# Patient Record
Sex: FEMALE | Race: WHITE | HISPANIC OR LATINO | Employment: OTHER | URBAN - METROPOLITAN AREA
[De-identification: names, ages, dates, MRNs, and addresses within clinical notes are randomized per-mention and may not be internally consistent; named-entity substitution may affect disease eponyms.]

---

## 2017-07-21 ENCOUNTER — ALLSCRIPTS OFFICE VISIT (OUTPATIENT)
Dept: OTHER | Facility: OTHER | Age: 62
End: 2017-07-21

## 2017-07-21 ENCOUNTER — LAB REQUISITION (OUTPATIENT)
Dept: LAB | Facility: HOSPITAL | Age: 62
End: 2017-07-21
Payer: COMMERCIAL

## 2017-07-21 DIAGNOSIS — R30.0 DYSURIA: ICD-10-CM

## 2017-07-21 DIAGNOSIS — Z12.31 ENCOUNTER FOR SCREENING MAMMOGRAM FOR MALIGNANT NEOPLASM OF BREAST: ICD-10-CM

## 2017-07-21 DIAGNOSIS — Z12.4 ENCOUNTER FOR SCREENING FOR MALIGNANT NEOPLASM OF CERVIX: ICD-10-CM

## 2017-07-21 DIAGNOSIS — Z78.0 ASYMPTOMATIC MENOPAUSAL STATE: ICD-10-CM

## 2017-07-21 DIAGNOSIS — Z11.51 ENCOUNTER FOR SCREENING FOR HUMAN PAPILLOMAVIRUS (HPV): ICD-10-CM

## 2017-07-21 PROCEDURE — 87086 URINE CULTURE/COLONY COUNT: CPT | Performed by: NURSE PRACTITIONER

## 2017-07-21 PROCEDURE — 87624 HPV HI-RISK TYP POOLED RSLT: CPT | Performed by: NURSE PRACTITIONER

## 2017-07-21 PROCEDURE — G0145 SCR C/V CYTO,THINLAYER,RESCR: HCPCS | Performed by: NURSE PRACTITIONER

## 2017-07-24 LAB — BACTERIA UR CULT: NORMAL

## 2017-07-26 LAB
HPV RRNA GENITAL QL NAA+PROBE: NORMAL
LAB AP GYN PRIMARY INTERPRETATION: NORMAL
Lab: NORMAL

## 2017-09-26 ENCOUNTER — GENERIC CONVERSION - ENCOUNTER (OUTPATIENT)
Dept: OTHER | Facility: OTHER | Age: 62
End: 2017-09-26

## 2018-01-12 VITALS
WEIGHT: 163 LBS | SYSTOLIC BLOOD PRESSURE: 118 MMHG | BODY MASS INDEX: 28.88 KG/M2 | HEIGHT: 63 IN | DIASTOLIC BLOOD PRESSURE: 64 MMHG

## 2018-01-13 NOTE — MISCELLANEOUS
Provider Comments  Provider Comments:   Dear Jacques Sage,    You have missed your appointment with Dr Karri Petersen on 9/26/2017  Please call our office to reschedule at [phone]      Thank you,    [facility]      Signatures   Electronically signed by : Kalyn Shipman, ; Sep 26 2017 11:22AM EST                       (Author)

## 2018-12-29 ENCOUNTER — HOSPITAL ENCOUNTER (INPATIENT)
Facility: HOSPITAL | Age: 63
LOS: 12 days | Discharge: HOME | DRG: 246 | End: 2019-01-10
Attending: EMERGENCY MEDICINE | Admitting: INTERNAL MEDICINE
Payer: COMMERCIAL

## 2018-12-29 ENCOUNTER — APPOINTMENT (EMERGENCY)
Dept: RADIOLOGY | Facility: HOSPITAL | Age: 63
DRG: 246 | End: 2018-12-29
Payer: COMMERCIAL

## 2018-12-29 DIAGNOSIS — D64.9 ANEMIA: ICD-10-CM

## 2018-12-29 DIAGNOSIS — I10 ESSENTIAL HYPERTENSION: ICD-10-CM

## 2018-12-29 DIAGNOSIS — I50.9 CONGESTIVE HEART FAILURE OF UNKNOWN ETIOLOGY (HCC): Primary | ICD-10-CM

## 2018-12-29 DIAGNOSIS — R07.9 CHEST PAIN: ICD-10-CM

## 2018-12-29 DIAGNOSIS — N18.30 STAGE 3 CHRONIC KIDNEY DISEASE (HCC): ICD-10-CM

## 2018-12-29 DIAGNOSIS — I25.119 CORONARY ARTERY DISEASE INVOLVING NATIVE CORONARY ARTERY OF NATIVE HEART WITH ANGINA PECTORIS (HCC): ICD-10-CM

## 2018-12-29 DIAGNOSIS — I21.A1 TYPE 2 MYOCARDIAL INFARCTION (HCC): ICD-10-CM

## 2018-12-29 DIAGNOSIS — J90 PLEURAL EFFUSION ON RIGHT: ICD-10-CM

## 2018-12-29 DIAGNOSIS — I50.31 ACUTE DIASTOLIC CONGESTIVE HEART FAILURE (HCC): ICD-10-CM

## 2018-12-29 DIAGNOSIS — R60.0 EDEMA OF BOTH LOWER EXTREMITIES: ICD-10-CM

## 2018-12-29 DIAGNOSIS — N28.9 KIDNEY INSUFFICIENCY: ICD-10-CM

## 2018-12-29 DIAGNOSIS — R06.01 ORTHOPNEA: ICD-10-CM

## 2018-12-29 DIAGNOSIS — E11.9 DIABETES MELLITUS (HCC): ICD-10-CM

## 2018-12-29 DIAGNOSIS — R06.00 DYSPNEA ON EXERTION: ICD-10-CM

## 2018-12-29 DIAGNOSIS — E11.9 DM (DIABETES MELLITUS) (HCC): ICD-10-CM

## 2018-12-29 DIAGNOSIS — N17.9 AKI (ACUTE KIDNEY INJURY) (HCC): ICD-10-CM

## 2018-12-29 PROBLEM — E78.5 HYPERLIPIDEMIA: Status: ACTIVE | Noted: 2018-12-29

## 2018-12-29 PROBLEM — E87.70 VOLUME OVERLOAD: Status: ACTIVE | Noted: 2018-12-29

## 2018-12-29 PROBLEM — E03.9 HYPOTHYROIDISM: Status: ACTIVE | Noted: 2018-12-29

## 2018-12-29 PROBLEM — R09.89 SUSPECTED CHF (CONGESTIVE HEART FAILURE): Status: ACTIVE | Noted: 2018-12-29

## 2018-12-29 LAB
ALBUMIN SERPL BCP-MCNC: 3 G/DL (ref 3.5–5)
ALP SERPL-CCNC: 636 U/L (ref 46–116)
ALT SERPL W P-5'-P-CCNC: 46 U/L (ref 12–78)
ANION GAP SERPL CALCULATED.3IONS-SCNC: 7 MMOL/L (ref 4–13)
AST SERPL W P-5'-P-CCNC: 48 U/L (ref 5–45)
ATRIAL RATE: 86 BPM
BACTERIA UR QL AUTO: ABNORMAL /HPF
BASOPHILS # BLD AUTO: 0.04 THOUSANDS/ΜL (ref 0–0.1)
BASOPHILS NFR BLD AUTO: 1 % (ref 0–1)
BILIRUB SERPL-MCNC: 0.3 MG/DL (ref 0.2–1)
BILIRUB UR QL STRIP: NEGATIVE
BUN SERPL-MCNC: 30 MG/DL (ref 5–25)
CALCIUM SERPL-MCNC: 8.7 MG/DL (ref 8.3–10.1)
CHLORIDE SERPL-SCNC: 112 MMOL/L (ref 100–108)
CHOLEST SERPL-MCNC: 159 MG/DL (ref 50–200)
CLARITY UR: CLEAR
CO2 SERPL-SCNC: 21 MMOL/L (ref 21–32)
COLOR UR: YELLOW
COLOR, POC: NORMAL
CREAT SERPL-MCNC: 1.44 MG/DL (ref 0.6–1.3)
EOSINOPHIL # BLD AUTO: 0.21 THOUSAND/ΜL (ref 0–0.61)
EOSINOPHIL NFR BLD AUTO: 3 % (ref 0–6)
ERYTHROCYTE [DISTWIDTH] IN BLOOD BY AUTOMATED COUNT: 13.5 % (ref 11.6–15.1)
EST. AVERAGE GLUCOSE BLD GHB EST-MCNC: 169 MG/DL
FERRITIN SERPL-MCNC: 38 NG/ML (ref 8–388)
FOLATE SERPL-MCNC: >20 NG/ML (ref 3.1–17.5)
GFR SERPL CREATININE-BSD FRML MDRD: 38 ML/MIN/1.73SQ M
GLUCOSE SERPL-MCNC: 162 MG/DL (ref 65–140)
GLUCOSE SERPL-MCNC: 187 MG/DL (ref 65–140)
GLUCOSE SERPL-MCNC: 253 MG/DL (ref 65–140)
GLUCOSE UR STRIP-MCNC: ABNORMAL MG/DL
HBA1C MFR BLD: 7.5 % (ref 4.2–6.3)
HCT VFR BLD AUTO: 28.1 % (ref 34.8–46.1)
HDLC SERPL-MCNC: 65 MG/DL (ref 40–60)
HGB BLD-MCNC: 8.4 G/DL (ref 11.5–15.4)
HGB UR QL STRIP.AUTO: ABNORMAL
HYALINE CASTS #/AREA URNS LPF: ABNORMAL /LPF
IMM GRANULOCYTES # BLD AUTO: 0.02 THOUSAND/UL (ref 0–0.2)
IMM GRANULOCYTES NFR BLD AUTO: 0 % (ref 0–2)
IRON SATN MFR SERPL: 22 %
IRON SERPL-MCNC: 68 UG/DL (ref 50–170)
KETONES UR STRIP-MCNC: NEGATIVE MG/DL
LDLC SERPL CALC-MCNC: 72 MG/DL (ref 0–100)
LEUKOCYTE ESTERASE UR QL STRIP: NEGATIVE
LIPASE SERPL-CCNC: 147 U/L (ref 73–393)
LYMPHOCYTES # BLD AUTO: 1.55 THOUSANDS/ΜL (ref 0.6–4.47)
LYMPHOCYTES NFR BLD AUTO: 23 % (ref 14–44)
MCH RBC QN AUTO: 26.8 PG (ref 26.8–34.3)
MCHC RBC AUTO-ENTMCNC: 29.9 G/DL (ref 31.4–37.4)
MCV RBC AUTO: 90 FL (ref 82–98)
MONOCYTES # BLD AUTO: 0.42 THOUSAND/ΜL (ref 0.17–1.22)
MONOCYTES NFR BLD AUTO: 6 % (ref 4–12)
NEUTROPHILS # BLD AUTO: 4.58 THOUSANDS/ΜL (ref 1.85–7.62)
NEUTS SEG NFR BLD AUTO: 67 % (ref 43–75)
NITRITE UR QL STRIP: NEGATIVE
NON-SQ EPI CELLS URNS QL MICRO: ABNORMAL /HPF
NONHDLC SERPL-MCNC: 94 MG/DL
NRBC BLD AUTO-RTO: 0 /100 WBCS
NT-PROBNP SERPL-MCNC: 8663 PG/ML
P AXIS: 92 DEGREES
PH UR STRIP.AUTO: 5.5 [PH] (ref 4.5–8)
PLATELET # BLD AUTO: 174 THOUSANDS/UL (ref 149–390)
PLATELET # BLD AUTO: 184 THOUSANDS/UL (ref 149–390)
PMV BLD AUTO: 10.1 FL (ref 8.9–12.7)
PMV BLD AUTO: 9.9 FL (ref 8.9–12.7)
POTASSIUM SERPL-SCNC: 4.2 MMOL/L (ref 3.5–5.3)
PR INTERVAL: 176 MS
PROT SERPL-MCNC: 7.5 G/DL (ref 6.4–8.2)
PROT UR STRIP-MCNC: >=300 MG/DL
QRS AXIS: 223 DEGREES
QRSD INTERVAL: 82 MS
QT INTERVAL: 356 MS
QTC INTERVAL: 426 MS
RBC # BLD AUTO: 3.13 MILLION/UL (ref 3.81–5.12)
RBC #/AREA URNS AUTO: ABNORMAL /HPF
SODIUM SERPL-SCNC: 140 MMOL/L (ref 136–145)
SP GR UR STRIP.AUTO: >=1.03 (ref 1–1.03)
T WAVE AXIS: 57 DEGREES
T4 FREE SERPL-MCNC: 0.83 NG/DL (ref 0.76–1.46)
TIBC SERPL-MCNC: 309 UG/DL (ref 250–450)
TRIGL SERPL-MCNC: 109 MG/DL
TROPONIN I SERPL-MCNC: 0.16 NG/ML
TROPONIN I SERPL-MCNC: 0.2 NG/ML
TROPONIN I SERPL-MCNC: 0.21 NG/ML
TSH SERPL DL<=0.05 MIU/L-ACNC: 6.51 UIU/ML (ref 0.36–3.74)
UROBILINOGEN UR QL STRIP.AUTO: 0.2 E.U./DL
VENTRICULAR RATE: 86 BPM
VIT B12 SERPL-MCNC: 545 PG/ML (ref 100–900)
WBC # BLD AUTO: 6.82 THOUSAND/UL (ref 4.31–10.16)
WBC #/AREA URNS AUTO: ABNORMAL /HPF

## 2018-12-29 PROCEDURE — 83540 ASSAY OF IRON: CPT | Performed by: INTERNAL MEDICINE

## 2018-12-29 PROCEDURE — 99285 EMERGENCY DEPT VISIT HI MDM: CPT

## 2018-12-29 PROCEDURE — 82948 REAGENT STRIP/BLOOD GLUCOSE: CPT

## 2018-12-29 PROCEDURE — 80061 LIPID PANEL: CPT | Performed by: PHYSICIAN ASSISTANT

## 2018-12-29 PROCEDURE — 84165 PROTEIN E-PHORESIS SERUM: CPT | Performed by: PATHOLOGY

## 2018-12-29 PROCEDURE — 83880 ASSAY OF NATRIURETIC PEPTIDE: CPT | Performed by: EMERGENCY MEDICINE

## 2018-12-29 PROCEDURE — 83036 HEMOGLOBIN GLYCOSYLATED A1C: CPT | Performed by: PHYSICIAN ASSISTANT

## 2018-12-29 PROCEDURE — 83550 IRON BINDING TEST: CPT | Performed by: INTERNAL MEDICINE

## 2018-12-29 PROCEDURE — 80053 COMPREHEN METABOLIC PANEL: CPT | Performed by: EMERGENCY MEDICINE

## 2018-12-29 PROCEDURE — 82728 ASSAY OF FERRITIN: CPT | Performed by: INTERNAL MEDICINE

## 2018-12-29 PROCEDURE — 99223 1ST HOSP IP/OBS HIGH 75: CPT | Performed by: INTERNAL MEDICINE

## 2018-12-29 PROCEDURE — 84443 ASSAY THYROID STIM HORMONE: CPT | Performed by: EMERGENCY MEDICINE

## 2018-12-29 PROCEDURE — 93010 ELECTROCARDIOGRAM REPORT: CPT | Performed by: INTERNAL MEDICINE

## 2018-12-29 PROCEDURE — 93005 ELECTROCARDIOGRAM TRACING: CPT

## 2018-12-29 PROCEDURE — 96374 THER/PROPH/DIAG INJ IV PUSH: CPT

## 2018-12-29 PROCEDURE — 85025 COMPLETE CBC W/AUTO DIFF WBC: CPT | Performed by: EMERGENCY MEDICINE

## 2018-12-29 PROCEDURE — 83690 ASSAY OF LIPASE: CPT | Performed by: EMERGENCY MEDICINE

## 2018-12-29 PROCEDURE — 36415 COLL VENOUS BLD VENIPUNCTURE: CPT | Performed by: EMERGENCY MEDICINE

## 2018-12-29 PROCEDURE — 82607 VITAMIN B-12: CPT | Performed by: INTERNAL MEDICINE

## 2018-12-29 PROCEDURE — 84484 ASSAY OF TROPONIN QUANT: CPT | Performed by: EMERGENCY MEDICINE

## 2018-12-29 PROCEDURE — 84484 ASSAY OF TROPONIN QUANT: CPT | Performed by: PHYSICIAN ASSISTANT

## 2018-12-29 PROCEDURE — 71046 X-RAY EXAM CHEST 2 VIEWS: CPT

## 2018-12-29 PROCEDURE — 85049 AUTOMATED PLATELET COUNT: CPT | Performed by: PHYSICIAN ASSISTANT

## 2018-12-29 PROCEDURE — 82746 ASSAY OF FOLIC ACID SERUM: CPT | Performed by: INTERNAL MEDICINE

## 2018-12-29 PROCEDURE — 81001 URINALYSIS AUTO W/SCOPE: CPT

## 2018-12-29 PROCEDURE — 99253 IP/OBS CNSLTJ NEW/EST LOW 45: CPT | Performed by: INTERNAL MEDICINE

## 2018-12-29 PROCEDURE — 84439 ASSAY OF FREE THYROXINE: CPT | Performed by: EMERGENCY MEDICINE

## 2018-12-29 RX ORDER — FUROSEMIDE 10 MG/ML
40 INJECTION INTRAMUSCULAR; INTRAVENOUS
Status: DISCONTINUED | OUTPATIENT
Start: 2018-12-29 | End: 2019-01-01

## 2018-12-29 RX ORDER — NITROGLYCERIN 20 MG/100ML
INJECTION INTRAVENOUS
Status: COMPLETED
Start: 2018-12-29 | End: 2018-12-29

## 2018-12-29 RX ORDER — FUROSEMIDE 10 MG/ML
20 INJECTION INTRAMUSCULAR; INTRAVENOUS ONCE
Status: COMPLETED | OUTPATIENT
Start: 2018-12-29 | End: 2018-12-29

## 2018-12-29 RX ORDER — GABAPENTIN 100 MG/1
100 CAPSULE ORAL
COMMUNITY
Start: 2018-12-28 | End: 2020-02-19

## 2018-12-29 RX ORDER — AMLODIPINE BESYLATE 5 MG/1
5 TABLET ORAL DAILY
Status: DISCONTINUED | OUTPATIENT
Start: 2018-12-29 | End: 2018-12-30

## 2018-12-29 RX ORDER — LEVOTHYROXINE SODIUM 112 UG/1
112 TABLET ORAL
Status: DISCONTINUED | OUTPATIENT
Start: 2018-12-30 | End: 2018-12-29

## 2018-12-29 RX ORDER — HEPARIN SODIUM 5000 [USP'U]/ML
5000 INJECTION, SOLUTION INTRAVENOUS; SUBCUTANEOUS EVERY 8 HOURS SCHEDULED
Status: DISCONTINUED | OUTPATIENT
Start: 2018-12-29 | End: 2019-01-06

## 2018-12-29 RX ORDER — REPAGLINIDE 0.5 MG/1
0.5 TABLET ORAL
Status: ON HOLD | COMMUNITY
Start: 2018-10-11 | End: 2019-01-10

## 2018-12-29 RX ORDER — ATORVASTATIN CALCIUM 40 MG/1
40 TABLET, FILM COATED ORAL
Status: DISCONTINUED | OUTPATIENT
Start: 2018-12-29 | End: 2019-01-10 | Stop reason: HOSPADM

## 2018-12-29 RX ORDER — LEVOTHYROXINE SODIUM 112 UG/1
56 TABLET ORAL
Status: DISCONTINUED | OUTPATIENT
Start: 2018-12-30 | End: 2019-01-10 | Stop reason: HOSPADM

## 2018-12-29 RX ORDER — ONDANSETRON 2 MG/ML
4 INJECTION INTRAMUSCULAR; INTRAVENOUS EVERY 6 HOURS PRN
Status: DISCONTINUED | OUTPATIENT
Start: 2018-12-29 | End: 2019-01-10 | Stop reason: HOSPADM

## 2018-12-29 RX ORDER — FUROSEMIDE 10 MG/ML
20 INJECTION INTRAMUSCULAR; INTRAVENOUS ONCE
Status: CANCELLED | OUTPATIENT
Start: 2018-12-29

## 2018-12-29 RX ORDER — ATORVASTATIN CALCIUM 40 MG/1
40 TABLET, FILM COATED ORAL DAILY
COMMUNITY
Start: 2018-08-30 | End: 2019-02-01 | Stop reason: SDUPTHER

## 2018-12-29 RX ORDER — LEVOTHYROXINE SODIUM 112 UG/1
TABLET ORAL
Status: ON HOLD | COMMUNITY
Start: 2018-10-11 | End: 2021-02-27 | Stop reason: SDUPTHER

## 2018-12-29 RX ORDER — LEVOTHYROXINE SODIUM 112 UG/1
112 TABLET ORAL
Status: DISCONTINUED | OUTPATIENT
Start: 2018-12-31 | End: 2019-01-10 | Stop reason: HOSPADM

## 2018-12-29 RX ORDER — LISINOPRIL 5 MG/1
5 TABLET ORAL
COMMUNITY
Start: 2018-08-30 | End: 2019-01-10 | Stop reason: HOSPADM

## 2018-12-29 RX ORDER — NITROGLYCERIN 20 MG/100ML
5-200 INJECTION INTRAVENOUS
Status: DISCONTINUED | OUTPATIENT
Start: 2018-12-29 | End: 2018-12-30

## 2018-12-29 RX ORDER — DOCUSATE SODIUM 100 MG/1
100 CAPSULE, LIQUID FILLED ORAL 2 TIMES DAILY
Status: DISCONTINUED | OUTPATIENT
Start: 2018-12-29 | End: 2019-01-10 | Stop reason: HOSPADM

## 2018-12-29 RX ORDER — ACETAMINOPHEN 325 MG/1
650 TABLET ORAL EVERY 6 HOURS PRN
Status: DISCONTINUED | OUTPATIENT
Start: 2018-12-29 | End: 2019-01-10 | Stop reason: HOSPADM

## 2018-12-29 RX ORDER — FUROSEMIDE 10 MG/ML
40 INJECTION INTRAMUSCULAR; INTRAVENOUS DAILY
Status: CANCELLED | OUTPATIENT
Start: 2018-12-30

## 2018-12-29 RX ADMIN — FUROSEMIDE 20 MG: 10 INJECTION, SOLUTION INTRAMUSCULAR; INTRAVENOUS at 10:40

## 2018-12-29 RX ADMIN — HEPARIN SODIUM 5000 UNITS: 5000 INJECTION INTRAVENOUS; SUBCUTANEOUS at 21:41

## 2018-12-29 RX ADMIN — NITROGLYCERIN 1 INCH: 20 OINTMENT TOPICAL at 11:01

## 2018-12-29 RX ADMIN — ACETAMINOPHEN 650 MG: 325 TABLET, FILM COATED ORAL at 20:39

## 2018-12-29 RX ADMIN — NITROGLYCERIN 30 MCG/MIN: 20 INJECTION INTRAVENOUS at 14:52

## 2018-12-29 RX ADMIN — AMLODIPINE BESYLATE 5 MG: 5 TABLET ORAL at 16:23

## 2018-12-29 RX ADMIN — HEPARIN SODIUM 5000 UNITS: 5000 INJECTION INTRAVENOUS; SUBCUTANEOUS at 16:23

## 2018-12-29 RX ADMIN — INSULIN LISPRO 1 UNITS: 100 INJECTION, SOLUTION INTRAVENOUS; SUBCUTANEOUS at 21:42

## 2018-12-29 RX ADMIN — INSULIN LISPRO 2 UNITS: 100 INJECTION, SOLUTION INTRAVENOUS; SUBCUTANEOUS at 17:38

## 2018-12-29 RX ADMIN — FUROSEMIDE 40 MG: 10 INJECTION, SOLUTION INTRAMUSCULAR; INTRAVENOUS at 20:39

## 2018-12-29 NOTE — ASSESSMENT & PLAN NOTE
· With proteinuria  · Worked up at [facility] in the past  · Baseline appears to be around 1 4  · Avoid nephrotoxins if possible

## 2018-12-29 NOTE — H&P
H&P- Chris Clayton 1955, 58 y o  female MRN: [id number]    Unit/Bed#: ED 14 Encounter: [id number]    Primary Care Provider: Ericka Reese MD   Date and time admitted to hospital: 12/29/2018  9:02 AM        CHF (congestive heart failure) [facility])   Assessment & Plan    Suspect CHF  ProBNP 8,873 Patient has noticed swelling in ankles accumulating over 3 weeks associated with 4 lb weight gain  Demonstrates orthopnea and cough in supine position  Patient states she has never received an echo in the past or seen a cardiologist   Will admit with telemetry, will perform echo assessing ventricular function  Will obtain cardiology consult  * Chest pain   Assessment & Plan    · Patient's states has been worsening over the time of 3 weeks, associated with cough and orthopnea  Worse with lying down and exertion  Has not experienced before  Possible related to volume overload    · EKG normal sinus rhythm no acute ischemic changes  · Troponin 1 elevated at 0 16  Will trend  · Will monitor on telemetry  Acute kidney injury ([address] )   Assessment & Plan    · Patient's creatinine 1 44  Recently patient's creatinine has been 1 04  Consider likely fluid overload  · Hold nephrotoxic medications, including patient's lisinopril  · Patient with diabetes, will monitor blood sugar and perform hemoglobin A1c  · We will continue to monitor with ongoing diuresis  Repeat BMP in a m  Hyperlipidemia   Assessment & Plan    Patient on atorvastatin 40 mg every day  Will check lipid panel  Hypertension   Assessment & Plan    Patient's blood pressures have been high this admission, but the patient mentions that she saw PCP yesterday and blood pressures were normal   Outpatient, she Takes lisinopril 5 mg the acute QD  Will hold lisinopril for now since patient's creatinine is 1 44  Will monitor with ongoing diuresis  Hypothyroidism   Assessment & Plan    · Patient sees Endocrinology outpatient    Takes levothyroxine 0 112 mcg daily  · T4 were measured on admission  Within normal limits  Will continue levothyroxine  · Will recommend follow-up with outpatient endocrinologist upon discharge  DM (diabetes mellitus) ([facility] [address] )   Assessment & Plan    Lab Results   Component Value Date    HGBA1C 8 7 (H) 10/07/2015       No results for input(s): POCGLU in the last 72 hours  Blood Sugar Average: Last 72 hrs:  · Patient sees Endocrinology outpatient  Takes rapid denied 0 5 mg p o  B i d  Takes Januvia 50 mg p o  Daily  Patient states she has never taken insulin  She monitors her sugars twice daily  Normal results 160  · While hospitalized, patient will be managed on weight based insulin dosing  Will monitor             VTE Prophylaxis: Heparin  / sequential compression device   Code Status:  Full code  POLST: There is no POLST form on file for this patient (pre-hospital)  Discussion with family:  Patient's daughter and son-in-law present in the room  All questions answered  Anticipated Length of Stay:  Patient will be admitted on an Inpatient basis with an anticipated length of stay of  less than 2 midnights  Justification for Hospital Stay:  Elevated troponin and evaluation of new onset heart failure    Total Time for Visit, including Counseling / Coordination of Care: 30 minutes  Greater than 50% of this total time spent on direct patient counseling and coordination of care  Chief Complaint:   Chest pain associated with cough when lying down for 3 weeks    History of Present Illness:    Mady Wild is a 58 y o  female past medical history of hypertension, diabetes type 2, hypothyroidism and hyperlipidemia who presents with chest pain and shortness of breath which has gradually worsened over the 3 weeks  She states that she will become short of breath after walking only 5-10 minutes    States breathing is worse when she tries to lie down at night which also produces a cough and wheezing though she does not bring anything up  She has also noted swelling in her ankles during this time  Denies diet or medication changes  Describes chest pain as sub sternal, does not radiate  Worsens when she lies down in bed at night  She has not experienced chest pain during the day and does not currently experience chest pain  She states she has never seen a cardiologist and has never received an echocardiogram   Her only other condition have been well managed by an endocrinologist and PCP  She saw her PCP outpatient yesterday regarding the worsening chest pain  States he recommended an outpatient EKG however that evening the cough, difficulty breathing, headache, and chest pain worsened to the extent that she decided to come to the ER to be evaluated  Review of Systems:    Review of Systems   Constitutional: Positive for fatigue (Mild) and unexpected weight change (4 lb)  Negative for activity change, appetite change, chills and diaphoresis  HENT: Negative for congestion, facial swelling, nosebleeds, rhinorrhea, sinus pressure, sneezing, sore throat, tinnitus, trouble swallowing and voice change  Eyes: Negative for pain, discharge and visual disturbance  Respiratory: Positive for cough, shortness of breath and wheezing  Negative for choking and chest tightness  Cardiovascular: Positive for chest pain and leg swelling  Negative for palpitations  Gastrointestinal: Negative for abdominal distention, abdominal pain, anal bleeding, blood in stool, constipation, diarrhea, nausea and vomiting  Endocrine: Negative for cold intolerance and heat intolerance  Genitourinary: Negative for difficulty urinating and frequency  Musculoskeletal: Negative for arthralgias and back pain  Skin: Negative for color change, pallor, rash and wound  Allergic/Immunologic: Negative for environmental allergies and food allergies  Neurological: Positive for headaches (Mild, pounding)   Negative for dizziness and light-headedness  Psychiatric/Behavioral: Negative for agitation, behavioral problems and confusion  Past Medical and Surgical History:     Past Medical History:   Diagnosis Date    Diabetes mellitus ([address] )     Hypothyroidism        History reviewed  No pertinent surgical history  Meds/Allergies:    Prior to Admission medications    Medication Sig Start Date End Date Taking? Authorizing Provider   atorvastatin (LIPITOR) 40 mg tablet Take 40 mg by mouth 18 Yes Historical Provider, MD   gabapentin (NEURONTIN) 100 mg capsule Take 100 mg by mouth 18 Yes Historical Provider, MD   glucose blood test strip 3 times daily 18  Yes Historical Provider, MD   levothyroxine 112 mcg tablet 1 tab daily x 6 day and half pill on sundays 10/11/18  Yes Historical Provider, MD   lisinopril (ZESTRIL) 5 mg tablet Take 5 mg by mouth 18 Yes Historical Provider, MD   repaglinide (PRANDIN) 0 5 mg tablet Take 0 5 mg by mouth 10/11/18 10/11/19 Yes Historical Provider, MD   sitaGLIPtin (JANUVIA) 50 mg tablet Take 50 mg by mouth 18 Yes Historical Provider, MD     I have reviewed home medications with patient personally  Allergies: No Known Allergies    Social History:     Marital Status: /Civil Union   Occupation:  parttime  Patient Pre-hospital Living Situation:  Independent  Patient Pre-hospital Level of Mobility:  Fully mobile independent with ADLs    Patient Pre-hospital Diet Restrictions:  None  Substance Use History:   History   Alcohol Use No     History   Smoking Status    Former Smoker   Smokeless Tobacco    Never Used     History   Drug Use No       Family History:    Father  of MI at age 80, Mother with DMII, brother with lung condition-- uncertain    Physical Exam:     Vitals:   Blood Pressure: (!) 194/87 (18 1210)  Pulse: 77 (18 1210)  Temperature: 98 3 °F (36 8 °C) (18 0900)  Temp Source: Tympanic (18 0900)  Respirations: 18 (12/29/18 1210)  Height: 5' 3" (160 cm) (12/29/18 0900)  Weight - Scale: 74 8 kg (165 lb) (12/29/18 0900)  SpO2: 98 % (12/29/18 1210)    Physical Exam   Constitutional: She is oriented to person, place, and time  She appears well-developed and well-nourished  No distress  HENT:   Head: Normocephalic and atraumatic  Eyes: Right eye exhibits no discharge  Left eye exhibits no discharge  No scleral icterus  Neck: No tracheal deviation present  No thyromegaly present  Cardiovascular: Normal rate and regular rhythm  Exam reveals no gallop and no friction rub  No murmur heard  Pulmonary/Chest: Effort normal  She exhibits no tenderness  Lung sounds distant, no discernible wheezes, crackles, or rales   Abdominal: Soft  Bowel sounds are normal  She exhibits no distension  There is no tenderness  There is no rebound  Musculoskeletal: She exhibits edema (1+ pitting edema lower extremities bilaterally)  She exhibits no tenderness or deformity  Neurological: She is alert and oriented to person, place, and time  Skin: Skin is warm and dry  No rash noted  She is not diaphoretic  No erythema  Acanthosis nigricans noted on neck   Psychiatric: She has a normal mood and affect  Her behavior is normal    Vitals reviewed  Additional Data:     Lab Results: I have personally reviewed pertinent reports          Results from last 7 days  Lab Units 12/29/18  0936   WBC Thousand/uL 6 82   HEMOGLOBIN g/dL 8 4*   HEMATOCRIT % 28 1*   PLATELETS Thousands/uL 184   NEUTROS PCT % 67   LYMPHS PCT % 23   MONOS PCT % 6   EOS PCT % 3       Results from last 7 days  Lab Units 12/29/18  0936   SODIUM mmol/L 140   POTASSIUM mmol/L 4 2   CHLORIDE mmol/L 112*   CO2 mmol/L 21   BUN mg/dL 30*   CREATININE mg/dL 1 44*   ANION GAP mmol/L 7   CALCIUM mg/dL 8 7   ALBUMIN g/dL 3 0*   TOTAL BILIRUBIN mg/dL 0 30   ALK PHOS U/L 636*   ALT U/L 46   AST U/L 48*   GLUCOSE RANDOM mg/dL 187*                       Imaging: I have personally reviewed pertinent reports  XR chest 2 views   ED Interpretation by Brenda Roldan DO (12/29 1021)   Cardiomegaly with R sided pleural effusion          EKG, Pathology, and Other Studies Reviewed on Admission:   · EKG:  Normal sinus rhythm, no acute ischemic changes    Allscripts / Epic Records Reviewed: Yes     ** Please Note: This note has been constructed using a voice recognition system   **

## 2018-12-29 NOTE — ED NOTES
Nitro paste on her left arm removed prior to tridil infusion starting      Italo Root RN  12/29/18 7010

## 2018-12-29 NOTE — ASSESSMENT & PLAN NOTE
Uncontrolled  Continue amlodipine, diuresis  Start beta-blocker prior to discharge  Restart lisinopril when renal function stabilizes

## 2018-12-29 NOTE — ASSESSMENT & PLAN NOTE
· Likely non STEMI secondary to congestive heart failure    · Discussed with Cardiology, for stress test tomorrow

## 2018-12-29 NOTE — LETTER
[address] [address]  Dept: [phone]    January 3, 2019     Patient: Genesis Haddad   YOB: 1955   Date of Visit: 12/29/2018       To Whom it May Concern:    Genesis Haddad is under my professional care  She was seen in the hospital from 12/29/2018   to 01/03/19  She remains hospitalized at this time  If you have any questions or concerns, please don't hesitate to call           Sincerely,          Rickey Beltre, DO

## 2018-12-29 NOTE — CONSULTS
Cardiology   Andria Cat 58 y o  female MRN: [id number]  Unit/Bed#: ED 15 Encounter: [id number]      Reason for Consult / Principal Problem:  Shortness of breath    Physician Requesting Consult:  Stacie Pimentel MD    Cardiologist:  None        Assessment/Plan:    >> Shortness of breath: :  Likely due to congestive heart failure,  Will check echocardiogram,  Diuresis with Lasix IV b i d ,  Daily weights and strict intake and output charting  Salt restricted and fluid-restricted diet  Trend creatinine daily  Better blood pressure control    >> Eelevated troponin:  Likely type 2 MI due subendocardial ischemia from increased transmyocardial gradient  Continue monitor on telemetry and trend troponins until trending downward  If chest pain recurs repeat ECG    >> Hypertension:  Will begin nitroglycerin infusion, this will help with blood pressure and congestive heart failure, Lasix 40 mg IV b i d  And will add amlodipine 5 mg p o  Once her acute kidney injury resolves can resume her lisinopril although it will need to be at a higher dose  >> Acute kidney injury:  Likely cardiorenal, possible underlying CKD, continue to trend and monitor urine output      CC:  Shortness of breath    HPI: Andria Cat 58y o  year old female who presents with shortness of breath that has been going on for the last 3-4 weeks, it has been progressively getting worse  Prior to this she was in her usual state of health  She has no medical history other than diabetes and hypertension, has been on lisinopril 5 mg daily which she claims that she is compliant with  She is not compliant with any particular diet  Eats a lot of salty food  She has been having orthopnea, PND and pedal edema for the last 3-4 weeks, it has been worsening in frequency    Finally this morning she had severe shortness of breath and asked her daughter to bring her into the hospital   She does admit to having some chest pain, but this is always associated with shortness of breath, it is central in location and feels pressure-like, it is 5 to 6/10 in intensity, it is not aggravated by breathing or any other position  It does not radiate anywhere, it resolves when her shortness of breath improves  She does not have any prior history of cardiac disease  Her father had a myocardial infarction  Denies  palpitations, orthopnea, syncope, presyncope, diaphoresis, nausea/vomiting     Remainder of ROS done and negative    Telemetry:  Sinus rhythm, no evidence of ischemia    Weight:  74 8 kg  Chest x-ray shows bilateral congestion, right-sided pleural effusion        Family History:   Family History   Problem Relation Age of Onset    Diabetes Mother     Heart attack Father         MI    Hypertension Brother      Historical Information   Past Medical History:   Diagnosis Date    Diabetes mellitus ([facility] [address] )     Hypothyroidism      History reviewed  No pertinent surgical history    Social History   History   Alcohol Use No     History   Drug Use No     History   Smoking Status    Former Smoker   Smokeless Tobacco    Never Used     Family History:   Family History   Problem Relation Age of Onset    Diabetes Mother     Heart attack Father         MI    Hypertension Brother        Review of Systems:  Review of Systems        Scheduled Meds:  Current Facility-Administered Medications:  nitroGLYcerin       amLODIPine 5 mg Oral Daily Ricco Abarca MD   furosemide 40 mg Intravenous BID (diuretic) Caryl Cronin MD   nitroGLYcerin 5-200 mcg/min Intravenous Titrated Ricco Abarca MD     Continuous Infusions:  nitroGLYcerin 5-200 mcg/min     PRN Meds:   current meds:   Current Facility-Administered Medications   Medication Dose Route Frequency    nitroGLYcerin (TRIDIL) 50 mg in 250 mL infusion (premix) **ADS Override Pull**        amLODIPine (NORVASC) tablet 5 mg  5 mg Oral Daily    furosemide (LASIX) injection 40 mg  40 mg Intravenous BID (diuretic)    nitroGLYcerin (TRIDIL) 50 mg in 250 mL infusion (premix)  5-200 mcg/min Intravenous Titrated       No Known Allergies    Objective   Vitals: Blood pressure (!) 221/97, pulse 78, temperature 98 3 °F (36 8 °C), temperature source Tympanic, resp  rate 18, height 5' 3" (1 6 m), weight 74 8 kg (165 lb), SpO2 96 %  , Body mass index is 29 23 kg/m² , Orthostatic Blood Pressures      Most Recent Value   Blood Pressure   221/97 filed at 12/29/2018 1441   Patient Position - Orthostatic VS  Lying filed at 12/29/2018 1441          No intake or output data in the 24 hours ending 12/29/18 1443    Invasive Devices     Peripheral Intravenous Line            Peripheral IV 12/29/18 Right Wrist less than 1 day                Physical Exam:    General:  AO x3, no acute distress  Cardiac:  S1-S2 normal  No murmurs, rubs or gallops, JVP: elevated to angle of jaw  Lungs:  Clear to auscultation bilaterally, no wheezing or crackles    Abdomen:  Soft nontender nondistended, positive bowel sounds  Extremities:  Warm, well perfused, pulses palpable, no ulcers or rashes, no pedal edema, bilateral pitting edema  Neuro: Grossly nonfocal  Psych:  Normal affect      Lab Results:   Recent Results (from the past 24 hour(s))   CBC and differential    Collection Time: 12/29/18  9:36 AM   Result Value Ref Range    WBC 6 82 4 31 - 10 16 Thousand/uL    RBC 3 13 (L) 3 81 - 5 12 Million/uL    Hemoglobin 8 4 (L) 11 5 - 15 4 g/dL    Hematocrit 28 1 (L) 34 8 - 46 1 %    MCV 90 82 - 98 fL    MCH 26 8 26 8 - 34 3 pg    MCHC 29 9 (L) 31 4 - 37 4 g/dL    RDW 13 5 11 6 - 15 1 %    MPV 10 1 8 9 - 12 7 fL    Platelets 582 530 - 741 Thousands/uL    nRBC 0 /100 WBCs    Neutrophils Relative 67 43 - 75 %    Immat GRANS % 0 0 - 2 %    Lymphocytes Relative 23 14 - 44 %    Monocytes Relative 6 4 - 12 %    Eosinophils Relative 3 0 - 6 %    Basophils Relative 1 0 - 1 %    Neutrophils Absolute 4 58 1 85 - 7 62 Thousands/µL    Immature Grans Absolute 0 02 0 00 - 0 20 Thousand/uL Lymphocytes Absolute 1 55 0 60 - 4 47 Thousands/µL    Monocytes Absolute 0 42 0 17 - 1 22 Thousand/µL    Eosinophils Absolute 0 21 0 00 - 0 61 Thousand/µL    Basophils Absolute 0 04 0 00 - 0 10 Thousands/µL   Comprehensive metabolic panel    Collection Time: 12/29/18  9:36 AM   Result Value Ref Range    Sodium 140 136 - 145 mmol/L    Potassium 4 2 3 5 - 5 3 mmol/L    Chloride 112 (H) 100 - 108 mmol/L    CO2 21 21 - 32 mmol/L    ANION GAP 7 4 - 13 mmol/L    BUN 30 (H) 5 - 25 mg/dL    Creatinine 1 44 (H) 0 60 - 1 30 mg/dL    Glucose 187 (H) 65 - 140 mg/dL    Calcium 8 7 8 3 - 10 1 mg/dL    AST 48 (H) 5 - 45 U/L    ALT 46 12 - 78 U/L    Alkaline Phosphatase 636 (H) 46 - 116 U/L    Total Protein 7 5 6 4 - 8 2 g/dL    Albumin 3 0 (L) 3 5 - 5 0 g/dL    Total Bilirubin 0 30 0 20 - 1 00 mg/dL    eGFR 38 ml/min/1 73sq m   TSH, 3rd generation with Free T4 reflex    Collection Time: 12/29/18  9:36 AM   Result Value Ref Range    TSH 3RD GENERATON 6 510 (H) 0 358 - 3 740 uIU/mL   Troponin I    Collection Time: 12/29/18  9:36 AM   Result Value Ref Range    Troponin I 0 16 (H) <=0 04 ng/mL   B-type natriuretic peptide    Collection Time: 12/29/18  9:36 AM   Result Value Ref Range    NT-proBNP 8,663 (H) <125 pg/mL   Lipase    Collection Time: 12/29/18  9:36 AM   Result Value Ref Range    Lipase 147 73 - 393 u/L   T4, free    Collection Time: 12/29/18  9:36 AM   Result Value Ref Range    Free T4 0 83 0 76 - 1 46 ng/dL   POCT urinalysis dipstick    Collection Time: 12/29/18 10:39 AM   Result Value Ref Range    Color, UA see results    ED Urine Macroscopic    Collection Time: 12/29/18 10:39 AM   Result Value Ref Range    Color, UA Yellow     Clarity, UA Clear     pH, UA 5 5 4 5 - 8 0    Leukocytes, UA Negative Negative    Nitrite, UA Negative Negative    Protein, UA >=300 (A) Negative mg/dl    Glucose,  (1/10%) (A) Negative mg/dl    Ketones, UA Negative Negative mg/dl    Urobilinogen, UA 0 2 0 2, 1 0 E U /dl E U /dl Bilirubin, UA Negative Negative    Blood, UA Moderate (A) Negative    Specific Gravity, UA >=1 030 1 003 - 1 030   Urine Microscopic    Collection Time: 12/29/18 10:39 AM   Result Value Ref Range    RBC, UA 2-4 (A) None Seen, 0-5 /hpf    WBC, UA 2-4 (A) None Seen, 0-5, 5-55, 5-65 /hpf    Epithelial Cells None Seen None Seen, Occasional /hpf    Bacteria, UA None Seen None Seen, Occasional /hpf    Hyaline Casts, UA None Seen None Seen /lpf   Troponin I    Collection Time: 12/29/18 12:34 PM   Result Value Ref Range    Troponin I 0 20 (H) <=0 04 ng/mL       Imaging: I have personally reviewed pertinent reports

## 2018-12-29 NOTE — ASSESSMENT & PLAN NOTE
· Patient's creatinine 1 44  Recently patient's creatinine has been 1 04  Consider likely fluid overload  · Hold nephrotoxic medications, including patient's lisinopril  · Patient with diabetes, will monitor blood sugar and perform hemoglobin A1c  · We will continue to monitor with ongoing diuresis  Repeat BMP in a m

## 2018-12-29 NOTE — ASSESSMENT & PLAN NOTE
· Patient's states has been worsening over that time of 3 weeks, associated with cough and orthopnea  Worse with lying down and exertion  Has not experienced before  Possible related to volume overload    · EKG normal sinus rhythm no acute ischemic changes  · Troponin 1 elevated at 0 16  Will trend  · Will monitor on telemetry

## 2018-12-29 NOTE — ED ATTENDING ATTESTATION
Ortega Camarena MD, saw and evaluated the patient  I have discussed the patient with the resident/non-physician practitioner and agree with the resident's/non-physician practitioner's findings, Plan of Care, and MDM as documented in the resident's/non-physician practitioner's note, except where noted  All available labs and Radiology studies were reviewed  At this point I agree with the current assessment done in the Emergency Department  I have conducted an independent evaluation of this patient including a focused history and a physical exam     79-year-old female, presenting to the emergency department for evaluation of shortness of breath and chest pain  Patient reports that she has been having 3 weeks of worsening shortness of breath  Patient reports some dyspnea on exertion  Patient reports increasing orthopnea  Patient has had an 8 lb weight gain  Patient has noted bilateral lower extremity edema  Patient had an episode of chest pain that began last night while she was laying down flat  Pain has quickly resolved  No lightheadedness, fever, cough, abdominal pain, nausea, vomiting, diarrhea  Ten systems reviewed negative except as noted in the history of present illness  The patient is resting comfortably on a stretcher in no acute respiratory distress  The patient appears nontoxic  HEENT reveals moist mucous membranes  Head is normocephalic and atraumatic  Conjunctiva and sclera are normal  Neck is nontender and supple with full range of motion to flexion, extension, lateral rotation  No meningismus appreciated  No masses are appreciated  Diminished breath sounds bilateral bases  Heart is regular rate and rhythm without any murmurs, rubs or gallops  Abdomen is soft and nontender without any rebound or guarding  Extremities appear grossly normal without any significant arthropathy    1+ bilateral lower extremity pitting edema without any significant calf tenderness and negative Homans signs bilaterally  Patient is awake, alert, and oriented x3  The patient has normal interaction  Motor is 5 out of 5  Assessment and plan:  69-year-old female presenting to the emergency department for evaluation of shortness of breath and chest pain  Symptoms concerning for worsening CHF  Cardiac evaluation, bedside echo, chest x-ray      Labs Reviewed   CBC AND DIFFERENTIAL - Abnormal        Result Value Ref Range Status    WBC 6 82  4 31 - 10 16 Thousand/uL Final    RBC 3 13 (*) 3 81 - 5 12 Million/uL Final    Hemoglobin 8 4 (*) 11 5 - 15 4 g/dL Final    Hematocrit 28 1 (*) 34 8 - 46 1 % Final    MCV 90  82 - 98 fL Final    MCH 26 8  26 8 - 34 3 pg Final    MCHC 29 9 (*) 31 4 - 37 4 g/dL Final    RDW 13 5  11 6 - 15 1 % Final    MPV 10 1  8 9 - 12 7 fL Final    Platelets 014  168 - 390 Thousands/uL Final    nRBC 0  /100 WBCs Final    Neutrophils Relative 67  43 - 75 % Final    Immat GRANS % 0  0 - 2 % Final    Lymphocytes Relative 23  14 - 44 % Final    Monocytes Relative 6  4 - 12 % Final    Eosinophils Relative 3  0 - 6 % Final    Basophils Relative 1  0 - 1 % Final    Neutrophils Absolute 4 58  1 85 - 7 62 Thousands/µL Final    Immature Grans Absolute 0 02  0 00 - 0 20 Thousand/uL Final    Lymphocytes Absolute 1 55  0 60 - 4 47 Thousands/µL Final    Monocytes Absolute 0 42  0 17 - 1 22 Thousand/µL Final    Eosinophils Absolute 0 21  0 00 - 0 61 Thousand/µL Final    Basophils Absolute 0 04  0 00 - 0 10 Thousands/µL Final   COMPREHENSIVE METABOLIC PANEL - Abnormal     Sodium 140  136 - 145 mmol/L Final    Potassium 4 2  3 5 - 5 3 mmol/L Final    Chloride 112 (*) 100 - 108 mmol/L Final    CO2 21  21 - 32 mmol/L Final    ANION GAP 7  4 - 13 mmol/L Final    BUN 30 (*) 5 - 25 mg/dL Final    Creatinine 1 44 (*) 0 60 - 1 30 mg/dL Final    Comment: Standardized to IDMS reference method    Glucose 187 (*) 65 - 140 mg/dL Final    Comment:   If the patient is fasting, the ADA then defines impaired fasting glucose as > 100 mg/dL and diabetes as > or equal to 123 mg/dL  Specimen collection should occur prior to Sulfasalazine administration due to the potential for falsely depressed results  Specimen collection should occur prior to Sulfapyridine administration due to the potential for falsely elevated results  Calcium 8 7  8 3 - 10 1 mg/dL Final    AST 48 (*) 5 - 45 U/L Final    Comment:   Specimen collection should occur prior to Sulfasalazine administration due to the potential for falsely depressed results  ALT 46  12 - 78 U/L Final    Comment:   Specimen collection should occur prior to Sulfasalazine and/or Sulfapyridine administration due to the potential for falsely depressed results  Alkaline Phosphatase 636 (*) 46 - 116 U/L Final    Total Protein 7 5  6 4 - 8 2 g/dL Final    Albumin 3 0 (*) 3 5 - 5 0 g/dL Final    Total Bilirubin 0 30  0 20 - 1 00 mg/dL Final    eGFR 38  ml/min/1 73sq m Final    Narrative:     National Kidney Disease Education Program recommendations are as follows:  GFR calculation is accurate only with a steady state creatinine  Chronic Kidney disease less than 60 ml/min/1 73 sq  meters  Kidney failure less than 15 ml/min/1 73 sq  meters  TSH, 3RD GENERATION WITH FREE T4 REFLEX - Abnormal     TSH 3RD GENERATON 6 510 (*) 0 358 - 3 740 uIU/mL Final    Comment: Using supplements with high doses of biotin 20 to more than 300 times greater than the adequate daily intake for adults of 30 mcg/day as established by the [place] [employer], can cause falsely depress results  Narrative:     Patients undergoing fluorescein dye angiography may retain small amounts of fluorescein in the body for 48-72 hours post procedure  Samples containing fluorescein can produce falsely depressed TSH values  If the patient had this procedure,a specimen should be resubmitted post fluorescein clearance            The recommended reference ranges for TSH during pregnancy are as follows:  First trimester 0 1 to 2 5 uIU/mL  Second trimester  0 2 to 3 0 uIU/mL  Third trimester 0 3 to 3 0 uIU/m     TROPONIN I - Abnormal     Troponin I 0 16 (*) <=0 04 ng/mL Final    Comment:   [employer] Chemistry analyzer 99% cutoff is > 0 04 ng/mL in network labs     o cTnI 99% cutoff is useful only when applied to patients in the clinical setting of myocardial ischemia   o cTnI 99% cutoff should be interpreted in the context of clinical history, ECG findings and possibly cardiac imaging to establish correct diagnosis  o cTnI 99% cutoff may be suggestive but clearly not indicative of a coronary event without the clinical setting of myocardial ischemia  Results indicate test should be repeated on new specimen collected within 4-6 hours of the original   NT-BNP PRO (BRAIN NATRIURETIC PEPTIDE) - Abnormal     NT-proBNP 2,445 (*) <125 pg/mL Final   LIPASE - Normal    Lipase 147  73 - 393 u/L Final   T4, FREE - Normal    Free T4 0 83  0 76 - 1 46 ng/dL Final    Comment:   Specimen collection should occur prior to Sulfasalazine administration due to the potential for falsely elevated results  POCT URINALYSIS DIPSTICK       XR chest 2 views   ED Interpretation   Cardiomegaly with R sided pleural effusion          Bedside ultrasound demonstrated the mild left ventricular chamber enlargement and diminished ejection fraction in the 45-50% range  Given the elevated BNP and elevated troponin, patient will be admitted for further evaluation including probable inpatient echocardiogram and further cardiac evaluation

## 2018-12-29 NOTE — ASSESSMENT & PLAN NOTE
· Patient sees Endocrinology outpatient  Takes levothyroxine 0 112 mcg daily  · T4 were measured on admission  Within normal limits  Will continue levothyroxine  · Will recommend follow-up with outpatient endocrinologist upon discharge

## 2018-12-29 NOTE — LETTER
[address] [place] [address]  Dept: [phone]    January 10, 2019     Patient: Aristides Barroso   YOB: 1955   Date of Visit: 12/29/2018       To Whom it May Concern:    Aristides Barroso is under my professional care  She was seen in the hospital from 12/29/2018   to 01/10/19  Please excuse her from her duties for the next two weeks until she is evaluated by her cardiologist  At that point he will determine whether she can return to work  If you have any questions or concerns, please don't hesitate to call           Sincerely,          Camden Tenorio MD

## 2018-12-29 NOTE — PLAN OF CARE
Problem: PAIN - ADULT  Goal: Verbalizes/displays adequate comfort level or baseline comfort level  Interventions:  - Encourage patient to monitor pain and request assistance  - Assess pain using appropriate pain scale  - Administer analgesics based on type and severity of pain and evaluate response  - Implement non-pharmacological measures as appropriate and evaluate response  - Consider cultural and social influences on pain and pain management  - Notify physician/advanced practitioner if interventions unsuccessful or patient reports new pain  Outcome: Progressing      Problem: SAFETY ADULT  Goal: Patient will remain free of falls  INTERVENTIONS:  - Assess patient frequently for physical needs  -  Identify cognitive and physical deficits and behaviors that affect risk of falls    -  [place] fall precautions as indicated by assessment   - Educate patient/family on patient safety including physical limitations  - Instruct patient to call for assistance with activity based on assessment  - Modify environment to reduce risk of injury  - Consider OT/PT consult to assist with strengthening/mobility  Outcome: Progressing    Goal: Maintain or return to baseline ADL function  INTERVENTIONS:  -  Assess patient's ability to carry out ADLs; assess patient's baseline for ADL function and identify physical deficits which impact ability to perform ADLs (bathing, care of mouth/teeth, toileting, grooming, dressing, etc )  - Assess/evaluate cause of self-care deficits   - Assess range of motion  - Assess patient's mobility; develop plan if impaired  - Assess patient's need for assistive devices and provide as appropriate  - Encourage maximum independence but intervene and supervise when necessary  ¯ Involve family in performance of ADLs  ¯ Assess for home care needs following discharge   ¯ Request OT consult to assist with ADL evaluation and planning for discharge  ¯ Provide patient education as appropriate  Outcome: Progressing    Goal: Maintain or return mobility status to optimal level  INTERVENTIONS:  - Assess patient's baseline mobility status (ambulation, transfers, stairs, etc )    - Identify cognitive and physical deficits and behaviors that affect mobility  - Identify mobility aids required to assist with transfers and/or ambulation (gait belt, sit-to-stand, lift, walker, cane, etc )  - [place] fall precautions as indicated by assessment  - Record patient progress and toleration of activity level on Mobility SBAR; progress patient to next Phase/Stage  - Instruct patient to call for assistance with activity based on assessment  - Request Rehabilitation consult to assist with strengthening/weightbearing, etc   Outcome: Progressing

## 2018-12-29 NOTE — ED NOTES
Mary Jo Fernandez made aware of patient's 2nd Troponin result via tigertext     Jenna Jones RN  12/29/18 0756

## 2018-12-29 NOTE — ED PROVIDER NOTES
History  Chief Complaint   Patient presents with    Chest Pain     chest pain and SOB for the past 12 hrs, also to note called and reported these sx's to PCP yesterday     57 yo F presents to ED for evaluation of 3-4 week hx of dyspnea on exertion, orthopnea, PND, bilateral LE swelling and dry hacking cough which has progressively worsened  Pt states that she was awoken from sleep at 2330 last night with midsternal nonradiating squeezing chest pain which is why she came to ED today  Pt denies any trauma/injury, HA, visual changes, neck/back pain, sputum production, nasal congestion/rhinorrhea/postnasal drip, fever/chills, abdominal pain, N/V/D, urinary symptoms, rash or focal neuro deficits  Pt has PMH of HTN, HLD, hypothyroidism and DM, no significant PSH  Pt is a former smoker, denies any ETOH or recreational drug use  No recent travel or known sick contacts  Father  of MI in his [de-identified], brother  of "fluid in his lungs" in his 63's  No recent changes in medications or new medications  No improvement of symptoms with OTC Advil, no additional interventions prior to arrival, no other complaints at this time  MDM: 1 mo hx of OROSCO/PND/Orthopnea with LE edema/1 day hx of CP--likely new onset CHF, will obtain cardial workup including BNP, CXR, bedside cardiac ultrasound, treat accordingly, will need admission for further evaluation            Prior to Admission Medications   Prescriptions Last Dose Informant Patient Reported?  Taking?   atorvastatin (LIPITOR) 40 mg tablet 2018 at Unknown time  Yes Yes   Sig: Take 40 mg by mouth   gabapentin (NEURONTIN) 100 mg capsule 2018 at Unknown time  Yes Yes   Sig: Take 100 mg by mouth   glucose blood test strip   Yes Yes   Sig: 3 times daily   levothyroxine 112 mcg tablet 2018 at Unknown time  Yes Yes   Si tab daily x 6 day and half pill on sundays   lisinopril (ZESTRIL) 5 mg tablet 2018 at Unknown time  Yes Yes   Sig: Take 5 mg by mouth repaglinide (PRANDIN) 0 5 mg tablet 12/29/2018 at Unknown time  Yes Yes   Sig: Take 0 5 mg by mouth   sitaGLIPtin (JANUVIA) 50 mg tablet 12/29/2018 at Unknown time  Yes Yes   Sig: Take 50 mg by mouth      Facility-Administered Medications: None       Past Medical History:   Diagnosis Date    Diabetes mellitus ([facility] [address] )     Hypothyroidism        History reviewed  No pertinent surgical history  Family History   Problem Relation Age of Onset    Diabetes Mother     Heart attack Father         MI    Hypertension Brother      I have reviewed and agree with the history as documented  Social History   Substance Use Topics    Smoking status: Former Smoker    Smokeless tobacco: Never Used    Alcohol use No        Review of Systems   Constitutional: Negative for chills, fatigue and fever  HENT: Negative for congestion, rhinorrhea and sore throat  Eyes: Negative for pain, redness and visual disturbance  Respiratory: Positive for cough (dry cough) and shortness of breath  Negative for chest tightness, wheezing and stridor  Cardiovascular: Positive for chest pain and leg swelling  Negative for palpitations  Gastrointestinal: Negative for abdominal pain, blood in stool, constipation, diarrhea, nausea and vomiting  Genitourinary: Negative for dysuria, frequency, hematuria and urgency  Musculoskeletal: Negative for back pain and neck pain  Skin: Negative for pallor and rash  Neurological: Negative for dizziness, seizures, syncope, weakness, light-headedness and headaches (occasional HAs associated with coughing (none at time of exam))  Psychiatric/Behavioral: Negative for agitation and confusion         Physical Exam  ED Triage Vitals [12/29/18 0900]   Temperature Pulse Respirations Blood Pressure SpO2   98 3 °F (36 8 °C) 89 16 (!) 216/97 96 %      Temp Source Heart Rate Source Patient Position - Orthostatic VS BP Location FiO2 (%)   Tympanic Monitor Sitting Right arm --      Pain Score       Worst Possible Pain           Orthostatic Vital Signs  Vitals:    12/31/18 1500 12/31/18 1730 12/31/18 1930 12/31/18 2314   BP: 151/67 167/81 147/67 148/66   Pulse: 67 73 66 62   Patient Position - Orthostatic VS: Lying  Lying        Physical Exam   Constitutional: She is oriented to person, place, and time  She appears well-developed and well-nourished  No distress  HENT:   Head: Normocephalic and atraumatic  Nose: Nose normal    Mouth/Throat: Oropharynx is clear and moist  No oropharyngeal exudate  Eyes: Pupils are equal, round, and reactive to light  Conjunctivae and EOM are normal  Right eye exhibits no discharge  Left eye exhibits no discharge  Neck: Normal range of motion  Neck supple  No JVD present  No tracheal deviation present  Cardiovascular: Normal rate, regular rhythm, normal heart sounds and intact distal pulses  Exam reveals no gallop and no friction rub  No murmur heard  Pulmonary/Chest: No stridor  No respiratory distress  She has no wheezes  She has rales (few scattered rales)  She exhibits no tenderness  Dyspnea without tachypnea, diminished in bilateral lung bases with few scattered rales   Abdominal: Soft  Bowel sounds are normal  She exhibits no distension  There is no tenderness  There is no rebound and no guarding  Genitourinary:   Genitourinary Comments: Hemoccult negative   Musculoskeletal: Normal range of motion  She exhibits edema (2+ pitting edema to bilateral LEs)  She exhibits no tenderness or deformity  Neurological: She is alert and oriented to person, place, and time  No cranial nerve deficit  No facial droop/dysarthria/aphasia, intact strength/sensation distally, no cranial nerve deficits noted   Skin: Skin is warm and dry  Capillary refill takes less than 2 seconds  No rash noted  She is not diaphoretic  Psychiatric: She has a normal mood and affect  Nursing note and vitals reviewed        ED Medications  Medications   atorvastatin (LIPITOR) tablet 40 mg (40 mg Oral Given 12/31/18 1730)   docusate sodium (COLACE) capsule 100 mg (100 mg Oral Given 12/31/18 1733)   ondansetron (ZOFRAN) injection 4 mg (not administered)   heparin (porcine) subcutaneous injection 5,000 Units (5,000 Units Subcutaneous Given 12/31/18 2130)   insulin lispro (HumaLOG) 100 units/mL subcutaneous injection 1-5 Units (4 Units Subcutaneous Given 12/31/18 1731)   insulin lispro (HumaLOG) 100 units/mL subcutaneous injection 1-5 Units (2 Units Subcutaneous Given 12/31/18 2132)   furosemide (LASIX) injection 40 mg (40 mg Intravenous Given 12/31/18 1733)   levothyroxine tablet 112 mcg (112 mcg Oral Given 12/31/18 0535)   levothyroxine tablet 56 mcg (56 mcg Oral Given 12/30/18 0638)   acetaminophen (TYLENOL) tablet 650 mg (650 mg Oral Given 12/30/18 0356)   amLODIPine (NORVASC) tablet 10 mg (10 mg Oral Given 12/31/18 0813)   carvedilol (COREG) tablet 6 25 mg (6 25 mg Oral Given 12/31/18 1731)   benzonatate (TESSALON PERLES) capsule 100 mg (not administered)   furosemide (LASIX) injection 20 mg (20 mg Intravenous Given 12/29/18 1040)   nitroglycerin (NITRO-BID) 2 % TD ointment 1 inch (1 inch Topical Given 12/29/18 1101)   regadenoson (LEXISCAN) injection 0 4 mg ( Intravenous Not Given 12/31/18 1441)       Diagnostic Studies  Results Reviewed     Procedure Component Value Units Date/Time    Folate [[id number]]  (Abnormal) Collected:  12/29/18 0936    Lab Status:  Final result Specimen:  Blood from Arm, Right Updated:  12/29/18 1747     Folate >20 0 (H) ng/mL     Iron Saturation % [[id number]] Collected:  12/29/18 0936    Lab Status:  Final result Specimen:  Blood from Arm, Right Updated:  12/29/18 1747     Iron Saturation 22 %      TIBC 309 ug/dL      Iron 68 ug/dL     Ferritin [[id number]]  (Normal) Collected:  12/29/18 0936    Lab Status:  Final result Specimen:  Blood from Arm, Right Updated:  12/29/18 1747     Ferritin 38 ng/mL     Vitamin B12 [[id number]]  (Normal) Collected:  12/29/18 0936    Lab Status: Final result Specimen:  Blood from Arm, Right Updated:  12/29/18 1629     Vitamin B-12 545 pg/mL     Troponin I [[id number]]  (Abnormal) Collected:  12/29/18 1234    Lab Status:  Final result Specimen:  Blood from Arm, Left Updated:  12/29/18 1258     Troponin I 0 20 (H) ng/mL     Urine Microscopic [[id number]]  (Abnormal) Collected:  12/29/18 1039    Lab Status:  Final result Specimen:  Urine from Urine, Clean Catch Updated:  12/29/18 1106     RBC, UA 2-4 (A) /hpf      WBC, UA 2-4 (A) /hpf      Epithelial Cells None Seen /hpf      Bacteria, UA None Seen /hpf      Hyaline Casts, UA None Seen /lpf     POCT urinalysis dipstick [[id number]]  (Normal) Resulted:  12/29/18 1039    Lab Status:  Final result Specimen:  Urine Updated:  12/29/18 1039     Color, UA see results    ED Urine Macroscopic [[id number]]  (Abnormal) Collected:  12/29/18 1039    Lab Status:  Final result Specimen:  Urine Updated:  12/29/18 1038     Color, UA Yellow     Clarity, UA Clear     pH, UA 5 5     Leukocytes, UA Negative     Nitrite, UA Negative     Protein, UA >=300 (A) mg/dl      Glucose,  (1/10%) (A) mg/dl      Ketones, UA Negative mg/dl      Urobilinogen, UA 0 2 E U /dl      Bilirubin, UA Negative     Blood, UA Moderate (A)     Specific Gravity, UA >=1 030    Narrative:       CLINITEK RESULT    T4, free [[id number]]  (Normal) Collected:  12/29/18 0936    Lab Status:  Final result Specimen:  Blood from Arm, Right Updated:  12/29/18 1025     Free T4 0 83 ng/dL     TSH, 3rd generation with Free T4 reflex [[id number]]  (Abnormal) Collected:  12/29/18 0936    Lab Status:  Final result Specimen:  Blood from Arm, Right Updated:  12/29/18 1009     TSH 3RD GENERATON 6 510 (H) uIU/mL     Narrative:         Patients undergoing fluorescein dye angiography may retain small amounts of fluorescein in the body for 48-72 hours post procedure  Samples containing fluorescein can produce falsely depressed TSH values   If the patient had this procedure,a specimen should be resubmitted post fluorescein clearance  The recommended reference ranges for TSH during pregnancy are as follows:  First trimester 0 1 to 2 5 uIU/mL  Second trimester  0 2 to 3 0 uIU/mL  Third trimester 0 3 to 3 0 uIU/m      Comprehensive metabolic panel [[id number]]  (Abnormal) Collected:  12/29/18 0936    Lab Status:  Final result Specimen:  Blood from Arm, Right Updated:  12/29/18 1009     Sodium 140 mmol/L      Potassium 4 2 mmol/L      Chloride 112 (H) mmol/L      CO2 21 mmol/L      ANION GAP 7 mmol/L      BUN 30 (H) mg/dL      Creatinine 1 44 (H) mg/dL      Glucose 187 (H) mg/dL      Calcium 8 7 mg/dL      AST 48 (H) U/L      ALT 46 U/L      Alkaline Phosphatase 636 (H) U/L      Total Protein 7 5 g/dL      Albumin 3 0 (L) g/dL      Total Bilirubin 0 30 mg/dL      eGFR 38 ml/min/1 73sq m     Narrative:         National Kidney Disease Education Program recommendations are as follows:  GFR calculation is accurate only with a steady state creatinine  Chronic Kidney disease less than 60 ml/min/1 73 sq  meters  Kidney failure less than 15 ml/min/1 73 sq  meters      B-type natriuretic peptide [[id number]]  (Abnormal) Collected:  12/29/18 0936    Lab Status:  Final result Specimen:  Blood from Arm, Right Updated:  12/29/18 1009     NT-proBNP 8,663 (H) pg/mL     Lipase [[id number]]  (Normal) Collected:  12/29/18 0936    Lab Status:  Final result Specimen:  Blood from Arm, Right Updated:  12/29/18 1009     Lipase 147 u/L     Troponin I [[id number]]  (Abnormal) Collected:  12/29/18 0936    Lab Status:  Final result Specimen:  Blood from Arm, Right Updated:  12/29/18 1001     Troponin I 0 16 (H) ng/mL     CBC and differential [[id number]]  (Abnormal) Collected:  12/29/18 0936    Lab Status:  Final result Specimen:  Blood from Arm, Right Updated:  12/29/18 0946     WBC 6 82 Thousand/uL      RBC 3 13 (L) Million/uL      Hemoglobin 8 4 (L) g/dL      Hematocrit 28 1 (L) %      MCV 90 fL      MCH 26 8 pg MCHC 29 9 (L) g/dL      RDW 13 5 %      MPV 10 1 fL      Platelets 206 Thousands/uL      nRBC 0 /100 WBCs      Neutrophils Relative 67 %      Immat GRANS % 0 %      Lymphocytes Relative 23 %      Monocytes Relative 6 %      Eosinophils Relative 3 %      Basophils Relative 1 %      Neutrophils Absolute 4 58 Thousands/µL      Immature Grans Absolute 0 02 Thousand/uL      Lymphocytes Absolute 1 55 Thousands/µL      Monocytes Absolute 0 42 Thousand/µL      Eosinophils Absolute 0 21 Thousand/µL      Basophils Absolute 0 04 Thousands/µL                  XR chest 2 views   ED Interpretation by Christopher Johnson DO (12/29 1021)   Cardiomegaly with R sided pleural effusion      Final Result by Malissa Conway MD (12/29 1504)      Vascular congestion with small bilateral pleural effusions  Workstation performed: [id number]               Procedures  ECG 12 Lead Documentation  Date/Time: 12/29/2018 9:10 AM  Performed by: Tyrese Rodriguez  Authorized by: Ricardo Amaya     ECG reviewed by me, the ED Provider: yes    Patient location:  ED  Previous ECG:     Previous ECG:  Unavailable  Interpretation:     Interpretation: abnormal    Rate:     ECG rate assessment: normal    Rhythm:     Rhythm: sinus rhythm    Ectopy:     Ectopy: none    QRS:     QRS axis: right superior axis deviation      QRS intervals:  Normal  ST segments:     ST segments:  Normal  T waves:     T waves: inverted      Inverted:  AVR and V6          Phone Consults  ED Phone Contact    ED Course         HEART Risk Score      Most Recent Value   History  1 Filed at: 12/29/2018 1202   ECG  1 Filed at: 12/29/2018 1202   Age  1 Filed at: 12/29/2018 1202   Risk Factors  2 Filed at: 12/29/2018 1202   Troponin  2 Filed at: 12/29/2018 1202   Heart Score Risk Calculator   History  1 Filed at: 12/29/2018 1202   ECG  1 Filed at: 12/29/2018 1202   Age  1 Filed at: 12/29/2018 1202   Risk Factors  2 Filed at: 12/29/2018 1202   Troponin  2 Filed at: 12/29/2018 1202 HEART Score  7 Filed at: 12/29/2018 1202   HEART Score  7 Filed at: 12/29/2018 1202             Patient and family updated on results of tests and plan of care including admission to the hospital  Report given to Dr Irene Valiente \[facility]\""[facility]) for continuation of patient care  [facility]  CritCare Time    Disposition  Final diagnoses:   Congestive heart failure of unknown etiology ([facility] [address] )   Anemia   Kidney insufficiency   Dyspnea on exertion   Orthopnea   Edema of both lower extremities   Diabetes mellitus ([facility] [address] )   Chest pain   Pleural effusion on right     Time reflects when diagnosis was documented in both MDM as applicable and the Disposition within this note     Time User Action Codes Description Comment    12/29/2018 10:45 AM DarrenHeena santillanah L Add [I50 9] Congestive heart failure of unknown etiology ([facility] [address] )     12/29/2018 10:45 AM Darren Donnald Runner Add [D64 9] Anemia     12/29/2018 10:46 AM Darren Donnald Runner Add [N28 9] Kidney insufficiency     12/29/2018 10:46 AM Darren Donnald Runner Add [R06 09] Dyspnea on exertion     12/29/2018 10:46 AM Darren Donnald Runner Add [R06 01] Orthopnea     12/29/2018 10:46 AM Darren Donnald Runner Add [R22 43] Localized swelling, mass, or lump of lower extremity, bilateral     12/29/2018 10:46 AM Darren Donnald Runner Remove [R22 43] Localized swelling, mass, or lump of lower extremity, bilateral     12/29/2018 10:47 AM Jessica Banks L Add [R60 0] Edema of both lower extremities     12/29/2018 10:47 AM Heena Banksah L Add [E11 9] Diabetes mellitus ([facility] [address] )     12/29/2018 10:47 AM Darren Donnald Runner Add [R07 9] Chest pain     12/29/2018 10:47 AM DarrenGume santillannald Runner Add [J90] Pleural effusion on right       ED Disposition     ED Disposition Condition Comment    Admit  Case was discussed with MELVI and the patient's admission status was agreed to be Admission Status: inpatient status to the service of Dr Irene Valiente          Follow-up Information    None         Current Discharge Medication List      CONTINUE these medications which have NOT CHANGED    Details   atorvastatin (LIPITOR) 40 mg tablet Take 40 mg by mouth      gabapentin (NEURONTIN) 100 mg capsule Take 100 mg by mouth      glucose blood test strip 3 times daily      levothyroxine 112 mcg tablet 1 tab daily x 6 day and half pill on sundays      lisinopril (ZESTRIL) 5 mg tablet Take 5 mg by mouth      repaglinide (PRANDIN) 0 5 mg tablet Take 0 5 mg by mouth      sitaGLIPtin (JANUVIA) 50 mg tablet Take 50 mg by mouth           No discharge procedures on file  ED Provider  Attending physically available and evaluated Brent Rodriguez I managed the patient along with the ED Attending      Electronically Signed by         Darlene Azul DO  12/31/18 9876

## 2018-12-29 NOTE — ASSESSMENT & PLAN NOTE
Continue IV diuresis per Cardiology recommendations    Monitor intake and output  Monitor on telemetry  Low-sodium diet  Daily weights  BMP daily

## 2018-12-29 NOTE — ASSESSMENT & PLAN NOTE
Consider CHF  Patient has noticed swelling in ankles accumulating over 3 weeks associated with 4 lb weight gain  Demonstrates orthopnea and cough in supine position  Patient states she has never received an echo in the past or seen a cardiologist   Will admit with telemetry, will perform echo assessing ventricular function    Consider cardiology referral

## 2018-12-29 NOTE — ASSESSMENT & PLAN NOTE
Lab Results   Component Value Date    HGBA1C 8 7 (H) 10/07/2015       No results for input(s): POCGLU in the last 72 hours  Blood Sugar Average: Last 72 hrs:  · Patient sees Endocrinology outpatient  Takes rapid denied 0 5 mg p o  B i d  Takes Januvia 50 mg p o  Daily  Patient states she has never taken insulin  She monitors her sugars twice daily  Normal results 160  · While hospitalized, patient will be managed on weight based insulin dosing    Will monitor

## 2018-12-29 NOTE — ASSESSMENT & PLAN NOTE
Patient's blood pressures have been high this admission, but the patient mentions that she saw PCP yesterday and blood pressures were normal   Outpatient, she Takes lisinopril 5 mg the acute QD  Will hold lisinopril for now since patient's creatinine is 1 44  Will monitor with ongoing diuresis

## 2018-12-29 NOTE — ASSESSMENT & PLAN NOTE
Lab Results   Component Value Date    HGBA1C 8 7 (H) 10/07/2015       No results for input(s): POCGLU in the last 72 hours  Blood Sugar Average: Last 72 hrs:  · Patient sees Endocrinology outpatient  Takes repaglinide 0 5 mg p o  B i d  Januvia 50 mg p o  Daily  Patient states she has never taken insulin  She monitors her sugars twice daily  Normal results 160  · While hospitalized, patient will be managed on weight based insulin dosing    Will monitor    · Blood glucose is acceptable, continue current medications

## 2018-12-30 ENCOUNTER — APPOINTMENT (INPATIENT)
Dept: NON INVASIVE DIAGNOSTICS | Facility: HOSPITAL | Age: 63
DRG: 246 | End: 2018-12-30
Payer: COMMERCIAL

## 2018-12-30 PROBLEM — N18.30 STAGE 3 CHRONIC KIDNEY DISEASE (HCC): Status: ACTIVE | Noted: 2018-12-29

## 2018-12-30 PROBLEM — D64.9 ANEMIA: Status: ACTIVE | Noted: 2018-12-30

## 2018-12-30 PROBLEM — I50.31 ACUTE DIASTOLIC CONGESTIVE HEART FAILURE (HCC): Status: ACTIVE | Noted: 2018-12-29

## 2018-12-30 LAB
ABO GROUP BLD: NORMAL
ANION GAP SERPL CALCULATED.3IONS-SCNC: 8 MMOL/L (ref 4–13)
BASOPHILS # BLD AUTO: 0.03 THOUSANDS/ΜL (ref 0–0.1)
BASOPHILS NFR BLD AUTO: 0 % (ref 0–1)
BLD GP AB SCN SERPL QL: POSITIVE
BLOOD GROUP ANTIBODIES SERPL: NORMAL
BUN SERPL-MCNC: 32 MG/DL (ref 5–25)
CALCIUM SERPL-MCNC: 8.4 MG/DL (ref 8.3–10.1)
CHLORIDE SERPL-SCNC: 111 MMOL/L (ref 100–108)
CO2 SERPL-SCNC: 22 MMOL/L (ref 21–32)
CREAT SERPL-MCNC: 1.59 MG/DL (ref 0.6–1.3)
EOSINOPHIL # BLD AUTO: 0.27 THOUSAND/ΜL (ref 0–0.61)
EOSINOPHIL NFR BLD AUTO: 4 % (ref 0–6)
ERYTHROCYTE [DISTWIDTH] IN BLOOD BY AUTOMATED COUNT: 13.5 % (ref 11.6–15.1)
GFR SERPL CREATININE-BSD FRML MDRD: 34 ML/MIN/1.73SQ M
GLUCOSE SERPL-MCNC: 106 MG/DL (ref 65–140)
GLUCOSE SERPL-MCNC: 107 MG/DL (ref 65–140)
GLUCOSE SERPL-MCNC: 179 MG/DL (ref 65–140)
GLUCOSE SERPL-MCNC: 257 MG/DL (ref 65–140)
GLUCOSE SERPL-MCNC: 300 MG/DL (ref 65–140)
HCT VFR BLD AUTO: 24 % (ref 34.8–46.1)
HGB BLD-MCNC: 7.2 G/DL (ref 11.5–15.4)
IMM GRANULOCYTES # BLD AUTO: 0.02 THOUSAND/UL (ref 0–0.2)
IMM GRANULOCYTES NFR BLD AUTO: 0 % (ref 0–2)
LYMPHOCYTES # BLD AUTO: 1.76 THOUSANDS/ΜL (ref 0.6–4.47)
LYMPHOCYTES NFR BLD AUTO: 26 % (ref 14–44)
MAGNESIUM SERPL-MCNC: 1.7 MG/DL (ref 1.6–2.6)
MCH RBC QN AUTO: 27 PG (ref 26.8–34.3)
MCHC RBC AUTO-ENTMCNC: 30 G/DL (ref 31.4–37.4)
MCV RBC AUTO: 90 FL (ref 82–98)
MONOCYTES # BLD AUTO: 0.48 THOUSAND/ΜL (ref 0.17–1.22)
MONOCYTES NFR BLD AUTO: 7 % (ref 4–12)
NEUTROPHILS # BLD AUTO: 4.14 THOUSANDS/ΜL (ref 1.85–7.62)
NEUTS SEG NFR BLD AUTO: 63 % (ref 43–75)
NRBC BLD AUTO-RTO: 0 /100 WBCS
PHOSPHATE SERPL-MCNC: 4.3 MG/DL (ref 2.3–4.1)
PLATELET # BLD AUTO: 180 THOUSANDS/UL (ref 149–390)
PMV BLD AUTO: 10.8 FL (ref 8.9–12.7)
POTASSIUM SERPL-SCNC: 4 MMOL/L (ref 3.5–5.3)
RBC # BLD AUTO: 2.67 MILLION/UL (ref 3.81–5.12)
RETICS # AUTO: ABNORMAL 10*3/UL (ref 14097–95744)
RETICS # CALC: 2.56 % (ref 0.37–1.87)
RH BLD: NEGATIVE
SODIUM SERPL-SCNC: 141 MMOL/L (ref 136–145)
SPECIMEN EXPIRATION DATE: NORMAL
TROPONIN I SERPL-MCNC: 0.2 NG/ML
WBC # BLD AUTO: 6.7 THOUSAND/UL (ref 4.31–10.16)

## 2018-12-30 PROCEDURE — 85045 AUTOMATED RETICULOCYTE COUNT: CPT | Performed by: INTERNAL MEDICINE

## 2018-12-30 PROCEDURE — 86870 RBC ANTIBODY IDENTIFICATION: CPT | Performed by: INTERNAL MEDICINE

## 2018-12-30 PROCEDURE — 99232 SBSQ HOSP IP/OBS MODERATE 35: CPT | Performed by: INTERNAL MEDICINE

## 2018-12-30 PROCEDURE — 93306 TTE W/DOPPLER COMPLETE: CPT

## 2018-12-30 PROCEDURE — 86901 BLOOD TYPING SEROLOGIC RH(D): CPT | Performed by: INTERNAL MEDICINE

## 2018-12-30 PROCEDURE — 80048 BASIC METABOLIC PNL TOTAL CA: CPT | Performed by: PHYSICIAN ASSISTANT

## 2018-12-30 PROCEDURE — 85025 COMPLETE CBC W/AUTO DIFF WBC: CPT | Performed by: PHYSICIAN ASSISTANT

## 2018-12-30 PROCEDURE — 82948 REAGENT STRIP/BLOOD GLUCOSE: CPT

## 2018-12-30 PROCEDURE — 84100 ASSAY OF PHOSPHORUS: CPT | Performed by: PHYSICIAN ASSISTANT

## 2018-12-30 PROCEDURE — 86900 BLOOD TYPING SEROLOGIC ABO: CPT | Performed by: INTERNAL MEDICINE

## 2018-12-30 PROCEDURE — 86850 RBC ANTIBODY SCREEN: CPT | Performed by: INTERNAL MEDICINE

## 2018-12-30 PROCEDURE — 93306 TTE W/DOPPLER COMPLETE: CPT | Performed by: INTERNAL MEDICINE

## 2018-12-30 PROCEDURE — 83735 ASSAY OF MAGNESIUM: CPT | Performed by: INTERNAL MEDICINE

## 2018-12-30 RX ORDER — CARVEDILOL 6.25 MG/1
6.25 TABLET ORAL 2 TIMES DAILY WITH MEALS
Status: DISCONTINUED | OUTPATIENT
Start: 2018-12-31 | End: 2019-01-10 | Stop reason: HOSPADM

## 2018-12-30 RX ORDER — AMLODIPINE BESYLATE 10 MG/1
10 TABLET ORAL DAILY
Status: DISCONTINUED | OUTPATIENT
Start: 2018-12-31 | End: 2019-01-06

## 2018-12-30 RX ADMIN — DOCUSATE SODIUM 100 MG: 100 CAPSULE, LIQUID FILLED ORAL at 08:12

## 2018-12-30 RX ADMIN — ACETAMINOPHEN 650 MG: 325 TABLET, FILM COATED ORAL at 03:56

## 2018-12-30 RX ADMIN — HEPARIN SODIUM 5000 UNITS: 5000 INJECTION INTRAVENOUS; SUBCUTANEOUS at 05:22

## 2018-12-30 RX ADMIN — INSULIN LISPRO 1 UNITS: 100 INJECTION, SOLUTION INTRAVENOUS; SUBCUTANEOUS at 16:47

## 2018-12-30 RX ADMIN — INSULIN LISPRO 2 UNITS: 100 INJECTION, SOLUTION INTRAVENOUS; SUBCUTANEOUS at 11:55

## 2018-12-30 RX ADMIN — INSULIN LISPRO 3 UNITS: 100 INJECTION, SOLUTION INTRAVENOUS; SUBCUTANEOUS at 21:50

## 2018-12-30 RX ADMIN — AMLODIPINE BESYLATE 5 MG: 5 TABLET ORAL at 08:12

## 2018-12-30 RX ADMIN — DOCUSATE SODIUM 100 MG: 100 CAPSULE, LIQUID FILLED ORAL at 17:21

## 2018-12-30 RX ADMIN — FUROSEMIDE 40 MG: 10 INJECTION, SOLUTION INTRAMUSCULAR; INTRAVENOUS at 08:12

## 2018-12-30 RX ADMIN — FUROSEMIDE 40 MG: 10 INJECTION, SOLUTION INTRAMUSCULAR; INTRAVENOUS at 17:55

## 2018-12-30 RX ADMIN — ATORVASTATIN CALCIUM 40 MG: 40 TABLET, FILM COATED ORAL at 16:45

## 2018-12-30 RX ADMIN — HEPARIN SODIUM 5000 UNITS: 5000 INJECTION INTRAVENOUS; SUBCUTANEOUS at 21:50

## 2018-12-30 RX ADMIN — LEVOTHYROXINE SODIUM 56 MCG: 112 TABLET ORAL at 06:38

## 2018-12-30 NOTE — UTILIZATION REVIEW
[facility] Utilization Review Department  Phone: [phone]; Fax [phone]  [email or web address]  ATTENTION: Please call with any questions or concerns to [phone]  and carefully listen to the prompts so that you are directed to the right person  Send all requests for admission clinical reviews, approved or denied determinations and any other requests to fax [phone]  All voicemails are confidential     Initial Clinical Review    Admission: Date/Time/Statement: 12/29/18 @ 1049     Orders Placed This Encounter   Procedures    Inpatient Admission (expected length of stay for this patient is greater than two midnights)     Standing Status:   Standing     Number of Occurrences:   1     Order Specific Question:   Admitting Physician     Answer:   Charis Rocha     Order Specific Question:   Level of Care     Answer:   Med Surg [16]     Order Specific Question:   Estimated length of stay     Answer:   More than 2 Midnights     Order Specific Question:   Certification     Answer:   I certify that inpatient services are medically necessary for this patient for a duration of greater than two midnights  See H&P and MD Progress Notes for additional information about the patient's course of treatment         ED: Date/Time/Mode of Arrival:   ED Arrival Information     Expected Arrival Acuity Means of Arrival Escorted By Service Admission Type    - 12/29/2018 08:56 Emergent Walk-In Family Member General Medicine Emergency    Arrival Complaint    CHEST PAIN  / SOB          Chief Complaint:   Chief Complaint   Patient presents with    Chest Pain     chest pain and SOB for the past 12 hrs, also to note called and reported these sx's to PCP yesterday       History of Illness: 58 y o  female past medical history of hypertension, diabetes type 2, hypothyroidism and hyperlipidemia who presents with chest pain and shortness of breath which has gradually worsened over the 3 weeks  She states that she will become short of breath after walking only 5-10 minutes  States breathing is worse when she tries to lie down at night which also produces a cough and wheezing though she does not bring anything up  She has also noted swelling in her ankles during this time  Denies diet or medication changes  Describes chest pain as sub sternal, does not radiate  Worsens when she lies down in bed at night  She has not experienced chest pain during the day and does not currently experience chest pain  She states she has never seen a cardiologist and has never received an echocardiogram   Her only other condition have been well managed by an endocrinologist and PCP  She saw her PCP outpatient yesterday regarding the worsening chest pain    States he recommended an outpatient EKG however that evening the cough, difficulty breathing, headache, and chest pain worsened to the extent that she decided to come to the ER to be evaluated        ED Vital Signs:   ED Triage Vitals [12/29/18 0900]   Temperature Pulse Respirations Blood Pressure SpO2   98 3 °F (36 8 °C) 89 16 (!) 216/97 96 %      Temp Source Heart Rate Source Patient Position - Orthostatic VS BP Location FiO2 (%)   Tympanic Monitor Sitting Right arm --      Pain Score       Worst Possible Pain        Wt Readings from Last 1 Encounters:   12/30/18 75 5 kg (166 lb 7 2 oz)       Vital Signs:  12/30/18 0510  --  76  --  133/63  --    12/30/18 0402  --  77  --   172/79  --    12/30/18 0400  --  77  --   178/81  --    12/30/18 0351  97 7 °F (36 5 °C)  84  18   179/82  93 %    12/30/18 0315  --  82  --   172/79  --    12/30/18 0311  --  81  --   173/77  --    12/30/18 0305  --  82  --  165/73  --    12/29/18 2255  98 1 °F (36 7 °C)  80  16  149/67  94 %    12/29/18 2000  --  --  --  --  --    12/29/18 1901  98 4 °F (36 9 °C)  79  18   174/76  97 %    12/29/18 1500  --  86  20   186/88  98 %    12/29/18 1441  --  78  18   221/97  96 % 12/29/18 1336  --  80  18   220/94  96 %    12/29/18 1210  --  77  18   194/87  98 %    12/29/18 1056  --  89  18   194/94  98 %    12/29/18 0950  --  --  --  --  --    12/29/18 0915  --  88  18   202/93  96 %    12/29/18 0900  98 3 °F (36 8 °C)  89  16   216/97  96 %          Abnormal Labs/Diagnostic Test Results:    Ref Range & Units 12/29/18 0936   Sodium 136 - 145 mmol/L 140    Potassium 3 5 - 5 3 mmol/L 4 2    Chloride 100 - 108 mmol/L 112     CO2 21 - 32 mmol/L 21    ANION GAP 4 - 13 mmol/L 7    BUN 5 - 25 mg/dL 30     Creatinine 0 60 - 1 30 mg/dL 1 44     Glucose 65 - 140 mg/dL 187     Calcium 8 3 - 10 1 mg/dL 8 7    AST 5 - 45 U/L 48     ALT 12 - 78 U/L 46    Alkaline Phosphatase 46 - 116 U/L 636     Total Protein 6 4 - 8 2 g/dL 7 5    Albumin 3 5 - 5 0 g/dL 3 0       WBC 4 31 - 10 16 Thousand/uL 6 82    RBC 3 81 - 5 12 Million/uL 3 13     Hemoglobin 11 5 - 15 4 g/dL 8 4     Hematocrit 34 8 - 46 1 % 28 1       Trop 0 16, BNP 8663    UA-- (+) glucose, mod blood, >300 protein    CXR -- Vascular congestion with small bilateral pleural effusions      EKG ---   Normal sinus rhythm  Right superior axis deviation  Nonspecific ST abnormality  Abnormal ECG      ED Treatment:   Medication Administration from 12/29/2018 0856 to 12/29/2018 1522       Date/Time Order Dose Route Action     12/29/2018 1040 furosemide (LASIX) injection 20 mg 20 mg Intravenous Given     12/29/2018 1101 nitroglycerin (NITRO-BID) 2 % TD ointment 1 inch 1 inch Topical Given     12/29/2018 1452 nitroGLYcerin (TRIDIL) 50 mg in 250 mL infusion (premix) 30 mcg/min Intravenous New Bag       Past Medical/Surgical History:   Past Medical History:   Diagnosis Date    Diabetes mellitus ([facility] )     Hypothyroidism        Admitting Diagnosis: Orthopnea [R06 01]  Diabetes mellitus ([facility] ) [E11 9]  Chest pain [R07 9]  Dyspnea on exertion [R06 09]  Anemia [D64 9]  Kidney insufficiency [N28 9]  Pleural effusion on right [J90]  Congestive heart failure of unknown etiology ([facility] ) [I50 9]  Edema of both lower extremities [R60 0]    Age/Sex: 58 y o  female    Assessment/Plan: Accelerated hypertension on admission noted as well - home Zestril will be held due to acute kidney injury - in light of suspicion for acute CHF, Continue telemetry monitoring  Continue IV Lasix  will also initiate beta-blockade with Lopressor  Monitor I/Os and daily weights  Await echocardiogram to assess for structural/valvular heart disease  Continue to encourage low-sodium diet  In light of history of hypothyroidism a thyroid panel checked revealed a normal free T4 - continue current Synthroid regimen  History of diabetes mellitus noted as well and will check an updated HbA1c level (last noted from 2015) - place on SSI coverage per accuchecks - hold home oral hypoglycemics  Patient likely has an NSTEMI type 2 and troponins will be trended  Maintain oxygenation  Consult cardiology  If renal function worsens on IV diuresis, consider Nephrology consult as well         Admission Orders:  Scheduled Meds:   acetaminophen 650 mg Oral Q6H PRN   [START ON 12/31/2018] amLODIPine 10 mg Oral Daily   atorvastatin 40 mg Oral Daily With Dinner   docusate sodium 100 mg Oral BID   furosemide 40 mg Intravenous BID (diuretic)   heparin (porcine) 5,000 Units Subcutaneous Q8H [address]   insulin lispro 1-5 Units Subcutaneous TID AC   insulin lispro 1-5 Units Subcutaneous HS   [START ON 12/31/2018] levothyroxine 112 mcg Oral Once per day on Mon Tue Wed Thu Fri Sat   levothyroxine 56 mcg Oral Once per day on Sun   ondansetron 4 mg Intravenous Q6H PRN     Telem  Serial troponins  EKG  Echo  Nuclear stress test  Cardiology consult  Fingerstick glucose checks qid w/ ssi  Daily wts  Activity as zach  I/O  Cardiac diet  Stool for occult blood 1-3    Cardiology consult:  >> Shortness of breath: :  Likely due to congestive heart failure,  Will check echocardiogram,  Diuresis with Lasix IV b i d ,  Daily weights and strict intake and output charting  Salt restricted and fluid-restricted diet  Trend creatinine daily  Better blood pressure control     >> Eelevated troponin:  Likely type 2 MI due subendocardial ischemia from increased transmyocardial gradient  Continue monitor on telemetry and trend troponins until trending downward  If chest pain recurs repeat ECG     >> Hypertension:  Will begin nitroglycerin infusion, this will help with blood pressure and congestive heart failure, Lasix 40 mg IV b i d  And will add amlodipine 5 mg p o    Once her acute kidney injury resolves can resume her lisinopril although it will need to be at a higher dose      >> Acute kidney injury:  Likely cardiorenal, possible underlying CKD, continue to trend and monitor urine output

## 2018-12-30 NOTE — PROGRESS NOTES
Progress Note - Ed Mascorro 1955, 58 y o  female MRN: [id number]    Unit/Bed#: [facility] 509-01 Encounter: [id number]    Primary Care Provider: Reema Vásquez MD   Date and time admitted to hospital: 12/29/2018  9:02 AM        * Chest pain   Assessment & Plan    · Likely non STEMI secondary to congestive heart failure  · Discussed with Cardiology, for stress test tomorrow     Acute diastolic congestive heart failure ([facility] 75 )   Assessment & Plan    Continue IV diuresis per Cardiology recommendations  Monitor intake and output  Monitor on telemetry  Low-sodium diet  Daily weights  BMP daily     Anemia   Assessment & Plan    Likely due to chronic kidney disease  Due to her acute heart failure and non STEMI will transfuse 1 unit packed red cells  Stage 3 chronic kidney disease ([facility] 75 )   Assessment & Plan    · With proteinuria  · Worked up at [facility] in the past  · Baseline appears to be around 1 4  · Avoid nephrotoxins if possible     Hyperlipidemia   Assessment & Plan    Patient on atorvastatin 40 mg every day  LDL is at goal     Hypertension   Assessment & Plan    Uncontrolled  Continue amlodipine, diuresis  Start beta-blocker prior to discharge  Restart lisinopril when renal function stabilizes     Hypothyroidism   Assessment & Plan    · Patient sees Endocrinology outpatient  Takes levothyroxine 0 112 mcg daily  · T4 were measured on admission  Within normal limits  Will continue levothyroxine  · Will recommend follow-up with outpatient endocrinologist upon discharge  DM (diabetes mellitus) ([facility] 75 )   Assessment & Plan    Lab Results   Component Value Date    HGBA1C 8 7 (H) 10/07/2015       No results for input(s): POCGLU in the last 72 hours  Blood Sugar Average: Last 72 hrs:  · Patient sees Endocrinology outpatient  Takes repaglinide 0 5 mg p o  B i d  Januvia 50 mg p o  Daily  Patient states she has never taken insulin  She monitors her sugars twice daily    Normal results 160   · While hospitalized, patient will be managed on weight based insulin dosing  Will monitor    · Blood glucose is acceptable, continue current medications       VTE Pharmacologic Prophylaxis:   Pharmacologic: Heparin  Mechanical VTE Prophylaxis in Place: Yes    Patient Centered Rounds: I have performed bedside rounds with nursing staff today  Discussions with Specialists or Other Care Team Provider: cardiology    Education and Discussions with Family / Patient: patient and family, plan of care    Time Spent for Care: 15 minutes  More than 50% of total time spent on counseling and coordination of care as described above  Current Length of Stay: 1 day(s)    Current Patient Status: Inpatient   Certification Statement: The patient will continue to require additional inpatient hospital stay due to Stress test tomorrow, blood transfusion    Discharge Plan:  Home when stable    Code Status: Level 1 - Full Code      Subjective:   Family is translating  Reports that shortness of breath is improving  Denies chest pain at this time  Objective:     Vitals:   Temp (24hrs), Av °F (36 7 °C), Min:97 7 °F (36 5 °C), Max:98 4 °F (36 9 °C)    Temp:  [97 7 °F (36 5 °C)-98 4 °F (36 9 °C)] 98 3 °F (36 8 °C)  HR:  [76-84] 83  Resp:  [16-18] 18  BP: (133-182)/(63-82) 170/82  SpO2:  [91 %-97 %] 95 %  Body mass index is 29 48 kg/m²  Input and Output Summary (last 24 hours): Intake/Output Summary (Last 24 hours) at 18 1555  Last data filed at 18 1216   Gross per 24 hour   Intake           1183 8 ml   Output             2150 ml   Net           -966 2 ml       Physical Exam:     Physical Exam   Constitutional: She is oriented to person, place, and time  She appears well-developed and well-nourished  HENT:   Head: Normocephalic and atraumatic  Mouth/Throat: No oropharyngeal exudate  Eyes: Pupils are equal, round, and reactive to light  EOM are normal    Neck: Neck supple     Pulmonary/Chest: She has rales  Decreased breath sounds in the bases bilaterally   Abdominal: Soft  She exhibits no distension  There is no tenderness  Musculoskeletal: She exhibits edema  Neurological: She is alert and oriented to person, place, and time  No cranial nerve deficit  Skin: Skin is warm and dry  Additional Data:     Labs:      Results from last 7 days  Lab Units 12/30/18  0510   WBC Thousand/uL 6 70   HEMOGLOBIN g/dL 7 2*   HEMATOCRIT % 24 0*   PLATELETS Thousands/uL 180   NEUTROS PCT % 63   LYMPHS PCT % 26   MONOS PCT % 7   EOS PCT % 4       Results from last 7 days  Lab Units 12/30/18  0510 12/29/18  0936   SODIUM mmol/L 141 140   POTASSIUM mmol/L 4 0 4 2   CHLORIDE mmol/L 111* 112*   CO2 mmol/L 22 21   BUN mg/dL 32* 30*   CREATININE mg/dL 1 59* 1 44*   ANION GAP mmol/L 8 7   CALCIUM mg/dL 8 4 8 7   ALBUMIN g/dL  --  3 0*   TOTAL BILIRUBIN mg/dL  --  0 30   ALK PHOS U/L  --  636*   ALT U/L  --  46   AST U/L  --  48*   GLUCOSE RANDOM mg/dL 107 187*           Results from last 7 days  Lab Units 12/30/18  1104 12/30/18  0640 12/29/18  2134 12/29/18  1545   POC GLUCOSE mg/dl 257* 106 162* 253*       Results from last 7 days  Lab Units 12/29/18  1738   HEMOGLOBIN A1C % 7 5*               * I Have Reviewed All Lab Data Listed Above  * Additional Pertinent Lab Tests Reviewed:  All Labs Within Last 24 Hours Reviewed      Recent Cultures (last 7 days):           Last 24 Hours Medication List:     Current Facility-Administered Medications:  acetaminophen 650 mg Oral Q6H PRN Santino Mckay PA-C   [START ON 12/31/2018] amLODIPine 10 mg Oral Daily Awais Jade MD   atorvastatin 40 mg Oral Daily With Dinner Irena Anthony PA-C   docusate sodium 100 mg Oral BID Irena Anthony PA-C   furosemide 40 mg Intravenous BID (diuretic) Irene Mendoza MD   heparin (porcine) 5,000 Units Subcutaneous Q8H [address] Irena Anthony PA-C   insulin lispro 1-5 Units Subcutaneous TID AC Irena Anthony PA-C   insulin lispro 1-5 Units Subcutaneous HS Irena Anthony PA-C   [START ON 12/31/2018] levothyroxine 112 mcg Oral Once per day on Mon Tue Wed Thu Fri Sat Christine A Ayesha Schwab, PA-C   levothyroxine 56 mcg Oral Once per day on Sun Irena Anthony PA-C   ondansetron 4 mg Intravenous Q6H PRN Julian Eldridge PA-C        Today, Patient Was Seen By: Pearl James MD    ** Please Note: Dictation voice to text software may have been used in the creation of this document   **

## 2018-12-30 NOTE — PROGRESS NOTES
Progress Note - Cardiology   Hugh Camacho 58 y o  female MRN: [id number]  Unit/Bed#: [facility] 509-01 Encounter: [id number]    Assessment:  Principal Problem:    Chest pain  Active Problems:    Acute diastolic congestive heart failure (HCC)    DM (diabetes mellitus) ([facility] )    Hypothyroidism    Hypertension    Hyperlipidemia    Stage 3 chronic kidney disease ([facility] )    Anemia    Plan:  Diastolic CHF - unclear etiology  She is quite hypertensive  Increase amlodipine  Continue IV lasix  Possible RWMA on echocardiogram, low normal LV function  Will start with pharmacologic nuclear stress test   Evaluation of anemia per primary team - needs to be sorted out before any potential cath in future  Start Coreg tomorrow for HTN  Subjective/Objective     Subjective:  Still with some dyspnea, PND  Denies any h/o anemia  No bleeding noted  She has no chest pain currently, but still not feeling at her baseline  Objective:    Vitals: /82   Pulse 83   Temp 98 3 °F (36 8 °C) (Oral)   Resp 18   Ht 5' 3" (1 6 m)   Wt 75 5 kg (166 lb 7 2 oz)   SpO2 95%   BMI 29 48 kg/m²   Vitals:    12/29/18 1700 12/30/18 0643   Weight: 76 6 kg (168 lb 14 oz) 75 5 kg (166 lb 7 2 oz)     Orthostatic Blood Pressures      Most Recent Value   Blood Pressure  170/82 filed at 12/30/2018 1551   Patient Position - Orthostatic VS  Lying filed at 12/30/2018 0550          Intake/Output Summary (Last 24 hours) at 12/30/18 1653  Last data filed at 12/30/18 1216   Gross per 24 hour   Intake           1183 8 ml   Output             2150 ml   Net           -966 2 ml     Physical Exam:   General appearance: alert and in no acute distress  Head: Normocephalic, without obvious abnormality, atraumatic  Lungs: basilar rales  Heart: S1, S2 normal and no S3 or S4  No murmurs    Abdomen: soft, non-tender; bowel sounds normal; no masses,  no organomegaly  Extremities: 1+ LE edema  Pulses: 2+ and symmetric bilaterally  Skin: Skin color, texture, turgor normal  No rashes or lesions  Neurologic: Grossly normal  Alert and oriented      Medications:    Current Facility-Administered Medications:     acetaminophen (TYLENOL) tablet 650 mg, 650 mg, Oral, Q6H PRN, Jose Love PA-C, 650 mg at 12/30/18 0356    [START ON 12/31/2018] amLODIPine (NORVASC) tablet 10 mg, 10 mg, Oral, Daily, Stefany Reddy MD    atorvastatin (LIPITOR) tablet 40 mg, 40 mg, Oral, Daily With Saul Anthony PA-C, 40 mg at 12/30/18 1645    docusate sodium (COLACE) capsule 100 mg, 100 mg, Oral, BID, Irena Anthony PA-C, 100 mg at 12/30/18 0727    furosemide (LASIX) injection 40 mg, 40 mg, Intravenous, BID (diuretic), Malorie Simpson MD, Stopped at 12/30/18 1645    heparin (porcine) subcutaneous injection 5,000 Units, 5,000 Units, Subcutaneous, Q8H [facility], Stopped at 12/30/18 1645 **AND** Platelet count, , , Once, Irena Anthony PA-C    insulin lispro (HumaLOG) 100 units/mL subcutaneous injection 1-5 Units, 1-5 Units, Subcutaneous, TID AC, 1 Units at 12/30/18 1647 **AND** Fingerstick Glucose (POCT), , , TID AC, Irena Anthony PA-C    insulin lispro (HumaLOG) 100 units/mL subcutaneous injection 1-5 Units, 1-5 Units, Subcutaneous, HS, Irena Anthony PA-C, 1 Units at 12/29/18 2142    [START ON 12/31/2018] levothyroxine tablet 112 mcg, 112 mcg, Oral, Once per day on Mon Tue Wed Thu Fri Sat, Daniel Guzman PA-C    levothyroxine tablet 56 mcg, 56 mcg, Oral, Once per day on Sun, Daniel Guzman PA-C, 56 mcg at 12/30/18 4673    ondansetron (ZOFRAN) injection 4 mg, 4 mg, Intravenous, Q6H PRN, Meka Prince PA-C    Lab Results:    Results from last 7 days  Lab Units 12/29/18  2335 12/29/18  1738 12/29/18  1234   TROPONIN I ng/mL 0 20* 0 21* 0 20*       Results from last 7 days  Lab Units 12/30/18  0510 12/29/18  1738 12/29/18  0936   WBC Thousand/uL 6 70  --  6 82   HEMOGLOBIN g/dL 7 2*  --  8 4*   HEMATOCRIT % 24 0*  --  28 1*   PLATELETS Thousands/uL 180 174 184       Results from last 7 days  Lab Units 12/29/18  1738   TRIGLYCERIDES mg/dL 109   HDL mg/dL 65*       Results from last 7 days  Lab Units 12/30/18  0510 12/29/18  0936   POTASSIUM mmol/L 4 0 4 2   CHLORIDE mmol/L 111* 112*   CO2 mmol/L 22 21   BUN mg/dL 32* 30*   CREATININE mg/dL 1 59* 1 44*   CALCIUM mg/dL 8 4 8 7   ALK PHOS U/L  --  636*   ALT U/L  --  46   AST U/L  --  48*           Results from last 7 days  Lab Units 12/30/18  0510   MAGNESIUM mg/dL 1 7       Telemetry: Personally reviewed  Echo:  LEFT VENTRICLE:  Systolic function was at the lower limits of normal  Ejection fraction was estimated to be 50 %  There was hypokinesis of the mid inferoseptal and apical inferior wall(s)  Wall thickness was mildly increased  There was mild concentric hypertrophy  Features were consistent with a pseudonormal left ventricular filling pattern, with concomitant abnormal relaxation and increased filling pressure (grade 2 diastolic dysfunction)      LEFT ATRIUM:  The atrium was mildly dilated      MITRAL VALVE:  There was mild regurgitation      TRICUSPID VALVE:  There was mild regurgitation  Estimated peak PA pressure was 33 mmHg      PERICARDIUM:  A trace, free-flowing pericardial effusion was identified   There was no evidence of hemodynamic compromise

## 2018-12-31 ENCOUNTER — APPOINTMENT (INPATIENT)
Dept: RADIOLOGY | Facility: HOSPITAL | Age: 63
DRG: 246 | End: 2018-12-31
Payer: COMMERCIAL

## 2018-12-31 ENCOUNTER — APPOINTMENT (INPATIENT)
Dept: NON INVASIVE DIAGNOSTICS | Facility: HOSPITAL | Age: 63
DRG: 246 | End: 2018-12-31
Payer: COMMERCIAL

## 2018-12-31 LAB
ALBUMIN SERPL ELPH-MCNC: 3.02 G/DL (ref 3.5–5)
ALBUMIN SERPL ELPH-MCNC: 50.4 % (ref 52–65)
ALPHA1 GLOB SERPL ELPH-MCNC: 0.36 G/DL (ref 0.1–0.4)
ALPHA1 GLOB SERPL ELPH-MCNC: 6 % (ref 2.5–5)
ALPHA2 GLOB SERPL ELPH-MCNC: 0.8 G/DL (ref 0.4–1.2)
ALPHA2 GLOB SERPL ELPH-MCNC: 13.4 % (ref 7–13)
ANION GAP SERPL CALCULATED.3IONS-SCNC: 7 MMOL/L (ref 4–13)
BASOPHILS # BLD AUTO: 0.04 THOUSANDS/ΜL (ref 0–0.1)
BASOPHILS NFR BLD AUTO: 1 % (ref 0–1)
BETA GLOB ABNORMAL SERPL ELPH-MCNC: 0.38 G/DL (ref 0.4–0.8)
BETA1 GLOB SERPL ELPH-MCNC: 6.3 % (ref 5–13)
BETA2 GLOB SERPL ELPH-MCNC: 6.6 % (ref 2–8)
BETA2+GAMMA GLOB SERPL ELPH-MCNC: 0.4 G/DL (ref 0.2–0.5)
BUN SERPL-MCNC: 35 MG/DL (ref 5–25)
CALCIUM SERPL-MCNC: 8.4 MG/DL (ref 8.3–10.1)
CHEST PAIN STATEMENT: NORMAL
CHLORIDE SERPL-SCNC: 109 MMOL/L (ref 100–108)
CO2 SERPL-SCNC: 23 MMOL/L (ref 21–32)
CREAT SERPL-MCNC: 1.58 MG/DL (ref 0.6–1.3)
EOSINOPHIL # BLD AUTO: 0.21 THOUSAND/ΜL (ref 0–0.61)
EOSINOPHIL NFR BLD AUTO: 3 % (ref 0–6)
ERYTHROCYTE [DISTWIDTH] IN BLOOD BY AUTOMATED COUNT: 13.4 % (ref 11.6–15.1)
GAMMA GLOB ABNORMAL SERPL ELPH-MCNC: 1.04 G/DL (ref 0.5–1.6)
GAMMA GLOB SERPL ELPH-MCNC: 17.3 % (ref 12–22)
GFR SERPL CREATININE-BSD FRML MDRD: 34 ML/MIN/1.73SQ M
GLUCOSE SERPL-MCNC: 104 MG/DL (ref 65–140)
GLUCOSE SERPL-MCNC: 116 MG/DL (ref 65–140)
GLUCOSE SERPL-MCNC: 243 MG/DL (ref 65–140)
GLUCOSE SERPL-MCNC: 276 MG/DL (ref 65–140)
GLUCOSE SERPL-MCNC: 382 MG/DL (ref 65–140)
HCT VFR BLD AUTO: 25.3 % (ref 34.8–46.1)
HGB BLD-MCNC: 7.7 G/DL (ref 11.5–15.4)
IGG/ALB SER: 1.02 {RATIO} (ref 1.1–1.8)
IMM GRANULOCYTES # BLD AUTO: 0.02 THOUSAND/UL (ref 0–0.2)
IMM GRANULOCYTES NFR BLD AUTO: 0 % (ref 0–2)
LYMPHOCYTES # BLD AUTO: 1.68 THOUSANDS/ΜL (ref 0.6–4.47)
LYMPHOCYTES NFR BLD AUTO: 27 % (ref 14–44)
MAX DIASTOLIC BP: 72 MMHG
MAX HEART RATE: 81 BPM
MAX PREDICTED HEART RATE: 156 BPM
MAX. SYSTOLIC BP: 141 MMHG
MCH RBC QN AUTO: 27.1 PG (ref 26.8–34.3)
MCHC RBC AUTO-ENTMCNC: 30.4 G/DL (ref 31.4–37.4)
MCV RBC AUTO: 89 FL (ref 82–98)
MONOCYTES # BLD AUTO: 0.5 THOUSAND/ΜL (ref 0.17–1.22)
MONOCYTES NFR BLD AUTO: 8 % (ref 4–12)
NEUTROPHILS # BLD AUTO: 3.86 THOUSANDS/ΜL (ref 1.85–7.62)
NEUTS SEG NFR BLD AUTO: 61 % (ref 43–75)
NRBC BLD AUTO-RTO: 0 /100 WBCS
PLATELET # BLD AUTO: 175 THOUSANDS/UL (ref 149–390)
PMV BLD AUTO: 10.6 FL (ref 8.9–12.7)
POTASSIUM SERPL-SCNC: 4 MMOL/L (ref 3.5–5.3)
PROT PATTERN SERPL ELPH-IMP: ABNORMAL
PROT SERPL-MCNC: 6 G/DL (ref 6.4–8.2)
PROTOCOL NAME: NORMAL
RBC # BLD AUTO: 2.84 MILLION/UL (ref 3.81–5.12)
REASON FOR TERMINATION: NORMAL
SODIUM SERPL-SCNC: 139 MMOL/L (ref 136–145)
T4 FREE SERPL-MCNC: 0.91 NG/DL (ref 0.76–1.46)
TARGET HR FORMULA: NORMAL
TEST INDICATION: NORMAL
TIME IN EXERCISE PHASE: NORMAL
TSH SERPL DL<=0.05 MIU/L-ACNC: 5.51 UIU/ML (ref 0.36–3.74)
WBC # BLD AUTO: 6.31 THOUSAND/UL (ref 4.31–10.16)

## 2018-12-31 PROCEDURE — 78452 HT MUSCLE IMAGE SPECT MULT: CPT | Performed by: INTERNAL MEDICINE

## 2018-12-31 PROCEDURE — 84439 ASSAY OF FREE THYROXINE: CPT | Performed by: INTERNAL MEDICINE

## 2018-12-31 PROCEDURE — 84443 ASSAY THYROID STIM HORMONE: CPT | Performed by: INTERNAL MEDICINE

## 2018-12-31 PROCEDURE — A9502 TC99M TETROFOSMIN: HCPCS

## 2018-12-31 PROCEDURE — 99232 SBSQ HOSP IP/OBS MODERATE 35: CPT | Performed by: INTERNAL MEDICINE

## 2018-12-31 PROCEDURE — 93017 CV STRESS TEST TRACING ONLY: CPT

## 2018-12-31 PROCEDURE — 82948 REAGENT STRIP/BLOOD GLUCOSE: CPT

## 2018-12-31 PROCEDURE — 84165 PROTEIN E-PHORESIS SERUM: CPT | Performed by: INTERNAL MEDICINE

## 2018-12-31 PROCEDURE — 78452 HT MUSCLE IMAGE SPECT MULT: CPT

## 2018-12-31 PROCEDURE — 93016 CV STRESS TEST SUPVJ ONLY: CPT | Performed by: INTERNAL MEDICINE

## 2018-12-31 PROCEDURE — 93018 CV STRESS TEST I&R ONLY: CPT | Performed by: INTERNAL MEDICINE

## 2018-12-31 PROCEDURE — 80048 BASIC METABOLIC PNL TOTAL CA: CPT | Performed by: PHYSICIAN ASSISTANT

## 2018-12-31 PROCEDURE — 85025 COMPLETE CBC W/AUTO DIFF WBC: CPT | Performed by: PHYSICIAN ASSISTANT

## 2018-12-31 RX ORDER — BENZONATATE 100 MG/1
100 CAPSULE ORAL 3 TIMES DAILY PRN
Status: DISCONTINUED | OUTPATIENT
Start: 2018-12-31 | End: 2019-01-01

## 2018-12-31 RX ADMIN — HEPARIN SODIUM 5000 UNITS: 5000 INJECTION INTRAVENOUS; SUBCUTANEOUS at 05:35

## 2018-12-31 RX ADMIN — HEPARIN SODIUM 5000 UNITS: 5000 INJECTION INTRAVENOUS; SUBCUTANEOUS at 14:43

## 2018-12-31 RX ADMIN — HEPARIN SODIUM 5000 UNITS: 5000 INJECTION INTRAVENOUS; SUBCUTANEOUS at 21:30

## 2018-12-31 RX ADMIN — LEVOTHYROXINE SODIUM 112 MCG: 112 TABLET ORAL at 05:35

## 2018-12-31 RX ADMIN — INSULIN LISPRO 2 UNITS: 100 INJECTION, SOLUTION INTRAVENOUS; SUBCUTANEOUS at 21:32

## 2018-12-31 RX ADMIN — CARVEDILOL 6.25 MG: 6.25 TABLET, FILM COATED ORAL at 08:08

## 2018-12-31 RX ADMIN — AMLODIPINE BESYLATE 10 MG: 10 TABLET ORAL at 08:13

## 2018-12-31 RX ADMIN — FUROSEMIDE 40 MG: 10 INJECTION, SOLUTION INTRAMUSCULAR; INTRAVENOUS at 17:33

## 2018-12-31 RX ADMIN — INSULIN LISPRO 4 UNITS: 100 INJECTION, SOLUTION INTRAVENOUS; SUBCUTANEOUS at 17:31

## 2018-12-31 RX ADMIN — REGADENOSON 0.4 MG: 0.08 INJECTION, SOLUTION INTRAVENOUS at 13:08

## 2018-12-31 RX ADMIN — DOCUSATE SODIUM 100 MG: 100 CAPSULE, LIQUID FILLED ORAL at 17:33

## 2018-12-31 RX ADMIN — DOCUSATE SODIUM 100 MG: 100 CAPSULE, LIQUID FILLED ORAL at 08:08

## 2018-12-31 RX ADMIN — FUROSEMIDE 40 MG: 10 INJECTION, SOLUTION INTRAMUSCULAR; INTRAVENOUS at 08:08

## 2018-12-31 RX ADMIN — INSULIN LISPRO 2 UNITS: 100 INJECTION, SOLUTION INTRAVENOUS; SUBCUTANEOUS at 14:44

## 2018-12-31 RX ADMIN — CARVEDILOL 6.25 MG: 6.25 TABLET, FILM COATED ORAL at 17:31

## 2018-12-31 RX ADMIN — ATORVASTATIN CALCIUM 40 MG: 40 TABLET, FILM COATED ORAL at 17:30

## 2018-12-31 NOTE — PROGRESS NOTES
Progress Note - Cardiology   Alena Dykes 58 y o  female MRN: [id number]  Unit/Bed#: [facility] 509-01 Encounter: [id number]    Assessment:  Principal Problem:    Chest pain  Active Problems:    Acute diastolic congestive heart failure (HCC)    DM (diabetes mellitus) ([facility] )    Hypertension    Hyperlipidemia    Stage 3 chronic kidney disease ([facility] )    Anemia    Plan:  Diastolic CHF - unclear etiology  She is quite hypertensive  Continue with blood pressure control with the higher dose of amlodipine as well as the Coreg  For the time being, will continue IV Lasix  She continues to have dyspnea with lying flat  Review the results of the stress test with the patient  Based on these results, would recommend cardiac catheterization  She will need to lie flat to be able to do this, tells me she would not be able to do so currently  Also need to watch her kidney function as she has some chronic kidney disease  Continue with diuresis and if able to do so, would proceed with cardiac catheterization on Wednesday  Anemia appears stable, likely secondary chronic kidney disease  Discussed with the internal medicine team     Subjective/Objective     Subjective:  Weight is decreased  She still has PND  Has a lot of coughing which she lays flat  Had a stress test earlier today  There was some anterior ischemia which is relatively mild noted      Objective:    Vitals: /67 (BP Location: Right arm)   Pulse 67   Temp 98 4 °F (36 9 °C) (Oral)   Resp 16   Ht 5' 3" (1 6 m)   Wt 74 3 kg (163 lb 12 8 oz)   SpO2 96%   BMI 29 02 kg/m²   Vitals:    12/30/18 0643 12/31/18 0541   Weight: 75 5 kg (166 lb 7 2 oz) 74 3 kg (163 lb 12 8 oz)     Orthostatic Blood Pressures      Most Recent Value   Blood Pressure  151/67 filed at 12/31/2018 1500   Patient Position - Orthostatic VS  Lying filed at 12/31/2018 1500          Intake/Output Summary (Last 24 hours) at 12/31/18 1634  Last data filed at 12/31/18 1500   Gross per 24 hour Intake              960 ml   Output             1800 ml   Net             -840 ml     Physical Exam:  GEN: Jeff Lyons appears well, alert and oriented x 3, pleasant and cooperative   HEENT: pupils equal, round, and reactive to light; extraocular muscles intact  NECK: supple, no carotid bruits   HEART: regular rhythm, normal S1 and S2, no murmurs, clicks, gallops or rubs   LUNGS: decreased sounds at bases  Still with some rales    ABDOMEN: normal bowel sounds, soft, no tenderness, no distention  EXTREMITIES: peripheral pulses normal; no clubbing, cyanosis, or edema  NEURO: no focal findings   SKIN: normal without suspicious lesions on exposed skin    Medications:    Current Facility-Administered Medications:     acetaminophen (TYLENOL) tablet 650 mg, 650 mg, Oral, Q6H PRN, Kelton Winters PA-C, 650 mg at 12/30/18 0356    amLODIPine (NORVASC) tablet 10 mg, 10 mg, Oral, Daily, Caro Soto MD, 10 mg at 12/31/18 0813    atorvastatin (LIPITOR) tablet 40 mg, 40 mg, Oral, Daily With Dinner, Beverley Gitelman Fischer, PA-C, 40 mg at 12/30/18 1645    benzonatate (TESSALON PERLES) capsule 100 mg, 100 mg, Oral, TID PRN, Florence Chaves MD    carvedilol (COREG) tablet 6 25 mg, 6 25 mg, Oral, BID With Meals, Caro Soto MD, 6 25 mg at 12/31/18 0808    docusate sodium (COLACE) capsule 100 mg, 100 mg, Oral, BID, Irena Anthony PA-C, 100 mg at 12/31/18 0808    furosemide (LASIX) injection 40 mg, 40 mg, Intravenous, BID (diuretic), Obed Rubio MD, 40 mg at 12/31/18 0808    heparin (porcine) subcutaneous injection 5,000 Units, 5,000 Units, Subcutaneous, Q8H [address], 5,000 Units at 12/31/18 1443 **AND** Platelet count, , , Once, Irena Anthony PA-C    insulin lispro (HumaLOG) 100 units/mL subcutaneous injection 1-5 Units, 1-5 Units, Subcutaneous, TID AC, 2 Units at 12/31/18 1444 **AND** Fingerstick Glucose (POCT), , , TID AC, Irena Anthony PA-C    insulin lispro (HumaLOG) 100 units/mL subcutaneous injection 1-5 Units, 1-5 Units, Subcutaneous, HS, Irena Anthony PA-C, 3 Units at 12/30/18 2150    levothyroxine tablet 112 mcg, 112 mcg, Oral, Once per day on Mon Tue Wed Thu Fri Sat, Jackie Thomas PA-C, 112 mcg at 12/31/18 6102    levothyroxine tablet 56 mcg, 56 mcg, Oral, Once per day on Sun, Jackie Thomas PA-C, 56 mcg at 12/30/18 8997    ondansetron (ZOFRAN) injection 4 mg, 4 mg, Intravenous, Q6H PRN, Zen Anthony PA-C    Lab Results:    Results from last 7 days  Lab Units 12/29/18  2335 12/29/18  1738 12/29/18  1234   TROPONIN I ng/mL 0 20* 0 21* 0 20*       Results from last 7 days  Lab Units 12/31/18  0501 12/30/18  0510 12/29/18  1738 12/29/18  0936   WBC Thousand/uL 6 31 6 70  --  6 82   HEMOGLOBIN g/dL 7 7* 7 2*  --  8 4*   HEMATOCRIT % 25 3* 24 0*  --  28 1*   PLATELETS Thousands/uL 175 180 174 184       Results from last 7 days  Lab Units 12/29/18  1738   TRIGLYCERIDES mg/dL 109   HDL mg/dL 65*       Results from last 7 days  Lab Units 12/31/18  0501 12/30/18  0510 12/29/18  0936   POTASSIUM mmol/L 4 0 4 0 4 2   CHLORIDE mmol/L 109* 111* 112*   CO2 mmol/L 23 22 21   BUN mg/dL 35* 32* 30*   CREATININE mg/dL 1 58* 1 59* 1 44*   CALCIUM mg/dL 8 4 8 4 8 7   ALK PHOS U/L  --   --  636*   ALT U/L  --   --  46   AST U/L  --   --  48*           Results from last 7 days  Lab Units 12/30/18  0510   MAGNESIUM mg/dL 1 7       Telemetry: Personally reviewed  No significant arrhythmias  Echo:  LEFT VENTRICLE:  Systolic function was at the lower limits of normal  Ejection fraction was estimated to be 50 %  There was hypokinesis of the mid inferoseptal and apical inferior wall(s)  Wall thickness was mildly increased  There was mild concentric hypertrophy    Features were consistent with a pseudonormal left ventricular filling pattern, with concomitant abnormal relaxation and increased filling pressure (grade 2 diastolic dysfunction)      LEFT ATRIUM:  The atrium was mildly dilated      MITRAL VALVE:  There was mild regurgitation      TRICUSPID VALVE:  There was mild regurgitation  Estimated peak PA pressure was 33 mmHg      PERICARDIUM:  A trace, free-flowing pericardial effusion was identified   There was no evidence of hemodynamic compromise

## 2018-12-31 NOTE — ASSESSMENT & PLAN NOTE
Uncontrolled  Continue amlodipine, diuresis  Start Coreg today  Restart lisinopril when renal function stabilizes

## 2018-12-31 NOTE — SOCIAL WORK
Met with the patient and her daughter, Kiet Young, [phone] to complete assessment and explain role of CM  The patient lives with her spouse, son and grandchildren in a second floor walk up apt  There are 2 ARUNA and then 15 steps with right rail  Prior to admission the patient was independent, drives and works in   She has no DME and no history of HHC or snf  Patient denies MH and D&A treatment  Discussed benefit of Stacie Locke referral for diagnosis of CHF but patient and her spouse decline  PCP is Dr Luna Amaya  Patient has prescription coverage and uses [employer] [place]  She has no Advance Directive and requests her daughterBalbir be primary contact  CM reviewed d/c planning process including the following: identifying help at home, patient preference for d/c planning needs, Discharge Lounge, [facility] Meds to Bed program, availability of treatment team to discuss questions or concerns patient and/or family may have regarding understanding medications and recognizing signs and symptoms once discharged  CM also encouraged patient to follow up with all recommended appointments after discharge  Patient advised of importance for patient and family to participate in managing patients medical well being  Patient/caregiver received discharge checklist  Content reviewed  Patient/caregiver encouraged to participate in discharge plan of care prior to discharge home

## 2018-12-31 NOTE — ASSESSMENT & PLAN NOTE
· Likely type 2 non STEMI secondary to congestive heart failure    · Discussed with Cardiology, awaiting stress test results

## 2018-12-31 NOTE — ASSESSMENT & PLAN NOTE
Likely due to chronic kidney disease    Refuses blood transfusion at this time  Will refer outpatient to Nephrology for EPO and iron therapy

## 2018-12-31 NOTE — PLAN OF CARE
CARDIOVASCULAR - ADULT     Maintains optimal cardiac output and hemodynamic stability Progressing     Absence of cardiac dysrhythmias or at baseline rhythm Progressing        HEMATOLOGIC - ADULT     Maintains hematologic stability Progressing        METABOLIC, FLUID AND ELECTROLYTES - ADULT     Electrolytes maintained within normal limits Progressing     Fluid balance maintained Progressing     Glucose maintained within target range Progressing        PAIN - ADULT     Verbalizes/displays adequate comfort level or baseline comfort level Progressing        Potential for Falls     Patient will remain free of falls Progressing        SAFETY ADULT     Patient will remain free of falls Progressing     Maintain or return to baseline ADL function Progressing     Maintain or return mobility status to optimal level Progressing

## 2018-12-31 NOTE — PROGRESS NOTES
Progress Note - Nimesh Enriquez 1955, 58 y o  female MRN: [id number]    Unit/Bed#: [facility] 509-01 Encounter: [id number]    Primary Care Provider: Christie Cota MD   Date and time admitted to hospital: 12/29/2018  9:02 AM        * Chest pain   Assessment & Plan    · Likely type 2 non STEMI secondary to congestive heart failure  · Discussed with Cardiology, awaiting stress test results     Acute diastolic congestive heart failure ([facility] )   Assessment & Plan    Continue IV diuresis per Cardiology recommendations  Monitor intake and output  Monitor on telemetry  Low-sodium diet  Daily weights  BMP daily     Anemia   Assessment & Plan    Likely due to chronic kidney disease  Refuses blood transfusion at this time  Will refer outpatient to Nephrology for EPO and iron therapy     Stage 3 chronic kidney disease ([facility] )   Assessment & Plan    · With proteinuria, diabetic nephropathy  · Worked up at [facility] in the past  · Baseline appears to be around 1 4  · Avoid nephrotoxins if possible  · Outpatient referral to Nephrology     Hyperlipidemia   Assessment & Plan    Patient on atorvastatin 40 mg every day  LDL is at goal     Hypertension   Assessment & Plan    Uncontrolled  Continue amlodipine, diuresis  Start Coreg today  Restart lisinopril when renal function stabilizes     Hypothyroidism   Assessment & Plan    · Patient sees Endocrinology outpatient  Takes levothyroxine 0 112 mcg daily  · T4 were measured on admission  Within normal limits  Will continue levothyroxine  · Will recommend follow-up with outpatient endocrinologist upon discharge  DM (diabetes mellitus) [facility])   Assessment & Plan    Lab Results   Component Value Date    HGBA1C 7 5 (H) 12/29/2018       Recent Labs      12/30/18   1646  12/30/18   2026  12/31/18   0620  12/31/18   1057   POCGLU  179*  300*  116  243*       Blood Sugar Average: Last 72 hrs:  · Patient sees Endocrinology outpatient  Takes repaglinide 0 5 mg p o  B i d  Januvia 50 mg p o  Daily  Patient states she has never taken insulin  She monitors her sugars twice daily  Normal results 160  · While hospitalized, patient will be managed on weight based insulin dosing  Will monitor    · Blood glucose is acceptable, continue current medications         VTE Pharmacologic Prophylaxis:   Pharmacologic: Heparin  Mechanical VTE Prophylaxis in Place: Yes    Patient Centered Rounds: I have performed bedside rounds with nursing staff today  Discussions with Specialists or Other Care Team Provider:  Cardiology    Education and Discussions with Family / Patient:  Patient, plan of care    Time Spent for Care: 15 minutes  More than 50% of total time spent on counseling and coordination of care as described above  Current Length of Stay: 2 day(s)    Current Patient Status: Inpatient   Certification Statement: The patient will continue to require additional inpatient hospital stay due to Stress test    Discharge Plan: To be determined    Code Status: Level 1 - Full Code      Subjective:   Reports that her shortness of breath is improved  Does note nocturnal coughing which is interrupting her sleep  Denies chest pain  Objective:     Vitals:   Temp (24hrs), Av 5 °F (36 9 °C), Min:98 °F (36 7 °C), Max:99 2 °F (37 3 °C)    Temp:  [98 °F (36 7 °C)-99 2 °F (37 3 °C)] 98 4 °F (36 9 °C)  HR:  [64-92] 67  Resp:  [16-18] 16  BP: (100-192)/(65-93) 151/67  SpO2:  [95 %-97 %] 96 %  Body mass index is 29 02 kg/m²  Input and Output Summary (last 24 hours): Intake/Output Summary (Last 24 hours) at 18 1553  Last data filed at 18 1500   Gross per 24 hour   Intake              960 ml   Output             1800 ml   Net             -840 ml       Physical Exam:     Physical Exam   Constitutional: She is oriented to person, place, and time  She appears well-developed and well-nourished  HENT:   Head: Normocephalic and atraumatic     Eyes: Pupils are equal, round, and reactive to light  EOM are normal    Neck: Neck supple  No JVD present  Cardiovascular: Normal rate and regular rhythm  Pulmonary/Chest: Effort normal  She has no wheezes  She has no rales  Abdominal: Soft  There is no tenderness  Musculoskeletal: She exhibits no edema  Neurological: She is alert and oriented to person, place, and time  Skin: Skin is warm and dry  Additional Data:     Labs:      Results from last 7 days  Lab Units 12/31/18  0501   WBC Thousand/uL 6 31   HEMOGLOBIN g/dL 7 7*   HEMATOCRIT % 25 3*   PLATELETS Thousands/uL 175   NEUTROS PCT % 61   LYMPHS PCT % 27   MONOS PCT % 8   EOS PCT % 3       Results from last 7 days  Lab Units 12/31/18  0501  12/29/18  0936   SODIUM mmol/L 139  < > 140   POTASSIUM mmol/L 4 0  < > 4 2   CHLORIDE mmol/L 109*  < > 112*   CO2 mmol/L 23  < > 21   BUN mg/dL 35*  < > 30*   CREATININE mg/dL 1 58*  < > 1 44*   ANION GAP mmol/L 7  < > 7   CALCIUM mg/dL 8 4  < > 8 7   ALBUMIN g/dL  --   --  3 0*   TOTAL BILIRUBIN mg/dL  --   --  0 30   ALK PHOS U/L  --   --  636*   ALT U/L  --   --  46   AST U/L  --   --  48*   GLUCOSE RANDOM mg/dL 104  < > 187*   < > = values in this interval not displayed  Results from last 7 days  Lab Units 12/31/18  1057 12/31/18  0620 12/30/18  2026 12/30/18  1646 12/30/18  1104 12/30/18  0640 12/29/18  2134 12/29/18  1545   POC GLUCOSE mg/dl 243* 116 300* 179* 257* 106 162* 253*       Results from last 7 days  Lab Units 12/29/18  1738   HEMOGLOBIN A1C % 7 5*               * I Have Reviewed All Lab Data Listed Above  * Additional Pertinent Lab Tests Reviewed:  All Labs Within Last 24 Hours Reviewed      Recent Cultures (last 7 days):           Last 24 Hours Medication List:     Current Facility-Administered Medications:  acetaminophen 650 mg Oral Q6H PRN Parminder Alas PA-C   amLODIPine 10 mg Oral Daily Ole Cunha MD   atorvastatin 40 mg Oral Daily With Dinner Irena Anthony PA-C   benzonatate 100 mg Oral TID PRN Yudith Shaikh MD   carvedilol 6 25 mg Oral BID With Meals Hipolito Eubanks MD   docusate sodium 100 mg Oral BID Irena Anthony PA-C   furosemide 40 mg Intravenous BID (diuretic) Ajay Medina MD   heparin (porcine) 5,000 Units Subcutaneous Q8H [address] [facility] ZAYRA Anthony PA-C   insulin lispro 1-5 Units Subcutaneous TID AC Irena Anthony PA-C   insulin lispro 1-5 Units Subcutaneous HS Irena Anthony PA-C   levothyroxine 112 mcg Oral Once per day on Mon Tue Wed Thu Fri Sat Irena Padilla PA-C   levothyroxine 56 mcg Oral Once per day on Sun Irena Anthony PA-C   ondansetron 4 mg Intravenous Q6H PRN Alex Anthony PA-C   regadenoson            Today, Patient Was Seen By: Yovana Ward MD    ** Please Note: Dictation voice to text software may have been used in the creation of this document   **

## 2018-12-31 NOTE — ASSESSMENT & PLAN NOTE
Lab Results   Component Value Date    HGBA1C 7 5 (H) 12/29/2018       Recent Labs      12/30/18   1646  12/30/18   2026  12/31/18   0620  12/31/18   1057   POCGLU  179*  300*  116  243*       Blood Sugar Average: Last 72 hrs:  · Patient sees Endocrinology outpatient  Takes repaglinide 0 5 mg p o  B i d  Januvia 50 mg p o  Daily  Patient states she has never taken insulin  She monitors her sugars twice daily  Normal results 160  · While hospitalized, patient will be managed on weight based insulin dosing    Will monitor    · Blood glucose is acceptable, continue current medications

## 2018-12-31 NOTE — ASSESSMENT & PLAN NOTE
· With proteinuria, diabetic nephropathy  · Worked up at [facility] in the past  · Baseline appears to be around 1 4  · Avoid nephrotoxins if possible    · Outpatient referral to Nephrology

## 2019-01-01 PROBLEM — I21.A1 TYPE 2 MYOCARDIAL INFARCTION (HCC): Status: ACTIVE | Noted: 2018-12-29

## 2019-01-01 LAB
ANION GAP SERPL CALCULATED.3IONS-SCNC: 7 MMOL/L (ref 4–13)
BASOPHILS # BLD AUTO: 0.04 THOUSANDS/ΜL (ref 0–0.1)
BASOPHILS NFR BLD AUTO: 1 % (ref 0–1)
BUN SERPL-MCNC: 39 MG/DL (ref 5–25)
CALCIUM SERPL-MCNC: 8.2 MG/DL (ref 8.3–10.1)
CHLORIDE SERPL-SCNC: 107 MMOL/L (ref 100–108)
CO2 SERPL-SCNC: 25 MMOL/L (ref 21–32)
CREAT SERPL-MCNC: 1.78 MG/DL (ref 0.6–1.3)
EOSINOPHIL # BLD AUTO: 0.25 THOUSAND/ΜL (ref 0–0.61)
EOSINOPHIL NFR BLD AUTO: 4 % (ref 0–6)
ERYTHROCYTE [DISTWIDTH] IN BLOOD BY AUTOMATED COUNT: 13.4 % (ref 11.6–15.1)
GFR SERPL CREATININE-BSD FRML MDRD: 30 ML/MIN/1.73SQ M
GLUCOSE SERPL-MCNC: 128 MG/DL (ref 65–140)
GLUCOSE SERPL-MCNC: 138 MG/DL (ref 65–140)
GLUCOSE SERPL-MCNC: 184 MG/DL (ref 65–140)
GLUCOSE SERPL-MCNC: 293 MG/DL (ref 65–140)
GLUCOSE SERPL-MCNC: 297 MG/DL (ref 65–140)
HCT VFR BLD AUTO: 24.4 % (ref 34.8–46.1)
HGB BLD-MCNC: 7.4 G/DL (ref 11.5–15.4)
IMM GRANULOCYTES # BLD AUTO: 0.02 THOUSAND/UL (ref 0–0.2)
IMM GRANULOCYTES NFR BLD AUTO: 0 % (ref 0–2)
LYMPHOCYTES # BLD AUTO: 2.22 THOUSANDS/ΜL (ref 0.6–4.47)
LYMPHOCYTES NFR BLD AUTO: 32 % (ref 14–44)
MCH RBC QN AUTO: 26.8 PG (ref 26.8–34.3)
MCHC RBC AUTO-ENTMCNC: 30.3 G/DL (ref 31.4–37.4)
MCV RBC AUTO: 88 FL (ref 82–98)
MONOCYTES # BLD AUTO: 0.52 THOUSAND/ΜL (ref 0.17–1.22)
MONOCYTES NFR BLD AUTO: 7 % (ref 4–12)
NEUTROPHILS # BLD AUTO: 3.97 THOUSANDS/ΜL (ref 1.85–7.62)
NEUTS SEG NFR BLD AUTO: 56 % (ref 43–75)
NRBC BLD AUTO-RTO: 0 /100 WBCS
PLATELET # BLD AUTO: 180 THOUSANDS/UL (ref 149–390)
PMV BLD AUTO: 10.2 FL (ref 8.9–12.7)
POTASSIUM SERPL-SCNC: 3.9 MMOL/L (ref 3.5–5.3)
RBC # BLD AUTO: 2.76 MILLION/UL (ref 3.81–5.12)
SODIUM SERPL-SCNC: 139 MMOL/L (ref 136–145)
WBC # BLD AUTO: 7.02 THOUSAND/UL (ref 4.31–10.16)

## 2019-01-01 PROCEDURE — 99232 SBSQ HOSP IP/OBS MODERATE 35: CPT | Performed by: INTERNAL MEDICINE

## 2019-01-01 PROCEDURE — 85025 COMPLETE CBC W/AUTO DIFF WBC: CPT | Performed by: INTERNAL MEDICINE

## 2019-01-01 PROCEDURE — 82948 REAGENT STRIP/BLOOD GLUCOSE: CPT

## 2019-01-01 PROCEDURE — 80048 BASIC METABOLIC PNL TOTAL CA: CPT | Performed by: INTERNAL MEDICINE

## 2019-01-01 RX ORDER — BENZONATATE 100 MG/1
100 CAPSULE ORAL 3 TIMES DAILY
Status: DISCONTINUED | OUTPATIENT
Start: 2019-01-01 | End: 2019-01-10 | Stop reason: HOSPADM

## 2019-01-01 RX ORDER — ASPIRIN 81 MG/1
81 TABLET, CHEWABLE ORAL DAILY
Status: DISCONTINUED | OUTPATIENT
Start: 2019-01-01 | End: 2019-01-10 | Stop reason: HOSPADM

## 2019-01-01 RX ORDER — FLUTICASONE PROPIONATE 50 MCG
1 SPRAY, SUSPENSION (ML) NASAL 2 TIMES DAILY
Status: DISCONTINUED | OUTPATIENT
Start: 2019-01-01 | End: 2019-01-10 | Stop reason: HOSPADM

## 2019-01-01 RX ORDER — INSULIN GLARGINE 100 [IU]/ML
10 INJECTION, SOLUTION SUBCUTANEOUS
Status: DISCONTINUED | OUTPATIENT
Start: 2019-01-01 | End: 2019-01-04

## 2019-01-01 RX ADMIN — AMLODIPINE BESYLATE 10 MG: 10 TABLET ORAL at 09:46

## 2019-01-01 RX ADMIN — CARVEDILOL 6.25 MG: 6.25 TABLET, FILM COATED ORAL at 17:43

## 2019-01-01 RX ADMIN — INSULIN GLARGINE 10 UNITS: 100 INJECTION, SOLUTION SUBCUTANEOUS at 21:20

## 2019-01-01 RX ADMIN — FLUTICASONE PROPIONATE 1 SPRAY: 50 SPRAY, METERED NASAL at 18:10

## 2019-01-01 RX ADMIN — HEPARIN SODIUM 5000 UNITS: 5000 INJECTION INTRAVENOUS; SUBCUTANEOUS at 05:26

## 2019-01-01 RX ADMIN — INSULIN LISPRO 4 UNITS: 100 INJECTION, SOLUTION INTRAVENOUS; SUBCUTANEOUS at 17:43

## 2019-01-01 RX ADMIN — ATORVASTATIN CALCIUM 40 MG: 40 TABLET, FILM COATED ORAL at 17:43

## 2019-01-01 RX ADMIN — INSULIN LISPRO 1 UNITS: 100 INJECTION, SOLUTION INTRAVENOUS; SUBCUTANEOUS at 17:43

## 2019-01-01 RX ADMIN — INSULIN LISPRO 2 UNITS: 100 INJECTION, SOLUTION INTRAVENOUS; SUBCUTANEOUS at 21:20

## 2019-01-01 RX ADMIN — BENZONATATE 100 MG: 100 CAPSULE ORAL at 21:20

## 2019-01-01 RX ADMIN — BENZONATATE 100 MG: 100 CAPSULE ORAL at 14:38

## 2019-01-01 RX ADMIN — DOCUSATE SODIUM 100 MG: 100 CAPSULE, LIQUID FILLED ORAL at 09:46

## 2019-01-01 RX ADMIN — DOCUSATE SODIUM 100 MG: 100 CAPSULE, LIQUID FILLED ORAL at 17:43

## 2019-01-01 RX ADMIN — HEPARIN SODIUM 5000 UNITS: 5000 INJECTION INTRAVENOUS; SUBCUTANEOUS at 21:20

## 2019-01-01 RX ADMIN — INSULIN LISPRO 3 UNITS: 100 INJECTION, SOLUTION INTRAVENOUS; SUBCUTANEOUS at 12:12

## 2019-01-01 RX ADMIN — HEPARIN SODIUM 5000 UNITS: 5000 INJECTION INTRAVENOUS; SUBCUTANEOUS at 14:33

## 2019-01-01 RX ADMIN — CARVEDILOL 6.25 MG: 6.25 TABLET, FILM COATED ORAL at 09:46

## 2019-01-01 RX ADMIN — ASPIRIN 81 MG 81 MG: 81 TABLET ORAL at 12:13

## 2019-01-01 RX ADMIN — LEVOTHYROXINE SODIUM 112 MCG: 112 TABLET ORAL at 05:27

## 2019-01-01 NOTE — PROGRESS NOTES
Progress Note - Cardiology   Jeff Lyons 58 y o  female MRN: [id number]  Unit/Bed#: [place] 509-01 Encounter: [id number]    Assessment:  Principal Problem:    Chest pain  Active Problems:    Acute diastolic congestive heart failure (HCC)    DM (diabetes mellitus) ([facility] )    Hypertension    Hyperlipidemia    Stage 3 chronic kidney disease ([facility] )    Anemia    Plan:  Diastolic congestive heart failure with improving volume status  Creatinine is increased today  Will hold off on diuretics today and allow creatinine to hopefully stabilize a little bit  I have discussed cardiac catheterization with the patient  She seems very reluctant, but would like to think about this further  This will depend on whether she can lay flat for the procedure, and she is able to tolerate the claustrophobia with her anxiety  I explained that this would be done with sedation, but not general anesthesia  If she is unable to lay flat or on willing to have the procedure done, then we would treat medically for coronary artery disease  With regards to medical management of coronary disease, add aspirin 81 mg  Continue the beta-blocker, Coreg 6 25 mg twice a day  On amlodipine 10 for blood pressure control  40 mg of atorvastatin  Check blood work tomorrow, further recommendations pending this  Subjective/Objective     Subjective:  Less coughing last night  Still some difficulty with lying flat  Her creatinine is increased today  I discussed cardiac catheterization again with her at, and her daughter was present at the bedside  They are significant concerns because she is very claustrophobic  She even had a lot of problems with the stress test yesterday  I discussed that ideally I would recommend cardiac catheterization  However, creatinine is increased today  I do not think we should proceed with this tomorrow  Her volume status is improving, and her symptoms are improving as well      Objective:    Vitals: /69 Pulse 73   Temp 98 7 °F (37 1 °C) (Oral)   Resp 20   Ht 5' 3" (1 6 m)   Wt 74 3 kg (163 lb 12 8 oz)   SpO2 95%   BMI 29 02 kg/m²   Vitals:    12/31/18 0541 01/01/19 0546   Weight: 74 3 kg (163 lb 12 8 oz) 74 3 kg (163 lb 12 8 oz)     Orthostatic Blood Pressures      Most Recent Value   Blood Pressure  153/69 filed at 01/01/2019 6965   Patient Position - Orthostatic VS  Lying filed at 12/31/2018 1930          Intake/Output Summary (Last 24 hours) at 01/01/19 1026  Last data filed at 01/01/19 0935   Gross per 24 hour   Intake              840 ml   Output             1550 ml   Net             -710 ml     Physical Exam:  GEN: Hugh Camacho is anxious, but in NAD  NECK: supple, no carotid bruits   HEART: regular rhythm, normal S1 and S2, no murmurs, clicks, gallops or rubs   LUNGS: decreased air entry    ABDOMEN: normal bowel sounds, soft, no tenderness, no distention  EXTREMITIES: decreased edema  NEURO: no focal findings   SKIN: normal without suspicious lesions on exposed skin    Medications:    Current Facility-Administered Medications:     acetaminophen (TYLENOL) tablet 650 mg, 650 mg, Oral, Q6H PRN, Parminder Alas PA-C, 650 mg at 12/30/18 0356    amLODIPine (NORVASC) tablet 10 mg, 10 mg, Oral, Daily, Ole Cunha MD, 10 mg at 01/01/19 0946    atorvastatin (LIPITOR) tablet 40 mg, 40 mg, Oral, Daily With Dinner, Carmelita Anthony PA-C, 40 mg at 12/31/18 1730    benzonatate (TESSALON PERLES) capsule 100 mg, 100 mg, Oral, TID PRN, Joselyn Borja MD    carvedilol (COREG) tablet 6 25 mg, 6 25 mg, Oral, BID With Meals, Ole Cunha MD, 6 25 mg at 01/01/19 0946    docusate sodium (COLACE) capsule 100 mg, 100 mg, Oral, BID, Irena Anthony PA-C, 100 mg at 01/01/19 0946    heparin (porcine) subcutaneous injection 5,000 Units, 5,000 Units, Subcutaneous, Q8H [facility], 5,000 Units at 01/01/19 0526 **AND** Platelet count, , , Once, Irena Anthony PA-C    insulin lispro (HumaLOG) 100 units/mL subcutaneous injection 1-5 Units, 1-5 Units, Subcutaneous, TID AC, 4 Units at 12/31/18 1731 **AND** Fingerstick Glucose (POCT), , , TID AC, Irena Anthony PA-C    insulin lispro (HumaLOG) 100 units/mL subcutaneous injection 1-5 Units, 1-5 Units, Subcutaneous, HS, Irena Anthony PA-C, 2 Units at 12/31/18 2132    levothyroxine tablet 112 mcg, 112 mcg, Oral, Once per day on Mon Tue Wed Thu Fri Sat, Virginia Fisher PA-C, 112 mcg at 01/01/19 0380    levothyroxine tablet 56 mcg, 56 mcg, Oral, Once per day on Sun, Virginia Fisher PA-C, 56 mcg at 12/30/18 7882    ondansetron (ZOFRAN) injection 4 mg, 4 mg, Intravenous, Q6H PRN, Anitha Anthony PA-C    Lab Results:    Results from last 7 days  Lab Units 12/29/18  2335 12/29/18  1738 12/29/18  1234   TROPONIN I ng/mL 0 20* 0 21* 0 20*       Results from last 7 days  Lab Units 01/01/19  0458 12/31/18  0501 12/30/18  0510   WBC Thousand/uL 7 02 6 31 6 70   HEMOGLOBIN g/dL 7 4* 7 7* 7 2*   HEMATOCRIT % 24 4* 25 3* 24 0*   PLATELETS Thousands/uL 180 175 180       Results from last 7 days  Lab Units 12/29/18  1738   TRIGLYCERIDES mg/dL 109   HDL mg/dL 65*       Results from last 7 days  Lab Units 01/01/19  0458 12/31/18  0501 12/30/18  0510 12/29/18  0936   POTASSIUM mmol/L 3 9 4 0 4 0 4 2   CHLORIDE mmol/L 107 109* 111* 112*   CO2 mmol/L 25 23 22 21   BUN mg/dL 39* 35* 32* 30*   CREATININE mg/dL 1 78* 1 58* 1 59* 1 44*   CALCIUM mg/dL 8 2* 8 4 8 4 8 7   ALK PHOS U/L  --   --   --  636*   ALT U/L  --   --   --  46   AST U/L  --   --   --  48*           Results from last 7 days  Lab Units 12/30/18  0510   MAGNESIUM mg/dL 1 7     Telemetry: Personally reviewed  No significant arrhythmias  Echo:  LEFT VENTRICLE:  Systolic function was at the lower limits of normal  Ejection fraction was estimated to be 50 %  There was hypokinesis of the mid inferoseptal and apical inferior wall(s)  Wall thickness was mildly increased    There was mild concentric hypertrophy  Features were consistent with a pseudonormal left ventricular filling pattern, with concomitant abnormal relaxation and increased filling pressure (grade 2 diastolic dysfunction)      LEFT ATRIUM:  The atrium was mildly dilated      MITRAL VALVE:  There was mild regurgitation      TRICUSPID VALVE:  There was mild regurgitation  Estimated peak PA pressure was 33 mmHg      PERICARDIUM:  A trace, free-flowing pericardial effusion was identified   There was no evidence of hemodynamic compromise

## 2019-01-01 NOTE — ASSESSMENT & PLAN NOTE
· Likely due to congestive heart failure  · Echo shows preserved ejection fraction  · Stress test does show a point of reversible ischemia on the mid anterior wall  · Cardiology planning for cardiac catheterization when renal function stabilizes

## 2019-01-01 NOTE — ASSESSMENT & PLAN NOTE
Stable, reports her breathing is near baseline  Completed IV diuresis  Monitor intake and output  Monitor on telemetry  Low-sodium diet  Daily weights  BMP daily  Monitor off diuretics secondary to elevating creatinine

## 2019-01-01 NOTE — PROGRESS NOTES
Progress Note - Aristides Fletcherer 1955, 58 y o  female MRN: [id number]    Unit/Bed#: [facility] 509-01 Encounter: [id number]    Primary Care Provider: Lacie Santoro MD   Date and time admitted to hospital: 12/29/2018  9:02 AM        * Type 2 myocardial infarction [facility])   Assessment & Plan    · Likely due to congestive heart failure  · Echo shows preserved ejection fraction  · Stress test does show a point of reversible ischemia on the mid anterior wall  · Cardiology planning for cardiac catheterization when renal function stabilizes  Acute diastolic congestive heart failure (HCC)   Assessment & Plan    Stable, reports her breathing is near baseline  Completed IV diuresis  Monitor intake and output  Monitor on telemetry  Low-sodium diet  Daily weights  BMP daily  Monitor off diuretics secondary to elevating creatinine     Anemia   Assessment & Plan    Likely due to chronic kidney disease  Refuses blood transfusion at this time  Will refer outpatient to Nephrology for EPO and iron therapy     Stage 3 chronic kidney disease ([address] )   Assessment & Plan    · With proteinuria, likely diabetic nephropathy  · Appears to be worked up by her endocrinologist stat [facility]  · Baseline appears to be around 1 4  · Avoid nephrotoxins if possible  · Outpatient referral to Nephrology     Hyperlipidemia   Assessment & Plan    Continue atorvastatin 40 mg  LDL is at goal     Hypertension   Assessment & Plan    Improving  Continue amlodipine, Coreg  Restart lisinopril when renal function stabilizes     DM (diabetes mellitus) ([facility])   Assessment & Plan    Lab Results   Component Value Date    HGBA1C 7 5 (H) 12/29/2018       Recent Labs      12/31/18   1621  12/31/18   2125  01/01/19   0622  01/01/19   1057   POCGLU  382*  276*  138  297*       Blood Sugar Average: Last 72 hrs:  · Patient sees Endocrinology outpatient  Takes repaglinide 0 5 mg p o  B i d  Januvia 50 mg p o  Daily  Patient states she has never taken insulin  She monitors her sugars twice daily  Normal results 160  · While hospitalized, patient will be managed on weight based insulin dosing  Will monitor    · Blood glucose is diuretic and elevated, add basal bolus insulin         VTE Pharmacologic Prophylaxis:   Pharmacologic: Heparin  Mechanical VTE Prophylaxis in Place: Yes    Patient Centered Rounds: I have performed bedside rounds with nursing staff today  Discussions with Specialists or Other Care Team Provider:  Cardiology, plan of care    Education and Discussions with Family / Patient:  Patient and family, plan of care    Time Spent for Care: 20 minutes  More than 50% of total time spent on counseling and coordination of care as described above  Current Length of Stay: 3 day(s)    Current Patient Status: Inpatient   Certification Statement: The patient will continue to require additional inpatient hospital stay due to Monitor renal function, awaiting cardiac catheterization    Discharge Plan: To be determined    Code Status: Level 1 - Full Code      Subjective:   Reports that her breathing is near baseline  States that she still does have an occasional dry cough which is interfering with her sleep  Does report some nasal congestion and postnasal drip  Objective:     Vitals:   Temp (24hrs), Av 3 °F (36 8 °C), Min:97 8 °F (36 6 °C), Max:98 7 °F (37 1 °C)    Temp:  [97 8 °F (36 6 °C)-98 7 °F (37 1 °C)] 97 8 °F (36 6 °C)  HR:  [62-73] 65  Resp:  [18-20] 18  BP: (143-167)/(66-81) 143/67  SpO2:  [95 %-99 %] 97 %  Body mass index is 29 02 kg/m²  Input and Output Summary (last 24 hours): Intake/Output Summary (Last 24 hours) at 19 1654  Last data filed at 19 1521   Gross per 24 hour   Intake             1020 ml   Output             1750 ml   Net             -730 ml       Physical Exam:     Physical Exam   Constitutional: She is oriented to person, place, and time  She appears well-developed and well-nourished     HENT:   Head: Normocephalic and atraumatic  Eyes: Pupils are equal, round, and reactive to light  EOM are normal  No scleral icterus  Neck: Neck supple  No JVD present  Cardiovascular: Normal rate and regular rhythm  Pulmonary/Chest: She has no wheezes  She has no rales  Abdominal: Soft  She exhibits no distension  There is no tenderness  Musculoskeletal: She exhibits edema  Neurological: She is alert and oriented to person, place, and time  Skin: Skin is warm and dry  Additional Data:     Labs:      Results from last 7 days  Lab Units 01/01/19  0458   WBC Thousand/uL 7 02   HEMOGLOBIN g/dL 7 4*   HEMATOCRIT % 24 4*   PLATELETS Thousands/uL 180   NEUTROS PCT % 56   LYMPHS PCT % 32   MONOS PCT % 7   EOS PCT % 4       Results from last 7 days  Lab Units 01/01/19  0458  12/29/18  0936   SODIUM mmol/L 139  < > 140   POTASSIUM mmol/L 3 9  < > 4 2   CHLORIDE mmol/L 107  < > 112*   CO2 mmol/L 25  < > 21   BUN mg/dL 39*  < > 30*   CREATININE mg/dL 1 78*  < > 1 44*   ANION GAP mmol/L 7  < > 7   CALCIUM mg/dL 8 2*  < > 8 7   ALBUMIN g/dL  --   --  3 0*   TOTAL BILIRUBIN mg/dL  --   --  0 30   ALK PHOS U/L  --   --  636*   ALT U/L  --   --  46   AST U/L  --   --  48*   GLUCOSE RANDOM mg/dL 128  < > 187*   < > = values in this interval not displayed  Results from last 7 days  Lab Units 01/01/19  1057 01/01/19  0622 12/31/18  2125 12/31/18  1621 12/31/18  1057 12/31/18  0620 12/30/18  2026 12/30/18  1646 12/30/18  1104 12/30/18  0640 12/29/18  2134 12/29/18  1545   POC GLUCOSE mg/dl 297* 138 276* 382* 243* 116 300* 179* 257* 106 162* 253*       Results from last 7 days  Lab Units 12/29/18  1738   HEMOGLOBIN A1C % 7 5*               * I Have Reviewed All Lab Data Listed Above  * Additional Pertinent Lab Tests Reviewed:  All Labs Within Last 24 Hours Reviewed        Recent Cultures (last 7 days):           Last 24 Hours Medication List:     Current Facility-Administered Medications:  acetaminophen 650 mg Oral Q6H PRN Marc Cuadra PA-C   amLODIPine 10 mg Oral Daily Kenya Mercer MD   aspirin 81 mg Oral Daily Kenya Mercer MD   atorvastatin 40 mg Oral Daily With Lisaesther Patel PA-C   benzonatate 100 mg Oral TID Jose Farfan MD   carvedilol 6 25 mg Oral BID With Meals Kenya Mercer MD   docusate sodium 100 mg Oral BID Irena Anthony PA-C   fluticasone 1 spray Each Nare BID Jose Farfan MD   heparin (porcine) 5,000 Units Subcutaneous Q8H [address] [facility] [address] [facility] PA-C   insulin glargine 10 Units Subcutaneous HS Jose Farfan MD   insulin lispro 1-5 Units Subcutaneous TID AC Irena Anthony PA-C   insulin lispro 1-5 Units Subcutaneous HS Irena Anthony PA-C   insulin lispro 4 Units Subcutaneous TID With Meals Jose Farfan MD   levothyroxine 112 mcg Oral Once per day on Mon Tue Wed Thu Fri Sat Irena Landin PA-C   levothyroxine 56 mcg Oral Once per day on Sun Irena Anthony PA-C   ondansetron 4 mg Intravenous Q6H PRN Willard Oro PA-C        Today, Patient Was Seen By: Chiara Camacho MD    ** Please Note: Dictation voice to text software may have been used in the creation of this document   **

## 2019-01-01 NOTE — ASSESSMENT & PLAN NOTE
Lab Results   Component Value Date    HGBA1C 7 5 (H) 12/29/2018       Recent Labs      12/31/18   1621  12/31/18   2125  01/01/19   0622  01/01/19   1057   POCGLU  382*  276*  138  297*       Blood Sugar Average: Last 72 hrs:  · Patient sees Endocrinology outpatient  Takes repaglinide 0 5 mg p o  B i d  Januvia 50 mg p o  Daily  Patient states she has never taken insulin  She monitors her sugars twice daily  Normal results 160  · While hospitalized, patient will be managed on weight based insulin dosing    Will monitor    · Blood glucose is diuretic and elevated, add basal bolus insulin

## 2019-01-01 NOTE — ASSESSMENT & PLAN NOTE
· With proteinuria, likely diabetic nephropathy  · Appears to be worked up by her endocrinologist stat [facility]  · Baseline appears to be around 1 4  · Avoid nephrotoxins if possible    · Outpatient referral to Nephrology

## 2019-01-02 DIAGNOSIS — Z71.89 COMPLEX CARE COORDINATION: Primary | ICD-10-CM

## 2019-01-02 LAB
ANION GAP SERPL CALCULATED.3IONS-SCNC: 6 MMOL/L (ref 4–13)
BASOPHILS # BLD AUTO: 0.03 THOUSANDS/ΜL (ref 0–0.1)
BASOPHILS NFR BLD AUTO: 0 % (ref 0–1)
BUN SERPL-MCNC: 49 MG/DL (ref 5–25)
CALCIUM SERPL-MCNC: 8.4 MG/DL (ref 8.3–10.1)
CHLORIDE SERPL-SCNC: 106 MMOL/L (ref 100–108)
CO2 SERPL-SCNC: 24 MMOL/L (ref 21–32)
CREAT SERPL-MCNC: 1.96 MG/DL (ref 0.6–1.3)
EOSINOPHIL # BLD AUTO: 0.26 THOUSAND/ΜL (ref 0–0.61)
EOSINOPHIL NFR BLD AUTO: 4 % (ref 0–6)
ERYTHROCYTE [DISTWIDTH] IN BLOOD BY AUTOMATED COUNT: 13.4 % (ref 11.6–15.1)
GFR SERPL CREATININE-BSD FRML MDRD: 26 ML/MIN/1.73SQ M
GLUCOSE SERPL-MCNC: 128 MG/DL (ref 65–140)
GLUCOSE SERPL-MCNC: 163 MG/DL (ref 65–140)
GLUCOSE SERPL-MCNC: 172 MG/DL (ref 65–140)
GLUCOSE SERPL-MCNC: 196 MG/DL (ref 65–140)
GLUCOSE SERPL-MCNC: 256 MG/DL (ref 65–140)
HCT VFR BLD AUTO: 25.3 % (ref 34.8–46.1)
HGB BLD-MCNC: 7.8 G/DL (ref 11.5–15.4)
IMM GRANULOCYTES # BLD AUTO: 0.01 THOUSAND/UL (ref 0–0.2)
IMM GRANULOCYTES NFR BLD AUTO: 0 % (ref 0–2)
LYMPHOCYTES # BLD AUTO: 1.7 THOUSANDS/ΜL (ref 0.6–4.47)
LYMPHOCYTES NFR BLD AUTO: 25 % (ref 14–44)
MCH RBC QN AUTO: 27.3 PG (ref 26.8–34.3)
MCHC RBC AUTO-ENTMCNC: 30.8 G/DL (ref 31.4–37.4)
MCV RBC AUTO: 89 FL (ref 82–98)
MONOCYTES # BLD AUTO: 0.47 THOUSAND/ΜL (ref 0.17–1.22)
MONOCYTES NFR BLD AUTO: 7 % (ref 4–12)
NEUTROPHILS # BLD AUTO: 4.47 THOUSANDS/ΜL (ref 1.85–7.62)
NEUTS SEG NFR BLD AUTO: 64 % (ref 43–75)
NRBC BLD AUTO-RTO: 0 /100 WBCS
PLATELET # BLD AUTO: 167 THOUSANDS/UL (ref 149–390)
PMV BLD AUTO: 11 FL (ref 8.9–12.7)
POTASSIUM SERPL-SCNC: 4.4 MMOL/L (ref 3.5–5.3)
RBC # BLD AUTO: 2.86 MILLION/UL (ref 3.81–5.12)
SODIUM SERPL-SCNC: 136 MMOL/L (ref 136–145)
WBC # BLD AUTO: 6.94 THOUSAND/UL (ref 4.31–10.16)

## 2019-01-02 PROCEDURE — 82948 REAGENT STRIP/BLOOD GLUCOSE: CPT

## 2019-01-02 PROCEDURE — 99232 SBSQ HOSP IP/OBS MODERATE 35: CPT | Performed by: GENERAL PRACTICE

## 2019-01-02 PROCEDURE — 85025 COMPLETE CBC W/AUTO DIFF WBC: CPT | Performed by: INTERNAL MEDICINE

## 2019-01-02 PROCEDURE — 80048 BASIC METABOLIC PNL TOTAL CA: CPT | Performed by: INTERNAL MEDICINE

## 2019-01-02 PROCEDURE — 99232 SBSQ HOSP IP/OBS MODERATE 35: CPT | Performed by: INTERNAL MEDICINE

## 2019-01-02 RX ADMIN — INSULIN LISPRO 1 UNITS: 100 INJECTION, SOLUTION INTRAVENOUS; SUBCUTANEOUS at 08:47

## 2019-01-02 RX ADMIN — AMLODIPINE BESYLATE 10 MG: 10 TABLET ORAL at 08:45

## 2019-01-02 RX ADMIN — CARVEDILOL 6.25 MG: 6.25 TABLET, FILM COATED ORAL at 08:45

## 2019-01-02 RX ADMIN — HEPARIN SODIUM 5000 UNITS: 5000 INJECTION INTRAVENOUS; SUBCUTANEOUS at 05:37

## 2019-01-02 RX ADMIN — HEPARIN SODIUM 5000 UNITS: 5000 INJECTION INTRAVENOUS; SUBCUTANEOUS at 15:52

## 2019-01-02 RX ADMIN — FLUTICASONE PROPIONATE 1 SPRAY: 50 SPRAY, METERED NASAL at 08:46

## 2019-01-02 RX ADMIN — INSULIN LISPRO 4 UNITS: 100 INJECTION, SOLUTION INTRAVENOUS; SUBCUTANEOUS at 11:39

## 2019-01-02 RX ADMIN — INSULIN LISPRO 4 UNITS: 100 INJECTION, SOLUTION INTRAVENOUS; SUBCUTANEOUS at 08:46

## 2019-01-02 RX ADMIN — BENZONATATE 100 MG: 100 CAPSULE ORAL at 16:54

## 2019-01-02 RX ADMIN — CARVEDILOL 6.25 MG: 6.25 TABLET, FILM COATED ORAL at 16:54

## 2019-01-02 RX ADMIN — LEVOTHYROXINE SODIUM 112 MCG: 112 TABLET ORAL at 05:37

## 2019-01-02 RX ADMIN — BENZONATATE 100 MG: 100 CAPSULE ORAL at 08:45

## 2019-01-02 RX ADMIN — INSULIN LISPRO 1 UNITS: 100 INJECTION, SOLUTION INTRAVENOUS; SUBCUTANEOUS at 21:44

## 2019-01-02 RX ADMIN — INSULIN LISPRO 2 UNITS: 100 INJECTION, SOLUTION INTRAVENOUS; SUBCUTANEOUS at 11:39

## 2019-01-02 RX ADMIN — BENZONATATE 100 MG: 100 CAPSULE ORAL at 21:35

## 2019-01-02 RX ADMIN — HEPARIN SODIUM 5000 UNITS: 5000 INJECTION INTRAVENOUS; SUBCUTANEOUS at 21:35

## 2019-01-02 RX ADMIN — ASPIRIN 81 MG 81 MG: 81 TABLET ORAL at 08:45

## 2019-01-02 RX ADMIN — INSULIN GLARGINE 10 UNITS: 100 INJECTION, SOLUTION SUBCUTANEOUS at 21:36

## 2019-01-02 RX ADMIN — FLUTICASONE PROPIONATE 1 SPRAY: 50 SPRAY, METERED NASAL at 17:00

## 2019-01-02 RX ADMIN — INSULIN LISPRO 4 UNITS: 100 INJECTION, SOLUTION INTRAVENOUS; SUBCUTANEOUS at 16:57

## 2019-01-02 RX ADMIN — ATORVASTATIN CALCIUM 40 MG: 40 TABLET, FILM COATED ORAL at 16:54

## 2019-01-02 NOTE — PLAN OF CARE
Problem: PAIN - ADULT  Goal: Verbalizes/displays adequate comfort level or baseline comfort level  Interventions:  - Encourage patient to monitor pain and request assistance  - Assess pain using appropriate pain scale  - Administer analgesics based on type and severity of pain and evaluate response  - Implement non-pharmacological measures as appropriate and evaluate response  - Consider cultural and social influences on pain and pain management  - Notify physician/advanced practitioner if interventions unsuccessful or patient reports new pain   Outcome: Progressing      Problem: SAFETY ADULT  Goal: Patient will remain free of falls  INTERVENTIONS:  - Assess patient frequently for physical needs  -  Identify cognitive and physical deficits and behaviors that affect risk of falls    -  [place] fall precautions as indicated by assessment   - Educate patient/family on patient safety including physical limitations  - Instruct patient to call for assistance with activity based on assessment  - Modify environment to reduce risk of injury  - Consider OT/PT consult to assist with strengthening/mobility    Outcome: Progressing    Goal: Maintain or return to baseline ADL function  INTERVENTIONS:  -  Assess patient's ability to carry out ADLs; assess patient's baseline for ADL function and identify physical deficits which impact ability to perform ADLs (bathing, care of mouth/teeth, toileting, grooming, dressing, etc )  - Assess/evaluate cause of self-care deficits   - Assess range of motion  - Assess patient's mobility; develop plan if impaired  - Assess patient's need for assistive devices and provide as appropriate  - Encourage maximum independence but intervene and supervise when necessary  ¯ Involve family in performance of ADLs  ¯ Assess for home care needs following discharge   ¯ Request OT consult to assist with ADL evaluation and planning for discharge  ¯ Provide patient education as appropriate   Outcome: Progressing    Goal: Maintain or return mobility status to optimal level  INTERVENTIONS:  - Assess patient's baseline mobility status (ambulation, transfers, stairs, etc )    - Identify cognitive and physical deficits and behaviors that affect mobility  - Identify mobility aids required to assist with transfers and/or ambulation (gait belt, sit-to-stand, lift, walker, cane, etc )  - [place] fall precautions as indicated by assessment  - Record patient progress and toleration of activity level on Mobility SBAR; progress patient to next Phase/Stage  - Instruct patient to call for assistance with activity based on assessment  - Request Rehabilitation consult to assist with strengthening/weightbearing, etc    Outcome: Progressing      Problem: Potential for Falls  Goal: Patient will remain free of falls  INTERVENTIONS:  - Assess patient frequently for physical needs  -  Identify cognitive and physical deficits and behaviors that affect risk of falls  -  [place] fall precautions as indicated by assessment   - Educate patient/family on patient safety including physical limitations  - Instruct patient to call for assistance with activity based on assessment  - Modify environment to reduce risk of injury  - Consider OT/PT consult to assist with strengthening/mobility    Outcome: Progressing      Problem: CARDIOVASCULAR - ADULT  Goal: Maintains optimal cardiac output and hemodynamic stability  INTERVENTIONS:  - Monitor I/O, vital signs and rhythm  - Monitor for S/S and trends of decreased cardiac output i e  bleeding, hypotension  - Administer and titrate ordered vasoactive medications to optimize hemodynamic stability  - Assess quality of pulses, skin color and temperature  - Assess for signs of decreased coronary artery perfusion - ex   Angina  - Instruct patient to report change in severity of symptoms   Outcome: Progressing    Goal: Absence of cardiac dysrhythmias or at baseline rhythm  INTERVENTIONS:  - Continuous cardiac monitoring, monitor vital signs, obtain 12 lead EKG if indicated  - Administer antiarrhythmic and heart rate control medications as ordered  - Monitor electrolytes and administer replacement therapy as ordered   Outcome: Progressing      Problem: METABOLIC, FLUID AND ELECTROLYTES - ADULT  Goal: Electrolytes maintained within normal limits  INTERVENTIONS:  - Monitor labs and assess patient for signs and symptoms of electrolyte imbalances  - Administer electrolyte replacement as ordered  - Monitor response to electrolyte replacements, including repeat lab results as appropriate  - Instruct patient on fluid and nutrition as appropriate   Outcome: Progressing    Goal: Fluid balance maintained  INTERVENTIONS:  - Monitor labs and assess for signs and symptoms of volume excess or deficit  - Monitor I/O and WT  - Instruct patient on fluid and nutrition as appropriate   Outcome: Progressing    Goal: Glucose maintained within target range  INTERVENTIONS:  - Monitor Blood Glucose as ordered  - Assess for signs and symptoms of hyperglycemia and hypoglycemia  - Administer ordered medications to maintain glucose within target range  - Assess nutritional intake and initiate nutrition service referral as needed   Outcome: Progressing      Problem: HEMATOLOGIC - ADULT  Goal: Maintains hematologic stability  INTERVENTIONS  - Assess for signs and symptoms of bleeding or hemorrhage  - Monitor labs  - Administer supportive blood products/factors as ordered and appropriate   Outcome: Progressing      Problem: DISCHARGE PLANNING - CARE MANAGEMENT  Goal: Discharge to post-acute care or home with appropriate resources  INTERVENTIONS:  - Conduct assessment to determine patient/family and health care team treatment goals, and need for post-acute services based on payer coverage, community resources, and patient preferences, and barriers to discharge  - Address psychosocial, clinical, and financial barriers to discharge as identified in assessment in conjunction with the patient/family and health care team  - Arrange appropriate level of post-acute services according to patient's   needs and preference and payer coverage in collaboration with the physician and health care team  - Communicate with and update the patient/family, physician, and health care team regarding progress on the discharge plan  - Arrange appropriate transportation to post-acute venues   Outcome: Progressing

## 2019-01-02 NOTE — SOCIAL WORK
Heart Failure Care Coordinator Note  Met with patient to provide HF education and identify barriers for HF management at home  HF Booklet: given and reviewed  Arabic book also provided   Will refer for [email or web address] [facility] outreach    Patient has Scale yes  Patient has BP Cuff  no  Scale given:no  BP Cuff given:yes

## 2019-01-02 NOTE — PROGRESS NOTES
Progress Note - Alena Donis 1955, 58 y o  female MRN: [id number]    Unit/Bed#: [facility] 509-01 Encounter: [id number]    Primary Care Provider: Lamin Johnston MD   Date and time admitted to hospital: 12/29/2018  9:02 AM        * Type 2 myocardial infarction [facility])   Assessment & Plan    · Likely due to congestive heart failure  · Echo shows preserved ejection fraction  · Stress test does show a point of reversible ischemia on the mid anterior wall  · Cardiology planning for cardiac catheterization when renal function stabilizes  Acute diastolic congestive heart failure (HCC)   Assessment & Plan    Stable, reports her breathing is near baseline  Completed IV diuresis  Monitor intake and output  Monitor on telemetry  Low-sodium diet  Daily weights  BMP daily  Monitor off diuretics secondary to elevating creatinine     Anemia   Assessment & Plan    Likely due to chronic kidney disease  Refuses blood transfusion at this time  Will refer outpatient to Nephrology for EPO and iron therapy     Stage 3 chronic kidney disease ([facility] [address] )   Assessment & Plan    · With proteinuria, likely diabetic nephropathy  · Appears to be worked up by her endocrinologist stat [facility]  · Baseline appears to be around 1 4  · Avoid nephrotoxins if possible  · Outpatient referral to Nephrology     Hyperlipidemia   Assessment & Plan    Continue atorvastatin 40 mg  LDL is at goal     Hypertension   Assessment & Plan    Improving  Continue amlodipine, Coreg  Restart lisinopril when renal function stabilizes     DM (diabetes mellitus) ([facility] [address] )   Assessment & Plan    Lab Results   Component Value Date    HGBA1C 7 5 (H) 12/29/2018       Recent Labs      01/01/19   2054  01/02/19   0642  01/02/19   1120  01/02/19   1601   POCGLU  293*  172*  256*  128       Blood Sugar Average: Last 72 hrs:  · Patient sees Endocrinology outpatient  Takes repaglinide 0 5 mg p o  B i d  Januvia 50 mg p o  Daily  Patient states she has never taken insulin  She monitors her sugars twice daily  Normal results 160  · While hospitalized, patient will be managed on weight based insulin dosing  Will monitor    · Blood glucose is elevated, add basal bolus insulin       VTE Pharmacologic Prophylaxis:   Pharmacologic: Heparin  Mechanical VTE Prophylaxis in Place: Yes    Patient Centered Rounds: I have performed bedside rounds with nursing staff today  Discussions with Specialists or Other Care Team Provider: no    Education and Discussions with Family / Patient: pt and family    Time Spent for Care: 30 minutes  More than 50% of total time spent on counseling and coordination of care as described above  Current Length of Stay: 4 day(s)    Current Patient Status: Inpatient   Certification Statement: The patient will continue to require additional inpatient hospital stay due to need for cath    Discharge Plan: when Cr stable for cath    Code Status: Level 1 - Full Code      Subjective:   No acute complaints    Objective:     Vitals:   Temp (24hrs), Av 1 °F (36 7 °C), Min:97 8 °F (36 6 °C), Max:98 5 °F (36 9 °C)    Temp:  [97 8 °F (36 6 °C)-98 5 °F (36 9 °C)] 98 °F (36 7 °C)  HR:  [61-77] 65  Resp:  [16-18] 18  BP: (131-169)/(63-97) 131/97  SpO2:  [94 %-98 %] 97 %  Body mass index is 29 33 kg/m²  Input and Output Summary (last 24 hours): Intake/Output Summary (Last 24 hours) at 19 1851  Last data filed at 19 1740   Gross per 24 hour   Intake              238 ml   Output             1225 ml   Net             -987 ml       Physical Exam:     Physical Exam   Constitutional: She is oriented to person, place, and time  No distress  HENT:   Head: Normocephalic and atraumatic  Eyes: Conjunctivae and EOM are normal    Neck: Normal range of motion  Neck supple  Cardiovascular: Normal rate and regular rhythm  Pulmonary/Chest: Effort normal and breath sounds normal  She has no wheezes  She has no rales  Abdominal: Soft   Bowel sounds are normal  She exhibits no distension  There is no tenderness  Musculoskeletal: Normal range of motion  She exhibits no edema  Neurological: She is alert and oriented to person, place, and time  Skin: Skin is warm and dry  She is not diaphoretic  Additional Data:     Labs:      Results from last 7 days  Lab Units 01/02/19  0516   WBC Thousand/uL 6 94   HEMOGLOBIN g/dL 7 8*   HEMATOCRIT % 25 3*   PLATELETS Thousands/uL 167   NEUTROS PCT % 64   LYMPHS PCT % 25   MONOS PCT % 7   EOS PCT % 4       Results from last 7 days  Lab Units 01/02/19  0516  12/29/18  0936   POTASSIUM mmol/L 4 4  < > 4 2   CHLORIDE mmol/L 106  < > 112*   CO2 mmol/L 24  < > 21   BUN mg/dL 49*  < > 30*   CREATININE mg/dL 1 96*  < > 1 44*   CALCIUM mg/dL 8 4  < > 8 7   ALK PHOS U/L  --   --  636*   ALT U/L  --   --  46   AST U/L  --   --  48*   < > = values in this interval not displayed  * I Have Reviewed All Lab Data Listed Above  * Additional Pertinent Lab Tests Reviewed:  [address] Admission Reviewed        Recent Cultures (last 7 days):           Last 24 Hours Medication List:     Current Facility-Administered Medications:  acetaminophen 650 mg Oral Q6H PRN Christy Alston PA-C   amLODIPine 10 mg Oral Daily Amy Patrick MD   aspirin 81 mg Oral Daily Amy Patrick MD   atorvastatin 40 mg Oral Daily With Yanna Alexandra PA-C   benzonatate 100 mg Oral TID Denise Bacon MD   carvedilol 6 25 mg Oral BID With Meals Amy Patrick MD   docusate sodium 100 mg Oral BID Irena Anthony PA-C   fluticasone 1 spray Each Nare BID Denise Bacon MD   heparin (porcine) 5,000 Units Subcutaneous Q8H [facility] Stew Anthony PA-C   insulin glargine 10 Units Subcutaneous HS Denise Bacon MD   insulin lispro 1-5 Units Subcutaneous TID AC Irena Anthony PA-C   insulin lispro 1-5 Units Subcutaneous HS Irena Anthony PA-C   insulin lispro 4 Units Subcutaneous TID With Meals Denise Bacon MD   levothyroxine 112 mcg Oral Once per day on Mon Tue Wed Thu Fri Sat Irena Bajwa PA-C   levothyroxine 56 mcg Oral Once per day on Sun Irena Anthony PA-C   ondansetron 4 mg Intravenous Q6H PRN Darya Bajwa PA-C        Today, Patient Was Seen By: Radha Peralta DO    ** Please Note: Dictation voice to text software may have been used in the creation of this document   **

## 2019-01-02 NOTE — PROGRESS NOTES
Cardiology Progress Note - Mady Wild 58 y o  female MRN: [id number]    Unit/Bed#: [facility] 509-01 Encounter: [id number]      Assessment/Recommendations:  1  Chest Pain: no events overnight, but with positive troponin and abnormal stress test, would benefit from a cardiac cath - would want her Cr to be improved prior to doing cath and subjecting to dye load as well as patient's preference as to whether she wants to get the procedure done  2   Acute diastolic CHF: likely with mild systolic component as well  At this time, we are holding diuresis in order for Cr to improve for potential cath  3   HTN: elevated today, potential increase of coreg to 12 5 if heart rates are not too slow  4   HLD: continued on statin  5   CAD: presumed, continue management medically with ASA, statin, B-blocker  6   KELLY    Subjective:   Patient seen and examined  No significant events overnight   ; pertinent negatives - chest pain, chest pressure/discomfort, irregular heart beat and palpitations  Still unable to lie flat  Objective:     Vitals: Blood pressure 151/69, pulse 61, temperature 97 8 °F (36 6 °C), temperature source Oral, resp  rate 16, height 5' 3" (1 6 m), weight 75 1 kg (165 lb 9 1 oz), SpO2 98 %  , Body mass index is 29 33 kg/m² , Orthostatic Blood Pressures      Most Recent Value   Blood Pressure  151/69 filed at 01/02/2019 1035   Patient Position - Orthostatic VS  Sitting filed at 01/02/2019 1035            Intake/Output Summary (Last 24 hours) at 01/02/19 1046  Last data filed at 01/02/19 0541   Gross per 24 hour   Intake              420 ml   Output              825 ml   Net             -405 ml       TELE: No significant arrhythmias seen      Physical Exam:    GEN: Mady Wild appears well, alert and oriented x 3, pleasant and cooperative   HEENT: pupils equal, round, and reactive to light; extraocular muscles intact  NECK: supple, no carotid bruits   HEART: regular rhythm, normal S1 and S2, no murmurs, clicks, gallops or rubs   LUNGS: diminished breath sounds bilaterally  ABDOMEN: normal bowel sounds, soft, no tenderness, no distention  EXTREMITIES: peripheral pulses normal; no clubbing, cyanosis, + edema  NEURO: no focal findings   SKIN: normal without suspicious lesions on exposed skin    Medications:      Current Facility-Administered Medications:     acetaminophen (TYLENOL) tablet 650 mg, 650 mg, Oral, Q6H PRN, Caty Mahajan PA-C, 650 mg at 12/30/18 0356    amLODIPine (NORVASC) tablet 10 mg, 10 mg, Oral, Daily, Tiny Shay MD, 10 mg at 01/02/19 0845    aspirin chewable tablet 81 mg, 81 mg, Oral, Daily, Tiny Shay MD, 81 mg at 01/02/19 0845    atorvastatin (LIPITOR) tablet 40 mg, 40 mg, Oral, Daily With Melany Churchill PA-C, 40 mg at 01/01/19 1743    benzonatate (TESSALON PERLES) capsule 100 mg, 100 mg, Oral, TID, Lucian Arzate MD, 100 mg at 01/02/19 0845    carvedilol (COREG) tablet 6 25 mg, 6 25 mg, Oral, BID With Meals, Tiny Shay MD, 6 25 mg at 01/02/19 0845    docusate sodium (COLACE) capsule 100 mg, 100 mg, Oral, BID, Irena Anthony PA-C, 100 mg at 01/01/19 1743    fluticasone (FLONASE) 50 mcg/act nasal spray 1 spray, 1 spray, Each Nare, BID, Lucian Arzate MD, 1 spray at 01/02/19 0846    heparin (porcine) subcutaneous injection 5,000 Units, 5,000 Units, Subcutaneous, Q8H [facility], 5,000 Units at 01/02/19 0537 **AND** Platelet count, , , Once, Irena Anthony PA-C    insulin glargine (LANTUS) subcutaneous injection 10 Units 0 1 mL, 10 Units, Subcutaneous, HS, Lucian Arzate MD, 10 Units at 01/01/19 2120    insulin lispro (HumaLOG) 100 units/mL subcutaneous injection 1-5 Units, 1-5 Units, Subcutaneous, TID AC, 1 Units at 01/02/19 0847 **AND** Fingerstick Glucose (POCT), , , TID AC, Irena Anthony PA-C    insulin lispro (HumaLOG) 100 units/mL subcutaneous injection 1-5 Units, 1-5 Units, Subcutaneous, HS, Irena Anthony PA-C, 2 Units at 01/01/19 4090   insulin lispro (HumaLOG) 100 units/mL subcutaneous injection 4 Units, 4 Units, Subcutaneous, TID With Meals, Alverto Pal MD, 4 Units at 01/02/19 0846    levothyroxine tablet 112 mcg, 112 mcg, Oral, Once per day on Mon Tue Wed Thu Fri Sat, Daniel Guzman PA-C, 112 mcg at 01/02/19 5007    levothyroxine tablet 56 mcg, 56 mcg, Oral, Once per day on Sun, Daniel Guzman PA-C, 56 mcg at 12/30/18 0814    ondansetron (ZOFRAN) injection 4 mg, 4 mg, Intravenous, Q6H PRN, Della Anthony PA-C     Labs & Results:      Results from last 7 days  Lab Units 12/29/18  2335 12/29/18  1738 12/29/18  1234   TROPONIN I ng/mL 0 20* 0 21* 0 20*       Results from last 7 days  Lab Units 01/02/19  0516 01/01/19  0458 12/31/18  0501   WBC Thousand/uL 6 94 7 02 6 31   HEMOGLOBIN g/dL 7 8* 7 4* 7 7*   HEMATOCRIT % 25 3* 24 4* 25 3*   PLATELETS Thousands/uL 167 180 175       Results from last 7 days  Lab Units 12/29/18  1738   TRIGLYCERIDES mg/dL 109   HDL mg/dL 65*       Results from last 7 days  Lab Units 01/02/19  0516 01/01/19  0458 12/31/18  0501  12/29/18  0936   POTASSIUM mmol/L 4 4 3 9 4 0  < > 4 2   CHLORIDE mmol/L 106 107 109*  < > 112*   CO2 mmol/L 24 25 23  < > 21   BUN mg/dL 49* 39* 35*  < > 30*   CREATININE mg/dL 1 96* 1 78* 1 58*  < > 1 44*   CALCIUM mg/dL 8 4 8 2* 8 4  < > 8 7   ALK PHOS U/L  --   --   --   --  636*   ALT U/L  --   --   --   --  46   AST U/L  --   --   --   --  48*   < > = values in this interval not displayed  Results from last 7 days  Lab Units 12/30/18  0510   MAGNESIUM mg/dL 1 7       Echo: personally reviewed - EF 50%, HK of mid IS and apical inf walls, G2DD, LAE, mild MR    Nuclear stress test: small amount of ischemia in mid anterior wall      EKG personally reviewed by Ryder León MD

## 2019-01-02 NOTE — ASSESSMENT & PLAN NOTE
· With proteinuria, likely diabetic nephropathy  · Appears to be worked up by her endocrinologist stat [facility]  · Baseline appears to be around 1 4  · Avoid nephrotoxins if possible    · Outpatient referral to Nephrology

## 2019-01-02 NOTE — ASSESSMENT & PLAN NOTE
Lab Results   Component Value Date    HGBA1C 7 5 (H) 12/29/2018       Recent Labs      01/01/19   2054  01/02/19   0642  01/02/19   1120  01/02/19   1601   POCGLU  293*  172*  256*  128       Blood Sugar Average: Last 72 hrs:  · Patient sees Endocrinology outpatient  Takes repaglinide 0 5 mg p o  B i d  Januvia 50 mg p o  Daily  Patient states she has never taken insulin  She monitors her sugars twice daily  Normal results 160  · While hospitalized, patient will be managed on weight based insulin dosing    Will monitor    · Blood glucose is elevated, add basal bolus insulin

## 2019-01-03 LAB
ANION GAP SERPL CALCULATED.3IONS-SCNC: 7 MMOL/L (ref 4–13)
BUN SERPL-MCNC: 50 MG/DL (ref 5–25)
CALCIUM SERPL-MCNC: 8.6 MG/DL (ref 8.3–10.1)
CHLORIDE SERPL-SCNC: 106 MMOL/L (ref 100–108)
CO2 SERPL-SCNC: 25 MMOL/L (ref 21–32)
CREAT SERPL-MCNC: 1.64 MG/DL (ref 0.6–1.3)
ERYTHROCYTE [DISTWIDTH] IN BLOOD BY AUTOMATED COUNT: 13.4 % (ref 11.6–15.1)
GFR SERPL CREATININE-BSD FRML MDRD: 33 ML/MIN/1.73SQ M
GLUCOSE SERPL-MCNC: 107 MG/DL (ref 65–140)
GLUCOSE SERPL-MCNC: 182 MG/DL (ref 65–140)
GLUCOSE SERPL-MCNC: 192 MG/DL (ref 65–140)
GLUCOSE SERPL-MCNC: 261 MG/DL (ref 65–140)
GLUCOSE SERPL-MCNC: 93 MG/DL (ref 65–140)
HCT VFR BLD AUTO: 24.9 % (ref 34.8–46.1)
HGB BLD-MCNC: 7.7 G/DL (ref 11.5–15.4)
MCH RBC QN AUTO: 27.3 PG (ref 26.8–34.3)
MCHC RBC AUTO-ENTMCNC: 30.9 G/DL (ref 31.4–37.4)
MCV RBC AUTO: 88 FL (ref 82–98)
PLATELET # BLD AUTO: 169 THOUSANDS/UL (ref 149–390)
PMV BLD AUTO: 10.7 FL (ref 8.9–12.7)
POTASSIUM SERPL-SCNC: 4.1 MMOL/L (ref 3.5–5.3)
RBC # BLD AUTO: 2.82 MILLION/UL (ref 3.81–5.12)
SODIUM SERPL-SCNC: 138 MMOL/L (ref 136–145)
WBC # BLD AUTO: 6.02 THOUSAND/UL (ref 4.31–10.16)

## 2019-01-03 PROCEDURE — 80048 BASIC METABOLIC PNL TOTAL CA: CPT | Performed by: GENERAL PRACTICE

## 2019-01-03 PROCEDURE — 99232 SBSQ HOSP IP/OBS MODERATE 35: CPT | Performed by: INTERNAL MEDICINE

## 2019-01-03 PROCEDURE — 85027 COMPLETE CBC AUTOMATED: CPT | Performed by: GENERAL PRACTICE

## 2019-01-03 PROCEDURE — 82948 REAGENT STRIP/BLOOD GLUCOSE: CPT

## 2019-01-03 PROCEDURE — 99233 SBSQ HOSP IP/OBS HIGH 50: CPT | Performed by: GENERAL PRACTICE

## 2019-01-03 RX ADMIN — INSULIN LISPRO 4 UNITS: 100 INJECTION, SOLUTION INTRAVENOUS; SUBCUTANEOUS at 12:30

## 2019-01-03 RX ADMIN — FLUTICASONE PROPIONATE 1 SPRAY: 50 SPRAY, METERED NASAL at 17:24

## 2019-01-03 RX ADMIN — FLUTICASONE PROPIONATE 1 SPRAY: 50 SPRAY, METERED NASAL at 08:45

## 2019-01-03 RX ADMIN — INSULIN LISPRO 1 UNITS: 100 INJECTION, SOLUTION INTRAVENOUS; SUBCUTANEOUS at 12:30

## 2019-01-03 RX ADMIN — CARVEDILOL 6.25 MG: 6.25 TABLET, FILM COATED ORAL at 08:44

## 2019-01-03 RX ADMIN — HEPARIN SODIUM 5000 UNITS: 5000 INJECTION INTRAVENOUS; SUBCUTANEOUS at 05:14

## 2019-01-03 RX ADMIN — ATORVASTATIN CALCIUM 40 MG: 40 TABLET, FILM COATED ORAL at 17:19

## 2019-01-03 RX ADMIN — BENZONATATE 100 MG: 100 CAPSULE ORAL at 21:24

## 2019-01-03 RX ADMIN — HEPARIN SODIUM 5000 UNITS: 5000 INJECTION INTRAVENOUS; SUBCUTANEOUS at 21:25

## 2019-01-03 RX ADMIN — INSULIN LISPRO 1 UNITS: 100 INJECTION, SOLUTION INTRAVENOUS; SUBCUTANEOUS at 21:24

## 2019-01-03 RX ADMIN — BENZONATATE 100 MG: 100 CAPSULE ORAL at 08:44

## 2019-01-03 RX ADMIN — DOCUSATE SODIUM 100 MG: 100 CAPSULE, LIQUID FILLED ORAL at 17:19

## 2019-01-03 RX ADMIN — INSULIN GLARGINE 10 UNITS: 100 INJECTION, SOLUTION SUBCUTANEOUS at 21:24

## 2019-01-03 RX ADMIN — INSULIN LISPRO 2 UNITS: 100 INJECTION, SOLUTION INTRAVENOUS; SUBCUTANEOUS at 17:20

## 2019-01-03 RX ADMIN — INSULIN LISPRO 4 UNITS: 100 INJECTION, SOLUTION INTRAVENOUS; SUBCUTANEOUS at 08:45

## 2019-01-03 RX ADMIN — ASPIRIN 81 MG 81 MG: 81 TABLET ORAL at 08:44

## 2019-01-03 RX ADMIN — CARVEDILOL 6.25 MG: 6.25 TABLET, FILM COATED ORAL at 17:19

## 2019-01-03 RX ADMIN — DOCUSATE SODIUM 100 MG: 100 CAPSULE, LIQUID FILLED ORAL at 08:44

## 2019-01-03 RX ADMIN — HEPARIN SODIUM 5000 UNITS: 5000 INJECTION INTRAVENOUS; SUBCUTANEOUS at 14:09

## 2019-01-03 RX ADMIN — BENZONATATE 100 MG: 100 CAPSULE ORAL at 17:19

## 2019-01-03 RX ADMIN — INSULIN LISPRO 4 UNITS: 100 INJECTION, SOLUTION INTRAVENOUS; SUBCUTANEOUS at 17:19

## 2019-01-03 RX ADMIN — AMLODIPINE BESYLATE 10 MG: 10 TABLET ORAL at 08:44

## 2019-01-03 RX ADMIN — LEVOTHYROXINE SODIUM 112 MCG: 112 TABLET ORAL at 05:14

## 2019-01-03 NOTE — PROGRESS NOTES
Cardiology Progress Note - Dayami Tran 58 y o  female MRN: [id number]    Unit/Bed#: [facility] 509-01 Encounter: [id number]      Assessment/Recommendations:  1  Chest Pain: no events overnight, but with positive troponin and abnormal stress test, would benefit from a cardiac cath - would want her Cr to be improved prior to doing cath and subjecting to dye load  Baseline Cr is definitely 1 4 or lower (1 0 in 2015) - would wait another day  2   Acute diastolic CHF: likely with mild systolic component as well  At this time, we are holding diuresis in order for Cr to improve for potential cath  Continues to diurese well without diuretic  3   HTN: improved today, continue current regimen  4   HLD: continued on statin  5   CAD: presumed, continue management medically with ASA, statin, B-blocker  6   KELLY    Subjective:   Patient seen and examined  No significant events overnight   ; pertinent negatives - chest pain, chest pressure/discomfort, irregular heart beat and palpitations  Still unable to lie flat  Objective:     Vitals: Blood pressure 139/65, pulse 57, temperature 98 °F (36 7 °C), temperature source Oral, resp  rate 16, height 5' 3" (1 6 m), weight 76 kg (167 lb 8 8 oz), SpO2 96 %  , Body mass index is 29 68 kg/m² ,   Orthostatic Blood Pressures      Most Recent Value   Blood Pressure  139/65 filed at 01/03/2019 1051   Patient Position - Orthostatic VS  Sitting filed at 01/02/2019 1035            Intake/Output Summary (Last 24 hours) at 01/03/19 1134  Last data filed at 01/03/19 1001   Gross per 24 hour   Intake              418 ml   Output             1600 ml   Net            -1182 ml       TELE: No significant arrhythmias seen      Physical Exam:    GEN: Dayami Tran appears well, alert and oriented x 3, pleasant and cooperative   HEENT: pupils equal, round, and reactive to light; extraocular muscles intact  NECK: supple, no carotid bruits   HEART: regular rhythm, normal S1 and S2, no murmurs, clicks, gallops or rubs   LUNGS: improved aeration compared with yesterday  ABDOMEN: normal bowel sounds, soft, no tenderness, no distention  EXTREMITIES: peripheral pulses normal; no clubbing, cyanosis, + edema  NEURO: no focal findings   SKIN: normal without suspicious lesions on exposed skin    Medications:      Current Facility-Administered Medications:     acetaminophen (TYLENOL) tablet 650 mg, 650 mg, Oral, Q6H PRN, Kayla Alexander PA-C, 650 mg at 12/30/18 0356    amLODIPine (NORVASC) tablet 10 mg, 10 mg, Oral, Daily, Katharina Pereyra MD, 10 mg at 01/03/19 0844    aspirin chewable tablet 81 mg, 81 mg, Oral, Daily, Katharina Pereyra MD, 81 mg at 01/03/19 0844    atorvastatin (LIPITOR) tablet 40 mg, 40 mg, Oral, Daily With Carmen Anthony PA-C, 40 mg at 01/02/19 1654    benzonatate (TESSALON PERLES) capsule 100 mg, 100 mg, Oral, TID, David Hsu MD, 100 mg at 01/03/19 0844    carvedilol (COREG) tablet 6 25 mg, 6 25 mg, Oral, BID With Meals, Katharina Pereyra MD, 6 25 mg at 01/03/19 0844    docusate sodium (COLACE) capsule 100 mg, 100 mg, Oral, BID, Irena Anthony PA-C, 100 mg at 01/03/19 0844    fluticasone (FLONASE) 50 mcg/act nasal spray 1 spray, 1 spray, Each Nare, BID, David sHu MD, 1 spray at 01/03/19 0845    heparin (porcine) subcutaneous injection 5,000 Units, 5,000 Units, Subcutaneous, Q8H [address], 5,000 Units at 01/03/19 0514 **AND** Platelet count, , , Once, Irena Anthony PA-C    insulin glargine (LANTUS) subcutaneous injection 10 Units 0 1 mL, 10 Units, Subcutaneous, HS, David Hsu MD, 10 Units at 01/02/19 2136    insulin lispro (HumaLOG) 100 units/mL subcutaneous injection 1-5 Units, 1-5 Units, Subcutaneous, TID AC, 2 Units at 01/02/19 1139 **AND** Fingerstick Glucose (POCT), , , TID AC, Irena Anthony PA-C    insulin lispro (HumaLOG) 100 units/mL subcutaneous injection 1-5 Units, 1-5 Units, Subcutaneous, HS, Irena Anthony PA-C, 1 Units at 01/02/19 2144    insulin lispro (HumaLOG) 100 units/mL subcutaneous injection 4 Units, 4 Units, Subcutaneous, TID With Meals, Jared Powell MD, 4 Units at 01/03/19 0845    levothyroxine tablet 112 mcg, 112 mcg, Oral, Once per day on Mon Tue Wed Thu Fri Sat, Bravo Tucker PA-C, 112 mcg at 01/03/19 5463    levothyroxine tablet 56 mcg, 56 mcg, Oral, Once per day on Sun, Bravo Tucker PA-C, 56 mcg at 12/30/18 1386    ondansetron (ZOFRAN) injection 4 mg, 4 mg, Intravenous, Q6H PRN, Jeromy Anthony PA-C     Labs & Results:      Results from last 7 days  Lab Units 12/29/18  2335 12/29/18  1738 12/29/18  1234   TROPONIN I ng/mL 0 20* 0 21* 0 20*       Results from last 7 days  Lab Units 01/03/19  0529 01/02/19  0516 01/01/19  0458   WBC Thousand/uL 6 02 6 94 7 02   HEMOGLOBIN g/dL 7 7* 7 8* 7 4*   HEMATOCRIT % 24 9* 25 3* 24 4*   PLATELETS Thousands/uL 169 167 180       Results from last 7 days  Lab Units 12/29/18  1738   TRIGLYCERIDES mg/dL 109   HDL mg/dL 65*       Results from last 7 days  Lab Units 01/03/19  0529 01/02/19  0516 01/01/19  0458  12/29/18  0936   POTASSIUM mmol/L 4 1 4 4 3 9  < > 4 2   CHLORIDE mmol/L 106 106 107  < > 112*   CO2 mmol/L 25 24 25  < > 21   BUN mg/dL 50* 49* 39*  < > 30*   CREATININE mg/dL 1 64* 1 96* 1 78*  < > 1 44*   CALCIUM mg/dL 8 6 8 4 8 2*  < > 8 7   ALK PHOS U/L  --   --   --   --  636*   ALT U/L  --   --   --   --  46   AST U/L  --   --   --   --  48*   < > = values in this interval not displayed  Results from last 7 days  Lab Units 12/30/18  0510   MAGNESIUM mg/dL 1 7       Echo: personally reviewed - EF 50%, HK of mid IS and apical inf walls, G2DD, LAE, mild MR    Nuclear stress test: small amount of ischemia in mid anterior wall      EKG personally reviewed by Inessa Dunne MD

## 2019-01-03 NOTE — ASSESSMENT & PLAN NOTE
· Likely due to congestive heart failure  · Echo shows preserved ejection fraction  · Stress test does show a point of reversible ischemia on the mid anterior wall    · Cardiology planning for cardiac catheterization tomorrow

## 2019-01-03 NOTE — ASSESSMENT & PLAN NOTE
Stable, reports her breathing is near baseline  Completed IV diuresis  Monitor intake and output  Monitor on telemetry  Low-sodium diet  Daily weights  BMP daily  Monitor off diuretics secondary to elevated creatinine

## 2019-01-03 NOTE — ASSESSMENT & PLAN NOTE
· With proteinuria, likely diabetic nephropathy  · Appears to be worked up by her endocrinologist stat [facility]  · Baseline appears to be around 1 4  · Avoid nephrotoxins if possible    · Outpatient referral to Nephrology

## 2019-01-03 NOTE — UTILIZATION REVIEW
[address]  Utilization Review Department  Phone: [phone]; Fax [phone]  [email or web address]  ATTENTION: Please call with any questions or concerns to [phone]  and carefully listen to the prompts so that you are directed to the right person  Send all requests for admission clinical reviews, approved or denied determinations and any other requests to fax [phone]  All voicemails are confidential         Continued Stay Review    Date: 1/3/19 Thursday ACUTE MED SURG LEVEL OF CARE    Vital Signs: /65   Pulse 57   Temp 98 °F (36 7 °C) (Oral)   Resp 16   Ht 5' 3" (1 6 m)   Wt 76 kg (167 lb 8 8 oz)   SpO2 96%   BMI 29 68 kg/m²       01/02 0701 01/03 0700 01/03 0701 01/03 1311      Temperature (°F) 97 898 1 97 998      Pulse 6174 5767      Respirations 1618 1618      Blood Pressure 131/97154/68 139/65153/67      Shock Index 0 40 5 0 410 44      SpO2 (%) 9498 9596        I/O   01/01 0701 01/02 0700 01/02 0701  01/03 0700 01/03 0701  01/04 0700   P  O  540 238 540   Total Intake(mL/kg) 540 (7 2) 238 (3 1) 540 (7 1)   Urine (mL/kg/hr) 825 (0 5) 900 (0 5) 700 (1 5)   Total Output 825 900 700   Net -285 -662 -160       Diet Cardiovascular;  Sodium 2 GM        IV ACCESS    Medications:   Scheduled Meds:   Current Facility-Administered Medications:  acetaminophen 650 mg Oral Q6H PRN    amLODIPine 10 mg Oral Daily    aspirin 81 mg Oral Daily    atorvastatin 40 mg Oral Daily With Dinner    benzonatate 100 mg Oral TID    carvedilol 6 25 mg Oral BID With Meals    docusate sodium 100 mg Oral BID    fluticasone 1 spray Each Nare BID    heparin (porcine) 5,000 Units Subcutaneous Q8H [address]    insulin glargine 10 Units Subcutaneous HS    insulin lispro 1-5 Units Subcutaneous TID AC    insulin lispro 1-5 Units Subcutaneous HS    insulin lispro 4 Units Subcutaneous TID With Meals    levothyroxine 112 mcg Oral Once per day on Mon Tue Wed Thu Fri Sat levothyroxine 56 mcg Oral Once per day on Sun    ondansetron 4 mg Intravenous Q6H PRN        PRN Meds:     acetaminophen    ondansetron      LABS/Diagnostic Results:   Results from last 7 days  Lab Units 01/03/19  0529 01/02/19  0516 01/01/19  0458   WBC Thousand/uL 6 02 6 94 7 02   HEMOGLOBIN g/dL 7 7* 7 8* 7 4*   HEMATOCRIT % 24 9* 25 3* 24 4*   PLATELETS Thousands/uL 169 167 180        Results from last 7 days  Lab Units 01/03/19  0529 01/02/19  0516 01/01/19 0458   12/29/18  0936   POTASSIUM mmol/L 4 1 4 4 3 9  < > 4 2   CHLORIDE mmol/L 106 106 107  < > 112*   CO2 mmol/L 25 24 25  < > 21   BUN mg/dL 50* 49* 39*  < > 30*   CREATININE mg/dL 1 64* 1 96* 1 78*  < > 1 44*   CALCIUM mg/dL 8 6 8 4 8 2*  < > 8 7   ALK PHOS U/L  --   --   --   --  636*   ALT U/L  --   --   --   --  46   AST U/L  --   --   --   --  48*   < > = values in this interval not displayed          Results from last 7 days  Lab Units 12/30/18  0510   MAGNESIUM mg/dL 1 7       IMAGING (Per Card MD):   ECHO: EF 50%, HK of mid IS and apical inf walls, G2DD, LAE, mild MR     NUC MED STRESS TEST: small amount of ischemia in mid anterior wall  Age/Sex: 58 y o  female       Assessment/Plan:   * Type 2 myocardial infarction [facility])   Assessment & Plan     · Likely due to congestive heart failure  · Echo shows preserved ejection fraction  · Stress test does show a point of reversible ischemia on the mid anterior wall  · Cardiology planning for cardiac catheterization when renal function stabilizes       Acute diastolic congestive heart failure (HCC)   Assessment & Plan     Stable, reports her breathing is near baseline  Completed IV diuresis  Monitor intake and output  Monitor on telemetry  Low-sodium diet  Daily weights  BMP daily  Monitor off diuretics secondary to elevating creatinine      Anemia   Assessment & Plan     Likely due to chronic kidney disease    Refuses blood transfusion at this time  Will refer outpatient to Nephrology for EPO and iron therapy      Stage 3 chronic kidney disease ([facility] [address] )   Assessment & Plan     · With proteinuria, likely diabetic nephropathy  · Appears to be worked up by her endocrinologist stat [facility]  · Baseline appears to be around 1 4  · Avoid nephrotoxins if possible  · Outpatient referral to Nephrology      Hyperlipidemia   Assessment & Plan     Continue atorvastatin 40 mg  LDL is at goal      Hypertension   Assessment & Plan     Improving  Continue amlodipine, Coreg  Restart lisinopril when renal function stabilizes      DM (diabetes mellitus) ([facility] )   Assessment & Plan     Lab Results   Component Value Date     HGBA1C 7 5 (H) 12/29/2018                Recent Labs      01/01/19   2054  01/02/19   0642  01/02/19   1120  01/02/19   1601   POCGLU  293*  172*  256*  128         Blood Sugar Average: Last 72 hrs:  · Patient sees Endocrinology outpatient  Takes repaglinide 0 5 mg p o  B i d  Januvia 50 mg p o  Daily  Patient states she has never taken insulin  She monitors her sugars twice daily  Normal results 160  · While hospitalized, patient will be managed on weight based insulin dosing  Will monitor    · Blood glucose is elevated, add basal bolus insulin         VTE Pharmacologic Prophylaxis:   Pharmacologic: Heparin  Mechanical VTE Prophylaxis in Place: Yes    Current Length of Stay: 4 day(s)     Current Patient Status: Inpatient      Certification Statement: The patient will continue to require additional inpatient hospital stay        Cardiology  Progress Notes Date of Service: 1/3/2019 11:33 AM      Assessment/Recommendations:  1  Chest Pain: no events overnight, but with positive troponin and abnormal stress test, would benefit from a cardiac cath - would want her Cr to be improved prior to doing cath and subjecting to dye load  Baseline Cr is definitely 1 4 or lower (1 0 in 2015) - would wait another day  2   Acute diastolic CHF: likely with mild systolic component as well    At this time, we are holding diuresis in order for Cr to improve for potential cath  Continues to diurese well without diuretic  3   HTN: improved today, continue current regimen  4   HLD: continued on statin  5   CAD: presumed, continue management medically with ASA, statin, B-blocker      6   KELLY          Discharge Plan:   [facility] BE DETERMINED PENDING TX PLAN    CASE MANAGEMENT FOLLOWING CLOSELY FOR ALL DISCHARGE NEEDS

## 2019-01-03 NOTE — PROGRESS NOTES
Progress Note - Fatuma Clayton 1955, 58 y o  female MRN: [id number]    Unit/Bed#: [place] 509-01 Encounter: [id number]    Primary Care Provider: Calderon Chan MD   Date and time admitted to hospital: 12/29/2018  9:02 AM        * Type 2 myocardial infarction [facility])   Assessment & Plan    · Likely due to congestive heart failure  · Echo shows preserved ejection fraction  · Stress test does show a point of reversible ischemia on the mid anterior wall  · Cardiology planning for cardiac catheterization tomorrow     Acute diastolic congestive heart failure (HCC)   Assessment & Plan    Stable, reports her breathing is near baseline  Completed IV diuresis  Monitor intake and output  Monitor on telemetry  Low-sodium diet  Daily weights  BMP daily  Monitor off diuretics secondary to elevated creatinine     Anemia   Assessment & Plan    Likely due to chronic kidney disease  Refuses blood transfusion at this time  Will refer outpatient to Nephrology for EPO and iron therapy     Stage 3 chronic kidney disease ([address] )   Assessment & Plan    · With proteinuria, likely diabetic nephropathy  · Appears to be worked up by her endocrinologist stat [facility]  · Baseline appears to be around 1 4  · Avoid nephrotoxins if possible  · Outpatient referral to Nephrology     Hyperlipidemia   Assessment & Plan    Continue atorvastatin 40 mg  LDL is at goal     Hypertension   Assessment & Plan    Improving  Continue amlodipine, Coreg  Restart lisinopril when renal function stable and after cath     DM (diabetes mellitus) [facility])   Assessment & Plan    Lab Results   Component Value Date    HGBA1C 7 5 (H) 12/29/2018       Recent Labs      01/02/19   2104  01/03/19   0622  01/03/19   1032  01/03/19   1623   POCGLU  196*  107  192*  261*       Blood Sugar Average: Last 72 hrs:  · Patient sees Endocrinology outpatient  Takes repaglinide 0 5 mg p o  B i d  Januvia 50 mg p o  Daily  Patient states she has never taken insulin    She monitors her sugars twice daily  Normal results 160  · While hospitalized, patient will be managed on weight based insulin dosing  Will monitor    · Blood glucose is elevated, add basal bolus insulin       VTE Pharmacologic Prophylaxis:   Pharmacologic: Heparin  Mechanical VTE Prophylaxis in Place: Yes    Patient Centered Rounds: I have performed bedside rounds with nursing staff today  Discussions with Specialists or Other Care Team Provider: no    Education and Discussions with Family / Patient: pt  Left vm w/ Nissa    Time Spent for Care: 30 minutes  More than 50% of total time spent on counseling and coordination of care as described above  Current Length of Stay: 5 day(s)    Current Patient Status: Inpatient   Certification Statement: The patient will continue to require additional inpatient hospital stay due to need for cath    Discharge Plan: pending cath results    Code Status: Level 1 - Full Code      Subjective:   No acute complaints    Objective:     Vitals:   Temp (24hrs), Av °F (36 7 °C), Min:97 9 °F (36 6 °C), Max:98 °F (36 7 °C)    Temp:  [97 9 °F (36 6 °C)-98 °F (36 7 °C)] 97 9 °F (36 6 °C)  HR:  [57-70] 60  Resp:  [16-18] 16  BP: (131-153)/(65-97) 152/67  SpO2:  [94 %-97 %] 97 %  Body mass index is 29 68 kg/m²  Input and Output Summary (last 24 hours): Intake/Output Summary (Last 24 hours) at 19 1739  Last data filed at 19 1304   Gross per 24 hour   Intake              660 ml   Output             1000 ml   Net             -340 ml       Physical Exam:     Physical Exam   Constitutional: She is oriented to person, place, and time  No distress  HENT:   Head: Normocephalic and atraumatic  Eyes: Conjunctivae and EOM are normal    Neck: Normal range of motion  Neck supple  Cardiovascular: Normal rate and regular rhythm  Pulmonary/Chest: Effort normal and breath sounds normal  She has no wheezes  She has no rales  Abdominal: Soft   Bowel sounds are normal  She exhibits no distension  There is no tenderness  Musculoskeletal: Normal range of motion  She exhibits no edema  Neurological: She is alert and oriented to person, place, and time  Skin: Skin is warm and dry  She is not diaphoretic  Additional Data:     Labs:      Results from last 7 days  Lab Units 01/03/19  0529 01/02/19  0516   WBC Thousand/uL 6 02 6 94   HEMOGLOBIN g/dL 7 7* 7 8*   HEMATOCRIT % 24 9* 25 3*   PLATELETS Thousands/uL 169 167   NEUTROS PCT %  --  64   LYMPHS PCT %  --  25   MONOS PCT %  --  7   EOS PCT %  --  4       Results from last 7 days  Lab Units 01/03/19  0529  12/29/18  0936   POTASSIUM mmol/L 4 1  < > 4 2   CHLORIDE mmol/L 106  < > 112*   CO2 mmol/L 25  < > 21   BUN mg/dL 50*  < > 30*   CREATININE mg/dL 1 64*  < > 1 44*   CALCIUM mg/dL 8 6  < > 8 7   ALK PHOS U/L  --   --  636*   ALT U/L  --   --  46   AST U/L  --   --  48*   < > = values in this interval not displayed  * I Have Reviewed All Lab Data Listed Above  * Additional Pertinent Lab Tests Reviewed:  [address] 65 Admission Reviewed        Recent Cultures (last 7 days):           Last 24 Hours Medication List:     Current Facility-Administered Medications:  acetaminophen 650 mg Oral Q6H PRN Thuy Browne PA-C   amLODIPine 10 mg Oral Daily Rivas Shay MD   aspirin 81 mg Oral Daily Rivas Shay MD   atorvastatin 40 mg Oral Daily With Alejandro Cruz PA-C   benzonatate 100 mg Oral TID Fifi Gastelum MD   carvedilol 6 25 mg Oral BID With Meals Rivas Shay MD   docusate sodium 100 mg Oral BID Irena Anthony PA-C   fluticasone 1 spray Each Nare BID Fifi Gastelum MD   heparin (porcine) 5,000 Units Subcutaneous Q8H [address] Anthony PA-C   insulin glargine 10 Units Subcutaneous HS Fifi Gastelum MD   insulin lispro 1-5 Units Subcutaneous TID AC Irena Anthony PA-C   insulin lispro 1-5 Units Subcutaneous HS Irena Anthony PA-C   insulin lispro 4 Units Subcutaneous TID With Meals Tracy Wu MD   levothyroxine 112 mcg Oral Once per day on Mon Tue Wed Thu Fri Sat Irena Esposito PA-C   levothyroxine 56 mcg Oral Once per day on Sun Irena Anthony PA-C   ondansetron 4 mg Intravenous Q6H PRN Mannie Esposito PA-C        Today, Patient Was Seen By: Nora James DO    ** Please Note: Dictation voice to text software may have been used in the creation of this document   **

## 2019-01-03 NOTE — ASSESSMENT & PLAN NOTE
Lab Results   Component Value Date    HGBA1C 7 5 (H) 12/29/2018       Recent Labs      01/02/19   2104  01/03/19   0622  01/03/19   1032  01/03/19   1623   POCGLU  196*  107  192*  261*       Blood Sugar Average: Last 72 hrs:  · Patient sees Endocrinology outpatient  Takes repaglinide 0 5 mg p o  B i d  Januvia 50 mg p o  Daily  Patient states she has never taken insulin  She monitors her sugars twice daily  Normal results 160  · While hospitalized, patient will be managed on weight based insulin dosing    Will monitor    · Blood glucose is elevated, add basal bolus insulin

## 2019-01-04 ENCOUNTER — APPOINTMENT (INPATIENT)
Dept: NON INVASIVE DIAGNOSTICS | Facility: HOSPITAL | Age: 64
DRG: 246 | End: 2019-01-04
Attending: INTERNAL MEDICINE
Payer: COMMERCIAL

## 2019-01-04 ENCOUNTER — TELEPHONE (OUTPATIENT)
Dept: CARDIOLOGY CLINIC | Facility: CLINIC | Age: 64
End: 2019-01-04

## 2019-01-04 PROBLEM — I25.119 CORONARY ARTERY DISEASE INVOLVING NATIVE CORONARY ARTERY OF NATIVE HEART WITH ANGINA PECTORIS (HCC): Status: ACTIVE | Noted: 2019-01-04

## 2019-01-04 LAB
ANION GAP SERPL CALCULATED.3IONS-SCNC: 8 MMOL/L (ref 4–13)
BUN SERPL-MCNC: 57 MG/DL (ref 5–25)
CALCIUM SERPL-MCNC: 8.5 MG/DL (ref 8.3–10.1)
CHLORIDE SERPL-SCNC: 105 MMOL/L (ref 100–108)
CO2 SERPL-SCNC: 24 MMOL/L (ref 21–32)
CREAT SERPL-MCNC: 1.77 MG/DL (ref 0.6–1.3)
ERYTHROCYTE [DISTWIDTH] IN BLOOD BY AUTOMATED COUNT: 13.4 % (ref 11.6–15.1)
GFR SERPL CREATININE-BSD FRML MDRD: 30 ML/MIN/1.73SQ M
GLUCOSE SERPL-MCNC: 180 MG/DL (ref 65–140)
GLUCOSE SERPL-MCNC: 197 MG/DL (ref 65–140)
GLUCOSE SERPL-MCNC: 206 MG/DL (ref 65–140)
GLUCOSE SERPL-MCNC: 239 MG/DL (ref 65–140)
HCT VFR BLD AUTO: 23.6 % (ref 34.8–46.1)
HGB BLD-MCNC: 7.1 G/DL (ref 11.5–15.4)
KCT BLD-ACNC: 266 SEC (ref 89–137)
MCH RBC QN AUTO: 26.8 PG (ref 26.8–34.3)
MCHC RBC AUTO-ENTMCNC: 30.1 G/DL (ref 31.4–37.4)
MCV RBC AUTO: 89 FL (ref 82–98)
PLATELET # BLD AUTO: 160 THOUSANDS/UL (ref 149–390)
PMV BLD AUTO: 11.1 FL (ref 8.9–12.7)
POTASSIUM SERPL-SCNC: 4.4 MMOL/L (ref 3.5–5.3)
RBC # BLD AUTO: 2.65 MILLION/UL (ref 3.81–5.12)
SODIUM SERPL-SCNC: 137 MMOL/L (ref 136–145)
SPECIMEN SOURCE: ABNORMAL
WBC # BLD AUTO: 6.5 THOUSAND/UL (ref 4.31–10.16)

## 2019-01-04 PROCEDURE — C1769 GUIDE WIRE: HCPCS | Performed by: INTERNAL MEDICINE

## 2019-01-04 PROCEDURE — C1725 CATH, TRANSLUMIN NON-LASER: HCPCS | Performed by: INTERNAL MEDICINE

## 2019-01-04 PROCEDURE — 99152 MOD SED SAME PHYS/QHP 5/>YRS: CPT | Performed by: INTERNAL MEDICINE

## 2019-01-04 PROCEDURE — C1894 INTRO/SHEATH, NON-LASER: HCPCS | Performed by: INTERNAL MEDICINE

## 2019-01-04 PROCEDURE — 82948 REAGENT STRIP/BLOOD GLUCOSE: CPT

## 2019-01-04 PROCEDURE — 99232 SBSQ HOSP IP/OBS MODERATE 35: CPT | Performed by: INTERNAL MEDICINE

## 2019-01-04 PROCEDURE — 92928 PRQ TCAT PLMT NTRAC ST 1 LES: CPT | Performed by: INTERNAL MEDICINE

## 2019-01-04 PROCEDURE — C1874 STENT, COATED/COV W/DEL SYS: HCPCS

## 2019-01-04 PROCEDURE — C9600 PERC DRUG-EL COR STENT SING: HCPCS | Performed by: INTERNAL MEDICINE

## 2019-01-04 PROCEDURE — B2111ZZ FLUOROSCOPY OF MULTIPLE CORONARY ARTERIES USING LOW OSMOLAR CONTRAST: ICD-10-PCS | Performed by: INTERNAL MEDICINE

## 2019-01-04 PROCEDURE — 93454 CORONARY ARTERY ANGIO S&I: CPT | Performed by: INTERNAL MEDICINE

## 2019-01-04 PROCEDURE — 027034Z DILATION OF CORONARY ARTERY, ONE ARTERY WITH DRUG-ELUTING INTRALUMINAL DEVICE, PERCUTANEOUS APPROACH: ICD-10-PCS | Performed by: INTERNAL MEDICINE

## 2019-01-04 PROCEDURE — 85027 COMPLETE CBC AUTOMATED: CPT | Performed by: GENERAL PRACTICE

## 2019-01-04 PROCEDURE — 85347 COAGULATION TIME ACTIVATED: CPT

## 2019-01-04 PROCEDURE — C1887 CATHETER, GUIDING: HCPCS | Performed by: INTERNAL MEDICINE

## 2019-01-04 PROCEDURE — 99153 MOD SED SAME PHYS/QHP EA: CPT | Performed by: INTERNAL MEDICINE

## 2019-01-04 PROCEDURE — 99233 SBSQ HOSP IP/OBS HIGH 50: CPT | Performed by: GENERAL PRACTICE

## 2019-01-04 PROCEDURE — 02703ZZ DILATION OF CORONARY ARTERY, ONE ARTERY, PERCUTANEOUS APPROACH: ICD-10-PCS | Performed by: INTERNAL MEDICINE

## 2019-01-04 PROCEDURE — 80048 BASIC METABOLIC PNL TOTAL CA: CPT | Performed by: GENERAL PRACTICE

## 2019-01-04 RX ORDER — NITROGLYCERIN 20 MG/100ML
INJECTION INTRAVENOUS CODE/TRAUMA/SEDATION MEDICATION
Status: COMPLETED | OUTPATIENT
Start: 2019-01-04 | End: 2019-01-04

## 2019-01-04 RX ORDER — HEPARIN SODIUM 1000 [USP'U]/ML
INJECTION, SOLUTION INTRAVENOUS; SUBCUTANEOUS CODE/TRAUMA/SEDATION MEDICATION
Status: COMPLETED | OUTPATIENT
Start: 2019-01-04 | End: 2019-01-04

## 2019-01-04 RX ORDER — SODIUM CHLORIDE 9 MG/ML
200 INJECTION, SOLUTION INTRAVENOUS CONTINUOUS
Status: DISCONTINUED | OUTPATIENT
Start: 2019-01-04 | End: 2019-01-04

## 2019-01-04 RX ORDER — SODIUM CHLORIDE 9 MG/ML
150 INJECTION, SOLUTION INTRAVENOUS CONTINUOUS
Status: DISPENSED | OUTPATIENT
Start: 2019-01-04 | End: 2019-01-04

## 2019-01-04 RX ORDER — INSULIN GLARGINE 100 [IU]/ML
15 INJECTION, SOLUTION SUBCUTANEOUS
Status: DISCONTINUED | OUTPATIENT
Start: 2019-01-04 | End: 2019-01-05

## 2019-01-04 RX ORDER — FENTANYL CITRATE 50 UG/ML
INJECTION, SOLUTION INTRAMUSCULAR; INTRAVENOUS CODE/TRAUMA/SEDATION MEDICATION
Status: COMPLETED | OUTPATIENT
Start: 2019-01-04 | End: 2019-01-04

## 2019-01-04 RX ORDER — CLOPIDOGREL BISULFATE 75 MG/1
75 TABLET ORAL DAILY
Status: DISCONTINUED | OUTPATIENT
Start: 2019-01-05 | End: 2019-01-10 | Stop reason: HOSPADM

## 2019-01-04 RX ORDER — MIDAZOLAM HYDROCHLORIDE 1 MG/ML
INJECTION INTRAMUSCULAR; INTRAVENOUS CODE/TRAUMA/SEDATION MEDICATION
Status: COMPLETED | OUTPATIENT
Start: 2019-01-04 | End: 2019-01-04

## 2019-01-04 RX ORDER — LIDOCAINE HYDROCHLORIDE 10 MG/ML
INJECTION, SOLUTION INFILTRATION; PERINEURAL CODE/TRAUMA/SEDATION MEDICATION
Status: COMPLETED | OUTPATIENT
Start: 2019-01-04 | End: 2019-01-04

## 2019-01-04 RX ORDER — VERAPAMIL HYDROCHLORIDE 2.5 MG/ML
INJECTION, SOLUTION INTRAVENOUS CODE/TRAUMA/SEDATION MEDICATION
Status: COMPLETED | OUTPATIENT
Start: 2019-01-04 | End: 2019-01-04

## 2019-01-04 RX ORDER — PRASUGREL 10 MG/1
TABLET, FILM COATED ORAL CODE/TRAUMA/SEDATION MEDICATION
Status: COMPLETED | OUTPATIENT
Start: 2019-01-04 | End: 2019-01-04

## 2019-01-04 RX ORDER — NITROGLYCERIN 0.4 MG/1
0.4 TABLET SUBLINGUAL
Status: DISCONTINUED | OUTPATIENT
Start: 2019-01-04 | End: 2019-01-10 | Stop reason: HOSPADM

## 2019-01-04 RX ADMIN — SODIUM CHLORIDE 150 ML/HR: 0.9 INJECTION, SOLUTION INTRAVENOUS at 13:31

## 2019-01-04 RX ADMIN — HEPARIN SODIUM 5000 UNITS: 1000 INJECTION INTRAVENOUS; SUBCUTANEOUS at 11:44

## 2019-01-04 RX ADMIN — VERAPAMIL HYDROCHLORIDE 5 MG: 2.5 INJECTION, SOLUTION INTRAVENOUS at 11:33

## 2019-01-04 RX ADMIN — INSULIN GLARGINE 15 UNITS: 100 INJECTION, SOLUTION SUBCUTANEOUS at 21:46

## 2019-01-04 RX ADMIN — LEVOTHYROXINE SODIUM 112 MCG: 112 TABLET ORAL at 06:28

## 2019-01-04 RX ADMIN — LIDOCAINE HYDROCHLORIDE 1 ML: 10 INJECTION, SOLUTION INFILTRATION; PERINEURAL at 11:30

## 2019-01-04 RX ADMIN — ONDANSETRON 4 MG: 2 INJECTION INTRAMUSCULAR; INTRAVENOUS at 13:49

## 2019-01-04 RX ADMIN — INSULIN LISPRO 2 UNITS: 100 INJECTION, SOLUTION INTRAVENOUS; SUBCUTANEOUS at 21:46

## 2019-01-04 RX ADMIN — FLUTICASONE PROPIONATE 1 SPRAY: 50 SPRAY, METERED NASAL at 09:53

## 2019-01-04 RX ADMIN — NITROGLYCERIN 200 MCG: 20 INJECTION INTRAVENOUS at 11:57

## 2019-01-04 RX ADMIN — INSULIN LISPRO 1 UNITS: 100 INJECTION, SOLUTION INTRAVENOUS; SUBCUTANEOUS at 06:28

## 2019-01-04 RX ADMIN — HEPARIN SODIUM 2000 UNITS: 1000 INJECTION INTRAVENOUS; SUBCUTANEOUS at 11:51

## 2019-01-04 RX ADMIN — PRASUGREL HYDROCHLORIDE 60 MG: 10 TABLET, FILM COATED ORAL at 11:44

## 2019-01-04 RX ADMIN — BENZONATATE 100 MG: 100 CAPSULE ORAL at 20:01

## 2019-01-04 RX ADMIN — CARVEDILOL 6.25 MG: 6.25 TABLET, FILM COATED ORAL at 16:42

## 2019-01-04 RX ADMIN — MIDAZOLAM 1 MG: 1 INJECTION INTRAMUSCULAR; INTRAVENOUS at 11:43

## 2019-01-04 RX ADMIN — ASPIRIN 81 MG 81 MG: 81 TABLET ORAL at 07:53

## 2019-01-04 RX ADMIN — BENZONATATE 100 MG: 100 CAPSULE ORAL at 09:53

## 2019-01-04 RX ADMIN — HEPARIN SODIUM 3000 UNITS: 1000 INJECTION INTRAVENOUS; SUBCUTANEOUS at 11:32

## 2019-01-04 RX ADMIN — NITROGLYCERIN 1 INCH: 20 OINTMENT TOPICAL at 11:43

## 2019-01-04 RX ADMIN — CARVEDILOL 6.25 MG: 6.25 TABLET, FILM COATED ORAL at 09:53

## 2019-01-04 RX ADMIN — INSULIN LISPRO 1 UNITS: 100 INJECTION, SOLUTION INTRAVENOUS; SUBCUTANEOUS at 17:19

## 2019-01-04 RX ADMIN — ACETAMINOPHEN 325 MG: 325 TABLET, FILM COATED ORAL at 16:45

## 2019-01-04 RX ADMIN — INSULIN LISPRO 4 UNITS: 100 INJECTION, SOLUTION INTRAVENOUS; SUBCUTANEOUS at 17:19

## 2019-01-04 RX ADMIN — ACETAMINOPHEN 325 MG: 325 TABLET, FILM COATED ORAL at 19:59

## 2019-01-04 RX ADMIN — HEPARIN SODIUM 5000 UNITS: 5000 INJECTION INTRAVENOUS; SUBCUTANEOUS at 06:25

## 2019-01-04 RX ADMIN — BENZONATATE 100 MG: 100 CAPSULE ORAL at 16:42

## 2019-01-04 RX ADMIN — AMLODIPINE BESYLATE 10 MG: 10 TABLET ORAL at 09:53

## 2019-01-04 RX ADMIN — FLUTICASONE PROPIONATE 1 SPRAY: 50 SPRAY, METERED NASAL at 17:19

## 2019-01-04 RX ADMIN — NITROGLYCERIN 200 MCG: 20 INJECTION INTRAVENOUS at 11:32

## 2019-01-04 RX ADMIN — IOHEXOL 60 ML: 350 INJECTION, SOLUTION INTRAVENOUS at 12:26

## 2019-01-04 RX ADMIN — FENTANYL CITRATE 50 MCG: 50 INJECTION, SOLUTION INTRAMUSCULAR; INTRAVENOUS at 11:43

## 2019-01-04 RX ADMIN — ATORVASTATIN CALCIUM 40 MG: 40 TABLET, FILM COATED ORAL at 16:42

## 2019-01-04 RX ADMIN — HEPARIN SODIUM 5000 UNITS: 5000 INJECTION INTRAVENOUS; SUBCUTANEOUS at 21:48

## 2019-01-04 NOTE — TELEPHONE ENCOUNTER
Called and spoke to pt  and I will call back the pt when she is released from the hospital to schedule her PCI

## 2019-01-04 NOTE — PLAN OF CARE
Problem: PAIN - ADULT  Goal: Verbalizes/displays adequate comfort level or baseline comfort level  Interventions:  - Encourage patient to monitor pain and request assistance  - Assess pain using appropriate pain scale  - Administer analgesics based on type and severity of pain and evaluate response  - Implement non-pharmacological measures as appropriate and evaluate response  - Consider cultural and social influences on pain and pain management  - Notify physician/advanced practitioner if interventions unsuccessful or patient reports new pain   Outcome: Progressing      Problem: SAFETY ADULT  Goal: Patient will remain free of falls  INTERVENTIONS:  - Assess patient frequently for physical needs  -  Identify cognitive and physical deficits and behaviors that affect risk of falls    -  [place] fall precautions as indicated by assessment   - Educate patient/family on patient safety including physical limitations  - Instruct patient to call for assistance with activity based on assessment  - Modify environment to reduce risk of injury  - Consider OT/PT consult to assist with strengthening/mobility    Outcome: Progressing    Goal: Maintain or return to baseline ADL function  INTERVENTIONS:  -  Assess patient's ability to carry out ADLs; assess patient's baseline for ADL function and identify physical deficits which impact ability to perform ADLs (bathing, care of mouth/teeth, toileting, grooming, dressing, etc )  - Assess/evaluate cause of self-care deficits   - Assess range of motion  - Assess patient's mobility; develop plan if impaired  - Assess patient's need for assistive devices and provide as appropriate  - Encourage maximum independence but intervene and supervise when necessary  ¯ Involve family in performance of ADLs  ¯ Assess for home care needs following discharge   ¯ Request OT consult to assist with ADL evaluation and planning for discharge  ¯ Provide patient education as appropriate   Outcome: Progressing    Goal: Maintain or return mobility status to optimal level  INTERVENTIONS:  - Assess patient's baseline mobility status (ambulation, transfers, stairs, etc )    - Identify cognitive and physical deficits and behaviors that affect mobility  - Identify mobility aids required to assist with transfers and/or ambulation (gait belt, sit-to-stand, lift, walker, cane, etc )  - [place] fall precautions as indicated by assessment  - Record patient progress and toleration of activity level on Mobility SBAR; progress patient to next Phase/Stage  - Instruct patient to call for assistance with activity based on assessment  - Request Rehabilitation consult to assist with strengthening/weightbearing, etc    Outcome: Progressing      Problem: Potential for Falls  Goal: Patient will remain free of falls  INTERVENTIONS:  - Assess patient frequently for physical needs  -  Identify cognitive and physical deficits and behaviors that affect risk of falls  -  [place] fall precautions as indicated by assessment   - Educate patient/family on patient safety including physical limitations  - Instruct patient to call for assistance with activity based on assessment  - Modify environment to reduce risk of injury  - Consider OT/PT consult to assist with strengthening/mobility    Outcome: Progressing      Problem: CARDIOVASCULAR - ADULT  Goal: Maintains optimal cardiac output and hemodynamic stability  INTERVENTIONS:  - Monitor I/O, vital signs and rhythm  - Monitor for S/S and trends of decreased cardiac output i e  bleeding, hypotension  - Administer and titrate ordered vasoactive medications to optimize hemodynamic stability  - Assess quality of pulses, skin color and temperature  - Assess for signs of decreased coronary artery perfusion - ex   Angina  - Instruct patient to report change in severity of symptoms   Outcome: Progressing    Goal: Absence of cardiac dysrhythmias or at baseline rhythm  INTERVENTIONS:  - Continuous cardiac monitoring, monitor vital signs, obtain 12 lead EKG if indicated  - Administer antiarrhythmic and heart rate control medications as ordered  - Monitor electrolytes and administer replacement therapy as ordered   Outcome: Progressing      Problem: METABOLIC, FLUID AND ELECTROLYTES - ADULT  Goal: Electrolytes maintained within normal limits  INTERVENTIONS:  - Monitor labs and assess patient for signs and symptoms of electrolyte imbalances  - Administer electrolyte replacement as ordered  - Monitor response to electrolyte replacements, including repeat lab results as appropriate  - Instruct patient on fluid and nutrition as appropriate   Outcome: Progressing    Goal: Fluid balance maintained  INTERVENTIONS:  - Monitor labs and assess for signs and symptoms of volume excess or deficit  - Monitor I/O and WT  - Instruct patient on fluid and nutrition as appropriate   Outcome: Progressing    Goal: Glucose maintained within target range  INTERVENTIONS:  - Monitor Blood Glucose as ordered  - Assess for signs and symptoms of hyperglycemia and hypoglycemia  - Administer ordered medications to maintain glucose within target range  - Assess nutritional intake and initiate nutrition service referral as needed   Outcome: Progressing      Problem: HEMATOLOGIC - ADULT  Goal: Maintains hematologic stability  INTERVENTIONS  - Assess for signs and symptoms of bleeding or hemorrhage  - Monitor labs  - Administer supportive blood products/factors as ordered and appropriate   Outcome: Progressing      Problem: DISCHARGE PLANNING - CARE MANAGEMENT  Goal: Discharge to post-acute care or home with appropriate resources  INTERVENTIONS:  - Conduct assessment to determine patient/family and health care team treatment goals, and need for post-acute services based on payer coverage, community resources, and patient preferences, and barriers to discharge  - Address psychosocial, clinical, and financial barriers to discharge as identified in assessment in conjunction with the patient/family and health care team  - Arrange appropriate level of post-acute services according to patient's   needs and preference and payer coverage in collaboration with the physician and health care team  - Communicate with and update the patient/family, physician, and health care team regarding progress on the discharge plan  - Arrange appropriate transportation to post-acute venues   Outcome: Progressing

## 2019-01-04 NOTE — ASSESSMENT & PLAN NOTE
Stable, reports her breathing is near baseline  Completed IV diuresis  Monitor intake and output  Monitor on telemetry  Low-sodium diet  Daily weights  BMP daily  Monitor off diuretics secondary to elevated creatinine and need for cath  Net output 4 6L

## 2019-01-04 NOTE — PROGRESS NOTES
Cardiology Progress Note - Genesis Haddad 58 y o  female MRN: [id number]    Unit/Bed#: [facility] 509-01 Encounter: [id number]      Assessment/Recommendations:  1  Chest Pain: no events overnight, but with positive troponin and abnormal stress test, would benefit from a cardiac cath - would want her Cr to be improved prior to doing cath and subjecting to dye load  Baseline Cr is definitely 1 4 or lower (1 0 in 2015) - but perhaps she will have a new baseline now that is higher  2   Acute diastolic CHF: likely with mild systolic component as well  At this time, we are holding diuresis in order for Cr to improve for potential cath  Continues to diurese without diuretic  3   HTN: elevated this AM, continue current regimen  4   HLD: continued on statin  5   CAD: presumed, continue management medically with ASA, statin, B-blocker  6   KELLY    Subjective:   Patient seen and examined  No significant events overnight   ; pertinent negatives - chest pain, chest pressure/discomfort, irregular heart beat and palpitations  Able to lie flat now  Objective:     Vitals: Blood pressure 160/68, pulse 60, temperature 99 °F (37 2 °C), temperature source Oral, resp  rate 18, height 5' 3" (1 6 m), weight 76 7 kg (169 lb 1 5 oz), SpO2 95 %  , Body mass index is 29 95 kg/m² ,   Orthostatic Blood Pressures      Most Recent Value   Blood Pressure  160/68 filed at 01/04/2019 0716   Patient Position - Orthostatic VS  Sitting filed at 01/02/2019 1035            Intake/Output Summary (Last 24 hours) at 01/04/19 1028  Last data filed at 01/04/19 0514   Gross per 24 hour   Intake             1062 ml   Output             1300 ml   Net             -238 ml       TELE: No significant arrhythmias seen      Physical Exam:    GEN: Genesis Haddad appears well, alert and oriented x 3, pleasant and cooperative   HEENT: pupils equal, round, and reactive to light; extraocular muscles intact  NECK: supple, no carotid bruits   HEART: regular rhythm, normal S1 and S2, no murmurs, clicks, gallops or rubs   LUNGS: improved aeration compared with yesterday  ABDOMEN: normal bowel sounds, soft, no tenderness, no distention  EXTREMITIES: peripheral pulses normal; no clubbing, cyanosis, mild edema  NEURO: no focal findings   SKIN: normal without suspicious lesions on exposed skin    Medications:      Current Facility-Administered Medications:     acetaminophen (TYLENOL) tablet 650 mg, 650 mg, Oral, Q6H PRN, Alessia Cisneros PA-C, 650 mg at 12/30/18 0356    amLODIPine (NORVASC) tablet 10 mg, 10 mg, Oral, Daily, Jessica Gimenez MD, 10 mg at 01/04/19 0421    aspirin chewable tablet 81 mg, 81 mg, Oral, Daily, Jessica Gimenez MD, 81 mg at 01/04/19 0753    atorvastatin (LIPITOR) tablet 40 mg, 40 mg, Oral, Daily With Floraravindra Anthony PA-C, 40 mg at 01/03/19 1719    benzonatate (TESSALON PERLES) capsule 100 mg, 100 mg, Oral, TID, Bernadine Parrish MD, 100 mg at 01/04/19 0953    carvedilol (COREG) tablet 6 25 mg, 6 25 mg, Oral, BID With Meals, Jessica Gimenez MD, 6 25 mg at 01/04/19 0953    docusate sodium (COLACE) capsule 100 mg, 100 mg, Oral, BID, Irena Anthony PA-C, 100 mg at 01/03/19 1719    fluticasone (FLONASE) 50 mcg/act nasal spray 1 spray, 1 spray, Each Nare, BID, Bernadine Parrish MD, 1 spray at 01/04/19 0953    heparin (porcine) subcutaneous injection 5,000 Units, 5,000 Units, Subcutaneous, Q8H [address], 5,000 Units at 01/04/19 0625 **AND** Platelet count, , , Once, Irena Anthony PA-C    insulin glargine (LANTUS) subcutaneous injection 10 Units 0 1 mL, 10 Units, Subcutaneous, HS, Bernadine Parrish MD, 10 Units at 01/03/19 2124    insulin lispro (HumaLOG) 100 units/mL subcutaneous injection 1-5 Units, 1-5 Units, Subcutaneous, TID AC, 1 Units at 01/04/19 0628 **AND** Fingerstick Glucose (POCT), , , TINAHUN SCRUGGS, Irena Anthony PA-C    insulin lispro (HumaLOG) 100 units/mL subcutaneous injection 1-5 Units, 1-5 Units, Subcutaneous, HS, Irena Anthony, PEDRO, 1 Units at 01/03/19 2124    insulin lispro (HumaLOG) 100 units/mL subcutaneous injection 4 Units, 4 Units, Subcutaneous, TID With Meals, Denise Bacon MD, 4 Units at 01/03/19 1719    levothyroxine tablet 112 mcg, 112 mcg, Oral, Once per day on Mon Tue Wed Thu Fri Sat, Natalia Shah PA-C, 112 mcg at 01/04/19 7938    levothyroxine tablet 56 mcg, 56 mcg, Oral, Once per day on Sun, Natalia Shah PA-C, 56 mcg at 12/30/18 7969    ondansetron (ZOFRAN) injection 4 mg, 4 mg, Intravenous, Q6H PRN, Colletta [employer]ands Fischer, PA-C     Labs & Results:      Results from last 7 days  Lab Units 12/29/18  2335 12/29/18  1738 12/29/18  1234   TROPONIN I ng/mL 0 20* 0 21* 0 20*       Results from last 7 days  Lab Units 01/04/19  0512 01/03/19  0529 01/02/19  0516   WBC Thousand/uL 6 50 6 02 6 94   HEMOGLOBIN g/dL 7 1* 7 7* 7 8*   HEMATOCRIT % 23 6* 24 9* 25 3*   PLATELETS Thousands/uL 160 169 167       Results from last 7 days  Lab Units 12/29/18  1738   TRIGLYCERIDES mg/dL 109   HDL mg/dL 65*       Results from last 7 days  Lab Units 01/04/19  0512 01/03/19  0529 01/02/19  0516  12/29/18  0936   POTASSIUM mmol/L 4 4 4 1 4 4  < > 4 2   CHLORIDE mmol/L 105 106 106  < > 112*   CO2 mmol/L 24 25 24  < > 21   BUN mg/dL 57* 50* 49*  < > 30*   CREATININE mg/dL 1 77* 1 64* 1 96*  < > 1 44*   CALCIUM mg/dL 8 5 8 6 8 4  < > 8 7   ALK PHOS U/L  --   --   --   --  636*   ALT U/L  --   --   --   --  46   AST U/L  --   --   --   --  48*   < > = values in this interval not displayed  Results from last 7 days  Lab Units 12/30/18  0510   MAGNESIUM mg/dL 1 7       Echo: personally reviewed - EF 50%, HK of mid IS and apical inf walls, G2DD, LAE, mild MR    Nuclear stress test: small amount of ischemia in mid anterior wall      EKG personally reviewed by Katelynn Alonzo MD

## 2019-01-04 NOTE — PROGRESS NOTES
Progress Note - Beatrice Stephens 1955, 58 y o  female MRN: [id number]    Unit/Bed#: [facility] 509-01 Encounter: [id number]    Primary Care Provider: Kemper Babinski, MD   Date and time admitted to hospital: 12/29/2018  9:02 AM        * Type 2 myocardial infarction [facility])   Assessment & Plan    · Likely due to congestive heart failure  · Echo shows preserved ejection fraction  · Stress test does show a point of reversible ischemia on the mid anterior wall  · 1/4: Cardiac catheterization showed CAD - see below     Coronary artery disease involving native coronary artery of native heart with angina pectoris [facility])   Assessment & Plan    S/p PCI today - plan for 2nd stage of PCI in 3 weeks  ASA and plavix and statin abd Coreg     Acute diastolic congestive heart failure (HCC)   Assessment & Plan    Stable, reports her breathing is near baseline  Completed IV diuresis  Monitor intake and output  Monitor on telemetry  Low-sodium diet  Daily weights  BMP daily  Monitor off diuretics secondary to elevated creatinine and need for cath  Net output 4 6L     Anemia   Assessment & Plan    Likely due to chronic kidney disease  Refuses blood transfusion at this time - pt will consider if Hgb < 7  Iron panel, B12, folate WNL  hemoccult neg in ER  Will check hemolysis labs  Outpt hematology     Stage 3 chronic kidney disease ([facility] [address] )   Assessment & Plan    · With proteinuria, likely diabetic nephropathy  · Appears to be worked up by her endocrinologist stat [facility]  · Baseline appears to be around 1 4  · Avoid nephrotoxins if possible    · Outpatient referral to Nephrology     Hypertension   Assessment & Plan    Improving  Continue amlodipine, Coreg  Restart lisinopril when renal function stable and after cath     DM (diabetes mellitus) [facility])   Assessment & Plan    Lab Results   Component Value Date    HGBA1C 7 5 (H) 12/29/2018       Recent Labs      01/03/19   1623  01/03/19   2119  01/04/19   0623  01/04/19   1558   POCGLU 261*  182*  206*  180*       Blood Sugar Average: Last 72 hrs:  · Patient sees Endocrinology outpatient  Takes repaglinide 0 5 mg p o  B i d  Januvia 50 mg p o  Daily  Patient states she has never taken insulin  She monitors her sugars twice daily  Normal results 160  · While hospitalized, patient will be managed on weight based insulin dosing  Will monitor    · Blood glucose is elevated, add basal bolus insulin  · 1/4 - increase Lantus to 15U qhs as BSs high       VTE Pharmacologic Prophylaxis:   Pharmacologic: Heparin  Mechanical VTE Prophylaxis in Place: Yes    Patient Centered Rounds: I have performed bedside rounds with nursing staff today  Discussions with Specialists or Other Care Team Provider: no    Education and Discussions with Family / Patient: pt and family    Time Spent for Care: 30 minutes  More than 50% of total time spent on counseling and coordination of care as described above  Current Length of Stay: 6 day(s)    Current Patient Status: Inpatient   Certification Statement: The patient will continue to require additional inpatient hospital stay due to need to monitor Cr    Discharge Plan: when Cr stable and ok by cardio    Code Status: Level 1 - Full Code      Subjective:   No acute complaints    Objective:     Vitals:   Temp (24hrs), Av 2 °F (36 8 °C), Min:97 6 °F (36 4 °C), Max:99 °F (37 2 °C)    Temp:  [97 6 °F (36 4 °C)-99 °F (37 2 °C)] 97 6 °F (36 4 °C)  HR:  [58-73] 62  Resp:  [16-18] 18  BP: (129-162)/(57-80) 129/59  SpO2:  [95 %-99 %] 96 %  Body mass index is 29 95 kg/m²  Input and Output Summary (last 24 hours): Intake/Output Summary (Last 24 hours) at 19 1907  Last data filed at 19 0800   Gross per 24 hour   Intake              240 ml   Output             1500 ml   Net            -1260 ml       Physical Exam:     Physical Exam   Constitutional: She is oriented to person, place, and time  No distress  HENT:   Head: Normocephalic and atraumatic  Eyes: Conjunctivae and EOM are normal    Neck: Normal range of motion  Neck supple  Cardiovascular: Normal rate and regular rhythm  Pulmonary/Chest: Effort normal and breath sounds normal  She has no wheezes  She has no rales  Abdominal: Soft  Bowel sounds are normal  She exhibits no distension  There is no tenderness  Musculoskeletal: Normal range of motion  She exhibits edema (pedal)  Neurological: She is alert and oriented to person, place, and time  Skin: Skin is warm and dry  She is not diaphoretic  Additional Data:     Labs:      Results from last 7 days  Lab Units 01/04/19  0512  01/02/19  0516   WBC Thousand/uL 6 50  < > 6 94   HEMOGLOBIN g/dL 7 1*  < > 7 8*   HEMATOCRIT % 23 6*  < > 25 3*   PLATELETS Thousands/uL 160  < > 167   NEUTROS PCT %  --   --  64   LYMPHS PCT %  --   --  25   MONOS PCT %  --   --  7   EOS PCT %  --   --  4   < > = values in this interval not displayed  Results from last 7 days  Lab Units 01/04/19  0512  12/29/18  0936   POTASSIUM mmol/L 4 4  < > 4 2   CHLORIDE mmol/L 105  < > 112*   CO2 mmol/L 24  < > 21   BUN mg/dL 57*  < > 30*   CREATININE mg/dL 1 77*  < > 1 44*   CALCIUM mg/dL 8 5  < > 8 7   ALK PHOS U/L  --   --  636*   ALT U/L  --   --  46   AST U/L  --   --  48*   < > = values in this interval not displayed  * I Have Reviewed All Lab Data Listed Above  * Additional Pertinent Lab Tests Reviewed:  All [facility] Admission Reviewed        Recent Cultures (last 7 days):           Last 24 Hours Medication List:     Current Facility-Administered Medications:  acetaminophen 650 mg Oral Q6H PRN Stu Estevez PA-C    amLODIPine 10 mg Oral Daily Cory Jasmine MD    aspirin 81 mg Oral Daily Cory Jasmine MD    atorvastatin 40 mg Oral Daily With Bhavana Keating PA-C    benzonatate 100 mg Oral TID Manolo Rojas MD    carvedilol 6 25 mg Oral BID With Meals Cory Jasmine MD    [START ON 1/5/2019] clopidogrel 75 mg Oral Daily Doug Campoverde MD    docusate sodium 100 mg Oral BID Irena Anthony PA-C    fluticasone 1 spray Each Nare BID Alverto Pal MD    heparin (porcine) 5,000 Units Subcutaneous Q8H [facility] Irena Anthony PA-C    insulin glargine 15 Units Subcutaneous HS Ramila Brar DO    insulin lispro 1-5 Units Subcutaneous TID AC Irena Anthony PA-C    insulin lispro 1-5 Units Subcutaneous HS Irena Anthony PA-C    insulin lispro 4 Units Subcutaneous TID With Meals Alverto Pal MD    levothyroxine 112 mcg Oral Once per day on Mon Tue Wed Thu Fri Sat Irena Prince PA-C    levothyroxine 56 mcg Oral Once per day on Sun Irena Anthony PA-C    nitroglycerin 0 4 mg Sublingual Q5 Min PRN CLARICE Reid    ondansetron 4 mg Intravenous Q6H PRN Irena Anthony PA-C    sodium chloride 150 mL/hr Intravenous Continuous Doug Campoverde MD Last Rate: 150 mL/hr (01/04/19 1331)        Today, Patient Was Seen By: Ramila Brar DO    ** Please Note: Dictation voice to text software may have been used in the creation of this document   **

## 2019-01-04 NOTE — ASSESSMENT & PLAN NOTE
· Likely due to congestive heart failure  · Echo shows preserved ejection fraction  · Stress test does show a point of reversible ischemia on the mid anterior wall    · 1/4: Cardiac catheterization showed CAD - see below

## 2019-01-04 NOTE — DISCHARGE INSTRUCTIONS
1  Please see the post angioplasty discharge instructions  No heavy lifting, greater than 10 lbs  or strenuous activity for 1 week  Follow angioplasty discharge instructions  2 Remove band aid tomorrow  Shower and wash area- wrist gently with soap and water- beginning tomorrow  Rinse and pat dry  Apply new water seal band aid  Repeat this process for 5 days  No powders, creams lotions or antibiotic ointments  for 5 days  No tub baths, hot tubs or swimming for 5 days  3  Call [facility]  Cardiology Office ([phone]) if you develop a fever, redness or drainage at your wrist access site  4  No driving for 2 days    5  Do not stop aspirin or Plavix (clopiogrel) any reason without a cardiologists consent, or the stent could block up and cause a heart attack  6  Stent card and book  After Coronary Angioplasty and Intravascular Stent Placement   AMBULATORY CARE:   After coronary angioplasty and intravascular stent placement,  you will need to move carefully for 48 hours  Your healthcare provider will give you specific activity instructions to follow while you heal  You will also need to care for your procedure wound to prevent infection and help it heal   Call 911 for any of the following:   · You have any of the following signs of a heart attack:      ¨ Squeezing, pressure, or pain in your chest that lasts longer than 5 minutes or returns    ¨ Discomfort or pain in your back, neck, jaw, stomach, or arm     ¨ Trouble breathing    ¨ Nausea or vomiting    ¨ Lightheadedness or a sudden cold sweat, especially with chest pain or trouble breathing    · You have any of the following signs of a stroke:      ¨ Numbness or drooping on one side of your face     ¨ Weakness in an arm or leg    ¨ Confusion or difficulty speaking    ¨ Dizziness, a severe headache, or vision loss    · You feel lightheaded, short of breath, and have chest pain  · You cough up blood    Seek care immediately if:   · Your arm or leg feels warm, tender, and painful  It may look swollen and red  · Your leg or arm becomes numb, feels cold, or turns white or blue  · Your wound is bleeding and does not stop after you hold pressure on it for 10 minutes  · Your wound is swollen, red, or has pus or foul-smelling fluid coming from it  Contact your cardiologist if:   · You have a fever or chills  · You have questions or concerns about your condition or care  Medicines: You may need any of the following:  · Blood thinners    help prevent blood clots  Examples of blood thinners include heparin and warfarin  Clots can cause strokes, heart attacks, and death  The following are general safety guidelines to follow while you are taking a blood thinner:    ¨ Watch for bleeding and bruising while you take blood thinners  Watch for bleeding from your gums or nose  Watch for blood in your urine and bowel movements  Use a soft washcloth on your skin, and a soft toothbrush to brush your teeth  This can keep your skin and gums from bleeding  If you shave, use an electric shaver  Do not play contact sports  ¨ Tell your dentist and other healthcare providers that you take anticoagulants  Wear a bracelet or necklace that says you take this medicine  ¨ Do not start or stop any medicines unless your healthcare provider tells you to  Many medicines cannot be used with blood thinners  ¨ Tell your healthcare provider right away if you forget to take the medicine, or if you take too much  ¨ Warfarin  is a blood thinner that you may need to take  The following are things you should be aware of if you take warfarin  § Foods and medicines can affect the amount of warfarin in your blood  Do not make major changes to your diet while you take warfarin  Warfarin works best when you eat about the same amount of vitamin K every day  Vitamin K is found in green leafy vegetables and certain other foods   Ask for more information about what to eat when you are taking warfarin  § You will need to see your healthcare provider for follow-up visits when you are on warfarin  You will need regular blood tests  These tests are used to decide how much medicine you need  · Antiplatelets , such as aspirin, help prevent blood clots  Take your antiplatelet medicine exactly as directed  These medicines make it more likely for you to bleed or bruise  If you are told to take aspirin, do not take acetaminophen or ibuprofen instead  · Nitrates , such as nitroglycerin, relax the arteries of your heart so it gets more oxygen  This medicine helps to relieve chest pain  · Cholesterol medicine  helps decrease the amount of cholesterol in your blood  Cholesterol can cause plaque buildup that blocks your arteries  · Blood pressure medicine  lowers your blood pressure  · Take your medicine as directed  Contact your healthcare provider if you think your medicine is not helping or if you have side effects  Tell him or her if you are allergic to any medicine  Keep a list of the medicines, vitamins, and herbs you take  Include the amounts, and when and why you take them  Bring the list or the pill bottles to follow-up visits  Carry your medicine list with you in case of an emergency  Activity:   · You may feel like resting more after your procedure  Slowly start to do more each day  Rest when you feel it is needed  Ask when you can return to your daily activities  · Keep the arm or leg used for the procedure straight as much as possible  Try not to bend at the site of the incision for 24 to 48 hours  · Do not place pressure on your arm, hand, or wrist, if the catheter was placed in your wrist      · Do not lift anything heavy for at least 48 hours after your procedure  · Do not climb stairs for the first 48 hours unless it is necessary  Wound care:   · You may have pain, swelling, or bruising at the wound area   This is normal and should go away in a few days  Ice can help relieve pain, swelling, and bruising  Use an ice pack, or put crushed ice in a plastic bag  Cover the ice pack or plastic bag with a towel  Apply it to the area for 20 minutes every hour, or as directed  · If the wound bleeds, lie down with your arm or leg straight, and apply pressure on the wound for 10 minutes  · Do not get your wound wet until your healthcare provider says it is okay  Carefully wash around the wound with soap and water  · Check the wound for signs of infection, such as redness, swelling, or pus  · Dry around the wound and put on new, clean bandages as directed  Change your bandages when they get wet or dirty  · If you need to cough, apply gentle pressure to the wound area with your hand  Drink liquids as directed:  Drink extra liquids if contrast liquid was used during your procedure  Liquid will help flush the contrast out of your body  Ask your healthcare provider how much liquid to drink each day and which liquids are best for you  Do not smoke:  Nicotine and other chemicals in cigarettes and cigars can cause heart, lung, and blood vessel damage  Ask your healthcare provider for information if you currently smoke and need help to quit  E-cigarettes or smokeless tobacco still contain nicotine  Talk to your healthcare provider before you use these products  Go to cardiac rehabilitation (rehab) as directed:  Cardiac rehab is a program run by specialists who help you safely strengthen your heart and prevent more heart disease  The plan includes exercise, relaxation, stress management, and heart-healthy nutrition  Healthcare providers will also check to make sure any medicines you take are working  The plan may also include instructions for when you can drive, return to work, and do other normal daily activities  Follow up with your cardiologist as directed: If you need an MRI, wait at least 6 to 8 weeks after stent placement, or as directed   Write down your questions so you remember to ask them during your visits  © 2017 [employer] Information is for End User's use only and may not be sold, redistributed or otherwise used for commercial purposes  All illustrations and images included in CareNotes® are the copyrighted property of A [employer] M [employer] Inc  or Eliezer Garcia  The above information is an  only  It is not intended as medical advice for individual conditions or treatments  Talk to your doctor, nurse or pharmacist before following any medical regimen to see if it is safe and effective for you

## 2019-01-05 LAB
ALBUMIN SERPL BCP-MCNC: 2.9 G/DL (ref 3.5–5)
ALP SERPL-CCNC: 531 U/L (ref 46–116)
ALT SERPL W P-5'-P-CCNC: 42 U/L (ref 12–78)
ANION GAP SERPL CALCULATED.3IONS-SCNC: 8 MMOL/L (ref 4–13)
AST SERPL W P-5'-P-CCNC: 39 U/L (ref 5–45)
BILIRUB DIRECT SERPL-MCNC: 0.11 MG/DL (ref 0–0.2)
BILIRUB SERPL-MCNC: 0.2 MG/DL (ref 0.2–1)
BUN SERPL-MCNC: 59 MG/DL (ref 5–25)
CALCIUM SERPL-MCNC: 8.9 MG/DL (ref 8.3–10.1)
CHLORIDE SERPL-SCNC: 109 MMOL/L (ref 100–108)
CO2 SERPL-SCNC: 23 MMOL/L (ref 21–32)
CREAT SERPL-MCNC: 1.92 MG/DL (ref 0.6–1.3)
ERYTHROCYTE [DISTWIDTH] IN BLOOD BY AUTOMATED COUNT: 13.6 % (ref 11.6–15.1)
GFR SERPL CREATININE-BSD FRML MDRD: 27 ML/MIN/1.73SQ M
GLUCOSE SERPL-MCNC: 191 MG/DL (ref 65–140)
GLUCOSE SERPL-MCNC: 260 MG/DL (ref 65–140)
GLUCOSE SERPL-MCNC: 361 MG/DL (ref 65–140)
GLUCOSE SERPL-MCNC: 416 MG/DL (ref 65–140)
GLUCOSE SERPL-MCNC: 43 MG/DL (ref 65–140)
GLUCOSE SERPL-MCNC: 54 MG/DL (ref 65–140)
GLUCOSE SERPL-MCNC: 71 MG/DL (ref 65–140)
HCT VFR BLD AUTO: 23.4 % (ref 34.8–46.1)
HGB BLD-MCNC: 7.1 G/DL (ref 11.5–15.4)
LDH SERPL-CCNC: 281 U/L (ref 81–234)
MCH RBC QN AUTO: 27 PG (ref 26.8–34.3)
MCHC RBC AUTO-ENTMCNC: 30.3 G/DL (ref 31.4–37.4)
MCV RBC AUTO: 89 FL (ref 82–98)
PLATELET # BLD AUTO: 246 THOUSANDS/UL (ref 149–390)
PMV BLD AUTO: 11.2 FL (ref 8.9–12.7)
POTASSIUM SERPL-SCNC: 3.9 MMOL/L (ref 3.5–5.3)
PROT SERPL-MCNC: 6.8 G/DL (ref 6.4–8.2)
RBC # BLD AUTO: 2.63 MILLION/UL (ref 3.81–5.12)
RETICS # AUTO: ABNORMAL 10*3/UL (ref 14097–95744)
RETICS # CALC: 3 % (ref 0.37–1.87)
SODIUM SERPL-SCNC: 140 MMOL/L (ref 136–145)
WBC # BLD AUTO: 9.99 THOUSAND/UL (ref 4.31–10.16)

## 2019-01-05 PROCEDURE — 80048 BASIC METABOLIC PNL TOTAL CA: CPT | Performed by: GENERAL PRACTICE

## 2019-01-05 PROCEDURE — 82948 REAGENT STRIP/BLOOD GLUCOSE: CPT

## 2019-01-05 PROCEDURE — 83615 LACTATE (LD) (LDH) ENZYME: CPT | Performed by: GENERAL PRACTICE

## 2019-01-05 PROCEDURE — 99232 SBSQ HOSP IP/OBS MODERATE 35: CPT | Performed by: INTERNAL MEDICINE

## 2019-01-05 PROCEDURE — 80076 HEPATIC FUNCTION PANEL: CPT | Performed by: GENERAL PRACTICE

## 2019-01-05 PROCEDURE — 85045 AUTOMATED RETICULOCYTE COUNT: CPT | Performed by: GENERAL PRACTICE

## 2019-01-05 PROCEDURE — 83010 ASSAY OF HAPTOGLOBIN QUANT: CPT | Performed by: GENERAL PRACTICE

## 2019-01-05 PROCEDURE — 99232 SBSQ HOSP IP/OBS MODERATE 35: CPT | Performed by: GENERAL PRACTICE

## 2019-01-05 PROCEDURE — 85027 COMPLETE CBC AUTOMATED: CPT | Performed by: GENERAL PRACTICE

## 2019-01-05 RX ORDER — INSULIN GLARGINE 100 [IU]/ML
10 INJECTION, SOLUTION SUBCUTANEOUS
Status: DISCONTINUED | OUTPATIENT
Start: 2019-01-05 | End: 2019-01-06

## 2019-01-05 RX ORDER — ISOSORBIDE MONONITRATE 30 MG/1
30 TABLET, EXTENDED RELEASE ORAL DAILY
Status: DISCONTINUED | OUTPATIENT
Start: 2019-01-05 | End: 2019-01-10 | Stop reason: HOSPADM

## 2019-01-05 RX ADMIN — LEVOTHYROXINE SODIUM 112 MCG: 112 TABLET ORAL at 05:57

## 2019-01-05 RX ADMIN — CLOPIDOGREL BISULFATE 75 MG: 75 TABLET ORAL at 08:56

## 2019-01-05 RX ADMIN — INSULIN LISPRO 4 UNITS: 100 INJECTION, SOLUTION INTRAVENOUS; SUBCUTANEOUS at 11:44

## 2019-01-05 RX ADMIN — HEPARIN SODIUM 5000 UNITS: 5000 INJECTION INTRAVENOUS; SUBCUTANEOUS at 05:57

## 2019-01-05 RX ADMIN — CARVEDILOL 6.25 MG: 6.25 TABLET, FILM COATED ORAL at 08:56

## 2019-01-05 RX ADMIN — INSULIN LISPRO 5 UNITS: 100 INJECTION, SOLUTION INTRAVENOUS; SUBCUTANEOUS at 16:54

## 2019-01-05 RX ADMIN — HEPARIN SODIUM 5000 UNITS: 5000 INJECTION INTRAVENOUS; SUBCUTANEOUS at 13:36

## 2019-01-05 RX ADMIN — INSULIN LISPRO 2 UNITS: 100 INJECTION, SOLUTION INTRAVENOUS; SUBCUTANEOUS at 21:31

## 2019-01-05 RX ADMIN — INSULIN LISPRO 4 UNITS: 100 INJECTION, SOLUTION INTRAVENOUS; SUBCUTANEOUS at 16:55

## 2019-01-05 RX ADMIN — ACETAMINOPHEN 650 MG: 325 TABLET, FILM COATED ORAL at 16:53

## 2019-01-05 RX ADMIN — DOCUSATE SODIUM 100 MG: 100 CAPSULE, LIQUID FILLED ORAL at 08:56

## 2019-01-05 RX ADMIN — ATORVASTATIN CALCIUM 40 MG: 40 TABLET, FILM COATED ORAL at 16:54

## 2019-01-05 RX ADMIN — BENZONATATE 100 MG: 100 CAPSULE ORAL at 16:54

## 2019-01-05 RX ADMIN — BENZONATATE 100 MG: 100 CAPSULE ORAL at 21:31

## 2019-01-05 RX ADMIN — FLUTICASONE PROPIONATE 1 SPRAY: 50 SPRAY, METERED NASAL at 08:56

## 2019-01-05 RX ADMIN — CARVEDILOL 6.25 MG: 6.25 TABLET, FILM COATED ORAL at 16:54

## 2019-01-05 RX ADMIN — ASPIRIN 81 MG 81 MG: 81 TABLET ORAL at 08:56

## 2019-01-05 RX ADMIN — BENZONATATE 100 MG: 100 CAPSULE ORAL at 08:56

## 2019-01-05 RX ADMIN — HEPARIN SODIUM 5000 UNITS: 5000 INJECTION INTRAVENOUS; SUBCUTANEOUS at 21:31

## 2019-01-05 RX ADMIN — AMLODIPINE BESYLATE 10 MG: 10 TABLET ORAL at 08:56

## 2019-01-05 RX ADMIN — ISOSORBIDE MONONITRATE 30 MG: 30 TABLET, EXTENDED RELEASE ORAL at 13:36

## 2019-01-05 NOTE — PROGRESS NOTES
Rounds with MELVI (Dr Monika Villarreal)--- data reviewed, blood glucose still labile, adjustments made

## 2019-01-05 NOTE — PROGRESS NOTES
AM blood sugar 54  Pt asymptomatic  Pt given juice and 15 minutes later her blood sugar was 71 upon recheck  Patient is now set up with breakfast and am insulin has been held

## 2019-01-05 NOTE — PROGRESS NOTES
Cardiology Progress Note - Georgia Myers 58 y o  female MRN: [id number]    Unit/Bed#: [facility] 509-01 Encounter: [id number]      Assessment/Recommendations:  1  Chest Pain: no events overnight, but with positive troponin and abnormal stress test, s/p cardiac cath - s/p 2 LUDY to mLAD and will need staged intervention in 3 weeks for RCA lesion  Will continue medical management in the interim - continued on ASA, statin, B-blocker - will add Imdur as well  2   Acute diastolic CHF: likely with mild systolic component as well  At this time, we are holding diuresis in order for Cr to improve for after cath  3   HTN: elevated this AM, continue current regimen, will tolerate addition of Imdur  4  HLD: continued on statin  5   CAD: continue management medically with ASA, statin, B-blocker  - will add Imdur; staged PCI to RCA in 3 weeks  6   KELLY: follow BMP daily  Subjective:   Patient seen and examined  No significant events overnight   ; pertinent negatives - chest pain, chest pressure/discomfort, irregular heart beat and palpitations  Objective:     Vitals: Blood pressure 149/67, pulse 67, temperature (!) 97 4 °F (36 3 °C), temperature source Oral, resp  rate 15, height 5' 3" (1 6 m), weight 77 1 kg (169 lb 15 6 oz), SpO2 99 %  , Body mass index is 30 11 kg/m² ,   Orthostatic Blood Pressures      Most Recent Value   Blood Pressure  149/67 filed at 01/05/2019 1105   Patient Position - Orthostatic VS  Sitting filed at 01/02/2019 1035            Intake/Output Summary (Last 24 hours) at 01/05/19 1230  Last data filed at 01/05/19 1211   Gross per 24 hour   Intake              860 ml   Output              900 ml   Net              -40 ml       TELE: No significant arrhythmias seen      Physical Exam:    GEN: Georgia Myers appears well, alert and oriented x 3, pleasant and cooperative   HEENT: pupils equal, round, and reactive to light; extraocular muscles intact  NECK: supple, no carotid bruits   HEART: regular rhythm, normal S1 and S2, no murmurs, clicks, gallops or rubs   LUNGS: improved aeration   ABDOMEN: normal bowel sounds, soft, no tenderness, no distention  EXTREMITIES: peripheral pulses normal; no clubbing, cyanosis, mild edema  NEURO: no focal findings   SKIN: normal without suspicious lesions on exposed skin    Medications:      Current Facility-Administered Medications:     acetaminophen (TYLENOL) tablet 650 mg, 650 mg, Oral, Q6H PRN, Darinel Ridley PA-C, 325 mg at 01/04/19 1959    amLODIPine (NORVASC) tablet 10 mg, 10 mg, Oral, Daily, Janee Rolle MD, 10 mg at 01/05/19 0856    aspirin chewable tablet 81 mg, 81 mg, Oral, Daily, Janee Rolle MD, 81 mg at 01/05/19 0856    atorvastatin (LIPITOR) tablet 40 mg, 40 mg, Oral, Daily With Dinner, Irena Anthony PA-C, 40 mg at 01/04/19 1642    benzonatate (TESSALON PERLES) capsule 100 mg, 100 mg, Oral, TID, Lucy Patten MD, 100 mg at 01/05/19 0856    carvedilol (COREG) tablet 6 25 mg, 6 25 mg, Oral, BID With Meals, Janee Rolle MD, 6 25 mg at 01/05/19 0856    clopidogrel (PLAVIX) tablet 75 mg, 75 mg, Oral, Daily, Jahaira Chirinos MD, 75 mg at 01/05/19 0856    docusate sodium (COLACE) capsule 100 mg, 100 mg, Oral, BID, Irena Anthony PA-C, 100 mg at 01/05/19 0856    fluticasone (FLONASE) 50 mcg/act nasal spray 1 spray, 1 spray, Each Nare, BID, Lucy Patten MD, 1 spray at 01/05/19 0856    heparin (porcine) subcutaneous injection 5,000 Units, 5,000 Units, Subcutaneous, Q8H [address], 5,000 Units at 01/05/19 0557 **AND** Platelet count, , , Once, Irena Anthony PA-C    insulin glargine (LANTUS) subcutaneous injection 15 Units 0 15 mL, 15 Units, Subcutaneous, HS, Radha , DO, 15 Units at 01/04/19 2146    insulin lispro (HumaLOG) 100 units/mL subcutaneous injection 1-5 Units, 1-5 Units, Subcutaneous, TID AC, 4 Units at 01/05/19 1144 **AND** Fingerstick Glucose (POCT), , , TID AC, Irena Anthony PA-C    insulin lispro (HumaLOG) 100 units/mL subcutaneous injection 1-5 Units, 1-5 Units, Subcutaneous, HS, Irena Anthony PA-C, 2 Units at 01/04/19 2146    insulin lispro (HumaLOG) 100 units/mL subcutaneous injection 4 Units, 4 Units, Subcutaneous, TID With Meals, Valeria Liu MD, 4 Units at 01/05/19 1144    levothyroxine tablet 112 mcg, 112 mcg, Oral, Once per day on Mon Tue Wed Thu Fri Sat, Oren Landau, PA-C, 112 mcg at 01/05/19 0359    levothyroxine tablet 56 mcg, 56 mcg, Oral, Once per day on Sun, Oren Landau, PA-C, 56 mcg at 12/30/18 6080    nitroglycerin (NITROSTAT) SL tablet 0 4 mg, 0 4 mg, Sublingual, Q5 Min PRN, CLARICE Kay    ondansetron [facility]) injection 4 mg, 4 mg, Intravenous, Q6H PRN, Solange Anthony PA-C, 4 mg at 01/04/19 1349     Labs & Results:      Results from last 7 days  Lab Units 12/29/18  2335 12/29/18  1738 12/29/18  1234   TROPONIN I ng/mL 0 20* 0 21* 0 20*       Results from last 7 days  Lab Units 01/05/19  0446 01/04/19  0512 01/03/19  0529   WBC Thousand/uL 9 99 6 50 6 02   HEMOGLOBIN g/dL 7 1* 7 1* 7 7*   HEMATOCRIT % 23 4* 23 6* 24 9*   PLATELETS Thousands/uL 246 160 169       Results from last 7 days  Lab Units 12/29/18  1738   TRIGLYCERIDES mg/dL 109   HDL mg/dL 65*       Results from last 7 days  Lab Units 01/05/19  0446 01/04/19  0512 01/03/19  0529   POTASSIUM mmol/L 3 9 4 4 4 1   CHLORIDE mmol/L 109* 105 106   CO2 mmol/L 23 24 25   BUN mg/dL 59* 57* 50*   CREATININE mg/dL 1 92* 1 77* 1 64*   CALCIUM mg/dL 8 9 8 5 8 6   ALK PHOS U/L 531*  --   --    ALT U/L 42  --   --    AST U/L 39  --   --            Results from last 7 days  Lab Units 12/30/18  0510   MAGNESIUM mg/dL 1 7       Echo: personally reviewed - EF 50%, HK of mid IS and apical inf walls, G2DD, LAE, mild MR    Nuclear stress test: small amount of ischemia in mid anterior wall      EKG personally reviewed by Ann Nesbitt MD

## 2019-01-05 NOTE — ASSESSMENT & PLAN NOTE
Likely due to chronic kidney disease    Refuses blood transfusion at this time - pt will consider if Hgb < 7  Iron panel, B12, folate WNL  hemoccult neg in ER  Will check hemolysis labs  Outpt hematology

## 2019-01-05 NOTE — ASSESSMENT & PLAN NOTE
· With proteinuria, likely diabetic nephropathy  · Appears to be worked up by her endocrinologist stat [facility]  · Baseline appears to be around 1 4  · Avoid nephrotoxins if possible    · Outpatient referral to Nephrology

## 2019-01-05 NOTE — ASSESSMENT & PLAN NOTE
Lab Results   Component Value Date    HGBA1C 7 5 (H) 12/29/2018       Recent Labs      01/03/19   1623  01/03/19   2119  01/04/19   0623  01/04/19   1558   POCGLU  261*  182*  206*  180*       Blood Sugar Average: Last 72 hrs:  · Patient sees Endocrinology outpatient  Takes repaglinide 0 5 mg p o  B i d  Januvia 50 mg p o  Daily  Patient states she has never taken insulin  She monitors her sugars twice daily  Normal results 160  · While hospitalized, patient will be managed on weight based insulin dosing    Will monitor    · Blood glucose is elevated, add basal bolus insulin  · 1/4 - increase Lantus to 15U qhs as BSs high

## 2019-01-05 NOTE — PROGRESS NOTES
Progress Note - Matthew Jaramillo 1955, 58 y o  female MRN: [id number]    Unit/Bed#: [facility] 509-01 Encounter: [id number]    Primary Care Provider: Marc Saez MD   Date and time admitted to hospital: 12/29/2018  9:02 AM        * Type 2 myocardial infarction [facility])   Assessment & Plan    · Likely due to congestive heart failure  · Echo shows preserved ejection fraction  · Stress test does show a point of reversible ischemia on the mid anterior wall  · 1/4: Cardiac catheterization showed CAD - see below     Coronary artery disease involving native coronary artery of native heart with angina pectoris [facility])   Assessment & Plan    S/p PCI 1/4 - plan for 2nd stage of PCI in 3 weeks  ASA and plavix and statin and Coreg     Acute diastolic congestive heart failure (HCC)   Assessment & Plan    Stable, reports her breathing is near baseline  Completed IV diuresis  Monitor intake and output  Monitor on telemetry  Low-sodium diet  Daily weights  BMP daily  Monitor off diuretics secondary to elevated creatinine and need for cath  Net output 4 3L     Anemia   Assessment & Plan    Likely due to chronic kidney disease  Refuses blood transfusion at this time - pt will consider if Hgb < 7  Iron panel, B12, folate WNL  hemoccult neg in ER  hemolysis labs show high LDH, high retic ct, nl bili, and Haptoglobin pending  Outpt hematology     Stage 3 chronic kidney disease ([address] )   Assessment & Plan    · With proteinuria, likely diabetic nephropathy  · Appears to be worked up by her endocrinologist stat [facility]  · Baseline appears to be around 1 4, but possibly closer to 1 6  · Avoid nephrotoxins if possible  · 1/5: Cr up to 1 9 after cath    Monitor for improvement  · Outpatient referral to Nephrology     Hyperlipidemia   Assessment & Plan    Continue atorvastatin 40 mg  LDL is at goal     Hypertension   Assessment & Plan    Improving  Continue amlodipine, Coreg  Restart lisinopril when renal function stable and after cath DM (diabetes mellitus) [facility])   Assessment & Plan    Lab Results   Component Value Date    HGBA1C 7 5 (H) 2018       Recent Labs      19   0655  19   0722  19   1108  19   1645   POCGLU  54*  71  361*  416*       Blood Sugar Average: Last 72 hrs:  · Patient sees Endocrinology outpatient  Takes repaglinide 0 5 mg p o  B i d  Januvia 50 mg p o  Daily  Patient states she has never taken insulin  She monitors her sugars twice daily  Normal results 160  · While hospitalized, patient will be managed on weight based insulin dosing  Will monitor    · Blood glucose is elevated, add basal bolus insulin  · 1/4 - increase Lantus to 15U qhs as BSs high  · 1/5 - decrease Lantus to 10U qhs as FBS low       VTE Pharmacologic Prophylaxis:   Pharmacologic: Heparin  Mechanical VTE Prophylaxis in Place: Yes    Patient Centered Rounds: I have performed bedside rounds with nursing staff today  Discussions with Specialists or Other Care Team Provider: no    Education and Discussions with Family / Patient: pt and famil    Time Spent for Care: 30 minutes  More than 50% of total time spent on counseling and coordination of care as described above  Current Length of Stay: 7 day(s)    Current Patient Status: Inpatient   Certification Statement: The patient will continue to require additional inpatient hospital stay due to need to monitor Cr    Discharge Plan: when ok by cardio    Code Status: Level 1 - Full Code      Subjective:   No acute complaints    Objective: y    Vitals:   Temp (24hrs), Av 9 °F (36 6 °C), Min:97 4 °F (36 3 °C), Max:98 5 °F (36 9 °C)    Temp:  [97 4 °F (36 3 °C)-98 5 °F (36 9 °C)] 98 5 °F (36 9 °C)  HR:  [60-72] 72  Resp:  [15-18] 18  BP: (138-156)/(65-71) 156/71  SpO2:  [95 %-99 %] 97 %  Body mass index is 30 11 kg/m²  Input and Output Summary (last 24 hours):        Intake/Output Summary (Last 24 hours) at 19  Last data filed at 19 1736   Gross per 24 hour   Intake              480 ml   Output              900 ml   Net             -420 ml       Physical Exam:     Physical Exam   Constitutional: She is oriented to person, place, and time  No distress  HENT:   Head: Normocephalic and atraumatic  Eyes: Conjunctivae and EOM are normal    Neck: Normal range of motion  Neck supple  Cardiovascular: Normal rate and regular rhythm  Pulmonary/Chest: Effort normal and breath sounds normal  She has no wheezes  She has no rales  Abdominal: Soft  Bowel sounds are normal  She exhibits no distension  There is no tenderness  Musculoskeletal: Normal range of motion  She exhibits edema  Neurological: She is alert and oriented to person, place, and time  Skin: Skin is warm and dry  She is not diaphoretic  Additional Data:     Labs:      Results from last 7 days  Lab Units 01/05/19  0446  01/02/19  0516   WBC Thousand/uL 9 99  < > 6 94   HEMOGLOBIN g/dL 7 1*  < > 7 8*   HEMATOCRIT % 23 4*  < > 25 3*   PLATELETS Thousands/uL 246  < > 167   NEUTROS PCT %  --   --  64   LYMPHS PCT %  --   --  25   MONOS PCT %  --   --  7   EOS PCT %  --   --  4   < > = values in this interval not displayed  Results from last 7 days  Lab Units 01/05/19  0446   POTASSIUM mmol/L 3 9   CHLORIDE mmol/L 109*   CO2 mmol/L 23   BUN mg/dL 59*   CREATININE mg/dL 1 92*   CALCIUM mg/dL 8 9   ALK PHOS U/L 531*   ALT U/L 42   AST U/L 39           * I Have Reviewed All Lab Data Listed Above  * Additional Pertinent Lab Tests Reviewed:  All [facility] Admission Reviewed        Recent Cultures (last 7 days):           Last 24 Hours Medication List:     Current Facility-Administered Medications:  acetaminophen 650 mg Oral Q6H PRN Burgess John PA-C   amLODIPine 10 mg Oral Daily Gerhardt Sabin, MD   aspirin 81 mg Oral Daily Gerhardt Sabin, MD   atorvastatin 40 mg Oral Daily With Yo Anthony PA-C   benzonatate 100 mg Oral TID Ammy Zepeda MD   carvedilol 6 25 mg Oral BID With Meals Saba Solitario MD   clopidogrel 75 mg Oral Daily Beka Amado MD   docusate sodium 100 mg Oral BID Irena Anthony PA-C   fluticasone 1 spray Each Nare BID Ashley Rogel MD   heparin (porcine) 5,000 Units Subcutaneous Q8H [address] ZAYRA Anthony PA-C   insulin glargine 10 Units Subcutaneous HS Randy Kelly DO   insulin lispro 1-5 Units Subcutaneous TID AC Irena Anthony PA-C   insulin lispro 1-5 Units Subcutaneous HS Irena Anthony PA-C   insulin lispro 4 Units Subcutaneous TID With Meals Ashley Rogel MD   isosorbide mononitrate 30 mg Oral Daily Rodriguez Li MD   levothyroxine 112 mcg Oral Once per day on Mon Tue Wed Thu Fri Sat Irena Can PA-C   levothyroxine 56 mcg Oral Once per day on Sun Irena Anthony PA-C   nitroglycerin 0 4 mg Sublingual Q5 Min PRN CLARICE Marks   ondansetron 4 mg Intravenous Q6H PRN Gunjan Rivas PA-C        Today, Patient Was Seen By: Randy Kelly DO    ** Please Note: Dictation voice to text software may have been used in the creation of this document   **

## 2019-01-06 PROBLEM — N17.9 AKI (ACUTE KIDNEY INJURY) (HCC): Status: ACTIVE | Noted: 2019-01-06

## 2019-01-06 LAB
ANION GAP SERPL CALCULATED.3IONS-SCNC: 8 MMOL/L (ref 4–13)
BACTERIA UR QL AUTO: ABNORMAL /HPF
BILIRUB UR QL STRIP: NEGATIVE
BUN SERPL-MCNC: 71 MG/DL (ref 5–25)
CALCIUM SERPL-MCNC: 8.5 MG/DL (ref 8.3–10.1)
CHLORIDE SERPL-SCNC: 106 MMOL/L (ref 100–108)
CLARITY UR: ABNORMAL
CO2 SERPL-SCNC: 21 MMOL/L (ref 21–32)
COLOR UR: YELLOW
CREAT SERPL-MCNC: 2.48 MG/DL (ref 0.6–1.3)
ERYTHROCYTE [DISTWIDTH] IN BLOOD BY AUTOMATED COUNT: 13.9 % (ref 11.6–15.1)
GFR SERPL CREATININE-BSD FRML MDRD: 20 ML/MIN/1.73SQ M
GLUCOSE SERPL-MCNC: 135 MG/DL (ref 65–140)
GLUCOSE SERPL-MCNC: 147 MG/DL (ref 65–140)
GLUCOSE SERPL-MCNC: 192 MG/DL (ref 65–140)
GLUCOSE SERPL-MCNC: 214 MG/DL (ref 65–140)
GLUCOSE SERPL-MCNC: 218 MG/DL (ref 65–140)
GLUCOSE UR STRIP-MCNC: ABNORMAL MG/DL
HAPTOGLOB SERPL-MCNC: 134 MG/DL (ref 34–200)
HCT VFR BLD AUTO: 22.9 % (ref 34.8–46.1)
HGB BLD-MCNC: 6.9 G/DL (ref 11.5–15.4)
HGB UR QL STRIP.AUTO: NEGATIVE
KETONES UR STRIP-MCNC: NEGATIVE MG/DL
LEUKOCYTE ESTERASE UR QL STRIP: NEGATIVE
MAGNESIUM SERPL-MCNC: 2.2 MG/DL (ref 1.6–2.6)
MCH RBC QN AUTO: 27 PG (ref 26.8–34.3)
MCHC RBC AUTO-ENTMCNC: 30.1 G/DL (ref 31.4–37.4)
MCV RBC AUTO: 90 FL (ref 82–98)
NITRITE UR QL STRIP: NEGATIVE
NON-SQ EPI CELLS URNS QL MICRO: ABNORMAL /HPF
PH UR STRIP.AUTO: 5 [PH] (ref 4.5–8)
PLATELET # BLD AUTO: 184 THOUSANDS/UL (ref 149–390)
PMV BLD AUTO: 11.8 FL (ref 8.9–12.7)
POTASSIUM SERPL-SCNC: 4.4 MMOL/L (ref 3.5–5.3)
PROT UR STRIP-MCNC: ABNORMAL MG/DL
RBC # BLD AUTO: 2.56 MILLION/UL (ref 3.81–5.12)
RBC #/AREA URNS AUTO: ABNORMAL /HPF
SODIUM 24H UR-SCNC: 14 MOL/L
SODIUM SERPL-SCNC: 135 MMOL/L (ref 136–145)
SP GR UR STRIP.AUTO: 1.02 (ref 1–1.03)
UROBILINOGEN UR QL STRIP.AUTO: 0.2 E.U./DL
WBC # BLD AUTO: 7.98 THOUSAND/UL (ref 4.31–10.16)
WBC #/AREA URNS AUTO: ABNORMAL /HPF

## 2019-01-06 PROCEDURE — 83735 ASSAY OF MAGNESIUM: CPT | Performed by: GENERAL PRACTICE

## 2019-01-06 PROCEDURE — 81001 URINALYSIS AUTO W/SCOPE: CPT | Performed by: INTERNAL MEDICINE

## 2019-01-06 PROCEDURE — 80048 BASIC METABOLIC PNL TOTAL CA: CPT | Performed by: GENERAL PRACTICE

## 2019-01-06 PROCEDURE — 99232 SBSQ HOSP IP/OBS MODERATE 35: CPT | Performed by: INTERNAL MEDICINE

## 2019-01-06 PROCEDURE — 85027 COMPLETE CBC AUTOMATED: CPT | Performed by: GENERAL PRACTICE

## 2019-01-06 PROCEDURE — 84300 ASSAY OF URINE SODIUM: CPT | Performed by: INTERNAL MEDICINE

## 2019-01-06 PROCEDURE — 99254 IP/OBS CNSLTJ NEW/EST MOD 60: CPT | Performed by: INTERNAL MEDICINE

## 2019-01-06 PROCEDURE — 99232 SBSQ HOSP IP/OBS MODERATE 35: CPT | Performed by: HOSPITALIST

## 2019-01-06 PROCEDURE — 82948 REAGENT STRIP/BLOOD GLUCOSE: CPT

## 2019-01-06 RX ORDER — AMLODIPINE BESYLATE 10 MG/1
10 TABLET ORAL DAILY
Status: DISCONTINUED | OUTPATIENT
Start: 2019-01-07 | End: 2019-01-10 | Stop reason: HOSPADM

## 2019-01-06 RX ADMIN — INSULIN LISPRO 4 UNITS: 100 INJECTION, SOLUTION INTRAVENOUS; SUBCUTANEOUS at 08:48

## 2019-01-06 RX ADMIN — IRON SUCROSE 300 MG: 20 INJECTION, SOLUTION INTRAVENOUS at 15:33

## 2019-01-06 RX ADMIN — INSULIN LISPRO 1 UNITS: 100 INJECTION, SOLUTION INTRAVENOUS; SUBCUTANEOUS at 21:32

## 2019-01-06 RX ADMIN — CARVEDILOL 6.25 MG: 6.25 TABLET, FILM COATED ORAL at 17:05

## 2019-01-06 RX ADMIN — HEPARIN SODIUM 5000 UNITS: 5000 INJECTION INTRAVENOUS; SUBCUTANEOUS at 05:27

## 2019-01-06 RX ADMIN — INSULIN LISPRO 4 UNITS: 100 INJECTION, SOLUTION INTRAVENOUS; SUBCUTANEOUS at 17:05

## 2019-01-06 RX ADMIN — AMLODIPINE BESYLATE 10 MG: 10 TABLET ORAL at 08:45

## 2019-01-06 RX ADMIN — CARVEDILOL 6.25 MG: 6.25 TABLET, FILM COATED ORAL at 08:46

## 2019-01-06 RX ADMIN — INSULIN LISPRO 1 UNITS: 100 INJECTION, SOLUTION INTRAVENOUS; SUBCUTANEOUS at 11:39

## 2019-01-06 RX ADMIN — INSULIN LISPRO 2 UNITS: 100 INJECTION, SOLUTION INTRAVENOUS; SUBCUTANEOUS at 17:04

## 2019-01-06 RX ADMIN — ATORVASTATIN CALCIUM 40 MG: 40 TABLET, FILM COATED ORAL at 17:05

## 2019-01-06 RX ADMIN — ISOSORBIDE MONONITRATE 30 MG: 30 TABLET, EXTENDED RELEASE ORAL at 08:45

## 2019-01-06 RX ADMIN — ASPIRIN 81 MG 81 MG: 81 TABLET ORAL at 08:46

## 2019-01-06 RX ADMIN — LEVOTHYROXINE SODIUM 56 MCG: 112 TABLET ORAL at 05:28

## 2019-01-06 RX ADMIN — CLOPIDOGREL BISULFATE 75 MG: 75 TABLET ORAL at 08:44

## 2019-01-06 RX ADMIN — INSULIN LISPRO 4 UNITS: 100 INJECTION, SOLUTION INTRAVENOUS; SUBCUTANEOUS at 11:40

## 2019-01-06 NOTE — ASSESSMENT & PLAN NOTE
· With proteinuria, likely diabetic nephropathy  · Appears to be worked up by her endocrinologist stat [facility]  · Baseline appears to be around 1 4, but possibly closer to 1 6  · Avoid nephrotoxins if possible  · 1/5: Cr up to 1 9 after cath    Monitor for improvement  · Outpatient referral to Nephrology

## 2019-01-06 NOTE — ASSESSMENT & PLAN NOTE
Likely due to chronic kidney disease    Iron panel, B12, folate WNL  hemoccult neg in ER  hemolysis labs show high LDH, high retic ct, nl bili, and Haptoglobin negative  Serum protein electrophoresis has some abnormalities  Hb dropped further to 6 9; explained recommendation for transfusion abdias with KELLY btu pt refusing  Will need aspirin, plavix, hold sc heparin  Outpt hematology

## 2019-01-06 NOTE — PROGRESS NOTES
RN notified Dr Amanda Albright of pt's Hbg 6 9  Vital signs stable, pt is asymptomatic  No new orders at this time

## 2019-01-06 NOTE — ASSESSMENT & PLAN NOTE
· With proteinuria, likely diabetic nephropathy  · Appears to be worked up by her endocrinologist stat [facility]  · Baseline appears to be around 1 4, but possibly closer to 1 6  · Avoid nephrotoxins if possible  · 1/5: Cr up to 1 9 after cath    Monitor for improvement  · Outpatient referral to Nephrology

## 2019-01-06 NOTE — PROGRESS NOTES
Progress Note - Caitlin Jalloh 1955, 58 y o  female MRN: [id number]    Unit/Bed#: [facility] 509-01 Encounter: [id number]    Primary Care Provider: Stacie Valle MD   Date and time admitted to hospital: 12/29/2018  9:02 AM        KELLY (acute kidney injury) [facility])   Assessment & Plan    wrosening cr post cath, was 1 7 -> 1 9 -> 2 4  Consulted nephro - recommended rechecking Cr in am  Patient initially was very upset, she just states that she wants some med to go home that her body is improved by itself, wanted no on any ways of managing this as outpatient, however recommended that the acute ongoing rise in her creatinine is concerning, she needs daily blood work as well as some may need management with IV fluids/IV diuretics as needed  She has worsening creatinine that can cause worsening urea and electrolytes which can be life threatening for her  Discuss this with patient, daughter and  at bedside; patient continued to state that she is frustrated about being here and just wants to go home  Nephrology consulted, patient was further recommended to discuss with Nephrology     Coronary artery disease involving native coronary artery of native heart with angina pectoris [facility])   Assessment & Plan    S/p PCI with LUDY to mLAD on 1/4 - plan for staged PCI to RCA in 3 weeks  ASA and plavix and statin and Coreg, imdur     Anemia   Assessment & Plan    Likely due to chronic kidney disease    Iron panel, B12, folate WNL  hemoccult neg in ER  hemolysis labs show high LDH, high retic ct, nl bili, and Haptoglobin negative  Serum protein electrophoresis has some abnormalities  Hb dropped further to 6 9; explained recommendation for transfusion abdias with KELLY btu pt refusing  Will need aspirin, plavix, hold sc heparin  Outpt hematology     Stage 3 chronic kidney disease ([facility] [address] )   Assessment & Plan    · With proteinuria, likely diabetic nephropathy  · Appears to be worked up by her endocrinologist stat [facility]  · Baseline appears to be around 1 4, but possibly closer to 1 6  · Avoid nephrotoxins if possible  · 1/5: Cr up to 1 9 after cath  Monitor for improvement  · Outpatient referral to Nephrology     Hypertension   Assessment & Plan    Improving  Continue amlodipine, Coreg, imdur added  Restart lisinopril when renal function stable     DM (diabetes mellitus) ([facility] [address] )   Assessment & Plan    Lab Results   Component Value Date    HGBA1C 7 5 (H) 12/29/2018       Recent Labs      01/05/19   2237  01/06/19   0639  01/06/19   1133  01/06/19   1617   POCGLU  191*  147*  192*  214*       Blood Sugar Average: Last 72 hrs:  · Patient sees Endocrinology outpatient  Takes repaglinide 0 5 mg p o  B i d  Januvia 50 mg p o  Daily  Patient states she has never taken insulin  She monitors her sugars twice daily  Normal results 160  · While hospitalized, patient will be managed on weight based insulin dosing  Will monitor    · Blood glucose is elevated, add basal bolus insulin  · 1/4 - increase Lantus to 15U qhs as BSs high  · 1/5 - decrease Lantus to 10U qhs as FBS low  · 1/6: PT upset about drop in BS with increase in lantus, refused lantus last night, DC lantus, cont humalog 4 U TIDAC     Acute diastolic congestive heart failure (HCC)   Assessment & Plan    Stable, reports her breathing is at baseline  Completed IV diuresis  Now Monitor off diuretics secondary to elevated creatinine     * Type 2 myocardial infarction [facility])   Assessment & Plan    · Likely due to congestive heart failure  · Echo shows preserved ejection fraction  · Stress test does show a point of reversible ischemia on the mid anterior wall    · 1/4: Cardiac catheterization showed CAD - see below       [facility] Progress Note  Patient: Brent Rodriguez 58 y o  female   MRN: [id number]  PCP: Hadley Mcbride MD  Unit/Bed#: [facility] 963-38 Encounter: [id number]  Date Of Visit: 01/06/19    Assessment:    Principal Problem:    Type 2 myocardial infarction [facility])  Active Problems:    Acute diastolic congestive heart failure (HCC)    DM (diabetes mellitus) ([email or web address] )    Hypertension    Hyperlipidemia    Stage 3 chronic kidney disease (HCC)    Anemia    Coronary artery disease involving native coronary artery of native heart with angina pectoris (HCC)    KELLY (acute kidney injury) ([email or web address] 75 )      VTE Pharmacologic Prophylaxis:   Pharmacologic: Pharmacologic VTE Prophylaxis contraindicated due to hold for anemia  Mechanical VTE Prophylaxis in Place: Yes    Patient Centered Rounds: I have performed bedside rounds with nursing staff today  Discussions with Specialists or Other Care Team Provider: nephro    Education and Discussions with Family / Patient:  Patient, daughter,     Time Spent for Care: 30 minutes  More than 50% of total time spent on counseling and coordination of care as described above  Current Length of Stay: 8 day(s)    Current Patient Status: Inpatient   Certification Statement: The patient will continue to require additional inpatient hospital stay due to Monitor hemoglobin creatinine    Discharge Plan / Estimated Discharge Date:  Next 1-2 days based on current    Code Status: Level 1 - Full Code      Subjective:   She feels very good and wants to go home and is frustrated about being here    Objective:     Vitals:   Temp (24hrs), Av 1 °F (36 7 °C), Min:97 7 °F (36 5 °C), Max:98 9 °F (37 2 °C)    Temp:  [97 7 °F (36 5 °C)-98 9 °F (37 2 °C)] 97 7 °F (36 5 °C)  HR:  [66-71] 66  Resp:  [20] 20  BP: (115-148)/(55-68) 146/67  SpO2:  [95 %-98 %] 97 %  Body mass index is 30 42 kg/m²  Input and Output Summary (last 24 hours): Intake/Output Summary (Last 24 hours) at 19 1732  Last data filed at 19 1533   Gross per 24 hour   Intake              240 ml   Output              825 ml   Net             -585 ml       Physical Exam:     Physical Exam   Constitutional: She is oriented to person, place, and time   She appears well-developed and well-nourished  HENT:   Head: Normocephalic and atraumatic  Mouth/Throat: Oropharynx is clear and moist    Eyes: Conjunctivae are normal  No scleral icterus  Cardiovascular: Normal rate, regular rhythm and normal heart sounds  Exam reveals no gallop  No murmur heard  Pulmonary/Chest: Effort normal  No respiratory distress  She has no wheezes  Abdominal: Soft  She exhibits no distension  There is no tenderness  Musculoskeletal: She exhibits no edema  Neurological: She is alert and oriented to person, place, and time  Vitals reviewed  Additional Data:     Labs:      Results from last 7 days  Lab Units 01/06/19  0435  01/02/19  0516   WBC Thousand/uL 7 98  < > 6 94   HEMOGLOBIN g/dL 6 9*  < > 7 8*   HEMATOCRIT % 22 9*  < > 25 3*   PLATELETS Thousands/uL 184  < > 167   NEUTROS PCT %  --   --  64   LYMPHS PCT %  --   --  25   MONOS PCT %  --   --  7   EOS PCT %  --   --  4   < > = values in this interval not displayed  Results from last 7 days  Lab Units 01/06/19  0435 01/05/19  0446   POTASSIUM mmol/L 4 4 3 9   CHLORIDE mmol/L 106 109*   CO2 mmol/L 21 23   BUN mg/dL 71* 59*   CREATININE mg/dL 2 48* 1 92*   CALCIUM mg/dL 8 5 8 9   ALK PHOS U/L  --  531*   ALT U/L  --  42   AST U/L  --  39           * I Have Reviewed All Lab Data Listed Above  * Additional Pertinent Lab Tests Reviewed:  All Labs Within Last 24 Hours Reviewed    Imaging:    Imaging Reports Reviewed Today Include:   Imaging Personally Reviewed by Myself Includes:      Recent Cultures (last 7 days):           Last 24 Hours Medication List:     Current Facility-Administered Medications:  acetaminophen 650 mg Oral Q6H PRN Tien Kerr PA-C   [START ON 1/7/2019] amLODIPine 10 mg Oral Daily Mu Brar MD   aspirin 81 mg Oral Daily Erasmo Keen MD   atorvastatin 40 mg Oral Daily With [employer] A PEDRO Anthony   benzonatate 100 mg Oral TID Leroy Dumont MD   carvedilol 6 25 mg Oral BID With Meals Janee Rolle MD   clopidogrel 75 mg Oral Daily Jahaira Chirinos MD   docusate sodium 100 mg Oral BID Irena Anthony PA-C   fluticasone 1 spray Each Nare BID Lucy Patten MD   insulin lispro 1-5 Units Subcutaneous TID AC Irena Anthony PA-C   insulin lispro 1-5 Units Subcutaneous HS Irena Anthony PA-C   insulin lispro 4 Units Subcutaneous TID With Meals Lucy Patten MD   isosorbide mononitrate 30 mg Oral Daily Gigi Moreira MD   levothyroxine 112 mcg Oral Once per day on Mon Tue Wed Thu Fri Sat Irena Bajwa PA-C   levothyroxine 56 mcg Oral Once per day on Sun Irena Anthony PA-C   nitroglycerin 0 4 mg Sublingual Q5 Min PRN CLARICE Dooley   ondansetron 4 mg Intravenous Q6H PRN Paty Mosley PA-C        Today, Patient Was Seen By: Alfredo Gonzalez MD    ** Please Note: This note has been constructed using a voice recognition system   **

## 2019-01-06 NOTE — ASSESSMENT & PLAN NOTE
Likely due to chronic kidney disease    Refuses blood transfusion at this time - pt will consider if Hgb < 7  Iron panel, B12, folate WNL  hemoccult neg in ER  hemolysis labs show high LDH, high retic ct, nl bili, and Haptoglobin pending  Outpt hematology

## 2019-01-06 NOTE — ASSESSMENT & PLAN NOTE
wrosening cr post cath, was 1 7 -> 1 9 -> 2 4  Consulted nephro - recommended rechecking Cr in am  Patient initially was very upset, she just states that she wants some med to go home that her body is improved by itself, wanted no on any ways of managing this as outpatient, however recommended that the acute ongoing rise in her creatinine is concerning, she needs daily blood work as well as some may need management with IV fluids/IV diuretics as needed  She has worsening creatinine that can cause worsening urea and electrolytes which can be life threatening for her  Discuss this with patient, daughter and  at bedside; patient continued to state that she is frustrated about being here and just wants to go home    Nephrology consulted, patient was further recommended to discuss with Nephrology

## 2019-01-06 NOTE — ASSESSMENT & PLAN NOTE
S/p PCI with LUDY to mLAD on 1/4 - plan for staged PCI to RCA in 3 weeks  ASA and plavix and statin and Coreg, imdur

## 2019-01-06 NOTE — ASSESSMENT & PLAN NOTE
Lab Results   Component Value Date    HGBA1C 7 5 (H) 12/29/2018       Recent Labs      01/05/19   2237  01/06/19   0639  01/06/19   1133  01/06/19   1617   POCGLU  191*  147*  192*  214*       Blood Sugar Average: Last 72 hrs:  · Patient sees Endocrinology outpatient  Takes repaglinide 0 5 mg p o  B i d  Januvia 50 mg p o  Daily  Patient states she has never taken insulin  She monitors her sugars twice daily  Normal results 160  · While hospitalized, patient will be managed on weight based insulin dosing    Will monitor    · Blood glucose is elevated, add basal bolus insulin  · 1/4 - increase Lantus to 15U qhs as BSs high  · 1/5 - decrease Lantus to 10U qhs as FBS low  · 1/6: PT upset about drop in BS with increase in lantus, refused lantus last night, DC lantus, cont humalog 4 U TIDAC

## 2019-01-06 NOTE — CONSULTS
Consultation - Nephrology   Guillermo Terry 58 y o  female MRN: [id number]  Unit/Bed#: [facility] 509-01 Encounter: [id number]      Assessment/Plan:  1  Acute kidney injury, suspecting possible component of contrast induced nephropathy, repeat urinalysis, check urine sodium  2  Chronic kidney disease, previous baseline creatinine approximately 1 5, likely secondary to diabetic nephropathy given proteinuria  3  Anemia of chronic disease, serum electrophoresis negative for monoclonal gammopathy, check UPEP, suspect some degree of iron deficiency, ferritin less than 100  4  Diastolic heart failure, volume status appears fairly stable  5  Coronary artery disease, status post stent placement will need staged intervention for the RCA    Plan:  · Discussed with patient at length, would continue to monitor renal function closely, again suspecting contrast induced nephropathy  · Continue hold diuretic therapy for now  · No ACE-inhibitor given patient's acute kidney injury  · Check urinalysis, urine sodium  · Urine electrophoresis  · Renal ultrasound for size and echogenicity acute kidney injury    History of Present Illness   Physician Requesting Consult: Gamaliel Otoole DO  Reason for Consult / Principal Problem:  Acute kidney injury  HPI: Guillermo Terry is a 58y o  year old female who presents with dyspnea  Patient is a 78-year-old female with a past medical history of likely chronic kidney disease, baseline creatinine 1 5, diabetes, hypertension who presents with chest pain and shortness of breath  Shortness of breath apparently happened only after walking a very short distance  Chest pain was described as substernal without radiation  However it will worsened with lying down  1 9, did improved approximately 1 6-1 7  At that time underwent cardiac catheterization however creatinine has continued to rise now up to 2 4  Currently patient is feeling well, she wants to go home    Denies any further chest pain or shortness of Breath  She does have lower extremity swelling  Denies any abdominal pain  Initial creatinine on presentation was noted be 144, ACE-inhibitor was on placed on hold, patient was given IV diuretics with improvement in shortness of breath  Renal function did unfortunately worsened with a creatinine of        History obtained from chart review and the patient    Review of Systems    Review of Systems: pertinent findings as noted above otherwise negative    Historical Information   Patient Active Problem List   Diagnosis    Acute diastolic congestive heart failure ([place] [address] )    DM (diabetes mellitus) ([place] [address] )    Hypothyroidism    Hypertension    Type 2 myocardial infarction ([place] [address] )    Hyperlipidemia    Stage 3 chronic kidney disease ([place] [address] )    Anemia    Coronary artery disease involving native coronary artery of native heart with angina pectoris [facility])     Past Medical History:   Diagnosis Date    Diabetes mellitus ([place] [address] )     Hypothyroidism      History reviewed  No pertinent surgical history    Social History   History   Alcohol Use No     History   Drug Use No     History   Smoking Status    Former Smoker   Smokeless Tobacco    Never Used     Family History   Problem Relation Age of Onset    Diabetes Mother     Heart attack Father         MI    Hypertension Brother        Meds/Allergies   current meds:   Current Facility-Administered Medications   Medication Dose Route Frequency    acetaminophen (TYLENOL) tablet 650 mg  650 mg Oral Q6H PRN    amLODIPine (NORVASC) tablet 10 mg  10 mg Oral Daily    aspirin chewable tablet 81 mg  81 mg Oral Daily    atorvastatin (LIPITOR) tablet 40 mg  40 mg Oral Daily With Dinner    benzonatate (TESSALON PERLES) capsule 100 mg  100 mg Oral TID    carvedilol (COREG) tablet 6 25 mg  6 25 mg Oral BID With Meals    clopidogrel (PLAVIX) tablet 75 mg  75 mg Oral Daily    docusate sodium (COLACE) capsule 100 mg  100 mg Oral BID    fluticasone (FLONASE) 50 mcg/act nasal spray 1 spray  1 spray Each Nare BID    heparin (porcine) subcutaneous injection 5,000 Units  5,000 Units Subcutaneous Q8H [address]    insulin glargine (LANTUS) subcutaneous injection 10 Units 0 1 mL  10 Units Subcutaneous HS    insulin lispro (HumaLOG) 100 units/mL subcutaneous injection 1-5 Units  1-5 Units Subcutaneous TID AC    insulin lispro (HumaLOG) 100 units/mL subcutaneous injection 1-5 Units  1-5 Units Subcutaneous HS    insulin lispro (HumaLOG) 100 units/mL subcutaneous injection 4 Units  4 Units Subcutaneous TID With Meals    isosorbide mononitrate (IMDUR) 24 hr tablet 30 mg  30 mg Oral Daily    levothyroxine tablet 112 mcg  112 mcg Oral Once per day on Mon Tue Wed Thu Fri Sat    levothyroxine tablet 56 mcg  56 mcg Oral Once per day on Sun    nitroglycerin (NITROSTAT) SL tablet 0 4 mg  0 4 mg Sublingual Q5 Min PRN    ondansetron (ZOFRAN) injection 4 mg  4 mg Intravenous Q6H PRN       No Known Allergies      Objective   /55 (BP Location: Right arm)   Pulse 66   Temp 97 9 °F (36 6 °C) (Oral)   Resp 20   Ht 5' 3" (1 6 m)   Wt 77 9 kg (171 lb 11 8 oz)   SpO2 98%   BMI 30 42 kg/m²     Intake/Output Summary (Last 24 hours) at 01/06/19 1346  Last data filed at 01/06/19 0552   Gross per 24 hour   Intake              240 ml   Output              500 ml   Net             -260 ml       Current Weight: Weight - Scale: 77 9 kg (171 lb 11 8 oz)    Physical Exam   Constitutional: No distress  HENT:   Head: Normocephalic  Neck: Neck supple  Cardiovascular: Normal rate and regular rhythm  Pulmonary/Chest: Effort normal  She has rales in the left upper field  Abdominal: Soft  She exhibits no distension  Musculoskeletal: She exhibits edema (2+ edema)  Neurological: She is alert  Skin: Skin is warm and dry  Psychiatric: She has a normal mood and affect           Lab Results:      Results from last 7 days  Lab Units 01/06/19  0435   WBC Thousand/uL 7 98   HEMOGLOBIN g/dL 6 9*   HEMATOCRIT % 22 9*   PLATELETS Thousands/uL 184       Results from last 7 days  Lab Units 01/06/19  0435   POTASSIUM mmol/L 4 4   CHLORIDE mmol/L 106   CO2 mmol/L 21   BUN mg/dL 71*   CREATININE mg/dL 2 48*   CALCIUM mg/dL 8 5

## 2019-01-06 NOTE — PROGRESS NOTES
Cardiology Progress Note - Genesis Haddad 58 y o  female MRN: [id number]    Unit/Bed#: [facility] 509-01 Encounter: [id number]      Assessment/Recommendations:  1  Chest Pain: no events overnight, but with positive troponin and abnormal stress test, s/p cardiac cath - s/p 2 LUDY to mLAD and will need staged intervention in 3 weeks for RCA lesion  Will continue medical management in the interim - continued on ASA, statin, B-blocker - added Imdur as well and tolerating  2   Acute diastolic CHF: likely with mild systolic component as well  At this time, we are holding diuresis in order for Cr to improve for after cath - which has worsened - recommend Nephrology consult  3   HTN: improved this AM, continue current regimen, tolerating addition of Imdur  4  HLD: continued on statin  5   CAD: continue management medically with ASA, statin, B-blocker  - added Imdur; staged PCI to RCA in 3 weeks  6   KELLY: follow BMP daily  7   Acute anemia: with low Hgb - work-up per primary team     Subjective:   Patient seen and examined  No significant events overnight   ; pertinent negatives - chest pain, chest pressure/discomfort, irregular heart beat and palpitations  Objective:     Vitals: Blood pressure 115/55, pulse 66, temperature 97 9 °F (36 6 °C), temperature source Oral, resp  rate 20, height 5' 3" (1 6 m), weight 77 9 kg (171 lb 11 8 oz), SpO2 98 %  , Body mass index is 30 42 kg/m² ,   Orthostatic Blood Pressures      Most Recent Value   Blood Pressure  115/55 filed at 01/06/2019 1109   Patient Position - Orthostatic VS  Sitting filed at 01/06/2019 1109            Intake/Output Summary (Last 24 hours) at 01/06/19 1136  Last data filed at 01/06/19 0552   Gross per 24 hour   Intake              240 ml   Output             1100 ml   Net             -860 ml       TELE: No significant arrhythmias seen      Physical Exam:    GEN: Genesis Haddad appears well, alert and oriented x 3, pleasant and cooperative   HEENT: pupils equal, round, and reactive to light; extraocular muscles intact  NECK: supple, no carotid bruits   HEART: regular rhythm, normal S1 and S2, no murmurs, clicks, gallops or rubs   LUNGS: improved aeration   ABDOMEN: normal bowel sounds, soft, no tenderness, no distention  EXTREMITIES: peripheral pulses normal; no clubbing, cyanosis, mild edema  NEURO: no focal findings   SKIN: normal without suspicious lesions on exposed skin    Medications:      Current Facility-Administered Medications:     acetaminophen (TYLENOL) tablet 650 mg, 650 mg, Oral, Q6H PRN, Danielle Muller PA-C, 650 mg at 01/05/19 1653    amLODIPine (NORVASC) tablet 10 mg, 10 mg, Oral, Daily, Alex Valle MD, 10 mg at 01/06/19 0845    aspirin chewable tablet 81 mg, 81 mg, Oral, Daily, Alex Valle MD, 81 mg at 01/06/19 0846    atorvastatin (LIPITOR) tablet 40 mg, 40 mg, Oral, Daily With Dinner, Zen Anthony PA-C, 40 mg at 01/05/19 1654    benzonatate (TESSALON PERLES) capsule 100 mg, 100 mg, Oral, TID, Nancy Wu MD, 100 mg at 01/05/19 2131    carvedilol (COREG) tablet 6 25 mg, 6 25 mg, Oral, BID With Meals, Alex Valle MD, 6 25 mg at 01/06/19 0846    clopidogrel (PLAVIX) tablet 75 mg, 75 mg, Oral, Daily, Lisa Mejia MD, 75 mg at 01/06/19 0844    docusate sodium (COLACE) capsule 100 mg, 100 mg, Oral, BID, Irena Anthony PA-C, 100 mg at 01/05/19 0856    fluticasone (FLONASE) 50 mcg/act nasal spray 1 spray, 1 spray, Each Nare, BID, Nancy Wu MD, 1 spray at 01/05/19 0856    heparin (porcine) subcutaneous injection 5,000 Units, 5,000 Units, Subcutaneous, Q8H [facility], 5,000 Units at 01/06/19 0527 **AND** Platelet count, , , Once, Irena A Anthony, PA-C    insulin glargine (LANTUS) subcutaneous injection 10 Units 0 1 mL, 10 Units, Subcutaneous, HS, Amanda Baca DO    insulin lispro (HumaLOG) 100 units/mL subcutaneous injection 1-5 Units, 1-5 Units, Subcutaneous, TID AC, 5 Units at 01/05/19 1654 **AND** Fingerstick Glucose (POCT), , , TID AC, Irena Anthony PA-C    insulin lispro (HumaLOG) 100 units/mL subcutaneous injection 1-5 Units, 1-5 Units, Subcutaneous, HS, Irena Anthony PA-C, 2 Units at 01/05/19 2131    insulin lispro (HumaLOG) 100 units/mL subcutaneous injection 4 Units, 4 Units, Subcutaneous, TID With Meals, Unknown MD Jamari, 4 Units at 01/06/19 0848    isosorbide mononitrate (IMDUR) 24 hr tablet 30 mg, 30 mg, Oral, Daily, Ric Crump MD, 30 mg at 01/06/19 0845    levothyroxine tablet 112 mcg, 112 mcg, Oral, Once per day on Mon Tue Wed Thu Fri Sat, Blake See PA-C, 112 mcg at 01/05/19 2563    levothyroxine tablet 56 mcg, 56 mcg, Oral, Once per day on Sun, Blake See PA-C, 56 mcg at 01/06/19 2226    nitroglycerin (NITROSTAT) SL tablet 0 4 mg, 0 4 mg, Sublingual, Q5 Min PRN, CLARICE Culp    ondansetron [facility]) injection 4 mg, 4 mg, Intravenous, Q6H PRN, Pawel Anthony PA-C, 4 mg at 01/04/19 1349     Labs & Results:          Results from last 7 days  Lab Units 01/06/19  0435 01/05/19  0446 01/04/19  0512   WBC Thousand/uL 7 98 9 99 6 50   HEMOGLOBIN g/dL 6 9* 7 1* 7 1*   HEMATOCRIT % 22 9* 23 4* 23 6*   PLATELETS Thousands/uL 184 246 160           Results from last 7 days  Lab Units 01/06/19  0435 01/05/19  0446 01/04/19  0512   POTASSIUM mmol/L 4 4 3 9 4 4   CHLORIDE mmol/L 106 109* 105   CO2 mmol/L 21 23 24   BUN mg/dL 71* 59* 57*   CREATININE mg/dL 2 48* 1 92* 1 77*   CALCIUM mg/dL 8 5 8 9 8 5   ALK PHOS U/L  --  531*  --    ALT U/L  --  42  --    AST U/L  --  39  --            Results from last 7 days  Lab Units 01/06/19  0435   MAGNESIUM mg/dL 2 2       Echo: personally reviewed - EF 50%, HK of mid IS and apical inf walls, G2DD, LAE, mild MR    Nuclear stress test: small amount of ischemia in mid anterior wall      EKG personally reviewed by Ric Crump MD

## 2019-01-06 NOTE — ASSESSMENT & PLAN NOTE
Stable, reports her breathing is at baseline  Completed IV diuresis  Now Monitor off diuretics secondary to elevated creatinine

## 2019-01-06 NOTE — ASSESSMENT & PLAN NOTE
Lab Results   Component Value Date    HGBA1C 7 5 (H) 12/29/2018       Recent Labs      01/05/19   0655  01/05/19   0722  01/05/19   1108  01/05/19   1645   POCGLU  54*  71  361*  416*       Blood Sugar Average: Last 72 hrs:  · Patient sees Endocrinology outpatient  Takes repaglinide 0 5 mg p o  B i d  Januvia 50 mg p o  Daily  Patient states she has never taken insulin  She monitors her sugars twice daily  Normal results 160  · While hospitalized, patient will be managed on weight based insulin dosing    Will monitor    · Blood glucose is elevated, add basal bolus insulin  · 1/4 - increase Lantus to 15U qhs as BSs high  · 1/5 - decrease Lantus to 10U qhs as FBS low

## 2019-01-06 NOTE — PROGRESS NOTES
Patient and her daughter expressed great concern and frustration with the ordered insulin doses for bed time  The patient is concerned she is getting too much insulin because she was hypoglycemic this AM  She was agreeable to receiving her humalog insulin, and she requested to have her blood sugar taken an hour after administration  She was 260 before 2 units of humalog and 190 an hour afterwards  At that point, the patient refused to take her Lantus  SLIM notified via text

## 2019-01-06 NOTE — ASSESSMENT & PLAN NOTE
Stable, reports her breathing is near baseline  Completed IV diuresis  Monitor intake and output  Monitor on telemetry  Low-sodium diet  Daily weights  BMP daily  Monitor off diuretics secondary to elevated creatinine and need for cath  Net output 4 3L

## 2019-01-07 ENCOUNTER — APPOINTMENT (INPATIENT)
Dept: RADIOLOGY | Facility: HOSPITAL | Age: 64
DRG: 246 | End: 2019-01-07
Payer: COMMERCIAL

## 2019-01-07 LAB
ABO GROUP BLD: NORMAL
ANION GAP SERPL CALCULATED.3IONS-SCNC: 8 MMOL/L (ref 4–13)
BLD GP AB SCN SERPL QL: POSITIVE
BUN SERPL-MCNC: 76 MG/DL (ref 5–25)
CALCIUM SERPL-MCNC: 8.3 MG/DL (ref 8.3–10.1)
CHLORIDE SERPL-SCNC: 106 MMOL/L (ref 100–108)
CO2 SERPL-SCNC: 21 MMOL/L (ref 21–32)
CREAT SERPL-MCNC: 2.85 MG/DL (ref 0.6–1.3)
CREAT UR-MCNC: 185 MG/DL
GFR SERPL CREATININE-BSD FRML MDRD: 17 ML/MIN/1.73SQ M
GLUCOSE SERPL-MCNC: 116 MG/DL (ref 65–140)
GLUCOSE SERPL-MCNC: 123 MG/DL (ref 65–140)
GLUCOSE SERPL-MCNC: 153 MG/DL (ref 65–140)
GLUCOSE SERPL-MCNC: 154 MG/DL (ref 65–140)
GLUCOSE SERPL-MCNC: 173 MG/DL (ref 65–140)
HCT VFR BLD AUTO: 21.2 % (ref 34.8–46.1)
HGB BLD-MCNC: 6.4 G/DL (ref 11.5–15.4)
POTASSIUM SERPL-SCNC: 4.5 MMOL/L (ref 3.5–5.3)
PROT UR-MCNC: 141 MG/DL
PROT/CREAT UR: 0.76 MG/G{CREAT} (ref 0–0.1)
RH BLD: NEGATIVE
SODIUM SERPL-SCNC: 135 MMOL/L (ref 136–145)
SPECIMEN EXPIRATION DATE: NORMAL

## 2019-01-07 PROCEDURE — 76770 US EXAM ABDO BACK WALL COMP: CPT

## 2019-01-07 PROCEDURE — P9016 RBC LEUKOCYTES REDUCED: HCPCS

## 2019-01-07 PROCEDURE — 86901 BLOOD TYPING SEROLOGIC RH(D): CPT | Performed by: HOSPITALIST

## 2019-01-07 PROCEDURE — 86921 COMPATIBILITY TEST INCUBATE: CPT

## 2019-01-07 PROCEDURE — 86850 RBC ANTIBODY SCREEN: CPT | Performed by: HOSPITALIST

## 2019-01-07 PROCEDURE — 82570 ASSAY OF URINE CREATININE: CPT | Performed by: INTERNAL MEDICINE

## 2019-01-07 PROCEDURE — 99232 SBSQ HOSP IP/OBS MODERATE 35: CPT | Performed by: HOSPITALIST

## 2019-01-07 PROCEDURE — 86922 COMPATIBILITY TEST ANTIGLOB: CPT

## 2019-01-07 PROCEDURE — 85014 HEMATOCRIT: CPT | Performed by: HOSPITALIST

## 2019-01-07 PROCEDURE — 30233N1 TRANSFUSION OF NONAUTOLOGOUS RED BLOOD CELLS INTO PERIPHERAL VEIN, PERCUTANEOUS APPROACH: ICD-10-PCS | Performed by: HOSPITALIST

## 2019-01-07 PROCEDURE — 99233 SBSQ HOSP IP/OBS HIGH 50: CPT | Performed by: INTERNAL MEDICINE

## 2019-01-07 PROCEDURE — 99232 SBSQ HOSP IP/OBS MODERATE 35: CPT | Performed by: INTERNAL MEDICINE

## 2019-01-07 PROCEDURE — 86900 BLOOD TYPING SEROLOGIC ABO: CPT | Performed by: HOSPITALIST

## 2019-01-07 PROCEDURE — 84156 ASSAY OF PROTEIN URINE: CPT | Performed by: INTERNAL MEDICINE

## 2019-01-07 PROCEDURE — P9040 RBC LEUKOREDUCED IRRADIATED: HCPCS

## 2019-01-07 PROCEDURE — 85018 HEMOGLOBIN: CPT | Performed by: HOSPITALIST

## 2019-01-07 PROCEDURE — 82948 REAGENT STRIP/BLOOD GLUCOSE: CPT

## 2019-01-07 PROCEDURE — 80048 BASIC METABOLIC PNL TOTAL CA: CPT | Performed by: INTERNAL MEDICINE

## 2019-01-07 RX ADMIN — INSULIN LISPRO 1 UNITS: 100 INJECTION, SOLUTION INTRAVENOUS; SUBCUTANEOUS at 11:46

## 2019-01-07 RX ADMIN — ATORVASTATIN CALCIUM 40 MG: 40 TABLET, FILM COATED ORAL at 16:09

## 2019-01-07 RX ADMIN — INSULIN LISPRO 4 UNITS: 100 INJECTION, SOLUTION INTRAVENOUS; SUBCUTANEOUS at 17:02

## 2019-01-07 RX ADMIN — INSULIN LISPRO 4 UNITS: 100 INJECTION, SOLUTION INTRAVENOUS; SUBCUTANEOUS at 11:46

## 2019-01-07 RX ADMIN — AMLODIPINE BESYLATE 10 MG: 10 TABLET ORAL at 08:12

## 2019-01-07 RX ADMIN — ASPIRIN 81 MG 81 MG: 81 TABLET ORAL at 08:12

## 2019-01-07 RX ADMIN — CARVEDILOL 6.25 MG: 6.25 TABLET, FILM COATED ORAL at 16:09

## 2019-01-07 RX ADMIN — CARVEDILOL 6.25 MG: 6.25 TABLET, FILM COATED ORAL at 08:12

## 2019-01-07 RX ADMIN — DOCUSATE SODIUM 100 MG: 100 CAPSULE, LIQUID FILLED ORAL at 21:18

## 2019-01-07 RX ADMIN — LEVOTHYROXINE SODIUM 112 MCG: 112 TABLET ORAL at 05:52

## 2019-01-07 RX ADMIN — DOCUSATE SODIUM 100 MG: 100 CAPSULE, LIQUID FILLED ORAL at 08:12

## 2019-01-07 RX ADMIN — INSULIN LISPRO 4 UNITS: 100 INJECTION, SOLUTION INTRAVENOUS; SUBCUTANEOUS at 08:15

## 2019-01-07 RX ADMIN — INSULIN LISPRO 1 UNITS: 100 INJECTION, SOLUTION INTRAVENOUS; SUBCUTANEOUS at 08:14

## 2019-01-07 RX ADMIN — ISOSORBIDE MONONITRATE 30 MG: 30 TABLET, EXTENDED RELEASE ORAL at 08:12

## 2019-01-07 RX ADMIN — CLOPIDOGREL BISULFATE 75 MG: 75 TABLET ORAL at 08:11

## 2019-01-07 NOTE — PROGRESS NOTES
General Cardiology Progress Note   Caitlin Jalloh 58 y o  female MRN: [id number]  Unit/Bed#: [facility] 509-01 Encounter: [id number]      Assessment:  Principal Problem:    Type 2 myocardial infarction [facility])  Active Problems:    Acute diastolic congestive heart failure (HCC)    DM (diabetes mellitus) ([email or web address] 75 )    Hypertension    Hyperlipidemia    Stage 3 chronic kidney disease ([place] [address] )    Anemia    Coronary artery disease involving native coronary artery of native heart with angina pectoris (HCC)    KELLY (acute kidney injury) ([place] [address] )      Impression:    CAD  Drug-eluting stent x2 to the mid LAD  Requires staged intervention to the RCA  Currently dealing with acute renal issues as below  On appropriate meds of aspirin, statin, beta-blocker, Imdur  Acute diastolic CHF  Mildly reduced ejection fraction  Diuresis being held due to rising creatinine    Acute kidney injury  Nephrology consult  Likely multifactorial injury, some contrast induced nephropathy, some decreased effective circulating volume with anemia  Anemia  Getting packed red blood cells  Anemia workup per [facility]  Outpatient hematology advised    Plan: At this point will continue to hold off on diuresis  Blood volume with packed red blood cells today  Continue to follow creatinine  Will need to be sure this improved dramatically prior to RCA intervention  Will also need Nephrology clearance before proceeding with repeat cardiac catheterization  She will also need blood pressure meds, hemodynamics etcetera optimized prior to proceeding with cardiac catheterization  Subjective:   Patient seen and examined  She denies chest pain, shortness of breath, lightheadedness, palpitations, lower extremity edema  Case was discussed with Nephrology regarding acute kidney injury, conservative approach at this point, no diuretics  She continues to have no complaints from a CHF standpoint, stable of diuretics at this point    Currently still needs RCA intervention, she is aware of this, she had LAD stenting last week  She finally agreed to packed red blood cells today  Objective   Vitals: Blood pressure 128/50, pulse 61, temperature 98 °F (36 7 °C), temperature source Oral, resp  rate 20, height 5' 3" (1 6 m), weight 78 4 kg (172 lb 13 5 oz), SpO2 99 %  , Body mass index is 30 62 kg/m² , I/O last 3 completed shifts:  In: -   Out: 1300 [Urine:1300]  No intake/output data recorded  Wt Readings from Last 3 Encounters:   01/07/19 78 4 kg (172 lb 13 5 oz)   07/21/17 73 9 kg (163 lb)   11/20/15 72 8 kg (160 lb 8 oz)       Intake/Output Summary (Last 24 hours) at 01/07/19 1151  Last data filed at 01/07/19 0554   Gross per 24 hour   Intake                0 ml   Output              800 ml   Net             -800 ml     I/O last 3 completed shifts:  In: -   Out: 1300 [Urine:1300]    No significant arrhythmias seen on telemetry review       Physical Exam:    GEN: Deuce Valverde appears well, alert and oriented x 3, pleasant and cooperative   HEENT: pupils equal, round, and reactive to light; extraocular muscles intact  NECK: supple, no carotid bruits, no JVD or HJR  HEART: normal rate, regular rhythm, normal S1 and S2, no murmurs, clicks, gallops or rubs   LUNGS: clear to auscultation bilaterally; no wheezes, rales, or rhonchi   ABDOMEN: normal bowel sounds, soft, no tenderness, no distention  EXTREMITIES: peripheral pulses normal; no clubbing, cyanosis, or edema  NEURO: no focal findings   SKIN: warm and well perfused, no suspicious lesions on exposed skin    Meds/Allergies   No Known Allergies    Current Facility-Administered Medications:     acetaminophen (TYLENOL) tablet 650 mg, 650 mg, Oral, Q6H PRN, Caty Mahajan PA-C, 650 mg at 01/05/19 1653    amLODIPine (NORVASC) tablet 10 mg, 10 mg, Oral, Daily, Myrtle Bennett MD, 10 mg at 01/07/19 2992    aspirin chewable tablet 81 mg, 81 mg, Oral, Daily, Tiny Shay MD, 81 mg at 01/07/19 0812    atorvastatin (LIPITOR) tablet 40 mg, 40 mg, Oral, Daily With Pastora Lopes PA-C, 40 mg at 01/06/19 1705    benzonatate (TESSALON PERLES) capsule 100 mg, 100 mg, Oral, TID, Ernst Hoover MD, 100 mg at 01/05/19 2131    carvedilol (COREG) tablet 6 25 mg, 6 25 mg, Oral, BID With Meals, Apollo Owusu MD, 6 25 mg at 01/07/19 0649    clopidogrel (PLAVIX) tablet 75 mg, 75 mg, Oral, Daily, Walker Ribera MD, 75 mg at 01/07/19 0811    docusate sodium (COLACE) capsule 100 mg, 100 mg, Oral, BID, Irena Anthony PA-C, 100 mg at 01/07/19 4029    fluticasone (FLONASE) 50 mcg/act nasal spray 1 spray, 1 spray, Each Nare, BID, Ernst Hoover MD, 1 spray at 01/05/19 0856    insulin lispro (HumaLOG) 100 units/mL subcutaneous injection 1-5 Units, 1-5 Units, Subcutaneous, TID AC, 1 Units at 01/07/19 1146 **AND** Fingerstick Glucose (POCT), , , TID AC, Irena Anthony PA-C    insulin lispro (HumaLOG) 100 units/mL subcutaneous injection 1-5 Units, 1-5 Units, Subcutaneous, HS, Irena Anthony PA-C, 1 Units at 01/06/19 2132    insulin lispro (HumaLOG) 100 units/mL subcutaneous injection 4 Units, 4 Units, Subcutaneous, TID With Meals, Ernst Hoover MD, 4 Units at 01/07/19 1146    isosorbide mononitrate (IMDUR) 24 hr tablet 30 mg, 30 mg, Oral, Daily, Joceline Anne MD, 30 mg at 01/07/19 5637    levothyroxine tablet 112 mcg, 112 mcg, Oral, Once per day on Mon Tue Wed Thu Fri Sat, Maria Eugenia Toledo PA-C, 112 mcg at 01/07/19 3158    levothyroxine tablet 56 mcg, 56 mcg, Oral, Once per day on Sun, Maria Eugenia Toledo PA-C, 56 mcg at 01/06/19 3208    nitroglycerin (NITROSTAT) SL tablet 0 4 mg, 0 4 mg, Sublingual, Q5 Min PRN, Sunday CLARICE Still    ondansetron [facility]) injection 4 mg, 4 mg, Intravenous, Q6H PRN, Irena Anthony PA-C, 4 mg at 01/04/19 1349    Laboratory Results:        CBC with diff:   Results from last 7 days  Lab Units 01/07/19  0551 01/06/19  0435 01/05/19  0446 01/04/19  0213 01/03/19 0529 01/02/19  0516 01/01/19  0458   WBC Thousand/uL  --  7 98 9 99 6 50 6 02 6 94 7 02   HEMOGLOBIN g/dL 6 4* 6 9* 7 1* 7 1* 7 7* 7 8* 7 4*   HEMATOCRIT % 21 2* 22 9* 23 4* 23 6* 24 9* 25 3* 24 4*   MCV fL  --  90 89 89 88 89 88   PLATELETS Thousands/uL  --  184 246 160 169 167 180   MCH pg  --  27 0 27 0 26 8 27 3 27 3 26 8   MCHC g/dL  --  30 1* 30 3* 30 1* 30 9* 30 8* 30 3*   RDW %  --  13 9 13 6 13 4 13 4 13 4 13 4   MPV fL  --  11 8 11 2 11 1 10 7 11 0 10 2   NRBC AUTO /100 WBCs  --   --   --   --   --  0 0       CMP:  Results from last 7 days  Lab Units 01/07/19 0551 01/06/19 0435 01/05/19 0446 01/04/19 0512 01/03/19 0529 01/02/19 0516 01/01/19  0458   POTASSIUM mmol/L 4 5 4 4 3 9 4 4 4 1 4 4 3 9   CHLORIDE mmol/L 106 106 109* 105 106 106 107   CO2 mmol/L 21 21 23 24 25 24 25   BUN mg/dL 76* 71* 59* 57* 50* 49* 39*   CREATININE mg/dL 2 85* 2 48* 1 92* 1 77* 1 64* 1 96* 1 78*   CALCIUM mg/dL 8 3 8 5 8 9 8 5 8 6 8 4 8 2*   AST U/L  --   --  39  --   --   --   --    ALT U/L  --   --  42  --   --   --   --    ALK PHOS U/L  --   --  531*  --   --   --   --    EGFR ml/min/1 73sq m 17 20 27 30 33 26 30       BMP:  Results from last 7 days  Lab Units 01/07/19  0551 01/06/19 0435 01/05/19 0446 01/04/19  0512 01/03/19  0529 01/02/19  0516 01/01/19  0458   POTASSIUM mmol/L 4 5 4 4 3 9 4 4 4 1 4 4 3 9   CHLORIDE mmol/L 106 106 109* 105 106 106 107   CO2 mmol/L 21 21 23 24 25 24 25   BUN mg/dL 76* 71* 59* 57* 50* 49* 39*   CREATININE mg/dL 2 85* 2 48* 1 92* 1 77* 1 64* 1 96* 1 78*   CALCIUM mg/dL 8 3 8 5 8 9 8 5 8 6 8 4 8 2*       Magnesium:   Results from last 7 days  Lab Units 01/06/19  0435   MAGNESIUM mg/dL 2 2     Lipid Profile:   Lab Results   Component Value Date    CHOL 229 05/30/2015    CHOL 244 04/18/2014     Lab Results   Component Value Date    HDL 65 (H) 12/29/2018    HDL 48 05/30/2015    HDL 55 04/18/2014     Lab Results   Component Value Date    LDLCALC 72 12/29/2018    LDLCALC 152 (H) 2015    LDLCALC 172 (H) 2014     No results found for: LDLDIRECT  Lab Results   Component Value Date    TRIG 109 2018    TRIG 146 2015    TRIG 84 2014       Cardiac testing:       Results for orders placed during the hospital encounter of 18   Echo complete with contrast if indicated    Narrative [address]  [facility], [address] [facility]  [phone]    Transthoracic Echocardiogram  2D, M-mode, Doppler, and Color Doppler    Study date:  30-Dec-2018    Patient: Anna Marie Ortega  MR number: [id number]  Account number: [de-identified]  : 1955  Age: 58 years  Gender: Female  Status: Inpatient  Location: Bedside  Height: 63 in  Weight: 201 5 lb  BP: 159/ 71 mmHg    Indications: Cardiomyopathy    Diagnoses: I43  - Cardiomyopathy in diseases classified elsewhere    Sonographer:  JOCE Benson  Primary Physician:  Stacie Valle MD  Referring Physician:  Todd Pendleton MD  Group:  [facility]  Interpreting Physician:  Sabrina Canales MD    SUMMARY    LEFT VENTRICLE:  Systolic function was at the lower limits of normal  Ejection fraction was estimated to be 50 %  There was hypokinesis of the mid inferoseptal and apical inferior wall(s)  Wall thickness was mildly increased  There was mild concentric hypertrophy  Features were consistent with a pseudonormal left ventricular filling pattern, with concomitant abnormal relaxation and increased filling pressure (grade 2 diastolic dysfunction)  LEFT ATRIUM:  The atrium was mildly dilated  MITRAL VALVE:  There was mild regurgitation  TRICUSPID VALVE:  There was mild regurgitation  Estimated peak PA pressure was 33 mmHg  PERICARDIUM:  A trace, free-flowing pericardial effusion was identified  There was no evidence of hemodynamic compromise      HISTORY: PRIOR HISTORY: CHF; DM; Hypothyroid; HTN; HLD; F  smoker    PROCEDURE: The procedure was performed at the bedside  This was a routine study  The transthoracic approach was used  The study included complete 2D imaging, M-mode, complete spectral Doppler, and color Doppler  Echocardiographic views  were limited due to decreased penetration and lung interference  This was a technically difficult study  LEFT VENTRICLE: Size was normal  Systolic function was at the lower limits of normal  Ejection fraction was estimated to be 50 %  There was hypokinesis of the mid inferoseptal and apical inferior wall(s)  Wall thickness was mildly  increased  There was mild concentric hypertrophy  DOPPLER: Features were consistent with a pseudonormal left ventricular filling pattern, with concomitant abnormal relaxation and increased filling pressure (grade 2 diastolic dysfunction)  RIGHT VENTRICLE: The size was normal  Systolic function was normal     LEFT ATRIUM: The atrium was mildly dilated  RIGHT ATRIUM: Size was normal     MITRAL VALVE: Valve structure was normal  There was normal leaflet separation  DOPPLER: The transmitral velocity was within the normal range  There was no evidence for stenosis  There was mild regurgitation  AORTIC VALVE: The valve was trileaflet  Leaflets exhibited normal thickness and normal cuspal separation  DOPPLER: Transaortic velocity was within the normal range  There was no evidence for stenosis  There was no regurgitation  TRICUSPID VALVE: The valve structure was normal  There was normal leaflet separation  DOPPLER: The transtricuspid velocity was within the normal range  There was no evidence for stenosis  There was mild regurgitation  Estimated peak PA  pressure was 33 mmHg  PULMONIC VALVE: Leaflets exhibited normal thickness, no calcification, and normal cuspal separation  DOPPLER: The transpulmonic velocity was within the normal range  There was no significant regurgitation  PERICARDIUM: A trace, free-flowing pericardial effusion was identified   There was no evidence of hemodynamic compromise  AORTA: The root exhibited normal size  SYSTEMIC VEINS: IVC: The inferior vena cava was not well visualized  SYSTEM MEASUREMENT TABLES    2D  %FS: 29 85 %  Ao Diam: 2 39 cm  EDV(Teich): 52 8 ml  EF Biplane: 55 93 %  EF(Teich): 57 99 %  ESV(Teich): 22 18 ml  IVSd: 1 14 cm  LA Area: 20 45 cm2  LA Diam: 3 62 cm  LVEDV MOD A2C: 127 02 ml  LVEDV MOD A4C: 116 5 ml  LVEDV MOD BP: 127 96 ml  LVEF MOD A2C: 59 2 %  LVEF MOD A4C: 48 18 %  LVESV MOD A2C: 51 82 ml  LVESV MOD A4C: 60 37 ml  LVESV MOD BP: 56 39 ml  LVIDd: 3 55 cm  LVIDs: 2 49 cm  LVLd A2C: 9 81 cm  LVLd A4C: 8 85 cm  LVLs A2C: 8 03 cm  LVLs A4C: 7 62 cm  LVPWd: 1 18 cm  RA Area: 13 89 cm2  RVIDd: 3 23 cm  SV MOD A2C: 75 2 ml  SV MOD A4C: 56 13 ml  SV(Teich): 30 62 ml    CW  RAP: 10 mmHg  TR Vmax: 2 63 m/s  TR maxP 66 mmHg    MM  TAPSE: 1 85 cm    PW  E': 0 06 m/s  E/E': 19 47  MV A Miko: 1 13 m/s  MV Dec Menard: 6 89 m/s2  MV DecT: 168 4 ms  MV E Miko: 1 16 m/s  MV E/A Ratio: 1 03  MV PHT: 48 84 ms  MVA By PHT: 4 5 cm2  RVSP: 37 66 mmHg    [employer] Accredited Echocardiography Laboratory    Prepared and electronically signed by    Charity Alonso MD  Signed 30-Dec-2018 13:04:11       No results found for this or any previous visit  Results for orders placed during the hospital encounter of 18   Cardiac catheterization    Narrative Keshav [facility]  [address]  [phone]    [facility]    Invasive Cardiovascular Lab Complete Report    Patient: Tiffanie Bonner  MR number: [id number]  Account number: [de-identified]  Study date: 2019  Gender: Female  : 1955  Height: 63 in  Weight: 168 7 lb  BSA: 1 8 m squared    Allergies: NO KNOWN ALLERGIES    Diagnostic Cardiologist:  Zoe Saldivar MD  Interventional Cardiologist:  Zoe Saldivar MD  Primary Physician:  Elizabeth Delgado MD    SUMMARY    CORONARY CIRCULATION:  Proximal LAD: There was a 50 % stenosis    Mid LAD: There was a tubular 90 % stenosis  The lesion was complex and moderately calcified  This is a likely culprit for the patient's clinical presentation  An intervention was performed  Distal LAD: There was a diffuse 50 % stenosis  Mid RCA: There was a tubular 80 % stenosis  It appears amenable to percutaneous intervention  1ST LESION INTERVENTIONS:  A balloon angioplasty procedure was performed on the lesion in the mid LAD  A Xience Terrie Rx 2 5 x 15mm drug-eluting stent was placed across the lesion and deployed at a maximum inflation pressure of 12 jennifer  A Xience Terrie Rx 2 5 x 15mm drug-eluting stent was placed across the lesion and deployed at a maximum inflation pressure of 11 jennifer  INDICATIONS:  --  Possible CAD: myocardial infarction without ST elevation (NSTEMI)  --  Coronary artery disease: abnormal stress test     PROCEDURES PERFORMED    --  Left coronary angiography  --  Right coronary angiography  --  Inpatient  --  Mod Sedation Same Physician Initial 15min  --  Mod Sedation Same Physician Add 15min  --  Coronary Catheterization (w/o LHC)  --  Mod Sedation Same Physician Add 15min  --  Coronary Drug Eluting Stent w/PTCA  --  Intervention on mid LAD: balloon angioplasty  PROCEDURE: The risks and alternatives of the procedures and conscious sedation were explained to the patient and informed consent was obtained  The patient was brought to the cath lab and placed on the table  The planned puncture sites  were prepped and draped in the usual sterile fashion  --  Right radial artery access  After performing an Balwinder's test to verify adequate ulnar artery supply to the hand, the radial site was prepped  The puncture site was infiltrated with local anesthetic  The vessel was accessed using the  modified Seldinger technique, a wire was advanced into the vessel, and a sheath was advanced over the wire into the vessel  --  Left coronary artery angiography   A catheter was advanced over a guidewire into the aorta and positioned in the left coronary artery ostium under fluoroscopic guidance  Angiography was performed  --  Right coronary artery angiography  A catheter was advanced over a guidewire into the aorta and positioned in the right coronary artery ostium under fluoroscopic guidance  Angiography was performed  --  Inpatient  --  Mod Sedation Same Physician Initial 15min  --  Mod Sedation Same Physician Add 15min  --  Coronary Catheterization (w/o C)  --  Mod Sedation Same Physician Add 15min  LESION INTERVENTION: A balloon angioplasty procedure was performed on the lesion in the mid LAD  --  Vessel setup was performed  A 5Fr Launcher Ebu 3 5 guiding catheter was used to cannulate the vessel  --  Vessel setup was performed  A Runthrough NS 180cm wire was used to cross the lesion  --  Balloon angioplasty was performed, using a Mini Trek Rx 2 0 x 15mm balloon, with 4 inflations and a maximum inflation pressure of 8 jennifer  --  A Xience Terrie Rx 2 5 x 15mm drug-eluting stent was placed across the lesion and deployed at a maximum inflation pressure of 12 jennifer  --  Balloon angioplasty was performed, using a NC Euphora Rx 2 25 x 15mm balloon, with 2 inflations and a maximum inflation pressure of 16 jennifer  --  Balloon angioplasty was performed, using a NC Trek Rx 2 75 x 08mm balloon, with 2 inflations and a maximum inflation pressure of 20 jennifer  --  A Xience Terrie Rx 2 5 x 15mm drug-eluting stent was placed across the lesion and deployed at a maximum inflation pressure of 11 jennifer  --  Balloon angioplasty was performed, using a NC Trek Rx 2 75 x 08mm balloon, with 3 inflations and a maximum inflation pressure of 20 jennifer  INTERVENTIONS:  --  Coronary Drug Eluting Stent w/PTCA  PROCEDURE COMPLETION: The patient tolerated the procedure well and was discharged from the cath lab  TIMING: Test started at 11:29  Test concluded at 12:29   HEMOSTASIS: The sheath was removed  The site was compressed with a Hemoband  device  Hemostasis was obtained  MEDICATIONS GIVEN: Verapamil (Isoptin, Calan, Covera), 5 mg, IA, at 11:32  Fentanyl (1OOmcg/2 ml), 50 mcg, IV, at 11:27  Versed (2mg/2ml), 1 mg, IV, at 11:27  1% Lidocaine, 1 ml, subcutaneously, at 11:29  Nitroglycerin (200mcg/ml), 200 mcg, at 11:32  Heparin 1000 units/ml, 3,000 units, at 11:32  Versed (2mg/2ml), 1 mg, IV, at 11:42  Fentanyl (1OOmcg/2 ml), 50 mcg, IV, at 11:42  Nitro Paste, 1, topically, at 11:42, in  Effient, 60 mg, PO, at  11:42  Heparin 1000 units/ml, 5,000 units, IV, at 11:43  Heparin 1000 units/ml, 2,000 units, IV, at 11:51  Nitroglycerine (200mcg/ml), 200 mcg, at 11:57  CONTRAST GIVEN: 60 ml Omnipaque (350mg I /ml)  RADIATION EXPOSURE: Fluoroscopy time:  13 23 min  CORONARY VESSELS:   --  The coronary circulation is right dominant  --  Left main: The vessel was medium to large sized  Angiography showed minor luminal irregularities  --  Proximal LAD: There was a 50 % stenosis  --  Mid LAD: There was a tubular 90 % stenosis  The lesion was complex and moderately calcified  This is a likely culprit for the patient's clinical presentation  An intervention was performed  --  Distal LAD: The vessel was small to medium sized  Angiography showed severe atherosclerosis  There was a diffuse 50 % stenosis  --  Circumflex: The vessel was small sized  Angiography showed minor luminal irregularities  There was one major obtuse marginal   --  Ramus intermedius: The vessel was medium sized  Angiography showed mild atherosclerosis  The artery bifurcated into two small sized vessels  --  RCA: The vessel was medium sized  --  Mid RCA: There was a tubular 80 % stenosis  It appears amenable to percutaneous intervention  IMPRESSIONS:  There is significant double vessel coronary artery disease  RECOMMENDATIONS  The patient's anti-anginal regimen should be further intensified    Patient instructed to return for staged percutaneous intervention in three weeks  Prepared and signed by    Thom Cronin MD  Signed 2019 12:37:00    Study diagram    Angiographic findings  Native coronary lesions:  ·Proximal LAD: Lesion 1: 50 % stenosis  ·Mid LAD: Lesion 1: tubular, 90 % stenosis  ·Distal LAD: Lesion 1: diffuse, 50 % stenosis  ·Mid RCA: Lesion 1: tubular, 80 % stenosis  Intervention results  Native coronary lesions:  · balloon angioplasty of mid LAD  Stent: Pastor Eisenmenger Rx 2 5 x 15mm drug-eluting  Stent: Pastor Eisenmenger Rx 2 5 x 15mm drug-eluting  Hemodynamic tables    Pressures:  Baseline  Pressures:  - HR: 42  Pressures:  - Rhythm:  Pressures:  -- Aortic Pressure (S/D/M): 101/49/66    Outputs:  Baseline  Outputs:  -- CALCULATIONS: [age]  Outputs:  -- CALCULATIONS: Body Surface Area: 1 80  Outputs:  -- CALCULATIONS: Height in cm: 160 00  Outputs:  -- CALCULATIONS: Sex: Female  Outputs:  -- CALCULATIONS: Weight in k 70       No results found for this or any previous visit  No results found for this or any previous visit    Results for orders placed during the hospital encounter of 18   NM myocardial perfusion spect (rx stress and/or rest)    [facility] [address]  [facility], [address] [facility]  [phone]    Stress SPECT Myocardial Perfusion Imaging after Regadenoson    Patient: Liliana Barry  MR number: [id number]  Account number: [de-identified]  : 1955  Age: 58 years  Gender: Female  Status: Inpatient  Location: Stress lab  Height: 63 in  Weight: 169 lb  BP: 141/ 72 mmHg    Allergies: NO KNOWN ALLERGIES    Diagnosis: R07 9 - Chest pain, unspecified    Primary Physician:  Lenny Cunha MD  Technician:  Carl Bains  RN:  Jones Kwong RN  Group:  [facility]  Report Prepared by[de-identified]  Jones Kwong RN  Interpreting Physician:  Sundeep Lal MD    INDICATIONS: Chest pain, Dyspnea, Detection CAD    HISTORY: The patient is a 58 year old  female  Chest pain status: recent onset chest pain  Coronary artery disease risk factors: dyslipidemia, hypertension, smoking, family history of premature coronary artery disease, and diabetes  mellitus  Cardiovascular history: none significant  Medications: a calcium channel blocker, an ACE inhibitor/ARB, a lipid lowering agent, and an antihypertensive agent  PHYSICAL EXAM: Baseline physical exam screening: normal and no wheezes audible  REST ECG: Normal sinus rhythm at 71 beats per minute  PROCEDURE: The study was performed in the the Stress lab  A regadenoson infusion pharmacologic stress test was performed  SPECT myocardial perfusion study performed during stress  Systolic blood pressure was 141 mmHg, at the start of the  study  Diastolic blood pressure was 72 mmHg, at the start of the study  The heart rate was 71 bpm, at the start of the study  IV double checked  Regadenoson protocol:  HR bpm SBP mmHg DBP mmHg Symptoms  Baseline 71 141 72 none  Immediate 81 130 59 dizziness, nausea  1 min 68 102 56 same as above  2 min 66 105 47 same as above  3 min 65 103 55 subsiding  4 min 66 121 59 none  No medications or fluids given  The patient also performed low level exercise with leg lifts  STRESS SUMMARY: Duration of pharmacologic stress was 3 min and 0 sec  Functional capacity could not be adequately assessed  Maximal heart rate during stress was 81 bpm  The heart rate response to stress was normal  There was resting  hypertension with an appropriate blood pressure response to stress  The rate-pressure product for the peak heart rate and blood pressure was 04346  There was no chest pain during stress  The stress test was terminated due to protocol  completion  Pre oxygen saturation: 97 %  Peak oxygen saturation: 99 %  The stress ECG was negative for ischemia with vasodilator stress  There were no stress arrhythmias or conduction abnormalities      ISOTOPE ADMINISTRATION:  Resting isotope administration Stress isotope administration  Agent Tetrofosmin Tetrofosmin  Dose 10 5 mCi 32 1 mCi  Date 12/31/2018 12/31/2018  Injection time 11:50 13:09  Injection-image interval 38 min 51 min    The radiopharmaceutical was injected at the peak effect of pharmacologic stress  MYOCARDIAL PERFUSION IMAGING:  The image quality was fair  Rotating projection images reveal mild breast attenuation  Left ventricular size was normal  The TID ratio was 1 01  PERFUSION DEFECTS:  -  There was a small, mildly severe, partially reversible myocardial perfusion defect of the mid anterior wall  GATED SPECT:  The calculated left ventricular ejection fraction was 52 %  There was no diagnostic evidence for left ventricular regional abnormality  SUMMARY:  -  Stress results: There was resting hypertension with an appropriate blood pressure response to stress  There was no chest pain during stress  -  ECG conclusions: The stress ECG was negative for ischemia with vasodilator stress  -  Perfusion imaging: There was a small, mildly severe, partially reversible myocardial perfusion defect of the mid anterior wall  -  Gated SPECT: The calculated left ventricular ejection fraction was 52 %  There was no diagnostic evidence for left ventricular regional abnormality  IMPRESSIONS: Abnormal study after pharmacologic vasodilation  There was a small amount of ischemia of the mid anterior wall  Left ventricular systolic function was normal     Prepared and signed by    Nataliia Cantrell MD  Signed 12/31/2018 15:54:13             Ted Pettit MD    Portions of the record may have been created with voice recognition software   Occasional wrong word or "sound a like" substitutions may have occurred due to the inherent limitations of voice recognition software   Read the chart carefully and recognize, using context, where substitutions have occurred

## 2019-01-07 NOTE — ASSESSMENT & PLAN NOTE
· wrosening cr post cath, was 1 7 -> 1 9 -> 2 4 --  2 8  · Contrast induced nephropathy plus worsened by anemia  · Consulted nephro  · Waited out to see the creatinine today but it continued to worsen to 2 8  And discuss with patient and daughter that partly this is a being contributed due to her worsening anemia and that we should treated with blood transfusion  Patient and daughter continues to argue that why everything is happening and they are only thing was white things are happening like this the should not have perform cardiac catheterization on, their concerns were why she her blood counts were dropping if he are not seeing any active bleeding  But I continue to reinforce than that right now the main thing is to treat her and then the only way her counts can come up soon would be to PRBCs transfusion that would help her renal function as well  I left them to discuss amongst themselves  I explained home the at was outcomes including life-threatening events if she goes home which she wants to do    Later on nephrology also discussed with the patient and finally she agreed for blood transfusion  · Monitor with 2 units PRBC today

## 2019-01-07 NOTE — PROGRESS NOTES
Pt family requesting to speak with Dr Fredo Terry or Dr Austin Charleston  Attempting to call Dr Sandy Cottrell  Will monitor

## 2019-01-07 NOTE — PROGRESS NOTES
Patient refusing IV change this morning  RN explained that it is [facility] protocol to change IV's every 4 days  Pt states that it hurts to much and she will not allow it, she believes she is going home today  Educated on the importance of the blood work in her condition, patient agreeable to blood work

## 2019-01-07 NOTE — PROGRESS NOTES
Pt taken via stretcher to Radiology dept  for Retroperitoneal US  Daughter accompanied pt to procedural area  Will monitor

## 2019-01-07 NOTE — ASSESSMENT & PLAN NOTE
· With proteinuria, likely diabetic nephropathy  · Appears to be worked up by her endocrinologist stat [facility]  · Baseline appears to be around 1 4, but possibly closer to 1 6  · Avoid nephrotoxins if possible  · 1/5: Cr up to 1 9 after cath    Monitor for improvement  · Outpatient referral to Nephrology

## 2019-01-07 NOTE — PROGRESS NOTES
Rounded with CHF team and discussed pt's plan of care  Dr Moustapha Evans made aware of pt refusing to have CT scan done due to being anxious  Explained to pt that it would be a test done feet first  Will monitor

## 2019-01-07 NOTE — PROGRESS NOTES
Pt arrived from [address]  Requesting to shower  IV site covered with glove and clean gown given  Will order first unit of blood when pt done with shower

## 2019-01-07 NOTE — ASSESSMENT & PLAN NOTE
S/p PCI with LUDY to mLAD on 1/4 - plan for staged PCI to RCA in 3 weeks (unclear if she would even come a did do that out will be eligible for that given her rising creatinine post contrast)  ASA and plavix and statin and Coreg, imdur round/constricted

## 2019-01-07 NOTE — PROGRESS NOTES
Progress Note - Mike Schafer 1955, 58 y o  female MRN: [id number]    Unit/Bed#: [facility] 509-01 Encounter: [id number]    Primary Care Provider: Lenny Cunha MD   Date and time admitted to hospital: 12/29/2018  9:02 AM        KELLY (acute kidney injury) [facility])   Assessment & Plan    · wrosening cr post cath, was 1 7 -> 1 9 -> 2 4 --  2 8  · Contrast induced nephropathy plus worsened by anemia  · Consulted nephro  · Waited out to see the creatinine today but it continued to worsen to 2 8  And discuss with patient and daughter that partly this is a being contributed due to her worsening anemia and that we should treated with blood transfusion  Patient and daughter continues to argue that why everything is happening and they are only thing was white things are happening like this the should not have perform cardiac catheterization on, their concerns were why she her blood counts were dropping if he are not seeing any active bleeding  But I continue to reinforce than that right now the main thing is to treat her and then the only way her counts can come up soon would be to PRBCs transfusion that would help her renal function as well  I left them to discuss amongst themselves  I explained home the at was outcomes including life-threatening events if she goes home which she wants to do  Later on nephrology also discussed with the patient and finally she agreed for blood transfusion  · Monitor with 2 units PRBC today     Coronary artery disease involving native coronary artery of native heart with angina pectoris [facility])   Assessment & Plan    S/p PCI with LUDY to mLAD on 1/4 - plan for staged PCI to RCA in 3 weeks (unclear if she would even come a did do that out will be eligible for that given her rising creatinine post contrast)  ASA and plavix and statin and Coreg, imdur     Anemia   Assessment & Plan    Likely due to chronic kidney disease    Iron panel, B12, folate WNL  hemoccult neg in ER  hemolysis labs show high LDH, high retic ct, nl bili, and Haptoglobin negative  Serum protein electrophoresis has some abnormalities  Continued downward stranding hemoglobin now dropped from 6 9-6 4  No active source of bleeding  At recheck her stool occult  Did recommend blood transfusion which initially refused to me but did agree with Nephrology  Giving 2 units PRBC  Also placed orders for a CT abdomen pelvis to look for any intra-abdominal/retroperitoneal bleed but patient denied that the nurse  Will need aspirin, plavix, hold sc heparin  Outpt hematology     Stage 3 chronic kidney disease ([facility] [address] )   Assessment & Plan    · With proteinuria, likely diabetic nephropathy  · Appears to be worked up by her endocrinologist stat [facility]  · Baseline appears to be around 1 4, but possibly closer to 1 6  · Avoid nephrotoxins if possible  · 1/5: Cr up to 1 9 after cath  Monitor for improvement  · Outpatient referral to Nephrology     Hypertension   Assessment & Plan    Improving  Continue amlodipine, Coreg, imdur added  Restart lisinopril when renal function stable     DM (diabetes mellitus) ([facility])   Assessment & Plan    Lab Results   Component Value Date    HGBA1C 7 5 (H) 12/29/2018       Recent Labs      01/06/19   2132  01/07/19   0629  01/07/19   1051  01/07/19   1549   POCGLU  218*  173*  153*  116       Blood Sugar Average: Last 72 hrs:  · Patient sees Endocrinology outpatient  Takes repaglinide 0 5 mg p o  B i d  Januvia 50 mg p o  Daily  Patient states she has never taken insulin  She monitors her sugars twice daily  Normal results 160  · While hospitalized, patient will be managed on weight based insulin dosing    Will monitor    · Blood glucose is elevated, add basal bolus insulin  · 1/4 - increase Lantus to 15U qhs as BSs high  · 1/5 - decrease Lantus to 10U qhs as FBS low  · 1/6: PT upset about drop in BS with increase in lantus, refused lantus last night, DC lantus, cont humalog 4 U TIDAC  · 1/7:  Continue current regimen     Acute diastolic congestive heart failure (HCC)   Assessment & Plan    Stable, reports her breathing is at baseline  Completed IV diuresis  Now Monitor off diuretics secondary to elevated creatinine     * Type 2 myocardial infarction [facility])   Assessment & Plan    · Likely due to congestive heart failure  · Echo shows preserved ejection fraction  · Stress test does show a point of reversible ischemia on the mid anterior wall  · 1/4: Cardiac catheterization showed CAD - see below         [facility] Internal Medicine Progress Note  Patient: Mady Wild 58 y o  female   MRN: [id number]  PCP: Hugh Dover MD  Unit/Bed#: [facility] 509-01 Encounter: [id number]  Date Of Visit: 01/07/19    Assessment:    Principal Problem:    Type 2 myocardial infarction [facility])  Active Problems:    Acute diastolic congestive heart failure (HCC)    DM (diabetes mellitus) ([facility] )    Hypertension    Hyperlipidemia    Stage 3 chronic kidney disease ([facility] )    Anemia    Coronary artery disease involving native coronary artery of native heart with angina pectoris ([facility])    KELLY (acute kidney injury) ([facility] )      VTE Pharmacologic Prophylaxis:   Pharmacologic: Pharmacologic VTE Prophylaxis contraindicated due to Anemia  Mechanical VTE Prophylaxis in Place: Yes    Patient Centered Rounds: I have performed bedside rounds with nursing staff today  Discussions with Specialists or Other Care Team Provider:  Nephrology    Education and Discussions with Family / Patient:  Patient and daughter    Time Spent for Care: 45 minutes  More than 50% of total time spent on counseling and coordination of care as described above      Current Length of Stay: 9 day(s)    Current Patient Status: Inpatient   Certification Statement: The patient will continue to require additional inpatient hospital stay due to ore monitor hemoglobin and creatinine    Discharge Plan / Estimated Discharge Date:  Unclear    Code Status: Level 1 - Full Code      Subjective:   She states he feels fine and wants to go home    Objective:     Vitals:   Temp (24hrs), Av °F (36 7 °C), Min:97 5 °F (36 4 °C), Max:98 3 °F (36 8 °C)    Temp:  [97 5 °F (36 4 °C)-98 3 °F (36 8 °C)] 97 5 °F (36 4 °C)  HR:  [60-69] 61  Resp:  [18-20] 18  BP: (128-158)/(50-75) 133/63  SpO2:  [92 %-99 %] 97 %  Body mass index is 30 62 kg/m²  Input and Output Summary (last 24 hours): Intake/Output Summary (Last 24 hours) at 19 1752  Last data filed at 19 1701   Gross per 24 hour   Intake             1070 ml   Output              725 ml   Net              345 ml       Physical Exam:     Physical Exam   Constitutional: She is oriented to person, place, and time  She appears well-developed and well-nourished  HENT:   Head: Normocephalic and atraumatic  Mouth/Throat: Oropharynx is clear and moist    Cardiovascular: Normal rate, regular rhythm and normal heart sounds  Pulmonary/Chest: Effort normal and breath sounds normal  No respiratory distress  She has no wheezes  She has no rales  Abdominal: Soft  She exhibits no distension  There is no tenderness  Musculoskeletal: She exhibits no edema  Neurological: She is alert and oriented to person, place, and time  Skin: She is not diaphoretic  Vitals reviewed  Additional Data:     Labs:      Results from last 7 days  Lab Units 19  0551 19  0435  19  0516   WBC Thousand/uL  --  7 98  < > 6 94   HEMOGLOBIN g/dL 6 4* 6 9*  < > 7 8*   HEMATOCRIT % 21 2* 22 9*  < > 25 3*   PLATELETS Thousands/uL  --  184  < > 167   NEUTROS PCT %  --   --   --  64   LYMPHS PCT %  --   --   --  25   MONOS PCT %  --   --   --  7   EOS PCT %  --   --   --  4   < > = values in this interval not displayed      Results from last 7 days  Lab Units 19  0551  19  0446   POTASSIUM mmol/L 4 5  < > 3 9   CHLORIDE mmol/L 106  < > 109*   CO2 mmol/L 21  < > 23   BUN mg/dL 76*  < > 59*   CREATININE mg/dL 2 85*  < > 1 92*   CALCIUM mg/dL 8 3  < > 8  9   ALK PHOS U/L  --   --  531*   ALT U/L  --   --  42   AST U/L  --   --  39   < > = values in this interval not displayed  * I Have Reviewed All Lab Data Listed Above  * Additional Pertinent Lab Tests Reviewed: All Labs Within Last 24 Hours Reviewed    Imaging:    Imaging Reports Reviewed Today Include:  Imaging Personally Reviewed by Myself Includes:      Recent Cultures (last 7 days):           Last 24 Hours Medication List:     Current Facility-Administered Medications:  acetaminophen 650 mg Oral Q6H PRN Jose Love PA-C   amLODIPine 10 mg Oral Daily Ivon Roberts MD   aspirin 81 mg Oral Daily Stefany Reddy MD   atorvastatin 40 mg Oral Daily With Deandre Lo PA-C   benzonatate 100 mg Oral TID Alverto Pal MD   carvedilol 6 25 mg Oral BID With Meals Stefany Reddy MD   clopidogrel 75 mg Oral Daily Doug Campoverde MD   docusate sodium 100 mg Oral BID Irena Anthony PA-C   fluticasone 1 spray Each Nare BID Alverto Pal MD   insulin lispro 1-5 Units Subcutaneous TID AC Irena Anthony PA-C   insulin lispro 1-5 Units Subcutaneous HS Irena Prince PA-C   insulin lispro 4 Units Subcutaneous TID With Meals Alverto Pal MD   isosorbide mononitrate 30 mg Oral Daily Ryder León MD   levothyroxine 112 mcg Oral Once per day on Mon Tue Wed Thu Fri Sat Irena Prince PA-C   levothyroxine 56 mcg Oral Once per day on Sun Irena Anthony PA-C   nitroglycerin 0 4 mg Sublingual Q5 Min PRN CLARICE Reid   ondansetron 4 mg Intravenous Q6H PRN Daniel Guzman PA-C        Today, Patient Was Seen By: Ivon Roberts MD    ** Please Note: This note has been constructed using a voice recognition system   **

## 2019-01-07 NOTE — PROGRESS NOTES
NEPHROLOGY PROGRESS NOTE   Terrial Records 58 y o  female MRN: [id number]  Unit/Bed#: [facility] 509-01 Encounter: [id number]      ASSESSMENT & PLAN:    1  Acute kidney injury likely multifactorial secondary to contrast induced nephropathy, decreased effective arterial volume with anemia  2  Stage III B chronic kidney disease with a baseline creatinine of 1 5 and likely he has has diabetic nephropathy with proteinuria  3  Proteinuric kidney disease  4  Diastolic congestive heart failure  5  Coronary artery disease status post intervention as below    -creatinine was slightly above baseline at the time of cardiac catheterization on January 4th the patient was having chest pain with positive troponin and abnormal stress test status post 2 drug-eluting stents mid LAD and will require intervention to RCA lesion and future  -hemoglobin has dropped while in the hospital from 7 7 down to 6 4  -consent obtained for receiving packed red blood cells  -workup ongoing by slim for any signs of bleeding  -iron saturation 22% with a ferritin that is low based on lab work from the 29th  -blood pressures reviewed over the last 3-4 days and she has maintained a mean arterial pressure greater than 65 with blood pressures that have averaged 440 to 931 systolic  -Continue to avoid nephrotoxic agent avoid hypotension and trend creatinine  -will check a urine protein to creatinine ratio  -we did discuss that if renal function worsening she may require renal replacement therapy, she states that she would not want dialysis if needed prolong her life    She was reluctant to sign consent for packed red blood cells we did discuss the benefits and risks of giving blood and at this point given her rising creatinine worsening hemoglobin the benefits of blood transfusion outweigh the risks associated with transfusion reaction she understood signed consent and all other questions were answered    Addendum:  Patient's daughter and daughter's boyfriend at bedside and further questions were answered regarding anemia and acute kidney injury, discuss that risks are present with receiving packed red blood cells but given creatinine now worsening and worsening renal indices workup needs to be done and benefits of receiving blood outweigh the risks    Patient and family are in agreement all questions were answered to their satisfaction    SUBJECTIVE:    Patient was seen sitting up comfortable not in any distress or pain states she is urinating without any difficulties breathing without any difficulty    OBJECTIVE:  Current Weight: Weight - Scale: 78 4 kg (172 lb 13 5 oz)  Vitals:    01/07/19 1053   BP: 128/50   Pulse: 61   Resp: 20   Temp: 98 °F (36 7 °C)   SpO2: 99%       Intake/Output Summary (Last 24 hours) at 01/07/19 1137  Last data filed at 01/07/19 0554   Gross per 24 hour   Intake                0 ml   Output              800 ml   Net             -800 ml       General: conscious, cooperative, in not acute distress  Eyes: conjunctivae pink, anicteric sclerae  ENT: lips and mucous membranes moist  Neck: supple, no JVD  Chest: clear breath sounds bilateral, no crackles, ronchus or wheezings  CVS: distinct S1 & S2, normal rate, regular rhythm  Abdomen: soft, non-tender, non-distended, normoactive bowel sounds  Extremities:  1+ edema  Skin: no rash  Neuro: awake, alert, oriented    Medications:    Current Facility-Administered Medications:     acetaminophen (TYLENOL) tablet 650 mg, 650 mg, Oral, Q6H PRN, Stu Estevez PA-C, 650 mg at 01/05/19 1653    amLODIPine (NORVASC) tablet 10 mg, 10 mg, Oral, Daily, Myrtle Bennett MD, 10 mg at 01/07/19 6805    aspirin chewable tablet 81 mg, 81 mg, Oral, Daily, Cory Jasmine MD, 81 mg at 01/07/19 6102    atorvastatin (LIPITOR) tablet 40 mg, 40 mg, Oral, Daily With Dinner, Irena Anthony PA-C, 40 mg at 01/06/19 1705    benzonatate (TESSALON PERLES) capsule 100 mg, 100 mg, Oral, TID, Manolo Rojas MD, 100 mg at 01/05/19 2131    carvedilol (COREG) tablet 6 25 mg, 6 25 mg, Oral, BID With Meals, Hipolito Eubanks MD, 6 25 mg at 01/07/19 3741    clopidogrel (PLAVIX) tablet 75 mg, 75 mg, Oral, Daily, Amanda Ndiaye MD, 75 mg at 01/07/19 0811    docusate sodium (COLACE) capsule 100 mg, 100 mg, Oral, BID, Irena Anthony PA-C, 100 mg at 01/07/19 8510    fluticasone (FLONASE) 50 mcg/act nasal spray 1 spray, 1 spray, Each Nare, BID, Yudith Shaikh MD, 1 spray at 01/05/19 0856    insulin lispro (HumaLOG) 100 units/mL subcutaneous injection 1-5 Units, 1-5 Units, Subcutaneous, TID AC, 1 Units at 01/07/19 0814 **AND** Fingerstick Glucose (POCT), , , TID AC, Irena Anthony PA-C    insulin lispro (HumaLOG) 100 units/mL subcutaneous injection 1-5 Units, 1-5 Units, Subcutaneous, HS, Irena Anthony PA-C, 1 Units at 01/06/19 2132    insulin lispro (HumaLOG) 100 units/mL subcutaneous injection 4 Units, 4 Units, Subcutaneous, TID With Meals, Yudith Shaikh MD, 4 Units at 01/07/19 0815    isosorbide mononitrate (IMDUR) 24 hr tablet 30 mg, 30 mg, Oral, Daily, Alecia Au MD, 30 mg at 01/07/19 3352    levothyroxine tablet 112 mcg, 112 mcg, Oral, Once per day on Mon Tue Wed Thu Fri Sat, Altaf Peterson PA-C, 112 mcg at 01/07/19 9959    levothyroxine tablet 56 mcg, 56 mcg, Oral, Once per day on Sun, Altaf Peterson PA-C, 56 mcg at 01/06/19 1421    nitroglycerin (NITROSTAT) SL tablet 0 4 mg, 0 4 mg, Sublingual, Q5 Min PRN, CLARICE Okeefe    [facility]ansetron [facility]) injection 4 mg, 4 mg, Intravenous, Q6H PRN, Irena Anthony PA-C, 4 mg at 01/04/19 1349    Invasive Devices:      Lab Results:     Results from last 7 days  Lab Units 01/07/19  0551 01/06/19  1451 01/06/19  0435 01/05/19  0446 01/04/19  0512 01/03/19  0529 01/02/19  0516   WBC Thousand/uL  --   --  7 98 9 99 6 50 6 02 6 94   HEMOGLOBIN g/dL 6 4*  --  6 9* 7 1* 7 1* 7 7* 7 8*   HEMATOCRIT % 21 2*  --  22 9* 23 4* 23 6* 24 9* 25 3* PLATELETS Thousands/uL  --   --  184 246 160 169 167   POTASSIUM mmol/L 4 5  --  4 4 3 9 4 4 4 1 4 4   CHLORIDE mmol/L 106  --  106 109* 105 106 106   CO2 mmol/L 21  --  21 23 24 25 24   BUN mg/dL 76*  --  71* 59* 57* 50* 49*   CREATININE mg/dL 2 85*  --  2 48* 1 92* 1 77* 1 64* 1 96*   CALCIUM mg/dL 8 3  --  8 5 8 9 8 5 8 6 8 4   MAGNESIUM mg/dL  --   --  2 2  --   --   --   --    ALK PHOS U/L  --   --   --  531*  --   --   --    ALT U/L  --   --   --  42  --   --   --    AST U/L  --   --   --  39  --   --   --    LEUKOCYTES UA   --  Negative  --   --   --   --   --    BLOOD UA   --  Negative  --   --   --   --   --        Previous work up:  Please see previous notes

## 2019-01-07 NOTE — ASSESSMENT & PLAN NOTE
Lab Results   Component Value Date    HGBA1C 7 5 (H) 12/29/2018       Recent Labs      01/06/19   2132  01/07/19   0629  01/07/19   1051  01/07/19   1549   POCGLU  218*  173*  153*  116       Blood Sugar Average: Last 72 hrs:  · Patient sees Endocrinology outpatient  Takes repaglinide 0 5 mg p o  B i d  Januvia 50 mg p o  Daily  Patient states she has never taken insulin  She monitors her sugars twice daily  Normal results 160  · While hospitalized, patient will be managed on weight based insulin dosing    Will monitor    · Blood glucose is elevated, add basal bolus insulin  · 1/4 - increase Lantus to 15U qhs as BSs high  · 1/5 - decrease Lantus to 10U qhs as FBS low  · 1/6: PT upset about drop in BS with increase in lantus, refused lantus last night, DC lantus, cont humalog 4 U TIDAC  · 1/7:  Continue current regimen

## 2019-01-07 NOTE — ASSESSMENT & PLAN NOTE
Likely due to chronic kidney disease  Iron panel, B12, folate WNL  hemoccult neg in ER  hemolysis labs show high LDH, high retic ct, nl bili, and Haptoglobin negative  Serum protein electrophoresis has some abnormalities  Continued downward stranding hemoglobin now dropped from 6 9-6 4  No active source of bleeding  At recheck her stool occult  Did recommend blood transfusion which initially refused to me but did agree with Nephrology    Giving 2 units PRBC  Also placed orders for a CT abdomen pelvis to look for any intra-abdominal/retroperitoneal bleed but patient denied that the nurse  Will need aspirin, plavix, hold sc heparin  Outpt hematology

## 2019-01-07 NOTE — PROGRESS NOTES
Dr Angeli Ramirez updated pt daughter current plan of care and addressed concerns regarding dropping Hg levels as per family request  Per Dr Angeli Ramirez, pt family appears satisfied with meeting  Will monitor

## 2019-01-08 LAB
ABO GROUP BLD BPU: NORMAL
ABO GROUP BLD BPU: NORMAL
ANION GAP SERPL CALCULATED.3IONS-SCNC: 9 MMOL/L (ref 4–13)
BASOPHILS # BLD AUTO: 0.03 THOUSANDS/ΜL (ref 0–0.1)
BASOPHILS NFR BLD AUTO: 0 % (ref 0–1)
BPU ID: NORMAL
BPU ID: NORMAL
BUN SERPL-MCNC: 82 MG/DL (ref 5–25)
CALCIUM SERPL-MCNC: 8.6 MG/DL (ref 8.3–10.1)
CHLORIDE SERPL-SCNC: 107 MMOL/L (ref 100–108)
CO2 SERPL-SCNC: 20 MMOL/L (ref 21–32)
CREAT SERPL-MCNC: 2.99 MG/DL (ref 0.6–1.3)
CROSSMATCH: NORMAL
CROSSMATCH: NORMAL
EOSINOPHIL # BLD AUTO: 0.13 THOUSAND/ΜL (ref 0–0.61)
EOSINOPHIL NFR BLD AUTO: 2 % (ref 0–6)
ERYTHROCYTE [DISTWIDTH] IN BLOOD BY AUTOMATED COUNT: 14.1 % (ref 11.6–15.1)
GFR SERPL CREATININE-BSD FRML MDRD: 16 ML/MIN/1.73SQ M
GLUCOSE SERPL-MCNC: 125 MG/DL (ref 65–140)
GLUCOSE SERPL-MCNC: 134 MG/DL (ref 65–140)
GLUCOSE SERPL-MCNC: 189 MG/DL (ref 65–140)
GLUCOSE SERPL-MCNC: 193 MG/DL (ref 65–140)
GLUCOSE SERPL-MCNC: 207 MG/DL (ref 65–140)
HCT VFR BLD AUTO: 28.9 % (ref 34.8–46.1)
HGB BLD-MCNC: 9.2 G/DL (ref 11.5–15.4)
IMM GRANULOCYTES # BLD AUTO: 0.05 THOUSAND/UL (ref 0–0.2)
IMM GRANULOCYTES NFR BLD AUTO: 1 % (ref 0–2)
LYMPHOCYTES # BLD AUTO: 1.5 THOUSANDS/ΜL (ref 0.6–4.47)
LYMPHOCYTES NFR BLD AUTO: 20 % (ref 14–44)
MCH RBC QN AUTO: 27.3 PG (ref 26.8–34.3)
MCHC RBC AUTO-ENTMCNC: 31.8 G/DL (ref 31.4–37.4)
MCV RBC AUTO: 86 FL (ref 82–98)
MONOCYTES # BLD AUTO: 0.56 THOUSAND/ΜL (ref 0.17–1.22)
MONOCYTES NFR BLD AUTO: 8 % (ref 4–12)
NEUTROPHILS # BLD AUTO: 5.19 THOUSANDS/ΜL (ref 1.85–7.62)
NEUTS SEG NFR BLD AUTO: 69 % (ref 43–75)
NRBC BLD AUTO-RTO: 0 /100 WBCS
PLATELET # BLD AUTO: 175 THOUSANDS/UL (ref 149–390)
PMV BLD AUTO: 11.3 FL (ref 8.9–12.7)
POTASSIUM SERPL-SCNC: 4.3 MMOL/L (ref 3.5–5.3)
RBC # BLD AUTO: 3.37 MILLION/UL (ref 3.81–5.12)
SODIUM SERPL-SCNC: 136 MMOL/L (ref 136–145)
UNIT DISPENSE STATUS: NORMAL
UNIT DISPENSE STATUS: NORMAL
UNIT PRODUCT CODE: NORMAL
UNIT PRODUCT CODE: NORMAL
UNIT RH: NORMAL
UNIT RH: NORMAL
WBC # BLD AUTO: 7.46 THOUSAND/UL (ref 4.31–10.16)

## 2019-01-08 PROCEDURE — 99232 SBSQ HOSP IP/OBS MODERATE 35: CPT | Performed by: INTERNAL MEDICINE

## 2019-01-08 PROCEDURE — 85025 COMPLETE CBC W/AUTO DIFF WBC: CPT | Performed by: HOSPITALIST

## 2019-01-08 PROCEDURE — 82948 REAGENT STRIP/BLOOD GLUCOSE: CPT

## 2019-01-08 PROCEDURE — 80048 BASIC METABOLIC PNL TOTAL CA: CPT | Performed by: HOSPITALIST

## 2019-01-08 RX ADMIN — INSULIN LISPRO 4 UNITS: 100 INJECTION, SOLUTION INTRAVENOUS; SUBCUTANEOUS at 07:42

## 2019-01-08 RX ADMIN — INSULIN LISPRO 1 UNITS: 100 INJECTION, SOLUTION INTRAVENOUS; SUBCUTANEOUS at 21:24

## 2019-01-08 RX ADMIN — INSULIN LISPRO 4 UNITS: 100 INJECTION, SOLUTION INTRAVENOUS; SUBCUTANEOUS at 16:59

## 2019-01-08 RX ADMIN — AMLODIPINE BESYLATE 10 MG: 10 TABLET ORAL at 08:31

## 2019-01-08 RX ADMIN — CARVEDILOL 6.25 MG: 6.25 TABLET, FILM COATED ORAL at 17:01

## 2019-01-08 RX ADMIN — ISOSORBIDE MONONITRATE 30 MG: 30 TABLET, EXTENDED RELEASE ORAL at 08:31

## 2019-01-08 RX ADMIN — ATORVASTATIN CALCIUM 40 MG: 40 TABLET, FILM COATED ORAL at 16:59

## 2019-01-08 RX ADMIN — INSULIN LISPRO 1 UNITS: 100 INJECTION, SOLUTION INTRAVENOUS; SUBCUTANEOUS at 17:00

## 2019-01-08 RX ADMIN — ASPIRIN 81 MG 81 MG: 81 TABLET ORAL at 08:31

## 2019-01-08 RX ADMIN — CLOPIDOGREL BISULFATE 75 MG: 75 TABLET ORAL at 08:31

## 2019-01-08 RX ADMIN — LEVOTHYROXINE SODIUM 112 MCG: 112 TABLET ORAL at 05:01

## 2019-01-08 RX ADMIN — INSULIN LISPRO 1 UNITS: 100 INJECTION, SOLUTION INTRAVENOUS; SUBCUTANEOUS at 11:32

## 2019-01-08 RX ADMIN — DOCUSATE SODIUM 100 MG: 100 CAPSULE, LIQUID FILLED ORAL at 08:31

## 2019-01-08 RX ADMIN — INSULIN LISPRO 4 UNITS: 100 INJECTION, SOLUTION INTRAVENOUS; SUBCUTANEOUS at 11:32

## 2019-01-08 RX ADMIN — DOCUSATE SODIUM 100 MG: 100 CAPSULE, LIQUID FILLED ORAL at 17:05

## 2019-01-08 RX ADMIN — CARVEDILOL 6.25 MG: 6.25 TABLET, FILM COATED ORAL at 07:43

## 2019-01-08 RX ADMIN — BENZONATATE 100 MG: 100 CAPSULE ORAL at 16:59

## 2019-01-08 RX ADMIN — BENZONATATE 100 MG: 100 CAPSULE ORAL at 08:31

## 2019-01-08 NOTE — ASSESSMENT & PLAN NOTE
Likely due to chronic kidney disease  Iron panel, B12, folate WNL  hemoccult neg in ER  hemolysis labs show high LDH, high retic ct, nl bili, and Haptoglobin negative  Serum protein electrophoresis shows no evidence of monoclonal spike  Continue aspirin and Plavix due to recent stent placement  Hold heparin for now    Status post 2 units of packed red cells yesterday, continue monitor hemoglobin

## 2019-01-08 NOTE — ASSESSMENT & PLAN NOTE
· On CKD 3  · Renal function worsening post cardiac catheterization, rate of rise has decreased somewhat today after transfusion yesterday  · Suspect contrast nephropathy  · Continue to monitor renal function off diuretics, avoid nephrotoxins    · Nephrology following

## 2019-01-08 NOTE — PROGRESS NOTES
NEPHROLOGY PROGRESS NOTE   Jeff Lyons 58 y o  female MRN: [id number]  Unit/Bed#: [facility] 509-01 Encounter: [id number]      ASSESSMENT & PLAN:    1  Acute kidney injury likely multifactorial secondary to contrast induced nephropathy, decreased effective arterial volume with anemia  2  Stage III B chronic kidney disease with a baseline creatinine of 1 5 and likely he has has diabetic nephropathy with proteinuria  3  Proteinuric kidney disease  4  Diastolic congestive heart failure  5   Coronary artery disease status post intervention as below    -creatinine was slightly above baseline at the time of cardiac catheterization on January 4th the patient was having chest pain with positive troponin and abnormal stress test status post 2 drug-eluting stents mid LAD and will require intervention to RCA lesion and future  -hemoglobin had dropped to 6 4 and is now 9 2 after receiving 2 units of packed red blood cell  -tolerated blood without any difficulty  -workup ongoing by slim for any signs of bleeding  -renal ultrasound shows normal echogenicity and contour of both kidneys with right kidney measuring 9 4 cm and left kidney measuring 9 9 cm  -iron saturation 22% with a ferritin that is low based on lab work from the 29th  -blood pressures reviewed would not overly treat blood pressure for concern of hypotension  -Continue to avoid nephrotoxic agent avoid hypotension and trend creatinine  -urine protein to creatinine ratio 0 76 in the setting of acute kidney injury and advanced diabetes non nephrotic range and will re-evaluate in workup as an outpatient  -overall creatinine is 2 99 from 2 8 yesterday urine output remained stable weight is slightly increased if more short of breath can consider giving Lasix 40 mg IV x1 at this point will hold off on any diuretics given that she has no symptoms and hopeful over the next 1-2 days creatinine stabilizes and that improves and we divert any need for dialysis  -discussed with her daughter in detail today    SUBJECTIVE:    Patient is sitting up comfortably states she feels marginally better denies any chest pain or shortness of Breath slept without any difficulties overnight    OBJECTIVE:  Current Weight: Weight - Scale: 79 1 kg (174 lb 6 1 oz) (standing scale)  Vitals:    01/08/19 1039   BP: 148/75   Pulse: 64   Resp: 18   Temp: 97 7 °F (36 5 °C)   SpO2: 94%       Intake/Output Summary (Last 24 hours) at 01/08/19 1200  Last data filed at 01/08/19 0815   Gross per 24 hour   Intake             1490 ml   Output              550 ml   Net              940 ml       General: conscious, cooperative, in not acute distress  Eyes: conjunctivae pink, anicteric sclerae  ENT: lips and mucous membranes moist  Neck: supple, no JVD  Chest: clear breath sounds bilateral, no crackles, ronchus or wheezings  CVS: distinct S1 & S2, normal rate, regular rhythm  Abdomen: soft, non-tender, non-distended, normoactive bowel sounds  Extremities:  1+ edema in the lower extremity  Skin: no rash  Neuro: awake, alert, oriented      Medications:    Current Facility-Administered Medications:     acetaminophen (TYLENOL) tablet 650 mg, 650 mg, Oral, Q6H PRN, Mendel Mallick, PA-C, 650 mg at 01/05/19 1653    amLODIPine (NORVASC) tablet 10 mg, 10 mg, Oral, Daily, Myrtle Bennett MD, 10 mg at 01/08/19 0831    aspirin chewable tablet 81 mg, 81 mg, Oral, Daily, Galina Khanna MD, 81 mg at 01/08/19 0831    atorvastatin (LIPITOR) tablet 40 mg, 40 mg, Oral, Daily With Dinner, Irean Anthony PA-C, 40 mg at 01/07/19 1609    benzonatate (TESSALON PERLES) capsule 100 mg, 100 mg, Oral, TID, Sherren Pax, MD, 100 mg at 01/08/19 0831    carvedilol (COREG) tablet 6 25 mg, 6 25 mg, Oral, BID With Meals, Galina Khanna MD, 6 25 mg at 01/08/19 0743    clopidogrel (PLAVIX) tablet 75 mg, 75 mg, Oral, Daily, Liliana Small MD, 75 mg at 01/08/19 0831    docusate sodium (COLACE) capsule 100 mg, 100 mg, Oral, BID, Irena IVERSON PEDRO Anthony, 100 mg at 01/08/19 0831    fluticasone (FLONASE) 50 mcg/act nasal spray 1 spray, 1 spray, Each Nare, BID, Lucian Arzate MD, 1 spray at 01/05/19 0856    insulin lispro (HumaLOG) 100 units/mL subcutaneous injection 1-5 Units, 1-5 Units, Subcutaneous, TID AC, 1 Units at 01/08/19 1132 **AND** Fingerstick Glucose (POCT), , , TID AC, Irena Anthony PA-C    insulin lispro (HumaLOG) 100 units/mL subcutaneous injection 1-5 Units, 1-5 Units, Subcutaneous, HS, Irena Anthony PA-C, 1 Units at 01/06/19 2132    insulin lispro (HumaLOG) 100 units/mL subcutaneous injection 4 Units, 4 Units, Subcutaneous, TID With Meals, Lucian Arzate MD, 4 Units at 01/08/19 1132    isosorbide mononitrate (IMDUR) 24 hr tablet 30 mg, 30 mg, Oral, Daily, Osman Arriaga MD, 30 mg at 01/08/19 0831    levothyroxine tablet 112 mcg, 112 mcg, Oral, Once per day on Mon Tue Wed Thu Fri Sat, Edu Wilson PA-C, 112 mcg at 01/08/19 0501    levothyroxine tablet 56 mcg, 56 mcg, Oral, Once per day on Sun, Edu Wilson PA-C, 56 mcg at 01/06/19 9300    nitroglycerin (NITROSTAT) SL tablet 0 4 mg, 0 4 mg, Sublingual, Q5 Min PRN, CLARICE Wilder    [facility]ron [facility]) injection 4 mg, 4 mg, Intravenous, Q6H PRN, Winnie Anthony PA-C, 4 mg at 01/04/19 1349     Lab Results:     Results from last 7 days  Lab Units 01/08/19  0447 01/07/19  0551 01/06/19  1451 01/06/19  0435 01/05/19  0446 01/04/19  0512 01/03/19  0529   WBC Thousand/uL 7 46  --   --  7 98 9 99 6 50 6 02   HEMOGLOBIN g/dL 9 2* 6 4*  --  6 9* 7 1* 7 1* 7 7*   HEMATOCRIT % 28 9* 21 2*  --  22 9* 23 4* 23 6* 24 9*   PLATELETS Thousands/uL 175  --   --  184 246 160 169   POTASSIUM mmol/L 4 3 4 5  --  4 4 3 9 4 4 4 1   CHLORIDE mmol/L 107 106  --  106 109* 105 106   CO2 mmol/L 20* 21  --  21 23 24 25   BUN mg/dL 82* 76*  --  71* 59* 57* 50*   CREATININE mg/dL 2 99* 2 85*  --  2 48* 1 92* 1 77* 1 64*   CALCIUM mg/dL 8 6 8 3  --  8 5 8 9 8 5 8 6 MAGNESIUM mg/dL  --   --   --  2 2  --   --   --    ALK PHOS U/L  --   --   --   --  531*  --   --    ALT U/L  --   --   --   --  42  --   --    AST U/L  --   --   --   --  39  --   --    LEUKOCYTES UA   --   --  Negative  --   --   --   --    BLOOD UA   --   --  Negative  --   --   --   --        Previous work up:  Please see previous notes

## 2019-01-08 NOTE — PROGRESS NOTES
Progress Note - Alena Dykes 1955, 58 y o  female MRN: [id number]    Unit/Bed#: [facility] 509-01 Encounter: [id number]    Primary Care Provider: Lamin Johnston MD   Date and time admitted to hospital: 12/29/2018  9:02 AM        * Type 2 myocardial infarction [facility])   Assessment & Plan    · Likely due to congestive heart failure  · Echo shows preserved ejection fraction  · Stress test does show a point of reversible ischemia on the mid anterior wall  · 1/4: Cardiac catheterization showed CAD - see below     Acute diastolic congestive heart failure (HCC)   Assessment & Plan    Stable, reports her breathing is stable  Completed IV diuresis  Now Monitor off diuretics secondary to elevated creatinine     [facility] (acute kidney injury) ([facility] [address] )   Assessment & Plan    · On CKD 3  · Renal function worsening post cardiac catheterization, rate of rise has decreased somewhat today after transfusion yesterday  · Suspect contrast nephropathy  · Continue to monitor renal function off diuretics, avoid nephrotoxins  · Nephrology following     Coronary artery disease involving native coronary artery of native heart with angina pectoris [facility])   Assessment & Plan    S/p PCI with LUDY to mLAD on 1/4 - plan for staged PCI to RCA in 3 weeks (unclear if she would even come a did do that out will be eligible for that given her rising creatinine post contrast)  ASA and plavix and statin and Coreg, imdur     Anemia   Assessment & Plan    Likely due to chronic kidney disease  Iron panel, B12, folate WNL  hemoccult neg in ER  hemolysis labs show high LDH, high retic ct, nl bili, and Haptoglobin negative  Serum protein electrophoresis shows no evidence of monoclonal spike  Continue aspirin and Plavix due to recent stent placement  Hold heparin for now    Status post 2 units of packed red cells yesterday, continue monitor hemoglobin     Hyperlipidemia   Assessment & Plan    Continue atorvastatin 40 mg  LDL is at goal     Hypertension Assessment & Plan    Improving  Continue amlodipine, Coreg, imdur added  Restart lisinopril when renal function stable  Blood pressure is currently acceptable, avoid hypotension while in [place]     DM (diabetes mellitus) [facility])   Assessment & Plan    Lab Results   Component Value Date    HGBA1C 7 5 (H) 12/29/2018       Recent Labs      01/07/19   1549  01/07/19   2107  01/08/19   0627  01/08/19   1114   POCGLU  116  123  134  189*       Blood Sugar Average: Last 72 hrs:  · Patient sees Endocrinology outpatient  Takes repaglinide 0 5 mg p o  B i d  Januvia 50 mg p o  Daily  Patient states she has never taken insulin  She monitors her sugars twice daily  Normal results 160  · While hospitalized, patient will be managed on weight based insulin dosing  Will monitor    · Blood glucose is elevated, add basal bolus insulin  · 1/4 - increase Lantus to 15U qhs as BSs high  · 1/5 - decrease Lantus to 10U qhs as FBS low  · 1/6: PT upset about drop in BS with increase in lantus, refused lantus last night, DC lantus, cont humalog 4 U TIDAC  · Blood sugars stable, continue current regimen         VTE Pharmacologic Prophylaxis:   Pharmacologic: Pharmacologic VTE Prophylaxis contraindicated due to Anemia  Mechanical VTE Prophylaxis in Place: Yes    Patient Centered Rounds: I have performed bedside rounds with nursing staff today  Discussions with Specialists or Other Care Team Provider:  Nephrology    Education and Discussions with Family / Patient:  Patient, plan of care  Call the patient's daughter, no answer, message left  Time Spent for Care: 20 minutes  More than 50% of total time spent on counseling and coordination of care as described above      Current Length of Stay: 10 day(s)    Current Patient Status: Inpatient   Certification Statement: The patient will continue to require additional inpatient hospital stay due to Monitor renal function    Discharge Plan:  Home when stable    Code Status: Level 1 - Full Code      Subjective:   Reports that her breathing is stable post transfusion  Denies any lower extremity edema  Appetite is adequate  Objective:     Vitals:   Temp (24hrs), Av 8 °F (36 6 °C), Min:97 2 °F (36 2 °C), Max:98 4 °F (36 9 °C)    Temp:  [97 2 °F (36 2 °C)-98 4 °F (36 9 °C)] 98 4 °F (36 9 °C)  HR:  [60-72] 64  Resp:  [16-20] 18  BP: (130-169)/(58-78) 162/72  SpO2:  [94 %-99 %] 96 %  Body mass index is 30 89 kg/m²  Input and Output Summary (last 24 hours): Intake/Output Summary (Last 24 hours) at 19 1611  Last data filed at 19 1300   Gross per 24 hour   Intake             1550 ml   Output              300 ml   Net             1250 ml       Physical Exam:     Physical Exam   Constitutional: She is oriented to person, place, and time  She appears well-developed and well-nourished  HENT:   Head: Normocephalic  Eyes: Pupils are equal, round, and reactive to light  EOM are normal  No scleral icterus  Neck: Neck supple  No JVD present  Cardiovascular: Normal rate and regular rhythm  Pulmonary/Chest: Effort normal  She has no wheezes  She has no rales  Abdominal: Soft  Musculoskeletal:   Trace edema in the lower extremities bilaterally   Neurological: She is alert and oriented to person, place, and time  No cranial nerve deficit  Skin: Skin is warm and dry           Additional Data:     Labs:      Results from last 7 days  Lab Units 19  0447   WBC Thousand/uL 7 46   HEMOGLOBIN g/dL 9 2*   HEMATOCRIT % 28 9*   PLATELETS Thousands/uL 175   NEUTROS PCT % 69   LYMPHS PCT % 20   MONOS PCT % 8   EOS PCT % 2       Results from last 7 days  Lab Units 19  0447  19  0446   SODIUM mmol/L 136  < > 140   POTASSIUM mmol/L 4 3  < > 3 9   CHLORIDE mmol/L 107  < > 109*   CO2 mmol/L 20*  < > 23   BUN mg/dL 82*  < > 59*   CREATININE mg/dL 2 99*  < > 1 92*   ANION GAP mmol/L 9  < > 8   CALCIUM mg/dL 8 6  < > 8 9   ALBUMIN g/dL  --   --  2 9*   TOTAL BILIRUBIN mg/dL  -- --  0 20   ALK PHOS U/L  --   --  531*   ALT U/L  --   --  42   AST U/L  --   --  39   GLUCOSE RANDOM mg/dL 125  < > 43*   < > = values in this interval not displayed  Results from last 7 days  Lab Units 01/08/19  1114 01/08/19  0627 01/07/19  2107 01/07/19  1549 01/07/19  1051 01/07/19  0629 01/06/19  2132 01/06/19  1617 01/06/19  1133 01/06/19  0639 01/05/19  2237 01/05/19  2033   POC GLUCOSE mg/dl 189* 134 123 116 153* 173* 218* 214* 192* 147* 191* 260*                   * I Have Reviewed All Lab Data Listed Above  * Additional Pertinent Lab Tests Reviewed:  All Labs Within Last 24 Hours Reviewed      Recent Cultures (last 7 days):           Last 24 Hours Medication List:     Current Facility-Administered Medications:  acetaminophen 650 mg Oral Q6H PRN Jolynn Smiley PA-C   amLODIPine 10 mg Oral Daily Nishi Rodriguez MD   aspirin 81 mg Oral Daily Saba Sloitario MD   atorvastatin 40 mg Oral Daily With Peterson Fulton PA-C   benzonatate 100 mg Oral TID Ashley Rogel MD   carvedilol 6 25 mg Oral BID With Meals Saba Solitraio MD   clopidogrel 75 mg Oral Daily Beka Amado MD   docusate sodium 100 mg Oral BID Irena Anthony PA-C   fluticasone 1 spray Each Nare BID Ashley Rogel MD   insulin lispro 1-5 Units Subcutaneous TID AC Irena Anthony PA-C   insulin lispro 1-5 Units Subcutaneous HS Irena Can PA-C   insulin lispro 4 Units Subcutaneous TID With Meals Ashley Rogel MD   isosorbide mononitrate 30 mg Oral Daily Rodriguez Li MD   levothyroxine 112 mcg Oral Once per day on Mon Tue Wed Thu Fri Sat Irena Can PA-C   levothyroxine 56 mcg Oral Once per day on Sun Irena Anthony PA-C   nitroglycerin 0 4 mg Sublingual Q5 Min PRN CLARICE Marks   ondansetron 4 mg Intravenous Q6H PRN Gunjan Rivas PA-C        Today, Patient Was Seen By: Kayy Katz MD    ** Please Note: Dictation voice to text software may have been used in the creation of this document   **

## 2019-01-08 NOTE — ASSESSMENT & PLAN NOTE
Improving  Continue amlodipine, Coreg, imdur added  Restart lisinopril when renal function stable    Blood pressure is currently acceptable, avoid hypotension while in KELLY

## 2019-01-08 NOTE — ASSESSMENT & PLAN NOTE
Stable, reports her breathing is stable  Completed IV diuresis  Now Monitor off diuretics secondary to elevated creatinine

## 2019-01-08 NOTE — PROGRESS NOTES
Progress Note - Cardiology   Delene Abo 58 y o  female MRN: [id number]  Unit/Bed#: [facility] 509-01 Encounter: [id number]        Principal Problem:    Type 2 myocardial infarction [facility])  Active Problems:    Acute diastolic congestive heart failure (HCC)    DM (diabetes mellitus) ([facility] )    Hypertension    Hyperlipidemia    Stage 3 chronic kidney disease ([place] [address] )    Anemia    Coronary artery disease involving native coronary artery of native heart with angina pectoris (HCC)    KELLY (acute kidney injury) ([place] [address] )      Assessment/Plan    1  CAD   S/P LUDY X2 LAD  Residual RCA disease and plan is for staged PCI  Pt however with KELLY post cath  Pre op echo- LVEF 50% with hypokinesis mid inferoseptal and apical inferior walls  Nuclear stress test- small mildly severe partially reversible defect mid anterior wall  On asa 81, lipitor 40, coreg 6 25 BID, plavix 75, imdur 30   Minimal troponin elevation on arrival felt to be secondary to heart failure more so than a non STEMI type 1  Patient without any angina  2  KELLY- multifactorial , contrast induced nephropathy and decrease effective circulatory volume d/t anemia  Creatinine rising now 2 9  Renal following  3  Acute diastolic heart failure  Diuretics currently on hold  Creat today 2 9  Patient complains of dyspnea  Reports edema improved  4  Anemia- status post transfusion  W/U per SLIM  Hemoglobin now to 9 2     5  HTN-not well controlled although cautious gets being overly aggressive to avoid any hypotension sign of acute kidney injury  On Norvasc 10 and carvedilol 6 25 b i d  Subjective/Objective   Chief Complaint/Subjective  Patient without any chest pain or shortness of breath  Overall feels her breathing has improved  Feels lower extremity edema improved          Vitals: /75 (BP Location: Right arm)   Pulse 64   Temp 97 7 °F (36 5 °C) (Oral)   Resp 18   Ht 5' 3" (1 6 m)   Wt 79 1 kg (174 lb 6 1 oz) Comment: standing scale  SpO2 94%   BMI 30 89 kg/m²     Vitals:    01/07/19 0554 01/08/19 0455   Weight: 78 4 kg (172 lb 13 5 oz) 79 1 kg (174 lb 6 1 oz)     Orthostatic Blood Pressures      Most Recent Value   Blood Pressure  148/75 filed at 01/08/2019 1039   Patient Position - Orthostatic VS  Lying filed at 01/08/2019 1039            Intake/Output Summary (Last 24 hours) at 01/08/19 1221  Last data filed at 01/08/19 0815   Gross per 24 hour   Intake             1490 ml   Output              550 ml   Net              940 ml       Invasive Devices     Peripheral Intravenous Line            Peripheral IV 01/07/19 Left Forearm less than 1 day                Current Facility-Administered Medications   Medication Dose Route Frequency    acetaminophen (TYLENOL) tablet 650 mg  650 mg Oral Q6H PRN    amLODIPine (NORVASC) tablet 10 mg  10 mg Oral Daily    aspirin chewable tablet 81 mg  81 mg Oral Daily    atorvastatin (LIPITOR) tablet 40 mg  40 mg Oral Daily With Dinner    benzonatate (TESSALON PERLES) capsule 100 mg  100 mg Oral TID    carvedilol (COREG) tablet 6 25 mg  6 25 mg Oral BID With Meals    clopidogrel (PLAVIX) tablet 75 mg  75 mg Oral Daily    docusate sodium (COLACE) capsule 100 mg  100 mg Oral BID    fluticasone (FLONASE) 50 mcg/act nasal spray 1 spray  1 spray Each Nare BID    insulin lispro (HumaLOG) 100 units/mL subcutaneous injection 1-5 Units  1-5 Units Subcutaneous TID AC    insulin lispro (HumaLOG) 100 units/mL subcutaneous injection 1-5 Units  1-5 Units Subcutaneous HS    insulin lispro (HumaLOG) 100 units/mL subcutaneous injection 4 Units  4 Units Subcutaneous TID With Meals    isosorbide mononitrate (IMDUR) 24 hr tablet 30 mg  30 mg Oral Daily    levothyroxine tablet 112 mcg  112 mcg Oral Once per day on Mon Tue Wed Thu Fri Sat    levothyroxine tablet 56 mcg  56 mcg Oral Once per day on Sun    nitroglycerin (NITROSTAT) SL tablet 0 4 mg  0 4 mg Sublingual Q5 Min PRN    ondansetron (ZOFRAN) injection 4 mg  4 mg Intravenous Q6H PRN         Physical Exam: /75 (BP Location: Right arm)   Pulse 64   Temp 97 7 °F (36 5 °C) (Oral)   Resp 18   Ht 5' 3" (1 6 m)   Wt 79 1 kg (174 lb 6 1 oz) Comment: standing scale  SpO2 94%   BMI 30 89 kg/m²     General Appearance:    Alert, cooperative, no distress, appears stated age   Head:    Normocephalic, no scleral icterus   Eyes:    PERRL   Nose:   Nares normal, septum midline, no drainage    Throat:   Lips, mucosa, and tongue normal   Neck:   Supple, symmetrical, trachea midline,              Lungs:     Clear to auscultation bilaterally, respirations unlabored   Chest Wall:    No tenderness or deformity    Heart:    Regular rate and rhythm, S1 and S2 normal, no murmur, rub   or gallop       Extremities:   Extremities normal, atraumatic, no cyanosis, 1+ edema       Skin:   Skin warm   Neurologic:   Alert and oriented to person place and time, no focal deficits                 Lab Results:   Recent Results (from the past 72 hour(s))   Fingerstick Glucose (POCT)    Collection Time: 01/05/19  4:45 PM   Result Value Ref Range    POC Glucose 416 (H) 65 - 140 mg/dl   Fingerstick Glucose (POCT)    Collection Time: 01/05/19  8:33 PM   Result Value Ref Range    POC Glucose 260 (H) 65 - 140 mg/dl   Fingerstick Glucose (POCT)    Collection Time: 01/05/19 10:37 PM   Result Value Ref Range    POC Glucose 191 (H) 65 - 140 mg/dl   CBC    Collection Time: 01/06/19  4:35 AM   Result Value Ref Range    WBC 7 98 4 31 - 10 16 Thousand/uL    RBC 2 56 (L) 3 81 - 5 12 Million/uL    Hemoglobin 6 9 (LL) 11 5 - 15 4 g/dL    Hematocrit 22 9 (L) 34 8 - 46 1 %    MCV 90 82 - 98 fL    MCH 27 0 26 8 - 34 3 pg    MCHC 30 1 (L) 31 4 - 37 4 g/dL    RDW 13 9 11 6 - 15 1 %    Platelets 797 074 - 324 Thousands/uL    MPV 11 8 8 9 - 12 7 fL   Basic metabolic panel    Collection Time: 01/06/19  4:35 AM   Result Value Ref Range    Sodium 135 (L) 136 - 145 mmol/L    Potassium 4 4 3 5 - 5 3 mmol/L    Chloride 106 100 - 108 mmol/L CO2 21 21 - 32 mmol/L    ANION GAP 8 4 - 13 mmol/L    BUN 71 (H) 5 - 25 mg/dL    Creatinine 2 48 (H) 0 60 - 1 30 mg/dL    Glucose 135 65 - 140 mg/dL    Calcium 8 5 8 3 - 10 1 mg/dL    eGFR 20 ml/min/1 73sq m   Magnesium    Collection Time: 01/06/19  4:35 AM   Result Value Ref Range    Magnesium 2 2 1 6 - 2 6 mg/dL   Fingerstick Glucose (POCT)    Collection Time: 01/06/19  6:39 AM   Result Value Ref Range    POC Glucose 147 (H) 65 - 140 mg/dl   Fingerstick Glucose (POCT)    Collection Time: 01/06/19 11:33 AM   Result Value Ref Range    POC Glucose 192 (H) 65 - 140 mg/dl   Sodium, urine, random    Collection Time: 01/06/19  2:50 PM   Result Value Ref Range    Sodium, Ur 14    Urinalysis with microscopic    Collection Time: 01/06/19  2:51 PM   Result Value Ref Range    Clarity, UA Cloudy     Color, UA Yellow     Specific [place]gomar, UA 1 021 1 003 - 1 030    pH, UA 5 0 4 5 - 8 0    Glucose,  (1/10%) (A) Negative mg/dl    Ketones, UA Negative Negative mg/dl    Blood, UA Negative Negative    Protein,  (2+) (A) Negative mg/dl    Nitrite, UA Negative Negative    Bilirubin, UA Negative Negative    Urobilinogen, UA 0 2 0 2, 1 0 E U /dl E U /dl    Leukocytes, UA Negative Negative    WBC, UA 2-4 (A) None Seen, 0-5, 5-55, 5-65 /hpf    RBC, UA None Seen None Seen, 0-5 /hpf    Bacteria, UA None Seen None Seen, Occasional /hpf    Epithelial Cells Occasional None Seen, Occasional /hpf   Fingerstick Glucose (POCT)    Collection Time: 01/06/19  4:17 PM   Result Value Ref Range    POC Glucose 214 (H) 65 - 140 mg/dl   Fingerstick Glucose (POCT)    Collection Time: 01/06/19  9:32 PM   Result Value Ref Range    POC Glucose 218 (H) 65 - 140 mg/dl   Basic metabolic panel    Collection Time: 01/07/19  5:51 AM   Result Value Ref Range    Sodium 135 (L) 136 - 145 mmol/L    Potassium 4 5 3 5 - 5 3 mmol/L    Chloride 106 100 - 108 mmol/L    CO2 21 21 - 32 mmol/L    ANION GAP 8 4 - 13 mmol/L    BUN 76 (H) 5 - 25 mg/dL    Creatinine 2 85 (H) 0 60 - 1 30 mg/dL    Glucose 154 (H) 65 - 140 mg/dL    Calcium 8 3 8 3 - 10 1 mg/dL    eGFR 17 ml/min/1 73sq m   Hemoglobin and hematocrit, blood    Collection Time: 01/07/19  5:51 AM   Result Value Ref Range    Hemoglobin 6 4 (LL) 11 5 - 15 4 g/dL    Hematocrit 21 2 (L) 34 8 - 46 1 %   Fingerstick Glucose (POCT)    Collection Time: 01/07/19  6:29 AM   Result Value Ref Range    POC Glucose 173 (H) 65 - 140 mg/dl   Fingerstick Glucose (POCT)    Collection Time: 01/07/19 10:51 AM   Result Value Ref Range    POC Glucose 153 (H) 65 - 140 mg/dl   Type and screen    Collection Time: 01/07/19 12:16 PM   Result Value Ref Range    ABO Grouping A     Rh Factor Negative     Antibody Screen Positive     Specimen Expiration Date 20190110    Fingerstick Glucose (POCT)    Collection Time: 01/07/19  3:49 PM   Result Value Ref Range    POC Glucose 116 65 - 140 mg/dl   Protein / creatinine ratio, urine    Collection Time: 01/07/19  7:15 PM   Result Value Ref Range    Creatinine, Ur 185 0 mg/dL    Protein Urine Random 141 mg/dL    Prot/Creat Ratio, Ur 0 76 (H) 0 00 - 0 10   Fingerstick Glucose (POCT)    Collection Time: 01/07/19  9:07 PM   Result Value Ref Range    POC Glucose 123 65 - 140 mg/dl   CBC and differential    Collection Time: 01/08/19  4:47 AM   Result Value Ref Range    WBC 7 46 4 31 - 10 16 Thousand/uL    RBC 3 37 (L) 3 81 - 5 12 Million/uL    Hemoglobin 9 2 (L) 11 5 - 15 4 g/dL    Hematocrit 28 9 (L) 34 8 - 46 1 %    MCV 86 82 - 98 fL    MCH 27 3 26 8 - 34 3 pg    MCHC 31 8 31 4 - 37 4 g/dL    RDW 14 1 11 6 - 15 1 %    MPV 11 3 8 9 - 12 7 fL    Platelets 661 918 - 740 Thousands/uL    nRBC 0 /100 WBCs    Neutrophils Relative 69 43 - 75 %    Immat GRANS % 1 0 - 2 %    Lymphocytes Relative 20 14 - 44 %    Monocytes Relative 8 4 - 12 %    Eosinophils Relative 2 0 - 6 %    Basophils Relative 0 0 - 1 %    Neutrophils Absolute 5 19 1 85 - 7 62 Thousands/µL    Immature Grans Absolute 0 05 0 00 - 0 20 Thousand/uL Lymphocytes Absolute 1 50 0 60 - 4 47 Thousands/µL    Monocytes Absolute 0 56 0 17 - 1 22 Thousand/µL    Eosinophils Absolute 0 13 0 00 - 0 61 Thousand/µL    Basophils Absolute 0 03 0 00 - 0 10 Thousands/µL   Basic metabolic panel    Collection Time: 19  4:47 AM   Result Value Ref Range    Sodium 136 136 - 145 mmol/L    Potassium 4 3 3 5 - 5 3 mmol/L    Chloride 107 100 - 108 mmol/L    CO2 20 (L) 21 - 32 mmol/L    ANION GAP 9 4 - 13 mmol/L    BUN 82 (H) 5 - 25 mg/dL    Creatinine 2 99 (H) 0 60 - 1 30 mg/dL    Glucose 125 65 - 140 mg/dL    Calcium 8 6 8 3 - 10 1 mg/dL    eGFR 16 ml/min/1 73sq m   Prepare RBC:Special Requirements: None; Has consent been obtained? Yes, 2 Units    Collection Time: 19  5:55 AM   Result Value Ref Range    Unit Product Code H9069Y32     Unit Number [id number]     Unit ABO A     Unit [facility] NEG     Crossmatch Compatible     Unit Dispense Status Presumed Trans     Unit Product Code T3491X06     Unit Number [id number]     Unit ABO O     Unit RH NEG     Crossmatch Compatible     Unit Dispense Status Presumed Trans    Fingerstick Glucose (POCT)    Collection Time: 19  6:27 AM   Result Value Ref Range    POC Glucose 134 65 - 140 mg/dl   Fingerstick Glucose (POCT)    Collection Time: 19 11:14 AM   Result Value Ref Range    POC Glucose 189 (H) 65 - 140 mg/dl     [facility] 175  [address]  [phone]     [facility]     Invasive Cardiovascular Lab Complete Report     Patient: Mellissa Sanchez  MR number: [id number]  Account number: [de-identified]  Study date: 2019  Gender: Female  : 1955  Height: 63 in  Weight: 168 7 lb  BSA: 1 8 m squared     Allergies: NO KNOWN ALLERGIES     Diagnostic Cardiologist:  Zoltan South MD  Interventional Cardiologist:  Zoltan South MD  Primary Physician:  Lamin Johnston MD     SUMMARY     CORONARY CIRCULATION:  Proximal LAD: There was a 50 % stenosis    Mid LAD: There was a tubular 90 % stenosis  The lesion was complex and moderately calcified  This is a likely culprit for the patient's clinical presentation  An intervention was performed  Distal LAD: There was a diffuse 50 % stenosis  Mid RCA: There was a tubular 80 % stenosis  It appears amenable to percutaneous intervention      1ST LESION INTERVENTIONS:  A balloon angioplasty procedure was performed on the lesion in the mid LAD  A Xience Terrie Rx 2 5 x 15mm drug-eluting stent was placed across the lesion and deployed at a maximum inflation pressure of 12 jennifer  A Xience [address] Rx 2 5 x 15mm drug-eluting stent was placed across the lesion and deployed at a maximum inflation pressure of 11 jennifer      INDICATIONS:  --  Possible CAD: myocardial infarction without ST elevation (NSTEMI)  --  Coronary artery disease: abnormal stress test      PROCEDURES PERFORMED     --  Left coronary angiography  --  Right coronary angiography  --  Inpatient  --  Mod Sedation Same Physician Initial 15min  --  Mod Sedation Same Physician Add 15min  --  Coronary Catheterization (w/o LHC)  --  Mod Sedation Same Physician Add 15min  --  Coronary Drug Eluting Stent w/PTCA  --  Intervention on mid LAD: balloon angioplasty      PROCEDURE: The risks and alternatives of the procedures and conscious sedation were explained to the patient and informed consent was obtained  The patient was brought to the cath lab and placed on the table  The planned puncture sites  were prepped and draped in the usual sterile fashion      --  Right radial artery access  After performing an Balwinder's test to verify adequate ulnar artery supply to the hand, the radial site was prepped  The puncture site was infiltrated with local anesthetic  The vessel was accessed using the  modified Seldinger technique, a wire was advanced into the vessel, and a sheath was advanced over the wire into the vessel      --  Left coronary artery angiography   A catheter was advanced over a guidewire into the aorta and positioned in the left coronary artery ostium under fluoroscopic guidance  Angiography was performed      --  Right coronary artery angiography  A catheter was advanced over a guidewire into the aorta and positioned in the right coronary artery ostium under fluoroscopic guidance  Angiography was performed      --  Inpatient      --  Mod Sedation Same Physician Initial 15min      --  Mod Sedation Same Physician Add 15min      --  Coronary Catheterization (w/o LHC)     --  Mod Sedation Same Physician Add 15min      LESION INTERVENTION: A balloon angioplasty procedure was performed on the lesion in the mid LAD      --  Vessel setup was performed  A 5Fr Launcher Ebu 3 5 guiding catheter was used to cannulate the vessel      --  Vessel setup was performed  A Runthrough NS 180cm wire was used to cross the lesion      --  Balloon angioplasty was performed, using a Mini Trek Rx 2 0 x 15mm balloon, with 4 inflations and a maximum inflation pressure of 8 jennifer      --  A Xience Terrie Rx 2 5 x 15mm drug-eluting stent was placed across the lesion and deployed at a maximum inflation pressure of 12 jennifer      --  Balloon angioplasty was performed, using a NC Euphora Rx 2 25 x 15mm balloon, with 2 inflations and a maximum inflation pressure of 16 jennifer      --  Balloon angioplasty was performed, using a NC Trek Rx 2 75 x 08mm balloon, with 2 inflations and a maximum inflation pressure of 20 jennifer      --  A Xience Terrie Rx 2 5 x 15mm drug-eluting stent was placed across the lesion and deployed at a maximum inflation pressure of 11 jennifer      --  Balloon angioplasty was performed, using a NC Trek Rx 2 75 x 08mm balloon, with 3 inflations and a maximum inflation pressure of 20 jennifer      INTERVENTIONS:  --  Coronary Drug Eluting Stent w/PTCA      PROCEDURE COMPLETION: The patient tolerated the procedure well and was discharged from the cath lab  TIMING: Test started at 11:29  Test concluded at 12:29   HEMOSTASIS: The sheath was removed  The site was compressed with a Hemoband  device  Hemostasis was obtained  MEDICATIONS GIVEN: Verapamil (Isoptin, Calan, Covera), 5 mg, IA, at 11:32  Fentanyl (1OOmcg/2 ml), 50 mcg, IV, at 11:27  Versed (2mg/2ml), 1 mg, IV, at 11:27  1% Lidocaine, 1 ml, subcutaneously, at 11:29  Nitroglycerin (200mcg/ml), 200 mcg, at 11:32  Heparin 1000 units/ml, 3,000 units, at 11:32  Versed (2mg/2ml), 1 mg, IV, at 11:42  Fentanyl (1OOmcg/2 ml), 50 mcg, IV, at 11:42  Nitro Paste, 1, topically, at 11:42, in  Effient, 60 mg, PO, at  11:42  Heparin 1000 units/ml, 5,000 units, IV, at 11:43  Heparin 1000 units/ml, 2,000 units, IV, at 11:51  Nitroglycerine (200mcg/ml), 200 mcg, at 11:57  CONTRAST GIVEN: 60 ml Omnipaque (350mg I /ml)  RADIATION EXPOSURE: Fluoroscopy time:  13 23 min      CORONARY VESSELS:   --  The coronary circulation is right dominant  --  Left main: The vessel was medium to large sized  Angiography showed minor luminal irregularities  --  Proximal LAD: There was a 50 % stenosis  --  Mid LAD: There was a tubular 90 % stenosis  The lesion was complex and moderately calcified  This is a likely culprit for the patient's clinical presentation  An intervention was performed  --  Distal LAD: The vessel was small to medium sized  Angiography showed severe atherosclerosis  There was a diffuse 50 % stenosis  --  Circumflex: The vessel was small sized  Angiography showed minor luminal irregularities  There was one major obtuse marginal   --  Ramus intermedius: The vessel was medium sized  Angiography showed mild atherosclerosis  The artery bifurcated into two small sized vessels  --  RCA: The vessel was medium sized  --  Mid RCA: There was a tubular 80 % stenosis  It appears amenable to percutaneous intervention      IMPRESSIONS:  There is significant double vessel coronary artery disease      RECOMMENDATIONS  The patient's anti-anginal regimen should be further intensified    Patient instructed to return for staged percutaneous intervention in three weeks      Prepared and signed by  Fede Medina MD  Signed 01/04/2019 12:37:00     Study diagram     Angiographic findings  Native coronary lesions:  ·Proximal LAD: Lesion 1: 50 % stenosis  ·Mid LAD: Lesion 1: tubular, 90 % stenosis  ·Distal LAD: Lesion 1: diffuse, 50 % stenosis  ·Mid RCA: Lesion 1: tubular, 80 % stenosis  Intervention results  Native coronary lesions:  · balloon angioplasty of mid LAD  Stent: Clevester Six Rx 2 5 x 15mm drug-eluting  Stent: Clevester Six Rx 2 5 x 15mm drug-eluting        Imaging: I have personally reviewed pertinent reports  Tele- NSR    Counseling / Coordination of Care  Total time spent today 25 minutes  Greater than 50% of total time was spent with the patient and / or family counseling and / or coordination of care

## 2019-01-08 NOTE — ASSESSMENT & PLAN NOTE
Lab Results   Component Value Date    HGBA1C 7 5 (H) 12/29/2018       Recent Labs      01/07/19   1549  01/07/19   2107  01/08/19   0627  01/08/19   1114   POCGLU  116  123  134  189*       Blood Sugar Average: Last 72 hrs:  · Patient sees Endocrinology outpatient  Takes repaglinide 0 5 mg p o  B i d  Januvia 50 mg p o  Daily  Patient states she has never taken insulin  She monitors her sugars twice daily  Normal results 160  · While hospitalized, patient will be managed on weight based insulin dosing    Will monitor    · Blood glucose is elevated, add basal bolus insulin  · 1/4 - increase Lantus to 15U qhs as BSs high  · 1/5 - decrease Lantus to 10U qhs as FBS low  · 1/6: PT upset about drop in BS with increase in lantus, refused lantus last night, DC lantus, cont humalog 4 U TIDAC  · Blood sugars stable, continue current regimen

## 2019-01-08 NOTE — UTILIZATION REVIEW
[address] Utilization Review Department  Phone: [phone]; Fax [phone]  [employer]@"[employer]ah[employer] [email or web address], [employer]"  org  ATTENTION: Please call with any questions or concerns to [phone]  and carefully listen to the prompts so that you are directed to the right person  Send all requests for admission clinical reviews, approved or denied determinations and any other requests to fax [phone]  All voicemails are confidential       Continued Stay Review    Date: 19 ACUTE MED SURG LEVEL OF CARE    Vital Signs: /75 (BP Location: Right arm)   Pulse 64   Temp 97 7 °F (36 5 °C) (Oral)   Resp 18   Ht 5' 3" (1 6 m)   Wt 79 1 kg (174 lb 6 1 oz) Comment: standing scale  SpO2 94%   BMI 30 89 kg/m²     Vitals:   Temp (24hrs), Av °F (36 7 °C), Min:97 5 °F (36 4 °C), Max:98 3 °F (36 8 °C)   Temp:  [97 5 °F (36 4 °C)-98 3 °F (36 8 °C)] 97 5 °F (36 4 °C)  HR:  [60-69] 61  Resp:  [18-20] 18  BP: (128-158)/(50-75) 133/63  SpO2:  [92 %-99 %] 97 %  Body mass index is 30 62 kg/m²       Input and Output Summary (last 24 hours):    Intake/Output Summary (Last 24 hours) at 19 1752    Gross per 24 hour   Intake             1070 ml   Output              725 ml   Net              345 ml       Diet Cardiovascular; Cardiac; Consistent Carbohydrate Diet Level 2 (5 carb servings/75 grams CHO/meal), Sodium 2 GM       IV ACCESS    Assessment/Plan:   KELLY (acute kidney injury) ([facility] [address] )   Assessment & Plan     · wrosening cr post cath, was 1 7 -> 1 9 -> 2 4 --  2 8  · Contrast induced nephropathy plus worsened by anemia  · Consulted nephro  · Waited out to see the creatinine today but it continued to worsen to 2 8  And discuss with patient and daughter that partly this is a being contributed due to her worsening anemia and that we should treated with blood transfusion    Patient and daughter continues to argue that why everything is happening and they are only thing was white things are happening like this the should not have perform cardiac catheterization on, their concerns were why she her blood counts were dropping if he are not seeing any active bleeding  But I continue to reinforce than that right now the main thing is to treat her and then the only way her counts can come up soon would be to PRBCs transfusion that would help her renal function as well  I left them to discuss amongst themselves  I explained home the at was outcomes including life-threatening events if she goes home which she wants to do  Later on nephrology also discussed with the patient and finally she agreed for blood transfusion  · Monitor with 2 units PRBC today      Coronary artery disease involving native coronary artery of native heart with angina pectoris [facility])   Assessment & Plan     S/p PCI with LUDY to mLAD on 1/4 - plan for staged PCI to RCA in 3 weeks (unclear if she would even come a did do that out will be eligible for that given her rising creatinine post contrast)  ASA and plavix and statin and Coreg, imdur      Anemia   Assessment & Plan     Likely due to chronic kidney disease  Iron panel, B12, folate WNL  hemoccult neg in ER  hemolysis labs show high LDH, high retic ct, nl bili, and Haptoglobin negative  Serum protein electrophoresis has some abnormalities  Continued downward stranding hemoglobin now dropped from 6 9-6 4  No active source of bleeding  At recheck her stool occult  Did recommend blood transfusion which initially refused to me but did agree with Nephrology    Giving 2 units PRBC  Also placed orders for a CT abdomen pelvis to look for any intra-abdominal/retroperitoneal bleed but patient denied that the nurse  Will need aspirin, plavix, hold sc heparin  Outpt hematology      Stage 3 chronic kidney disease ([facility] [address] )   Assessment & Plan     · With proteinuria, likely diabetic nephropathy  · Appears to be worked up by her endocrinologist stat [facility]  · Baseline appears to be around 1 4, but possibly closer to 1 6  · Avoid nephrotoxins if possible  · 1/5: Cr up to 1 9 after cath  Monitor for improvement  · Outpatient referral to Nephrology      Hypertension   Assessment & Plan     Improving  Continue amlodipine, Coreg, imdur added  Restart lisinopril when renal function stable      DM (diabetes mellitus) ([facility] )   Assessment & Plan             Lab Results   Component Value Date     HGBA1C 7 5 (H) 12/29/2018                Recent Labs      01/06/19   2132  01/07/19   0629  01/07/19   1051  01/07/19   1549   POCGLU  218*  173*  153*  116         Blood Sugar Average: Last 72 hrs:  · Patient sees Endocrinology outpatient  Takes repaglinide 0 5 mg p o  B i d  Januvia 50 mg p o  Daily  Patient states she has never taken insulin  She monitors her sugars twice daily  Normal results 160  · While hospitalized, patient will be managed on weight based insulin dosing  Will monitor    · Blood glucose is elevated, add basal bolus insulin  · 1/4 - increase Lantus to 15U qhs as BSs high  · 1/5 - decrease Lantus to 10U qhs as FBS low  · 1/6: PT upset about drop in BS with increase in lantus, refused lantus last night, DC lantus, cont humalog 4 U TIDAC  · 1/7:  Continue current regimen      Acute diastolic congestive heart failure (HCC)   Assessment & Plan     Stable, reports her breathing is at baseline  Completed IV diuresis  Now Monitor off diuretics secondary to elevated creatinine      * Type 2 myocardial infarction [facility])   Assessment & Plan     · Likely due to congestive heart failure  · Echo shows preserved ejection fraction  · Stress test does show a point of reversible ischemia on the mid anterior wall    · 1/4: Cardiac catheterization showed CAD - see below          Assessment:   Principal Problem:    Type 2 myocardial infarction [facility])  Active Problems:    Acute diastolic congestive heart failure (HCC)    DM (diabetes mellitus) ([facility] )    Hypertension    Hyperlipidemia    Stage 3 chronic kidney disease ([facility] [address] )    Anemia    Coronary artery disease involving native coronary artery of native heart with angina pectoris (HCC)    KELLY (acute kidney injury) ([facility])      VTE Pharmacologic Prophylaxis:   Pharmacologic: Pharmacologic VTE Prophylaxis contraindicated due to Anemia  Mechanical VTE Prophylaxis in Place:  Yes     Current Length of Stay: 9 day(s)     Current Patient Status: Inpatient   Certification Statement: The patient will continue to require additional inpatient hospital stay         Medications:   Scheduled Meds:   Current Facility-Administered Medications:  acetaminophen 650 mg Oral Q6H PRN    amLODIPine 10 mg Oral Daily    aspirin 81 mg Oral Daily    atorvastatin 40 mg Oral Daily With Dinner    benzonatate 100 mg Oral TID    carvedilol 6 25 mg Oral BID With Meals    clopidogrel 75 mg Oral Daily    docusate sodium 100 mg Oral BID    fluticasone 1 spray Each Nare BID    insulin lispro 1-5 Units Subcutaneous TID AC    insulin lispro 1-5 Units Subcutaneous HS    insulin lispro 4 Units Subcutaneous TID With Meals    isosorbide mononitrate 30 mg Oral Daily    levothyroxine 112 mcg Oral Once per day on Mon Tue Wed Thu Fri Sat    levothyroxine 56 mcg Oral Once per day on Sun    nitroglycerin 0 4 mg Sublingual Q5 Min PRN    ondansetron 4 mg Intravenous Q6H PRN        PRN Meds:     acetaminophen    nitroglycerin    ondansetron      LABS/Diagnostic Results:  Results from last 7 days  Lab Units 01/08/19  0447 01/07/19  0551 01/06/19  1451 01/06/19  0435 01/05/19  0446 01/04/19  0512 01/03/19  0529   WBC Thousand/uL 7 46  --   --  7 98 9 99 6 50 6 02   HEMOGLOBIN g/dL 9 2* 6 4*  --  6 9* 7 1* 7 1* 7 7*   HEMATOCRIT % 28 9* 21 2*  --  22 9* 23 4* 23 6* 24 9*   PLATELETS Thousands/uL 175  --   --  184 246 160 169   POTASSIUM mmol/L 4 3 4 5  --  4 4 3 9 4 4 4 1   CHLORIDE mmol/L 107 106  --  106 109* 105 106   CO2 mmol/L 20* 21  --  21 23 24 25   BUN mg/dL 82* 76*  --  71* 59* 57* 50*   CREATININE mg/dL 2 99* 2 85*  --  2 48* 1 92* 1 77* 1 64*   CALCIUM mg/dL 8 6 8 3  --  8 5 8 9 8 5 8 6   MAGNESIUM mg/dL  --   --   --  2 2  --   --   --    ALK PHOS U/L  --   --   --   --  531*  --   --    ALT U/L  --   --   --   --  42  --   --    AST U/L  --   --   --   --  39  --   --    LEUKOCYTES UA    --   --  Negative  --   --   --   --    BLOOD UA    --   --  Negative  --   --   --   --      Age/Sex: 58 y o  female           Discharge Plan:   [facility]

## 2019-01-08 NOTE — ASSESSMENT & PLAN NOTE
S/p PCI with LUDY to mLAD on 1/4 - plan for staged PCI to RCA in 3 weeks (unclear if she would even come a did do that out will be eligible for that given her rising creatinine post contrast)  ASA and plavix and statin and Coreg, imdur

## 2019-01-09 LAB
ANION GAP SERPL CALCULATED.3IONS-SCNC: 11 MMOL/L (ref 4–13)
BASOPHILS # BLD AUTO: 0.04 THOUSANDS/ΜL (ref 0–0.1)
BASOPHILS NFR BLD AUTO: 1 % (ref 0–1)
BUN SERPL-MCNC: 81 MG/DL (ref 5–25)
CALCIUM SERPL-MCNC: 8.6 MG/DL (ref 8.3–10.1)
CHLORIDE SERPL-SCNC: 109 MMOL/L (ref 100–108)
CO2 SERPL-SCNC: 16 MMOL/L (ref 21–32)
CREAT SERPL-MCNC: 2.9 MG/DL (ref 0.6–1.3)
EOSINOPHIL # BLD AUTO: 0.17 THOUSAND/ΜL (ref 0–0.61)
EOSINOPHIL NFR BLD AUTO: 2 % (ref 0–6)
ERYTHROCYTE [DISTWIDTH] IN BLOOD BY AUTOMATED COUNT: 14.6 % (ref 11.6–15.1)
GFR SERPL CREATININE-BSD FRML MDRD: 16 ML/MIN/1.73SQ M
GLUCOSE SERPL-MCNC: 119 MG/DL (ref 65–140)
GLUCOSE SERPL-MCNC: 121 MG/DL (ref 65–140)
GLUCOSE SERPL-MCNC: 128 MG/DL (ref 65–140)
GLUCOSE SERPL-MCNC: 141 MG/DL (ref 65–140)
GLUCOSE SERPL-MCNC: 173 MG/DL (ref 65–140)
HCT VFR BLD AUTO: 32.2 % (ref 34.8–46.1)
HGB BLD-MCNC: 9.8 G/DL (ref 11.5–15.4)
IMM GRANULOCYTES # BLD AUTO: 0.04 THOUSAND/UL (ref 0–0.2)
IMM GRANULOCYTES NFR BLD AUTO: 1 % (ref 0–2)
LYMPHOCYTES # BLD AUTO: 1.52 THOUSANDS/ΜL (ref 0.6–4.47)
LYMPHOCYTES NFR BLD AUTO: 19 % (ref 14–44)
MCH RBC QN AUTO: 27.1 PG (ref 26.8–34.3)
MCHC RBC AUTO-ENTMCNC: 30.4 G/DL (ref 31.4–37.4)
MCV RBC AUTO: 89 FL (ref 82–98)
MONOCYTES # BLD AUTO: 0.5 THOUSAND/ΜL (ref 0.17–1.22)
MONOCYTES NFR BLD AUTO: 6 % (ref 4–12)
NEUTROPHILS # BLD AUTO: 5.89 THOUSANDS/ΜL (ref 1.85–7.62)
NEUTS SEG NFR BLD AUTO: 71 % (ref 43–75)
NRBC BLD AUTO-RTO: 0 /100 WBCS
PLATELET # BLD AUTO: 202 THOUSANDS/UL (ref 149–390)
PMV BLD AUTO: 11 FL (ref 8.9–12.7)
POTASSIUM SERPL-SCNC: 4.3 MMOL/L (ref 3.5–5.3)
RBC # BLD AUTO: 3.62 MILLION/UL (ref 3.81–5.12)
SODIUM SERPL-SCNC: 136 MMOL/L (ref 136–145)
WBC # BLD AUTO: 8.16 THOUSAND/UL (ref 4.31–10.16)

## 2019-01-09 PROCEDURE — 99232 SBSQ HOSP IP/OBS MODERATE 35: CPT | Performed by: INTERNAL MEDICINE

## 2019-01-09 PROCEDURE — 80048 BASIC METABOLIC PNL TOTAL CA: CPT | Performed by: PHYSICIAN ASSISTANT

## 2019-01-09 PROCEDURE — 85025 COMPLETE CBC W/AUTO DIFF WBC: CPT | Performed by: PHYSICIAN ASSISTANT

## 2019-01-09 PROCEDURE — 82948 REAGENT STRIP/BLOOD GLUCOSE: CPT

## 2019-01-09 RX ORDER — SODIUM BICARBONATE 650 MG/1
650 TABLET ORAL
Status: DISCONTINUED | OUTPATIENT
Start: 2019-01-09 | End: 2019-01-10 | Stop reason: HOSPADM

## 2019-01-09 RX ADMIN — ASPIRIN 81 MG 81 MG: 81 TABLET ORAL at 08:15

## 2019-01-09 RX ADMIN — BENZONATATE 100 MG: 100 CAPSULE ORAL at 17:09

## 2019-01-09 RX ADMIN — ISOSORBIDE MONONITRATE 30 MG: 30 TABLET, EXTENDED RELEASE ORAL at 08:15

## 2019-01-09 RX ADMIN — INSULIN LISPRO 4 UNITS: 100 INJECTION, SOLUTION INTRAVENOUS; SUBCUTANEOUS at 08:15

## 2019-01-09 RX ADMIN — INSULIN LISPRO 4 UNITS: 100 INJECTION, SOLUTION INTRAVENOUS; SUBCUTANEOUS at 17:10

## 2019-01-09 RX ADMIN — CLOPIDOGREL BISULFATE 75 MG: 75 TABLET ORAL at 08:15

## 2019-01-09 RX ADMIN — LEVOTHYROXINE SODIUM 112 MCG: 112 TABLET ORAL at 05:02

## 2019-01-09 RX ADMIN — BENZONATATE 100 MG: 100 CAPSULE ORAL at 08:15

## 2019-01-09 RX ADMIN — FLUTICASONE PROPIONATE 1 SPRAY: 50 SPRAY, METERED NASAL at 08:15

## 2019-01-09 RX ADMIN — SODIUM BICARBONATE 650 MG TABLET 650 MG: at 17:09

## 2019-01-09 RX ADMIN — INSULIN LISPRO 4 UNITS: 100 INJECTION, SOLUTION INTRAVENOUS; SUBCUTANEOUS at 12:04

## 2019-01-09 RX ADMIN — ATORVASTATIN CALCIUM 40 MG: 40 TABLET, FILM COATED ORAL at 17:09

## 2019-01-09 RX ADMIN — AMLODIPINE BESYLATE 10 MG: 10 TABLET ORAL at 08:15

## 2019-01-09 RX ADMIN — INSULIN LISPRO 1 UNITS: 100 INJECTION, SOLUTION INTRAVENOUS; SUBCUTANEOUS at 17:10

## 2019-01-09 RX ADMIN — DOCUSATE SODIUM 100 MG: 100 CAPSULE, LIQUID FILLED ORAL at 17:09

## 2019-01-09 RX ADMIN — CARVEDILOL 6.25 MG: 6.25 TABLET, FILM COATED ORAL at 17:09

## 2019-01-09 RX ADMIN — CARVEDILOL 6.25 MG: 6.25 TABLET, FILM COATED ORAL at 08:15

## 2019-01-09 RX ADMIN — DOCUSATE SODIUM 100 MG: 100 CAPSULE, LIQUID FILLED ORAL at 08:15

## 2019-01-09 RX ADMIN — SODIUM BICARBONATE 650 MG TABLET 650 MG: at 12:04

## 2019-01-09 RX ADMIN — FLUTICASONE PROPIONATE 1 SPRAY: 50 SPRAY, METERED NASAL at 17:10

## 2019-01-09 NOTE — PLAN OF CARE
HEMATOLOGIC - ADULT     Maintains hematologic stability Completed        SAFETY ADULT     Maintain or return to baseline ADL function Completed     Maintain or return mobility status to optimal level Completed          CARDIOVASCULAR - ADULT     Maintains optimal cardiac output and hemodynamic stability Progressing     Absence of cardiac dysrhythmias or at baseline rhythm Progressing        DISCHARGE PLANNING - CARE MANAGEMENT     Discharge to post-acute care or home with appropriate resources Progressing        METABOLIC, FLUID AND ELECTROLYTES - ADULT     Electrolytes maintained within normal limits Progressing     Fluid balance maintained Progressing     Glucose maintained within target range Progressing        Nutrition/Hydration-ADULT     Nutrient/Hydration intake appropriate for improving, restoring or maintaining nutritional needs Progressing        PAIN - ADULT     Verbalizes/displays adequate comfort level or baseline comfort level Progressing        Potential for Falls     Patient will remain free of falls Progressing        SAFETY ADULT     Patient will remain free of falls Progressing

## 2019-01-09 NOTE — ASSESSMENT & PLAN NOTE
Lab Results   Component Value Date    HGBA1C 7 5 (H) 12/29/2018       Recent Labs      01/08/19 2058  01/09/19   0614  01/09/19   1031  01/09/19   1624   POCGLU  207*  121  141*  173*       Blood Sugar Average: Last 72 hrs:  · Patient sees Endocrinology outpatient  Takes repaglinide 0 5 mg p o  B i d  Januvia 50 mg p o  Daily  Patient states she has never taken insulin  She monitors her sugars twice daily  Normal results 160  · While hospitalized, patient will be managed on weight based insulin dosing    Will monitor    · Blood glucose is elevated, add basal bolus insulin  · 1/4 - increase Lantus to 15U qhs as BSs high  · 1/5 - decrease Lantus to 10U qhs as FBS low  · 1/6: PT upset about drop in BS with increase in lantus, refused lantus last night, DC lantus, cont humalog 4 U TIDAC  · Blood sugars stable, continue current regimen

## 2019-01-09 NOTE — ASSESSMENT & PLAN NOTE
· On CKD 3  · Renal function worsening post cardiac catheterization  · Suspect contrast nephropathy  · Continue to monitor off diuretics  · Creatinine seems to have plateaued and is mildly improved today, continue to monitor

## 2019-01-09 NOTE — PROGRESS NOTES
NEPHROLOGY PROGRESS NOTE   Deuce Valverde 58 y o  female MRN: [id number]  Unit/Bed#: [facility] 509-01 Encounter: [id number]      ASSESSMENT & PLAN:    1  Acute kidney injury likely multifactorial secondary to contrast induced nephropathy, decreased effective arterial volume with anemia  2  Stage III B chronic kidney disease with a baseline creatinine of 1 5 and likely he has has diabetic nephropathy with proteinuria  3  Proteinuric kidney disease  4  Diastolic congestive heart failure  5  Coronary artery disease status post intervention as below  6   Non-anion gap metabolic acidosis    -creatinine was slightly above baseline at the time of cardiac catheterization on January 4th the patient was having chest pain with positive troponin and abnormal stress test status post 2 drug-eluting stents mid LAD and will require intervention to RCA lesion and future  -hemoglobin had dropped to 6 4 and is now 9 2 after receiving 2 units of packed red blood cell  -tolerated blood without any difficulty  -workup ongoing by slim for any signs of bleeding, but hemoglobin has stabilized  -renal ultrasound shows normal echogenicity and contour of both kidneys with right kidney measuring 9 4 cm and left kidney measuring 9 9 cm  -iron saturation 22% with a ferritin that is low based on lab work from the 29th  -blood pressures reviewed would not overly treat blood pressure for concern of hypotension  -Continue to avoid nephrotoxic agent avoid hypotension and trend creatinine  -urine protein to creatinine ratio 0 76 in the setting of acute kidney injury and advanced diabetes non nephrotic range and will re-evaluate in workup as an outpatient  -creatinine has improved slightly now to 2 9 from 2 99, monitor for polyuria and recovery, will hold on diuretics right now in unless short of breath and if creatinine continues to improve consider re-initiation of diuretics tomorrow  -bicarb did decrease now from 20-16, will check a VBG and empirically treat with sodium bicarbonate 650 mg b i d   -discussed with her daughter again in detail today and also discussed with slim    SUBJECTIVE:    Patient seen today sitting up comfortably denies any chest pain or shortness of Breath no fevers or chills no nausea vomiting diarrhea or constipation    OBJECTIVE:  Current Weight: Weight - Scale: 79 1 kg (174 lb 6 1 oz)  Vitals:    01/09/19 1028   BP: 146/63   Pulse: 60   Resp: 18   Temp: 97 7 °F (36 5 °C)   SpO2: 95%       Intake/Output Summary (Last 24 hours) at 01/09/19 1149  Last data filed at 01/09/19 0900   Gross per 24 hour   Intake              830 ml   Output             1050 ml   Net             -220 ml       General: conscious, cooperative, in not acute distress  Eyes: conjunctivae pink, anicteric sclerae  ENT: lips and mucous membranes moist  Neck: supple, no JVD  Chest: clear breath sounds bilateral, no crackles, ronchus or wheezings  CVS: distinct S1 & S2, normal rate, regular rhythm  Abdomen: soft, non-tender, non-distended, normoactive bowel sounds  Extremities:  1+ edema  Skin: no rash  Neuro: awake, alert, oriented      Medications:    Current Facility-Administered Medications:     acetaminophen (TYLENOL) tablet 650 mg, 650 mg, Oral, Q6H PRN, Jelena Trejo PA-C, 650 mg at 01/05/19 1653    amLODIPine (NORVASC) tablet 10 mg, 10 mg, Oral, Daily, Myrtle Bennett MD, 10 mg at 01/09/19 0815    aspirin chewable tablet 81 mg, 81 mg, Oral, Daily, Rosita Blanca MD, 81 mg at 01/09/19 0815    atorvastatin (LIPITOR) tablet 40 mg, 40 mg, Oral, Daily With Dinner, Irena Anthony PA-C, 40 mg at 01/08/19 1659    benzonatate (TESSALON PERLES) capsule 100 mg, 100 mg, Oral, TID, Thomas Woods MD, 100 mg at 01/09/19 0815    carvedilol (COREG) tablet 6 25 mg, 6 25 mg, Oral, BID With Meals, Rosita Blanca MD, 6 25 mg at 01/09/19 0815    clopidogrel (PLAVIX) tablet 75 mg, 75 mg, Oral, Daily, Pako Allan MD, 75 mg at 01/09/19 0815    docusate sodium (COLACE) capsule 100 mg, 100 mg, Oral, BID, Irena Anthony PA-C, 100 mg at 01/09/19 0815    fluticasone (FLONASE) 50 mcg/act nasal spray 1 spray, 1 spray, Each Nare, BID, Joselyn Borja MD, 1 spray at 01/09/19 0815    insulin lispro (HumaLOG) 100 units/mL subcutaneous injection 1-5 Units, 1-5 Units, Subcutaneous, TID AC, 1 Units at 01/08/19 1700 **AND** Fingerstick Glucose (POCT), , , TID AC, Irena Anthony PA-C    insulin lispro (HumaLOG) 100 units/mL subcutaneous injection 1-5 Units, 1-5 Units, Subcutaneous, HS, Irena Anthony PA-C, 1 Units at 01/08/19 2124    insulin lispro (HumaLOG) 100 units/mL subcutaneous injection 4 Units, 4 Units, Subcutaneous, TID With Meals, Joselyn Borja MD, 4 Units at 01/09/19 0815    isosorbide mononitrate (IMDUR) 24 hr tablet 30 mg, 30 mg, Oral, Daily, Lei Ocampo MD, 30 mg at 01/09/19 0815    levothyroxine tablet 112 mcg, 112 mcg, Oral, Once per day on Mon Tue Wed Thu Fri Sat, Shabana Hull PA-C, 112 mcg at 01/09/19 0502    levothyroxine tablet 56 mcg, 56 mcg, Oral, Once per day on Sun, Shabana Hull PA-C, 56 mcg at 01/06/19 0858    nitroglycerin (NITROSTAT) SL tablet 0 4 mg, 0 4 mg, Sublingual, Q5 Min PRN, CLARICE Kaba    ondansetron [facility]) injection 4 mg, 4 mg, Intravenous, Q6H PRN, Carmelita Anthony PA-C, 4 mg at 01/04/19 1349     Lab Results:     Results from last 7 days  Lab Units 01/09/19  0514 01/08/19  0447 01/07/19  0551 01/06/19  1451 01/06/19  0435 01/05/19  0446 01/04/19  0512   WBC Thousand/uL 8 16 7 46  --   --  7 98 9 99 6 50   HEMOGLOBIN g/dL 9 8* 9 2* 6 4*  --  6 9* 7 1* 7 1*   HEMATOCRIT % 32 2* 28 9* 21 2*  --  22 9* 23 4* 23 6*   PLATELETS Thousands/uL 202 175  --   --  184 246 160   POTASSIUM mmol/L 4 3 4 3 4 5  --  4 4 3 9 4 4   CHLORIDE mmol/L 109* 107 106  --  106 109* 105   CO2 mmol/L 16* 20* 21  --  21 23 24   BUN mg/dL 81* 82* 76*  --  71* 59* 57*   CREATININE mg/dL 2 90* 2 99* 2 85*  --  2 48* 1 92* 1 77*   CALCIUM mg/dL 8 6 8 6 8 3  --  8 5 8 9 8 5   MAGNESIUM mg/dL  --   --   --   --  2 2  --   --    ALK PHOS U/L  --   --   --   --   --  531*  --    ALT U/L  --   --   --   --   --  42  --    AST U/L  --   --   --   --   --  39  --    LEUKOCYTES UA   --   --   --  Negative  --   --   --    BLOOD UA   --   --   --  Negative  --   --   --        Previous work up:  Please see previous notes

## 2019-01-09 NOTE — PROGRESS NOTES
Progress Note - Mady Wild 1955, 58 y o  female MRN: [id number]    Unit/Bed#: [facility] 509-01 Encounter: [id number]    Primary Care Provider: Hugh Dover MD   Date and time admitted to hospital: 12/29/2018  9:02 AM        * Type 2 myocardial infarction [facility])   Assessment & Plan    · Likely due to congestive heart failure  · Echo shows preserved ejection fraction  · Stress test does show a point of reversible ischemia on the mid anterior wall  · 1/4: Cardiac catheterization showed CAD - see below     Acute diastolic congestive heart failure (HCC)   Assessment & Plan    Stable, reports her breathing is stable  Completed IV diuresis  Now Monitor off diuretics secondary to elevated creatinine, possibly restart diuretics tomorrow if renal function continues to recover     KELLY (acute kidney injury) ([facility] [address] )   Assessment & Plan    · On CKD 3  · Renal function worsening post cardiac catheterization  · Suspect contrast nephropathy  · Continue to monitor off diuretics  · Creatinine seems to have plateaued and is mildly improved today, continue to monitor  Coronary artery disease involving native coronary artery of native heart with angina pectoris [facility])   Assessment & Plan    S/p PCI with LUDY to mLAD on 1/4 - plan for staged PCI to RCA in 3 weeks (unclear if she would even come a did do that out will be eligible for that given her rising creatinine post contrast)  ASA and plavix and statin and Coreg, imdur     Anemia   Assessment & Plan    Likely due to chronic kidney disease  Iron panel, B12, folate WNL  hemoccult neg in ER  hemolysis labs show high LDH, high retic ct, nl bili, and Haptoglobin negative  Serum protein electrophoresis shows no evidence of monoclonal spike  Continue aspirin and Plavix due to recent stent placement  Hold heparin for now    Status post 2 units of packed red cells, hemoglobin is stable     DM (diabetes mellitus) [facility])   Assessment & Plan    Lab Results   Component Value Date HGBA1C 7 5 (H) 2018       Recent Labs      198  19   0614  19   1031  19   1624   POCGLU  207*  121  141*  173*       Blood Sugar Average: Last 72 hrs:  · Patient sees Endocrinology outpatient  Takes repaglinide 0 5 mg p o  B i d  Januvia 50 mg p o  Daily  Patient states she has never taken insulin  She monitors her sugars twice daily  Normal results 160  · While hospitalized, patient will be managed on weight based insulin dosing  Will monitor    · Blood glucose is elevated, add basal bolus insulin  ·  - increase Lantus to 15U qhs as BSs high  ·  - decrease Lantus to 10U qhs as FBS low  · : PT upset about drop in BS with increase in lantus, refused lantus last night, DC lantus, cont humalog 4 U TIDAC  · Blood sugars stable, continue current regimen         VTE Pharmacologic Prophylaxis:   Pharmacologic: Pharmacologic VTE Prophylaxis contraindicated due to Monitor anemia  Mechanical VTE Prophylaxis in Place: Yes    Patient Centered Rounds: I have performed bedside rounds with nursing staff today  Discussions with Specialists or Other Care Team Provider:  Nephrology    Education and Discussions with Family / Patient:  Patient and family, plan of care    Time Spent for Care: 20 minutes  More than 50% of total time spent on counseling and coordination of care as described above  Current Length of Stay: 11 day(s)    Current Patient Status: Inpatient   Certification Statement: The patient will continue to require additional inpatient hospital stay due to Monitor for recovery of renal function    Discharge Plan:  Likely home tomorrow    Code Status: Level 1 - Full Code      Subjective:   Denies any changes, states that her shortness of breath is stable  Denies any chest pain      Objective:     Vitals:   Temp (24hrs), Av 1 °F (36 7 °C), Min:97 3 °F (36 3 °C), Max:98 8 °F (37 1 °C)    Temp:  [97 3 °F (36 3 °C)-98 8 °F (37 1 °C)] 98 5 °F (36 9 °C)  HR:  Tyrel Mcgarry 67  Resp:  [10-18] 18  BP: (146-159)/(63-81) 148/81  SpO2:  [94 %-96 %] 96 %  Body mass index is 30 89 kg/m²  Input and Output Summary (last 24 hours): Intake/Output Summary (Last 24 hours) at 01/09/19 1730  Last data filed at 01/09/19 0900   Gross per 24 hour   Intake              530 ml   Output             1050 ml   Net             -520 ml       Physical Exam:     Physical Exam   Constitutional: She is oriented to person, place, and time  She appears well-developed and well-nourished  HENT:   Head: Normocephalic and atraumatic  Eyes: Pupils are equal, round, and reactive to light  EOM are normal  No scleral icterus  Neck: Neck supple  No JVD present  Cardiovascular: Normal rate and regular rhythm  Pulmonary/Chest: She has no wheezes  She has no rales  Musculoskeletal: She exhibits edema  Neurological: She is alert and oriented to person, place, and time  Skin: Skin is warm and dry  Additional Data:     Labs:      Results from last 7 days  Lab Units 01/09/19  0514   WBC Thousand/uL 8 16   HEMOGLOBIN g/dL 9 8*   HEMATOCRIT % 32 2*   PLATELETS Thousands/uL 202   NEUTROS PCT % 71   LYMPHS PCT % 19   MONOS PCT % 6   EOS PCT % 2       Results from last 7 days  Lab Units 01/09/19  0514  01/05/19  0446   SODIUM mmol/L 136  < > 140   POTASSIUM mmol/L 4 3  < > 3 9   CHLORIDE mmol/L 109*  < > 109*   CO2 mmol/L 16*  < > 23   BUN mg/dL 81*  < > 59*   CREATININE mg/dL 2 90*  < > 1 92*   ANION GAP mmol/L 11  < > 8   CALCIUM mg/dL 8 6  < > 8 9   ALBUMIN g/dL  --   --  2 9*   TOTAL BILIRUBIN mg/dL  --   --  0 20   ALK PHOS U/L  --   --  531*   ALT U/L  --   --  42   AST U/L  --   --  39   GLUCOSE RANDOM mg/dL 119  < > 43*   < > = values in this interval not displayed          Results from last 7 days  Lab Units 01/09/19  1624 01/09/19  1031 01/09/19  3400 01/08/19  2058 01/08/19  1646 01/08/19  1114 01/08/19  0627 01/07/19  2107 01/07/19  1549 01/07/19  1051 01/07/19  0629 01/06/19  2132   POC GLUCOSE mg/dl 173* 141* 121 207* 193* 189* 134 123 116 153* 173* 218*                   * I Have Reviewed All Lab Data Listed Above  * Additional Pertinent Lab Tests Reviewed: All Labs Within Last 24 Hours Reviewed        Recent Cultures (last 7 days):           Last 24 Hours Medication List:     Current Facility-Administered Medications:  acetaminophen 650 mg Oral Q6H PRN Danielle Muller PA-C   amLODIPine 10 mg Oral Daily Casey Wright MD   aspirin 81 mg Oral Daily Alex Valle MD   atorvastatin 40 mg Oral Daily With Maikol Medina PA-C   benzonatate 100 mg Oral TID Nancy Wu MD   carvedilol 6 25 mg Oral BID With Meals Alex Valle MD   clopidogrel 75 mg Oral Daily Lisa Mejia MD   docusate sodium 100 mg Oral BID Irena Anthony PA-C   fluticasone 1 spray Each Nare BID Nancy Wu MD   insulin lispro 1-5 Units Subcutaneous TID AC Irena Anthony PA-C   insulin lispro 1-5 Units Subcutaneous HS Irena Hoskins PA-C   insulin lispro 4 Units Subcutaneous TID With Meals Nancy Wu MD   isosorbide mononitrate 30 mg Oral Daily Pedro Castellanos MD   levothyroxine 112 mcg Oral Once per day on Mon Tue Wed Thu Fri Sat Irena Hoskins PA-C   levothyroxine 56 mcg Oral Once per day on Sun Irena Hoskins PA-C   nitroglycerin 0 4 mg Sublingual Q5 Min PRN CLARICE Braun   ondansetron 4 mg Intravenous Q6H PRN Irena Anthony PA-C   sodium bicarbonate 650 mg Oral BID after meals Contreras Herrera DO        Today, Patient Was Seen By: Bakari Ba MD    ** Please Note: Dictation voice to text software may have been used in the creation of this document   **

## 2019-01-09 NOTE — ASSESSMENT & PLAN NOTE
Likely due to chronic kidney disease  Iron panel, B12, folate WNL  hemoccult neg in ER  hemolysis labs show high LDH, high retic ct, nl bili, and Haptoglobin negative  Serum protein electrophoresis shows no evidence of monoclonal spike  Continue aspirin and Plavix due to recent stent placement  Hold heparin for now    Status post 2 units of packed red cells, hemoglobin is stable

## 2019-01-09 NOTE — ASSESSMENT & PLAN NOTE
Stable, reports her breathing is stable  Completed IV diuresis  Now Monitor off diuretics secondary to elevated creatinine, possibly restart diuretics tomorrow if renal function continues to recover

## 2019-01-09 NOTE — PROGRESS NOTES
Progress Note - Cardiology   Milla Henry 58 y o  female MRN: [id number]  Unit/Bed#: [facility] 509-01 Encounter: [id number]        Principal Problem:    Type 2 myocardial infarction [facility])  Active Problems:    Acute diastolic congestive heart failure (HCC)    DM (diabetes mellitus) ([facility] 75 )    Hypertension    Hyperlipidemia    Stage 3 chronic kidney disease ([place] [address] )    Anemia    Coronary artery disease involving native coronary artery of native heart with angina pectoris (HCC)    KELLY (acute kidney injury) ([place] [address] )      Assessment/Plan    1  CAD   S/P LUDY X2 LAD  Residual RCA disease and plan is for staged PCI  Pt however with KELLY post cath  Pre op echo- LVEF 50% with hypokinesis mid inferoseptal and apical inferior walls  Nuclear stress test- small mildly severe partially reversible defect mid anterior wall  On asa 81, lipitor 40, coreg 6 25 BID, plavix 75, imdur 30   Minimal troponin elevation on arrival felt to be secondary to heart failure more so than a non STEMI type 1  Patient without any angina  2  KELLY- multifactorial , contrast induced nephropathy and decrease effective circulatory volume d/t anemia  Creatinine rising now 2 9, hopefully plateaued  Renal following  3  Acute diastolic heart failure  Diuretics currently on hold  Creat today 2 9  Patient without complains of dyspnea  Clarification to yesterday's note she was not short of breath yesterday  Reports edema improved overall  Continue to trend weights  Unchanged from yesterday  4  Anemia- status post transfusion  W/U per SLIM  Hemoglobin to 9 2 and 9 8 this morning  5  HTN-not well controlled although cautious gets being overly aggressive to avoid any hypotension sign of acute kidney injury  On Norvasc 10 and carvedilol 6 25 b i d  Subjective/Objective   Chief Complaint/Subjective  Patient without any chest pain or shortness of breath  Overall feels her breathing has improved  Feels lower extremity edema improved          Vitals: BP 146/63   Pulse 60   Temp 97 7 °F (36 5 °C) (Oral)   Resp 18   Ht 5' 3" (1 6 m)   Wt 79 1 kg (174 lb 6 1 oz)   SpO2 95%   BMI 30 89 kg/m²     Vitals:    01/08/19 0455 01/09/19 0600   Weight: 79 1 kg (174 lb 6 1 oz) 79 1 kg (174 lb 6 1 oz)     Orthostatic Blood Pressures      Most Recent Value   Blood Pressure  146/63 filed at 01/09/2019 1028   Patient Position - Orthostatic VS  Lying filed at 01/08/2019 1039            Intake/Output Summary (Last 24 hours) at 01/09/19 1044  Last data filed at 01/09/19 0900   Gross per 24 hour   Intake              830 ml   Output             1050 ml   Net             -220 ml       Invasive Devices     Peripheral Intravenous Line            Peripheral IV 01/07/19 Left Forearm 1 day                Current Facility-Administered Medications   Medication Dose Route Frequency    acetaminophen (TYLENOL) tablet 650 mg  650 mg Oral Q6H PRN    amLODIPine (NORVASC) tablet 10 mg  10 mg Oral Daily    aspirin chewable tablet 81 mg  81 mg Oral Daily    atorvastatin (LIPITOR) tablet 40 mg  40 mg Oral Daily With Dinner    benzonatate (TESSALON PERLES) capsule 100 mg  100 mg Oral TID    carvedilol (COREG) tablet 6 25 mg  6 25 mg Oral BID With Meals    clopidogrel (PLAVIX) tablet 75 mg  75 mg Oral Daily    docusate sodium (COLACE) capsule 100 mg  100 mg Oral BID    fluticasone (FLONASE) 50 mcg/act nasal spray 1 spray  1 spray Each Nare BID    insulin lispro (HumaLOG) 100 units/mL subcutaneous injection 1-5 Units  1-5 Units Subcutaneous TID AC    insulin lispro (HumaLOG) 100 units/mL subcutaneous injection 1-5 Units  1-5 Units Subcutaneous HS    insulin lispro (HumaLOG) 100 units/mL subcutaneous injection 4 Units  4 Units Subcutaneous TID With Meals    isosorbide mononitrate (IMDUR) 24 hr tablet 30 mg  30 mg Oral Daily    levothyroxine tablet 112 mcg  112 mcg Oral Once per day on Mon Tue Wed Thu Fri Sat    levothyroxine tablet 56 mcg  56 mcg Oral Once per day on Sun    nitroglycerin (NITROSTAT) SL tablet 0 4 mg  0 4 mg Sublingual Q5 Min PRN    ondansetron (ZOFRAN) injection 4 mg  4 mg Intravenous Q6H PRN         Physical Exam: /63   Pulse 60   Temp 97 7 °F (36 5 °C) (Oral)   Resp 18   Ht 5' 3" (1 6 m)   Wt 79 1 kg (174 lb 6 1 oz)   SpO2 95%   BMI 30 89 kg/m²     General Appearance:    Alert, cooperative, no distress, appears stated age   Head:    Normocephalic, no scleral icterus   Eyes:    PERRL   Nose:   Nares normal, septum midline, no drainage    Throat:   Lips, mucosa, and tongue normal   Neck:   Supple, symmetrical, trachea midline,              Lungs:     Clear to auscultation bilaterally, respirations unlabored   Chest Wall:    No tenderness or deformity    Heart:    Regular rate and rhythm, S1 and S2 normal, no murmur, rub   or gallop       Extremities:   Extremities normal, atraumatic, no cyanosis, 1+ edema       Skin:   Skin warm   Neurologic:   Alert and oriented to person place and time, no focal deficits                 Lab Results:   Recent Results (from the past 72 hour(s))   Fingerstick Glucose (POCT)    Collection Time: 01/06/19 11:33 AM   Result Value Ref Range    POC Glucose 192 (H) 65 - 140 mg/dl   Sodium, urine, random    Collection Time: 01/06/19  2:50 PM   Result Value Ref Range    Sodium, Ur 14    Urinalysis with microscopic    Collection Time: 01/06/19  2:51 PM   Result Value Ref Range    Clarity, UA Cloudy     Color, UA Yellow     Specific [place], UA 1 021 1 003 - 1 030    pH, UA 5 0 4 5 - 8 0    Glucose,  (1/10%) (A) Negative mg/dl    Ketones, UA Negative Negative mg/dl    Blood, UA Negative Negative    Protein,  (2+) (A) Negative mg/dl    Nitrite, UA Negative Negative    Bilirubin, UA Negative Negative    Urobilinogen, UA 0 2 0 2, 1 0 E U /dl E U /dl    Leukocytes, UA Negative Negative    WBC, UA 2-4 (A) None Seen, 0-5, 5-55, 5-65 /hpf    RBC, UA None Seen None Seen, 0-5 /hpf    Bacteria, UA None Seen None Seen, Occasional /hpf    Epithelial Cells Occasional None Seen, Occasional /hpf   Fingerstick Glucose (POCT)    Collection Time: 01/06/19  4:17 PM   Result Value Ref Range    POC Glucose 214 (H) 65 - 140 mg/dl   Fingerstick Glucose (POCT)    Collection Time: 01/06/19  9:32 PM   Result Value Ref Range    POC Glucose 218 (H) 65 - 140 mg/dl   Basic metabolic panel    Collection Time: 01/07/19  5:51 AM   Result Value Ref Range    Sodium 135 (L) 136 - 145 mmol/L    Potassium 4 5 3 5 - 5 3 mmol/L    Chloride 106 100 - 108 mmol/L    CO2 21 21 - 32 mmol/L    ANION GAP 8 4 - 13 mmol/L    BUN 76 (H) 5 - 25 mg/dL    Creatinine 2 85 (H) 0 60 - 1 30 mg/dL    Glucose 154 (H) 65 - 140 mg/dL    Calcium 8 3 8 3 - 10 1 mg/dL    eGFR 17 ml/min/1 73sq m   Hemoglobin and hematocrit, blood    Collection Time: 01/07/19  5:51 AM   Result Value Ref Range    Hemoglobin 6 4 (LL) 11 5 - 15 4 g/dL    Hematocrit 21 2 (L) 34 8 - 46 1 %   Fingerstick Glucose (POCT)    Collection Time: 01/07/19  6:29 AM   Result Value Ref Range    POC Glucose 173 (H) 65 - 140 mg/dl   Fingerstick Glucose (POCT)    Collection Time: 01/07/19 10:51 AM   Result Value Ref Range    POC Glucose 153 (H) 65 - 140 mg/dl   Type and screen    Collection Time: 01/07/19 12:16 PM   Result Value Ref Range    ABO Grouping A     Rh Factor Negative     Antibody Screen Positive     Specimen Expiration Date 20190110    Fingerstick Glucose (POCT)    Collection Time: 01/07/19  3:49 PM   Result Value Ref Range    POC Glucose 116 65 - 140 mg/dl   Protein / creatinine ratio, urine    Collection Time: 01/07/19  7:15 PM   Result Value Ref Range    Creatinine, Ur 185 0 mg/dL    Protein Urine Random 141 mg/dL    Prot/Creat Ratio, Ur 0 76 (H) 0 00 - 0 10   Fingerstick Glucose (POCT)    Collection Time: 01/07/19  9:07 PM   Result Value Ref Range    POC Glucose 123 65 - 140 mg/dl   CBC and differential    Collection Time: 01/08/19  4:47 AM   Result Value Ref Range    WBC 7 46 4 31 - 10 16 Thousand/uL    RBC 3 37 (L) 3 81 - 5 12 Million/uL    Hemoglobin 9 2 (L) 11 5 - 15 4 g/dL    Hematocrit 28 9 (L) 34 8 - 46 1 %    MCV 86 82 - 98 fL    MCH 27 3 26 8 - 34 3 pg    MCHC 31 8 31 4 - 37 4 g/dL    RDW 14 1 11 6 - 15 1 %    MPV 11 3 8 9 - 12 7 fL    Platelets 715 662 - 028 Thousands/uL    nRBC 0 /100 WBCs    Neutrophils Relative 69 43 - 75 %    Immat GRANS % 1 0 - 2 %    Lymphocytes Relative 20 14 - 44 %    Monocytes Relative 8 4 - 12 %    Eosinophils Relative 2 0 - 6 %    Basophils Relative 0 0 - 1 %    Neutrophils Absolute 5 19 1 85 - 7 62 Thousands/µL    Immature Grans Absolute 0 05 0 00 - 0 20 Thousand/uL    Lymphocytes Absolute 1 50 0 60 - 4 47 Thousands/µL    Monocytes Absolute 0 56 0 17 - 1 22 Thousand/µL    Eosinophils Absolute 0 13 0 00 - 0 61 Thousand/µL    Basophils Absolute 0 03 0 00 - 0 10 Thousands/µL   Basic metabolic panel    Collection Time: 01/08/19  4:47 AM   Result Value Ref Range    Sodium 136 136 - 145 mmol/L    Potassium 4 3 3 5 - 5 3 mmol/L    Chloride 107 100 - 108 mmol/L    CO2 20 (L) 21 - 32 mmol/L    ANION GAP 9 4 - 13 mmol/L    BUN 82 (H) 5 - 25 mg/dL    Creatinine 2 99 (H) 0 60 - 1 30 mg/dL    Glucose 125 65 - 140 mg/dL    Calcium 8 6 8 3 - 10 1 mg/dL    eGFR 16 ml/min/1 73sq m   Prepare RBC:Special Requirements: None; Has consent been obtained?  Yes, 2 Units    Collection Time: 01/08/19  5:55 AM   Result Value Ref Range    Unit Product Code T5424P40     Unit Number [id number]     Unit ABO A     Unit [facility] NEG     Crossmatch Compatible     Unit Dispense Status Presumed Trans     Unit Product Code I7309Z96     Unit Number [id number]     Unit ABO O     Unit RH NEG     Crossmatch Compatible     Unit Dispense Status Presumed Trans    Fingerstick Glucose (POCT)    Collection Time: 01/08/19  6:27 AM   Result Value Ref Range    POC Glucose 134 65 - 140 mg/dl   Fingerstick Glucose (POCT)    Collection Time: 01/08/19 11:14 AM   Result Value Ref Range    POC Glucose 189 (H) 65 - 140 mg/dl   Fingerstick Glucose (POCT)    Collection Time: 01/08/19  4:46 PM   Result Value Ref Range    POC Glucose 193 (H) 65 - 140 mg/dl   Fingerstick Glucose (POCT)    Collection Time: 01/08/19  8:58 PM   Result Value Ref Range    POC Glucose 207 (H) 65 - 140 mg/dl   Basic metabolic panel    Collection Time: 01/09/19  5:14 AM   Result Value Ref Range    Sodium 136 136 - 145 mmol/L    Potassium 4 3 3 5 - 5 3 mmol/L    Chloride 109 (H) 100 - 108 mmol/L    CO2 16 (L) 21 - 32 mmol/L    ANION GAP 11 4 - 13 mmol/L    BUN 81 (H) 5 - 25 mg/dL    Creatinine 2 90 (H) 0 60 - 1 30 mg/dL    Glucose 119 65 - 140 mg/dL    Calcium 8 6 8 3 - 10 1 mg/dL    eGFR 16 ml/min/1 73sq m   CBC and differential    Collection Time: 01/09/19  5:14 AM   Result Value Ref Range    WBC 8 16 4 31 - 10 16 Thousand/uL    RBC 3 62 (L) 3 81 - 5 12 Million/uL    Hemoglobin 9 8 (L) 11 5 - 15 4 g/dL    Hematocrit 32 2 (L) 34 8 - 46 1 %    MCV 89 82 - 98 fL    MCH 27 1 26 8 - 34 3 pg    MCHC 30 4 (L) 31 4 - 37 4 g/dL    RDW 14 6 11 6 - 15 1 %    MPV 11 0 8 9 - 12 7 fL    Platelets 859 852 - 757 Thousands/uL    nRBC 0 /100 WBCs    Neutrophils Relative 71 43 - 75 %    Immat GRANS % 1 0 - 2 %    Lymphocytes Relative 19 14 - 44 %    Monocytes Relative 6 4 - 12 %    Eosinophils Relative 2 0 - 6 %    Basophils Relative 1 0 - 1 %    Neutrophils Absolute 5 89 1 85 - 7 62 Thousands/µL    Immature Grans Absolute 0 04 0 00 - 0 20 Thousand/uL    Lymphocytes Absolute 1 52 0 60 - 4 47 Thousands/µL    Monocytes Absolute 0 50 0 17 - 1 22 Thousand/µL    Eosinophils Absolute 0 17 0 00 - 0 61 Thousand/µL    Basophils Absolute 0 04 0 00 - 0 10 Thousands/µL   Fingerstick Glucose (POCT)    Collection Time: 01/09/19  6:14 AM   Result Value Ref Range    POC Glucose 121 65 - 140 mg/dl     [address]  [address] [facility], [address] [place] [address]  [phone]     Jenel Galeazzi)     Invasive Cardiovascular Lab Complete Report     Patient: Leif Gerard  MR number: [id number]  Account number: [de-identified]  Study date: 2019  Gender: Female  : 1955  Height: 63 in  Weight: 168 7 lb  BSA: 1 8 m squared     Allergies: NO KNOWN ALLERGIES     Diagnostic Cardiologist:  Hung Anderson MD  Interventional Cardiologist:  Hung Anderson MD  Primary Physician:  Melanie Jacobsen MD     SUMMARY     CORONARY CIRCULATION:  Proximal LAD: There was a 50 % stenosis  Mid LAD: There was a tubular 90 % stenosis  The lesion was complex and moderately calcified  This is a likely culprit for the patient's clinical presentation  An intervention was performed  Distal LAD: There was a diffuse 50 % stenosis  Mid RCA: There was a tubular 80 % stenosis  It appears amenable to percutaneous intervention      1ST LESION INTERVENTIONS:  A balloon angioplasty procedure was performed on the lesion in the mid LAD  A Xience Terrie Rx 2 5 x 15mm drug-eluting stent was placed across the lesion and deployed at a maximum inflation pressure of 12 jennifer  A Xience [place] Rx 2 5 x 15mm drug-eluting stent was placed across the lesion and deployed at a maximum inflation pressure of 11 jennifer      INDICATIONS:  --  Possible CAD: myocardial infarction without ST elevation (NSTEMI)  --  Coronary artery disease: abnormal stress test      PROCEDURES PERFORMED     --  Left coronary angiography  --  Right coronary angiography  --  Inpatient  --  Mod Sedation Same Physician Initial 15min  --  Mod Sedation Same Physician Add 15min  --  Coronary Catheterization (w/o LHC)  --  Mod Sedation Same Physician Add 15min  --  Coronary Drug Eluting Stent w/PTCA  --  Intervention on mid LAD: balloon angioplasty      PROCEDURE: The risks and alternatives of the procedures and conscious sedation were explained to the patient and informed consent was obtained  The patient was brought to the cath lab and placed on the table   The planned puncture sites  were prepped and draped in the usual sterile fashion      --  Right radial artery access  After performing an Balwinder's test to verify adequate ulnar artery supply to the hand, the radial site was prepped  The puncture site was infiltrated with local anesthetic  The vessel was accessed using the  modified Seldinger technique, a wire was advanced into the vessel, and a sheath was advanced over the wire into the vessel      --  Left coronary artery angiography  A catheter was advanced over a guidewire into the aorta and positioned in the left coronary artery ostium under fluoroscopic guidance  Angiography was performed      --  Right coronary artery angiography  A catheter was advanced over a guidewire into the aorta and positioned in the right coronary artery ostium under fluoroscopic guidance  Angiography was performed      --  Inpatient      --  Mod Sedation Same Physician Initial 15min      --  Mod Sedation Same Physician Add 15min      --  Coronary Catheterization (w/o [facility])     --  Mod Sedation Same Physician Add 15min      LESION INTERVENTION: A balloon angioplasty procedure was performed on the lesion in the mid LAD      --  Vessel setup was performed  A 5Fr Launcher Ebu 3 5 guiding catheter was used to cannulate the vessel      --  Vessel setup was performed   A Runthrough NS 180cm wire was used to cross the lesion      --  Balloon angioplasty was performed, using a Mini Trek Rx 2 0 x 15mm balloon, with 4 inflations and a maximum inflation pressure of 8 jennifer      --  A Xience Terrie Rx 2 5 x 15mm drug-eluting stent was placed across the lesion and deployed at a maximum inflation pressure of 12 jennifer      --  Balloon angioplasty was performed, using a NC [place]up[place] Rx 2 25 x 15mm balloon, with 2 inflations and a maximum inflation pressure of 16 jennifer      --  Balloon angioplasty was performed, using a NC Trek Rx 2 75 x 08mm balloon, with 2 inflations and a maximum inflation pressure of 20 jennifer      --  A Xience Terrie Rx 2 5 x 15mm drug-eluting stent was placed across the lesion and deployed at a maximum inflation pressure of 11 jennifer      --  Balloon angioplasty was performed, using a NC Trek Rx 2 75 x 08mm balloon, with 3 inflations and a maximum inflation pressure of 20 jennifer      INTERVENTIONS:  --  Coronary Drug Eluting Stent w/PTCA      PROCEDURE COMPLETION: The patient tolerated the procedure well and was discharged from the cath lab  TIMING: Test started at 11:29  Test concluded at 12:29  HEMOSTASIS: The sheath was removed  The site was compressed with a Hemoband  device  Hemostasis was obtained  MEDICATIONS GIVEN: Verapamil (Isoptin, Calan, Covera), 5 mg, IA, at 11:32  Fentanyl (1OOmcg/2 ml), 50 mcg, IV, at 11:27  Versed (2mg/2ml), 1 mg, IV, at 11:27  1% Lidocaine, 1 ml, subcutaneously, at 11:29  Nitroglycerin (200mcg/ml), 200 mcg, at 11:32  Heparin 1000 units/ml, 3,000 units, at 11:32  Versed (2mg/2ml), 1 mg, IV, at 11:42  Fentanyl (1OOmcg/2 ml), 50 mcg, IV, at 11:42  Nitro Paste, 1, topically, at 11:42, in  Effient, 60 mg, PO, at  11:42  Heparin 1000 units/ml, 5,000 units, IV, at 11:43  Heparin 1000 units/ml, 2,000 units, IV, at 11:51  Nitroglycerine (200mcg/ml), 200 mcg, at 11:57  CONTRAST GIVEN: 60 ml Omnipaque (350mg I /ml)  RADIATION EXPOSURE: Fluoroscopy time:  13 23 min      CORONARY VESSELS:   --  The coronary circulation is right dominant  --  Left main: The vessel was medium to large sized  Angiography showed minor luminal irregularities  --  Proximal LAD: There was a 50 % stenosis  --  Mid LAD: There was a tubular 90 % stenosis  The lesion was complex and moderately calcified  This is a likely culprit for the patient's clinical presentation  An intervention was performed  --  Distal LAD: The vessel was small to medium sized  Angiography showed severe atherosclerosis  There was a diffuse 50 % stenosis  --  Circumflex: The vessel was small sized  Angiography showed minor luminal irregularities  There was one major obtuse marginal   --  Ramus intermedius: The vessel was medium sized  Angiography showed mild atherosclerosis  The artery bifurcated into two small sized vessels  --  RCA: The vessel was medium sized  --  Mid RCA: There was a tubular 80 % stenosis  It appears amenable to percutaneous intervention      IMPRESSIONS:  There is significant double vessel coronary artery disease      RECOMMENDATIONS  The patient's anti-anginal regimen should be further intensified  Patient instructed to return for staged percutaneous intervention in three weeks      Prepared and signed by  Sagrario Eduardo MD  Signed 01/04/2019 12:37:00     Study diagram     Angiographic findings  Native coronary lesions:  ·Proximal LAD: Lesion 1: 50 % stenosis  ·Mid LAD: Lesion 1: tubular, 90 % stenosis  ·Distal LAD: Lesion 1: diffuse, 50 % stenosis  ·Mid RCA: Lesion 1: tubular, 80 % stenosis  Intervention results  Native coronary lesions:  · balloon angioplasty of mid LAD  Stent: Tamiko Jeans Rx 2 5 x 15mm drug-eluting  Stent: Tamiko Jeans Rx 2 5 x 15mm drug-eluting        Imaging: I have personally reviewed pertinent reports  Tele- NSR    Counseling / Coordination of Care  Total time spent today 25 minutes  Greater than 50% of total time was spent with the patient and / or family counseling and / or coordination of care

## 2019-01-10 ENCOUNTER — TELEPHONE (OUTPATIENT)
Dept: NEPHROLOGY | Facility: CLINIC | Age: 64
End: 2019-01-10

## 2019-01-10 VITALS
WEIGHT: 174.38 LBS | HEART RATE: 66 BPM | RESPIRATION RATE: 20 BRPM | HEIGHT: 63 IN | BODY MASS INDEX: 30.9 KG/M2 | OXYGEN SATURATION: 95 % | TEMPERATURE: 97.8 F | SYSTOLIC BLOOD PRESSURE: 150 MMHG | DIASTOLIC BLOOD PRESSURE: 68 MMHG

## 2019-01-10 LAB
ANION GAP SERPL CALCULATED.3IONS-SCNC: 8 MMOL/L (ref 4–13)
BUN SERPL-MCNC: 72 MG/DL (ref 5–25)
CALCIUM SERPL-MCNC: 8.3 MG/DL (ref 8.3–10.1)
CHLORIDE SERPL-SCNC: 109 MMOL/L (ref 100–108)
CO2 SERPL-SCNC: 21 MMOL/L (ref 21–32)
CREAT SERPL-MCNC: 2.45 MG/DL (ref 0.6–1.3)
GFR SERPL CREATININE-BSD FRML MDRD: 20 ML/MIN/1.73SQ M
GLUCOSE SERPL-MCNC: 109 MG/DL (ref 65–140)
GLUCOSE SERPL-MCNC: 117 MG/DL (ref 65–140)
GLUCOSE SERPL-MCNC: 146 MG/DL (ref 65–140)
POTASSIUM SERPL-SCNC: 4.3 MMOL/L (ref 3.5–5.3)
SODIUM SERPL-SCNC: 138 MMOL/L (ref 136–145)

## 2019-01-10 PROCEDURE — 99239 HOSP IP/OBS DSCHRG MGMT >30: CPT | Performed by: INTERNAL MEDICINE

## 2019-01-10 PROCEDURE — 82948 REAGENT STRIP/BLOOD GLUCOSE: CPT

## 2019-01-10 PROCEDURE — 99232 SBSQ HOSP IP/OBS MODERATE 35: CPT | Performed by: INTERNAL MEDICINE

## 2019-01-10 PROCEDURE — 80048 BASIC METABOLIC PNL TOTAL CA: CPT | Performed by: INTERNAL MEDICINE

## 2019-01-10 RX ORDER — NITROGLYCERIN 0.4 MG/1
0.4 TABLET SUBLINGUAL
Qty: 30 TABLET | Refills: 0 | Status: SHIPPED | OUTPATIENT
Start: 2019-01-10 | End: 2021-03-10 | Stop reason: HOSPADM

## 2019-01-10 RX ORDER — CARVEDILOL 6.25 MG/1
6.25 TABLET ORAL 2 TIMES DAILY WITH MEALS
Qty: 60 TABLET | Refills: 0 | Status: SHIPPED | OUTPATIENT
Start: 2019-01-10 | End: 2019-02-01 | Stop reason: SDUPTHER

## 2019-01-10 RX ORDER — ISOSORBIDE MONONITRATE 30 MG/1
30 TABLET, EXTENDED RELEASE ORAL DAILY
Qty: 30 TABLET | Refills: 0 | Status: SHIPPED | OUTPATIENT
Start: 2019-01-10 | End: 2019-02-01 | Stop reason: SDUPTHER

## 2019-01-10 RX ORDER — REPAGLINIDE 0.5 MG/1
0.5 TABLET ORAL
Refills: 0 | Status: SHIPPED | OUTPATIENT
Start: 2019-01-10 | End: 2021-02-21

## 2019-01-10 RX ORDER — FUROSEMIDE 40 MG/1
40 TABLET ORAL DAILY
Qty: 30 TABLET | Refills: 0 | Status: SHIPPED | OUTPATIENT
Start: 2019-01-11 | End: 2019-01-15 | Stop reason: SDUPTHER

## 2019-01-10 RX ORDER — SODIUM BICARBONATE 650 MG/1
650 TABLET ORAL
Qty: 60 TABLET | Refills: 0 | Status: SHIPPED | OUTPATIENT
Start: 2019-01-10 | End: 2019-01-15

## 2019-01-10 RX ORDER — FUROSEMIDE 40 MG/1
40 TABLET ORAL DAILY
Status: DISCONTINUED | OUTPATIENT
Start: 2019-01-10 | End: 2019-01-10 | Stop reason: HOSPADM

## 2019-01-10 RX ORDER — CLOPIDOGREL BISULFATE 75 MG/1
75 TABLET ORAL DAILY
Qty: 30 TABLET | Refills: 0 | Status: SHIPPED | OUTPATIENT
Start: 2019-01-10 | End: 2019-02-01 | Stop reason: SDUPTHER

## 2019-01-10 RX ORDER — ASPIRIN 81 MG/1
81 TABLET, CHEWABLE ORAL DAILY
Qty: 30 TABLET | Refills: 0 | Status: SHIPPED | OUTPATIENT
Start: 2019-01-10 | End: 2019-02-01 | Stop reason: SDUPTHER

## 2019-01-10 RX ORDER — AMLODIPINE BESYLATE 10 MG/1
10 TABLET ORAL DAILY
Qty: 30 TABLET | Refills: 0 | Status: SHIPPED | OUTPATIENT
Start: 2019-01-10 | End: 2019-02-01 | Stop reason: SDUPTHER

## 2019-01-10 RX ADMIN — DOCUSATE SODIUM 100 MG: 100 CAPSULE, LIQUID FILLED ORAL at 08:15

## 2019-01-10 RX ADMIN — FLUTICASONE PROPIONATE 1 SPRAY: 50 SPRAY, METERED NASAL at 08:20

## 2019-01-10 RX ADMIN — BENZONATATE 100 MG: 100 CAPSULE ORAL at 08:15

## 2019-01-10 RX ADMIN — INSULIN LISPRO 4 UNITS: 100 INJECTION, SOLUTION INTRAVENOUS; SUBCUTANEOUS at 12:13

## 2019-01-10 RX ADMIN — SODIUM BICARBONATE 650 MG TABLET 650 MG: at 08:15

## 2019-01-10 RX ADMIN — LEVOTHYROXINE SODIUM 112 MCG: 112 TABLET ORAL at 06:15

## 2019-01-10 RX ADMIN — AMLODIPINE BESYLATE 10 MG: 10 TABLET ORAL at 08:15

## 2019-01-10 RX ADMIN — INSULIN LISPRO 4 UNITS: 100 INJECTION, SOLUTION INTRAVENOUS; SUBCUTANEOUS at 08:16

## 2019-01-10 RX ADMIN — CARVEDILOL 6.25 MG: 6.25 TABLET, FILM COATED ORAL at 08:15

## 2019-01-10 RX ADMIN — ASPIRIN 81 MG 81 MG: 81 TABLET ORAL at 08:15

## 2019-01-10 RX ADMIN — CLOPIDOGREL BISULFATE 75 MG: 75 TABLET ORAL at 08:15

## 2019-01-10 RX ADMIN — FUROSEMIDE 40 MG: 40 TABLET ORAL at 13:18

## 2019-01-10 RX ADMIN — ISOSORBIDE MONONITRATE 30 MG: 30 TABLET, EXTENDED RELEASE ORAL at 08:15

## 2019-01-10 NOTE — DISCHARGE SUMMARY
Discharge Summary - [place] [address] Internal Medicine    Patient Information: Tiffany Hubbard 58 y o  female MRN: [id number]  Unit/Bed#: [facility] 509-01 Encounter: [id number]    Discharging Physician / Practitioner: John Odonnell MD  PCP: Marvin Escobar MD  Admission Date: 12/29/2018  Discharge Date: 01/10/19    Disposition:     Home    Reason for Admission:  Congestive heart failure    Discharge Diagnoses:     Principal Problem:    Type 2 myocardial infarction [facility])  Active Problems:    Acute diastolic congestive heart failure ([place] [address] )    DM (diabetes mellitus) ([place] [address] )    Hypertension    Hyperlipidemia    Stage 3 chronic kidney disease ([place] [address] )    Anemia    Coronary artery disease involving native coronary artery of native heart with angina pectoris ([place] [address] )    KELLY (acute kidney injury) ([place] [address] )  Resolved Problems:    * No resolved hospital problems  *      Consultations During Hospital Stay:  · Cardiology  · Nephrology    Procedures Performed:     · Cardiac catheterization    Significant Findings / Test Results:     Echocardiogram: LEFT VENTRICLE:  Systolic function was at the lower limits of normal  Ejection fraction was estimated to be 50 %  There was hypokinesis of the mid inferoseptal and apical inferior wall(s)  Wall thickness was mildly increased  There was mild concentric hypertrophy  Features were consistent with a pseudonormal left ventricular filling pattern, with concomitant abnormal relaxation and increased filling pressure (grade 2 diastolic dysfunction)   LEFT ATRIUM:  The atrium was mildly dilated    MITRAL VALVE:  There was mild regurgitation    TRICUSPID VALVE:  There was mild regurgitation  Estimated peak PA pressure was 33 mmHg    PERICARDIUM:  A trace, free-flowing pericardial effusion was identified  There was no evidence of hemodynamic compromise  Stress test: IMPRESSIONS: Abnormal study after pharmacologic vasodilation  There was a small amount of ischemia of the mid anterior wall   Left ventricular systolic function was normal     Cardiac catheterization: CORONARY CIRCULATION:  Proximal LAD: There was a 50 % stenosis  Mid LAD: There was a tubular 90 % stenosis  The lesion was complex and moderately calcified  This is a likely culprit for the patient's clinical presentation  An intervention was performed  Distal LAD: There was a diffuse 50 % stenosis  Mid RCA: There was a tubular 80 % stenosis  It appears amenable to percutaneous intervention  1ST LESION INTERVENTIONS:  A balloon angioplasty procedure was performed on the lesion in the mid LAD  A Xience Terrie Rx 2 5 x 15mm drug-eluting stent was placed across the lesion and deployed at a maximum inflation pressure of 12 jennifer  A Xience Terrie Rx 2 5 x 15mm drug-eluting stent was placed across the lesion and deployed at a maximum inflation pressure of 11 jennifer  Renal ultrasound:  No hydronephrosis    Incidental Findings:   · None     Test Results Pending at Discharge (will require follow up): · None     Outpatient Tests Requested:  · BMP on Monday January 14    Complications:  Contrast nephropathy    Hospital Course:     Tatum Thompson is a 58 y o  female patient who originally presented to the hospital on 12/29/2018 due to shortness of breath  In the ED the patient was found to be in congestive heart failure, she was admitted and placed on IV diuretics  During her hospital course her shortness of breath did improve significantly with IV diuresis  Echocardiogram was performed which showed grade 2 diastolic dysfunction with a 50% ejection fraction  Later in her hospital course she had a stress test which did show some signs of ischemia  Due to this a cardiac catheterization was performed, the above findings were found and interventions performed  After cardiac catheterization she developed contrast nephropathy, diuretics were held, medications were adjusted  She was observed and renal function eventually plateaued and started to improve    During her hospital course for her chronic anemia she was transfused packed red cells to assist with renal recovery  Upon discharge medications were adjusted for blood pressure control, a diuretic was added, and bicarbonate was added, Januvia was adjusted for renal function  A BMP was ordered for Monday January 14  The patient was counseled to follow up with Cardiology and Nephrology as an outpatient  During her hospital course only 1 intervention was performed of the planned 2 interventions avoid contrast nephropathy  Cardiology recommendations she will need to be re-evaluated for recurrent symptoms or possibly a repeat stress test prior to any repeat cardiac catheterization to her high risk of contrast nephropathy  Condition at Discharge: stable     Discharge Day Visit / Exam:     Subjective:  Denies shortness of breath or chest pain  Vitals: Blood Pressure: 150/68 (01/10/19 0745)  Pulse: 66 (01/10/19 0745)  Temperature: 97 8 °F (36 6 °C) (01/10/19 0745)  Temp Source: Oral (01/10/19 0745)  Respirations: 20 (01/10/19 0745)  Height: 5' 3" (160 cm) (12/30/18 1038)  Weight - Scale: 79 1 kg (174 lb 6 1 oz) (01/10/19 0600)  SpO2: 95 % (01/10/19 0745)  Exam:   Physical Exam   Constitutional: She is oriented to person, place, and time  She appears well-developed and well-nourished  HENT:   Head: Normocephalic and atraumatic  Eyes: Pupils are equal, round, and reactive to light  EOM are normal  No scleral icterus  Neck: Neck supple  No JVD present  Cardiovascular: Normal rate and regular rhythm  Pulmonary/Chest: Effort normal  She has no wheezes  She has no rales  Musculoskeletal: She exhibits no edema  Neurological: She is alert and oriented to person, place, and time  No cranial nerve deficit  Skin: Skin is warm and dry  Discussion with Family: daughter at bedside    Discharge instructions/Information to patient and family:   See after visit summary for information provided to patient and family  Provisions for Follow-Up Care:  See after visit summary for information related to follow-up care and any pertinent home health orders  Planned Readmission: None     Discharge Statement:  I spent 45 minutes discharging the patient  This time was spent on the day of discharge  I had direct contact with the patient on the day of discharge  Greater than 50% of the total time was spent examining patient, answering all patient questions, arranging and discussing plan of care with patient as well as directly providing post-discharge instructions  Additional time then spent on discharge activities  Discharge Medications:  See after visit summary for reconciled discharge medications provided to patient and family        ** Please Note: This note has been constructed using a voice recognition system **

## 2019-01-10 NOTE — PLAN OF CARE
Problem: CARDIOVASCULAR - ADULT  Goal: Maintains optimal cardiac output and hemodynamic stability  INTERVENTIONS:  - Monitor I/O, vital signs and rhythm  - Monitor for S/S and trends of decreased cardiac output i e  bleeding, hypotension  - Administer and titrate ordered vasoactive medications to optimize hemodynamic stability  - Assess quality of pulses, skin color and temperature  - Assess for signs of decreased coronary artery perfusion - ex   Angina  - Instruct patient to report change in severity of symptoms   Outcome: Progressing    Goal: Absence of cardiac dysrhythmias or at baseline rhythm  INTERVENTIONS:  - Continuous cardiac monitoring, monitor vital signs, obtain 12 lead EKG if indicated  - Administer antiarrhythmic and heart rate control medications as ordered  - Monitor electrolytes and administer replacement therapy as ordered   Outcome: Progressing      Problem: METABOLIC, FLUID AND ELECTROLYTES - ADULT  Goal: Electrolytes maintained within normal limits  INTERVENTIONS:  - Monitor labs and assess patient for signs and symptoms of electrolyte imbalances  - Administer electrolyte replacement as ordered  - Monitor response to electrolyte replacements, including repeat lab results as appropriate  - Instruct patient on fluid and nutrition as appropriate   Outcome: Progressing    Goal: Fluid balance maintained  INTERVENTIONS:  - Monitor labs and assess for signs and symptoms of volume excess or deficit  - Monitor I/O and WT  - Instruct patient on fluid and nutrition as appropriate   Outcome: Progressing      Problem: DISCHARGE PLANNING - CARE MANAGEMENT  Goal: Discharge to post-acute care or home with appropriate resources  INTERVENTIONS:  - Conduct assessment to determine patient/family and health care team treatment goals, and need for post-acute services based on payer coverage, community resources, and patient preferences, and barriers to discharge  - Address psychosocial, clinical, and financial barriers to discharge as identified in assessment in conjunction with the patient/family and health care team  - Arrange appropriate level of post-acute services according to patient's   needs and preference and payer coverage in collaboration with the physician and health care team  - Communicate with and update the patient/family, physician, and health care team regarding progress on the discharge plan  - Arrange appropriate transportation to post-acute venues   Outcome: Progressing      Problem: Nutrition/Hydration-ADULT  Goal: Nutrient/Hydration intake appropriate for improving, restoring or maintaining nutritional needs  Monitor and assess patient's nutrition/hydration status for malnutrition (ex- brittle hair, bruises, dry skin, pale skin and conjunctiva, muscle wasting, smooth red tongue, and disorientation)  Collaborate with interdisciplinary team and initiate plan and interventions as ordered  Monitor patient's weight and dietary intake as ordered or per policy  Utilize nutrition screening tool and intervene per policy  Determine patient's food preferences and provide high-protein, high-caloric foods as appropriate       INTERVENTIONS:  - Monitor oral intake, urinary output, labs, and treatment plans  - Assess nutrition and hydration status and recommend course of action  - Evaluate amount of meals eaten  - Assist patient with eating if necessary   - Allow adequate time for meals  - Recommend/ encourage appropriate diets, oral nutritional supplements, and vitamin/mineral supplements  - Order, calculate, and assess calorie counts as needed  - Recommend, monitor, and adjust tube feedings and TPN/PPN based on assessed needs  - Assess need for intravenous fluids  - Provide specific nutrition/hydration education as appropriate  - Include patient/family/caregiver in decisions related to nutrition   Outcome: Progressing

## 2019-01-10 NOTE — SOCIAL WORK
CM rounded with Dr Huma Carranza, pt is a tent d/c for today  Cm informed pharmacist Salvador Ramirez to provide education

## 2019-01-10 NOTE — PROGRESS NOTES
NEPHROLOGY PROGRESS NOTE   Morales Pugh 58 y o  female MRN: [id number]  Unit/Bed#: [facility] 509-01 Encounter: [id number]      ASSESSMENT & PLAN:    1  Acute kidney injury likely multifactorial secondary to contrast induced nephropathy, decreased effective arterial volume with anemia  2  Stage III B chronic kidney disease with a baseline creatinine of 1 5 and likely he has has diabetic nephropathy with proteinuria  3  Proteinuric kidney disease  4  Diastolic congestive heart failure  5  Coronary artery disease status post intervention   6  Non-anion gap metabolic acidosis    -creatinine now improved to 2 45 from a peak of 2 99  -symptomatically stable with good urine output  -would continue to hold ACE-inhibitor as an outpatient  -can start furosemide 40 mg p o  Daily  -proteinuria workup as an outpatient  -continue sodium bicarbonate 650 mg p o  B i d   For now  -repeat BMP on Monday and follow up within 1 week will be scheduled with from our office  -further cardiac workup and evaluation ongoing for coronary artery disease    SUBJECTIVE:    Patient seen sitting up denies any chest pain or shortness of Breath good urine output no fevers or chills    OBJECTIVE:  Current Weight: Weight - Scale: 79 1 kg (174 lb 6 1 oz)  Vitals:    01/10/19 0745   BP: 150/68   Pulse: 66   Resp: 20   Temp: 97 8 °F (36 6 °C)   SpO2: 95%       Intake/Output Summary (Last 24 hours) at 01/10/19 1214  Last data filed at 01/10/19 0901   Gross per 24 hour   Intake              480 ml   Output             1200 ml   Net             -720 ml       General: conscious, cooperative, in not acute distress  Eyes: conjunctivae pink, anicteric sclerae  ENT: lips and mucous membranes moist  Neck: supple, no JVD  Chest: clear breath sounds bilateral, no crackles, ronchus or wheezings  CVS: distinct S1 & S2, normal rate, regular rhythm  Abdomen: soft, non-tender, non-distended, normoactive bowel sounds  Extremities:  1+ edema  Skin: no rash  Neuro: awake, alert, oriented      Medications:    Current Facility-Administered Medications:     acetaminophen (TYLENOL) tablet 650 mg, 650 mg, Oral, Q6H PRN, Marc Cuadra PA-C, 650 mg at 01/05/19 1653    amLODIPine (NORVASC) tablet 10 mg, 10 mg, Oral, Daily, Paola Hayes MD, 10 mg at 01/10/19 0815    aspirin chewable tablet 81 mg, 81 mg, Oral, Daily, Kenya Mercer MD, 81 mg at 01/10/19 0815    atorvastatin (LIPITOR) tablet 40 mg, 40 mg, Oral, Daily With Dinner, Erby Schwab Fischer, PA-C, 40 mg at 01/09/19 1709    benzonatate (TESSALON PERLES) capsule 100 mg, 100 mg, Oral, TID, Jose Farfan MD, 100 mg at 01/10/19 0815    carvedilol (COREG) tablet 6 25 mg, 6 25 mg, Oral, BID With Meals, Kenya Mercer MD, 6 25 mg at 01/10/19 0815    clopidogrel (PLAVIX) tablet 75 mg, 75 mg, Oral, Daily, Jake Velez MD, 75 mg at 01/10/19 0815    docusate sodium (COLACE) capsule 100 mg, 100 mg, Oral, BID, Irena Anthony PA-C, 100 mg at 01/10/19 0815    fluticasone (FLONASE) 50 mcg/act nasal spray 1 spray, 1 spray, Each Nare, BID, Jose Farfan MD, 1 spray at 01/10/19 0820    insulin lispro (HumaLOG) 100 units/mL subcutaneous injection 1-5 Units, 1-5 Units, Subcutaneous, TID AC, 1 Units at 01/09/19 1710 **AND** Fingerstick Glucose (POCT), , , TID AC, Irena Anthony PA-C    insulin lispro (HumaLOG) 100 units/mL subcutaneous injection 1-5 Units, 1-5 Units, Subcutaneous, HS, Irena Anthony PA-C, 1 Units at 01/08/19 2124    insulin lispro (HumaLOG) 100 units/mL subcutaneous injection 4 Units, 4 Units, Subcutaneous, TID With Meals, Jose Farfan MD, 4 Units at 01/10/19 0816    isosorbide mononitrate (IMDUR) 24 hr tablet 30 mg, 30 mg, Oral, Daily, Pola Kay MD, 30 mg at 01/10/19 0815    levothyroxine tablet 112 mcg, 112 mcg, Oral, Once per day on Mon Tue Wed Thu Fri Sat, Willard Oro PA-C, 112 mcg at 01/10/19 8756    levothyroxine tablet 56 mcg, 56 mcg, Oral, Once per day on Sun, Irena Anthony PA-C, 56 mcg at 01/06/19 1400    nitroglycerin (NITROSTAT) SL tablet 0 4 mg, 0 4 mg, Sublingual, Q5 Min PRN, CLARICE Caldera    ondansetron [facility]) injection 4 mg, 4 mg, Intravenous, Q6H PRN, Irena Anthony PA-C, 4 mg at 01/04/19 1349    sodium bicarbonate tablet 650 mg, 650 mg, Oral, BID after meals, Fadi Kingsley DO, 650 mg at 01/10/19 0815     Lab Results:     Results from last 7 days  Lab Units 01/10/19  0516 01/09/19  0514 01/08/19  0447 01/07/19  0551 01/06/19  1451 01/06/19  0435 01/05/19  0446 01/04/19  0512   WBC Thousand/uL  --  8 16 7 46  --   --  7 98 9 99 6 50   HEMOGLOBIN g/dL  --  9 8* 9 2* 6 4*  --  6 9* 7 1* 7 1*   HEMATOCRIT %  --  32 2* 28 9* 21 2*  --  22 9* 23 4* 23 6*   PLATELETS Thousands/uL  --  202 175  --   --  184 246 160   POTASSIUM mmol/L 4 3 4 3 4 3 4 5  --  4 4 3 9 4 4   CHLORIDE mmol/L 109* 109* 107 106  --  106 109* 105   CO2 mmol/L 21 16* 20* 21  --  21 23 24   BUN mg/dL 72* 81* 82* 76*  --  71* 59* 57*   CREATININE mg/dL 2 45* 2 90* 2 99* 2 85*  --  2 48* 1 92* 1 77*   CALCIUM mg/dL 8 3 8 6 8 6 8 3  --  8 5 8 9 8 5   MAGNESIUM mg/dL  --   --   --   --   --  2 2  --   --    ALK PHOS U/L  --   --   --   --   --   --  531*  --    ALT U/L  --   --   --   --   --   --  42  --    AST U/L  --   --   --   --   --   --  39  --    LEUKOCYTES UA   --   --   --   --  Negative  --   --   --    BLOOD UA   --   --   --   --  Negative  --   --   --        Previous work up:  Please see previous notes

## 2019-01-10 NOTE — TELEPHONE ENCOUNTER
Patient is Scheduled for 1/15      ----- Message from Fadi Kingsley DO sent at 1/10/2019 12:17 PM EST -----  Please schedule the following patient for follow-up in 1 week  She will need a repeat BMP on Monday as well  Please call her daughter Candace Barros phone number in the chart  She can be scheduled to see advanced practitioner

## 2019-01-10 NOTE — CONSULTS
Patient was counseled on 1/10/19 at 1200 on the following cardiac medications (as well as all discharge medications):       Clopidogrel 75 mg PO daily  Aspirin 81 mg PO daily  Carvedilol 6 25 mg PO BID with meals  Atorvastatin 40 mg daily  Amlodipine 10 mg daily  Isosorbide mononitrate 30 mg daily  Furosemide 40 mg daily    Patient was educated on the importance of taking these medications to prevent further hospitalizations and improve mortality  Common side effects, what to do when a dose is missed, easy ways to remember to take the medications, and when to contact the doctor and/or pharmacist regarding the medications, were discussed  The patient seemed understanding of the education and did not have any questions  Thank you for involving me in this patient's care and please do not hesitate to contact me with any questions or concerns      Maggi Alejandra, PharmD  Pharmacy Resident

## 2019-01-11 ENCOUNTER — TELEPHONE (OUTPATIENT)
Dept: CARDIOLOGY CLINIC | Facility: CLINIC | Age: 64
End: 2019-01-11

## 2019-01-11 NOTE — TELEPHONE ENCOUNTER
Called pt number and line was busy also tried to call the daughter of the pt and her voicemail box is full  Was trying to call and schedule PCI ordered by Dr Gibson Rodríguez, will call back again and try later

## 2019-01-14 ENCOUNTER — TELEPHONE (OUTPATIENT)
Dept: CASE MANAGEMENT | Facility: OTHER | Age: 64
End: 2019-01-14

## 2019-01-14 LAB
BUN SERPL-MCNC: 40 MG/DL (ref 7–25)
BUN/CREAT SERPL: 24 (CALC) (ref 6–22)
CALCIUM SERPL-MCNC: 9.1 MG/DL (ref 8.6–10.4)
CHLORIDE SERPL-SCNC: 110 MMOL/L (ref 98–110)
CO2 SERPL-SCNC: 27 MMOL/L (ref 20–32)
CREAT SERPL-MCNC: 1.65 MG/DL (ref 0.5–0.99)
GLUCOSE SERPL-MCNC: 85 MG/DL (ref 65–99)
POTASSIUM SERPL-SCNC: 5.2 MMOL/L (ref 3.5–5.3)
SL AMB EGFR AFRICAN AMERICAN: 38 ML/MIN/1.73M2
SL AMB EGFR NON AFRICAN AMERICAN: 32 ML/MIN/1.73M2
SODIUM SERPL-SCNC: 143 MMOL/L (ref 135–146)

## 2019-01-14 NOTE — TELEPHONE ENCOUNTER
Called pt's spouse because home phone-unreachable and mobile phone(which is [facility]) not able to leave a message  Left vm for pt to give me a call to introduce myself and to possibly schedule home visit

## 2019-01-15 ENCOUNTER — PATIENT OUTREACH (OUTPATIENT)
Dept: CARDIOLOGY CLINIC | Facility: CLINIC | Age: 64
End: 2019-01-15

## 2019-01-15 ENCOUNTER — OFFICE VISIT (OUTPATIENT)
Dept: NEPHROLOGY | Facility: CLINIC | Age: 64
End: 2019-01-15
Payer: COMMERCIAL

## 2019-01-15 ENCOUNTER — TELEPHONE (OUTPATIENT)
Dept: NEPHROLOGY | Facility: CLINIC | Age: 64
End: 2019-01-15

## 2019-01-15 VITALS
SYSTOLIC BLOOD PRESSURE: 157 MMHG | DIASTOLIC BLOOD PRESSURE: 66 MMHG | HEART RATE: 59 BPM | HEIGHT: 65 IN | WEIGHT: 175.2 LBS | BODY MASS INDEX: 29.19 KG/M2

## 2019-01-15 DIAGNOSIS — I10 ESSENTIAL HYPERTENSION: ICD-10-CM

## 2019-01-15 DIAGNOSIS — N17.9 AKI (ACUTE KIDNEY INJURY) (HCC): Primary | ICD-10-CM

## 2019-01-15 DIAGNOSIS — I25.119 CORONARY ARTERY DISEASE INVOLVING NATIVE CORONARY ARTERY OF NATIVE HEART WITH ANGINA PECTORIS (HCC): ICD-10-CM

## 2019-01-15 DIAGNOSIS — I50.31 ACUTE DIASTOLIC CONGESTIVE HEART FAILURE (HCC): ICD-10-CM

## 2019-01-15 DIAGNOSIS — I21.A1 TYPE 2 MYOCARDIAL INFARCTION (HCC): ICD-10-CM

## 2019-01-15 DIAGNOSIS — N18.30 STAGE 3 CHRONIC KIDNEY DISEASE (HCC): ICD-10-CM

## 2019-01-15 PROCEDURE — 99214 OFFICE O/P EST MOD 30 MIN: CPT | Performed by: INTERNAL MEDICINE

## 2019-01-15 PROCEDURE — 3066F NEPHROPATHY DOC TX: CPT | Performed by: INTERNAL MEDICINE

## 2019-01-15 RX ORDER — FUROSEMIDE 40 MG/1
40 TABLET ORAL 2 TIMES DAILY
Qty: 30 TABLET | Refills: 0 | Status: SHIPPED | OUTPATIENT
Start: 2019-01-15 | End: 2019-01-22 | Stop reason: SDUPTHER

## 2019-01-15 NOTE — PATIENT INSTRUCTIONS
Elaina Ward, you are here today for follow-up regarding her kidney  You had an acute kidney injury during her last hospitalization that was likely related to contrast as well as having a low blood count  Fortunately these are improving and her creatinine which is your kidney number is now down to 1 6  Year baseline kidney number is around 1 4-1 5  Your blood pressure is elevated any do have signs of increased fluid in year body so we will have you take your furosemide 40 mg twice daily  Continue to check her weight daily at home and write them down and bring them with you to your doctor's appointments  Also we will have you discontinue the sodium bicarbonate tablets  Please continue to check her blood pressures at home  Follow up with the cardiologist and we will see when the next catheterization needs to be done and if everything is stable will continue our routine surveillance in 4 months and then if everything is stable semi annually after that

## 2019-01-15 NOTE — TELEPHONE ENCOUNTER
----- Message from Jonah Anderson DO sent at 1/15/2019 11:21 AM EST -----  Labs reviewed will be seeing her today and will discuss with her at that time

## 2019-01-15 NOTE — LETTER
January 15, 2019     Reema Hunger, [address] [facility] [address]  [address] [facility] [address]    Patient: Ed Mascorro   YOB: 1955   Date of Visit: 1/15/2019       Dear Dr Misael Odonnell: Thank you for referring Ed Mascorro to me for evaluation  Below are my notes for this consultation  If you have questions, please do not hesitate to call me  I look forward to following your patient along with you  Sincerely,        Cinthya Osuna DO        CC: El Schirmer, RN Glory Fallen, DO  1/15/2019  2:01 PM  Sign at close encounter  OFFICE FOLLOW UP - Nephrology   Ed Mascorro 58 y o  female MRN: [id number]    Encounter: [id number]        ASSESSMENT  Diagnoses and all orders for this visit:    27-year-old female with past medical history type 2 diabetes, hypertension who presented with a type 2 NSTEMI, coronary artery disease requiring PCI complicated by acute kidney injury on chronic kidney disease    KELLY (acute kidney injury) ([facility] [address] )  Stage 3 chronic kidney disease ([facility] [address] )  Coronary artery disease involving native coronary artery of native heart with angina pectoris ([facility] [address] )  Acute diastolic congestive heart failure ([facility] [address] )  Essential hypertension  Type 2 myocardial infarction [facility])    Patient Instructions   [place], you are here today for follow-up regarding her kidney  You had an acute kidney injury during her last hospitalization that was likely related to contrast as well as having a low blood count  Fortunately these are improving and her creatinine which is your kidney number is now down to 1 6  Year baseline kidney number is around 1 4-1 5  Your blood pressure is elevated any do have signs of increased fluid in year body so we will have you take your furosemide 40 mg twice daily  Continue to check her weight daily at home and write them down and bring them with you to your doctor's appointments  Also we will have you discontinue the sodium bicarbonate tablets    Please continue to check her blood pressures at home  Follow up with the cardiologist and we will see when the next catheterization needs to be done and if everything is stable will continue our routine surveillance in 4 months and then if everything is stable semi annually after that  DISCUSSION & PLAN    1  Acute kidney injury-etiology likely related to contrast induced nephropathy plus decreased effective arterial volume with acute anemia  -creatinine peaked at 3 and has improved now to 1 6  -baseline creatinine is around 1 4-1 5  -continue to monitor H&H  -avoid hypotension  -would space out next contrast exposure by at least 2-3 weeks from most recent hospitalization if possible    2  Stage 3 chronic kidney disease  -baseline creatinine was 1 4-1 5  -patient is not seen a nephrologist in the past  -does have some baseline proteinuria when her creatinine is at baseline this can be further worked the likely related to diabetic nephropathy  -continue cardiovascular risk reduction measures-statin therapy, glycemic control and treatment of coronary artery disease    3  Coronary artery disease  -will require another cardiac catheterization  -will be seeing Cardiology on Thursday    4  Essential hypertension in the setting of diastolic heart failure  -some JVD on exam, orthopnea and increased lower extremity edema  -increase furosemide to 40 mg p o  B i d , she is seeing Cardiology on Thursday  -discontinue sodium bicarbonate tablets   -continue Coreg 6 252  Times daily heart rates have been in the 60  -continue Imdur or 30 mg daily  -continue amlodipine 10 mg daily    5  Acute diastolic congestive heart failure  -is showing some mild signs of volume overload  -asked her to check her weights daily and bring with her to her office visit  -increased her furosemide 40 mg p o  B i d   She states that when she takes her furosemide she does urinate more  -continue blood pressure control, on a beta-blocker, on Imdur not on an ACE-inhibitor because recent acute kidney injury  -currently on a statin    6  Type 2 diabetes mellitus  -continue glycemic control     Overall her renal function remained stable, at her next cardiac catheterization I would have a low threshold to admit patient with renal risk reduction measures and postop monitoring given her recent history of acute kidney injury  Otherwise if everything is stable will follow up in 3-4      HPI: Irina Henriquez is a 58 y o  female who is here for follow-up  She has a past medical history of type 2 diabetes mellitus and hypertension recently presented with type 2 myocardial infarction in signs of volume overload requiring catheterization postoperatively had an acute kidney injury with a creatinine that peaked around 3 she was nonoliguric through this time she was also anemic she was transfused 2 units of packed red blood cell her blood pressures remained stable and her kidney numbers have slowly improved almost to her baseline she will require a repeat cardiac catheterization soon, she currently denies any chest pain she does have some increased coughing when lying flat but otherwise is stable      ROS:   All the systems were reviewed and were negative except as documented on the HPI  Allergies: Patient has no known allergies      Medications:   Current Outpatient Prescriptions:     amLODIPine (NORVASC) 10 mg tablet, Take 1 tablet (10 mg total) by mouth daily for 30 days, Disp: 30 tablet, Rfl: 0    aspirin 81 mg chewable tablet, Chew 1 tablet (81 mg total) daily for 30 days, Disp: 30 tablet, Rfl: 0    atorvastatin (LIPITOR) 40 mg tablet, Take 40 mg by mouth daily  , Disp: , Rfl:     carvedilol (COREG) 6 25 mg tablet, Take 1 tablet (6 25 mg total) by mouth 2 (two) times a day with meals for 30 days, Disp: 60 tablet, Rfl: 0    clopidogrel (PLAVIX) 75 mg tablet, Take 1 tablet (75 mg total) by mouth daily for 30 days, Disp: 30 tablet, Rfl: 0    furosemide (LASIX) 40 mg tablet, Take 1 tablet (40 mg total) by mouth 2 (two) times a day, Disp: 30 tablet, Rfl: 0    gabapentin (NEURONTIN) 100 mg capsule, Take 100 mg by mouth, Disp: , Rfl:     glucose blood test strip, 3 times daily, Disp: , Rfl:     isosorbide mononitrate (IMDUR) 30 mg 24 hr tablet, Take 1 tablet (30 mg total) by mouth daily for 30 days, Disp: 30 tablet, Rfl: 0    levothyroxine 112 mcg tablet, 1 tab daily x 6 day and half pill on sundays, Disp: , Rfl:     nitroglycerin (NITROSTAT) 0 4 mg SL tablet, Place 1 tablet (0 4 mg total) under the tongue every 5 (five) minutes as needed for chest pain for up to 30 doses, Disp: 30 tablet, Rfl: 0    repaglinide (PRANDIN) 0 5 mg tablet, Take 1 tablet (0 5 mg total) by mouth 3 (three) times a day before meals, Disp: , Rfl: 0    Past Medical History:   Diagnosis Date    Diabetes mellitus ([facility] [address] )     Hypothyroidism      History reviewed  No pertinent surgical history  Family History   Problem Relation Age of Onset    Diabetes Mother     Heart attack Father         MI    Hypertension Brother       reports that she has quit smoking  She has never used smokeless tobacco  She reports that she does not drink alcohol or use drugs  Physical Exam:   Vitals:    01/15/19 1317 01/15/19 1320 01/15/19 1322 01/15/19 1324   BP:  160/72 158/71 157/66   BP Location:  Left arm Left arm Left arm   Patient Position:  Sitting Sitting Sitting   Cuff Size:  Large Large Large   Pulse:  60 60 59   Weight: 79 5 kg (175 lb 3 2 oz)      Height: 5' 5" (1 651 m)        Body mass index is 29 15 kg/m²      General: conscious, cooperative, in not acute distress  Eyes: conjunctivae pink, anicteric sclerae  ENT: lips and mucous membranes moist  Neck: supple, positive JVD  Chest: clear breath sounds bilateral, no crackles, ronchus or wheezings  CVS: distinct S1 & S2, normal rate, regular rhythm  Abdomen: soft, non-tender, non-distended, normoactive bowel sounds  Extremities:  1+ edema  Skin: no rash  Neuro: awake, alert, oriented      Lab Results:    Results from last 7 days  Lab Units 01/14/19  1010 01/10/19  0516 01/09/19  0514   WBC Thousand/uL  --   --  8 16   HEMOGLOBIN g/dL  --   --  9 8*   HEMATOCRIT %  --   --  32 2*   PLATELETS Thousands/uL  --   --  202   POTASSIUM mmol/L 5 2 4 3 4 3   CHLORIDE mmol/L 110 109* 109*   CO2 mmol/L 27 21 16*   BUN mg/dL 40* 72* 81*   CREATININE mg/dL  --  2 45* 2 90*   SL AMB CALCIUM mg/dL 9 1  --   --    CALCIUM mg/dL  --  8 3 8 6       Portions of the record may have been created with voice recognition software  Occasional wrong word or "sound a like" substitutions may have occurred due to the inherent limitations of voice recognition software  Read the chart carefully and recognize, using context, where substitutions have occurred  If you have any questions, please contact the dictating provider

## 2019-01-15 NOTE — LETTER
January 15, 2019     Emmie Sachs, [address] [facility] [address]  [address] [facility] [address]    Patient: Nestor Loya   YOB: 1955   Date of Visit: 1/15/2019       Dear Dr Miroslava Thurman: Thank you for referring Nestor Loya to me for evaluation  Below are my notes for this consultation  If you have questions, please do not hesitate to call me  I look forward to following your patient along with you           Sincerely,        Ailin Babb DO        CC: No Recipients

## 2019-01-15 NOTE — PROGRESS NOTES
HF Post hospital discharge call template:    Self management/teach back:  Primary learner: Family- Spoke with daughter, Nidia Pringle  States her mom has a cell phone # not listed:  [phone]  Following low sodium diet: States her mom is trying, but does not always listen to her  Weighing daily: Unsure if mom is still weighing daily- will check with her today  States over the weekend weight was unchanged form hospital discharge weight  - Weight today: ? Monitoring symptoms: Yes  Any current symptoms: Yes, SOB and wheezing at night  Knows when to call provider: Yes  Taking all medication as prescribed: Yes  Knows name of diuretic: Yes    Care Coordination:  Aware of cardiology follow up appointment: Yes  Aware of PCP follow up appointment: Cherie Mon [place] provider has contacted patient: GISELLA  Home Health RN (name) Contacted: GISELLA  Additional comments: Patient has renal appointment today and I recommended she bring patient's weights and medication list with her  Stressed importance of diet and monitoring weights and calling with problems quickly to avoid hospitalizations and complications  Daughter states patient is not interested in home care RN or Community Health Worker at this time  Daughter given my contact information

## 2019-01-15 NOTE — PROGRESS NOTES
OFFICE FOLLOW UP - Nephrology   [place]ad [place] 58 y o  female MRN: [id number]    Encounter: [id number]        ASSESSMENT  Diagnoses and all orders for this visit:    50-year-old female with past medical history type 2 diabetes, hypertension who presented with a type 2 NSTEMI, coronary artery disease requiring PCI complicated by acute kidney injury on chronic kidney disease    KELLY (acute kidney injury) ([facility] [address] )  Stage 3 chronic kidney disease ([email or web address]  Coronary artery disease involving native coronary artery of native heart with angina pectoris ([email or web address] )  Acute diastolic congestive heart failure ([email or web address]  Essential hypertension  Type 2 myocardial infarction [facility])    Patient Instructions   [place], you are here today for follow-up regarding her kidney  You had an acute kidney injury during her last hospitalization that was likely related to contrast as well as having a low blood count  Fortunately these are improving and her creatinine which is your kidney number is now down to 1 6  Year baseline kidney number is around 1 4-1 5  Your blood pressure is elevated any do have signs of increased fluid in year body so we will have you take your furosemide 40 mg twice daily  Continue to check her weight daily at home and write them down and bring them with you to your doctor's appointments  Also we will have you discontinue the sodium bicarbonate tablets  Please continue to check her blood pressures at home  Follow up with the cardiologist and we will see when the next catheterization needs to be done and if everything is stable will continue our routine surveillance in 4 months and then if everything is stable semi annually after that  DISCUSSION & PLAN    1   Acute kidney injury-etiology likely related to contrast induced nephropathy plus decreased effective arterial volume with acute anemia  -creatinine peaked at 3 and has improved now to 1 6  -baseline creatinine is around 1 4-1 5  -continue to monitor H&H  -avoid hypotension  -would space out next contrast exposure by at least 2-3 weeks from most recent hospitalization if possible    2  Stage 3 chronic kidney disease  -baseline creatinine was 1 4-1 5  -patient is not seen a nephrologist in the past  -does have some baseline proteinuria when her creatinine is at baseline this can be further worked the likely related to diabetic nephropathy  -continue cardiovascular risk reduction measures-statin therapy, glycemic control and treatment of coronary artery disease    3  Coronary artery disease  -will require another cardiac catheterization  -will be seeing Cardiology on Thursday    4  Essential hypertension in the setting of diastolic heart failure  -some JVD on exam, orthopnea and increased lower extremity edema  -increase furosemide to 40 mg p o  B i d , she is seeing Cardiology on Thursday  -discontinue sodium bicarbonate tablets   -continue Coreg 6 252  Times daily heart rates have been in the 60  -continue Imdur or 30 mg daily  -continue amlodipine 10 mg daily    5  Acute diastolic congestive heart failure  -is showing some mild signs of volume overload  -asked her to check her weights daily and bring with her to her office visit  -increased her furosemide 40 mg p o  B i d  She states that when she takes her furosemide she does urinate more  -continue blood pressure control, on a beta-blocker, on Imdur not on an ACE-inhibitor because recent acute kidney injury  -currently on a statin    6  Type 2 diabetes mellitus  -continue glycemic control     Overall her renal function remained stable, at her next cardiac catheterization I would have a low threshold to admit patient with renal risk reduction measures and postop monitoring given her recent history of acute kidney injury  Otherwise if everything is stable will follow up in 3-4      HPI: Zuleyma Lorenz is a 58 y o  female who is here for follow-up      She has a past medical history of type 2 diabetes mellitus and hypertension recently presented with type 2 myocardial infarction in signs of volume overload requiring catheterization postoperatively had an acute kidney injury with a creatinine that peaked around 3 she was nonoliguric through this time she was also anemic she was transfused 2 units of packed red blood cell her blood pressures remained stable and her kidney numbers have slowly improved almost to her baseline she will require a repeat cardiac catheterization soon, she currently denies any chest pain she does have some increased coughing when lying flat but otherwise is stable      ROS:   All the systems were reviewed and were negative except as documented on the HPI  Allergies: Patient has no known allergies      Medications:   Current Outpatient Prescriptions:     amLODIPine (NORVASC) 10 mg tablet, Take 1 tablet (10 mg total) by mouth daily for 30 days, Disp: 30 tablet, Rfl: 0    aspirin 81 mg chewable tablet, Chew 1 tablet (81 mg total) daily for 30 days, Disp: 30 tablet, Rfl: 0    atorvastatin (LIPITOR) 40 mg tablet, Take 40 mg by mouth daily  , Disp: , Rfl:     carvedilol (COREG) 6 25 mg tablet, Take 1 tablet (6 25 mg total) by mouth 2 (two) times a day with meals for 30 days, Disp: 60 tablet, Rfl: 0    clopidogrel (PLAVIX) 75 mg tablet, Take 1 tablet (75 mg total) by mouth daily for 30 days, Disp: 30 tablet, Rfl: 0    furosemide (LASIX) 40 mg tablet, Take 1 tablet (40 mg total) by mouth 2 (two) times a day, Disp: 30 tablet, Rfl: 0    gabapentin (NEURONTIN) 100 mg capsule, Take 100 mg by mouth, Disp: , Rfl:     glucose blood test strip, 3 times daily, Disp: , Rfl:     isosorbide mononitrate (IMDUR) 30 mg 24 hr tablet, Take 1 tablet (30 mg total) by mouth daily for 30 days, Disp: 30 tablet, Rfl: 0    levothyroxine 112 mcg tablet, 1 tab daily x 6 day and half pill on sundays, Disp: , Rfl:     nitroglycerin (NITROSTAT) 0 4 mg SL tablet, Place 1 tablet (0 4 mg total) under the tongue every 5 (five) minutes as needed for chest pain for up to 30 doses, Disp: 30 tablet, Rfl: 0    repaglinide (PRANDIN) 0 5 mg tablet, Take 1 tablet (0 5 mg total) by mouth 3 (three) times a day before meals, Disp: , Rfl: 0    Past Medical History:   Diagnosis Date    Diabetes mellitus ([facility] [address] )     Hypothyroidism      History reviewed  No pertinent surgical history  Family History   Problem Relation Age of Onset    Diabetes Mother     Heart attack Father         MI    Hypertension Brother       reports that she has quit smoking  She has never used smokeless tobacco  She reports that she does not drink alcohol or use drugs  Physical Exam:   Vitals:    01/15/19 1317 01/15/19 1320 01/15/19 1322 01/15/19 1324   BP:  160/72 158/71 157/66   BP Location:  Left arm Left arm Left arm   Patient Position:  Sitting Sitting Sitting   Cuff Size:  Large Large Large   Pulse:  60 60 59   Weight: 79 5 kg (175 lb 3 2 oz)      Height: 5' 5" (1 651 m)        Body mass index is 29 15 kg/m²  General: conscious, cooperative, in not acute distress  Eyes: conjunctivae pink, anicteric sclerae  ENT: lips and mucous membranes moist  Neck: supple, positive JVD  Chest: clear breath sounds bilateral, no crackles, ronchus or wheezings  CVS: distinct S1 & S2, normal rate, regular rhythm  Abdomen: soft, non-tender, non-distended, normoactive bowel sounds  Extremities:  1+ edema  Skin: no rash  Neuro: awake, alert, oriented      Lab Results:    Results from last 7 days  Lab Units 01/14/19  1010 01/10/19  0516 01/09/19  0514   WBC Thousand/uL  --   --  8 16   HEMOGLOBIN g/dL  --   --  9 8*   HEMATOCRIT %  --   --  32 2*   PLATELETS Thousands/uL  --   --  202   POTASSIUM mmol/L 5 2 4 3 4 3   CHLORIDE mmol/L 110 109* 109*   CO2 mmol/L 27 21 16*   BUN mg/dL 40* 72* 81*   CREATININE mg/dL  --  2 45* 2 90*   SL AMB CALCIUM mg/dL 9 1  --   --    CALCIUM mg/dL  --  8 3 8 6       Portions of the record may have been created with voice recognition software  Occasional wrong word or "sound a like" substitutions may have occurred due to the inherent limitations of voice recognition software  Read the chart carefully and recognize, using context, where substitutions have occurred  If you have any questions, please contact the dictating provider

## 2019-01-16 NOTE — PROGRESS NOTES
Outpatient Cardiology Consult Note - Aries Mosley 58 y o  female MRN: [id number]    @ Encounter: [id number]        Patient Active Problem List    Diagnosis Date Noted    KELLY (acute kidney injury) ([place] [address] ) 01/06/2019    Coronary artery disease involving native coronary artery of native heart with angina pectoris ([place] [address] ) 01/04/2019    Anemia 12/30/2018    Acute diastolic congestive heart failure ([place] [address] ) 12/29/2018    DM (diabetes mellitus) ([place] [address] ) 12/29/2018    Hypothyroidism 12/29/2018    Hypertension 12/29/2018    Type 2 myocardial infarction ([place] [address] ) 12/29/2018    Hyperlipidemia 12/29/2018    Stage 3 chronic kidney disease ([place] [address] ) 12/29/2018       Assessment:  # Chronic Diastolic CHF, Stage C  Diuretic: furosemide 40 mg BID- increased yesterday by nephrology  Weight: 173 lbs  Echo 12/30/18:  EF: 50%, grade 2 DD, PASP 35 mmHg  # CAD - LUDY x 2 to LAD after NSTEMI (trop 0 2) and abnormal stress test with ischemia in anterior wall on 12/31/18  80% RCA- plan staged  Imdur 30 mg daily, asa and plavix  # HTN- norvasc 10 mg daily, coreg 6 25 mg BID  # anemia- s/p transfusion in hospital  # CKD3, baseline Cr 1 5, cr 1 6 on 1/14  # DM2, hgA1C 9 2    Today's Plan:  Given her fluid status will hold off on RCA stenting for now  Keep 40 mg twice daily  JVP is up, edema, will give lasix 80 mg IV today  Will call tomorrow    HPI:     59 yo with chronic diastolic CHF, HTN, CAD s/p LUDY x 2 LAD, residual disease in RCA planned for stage PCI, CKD, anemia presents post hospitalization for NSTEMI and acute diastolic CHF  She was having OROSCO for last 3-4 weeks  Eats a lot of salty foods  Had PND, edema for about a month prior  She was hypertensive to 220 mmHg on admit  Interim:  Weight is stable a little lower, but still has edema, cough, JVD    Past Medical History:   Diagnosis Date    Diabetes mellitus ([place] [address] )     Hypothyroidism        Review of Systems - Negative except cough, pnd, orthopnea, edema    No Known Allergies        Current Outpatient Prescriptions:     amLODIPine (NORVASC) 10 mg tablet, Take 1 tablet (10 mg total) by mouth daily for 30 days, Disp: 30 tablet, Rfl: 0    aspirin 81 mg chewable tablet, Chew 1 tablet (81 mg total) daily for 30 days, Disp: 30 tablet, Rfl: 0    atorvastatin (LIPITOR) 40 mg tablet, Take 40 mg by mouth daily  , Disp: , Rfl:     carvedilol (COREG) 6 25 mg tablet, Take 1 tablet (6 25 mg total) by mouth 2 (two) times a day with meals for 30 days, Disp: 60 tablet, Rfl: 0    clopidogrel (PLAVIX) 75 mg tablet, Take 1 tablet (75 mg total) by mouth daily for 30 days, Disp: 30 tablet, Rfl: 0    furosemide (LASIX) 40 mg tablet, Take 1 tablet (40 mg total) by mouth 2 (two) times a day, Disp: 30 tablet, Rfl: 0    glucose blood test strip, 3 times daily, Disp: , Rfl:     isosorbide mononitrate (IMDUR) 30 mg 24 hr tablet, Take 1 tablet (30 mg total) by mouth daily for 30 days, Disp: 30 tablet, Rfl: 0    levothyroxine 112 mcg tablet, 1 tab daily x 6 day and half pill on sundays, Disp: , Rfl:     repaglinide (PRANDIN) 0 5 mg tablet, Take 1 tablet (0 5 mg total) by mouth 3 (three) times a day before meals, Disp: , Rfl: 0    gabapentin (NEURONTIN) 100 mg capsule, Take 100 mg by mouth, Disp: , Rfl:     nitroglycerin (NITROSTAT) 0 4 mg SL tablet, Place 1 tablet (0 4 mg total) under the tongue every 5 (five) minutes as needed for chest pain for up to 30 doses (Patient not taking: Reported on 1/17/2019 ), Disp: 30 tablet, Rfl: 0    Social History     Social History    Marital status: /Civil Union     Spouse name: N/A    Number of children: 3    Years of education: N/A     Occupational History    part time      Social History Main Topics    Smoking status: Former Smoker    Smokeless tobacco: Never Used    Alcohol use No    Drug use: No    Sexual activity: Yes     Other Topics Concern    Not on file     Social History Narrative    Always uses seatbelt    Caffeine use    Daily coffee consumption: 1 cp daily    Feels safe at home    Lives with spouse           Family History   Problem Relation Age of Onset    Diabetes Mother     Heart attack Father         MI    Hypertension Brother        Physical Exam:    Vitals: Blood pressure 138/52, pulse 71, height 5' 5" (1 651 m), weight 78 5 kg (173 lb), SpO2 97 %  , Body mass index is 28 79 kg/m² ,   Wt Readings from Last 3 Encounters:   01/17/19 78 5 kg (173 lb)   01/15/19 79 5 kg (175 lb 3 2 oz)   01/10/19 79 1 kg (174 lb 6 1 oz)       Physical Exam:  Vitals:    01/17/19 0811   BP: 138/52   BP Location: Right arm   Patient Position: Sitting   Cuff Size: Standard   Pulse: 71   SpO2: 97%   Weight: 78 5 kg (173 lb)   Height: 5' 5" (1 651 m)       GEN: Bony Menjivar appears well, alert and oriented x 3, pleasant and cooperative   HEENT: pupils equal, round, and reactive to light; extraocular muscles intact  NECK: supple, no carotid bruits   HEART: regular rhythm, normal S1 and S2, no murmurs, clicks, gallops or rubs, JVP is    LUNGS: clear to auscultation bilaterally; no wheezes, rales, or rhonchi   ABDOMEN: normal bowel sounds, soft, no tenderness, no distention  EXTREMITIES: peripheral pulses normal; no clubbing, cyanosis, or edema  NEURO: no focal findings   SKIN: normal without suspicious lesions on exposed skin    Labs & Results:    Lab Results   Component Value Date    WBC 8 16 01/09/2019    HGB 9 8 (L) 01/09/2019    HCT 32 2 (L) 01/09/2019    MCV 89 01/09/2019     01/09/2019     Lab Results   Component Value Date    GLUCOSE 453 (H) 10/07/2015    CALCIUM 9 1 01/14/2019     10/07/2015    K 5 2 01/14/2019    CO2 27 01/14/2019     01/14/2019    BUN 40 (H) 01/14/2019    CREATININE 2 45 (H) 01/10/2019     No results found for: BNP   Lab Results   Component Value Date    CHOL 229 05/30/2015    CHOL 244 04/18/2014     Lab Results   Component Value Date    HDL 65 (H) 12/29/2018    HDL 48 05/30/2015    HDL 55 04/18/2014     Lab Results   Component Value Date    LDLCALC 72 12/29/2018    LDLCALC 152 (H) 05/30/2015    LDLCALC 172 (H) 04/18/2014     Lab Results   Component Value Date    TRIG 109 12/29/2018    TRIG 146 05/30/2015    TRIG 84 04/18/2014     No results found for: CHOLHDL      Echocardiogram  LVEF:   LVIDd:  RV:  MR:  PASP:  RVOT:   Other:      EKG personally reviewed by Brissa Granado, DO  Counseling / Coordination of Care  Total floor / unit time spent today 40 minutes  Greater than 50% of total time was spent with the patient and / or family counseling and / or coordination of care  A description of the counseling / coordination of care: 25 min  Thank you for the opportunity to participate in the care of this patient  Brissa QUINONEZ    Director of Advanced Heart Failure Program  Medical Director of [address] [place] [address]

## 2019-01-17 ENCOUNTER — OFFICE VISIT (OUTPATIENT)
Dept: CARDIOLOGY CLINIC | Facility: CLINIC | Age: 64
End: 2019-01-17
Payer: COMMERCIAL

## 2019-01-17 ENCOUNTER — DOCUMENTATION (OUTPATIENT)
Dept: CARDIOLOGY CLINIC | Facility: CLINIC | Age: 64
End: 2019-01-17

## 2019-01-17 VITALS
HEIGHT: 65 IN | WEIGHT: 173 LBS | OXYGEN SATURATION: 97 % | BODY MASS INDEX: 28.82 KG/M2 | SYSTOLIC BLOOD PRESSURE: 138 MMHG | HEART RATE: 71 BPM | DIASTOLIC BLOOD PRESSURE: 52 MMHG

## 2019-01-17 DIAGNOSIS — I25.10 CORONARY ARTERY DISEASE INVOLVING NATIVE CORONARY ARTERY OF NATIVE HEART WITHOUT ANGINA PECTORIS: ICD-10-CM

## 2019-01-17 DIAGNOSIS — I10 HTN (HYPERTENSION), BENIGN: ICD-10-CM

## 2019-01-17 DIAGNOSIS — I50.32 CHRONIC DIASTOLIC CHF (CONGESTIVE HEART FAILURE), NYHA CLASS 3 (HCC): Primary | ICD-10-CM

## 2019-01-17 PROCEDURE — 99244 OFF/OP CNSLTJ NEW/EST MOD 40: CPT | Performed by: INTERNAL MEDICINE

## 2019-01-17 RX ORDER — FUROSEMIDE 10 MG/ML
80 INJECTION INTRAMUSCULAR; INTRAVENOUS ONCE
Status: COMPLETED | OUTPATIENT
Start: 2019-01-17 | End: 2019-01-17

## 2019-01-17 RX ADMIN — FUROSEMIDE 80 MG: 10 INJECTION INTRAMUSCULAR; INTRAVENOUS at 09:02

## 2019-01-17 NOTE — PROGRESS NOTES
80 mg IV Lasix given w/o difficulty    Post /60     Voided 200 ml     Discussed following a 2g sodium diet as well as a 2000 ml fluid restriction per Dr Hoang Molina recommendations  Diet seems to be a barrier, consumes Adobo with all her foods, spouse did purchase low sodium Adobo  Aware of needing to monitor salt intake  Admits to eating lasagna last night  Provided daughter my card and advised to call me w/ any concerns  Has HF booklet in [place] and [place] which was provided to her in the hospital  She is to call me w/ any weight gain or SOB, LE edema  I also provided main office # as well  Daughter is aware I will be calling her tomorrow to follow up on her mother status

## 2019-01-17 NOTE — PROGRESS NOTES
Domenic Lyle  Is under my care for a cardiovascular condition   Under further review, she  Is not able to return to work until further notice due to heart failure treatment    If you have any further questions please contact me at my office at [phone] Kwadwo Louie

## 2019-01-17 NOTE — PATIENT INSTRUCTIONS
Please check daily weights and contact the heart failure program at [phone] if you gain 3 lb in one day or 5 lb in one week  Please limit daily sodium intake to 2000 mg daily  Please limit daily fluid intake to 2000 ml daily  Bring complete list of medications to your follow up appointment  Low-Salt Choices  Eating salt (sodium) can make your body retain too much water  Excess water makes your heart work harder  Canned, packaged, and frozen foods are easy to prepare  But they are often high in sodium  Here are some ideas for low-salt foods you can easily make yourself  For breakfast  · Fruit or 100% fruit juice  · Whole-wheat bread or an English muffin  Look for sodium content on Nutrition Facts labels    · Low-fat milk or yogurt  · Unsalted eggs  · Shredded wheat  · Corn tortillas  · Unsalted steamed rice  · Regular (not instant) hot cereal, made without salt    Stay away from:  · Sausage, gagnon, and ham  · Flour tortillas  · Packaged muffins, pancakes, and biscuits  · Instant hot cereals  · Cottage cheese    Good Choices:  · Fresh fish, chicken, turkey, or meat--baked, broiled, or roasted without salt  · Dry beans, cooked without salt  · Tofu, stir-fried without salt  · Unsalted fresh fruit and vegetables, or frozen or canned fruit and vegetables with no added salt    Stay away from:  · Lunch or deli meat that is cured or smoked  · Cheese  · Tomato juice and ketchup  · Canned vegetables, soups, and fish not labeled as no-salt-added or reduced sodium  · Packaged gravies and sauces  · Olives, pickles, and relish  · Bottled salad dressings    For snacks and desserts  · Yogurt  · Unsalted, air-popped popcorn  · Unsalted nuts or seeds    Stay away from:  · Pies and cakes  · Packaged dessert mixes  · Pizza  · Canned and packaged puddings  · Pretzels, chips, crackers, and nuts--unless the label says unsalted

## 2019-01-18 ENCOUNTER — PATIENT OUTREACH (OUTPATIENT)
Dept: CARDIOLOGY CLINIC | Facility: CLINIC | Age: 64
End: 2019-01-18

## 2019-01-18 NOTE — PROGRESS NOTES
Daughter, Maria Eugenia Xiong reports patient weight is down to 171 2 (from 175)  Less leg edema, till some coughing at night  Reinforced compliance with diet and fluids  Also reinforced to call with weight gain or worsening symptoms

## 2019-01-21 ENCOUNTER — TELEPHONE (OUTPATIENT)
Dept: CARDIOLOGY CLINIC | Facility: CLINIC | Age: 64
End: 2019-01-21

## 2019-01-21 NOTE — TELEPHONE ENCOUNTER
S/w Abhinav Weems  Weight today 168 lbs  C/o slight pedal edema  Continues w/ cough @ hs relieved when she sits up  Maintaining a low sodium diet  Advised to call w/ any concerns

## 2019-01-22 DIAGNOSIS — I50.31 ACUTE DIASTOLIC CONGESTIVE HEART FAILURE (HCC): ICD-10-CM

## 2019-01-22 RX ORDER — FUROSEMIDE 40 MG/1
40 TABLET ORAL 2 TIMES DAILY
Qty: 60 TABLET | Refills: 2 | Status: SHIPPED | OUTPATIENT
Start: 2019-01-22 | End: 2019-05-01 | Stop reason: SDUPTHER

## 2019-01-22 RX ORDER — FUROSEMIDE 40 MG/1
40 TABLET ORAL 2 TIMES DAILY
Qty: 60 TABLET | Refills: 6 | Status: SHIPPED | OUTPATIENT
Start: 2019-01-22 | End: 2019-01-22 | Stop reason: SDUPTHER

## 2019-01-25 ENCOUNTER — PATIENT OUTREACH (OUTPATIENT)
Dept: CARDIOLOGY CLINIC | Facility: CLINIC | Age: 64
End: 2019-01-25

## 2019-01-25 NOTE — PROGRESS NOTES
Spoke with daughter Xenia Heredia who reports no major concerns with her mother  She does not see her mom everyday  But calls her daily  She does know her recent weights, but will call Monday with an update  States mom compliant with medication  Reinforced diet and daily weight tracking and recording and to bring to provider visits

## 2019-01-28 NOTE — TELEPHONE ENCOUNTER
Pt  Daughter said she is waiting till 02/01 when she sees Dr Zelda Roberts whether to schedule cardiac PCI

## 2019-01-31 NOTE — PROGRESS NOTES
Outpatient Cardiology Consult Note - Madelyn Mcgee 58 y o  female MRN: [id number]    @ Encounter: [id number]        Patient Active Problem List    Diagnosis Date Noted    KELLY (acute kidney injury) ([place] [address] ) 01/06/2019    Coronary artery disease involving native coronary artery of native heart with angina pectoris ([place] [address] ) 01/04/2019    Anemia 12/30/2018    Acute diastolic congestive heart failure ([place] [address] ) 12/29/2018    DM (diabetes mellitus) ([place] [address] ) 12/29/2018    Hypothyroidism 12/29/2018    Hypertension 12/29/2018    Type 2 myocardial infarction ([place] [address] ) 12/29/2018    Hyperlipidemia 12/29/2018    Stage 3 chronic kidney disease ([place] [address] ) 12/29/2018       Assessment:  # Chronic Diastolic CHF, Stage C  Diuretic: furosemide 40 mg BID- increased yesterday by nephrology  Weight: 169 lbs, down 4 lbs from last visit  Echo 12/30/18:  EF: 50%, grade 2 DD, PASP 35 mmHg  # CAD - 1/4/19: LUDY x 2 to LAD after NSTEMI (trop 0 2) and abnormal stress test with ischemia in anterior wall on 12/31/18  80% RCA- plan staged  Imdur 30 mg daily, asa and plavix  # HTN- norvasc 10 mg daily, coreg 6 25 mg BID  # anemia- s/p transfusion in hospital  # CKD3, baseline Cr 1 5, cr 1 6 on 1/14  # DM2, hgA1C 9 2    Today's Plan:  RCA stenting with Zuly Gonzalez- will hold off, no chest pain symptoms  Pt having problems managing fluids  HPI:     57 yo with chronic diastolic CHF, HTN, CAD s/p LUDY x 2 LAD, residual disease in RCA planned for stage PCI, CKD, anemia presents post hospitalization for NSTEMI and acute diastolic CHF  She was having OROSCO for last 3-4 weeks  Eats a lot of salty foods  Had PND, edema for about a month prior  She was hypertensive to 220 mmHg on admit       Interim:  Held off on RCA stenting last time  Gave lasix 80 mg IV in office - weight next day down a couple of pounts to 171  Weight today 169 lbs  Still has edema- some of this may be due to norvasc    Past Medical History:   Diagnosis Date    Diabetes mellitus ([place] [address] )     Hypothyroidism        Review of Systems - still with pedal edema    No Known Allergies        Current Outpatient Prescriptions:     amLODIPine (NORVASC) 10 mg tablet, Take 1 tablet (10 mg total) by mouth daily for 30 days, Disp: 30 tablet, Rfl: 0    aspirin 81 mg chewable tablet, Chew 1 tablet (81 mg total) daily for 30 days, Disp: 30 tablet, Rfl: 0    atorvastatin (LIPITOR) 40 mg tablet, Take 40 mg by mouth daily  , Disp: , Rfl:     carvedilol (COREG) 6 25 mg tablet, Take 1 tablet (6 25 mg total) by mouth 2 (two) times a day with meals for 30 days, Disp: 60 tablet, Rfl: 0    clopidogrel (PLAVIX) 75 mg tablet, Take 1 tablet (75 mg total) by mouth daily for 30 days, Disp: 30 tablet, Rfl: 0    furosemide (LASIX) 40 mg tablet, Take 1 tablet (40 mg total) by mouth 2 (two) times a day, Disp: 60 tablet, Rfl: 2    glucose blood test strip, 3 times daily, Disp: , Rfl:     isosorbide mononitrate (IMDUR) 30 mg 24 hr tablet, Take 1 tablet (30 mg total) by mouth daily for 30 days, Disp: 30 tablet, Rfl: 0    levothyroxine 112 mcg tablet, 1 tab daily x 6 day and half pill on sundays, Disp: , Rfl:     nitroglycerin (NITROSTAT) 0 4 mg SL tablet, Place 1 tablet (0 4 mg total) under the tongue every 5 (five) minutes as needed for chest pain for up to 30 doses, Disp: 30 tablet, Rfl: 0    repaglinide (PRANDIN) 0 5 mg tablet, Take 1 tablet (0 5 mg total) by mouth 3 (three) times a day before meals, Disp: , Rfl: 0    gabapentin (NEURONTIN) 100 mg capsule, Take 100 mg by mouth, Disp: , Rfl:     Social History     Social History    Marital status: /Civil Union     Spouse name: N/A    Number of children: 3    Years of education: N/A     Occupational History    part time      Social History Main Topics    Smoking status: Former Smoker    Smokeless tobacco: Never Used    Alcohol use No    Drug use: No    Sexual activity: Yes     Other Topics Concern    Not on file     Social History Narrative    Always uses seatbelt    Caffeine use    Daily coffee consumption: 1 cp daily    Feels safe at home    Lives with spouse           Family History   Problem Relation Age of Onset    Diabetes Mother     Heart attack Father         MI    Hypertension Brother        Physical Exam:    Vitals: Blood pressure 134/58, pulse 58, height 5' 5" (1 651 m), weight 76 7 kg (169 lb), SpO2 98 %  , Body mass index is 28 12 kg/m² ,   Wt Readings from Last 3 Encounters:   02/01/19 76 7 kg (169 lb)   01/17/19 78 5 kg (173 lb)   01/15/19 79 5 kg (175 lb 3 2 oz)       Physical Exam:  Vitals:    02/01/19 1543   BP: 134/58   BP Location: Right arm   Patient Position: Sitting   Cuff Size: Standard   Pulse: 58   SpO2: 98%   Weight: 76 7 kg (169 lb)   Height: 5' 5" (1 651 m)       GEN: Gerre [place] appears well, alert and oriented x 3, pleasant and cooperative   HEENT: pupils equal, round, and reactive to light; extraocular muscles intact  NECK: supple, no carotid bruits   HEART: regular rhythm, normal S1 and S2, no murmurs, clicks, gallops or rubs, JVP is  Minimally up  LUNGS: clear to auscultation bilaterally; no wheezes, rales, or rhonchi   ABDOMEN: normal bowel sounds, soft, no tenderness, no distention  EXTREMITIES: peripheral pulses normal; no clubbing, cyanosis, 1+ edema  NEURO: no focal findings   SKIN: normal without suspicious lesions on exposed skin    Labs & Results:    Lab Results   Component Value Date    WBC 8 16 01/09/2019    HGB 9 8 (L) 01/09/2019    HCT 32 2 (L) 01/09/2019    MCV 89 01/09/2019     01/09/2019     Lab Results   Component Value Date    GLUCOSE 453 (H) 10/07/2015    CALCIUM 9 1 01/14/2019     10/07/2015    K 5 2 01/14/2019    CO2 27 01/14/2019     01/14/2019    BUN 40 (H) 01/14/2019    CREATININE 2 45 (H) 01/10/2019     No results found for: BNP   Lab Results   Component Value Date    CHOL 229 05/30/2015    CHOL 244 04/18/2014     Lab Results   Component Value Date    HDL 65 (H) 12/29/2018    HDL 48 05/30/2015    HDL 55 04/18/2014     Lab Results   Component Value Date    LDLCALC 72 12/29/2018    LDLCALC 152 (H) 05/30/2015    LDLCALC 172 (H) 04/18/2014     Lab Results   Component Value Date    TRIG 109 12/29/2018    TRIG 146 05/30/2015    TRIG 84 04/18/2014     No results found for: CHOLHDL        EKG personally reviewed by Will Huddleston DO  Counseling / Coordination of Care  Total floor / unit time spent today 25 minutes  Greater than 50% of total time was spent with the patient and / or family counseling and / or coordination of care  A description of the counseling / coordination of care: 15 min  Thank you for the opportunity to participate in the care of this patient  Will QUINONEZ    Director of Advanced Heart Failure Program  Medical Director of [address] [place] [address]

## 2019-02-01 ENCOUNTER — OFFICE VISIT (OUTPATIENT)
Dept: CARDIOLOGY CLINIC | Facility: CLINIC | Age: 64
End: 2019-02-01
Payer: COMMERCIAL

## 2019-02-01 VITALS
OXYGEN SATURATION: 98 % | BODY MASS INDEX: 28.16 KG/M2 | HEART RATE: 58 BPM | DIASTOLIC BLOOD PRESSURE: 58 MMHG | WEIGHT: 169 LBS | HEIGHT: 65 IN | SYSTOLIC BLOOD PRESSURE: 134 MMHG

## 2019-02-01 DIAGNOSIS — I10 ESSENTIAL HYPERTENSION: ICD-10-CM

## 2019-02-01 DIAGNOSIS — I25.10 CORONARY ARTERY DISEASE INVOLVING NATIVE CORONARY ARTERY OF NATIVE HEART WITHOUT ANGINA PECTORIS: ICD-10-CM

## 2019-02-01 DIAGNOSIS — I10 HTN (HYPERTENSION), BENIGN: ICD-10-CM

## 2019-02-01 DIAGNOSIS — I50.32 CHRONIC DIASTOLIC CHF (CONGESTIVE HEART FAILURE), NYHA CLASS 3 (HCC): Primary | ICD-10-CM

## 2019-02-01 DIAGNOSIS — I21.A1 TYPE 2 MYOCARDIAL INFARCTION (HCC): ICD-10-CM

## 2019-02-01 PROCEDURE — 99214 OFFICE O/P EST MOD 30 MIN: CPT | Performed by: INTERNAL MEDICINE

## 2019-02-01 RX ORDER — CARVEDILOL 6.25 MG/1
6.25 TABLET ORAL 2 TIMES DAILY WITH MEALS
Qty: 60 TABLET | Refills: 5 | Status: SHIPPED | OUTPATIENT
Start: 2019-02-01 | End: 2019-08-11 | Stop reason: SDUPTHER

## 2019-02-01 RX ORDER — ASPIRIN 81 MG/1
81 TABLET, CHEWABLE ORAL DAILY
Qty: 30 TABLET | Refills: 5 | Status: SHIPPED | OUTPATIENT
Start: 2019-02-01 | End: 2019-08-13 | Stop reason: SDUPTHER

## 2019-02-01 RX ORDER — CLOPIDOGREL BISULFATE 75 MG/1
75 TABLET ORAL DAILY
Qty: 30 TABLET | Refills: 5 | Status: SHIPPED | OUTPATIENT
Start: 2019-02-01 | End: 2019-02-01 | Stop reason: SDUPTHER

## 2019-02-01 RX ORDER — CLOPIDOGREL BISULFATE 75 MG/1
75 TABLET ORAL DAILY
Qty: 30 TABLET | Refills: 5 | Status: SHIPPED | OUTPATIENT
Start: 2019-02-01 | End: 2019-08-11 | Stop reason: SDUPTHER

## 2019-02-01 RX ORDER — ISOSORBIDE MONONITRATE 30 MG/1
30 TABLET, EXTENDED RELEASE ORAL DAILY
Qty: 30 TABLET | Refills: 5 | Status: SHIPPED | OUTPATIENT
Start: 2019-02-01 | End: 2019-08-11 | Stop reason: SDUPTHER

## 2019-02-01 RX ORDER — ATORVASTATIN CALCIUM 40 MG/1
40 TABLET, FILM COATED ORAL DAILY
Qty: 30 TABLET | Refills: 11 | Status: SHIPPED | OUTPATIENT
Start: 2019-02-01 | End: 2020-05-19 | Stop reason: SDUPTHER

## 2019-02-01 RX ORDER — ASPIRIN 81 MG/1
81 TABLET, CHEWABLE ORAL DAILY
Qty: 30 TABLET | Refills: 5 | Status: SHIPPED | OUTPATIENT
Start: 2019-02-01 | End: 2019-02-01 | Stop reason: SDUPTHER

## 2019-02-01 RX ORDER — ISOSORBIDE MONONITRATE 30 MG/1
30 TABLET, EXTENDED RELEASE ORAL DAILY
Qty: 30 TABLET | Refills: 5 | Status: SHIPPED | OUTPATIENT
Start: 2019-02-01 | End: 2019-02-01 | Stop reason: SDUPTHER

## 2019-02-01 RX ORDER — ATORVASTATIN CALCIUM 40 MG/1
40 TABLET, FILM COATED ORAL DAILY
Qty: 30 TABLET | Refills: 11 | Status: SHIPPED | OUTPATIENT
Start: 2019-02-01 | End: 2019-02-01 | Stop reason: SDUPTHER

## 2019-02-01 RX ORDER — AMLODIPINE BESYLATE 10 MG/1
10 TABLET ORAL DAILY
Qty: 30 TABLET | Refills: 5 | Status: SHIPPED | OUTPATIENT
Start: 2019-02-01 | End: 2019-05-31

## 2019-02-01 RX ORDER — AMLODIPINE BESYLATE 10 MG/1
10 TABLET ORAL DAILY
Qty: 30 TABLET | Refills: 5 | Status: SHIPPED | OUTPATIENT
Start: 2019-02-01 | End: 2019-02-01 | Stop reason: SDUPTHER

## 2019-02-01 RX ORDER — CARVEDILOL 6.25 MG/1
6.25 TABLET ORAL 2 TIMES DAILY WITH MEALS
Qty: 60 TABLET | Refills: 5 | Status: SHIPPED | OUTPATIENT
Start: 2019-02-01 | End: 2019-02-01 | Stop reason: SDUPTHER

## 2019-02-07 ENCOUNTER — TELEPHONE (OUTPATIENT)
Dept: NEPHROLOGY | Facility: CLINIC | Age: 64
End: 2019-02-07

## 2019-02-07 NOTE — TELEPHONE ENCOUNTER
Pt called complaining of back pain and was wondering if she can get seen sooner for it and get labs to check kidney function  She is due back to see you in May  I told her that I will contact the doctor to see about coming in sooner  I asked her if she sees Urology she said no and she also has not talked with PCP about the back pain either

## 2019-02-08 NOTE — TELEPHONE ENCOUNTER
She had a renal ultrasound in January that was negative for any stones, we can send her for a repeat BMP make sure creatinine is stable  But she should also talked to her primary care physician about her back pain    Please send her for repeat BMP thanks

## 2019-04-03 ENCOUNTER — TELEPHONE (OUTPATIENT)
Dept: NEPHROLOGY | Facility: CLINIC | Age: 64
End: 2019-04-03

## 2019-04-29 ENCOUNTER — TELEPHONE (OUTPATIENT)
Dept: CARDIOLOGY CLINIC | Facility: CLINIC | Age: 64
End: 2019-04-29

## 2019-04-30 ENCOUNTER — HOSPITAL ENCOUNTER (EMERGENCY)
Facility: HOSPITAL | Age: 64
Discharge: HOME | End: 2019-04-30
Attending: EMERGENCY MEDICINE
Payer: COMMERCIAL

## 2019-04-30 ENCOUNTER — APPOINTMENT (EMERGENCY)
Dept: RADIOLOGY | Facility: HOSPITAL | Age: 64
End: 2019-04-30
Payer: COMMERCIAL

## 2019-04-30 VITALS
SYSTOLIC BLOOD PRESSURE: 172 MMHG | DIASTOLIC BLOOD PRESSURE: 79 MMHG | OXYGEN SATURATION: 98 % | RESPIRATION RATE: 18 BRPM | TEMPERATURE: 98.8 F | HEART RATE: 68 BPM

## 2019-04-30 DIAGNOSIS — R00.2 PALPITATIONS: Primary | ICD-10-CM

## 2019-04-30 DIAGNOSIS — I50.9 CHF (CONGESTIVE HEART FAILURE) (HCC): ICD-10-CM

## 2019-04-30 LAB
ALBUMIN SERPL BCP-MCNC: 3.4 G/DL (ref 3.5–5)
ALP SERPL-CCNC: 652 U/L (ref 46–116)
ALT SERPL W P-5'-P-CCNC: 56 U/L (ref 12–78)
ANION GAP SERPL CALCULATED.3IONS-SCNC: 4 MMOL/L (ref 4–13)
AST SERPL W P-5'-P-CCNC: 46 U/L (ref 5–45)
ATRIAL RATE: 69 BPM
BASOPHILS # BLD AUTO: 0.02 THOUSANDS/ΜL (ref 0–0.1)
BASOPHILS NFR BLD AUTO: 0 % (ref 0–1)
BILIRUB SERPL-MCNC: 0.43 MG/DL (ref 0.2–1)
BUN SERPL-MCNC: 44 MG/DL (ref 5–25)
CALCIUM SERPL-MCNC: 8.5 MG/DL (ref 8.3–10.1)
CHLORIDE SERPL-SCNC: 104 MMOL/L (ref 100–108)
CO2 SERPL-SCNC: 28 MMOL/L (ref 21–32)
CREAT SERPL-MCNC: 1.82 MG/DL (ref 0.6–1.3)
EOSINOPHIL # BLD AUTO: 0 THOUSAND/ΜL (ref 0–0.61)
EOSINOPHIL NFR BLD AUTO: 0 % (ref 0–6)
ERYTHROCYTE [DISTWIDTH] IN BLOOD BY AUTOMATED COUNT: 13.6 % (ref 11.6–15.1)
GFR SERPL CREATININE-BSD FRML MDRD: 29 ML/MIN/1.73SQ M
GLUCOSE SERPL-MCNC: 259 MG/DL (ref 65–140)
HCT VFR BLD AUTO: 27 % (ref 34.8–46.1)
HGB BLD-MCNC: 8.4 G/DL (ref 11.5–15.4)
IMM GRANULOCYTES # BLD AUTO: 0.02 THOUSAND/UL (ref 0–0.2)
IMM GRANULOCYTES NFR BLD AUTO: 0 % (ref 0–2)
LYMPHOCYTES # BLD AUTO: 1.19 THOUSANDS/ΜL (ref 0.6–4.47)
LYMPHOCYTES NFR BLD AUTO: 20 % (ref 14–44)
MCH RBC QN AUTO: 28.3 PG (ref 26.8–34.3)
MCHC RBC AUTO-ENTMCNC: 31.1 G/DL (ref 31.4–37.4)
MCV RBC AUTO: 91 FL (ref 82–98)
MONOCYTES # BLD AUTO: 0.35 THOUSAND/ΜL (ref 0.17–1.22)
MONOCYTES NFR BLD AUTO: 6 % (ref 4–12)
NEUTROPHILS # BLD AUTO: 4.51 THOUSANDS/ΜL (ref 1.85–7.62)
NEUTS SEG NFR BLD AUTO: 74 % (ref 43–75)
NRBC BLD AUTO-RTO: 0 /100 WBCS
NT-PROBNP SERPL-MCNC: 702 PG/ML
P AXIS: 69 DEGREES
PLATELET # BLD AUTO: 167 THOUSANDS/UL (ref 149–390)
PMV BLD AUTO: 10 FL (ref 8.9–12.7)
POTASSIUM SERPL-SCNC: 4.3 MMOL/L (ref 3.5–5.3)
PR INTERVAL: 166 MS
PROT SERPL-MCNC: 7.6 G/DL (ref 6.4–8.2)
QRS AXIS: -40 DEGREES
QRSD INTERVAL: 84 MS
QT INTERVAL: 420 MS
QTC INTERVAL: 450 MS
RBC # BLD AUTO: 2.97 MILLION/UL (ref 3.81–5.12)
SODIUM SERPL-SCNC: 136 MMOL/L (ref 136–145)
T WAVE AXIS: 65 DEGREES
TROPONIN I SERPL-MCNC: <0.02 NG/ML
VENTRICULAR RATE: 69 BPM
WBC # BLD AUTO: 6.09 THOUSAND/UL (ref 4.31–10.16)

## 2019-04-30 PROCEDURE — 36415 COLL VENOUS BLD VENIPUNCTURE: CPT

## 2019-04-30 PROCEDURE — 99285 EMERGENCY DEPT VISIT HI MDM: CPT

## 2019-04-30 PROCEDURE — 93005 ELECTROCARDIOGRAM TRACING: CPT

## 2019-04-30 PROCEDURE — 84484 ASSAY OF TROPONIN QUANT: CPT | Performed by: EMERGENCY MEDICINE

## 2019-04-30 PROCEDURE — 85025 COMPLETE CBC W/AUTO DIFF WBC: CPT | Performed by: EMERGENCY MEDICINE

## 2019-04-30 PROCEDURE — 99284 EMERGENCY DEPT VISIT MOD MDM: CPT | Performed by: EMERGENCY MEDICINE

## 2019-04-30 PROCEDURE — 96374 THER/PROPH/DIAG INJ IV PUSH: CPT

## 2019-04-30 PROCEDURE — 83880 ASSAY OF NATRIURETIC PEPTIDE: CPT | Performed by: EMERGENCY MEDICINE

## 2019-04-30 PROCEDURE — 80053 COMPREHEN METABOLIC PANEL: CPT | Performed by: EMERGENCY MEDICINE

## 2019-04-30 PROCEDURE — 93010 ELECTROCARDIOGRAM REPORT: CPT | Performed by: INTERNAL MEDICINE

## 2019-04-30 PROCEDURE — 71046 X-RAY EXAM CHEST 2 VIEWS: CPT

## 2019-04-30 RX ORDER — FUROSEMIDE 10 MG/ML
40 INJECTION INTRAMUSCULAR; INTRAVENOUS ONCE
Status: COMPLETED | OUTPATIENT
Start: 2019-04-30 | End: 2019-04-30

## 2019-04-30 RX ADMIN — FUROSEMIDE 40 MG: 10 INJECTION, SOLUTION INTRAMUSCULAR; INTRAVENOUS at 11:49

## 2019-05-01 DIAGNOSIS — I50.31 ACUTE DIASTOLIC CONGESTIVE HEART FAILURE (HCC): ICD-10-CM

## 2019-05-01 RX ORDER — FUROSEMIDE 40 MG/1
TABLET ORAL
Qty: 60 TABLET | Refills: 2 | Status: SHIPPED | OUTPATIENT
Start: 2019-05-01 | End: 2019-08-11 | Stop reason: SDUPTHER

## 2019-05-03 ENCOUNTER — OFFICE VISIT (OUTPATIENT)
Dept: CARDIOLOGY CLINIC | Facility: CLINIC | Age: 64
End: 2019-05-03
Payer: COMMERCIAL

## 2019-05-03 VITALS
OXYGEN SATURATION: 97 % | HEART RATE: 60 BPM | HEIGHT: 65 IN | WEIGHT: 167 LBS | BODY MASS INDEX: 27.82 KG/M2 | DIASTOLIC BLOOD PRESSURE: 52 MMHG | SYSTOLIC BLOOD PRESSURE: 140 MMHG

## 2019-05-03 DIAGNOSIS — I25.10 CORONARY ARTERY DISEASE INVOLVING NATIVE CORONARY ARTERY OF NATIVE HEART WITHOUT ANGINA PECTORIS: ICD-10-CM

## 2019-05-03 DIAGNOSIS — I10 HTN (HYPERTENSION), BENIGN: Primary | ICD-10-CM

## 2019-05-03 DIAGNOSIS — R00.2 HEART PALPITATIONS: ICD-10-CM

## 2019-05-03 PROCEDURE — 99214 OFFICE O/P EST MOD 30 MIN: CPT | Performed by: INTERNAL MEDICINE

## 2019-05-17 ENCOUNTER — HOSPITAL ENCOUNTER (OUTPATIENT)
Dept: NON INVASIVE DIAGNOSTICS | Facility: CLINIC | Age: 64
Discharge: HOME | End: 2019-05-17
Payer: COMMERCIAL

## 2019-05-17 DIAGNOSIS — R00.2 HEART PALPITATIONS: ICD-10-CM

## 2019-05-17 PROCEDURE — 93226 XTRNL ECG REC<48 HR SCAN A/R: CPT

## 2019-05-17 PROCEDURE — 93225 XTRNL ECG REC<48 HRS REC: CPT

## 2019-05-21 LAB
25(OH)D3 SERPL-MCNC: 12 NG/ML (ref 30–100)
ALBUMIN SERPL-MCNC: 3.8 G/DL (ref 3.6–5.1)
ALBUMIN/GLOB SERPL: 1.2 (CALC) (ref 1–2.5)
ALP SERPL-CCNC: 514 U/L (ref 33–130)
ALT SERPL-CCNC: 25 U/L (ref 6–29)
APPEARANCE UR: CLEAR
AST SERPL-CCNC: 28 U/L (ref 10–35)
BACTERIA UR QL AUTO: ABNORMAL /HPF
BASOPHILS # BLD AUTO: 40 CELLS/UL (ref 0–200)
BASOPHILS NFR BLD AUTO: 0.6 %
BILIRUB SERPL-MCNC: 0.4 MG/DL (ref 0.2–1.2)
BILIRUB UR QL STRIP: NEGATIVE
BUN SERPL-MCNC: 47 MG/DL (ref 7–25)
BUN/CREAT SERPL: 24 (CALC) (ref 6–22)
CALCIUM SERPL-MCNC: 9 MG/DL (ref 8.6–10.4)
CALCIUM SERPL-MCNC: 9 MG/DL (ref 8.6–10.4)
CHLORIDE SERPL-SCNC: 103 MMOL/L (ref 98–110)
CO2 SERPL-SCNC: 29 MMOL/L (ref 20–32)
COLOR UR: YELLOW
CREAT SERPL-MCNC: 1.95 MG/DL (ref 0.5–0.99)
CREAT UR-MCNC: 142 MG/DL (ref 20–275)
EOSINOPHIL # BLD AUTO: 0 CELLS/UL (ref 15–500)
EOSINOPHIL NFR BLD AUTO: 0 %
ERYTHROCYTE [DISTWIDTH] IN BLOOD BY AUTOMATED COUNT: 12.4 % (ref 11–15)
GLOBULIN SER CALC-MCNC: 3.2 G/DL (CALC) (ref 1.9–3.7)
GLUCOSE SERPL-MCNC: 93 MG/DL (ref 65–99)
GLUCOSE UR QL STRIP: NEGATIVE
HCT VFR BLD AUTO: 26.7 % (ref 35–45)
HGB BLD-MCNC: 8.7 G/DL (ref 11.7–15.5)
HGB UR QL STRIP: NEGATIVE
HYALINE CASTS #/AREA URNS LPF: ABNORMAL /LPF
KETONES UR QL STRIP: NEGATIVE
LEUKOCYTE ESTERASE UR QL STRIP: NEGATIVE
LYMPHOCYTES # BLD AUTO: 1023 CELLS/UL (ref 850–3900)
LYMPHOCYTES NFR BLD AUTO: 15.5 %
MAGNESIUM SERPL-MCNC: 2.1 MG/DL (ref 1.5–2.5)
MCH RBC QN AUTO: 28.1 PG (ref 27–33)
MCHC RBC AUTO-ENTMCNC: 32.6 G/DL (ref 32–36)
MCV RBC AUTO: 86.1 FL (ref 80–100)
MONOCYTES # BLD AUTO: 462 CELLS/UL (ref 200–950)
MONOCYTES NFR BLD AUTO: 7 %
NEUTROPHILS # BLD AUTO: 5075 CELLS/UL (ref 1500–7800)
NEUTROPHILS NFR BLD AUTO: 76.9 %
NITRITE UR QL STRIP: NEGATIVE
PH UR STRIP: 5.5 [PH] (ref 5–8)
PHOSPHATE SERPL-MCNC: 5 MG/DL (ref 2.1–4.3)
PLATELET # BLD AUTO: 231 THOUSAND/UL (ref 140–400)
PMV BLD REES-ECKER: 10.3 FL (ref 7.5–12.5)
POTASSIUM SERPL-SCNC: 4.4 MMOL/L (ref 3.5–5.3)
PROT SERPL-MCNC: 7 G/DL (ref 6.1–8.1)
PROT UR QL STRIP: ABNORMAL
PROT UR-MCNC: 134 MG/DL (ref 5–24)
PROT/CREAT UR: 944 MG/G CREAT (ref 21–161)
PTH-INTACT SERPL-MCNC: 133 PG/ML (ref 14–64)
RBC # BLD AUTO: 3.1 MILLION/UL (ref 3.8–5.1)
RBC #/AREA URNS HPF: ABNORMAL /HPF
SL AMB EGFR AFRICAN AMERICAN: 31 ML/MIN/1.73M2
SL AMB EGFR NON AFRICAN AMERICAN: 26 ML/MIN/1.73M2
SODIUM SERPL-SCNC: 139 MMOL/L (ref 135–146)
SP GR UR STRIP: 1.01 (ref 1–1.03)
SQUAMOUS #/AREA URNS HPF: ABNORMAL /HPF
WBC # BLD AUTO: 6.6 THOUSAND/UL (ref 3.8–10.8)
WBC #/AREA URNS HPF: ABNORMAL /HPF

## 2019-05-22 ENCOUNTER — TELEPHONE (OUTPATIENT)
Dept: OTHER | Facility: HOSPITAL | Age: 64
End: 2019-05-22

## 2019-05-22 DIAGNOSIS — N18.30 ANEMIA DUE TO STAGE 3 CHRONIC KIDNEY DISEASE (HCC): ICD-10-CM

## 2019-05-22 DIAGNOSIS — N18.30 STAGE 3 CHRONIC KIDNEY DISEASE (HCC): ICD-10-CM

## 2019-05-22 DIAGNOSIS — N17.9 AKI (ACUTE KIDNEY INJURY) (HCC): ICD-10-CM

## 2019-05-22 DIAGNOSIS — I10 ESSENTIAL HYPERTENSION: Primary | ICD-10-CM

## 2019-05-22 DIAGNOSIS — D63.1 ANEMIA DUE TO STAGE 3 CHRONIC KIDNEY DISEASE (HCC): ICD-10-CM

## 2019-05-22 PROCEDURE — 93227 XTRNL ECG REC<48 HR R&I: CPT | Performed by: INTERNAL MEDICINE

## 2019-05-29 ENCOUNTER — TELEPHONE (OUTPATIENT)
Dept: CARDIOLOGY CLINIC | Facility: CLINIC | Age: 64
End: 2019-05-29

## 2019-05-30 ENCOUNTER — TELEPHONE (OUTPATIENT)
Dept: NEPHROLOGY | Facility: CLINIC | Age: 64
End: 2019-05-30

## 2019-05-31 ENCOUNTER — OFFICE VISIT (OUTPATIENT)
Dept: NEPHROLOGY | Facility: CLINIC | Age: 64
End: 2019-05-31
Payer: COMMERCIAL

## 2019-05-31 VITALS
BODY MASS INDEX: 28.62 KG/M2 | SYSTOLIC BLOOD PRESSURE: 148 MMHG | HEIGHT: 65 IN | WEIGHT: 171.8 LBS | DIASTOLIC BLOOD PRESSURE: 59 MMHG | HEART RATE: 56 BPM

## 2019-05-31 DIAGNOSIS — N18.30 STAGE 3 CHRONIC KIDNEY DISEASE (HCC): Primary | ICD-10-CM

## 2019-05-31 DIAGNOSIS — E11.9 DM (DIABETES MELLITUS) (HCC): ICD-10-CM

## 2019-05-31 DIAGNOSIS — I21.A1 TYPE 2 MYOCARDIAL INFARCTION (HCC): ICD-10-CM

## 2019-05-31 DIAGNOSIS — I10 ESSENTIAL HYPERTENSION: ICD-10-CM

## 2019-05-31 DIAGNOSIS — E55.9 VITAMIN D DEFICIENCY: ICD-10-CM

## 2019-05-31 DIAGNOSIS — N17.9 AKI (ACUTE KIDNEY INJURY) (HCC): ICD-10-CM

## 2019-05-31 DIAGNOSIS — I50.31 ACUTE DIASTOLIC CONGESTIVE HEART FAILURE (HCC): ICD-10-CM

## 2019-05-31 PROBLEM — Z86.2 HISTORY OF ANEMIA DUE TO CHRONIC KIDNEY DISEASE: Status: ACTIVE | Noted: 2019-05-31

## 2019-05-31 PROBLEM — N18.9 HISTORY OF ANEMIA DUE TO CHRONIC KIDNEY DISEASE: Status: ACTIVE | Noted: 2019-05-31

## 2019-05-31 PROBLEM — R80.1 PERSISTENT PROTEINURIA: Status: ACTIVE | Noted: 2019-05-31

## 2019-05-31 PROCEDURE — 99214 OFFICE O/P EST MOD 30 MIN: CPT | Performed by: INTERNAL MEDICINE

## 2019-05-31 RX ORDER — ERGOCALCIFEROL (VITAMIN D2) 1250 MCG
50000 CAPSULE ORAL WEEKLY
Qty: 8 CAPSULE | Refills: 0 | Status: SHIPPED | OUTPATIENT
Start: 2019-05-31 | End: 2020-02-19

## 2019-05-31 RX ORDER — AMLODIPINE BESYLATE 5 MG/1
5 TABLET ORAL DAILY
Qty: 90 TABLET | Refills: 3 | Status: SHIPPED | OUTPATIENT
Start: 2019-05-31 | End: 2020-03-10

## 2019-05-31 RX ORDER — LOSARTAN POTASSIUM 25 MG/1
25 TABLET ORAL DAILY
Qty: 30 TABLET | Refills: 3 | Status: SHIPPED | OUTPATIENT
Start: 2019-05-31 | End: 2019-09-29 | Stop reason: SDUPTHER

## 2019-07-11 ENCOUNTER — TELEPHONE (OUTPATIENT)
Dept: NEPHROLOGY | Facility: CLINIC | Age: 64
End: 2019-07-11

## 2019-07-26 NOTE — TELEPHONE ENCOUNTER
Left message for a call back to schedule f/u appt - please schedule with Gurpreet Monteiro or Abundio Springer 7/26/19

## 2019-08-01 NOTE — TELEPHONE ENCOUNTER
Left message for a call back to schedule f/u appt 8/1/19 - please schedule pt with Pillo Guerrero or Ray Hood

## 2019-08-11 DIAGNOSIS — I50.31 ACUTE DIASTOLIC CONGESTIVE HEART FAILURE (HCC): ICD-10-CM

## 2019-08-11 DIAGNOSIS — I21.A1 TYPE 2 MYOCARDIAL INFARCTION (HCC): ICD-10-CM

## 2019-08-11 DIAGNOSIS — I10 ESSENTIAL HYPERTENSION: ICD-10-CM

## 2019-08-12 RX ORDER — FUROSEMIDE 40 MG/1
TABLET ORAL
Qty: 60 TABLET | Refills: 2 | Status: SHIPPED | OUTPATIENT
Start: 2019-08-12 | End: 2019-11-14 | Stop reason: SDUPTHER

## 2019-08-12 RX ORDER — AMLODIPINE BESYLATE 10 MG/1
TABLET ORAL
Qty: 30 TABLET | Refills: 5 | Status: SHIPPED | OUTPATIENT
Start: 2019-08-12 | End: 2020-03-10

## 2019-08-12 RX ORDER — CARVEDILOL 6.25 MG/1
TABLET ORAL
Qty: 60 TABLET | Refills: 5 | Status: SHIPPED | OUTPATIENT
Start: 2019-08-12 | End: 2020-03-10

## 2019-08-12 RX ORDER — ISOSORBIDE MONONITRATE 30 MG/1
TABLET, EXTENDED RELEASE ORAL
Qty: 30 TABLET | Refills: 5 | Status: SHIPPED | OUTPATIENT
Start: 2019-08-12 | End: 2019-10-17 | Stop reason: SDUPTHER

## 2019-08-12 RX ORDER — CLOPIDOGREL BISULFATE 75 MG/1
TABLET ORAL
Qty: 30 TABLET | Refills: 5 | Status: SHIPPED | OUTPATIENT
Start: 2019-08-12 | End: 2020-02-19

## 2019-08-13 DIAGNOSIS — I21.A1 TYPE 2 MYOCARDIAL INFARCTION (HCC): ICD-10-CM

## 2019-08-13 RX ORDER — ASPIRIN 81 MG
TABLET,CHEWABLE ORAL
Qty: 30 TABLET | Refills: 5 | Status: SHIPPED | OUTPATIENT
Start: 2019-08-13 | End: 2021-02-21

## 2019-08-21 LAB
BUN SERPL-MCNC: 40 MG/DL (ref 7–25)
BUN/CREAT SERPL: 22 (CALC) (ref 6–22)
CALCIUM SERPL-MCNC: 9.1 MG/DL (ref 8.6–10.4)
CHLORIDE SERPL-SCNC: 105 MMOL/L (ref 98–110)
CO2 SERPL-SCNC: 26 MMOL/L (ref 20–32)
CREAT SERPL-MCNC: 1.86 MG/DL (ref 0.5–0.99)
GLUCOSE SERPL-MCNC: 144 MG/DL (ref 65–99)
POTASSIUM SERPL-SCNC: 5 MMOL/L (ref 3.5–5.3)
SL AMB EGFR AFRICAN AMERICAN: 32 ML/MIN/1.73M2
SL AMB EGFR NON AFRICAN AMERICAN: 28 ML/MIN/1.73M2
SODIUM SERPL-SCNC: 137 MMOL/L (ref 135–146)

## 2019-08-23 ENCOUNTER — TELEPHONE (OUTPATIENT)
Dept: NEPHROLOGY | Facility: CLINIC | Age: 64
End: 2019-08-23

## 2019-08-23 NOTE — TELEPHONE ENCOUNTER
----- Message from Gary Weems DO sent at 8/22/2019  3:42 PM EDT -----  Please let her know that her renal function remains stable with the addition of losartan we can continue if she feels well  Thanks  Latex:  Patient denies allergy to latex.  Medications reviewed with patient.  Tobacco use verified.  Allergies verified.    Primary Medical Doctor: Nan Cruz MD   DATE OF INJURY/SURGERY:  DOS 10/16/2018  WORK RELATED: No  OCCUPATION: Director for ACL lab at [employer]     Chief Complaint   Patient presents with   • Surgical Followup     left thumb CMC reconstruction DOS 10/16/18       Pain level 0-10: 0/10  Pain medication: None    New XR: No    Incision is well approximated: Yes  Signs of infection No;     Dressing removed/Sutures removed: yes      Pt reports numbness remains in L thumb in the morning

## 2019-09-29 DIAGNOSIS — I21.A1 TYPE 2 MYOCARDIAL INFARCTION (HCC): ICD-10-CM

## 2019-09-29 DIAGNOSIS — I50.31 ACUTE DIASTOLIC CONGESTIVE HEART FAILURE (HCC): ICD-10-CM

## 2019-09-29 DIAGNOSIS — E11.9 DM (DIABETES MELLITUS) (HCC): ICD-10-CM

## 2019-09-29 DIAGNOSIS — I10 ESSENTIAL HYPERTENSION: ICD-10-CM

## 2019-09-29 DIAGNOSIS — N17.9 AKI (ACUTE KIDNEY INJURY) (HCC): ICD-10-CM

## 2019-09-29 DIAGNOSIS — N18.30 STAGE 3 CHRONIC KIDNEY DISEASE (HCC): ICD-10-CM

## 2019-09-30 RX ORDER — LOSARTAN POTASSIUM 25 MG/1
TABLET ORAL
Qty: 30 TABLET | Refills: 3 | Status: SHIPPED | OUTPATIENT
Start: 2019-09-30 | End: 2020-04-14 | Stop reason: SDUPTHER

## 2019-10-10 ENCOUNTER — TELEPHONE (OUTPATIENT)
Dept: NEPHROLOGY | Facility: CLINIC | Age: 64
End: 2019-10-10

## 2019-10-10 NOTE — TELEPHONE ENCOUNTER
Left detailed message reminding Vani Henry on blood work that needs to get done prior to next week's appointment 10/10/19

## 2019-10-15 LAB
ALBUMIN SERPL-MCNC: 3.4 G/DL (ref 3.6–5.1)
ALBUMIN/GLOB SERPL: 1.1 (CALC) (ref 1–2.5)
ALP SERPL-CCNC: 477 U/L (ref 33–130)
ALT SERPL-CCNC: 34 U/L (ref 6–29)
AST SERPL-CCNC: 29 U/L (ref 10–35)
BASOPHILS # BLD AUTO: 50 CELLS/UL (ref 0–200)
BASOPHILS NFR BLD AUTO: 0.7 %
BILIRUB SERPL-MCNC: 0.3 MG/DL (ref 0.2–1.2)
BUN SERPL-MCNC: 42 MG/DL (ref 7–25)
BUN/CREAT SERPL: 25 (CALC) (ref 6–22)
CALCIUM SERPL-MCNC: 8.8 MG/DL (ref 8.6–10.4)
CALCIUM SERPL-MCNC: 8.8 MG/DL (ref 8.6–10.4)
CHLORIDE SERPL-SCNC: 107 MMOL/L (ref 98–110)
CO2 SERPL-SCNC: 25 MMOL/L (ref 20–32)
CREAT SERPL-MCNC: 1.68 MG/DL (ref 0.5–0.99)
EOSINOPHIL # BLD AUTO: 355 CELLS/UL (ref 15–500)
EOSINOPHIL NFR BLD AUTO: 5 %
ERYTHROCYTE [DISTWIDTH] IN BLOOD BY AUTOMATED COUNT: 11.9 % (ref 11–15)
FERRITIN SERPL-MCNC: 82 NG/ML (ref 16–288)
GLOBULIN SER CALC-MCNC: 3 G/DL (CALC) (ref 1.9–3.7)
GLUCOSE SERPL-MCNC: 224 MG/DL (ref 65–99)
HCT VFR BLD AUTO: 25.8 % (ref 35–45)
HGB BLD-MCNC: 8.1 G/DL (ref 11.7–15.5)
IRON SATN MFR SERPL: 26 % (CALC) (ref 16–45)
IRON SERPL-MCNC: 70 MCG/DL (ref 45–160)
LYMPHOCYTES # BLD AUTO: 1512 CELLS/UL (ref 850–3900)
LYMPHOCYTES NFR BLD AUTO: 21.3 %
MAGNESIUM SERPL-MCNC: 2.3 MG/DL (ref 1.5–2.5)
MCH RBC QN AUTO: 27.4 PG (ref 27–33)
MCHC RBC AUTO-ENTMCNC: 31.4 G/DL (ref 32–36)
MCV RBC AUTO: 87.2 FL (ref 80–100)
MONOCYTES # BLD AUTO: 376 CELLS/UL (ref 200–950)
MONOCYTES NFR BLD AUTO: 5.3 %
NEUTROPHILS # BLD AUTO: 4807 CELLS/UL (ref 1500–7800)
NEUTROPHILS NFR BLD AUTO: 67.7 %
PHOSPHATE SERPL-MCNC: 4.2 MG/DL (ref 2.1–4.3)
PLATELET # BLD AUTO: 200 THOUSAND/UL (ref 140–400)
PMV BLD REES-ECKER: 10.8 FL (ref 7.5–12.5)
POTASSIUM SERPL-SCNC: 5.4 MMOL/L (ref 3.5–5.3)
PROT SERPL-MCNC: 6.4 G/DL (ref 6.1–8.1)
PTH-INTACT SERPL-MCNC: 97 PG/ML (ref 14–64)
RBC # BLD AUTO: 2.96 MILLION/UL (ref 3.8–5.1)
SL AMB EGFR AFRICAN AMERICAN: 37 ML/MIN/1.73M2
SL AMB EGFR NON AFRICAN AMERICAN: 32 ML/MIN/1.73M2
SODIUM SERPL-SCNC: 137 MMOL/L (ref 135–146)
TIBC SERPL-MCNC: 268 MCG/DL (CALC) (ref 250–450)
WBC # BLD AUTO: 7.1 THOUSAND/UL (ref 3.8–10.8)

## 2019-10-16 ENCOUNTER — TELEPHONE (OUTPATIENT)
Dept: NEPHROLOGY | Facility: HOSPITAL | Age: 64
End: 2019-10-16

## 2019-10-17 ENCOUNTER — OFFICE VISIT (OUTPATIENT)
Dept: NEPHROLOGY | Facility: CLINIC | Age: 64
End: 2019-10-17
Payer: COMMERCIAL

## 2019-10-17 ENCOUNTER — TELEPHONE (OUTPATIENT)
Dept: NEPHROLOGY | Facility: CLINIC | Age: 64
End: 2019-10-17

## 2019-10-17 VITALS
WEIGHT: 173.6 LBS | HEART RATE: 58 BPM | SYSTOLIC BLOOD PRESSURE: 142 MMHG | HEIGHT: 64 IN | DIASTOLIC BLOOD PRESSURE: 62 MMHG | BODY MASS INDEX: 29.64 KG/M2

## 2019-10-17 DIAGNOSIS — N18.30 STAGE 3 CHRONIC KIDNEY DISEASE (HCC): Primary | ICD-10-CM

## 2019-10-17 DIAGNOSIS — D63.1 ANEMIA DUE TO STAGE 3 CHRONIC KIDNEY DISEASE (HCC): ICD-10-CM

## 2019-10-17 DIAGNOSIS — N18.30 ANEMIA DUE TO STAGE 3 CHRONIC KIDNEY DISEASE (HCC): ICD-10-CM

## 2019-10-17 DIAGNOSIS — I21.A1 TYPE 2 MYOCARDIAL INFARCTION (HCC): ICD-10-CM

## 2019-10-17 PROCEDURE — 99214 OFFICE O/P EST MOD 30 MIN: CPT | Performed by: INTERNAL MEDICINE

## 2019-10-17 RX ORDER — ISOSORBIDE MONONITRATE 30 MG/1
60 TABLET, EXTENDED RELEASE ORAL DAILY
Qty: 30 TABLET | Refills: 5 | Status: SHIPPED | OUTPATIENT
Start: 2019-10-17 | End: 2020-04-20

## 2019-10-17 NOTE — PROGRESS NOTES
OFFICE FOLLOW UP - Nephrology   Margarette Castro 71 y o  female MRN: [id number]    Encounter: [id number]        ASSESSMENT  Diagnoses and all orders for this visit:    59-year-old female with past medical history type 2 diabetes, hypertension who presented with a type 2 NSTEMI, coronary artery disease requiring PCI complicated by acute kidney injury on chronic kidney disease    KELLY (acute kidney injury) ([facility] [address] )  Stage 3 chronic kidney disease ([facility] )  Coronary artery disease involving native coronary artery of native heart with angina pectoris ([facility] )  Acute diastolic congestive heart failure ([facility] )  Essential hypertension  Type 2 myocardial infarction [facility])    Patient Instructions   [place], your here today for follow-up regarding her kidney function we recommend the following  Today your kidney function is stable your blood pressure is slightly elevated  Please increase your isosorbide mononitrate to 60 mg daily  Continue the rest of your medications  Your potassium is slightly elevated as well please start a low-potassium diet as we discussed  We will repeat a BMP/blood work in 1 week if the potassium is still elevated he pain do hold the losartan  Otherwise if you have any other questions or concerns please do not hesitate to contact me if you repeat blood work is stable we will have the follow-up in 4 months  Your hemoglobin is still low and slightly worse than in May  Your iron levels are okay, you have had a recent colonoscopy when urine the hospital   We have also placed a consult for Hematology for further evaluation and treatment      DISCUSSION & PLAN    1  Acute kidney injury-etiology likely related to contrast induced nephropathy plus decreased effective arterial volume with acute anemia  -creatinine peaked at 3 and has improved now to 1 6-1 8  -avoid hypotension  -holding off on further contrast imaging per notes    2   Stage 3 chronic kidney disease  -new baseline creatinine seems to be around 1 9  -continue cardiovascular risk reduction measures-statin therapy, glycemic control and treatment of coronary artery disease    3  Coronary artery disease  -holding on repeat cardiac catheterization at this time  -following up with Cardiology    4  Essential hypertension in the setting of diastolic heart failure  -weight increased but Edema stable will continue furosemide at current dose  -continue Coreg 6 252  Times daily heart rates have been in the 50s  -increase isosorbide mononitrate to 60 mg daily  -back on higher dose of amlodipine 10 mg daily  -currently on losartan potassium upper limits of normal placed on a low-potassium diet and will repeat a BMP in 1 week to make sure potassium is stable    5  diastolic congestive heart failure  -following up with Cardiology  -continue blood pressure control, on a beta-blocker, on Imdur , ARB,  -currently on a statin    6  Type 2 diabetes mellitus  -continue glycemic control     7  Vitamin-D deficiency  -PTH acceptable    8  Proteinuria  -about 1 g of protein on last test  -monitor longstanding history of diabetes    9  Anemia in the setting of chronic kidney disease  -iron saturation 26% hemoglobin now down to 8 1  -has had a colonoscopy in the past  -hemoglobin dropping at a faster rate and would be expected in her degree of CKD although CKD is likely playing a role, will place a referral for hematology     Will continue to monitor as above regarding repeat blood work in 1 week better blood pressure control and if everything is stable will follow up in 3-4 months      HPI: Janina Soto is a 71 y o  female who is here for follow-up      She has a past medical history of type 2 diabetes mellitus and hypertension recently presented with type 2 myocardial infarction in signs of volume overload requiring catheterization postoperatively had an acute kidney injury with a creatinine that peaked around 3 she was nonoliguric through this time she was also anemic she was transfused 2 units of packed red blood cell her blood pressures remained stable and her kidney numbers have slowly improved almost to her baseline    For interval history is that she is stable she denies any acute chest pain or shortness of breath no fevers chills no nausea she does have heart palpitations her blood pressures are not that well controlled does have blood pressures in  range at home her diabetes also she has had some high blood sugars she does admit to a high potassium diet with potatoes and bananas, potassium was slightly elevated today      ROS:   All the systems were reviewed and were negative except as documented on the HPI  Allergies: Patient has no known allergies      Medications:   Current Outpatient Medications:     amLODIPine (NORVASC) 10 mg tablet, take 1 tablet (10 MG TOTAL) by mouth once daily for 30 DAYS, Disp: 30 tablet, Rfl: 5    atorvastatin (LIPITOR) 40 mg tablet, Take 1 tablet (40 mg total) by mouth daily, Disp: 30 tablet, Rfl: 11    carvedilol (COREG) 6 25 mg tablet, take 1 tablet (6 25 MG TOTAL) by mouth twice a day with meals for 30 DAYS, Disp: 60 tablet, Rfl: 5    clopidogrel (PLAVIX) 75 mg tablet, take 1 tablet (75 MG TOTAL) by mouth once daily for 30 DAYS, Disp: 30 tablet, Rfl: 5    furosemide (LASIX) 40 mg tablet, TAKE 1 TABLET (40 MG TOTAL) BY MOUTH TWO TIMES A DAY, Disp: 60 tablet, Rfl: 2    glucose blood test strip, 3 times daily, Disp: , Rfl:     isosorbide mononitrate (IMDUR) 30 mg 24 hr tablet, Take 2 tablets (60 mg total) by mouth daily, Disp: 30 tablet, Rfl: 5    levothyroxine 112 mcg tablet, 1 tab daily x 6 day and half pill on sundays, Disp: , Rfl:     losartan (COZAAR) 25 mg tablet, TAKE 1 TABLET (25 MCG TOTAL) BY MOUTH DAILY (Patient taking differently: 25 mg daily ), Disp: 30 tablet, Rfl: 3    nitroglycerin (NITROSTAT) 0 4 mg SL tablet, Place 1 tablet (0 4 mg total) under the tongue every 5 (five) minutes as needed for chest pain for up to 30 doses, Disp: 30 tablet, Rfl: 0    RA ASPIRIN ADULT LOW DOSE 81 MG chewable tablet, chew and swallow 1 tablet (81 MG TOTAL) by mouth once daily, Disp: 30 tablet, Rfl: 5    repaglinide (PRANDIN) 0 5 mg tablet, Take 1 tablet (0 5 mg total) by mouth 3 (three) times a day before meals, Disp: , Rfl: 0    amLODIPine (NORVASC) 5 mg tablet, Take 1 tablet (5 mg total) by mouth daily for 91 days, Disp: 90 tablet, Rfl: 3    ergocalciferol (ERGOCALCIFEROL) 67446 units capsule, Take 1 capsule (50,000 Units total) by mouth once a week for 8 doses, Disp: 8 capsule, Rfl: 0    gabapentin (NEURONTIN) 100 mg capsule, Take 100 mg by mouth, Disp: , Rfl:     Past Medical History:   Diagnosis Date    Diabetes mellitus ([facility] [address] )     Hypothyroidism      Past Surgical History:   Procedure Laterality Date    CORONARY ANGIOPLASTY WITH STENT PLACEMENT  2019     Family History   Problem Relation Age of Onset    Diabetes Mother     Heart attack Father         MI    Hypertension Brother       reports that she has quit smoking  She has never used smokeless tobacco  She reports that she does not drink alcohol or use drugs  Physical Exam:   Vitals:    10/17/19 1524   BP: 142/62   BP Location: Left arm   Patient Position: Sitting   Cuff Size: Standard   Pulse: 58   Weight: 78 7 kg (173 lb 9 6 oz)   Height: 5' 3 58" (1 615 m)     Body mass index is 30 19 kg/m²      General: conscious, cooperative, in no acute distress  Eyes: conjunctivae pink, anicteric sclerae  ENT: lips and mucous membranes moist  Neck: supple, no JVD  Chest: clear breath sounds bilateral, no crackles, ronchus or wheezings  CVS: normal rate, regular rhythm  Abdomen: soft, non-tender, non-distended, normoactive bowel sounds  Extremities:  Trace edema  Skin: no rash  Neuro: awake, alert, oriented  Psych:  Pleasant affect    Lab Results:    Results for orders placed or performed in visit on 10/14/19   PTH, Intact and Calcium   Result Value Ref Range    PTH, Intact 97 (H) 14 - 64 pg/mL    SL AMB CALCIUM 8 8 8 6 - 10 4 mg/dL   Magnesium   Result Value Ref Range    Magnesium, Serum 2 3 1 5 - 2 5 mg/dL   Phosphorus   Result Value Ref Range    Phosphorus, Serum 4 2 2 1 - 4 3 mg/dL   TIBC   Result Value Ref Range    Iron, Serum 70 45 - 160 mcg/dL    Total Iron Binding Capacity (TIBC) 268 250 - 450 mcg/dL (calc)    Iron Saturation 26 16 - 45 % (calc)   Comprehensive metabolic panel   Result Value Ref Range    Glucose, Random 224 (H) 65 - 99 mg/dL    BUN 42 (H) 7 - 25 mg/dL    Creatinine 1 68 (H) 0 50 - 0 99 mg/dL    eGFR Non  32 (L) > OR = 60 mL/min/1 73m2    eGFR  37 (L) > OR = 60 mL/min/1 73m2    SL AMB BUN/CREATININE RATIO 25 (H) 6 - 22 (calc)    Sodium 137 135 - 146 mmol/L    Potassium 5 4 (H) 3 5 - 5 3 mmol/L    Chloride 107 98 - 110 mmol/L    CO2 25 20 - 32 mmol/L    SL AMB CALCIUM 8 8 8 6 - 10 4 mg/dL    Protein, Total 6 4 6 1 - 8 1 g/dL    Albumin 3 4 (L) 3 6 - 5 1 g/dL    Globulin 3 0 1 9 - 3 7 g/dL (calc)    Albumin/Globulin Ratio 1 1 1 0 - 2 5 (calc)    TOTAL BILIRUBIN 0 3 0 2 - 1 2 mg/dL    Alkaline Phosphatase 477 (H) 33 - 130 U/L    AST 29 10 - 35 U/L    ALT 34 (H) 6 - 29 U/L   CBC and differential   Result Value Ref Range    White Blood Cell Count 7 1 3 8 - 10 8 Thousand/uL    Red Blood Cell Count 2 96 (L) 3 80 - 5 10 Million/uL    Hemoglobin 8 1 (L) 11 7 - 15 5 g/dL    HCT 25 8 (L) 35 0 - 45 0 %    MCV 87 2 80 0 - 100 0 fL    MCH 27 4 27 0 - 33 0 pg    MCHC 31 4 (L) 32 0 - 36 0 g/dL    RDW 11 9 11 0 - 15 0 %    Platelet Count 672 472 - 400 Thousand/uL    SL AMB MPV 10 8 7 5 - 12 5 fL    Neutrophils (Absolute) 4,807 1,500 - 7,800 cells/uL    Lymphocytes (Absolute) 1,512 850 - 3,900 cells/uL    Monocytes (Absolute) 376 200 - 950 cells/uL    Eosinophils (Absolute) 355 15 - 500 cells/uL    Basophils ABS 50 0 - 200 cells/uL    Neutrophils 67 7 %    Lymphocytes 21 3 %    Monocytes 5 3 %    Eosinophils 5 0 %    Basophils PCT 0 7 %    Always Message Ferritin   Result Value Ref Range    Ferritin 82 16 - 288 ng/mL       Portions of the record may have been created with voice recognition software  Occasional wrong word or "sound a like" substitutions may have occurred due to the inherent limitations of voice recognition software  Read the chart carefully and recognize, using context, where substitutions have occurred  If you have any questions, please contact the dictating provider

## 2019-10-17 NOTE — PATIENT INSTRUCTIONS
Gopi Bowden, your here today for follow-up regarding her kidney function we recommend the following  Today your kidney function is stable your blood pressure is slightly elevated  Please increase your isosorbide mononitrate to 60 mg daily  Continue the rest of your medications  Your potassium is slightly elevated as well please start a low-potassium diet as we discussed  We will repeat a BMP/blood work in 1 week if the potassium is still elevated he pain do hold the losartan  Otherwise if you have any other questions or concerns please do not hesitate to contact me if you repeat blood work is stable we will have the follow-up in 4 months  Your hemoglobin is still low and slightly worse than in May    Your iron levels are okay, you have had a recent colonoscopy when urine the hospital   We have also placed a consult for Hematology for further evaluation and treatment

## 2019-10-17 NOTE — TELEPHONE ENCOUNTER
Left message offering patient earlier appointment slots for this afternoon (10/17/19) - office number left in the event the patient is interested in being seen sooner

## 2019-10-22 DIAGNOSIS — E55.9 VITAMIN D DEFICIENCY: ICD-10-CM

## 2019-10-22 DIAGNOSIS — I21.A1 TYPE 2 MYOCARDIAL INFARCTION (HCC): ICD-10-CM

## 2019-10-22 DIAGNOSIS — I50.31 ACUTE DIASTOLIC CONGESTIVE HEART FAILURE (HCC): ICD-10-CM

## 2019-10-22 DIAGNOSIS — N17.9 AKI (ACUTE KIDNEY INJURY) (HCC): ICD-10-CM

## 2019-10-22 DIAGNOSIS — I10 ESSENTIAL HYPERTENSION: ICD-10-CM

## 2019-10-22 DIAGNOSIS — E11.9 DM (DIABETES MELLITUS) (HCC): ICD-10-CM

## 2019-10-22 DIAGNOSIS — N18.30 STAGE 3 CHRONIC KIDNEY DISEASE (HCC): ICD-10-CM

## 2019-10-22 RX ORDER — ERGOCALCIFEROL 1.25 MG/1
CAPSULE ORAL
Qty: 8 CAPSULE | Refills: 0 | OUTPATIENT
Start: 2019-10-22

## 2019-11-14 DIAGNOSIS — I50.31 ACUTE DIASTOLIC CONGESTIVE HEART FAILURE (HCC): ICD-10-CM

## 2019-11-14 RX ORDER — FUROSEMIDE 40 MG/1
TABLET ORAL
Qty: 60 TABLET | Refills: 2 | Status: SHIPPED | OUTPATIENT
Start: 2019-11-14 | End: 2020-02-19 | Stop reason: ALTCHOICE

## 2020-01-31 ENCOUNTER — TELEPHONE (OUTPATIENT)
Dept: SURGICAL ONCOLOGY | Facility: CLINIC | Age: 65
End: 2020-01-31

## 2020-01-31 NOTE — TELEPHONE ENCOUNTER
New Patient Encounter    New Patient Intake Form   Patient Details:  Domenic Alvarenga  1955  [id number]    Background Information:  [address] starts by opening a telephone encounter and gathering the following information   Who is calling to schedule? If not self, relationship to patient? self   Referring Provider Dr Kaminski Brochure   What is the diagnosis? Low hemoglobin   Is this diagnosis confirmed Yes   Is there a confirmed diagnosis from a biopsy/tissue reviewed by pathology? Is there any prior history of Cancer? If yes, please explain    When was the diagnosis? 10/2019   Is patient aware of diagnosis? Yes   Reason for visit? NP DX   Have you had any testing done? If so: when, where? Yes   Are records in [facility]? yes   Was the patient told to bring a disk? no   Scheduling Information:   Preferred [place]:  Any     Requesting Specific Provider? no   Are there any dates/time the patient cannot be seen? no      Miscellaneous: n/a   After completing the above information, please route to Financial Counselor and the appropriate Nurse Navigator for review

## 2020-02-14 ENCOUNTER — TELEPHONE (OUTPATIENT)
Dept: CARDIOLOGY CLINIC | Facility: CLINIC | Age: 65
End: 2020-02-14

## 2020-02-14 NOTE — TELEPHONE ENCOUNTER
Prior discussion she seems to be fluid overloaded  Tomorrow, Saturday please have her take the furosemide 40, 3 tablets in the morning, 120 mg   If she is not better Sunday please have her take another 3 tablets of furosemide 120    She is to call the office Monday if she has had to use the furosemide 120 mg 2 days in a row    Please check with her Monday and see how she did

## 2020-02-14 NOTE — TELEPHONE ENCOUNTER
Daughter called and states for the last 2 nights when lying down , pt has been SOB and had chest tightness  Also has abd bloating  WT today 167 lbs  /71 Hr-66  Per our conversation, pt will take 120 mg of lasix in the am only on Saturday and Sunday  I'll call her back Monday to see how she is doing      Thank you

## 2020-02-17 LAB
ALBUMIN SERPL-MCNC: 3.5 G/DL (ref 3.6–5.1)
ALBUMIN/GLOB SERPL: 1.2 (CALC) (ref 1–2.5)
ALP SERPL-CCNC: 617 U/L (ref 37–153)
ALT SERPL-CCNC: 62 U/L (ref 6–29)
AST SERPL-CCNC: 56 U/L (ref 10–35)
BASOPHILS # BLD AUTO: 48 CELLS/UL (ref 0–200)
BASOPHILS NFR BLD AUTO: 0.7 %
BILIRUB SERPL-MCNC: 0.4 MG/DL (ref 0.2–1.2)
BUN SERPL-MCNC: 46 MG/DL (ref 7–25)
BUN/CREAT SERPL: 24 (CALC) (ref 6–22)
CALCIUM SERPL-MCNC: 8.7 MG/DL (ref 8.6–10.4)
CALCIUM SERPL-MCNC: 8.7 MG/DL (ref 8.6–10.4)
CHLORIDE SERPL-SCNC: 108 MMOL/L (ref 98–110)
CO2 SERPL-SCNC: 26 MMOL/L (ref 20–32)
CREAT SERPL-MCNC: 1.94 MG/DL (ref 0.5–0.99)
EOSINOPHIL # BLD AUTO: 290 CELLS/UL (ref 15–500)
EOSINOPHIL NFR BLD AUTO: 4.2 %
ERYTHROCYTE [DISTWIDTH] IN BLOOD BY AUTOMATED COUNT: 12.2 % (ref 11–15)
GLOBULIN SER CALC-MCNC: 2.9 G/DL (CALC) (ref 1.9–3.7)
GLUCOSE SERPL-MCNC: 145 MG/DL (ref 65–99)
HCT VFR BLD AUTO: 24.7 % (ref 35–45)
HGB BLD-MCNC: 8.2 G/DL (ref 11.7–15.5)
LYMPHOCYTES # BLD AUTO: 1325 CELLS/UL (ref 850–3900)
LYMPHOCYTES NFR BLD AUTO: 19.2 %
MAGNESIUM SERPL-MCNC: 2.1 MG/DL (ref 1.5–2.5)
MCH RBC QN AUTO: 28.4 PG (ref 27–33)
MCHC RBC AUTO-ENTMCNC: 33.2 G/DL (ref 32–36)
MCV RBC AUTO: 85.5 FL (ref 80–100)
MONOCYTES # BLD AUTO: 435 CELLS/UL (ref 200–950)
MONOCYTES NFR BLD AUTO: 6.3 %
NEUTROPHILS # BLD AUTO: 4802 CELLS/UL (ref 1500–7800)
NEUTROPHILS NFR BLD AUTO: 69.6 %
PHOSPHATE SERPL-MCNC: 4.3 MG/DL (ref 2.1–4.3)
PLATELET # BLD AUTO: 178 THOUSAND/UL (ref 140–400)
PMV BLD REES-ECKER: 11.5 FL (ref 7.5–12.5)
POTASSIUM SERPL-SCNC: 5.2 MMOL/L (ref 3.5–5.3)
PROT SERPL-MCNC: 6.4 G/DL (ref 6.1–8.1)
PTH-INTACT SERPL-MCNC: 140 PG/ML (ref 14–64)
RBC # BLD AUTO: 2.89 MILLION/UL (ref 3.8–5.1)
SL AMB EGFR AFRICAN AMERICAN: 31 ML/MIN/1.73M2
SL AMB EGFR NON AFRICAN AMERICAN: 27 ML/MIN/1.73M2
SODIUM SERPL-SCNC: 140 MMOL/L (ref 135–146)
WBC # BLD AUTO: 6.9 THOUSAND/UL (ref 3.8–10.8)

## 2020-02-18 ENCOUNTER — TELEPHONE (OUTPATIENT)
Dept: NEPHROLOGY | Facility: CLINIC | Age: 65
End: 2020-02-18

## 2020-02-18 NOTE — TELEPHONE ENCOUNTER
----- Message from Radha Quach DO sent at 2/18/2020 12:29 PM EST -----  Patient's creatinine slightly above baseline, notes reviewed patient diuretics were increased, will monitor with new doses of medication have her repeat a BMP before her March follow-up appointment thanks

## 2020-02-18 NOTE — PROGRESS NOTES
Outpatient Cardiology Consult Note - Marco Oviedo 71 y o  female MRN: [id number]    @ Encounter: [id number]        Patient Active Problem List    Diagnosis Date Noted    Elevated LFTs 02/19/2020     Priority: Low    History of anemia due to chronic kidney disease 05/31/2019     Priority: Low    Vitamin D deficiency 05/31/2019     Priority: Low    Persistent proteinuria 05/31/2019     Priority: Low    KELLY (acute kidney injury) ([place] [address] ) 01/06/2019     Priority: Low    Coronary artery disease involving native coronary artery of native heart with angina pectoris ([place] [address] ) 01/04/2019     Priority: Low    Anemia 12/30/2018     Priority: Low    Acute diastolic congestive heart failure ([place] [address] ) 12/29/2018     Priority: Low    DM (diabetes mellitus) ([place] [address] ) 12/29/2018     Priority: Low    Hypothyroidism 12/29/2018     Priority: Low    Hypertension 12/29/2018     Priority: Low    Type 2 myocardial infarction ([place] [address] ) 12/29/2018     Priority: Low    Hyperlipidemia 12/29/2018     Priority: Low    Stage 3 chronic kidney disease ([place] [address] ) 12/29/2018     Priority: Low       Assessment:  # Chronic Diastolic CHF, Stage C  Diuretic: furosemide 40 mg BID  Weight: 173 lbs  Nt pro BNP 4/30/19: 702  Echo 12/30/18:  EF: 50%, grade 2 DD, PASP 35 mmHg    # CAD - 1/4/19: LUDY x 2 to LAD after NSTEMI (trop 0 2) and abnormal stress test with ischemia in anterior wall on 12/31/18  80% RCA- plan staged  Imdur 30 mg daily, asa and plavix    # HTN- norvasc 15 mg daily, coreg 6 25 mg BID    # palpitations  Holter 5/17/19: 53-85, avg 69 bpm  8 PVCs     # anemia- 8 2  # CKD3, baseline Cr 1 9 on 2/15/20  # DM2, hgA1C 9 2 2/8/20    Today's Plan:  Can stop Plavix  palps sound like PVCs and documented  15 mg of amlodipine can definitely contribute to edema  Change furosemide to torsemide 20 mg BID    HPI:     72 yo with chronic diastolic CHF, HTN, CAD s/p LUDY x 2 LAD, residual disease in RCA planned for stage PCI, CKD, anemia presents post hospitalization for NSTEMI and acute diastolic CHF  She was having OROSCO for last 3-4 weeks  Eats a lot of salty foods  Had PND, edema for about a month prior  She was hypertensive to 220 mmHg on admit  Held off on RCA stenting last time  Gave lasix 80 mg IV in office - weight next day down a couple of pounts to 171  Weight today 169 lbs    Interim:  Still has not needed to have PCI to 80% RCA- held off as no symptoms  Holter 5/17/19: 53-85, avg 69 bpm  8 PVCs,     Called 2/14 with edema, volume overload- SOB , chest tightness and  mmHg- told to take furosemide 120 mg       Review of Systems   Constitutional: Negative for activity change, appetite change, fatigue and unexpected weight change  HENT: Negative for congestion and nosebleeds  Eyes: Negative  Respiratory: Positive for shortness of breath  Negative for cough and chest tightness  Cardiovascular: Positive for palpitations  Negative for chest pain and leg swelling  Gastrointestinal: Negative for abdominal distention  Endocrine: Negative  Genitourinary: Negative  Musculoskeletal: Negative  Skin: Negative  Neurological: Negative for dizziness, syncope and weakness  Hematological: Negative  Psychiatric/Behavioral: Negative            Past Medical History:   Diagnosis Date    Diabetes mellitus ([facility] [address] )     Hypothyroidism        Allergies   Allergen Reactions    Iv Contrast [Iodinated Diagnostic Agents]      Caused KELLY       Current Outpatient Medications:     amLODIPine (NORVASC) 10 mg tablet, take 1 tablet (10 MG TOTAL) by mouth once daily for 30 DAYS, Disp: 30 tablet, Rfl: 5    amLODIPine (NORVASC) 5 mg tablet, Take 1 tablet (5 mg total) by mouth daily for 91 days, Disp: 90 tablet, Rfl: 3    atorvastatin (LIPITOR) 40 mg tablet, Take 1 tablet (40 mg total) by mouth daily, Disp: 30 tablet, Rfl: 11    carvedilol (COREG) 6 25 mg tablet, take 1 tablet (6 25 MG TOTAL) by mouth twice a day with meals for 30 DAYS, Disp: 60 tablet, Rfl: 5   clopidogrel (PLAVIX) 75 mg tablet, take 1 tablet (75 MG TOTAL) by mouth once daily for 30 DAYS, Disp: 30 tablet, Rfl: 5    furosemide (LASIX) 40 mg tablet, take 1 tablet (40 MG TOTAL) by mouth twice a day, Disp: 60 tablet, Rfl: 2    glucose blood test strip, 3 times daily, Disp: , Rfl:     isosorbide mononitrate (IMDUR) 30 mg 24 hr tablet, Take 2 tablets (60 mg total) by mouth daily, Disp: 30 tablet, Rfl: 5    levothyroxine 112 mcg tablet, 1 tab daily x 6 day and half pill on sundays, Disp: , Rfl:     losartan (COZAAR) 25 mg tablet, TAKE 1 TABLET (25 MCG TOTAL) BY MOUTH DAILY (Patient taking differently: 25 mg daily ), Disp: 30 tablet, Rfl: 3    nitroglycerin (NITROSTAT) 0 4 mg SL tablet, Place 1 tablet (0 4 mg total) under the tongue every 5 (five) minutes as needed for chest pain for up to 30 doses, Disp: 30 tablet, Rfl: 0    RA ASPIRIN ADULT LOW DOSE 81 MG chewable tablet, chew and swallow 1 tablet (81 MG TOTAL) by mouth once daily, Disp: 30 tablet, Rfl: 5    repaglinide (PRANDIN) 0 5 mg tablet, Take 1 tablet (0 5 mg total) by mouth 3 (three) times a day before meals, Disp: , Rfl: 0    Social History     Socioeconomic History    Marital status: /Civil Union     Spouse name: Not on file    Number of children: 3    Years of education: Not on file    Highest education level: Not on file   Occupational History    Occupation: part time   Social Needs    Financial resource strain: Not on file    Food insecurity:     Worry: Not on file     Inability: Not on file   [employer] needs:     Medical: Not on file     Non-medical: Not on file   Tobacco Use    Smoking status: Former Smoker    Smokeless tobacco: Never Used   Substance and Sexual Activity    Alcohol use: No    Drug use: No    Sexual activity: Yes   Lifestyle    Physical activity:     Days per week: Not on file     Minutes per session: Not on file    Stress: Not on file   Relationships    Social connections:     Talks on phone: Not on file     Gets together: Not on file     Attends Rastafarian service: Not on file     Active member of club or organization: Not on file     Attends meetings of clubs or organizations: Not on file     Relationship status: Not on file    Intimate partner violence:     Fear of current or ex partner: Not on file     Emotionally abused: Not on file     Physically abused: Not on file     Forced sexual activity: Not on file   Other Topics Concern    Not on file   Social History Narrative    Always uses seatbelt    Caffeine use    Daily coffee consumption: 1 cp daily    Feels safe at home    Lives with spouse       Family History   Problem Relation Age of Onset    Diabetes Mother     Heart attack Father         MI    Hypertension Brother        Physical Exam:    Vitals: Blood pressure 138/70, pulse 64, height 5' 3 5" (1 613 m), weight 79 3 kg (174 lb 14 4 oz), SpO2 98 %  , Body mass index is 30 5 kg/m² ,   Wt Readings from Last 3 Encounters:   02/19/20 79 3 kg (174 lb 14 4 oz)   02/19/20 80 3 kg (177 lb)   10/17/19 78 7 kg (173 lb 9 6 oz)       Physical Exam   Constitutional: She is oriented to person, place, and time  She appears well-developed and well-nourished  HENT:   Head: Normocephalic and atraumatic  Eyes: Pupils are equal, round, and reactive to light  EOM are normal    Neck: Normal range of motion  No JVD present  Cardiovascular: Normal rate, regular rhythm and normal heart sounds  No murmur heard  Pulmonary/Chest: Effort normal and breath sounds normal  She has no rales  Abdominal: Soft  Bowel sounds are normal  She exhibits no distension  Musculoskeletal: Normal range of motion  She exhibits edema  Neurological: She is alert and oriented to person, place, and time  Skin: Skin is warm and dry  She is not diaphoretic  Psychiatric: She has a normal mood and affect           Labs & Results:    Lab Results   Component Value Date    WBC 6 9 02/15/2020    HGB 8 2 (L) 02/15/2020    HCT 24 7 (L) 02/15/2020    MCV 85 5 02/15/2020     02/15/2020     Lab Results   Component Value Date    GLUCOSE 453 (H) 10/07/2015    CALCIUM 8 7 02/15/2020    CALCIUM 8 7 02/15/2020     10/07/2015    K 5 2 02/15/2020    CO2 26 02/15/2020     02/15/2020    BUN 46 (H) 02/15/2020    CREATININE 1 94 (H) 02/15/2020     No results found for: BNP   Lab Results   Component Value Date    CHOL 229 05/30/2015    CHOL 244 04/18/2014     Lab Results   Component Value Date    HDL 65 (H) 12/29/2018    HDL 48 05/30/2015    HDL 55 04/18/2014     Lab Results   Component Value Date    LDLCALC 72 12/29/2018    LDLCALC 152 (H) 05/30/2015    LDLCALC 172 (H) 04/18/2014     Lab Results   Component Value Date    TRIG 109 12/29/2018    TRIG 146 05/30/2015    TRIG 84 04/18/2014     No results found for: CHOLHDL        EKG personally reviewed by Elizabeth Hurtado DO  Counseling / Coordination of Care  Total floor / unit time spent today 25 minutes  Greater than 50% of total time was spent with the patient and / or family counseling and / or coordination of care  A description of the counseling / coordination of care: 15 min  Thank you for the opportunity to participate in the care of this patient  Elizabeth QUINONEZ    Director of Advanced Heart Failure Program  Medical Director of [address] [place] [address]

## 2020-02-19 ENCOUNTER — TELEPHONE (OUTPATIENT)
Dept: INFUSION CENTER | Facility: HOSPITAL | Age: 65
End: 2020-02-19

## 2020-02-19 ENCOUNTER — OFFICE VISIT (OUTPATIENT)
Dept: CARDIOLOGY CLINIC | Facility: CLINIC | Age: 65
End: 2020-02-19
Payer: COMMERCIAL

## 2020-02-19 ENCOUNTER — CONSULT (OUTPATIENT)
Dept: HEMATOLOGY ONCOLOGY | Facility: CLINIC | Age: 65
End: 2020-02-19
Payer: COMMERCIAL

## 2020-02-19 VITALS
HEART RATE: 64 BPM | HEIGHT: 64 IN | WEIGHT: 174.9 LBS | DIASTOLIC BLOOD PRESSURE: 70 MMHG | OXYGEN SATURATION: 98 % | SYSTOLIC BLOOD PRESSURE: 138 MMHG | BODY MASS INDEX: 29.86 KG/M2

## 2020-02-19 VITALS
HEART RATE: 63 BPM | OXYGEN SATURATION: 98 % | WEIGHT: 177 LBS | HEIGHT: 64 IN | SYSTOLIC BLOOD PRESSURE: 152 MMHG | TEMPERATURE: 97.2 F | RESPIRATION RATE: 14 BRPM | DIASTOLIC BLOOD PRESSURE: 82 MMHG | BODY MASS INDEX: 30.22 KG/M2

## 2020-02-19 DIAGNOSIS — I21.A1 TYPE 2 MYOCARDIAL INFARCTION (HCC): ICD-10-CM

## 2020-02-19 DIAGNOSIS — R79.89 ELEVATED LFTS: ICD-10-CM

## 2020-02-19 DIAGNOSIS — N18.30 STAGE 3 CHRONIC KIDNEY DISEASE (HCC): ICD-10-CM

## 2020-02-19 DIAGNOSIS — N17.9 AKI (ACUTE KIDNEY INJURY) (HCC): ICD-10-CM

## 2020-02-19 DIAGNOSIS — I25.119 CORONARY ARTERY DISEASE INVOLVING NATIVE CORONARY ARTERY OF NATIVE HEART WITH ANGINA PECTORIS (HCC): ICD-10-CM

## 2020-02-19 DIAGNOSIS — E78.00 PURE HYPERCHOLESTEROLEMIA: ICD-10-CM

## 2020-02-19 DIAGNOSIS — D63.1 ANEMIA DUE TO STAGE 3 CHRONIC KIDNEY DISEASE (HCC): ICD-10-CM

## 2020-02-19 DIAGNOSIS — E11.9 DM (DIABETES MELLITUS) (HCC): ICD-10-CM

## 2020-02-19 DIAGNOSIS — I50.31 ACUTE DIASTOLIC CONGESTIVE HEART FAILURE (HCC): Primary | ICD-10-CM

## 2020-02-19 DIAGNOSIS — N18.9 HISTORY OF ANEMIA DUE TO CHRONIC KIDNEY DISEASE: Primary | ICD-10-CM

## 2020-02-19 DIAGNOSIS — I10 ESSENTIAL HYPERTENSION: ICD-10-CM

## 2020-02-19 DIAGNOSIS — N18.30 ANEMIA DUE TO STAGE 3 CHRONIC KIDNEY DISEASE (HCC): ICD-10-CM

## 2020-02-19 DIAGNOSIS — E55.9 VITAMIN D DEFICIENCY: ICD-10-CM

## 2020-02-19 DIAGNOSIS — Z86.2 HISTORY OF ANEMIA DUE TO CHRONIC KIDNEY DISEASE: Primary | ICD-10-CM

## 2020-02-19 PROCEDURE — 99242 OFF/OP CONSLTJ NEW/EST SF 20: CPT | Performed by: PHYSICIAN ASSISTANT

## 2020-02-19 PROCEDURE — 99214 OFFICE O/P EST MOD 30 MIN: CPT | Performed by: INTERNAL MEDICINE

## 2020-02-19 RX ORDER — ERGOCALCIFEROL (VITAMIN D2) 1250 MCG
50000 CAPSULE ORAL WEEKLY
Qty: 8 CAPSULE | Refills: 0
Start: 2020-02-19 | End: 2021-02-21

## 2020-02-19 RX ORDER — TORSEMIDE 20 MG/1
20 TABLET ORAL 2 TIMES DAILY
Qty: 60 TABLET | Refills: 3 | Status: SHIPPED | OUTPATIENT
Start: 2020-02-19 | End: 2020-05-19 | Stop reason: SDUPTHER

## 2020-02-19 NOTE — PROGRESS NOTES
Oncology Outpatient Consult Note  Janina Soto 71 y o  female MRN: @ Encounter: [id number]        Date:  2/19/2020      Assessment/ Plan:    1  Normocytic anemia hemoglobin in the 8 gram/deciliter range 2019 to 2020  It was 10 gram/deciliter in 2015 and 2016    2  Chronic kidney disease with creatinine 1 6-1 9  3   Elevated LFTs  Abdominal ultrasound requested    Reviewed with patient and her daughter that it is her anemia is likely related to chronic kidney disease chronic disease  We discussed possibility of Aranesp  Potential side effects could include but may not be limited to: cardiovascular event such as MI or CVA, blood clot, dizziness, headache, nausea, vomiting, bone pain, high blood pressure, seizure, allergic reaction, tenderness at injection site  Potential signs/ symptoms of blood clot, MI, CVA reviewed  Patient to receive immediate attention should he have any of these symptoms  Questions were asked and answered  She wishes to proceed  Due to her cardiac history, will start low-dose 100 micrograms every 2 weeks  She does have a few additional lab tests and abdominal ultrasound follow-up in 1 week  Will coordinate 1st Aranesp dose to follow office visit  CC:  "Anemia, low blood cells, low blood count, hemoglobin low "      HPI:  Janina Soto "Vicente Munson' is a 71 y o  female seen for initial consultation 2/19/2020 at the referral of Khushboo Morgan DO regarding anemia  She was hospitalized December 29th, 2018 through 1/10/2019 regarding congestive heart failure  Cardiac ischemia also noted and she underwent cardiac catheterization  She developed contrast nephropathy      2/15/2020 hemoglobin 8 2, MCV 85, white blood cell count 6 9 with normal differential, platelet count 091, creatinine 1 94, albumin 3 5, total protein 6 4, calcium 8 7, alkaline phosphatase 617, AST 56, ALT 62      12/29/2018 hemoglobin 8 4, MCV of 90, normal white blood cell count, platelets and differential   Creatinine at presentation was 1 44  It elevated to 2 99 after receiving contrast for cardiac catheterization  Time of discharge 1/14/2019 was 1 65    Her hemoglobin did decrease to 6 4 1/7/2019  She received 2 units of packed red blood cells with improvement of her symptoms  Her hemoglobin improved to 9 2 grams/deciliter  She also received IV iron  Ferritin improved from 38 12/29/2018 - 82 10/14/2019  Her iron saturations 22% 12/29/2018 and  26% 10/14/2019  She denies any melena, hematochezia, abdominal pain  She denies any postmenopausal bleeding  No hematuria  April 2019 hemoglobin 8 4      9/12/2015: hemoglobin 10 4, MCV 84, white blood cell count 6 47, 60% neutrophils, 32% lymphocytes, platelet count [id number]  2015 her creatinine was 1 09    Since May 2019 she has been more short of breath  She does experience palpitations  She has had issues with bilateral lower extremity edema  She does have fatigue, she feels cold  She does have some constipation, occasional headaches and lightheadedness  Denies any fevers, chills, sweats  Her weight has been stable  Test Results:      Labs:   Lab Results   Component Value Date    HGB 8 2 (L) 02/15/2020    HCT 24 7 (L) 02/15/2020    MCV 85 5 02/15/2020     02/15/2020    WBC 6 9 02/15/2020    NRBC 0 04/30/2019     Lab Results   Component Value Date     10/07/2015    K 5 2 02/15/2020     02/15/2020    CO2 26 02/15/2020    ANIONGAP 10 10/07/2015    BUN 46 (H) 02/15/2020    CREATININE 1 94 (H) 02/15/2020    GLUCOSE 453 (H) 10/07/2015    CALCIUM 8 7 02/15/2020    CALCIUM 8 7 02/15/2020    AST 56 (H) 02/15/2020    ALT 62 (H) 02/15/2020    ALKPHOS 617 (H) 02/15/2020    PROT 7 8 10/07/2015    BILITOT 0 41 10/07/2015    EGFR 29 04/30/2019           Imaging:   No results found  ROS: As mentioned in HPI & Interval History otherwise 14 point ROS negative        Active Problems:   Patient Active Problem List   Diagnosis    Acute diastolic congestive heart failure (HCC)    DM (diabetes mellitus) ([place] [id number] )    Hypothyroidism    Hypertension    Type 2 myocardial infarction ([place] [address] )    Hyperlipidemia    Stage 3 chronic kidney disease (HCC)    Anemia    Coronary artery disease involving native coronary artery of native heart with angina pectoris ([place] [address] )    KELLY (acute kidney injury) ([place] [address] )    History of anemia due to chronic kidney disease    Vitamin D deficiency    Persistent proteinuria       Past Medical History:   Past Medical History:   Diagnosis Date    Diabetes mellitus ([place] [address] )     Hypothyroidism        Surgical History:   Past Surgical History:   Procedure Laterality Date    CORONARY ANGIOPLASTY WITH STENT PLACEMENT  2019       Family History:    Family History   Problem Relation Age of Onset    Diabetes Mother     Heart attack Father         MI    Hypertension Brother        Cancer-related family history is not on file      Social History:   Social History     Socioeconomic History    Marital status: /Civil Union     Spouse name: Not on file    Number of children: 3    Years of education: Not on file    Highest education level: Not on file   Occupational History    Occupation: part time   Social Needs    Financial resource strain: Not on file    Food insecurity:     Worry: Not on file     Inability: Not on file   [employer] needs:     Medical: Not on file     Non-medical: Not on file   Tobacco Use    Smoking status: Former Smoker    Smokeless tobacco: Never Used   Substance and Sexual Activity    Alcohol use: No    Drug use: No    Sexual activity: Yes   Lifestyle    Physical activity:     Days per week: Not on file     Minutes per session: Not on file    Stress: Not on file   Relationships    Social connections:     Talks on phone: Not on file     Gets together: Not on file     Attends Evangelical service: Not on file     Active member of club or organization: Not on file     Attends meetings of clubs or organizations: Not on file     Relationship status: Not on file    Intimate partner violence:     Fear of current or ex partner: Not on file     Emotionally abused: Not on file     Physically abused: Not on file     Forced sexual activity: Not on file   Other Topics Concern    Not on file   Social History Narrative    Always uses seatbelt    Caffeine use    Daily coffee consumption: 1 cp daily    Feels safe at home    Lives with spouse       Current Medications:   Current Outpatient Medications   Medication Sig Dispense Refill    amLODIPine (NORVASC) 10 mg tablet take 1 tablet (10 MG TOTAL) by mouth once daily for 30 DAYS 30 tablet 5    amLODIPine (NORVASC) 5 mg tablet Take 1 tablet (5 mg total) by mouth daily for 91 days 90 tablet 3    atorvastatin (LIPITOR) 40 mg tablet Take 1 tablet (40 mg total) by mouth daily 30 tablet 11    carvedilol (COREG) 6 25 mg tablet take 1 tablet (6 25 MG TOTAL) by mouth twice a day with meals for 30 DAYS 60 tablet 5    clopidogrel (PLAVIX) 75 mg tablet take 1 tablet (75 MG TOTAL) by mouth once daily for 30 DAYS 30 tablet 5    ergocalciferol (ERGOCALCIFEROL) 14627 units capsule Take 1 capsule (50,000 Units total) by mouth once a week for 8 doses 8 capsule 0    furosemide (LASIX) 40 mg tablet take 1 tablet (40 MG TOTAL) by mouth twice a day 60 tablet 2    gabapentin (NEURONTIN) 100 mg capsule Take 100 mg by mouth      glucose blood test strip 3 times daily      isosorbide mononitrate (IMDUR) 30 mg 24 hr tablet Take 2 tablets (60 mg total) by mouth daily 30 tablet 5    levothyroxine 112 mcg tablet 1 tab daily x 6 day and half pill on sundays      losartan (COZAAR) 25 mg tablet TAKE 1 TABLET (25 MCG TOTAL) BY MOUTH DAILY (Patient taking differently: 25 mg daily ) 30 tablet 3    nitroglycerin (NITROSTAT) 0 4 mg SL tablet Place 1 tablet (0 4 mg total) under the tongue every 5 (five) minutes as needed for chest pain for up to 30 doses 30 tablet 0    RA ASPIRIN ADULT LOW DOSE 81 MG chewable tablet chew and swallow 1 tablet (81 MG TOTAL) by mouth once daily 30 tablet 5    repaglinide (PRANDIN) 0 5 mg tablet Take 1 tablet (0 5 mg total) by mouth 3 (three) times a day before meals  0     No current facility-administered medications for this visit  Allergies: No Known Allergies      Physical Exam:    Body surface area is 1 85 meters squared  Ht Readings from Last 3 Encounters:   02/19/20 5' 3 5" (1 613 m)   10/17/19 5' 3 58" (1 615 m)   05/31/19 5' 5" (1 651 m)       Wt Readings from Last 3 Encounters:   02/19/20 80 3 kg (177 lb)   10/17/19 78 7 kg (173 lb 9 6 oz)   05/31/19 77 9 kg (171 lb 12 8 oz)        Temp Readings from Last 3 Encounters:   02/19/20 (!) 97 2 °F (36 2 °C)   04/30/19 98 8 °F (37 1 °C) (Oral)   01/10/19 97 8 °F (36 6 °C) (Oral)        BP Readings from Last 3 Encounters:   02/19/20 152/82   10/17/19 142/62   05/31/19 148/59         Pulse Readings from Last 3 Encounters:   02/19/20 63   10/17/19 58   05/31/19 56         Physical Exam    Physical Exam   Constitutional: She is oriented to person, place, and time  She appears well-developed and well-nourished  No distress  HENT:   Head: Normocephalic and atraumatic  Eyes: Conjunctivae are normal    Neck: Normal range of motion  Neck supple  No tracheal deviation present  Cardiovascular: Normal rate and regular rhythm  Exam reveals no gallop and no friction rub  No murmur heard  Pulmonary/Chest: Effort normal and breath sounds normal  No respiratory distress  She has no wheezes  She has no rales  She exhibits no tenderness  Abdominal: Soft  She exhibits no distension  There is no tenderness  Central obesity   Musculoskeletal: She exhibits edema (1+ BLE)  Lymphadenopathy:     She has no cervical adenopathy  Neurological: She is alert and oriented to person, place, and time  Skin: Skin is warm and dry  She is not diaphoretic  No erythema  No pallor  Psychiatric: She has a normal mood and affect  Her behavior is normal  Judgment and thought content normal    Vitals reviewed            Emergency Contacts:    [address], [phone], [phone]

## 2020-02-19 NOTE — TELEPHONE ENCOUNTER
Meme Bustillo from [facility] to verify the frequesncy of CBC testing  Per Pa's OV note from 2/19/2020:    Due to her cardiac history, will start low-dose 100 micrograms every 2 weeks  Therefore , CBC to be done q 2weeks prior to aranesp injections  Red espinoza

## 2020-02-19 NOTE — PATIENT INSTRUCTIONS
Stop Plavix    Start taking magnesium oxide 400- 500 mg daily    I am changing furosemide 40 mg to torsemide 20 mg twice daily    Please check daily weights and contact the heart failure program at  if you gain 3 lb in one day or 5 lb in one week  Please limit daily sodium intake to 2000 mg daily  Please limit daily fluid intake to 2000 ml daily  Bring complete list of medications to your follow up appointment

## 2020-02-20 LAB
BASOPHILS # BLD AUTO: 40 CELLS/UL (ref 0–200)
BASOPHILS NFR BLD AUTO: 0.7 %
EOSINOPHIL # BLD AUTO: 171 CELLS/UL (ref 15–500)
EOSINOPHIL NFR BLD AUTO: 3 %
ERYTHROCYTE [DISTWIDTH] IN BLOOD BY AUTOMATED COUNT: 11.9 % (ref 11–15)
HCT VFR BLD AUTO: 25.2 % (ref 35–45)
HGB BLD-MCNC: 7.9 G/DL (ref 11.7–15.5)
LYMPHOCYTES # BLD AUTO: 1277 CELLS/UL (ref 850–3900)
LYMPHOCYTES NFR BLD AUTO: 22.4 %
MCH RBC QN AUTO: 27.3 PG (ref 27–33)
MCHC RBC AUTO-ENTMCNC: 31.3 G/DL (ref 32–36)
MCV RBC AUTO: 87.2 FL (ref 80–100)
MONOCYTES # BLD AUTO: 308 CELLS/UL (ref 200–950)
MONOCYTES NFR BLD AUTO: 5.4 %
NEUTROPHILS # BLD AUTO: 3905 CELLS/UL (ref 1500–7800)
NEUTROPHILS NFR BLD AUTO: 68.5 %
PLATELET # BLD AUTO: 148 THOUSAND/UL (ref 140–400)
PMV BLD REES-ECKER: 11 FL (ref 7.5–12.5)
RBC # BLD AUTO: 2.89 MILLION/UL (ref 3.8–5.1)
WBC # BLD AUTO: 5.7 THOUSAND/UL (ref 3.8–10.8)

## 2020-02-25 ENCOUNTER — HOSPITAL ENCOUNTER (OUTPATIENT)
Dept: INFUSION CENTER | Facility: HOSPITAL | Age: 65
Discharge: HOME | End: 2020-02-25
Attending: INTERNAL MEDICINE
Payer: COMMERCIAL

## 2020-02-25 ENCOUNTER — OFFICE VISIT (OUTPATIENT)
Dept: HEMATOLOGY ONCOLOGY | Facility: CLINIC | Age: 65
End: 2020-02-25
Payer: COMMERCIAL

## 2020-02-25 VITALS
HEIGHT: 64 IN | WEIGHT: 174 LBS | DIASTOLIC BLOOD PRESSURE: 80 MMHG | BODY MASS INDEX: 29.71 KG/M2 | RESPIRATION RATE: 14 BRPM | SYSTOLIC BLOOD PRESSURE: 126 MMHG | TEMPERATURE: 98.7 F | HEART RATE: 80 BPM

## 2020-02-25 DIAGNOSIS — D63.1 ANEMIA DUE TO STAGE 3 CHRONIC KIDNEY DISEASE (HCC): Primary | ICD-10-CM

## 2020-02-25 DIAGNOSIS — N18.30 ANEMIA DUE TO STAGE 3 CHRONIC KIDNEY DISEASE (HCC): Primary | ICD-10-CM

## 2020-02-25 DIAGNOSIS — N18.30 STAGE 3 CHRONIC KIDNEY DISEASE (HCC): ICD-10-CM

## 2020-02-25 LAB
BASOPHILS # BLD AUTO: 0.05 THOUSANDS/ΜL (ref 0–0.1)
BASOPHILS NFR BLD AUTO: 1 % (ref 0–1)
EOSINOPHIL # BLD AUTO: 0.2 THOUSAND/ΜL (ref 0–0.61)
EOSINOPHIL NFR BLD AUTO: 3 % (ref 0–6)
ERYTHROCYTE [DISTWIDTH] IN BLOOD BY AUTOMATED COUNT: 12.8 % (ref 11.6–15.1)
FERRITIN SERPL-MCNC: 60 NG/ML (ref 8–388)
HCT VFR BLD AUTO: 28 % (ref 34.8–46.1)
HGB BLD-MCNC: 8.7 G/DL (ref 11.5–15.4)
IMM GRANULOCYTES # BLD AUTO: 0.01 THOUSAND/UL (ref 0–0.2)
IMM GRANULOCYTES NFR BLD AUTO: 0 % (ref 0–2)
IRON SATN MFR SERPL: 17 %
IRON SERPL-MCNC: 52 UG/DL (ref 50–170)
LYMPHOCYTES # BLD AUTO: 1.5 THOUSANDS/ΜL (ref 0.6–4.47)
LYMPHOCYTES NFR BLD AUTO: 22 % (ref 14–44)
MCH RBC QN AUTO: 28 PG (ref 26.8–34.3)
MCHC RBC AUTO-ENTMCNC: 31.1 G/DL (ref 31.4–37.4)
MCV RBC AUTO: 90 FL (ref 82–98)
MONOCYTES # BLD AUTO: 0.43 THOUSAND/ΜL (ref 0.17–1.22)
MONOCYTES NFR BLD AUTO: 6 % (ref 4–12)
NEUTROPHILS # BLD AUTO: 4.78 THOUSANDS/ΜL (ref 1.85–7.62)
NEUTS SEG NFR BLD AUTO: 68 % (ref 43–75)
NRBC BLD AUTO-RTO: 0 /100 WBCS
PLATELET # BLD AUTO: 168 THOUSANDS/UL (ref 149–390)
PMV BLD AUTO: 11.3 FL (ref 8.9–12.7)
RBC # BLD AUTO: 3.11 MILLION/UL (ref 3.81–5.12)
TIBC SERPL-MCNC: 302 UG/DL (ref 250–450)
WBC # BLD AUTO: 6.97 THOUSAND/UL (ref 4.31–10.16)

## 2020-02-25 PROCEDURE — 99214 OFFICE O/P EST MOD 30 MIN: CPT | Performed by: INTERNAL MEDICINE

## 2020-02-25 PROCEDURE — 85025 COMPLETE CBC W/AUTO DIFF WBC: CPT

## 2020-02-25 PROCEDURE — 83540 ASSAY OF IRON: CPT

## 2020-02-25 PROCEDURE — 83550 IRON BINDING TEST: CPT

## 2020-02-25 PROCEDURE — 96365 THER/PROPH/DIAG IV INF INIT: CPT

## 2020-02-25 PROCEDURE — 82728 ASSAY OF FERRITIN: CPT

## 2020-02-25 PROCEDURE — 96366 THER/PROPH/DIAG IV INF ADDON: CPT

## 2020-02-25 RX ORDER — SODIUM CHLORIDE 9 MG/ML
20 INJECTION, SOLUTION INTRAVENOUS ONCE
Status: CANCELLED | OUTPATIENT
Start: 2020-02-25

## 2020-02-25 RX ORDER — SODIUM CHLORIDE 9 MG/ML
20 INJECTION, SOLUTION INTRAVENOUS ONCE
Status: COMPLETED | OUTPATIENT
Start: 2020-02-25 | End: 2020-02-25

## 2020-02-25 RX ADMIN — SODIUM CHLORIDE 20 ML/HR: 0.9 INJECTION, SOLUTION INTRAVENOUS at 12:47

## 2020-02-25 RX ADMIN — IRON SUCROSE 300 MG: 20 INJECTION, SOLUTION INTRAVENOUS at 13:05

## 2020-02-25 NOTE — PLAN OF CARE
Problem: Potential for Falls  Goal: Patient will remain free of falls  Description  INTERVENTIONS:  - Assess patient frequently for physical needs  -  Identify cognitive and physical deficits and behaviors that affect risk of falls    -  [place] fall precautions as indicated by assessment   - Educate patient/family on patient safety including physical limitations  - Instruct patient to call for assistance with activity based on assessment  - Modify environment to reduce risk of injury  - Consider OT/PT consult to assist with strengthening/mobility  Outcome: Progressing

## 2020-02-25 NOTE — PROGRESS NOTES
Hematology Outpatient Follow - Up Note  Nieves Quiñonez 71 y o  female MRN: @ Encounter: [id number]        Date:  2/25/2020        Assessment/ Plan:      Anemia most likely secondary to chronic kidney disease grade 3, in order for us to have Aranesp working with hemoglobin goal between 10-11 g only because of history of coronary artery disease, myocardial infarction I believe we need to give the patient Venofer 300 mg IV to increase the ferritin level from 93    Usually insurance will approve Aranesp if ferritin above 100    Will do Venofer 300 mg IV today and repeat CBC in 2 weeks if hemoglobin below 9 9 introduced Aranesp at 100 micro g every 2-4 weeks to keep hemoglobin between 10-11 g only    Follow-up in 3 months with CBC, iron studies          HPI: Nieves Quiñonez "Nasim White' is a 71 y o  female seen for initial consultation 2/19/2020 at the referral of Ruben Perkins DO regarding anemia           She was hospitalized December 29th, 2018 through 1/10/2019 regarding congestive heart failure  Cardiac ischemia also noted and she underwent cardiac catheterization  She developed contrast nephropathy      2/15/2020 hemoglobin 8 2, MCV 85, white blood cell count 6 9 with normal differential, platelet count 511, creatinine 1 94, albumin 3 5, total protein 6 4, calcium 8 7, alkaline phosphatase 617, AST 56, ALT 62        12/29/2018 hemoglobin 8 4, MCV of 90, normal white blood cell count, platelets and differential   Creatinine at presentation was 1 44  It elevated to 2 99 after receiving contrast for cardiac catheterization  Time of discharge 1/14/2019 was 1 65     Her hemoglobin did decrease to 6 4 1/7/2019  She received 2 units of packed red blood cells with improvement of her symptoms  Her hemoglobin improved to 9 2 grams/deciliter      She also received IV iron  Ferritin improved from 38 12/29/2018 - 82 10/14/2019    Her iron saturations 22% 12/29/2018 and  26% 10/14/2019  She denies any melena, hematochezia, abdominal pain  She denies any postmenopausal bleeding  No hematuria      April 2019 hemoglobin 8 4        9/12/2015: hemoglobin 10 4, MCV 84, white blood cell count 6 47, 60% neutrophils, 32% lymphocytes, platelet count [id number]  2015 her creatinine was 1 09     Since May 2019 she has been more short of breath  She does experience palpitations  She has had issues with bilateral lower extremity edema  She does have fatigue, she feels cold  She does have some constipation, occasional headaches and lightheadedness  Denies any fevers, chills, sweats  Her weight has been stable  Interval History:  She had CBC done on the at [address] [place] [address] with hemoglobin of 7 9 MCV 87, RDW 11 9    The patient had blood slip for iron studies, SPEP, free light chain unfortunately they were not done at [address] [place] [address] Lab                Test Results:    Imaging: No results found  Labs:   Lab Results   Component Value Date    WBC 5 7 02/19/2020    HGB 7 9 (L) 02/19/2020    HCT 25 2 (L) 02/19/2020    MCV 87 2 02/19/2020     02/19/2020     Lab Results   Component Value Date     10/07/2015    K 5 2 02/15/2020     02/15/2020    CO2 26 02/15/2020    ANIONGAP 10 10/07/2015    BUN 46 (H) 02/15/2020    CREATININE 1 94 (H) 02/15/2020    GLUCOSE 453 (H) 10/07/2015    CALCIUM 8 7 02/15/2020    CALCIUM 8 7 02/15/2020    AST 56 (H) 02/15/2020    ALT 62 (H) 02/15/2020    ALKPHOS 617 (H) 02/15/2020    PROT 7 8 10/07/2015    BILITOT 0 41 10/07/2015    EGFR 29 04/30/2019       Lab Results   Component Value Date    IRON 70 10/14/2019    TIBC 268 10/14/2019    FERRITIN 82 10/14/2019       Lab Results   Component Value Date    NVDOTEOE42 545 12/29/2018         ROS: Review of Systems   Constitutional: Positive for fatigue  Negative for appetite change, chills, diaphoresis and unexpected weight change  HENT:   Negative for mouth sores, nosebleeds, sore throat, trouble swallowing and voice change  Eyes: Negative for eye problems and icterus  Respiratory: Positive for shortness of breath (Exertional dyspnea)  Negative for chest tightness, cough, hemoptysis and wheezing  Cardiovascular: Negative for chest pain, leg swelling and palpitations  Gastrointestinal: Negative for abdominal distention, abdominal pain, blood in stool, constipation, diarrhea, nausea and vomiting  Endocrine: Negative for hot flashes  Genitourinary: Negative for bladder incontinence, difficulty urinating, dyspareunia, dysuria and frequency  Musculoskeletal: Negative for arthralgias, back pain, gait problem, neck pain and neck stiffness  Skin: Negative for itching and rash  Neurological: Negative for dizziness, gait problem, headaches, numbness, seizures and speech difficulty  Hematological: Negative for adenopathy  Does not bruise/bleed easily  Psychiatric/Behavioral: Negative for decreased concentration, depression, sleep disturbance and suicidal ideas  The patient is not nervous/anxious  Current Medications: Reviewed  Allergies: Reviewed  PMH/FH/SH:  Reviewed      Physical Exam:    Body surface area is 1 83 meters squared  Wt Readings from Last 3 Encounters:   02/25/20 78 9 kg (174 lb)   02/19/20 79 3 kg (174 lb 14 4 oz)   02/19/20 80 3 kg (177 lb)        Temp Readings from Last 3 Encounters:   02/25/20 98 7 °F (37 1 °C)   02/19/20 (!) 97 2 °F (36 2 °C)   04/30/19 98 8 °F (37 1 °C) (Oral)        BP Readings from Last 3 Encounters:   02/25/20 126/80   02/19/20 138/70   02/19/20 152/82         Pulse Readings from Last 3 Encounters:   02/25/20 80   02/19/20 64   02/19/20 63        Physical Exam   Constitutional: She is oriented to person, place, and time  She appears well-developed and well-nourished  No distress  HENT:   Head: Normocephalic and atraumatic  Eyes:   Pale conjunctivae   Neck: Normal range of motion  Neck supple  No tracheal deviation present  Cardiovascular: Normal rate and regular rhythm   Exam reveals no gallop and no friction rub    No murmur heard  Pulmonary/Chest: Effort normal and breath sounds normal  No respiratory distress  She has no wheezes  She has no rales  She exhibits no tenderness  Abdominal: Soft  She exhibits no distension  There is no tenderness  Lymphadenopathy:     She has no cervical adenopathy  Neurological: She is alert and oriented to person, place, and time  Skin: Skin is warm and dry  She is not diaphoretic  No erythema  No pallor  Psychiatric: She has a normal mood and affect  Her behavior is normal  Judgment and thought content normal    Vitals reviewed  ECO    Goals and Barriers:  Current Goal: Minimize effects of disease  Barriers: None  Patient's Capacity to Self Care:  Patient is able to self care      Code Status: [unfilled]

## 2020-03-09 ENCOUNTER — TELEPHONE (OUTPATIENT)
Dept: HEMATOLOGY ONCOLOGY | Facility: MEDICAL CENTER | Age: 65
End: 2020-03-09

## 2020-03-09 ENCOUNTER — TELEPHONE (OUTPATIENT)
Dept: HEMATOLOGY ONCOLOGY | Facility: CLINIC | Age: 65
End: 2020-03-09

## 2020-03-09 DIAGNOSIS — N18.30 ANEMIA DUE TO STAGE 3 CHRONIC KIDNEY DISEASE (HCC): Primary | ICD-10-CM

## 2020-03-09 DIAGNOSIS — D63.1 ANEMIA DUE TO STAGE 3 CHRONIC KIDNEY DISEASE (HCC): Primary | ICD-10-CM

## 2020-03-09 NOTE — TELEPHONE ENCOUNTER
Patient daughter called in had questions in regards to Wednesday appointment stated that it was to be cancelled?  A good call back number  [phone]

## 2020-03-09 NOTE — ASSESSMENT & PLAN NOTE
Likely due to chronic kidney disease  Due to her acute heart failure and non STEMI will transfuse 1 unit packed red cells  PHYSICIAN NEXT STEPS:  Review Only    CHIEF COMPLAINT:  Chief Complaint/Protocol Used: Cough - Acute Non-Productive  Onset: Coughing x 1 week       ASSESSMENT:  ? Onset: Coughing x 1 week   ? Normal True  ? Onset: Coughing x 1 week   ? Severity: Moderate coughing. Keeps her awake at night   ? Respiratory Distress: Denies shortness of breath and no wheezing   ? Fever: Denies   ? Hemoptysis: Denies   ? Treatment: Mucinex   ? Cardiac History: Denies   ? Lung History: Has sleep apnea   ? Pe Risk Factors: Being treated for blood clot   ? Other Symptoms: chest pain when coughing   ? Travel: Denies travel.   -------------------------------------------------------    DISPOSITION:  Disposition Recommendation: See Physician within 24 Hours  Questions that led to disposition:  ? SEVERE coughing spells (e.g., whooping sound after coughing, vomiting after coughing)  Patient Directed To: Unspecified  Patient Intended Action: Seek care in the doctor's office       CALL NOTES:  03/09/2020 at 8:36 AM by Jaz Ferguson  ? Appointment scheduled to see Dr. Chau today at 10:40am.  Instructed to call back for worsening symptoms.    DISPOSITION OVERRIDE/PROVIDER CONSULT:  Disposition Override: See Within 4 Hours  Override Source: Nurse clinical judgment  Consulted with PCP: No  Consulted with On-Call Physician: No    CALLER CONTACT INFO:  Name: Katty Green (Self)  Phone 1: [phone] (Home Phone)      ENCOUNTER STARTED:  03/09/20 08:29:09 AM  ENCOUNTER ASSIGNED TO/CLOSED BY:  Jaz Ferguson @ 03/09/20 08:36:33 AM      -------------------------------------------------------    CARE ADVICE given per Cough - Acute Non-Productive guideline.  SEE PHYSICIAN WITHIN 24 HOURS:   * IF OFFICE WILL BE OPEN: You need to be seen within the next 24 hours. Call your doctor when the office opens, and make an appointment.  * IF OFFICE WILL BE CLOSED AND NO PCP TRIAGE: You need to be seen within the next 24 hours. An urgent care center is often a good  source of care if your doctor's office is closed.  * IF OFFICE WILL BE CLOSED AND PCP TRIAGE REQUIRED: You may need to be seen within the next 24 hours. Your doctor will want to talk with you to decide what's best. I'll page the doctor now. NOTE: Since this isn't serious, hold the page between 10 pm and   7 am. Page the doctor in the morning.  * IF PATIENT HAS NO PCP: Refer patient to an Urgent Care Center or Retail Clinic. Also try to help caller find a PCP (medical home) for their future care. ?; COUGHING SPELLS:    * Drink warm fluids. Inhale warm mist. (Reason: both relax the airway and loosen up the phlegm)    * Suck on cough drops or hard candy to coat the irritated throat.; CALL BACK IF:    * Difficulty breathing occurs   * You become worse.      UNDERSTANDS CARE ADVICE: Yes    AGREES WITH CARE ADVICE: Yes    WILL FOLLOW CARE ADVICE: Yes    -------------------------------------------------------

## 2020-03-09 NOTE — TELEPHONE ENCOUNTER
Please have patient complete a cbc today or tomorrow  We will need results to decide on tx Wednesday

## 2020-03-10 ENCOUNTER — OFFICE VISIT (OUTPATIENT)
Dept: NEPHROLOGY | Facility: CLINIC | Age: 65
End: 2020-03-10
Payer: COMMERCIAL

## 2020-03-10 VITALS
DIASTOLIC BLOOD PRESSURE: 71 MMHG | HEIGHT: 64 IN | WEIGHT: 168 LBS | BODY MASS INDEX: 28.68 KG/M2 | SYSTOLIC BLOOD PRESSURE: 172 MMHG | HEART RATE: 62 BPM

## 2020-03-10 DIAGNOSIS — D63.1 ANEMIA DUE TO STAGE 3 CHRONIC KIDNEY DISEASE (HCC): ICD-10-CM

## 2020-03-10 DIAGNOSIS — N18.30 ANEMIA DUE TO STAGE 3 CHRONIC KIDNEY DISEASE (HCC): ICD-10-CM

## 2020-03-10 DIAGNOSIS — I21.A1 TYPE 2 MYOCARDIAL INFARCTION (HCC): ICD-10-CM

## 2020-03-10 DIAGNOSIS — E11.9 DM (DIABETES MELLITUS) (HCC): ICD-10-CM

## 2020-03-10 DIAGNOSIS — I10 ESSENTIAL HYPERTENSION: Primary | ICD-10-CM

## 2020-03-10 DIAGNOSIS — N18.30 STAGE 3 CHRONIC KIDNEY DISEASE (HCC): ICD-10-CM

## 2020-03-10 DIAGNOSIS — R80.1 PERSISTENT PROTEINURIA: ICD-10-CM

## 2020-03-10 PROCEDURE — 99213 OFFICE O/P EST LOW 20 MIN: CPT | Performed by: INTERNAL MEDICINE

## 2020-03-10 RX ORDER — CARVEDILOL 6.25 MG/1
12.5 TABLET ORAL 2 TIMES DAILY WITH MEALS
Qty: 60 TABLET | Refills: 0
Start: 2020-03-10 | End: 2020-05-19 | Stop reason: SDUPTHER

## 2020-03-10 RX ORDER — AMLODIPINE BESYLATE 5 MG/1
5 TABLET ORAL DAILY
Start: 2020-03-10 | End: 2020-06-04

## 2020-03-10 RX ORDER — UREA 10 %
500 LOTION (ML) TOPICAL 2 TIMES DAILY
COMMUNITY
End: 2021-02-21

## 2020-03-10 NOTE — LETTER
March 10, 2020     Patient: Catrachito Mckeon   YOB: 1955   Date of Visit: 3/10/2020       To Whom it May Concern:    Catrachito Mckeon is under my professional care  She was seen in my office on 3/10/2020  She can return to work on March 12/2020    If you have any questions or concerns, please don't hesitate to call           Sincerely,          [facility], DO        CC: No Recipients

## 2020-03-10 NOTE — PROGRESS NOTES
OFFICE FOLLOW UP - Nephrology   Royal Dailey 71 y o  female MRN: [id number]    Encounter: [id number]        ASSESSMENT  Diagnoses and all orders for this visit:    53-year-old female with past medical history type 2 diabetes, hypertension who presented with a type 2 NSTEMI, coronary artery disease requiring PCI complicated by acute kidney injury on chronic kidney disease    KELLY (acute kidney injury) ([facility] [address] )  Stage 3 chronic kidney disease ([facility] )  Coronary artery disease involving native coronary artery of native heart with angina pectoris ([facility] )  Acute diastolic congestive heart failure ([facility] )  Essential hypertension  Type 2 myocardial infarction [facility])    Patient Instructions   elena Atkinson here today for follow-up regarding your kidney function  Your blood pressure is on the higher side today and we recommend the followin  Continue amlodipine 5 mg daily, continue losartan 25 mg daily, continue isosorbide mononitrate 60 mg daily, and continue torsemide 20 mg twice daily for your blood pressure  2  Increase carvedilol from 6 25 mg twice daily to 12 5 mg twice daily  3  Please check your blood pressure once in the morning and once at night and write them down for the next 2 weeks  4  Please call me in 2 weeks and let me know higher blood pressures are doing at that point we may need to make further adjust  5  Your kidney function is stable we will follow up in about 3 months with repeat blood work       DISCUSSION & PLAN    1  Acute kidney injury-etiology likely related to contrast induced nephropathy plus decreased effective arterial volume with acute anemia  -creatinine peaked at 3 now stable between 1 point and 2 0  -avoid hypotension  -holding off on further contrast imaging per notes    2   Stage 3 chronic kidney disease  -new baseline creatinine seems to be around 1 5-2 0  -continue cardiovascular risk reduction measures-statin therapy, glycemic control and treatment of coronary artery disease  -estimated GFR likely 28-35 mL per minute    3  Coronary artery disease  -holding on repeat cardiac catheterization at this time  -following up with Cardiology    4  Essential hypertension in the setting of diastolic heart failure  -blood pressures remain elevated  -increase carvedilol to 12 5 mg b i d    -continue isosorbide mononitrate to 60 mg daily  -states she is on amlodipine 5 mg daily  -currently on losartan potassium remains in the upper limits of normal so will not up titrate this  -now on torsemide 20 mg twice daily  -asked her to check her blood pressure once in the morning and once at night at home  -her blood pressures in the office are in the 079 systolic and she states at home it sometimes higher than this  -she asked for a return to work slip which I do not think it is on reasonable over the next 2 days as we adjust her medication to stay home from work avoid hypotension 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 [employer], will up titrate meds as above return to work on March 12th if she feels okay    5  diastolic congestive heart failure  -following up with Cardiology  -continue blood pressure control, on a beta-blocker, on Imdur , ARB,  -currently on a statin    6  Type 2 diabetes mellitus  -continue glycemic control     7  Vitamin-D deficiency  -PTH acceptable    8  Proteinuria  -about 1 g of protein on last test  -monitor longstanding history of diabetes  -repeat with next blood work    9  Anemia in the setting of chronic kidney disease  -status post IV iron  -has had a colonoscopy in the past  -hematology did see the patient appreciate referral    Will continue to monitor and follow up in 3 months with blood work she needs better blood pressure control      HPI: Ban Bowden is a 71 y o  female who is here for follow-up      She has a past medical history of type 2 diabetes mellitus and hypertension recently presented with type 2 myocardial infarction in signs of volume overload requiring catheterization postoperatively had an acute kidney injury with a creatinine that peaked around 3 she was nonoliguric through this time she was also anemic she was transfused 2 units of packed red blood cell her blood pressures remained stable and her kidney numbers have slowly improved almost to her baseline    Interval history she is doing okay her blood pressures remain elevated she denies any chest pain or shortness of breath no fevers or chills she checks her blood pressure at home she is here with her daughter and she states stump times it is even higher than what is stated she has no headaches no blurry vision she is getting some episodes of palpitation    Her urine output has been stable  She otherwise offers no complaints      ROS:   All the systems were reviewed and were negative except as documented on the HPI  Allergies:  Iv contrast [iodinated diagnostic agents]    Medications:   Current Outpatient Medications:     amLODIPine (NORVASC) 5 mg tablet, Take 1 tablet (5 mg total) by mouth daily, Disp: , Rfl:     carvedilol (COREG) 6 25 mg tablet, Take 2 tablets (12 5 mg total) by mouth 2 (two) times a day with meals, Disp: 60 tablet, Rfl: 0    glucose blood test strip, 3 times daily, Disp: , Rfl:     isosorbide mononitrate (IMDUR) 30 mg 24 hr tablet, Take 2 tablets (60 mg total) by mouth daily, Disp: 30 tablet, Rfl: 5    levothyroxine 112 mcg tablet, 1 tab daily x 6 day and half pill on sundays, Disp: , Rfl:     losartan (COZAAR) 25 mg tablet, TAKE 1 TABLET (25 MCG TOTAL) BY MOUTH DAILY (Patient taking differently: 25 mg daily ), Disp: 30 tablet, Rfl: 3    magnesium gluconate (MAGONATE) 500 mg tablet, Take 500 mg by mouth 2 (two) times a day, Disp: , Rfl:     nitroglycerin (NITROSTAT) 0 4 mg SL tablet, Place 1 tablet (0 4 mg total) under the tongue every 5 (five) minutes as needed for chest pain for up to 30 doses, Disp: 30 tablet, Rfl: 0    RA ASPIRIN ADULT LOW DOSE 81 MG chewable tablet, chew and swallow 1 tablet (81 MG TOTAL) by mouth once daily, Disp: 30 tablet, Rfl: 5    torsemide (DEMADEX) 20 mg tablet, Take 1 tablet (20 mg total) by mouth 2 (two) times a day, Disp: 60 tablet, Rfl: 3    atorvastatin (LIPITOR) 40 mg tablet, Take 1 tablet (40 mg total) by mouth daily, Disp: 30 tablet, Rfl: 11    ergocalciferol (ERGOCALCIFEROL) 1 25 MG (08708 UT) capsule, Take 1 capsule (50,000 Units total) by mouth once a week for 8 doses (Patient not taking: Reported on 3/10/2020), Disp: 8 capsule, Rfl: 0    repaglinide (PRANDIN) 0 5 mg tablet, Take 1 tablet (0 5 mg total) by mouth 3 (three) times a day before meals, Disp: , Rfl: 0    Past Medical History:   Diagnosis Date    Anemia     CHF (congestive heart failure) (HCC)     Chronic kidney disease     Coronary artery disease     Diabetes mellitus ([facility] [address] )     Hypertension     Hypothyroidism      Past Surgical History:   Procedure Laterality Date    CORONARY ANGIOPLASTY WITH STENT PLACEMENT  2019     Family History   Problem Relation Age of Onset    Diabetes Mother     Heart attack Father         MI    Hypertension Brother       reports that she has quit smoking  She has never used smokeless tobacco  She reports that she does not drink alcohol or use drugs  Physical Exam:   Vitals:    03/10/20 0830   BP: (!) 172/71   BP Location: Left arm   Patient Position: Sitting   Cuff Size: Standard   Pulse: 62   Weight: 76 2 kg (168 lb)   Height: 5' 3 5" (1 613 m)     Body mass index is 29 29 kg/m²      General: conscious, cooperative, in no acute distress  Eyes: conjunctivae pink, anicteric sclerae  ENT: lips and mucous membranes moist  Neck: supple, no JVD  Chest: clear breath sounds bilateral, no crackles, ronchus or wheezings  CVS: normal rate, regular rhythm  Abdomen: soft, non-tender, non-distended, normoactive bowel sounds  Extremities:  1+ edema  Skin: no rash  Neuro: awake, alert, oriented  Psych:  Pleasant affect    Lab Results:    Results for orders placed or performed during the hospital encounter of 02/25/20   CBC and differential   Result Value Ref Range    WBC 6 97 4 31 - 10 16 Thousand/uL    RBC 3 11 (L) 3 81 - 5 12 Million/uL    Hemoglobin 8 7 (L) 11 5 - 15 4 g/dL    Hematocrit 28 0 (L) 34 8 - 46 1 %    MCV 90 82 - 98 fL    MCH 28 0 26 8 - 34 3 pg    MCHC 31 1 (L) 31 4 - 37 4 g/dL    RDW 12 8 11 6 - 15 1 %    MPV 11 3 8 9 - 12 7 fL    Platelets 787 613 - 787 Thousands/uL    nRBC 0 /100 WBCs    Neutrophils Relative 68 43 - 75 %    Immat GRANS % 0 0 - 2 %    Lymphocytes Relative 22 14 - 44 %    Monocytes Relative 6 4 - 12 %    Eosinophils Relative 3 0 - 6 %    Basophils Relative 1 0 - 1 %    Neutrophils Absolute 4 78 1 85 - 7 62 Thousands/µL    Immature Grans Absolute 0 01 0 00 - 0 20 Thousand/uL    Lymphocytes Absolute 1 50 0 60 - 4 47 Thousands/µL    Monocytes Absolute 0 43 0 17 - 1 22 Thousand/µL    Eosinophils Absolute 0 20 0 00 - 0 61 Thousand/µL    Basophils Absolute 0 05 0 00 - 0 10 Thousands/µL   Iron Saturation %   Result Value Ref Range    Iron Saturation 17 %    TIBC 302 250 - 450 ug/dL    Iron 52 50 - 170 ug/dL   Ferritin   Result Value Ref Range    Ferritin 60 8 - 388 ng/mL       Portions of the record may have been created with voice recognition software  Occasional wrong word or "sound a like" substitutions may have occurred due to the inherent limitations of voice recognition software  Read the chart carefully and recognize, using context, where substitutions have occurred  If you have any questions, please contact the dictating provider

## 2020-03-10 NOTE — PATIENT INSTRUCTIONS
Brooks Flaherty, your here today for follow-up regarding your kidney function  Your blood pressure is on the higher side today and we recommend the followin  Continue amlodipine 5 mg daily, continue losartan 25 mg daily, continue isosorbide mononitrate 60 mg daily, and continue torsemide 20 mg twice daily for your blood pressure  2  Increase carvedilol from 6 25 mg twice daily to 12 5 mg twice daily  3  Please check your blood pressure once in the morning and once at night and write them down for the next 2 weeks  4  Please call me in 2 weeks and let me know higher blood pressures are doing at that point we may need to make further adjust  5   Your kidney function is stable we will follow up in about 3 months with repeat blood work

## 2020-03-24 ENCOUNTER — TELEPHONE (OUTPATIENT)
Dept: HEMATOLOGY ONCOLOGY | Facility: CLINIC | Age: 65
End: 2020-03-24

## 2020-04-14 DIAGNOSIS — N18.30 STAGE 3 CHRONIC KIDNEY DISEASE (HCC): ICD-10-CM

## 2020-04-14 DIAGNOSIS — E11.9 DM (DIABETES MELLITUS) (HCC): ICD-10-CM

## 2020-04-14 DIAGNOSIS — I21.A1 TYPE 2 MYOCARDIAL INFARCTION (HCC): ICD-10-CM

## 2020-04-14 DIAGNOSIS — I50.31 ACUTE DIASTOLIC CONGESTIVE HEART FAILURE (HCC): ICD-10-CM

## 2020-04-14 DIAGNOSIS — N17.9 AKI (ACUTE KIDNEY INJURY) (HCC): ICD-10-CM

## 2020-04-14 DIAGNOSIS — I10 ESSENTIAL HYPERTENSION: ICD-10-CM

## 2020-04-15 RX ORDER — LOSARTAN POTASSIUM 25 MG/1
25 TABLET ORAL DAILY
Qty: 30 TABLET | Refills: 0 | Status: SHIPPED | OUTPATIENT
Start: 2020-04-15 | End: 2020-05-13

## 2020-04-19 DIAGNOSIS — I21.A1 TYPE 2 MYOCARDIAL INFARCTION (HCC): ICD-10-CM

## 2020-04-20 RX ORDER — ISOSORBIDE MONONITRATE 30 MG/1
TABLET, EXTENDED RELEASE ORAL
Qty: 30 TABLET | Refills: 6 | Status: SHIPPED | OUTPATIENT
Start: 2020-04-20 | End: 2020-05-19 | Stop reason: SDUPTHER

## 2020-05-13 DIAGNOSIS — I50.31 ACUTE DIASTOLIC CONGESTIVE HEART FAILURE (HCC): ICD-10-CM

## 2020-05-13 DIAGNOSIS — N17.9 AKI (ACUTE KIDNEY INJURY) (HCC): ICD-10-CM

## 2020-05-13 DIAGNOSIS — I10 ESSENTIAL HYPERTENSION: ICD-10-CM

## 2020-05-13 DIAGNOSIS — I21.A1 TYPE 2 MYOCARDIAL INFARCTION (HCC): ICD-10-CM

## 2020-05-13 DIAGNOSIS — N18.30 STAGE 3 CHRONIC KIDNEY DISEASE (HCC): ICD-10-CM

## 2020-05-13 DIAGNOSIS — E11.9 DM (DIABETES MELLITUS) (HCC): ICD-10-CM

## 2020-05-13 RX ORDER — LOSARTAN POTASSIUM 25 MG/1
TABLET ORAL
Qty: 90 TABLET | Refills: 3 | Status: SHIPPED | OUTPATIENT
Start: 2020-05-13 | End: 2020-05-19 | Stop reason: SDUPTHER

## 2020-05-22 ENCOUNTER — TELEPHONE (OUTPATIENT)
Dept: NEPHROLOGY | Facility: CLINIC | Age: 65
End: 2020-05-22

## 2020-05-29 ENCOUNTER — TELEPHONE (OUTPATIENT)
Dept: HEMATOLOGY ONCOLOGY | Facility: CLINIC | Age: 65
End: 2020-05-29

## 2020-06-01 LAB
ALBUMIN SERPL-MCNC: 3.7 G/DL (ref 3.6–5.1)
ALBUMIN SERPL-MCNC: 3.7 G/DL (ref 3.6–5.1)
ALBUMIN/GLOB SERPL: 1.2 (CALC) (ref 1–2.5)
ALP SERPL-CCNC: 597 U/L (ref 37–153)
ALT SERPL-CCNC: 37 U/L (ref 6–29)
AST SERPL-CCNC: 31 U/L (ref 10–35)
BASOPHILS # BLD AUTO: 57 CELLS/UL (ref 0–200)
BASOPHILS NFR BLD AUTO: 0.7 %
BILIRUB SERPL-MCNC: 0.6 MG/DL (ref 0.2–1.2)
BUN SERPL-MCNC: 38 MG/DL (ref 7–25)
BUN SERPL-MCNC: 38 MG/DL (ref 7–25)
BUN/CREAT SERPL: 18 (CALC) (ref 6–22)
BUN/CREAT SERPL: 18 (CALC) (ref 6–22)
CALCIUM SERPL-MCNC: 9.1 MG/DL (ref 8.6–10.4)
CALCIUM SERPL-MCNC: 9.1 MG/DL (ref 8.6–10.4)
CHLORIDE SERPL-SCNC: 98 MMOL/L (ref 98–110)
CHLORIDE SERPL-SCNC: 98 MMOL/L (ref 98–110)
CO2 SERPL-SCNC: 27 MMOL/L (ref 20–32)
CO2 SERPL-SCNC: 27 MMOL/L (ref 20–32)
CREAT SERPL-MCNC: 2.09 MG/DL (ref 0.5–0.99)
CREAT SERPL-MCNC: 2.09 MG/DL (ref 0.5–0.99)
EOSINOPHIL # BLD AUTO: 262 CELLS/UL (ref 15–500)
EOSINOPHIL NFR BLD AUTO: 3.2 %
ERYTHROCYTE [DISTWIDTH] IN BLOOD BY AUTOMATED COUNT: 11.9 % (ref 11–15)
GLOBULIN SER CALC-MCNC: 3.2 G/DL (CALC) (ref 1.9–3.7)
GLUCOSE SERPL-MCNC: 331 MG/DL (ref 65–99)
GLUCOSE SERPL-MCNC: 331 MG/DL (ref 65–99)
HCT VFR BLD AUTO: 31.1 % (ref 35–45)
HGB BLD-MCNC: 10 G/DL (ref 11.7–15.5)
LYMPHOCYTES # BLD AUTO: 1197 CELLS/UL (ref 850–3900)
LYMPHOCYTES NFR BLD AUTO: 14.6 %
MAGNESIUM SERPL-MCNC: 1.9 MG/DL (ref 1.5–2.5)
MCH RBC QN AUTO: 27.6 PG (ref 27–33)
MCHC RBC AUTO-ENTMCNC: 32.2 G/DL (ref 32–36)
MCV RBC AUTO: 85.9 FL (ref 80–100)
MONOCYTES # BLD AUTO: 377 CELLS/UL (ref 200–950)
MONOCYTES NFR BLD AUTO: 4.6 %
NEUTROPHILS # BLD AUTO: 6306 CELLS/UL (ref 1500–7800)
NEUTROPHILS NFR BLD AUTO: 76.9 %
PHOSPHATE SERPL-MCNC: 4.3 MG/DL (ref 2.1–4.3)
PLATELET # BLD AUTO: 187 THOUSAND/UL (ref 140–400)
PMV BLD REES-ECKER: 11.1 FL (ref 7.5–12.5)
POTASSIUM SERPL-SCNC: 5.3 MMOL/L (ref 3.5–5.3)
POTASSIUM SERPL-SCNC: 5.3 MMOL/L (ref 3.5–5.3)
PROT SERPL-MCNC: 6.9 G/DL (ref 6.1–8.1)
RBC # BLD AUTO: 3.62 MILLION/UL (ref 3.8–5.1)
SL AMB EGFR AFRICAN AMERICAN: 28 ML/MIN/1.73M2
SL AMB EGFR AFRICAN AMERICAN: 28 ML/MIN/1.73M2
SL AMB EGFR NON AFRICAN AMERICAN: 24 ML/MIN/1.73M2
SL AMB EGFR NON AFRICAN AMERICAN: 24 ML/MIN/1.73M2
SODIUM SERPL-SCNC: 133 MMOL/L (ref 135–146)
SODIUM SERPL-SCNC: 133 MMOL/L (ref 135–146)
URATE SERPL-MCNC: 9 MG/DL (ref 2.5–7)
WBC # BLD AUTO: 8.2 THOUSAND/UL (ref 3.8–10.8)

## 2020-06-04 ENCOUNTER — OFFICE VISIT (OUTPATIENT)
Dept: NEPHROLOGY | Facility: CLINIC | Age: 65
End: 2020-06-04
Payer: MEDICARE

## 2020-06-04 VITALS
SYSTOLIC BLOOD PRESSURE: 177 MMHG | WEIGHT: 165.8 LBS | HEART RATE: 62 BPM | HEIGHT: 65 IN | DIASTOLIC BLOOD PRESSURE: 78 MMHG | BODY MASS INDEX: 27.63 KG/M2

## 2020-06-04 DIAGNOSIS — I50.31 ACUTE DIASTOLIC CONGESTIVE HEART FAILURE (HCC): ICD-10-CM

## 2020-06-04 DIAGNOSIS — N18.30 STAGE 3 CHRONIC KIDNEY DISEASE (HCC): ICD-10-CM

## 2020-06-04 DIAGNOSIS — I10 ESSENTIAL HYPERTENSION: ICD-10-CM

## 2020-06-04 DIAGNOSIS — R80.1 PERSISTENT PROTEINURIA: ICD-10-CM

## 2020-06-04 DIAGNOSIS — E11.9 DM (DIABETES MELLITUS) (HCC): Primary | ICD-10-CM

## 2020-06-04 PROCEDURE — 99214 OFFICE O/P EST MOD 30 MIN: CPT | Performed by: INTERNAL MEDICINE

## 2020-06-04 RX ORDER — AMLODIPINE BESYLATE 5 MG/1
TABLET ORAL
Qty: 90 TABLET | Refills: 3 | Status: SHIPPED | OUTPATIENT
Start: 2020-06-04 | End: 2020-07-27 | Stop reason: SDUPTHER

## 2020-06-04 RX ORDER — HYDRALAZINE HYDROCHLORIDE 10 MG/1
10 TABLET, FILM COATED ORAL 2 TIMES DAILY
Qty: 28 TABLET | Refills: 3 | Status: SHIPPED | OUTPATIENT
Start: 2020-06-04 | End: 2020-06-15

## 2020-06-13 DIAGNOSIS — R80.1 PERSISTENT PROTEINURIA: ICD-10-CM

## 2020-06-13 DIAGNOSIS — N18.30 STAGE 3 CHRONIC KIDNEY DISEASE (HCC): ICD-10-CM

## 2020-06-13 DIAGNOSIS — I10 ESSENTIAL HYPERTENSION: ICD-10-CM

## 2020-06-13 DIAGNOSIS — E11.9 DM (DIABETES MELLITUS) (HCC): ICD-10-CM

## 2020-06-13 DIAGNOSIS — I50.31 ACUTE DIASTOLIC CONGESTIVE HEART FAILURE (HCC): ICD-10-CM

## 2020-06-15 RX ORDER — HYDRALAZINE HYDROCHLORIDE 10 MG/1
TABLET, FILM COATED ORAL
Qty: 28 TABLET | Refills: 3 | Status: SHIPPED | OUTPATIENT
Start: 2020-06-15 | End: 2020-08-10

## 2020-07-27 DIAGNOSIS — I10 ESSENTIAL HYPERTENSION: ICD-10-CM

## 2020-07-27 DIAGNOSIS — I21.A1 TYPE 2 MYOCARDIAL INFARCTION (HCC): ICD-10-CM

## 2020-07-27 RX ORDER — AMLODIPINE BESYLATE 5 MG/1
5 TABLET ORAL DAILY
Qty: 90 TABLET | Refills: 0 | Status: SHIPPED | OUTPATIENT
Start: 2020-07-27 | End: 2020-10-21 | Stop reason: SDUPTHER

## 2020-07-27 RX ORDER — CARVEDILOL 12.5 MG/1
12.5 TABLET ORAL 2 TIMES DAILY WITH MEALS
Qty: 60 TABLET | Refills: 0 | Status: CANCELLED
Start: 2020-07-27

## 2020-07-27 NOTE — TELEPHONE ENCOUNTER
Patient's daughter would like clarification on her coreg dose  She states she was on 6 25 but at her last visit on may 19  visit it was renewed for 12 5 mg BID by you  She doesn't remember being told to increase it  What dose should patient be on?

## 2020-07-30 ENCOUNTER — TELEPHONE (OUTPATIENT)
Dept: CARDIOLOGY CLINIC | Facility: CLINIC | Age: 65
End: 2020-07-30

## 2020-07-30 NOTE — TELEPHONE ENCOUNTER
3/10 note from Nephrology states increase coreg from 6 25 mg twice daily to 12 5 mg twice daily- nephrology increased and I renewed the higher dose   Due to her HTN

## 2020-08-04 ENCOUNTER — TELEPHONE (OUTPATIENT)
Dept: NEPHROLOGY | Facility: CLINIC | Age: 65
End: 2020-08-04

## 2020-08-04 NOTE — TELEPHONE ENCOUNTER
A message has been left reminding pt of f/u appointment with Dr Ruma Bianchi as well as lab work that is needed for this appointment

## 2020-08-07 ENCOUNTER — TELEPHONE (OUTPATIENT)
Dept: NEPHROLOGY | Facility: CLINIC | Age: 65
End: 2020-08-07

## 2020-08-10 ENCOUNTER — OFFICE VISIT (OUTPATIENT)
Dept: NEPHROLOGY | Facility: CLINIC | Age: 65
End: 2020-08-10
Payer: MEDICARE

## 2020-08-10 VITALS
WEIGHT: 168.2 LBS | HEIGHT: 65 IN | HEART RATE: 59 BPM | DIASTOLIC BLOOD PRESSURE: 70 MMHG | BODY MASS INDEX: 28.02 KG/M2 | RESPIRATION RATE: 18 BRPM | SYSTOLIC BLOOD PRESSURE: 178 MMHG | TEMPERATURE: 98.4 F

## 2020-08-10 DIAGNOSIS — I50.31 ACUTE DIASTOLIC CONGESTIVE HEART FAILURE (HCC): ICD-10-CM

## 2020-08-10 DIAGNOSIS — E11.9 DM (DIABETES MELLITUS) (HCC): ICD-10-CM

## 2020-08-10 DIAGNOSIS — R80.1 PERSISTENT PROTEINURIA: ICD-10-CM

## 2020-08-10 DIAGNOSIS — N18.30 STAGE 3 CHRONIC KIDNEY DISEASE (HCC): ICD-10-CM

## 2020-08-10 DIAGNOSIS — I10 ESSENTIAL HYPERTENSION: ICD-10-CM

## 2020-08-10 DIAGNOSIS — I21.A1 TYPE 2 MYOCARDIAL INFARCTION (HCC): ICD-10-CM

## 2020-08-10 LAB
ALBUMIN SERPL-MCNC: 3.5 G/DL (ref 3.6–5.1)
ALBUMIN/GLOB SERPL: 1.1 (CALC) (ref 1–2.5)
ALP SERPL-CCNC: 421 U/L (ref 37–153)
ALT SERPL-CCNC: 30 U/L (ref 6–29)
APPEARANCE UR: CLEAR
AST SERPL-CCNC: 31 U/L (ref 10–35)
BACTERIA UR QL AUTO: ABNORMAL /HPF
BASOPHILS # BLD AUTO: 61 CELLS/UL (ref 0–200)
BASOPHILS NFR BLD AUTO: 0.9 %
BILIRUB SERPL-MCNC: 0.5 MG/DL (ref 0.2–1.2)
BILIRUB UR QL STRIP: NEGATIVE
BUN SERPL-MCNC: 45 MG/DL (ref 7–25)
BUN/CREAT SERPL: 20 (CALC) (ref 6–22)
CALCIUM SERPL-MCNC: 8.9 MG/DL (ref 8.6–10.4)
CHLORIDE SERPL-SCNC: 104 MMOL/L (ref 98–110)
CO2 SERPL-SCNC: 24 MMOL/L (ref 20–32)
COLOR UR: YELLOW
CREAT SERPL-MCNC: 2.22 MG/DL (ref 0.5–0.99)
CREAT UR-MCNC: 151 MG/DL (ref 20–275)
EOSINOPHIL # BLD AUTO: 211 CELLS/UL (ref 15–500)
EOSINOPHIL NFR BLD AUTO: 3.1 %
ERYTHROCYTE [DISTWIDTH] IN BLOOD BY AUTOMATED COUNT: 12.9 % (ref 11–15)
GLOBULIN SER CALC-MCNC: 3.1 G/DL (CALC) (ref 1.9–3.7)
GLUCOSE SERPL-MCNC: 291 MG/DL (ref 65–99)
GLUCOSE UR QL STRIP: ABNORMAL
HCT VFR BLD AUTO: 30.6 % (ref 35–45)
HGB BLD-MCNC: 9.5 G/DL (ref 11.7–15.5)
HGB UR QL STRIP: NEGATIVE
HYALINE CASTS #/AREA URNS LPF: ABNORMAL /LPF
KETONES UR QL STRIP: NEGATIVE
LEUKOCYTE ESTERASE UR QL STRIP: NEGATIVE
LYMPHOCYTES # BLD AUTO: 1278 CELLS/UL (ref 850–3900)
LYMPHOCYTES NFR BLD AUTO: 18.8 %
MAGNESIUM SERPL-MCNC: 1.9 MG/DL (ref 1.5–2.5)
MCH RBC QN AUTO: 27 PG (ref 27–33)
MCHC RBC AUTO-ENTMCNC: 31 G/DL (ref 32–36)
MCV RBC AUTO: 86.9 FL (ref 80–100)
MONOCYTES # BLD AUTO: 367 CELLS/UL (ref 200–950)
MONOCYTES NFR BLD AUTO: 5.4 %
NEUTROPHILS # BLD AUTO: 4882 CELLS/UL (ref 1500–7800)
NEUTROPHILS NFR BLD AUTO: 71.8 %
NITRITE UR QL STRIP: NEGATIVE
PH UR STRIP: 5.5 [PH] (ref 5–8)
PHOSPHATE SERPL-MCNC: 3.8 MG/DL (ref 2.1–4.3)
PLATELET # BLD AUTO: 177 THOUSAND/UL (ref 140–400)
PMV BLD REES-ECKER: 11 FL (ref 7.5–12.5)
POTASSIUM SERPL-SCNC: 4.8 MMOL/L (ref 3.5–5.3)
PROT SERPL-MCNC: 6.6 G/DL (ref 6.1–8.1)
PROT UR QL STRIP: ABNORMAL
PROT UR-MCNC: 336 MG/DL (ref 5–24)
PROT/CREAT UR: 2.23 MG/MG CREAT (ref 0.02–0.16)
PROT/CREAT UR: 2225 MG/G CREAT (ref 21–161)
RBC # BLD AUTO: 3.52 MILLION/UL (ref 3.8–5.1)
RBC #/AREA URNS HPF: ABNORMAL /HPF
SL AMB EGFR AFRICAN AMERICAN: 26 ML/MIN/1.73M2
SL AMB EGFR NON AFRICAN AMERICAN: 22 ML/MIN/1.73M2
SODIUM SERPL-SCNC: 136 MMOL/L (ref 135–146)
SP GR UR STRIP: 1.02 (ref 1–1.03)
SQUAMOUS #/AREA URNS HPF: ABNORMAL /HPF
URATE SERPL-MCNC: 8.9 MG/DL (ref 2.5–7)
WBC # BLD AUTO: 6.8 THOUSAND/UL (ref 3.8–10.8)
WBC #/AREA URNS HPF: ABNORMAL /HPF

## 2020-08-10 PROCEDURE — 99214 OFFICE O/P EST MOD 30 MIN: CPT | Performed by: INTERNAL MEDICINE

## 2020-08-10 RX ORDER — CARVEDILOL 12.5 MG/1
6.25 TABLET ORAL 2 TIMES DAILY WITH MEALS
Qty: 60 TABLET | Refills: 0
Start: 2020-08-10 | End: 2020-11-02

## 2020-08-10 RX ORDER — HYDRALAZINE HYDROCHLORIDE 10 MG/1
25 TABLET, FILM COATED ORAL 3 TIMES DAILY
Qty: 270 TABLET | Refills: 3 | Status: ON HOLD | OUTPATIENT
Start: 2020-08-10 | End: 2021-02-27 | Stop reason: SDUPTHER

## 2020-08-10 NOTE — PROGRESS NOTES
OFFICE FOLLOW UP - Nephrology   Morales Pugh 76 y o  female MRN: [id number]    Encounter: [id number]        ASSESSMENT  Diagnoses and all orders for this visit:    13-year-old female with past medical history type 2 diabetes, hypertension who presented with a type 2 NSTEMI, coronary artery disease requiring PCI complicated by acute kidney injury on chronic kidney disease    KELLY (acute kidney injury) ([facility] [address] )  Stage 3 chronic kidney disease ([facility] [address] )  Coronary artery disease involving native coronary artery of native heart with angina pectoris ([facility] [address] )  Acute diastolic congest[facility] failure ([facility] )  Essential hypertension  Type 2 myocardial infarction ([facility] )      DISCUSSION & PLAN    1  Acute kidney injury-etiology likely related to contrast induced nephropathy plus decreased effective arterial volume with acute anemia  -creatinine peaked at 3 now stable between 1 point and 2 0  -avoid hypotension  -holding off on further contrast imaging per notes    2  Stage 3 chronic kidney disease  -new baseline creatinine seems to be around 1 5-2 0  -continue cardiovascular risk reduction measures-statin therapy, glycemic control and treatment of coronary artery disease  -estimated GFR likely 28-35 mL per minute    3  Coronary artery disease  -holding on repeat cardiac catheterization at this time  -following up with Cardiology    4   Essential hypertension in the setting of diastolic heart failure  -blood pressures remain elevated  -did ask her to increase her carvedilol but she did not do this she is currently on 6 25 mg twice daily will continue this as her heart rate is in the high 50s low 60  -currently on 30 mg of isosorbide mononitrate, could potentially increase this to 60 mg if blood pressure not at goal  -states she is on amlodipine 5 mg daily, at higher doses she has a significant amount of lower extremity edema  -currently on losartan potassium remains in the upper limits of normal and she has had hyperkalemia  -now on torsemide 20 mg twice daily and tolerating this well  -started on hydralazine 10 mg twice daily increase this now to 25 mg 3 times daily  -target a blood pressure of 130 over 70  -her daughter Mukesh Montoya was called as well and this was discussed with her    5  diastolic congestive heart failure  -following up with Cardiology  -continue blood pressure control, on a beta-blocker, on Imdur , ARB,  -currently on a statin    6  Type 2 diabetes mellitus  -continue glycemic control     7  Vitamin-D deficiency  -PTH acceptable    8  Proteinuria  -proteinuria now worsening  -monitor longstanding history of diabetes  -continue ARB and glycemic control    9  Anemia in the setting of chronic kidney disease  -status post IV iron  -has had a colonoscopy in the past  -hematology did see the patient appreciate referral  -hemoglobin remained stable    Continue follow-up every 3 months with blood work call if blood pressure does not improve to 130/70    HPI: Brent Rodriguez is a 76 y o  female who is here for follow-up  She has a past medical history of type 2 diabetes mellitus and hypertension recently presented with type 2 myocardial infarction in signs of volume overload requiring catheterization postoperatively had an acute kidney injury with a creatinine that peaked around 3 she was nonoliguric through this time she was also anemic she was transfused 2 units of packed red blood cell her blood pressures remained stable and her kidney numbers have slowly improved almost to her baseline    She is doing okay, denies any acute complaints, she is asymptomatic, blood pressures now range from 292-578 systolic, she has no chest pain or shortness of breath fevers chills nausea vomiting diarrhea constipation foamy or bloody urine    Her urine output has been stable  She otherwise offers no complaints      ROS:   All the systems were reviewed and were negative except as documented on the HPI  Allergies:  Iv contrast [iodinated diagnostic agents]    Medications:   Current Outpatient Medications:     amLODIPine (NORVASC) 5 mg tablet, Take 1 tablet (5 mg total) by mouth daily, Disp: 90 tablet, Rfl: 0    atorvastatin (LIPITOR) 40 mg tablet, Take 1 tablet (40 mg total) by mouth daily, Disp: 30 tablet, Rfl: 11    carvedilol (COREG) 12 5 mg tablet, Take 0 5 tablets (6 25 mg total) by mouth 2 (two) times a day with meals, Disp: 60 tablet, Rfl: 0    glucose blood test strip, 3 times daily, Disp: , Rfl:     hydrALAZINE (APRESOLINE) 10 mg tablet, Take 2 5 tablets (25 mg total) by mouth 3 (three) times a day, Disp: 270 tablet, Rfl: 3    isosorbide mononitrate (IMDUR) 30 mg 24 hr tablet, Take 1 tablet (30 mg total) by mouth daily, Disp: 30 tablet, Rfl: 6    levothyroxine 112 mcg tablet, 1 tab daily x 6 day and half pill on sundays, Disp: , Rfl:     losartan (COZAAR) 25 mg tablet, Take 1 tablet (25 mg total) by mouth daily, Disp: 30 tablet, Rfl: 5    nitroglycerin (NITROSTAT) 0 4 mg SL tablet, Place 1 tablet (0 4 mg total) under the tongue every 5 (five) minutes as needed for chest pain for up to 30 doses, Disp: 30 tablet, Rfl: 0    RA ASPIRIN ADULT LOW DOSE 81 MG chewable tablet, chew and swallow 1 tablet (81 MG TOTAL) by mouth once daily, Disp: 30 tablet, Rfl: 5    repaglinide (PRANDIN) 0 5 mg tablet, Take 1 tablet (0 5 mg total) by mouth 3 (three) times a day before meals, Disp: , Rfl: 0    torsemide (DEMADEX) 20 mg tablet, Take 1 tablet (20 mg total) by mouth 2 (two) times a day, Disp: 60 tablet, Rfl: 5    ergocalciferol (ERGOCALCIFEROL) 1 25 MG (35638 UT) capsule, Take 1 capsule (50,000 Units total) by mouth once a week for 8 doses (Patient not taking: Reported on 3/10/2020), Disp: 8 capsule, Rfl: 0    magnesium gluconate (MAGONATE) 500 mg tablet, Take 500 mg by mouth 2 (two) times a day, Disp: , Rfl:     Past Medical History:   Diagnosis Date    Anemia     CHF (congestive heart failure) (HCC)     Chronic kidney disease     Coronary artery disease     Diabetes mellitus ([facility] [address] )     Hypertension     Hypothyroidism      Past Surgical History:   Procedure Laterality Date    CORONARY ANGIOPLASTY WITH STENT PLACEMENT  2019     Family History   Problem Relation Age of Onset    Diabetes Mother     Heart attack Father         MI    Hypertension Brother       reports that she has quit smoking  She has never used smokeless tobacco  She reports that she does not drink alcohol or use drugs  Physical Exam:   Vitals:    08/10/20 1359   BP: (!) 178/70   BP Location: Left arm   Patient Position: Sitting   Cuff Size: Large   Pulse: 59   Resp: 18   Temp: 98 4 °F (36 9 °C)   TempSrc: Oral   Weight: 76 3 kg (168 lb 3 2 oz)   Height: 5' 5" (1 651 m)     Body mass index is 27 99 kg/m²     Repeat blood pressure on the right arm was 155 over 72  General: conscious, cooperative, in no acute distress  Eyes: conjunctivae pink, anicteric sclerae  ENT: lips and mucous membranes moist  Neck: supple, no JVD  Chest: clear breath sounds bilateral, no crackles, ronchus or wheezings  CVS: normal rate, regular rhythm  Abdomen: soft, non-tender, non-distended, normoactive bowel sounds  Extremities:  Trace edema  Skin: no rash  Neuro: awake, alert, oriented  Psych:  Pleasant affect    Lab Results:    Results for orders placed or performed in visit on 08/08/20   Magnesium   Result Value Ref Range    Magnesium, Serum 1 9 1 5 - 2 5 mg/dL   Phosphorus   Result Value Ref Range    Phosphorus, Serum 3 8 2 1 - 4 3 mg/dL   Uric acid   Result Value Ref Range    Uric Acid 8 9 (H) 2 5 - 7 0 mg/dL   Comprehensive metabolic panel   Result Value Ref Range    Glucose, Random 291 (H) 65 - 99 mg/dL    BUN 45 (H) 7 - 25 mg/dL    Creatinine 2 22 (H) 0 50 - 0 99 mg/dL    eGFR Non  22 (L) > OR = 60 mL/min/1 73m2    eGFR  26 (L) > OR = 60 mL/min/1 73m2    SL AMB BUN/CREATININE RATIO 20 6 - 22 (calc)    Sodium 136 135 - 146 mmol/L    Potassium 4 8 3 5 - 5 3 mmol/L Chloride 104 98 - 110 mmol/L    CO2 24 20 - 32 mmol/L    Calcium 8 9 8 6 - 10 4 mg/dL    Protein, Total 6 6 6 1 - 8 1 g/dL    Albumin 3 5 (L) 3 6 - 5 1 g/dL    Globulin 3 1 1 9 - 3 7 g/dL (calc)    Albumin/Globulin Ratio 1 1 1 0 - 2 5 (calc)    TOTAL BILIRUBIN 0 5 0 2 - 1 2 mg/dL    Alkaline Phosphatase 421 (H) 37 - 153 U/L    AST 31 10 - 35 U/L    ALT 30 (H) 6 - 29 U/L   Urinalysis with microscopic   Result Value Ref Range    Color UA YELLOW YELLOW    Urine Appearance CLEAR CLEAR    Specific Gravity 1 019 1 001 - 1 035    Ph 5 5 5 0 - 8 0    Glucose, Urine 3+ (A) NEGATIVE    Bilirubin, Urine NEGATIVE NEGATIVE    Ketone, Urine NEGATIVE NEGATIVE    Blood, Urine NEGATIVE NEGATIVE    Protein, Urine 3+ (A) NEGATIVE    SL AMB NITRITES URINE, QUAL   NEGATIVE NEGATIVE    Leukocyte Esterase NEGATIVE NEGATIVE    SL AMB WBC, URINE 0-5 < OR = 5 /HPF    RBC, Urine 0-2 < OR = 2 /HPF    Squamous Epithelial Cells 0-5 < OR = 5 /HPF    Bacteria, UA NONE SEEN NONE SEEN /HPF    Hyaline Casts NONE SEEN NONE SEEN /LPF   CBC and differential   Result Value Ref Range    White Blood Cell Count 6 8 3 8 - 10 8 Thousand/uL    Red Blood Cell Count 3 52 (L) 3 80 - 5 10 Million/uL    Hemoglobin 9 5 (L) 11 7 - 15 5 g/dL    HCT 30 6 (L) 35 0 - 45 0 %    MCV 86 9 80 0 - 100 0 fL    MCH 27 0 27 0 - 33 0 pg    MCHC 31 0 (L) 32 0 - 36 0 g/dL    RDW 12 9 11 0 - 15 0 %    Platelet Count 625 177 - 400 Thousand/uL    SL AMB MPV 11 0 7 5 - 12 5 fL    Neutrophils (Absolute) 4,882 1,500 - 7,800 cells/uL    Lymphocytes (Absolute) 1,278 850 - 3,900 cells/uL    Monocytes (Absolute) 367 200 - 950 cells/uL    Eosinophils (Absolute) 211 15 - 500 cells/uL    Basophils ABS 61 0 - 200 cells/uL    Neutrophils 71 8 %    Lymphocytes 18 8 %    Monocytes 5 4 %    Eosinophils 3 1 %    Basophils PCT 0 9 %   Protein, Total w/Creat, Random Urine   Result Value Ref Range    Creatinine, Urine 151 20 - 275 mg/dL    Protein/Creat Ratio 2,225 (H) 21 - 161 mg/g creat Protein/Creat Ratio 2 225 (H) 0 021 - 0 161 mg/mg creat    Total Protein, Urine 336 (H) 5 - 24 mg/dL       Portions of the record may have been created with voice recognition software  Occasional wrong word or "sound a like" substitutions may have occurred due to the inherent limitations of voice recognition software  Read the chart carefully and recognize, using context, where substitutions have occurred  If you have any questions, please contact the dictating provider

## 2020-08-10 NOTE — PATIENT INSTRUCTIONS

## 2020-08-22 DIAGNOSIS — I21.A1 TYPE 2 MYOCARDIAL INFARCTION (HCC): ICD-10-CM

## 2020-08-22 RX ORDER — CARVEDILOL 6.25 MG/1
TABLET ORAL
Qty: 60 TABLET | Refills: 5 | Status: ON HOLD | OUTPATIENT
Start: 2020-08-22 | End: 2021-02-27 | Stop reason: SDUPTHER

## 2020-10-16 DIAGNOSIS — I25.10 CORONARY ARTERY DISEASE INVOLVING NATIVE CORONARY ARTERY OF NATIVE HEART WITHOUT ANGINA PECTORIS: ICD-10-CM

## 2020-10-19 RX ORDER — ATORVASTATIN CALCIUM 40 MG/1
40 TABLET, FILM COATED ORAL DAILY
Qty: 30 TABLET | Refills: 5 | Status: SHIPPED | OUTPATIENT
Start: 2020-10-19 | End: 2021-02-21

## 2020-10-19 RX ORDER — ATORVASTATIN CALCIUM 40 MG/1
40 TABLET, FILM COATED ORAL DAILY
Qty: 30 TABLET | Refills: 5 | Status: SHIPPED | OUTPATIENT
Start: 2020-10-19 | End: 2021-04-12

## 2020-10-21 DIAGNOSIS — I10 ESSENTIAL HYPERTENSION: ICD-10-CM

## 2020-10-21 DIAGNOSIS — I21.A1 TYPE 2 MYOCARDIAL INFARCTION (HCC): ICD-10-CM

## 2020-10-22 RX ORDER — ISOSORBIDE MONONITRATE 30 MG/1
30 TABLET, EXTENDED RELEASE ORAL DAILY
Qty: 30 TABLET | Refills: 10 | Status: SHIPPED | OUTPATIENT
Start: 2020-10-22 | End: 2021-03-10 | Stop reason: HOSPADM

## 2020-10-22 RX ORDER — AMLODIPINE BESYLATE 5 MG/1
5 TABLET ORAL DAILY
Qty: 90 TABLET | Refills: 0 | Status: SHIPPED | OUTPATIENT
Start: 2020-10-22 | End: 2021-02-27 | Stop reason: HOSPADM

## 2020-10-30 ENCOUNTER — TELEPHONE (OUTPATIENT)
Dept: NEPHROLOGY | Facility: CLINIC | Age: 65
End: 2020-10-30

## 2020-10-31 ENCOUNTER — TELEPHONE (OUTPATIENT)
Dept: OTHER | Facility: OTHER | Age: 65
End: 2020-10-31

## 2020-11-02 ENCOUNTER — OFFICE VISIT (OUTPATIENT)
Dept: NEPHROLOGY | Facility: CLINIC | Age: 65
End: 2020-11-02
Payer: MEDICARE

## 2020-11-02 VITALS
HEIGHT: 65 IN | HEART RATE: 72 BPM | RESPIRATION RATE: 18 BRPM | DIASTOLIC BLOOD PRESSURE: 70 MMHG | TEMPERATURE: 96.3 F | BODY MASS INDEX: 28.32 KG/M2 | SYSTOLIC BLOOD PRESSURE: 180 MMHG | WEIGHT: 170 LBS

## 2020-11-02 DIAGNOSIS — I21.A1 TYPE 2 MYOCARDIAL INFARCTION (HCC): ICD-10-CM

## 2020-11-02 DIAGNOSIS — N18.4 ANEMIA DUE TO STAGE 4 CHRONIC KIDNEY DISEASE (HCC): ICD-10-CM

## 2020-11-02 DIAGNOSIS — D63.1 ANEMIA DUE TO STAGE 4 CHRONIC KIDNEY DISEASE (HCC): ICD-10-CM

## 2020-11-02 DIAGNOSIS — I50.31 ACUTE DIASTOLIC CONGESTIVE HEART FAILURE (HCC): ICD-10-CM

## 2020-11-02 DIAGNOSIS — N18.4 CKD (CHRONIC KIDNEY DISEASE) STAGE 4, GFR 15-29 ML/MIN (HCC): Primary | ICD-10-CM

## 2020-11-02 LAB
ALBUMIN SERPL-MCNC: 3.4 G/DL (ref 3.6–5.1)
APPEARANCE UR: CLEAR
BACTERIA UR QL AUTO: ABNORMAL /HPF
BASOPHILS # BLD AUTO: 16 CELLS/UL (ref 0–200)
BASOPHILS NFR BLD AUTO: 0.2 %
BILIRUB UR QL STRIP: NEGATIVE
BUN SERPL-MCNC: 43 MG/DL (ref 7–25)
BUN/CREAT SERPL: 20 (CALC) (ref 6–22)
CALCIUM SERPL-MCNC: 8.5 MG/DL (ref 8.6–10.4)
CALCIUM SERPL-MCNC: 8.5 MG/DL (ref 8.6–10.4)
CHLORIDE SERPL-SCNC: 103 MMOL/L (ref 98–110)
CO2 SERPL-SCNC: 22 MMOL/L (ref 20–32)
COLOR UR: YELLOW
CREAT SERPL-MCNC: 2.12 MG/DL (ref 0.5–0.99)
CREAT UR-MCNC: 99 MG/DL (ref 20–275)
EOSINOPHIL # BLD AUTO: 8 CELLS/UL (ref 15–500)
EOSINOPHIL NFR BLD AUTO: 0.1 %
ERYTHROCYTE [DISTWIDTH] IN BLOOD BY AUTOMATED COUNT: 11.9 % (ref 11–15)
GLUCOSE SERPL-MCNC: 491 MG/DL (ref 65–139)
GLUCOSE UR QL STRIP: ABNORMAL
HCT VFR BLD AUTO: 27.6 % (ref 35–45)
HGB BLD-MCNC: 8.6 G/DL (ref 11.7–15.5)
HGB UR QL STRIP: NEGATIVE
HYALINE CASTS #/AREA URNS LPF: ABNORMAL /LPF
KETONES UR QL STRIP: NEGATIVE
LEUKOCYTE ESTERASE UR QL STRIP: NEGATIVE
LYMPHOCYTES # BLD AUTO: 656 CELLS/UL (ref 850–3900)
LYMPHOCYTES NFR BLD AUTO: 8.2 %
MAGNESIUM SERPL-MCNC: 1.8 MG/DL (ref 1.5–2.5)
MCH RBC QN AUTO: 27.8 PG (ref 27–33)
MCHC RBC AUTO-ENTMCNC: 31.2 G/DL (ref 32–36)
MCV RBC AUTO: 89.3 FL (ref 80–100)
MONOCYTES # BLD AUTO: 288 CELLS/UL (ref 200–950)
MONOCYTES NFR BLD AUTO: 3.6 %
NEUTROPHILS # BLD AUTO: 7032 CELLS/UL (ref 1500–7800)
NEUTROPHILS NFR BLD AUTO: 87.9 %
NITRITE UR QL STRIP: NEGATIVE
PH UR STRIP: 5.5 [PH] (ref 5–8)
PHOSPHATE SERPL-MCNC: 4.6 MG/DL (ref 2.1–4.3)
PLATELET # BLD AUTO: 173 THOUSAND/UL (ref 140–400)
PMV BLD REES-ECKER: 11.3 FL (ref 7.5–12.5)
POTASSIUM SERPL-SCNC: 5.1 MMOL/L (ref 3.5–5.3)
PROT UR QL STRIP: ABNORMAL
PROT UR-MCNC: 339 MG/DL (ref 5–24)
PROT/CREAT UR: 3.42 MG/MG CREAT (ref 0.02–0.16)
PROT/CREAT UR: 3424 MG/G CREAT (ref 21–161)
PTH-INTACT SERPL-MCNC: 198 PG/ML (ref 14–64)
RBC # BLD AUTO: 3.09 MILLION/UL (ref 3.8–5.1)
RBC #/AREA URNS HPF: ABNORMAL /HPF
SL AMB EGFR AFRICAN AMERICAN: 27 ML/MIN/1.73M2
SL AMB EGFR NON AFRICAN AMERICAN: 24 ML/MIN/1.73M2
SODIUM SERPL-SCNC: 137 MMOL/L (ref 135–146)
SP GR UR STRIP: 1.02 (ref 1–1.03)
SQUAMOUS #/AREA URNS HPF: ABNORMAL /HPF
URATE SERPL-MCNC: 8.6 MG/DL (ref 2.5–7)
WBC # BLD AUTO: 8 THOUSAND/UL (ref 3.8–10.8)
WBC #/AREA URNS HPF: ABNORMAL /HPF

## 2020-11-02 PROCEDURE — 99214 OFFICE O/P EST MOD 30 MIN: CPT | Performed by: INTERNAL MEDICINE

## 2020-11-02 PROCEDURE — 1124F ACP DISCUSS-NO DSCNMKR DOCD: CPT | Performed by: INTERNAL MEDICINE

## 2020-11-02 RX ORDER — TORSEMIDE 20 MG/1
TABLET ORAL
Qty: 60 TABLET | Refills: 5 | Status: SHIPPED | OUTPATIENT
Start: 2020-11-02 | End: 2021-02-27 | Stop reason: HOSPADM

## 2020-11-02 RX ORDER — CARVEDILOL 12.5 MG/1
6.25 TABLET ORAL 2 TIMES DAILY WITH MEALS
Qty: 60 TABLET | Refills: 0
Start: 2020-11-02 | End: 2020-11-02

## 2020-12-09 DIAGNOSIS — I50.31 ACUTE DIASTOLIC CONGESTIVE HEART FAILURE (HCC): ICD-10-CM

## 2020-12-09 DIAGNOSIS — N18.30 STAGE 3 CHRONIC KIDNEY DISEASE (HCC): ICD-10-CM

## 2020-12-09 DIAGNOSIS — I10 ESSENTIAL HYPERTENSION: ICD-10-CM

## 2020-12-09 DIAGNOSIS — N17.9 AKI (ACUTE KIDNEY INJURY) (HCC): ICD-10-CM

## 2020-12-09 DIAGNOSIS — E11.9 DM (DIABETES MELLITUS) (HCC): ICD-10-CM

## 2020-12-09 DIAGNOSIS — I21.A1 TYPE 2 MYOCARDIAL INFARCTION (HCC): ICD-10-CM

## 2020-12-10 RX ORDER — LOSARTAN POTASSIUM 25 MG/1
25 TABLET ORAL DAILY
Qty: 30 TABLET | Refills: 10 | Status: SHIPPED | OUTPATIENT
Start: 2020-12-10 | End: 2021-01-21 | Stop reason: SDUPTHER

## 2020-12-23 ENCOUNTER — TELEPHONE (OUTPATIENT)
Dept: NEPHROLOGY | Facility: CLINIC | Age: 65
End: 2020-12-23

## 2021-01-21 DIAGNOSIS — N17.9 AKI (ACUTE KIDNEY INJURY) (HCC): ICD-10-CM

## 2021-01-21 DIAGNOSIS — E11.9 DM (DIABETES MELLITUS) (HCC): ICD-10-CM

## 2021-01-21 DIAGNOSIS — I10 ESSENTIAL HYPERTENSION: ICD-10-CM

## 2021-01-21 DIAGNOSIS — I50.31 ACUTE DIASTOLIC CONGESTIVE HEART FAILURE (HCC): ICD-10-CM

## 2021-01-21 DIAGNOSIS — N18.30 STAGE 3 CHRONIC KIDNEY DISEASE (HCC): ICD-10-CM

## 2021-01-21 DIAGNOSIS — I21.A1 TYPE 2 MYOCARDIAL INFARCTION (HCC): ICD-10-CM

## 2021-01-22 RX ORDER — LOSARTAN POTASSIUM 25 MG/1
25 TABLET ORAL DAILY
Qty: 30 TABLET | Refills: 5 | Status: SHIPPED | OUTPATIENT
Start: 2021-01-22 | End: 2021-02-27 | Stop reason: HOSPADM

## 2021-02-21 ENCOUNTER — HOSPITAL ENCOUNTER (INPATIENT)
Facility: HOSPITAL | Age: 66
LOS: 3 days | Discharge: HOME | DRG: 291 | End: 2021-02-24
Attending: EMERGENCY MEDICINE | Admitting: INTERNAL MEDICINE
Payer: COMMERCIAL

## 2021-02-21 ENCOUNTER — APPOINTMENT (EMERGENCY)
Dept: RADIOLOGY | Facility: HOSPITAL | Age: 66
DRG: 291 | End: 2021-02-21
Payer: COMMERCIAL

## 2021-02-21 DIAGNOSIS — Z79.4 TYPE 2 DIABETES MELLITUS WITH STAGE 3 CHRONIC KIDNEY DISEASE, WITH LONG-TERM CURRENT USE OF INSULIN, UNSPECIFIED WHETHER STAGE 3A OR 3B CKD (HCC): ICD-10-CM

## 2021-02-21 DIAGNOSIS — R77.8 ELEVATED TROPONIN: Primary | ICD-10-CM

## 2021-02-21 DIAGNOSIS — I50.31 ACUTE DIASTOLIC CONGESTIVE HEART FAILURE (HCC): ICD-10-CM

## 2021-02-21 DIAGNOSIS — I10 ESSENTIAL HYPERTENSION: ICD-10-CM

## 2021-02-21 DIAGNOSIS — E11.22 TYPE 2 DIABETES MELLITUS WITH STAGE 3 CHRONIC KIDNEY DISEASE, WITH LONG-TERM CURRENT USE OF INSULIN, UNSPECIFIED WHETHER STAGE 3A OR 3B CKD (HCC): ICD-10-CM

## 2021-02-21 DIAGNOSIS — N17.9 ACUTE RENAL FAILURE SUPERIMPOSED ON STAGE 3 CHRONIC KIDNEY DISEASE (HCC): ICD-10-CM

## 2021-02-21 DIAGNOSIS — N18.30 ACUTE RENAL FAILURE SUPERIMPOSED ON STAGE 3 CHRONIC KIDNEY DISEASE (HCC): ICD-10-CM

## 2021-02-21 DIAGNOSIS — N17.9 AKI (ACUTE KIDNEY INJURY) (HCC): ICD-10-CM

## 2021-02-21 DIAGNOSIS — I21.A1 TYPE 2 MYOCARDIAL INFARCTION (HCC): ICD-10-CM

## 2021-02-21 DIAGNOSIS — N17.9 ACUTE RENAL FAILURE SUPERIMPOSED ON STAGE 4 CHRONIC KIDNEY DISEASE (HCC): ICD-10-CM

## 2021-02-21 DIAGNOSIS — E11.9 DM (DIABETES MELLITUS) (HCC): ICD-10-CM

## 2021-02-21 DIAGNOSIS — E03.9 HYPOTHYROIDISM, UNSPECIFIED TYPE: ICD-10-CM

## 2021-02-21 DIAGNOSIS — N18.30 TYPE 2 DIABETES MELLITUS WITH STAGE 3 CHRONIC KIDNEY DISEASE, WITH LONG-TERM CURRENT USE OF INSULIN, UNSPECIFIED WHETHER STAGE 3A OR 3B CKD (HCC): ICD-10-CM

## 2021-02-21 DIAGNOSIS — J18.9 RIGHT LOWER LOBE PNEUMONIA: ICD-10-CM

## 2021-02-21 DIAGNOSIS — R80.1 PERSISTENT PROTEINURIA: ICD-10-CM

## 2021-02-21 DIAGNOSIS — N18.4 ACUTE RENAL FAILURE SUPERIMPOSED ON STAGE 4 CHRONIC KIDNEY DISEASE (HCC): ICD-10-CM

## 2021-02-21 DIAGNOSIS — N18.30 STAGE 3 CHRONIC KIDNEY DISEASE (HCC): ICD-10-CM

## 2021-02-21 PROBLEM — R79.89 ELEVATED TROPONIN: Status: ACTIVE | Noted: 2018-12-29

## 2021-02-21 PROBLEM — I50.43 ACUTE ON CHRONIC COMBINED SYSTOLIC AND DIASTOLIC CONGESTIVE HEART FAILURE (HCC): Status: ACTIVE | Noted: 2018-12-29

## 2021-02-21 LAB
ALBUMIN SERPL BCP-MCNC: 2.9 G/DL (ref 3.5–5)
ALP SERPL-CCNC: 591 U/L (ref 46–116)
ALT SERPL W P-5'-P-CCNC: 39 U/L (ref 12–78)
ANION GAP SERPL CALCULATED.3IONS-SCNC: 6 MMOL/L (ref 4–13)
APTT PPP: 36 SECONDS (ref 23–37)
AST SERPL W P-5'-P-CCNC: 32 U/L (ref 5–45)
ATRIAL RATE: 78 BPM
BACTERIA UR QL AUTO: ABNORMAL /HPF
BASOPHILS # BLD AUTO: 0.04 THOUSANDS/ΜL (ref 0–0.1)
BASOPHILS NFR BLD AUTO: 1 % (ref 0–1)
BILIRUB SERPL-MCNC: 0.64 MG/DL (ref 0.2–1)
BILIRUB UR QL STRIP: NEGATIVE
BUN SERPL-MCNC: 45 MG/DL (ref 5–25)
CALCIUM ALBUM COR SERPL-MCNC: 10.2 MG/DL (ref 8.3–10.1)
CALCIUM SERPL-MCNC: 9.3 MG/DL (ref 8.3–10.1)
CHLORIDE SERPL-SCNC: 108 MMOL/L (ref 100–108)
CHLORIDE UR-SCNC: 44 MMOL/L
CLARITY UR: CLEAR
CO2 SERPL-SCNC: 25 MMOL/L (ref 21–32)
COLOR UR: YELLOW
CREAT SERPL-MCNC: 2.91 MG/DL (ref 0.6–1.3)
CREAT UR-MCNC: 142 MG/DL
EOSINOPHIL # BLD AUTO: 0.05 THOUSAND/ΜL (ref 0–0.61)
EOSINOPHIL NFR BLD AUTO: 1 % (ref 0–6)
ERYTHROCYTE [DISTWIDTH] IN BLOOD BY AUTOMATED COUNT: 13.6 % (ref 11.6–15.1)
EST. AVERAGE GLUCOSE BLD GHB EST-MCNC: 332 MG/DL
FLUAV RNA RESP QL NAA+PROBE: NEGATIVE
FLUBV RNA RESP QL NAA+PROBE: NEGATIVE
GFR SERPL CREATININE-BSD FRML MDRD: 16 ML/MIN/1.73SQ M
GLUCOSE SERPL-MCNC: 318 MG/DL (ref 65–140)
GLUCOSE SERPL-MCNC: 325 MG/DL (ref 65–140)
GLUCOSE SERPL-MCNC: 383 MG/DL (ref 65–140)
GLUCOSE UR STRIP-MCNC: ABNORMAL MG/DL
HBA1C MFR BLD: 13.2 %
HCT VFR BLD AUTO: 27.9 % (ref 34.8–46.1)
HGB BLD-MCNC: 8.4 G/DL (ref 11.5–15.4)
HGB UR QL STRIP.AUTO: ABNORMAL
HYALINE CASTS #/AREA URNS LPF: ABNORMAL /LPF
IMM GRANULOCYTES # BLD AUTO: 0.06 THOUSAND/UL (ref 0–0.2)
IMM GRANULOCYTES NFR BLD AUTO: 1 % (ref 0–2)
INR PPP: 1.1 (ref 0.84–1.19)
KETONES UR STRIP-MCNC: NEGATIVE MG/DL
LEUKOCYTE ESTERASE UR QL STRIP: ABNORMAL
LYMPHOCYTES # BLD AUTO: 1.01 THOUSANDS/ΜL (ref 0.6–4.47)
LYMPHOCYTES NFR BLD AUTO: 12 % (ref 14–44)
MCH RBC QN AUTO: 27.5 PG (ref 26.8–34.3)
MCHC RBC AUTO-ENTMCNC: 30.1 G/DL (ref 31.4–37.4)
MCV RBC AUTO: 92 FL (ref 82–98)
MONOCYTES # BLD AUTO: 0.52 THOUSAND/ΜL (ref 0.17–1.22)
MONOCYTES NFR BLD AUTO: 6 % (ref 4–12)
NEUTROPHILS # BLD AUTO: 6.68 THOUSANDS/ΜL (ref 1.85–7.62)
NEUTS SEG NFR BLD AUTO: 79 % (ref 43–75)
NITRITE UR QL STRIP: NEGATIVE
NON-SQ EPI CELLS URNS QL MICRO: ABNORMAL /HPF
NRBC BLD AUTO-RTO: 0 /100 WBCS
NT-PROBNP SERPL-MCNC: ABNORMAL PG/ML
P AXIS: 81 DEGREES
PH UR STRIP.AUTO: 6 [PH]
PLATELET # BLD AUTO: 161 THOUSANDS/UL (ref 149–390)
PMV BLD AUTO: 11.2 FL (ref 8.9–12.7)
POTASSIUM SERPL-SCNC: 4.9 MMOL/L (ref 3.5–5.3)
PR INTERVAL: 132 MS
PROCALCITONIN SERPL-MCNC: 0.38 NG/ML
PROT SERPL-MCNC: 7.4 G/DL (ref 6.4–8.2)
PROT UR STRIP-MCNC: ABNORMAL MG/DL
PROTHROMBIN TIME: 14.2 SECONDS (ref 11.6–14.5)
QRS AXIS: 76 DEGREES
QRSD INTERVAL: 94 MS
QT INTERVAL: 368 MS
QTC INTERVAL: 419 MS
RBC # BLD AUTO: 3.05 MILLION/UL (ref 3.81–5.12)
RBC #/AREA URNS AUTO: ABNORMAL /HPF
RSV RNA RESP QL NAA+PROBE: NEGATIVE
SARS-COV-2 RNA RESP QL NAA+PROBE: NEGATIVE
SODIUM 24H UR-SCNC: 34 MOL/L
SODIUM SERPL-SCNC: 139 MMOL/L (ref 136–145)
SP GR UR STRIP.AUTO: 1.02 (ref 1–1.03)
T WAVE AXIS: 243 DEGREES
TROPONIN I SERPL-MCNC: 0.38 NG/ML
TROPONIN I SERPL-MCNC: 0.42 NG/ML
TROPONIN I SERPL-MCNC: 0.59 NG/ML
TROPONIN I SERPL-MCNC: 0.6 NG/ML
TSH SERPL DL<=0.05 MIU/L-ACNC: 20.9 UIU/ML (ref 0.36–3.74)
UROBILINOGEN UR QL STRIP.AUTO: 0.2 E.U./DL
VENTRICULAR RATE: 78 BPM
WBC # BLD AUTO: 8.36 THOUSAND/UL (ref 4.31–10.16)
WBC #/AREA URNS AUTO: ABNORMAL /HPF

## 2021-02-21 PROCEDURE — 99222 1ST HOSP IP/OBS MODERATE 55: CPT | Performed by: INTERNAL MEDICINE

## 2021-02-21 PROCEDURE — 84484 ASSAY OF TROPONIN QUANT: CPT | Performed by: INTERNAL MEDICINE

## 2021-02-21 PROCEDURE — 93010 ELECTROCARDIOGRAM REPORT: CPT | Performed by: INTERNAL MEDICINE

## 2021-02-21 PROCEDURE — 82436 ASSAY OF URINE CHLORIDE: CPT | Performed by: INTERNAL MEDICINE

## 2021-02-21 PROCEDURE — 82570 ASSAY OF URINE CREATININE: CPT | Performed by: INTERNAL MEDICINE

## 2021-02-21 PROCEDURE — 85610 PROTHROMBIN TIME: CPT | Performed by: EMERGENCY MEDICINE

## 2021-02-21 PROCEDURE — 99223 1ST HOSP IP/OBS HIGH 75: CPT | Performed by: INTERNAL MEDICINE

## 2021-02-21 PROCEDURE — 93005 ELECTROCARDIOGRAM TRACING: CPT

## 2021-02-21 PROCEDURE — 82948 REAGENT STRIP/BLOOD GLUCOSE: CPT

## 2021-02-21 PROCEDURE — 84300 ASSAY OF URINE SODIUM: CPT | Performed by: INTERNAL MEDICINE

## 2021-02-21 PROCEDURE — 36415 COLL VENOUS BLD VENIPUNCTURE: CPT

## 2021-02-21 PROCEDURE — 99291 CRITICAL CARE FIRST HOUR: CPT | Performed by: EMERGENCY MEDICINE

## 2021-02-21 PROCEDURE — 71045 X-RAY EXAM CHEST 1 VIEW: CPT

## 2021-02-21 PROCEDURE — 99285 EMERGENCY DEPT VISIT HI MDM: CPT

## 2021-02-21 PROCEDURE — 81001 URINALYSIS AUTO W/SCOPE: CPT | Performed by: INTERNAL MEDICINE

## 2021-02-21 PROCEDURE — 84145 PROCALCITONIN (PCT): CPT | Performed by: INTERNAL MEDICINE

## 2021-02-21 PROCEDURE — 0241U HB NFCT DS VIR RESP RNA 4 TRGT: CPT | Performed by: EMERGENCY MEDICINE

## 2021-02-21 PROCEDURE — 83880 ASSAY OF NATRIURETIC PEPTIDE: CPT | Performed by: EMERGENCY MEDICINE

## 2021-02-21 PROCEDURE — 85730 THROMBOPLASTIN TIME PARTIAL: CPT | Performed by: EMERGENCY MEDICINE

## 2021-02-21 PROCEDURE — 83036 HEMOGLOBIN GLYCOSYLATED A1C: CPT | Performed by: INTERNAL MEDICINE

## 2021-02-21 PROCEDURE — 84484 ASSAY OF TROPONIN QUANT: CPT | Performed by: EMERGENCY MEDICINE

## 2021-02-21 PROCEDURE — 87449 NOS EACH ORGANISM AG IA: CPT | Performed by: INTERNAL MEDICINE

## 2021-02-21 PROCEDURE — 84443 ASSAY THYROID STIM HORMONE: CPT | Performed by: INTERNAL MEDICINE

## 2021-02-21 PROCEDURE — 80053 COMPREHEN METABOLIC PANEL: CPT | Performed by: EMERGENCY MEDICINE

## 2021-02-21 PROCEDURE — 85025 COMPLETE CBC W/AUTO DIFF WBC: CPT | Performed by: EMERGENCY MEDICINE

## 2021-02-21 RX ORDER — HEPARIN SODIUM 10000 [USP'U]/100ML
3-20 INJECTION, SOLUTION INTRAVENOUS
Status: DISCONTINUED | OUTPATIENT
Start: 2021-02-21 | End: 2021-02-21

## 2021-02-21 RX ORDER — HEPARIN SODIUM 1000 [USP'U]/ML
4000 INJECTION, SOLUTION INTRAVENOUS; SUBCUTANEOUS
Status: DISCONTINUED | OUTPATIENT
Start: 2021-02-21 | End: 2021-02-21

## 2021-02-21 RX ORDER — SODIUM CHLORIDE 9 MG/ML
3 INJECTION INTRAVENOUS
Status: DISCONTINUED | OUTPATIENT
Start: 2021-02-21 | End: 2021-02-27 | Stop reason: HOSPADM

## 2021-02-21 RX ORDER — FUROSEMIDE 10 MG/ML
80 INJECTION INTRAMUSCULAR; INTRAVENOUS ONCE
Status: COMPLETED | OUTPATIENT
Start: 2021-02-21 | End: 2021-02-21

## 2021-02-21 RX ORDER — LEVOTHYROXINE SODIUM 112 UG/1
112 TABLET ORAL
Status: DISCONTINUED | OUTPATIENT
Start: 2021-02-22 | End: 2021-02-22

## 2021-02-21 RX ORDER — HYDRALAZINE HYDROCHLORIDE 25 MG/1
25 TABLET, FILM COATED ORAL 3 TIMES DAILY
Status: DISCONTINUED | OUTPATIENT
Start: 2021-02-21 | End: 2021-02-22

## 2021-02-21 RX ORDER — ASPIRIN 81 MG/1
81 TABLET, CHEWABLE ORAL DAILY
Status: DISCONTINUED | OUTPATIENT
Start: 2021-02-22 | End: 2021-02-27 | Stop reason: HOSPADM

## 2021-02-21 RX ORDER — ASPIRIN 81 MG/1
81 TABLET, CHEWABLE ORAL DAILY
COMMUNITY
End: 2021-03-28

## 2021-02-21 RX ORDER — ACETAMINOPHEN 325 MG/1
650 TABLET ORAL EVERY 6 HOURS PRN
Status: DISCONTINUED | OUTPATIENT
Start: 2021-02-21 | End: 2021-02-27 | Stop reason: HOSPADM

## 2021-02-21 RX ORDER — FUROSEMIDE 10 MG/ML
80 INJECTION INTRAMUSCULAR; INTRAVENOUS
Status: DISCONTINUED | OUTPATIENT
Start: 2021-02-21 | End: 2021-02-22

## 2021-02-21 RX ORDER — HEPARIN SODIUM 1000 [USP'U]/ML
4000 INJECTION, SOLUTION INTRAVENOUS; SUBCUTANEOUS ONCE
Status: DISCONTINUED | OUTPATIENT
Start: 2021-02-21 | End: 2021-02-21

## 2021-02-21 RX ORDER — ISOSORBIDE MONONITRATE 30 MG/1
30 TABLET, EXTENDED RELEASE ORAL DAILY
Status: DISCONTINUED | OUTPATIENT
Start: 2021-02-21 | End: 2021-02-27 | Stop reason: HOSPADM

## 2021-02-21 RX ORDER — INSULIN ASPART 100 [IU]/ML
10 INJECTION, SUSPENSION SUBCUTANEOUS
Status: ON HOLD | COMMUNITY
End: 2021-10-31 | Stop reason: SDUPTHER

## 2021-02-21 RX ORDER — LABETALOL 20 MG/4 ML (5 MG/ML) INTRAVENOUS SYRINGE
10 EVERY 4 HOURS PRN
Status: DISCONTINUED | OUTPATIENT
Start: 2021-02-21 | End: 2021-02-27 | Stop reason: HOSPADM

## 2021-02-21 RX ORDER — NITROGLYCERIN 20 MG/100ML
5-200 INJECTION INTRAVENOUS
Status: DISCONTINUED | OUTPATIENT
Start: 2021-02-21 | End: 2021-02-22

## 2021-02-21 RX ORDER — CARVEDILOL 6.25 MG/1
6.25 TABLET ORAL 2 TIMES DAILY WITH MEALS
Status: DISCONTINUED | OUTPATIENT
Start: 2021-02-21 | End: 2021-02-22

## 2021-02-21 RX ORDER — ASPIRIN 81 MG/1
243 TABLET, CHEWABLE ORAL ONCE
Status: COMPLETED | OUTPATIENT
Start: 2021-02-21 | End: 2021-02-21

## 2021-02-21 RX ORDER — HEPARIN SODIUM 1000 [USP'U]/ML
2000 INJECTION, SOLUTION INTRAVENOUS; SUBCUTANEOUS
Status: DISCONTINUED | OUTPATIENT
Start: 2021-02-21 | End: 2021-02-21

## 2021-02-21 RX ORDER — AMLODIPINE BESYLATE 5 MG/1
5 TABLET ORAL DAILY
Status: DISCONTINUED | OUTPATIENT
Start: 2021-02-22 | End: 2021-02-22

## 2021-02-21 RX ORDER — ATORVASTATIN CALCIUM 40 MG/1
40 TABLET, FILM COATED ORAL
Status: DISCONTINUED | OUTPATIENT
Start: 2021-02-21 | End: 2021-02-27 | Stop reason: HOSPADM

## 2021-02-21 RX ORDER — FUROSEMIDE 10 MG/ML
40 INJECTION INTRAMUSCULAR; INTRAVENOUS
Status: DISCONTINUED | OUTPATIENT
Start: 2021-02-21 | End: 2021-02-21

## 2021-02-21 RX ADMIN — CARVEDILOL 6.25 MG: 6.25 TABLET, FILM COATED ORAL at 15:56

## 2021-02-21 RX ADMIN — FUROSEMIDE 80 MG: 10 INJECTION, SOLUTION INTRAMUSCULAR; INTRAVENOUS at 15:55

## 2021-02-21 RX ADMIN — ISOSORBIDE MONONITRATE 30 MG: 30 TABLET, EXTENDED RELEASE ORAL at 14:18

## 2021-02-21 RX ADMIN — HYDRALAZINE HYDROCHLORIDE 25 MG: 25 TABLET, FILM COATED ORAL at 21:07

## 2021-02-21 RX ADMIN — HYDRALAZINE HYDROCHLORIDE 25 MG: 25 TABLET, FILM COATED ORAL at 15:56

## 2021-02-21 RX ADMIN — AZITHROMYCIN MONOHYDRATE 500 MG: 500 INJECTION, POWDER, LYOPHILIZED, FOR SOLUTION INTRAVENOUS at 14:52

## 2021-02-21 RX ADMIN — CEFTRIAXONE 1000 MG: 10 INJECTION, POWDER, FOR SOLUTION INTRAVENOUS at 14:05

## 2021-02-21 RX ADMIN — NITROGLYCERIN 5 MCG/MIN: 20 INJECTION INTRAVENOUS at 14:21

## 2021-02-21 RX ADMIN — ASPIRIN 243 MG: 81 TABLET, CHEWABLE ORAL at 14:03

## 2021-02-21 RX ADMIN — LABETALOL 20 MG/4 ML (5 MG/ML) INTRAVENOUS SYRINGE 10 MG: at 23:44

## 2021-02-21 RX ADMIN — INSULIN LISPRO 5 UNITS: 100 INJECTION, SOLUTION INTRAVENOUS; SUBCUTANEOUS at 21:07

## 2021-02-21 RX ADMIN — INSULIN LISPRO 5 UNITS: 100 INJECTION, SOLUTION INTRAVENOUS; SUBCUTANEOUS at 16:00

## 2021-02-21 RX ADMIN — FUROSEMIDE 80 MG: 10 INJECTION, SOLUTION INTRAMUSCULAR; INTRAVENOUS at 23:16

## 2021-02-21 RX ADMIN — ATORVASTATIN CALCIUM 40 MG: 40 TABLET, FILM COATED ORAL at 21:07

## 2021-02-21 NOTE — ASSESSMENT & PLAN NOTE
- patient presenting with few day history of worsened shortness of breath and productive cough with intermittent chills  - chest x-ray with right-sided basilar infiltrate and vascular congestion  - continue Rocephin/azithromycin, check strep, Legionella, procalcitonin  - monitor WBC and fever curve  - will likely need 5 day course

## 2021-02-21 NOTE — H&P
H&P- Jaret Santacruz 1955, 76 y o  female MRN: [id number]    Unit/Bed#: ED 11 Encounter: [id number]    Primary Care Provider: Noy Serrano MD   Date and time admitted to hospital: 2/21/2021 10:21 AM        * Community acquired pneumonia  Assessment & Plan  - patient presenting with few day history of worsened shortness of breath and productive cough with intermittent chills  - chest x-ray with right-sided basilar infiltrate and vascular congestion  - continue Rocephin/azithromycin, check strep, Legionella, procalcitonin  - monitor WBC and fever curve  - will likely need 5 day course    Elevated troponin  Assessment & Plan  -patient complains of intermittent chest tightness over past few days  -initial troponin 0 38 an EKG with mild T-wave inversions and V5, V6  -cardiology consulted by ED, ordered for aspirin and heparin infusion  -repeat echo ordered  Appreciate cardiology recommendations, suspect type 2 MI in the setting of right-sided pneumonia    Acute on chronic combined systolic and diastolic congestive heart failure (HCC)  Assessment & Plan  Wt Readings from Last 3 Encounters:   02/21/21 76 2 kg (168 lb)   11/02/20 77 1 kg (170 lb)   08/10/20 76 3 kg (168 lb 3 2 oz)     -presenting with few day history of worsening shortness of breath and lower extremity edema  - proBNP over 12,000, troponin 0 38  -likely acute on chronic diastolic CHF in the setting of right-sided pneumonia  -last echo in 2018 with EF 49%, grade 2 diastolic dysfunction  -cardiology has been consulted by ED given new T-wave inversions in lateral leads, new from 2019  -takes torsemide 40 mg in the morning and 20 mg in the evening, will continue Lasix 40 mg IV b i d  for now pending cards consultation  -continue cardiac meds, hold losartan for now given a KI  -continue Coreg, Imdur, aspirin, statin  -monitor intake/output, daily weights        KELLY (acute kidney injury) ([facility] [address] )  Assessment & Plan  -see plan under CKD    Coronary artery disease involving native coronary artery of native heart with angina pectoris ([facility] )  Assessment & Plan  -prior stenting X 2, takes daily aspirin, continue cardiac meds as above    Anemia  Assessment & Plan  -hemoglobin 8 4, appears near baseline, continue to trend    Acute renal failure superimposed on stage 3 chronic kidney disease [facility])  Assessment & Plan  Lab Results   Component Value Date    EGFR 16 02/21/2021    EGFR 29 04/30/2019    EGFR 20 01/10/2019    CREATININE 2 91 (H) 02/21/2021    CREATININE 2 12 (H) 10/30/2020    CREATININE 2 22 (H) 08/08/2020   A KI on CKD with presenting creatinine 2 91 from 2 2  May be component of cardiorenal in the setting of volume overload, continue diuresis and trend creatinine  -check urinalysis, urine studies-   Monitor intake/output, daily weights    Hyperlipidemia  Assessment & Plan  Continue statin    Hypertension  Assessment & Plan  -continue amlodipine, Coreg, hydralazine, Imdur, Lasix  -hold losartan given acute kidney injury    Hypothyroidism  Assessment & Plan  -continue home Synthroid, check TSH    DM (diabetes mellitus) ([facility] )  Assessment & Plan  Lab Results   Component Value Date    HGBA1C 7 5 (H) 12/29/2018       No results for input(s): POCGLU in the last 72 hours  Blood Sugar Average: Last 72 hrs:  -takes NPH at home 10 units b i d   -continue sliding scale for now, check A1c, may need to restart long-acting insulin if needed    VTE Prophylaxis: Heparin Drip  / sequential compression device   Code Status:  Full  POLST: POLST form is not discussed and not completed at this time  Discussion with family:  Patient and daughter at bedside    Anticipated Length of Stay:  Patient will be admitted on an Inpatient basis with an anticipated length of stay of  > 2 midnights     Justification for Hospital Stay:  Acute on chronic diastolic CHF, community-acquired pneumonia, a KI on CKD, elevated troponin    Total Time for Visit, including Counseling / Coordination of Care: 45 minutes  Greater than 50% of this total time spent on direct patient counseling and coordination of care  Chief Complaint:   Shortness of breath, cough, chest tightness    History of Present Illness:    Diana Lovett is a 76 y o  female with history of CAD status post stenting in 4283, combined systolic/diastolic CHF with last echo showing EF 89% grade 2 diastolic dysfunction, CKD stage 3, insulin-dependent diabetes, hypertension, hypothyroidism, presenting with 4 day history of shortness of breath, dyspnea on exertion, productive cough  Per daughter at bedside, since Thursday, patient has been having shortness of breath and dyspnea on exertion with lower extremity swelling, as well as productive cough of clear/yellow phlegm, and intermittent chest tightness  Denies fevers but does admit to chills  No sick contacts or recent travel  Denies nausea, vomiting  Chest tightness has been intermittent and ongoing, denies obvious chest pain  In the ED, patient found to be afebrile, heart rate 70s, respirations 20, elevated blood pressure 1 77Y systolic, saturating well on room air  Labs pertinent for troponin 0 38, negative COVID swab, proBNP 17616, creatinine 2 91, glucose 383, hemoglobin 8 4, WBC 8 4  Chest x-ray showed pulmonary vascular congestion and right basilar infiltrate  EKG showed new T-wave inversions in V5-V6, new from 2019  Patient received aspirin, ordered for heparin infusion, and receive Rocephin/azithromycin  Review of Systems:    Review of Systems   Constitutional: Positive for chills and fatigue  Negative for fever and unexpected weight change  HENT: Negative for congestion, rhinorrhea, sneezing and sore throat  Respiratory: Positive for cough, chest tightness and shortness of breath  Negative for wheezing  Cardiovascular: Positive for leg swelling  Negative for chest pain and palpitations     Gastrointestinal: Negative for abdominal pain, constipation, diarrhea, nausea and vomiting  Endocrine: Negative for cold intolerance and heat intolerance  Genitourinary: Negative for dysuria  Musculoskeletal: Negative for arthralgias  Allergic/Immunologic: Negative for immunocompromised state  Neurological: Negative for dizziness and numbness  Past Medical and Surgical History:     Past Medical History:   Diagnosis Date    Anemia     CHF (congestive heart failure) ([facility] )     Chronic kidney disease     Coronary artery disease     Diabetes mellitus ([facility] )     Hypertension     Hypothyroidism        Past Surgical History:   Procedure Laterality Date    CORONARY ANGIOPLASTY WITH STENT PLACEMENT  2019       Meds/Allergies:    Prior to Admission medications    Medication Sig Start Date End Date Taking?  Authorizing Provider   amLODIPine (NORVASC) 5 mg tablet Take 1 tablet (5 mg total) by mouth daily 10/22/20  Yes Babar Colvin DO   aspirin 81 mg chewable tablet Chew 81 mg daily   Yes Historical Provider, MD   atorvastatin (LIPITOR) 40 mg tablet Take 1 tablet (40 mg total) by mouth daily  Patient taking differently: Take 40 mg by mouth daily at bedtime  10/19/20 10/19/21 Yes Parker Gomez DO   carvedilol (COREG) 6 25 mg tablet TAKE 1 TABLET (6 25 MG TOTAL) BY MOUTH TWICE A DAY WITH MEALS FOR 30 DAYS 8/22/20  Yes Parker Gomez DO   hydrALAZINE (APRESOLINE) 10 mg tablet Take 2 5 tablets (25 mg total) by mouth 3 (three) times a day 8/10/20  Yes Amrit Bustos DO   insulin aspart protamine-insulin aspart (NovoLOG 70/30) 100 units/mL injection Inject 10 Units under the skin 2 (two) times a day before meals   Yes Historical Provider, MD   isosorbide mononitrate (IMDUR) 30 mg 24 hr tablet Take 1 tablet (30 mg total) by mouth daily 10/22/20  Yes Parker Gomez DO   levothyroxine 112 mcg tablet 1 tab daily x 6 day and half pill on sundays 10/11/18  Yes Historical Provider, MD   losartan (COZAAR) 25 mg tablet Take 1 tablet (25 mg total) by mouth daily 1/22/21  Yes Parker Gomez DO   torsemide (DEMADEX) 20 mg tablet Take 40 mg in Am and 20 mg in PM 11/2/20  Yes Amrit Bustos DO   nitroglycerin (NITROSTAT) 0 4 mg SL tablet Place 1 tablet (0 4 mg total) under the tongue every 5 (five) minutes as needed for chest pain for up to 30 doses 1/10/19   Judah Mendosa MD   atorvastatin (LIPITOR) 40 mg tablet Take 1 tablet (40 mg total) by mouth daily  Patient not taking: Reported on 11/2/2020 10/19/20 2/21/21  Ryan Lopes DO   ergocalciferol (ERGOCALCIFEROL) 1 25 MG (92509 UT) capsule Take 1 capsule (50,000 Units total) by mouth once a week for 8 doses  Patient not taking: Reported on 3/10/2020 2/19/20 2/21/21  Ryan Lopes DO   glucose blood test strip 3 times daily 8/30/18 2/21/21  Historical Provider, MD   magnesium gluconate (MAGONATE) 500 mg tablet Take 500 mg by mouth 2 (two) times a day  2/21/21  Historical Provider, MD   RA ASPIRIN ADULT LOW DOSE 81 MG chewable tablet chew and swallow 1 tablet (81 MG TOTAL) by mouth once daily 8/13/19 2/21/21  Ryan Lopes DO   repaglinide (PRANDIN) 0 5 mg tablet Take 1 tablet (0 5 mg total) by mouth 3 (three) times a day before meals  Patient not taking: Reported on 11/2/2020 1/10/19 2/21/21  Judah Mendosa MD     I have reviewed home medications with patient personally  Allergies:    Allergies   Allergen Reactions    Iv Contrast [Iodinated Diagnostic Agents]      Caused KELLY       Social History:     Marital Status: /Civil Union   Patient Pre-hospital Living Situation:  Lives at home with , daughter nearby  Patient Pre-hospital Level of Mobility:  Ambulates independently  Patient Pre-hospital Diet Restrictions:  Diabetic, cardiac, renal  Substance Use History:   Social History     Substance and Sexual Activity   Alcohol Use No     Social History     Tobacco Use   Smoking Status Former Smoker   Smokeless Tobacco Never Used     Social History     Substance and Sexual Activity   Drug Use No       Family History:    non-contributory    Physical Exam: Vitals:   Blood Pressure: (!) 193/90 (02/21/21 1030)  Pulse: 78 (02/21/21 1030)  Temperature: 98 3 °F (36 8 °C) (02/21/21 1025)  Temp Source: Oral (02/21/21 1025)  Respirations: 20 (02/21/21 1025)  Weight - Scale: 76 2 kg (168 lb) (02/21/21 1025)  SpO2: 94 % (02/21/21 1030)    Physical Exam  Constitutional:       General: She is not in acute distress  Appearance: She is well-developed  She is not diaphoretic  Comments: Obese   HENT:      Head: Normocephalic and atraumatic  Mouth/Throat:      Mouth: Mucous membranes are moist       Pharynx: Oropharynx is clear  No oropharyngeal exudate  Eyes:      General: No scleral icterus  Cardiovascular:      Rate and Rhythm: Normal rate and regular rhythm  Heart sounds: Normal heart sounds  No murmur  Pulmonary:      Effort: Pulmonary effort is normal  No respiratory distress  Breath sounds: No wheezing  Comments: Bibasilar crackles  Abdominal:      General: Bowel sounds are normal  There is no distension  Palpations: Abdomen is soft  Tenderness: There is no abdominal tenderness  Musculoskeletal:      Right lower leg: Edema present  Left lower leg: Edema present  Comments: 2+ bilateral lower extremity edema   Neurological:      Mental Status: She is alert and oriented to person, place, and time  Additional Data:     Lab Results: I have personally reviewed pertinent reports     and I have personally reviewed pertinent films in PACS    Results from last 7 days   Lab Units 02/21/21  1037   WBC Thousand/uL 8 36   HEMOGLOBIN g/dL 8 4*   HEMATOCRIT % 27 9*   PLATELETS Thousands/uL 161   NEUTROS PCT % 79*   LYMPHS PCT % 12*   MONOS PCT % 6   EOS PCT % 1     Results from last 7 days   Lab Units 02/21/21  1037   SODIUM mmol/L 139   POTASSIUM mmol/L 4 9   CHLORIDE mmol/L 108   CO2 mmol/L 25   BUN mg/dL 45*   CREATININE mg/dL 2 91*   ANION GAP mmol/L 6   CALCIUM mg/dL 9 3   ALBUMIN g/dL 2 9*   TOTAL BILIRUBIN mg/dL 0 64 ALK PHOS U/L 591*   ALT U/L 39   AST U/L 32   GLUCOSE RANDOM mg/dL 383*                       Imaging: I have personally reviewed pertinent reports  and I have personally reviewed pertinent films in PACS    XR chest 1 view portable   ED Interpretation by Cindy Madden MD (02/21 1151)   Right lower lobe pneumonia with associated effusion          EKG, Pathology, and Other Studies Reviewed on Admission:   · EKG:  As per HPI    Allscripts / Epic Records Reviewed: Yes     ** Please Note: This note has been constructed using a voice recognition system   **

## 2021-02-21 NOTE — ASSESSMENT & PLAN NOTE
Wt Readings from Last 3 Encounters:   02/21/21 76 2 kg (168 lb)   11/02/20 77 1 kg (170 lb)   08/10/20 76 3 kg (168 lb 3 2 oz)     -presenting with few day history of worsening shortness of breath and lower extremity edema  - proBNP over 12,000, troponin 0 38  -likely acute on chronic diastolic CHF in the setting of right-sided pneumonia  -last echo in 2018 with EF 79%, grade 2 diastolic dysfunction  -cardiology has been consulted by ED given new T-wave inversions in lateral leads, new from 2019  -takes torsemide 40 mg in the morning and 20 mg in the evening, will continue Lasix 40 mg IV b i d  for now pending cards consultation  -continue cardiac meds, hold losartan for now given a KI  -continue Coreg, Imdur, aspirin, statin  -monitor intake/output, daily weights

## 2021-02-21 NOTE — ASSESSMENT & PLAN NOTE
Lab Results   Component Value Date    EGFR 16 02/21/2021    EGFR 29 04/30/2019    EGFR 20 01/10/2019    CREATININE 2 91 (H) 02/21/2021    CREATININE 2 12 (H) 10/30/2020    CREATININE 2 22 (H) 08/08/2020   A KI on CKD with presenting creatinine 2 91 from 2 2  May be component of cardiorenal in the setting of volume overload, continue diuresis and trend creatinine  -check urinalysis, urine studies-   Monitor intake/output, daily weights

## 2021-02-21 NOTE — ASSESSMENT & PLAN NOTE
Lab Results   Component Value Date    HGBA1C 7 5 (H) 12/29/2018       No results for input(s): POCGLU in the last 72 hours      Blood Sugar Average: Last 72 hrs:  -takes NPH at home 10 units b i d   -continue sliding scale for now, check A1c, may need to restart long-acting insulin if needed

## 2021-02-21 NOTE — ED PROVIDER NOTES
History  Chief Complaint   Patient presents with    Shortness of Breath     Pt  reports having exertional SOB for the past 3 days, reports weight gain, unsure how much  Also reporting cough, and inability to breathe after coughing  77-year-old female with history of coronary artery disease, CKD, hypertension, presents for cough, chest pain, shortness of breath  Patient says symptoms are 3 days ago, constant  Chest pain is tight in quality, localizes center of her chest, nonradiating, no modifying factors  Also reports mild increased lower extremity swelling over the past few days  No known COVID-19 contacts  Denies fever, chills, diaphoresis, n/v/d, abdominal pain, headache, leg pain or asymmetric swelling, recent immobilization or surgery, hemoptysis, personal or family history of VTE  any other complaints  Prior to Admission Medications   Prescriptions Last Dose Informant Patient Reported? Taking?    amLODIPine (NORVASC) 5 mg tablet 2/21/2021 at Unknown time  No Yes   Sig: Take 1 tablet (5 mg total) by mouth daily   aspirin 81 mg chewable tablet 2/20/2021 at Unknown time  Yes Yes   Sig: Chew 81 mg daily   atorvastatin (LIPITOR) 40 mg tablet 2/20/2021 at Unknown time  No Yes   Sig: Take 1 tablet (40 mg total) by mouth daily   Patient taking differently: Take 40 mg by mouth daily at bedtime    carvedilol (COREG) 6 25 mg tablet 2/21/2021 at Unknown time  No Yes   Sig: TAKE 1 TABLET (6 25 MG TOTAL) BY MOUTH TWICE A DAY WITH MEALS FOR 30 DAYS   hydrALAZINE (APRESOLINE) 10 mg tablet 2/21/2021 at Unknown time  No Yes   Sig: Take 2 5 tablets (25 mg total) by mouth 3 (three) times a day   insulin aspart protamine-insulin aspart (NovoLOG 70/30) 100 units/mL injection 2/21/2021 at Unknown time  Yes Yes   Sig: Inject 10 Units under the skin 2 (two) times a day before meals   isosorbide mononitrate (IMDUR) 30 mg 24 hr tablet 2/20/2021 at Unknown time  No Yes   Sig: Take 1 tablet (30 mg total) by mouth daily levothyroxine 112 mcg tablet 2021 at Unknown time Child Yes Yes   Si tab daily x 6 day and half pill on sundays   losartan (COZAAR) 25 mg tablet 2021 at Unknown time  No Yes   Sig: Take 1 tablet (25 mg total) by mouth daily   nitroglycerin (NITROSTAT) 0 4 mg SL tablet  Child No No   Sig: Place 1 tablet (0 4 mg total) under the tongue every 5 (five) minutes as needed for chest pain for up to 30 doses   torsemide (DEMADEX) 20 mg tablet 2021 at Unknown time  No Yes   Sig: Take 40 mg in Am and 20 mg in PM      Facility-Administered Medications: None       Past Medical History:   Diagnosis Date    Anemia     [facility]cong[facility] ([facility] )     Chronic kidney disease     Coronary artery disease     Diabetes mellitus ([facility] )     Hypertension     Hypothyroidism        Past Surgical History:   Procedure Laterality Date    CORONARY ANGIOPLASTY WITH STENT PLACEMENT         Family History   Problem Relation Age of Onset    Diabetes Mother     Heart attack Father         MI    Hypertension Brother      I have reviewed and agree with the history as documented  E-Cigarette/Vaping     E-Cigarette/Vaping Substances     Social History     Tobacco Use    Smoking status: Former Smoker    Smokeless tobacco: Never Used   Substance Use Topics    Alcohol use: No    Drug use: No        Review of Systems   Constitutional: Negative  Negative for chills and fever  HENT: Negative  Negative for rhinorrhea  Eyes: Negative  Respiratory: Positive for cough and shortness of breath  Cardiovascular: Positive for chest pain and leg swelling  Gastrointestinal: Negative  Negative for abdominal pain, diarrhea, nausea and vomiting  Genitourinary: Negative  Negative for dysuria, flank pain and frequency  Musculoskeletal: Negative  Negative for back pain and neck pain  Skin: Negative  Negative for rash  Neurological: Negative  Negative for light-headedness and headaches     All other systems reviewed and are negative  Physical Exam  ED Triage Vitals [02/21/21 1025]   Temperature Pulse Respirations Blood Pressure SpO2   98 3 °F (36 8 °C) 79 20 (!) 211/97 93 %      Temp Source Heart Rate Source Patient Position - Orthostatic VS BP Location FiO2 (%)   Oral Monitor Lying Left arm --      Pain Score       Worst Possible Pain             Orthostatic Vital Signs  Vitals:    02/21/21 1025 02/21/21 1030 02/21/21 1300   BP: (!) 211/97 (!) 193/90 (!) 191/86   Pulse: 79 78 76   Patient Position - Orthostatic VS: Lying         Physical Exam  Vitals signs and nursing note reviewed  Constitutional:       General: She is not in acute distress  Appearance: She is well-developed  HENT:      Head: Normocephalic and atraumatic  Mouth/Throat:      Mouth: Mucous membranes are moist    Eyes:      Pupils: Pupils are equal, round, and reactive to light  Neck:      Musculoskeletal: Normal range of motion and neck supple  Cardiovascular:      Rate and Rhythm: Normal rate and regular rhythm  Pulses:           Radial pulses are 2+ on the right side and 2+ on the left side  Heart sounds: Normal heart sounds  No murmur  No friction rub  No gallop  Pulmonary:      Effort: Pulmonary effort is normal  No respiratory distress  Breath sounds: No stridor  Examination of the right-middle field reveals decreased breath sounds  Examination of the right-lower field reveals decreased breath sounds and rales  Decreased breath sounds and rales present  No wheezing or rhonchi  Abdominal:      Palpations: Abdomen is soft  Tenderness: There is no abdominal tenderness  There is no guarding or rebound  Musculoskeletal: Normal range of motion  General: No swelling or tenderness  Right lower leg: She exhibits no tenderness  No edema  Left lower leg: She exhibits no tenderness  Edema (2+ bilateral pitting edema in pretibial area, symmetric) present        Comments: No calf tenderness   Skin:     General: Skin is warm and dry  Capillary Refill: Capillary refill takes less than 2 seconds  Neurological:      Mental Status: She is alert and oriented to person, place, and time  Cranial Nerves: No cranial nerve deficit        Comments: Clear fluent speech         ED Medications  Medications   sodium chloride (PF) 0 9 % injection 3 mL (has no administration in time range)   ceftriaxone (ROCEPHIN) 1 g/50 mL in dextrose IVPB (has no administration in time range)   azithromycin (ZITHROMAX) 500 mg in sodium chloride 0 9% 250mL IVPB 500 mg (has no administration in time range)   aspirin chewable tablet 243 mg (has no administration in time range)   heparin (porcine) injection 4,000 Units (has no administration in time range)   heparin (porcine) 25,000 units in 0 45% NaCl 250 mL infusion (premix) (has no administration in time range)   heparin (porcine) injection 4,000 Units (has no administration in time range)   heparin (porcine) injection 2,000 Units (has no administration in time range)   amLODIPine (NORVASC) tablet 5 mg (has no administration in time range)   aspirin chewable tablet 81 mg (has no administration in time range)   atorvastatin (LIPITOR) tablet 40 mg (has no administration in time range)   carvedilol (COREG) tablet 6 25 mg (has no administration in time range)   hydrALAZINE (APRESOLINE) tablet 25 mg (has no administration in time range)   isosorbide mononitrate (IMDUR) 24 hr tablet 30 mg (has no administration in time range)   levothyroxine tablet 112 mcg (has no administration in time range)   acetaminophen (TYLENOL) tablet 650 mg (has no administration in time range)   insulin lispro (HumaLOG) 100 units/mL subcutaneous injection 1-6 Units (has no administration in time range)   insulin lispro (HumaLOG) 100 units/mL subcutaneous injection 1-6 Units (has no administration in time range)   ceftriaxone (ROCEPHIN) 1 g/50 mL in dextrose IVPB (has no administration in time range)   azithromycin (ZITHROMAX) 500 mg in sodium chloride 0 9% 250mL IVPB 500 mg (has no administration in time range)   furosemide (LASIX) injection 40 mg (has no administration in time range)       Diagnostic Studies  Results Reviewed     Procedure Component Value Units Date/Time    APTT six (6) hours after Heparin bolus/drip initiation or dosing change [[id number]]  (Normal) Collected: 02/21/21 1328    Lab Status: Final result Specimen: Blood from Arm, Right Updated: 02/21/21 1353     PTT 36 seconds     Protime-INR [[id number]]  (Normal) Collected: 02/21/21 1328    Lab Status: Final result Specimen: Blood from Arm, Right Updated: 02/21/21 1353     Protime 14 2 seconds      INR 1 10    Urinalysis with microscopic [[id number]] Collected: 02/21/21 1344    Lab Status: In process Specimen: Urine, Clean Catch Updated: 02/21/21 1348    Strep Pneumoniae, Urine [[id number]] Collected: 02/21/21 1344    Lab Status: In process Specimen: Urine, Clean Catch Updated: 02/21/21 1348    Legionella antigen, urine [[id number]] Collected: 02/21/21 1344    Lab Status: In process Specimen: Urine, Clean Catch Updated: 02/21/21 1348    Sodium, urine, random [[id number]] Collected: 02/21/21 1344    Lab Status: In process Specimen: Urine, Clean Catch Updated: 02/21/21 1348    Creatinine, urine, random [[id number]] Collected: 02/21/21 1344    Lab Status: In process Specimen: Urine, Clean Catch Updated: 02/21/21 1348    Chloride, urine, random [[id number]] Collected: 02/21/21 1344    Lab Status: In process Specimen: Urine, Clean Catch Updated: 02/21/21 1348    Procalcitonin [[id number]] Collected: 02/21/21 1328    Lab Status: In process Specimen: Blood from Arm, Right Updated: 02/21/21 1333    Troponin I [[id number]] Collected: 02/21/21 1328    Lab Status:  In process Specimen: Blood from Arm, Right Updated: 02/21/21 1333    Hemoglobin A1C [[id number]]     Lab Status: No result Specimen: Blood     TSH, 3rd generation [[id number]]     Lab Status: No result Specimen: Blood     COVID19, Influenza A/B, RSV PCR, [facility] [[id number]]  (Normal) Collected: 02/21/21 1124    Lab Status: Final result Specimen: Nares from Nasopharyngeal Swab Updated: 02/21/21 1222     SARS-CoV-2 Negative     INFLUENZA A PCR Negative     INFLUENZA B PCR Negative     RSV PCR Negative    Narrative: This test has been authorized by FDA under an EUA (Emergency Use Assay) for use by authorized laboratories  Clinical caution and judgement should be used with the interpretation of these results with consideration of the clinical impression and other laboratory testing  Testing reported as "Positive" or "Negative" has been proven to be accurate according to standard laboratory validation requirements  All testing is performed with control materials showing appropriate reactivity at standard intervals      Troponin I [[id number]]  (Abnormal) Collected: 02/21/21 1037    Lab Status: Final result Specimen: Blood from Arm, Left Updated: 02/21/21 1109     Troponin I 0 38 ng/mL     NT-BNP PRO [[id number]]  (Abnormal) Collected: 02/21/21 1037    Lab Status: Final result Specimen: Blood from Arm, Left Updated: 02/21/21 1109     NT-proBNP 12,376 pg/mL     Comprehensive metabolic panel [[id number]]  (Abnormal) Collected: 02/21/21 1037    Lab Status: Final result Specimen: Blood from Arm, Left Updated: 02/21/21 1107     Sodium 139 mmol/L      Potassium 4 9 mmol/L      Chloride 108 mmol/L      CO2 25 mmol/L      ANION GAP 6 mmol/L      BUN 45 mg/dL      Creatinine 2 91 mg/dL      Glucose 383 mg/dL      Calcium 9 3 mg/dL      Corrected Calcium 10 2 mg/dL      AST 32 U/L      ALT 39 U/L      Alkaline Phosphatase 591 U/L      Total Protein 7 4 g/dL      Albumin 2 9 g/dL      Total Bilirubin 0 64 mg/dL      eGFR 16 ml/min/1 73sq m     Narrative:      [place] guidelines for Chronic Kidney Disease (CKD):     Stage 1 with normal or high GFR (GFR > 90 mL/min/1 73 square meters)    Stage 2 Mild CKD (GFR = 60-89 mL/min/1 73 square meters)    Stage 3A Moderate CKD (GFR = 45-59 mL/min/1 73 square meters)    Stage 3B Moderate CKD (GFR = 30-44 mL/min/1 73 square meters)    Stage 4 Severe CKD (GFR = 15-29 mL/min/1 73 square meters)    Stage 5 End Stage CKD (GFR <15 mL/min/1 73 square meters)  Note: GFR calculation is accurate only with a steady state creatinine    CBC and differential [[id number]]  (Abnormal) Collected: 02/21/21 1037    Lab Status: Final result Specimen: Blood from Arm, Left Updated: 02/21/21 1047     WBC 8 36 Thousand/uL      RBC 3 05 Million/uL      Hemoglobin 8 4 g/dL      Hematocrit 27 9 %      MCV 92 fL      MCH 27 5 pg      MCHC 30 1 g/dL      RDW 13 6 %      MPV 11 2 fL      Platelets 689 Thousands/uL      nRBC 0 /100 WBCs      Neutrophils Relative 79 %      Immat GRANS % 1 %      Lymphocytes Relative 12 %      Monocytes Relative 6 %      Eosinophils Relative 1 %      Basophils Relative 1 %      Neutrophils Absolute 6 68 Thousands/µL      Immature Grans Absolute 0 06 Thousand/uL      Lymphocytes Absolute 1 01 Thousands/µL      Monocytes Absolute 0 52 Thousand/µL      Eosinophils Absolute 0 05 Thousand/µL      Basophils Absolute 0 04 Thousands/µL                  XR chest 1 view portable   ED Interpretation by Reginald Luther MD (02/21 1151)   Right lower lobe pneumonia with associated effusion            Procedures  Procedures      ED Course  ED Course as of Feb 21 1354   Sun Feb 21, 2021   1138 From 2 12 previously   Creatinine(!): 2 91   1139 baseline   Hemoglobin(!): 8 4   1139 Troponin I(!): 0 38   1139 NT-proBNP(!): 12,376   1233 Procedure Note: EKG  Date/Time: 02/21/21 12:33 PM   Interpreted by: Vi Rowland  Indications / Diagnosis: SOB  ECG reviewed by me, the ED Provider: yes   The EKG demonstrates:  Rate: 71  Rhythm: normal sinus  Intervals: normal intervals  Axis: normal axis  QRS/Blocks: normal QRS  ST Changes: New T-wave inversions in inferior and lateral leads Identification of Seniors at Risk      Most Recent Value   (ISAR) Identification of Seniors at Risk   Before the illness or injury that brought you to the Emergency, did you need someone to help you on a regular basis? 0 Filed at: 02/21/2021 1029   In the last 24 hours, have you needed more help than usual?  0 Filed at: 02/21/2021 1029   Have you been hospitalized for one or more nights during the past 6 months? 0 Filed at: 02/21/2021 1029   In general, do you see well?  0 Filed at: 02/21/2021 1029   In general, do you have serious problems with your memory? 0 Filed at: 02/21/2021 1029   Do you take more than three different medications every day? 1 Filed at: 02/21/2021 1029   ISAR Score  1 Filed at: 02/21/2021 1029                    SBIRT 20yo+      Most Recent Value   SBIRT (25 yo +)   In order to provide better care to our patients, we are screening all of our patients for alcohol and drug use  Would it be okay to ask you these screening questions? Unable to answer at this time Filed at: 02/21/2021 1030                MDM  Number of Diagnoses or Management Options  KELLY (acute kidney injury) [facility]):   Elevated troponin:   Right lower lobe pneumonia:   Diagnosis management comments: 44-year-old female with history of coronary artery disease, CKD, hypertension, presents for cough, chest pain, shortness of breath  Within differential consider pneumonia, COVID-19, coronary artery disease, pericarditis, pneumothorax  PE less likely given lack of risk factors  Doubt dissection  Obtain cardiac panel to evaluate  Final assessment:  Workup reveals right lower lobe pneumonia and patient has a new elevated troponin as well as KELLY  Started antibiotics and heparin drip  Gave full-dose aspirin  Cardiology is consulted  Discussed with admitting physician who agrees to accept patient for further management  Patient remains stable throughout ED course         Disposition  Final diagnoses:   Elevated troponin   Right lower lobe pneumonia   KELLY (acute kidney injury) ([facility] [address] [facility] )     Time reflects when diagnosis was documented in both MDM as applicable and the Disposition within this note     Time User Action Codes Description Comment    2/21/2021 12:57 PM Minor, Vi Add [R77 8] Elevated troponin     2/21/2021 12:57 PM Minor, Vi Add [J18 9] Right lower lobe pneumonia     2/21/2021 12:57 PM Minor, Vi Add [N17 9] KELLY (acute kidney injury) [facility])       ED Disposition     ED Disposition Condition Date/Time Comment    Admit Stable [place] Feb 21, 2021 12:57 PM Case was discussed with MELVI and the patient's admission status was agreed to be Admission Status: inpatient status to the service of [facility]  Follow-up Information    None         Patient's Medications   Discharge Prescriptions    No medications on file     No discharge procedures on file  PDMP Review     None           ED Provider  Attending physically available and evaluated Marissa Baum I managed the patient along with the ED Attending      Electronically Signed by         Jose Aleman MD  02/21/21 9792

## 2021-02-21 NOTE — ED ATTENDING ATTESTATION
2/21/2021  I, Fe Santiago MD, saw and evaluated the patient  I have discussed the patient with the resident/non-physician practitioner and agree with the resident's/non-physician practitioner's findings, Plan of Care, and MDM as documented in the resident's/non-physician practitioner's note, except where noted  All available labs and Radiology studies were reviewed  I was present for key portions of any procedure(s) performed by the resident/non-physician practitioner and I was immediately available to provide assistance  At this point I agree with the current assessment done in the Emergency Department  I have conducted an independent evaluation of this patient a history and physical is as follows:    OA: 75 y/o f with h/o CAD, CHF, CKD, anemia who presents to the emergency department with chest pain shortness of breaths and cough Thursday  Symptoms progressive since that time  Admits to chills without fevers  Cough has been productive of clear to yellow sputum  No known sick contacts  Upon arrival patient is nontoxic by hypertensive  She is in no respiratory distress  Vital signs are otherwise stable  She is afebrile  Heart is regular rate  Lungs are decreased at bases right greater than left without wheezing rhonchi or rales  Abdomen is soft positive bowel sounds no rebound or guarding  Nontender  Left lung lower extremity edema  Intact distal pulses and capillary refill less than 2 seconds  Awake alert oriented  Assessment and plan cough chest pain shortness of breath  EKG obtained upon arrival shows new T-wave inversions in lateral leads  Patient also with cardiac labs drawn and with elevation in troponin  Chest x-ray obtain  Evaluate for acute pulmonary process as well including infectious  Will treat accordingly  ED Course    patient started on heparin drip given chest pain shortness of breath and elevated troponin with EKG changes    Seen by Cardiology in the emergency department  After evaluation a advised discontinuation of heparin but will do a nitroglycerin as well as Lasix  Patient to be admitted and continued treatment        Critical Care Time  CriticalCare Time  Performed by: Costa Johnson MD  Authorized by: Costa Johnson MD     Critical care provider statement:     Critical care time (minutes):  32    Critical care time was exclusive of:  Separately billable procedures and treating other patients and teaching time    Critical care was necessary to treat or prevent imminent or life-threatening deterioration of the following conditions:  Cardiac failure    Critical care was time spent personally by me on the following activities:  Blood draw for specimens, obtaining history from patient or surrogate, development of treatment plan with patient or surrogate, discussions with consultants, evaluation of patient's response to treatment, examination of patient, ordering and performing treatments and interventions, ordering and review of laboratory studies, ordering and review of radiographic studies and re-evaluation of patient's condition  Comments:      Pt with CP, elevated troponin on heparin drip

## 2021-02-21 NOTE — ASSESSMENT & PLAN NOTE
-patient complains of intermittent chest tightness over past few days  -initial troponin 0 38 an EKG with mild T-wave inversions and V5, V6  -cardiology consulted by ED, ordered for aspirin and heparin infusion  -repeat echo ordered  Appreciate cardiology recommendations, suspect type 2 MI in the setting of right-sided pneumonia

## 2021-02-21 NOTE — CONSULTS
Consultation - Cardiology   Carlitos Ashley 76 y o  female MRN: [id number]  Unit/Bed#: ED 11 Encounter: [id number]      Assessment and Plan:  Principal Problem:    Community acquired pneumonia  Active Problems:    Acute on chronic combined systolic and diastolic congestive heart failure (HCC)    DM (diabetes mellitus) ([facility] )    Hypothyroidism    Hypertension    Elevated troponin    Hyperlipidemia    Acute renal failure superimposed on stage 3 chronic kidney disease (HCC)    Anemia    Coronary artery disease involving native coronary artery of native heart with angina pectoris ([facility] )    KELLY (acute kidney injury) ([place] [address] )    # CHF  # HFpEF ( last EF in 12/2018 noted to be 50% )  # Ischemic cardiomyopathy  - significant volume overload  - likely etiologies include medication and dietary non-compliance and hypertension, cardiorenal syndrome  - BNP > 12k, trop 0 4  - start the patient on lasix 80 mg IV BID  - start patient on nitro with BP goal < 140s  Restart home Imdur, norvasc and hydralazine  Restart coreg  - repeat TTE to evaluate for function  - obtain weight  - monitor strict I/O  - maintain K>4, Mg>2    # KELLY on CKD  - baseline creatinine low 2s  - currently 2 9 likely cardiorenal vs hypertensive emergency  - start the patient on lasix IV 80 mg BID  - avoid nephrotoxic drugs, hold losartan  - consider nephrology consult    # CAD  # type 2 MI  - troponin flat : 0 38 -> 0 42  - patient has hx of CAD  Cath in 01/2019 showing mid-LAD disease s/p 2 LUDY  Obstructive lesion in mid-RCA which was not stented as patient developed post PCI KELLY  -TTE in 2018 showed mid-distal inferoseptal akinesis and fractionation in inferior leads in baseline EKG suggestive of inferior scar   Likely non-viable tissue  - c/w aspirin and atorvastatin  - hold off on heparin for now as likely type 2 MI in the setting of CHF  - monitor on tele    # Hypothyroidism  # DM      History of Present Illness   Physician Requesting Consult: Zaria Lin, MD  Reason for Consult / Principal Problem: CHF  HPI: Janina Soto is a 76y o  year old female with above mentioned cardiac history presenting with chest pain and shortness of breath since Thursday  She is not very active and generally walks around the house  She has had worsening exertional sob and chest discomfort  She is unable to lie flat and wakes up at night with sob  Her legs have been swelling recently  She follows up with Dr Jazmine Greene for cardiology but she has not seen him recently  She is compliant with her medications, lives with her  and has been urinating fine recently  She denies any fevers, chills, sputum production recently  Consults    Review of Systems:  Review of Systems  10 point ROS negative except as mentioned in the HPI    Historical Information   Past Medical History:   Diagnosis Date    Anemia     [facility]est[facility]     Chronic kidney disease     Coronary artery disease     Diabetes mellitus ([facility] )     Hypertension     Hypothyroidism      Past Surgical History:   Procedure Laterality Date    CORONARY ANGIOPLASTY WITH STENT PLACEMENT  2019     Social History     Substance and Sexual Activity   Alcohol Use No     Social History     Substance and Sexual Activity   Drug Use No     Social History     Tobacco Use   Smoking Status Former Smoker   Smokeless Tobacco Never Used     Family History: non-contributory    Meds/Allergies   all current active meds have been reviewed  Allergies   Allergen Reactions    Iv Contrast [Iodinated Diagnostic Agents]      Caused KELLY       Objective   Vitals: Blood pressure (!) 189/84, pulse 74, temperature 98 3 °F (36 8 °C), temperature source Oral, resp  rate 18, weight 76 2 kg (168 lb), SpO2 97 %  , Body mass index is 27 96 kg/m² ,   Orthostatic Blood Pressures      Most Recent Value   Blood Pressure  (!) 189/84 filed at 02/21/2021 1430   Patient Position - Orthostatic VS  Lying filed at 02/21/2021 1025          No intake or output data in the 24 hours ending 02/21/21 1451    Invasive Devices     Peripheral Intravenous Line            Peripheral IV 02/21/21 Left Antecubital less than 1 day    Peripheral IV 02/21/21 Right Forearm less than 1 day                    Physical Exam:  Physical Exam  General: alert, oriented X 3 , on oxygen  Neck: JVD present  Cardiac: normal S1, S2, S3 gallop present, no m/r  Respiratory: normal breath sounds, Bibasilar crackles present  Abdomen: soft and non-tender  Extremities: warm, no cyanosis, 2+ lower extremity edema      Lab Results:     Lab Results   Component Value Date    TROPONINI 0 42 (H) 02/21/2021    TROPONINI 0 38 (H) 02/21/2021    TROPONINI <0 02 04/30/2019       Lab Results   Component Value Date    GLUCOSE 453 (H) 10/07/2015    CALCIUM 9 3 02/21/2021     10/07/2015    K 4 9 02/21/2021    CO2 25 02/21/2021     02/21/2021    BUN 45 (H) 02/21/2021    CREATININE 2 91 (H) 02/21/2021       Lab Results   Component Value Date    WBC 8 36 02/21/2021    HGB 8 4 (L) 02/21/2021    HCT 27 9 (L) 02/21/2021    MCV 92 02/21/2021     02/21/2021       Lab Results   Component Value Date    CHOL 229 05/30/2015    CHOL 244 04/18/2014     Lab Results   Component Value Date    HDL 65 (H) 12/29/2018    HDL 48 05/30/2015    HDL 55 04/18/2014     Lab Results   Component Value Date    LDLCALC 72 12/29/2018    LDLCALC 152 (H) 05/30/2015    LDLCALC 172 (H) 04/18/2014     Lab Results   Component Value Date    TRIG 109 12/29/2018    TRIG 146 05/30/2015    TRIG 84 04/18/2014       Lab Results   Component Value Date    ALT 39 02/21/2021    AST 32 02/21/2021       Results from last 7 days   Lab Units 02/21/21  1328   INR  1 10         Imaging: I have personally reviewed pertinent reports        EKG: NSR, ST depressions and T inversions in inferior leads and V5,V6

## 2021-02-22 ENCOUNTER — APPOINTMENT (INPATIENT)
Dept: NON INVASIVE DIAGNOSTICS | Facility: HOSPITAL | Age: 66
DRG: 291 | End: 2021-02-22
Payer: COMMERCIAL

## 2021-02-22 PROBLEM — E11.29 TYPE 2 DIABETES MELLITUS WITH KIDNEY COMPLICATION, WITH LONG-TERM CURRENT USE OF INSULIN (HCC): Status: ACTIVE | Noted: 2018-12-29

## 2021-02-22 PROBLEM — E83.39 HYPERPHOSPHATEMIA: Status: ACTIVE | Noted: 2021-02-22

## 2021-02-22 PROBLEM — Z79.4 TYPE 2 DIABETES MELLITUS WITH KIDNEY COMPLICATION, WITH LONG-TERM CURRENT USE OF INSULIN (HCC): Status: ACTIVE | Noted: 2018-12-29

## 2021-02-22 LAB
ANION GAP SERPL CALCULATED.3IONS-SCNC: 7 MMOL/L (ref 4–13)
ATRIAL RATE: 67 BPM
BASOPHILS # BLD AUTO: 0.04 THOUSANDS/ΜL (ref 0–0.1)
BASOPHILS NFR BLD AUTO: 1 % (ref 0–1)
BUN SERPL-MCNC: 48 MG/DL (ref 5–25)
CALCIUM SERPL-MCNC: 9.4 MG/DL (ref 8.3–10.1)
CHLORIDE SERPL-SCNC: 109 MMOL/L (ref 100–108)
CO2 SERPL-SCNC: 25 MMOL/L (ref 21–32)
CREAT SERPL-MCNC: 3.08 MG/DL (ref 0.6–1.3)
EOSINOPHIL # BLD AUTO: 0.15 THOUSAND/ΜL (ref 0–0.61)
EOSINOPHIL NFR BLD AUTO: 2 % (ref 0–6)
ERYTHROCYTE [DISTWIDTH] IN BLOOD BY AUTOMATED COUNT: 13.6 % (ref 11.6–15.1)
GFR SERPL CREATININE-BSD FRML MDRD: 15 ML/MIN/1.73SQ M
GLUCOSE SERPL-MCNC: 162 MG/DL (ref 65–140)
GLUCOSE SERPL-MCNC: 183 MG/DL (ref 65–140)
GLUCOSE SERPL-MCNC: 196 MG/DL (ref 65–140)
GLUCOSE SERPL-MCNC: 303 MG/DL (ref 65–140)
GLUCOSE SERPL-MCNC: 331 MG/DL (ref 65–140)
HCT VFR BLD AUTO: 26.6 % (ref 34.8–46.1)
HGB BLD-MCNC: 8.1 G/DL (ref 11.5–15.4)
IMM GRANULOCYTES # BLD AUTO: 0.03 THOUSAND/UL (ref 0–0.2)
IMM GRANULOCYTES NFR BLD AUTO: 0 % (ref 0–2)
L PNEUMO1 AG UR QL IA.RAPID: NEGATIVE
LYMPHOCYTES # BLD AUTO: 1.87 THOUSANDS/ΜL (ref 0.6–4.47)
LYMPHOCYTES NFR BLD AUTO: 24 % (ref 14–44)
MAGNESIUM SERPL-MCNC: 2 MG/DL (ref 1.6–2.6)
MCH RBC QN AUTO: 27.9 PG (ref 26.8–34.3)
MCHC RBC AUTO-ENTMCNC: 30.5 G/DL (ref 31.4–37.4)
MCV RBC AUTO: 92 FL (ref 82–98)
MONOCYTES # BLD AUTO: 0.47 THOUSAND/ΜL (ref 0.17–1.22)
MONOCYTES NFR BLD AUTO: 6 % (ref 4–12)
NEUTROPHILS # BLD AUTO: 5.19 THOUSANDS/ΜL (ref 1.85–7.62)
NEUTS SEG NFR BLD AUTO: 67 % (ref 43–75)
NRBC BLD AUTO-RTO: 0 /100 WBCS
P AXIS: 54 DEGREES
PHOSPHATE SERPL-MCNC: 4.4 MG/DL (ref 2.3–4.1)
PLATELET # BLD AUTO: 160 THOUSANDS/UL (ref 149–390)
PMV BLD AUTO: 11.3 FL (ref 8.9–12.7)
POTASSIUM SERPL-SCNC: 4.8 MMOL/L (ref 3.5–5.3)
PR INTERVAL: 130 MS
PROCALCITONIN SERPL-MCNC: 0.43 NG/ML
QRS AXIS: 46 DEGREES
QRSD INTERVAL: 90 MS
QT INTERVAL: 434 MS
QTC INTERVAL: 458 MS
RBC # BLD AUTO: 2.9 MILLION/UL (ref 3.81–5.12)
S PNEUM AG UR QL: NEGATIVE
SODIUM SERPL-SCNC: 141 MMOL/L (ref 136–145)
T WAVE AXIS: 132 DEGREES
T4 FREE SERPL-MCNC: 0.86 NG/DL (ref 0.76–1.46)
VENTRICULAR RATE: 67 BPM
WBC # BLD AUTO: 7.75 THOUSAND/UL (ref 4.31–10.16)

## 2021-02-22 PROCEDURE — 84100 ASSAY OF PHOSPHORUS: CPT | Performed by: INTERNAL MEDICINE

## 2021-02-22 PROCEDURE — 99233 SBSQ HOSP IP/OBS HIGH 50: CPT | Performed by: INTERNAL MEDICINE

## 2021-02-22 PROCEDURE — 82948 REAGENT STRIP/BLOOD GLUCOSE: CPT

## 2021-02-22 PROCEDURE — 99232 SBSQ HOSP IP/OBS MODERATE 35: CPT | Performed by: NURSE PRACTITIONER

## 2021-02-22 PROCEDURE — 84145 PROCALCITONIN (PCT): CPT | Performed by: INTERNAL MEDICINE

## 2021-02-22 PROCEDURE — 83735 ASSAY OF MAGNESIUM: CPT | Performed by: INTERNAL MEDICINE

## 2021-02-22 PROCEDURE — 84439 ASSAY OF FREE THYROXINE: CPT | Performed by: STUDENT IN AN ORGANIZED HEALTH CARE EDUCATION/TRAINING PROGRAM

## 2021-02-22 PROCEDURE — 93306 TTE W/DOPPLER COMPLETE: CPT

## 2021-02-22 PROCEDURE — 93010 ELECTROCARDIOGRAM REPORT: CPT | Performed by: INTERNAL MEDICINE

## 2021-02-22 PROCEDURE — 85025 COMPLETE CBC W/AUTO DIFF WBC: CPT | Performed by: INTERNAL MEDICINE

## 2021-02-22 PROCEDURE — 93306 TTE W/DOPPLER COMPLETE: CPT | Performed by: INTERNAL MEDICINE

## 2021-02-22 PROCEDURE — 80048 BASIC METABOLIC PNL TOTAL CA: CPT | Performed by: INTERNAL MEDICINE

## 2021-02-22 PROCEDURE — NC001 PR NO CHARGE: Performed by: INTERNAL MEDICINE

## 2021-02-22 PROCEDURE — 99223 1ST HOSP IP/OBS HIGH 75: CPT | Performed by: INTERNAL MEDICINE

## 2021-02-22 RX ORDER — HEPARIN SODIUM 5000 [USP'U]/ML
5000 INJECTION, SOLUTION INTRAVENOUS; SUBCUTANEOUS EVERY 8 HOURS SCHEDULED
Status: DISCONTINUED | OUTPATIENT
Start: 2021-02-22 | End: 2021-02-27 | Stop reason: HOSPADM

## 2021-02-22 RX ORDER — AMLODIPINE BESYLATE 5 MG/1
5 TABLET ORAL DAILY
Status: DISCONTINUED | OUTPATIENT
Start: 2021-02-23 | End: 2021-02-25

## 2021-02-22 RX ORDER — HYDRALAZINE HYDROCHLORIDE 50 MG/1
50 TABLET, FILM COATED ORAL 3 TIMES DAILY
Status: DISCONTINUED | OUTPATIENT
Start: 2021-02-22 | End: 2021-02-22

## 2021-02-22 RX ORDER — BUMETANIDE 0.25 MG/ML
2 INJECTION, SOLUTION INTRAMUSCULAR; INTRAVENOUS 2 TIMES DAILY
Status: DISCONTINUED | OUTPATIENT
Start: 2021-02-22 | End: 2021-02-23

## 2021-02-22 RX ORDER — LEVOTHYROXINE SODIUM 0.12 MG/1
125 TABLET ORAL
Status: DISCONTINUED | OUTPATIENT
Start: 2021-02-23 | End: 2021-02-27 | Stop reason: HOSPADM

## 2021-02-22 RX ORDER — CARVEDILOL 12.5 MG/1
12.5 TABLET ORAL 2 TIMES DAILY WITH MEALS
Status: DISCONTINUED | OUTPATIENT
Start: 2021-02-22 | End: 2021-02-22

## 2021-02-22 RX ORDER — CARVEDILOL 12.5 MG/1
12.5 TABLET ORAL 2 TIMES DAILY WITH MEALS
Status: DISCONTINUED | OUTPATIENT
Start: 2021-02-22 | End: 2021-02-27 | Stop reason: HOSPADM

## 2021-02-22 RX ORDER — INSULIN GLARGINE 100 [IU]/ML
15 INJECTION, SOLUTION SUBCUTANEOUS
Status: DISCONTINUED | OUTPATIENT
Start: 2021-02-22 | End: 2021-02-25

## 2021-02-22 RX ORDER — HYDRALAZINE HYDROCHLORIDE 50 MG/1
50 TABLET, FILM COATED ORAL 3 TIMES DAILY
Status: DISCONTINUED | OUTPATIENT
Start: 2021-02-22 | End: 2021-02-27 | Stop reason: HOSPADM

## 2021-02-22 RX ADMIN — BUMETANIDE 2 MG: 0.25 INJECTION INTRAMUSCULAR; INTRAVENOUS at 17:17

## 2021-02-22 RX ADMIN — AMLODIPINE BESYLATE 5 MG: 5 TABLET ORAL at 08:23

## 2021-02-22 RX ADMIN — LEVOTHYROXINE SODIUM 112 MCG: 112 TABLET ORAL at 04:58

## 2021-02-22 RX ADMIN — INSULIN LISPRO 1 UNITS: 100 INJECTION, SOLUTION INTRAVENOUS; SUBCUTANEOUS at 06:24

## 2021-02-22 RX ADMIN — INSULIN LISPRO 5 UNITS: 100 INJECTION, SOLUTION INTRAVENOUS; SUBCUTANEOUS at 17:33

## 2021-02-22 RX ADMIN — INSULIN LISPRO 5 UNITS: 100 INJECTION, SOLUTION INTRAVENOUS; SUBCUTANEOUS at 11:11

## 2021-02-22 RX ADMIN — ISOSORBIDE MONONITRATE 30 MG: 30 TABLET, EXTENDED RELEASE ORAL at 08:23

## 2021-02-22 RX ADMIN — INSULIN GLARGINE 15 UNITS: 100 INJECTION, SOLUTION SUBCUTANEOUS at 21:33

## 2021-02-22 RX ADMIN — LABETALOL 20 MG/4 ML (5 MG/ML) INTRAVENOUS SYRINGE 10 MG: at 07:31

## 2021-02-22 RX ADMIN — ACETAMINOPHEN 650 MG: 325 TABLET, FILM COATED ORAL at 18:04

## 2021-02-22 RX ADMIN — ATORVASTATIN CALCIUM 40 MG: 40 TABLET, FILM COATED ORAL at 21:33

## 2021-02-22 RX ADMIN — CARVEDILOL 12.5 MG: 12.5 TABLET, FILM COATED ORAL at 17:16

## 2021-02-22 RX ADMIN — LABETALOL 20 MG/4 ML (5 MG/ML) INTRAVENOUS SYRINGE 10 MG: at 15:25

## 2021-02-22 RX ADMIN — INSULIN LISPRO 4 UNITS: 100 INJECTION, SOLUTION INTRAVENOUS; SUBCUTANEOUS at 17:32

## 2021-02-22 RX ADMIN — CEFTRIAXONE 1000 MG: 10 INJECTION, POWDER, FOR SOLUTION INTRAVENOUS at 15:11

## 2021-02-22 RX ADMIN — INSULIN LISPRO 2 UNITS: 100 INJECTION, SOLUTION INTRAVENOUS; SUBCUTANEOUS at 21:33

## 2021-02-22 RX ADMIN — HYDRALAZINE HYDROCHLORIDE 25 MG: 25 TABLET, FILM COATED ORAL at 08:23

## 2021-02-22 RX ADMIN — FUROSEMIDE 80 MG: 10 INJECTION, SOLUTION INTRAMUSCULAR; INTRAVENOUS at 08:23

## 2021-02-22 RX ADMIN — HYDRALAZINE HYDROCHLORIDE 50 MG: 50 TABLET, FILM COATED ORAL at 17:16

## 2021-02-22 RX ADMIN — HYDRALAZINE HYDROCHLORIDE 50 MG: 50 TABLET, FILM COATED ORAL at 21:33

## 2021-02-22 RX ADMIN — CARVEDILOL 6.25 MG: 6.25 TABLET, FILM COATED ORAL at 07:30

## 2021-02-22 RX ADMIN — ASPIRIN 81 MG: 81 TABLET, CHEWABLE ORAL at 08:23

## 2021-02-22 NOTE — ASSESSMENT & PLAN NOTE
· Urgency noted  Continue Norvasc, Coreg, hydralazine, Imdur  Coreg and hydralazine increased today by Cardiology

## 2021-02-22 NOTE — ASSESSMENT & PLAN NOTE
Lab Results   Component Value Date    EGFR 15 02/22/2021    EGFR 16 02/21/2021    EGFR 29 04/30/2019    CREATININE 3 08 (H) 02/22/2021    CREATININE 2 91 (H) 02/21/2021    CREATININE 2 12 (H) 10/30/2020   · CKD stage 3 with a baseline creatinine around 1 5-2  Recent KELLY related to contrast induced nephropathy/anemia  Creatinine 3 08 today  · Monitor renal function carefully in the setting of intravenous diuresis  · Will consult nephrology    · Losartan on hold  · Avoid nephrotoxins and hypotension  · BMP in a m

## 2021-02-22 NOTE — ASSESSMENT & PLAN NOTE
Wt Readings from Last 3 Encounters:   02/22/21 77 6 kg (171 lb 1 6 oz)   11/02/20 77 1 kg (170 lb)   08/10/20 76 3 kg (168 lb 3 2 oz)     · Presenting with few day history of worsening shortness of breath and lower extremity edema  · Cardiology following, switching intravenous Lasix to Bumex  · Continue beta-blocker    · Echo pending  · I&Os, daily weights, low-sodium diet  · Monitor volume status

## 2021-02-22 NOTE — ASSESSMENT & PLAN NOTE
Lab Results   Component Value Date    HGBA1C 13 2 (H) 02/21/2021       Recent Labs     02/21/21  1537 02/21/21  2100 02/22/21  0624   POCGLU 318* 325* 183*       · takes 70/30 at home 10 units b i d, will continue + SSI  · A1c 13, Consult endocrinology  · Diabetic diet  · Insulin adjustments per endo     · Sees endo at [facility] Dr [facility]

## 2021-02-22 NOTE — ASSESSMENT & PLAN NOTE
Wt Readings from Last 3 Encounters:   02/22/21 77 6 kg (171 lb 1 6 oz)   11/02/20 77 1 kg (170 lb)   08/10/20 76 3 kg (168 lb 3 2 oz)       Management as per primary team

## 2021-02-22 NOTE — CONSULTS
Consultation - Nephrology   David Watson 76 y o  female MRN: [id number]  Unit/Bed#: [facility] 504-01 Encounter: [id number]    ASSESSMENT and PLAN:  Acute kidney injury (POA) on chronic kidney disease, stage IV:   - etiology suspect secondary to cardiorenal syndrome in the setting of volume overload, failure to auto regulate in the setting of ARB, underlying pneumonia  - outpatient nephrologist: Dr Miki Nelson  - upon review of medical records, creatinine 1 9- 2 2 mg/dL this past year  - creatinine 2 91 mg/dL upon admission on 02/21/2021   - most recent creatinine 3 08 mg/dL today  - on Bumex 2 mg IV b i d  Per Cardiology  - UA: > 1000 glucose, trace blood, +4 protein, 2-4 RBCs, 10-20 wbc's, 5-10 hyaline casts  Urine creatinine 142, urine sodium: 34 FENa 0 5%  - imaging: Ultrasound completed in January 2019 revealed right kidney 9 4 cm, left kidney 9 9 cm  Normal echogenicity and contour bilaterally  - check PVR with bladder scan, maintain urinary retention protocol   - avoid NSAIDs, nephrotoxic agents, IV contrast   - adjust medications to appropriate GFR  - monitor volume status with strict intake/output, daily weight  - will check BMP, magnesium, phosphorus in a m  Electrolytes, acid/base:  - mild hyperkalemia most recent potassium 4 8    - in the setting of acute renal failure, recommend low-potassium diet  - on diuretics as above  - most recent bicarbonate 25    - will continue monitor with repeat lab studies  Blood pressure, hypertension:  - blood pressure with elevations, fluctuations  To note, SBP > 200 upon admission    - outpatient regimen includes: Amlodipine 5 mg daily, carvedilol 6 25 mg b i d , hydralazine 25 mg t i d , Imdur 30 mg daily, losartan 25 mg daily, torsemide 40 mg in the a m  and 20 mg in the p m   - currently on: Amlodipine 5 mg daily, Bumex 2 mg IV b i d , carvedilol 12 5 mg b i d , hydralazine 50 mg t i d , Imdur 30 mg daily    - recommendations: No changes for now, maintain hold parameters on antihypertensives for SBP < 130  Hydralazine and carvedilol increased today per Cardiology, 2/22   - will complete secondary workup including metanephrines, catecholamines, renin, aldosterone  - optimize hemodynamics; avoid hypotension and fluctuations of blood pressure    - maintain MAP > 65  H/H, anemia of chronic kidney disease:  - most recent hemoglobin 8 1 grams/deciliter   - goal hemoglobin greater than 8 grams/deciliter  - recommend PRBC transfusion for hemoglobin less than 7   - avoid IV Venofer light of infection  Mineral bone disease of chronic kidney disease:  - most recent phosphorus 4 4, magnesium 2 0   - will continue monitor PTH, magnesium, phosphorus as an outpatient  Other medical problems:  - community-acquired pneumonia, continued on Rocephin per primary team     - acute on chronic combined CHF, chest x-ray revealed interstitial pulmonary edema and small right pleural effusion, echocardiogram completed in 2018 revealed EF of 50% (awaiting repeat echocardiogram), Cardiology following  Will implement 1 5 L fluid restriction  - diabetes, most recent hemoglobin A1c 13 2, on insulin per primary team     HISTORY OF PRESENT ILLNESS:  Requesting Physician: Chad Main MD  Reason for Consult:  Acute kidney injury    Ji Miller is a 76 y o  female with history of CAD status post stenting in 0121, combined systolic/diastolic CHF of, CKD stage 3/4, insulin-dependent diabetes, hypertension, hypothyroidism who was admitted to [facility] after presenting with 4 days of shortness of breath, dyspnea on exertion, productive cough  Upon assessment and evaluation, patient resting in chair comfortably on the phone  Patient is without current shortness of breath or chest pain  Per patient, she is eating and drinking well  Patient denies NSAID use and has been voiding well without issue      A renal consultation is requested today for assistance in the management of acute kidney injury      PAST MEDICAL HISTORY:  Past Medical History:   Diagnosis Date    Anemia     [facility]     Chronic kidney disease     Coronary artery disease     Diabetes mellitus ([facility] )     Hypertension     Hypothyroidism        PAST SURGICAL HISTORY:  Past Surgical History:   Procedure Laterality Date    CORONARY ANGIOPLASTY WITH STENT PLACEMENT  2019       ALLERGIES:  Allergies   Allergen Reactions    Iv Contrast [Iodinated Diagnostic Agents]      Caused KELLY       SOCIAL HISTORY:  Social History     Substance and Sexual Activity   Alcohol Use Never    Frequency: Never     Social History     Substance and Sexual Activity   Drug Use No     Social History     Tobacco Use   Smoking Status Former Smoker   Smokeless Tobacco Never Used       FAMILY HISTORY:  Family History   Problem Relation Age of Onset    Diabetes Mother     Heart attack Father         MI    Hypertension Brother        MEDICATIONS:    Current Facility-Administered Medications:     acetaminophen (TYLENOL) tablet 650 mg, 650 mg, Oral, Q6H PRN, Sudie Dayhoff, MD Julieann Frankel Bernerd Raya ON 2/23/2021] amLODIPine (NORVASC) tablet 5 mg, 5 mg, Oral, Daily, Kathleen Lopez, DO    aspirin chewable tablet 81 mg, 81 mg, Oral, Daily, Inna Atwood MD, 81 mg at 02/22/21 3737    atorvastatin (LIPITOR) tablet 40 mg, 40 mg, Oral, HS, Inna Atwood MD, 40 mg at 02/21/21 2107    bumetanide (BUMEX) injection 2 mg, 2 mg, Intravenous, BID, Bal Bourne MD, Stopped at 02/22/21 1018    carvedilol (COREG) tablet 12 5 mg, 12 5 mg, Oral, BID With Meals, Kathleen Lopez,     ceftriaxone (ROCEPHIN) 1 g/50 mL in dextrose IVPB, 1,000 mg, Intravenous, Q24H, Inna Atwood MD    heparin (porcine) subcutaneous injection 5,000 Units, 5,000 Units, Subcutaneous, Q8H [facility], CLARICE Olsen    hydrALAZINE (APRESOLINE) tablet 50 mg, 50 mg, Oral, TID, Bal Bourne MD    insulin lispro (HumaLOG) 100 units/mL subcutaneous injection 1-6 Units, 1-6 Units, Subcutaneous, TID AC, 5 Units at 02/22/21 1111 **AND** Fingerstick Glucose (POCT), , , TID AC, Tomás Hoover MD    insulin lispro (HumaLOG) 100 units/mL subcutaneous injection 1-6 Units, 1-6 Units, Subcutaneous, HS, Tomás Hoover MD, 5 Units at 02/21/21 2107    isosorbide mononitrate (IMDUR) 24 hr tablet 30 mg, 30 mg, Oral, Daily, Tomás Hoover MD, 30 mg at 02/22/21 4316    Labetalol HCl (NORMODYNE) injection 10 mg, 10 mg, Intravenous, Q4H PRN, Tomás Hoover MD, 10 mg at 02/22/21 0731    [START ON 2/23/2021] levothyroxine tablet 125 mcg, 125 mcg, Oral, Early Morning, CLARICE Olsen    Insert peripheral IV, , , Once **AND** sodium chloride (PF) 0 9 % injection 3 mL, 3 mL, Intravenous, Q1H PRN, Vi Rowland MD    REVIEW OF SYSTEMS:  All the systems were reviewed and were negative except as documented on the HPI      PHYSICAL EXAM:  Current Weight: Weight - Scale: 77 6 kg (171 lb 1 6 oz)  First Weight: Weight - Scale: 76 2 kg (168 lb)  Vitals:    02/22/21 0734 02/22/21 0823 02/22/21 1000 02/22/21 1043   BP: (!) 188/83 (!) 181/80 150/69 139/63   BP Location:       Pulse: 70   56   Resp: 18   18   Temp: 98 4 °F (36 9 °C)   98 6 °F (37 °C)   TempSrc: Oral   Oral   SpO2: 98%   96%   Weight:       Height:           Intake/Output Summary (Last 24 hours) at 2/22/2021 1119  Last data filed at 2/22/2021 1001  Gross per 24 hour   Intake 1171 11 ml   Output 1575 ml   Net -403 89 ml     General: conscious, coherent, cooperative, not in acute distress  Skin: no rash, warm  Eyes: pale conjunctivae, anicteric sclerae  ENT: moist lips and mucous membranes  Neck: supple, with trachea midline  Chest:  Diminished breath sounds  CVS: distinct S1 & S2, normal rate, regular rhythm  Abdomen: soft, non-tender, non-distended, normoactive bowel sounds  Extremities:  Bilateral lower extremity edema  Neuro: awake, alert  Psych: appropriate affect       Invasive Devices:        Lab Results:   Results from last 7 days Lab Units 02/22/21  0433 02/21/21  1037   WBC Thousand/uL 7 75 8 36   HEMOGLOBIN g/dL 8 1* 8 4*   HEMATOCRIT % 26 6* 27 9*   PLATELETS Thousands/uL 160 161   POTASSIUM mmol/L 4 8 4 9   CHLORIDE mmol/L 109* 108   CO2 mmol/L 25 25   BUN mg/dL 48* 45*   CREATININE mg/dL 3 08* 2 91*   CALCIUM mg/dL 9 4 9 3   MAGNESIUM mg/dL 2 0  --    PHOSPHORUS mg/dL 4 4*  --    ALK PHOS U/L  --  591*   ALT U/L  --  39   AST U/L  --  32

## 2021-02-22 NOTE — PROGRESS NOTES
Cardiology Progress Note - Marco Oviedo 76 y o  female MRN: [id number]    Unit/Bed#: [facility] 504-01 Encounter: [id number]        Subjective:    No significant events overnight  Feeling better  No dyspnea  Review of Systems   Cardiovascular: Positive for leg swelling  Negative for chest pain and palpitations  Respiratory: Positive for shortness of breath  Objective:   Vitals: Blood pressure (!) 181/80, pulse 70, temperature 98 4 °F (36 9 °C), temperature source Oral, resp  rate 18, height 5' 5" (1 651 m), weight 77 6 kg (171 lb 1 6 oz), SpO2 98 %  , Body mass index is 28 47 kg/m² ,   Orthostatic Blood Pressures      Most Recent Value   Blood Pressure  (!) 181/80 filed at 02/22/2021 2107   Patient Position - Orthostatic VS  Lying filed at 02/22/2021 9156         Systolic (27WSU), CHN:219 , Min:125 , HDH:523     Diastolic (73QRO), EPC:01, Min:60, Max:97      Intake/Output Summary (Last 24 hours) at 2/22/2021 1001  Last data filed at 2/22/2021 4877  Gross per 24 hour   Intake 1171 11 ml   Output 1100 ml   Net 71 11 ml     Weight (last 2 days)     Date/Time   Weight    02/22/21 0426   77 6 (171 1)    02/21/21 1532   77 4 (170 64)    02/21/21 1025   76 2 (168)                  Telemetry Review: NSR    Physical Exam  HENT:      Head: Atraumatic  Mouth/Throat:      Mouth: Mucous membranes are moist    Eyes:      Extraocular Movements: Extraocular movements intact  Neurological:      Mental Status: She is alert             Laboratory Results:  Results from last 7 days   Lab Units 02/21/21  1928 02/21/21  1623 02/21/21  1328   TROPONIN I ng/mL 0 59* 0 60* 0 42*       CBC with diff:   Results from last 7 days   Lab Units 02/22/21  0433 02/21/21  1037   WBC Thousand/uL 7 75 8 36   HEMOGLOBIN g/dL 8 1* 8 4*   HEMATOCRIT % 26 6* 27 9*   MCV fL 92 92   PLATELETS Thousands/uL 160 161   MCH pg 27 9 27 5   MCHC g/dL 30 5* 30 1*   RDW % 13 6 13 6   MPV fL 11 3 11 2   NRBC AUTO /100 WBCs 0 0         CMP:  Results from last 7 days   Lab Units 21  0433 21  1037   POTASSIUM mmol/L 4 8 4 9   CHLORIDE mmol/L 109* 108   CO2 mmol/L 25 25   BUN mg/dL 48* 45*   CREATININE mg/dL 3 08* 2 91*   CALCIUM mg/dL 9 4 9 3   AST U/L  --  32   ALT U/L  --  39   ALK PHOS U/L  --  591*   EGFR ml/min/1 73sq m 15 16         BMP:  Results from last 7 days   Lab Units 21  0433 21  1037   POTASSIUM mmol/L 4 8 4 9   CHLORIDE mmol/L 109* 108   CO2 mmol/L 25 25   BUN mg/dL 48* 45*   CREATININE mg/dL 3 08* 2 91*   CALCIUM mg/dL 9 4 9 3       BNP:     Magnesium:   Results from last 7 days   Lab Units 21  0433   MAGNESIUM mg/dL 2 0       Coags:   Results from last 7 days   Lab Units 21  1328   PTT seconds 36   INR  1 10           Cardiac testing:   Results for orders placed during the hospital encounter of 18   Echo complete with contrast if indicated    Narrative [facility] [address]  [facility] [place], [address] [facility]  [phone]    Transthoracic Echocardiogram  2D, M-mode, Doppler, and Color Doppler    Study date:  30-Dec-2018    Patient: Attila Bhat  MR number: [id number]  Account number: [de-identified]  : 1955  Age: 58 years  Gender: Female  Status: Inpatient  Location: Bedside  Height: 63 in  Weight: 201 5 lb  BP: 159/ 71 mmHg    Indications: Cardiomyopathy    Diagnoses: I43  - Cardiomyopathy in diseases classified elsewhere    Sonographer:  JOCE Mcneill  Primary Physician:  Rafa Gonzales MD  Referring Physician:  Justino Werner MD  Group:  [facility]  Interpreting Physician:  Kimberly Cox MD    SUMMARY    LEFT VENTRICLE:  Systolic function was at the lower limits of normal  Ejection fraction was estimated to be 50 %  There was hypokinesis of the mid inferoseptal and apical inferior wall(s)  Wall thickness was mildly increased  There was mild concentric hypertrophy    Features were consistent with a pseudonormal left ventricular filling pattern, with concomitant abnormal relaxation and increased filling pressure (grade 2 diastolic dysfunction)  LEFT ATRIUM:  The atrium was mildly dilated  MITRAL VALVE:  There was mild regurgitation  TRICUSPID VALVE:  There was mild regurgitation  Estimated peak PA pressure was 33 mmHg  PERICARDIUM:  A trace, free-flowing pericardial effusion was identified  There was no evidence of hemodynamic compromise  HISTORY: PRIOR HISTORY: CHF; DM; Hypothyroid; HTN; HLD; F  smoker    PROCEDURE: The procedure was performed at the bedside  This was a routine study  The transthoracic approach was used  The study included complete 2D imaging, M-mode, complete spectral Doppler, and color Doppler  Echocardiographic views  were limited due to decreased penetration and lung interference  This was a technically difficult study  LEFT VENTRICLE: Size was normal  Systolic function was at the lower limits of normal  Ejection fraction was estimated to be 50 %  There was hypokinesis of the mid inferoseptal and apical inferior wall(s)  Wall thickness was mildly  increased  There was mild concentric hypertrophy  DOPPLER: Features were consistent with a pseudonormal left ventricular filling pattern, with concomitant abnormal relaxation and increased filling pressure (grade 2 diastolic dysfunction)  RIGHT VENTRICLE: The size was normal  Systolic function was normal     LEFT ATRIUM: The atrium was mildly dilated  RIGHT ATRIUM: Size was normal     MITRAL VALVE: Valve structure was normal  There was normal leaflet separation  DOPPLER: The transmitral velocity was within the normal range  There was no evidence for stenosis  There was mild regurgitation  AORTIC VALVE: The valve was trileaflet  Leaflets exhibited normal thickness and normal cuspal separation  DOPPLER: Transaortic velocity was within the normal range  There was no evidence for stenosis  There was no regurgitation      TRICUSPID VALVE: The valve structure was normal  There was normal leaflet separation  DOPPLER: The transtricuspid velocity was within the normal range  There was no evidence for stenosis  There was mild regurgitation  Estimated peak PA  pressure was 33 mmHg  PULMONIC VALVE: Leaflets exhibited normal thickness, no calcification, and normal cuspal separation  DOPPLER: The transpulmonic velocity was within the normal range  There was no significant regurgitation  PERICARDIUM: A trace, free-flowing pericardial effusion was identified  There was no evidence of hemodynamic compromise  AORTA: The root exhibited normal size  SYSTEMIC VEINS: IVC: The inferior vena cava was not well visualized  SYSTEM MEASUREMENT TABLES    2D  %FS: 29 85 %  Ao Diam: 2 39 cm  EDV(Teich): 52 8 ml  EF Biplane: 55 93 %  EF(Teich): 57 99 %  ESV(Teich): 22 18 ml  IVSd: 1 14 cm  LA Area: 20 45 cm2  LA Diam: 3 62 cm  LVEDV MOD A2C: 127 02 ml  LVEDV MOD A4C: 116 5 ml  LVEDV MOD BP: 127 96 ml  LVEF MOD A2C: 59 2 %  LVEF MOD A4C: 48 18 %  LVESV MOD A2C: 51 82 ml  LVESV MOD A4C: 60 37 ml  LVESV MOD BP: 56 39 ml  LVIDd: 3 55 cm  LVIDs: 2 49 cm  LVLd A2C: 9 81 cm  LVLd A4C: 8 85 cm  LVLs A2C: 8 03 cm  LVLs A4C: 7 62 cm  LVPWd: 1 18 cm  RA Area: 13 89 cm2  RVIDd: 3 23 cm  SV MOD A2C: 75 2 ml  SV MOD A4C: 56 13 ml  SV(Teich): 30 62 ml    CW  RAP: 10 mmHg  TR Vmax: 2 63 m/s  TR maxP 66 mmHg    MM  TAPSE: 1 85 cm    PW  E': 0 06 m/s  E/E': 19 47  MV A Miko: 1 13 m/s  MV Dec Cabell: 6 89 m/s2  MV DecT: 168 4 ms  MV E Miko: 1 16 m/s  MV E/A Ratio: 1 03  MV PHT: 48 84 ms  MVA By PHT: 4 5 cm2  RVSP: 37 66 mmHg    [employer] Accredited Echocardiography Laboratory    Prepared and electronically signed by    Rylee Yuen MD  Signed 30-Dec-2018 13:04:11       No results found for this or any previous visit  No results found for this or any previous visit  No results found for this or any previous visit      Meds/Allergies   Current Facility-Administered Medications   Medication Dose Route Frequency Provider Last Rate    acetaminophen  650 mg Oral Q6H PRN Paolo Crook MD      amLODIPine  5 mg Oral Daily Paolo Crook MD      aspirin  81 mg Oral Daily Paolo Crook MD      atorvastatin  40 mg Oral HS Paolo Crook MD      azithromycin  500 mg Intravenous Q24H Paolo Crook MD      carvedilol  6 25 mg Oral BID With Meals Paolo Crook MD      cefTRIAXone  1,000 mg Intravenous Q24H Paolo Crook MD      furosemide  80 mg Intravenous BID (diuretic) Etelvina Oleary MD      hydrALAZINE  25 mg Oral TID Paolo Crook MD      insulin lispro  1-6 Units Subcutaneous TID [facility] Paolo Crook MD     Talya Crystal insulin lispro  1-6 Units Subcutaneous HS Paolo Crook MD      isosorbide mononitrate  30 mg Oral Daily Paolo Crook MD      Labetalol HCl  10 mg Intravenous Q4H PRN Paolo Crook MD      levothyroxine  112 mcg Oral Early Morning Paolo Crook MD      nitroGLYcerin  5-200 mcg/min Intravenous Titrated Etelvina Oleary MD Stopped (02/21/21 1900)    sodium chloride (PF)  3 mL Intravenous Q1H PRN Vi Rowland MD       nitroGLYcerin, 5-200 mcg/min, Last Rate: Stopped (02/21/21 1900)      Medications Prior to Admission   Medication    amLODIPine (NORVASC) 5 mg tablet    aspirin 81 mg chewable tablet    atorvastatin (LIPITOR) 40 mg tablet    carvedilol (COREG) 6 25 mg tablet    hydrALAZINE (APRESOLINE) 10 mg tablet    insulin aspart protamine-insulin aspart (NovoLOG 70/30) 100 units/mL injection    isosorbide mononitrate (IMDUR) 30 mg 24 hr tablet    levothyroxine 112 mcg tablet    losartan (COZAAR) 25 mg tablet    torsemide (DEMADEX) 20 mg tablet    nitroglycerin (NITROSTAT) 0 4 mg SL tablet       Assessment:  Principal Problem:    Community acquired pneumonia  Active Problems:    Acute on chronic combined systolic and diastolic congestive heart failure (HCC)    DM (diabetes mellitus) (HCC)    Hypothyroidism    Hypertension    Elevated troponin    Hyperlipidemia    Acute renal failure superimposed on stage 3 chronic kidney disease (HCC)    Anemia    Coronary artery disease involving native coronary artery of native heart with angina pectoris (HCC)    KELLY (acute kidney injury) ([facility] [address] )      Impression:  1  Acute on chronic diastolic heart failure - on IV diuretics  Not diuresing much  2  Hypertension - not adequate control  3  KELLY on CKD - slightly worse  4  CAD - stable    Plan:  1  Increase hydralazine and carvedilol  2  Switch to Bumex IV  3  Monitor renal function

## 2021-02-22 NOTE — CONSULTS
Consultation - Karen Farfan 76 y o  female MRN: [id number]    Unit/Bed#: [facility] 504-01 Encounter: [id number]      Assessment/Plan     Hypothyroidism  Assessment & Plan  On levothyroxine 112 mcg Monday to Saturday and half a dose on Sunday  Patient takes her medication regularly but not properly  2/21/2021 10:37 2/22/2021 04:33   TSH 3RD GENERATON 20 900 (H)    Free T4  0 86       Agree to increase levothyroxine to 125 mcg daily  Check TFT in 6 weeks  Type 2 diabetes mellitus with kidney complication, with long-term current use of insulin [facility])  Assessment & Plan  Lab Results   Component Value Date    HGBA1C 13 2 (H) 02/21/2021       Recent Labs     02/21/21  1537 02/21/21  2100 02/22/21  0624 02/22/21  1042   POCGLU 318* 325* 183* 331*     Uncontrolled the most recent A1c of 13 2%  The goal is less than 7%  Home regiment:  NovoLog 70/30, 10 units b i d  Perform home glucose monitoring twice daily  her readings in the morning is usually between 150-200 at at bedtime are between 250-350 mg/dl  Diabetes is complicated by nephropathy and CAD  Patient currently is on correctional sliding scale  Restarted Lantus 15 units q h s  And Humalog 5 units t i d  A c  And correctional sliding scale  Continue to monitor blood sugar over time and make adjustments to the regimen if necessary        Acute on chronic combined systolic and diastolic congestive heart failure (HCC)  Assessment & Plan  Wt Readings from Last 3 Encounters:   02/22/21 77 6 kg (171 lb 1 6 oz)   11/02/20 77 1 kg (170 lb)   08/10/20 76 3 kg (168 lb 3 2 oz)       Management as per primary team          CC: Diabetes Consult    History of Present Illness     HPI: Karen Farfan is a 76y o  year old female with type 2 diabetes with long-term use of insulin who is admitted for, community acquired pneumonia, and acute on chronic heart failure    She has type 2 diabetes for more than 5 years, currently on NovoLog 70/30, 10 units twice daily, which started almost 6 months ago which was switched from oral agents  She follows with [facility] endocrinology  She performs home glucose monitoring twice daily, and she reports her readings in the morning is usually between 150-200 at at bedtime are between 250-350 mg/dl  She denies missing any insulin doses  Her most recent A1c is 13 2 %,   Diabetes complicated by nephropathy, CAD, she denies neuropathy, retinopathy or stroke  She denies polyuria, polydipsia or nocturia or blurry vision  She also has developed KELLY  Consults    Review of Systems   Constitutional: Negative for activity change, appetite change, chills, diaphoresis, fatigue, fever and unexpected weight change  HENT: Negative for congestion, drooling, ear discharge, ear pain, trouble swallowing and voice change  Eyes: Negative for photophobia, pain, discharge, redness, itching and visual disturbance  Respiratory: Positive for cough, chest tightness and shortness of breath  Negative for wheezing  Cardiovascular: Positive for leg swelling  Negative for chest pain and palpitations  Gastrointestinal: Negative for abdominal distention, abdominal pain, anal bleeding, blood in stool, diarrhea, nausea and vomiting  Endocrine: Negative for cold intolerance, heat intolerance, polydipsia, polyphagia and polyuria  Genitourinary: Negative for dysuria, flank pain, frequency and hematuria  Musculoskeletal: Negative for back pain, gait problem, joint swelling, myalgias, neck pain and neck stiffness  Skin: Negative for color change, pallor, rash and wound  Neurological: Negative for dizziness, tremors, seizures, syncope, speech difficulty, weakness, numbness and headaches  Psychiatric/Behavioral: Negative for agitation         Historical Information   Past Medical History:   Diagnosis Date    Anemia     CHF (congestive heart failure) (HCC)     Chronic kidney disease     Coronary artery disease     Diabetes mellitus ([address] )     Hypertension     Hypothyroidism      Past Surgical History:   Procedure Laterality Date    CORONARY ANGIOPLASTY WITH STENT PLACEMENT  2019     Social History   Social History     Substance and Sexual Activity   Alcohol Use Never    Frequency: Never     Social History     Substance and Sexual Activity   Drug Use No     Social History     Tobacco Use   Smoking Status Former Smoker   Smokeless Tobacco Never Used     Family History:   Family History   Problem Relation Age of Onset    Diabetes Mother     Heart attack Father         MI    Hypertension Brother        Meds/Allergies   Current Facility-Administered Medications   Medication Dose Route Frequency Provider Last Rate Last Admin    acetaminophen (TYLENOL) tablet 650 mg  650 mg Oral Q6H PRN Catrachito Forte MD        [START ON 2/23/2021] amLODIPine (NORVASC) tablet 5 mg  5 mg Oral Daily Kathleen K Jessica, DO        aspirin chewable tablet 81 mg  81 mg Oral Daily Catrachito Forte MD   81 mg at 02/22/21 2369    atorvastatin (LIPITOR) tablet 40 mg  40 mg Oral HS Catrachito Forte MD   40 mg at 02/21/21 2107    bumetanide (BUMEX) injection 2 mg  2 mg Intravenous BID Phylicia Vogt MD   Stopped at 02/22/21 1018    carvedilol (COREG) tablet 12 5 mg  12 5 mg Oral BID With Meals Kathleen Jasper Oz, DO        ceftriaxone (ROCEPHIN) 1 g/50 mL in dextrose IVPB  1,000 mg Intravenous Q24H Catrachito Forte MD        heparin (porcine) subcutaneous injection 5,000 Units  5,000 Units Subcutaneous Q8H [facility] CLARICE Olsen        hydrALAZINE (APRESOLINE) tablet 50 mg  50 mg Oral TID Phyilcia Vogt MD        insulin lispro (HumaLOG) 100 units/mL subcutaneous injection 1-6 Units  1-6 Units Subcutaneous TID [facility] Catrachito Forte MD   1 Units at 02/22/21 8126    insulin lispro (HumaLOG) 100 units/mL subcutaneous injection 1-6 Units  1-6 Units Subcutaneous HS Catrachito Forte MD   5 Units at 02/21/21 2107    isosorbide mononitrate (IMDUR) 24 hr tablet 30 mg  30 mg Oral Daily Timothy Rosalva Rolle MD   30 mg at 02/22/21 4576    Labetalol HCl (NORMODYNE) injection 10 mg  10 mg Intravenous Q4H PRN Rock Arndt MD   10 mg at 02/22/21 0731    levothyroxine tablet 112 mcg  112 mcg Oral Early Morning Rock Arndt MD   112 mcg at 02/22/21 0458    nitroGLYcerin (TRIDIL) 50 mg in 250 mL infusion (premix)  5-200 mcg/min Intravenous Titrated Jazmin Driscoll MD   Stopped at 02/21/21 1900    sodium chloride (PF) 0 9 % injection 3 mL  3 mL Intravenous Q1H PRN Vi Rowland MD         Allergies   Allergen Reactions    Iv Contrast [Iodinated Diagnostic Agents]      Caused KELLY       Objective   Vitals: Blood pressure 139/63, pulse 56, temperature 98 6 °F (37 °C), temperature source Oral, resp  rate 18, height 5' 5" (1 651 m), weight 77 6 kg (171 lb 1 6 oz), SpO2 96 %  Intake/Output Summary (Last 24 hours) at 2/22/2021 1044  Last data filed at 2/22/2021 1001  Gross per 24 hour   Intake 1171 11 ml   Output 1575 ml   Net -403 89 ml     Invasive Devices     Peripheral Intravenous Line            Peripheral IV 02/21/21 Left Antecubital 1 day    Peripheral IV 02/21/21 Right Forearm less than 1 day                Physical Exam  Constitutional:       Appearance: She is well-developed  HENT:      Head: Normocephalic and atraumatic  Nose: Nose normal    Eyes:      Pupils: Pupils are equal, round, and reactive to light  Neck:      Musculoskeletal: Normal range of motion  Thyroid: No thyromegaly  Vascular: No JVD  Cardiovascular:      Rate and Rhythm: Normal rate and regular rhythm  Heart sounds: Normal heart sounds  No murmur  No friction rub  No gallop  Pulmonary:      Effort: Pulmonary effort is normal  No respiratory distress  Breath sounds: Normal breath sounds  No stridor  No wheezing or rales  Chest:      Chest wall: No tenderness  Abdominal:      General: Bowel sounds are normal  There is no distension  Palpations: Abdomen is soft  There is no mass  Tenderness:  There is no abdominal tenderness  There is no guarding  Musculoskeletal: Normal range of motion  General: No deformity  Left lower leg: Edema present  Skin:     General: Skin is warm  Neurological:      Mental Status: She is alert and oriented to person, place, and time  The history was obtained from the review of the chart, patient  Lab Results:   Results from last 7 days   Lab Units 02/21/21  1037   HEMOGLOBIN A1C % 13 2*     Lab Results   Component Value Date    WBC 7 75 02/22/2021    HGB 8 1 (L) 02/22/2021    HCT 26 6 (L) 02/22/2021    MCV 92 02/22/2021     02/22/2021     Lab Results   Component Value Date/Time    BUN 48 (H) 02/22/2021 04:33 AM    BUN 43 (H) 10/30/2020 12:59 PM     10/07/2015 01:09 PM    K 4 8 02/22/2021 04:33 AM    K 5 1 10/30/2020 12:59 PM     (H) 02/22/2021 04:33 AM     10/30/2020 12:59 PM    CO2 25 02/22/2021 04:33 AM    CO2 22 10/30/2020 12:59 PM    CREATININE 3 08 (H) 02/22/2021 04:33 AM    CREATININE 1 03 10/07/2015 01:09 PM    AST 32 02/21/2021 10:37 AM    AST 31 08/08/2020 10:15 AM    ALT 39 02/21/2021 10:37 AM    ALT 30 (H) 08/08/2020 10:15 AM    ALB 2 9 (L) 02/21/2021 10:37 AM    ALB 3 6 10/07/2015 01:09 PM    GLOB 3 1 08/08/2020 10:15 AM     No results for input(s): CHOL, HDL, LDL, TRIG, VLDL in the last 72 hours  No results found for: Kena Joe  POC Glucose (mg/dl)   Date Value   02/22/2021 183 (H)   02/21/2021 325 (H)   02/21/2021 318 (H)   01/10/2019 146 (H)   01/10/2019 109   01/09/2019 128   01/09/2019 173 (H)   01/09/2019 141 (H)   01/09/2019 121   01/08/2019 207 (H)       Imaging Studies: I have personally reviewed pertinent reports  Portions of the record may have been created with voice recognition software

## 2021-02-22 NOTE — ASSESSMENT & PLAN NOTE
· Patient presenting with few day history of worsened shortness of breath and productive cough with intermittent chills  · Chest x-ray with right sided basilar infiltrate and vascular congestion  · Continue intravenous Rocephin  Discontinue azithromycin (Legionella negative)  · COVID-19/influenza/RSV PCR negative  · Procalcitonin 0 43, will trend  Remains afebrile without leukocytosis

## 2021-02-22 NOTE — PROGRESS NOTES
[facility]    Progress Note - Annabella Díaz 1955, 76 y o  female MRN: [id number]    Unit/Bed#: [facility] 504-01 Encounter: [id number]    Primary Care Provider: Rivka Neumann MD   Date and time admitted to hospital: 2/21/2021 10:21 AM        * Community acquired pneumonia  Assessment & Plan  · Patient presenting with few day history of worsened shortness of breath and productive cough with intermittent chills  · Chest x-ray with right sided basilar infiltrate and vascular congestion  · Continue intravenous Rocephin  Discontinue azithromycin (Legionella negative)  · COVID-19/influenza/RSV PCR negative  · Procalcitonin 0 43, will trend  Remains afebrile without leukocytosis  Acute on chronic combined systolic and diastolic congestive heart failure (HCC)  Assessment & Plan  Wt Readings from Last 3 Encounters:   02/22/21 77 6 kg (171 lb 1 6 oz)   11/02/20 77 1 kg (170 lb)   08/10/20 76 3 kg (168 lb 3 2 oz)     · Presenting with few day history of worsening shortness of breath and lower extremity edema  · Cardiology following, switching intravenous Lasix to Bumex  · Continue beta-blocker  · Echo pending  · I&Os, daily weights, low-sodium diet  · Monitor volume status      Acute renal failure superimposed on stage 3 chronic kidney disease [facility])  Assessment & Plan  Lab Results   Component Value Date    EGFR 15 02/22/2021    EGFR 16 02/21/2021    EGFR 29 04/30/2019    CREATININE 3 08 (H) 02/22/2021    CREATININE 2 91 (H) 02/21/2021    CREATININE 2 12 (H) 10/30/2020   · CKD stage 3 with a baseline creatinine around 1 5-2  Recent KELLY related to contrast induced nephropathy/anemia  Creatinine 3 08 today  · Monitor renal function carefully in the setting of intravenous diuresis  · Will consult nephrology    · Losartan on hold  · Avoid nephrotoxins and hypotension  · BMP in a m      Elevated troponin  Assessment & Plan  · Non MI troponin elevation in the setting of pneumonia/heart failure exacerbation  Coronary artery disease involving native coronary artery of native heart with angina pectoris [facility])  Assessment & Plan  · Has history of PCI in the past   · Continue beta-blocker, aspirin, statin, Imdur  DM (diabetes mellitus) [facility])  Assessment & Plan  Lab Results   Component Value Date    HGBA1C 13 2 (H) 02/21/2021       Recent Labs     02/21/21  1537 02/21/21  2100 02/22/21  0624   POCGLU 318* 325* 183*       · takes 70/30 at home 10 units b i d, will continue + SSI  · A1c 13, Consult endocrinology  · Diabetic diet  · Insulin adjustments per endo  · Sees endo at [facility] Dr Sin Myers     Hypertension  Assessment & Plan  · Urgency noted  Continue Norvasc, Coreg, hydralazine, Imdur  Coreg and hydralazine increased today by Cardiology  Hypothyroidism  Assessment & Plan  · TSH 20 800  Increase levothyroxine to 125 mcg  · Reviewed proper administration with patient, was not taking on empty stomach consistently  Hyperlipidemia  Assessment & Plan  · Continue statin    Anemia  Assessment & Plan  · Hemoglobin stable  Pharmacologic: Heparin  Mechanical VTE Prophylaxis in Place: Yes    Patient Centered Rounds: I have performed bedside rounds with nursing staff today  Discussions with Specialists or Other Care Team Provider: nursing, case management     Education and Discussions with Family / Patient: patient and daughter over phone     Time Spent for Care: 30 minutes  More than 50% of total time spent on counseling and coordination of care as described above  Current Length of Stay: 1 day(s)    Current Patient Status: Inpatient   Certification Statement: The patient will continue to require additional inpatient hospital stay due to plan as above, additional IV diuresis and close monitoring of renal function, iv abx    Discharge Plan / Estimated Discharge Date: not stable for d/c, likely 48-72 hrs         Code Status: Level 1 - Full Code      Subjective:   Patient offers no acute complaints  Feels much better today  No subjective fevers or chills  Cough is improving  Lower extremity edema also improving  Denies any shortness of breath  Slept well overnight  Objective:     Vitals:   Temp (24hrs), Av 2 °F (36 8 °C), Min:97 7 °F (36 5 °C), Max:98 6 °F (37 °C)    Temp:  [97 7 °F (36 5 °C)-98 6 °F (37 °C)] 98 6 °F (37 °C)  HR:  [56-76] 56  Resp:  [15-20] 18  BP: (125-215)/(60-91) 139/63  SpO2:  [85 %-98 %] 96 %  Body mass index is 28 47 kg/m²  Input and Output Summary (last 24 hours): Intake/Output Summary (Last 24 hours) at 2021 1050  Last data filed at 2021 1001  Gross per 24 hour   Intake 1171 11 ml   Output 1575 ml   Net -403 89 ml       Physical Exam:     Physical Exam  Vitals signs and nursing note reviewed  Constitutional:       Appearance: She is obese  Cardiovascular:      Rate and Rhythm: Normal rate  Pulmonary:      Breath sounds: Decreased breath sounds present  Abdominal:      Tenderness: There is no abdominal tenderness  Musculoskeletal:         General: Swelling (Left lower extremity greater than right) present  Skin:     General: Skin is warm  Neurological:      Mental Status: She is alert and oriented to person, place, and time  Mental status is at baseline     Psychiatric:         Mood and Affect: Mood normal          Additional Data:     Labs:    Results from last 7 days   Lab Units 21  0433   WBC Thousand/uL 7 75   HEMOGLOBIN g/dL 8 1*   HEMATOCRIT % 26 6*   PLATELETS Thousands/uL 160   NEUTROS PCT % 67   LYMPHS PCT % 24   MONOS PCT % 6   EOS PCT % 2     Results from last 7 days   Lab Units 21  0433 21  1037   POTASSIUM mmol/L 4 8 4 9   CHLORIDE mmol/L 109* 108   CO2 mmol/L 25 25   BUN mg/dL 48* 45*   CREATININE mg/dL 3 08* 2 91*   CALCIUM mg/dL 9 4 9 3   ALK PHOS U/L  --  591*   ALT U/L  --  39   AST U/L  --  32     Results from last 7 days   Lab Units 21  1328   INR  1 10         Recent Cultures (last 7 days):     Results from last 7 days   Lab Units 02/21/21  1344   LEGIONELLA URINARY ANTIGEN  Negative       Last 24 Hours Medication List:   Current Facility-Administered Medications   Medication Dose Route Frequency Provider Last Rate    acetaminophen  650 mg Oral Q6H PRN Dia Cortes MD      [START ON 2/23/2021] amLODIPine  5 mg Oral Daily Kathleen KUMAR Jessica, DO      aspirin  81 mg Oral Daily Dia Cortes MD      atorvastatin  40 mg Oral HS Dia Cortes MD      bumetanide  2 mg Intravenous BID Destni Garnica MD      carvedilol  12 5 mg Oral BID With Meals Kathleen Lopez Dryer, DO      cefTRIAXone  1,000 mg Intravenous Q24H Dia Cortes MD      heparin (porcine)  5,000 Units Subcutaneous [facility] CLARICE Ramirez      hydrALAZINE  50 mg Oral TID Destin Garnica MD      insulin lispro  1-6 Units Subcutaneous TID [facility] MD Jackie Tello insulin lispro  1-6 Units Subcutaneous HS Dia Cortes MD      isosorbide mononitrate  30 mg Oral Daily Dia Cortes MD      Labetalol HCl  10 mg Intravenous Q4H PRN MD Jackie Tello [place] ON 2/23/2021] levothyroxine  125 mcg Oral Early Morning CLARICE Olsen      sodium chloride (PF)  3 mL Intravenous Q1H PRN Nakita Rowland MD          Today, Patient Was Seen By: CLARICE Drew    ** Please Note: Dragon 360 Dictation voice to text software may have been used in the creation of this document   **

## 2021-02-22 NOTE — UTILIZATION REVIEW
Initial Clinical Review    Admission: Date/Time/Statement:   Admission Orders (From admission, onward)     Ordered        02/21/21 1257  Inpatient Admission  Once                   Orders Placed This Encounter   Procedures    Inpatient Admission     Standing Status:   Standing     Number of Occurrences:   1     Order Specific Question:   Level of Care     Answer:   Med Surg [16]     Order Specific Question:   Estimated length of stay     Answer:   More than 2 Midnights     Order Specific Question:   Certification     Answer:   I certify that inpatient services are medically necessary for this patient for a duration of greater than two midnights  See H&P and MD Progress Notes for additional information about the patient's course of treatment  ED Arrival Information     Expected Arrival Acuity Means of Arrival Escorted By Service Admission Type    - 2/21/2021 10:12 Emergent Walk-In Self General Medicine Emergency    Arrival Complaint    SOB/Cough        Chief Complaint   Patient presents with    Shortness of Breath     Pt  reports having exertional SOB for the past 3 days, reports weight gain, unsure how much  Also reporting cough, and inability to breathe after coughing  Assessment/Plan:  77 y/o male with PMhx of CAD s/p stenting in 9385, combined systolic/diastolic HF with EF 52%, grade 2 dyastolic dysfunction, CKF III, IDDM, HTN, hypothyroidism presents to ED as walk in with sob, dyspnea on exertion, productive cough with lower extrem edema x 4 days  In ED /97  H/H 8 4/27 9  BUN 45, Creat 2 91  CXR showed pulm vascular congestion and R basilar infiltrate  EKG showed T-wave inversion in V5-V6  In ED received asa, rocephin/azithromycin IV, Hep gtt started  On exam bibasilar crackles, +2 b/l lower extrem edema  Admit inpatient to [facility] 2 step down unit with CAP, acute HF, KELLY -- continue Rocephin/azithromycin, check strep, Legionella, procalcitonin  Monitor WBC's/fever curse  Serial trops  Echo    Cont Hep gtt  Cardiology consulted  Hold losartan for now  Continue Coreg, Imdur, aspirin, statin  Cardiology consult 2/21 -- pt reports compliance with meds but not with diet  BP elevated  KELLY on CKD  Agree with diuresis  Using lasix, IV NTG for BP/diuresis  Wean as tolerated hopefully only use very short term  Repeat echo  Hold losartan, monitor BMP carefully  2/22 -- pt states he feels better today  Cough & lower extrem edema also improving  Continue current tx plan -- IV abx  Not diuresing much  Increase hydralazine, switch to Bumex IV by cardiology         ED Triage Vitals [02/21/21 1025]   Temperature Pulse Respirations Blood Pressure SpO2   98 3 °F (36 8 °C) 79 20 (!) 211/97 93 %      Temp Source Heart Rate Source Patient Position - Orthostatic VS BP Location FiO2 (%)   Oral Monitor Lying Left arm --      Pain Score       Worst Possible Pain          Wt Readings from Last 1 Encounters:   02/22/21 77 6 kg (171 lb 1 6 oz)     Additional Vital Signs:   Date/Time  Temp  Pulse  Resp  BP  MAP (mmHg)  SpO2  Calculated FIO2 (%) - Nasal Cannula  Nasal Cannula O2 Flow Rate (L/min)  O2 Device   02/22/21 1500  97 5 °F (36 4 °C)  62  --  183/79Abnormal   --  96 %  --  --  --   02/22/21 0823  --  --  --  181/80Abnormal   --  --  --  --  --   02/22/21 0734  98 4 °F (36 9 °C)  70  18  188/83Abnormal   --  98 %  --  --  --   02/22/21 0400  --  --  --  --  --  --  28  2 L/min  Nasal cannula   02/22/21 0341  --  --  --  --  --  95 %  28  2 L/min  Nasal cannula   02/22/21 0315  --  --  --  --  --  85 %Abnormal   --  --  None (Room air)   02/22/21 0000  --  --  --  --  --  --  --  --  None (Room air)   02/21/21 2348  --  68  --  188/84Abnormal   121  --  --  --  --   02/21/21 2323  97 7 °F (36 5 °C)  70  16  187/81Abnormal   117  96 %  --  --  None (Room air)   02/21/21 2205  --  68  --  175/81Abnormal   116  --  --  --  --   02/21/21 2108  --  67  --  177/80Abnormal   --  --  --  --  --   02/21/21 2000  --  --  --  --  --  -- 28  2 L/min  Nasal cannula   02/21/21 1900  --  66  --  151/71  102  --  --  --  --   02/21/21 1600  --  71  16  156/72  104  98 %  28  2 L/min  Nasal cannula   02/21/21 1534  --  75  18  155/73  105  98 %  28  2 L/min  Nasal cannula   02/21/21 1500  --  70  --  191/81Abnormal   117  97 %  --  --  --   02/21/21 1430  --  74  18  189/84Abnormal   120  97 %  --  --  --   02/21/21 1400  --  74  --  215/91Abnormal   130  97 %  --  --  --   02/21/21 1300  --  76  20  191/86Abnormal   124  94 %  --  --  None (Room air)   02/21/21 1030  --  78  --  193/90Abnormal   129  94 %  --  --  --   02/21/21 1025  98 3 °F (36 8 °C)  79  20  211/97Abnormal   --  93 %  --  --  None (Room air)       Pertinent Labs/Diagnostic Test Results:   EKG 2/21 -- Normal sinus rhythm, Nonspecific ST and T wave abnormality  EKG 2/22 -- Normal sinus rhythm  T wave abnormality, consider lateral ischemia  CXR 2/21 - Interstitial pulmonary edema and small right pleural effusion       Results from last 7 days   Lab Units 02/21/21  1124   SARS-COV-2  Negative     Results from last 7 days   Lab Units 02/22/21  0433 02/21/21  1037   WBC Thousand/uL 7 75 8 36   HEMOGLOBIN g/dL 8 1* 8 4*   HEMATOCRIT % 26 6* 27 9*   PLATELETS Thousands/uL 160 161   NEUTROS ABS Thousands/µL 5 19 6 68     Results from last 7 days   Lab Units 02/22/21  0433 02/21/21  1037   SODIUM mmol/L 141 139   POTASSIUM mmol/L 4 8 4 9   CHLORIDE mmol/L 109* 108   CO2 mmol/L 25 25   ANION GAP mmol/L 7 6   BUN mg/dL 48* 45*   CREATININE mg/dL 3 08* 2 91*   EGFR ml/min/1 73sq m 15 16   CALCIUM mg/dL 9 4 9 3   MAGNESIUM mg/dL 2 0  --    PHOSPHORUS mg/dL 4 4*  --      Results from last 7 days   Lab Units 02/21/21  1037   AST U/L 32   ALT U/L 39   ALK PHOS U/L 591*   TOTAL PROTEIN g/dL 7 4   ALBUMIN g/dL 2 9*   TOTAL BILIRUBIN mg/dL 0 64     Results from last 7 days   Lab Units 02/22/21  1042 02/22/21  0624 02/21/21  2100 02/21/21  1537   POC GLUCOSE mg/dl 331* 183* 325* 318*     Results from last 7 days   Lab Units 02/22/21  0433 02/21/21  1037   GLUCOSE RANDOM mg/dL 162* 383*     Results from last 7 days   Lab Units 02/21/21  1037   HEMOGLOBIN A1C % 13 2*   EAG mg/dl 332     Results from last 7 days   Lab Units 02/21/21  1928 02/21/21  1623 02/21/21  1328 02/21/21  1037   TROPONIN I ng/mL 0 59* 0 60* 0 42* 0 38*     Results from last 7 days   Lab Units 02/21/21  1328   PROTIME seconds 14 2   INR  1 10   PTT seconds 36     Results from last 7 days   Lab Units 02/21/21  1037   TSH 3RD GENERATON uIU/mL 20 900*     Results from last 7 days   Lab Units 02/22/21  0433 02/21/21  1328   PROCALCITONIN ng/ml 0 43* 0 38*     Results from last 7 days   Lab Units 02/21/21  1037   NT-PRO BNP pg/mL 12,376*     Results from last 7 days   Lab Units 02/21/21  1344   CLARITY UA  Clear   COLOR UA  Yellow   SPEC GRAV UA  1 021   PH UA  6 0   GLUCOSE UA mg/dl >=1000 (1%)*   KETONES UA mg/dl Negative   BLOOD UA  Trace*   PROTEIN UA mg/dl >=1000 (4+)*   NITRITE UA  Negative   BILIRUBIN UA  Negative   UROBILINOGEN UA E U /dl 0 2   LEUKOCYTES UA  Elevated glucose may cause decreased leukocyte values   See urine microscopic for [facility] result/*   WBC UA /hpf 10-20*   RBC UA /hpf 2-4   BACTERIA UA /hpf None Seen   EPITHELIAL CELLS WET PREP /hpf None Seen   SODIUM UR  34   CREATININE UR mg/dL 142 0     Results from last 7 days   Lab Units 02/21/21  1344 02/21/21  1124   STREP PNEUMONIAE ANTIGEN, URINE  Negative  --    LEGIONELLA URINARY ANTIGEN  Negative  --    INFLUENZA A PCR   --  Negative   INFLUENZA B PCR   --  Negative   RSV PCR   --  Negative     ED Treatment:   Medication Administration from 02/21/2021 1012 to 02/21/2021 1531       Date/Time Order Dose Route Action     02/21/2021 1405 ceftriaxone (ROCEPHIN) 1 g/50 mL in dextrose IVPB 1,000 mg Intravenous New Bag     02/21/2021 1452 azithromycin (ZITHROMAX) 500 mg in sodium chloride 0 9% 250mL IVPB 500 mg 500 mg Intravenous New Bag     02/21/2021 1403 aspirin chewable tablet 243 mg 243 mg Oral Given     02/21/2021 1418 isosorbide mononitrate (IMDUR) 24 hr tablet 30 mg 30 mg Oral Given     02/21/2021 1515 nitroGLYcerin (TRIDIL) 50 mg in 250 mL infusion (premix) 15 mcg/min Intravenous Rate/Dose Change     02/21/2021 1453 nitroGLYcerin (TRIDIL) 50 mg in 250 mL infusion (premix) 10 mcg/min Intravenous Rate/Dose Change     02/21/2021 1421 nitroGLYcerin (TRIDIL) 50 mg in 250 mL infusion (premix) 5 mcg/min Intravenous New Bag     Past Medical History:   Diagnosis Date    Anemia     CH[facility]congestive [facility] failure) [facility]     Chronic kidney disease     Coronary artery disease     [facility]     Hypertension     Hypothyroidism      Present on Admission:   Acute on chronic combined systolic and diastolic congestive heart failure ([facility])   Hypothyroidism   Hypertension   Elevated troponin   Acute renal failure superimposed on stage 3 chronic kidney disease ([facility]   Hyperlipidemia   Anemia   Coronary artery disease involving native coronary artery of native heart with angina pectoris ([facility])      Admitting Diagnosis: Shortness of breath [R06 02]  Elevated troponin [R77 8]  Right lower lobe pneumonia [J18 9]  KELLY (acute kidney injury) ([facility] ) [N17 9]  Age/Sex: 76 y o  female  Admission Orders:  Scheduled Medications:  [START ON 2/23/2021] amLODIPine, 5 mg, Oral, Daily  aspirin, 81 mg, Oral, Daily  atorvastatin, 40 mg, Oral, HS  bumetanide, 2 mg, Intravenous, BID  carvedilol, 12 5 mg, Oral, BID With Meals  cefTRIAXone, 1,000 mg, Intravenous, Q24H  heparin (porcine), 5,000 Units, Subcutaneous, Q8H DEE  hydrALAZINE, 50 mg, Oral, TID  insulin glargine, 15 Units, Subcutaneous, HS  insulin lispro, 1-6 Units, Subcutaneous, TID AC  insulin lispro, 1-6 Units, Subcutaneous, HS  insulin lispro, 5 Units, Subcutaneous, TID With Meals  isosorbide mononitrate, 30 mg, Oral, Daily  [START ON 2/23/2021] levothyroxine, 125 mcg, Oral, Early Morning       PRN Meds:  acetaminophen, 650 mg, Oral, Q6H PRN  Labetalol HCl, 10 mg, Intravenous, Q4H PRN 2/21 x1, 2/22 x1  sodium chloride (PF), 3 mL, Intravenous, Q1H PRN      Network Utilization Review Department  ATTENTION: Please call with any questions or concerns to [phone] and carefully listen to the prompts so that you are directed to the right person  All voicemails are confidential   Hung Pineda all requests for admission clinical reviews, approved or denied determinations and any other requests to dedicated fax number below belonging to the campus where the patient is receiving treatment   List of dedicated fax numbers for the Facilities:  [address] DENIALS (Administrative/Medical Necessity) [phone]   [address] (Maternity/NICU/Pediatrics) [phone] [address] Denzel Alvarado 9434 (  Rose Duncan "[place]" 103) [address] [place] [address] Shaheen Hughes 1481 [address] [place] [address] [phone]

## 2021-02-22 NOTE — CASE MANAGEMENT
Met with patient and her sister, [place], to complete assessment and explain role of CM  The patient is not a readmission or a Medicare bundle  Her readmission risk score is 16  The patient and her spouse live in a two floor home with 2+2 ARUNA  There are 12 steps to the second floor bedroom and bathroom  The patient is independent and drives  She has no HHC or DME  The patient denies MH and D&A treatment  She has prescription coverage and uses [address]  The patient's PCP is Jack Granda  CM had been informed by Leah El that patient is recommended for [facility] due to insulin changes  Discussed this with the patient who declines [facility] referral    CM reviewed d/c planning process including the following: identifying help at home, patient preference for d/c planning needs, Discharge Lounge, [facility] Meds to Bed program, availability of treatment team to discuss questions or concerns patient and/or family may have regarding understanding medications and recognizing signs and symptoms once discharged  CM also encouraged patient to follow up with all recommended appointments after discharge  Patient advised of importance for patient and family to participate in managing patients medical well being  Patient/caregiver received discharge checklist  Content reviewed  Patient/caregiver encouraged to participate in discharge plan of care prior to discharge home

## 2021-02-22 NOTE — ASSESSMENT & PLAN NOTE
Lab Results   Component Value Date    HGBA1C 13 2 (H) 02/21/2021       Recent Labs     02/21/21  1537 02/21/21  2100 02/22/21  0624 02/22/21  1042   POCGLU 318* 325* 183* 331*     Uncontrolled the most recent A1c of 13 2%  The goal is less than 7%  Home regiment:  NovoLog 70/30, 10 units b i d  Perform home glucose monitoring twice daily  her readings in the morning is usually between 150-200 at at bedtime are between 250-350 mg/dl  Patient blood sugars has been reasonably good controlled however reports at 4:00 a m  Feeling shaky and for which her blood sugar was checked which was 84 mg/dL, orange juice was given  Which brought her blood sugar up to 200 mg/dl at 6:00 a m  However overall she has pre meal hyperglycemia for which will increase Humalog to 7 units t i d  And continue Lantus 15 units q h s  And correctional sliding scale  Continue to monitor blood sugar over time and make adjustments to the regimen if necessary

## 2021-02-22 NOTE — PLAN OF CARE
Problem: Potential for Falls  Goal: Patient will remain free of falls  Description: INTERVENTIONS:  - Assess patient frequently for physical needs  -  Identify cognitive and physical deficits and behaviors that affect risk of falls    -  [place] fall precautions as indicated by assessment   - Educate patient/family on patient safety including physical limitations  - Instruct patient to call for assistance with activity based on assessment  - Modify environment to reduce risk of injury  - Consider OT/PT consult to assist with strengthening/mobility  2/22/2021 0006 by Dylan Brian RN  Outcome: Progressing  2/21/2021 2145 by Dylan Brian RN  Outcome: Progressing     Problem: PAIN - ADULT  Goal: Verbalizes/displays adequate comfort level or baseline comfort level  Description: Interventions:  - Encourage patient to monitor pain and request assistance  - Assess pain using appropriate pain scale  - Administer analgesics based on type and severity of pain and evaluate response  - Implement non-pharmacological measures as appropriate and evaluate response  - Consider cultural and social influences on pain and pain management  - Notify physician/advanced practitioner if interventions unsuccessful or patient reports new pain  2/22/2021 0006 by Dylan Brian RN  Outcome: Progressing  2/21/2021 2145 by Dylan Brian RN  Outcome: Progressing     Problem: INFECTION - ADULT  Goal: Absence or prevention of progression during hospitalization  Description: INTERVENTIONS:  - Assess and monitor for signs and symptoms of infection  - Monitor lab/diagnostic results  - Monitor all insertion sites, i e  indwelling lines, tubes, and drains  - Monitor endotracheal if appropriate and nasal secretions for changes in amount and color  - [place] appropriate cooling/warming therapies per order  - Administer medications as ordered  - Instruct and encourage patient and family to use good hand hygiene technique  - Identify and instruct in appropriate isolation precautions for identified infection/condition  2/22/2021 0006 by Scarlet Hallman RN  Outcome: Progressing  2/21/2021 2145 by Scarlet Hallman RN  Outcome: Progressing  Goal: Absence of fever/infection during neutropenic period  Description: INTERVENTIONS:  - Monitor WBC    2/22/2021 0006 by Scarlet Hallman RN  Outcome: Progressing  2/21/2021 2145 by Scarlet Hallman RN  Outcome: Progressing     Problem: SAFETY ADULT  Goal: Patient will remain free of falls  Description: INTERVENTIONS:  - Assess patient frequently for physical needs  -  Identify cognitive and physical deficits and behaviors that affect risk of falls    -  [place] fall precautions as indicated by assessment   - Educate patient/family on patient safety including physical limitations  - Instruct patient to call for assistance with activity based on assessment  - Modify environment to reduce risk of injury  - Consider OT/PT consult to assist with strengthening/mobility  2/22/2021 0006 by Scarlet Hallman RN  Outcome: Progressing  2/21/2021 2145 by Scarlet Hallman RN  Outcome: Progressing  Goal: Maintain or return to baseline ADL function  Description: INTERVENTIONS:  -  Assess patient's ability to carry out ADLs; assess patient's baseline for ADL function and identify physical deficits which impact ability to perform ADLs (bathing, care of mouth/teeth, toileting, grooming, dressing, etc )  - Assess/evaluate cause of self-care deficits   - Assess range of motion  - Assess patient's mobility; develop plan if impaired  - Assess patient's need for assistive devices and provide as appropriate  - Encourage maximum independence but intervene and supervise when necessary  - Involve family in performance of ADLs  - Assess for home care needs following discharge   - Consider OT consult to assist with ADL evaluation and planning for discharge  - Provide patient education as appropriate  2/22/2021 0006 by Scarlet Hallman RN  Outcome: Progressing  2/21/2021 2145 by Salvatore Richards RN  Outcome: Progressing  Goal: Maintain or return mobility status to optimal level  Description: INTERVENTIONS:  - Assess patient's baseline mobility status (ambulation, transfers, stairs, etc )    - Identify cognitive and physical deficits and behaviors that affect mobility  - Identify mobility aids required to assist with transfers and/or ambulation (gait belt, sit-to-stand, lift, walker, cane, etc )  - [place] fall precautions as indicated by assessment  - Record patient progress and toleration of activity level on Mobility SBAR; progress patient to next Phase/Stage  - Instruct patient to call for assistance with activity based on assessment  - Consider rehabilitation consult to assist with strengthening/weightbearing, etc   2/22/2021 0006 by Salvatore Richards RN  Outcome: Progressing  2/21/2021 2145 by Salvatore Richards RN  Outcome: Progressing     Problem: DISCHARGE PLANNING  Goal: Discharge to home or other facility with appropriate resources  Description: INTERVENTIONS:  - Identify barriers to discharge w/patient and caregiver  - Arrange for needed discharge resources and transportation as appropriate  - Identify discharge learning needs (meds, wound care, etc )  - Arrange for interpretive services to assist at discharge as needed  - Refer to Case Management Department for coordinating discharge planning if the patient needs post-hospital services based on physician/advanced practitioner order or complex needs related to functional status, cognitive ability, or social support system  2/22/2021 0006 by Salvatore Richards RN  Outcome: Progressing  2/21/2021 2145 by Salvatore Richards RN  Outcome: Progressing     Problem: Knowledge Deficit  Goal: Patient/family/caregiver demonstrates understanding of disease process, treatment plan, medications, and discharge instructions  Description: Complete learning assessment and assess knowledge base   Interventions:  - Provide teaching at level of understanding  - Provide teaching via preferred learning methods  2/22/2021 0006 by Loida Lewis RN  Outcome: Progressing  2/21/2021 2145 by Loida Lewis RN  Outcome: Progressing     Problem: CARDIOVASCULAR - ADULT  Goal: Maintains optimal cardiac output and hemodynamic stability  Description: INTERVENTIONS:  - Monitor I/O, vital signs and rhythm  - Monitor for S/S and trends of decreased cardiac output  - Administer and titrate ordered vasoactive medications to optimize hemodynamic stability  - Assess quality of pulses, skin color and temperature  - Assess for signs of decreased coronary artery perfusion  - Instruct patient to report change in severity of symptoms  2/22/2021 0006 by Loida Lewis RN  Outcome: Progressing  2/21/2021 2145 by Loida Lewis RN  Outcome: Progressing  Goal: Absence of cardiac dysrhythmias or at baseline rhythm  Description: INTERVENTIONS:  - Continuous cardiac monitoring, vital signs, obtain 12 lead EKG if ordered  - Administer antiarrhythmic and heart rate control medications as ordered  - Monitor electrolytes and administer replacement therapy as ordered  2/22/2021 0006 by Loida Lewis RN  Outcome: Progressing  2/21/2021 2145 by Loida Lewis RN  Outcome: Progressing     Problem: RESPIRATORY - ADULT  Goal: Achieves optimal ventilation and oxygenation  Description: INTERVENTIONS:  - Assess for changes in respiratory status  - Assess for changes in mentation and behavior  - Position to facilitate oxygenation and minimize respiratory effort  - Oxygen administered by appropriate delivery if ordered  - Initiate smoking cessation education as indicated  - Encourage broncho-pulmonary hygiene including cough, deep breathe, Incentive Spirometry  - Assess the need for suctioning and aspirate as needed  - Assess and instruct to report SOB or any respiratory difficulty  - Respiratory Therapy support as indicated  2/22/2021 0006 by Jean Paul Simeon RN  Outcome: Progressing  2/21/2021 2145 by Jean Paul Simeon RN  Outcome: Progressing     Problem: METABOLIC, FLUID AND ELECTROLYTES - ADULT  Goal: Electrolytes maintained within normal limits  Description: INTERVENTIONS:  - Monitor labs and assess patient for signs and symptoms of electrolyte imbalances  - Administer electrolyte replacement as ordered  - Monitor response to electrolyte replacements, including repeat lab results as appropriate  - Instruct patient on fluid and nutrition as appropriate  2/22/2021 0006 by Jean Paul Simeon RN  Outcome: Progressing  2/21/2021 2145 by Jean Paul Simeon RN  Outcome: Progressing  Goal: Fluid balance maintained  Description: INTERVENTIONS:  - Monitor labs   - Monitor I/O and WT  - Instruct patient on fluid and nutrition as appropriate  - Assess for signs & symptoms of volume excess or deficit  2/22/2021 0006 by Jean Paul Simeon RN  Outcome: Progressing  2/21/2021 2145 by Jean Paul Simeon RN  Outcome: Progressing  Goal: Glucose maintained within target range  Description: INTERVENTIONS:  - Monitor Blood Glucose as ordered  - Assess for signs and symptoms of hyperglycemia and hypoglycemia  - Administer ordered medications to maintain glucose within target range  - Assess nutritional intake and initiate nutrition service referral as needed  2/22/2021 0006 by Jean Paul Simeon RN  Outcome: Progressing  2/21/2021 2145 by Jean Paul Simeon RN  Outcome: Progressing     Problem: Nutrition/Hydration-ADULT  Goal: Nutrient/Hydration intake appropriate for improving, restoring or maintaining nutritional needs  Description: Monitor and assess patient's nutrition/hydration status for malnutrition  Collaborate with interdisciplinary team and initiate plan and interventions as ordered  Monitor patient's weight and dietary intake as ordered or per policy  Utilize nutrition screening tool and intervene as necessary   Determine patient's food preferences and provide high-protein, high-caloric foods as appropriate       INTERVENTIONS:  - Monitor oral intake, urinary output, labs, and treatment plans  - Assess nutrition and hydration status and recommend course of action  - Evaluate amount of meals eaten  - Assist patient with eating if necessary   - Allow adequate time for meals  - Recommend/ encourage appropriate diets, oral nutritional supplements, and vitamin/mineral supplements  - Order, calculate, and assess calorie counts as needed  - Recommend, monitor, and adjust tube feedings and TPN/PPN based on assessed needs  - Assess need for intravenous fluids  - Provide specific nutrition/hydration education as appropriate  - Include patient/family/caregiver in decisions related to nutrition  2/22/2021 0006 by Eugenio Hearn RN  Outcome: Progressing  2/21/2021 2145 by Eugenio Hearn RN  Outcome: Progressing     Problem: DISCHARGE PLANNING - CARE MANAGEMENT  Goal: Discharge to post-acute care or home with appropriate resources  Description: INTERVENTIONS:  - Conduct assessment to determine patient/family and health care team treatment goals, and need for post-acute services based on payer coverage, community resources, and patient preferences, and barriers to discharge  - Address psychosocial, clinical, and financial barriers to discharge as identified in assessment in conjunction with the patient/family and health care team  - Arrange appropriate level of post-acute services according to patients   needs and preference and payer coverage in collaboration with the physician and health care team  - Communicate with and update the patient/family, physician, and health care team regarding progress on the discharge plan  - Arrange appropriate transportation to post-acute venues  2/22/2021 0006 by Eugenio Hearn RN  Outcome: Progressing  2/21/2021 2145 by Eugenio Hearn RN  Outcome: Progressing

## 2021-02-22 NOTE — ASSESSMENT & PLAN NOTE
On levothyroxine 112 mcg Monday to Saturday and half a dose on Sunday  Patient takes her medication regularly but not properly  2/21/2021 10:37   TSH 3RD [facility] 20 900 (H)     Agree to increase levothyroxine to 125 mcg daily      Check free T4

## 2021-02-22 NOTE — ASSESSMENT & PLAN NOTE
· TSH 20 800  Increase levothyroxine to 125 mcg  · Reviewed proper administration with patient, was not taking on empty stomach consistently

## 2021-02-23 ENCOUNTER — APPOINTMENT (INPATIENT)
Dept: NON INVASIVE DIAGNOSTICS | Facility: HOSPITAL | Age: 66
DRG: 291 | End: 2021-02-23
Payer: COMMERCIAL

## 2021-02-23 PROBLEM — N18.4 ACUTE RENAL FAILURE SUPERIMPOSED ON STAGE 4 CHRONIC KIDNEY DISEASE (HCC): Status: ACTIVE | Noted: 2018-12-29

## 2021-02-23 LAB
ANION GAP SERPL CALCULATED.3IONS-SCNC: 6 MMOL/L (ref 4–13)
BACTERIA UR QL AUTO: ABNORMAL /HPF
BASOPHILS # BLD AUTO: 0.03 THOUSANDS/ΜL (ref 0–0.1)
BASOPHILS NFR BLD AUTO: 1 % (ref 0–1)
BILIRUB UR QL STRIP: NEGATIVE
BUN SERPL-MCNC: 62 MG/DL (ref 5–25)
CALCIUM SERPL-MCNC: 8.6 MG/DL (ref 8.3–10.1)
CHLORIDE SERPL-SCNC: 105 MMOL/L (ref 100–108)
CHLORIDE UR-SCNC: 54 MMOL/L
CLARITY UR: ABNORMAL
CO2 SERPL-SCNC: 25 MMOL/L (ref 21–32)
COLOR UR: YELLOW
CREAT SERPL-MCNC: 3.35 MG/DL (ref 0.6–1.3)
CREAT UR-MCNC: 135 MG/DL
CREAT UR-MCNC: 141 MG/DL
EOSINOPHIL # BLD AUTO: 0.18 THOUSAND/ΜL (ref 0–0.61)
EOSINOPHIL NFR BLD AUTO: 3 % (ref 0–6)
ERYTHROCYTE [DISTWIDTH] IN BLOOD BY AUTOMATED COUNT: 13.4 % (ref 11.6–15.1)
GFR SERPL CREATININE-BSD FRML MDRD: 14 ML/MIN/1.73SQ M
GLUCOSE SERPL-MCNC: 105 MG/DL (ref 65–140)
GLUCOSE SERPL-MCNC: 184 MG/DL (ref 65–140)
GLUCOSE SERPL-MCNC: 208 MG/DL (ref 65–140)
GLUCOSE SERPL-MCNC: 216 MG/DL (ref 65–140)
GLUCOSE SERPL-MCNC: 305 MG/DL (ref 65–140)
GLUCOSE SERPL-MCNC: 93 MG/DL (ref 65–140)
GLUCOSE UR STRIP-MCNC: ABNORMAL MG/DL
HCT VFR BLD AUTO: 24.4 % (ref 34.8–46.1)
HGB BLD-MCNC: 7.3 G/DL (ref 11.5–15.4)
HGB UR QL STRIP.AUTO: NEGATIVE
HYALINE CASTS #/AREA URNS LPF: ABNORMAL /LPF
IMM GRANULOCYTES # BLD AUTO: 0.03 THOUSAND/UL (ref 0–0.2)
IMM GRANULOCYTES NFR BLD AUTO: 1 % (ref 0–2)
KETONES UR STRIP-MCNC: NEGATIVE MG/DL
LEUKOCYTE ESTERASE UR QL STRIP: ABNORMAL
LYMPHOCYTES # BLD AUTO: 1.43 THOUSANDS/ΜL (ref 0.6–4.47)
LYMPHOCYTES NFR BLD AUTO: 23 % (ref 14–44)
MAGNESIUM SERPL-MCNC: 2.1 MG/DL (ref 1.6–2.6)
MCH RBC QN AUTO: 27.5 PG (ref 26.8–34.3)
MCHC RBC AUTO-ENTMCNC: 29.9 G/DL (ref 31.4–37.4)
MCV RBC AUTO: 92 FL (ref 82–98)
MONOCYTES # BLD AUTO: 0.38 THOUSAND/ΜL (ref 0.17–1.22)
MONOCYTES NFR BLD AUTO: 6 % (ref 4–12)
NEUTROPHILS # BLD AUTO: 4.3 THOUSANDS/ΜL (ref 1.85–7.62)
NEUTS SEG NFR BLD AUTO: 66 % (ref 43–75)
NITRITE UR QL STRIP: NEGATIVE
NON-SQ EPI CELLS URNS QL MICRO: ABNORMAL /HPF
NRBC BLD AUTO-RTO: 0 /100 WBCS
PH UR STRIP.AUTO: 5.5 [PH]
PHOSPHATE SERPL-MCNC: 5.7 MG/DL (ref 2.3–4.1)
PLATELET # BLD AUTO: 130 THOUSANDS/UL (ref 149–390)
PMV BLD AUTO: 11.5 FL (ref 8.9–12.7)
POTASSIUM SERPL-SCNC: 4.9 MMOL/L (ref 3.5–5.3)
PROCALCITONIN SERPL-MCNC: 0.53 NG/ML
PROT UR STRIP-MCNC: ABNORMAL MG/DL
PROT UR-MCNC: 213 MG/DL
PROT/CREAT UR: 1.51 MG/G{CREAT} (ref 0–0.1)
RBC # BLD AUTO: 2.65 MILLION/UL (ref 3.81–5.12)
RBC #/AREA URNS AUTO: ABNORMAL /HPF
SODIUM 24H UR-SCNC: 42 MOL/L
SODIUM SERPL-SCNC: 136 MMOL/L (ref 136–145)
SP GR UR STRIP.AUTO: 1.02 (ref 1–1.03)
UROBILINOGEN UR QL STRIP.AUTO: 1 E.U./DL
UUN 24H UR-MCNC: 383 MG/DL
WBC # BLD AUTO: 6.35 THOUSAND/UL (ref 4.31–10.16)
WBC #/AREA URNS AUTO: ABNORMAL /HPF

## 2021-02-23 PROCEDURE — 84156 ASSAY OF PROTEIN URINE: CPT | Performed by: NURSE PRACTITIONER

## 2021-02-23 PROCEDURE — 82384 ASSAY THREE CATECHOLAMINES: CPT | Performed by: NURSE PRACTITIONER

## 2021-02-23 PROCEDURE — 82436 ASSAY OF URINE CHLORIDE: CPT | Performed by: NURSE PRACTITIONER

## 2021-02-23 PROCEDURE — 84540 ASSAY OF URINE/UREA-N: CPT | Performed by: NURSE PRACTITIONER

## 2021-02-23 PROCEDURE — 80048 BASIC METABOLIC PNL TOTAL CA: CPT | Performed by: INTERNAL MEDICINE

## 2021-02-23 PROCEDURE — 84145 PROCALCITONIN (PCT): CPT | Performed by: NURSE PRACTITIONER

## 2021-02-23 PROCEDURE — 99233 SBSQ HOSP IP/OBS HIGH 50: CPT | Performed by: INTERNAL MEDICINE

## 2021-02-23 PROCEDURE — 85025 COMPLETE CBC W/AUTO DIFF WBC: CPT | Performed by: NURSE PRACTITIONER

## 2021-02-23 PROCEDURE — 83735 ASSAY OF MAGNESIUM: CPT | Performed by: NURSE PRACTITIONER

## 2021-02-23 PROCEDURE — 99232 SBSQ HOSP IP/OBS MODERATE 35: CPT | Performed by: NURSE PRACTITIONER

## 2021-02-23 PROCEDURE — 82088 ASSAY OF ALDOSTERONE: CPT | Performed by: NURSE PRACTITIONER

## 2021-02-23 PROCEDURE — 82948 REAGENT STRIP/BLOOD GLUCOSE: CPT

## 2021-02-23 PROCEDURE — 82570 ASSAY OF URINE CREATININE: CPT | Performed by: NURSE PRACTITIONER

## 2021-02-23 PROCEDURE — 81001 URINALYSIS AUTO W/SCOPE: CPT | Performed by: NURSE PRACTITIONER

## 2021-02-23 PROCEDURE — 84300 ASSAY OF URINE SODIUM: CPT | Performed by: NURSE PRACTITIONER

## 2021-02-23 PROCEDURE — 83835 ASSAY OF METANEPHRINES: CPT | Performed by: NURSE PRACTITIONER

## 2021-02-23 PROCEDURE — 84244 ASSAY OF RENIN: CPT | Performed by: NURSE PRACTITIONER

## 2021-02-23 PROCEDURE — 84100 ASSAY OF PHOSPHORUS: CPT | Performed by: NURSE PRACTITIONER

## 2021-02-23 RX ADMIN — ACETAMINOPHEN 650 MG: 325 TABLET, FILM COATED ORAL at 14:05

## 2021-02-23 RX ADMIN — INSULIN LISPRO 4 UNITS: 100 INJECTION, SOLUTION INTRAVENOUS; SUBCUTANEOUS at 18:54

## 2021-02-23 RX ADMIN — BUMETANIDE 2 MG: 0.25 INJECTION INTRAMUSCULAR; INTRAVENOUS at 09:06

## 2021-02-23 RX ADMIN — HEPARIN SODIUM 5000 UNITS: 5000 INJECTION INTRAVENOUS; SUBCUTANEOUS at 14:03

## 2021-02-23 RX ADMIN — INSULIN LISPRO 1 UNITS: 100 INJECTION, SOLUTION INTRAVENOUS; SUBCUTANEOUS at 11:53

## 2021-02-23 RX ADMIN — HYDRALAZINE HYDROCHLORIDE 50 MG: 50 TABLET, FILM COATED ORAL at 09:03

## 2021-02-23 RX ADMIN — INSULIN GLARGINE 15 UNITS: 100 INJECTION, SOLUTION SUBCUTANEOUS at 22:21

## 2021-02-23 RX ADMIN — LABETALOL 20 MG/4 ML (5 MG/ML) INTRAVENOUS SYRINGE 10 MG: at 03:17

## 2021-02-23 RX ADMIN — AMLODIPINE BESYLATE 5 MG: 5 TABLET ORAL at 09:03

## 2021-02-23 RX ADMIN — HYDRALAZINE HYDROCHLORIDE 50 MG: 50 TABLET, FILM COATED ORAL at 16:02

## 2021-02-23 RX ADMIN — ATORVASTATIN CALCIUM 40 MG: 40 TABLET, FILM COATED ORAL at 22:19

## 2021-02-23 RX ADMIN — CEFTRIAXONE 1000 MG: 10 INJECTION, POWDER, FOR SOLUTION INTRAVENOUS at 14:03

## 2021-02-23 RX ADMIN — INSULIN LISPRO 5 UNITS: 100 INJECTION, SOLUTION INTRAVENOUS; SUBCUTANEOUS at 18:53

## 2021-02-23 RX ADMIN — INSULIN LISPRO 5 UNITS: 100 INJECTION, SOLUTION INTRAVENOUS; SUBCUTANEOUS at 07:50

## 2021-02-23 RX ADMIN — INSULIN LISPRO 2 UNITS: 100 INJECTION, SOLUTION INTRAVENOUS; SUBCUTANEOUS at 22:21

## 2021-02-23 RX ADMIN — ASPIRIN 81 MG: 81 TABLET, CHEWABLE ORAL at 09:03

## 2021-02-23 RX ADMIN — CARVEDILOL 12.5 MG: 12.5 TABLET, FILM COATED ORAL at 07:26

## 2021-02-23 RX ADMIN — SODIUM CHLORIDE 250 ML: 0.9 INJECTION, SOLUTION INTRAVENOUS at 14:15

## 2021-02-23 RX ADMIN — INSULIN LISPRO 5 UNITS: 100 INJECTION, SOLUTION INTRAVENOUS; SUBCUTANEOUS at 11:53

## 2021-02-23 RX ADMIN — HYDRALAZINE HYDROCHLORIDE 50 MG: 50 TABLET, FILM COATED ORAL at 22:20

## 2021-02-23 RX ADMIN — LEVOTHYROXINE SODIUM 125 MCG: 125 TABLET ORAL at 05:37

## 2021-02-23 RX ADMIN — CARVEDILOL 12.5 MG: 12.5 TABLET, FILM COATED ORAL at 16:02

## 2021-02-23 RX ADMIN — HEPARIN SODIUM 5000 UNITS: 5000 INJECTION INTRAVENOUS; SUBCUTANEOUS at 22:20

## 2021-02-23 RX ADMIN — ISOSORBIDE MONONITRATE 30 MG: 30 TABLET, EXTENDED RELEASE ORAL at 09:03

## 2021-02-23 NOTE — ASSESSMENT & PLAN NOTE
· TSH 20 800  Increase levothyroxine to 125 mcg  · Reviewed proper administration with patient, was not taking on empty stomach consistently     · Repeat thyroid function testing in 4-6 weeks

## 2021-02-23 NOTE — PROGRESS NOTES
Cardiology Progress Note - Jaret Santacruz 76 y o  female MRN: [id number]    Unit/Bed#: [facility] 504-01 Encounter: [id number]        Subjective:    No significant events overnight  Feeling better  No dyspnea  Review of Systems   Cardiovascular: Positive for leg swelling  Negative for chest pain and palpitations  Respiratory: Positive for shortness of breath  Objective:   Vitals: Blood pressure 142/64, pulse 67, temperature 98 °F (36 7 °C), temperature source Oral, resp  rate 16, height 5' 5" (1 651 m), weight 77 5 kg (170 lb 13 7 oz), SpO2 96 %  , Body mass index is 28 43 kg/m² ,   Orthostatic Blood Pressures      Most Recent Value   Blood Pressure  142/64 filed at 02/23/2021 0903   Patient Position - Orthostatic VS  Lying filed at 02/23/2021 5671         Systolic (61IJI), QKS:667 , Min:139 , JEV:034     Diastolic (44JOK), VTQ:27, Min:63, Max:79      Intake/Output Summary (Last 24 hours) at 2/23/2021 0933  Last data filed at 2/23/2021 0901  Gross per 24 hour   Intake 780 ml   Output 2025 ml   Net -1245 ml     Weight (last 2 days)     Date/Time   Weight    02/23/21 0543   77 5 (170 86)    02/22/21 0426   77 6 (171 1)    02/21/21 1532   77 4 (170 64)    02/21/21 1025   76 2 (168)                  Telemetry Review: NSR    Physical Exam  HENT:      Head: Atraumatic  Mouth/Throat:      Mouth: Mucous membranes are moist    Eyes:      Extraocular Movements: Extraocular movements intact  Neurological:      Mental Status: She is alert             Laboratory Results:  Results from last 7 days   Lab Units 02/21/21  1928 02/21/21  1623 02/21/21  1328   TROPONIN I ng/mL 0 59* 0 60* 0 42*       CBC with diff:   Results from last 7 days   Lab Units 02/23/21  0532 02/22/21  0433 02/21/21  1037   WBC Thousand/uL 6 35 7 75 8 36   HEMOGLOBIN g/dL 7 3* 8 1* 8 4*   HEMATOCRIT % 24 4* 26 6* 27 9*   MCV fL 92 92 92   PLATELETS Thousands/uL 130* 160 161   MCH pg 27 5 27 9 27 5   MCHC g/dL 29 9* 30 5* 30 1*   RDW % 13 4 13 6 13 6   MPV fL 11 5 11 3 11 2   NRBC AUTO /100 WBCs 0 0 0         CMP:  Results from last 7 days   Lab Units 21  0536 21  0433 21  1037   POTASSIUM mmol/L 4 9 4 8 4 9   CHLORIDE mmol/L 105 109* 108   CO2 mmol/L 25 25 25   BUN mg/dL 62* 48* 45*   CREATININE mg/dL 3 35* 3 08* 2 91*   CALCIUM mg/dL 8 6 9 4 9 3   AST U/L  --   --  32   ALT U/L  --   --  39   ALK PHOS U/L  --   --  591*   EGFR ml/min/1 73sq m 14 15 16         BMP:  Results from last 7 days   Lab Units 21  0536 21  0433 21  1037   POTASSIUM mmol/L 4 9 4 8 4 9   CHLORIDE mmol/L 105 109* 108   CO2 mmol/L 25 25 25   BUN mg/dL 62* 48* 45*   CREATININE mg/dL 3 35* 3 08* 2 91*   CALCIUM mg/dL 8 6 9 4 9 3       BNP:     Magnesium:   Results from last 7 days   Lab Units 21  0536 21  0433   MAGNESIUM mg/dL 2 1 2 0       Coags:   Results from last 7 days   Lab Units 21  1328   PTT seconds 36   INR  1 10           Cardiac testing:   Results for orders placed during the hospital encounter of 18   Echo complete with contrast if indicated    Narrative [facility] [address]  [facility] [place], [address] [facility]  [phone]    Transthoracic Echocardiogram  2D, M-mode, Doppler, and Color Doppler    Study date:  30-Dec-2018    Patient: Mehran Beckman  MR number: [id number]  Account number: [de-identified]  : 1955  Age: 58 years  Gender: Female  Status: Inpatient  Location: Bedside  Height: 63 in  Weight: 201 5 lb  BP: 159/ 71 mmHg    Indications: Cardiomyopathy    Diagnoses: I43  - Cardiomyopathy in diseases classified elsewhere    Sonographer:  JOCE North  Primary Physician:  Jose Ann MD  Referring Physician:  Vin Rizo MD  Group:  [facility]  Interpreting Physician:  Catherine Courtney MD    SUMMARY    LEFT VENTRICLE:  Systolic function was at the lower limits of normal  Ejection fraction was estimated to be 50 %    There was hypokinesis of the mid inferoseptal and apical inferior wall(s)  Wall thickness was mildly increased  There was mild concentric hypertrophy  Features were consistent with a pseudonormal left ventricular filling pattern, with concomitant abnormal relaxation and increased filling pressure (grade 2 diastolic dysfunction)  LEFT ATRIUM:  The atrium was mildly dilated  MITRAL VALVE:  There was mild regurgitation  TRICUSPID VALVE:  There was mild regurgitation  Estimated peak PA pressure was 33 mmHg  PERICARDIUM:  A trace, free-flowing pericardial effusion was identified  There was no evidence of hemodynamic compromise  HISTORY: PRIOR HISTORY: CHF; DM; Hypothyroid; HTN; HLD; F  smoker    PROCEDURE: The procedure was performed at the bedside  This was a routine study  The transthoracic approach was used  The study included complete 2D imaging, M-mode, complete spectral Doppler, and color Doppler  Echocardiographic views  were limited due to decreased penetration and lung interference  This was a technically difficult study  LEFT VENTRICLE: Size was normal  Systolic function was at the lower limits of normal  Ejection fraction was estimated to be 50 %  There was hypokinesis of the mid inferoseptal and apical inferior wall(s)  Wall thickness was mildly  increased  There was mild concentric hypertrophy  DOPPLER: Features were consistent with a pseudonormal left ventricular filling pattern, with concomitant abnormal relaxation and increased filling pressure (grade 2 diastolic dysfunction)  RIGHT VENTRICLE: The size was normal  Systolic function was normal     LEFT ATRIUM: The atrium was mildly dilated  RIGHT ATRIUM: Size was normal     MITRAL VALVE: Valve structure was normal  There was normal leaflet separation  DOPPLER: The transmitral velocity was within the normal range  There was no evidence for stenosis  There was mild regurgitation  AORTIC VALVE: The valve was trileaflet   Leaflets exhibited normal thickness and normal cuspal separation  DOPPLER: Transaortic velocity was within the normal range  There was no evidence for stenosis  There was no regurgitation  TRICUSPID VALVE: The valve structure was normal  There was normal leaflet separation  DOPPLER: The transtricuspid velocity was within the normal range  There was no evidence for stenosis  There was mild regurgitation  Estimated peak PA  pressure was 33 mmHg  PULMONIC VALVE: Leaflets exhibited normal thickness, no calcification, and normal cuspal separation  DOPPLER: The transpulmonic velocity was within the normal range  There was no significant regurgitation  PERICARDIUM: A trace, free-flowing pericardial effusion was identified  There was no evidence of hemodynamic compromise  AORTA: The root exhibited normal size  SYSTEMIC VEINS: IVC: The inferior vena cava was not well visualized      SYSTEM MEASUREMENT TABLES    2D  %FS: 29 85 %  Ao Diam: 2 39 cm  EDV(Teich): 52 8 ml  EF Biplane: 55 93 %  EF(Teich): 57 99 %  ESV(Teich): 22 18 ml  IVSd: 1 14 cm  LA Area: 20 45 cm2  LA Diam: 3 62 cm  LVEDV MOD A2C: 127 02 ml  LVEDV MOD A4C: 116 5 ml  LVEDV MOD BP: 127 96 ml  LVEF MOD A2C: 59 2 %  LVEF MOD A4C: 48 18 %  LVESV MOD A2C: 51 82 ml  LVESV MOD A4C: 60 37 ml  LVESV MOD BP: 56 39 ml  LVIDd: 3 55 cm  LVIDs: 2 49 cm  LVLd A2C: 9 81 cm  LVLd A4C: 8 85 cm  LVLs A2C: 8 03 cm  LVLs A4C: 7 62 cm  LVPWd: 1 18 cm  RA Area: 13 89 cm2  RVIDd: 3 23 cm  SV MOD A2C: 75 2 ml  SV MOD A4C: 56 13 ml  SV(Teich): 30 62 ml    CW  RAP: 10 mmHg  TR Vmax: 2 63 m/s  TR maxP 66 mmHg    MM  TAPSE: 1 85 cm    PW  E': 0 06 m/s  E/E': 19 47  MV A Miko: 1 13 m/s  MV Dec [place]: 6 89 m/s2  MV DecT: 168 4 ms  MV E Miko: 1 16 m/s  MV E/A Ratio: 1 03  MV PHT: 48 84 ms  MVA By PHT: 4 5 cm2  RVSP: 37 66 mmHg    [facility]    Prepared and electronically signed by    Prema Tang MD  Signed 30-Dec-2018 13:04:11       No results found for this or any previous visit  No results found for this or any previous visit  No results found for this or any previous visit      Meds/Allergies   Current Facility-Administered Medications   Medication Dose Route Frequency Provider Last Rate    acetaminophen  650 mg Oral Q6H PRN Dia Cortes MD      amLODIPine  5 mg Oral Daily Kathleen Herrera,       aspirin  81 mg Oral Daily Dia Cortes MD      atorvastatin  40 mg Oral HS Dia Cortes MD      bumetanide  2 mg Intravenous BID Destin Garnica MD      carvedilol  12 5 mg Oral BID With Meals Kathleen Herrera, DO      cefTRIAXone  1,000 mg Intravenous Q24H Dia Cortes MD 1,000 mg (02/22/21 1511)    heparin (porcine)  5,000 Units Subcutaneous Q8H [address] CLARICE Olsen      hydrALAZINE  50 mg Oral TID CLARICE Copeland      insulin glargine  15 Units Subcutaneous HS MD Jackie Wood insulin lispro  1-6 Units Subcutaneous TID [facility] [place] MD Jackie Tello insulin lispro  1-6 Units Subcutaneous HS MD Jackie Tello insulin lispro  5 Units Subcutaneous TID With Meals Chad Duran MD      isosorbide mononitrate  30 mg Oral Daily Dia Cortes MD      Labetalol HCl  10 mg Intravenous Q4H PRN Dia oCrtes MD      levothyroxine  125 mcg Oral Early Morning CLARICE Ramirez      sodium chloride (PF)  3 mL Intravenous Q1H PRN Vi Rowland MD          Medications Prior to Admission   Medication    amLODIPine (NORVASC) 5 mg tablet    aspirin 81 mg chewable tablet    atorvastatin (LIPITOR) 40 mg tablet    carvedilol (COREG) 6 25 mg tablet    hydrALAZINE (APRESOLINE) 10 mg tablet    insulin aspart protamine-insulin aspart (NovoLOG 70/30) 100 units/mL injection    isosorbide mononitrate (IMDUR) 30 mg 24 hr tablet    levothyroxine 112 mcg tablet    losartan (COZAAR) 25 mg tablet    torsemide (DEMADEX) 20 mg tablet    nitroglycerin (NITROSTAT) 0 4 mg SL tablet       Assessment:  Principal Problem:    Community acquired pneumonia  Active Problems:    Acute on chronic combined systolic and diastolic congestive heart failure (HCC)    Type 2 diabetes mellitus with kidney complication, with long-term current use of insulin (HCC)    Hypothyroidism    Hypertension    Elevated troponin    Hyperlipidemia    Acute renal failure superimposed on stage 4 chronic kidney disease (HCC)    Anemia    Coronary artery disease involving native coronary artery of native heart with angina pectoris (HCC)    Hyperphosphatemia      Impression:  1  Acute on chronic diastolic heart failure - on IV diuretics  Some improvement on IV Bumex  2  Hypertension - improved control  3  KELLY on CKD - slightly worse  4  CAD - stable  5  Moderate pericardial effusion - no hemodynamic compromise  Plan:  1  Continue IV Bumex for now  2  Continue remainder of medications  3  Renal consult

## 2021-02-23 NOTE — ASSESSMENT & PLAN NOTE
Lab Results   Component Value Date    EGFR 14 02/23/2021    EGFR 15 02/22/2021    EGFR 16 02/21/2021    CREATININE 3 35 (H) 02/23/2021    CREATININE 3 08 (H) 02/22/2021    CREATININE 2 91 (H) 02/21/2021   · CKD stage 3 with a baseline creatinine around 1 5-2  Recent KELLY related to contrast induced nephropathy/anemia  Creatinine slightly reduced at 3 35 today  · Monitor renal function carefully in the setting of intravenous diuresis  · Nephrology following  · Losartan on hold  · Avoid nephrotoxins and hypotension  · BMP in a m

## 2021-02-23 NOTE — PROGRESS NOTES
NEPHROLOGY PROGRESS NOTE   Darryl Austin 76 y o  female MRN: [id number]  Unit/Bed#: [facility] 504-01 Encounter: [id number]      ASSESSMENT/PLAN:  Acute kidney injury (POA) on chronic kidney disease, stage IV:   - etiology suspect secondary to cardiorenal syndrome in the setting of volume overload, failure to auto regulate in the setting of ARB, underlying pneumonia +/- progression to ATN  - outpatient nephrologist: Dr Maximiliano Person  - upon review of medical records, creatinine 1 9- 2 2 mg/dL this past year  - creatinine 2 91 mg/dL upon admission on 02/21/2021   - most recent creatinine 3 35  mg/dL today  - continue to hold ARB  - on Bumex 2 mg IV b i d  Per Cardiology  - UA: > 1000 glucose, trace blood, + 4 protein, 2-4 RBCs, 10-20 wbc's, 5-10 hyaline casts  Urine creatinine 142, urine sodium: 34 FENa 0 5%  Will quantify proteinuria and add-on urine protein creatine ratio to original urine studies  - imaging: Ultrasound completed in January 2019 revealed right kidney 9 4 cm, left kidney 9 9 cm  Normal echogenicity and contour bilaterally  Will check renal artery doppler as below  - maintain urinary retention protocol   - avoid NSAIDs, nephrotoxic agents, IV contrast   - adjust medications to appropriate GFR  - monitor volume status with strict intake/output, daily weight  - weight minimally changed  - will check BMP, magnesium, phosphorus in a m      Electrolytes, acid/base:  - mild hyperkalemia most recent potassium 4 9               - in the setting of acute renal failure, continue strict low-potassium diet  - continue on diuretics as above  - most recent bicarbonate 25    - will continue monitor with repeat lab studies      Blood pressure, hypertension:  - blood pressure with elevations, fluctuations   To note, SBP > 200 upon admission    - outpatient regimen includes: Amlodipine 5 mg daily, carvedilol 6 25 mg b i d , hydralazine 25 mg t i d , Imdur 30 mg daily, losartan 25 mg daily, torsemide 40 mg in the a m  and 20 mg in the p m   - currently on: Amlodipine 5 mg daily, Bumex 2 mg IV b i d , carvedilol 12 5 mg b i d , hydralazine 50 mg t i d , Imdur 30 mg daily  - recommendations: No changes for now, maintain hold parameters on antihypertensives for SBP < 130    - secondary workup including metanephrines, catecholamines, renin, aldosterone pending  Will also place order for renal artery doppler   - optimize hemodynamics; avoid hypotension and fluctuations of blood pressure    - maintain MAP > 65       H/H, anemia of chronic kidney disease:  - most recent hemoglobin 7 3 grams/deciliter   - goal hemoglobin greater than 8 grams/deciliter  - recommend PRBC transfusion for hemoglobin less than 7   - avoid IV Venofer light of infection        Mineral bone disease of chronic kidney disease:  - hyperphosphatemia with most recent phosphorus 5 7, magnesium 2 1    - suspect improvement in phosphorus with improvement in renal indices  If no improvement, will plan to initiate phosphorus binders  - recommend low phosphorus diet  - will continue monitor PTH, magnesium, phosphorus as an outpatient      Other medical problems:  - community-acquired pneumonia, continued on Rocephin per primary team      - acute on chronic combined CHF, chest x-ray revealed interstitial pulmonary edema and small right pleural effusion, echocardiogram completed in 2018 revealed EF of 50% (awaiting repeat echocardiogram), Cardiology following  Continue 1 5 L fluid restriction and IV diuretics above      - diabetes, most recent hemoglobin A1c 13 2, on insulin per primary team     SUBJECTIVE:  Patient resting in chair  Patient without acute shortness of breath or chest pain  Patient states she is eating and drinking well      OBJECTIVE:  Current Weight: Weight - Scale: 77 5 kg (170 lb 13 7 oz)  Vitals:    02/23/21 0903   BP: 142/64   Pulse:    Resp:    Temp:    SpO2:        Intake/Output Summary (Last 24 hours) at 2/23/2021 1159  Last data filed at 2/23/2021 0901  Gross per 24 hour   Intake 420 ml   Output 1550 ml   Net -1130 ml       General:  No acute distress  Skin:  Warm, no rash  Eyes:  Sclerae anicteric  ENT:  Moist lips and mucous membranes  Neck:  Supple trachea midline  Chest:  Diminished breath sounds at the bilateral bases   CVS:  Regular rate regular rhythm  Abdomen:  Soft, nontender, normoactive bowel sounds  Extremities:  +1 bilateral lower extremity edema   Neuro:  Awake and interactive  Psych:  Appropriate affect      Medications:  Scheduled Meds:  Current Facility-Administered Medications   Medication Dose Route Frequency Provider Last Rate    acetaminophen  650 mg Oral Q6H PRN Danita Allen MD      amLODIPine  5 mg Oral Daily Kathleen Greenfield DO      aspirin  81 mg Oral Daily Danita Allen MD      atorvastatin  40 mg Oral HS Danita Allen MD      bumetanide  2 mg Intravenous BID Henry Jeong MD      carvedilol  12 5 mg Oral BID With Meals Kathleen Greenfield DO      cefTRIAXone  1,000 mg Intravenous Q24H Danita Allen MD 1,000 mg (02/22/21 1511)    heparin (porcine)  5,000 Units Subcutaneous Q8H [address] CLARICE Olsen      hydrALAZINE  50 mg Oral TID CLARICE Copeland      insulin glargine  15 Units Subcutaneous HS MD Margarette Ramirez insulin lispro  1-6 Units Subcutaneous TID [facility] MD Margarette Rivero insulin lispro  1-6 Units Subcutaneous HS MD Margarette Rivero insulin lispro  5 Units Subcutaneous TID With Meals Monster Moore MD      isosorbide mononitrate  30 mg Oral Daily Danita Allen MD      Labetalol HCl  10 mg Intravenous Q4H PRN Danita Allen MD      levothyroxine  125 mcg Oral Early Morning CLARICE Olsen      sodium chloride (PF)  3 mL Intravenous Q1H PRN Vi Rowland MD         PRN Meds:   acetaminophen    Labetalol HCl    Insert peripheral IV **AND** sodium chloride (PF)    Continuous Infusions:     Laboratory Results:  Results from last 7 days   Lab Units 02/23/21  0536 02/23/21  0532 02/22/21  0433 02/21/21  1037   WBC Thousand/uL  --  6 35 7 75 8 36   HEMOGLOBIN g/dL  --  7 3* 8 1* 8 4*   HEMATOCRIT %  --  24 4* 26 6* 27 9*   PLATELETS Thousands/uL  --  130* 160 161   SODIUM mmol/L 136  --  141 139   POTASSIUM mmol/L 4 9  --  4 8 4 9   CHLORIDE mmol/L 105  --  109* 108   CO2 mmol/L 25  --  25 25   BUN mg/dL 62*  --  48* 45*   CREATININE mg/dL 3 35*  --  3 08* 2 91*   CALCIUM mg/dL 8 6  --  9 4 9 3   MAGNESIUM mg/dL 2 1  --  2 0  --    PHOSPHORUS mg/dL 5 7*  --  4 4*  --

## 2021-02-23 NOTE — PROGRESS NOTES
[facility]    Progress Note - Margarette Matthew 1955, 76 y o  female MRN: [id number]    Unit/Bed#: [facility] 504-01 Encounter: [id number]    Primary Care Provider: Selene Zhao MD   Date and time admitted to hospital: 2/21/2021 10:21 AM        * Community acquired pneumonia  Assessment & Plan  · Patient presenting with few day history of worsened shortness of breath and productive cough with intermittent chills  · Chest x-ray with right sided basilar infiltrate and vascular congestion  · Continue intravenous Rocephin, Rx day 3  Azithromycin was discontinued given negative Legionella  · COVID-19/influenza/RSV PCR negative  · Procalcitonin 0  53, will trend  Remains afebrile without leukocytosis  Acute on chronic combined systolic and diastolic congestive heart failure (HCC)  Assessment & Plan  Wt Readings from Last 3 Encounters:   02/23/21 77 5 kg (170 lb 13 7 oz)   11/02/20 77 1 kg (170 lb)   08/10/20 76 3 kg (168 lb 3 2 oz)     · Presenting with few day history of worsening shortness of breath and lower extremity edema  · Cardiology following, currently on IV Bumex  · Continue beta-blocker  · Echo shows EF 60%  Acne dose of basal inferior and basal mid inferior lateral walls  Grade 2 diastolic dysfunction  LA mildly dilated  Mild MR, trace TR, trace PI  Moderate pericardial effusion identified without evidence of hemodynamic compromise  · I&Os, daily weights, low-sodium diet  · Monitor volume status      Acute renal failure superimposed on stage 4 chronic kidney disease [facility])  Assessment & Plan  Lab Results   Component Value Date    EGFR 14 02/23/2021    EGFR 15 02/22/2021    EGFR 16 02/21/2021    CREATININE 3 35 (H) 02/23/2021    CREATININE 3 08 (H) 02/22/2021    CREATININE 2 91 (H) 02/21/2021   · CKD stage 3 with a baseline creatinine around 1 5-2  Recent KELLY related to contrast induced nephropathy/anemia  Creatinine slightly reduced at 3 35 today    · Monitor renal function carefully in the setting of intravenous diuresis  · Nephrology following  · Losartan on hold  · Avoid nephrotoxins and hypotension  · BMP in a m  Elevated troponin  Assessment & Plan  · Non MI troponin elevation in the setting of pneumonia/heart failure exacerbation  Coronary artery disease involving native coronary artery of native heart with angina pectoris [facility])  Assessment & Plan  · Has history of PCI in the past   · Continue beta-blocker, aspirin, statin, Imdur  Type 2 diabetes mellitus with kidney complication, with long-term current use of insulin [facility])  Assessment & Plan  Lab Results   Component Value Date    HGBA1C 13 2 (H) 02/21/2021       Recent Labs     02/22/21  1042 02/22/21  1620 02/22/21  2042 02/23/21  0726   POCGLU 331* 303* 196* 105       · Uncontrolled as evidenced by A1c  Follows with endocrinology at [facility] (Dr Cari Myers)  takes 70/30 at home 10 units b i d    · Endocrinology following, continue Lantus 15 units q h s  And Humalog 5 units t i d  With meals plus sliding scale  · Diabetic diet  · Insulin adjustments per endo  Hypertension  Assessment & Plan  · Urgency noted  Continue Norvasc, Coreg, hydralazine, Imdur  Coreg and hydralazine increased 2/22 by cardiology  · Monitor    Hypothyroidism  Assessment & Plan  · TSH 20 800  Increase levothyroxine to 125 mcg  · Reviewed proper administration with patient, was not taking on empty stomach consistently  · Repeat thyroid function testing in 4-6 weeks    Hyperlipidemia  Assessment & Plan  · Continue statin    Anemia  Assessment & Plan  · Hemoglobin 7 3 today, trending down  No signs or symptoms of bleeding  Baseline appears to be between 7 and 8   · Trend       Pharmacologic: Heparin  Mechanical VTE Prophylaxis in Place: Yes    Patient Centered Rounds: I have performed bedside rounds with nursing staff today      Discussions with Specialists or Other Care Team Provider: nursing, case management, nephrology     Education and Discussions with Family / Patient: patient and daughter     Time Spent for Care: 30 minutes  More than 50% of total time spent on counseling and coordination of care as described above  Current Length of Stay: 2 day(s)    Current Patient Status: Inpatient   Certification Statement: The patient will continue to require additional inpatient hospital stay due to IV abx, diuresis, additional cards/nephro input    Discharge Plan / Estimated Discharge Date: not stable for d/c  Code Status: Level 1 - Full Code      Subjective:   Patient offers no acute complaints  Denies any CP, SOB  Cough has resolved  Denies fevers, chills  Lower extremity edema is improving, remains slightly worse on left than right  Objective:     Vitals:   Temp (24hrs), Av 9 °F (36 6 °C), Min:97 5 °F (36 4 °C), Max:98 6 °F (37 °C)    Temp:  [97 5 °F (36 4 °C)-98 6 °F (37 °C)] 98 °F (36 7 °C)  HR:  [56-67] 67  Resp:  [14-18] 16  BP: (139-187)/(63-79) 142/64  SpO2:  [94 %-100 %] 96 %  Body mass index is 28 43 kg/m²  Input and Output Summary (last 24 hours): Intake/Output Summary (Last 24 hours) at 2021 0918  Last data filed at 2021 0901  Gross per 24 hour   Intake 900 ml   Output 2025 ml   Net -1125 ml       Physical Exam:     Physical Exam  Vitals signs and nursing note reviewed  Constitutional:       Appearance: She is obese  HENT:      Head: Normocephalic  Cardiovascular:      Rate and Rhythm: Normal rate  Heart sounds: No murmur  Pulmonary:      Breath sounds: Decreased breath sounds present  Abdominal:      Tenderness: There is no abdominal tenderness  Musculoskeletal:         General: Swelling (lower extremities, L>R) present  Skin:     General: Skin is warm  Neurological:      Mental Status: She is alert and oriented to person, place, and time  Mental status is at baseline     Psychiatric:         Mood and Affect: Mood normal          Additional Data:     Labs:    Results from last 7 days   Lab Units 02/23/21  0532   WBC Thousand/uL 6 35   HEMOGLOBIN g/dL 7 3*   HEMATOCRIT % 24 4*   PLATELETS Thousands/uL 130*   NEUTROS PCT % 66   LYMPHS PCT % 23   MONOS PCT % 6   EOS PCT % 3     Results from last 7 days   Lab Units 02/23/21  0536  02/21/21  1037   POTASSIUM mmol/L 4 9   < > 4 9   CHLORIDE mmol/L 105   < > 108   CO2 mmol/L 25   < > 25   BUN mg/dL 62*   < > 45*   CREATININE mg/dL 3 35*   < > 2 91*   CALCIUM mg/dL 8 6   < > 9 3   ALK PHOS U/L  --   --  591*   ALT U/L  --   --  39   AST U/L  --   --  32    < > = values in this interval not displayed       Results from last 7 days   Lab Units 02/21/21  1328   INR  1 10         Recent Cultures (last 7 days):     Results from last 7 days   Lab Units 02/21/21  1344   LEGIONELLA URINARY ANTIGEN  Negative       Last 24 Hours Medication List:   Current Facility-Administered Medications   Medication Dose Route Frequency Provider Last Rate    acetaminophen  650 mg Oral Q6H PRN Clarissa Almodovar MD      amLODIPine  5 mg Oral Daily Kathleen K Jessica, DO      aspirin  81 mg Oral Daily Clarissa Almodovar MD      atorvastatin  40 mg Oral HS Clarissa Almodovar MD      bumetanide  2 mg Intravenous BID Jovany Floyd MD      carvedilol  12 5 mg Oral BID With Meals Kathleen Kylie Tong, DO      cefTRIAXone  1,000 mg Intravenous Q24H Clarissa Almodovar MD 1,000 mg (02/22/21 1511)    heparin (porcine)  5,000 Units Subcutaneous Q8H [address] Elaine M CLARICE Reagan      hydrALAZINE  50 mg Oral TID CLARICE Copeland      insulin glargine  15 Units Subcutaneous HS Maria Fernanda Avilez MD      insulin lispro  1-6 Units Subcutaneous TID [facility] Clarissa Almodovra MD      insulin lispro  1-6 Units Subcutaneous HS Clarissa Almodovar MD      insulin lispro  5 Units Subcutaneous TID With Meals Maria Fernanda Avilez MD      isosorbide mononitrate  30 mg Oral Daily Clarissa Almodovar MD      Labetalol HCl  10 mg Intravenous Q4H PRN Clarissa Almodovar MD      levothyroxine  125 mcg Oral Early Morning Elaine M CLARICE Reagan      sodium chloride (PF)  3 mL Intravenous Q1H PRN Crissy Dao MD          Today, Patient Was Seen By: CLARICE Rodriguez    ** Please Note: Dragon 360 Dictation voice to text software may have been used in the creation of this document   **

## 2021-02-23 NOTE — ASSESSMENT & PLAN NOTE
· Urgency noted  Continue Norvasc, Coreg, hydralazine, Imdur  Coreg and hydralazine increased 2/22 by cardiology    · Monitor

## 2021-02-23 NOTE — ASSESSMENT & PLAN NOTE
· Patient presenting with few day history of worsened shortness of breath and productive cough with intermittent chills  · Chest x-ray with right sided basilar infiltrate and vascular congestion  · Continue intravenous Rocephin, Rx day 4  Azithromycin was discontinued given negative Legionella  · COVID-19/influenza/RSV PCR negative  · Procalcitonin 0  53, will trend  Remains afebrile without leukocytosis

## 2021-02-23 NOTE — ASSESSMENT & PLAN NOTE
· Hemoglobin 7 3 today, trending down  No signs or symptoms of bleeding    Baseline appears to be between 7 and 8   · Trend

## 2021-02-23 NOTE — ASSESSMENT & PLAN NOTE
Wt Readings from Last 3 Encounters:   02/23/21 77 5 kg (170 lb 13 7 oz)   11/02/20 77 1 kg (170 lb)   08/10/20 76 3 kg (168 lb 3 2 oz)     · Presenting with few day history of worsening shortness of breath and lower extremity edema  · Cardiology following, currently on IV Bumex  · Continue beta-blocker  · Echo shows EF 60%  Acne dose of basal inferior and basal mid inferior lateral walls  Grade 2 diastolic dysfunction  LA mildly dilated  Mild MR, trace TR, trace PI  Moderate pericardial effusion identified without evidence of hemodynamic compromise    · I&Os, daily weights, low-sodium diet  · Monitor volume status

## 2021-02-23 NOTE — ASSESSMENT & PLAN NOTE
Lab Results   Component Value Date    HGBA1C 13 2 (H) 02/21/2021       Recent Labs     02/22/21  1042 02/22/21  1620 02/22/21 2042 02/23/21  0726   POCGLU 331* 303* 196* 105       · Uncontrolled as evidenced by A1c  Follows with endocrinology at [facility] (Dr Valentino Easter)  takes 70/30 at home 10 units b i d    · Endocrinology following, continue Lantus 15 units q h s  And Humalog 5 units t i d  With meals plus sliding scale  · Diabetic diet  · Insulin adjustments per endo

## 2021-02-24 ENCOUNTER — APPOINTMENT (INPATIENT)
Dept: NON INVASIVE DIAGNOSTICS | Facility: HOSPITAL | Age: 66
DRG: 291 | End: 2021-02-24
Payer: COMMERCIAL

## 2021-02-24 ENCOUNTER — APPOINTMENT (INPATIENT)
Dept: RADIOLOGY | Facility: HOSPITAL | Age: 66
DRG: 291 | End: 2021-02-24
Payer: COMMERCIAL

## 2021-02-24 PROBLEM — R80.9 PROTEINURIA: Status: ACTIVE | Noted: 2019-05-31

## 2021-02-24 LAB
ABO GROUP BLD: NORMAL
ANION GAP SERPL CALCULATED.3IONS-SCNC: 8 MMOL/L (ref 4–13)
BACTERIA UR QL AUTO: ABNORMAL /HPF
BASOPHILS # BLD AUTO: 0.01 THOUSANDS/ΜL (ref 0–0.1)
BASOPHILS NFR BLD AUTO: 0 % (ref 0–1)
BILIRUB UR QL STRIP: NEGATIVE
BLD GP AB SCN SERPL QL: POSITIVE
BLOOD GROUP ANTIBODIES SERPL: NORMAL
BUN SERPL-MCNC: 73 MG/DL (ref 5–25)
CALCIUM SERPL-MCNC: 8.3 MG/DL (ref 8.3–10.1)
CHLORIDE SERPL-SCNC: 107 MMOL/L (ref 100–108)
CLARITY UR: ABNORMAL
CO2 SERPL-SCNC: 21 MMOL/L (ref 21–32)
COLOR UR: YELLOW
CREAT SERPL-MCNC: 3.77 MG/DL (ref 0.6–1.3)
EOSINOPHIL # BLD AUTO: 0.08 THOUSAND/ΜL (ref 0–0.61)
EOSINOPHIL NFR BLD AUTO: 2 % (ref 0–6)
ERYTHROCYTE [DISTWIDTH] IN BLOOD BY AUTOMATED COUNT: 13.3 % (ref 11.6–15.1)
GFR SERPL CREATININE-BSD FRML MDRD: 12 ML/MIN/1.73SQ M
GLUCOSE SERPL-MCNC: 106 MG/DL (ref 65–140)
GLUCOSE SERPL-MCNC: 108 MG/DL (ref 65–140)
GLUCOSE SERPL-MCNC: 122 MG/DL (ref 65–140)
GLUCOSE SERPL-MCNC: 123 MG/DL (ref 65–140)
GLUCOSE SERPL-MCNC: 145 MG/DL (ref 65–140)
GLUCOSE SERPL-MCNC: 146 MG/DL (ref 65–140)
GLUCOSE SERPL-MCNC: 153 MG/DL (ref 65–140)
GLUCOSE SERPL-MCNC: 169 MG/DL (ref 65–140)
GLUCOSE SERPL-MCNC: 213 MG/DL (ref 65–140)
GLUCOSE SERPL-MCNC: 229 MG/DL (ref 65–140)
GLUCOSE SERPL-MCNC: 234 MG/DL (ref 65–140)
GLUCOSE SERPL-MCNC: 238 MG/DL (ref 65–140)
GLUCOSE SERPL-MCNC: 242 MG/DL (ref 65–140)
GLUCOSE SERPL-MCNC: 256 MG/DL (ref 65–140)
GLUCOSE SERPL-MCNC: 368 MG/DL (ref 65–140)
GLUCOSE SERPL-MCNC: 84 MG/DL (ref 65–140)
GLUCOSE UR STRIP-MCNC: ABNORMAL MG/DL
HCT VFR BLD AUTO: 23.6 % (ref 34.8–46.1)
HCT VFR BLD AUTO: 24.2 % (ref 34.8–46.1)
HGB BLD-MCNC: 7.2 G/DL (ref 11.5–15.4)
HGB BLD-MCNC: 7.6 G/DL (ref 11.5–15.4)
HGB UR QL STRIP.AUTO: NEGATIVE
HGB UR QL STRIP.AUTO: NEGATIVE
HYALINE CASTS #/AREA URNS LPF: ABNORMAL /LPF
IMM GRANULOCYTES # BLD AUTO: 0.02 THOUSAND/UL (ref 0–0.2)
IMM GRANULOCYTES NFR BLD AUTO: 0 % (ref 0–2)
KETONES UR STRIP-MCNC: NEGATIVE MG/DL
LDH SERPL-CCNC: 332 U/L (ref 81–234)
LEUKOCYTE ESTERASE UR QL STRIP: ABNORMAL
LYMPHOCYTES # BLD AUTO: 0.69 THOUSANDS/ΜL (ref 0.6–4.47)
LYMPHOCYTES NFR BLD AUTO: 14 % (ref 14–44)
MAGNESIUM SERPL-MCNC: 2 MG/DL (ref 1.6–2.6)
MCH RBC QN AUTO: 28 PG (ref 26.8–34.3)
MCHC RBC AUTO-ENTMCNC: 30.5 G/DL (ref 31.4–37.4)
MCV RBC AUTO: 92 FL (ref 82–98)
MONOCYTES # BLD AUTO: 0.24 THOUSAND/ΜL (ref 0.17–1.22)
MONOCYTES NFR BLD AUTO: 5 % (ref 4–12)
NEUTROPHILS # BLD AUTO: 3.81 THOUSANDS/ΜL (ref 1.85–7.62)
NEUTS SEG NFR BLD AUTO: 79 % (ref 43–75)
NITRITE UR QL STRIP: NEGATIVE
NON-SQ EPI CELLS URNS QL MICRO: ABNORMAL /HPF
NRBC BLD AUTO-RTO: 0 /100 WBCS
PH UR STRIP.AUTO: 5 [PH]
PHOSPHATE SERPL-MCNC: 5.8 MG/DL (ref 2.3–4.1)
PLATELET # BLD AUTO: 111 THOUSANDS/UL (ref 149–390)
PMV BLD AUTO: 11.1 FL (ref 8.9–12.7)
POTASSIUM SERPL-SCNC: 4.9 MMOL/L (ref 3.5–5.3)
PROCALCITONIN SERPL-MCNC: 0.62 NG/ML
PROT UR STRIP-MCNC: ABNORMAL MG/DL
RBC # BLD AUTO: 2.57 MILLION/UL (ref 3.81–5.12)
RBC #/AREA URNS AUTO: ABNORMAL /HPF
RBC, URINE: NORMAL
RH BLD: NEGATIVE
SODIUM SERPL-SCNC: 136 MMOL/L (ref 136–145)
SP GR UR STRIP.AUTO: 1.02 (ref 1–1.03)
SPECIMEN EXPIRATION DATE: NORMAL
TROPONIN I SERPL-MCNC: 0.09 NG/ML
UROBILINOGEN UR QL STRIP.AUTO: 0.2 E.U./DL
WBC # BLD AUTO: 4.85 THOUSAND/UL (ref 4.31–10.16)
WBC #/AREA URNS AUTO: ABNORMAL /HPF

## 2021-02-24 PROCEDURE — 85014 HEMATOCRIT: CPT | Performed by: FAMILY MEDICINE

## 2021-02-24 PROCEDURE — 86921 COMPATIBILITY TEST INCUBATE: CPT

## 2021-02-24 PROCEDURE — 99232 SBSQ HOSP IP/OBS MODERATE 35: CPT | Performed by: INTERNAL MEDICINE

## 2021-02-24 PROCEDURE — 83735 ASSAY OF MAGNESIUM: CPT | Performed by: NURSE PRACTITIONER

## 2021-02-24 PROCEDURE — 86901 BLOOD TYPING SEROLOGIC RH(D): CPT | Performed by: FAMILY MEDICINE

## 2021-02-24 PROCEDURE — 84100 ASSAY OF PHOSPHORUS: CPT | Performed by: NURSE PRACTITIONER

## 2021-02-24 PROCEDURE — 84484 ASSAY OF TROPONIN QUANT: CPT | Performed by: PHYSICIAN ASSISTANT

## 2021-02-24 PROCEDURE — 81003 URINALYSIS AUTO W/O SCOPE: CPT | Performed by: FAMILY MEDICINE

## 2021-02-24 PROCEDURE — 86850 RBC ANTIBODY SCREEN: CPT | Performed by: NURSE PRACTITIONER

## 2021-02-24 PROCEDURE — 86870 RBC ANTIBODY IDENTIFICATION: CPT | Performed by: INTERNAL MEDICINE

## 2021-02-24 PROCEDURE — 80048 BASIC METABOLIC PNL TOTAL CA: CPT | Performed by: INTERNAL MEDICINE

## 2021-02-24 PROCEDURE — 93005 ELECTROCARDIOGRAM TRACING: CPT

## 2021-02-24 PROCEDURE — 81015 MICROSCOPIC EXAM OF URINE: CPT | Performed by: FAMILY MEDICINE

## 2021-02-24 PROCEDURE — 93975 VASCULAR STUDY: CPT

## 2021-02-24 PROCEDURE — 81001 URINALYSIS AUTO W/SCOPE: CPT | Performed by: FAMILY MEDICINE

## 2021-02-24 PROCEDURE — 84145 PROCALCITONIN (PCT): CPT | Performed by: NURSE PRACTITIONER

## 2021-02-24 PROCEDURE — 86900 BLOOD TYPING SEROLOGIC ABO: CPT | Performed by: NURSE PRACTITIONER

## 2021-02-24 PROCEDURE — 99233 SBSQ HOSP IP/OBS HIGH 50: CPT | Performed by: INTERNAL MEDICINE

## 2021-02-24 PROCEDURE — 86880 COOMBS TEST DIRECT: CPT | Performed by: FAMILY MEDICINE

## 2021-02-24 PROCEDURE — 85025 COMPLETE CBC W/AUTO DIFF WBC: CPT | Performed by: NURSE PRACTITIONER

## 2021-02-24 PROCEDURE — 86922 COMPATIBILITY TEST ANTIGLOB: CPT

## 2021-02-24 PROCEDURE — 82948 REAGENT STRIP/BLOOD GLUCOSE: CPT

## 2021-02-24 PROCEDURE — 85018 HEMOGLOBIN: CPT | Performed by: FAMILY MEDICINE

## 2021-02-24 PROCEDURE — P9016 RBC LEUKOCYTES REDUCED: HCPCS

## 2021-02-24 PROCEDURE — 83010 ASSAY OF HAPTOGLOBIN QUANT: CPT | Performed by: FAMILY MEDICINE

## 2021-02-24 PROCEDURE — 86901 BLOOD TYPING SEROLOGIC RH(D): CPT | Performed by: NURSE PRACTITIONER

## 2021-02-24 PROCEDURE — 71045 X-RAY EXAM CHEST 1 VIEW: CPT

## 2021-02-24 PROCEDURE — 99232 SBSQ HOSP IP/OBS MODERATE 35: CPT | Performed by: FAMILY MEDICINE

## 2021-02-24 PROCEDURE — 86900 BLOOD TYPING SEROLOGIC ABO: CPT | Performed by: FAMILY MEDICINE

## 2021-02-24 PROCEDURE — 83615 LACTATE (LD) (LDH) ENZYME: CPT | Performed by: FAMILY MEDICINE

## 2021-02-24 RX ORDER — DIPHENHYDRAMINE HYDROCHLORIDE 50 MG/ML
25 INJECTION INTRAMUSCULAR; INTRAVENOUS EVERY 6 HOURS PRN
Status: DISCONTINUED | OUTPATIENT
Start: 2021-02-24 | End: 2021-02-27 | Stop reason: HOSPADM

## 2021-02-24 RX ORDER — CALCIUM ACETATE 667 MG/1
667 CAPSULE ORAL 2 TIMES DAILY WITH MEALS
Status: DISCONTINUED | OUTPATIENT
Start: 2021-02-24 | End: 2021-02-27 | Stop reason: HOSPADM

## 2021-02-24 RX ORDER — DEXTROSE MONOHYDRATE 25 G/50ML
50 INJECTION, SOLUTION INTRAVENOUS ONCE
Status: COMPLETED | OUTPATIENT
Start: 2021-02-24 | End: 2021-02-24

## 2021-02-24 RX ORDER — DEXTROSE MONOHYDRATE 100 MG/ML
50 INJECTION, SOLUTION INTRAVENOUS CONTINUOUS
Status: DISCONTINUED | OUTPATIENT
Start: 2021-02-24 | End: 2021-02-25

## 2021-02-24 RX ORDER — DEXTROSE MONOHYDRATE 25 G/50ML
INJECTION, SOLUTION INTRAVENOUS
Status: DISCONTINUED
Start: 2021-02-24 | End: 2021-02-25 | Stop reason: HOSPADM

## 2021-02-24 RX ORDER — BUMETANIDE 0.25 MG/ML
1 INJECTION, SOLUTION INTRAMUSCULAR; INTRAVENOUS ONCE
Status: COMPLETED | OUTPATIENT
Start: 2021-02-24 | End: 2021-02-24

## 2021-02-24 RX ADMIN — HEPARIN SODIUM 5000 UNITS: 5000 INJECTION INTRAVENOUS; SUBCUTANEOUS at 21:31

## 2021-02-24 RX ADMIN — DEXTROSE MONOHYDRATE 30 ML/HR: 100 INJECTION, SOLUTION INTRAVENOUS at 22:30

## 2021-02-24 RX ADMIN — ATORVASTATIN CALCIUM 40 MG: 40 TABLET, FILM COATED ORAL at 21:31

## 2021-02-24 RX ADMIN — ASPIRIN 81 MG: 81 TABLET, CHEWABLE ORAL at 10:07

## 2021-02-24 RX ADMIN — LEVOTHYROXINE SODIUM 125 MCG: 125 TABLET ORAL at 06:06

## 2021-02-24 RX ADMIN — INSULIN GLARGINE 15 UNITS: 100 INJECTION, SOLUTION SUBCUTANEOUS at 21:31

## 2021-02-24 RX ADMIN — DEXTROSE MONOHYDRATE 50 ML: 500 INJECTION PARENTERAL at 21:54

## 2021-02-24 RX ADMIN — HYDRALAZINE HYDROCHLORIDE 50 MG: 50 TABLET, FILM COATED ORAL at 10:07

## 2021-02-24 RX ADMIN — ACETAMINOPHEN 650 MG: 325 TABLET, FILM COATED ORAL at 19:48

## 2021-02-24 RX ADMIN — CARVEDILOL 12.5 MG: 12.5 TABLET, FILM COATED ORAL at 16:53

## 2021-02-24 RX ADMIN — HYDRALAZINE HYDROCHLORIDE 50 MG: 50 TABLET, FILM COATED ORAL at 21:31

## 2021-02-24 RX ADMIN — ACETAMINOPHEN 650 MG: 325 TABLET, FILM COATED ORAL at 13:45

## 2021-02-24 RX ADMIN — INSULIN LISPRO 1 UNITS: 100 INJECTION, SOLUTION INTRAVENOUS; SUBCUTANEOUS at 21:31

## 2021-02-24 RX ADMIN — HEPARIN SODIUM 5000 UNITS: 5000 INJECTION INTRAVENOUS; SUBCUTANEOUS at 06:06

## 2021-02-24 RX ADMIN — CALCIUM ACETATE 667 MG: 667 CAPSULE ORAL at 16:53

## 2021-02-24 RX ADMIN — ISOSORBIDE MONONITRATE 30 MG: 30 TABLET, EXTENDED RELEASE ORAL at 10:06

## 2021-02-24 RX ADMIN — CEFTRIAXONE 1000 MG: 10 INJECTION, POWDER, FOR SOLUTION INTRAVENOUS at 13:32

## 2021-02-24 RX ADMIN — INSULIN LISPRO 2 UNITS: 100 INJECTION, SOLUTION INTRAVENOUS; SUBCUTANEOUS at 10:05

## 2021-02-24 RX ADMIN — INSULIN LISPRO 1 UNITS: 100 INJECTION, SOLUTION INTRAVENOUS; SUBCUTANEOUS at 16:55

## 2021-02-24 RX ADMIN — INSULIN LISPRO 5 UNITS: 100 INJECTION, SOLUTION INTRAVENOUS; SUBCUTANEOUS at 11:50

## 2021-02-24 RX ADMIN — INSULIN LISPRO 5 UNITS: 100 INJECTION, SOLUTION INTRAVENOUS; SUBCUTANEOUS at 10:05

## 2021-02-24 RX ADMIN — HEPARIN SODIUM 5000 UNITS: 5000 INJECTION INTRAVENOUS; SUBCUTANEOUS at 13:32

## 2021-02-24 RX ADMIN — INSULIN LISPRO 3 UNITS: 100 INJECTION, SOLUTION INTRAVENOUS; SUBCUTANEOUS at 11:50

## 2021-02-24 RX ADMIN — HYDRALAZINE HYDROCHLORIDE 50 MG: 50 TABLET, FILM COATED ORAL at 16:53

## 2021-02-24 RX ADMIN — CARVEDILOL 12.5 MG: 12.5 TABLET, FILM COATED ORAL at 10:07

## 2021-02-24 RX ADMIN — BUMETANIDE 1 MG: 0.25 INJECTION, SOLUTION INTRAMUSCULAR; INTRAVENOUS at 19:49

## 2021-02-24 RX ADMIN — AMLODIPINE BESYLATE 5 MG: 5 TABLET ORAL at 10:07

## 2021-02-24 NOTE — PROGRESS NOTES
NEPHROLOGY PROGRESS NOTE   Markell Morse 76 y o  female MRN: [id number]  Unit/Bed#: [facility] 504-01 Encounter: [id number]      ASSESSMENT/PLAN:  Acute kidney injury (POA) on chronic kidney disease, stage IV:   - etiology suspect secondary to cardiorenal syndrome in the setting of volume overload, failure to auto regulate in the setting of ARB, hemodynamic perturbations, underlying pneumonia with progression to ATN  - outpatient nephrologist: Dr Gregory Cortes  - upon review of medical records, creatinine 1 9- 2 2 mg/dL this past year  - creatinine 2 91 mg/dL upon admission on 02/21/2021   - most recent creatinine 3 77 mg/dL today               - continue to hold ARB, evening and a m  dose of diuretics held  Status post gentle IVF hydration due to oliguria  - plan for PRBC transfusion as below, will provide Bumex 1 mg IV post transfusion               - UA: > 1000 glucose, trace blood, + 4 protein, 2-4 RBCs, 10-20 wbc's, 5-10 hyaline casts  Urine creatinine 142, urine sodium: 34, FENa 0 5%  Repeat UA completed on 02/23 revealed 250 glucose, small leukocytes, +3 protein, 10-20 wbc's, 10-20 hyaline casts  Urine creatinine 135, urine sodium 42, FEa 0 77%  Urine protein creatinine ratio 1  51               - imaging: Ultrasound completed in January 2019 revealed right kidney 9 4 cm, left kidney 9 9 cm  Normal echogenicity and contour bilaterally  Renal artery doppler completed, results rending    - maintain urinary retention protocol   - avoid NSAIDs, nephrotoxic agents, IV contrast   - adjust medications to appropriate GFR  - monitor volume status with strict intake/output, daily weight  - will check BMP, magnesium, phosphorus in a m      Electrolytes, acid/base:  - mild hyperkalemia most recent potassium 4 9               - RY the setting of acute renal failure, continue strict low-potassium diet     - most recent bicarbonate 21     - monitor in the setting of advanced chronic kidney disease to determine whether sodium bicarbonate supplements are warranted  Cinda Henry continue monitor with repeat lab studies      Blood pressure, hypertension:  - blood pressure with elevations, fluctuations  To note, SBP > 200 upon admission    - outpatient regimen includes: Amlodipine 5 mg daily, carvedilol 6 25 mg b i d , hydralazine 25 mg t i d , Imdur 30 mg daily, losartan 25 mg daily, torsemide 40 mg in the a m  and 20 mg in the p m   - currently on: Amlodipine 5 mg daily, carvedilol 12 5 mg b i d , hydralazine 50 mg t i d , Imdur 30 mg daily  - recommendations: No changes for now, maintain hold parameters on antihypertensives for SBP < 130    - secondary workup including metanephrines, catecholamines, renin, aldosterone pending  Renal artery doppler completed, results pending   - optimize hemodynamics; avoid hypotension and fluctuations of blood pressure    - maintain MAP > 65       H/H, anemia of chronic kidney disease:  - most recent hemoglobin 7 2 grams/deciliter  - recommend PRBC transfusion, provide diuretics as above post transfusion    - goal hemoglobin greater than 8 grams/deciliter   - recommend PRBC transfusion for hemoglobin less than 7   - avoid IV Venofer light of infection        Mineral bone disease of chronic kidney disease:  - hyperphosphatemia with most recent phosphorus 5 8, magnesium 2 0               - will initiate calcium acetate 667 mg b i d               - continue low phosphorus diet    Cinda Henry continue monitor PTH, magnesium, phosphorus as an outpatient      Other medical problems:  - community-acquired pneumonia, continued on Rocephin per primary team      - acute on chronic combined CHF, chest x-ray revealed interstitial pulmonary edema and small right pleural effusion, echocardiogram completed on 2/22/21 revealed EF of 60% with grade 2 diastolic dysfunction, moderate free flowing pericardial effusion with no evidence of hemodynamic compromise, Cardiology following  Continue 1 5 L fluid restriction       - diabetes, most recent hemoglobin A1c 13 2, on insulin per primary team     SUBJECTIVE:  Patient sitting in chair comfortably  Patient is without acute shortness of breath or chest pain  Patient denies nausea, vomiting, diarrhea      OBJECTIVE:  Current Weight: Weight - Scale: 78 8 kg (173 lb 11 6 oz)  Vitals:    02/24/21 1113   BP: 125/61   Pulse: (!) 52   Resp: 18   Temp:    SpO2: 99%       Intake/Output Summary (Last 24 hours) at 2/24/2021 1155  Last data filed at 2/24/2021 1101  Gross per 24 hour   Intake 736 67 ml   Output 1280 ml   Net -543 33 ml       General:  No acute distress  Skin:  Warm, no rash  Eyes:  Sclerae anicteric  ENT:  Moist lips and mucous membranes  Neck:  Supple with trachea midline  Chest:  Diminished breath sounds bilateral bases   CVS:  Regular rate regular rhythm  Abdomen:  Soft, nontender, normoactive bowel sounds  Extremities:  Bilateral lower extremity edema   Neuro:  Awake and interactive  Psych:  Appropriate affect      Medications:  Scheduled Meds:  Current Facility-Administered Medications   Medication Dose Route Frequency Provider Last Rate    acetaminophen  650 mg Oral Q6H PRN Jamie Crawford MD      amLODIPine  5 mg Oral Daily Kathleen Lopez DO      aspirin  81 mg Oral Daily Jamie Crawford MD      atorvastatin  40 mg Oral HS Jamie Crawford MD      carvedilol  12 5 mg Oral BID With Meals Kathleen Charlton DO      cefTRIAXone  1,000 mg Intravenous Q24H Jamie Crawford MD Stopped (02/23/21 1437)    heparin (porcine)  5,000 Units Subcutaneous Q8H [facility] CLARICE Olsen      hydrALAZINE  50 mg Oral TID CLARICE Copeland      insulin glargine  15 Units Subcutaneous HS MD Richy Tadeo insulin lispro  1-6 Units Subcutaneous TID [facility] MD Richy David insulin lispro  1-6 Units Subcutaneous HS Jamie Crawford MD      insulin lispro  5 Units Subcutaneous TID With Meals Jose Elizabeth MD      isosorbide mononitrate  30 mg Oral Daily Jamie Crawford MD      Labetalol HCl  10 mg Intravenous Q4H PRN [address] Skip Fisher MD      levothyroxine  125 mcg Oral Early Morning CLARICE Olsen      sodium chloride (PF)  3 mL Intravenous Q1H PRN Vi Rowland MD         PRN Meds:   acetaminophen    Labetalol HCl    Insert peripheral IV **AND** sodium chloride (PF)    Continuous Infusions:     Laboratory Results:  Results from last 7 days   Lab Units 02/24/21  0543 02/23/21  0536 02/23/21  0532 02/22/21  0433 02/21/21  1037   WBC Thousand/uL 4 85  --  6 35 7 75 8 36   HEMOGLOBIN g/dL 7 2*  --  7 3* 8 1* 8 4*   HEMATOCRIT % 23 6*  --  24 4* 26 6* 27 9*   PLATELETS Thousands/uL 111*  --  130* 160 161   SODIUM mmol/L 136 136  --  141 139   POTASSIUM mmol/L 4 9 4 9  --  4 8 4 9   CHLORIDE mmol/L 107 105  --  109* 108   CO2 mmol/L 21 25  --  25 25   BUN mg/dL 73* 62*  --  48* 45*   CREATININE mg/dL 3 77* 3 35*  --  3 08* 2 91*   CALCIUM mg/dL 8 3 8 6  --  9 4 9 3   MAGNESIUM mg/dL 2 0 2 1  --  2 0  --    PHOSPHORUS mg/dL 5 8* 5 7*  --  4 4*  --

## 2021-02-24 NOTE — ASSESSMENT & PLAN NOTE
· Overall improved  Continue Norvasc, Coreg, hydralazine, Imdur  Coreg and hydralazine increased 2/22 by cardiology    · Renal artery doppler pending  · Monitor

## 2021-02-24 NOTE — PROGRESS NOTES
Cardiology Progress Note - Carlitos Ashley 76 y o  female MRN: [id number]    Unit/Bed#: [facility] 504-01 Encounter: [id number]        Subjective:    No significant events overnight  Feeling better  No dyspnea  Review of Systems   Cardiovascular: Negative for chest pain, leg swelling and palpitations  Respiratory: Negative for shortness of breath  Objective:   Vitals: Blood pressure 125/61, pulse (!) 52, temperature (!) 97 3 °F (36 3 °C), resp  rate 18, height 5' 5" (1 651 m), weight 78 8 kg (173 lb 11 6 oz), SpO2 99 %  , Body mass index is 28 91 kg/m² ,   Orthostatic Blood Pressures      Most Recent Value   Blood Pressure  125/61 filed at 2021 1113   Patient Position - Orthostatic VS  Lying filed at 2021 3580         Systolic (24AFV), ZXO:849 , Min:115 , KP     Diastolic (10TEW), FMQ:68, Min:58, Max:80      Intake/Output Summary (Last 24 hours) at 2021 1556  Last data filed at 2021 1405  Gross per 24 hour   Intake 735 ml   Output 1080 ml   Net -345 ml     Weight (last 2 days)     Date/Time   Weight    21 0600   78 8 (173 72)    21 0543   77 5 (170 86)    21 0426   77 6 (171 1)                  Telemetry Review: NSR    Physical Exam  HENT:      Head: Atraumatic  Mouth/Throat:      Mouth: Mucous membranes are moist    Eyes:      Extraocular Movements: Extraocular movements intact  Neurological:      Mental Status: She is alert             Laboratory Results:  Results from last 7 days   Lab Units 21  1928 21  1623 21  1328   TROPONIN I ng/mL 0 59* 0 60* 0 42*       CBC with diff:   Results from last 7 days   Lab Units 21  0543 21  0532 21  0433 21  1037   WBC Thousand/uL 4 85 6 35 7 75 8 36   HEMOGLOBIN g/dL 7 2* 7 3* 8 1* 8 4*   HEMATOCRIT % 23 6* 24 4* 26 6* 27 9*   MCV fL 92 92 92 92   PLATELETS Thousands/uL 111* 130* 160 161   MCH pg 28 0 27 5 27 9 27 5   MCHC g/dL 30 5* 29 9* 30 5* 30 1*   RDW % 13 3 13 4 13 6 13 6 MPV fL 11 1 11 5 11 3 11 2   NRBC AUTO /100 WBCs 0 0 0 0         CMP:  Results from last 7 days   Lab Units 21  0543 21  0536 21  0433 21  1037   POTASSIUM mmol/L 4 9 4 9 4 8 4 9   CHLORIDE mmol/L 107 105 109* 108   CO2 mmol/L 21 25 25 25   BUN mg/dL 73* 62* 48* 45*   CREATININE mg/dL 3 77* 3 35* 3 08* 2 91*   CALCIUM mg/dL 8 3 8 6 9 4 9 3   AST U/L  --   --   --  32   ALT U/L  --   --   --  39   ALK PHOS U/L  --   --   --  591*   EGFR ml/min/1 73sq m 12 14 15 16         BMP:  Results from last 7 days   Lab Units 21  0543 21  0536 21  0433 21  1037   POTASSIUM mmol/L 4 9 4 9 4 8 4 9   CHLORIDE mmol/L 107 105 109* 108   CO2 mmol/L  25 25 25   BUN mg/dL 73* 62* 48* 45*   CREATININE mg/dL 3 77* 3 35* 3 08* 2 91*   CALCIUM mg/dL 8 3 8 6 9 4 9 3       BNP:     Magnesium:   Results from last 7 days   Lab Units 21  0543 21  0536 21  0433   MAGNESIUM mg/dL 2 0 2 1 2 0       Coags:   Results from last 7 days   Lab Units 21  1328   PTT seconds 36   INR  1 10           Cardiac testing:   Results for orders placed during the hospital encounter of 18   Echo complete with contrast if indicated    N[facility]ative [facility]  [address]  [phone]    Transthoracic Echocardiogram  2D, M-mode, Doppler, and Color Doppler    Study date:  30-Dec-2018    Patient: Emmie Bran  MR number: [id number]  Account number: [de-identified]  : 1955  Age: 58 years  Gender: Female  Status: Inpatient  Location: Bedside  Height: 63 in  Weight: 201 5 lb  BP: 159/ 71 mmHg    Indications: Cardiomyopathy    Diagnoses: I43  - Cardiomyopathy in diseases classified elsewhere    Sonographer:  JOCE Perez  Primary Physician:  Noel Topete MD  Referring Physician:  Rhonda Espinoza MD  Group:  [facility]  Interpreting Physician:  Catalina Rojas MD    SUMMARY    LEFT VENTRICLE:  Systolic function was at the lower limits of normal  Ejection fraction was estimated to be 50 %  There was hypokinesis of the mid inferoseptal and apical inferior wall(s)  Wall thickness was mildly increased  There was mild concentric hypertrophy  Features were consistent with a pseudonormal left ventricular filling pattern, with concomitant abnormal relaxation and increased filling pressure (grade 2 diastolic dysfunction)  LEFT ATRIUM:  The atrium was mildly dilated  MITRAL VALVE:  There was mild regurgitation  TRICUSPID VALVE:  There was mild regurgitation  Estimated peak PA pressure was 33 mmHg  PERICARDIUM:  A trace, free-flowing pericardial effusion was identified  There was no evidence of hemodynamic compromise  HISTORY: PRIOR HISTORY: CHF; DM; Hypothyroid; HTN; HLD; F  smoker    PROCEDURE: The procedure was performed at the bedside  This was a routine study  The transthoracic approach was used  The study included complete 2D imaging, M-mode, complete spectral Doppler, and color Doppler  Echocardiographic views  were limited due to decreased penetration and lung interference  This was a technically difficult study  LEFT VENTRICLE: Size was normal  Systolic function was at the lower limits of normal  Ejection fraction was estimated to be 50 %  There was hypokinesis of the mid inferoseptal and apical inferior wall(s)  Wall thickness was mildly  increased  There was mild concentric hypertrophy  DOPPLER: Features were consistent with a pseudonormal left ventricular filling pattern, with concomitant abnormal relaxation and increased filling pressure (grade 2 diastolic dysfunction)  RIGHT VENTRICLE: The size was normal  Systolic function was normal     LEFT ATRIUM: The atrium was mildly dilated  RIGHT ATRIUM: Size was normal     MITRAL VALVE: Valve structure was normal  There was normal leaflet separation  DOPPLER: The transmitral velocity was within the normal range   There was no evidence for stenosis  There was mild regurgitation  AORTIC VALVE: The valve was trileaflet  Leaflets exhibited normal thickness and normal cuspal separation  DOPPLER: Transaortic velocity was within the normal range  There was no evidence for stenosis  There was no regurgitation  TRICUSPID VALVE: The valve structure was normal  There was normal leaflet separation  DOPPLER: The transtricuspid velocity was within the normal range  There was no evidence for stenosis  There was mild regurgitation  Estimated peak PA  pressure was 33 mmHg  PULMONIC VALVE: Leaflets exhibited normal thickness, no calcification, and normal cuspal separation  DOPPLER: The transpulmonic velocity was within the normal range  There was no significant regurgitation  PERICARDIUM: A trace, free-flowing pericardial effusion was identified  There was no evidence of hemodynamic compromise  AORTA: The root exhibited normal size  SYSTEMIC VEINS: IVC: The inferior vena cava was not well visualized      SYSTEM MEASUREMENT TABLES    2D  %FS: 29 85 %  Ao Diam: 2 39 cm  EDV(Teich): 52 8 ml  EF Biplane: 55 93 %  EF(Teich): 57 99 %  ESV(Teich): 22 18 ml  IVSd: 1 14 cm  LA Area: 20 45 cm2  LA Diam: 3 62 cm  LVEDV MOD A2C: 127 02 ml  LVEDV MOD A4C: 116 5 ml  LVEDV MOD BP: 127 96 ml  LVEF MOD A2C: 59 2 %  LVEF MOD A4C: 48 18 %  LVESV MOD A2C: 51 82 ml  LVESV MOD A4C: 60 37 ml  LVESV MOD BP: 56 39 ml  LVIDd: 3 55 cm  LVIDs: 2 49 cm  LVLd A2C: 9 81 cm  LVLd A4C: 8 85 cm  LVLs A2C: 8 03 cm  LVLs A4C: 7 62 cm  LVPWd: 1 18 cm  RA Area: 13 89 cm2  RVIDd: 3 23 cm  SV MOD A2C: 75 2 ml  SV MOD A4C: 56 13 ml  SV(Teich): 30 62 ml    CW  RAP: 10 mmHg  TR Vmax: 2 63 m/s  TR maxP 66 mmHg    MM  TAPSE: 1 85 cm    PW  E': 0 06 m/s  E/E': 19 47  MV A Miko: 1 13 m/s  MV Dec [place]: 6 89 m/s2  MV DecT: 168 4 ms  MV E Miko: 1 16 m/s  MV E/A Ratio: 1 03  MV PHT: 48 84 ms  MVA By PHT: 4 5 cm2  RVSP: 37 66 mmHg    [facility]redited [facility]    Prepared and electronically signed by    Rafa Mckay MD  Signed 30-Dec-2018 13:04:11       No results found for this or any previous visit  No results found for this or any previous visit  No results found for this or any previous visit      Meds/Allergies   Current Facility-Administered Medications   Medication Dose Route Frequency Provider Last Rate    acetaminophen  650 mg Oral Q6H PRN Diana Child MD      amLODIPine  5 mg Oral Daily Kathleen SchaffervedDO rosalina      aspirin  81 mg Oral Daily Diana Child MD      atorvastatin  40 mg Oral HS Diana Child MD      bumetanide  1 mg Intravenous Once CLARICE Copeland      calcium acetate  667 mg Oral BID With Meals CLARICE Copeland      carvedilol  12 5 mg Oral BID With Meals Kathleen Yanez DO      cefTRIAXone  1,000 mg Intravenous Q24H Diana Child MD Stopped (02/24/21 1405)    heparin (porcine)  5,000 Units Subcutaneous Q8H [address] CLARICE Olsen      hydrALAZINE  50 mg Oral TID CLARICE Copeland      insulin glargine  15 Units Subcutaneous HS Etelvina Jones MD      insulin lispro  1-6 Units Subcutaneous TID [facility] Diana Child MD      insulin lispro  1-6 Units Subcutaneous HS Diana Child MD      insulin lispro  7 Units Subcutaneous TID With Meals Tamanna Alba, DO      isosorbide mononitrate  30 mg Oral Daily Diana Child MD      Labetalol HCl  10 mg Intravenous Q4H PRN Diana Child MD      levothyroxine  125 mcg Oral Early Morning Elaine CLARICE Gonzalez      sodium chloride (PF)  3 mL Intravenous Q1H PRN Vi Rowland MD          Medications Prior to Admission   Medication    amLODIPine (NORVASC) 5 mg tablet    aspirin 81 mg chewable tablet    atorvastatin (LIPITOR) 40 mg tablet    carvedilol (COREG) 6 25 mg tablet    hydrALAZINE (APRESOLINE) 10 mg tablet    insulin aspart protamine-insulin aspart (NovoLOG 70/30) 100 units/mL injection    isosorbide mononitrate (IMDUR) 30 mg 24 hr tablet    levothyroxine 112 mcg tablet    losartan (COZAAR) 25 mg tablet    torsemide (DEMADEX) 20 mg tablet    nitroglycerin (NITROSTAT) 0 4 mg SL tablet       Assessment:  Principal Problem:    Community acquired pneumonia  Active Problems:    Acute on chronic combined systolic and diastolic congestive heart failure (HCC)    Type 2 diabetes mellitus with kidney complication, with long-term current use of insulin ([facility])    Hypothyroidism    Hypertension    Elevated troponin    Hyperlipidemia    Acute renal failure superimposed on stage 4 chronic kidney disease ([facility])    Anemia    Coronary artery disease involving native coronary artery of native heart with angina pectoris ([facility])    History of anemia due to chronic kidney disease    Proteinuria    Hyperphosphatemia      Impression:  1  Acute on chronic diastolic heart failure - on IV diuretics  Some improvement on IV Bumex  2  Hypertension - improved control  3  KELLY on CKD - slightly worse  Likely somewhat volume depleted  4  CAD - stable  5  Moderate pericardial effusion - no hemodynamic compromise  Plan:  1  Hold diuretics  2  IVF per renal   3  Transfuse PRBC  4  Continue remainder of medications

## 2021-02-24 NOTE — QUICK NOTE
Called by nursing since the patient was complaining of chest pain and tightness after starting the blood transfusion  Rate was decreased from 75 cc to 55 cc even with that patient was not able to tolerate her transfusion due to persistent chest pain,  No fever no chills    No hypotension or rash or aortic area noted  Transfusion discontinued-check for transfusion reaction  EKG  Check troponin  Check chest x-ray- transfusion associated circulatory overload versus transfusion associated lung injury  On auscultation-no crackles  Stable vitals  Family updated in the room

## 2021-02-24 NOTE — ASSESSMENT & PLAN NOTE
Lab Results   Component Value Date    HGBA1C 13 2 (H) 02/21/2021       Recent Labs     02/23/21  2119 02/24/21  0342 02/24/21  0658 02/24/21  1004   POCGLU 208* 84 256* 229*       · Uncontrolled as evidenced by A1c  Follows with endocrinology at [facility] (Dr Lisa Goel)  takes 70/30 at home 10 units b i d    · Endocrinology following, continue Lantus 15 units q h s  And Humalog 5 units t i d  With meals plus sliding scale  · Diabetic diet  · Insulin adjustments per endo

## 2021-02-24 NOTE — PROGRESS NOTES
[address] Internal Medicine    Progress Note - Diana Lovett 1955, 76 y o  female MRN: [id number]    Unit/Bed#: [facility] 504-01 Encounter: [id number]    Primary Care Provider: Christina Medina MD   Date and time admitted to hospital: 2/21/2021 10:21 AM    * Community acquired pneumonia  Assessment & Plan  · Patient presenting with few day history of worsened shortness of breath and productive cough with intermittent chills  · Chest x-ray with right sided basilar infiltrate and vascular congestion  · Continue intravenous Rocephin, Rx day 4  Azithromycin was discontinued given negative Legionella  · COVID-19/influenza/RSV PCR negative  · Procal trending up however clinically improving  Likely 2/2 renal failure  Remains afebrile without leukocytosis  Acute on chronic combined systolic and diastolic congestive heart failure (HCC)  Assessment & Plan  Wt Readings from Last 3 Encounters:   02/24/21 78 8 kg (173 lb 11 6 oz)   11/02/20 77 1 kg (170 lb)   08/10/20 76 3 kg (168 lb 3 2 oz)     · Presenting with few day history of worsening shortness of breath and lower extremity edema  · Cardiology following, was given several doses of IV diuretic, now on hold 2/2 worsening renal function  · Continue beta-blocker  · Echo shows EF 60%  Akinesis of basal inferior and basal mid inferior lateral walls  Grade 2 diastolic dysfunction  LA mildly dilated  Mild MR, trace TR, trace PI  Moderate pericardial effusion identified without evidence of hemodynamic compromise  · I&Os, daily weights, low-sodium diet  · Monitor volume status      Anemia  Assessment & Plan  · Chronic in the setting of CKD  Hemoglobin 7 2 today, trending down  No signs or symptoms of bleeding  Baseline appears to be between 7 and 8   · Is supposed to get iron infusions as outpatient however has not 2/2 COVID  Should f/u with DR Elyn Kanner as outpatient     · Will transfuse 1 unit PRBC today  · Trend     Acute renal failure superimposed on stage 4 chronic kidney disease [facility])  Assessment & Plan  Lab Results   Component Value Date    EGFR 12 02/24/2021    EGFR 14 02/23/2021    EGFR 15 02/22/2021    CREATININE 3 77 (H) 02/24/2021    CREATININE 3 35 (H) 02/23/2021    CREATININE 3 08 (H) 02/22/2021   · CKD stage 3 with a baseline creatinine around 1 5-2  Recent KELLY related to contrast induced nephropathy/anemia  Creatinine worsening today at 3 77  · Nephrology following  Holding diuretics and losartan  Was given 250 NSS yesterday  · 1 unit PRBC today  · Avoid nephrotoxins and hypotension  · BMP in a m  Elevated troponin  Assessment & Plan  · Non MI troponin elevation in the setting of pneumonia/heart failure exacerbation  Coronary artery disease involving native coronary artery of native heart with angina pectoris [facility])  Assessment & Plan  · Has history of PCI in the past   · Continue beta-blocker, aspirin, statin, Imdur  Type 2 diabetes mellitus with kidney complication, with long-term current use of insulin [facility])  Assessment & Plan  Lab Results   Component Value Date    HGBA1C 13 2 (H) 02/21/2021       Recent Labs     02/23/21  2119 02/24/21  0342 02/24/21  0658 02/24/21  1004   POCGLU 208* 84 256* 229*       · Uncontrolled as evidenced by A1c  Follows with endocrinology at [facility] (Dr Joyce Goins)  takes 70/30 at home 10 units b i d    · Endocrinology following, continue Lantus 15 units q h s  And Humalog 5 units t i d  With meals plus sliding scale  · Diabetic diet  · Insulin adjustments per endo  Hypertension  Assessment & Plan  · Overall improved  Continue Norvasc, Coreg, hydralazine, Imdur  Coreg and hydralazine increased 2/22 by cardiology  · Renal artery doppler pending  · Monitor    Hypothyroidism  Assessment & Plan  · TSH 20 800  Increase levothyroxine to 125 mcg  · Reviewed proper administration with patient, was not taking on empty stomach consistently     · Repeat thyroid function testing in 4-6 weeks    Hyperlipidemia  Assessment & Plan  · Continue statin    Pharmacologic: Heparin  Mechanical VTE Prophylaxis in Place: Yes    Patient Centered Rounds: I have performed bedside rounds with nursing staff today  Discussions with Specialists or Other Care Team Provider: nursing, case management, nephro     Education and Discussions with Family / Patient: patient and extensive discussion with daughter over phone, reviewed blood work, testing, consultant recommendations  Daughter given names of all providers involved with mothers care per her request      Time Spent for Care: 30 minutes  More than 50% of total time spent on counseling and coordination of care as described above  Current Length of Stay: 3 day(s)    Current Patient Status: Inpatient   Certification Statement: The patient will continue to require additional inpatient hospital stay due to worsening renal function, anemia    Discharge Plan / Estimated Discharge Date: not stable for discharge  Code Status: Level 1 - Full Code      Subjective:   Patient offers no acute complaints  Feels well but is quite anxious today  Lower extremity edema overall improved  She is agreeable to blood transfusion  Objective:     Vitals:   Temp (24hrs), Av 9 °F (36 6 °C), Min:97 3 °F (36 3 °C), Max:98 7 °F (37 1 °C)    Temp:  [97 3 °F (36 3 °C)-98 7 °F (37 1 °C)] 97 3 °F (36 3 °C)  HR:  [55-97] 93  Resp:  [18] 18  BP: (115-191)/(58-80) 191/80  SpO2:  [97 %-98 %] 97 %  Body mass index is 28 91 kg/m²  Input and Output Summary (last 24 hours): Intake/Output Summary (Last 24 hours) at 2021 1032  Last data filed at 2021 1011  Gross per 24 hour   Intake 766 67 ml   Output 1280 ml   Net -513 33 ml       Physical Exam:     Physical Exam  Vitals signs and nursing note reviewed  Cardiovascular:      Rate and Rhythm: Normal rate  Pulmonary:      Breath sounds: Decreased breath sounds present  Abdominal:      Tenderness:  There is no abdominal tenderness  Musculoskeletal:         General: Swelling (lower extremities but overall improved ) present  Skin:     General: Skin is warm  Neurological:      Mental Status: She is alert and oriented to person, place, and time  Mental status is at baseline  Psychiatric:         Mood and Affect: Mood normal          Additional Data:     Labs:    Results from last 7 days   Lab Units 02/24/21  0543   WBC Thousand/uL 4 85   HEMOGLOBIN g/dL 7 2*   HEMATOCRIT % 23 6*   PLATELETS Thousands/uL 111*   NEUTROS PCT % 79*   LYMPHS PCT % 14   MONOS PCT % 5   EOS PCT % 2     Results from last 7 days   Lab Units 02/24/21  0543  02/21/21  1037   POTASSIUM mmol/L 4 9   < > 4 9   CHLORIDE mmol/L 107   < > 108   CO2 mmol/L 21   < > 25   BUN mg/dL 73*   < > 45*   CREATININE mg/dL 3 77*   < > 2 91*   CALCIUM mg/dL 8 3   < > 9 3   ALK PHOS U/L  --   --  591*   ALT U/L  --   --  39   AST U/L  --   --  32    < > = values in this interval not displayed       Results from last 7 days   Lab Units 02/21/21  1328   INR  1 10         Recent Cultures (last 7 days):     Results from last 7 days   Lab Units 02/21/21  1344   LEGIONELLA URINARY ANTIGEN  Negative       Last 24 Hours Medication List:   Current Facility-Administered Medications   Medication Dose Route Frequency Provider Last Rate    acetaminophen  650 mg Oral Q6H PRN Clement Chang MD      amLODIPine  5 mg Oral Daily Kathleen KUMAR Lopez DO      aspirin  81 mg Oral Daily Clement Chang MD      atorvastatin  40 mg Oral HS Clement Chang MD      carvedilol  12 5 mg Oral BID With Meals Kathleen Heath Leeliseo,       cefTRIAXone  1,000 mg Intravenous Q24H Clement Chang MD Stopped (02/23/21 1437)    heparin (porcine)  5,000 Units Subcutaneous Q8H [address] CLARICE Olsen      hydrALAZINE  50 mg Oral TID CLARICE Copeland      insulin glargine  15 Units Subcutaneous HS Nile Alonzo MD      insulin lispro  1-6 Units Subcutaneous TID [facility] Clement Chang MD      insulin lispro  1-6 Units Subcutaneous HS Margot Montague MD     [place] insulin lispro  5 Units Subcutaneous TID With Meals Lupillo Peoples MD      isosorbide mononitrate  30 mg Oral Daily Margot Montague MD      Labetalol HCl  10 mg Intravenous Q4H PRN Margot Montague MD      levothyroxine  125 mcg Oral Early Morning CLARICE Prince      sodium chloride (PF)  3 mL Intravenous Q1H PRN Terrell Rowland MD          Today, Patient Was Seen By: CLARICE Barreto    ** Please Note: Dragon 360 Dictation voice to text software may have been used in the creation of this document   **

## 2021-02-24 NOTE — ASSESSMENT & PLAN NOTE
· Chronic in the setting of CKD  Hemoglobin 7 2 today, trending down  No signs or symptoms of bleeding  Baseline appears to be between 7 and 8   · Is supposed to get iron infusions as outpatient however has not 2/2 COVID  Should f/u with DR Maribel Spear as outpatient     · Will transfuse 1 unit PRBC today  · Trend

## 2021-02-24 NOTE — ASSESSMENT & PLAN NOTE
Wt Readings from Last 3 Encounters:   02/24/21 78 8 kg (173 lb 11 6 oz)   11/02/20 77 1 kg (170 lb)   08/10/20 76 3 kg (168 lb 3 2 oz)     · Presenting with few day history of worsening shortness of breath and lower extremity edema  · Cardiology following, was given several doses of IV diuretic, now on hold 2/2 worsening renal function  · Continue beta-blocker  · Echo shows EF 60%  Akinesis of basal inferior and basal mid inferior lateral walls  Grade 2 diastolic dysfunction  LA mildly dilated  Mild MR, trace TR, trace PI  Moderate pericardial effusion identified without evidence of hemodynamic compromise    · I&Os, daily weights, low-sodium diet  · Monitor volume status

## 2021-02-24 NOTE — ASSESSMENT & PLAN NOTE
· Patient presenting with few day history of worsened shortness of breath and productive cough with intermittent chills  · Chest x-ray with right sided basilar infiltrate and vascular congestion  · Continue intravenous Rocephin, Rx day 4  Azithromycin was discontinued given negative Legionella  · COVID-19/influenza/RSV PCR negative  · Procal trending up however clinically improving  Likely 2/2 renal failure  Remains afebrile without leukocytosis

## 2021-02-24 NOTE — ASSESSMENT & PLAN NOTE
Lab Results   Component Value Date    EGFR 12 02/24/2021    EGFR 14 02/23/2021    EGFR 15 02/22/2021    CREATININE 3 77 (H) 02/24/2021    CREATININE 3 35 (H) 02/23/2021    CREATININE 3 08 (H) 02/22/2021   · CKD stage 3 with a baseline creatinine around 1 5-2  Recent KELLY related to contrast induced nephropathy/anemia  Creatinine worsening today at 3 77  · Nephrology following  Holding diuretics and losartan  Was given 250 NSS yesterday  · 1 unit PRBC today  · Avoid nephrotoxins and hypotension  · BMP in a m

## 2021-02-24 NOTE — PROGRESS NOTES
Progress Note - Angelica Murry 76 y o  female MRN: [id number]    Unit/Bed#: [facility] 504-01 Encounter: [id number]      CC: diabetes f/u    Subjective:   Angelica Murry is a 76y o  year old female with type 2 diabetes with long-term use of insulin who is admitted for community acquired pneumonia and acute on chronic combined systolic and diastolic congestive heart failure  Patient is currently on Lantus 15 units q h s  And Humalog 5 units t i d  A c  Along with correctional sliding scale  Patient blood sugars has been reasonably good controlled however reports at 4:00 a m  Feeling shaky and for which her blood sugar was checked which was 84 mg/dL, orange juice was given  Which brought her blood sugar up to 200 mg/dl at 6:00 a m         Objective:     Vitals: Blood pressure (!) 191/80, pulse 93, temperature (!) 97 3 °F (36 3 °C), resp  rate 18, height 5' 5" (1 651 m), weight 78 8 kg (173 lb 11 6 oz), SpO2 97 %  ,Body mass index is 28 91 kg/m²  Intake/Output Summary (Last 24 hours) at 2/24/2021 1109  Last data filed at 2/24/2021 1011  Gross per 24 hour   Intake 646 67 ml   Output 1280 ml   Net -633 33 ml       Physical Exam:  General Appearance: awake, appears stated age and cooperative  Head: Normocephalic, without obvious abnormality, atraumatic  Extremities: moves all extremities  Skin: Skin color and temperature normal    Pulm: no labored breathing    Lab, Imaging and other studies: I have personally reviewed pertinent reports  POC Glucose (mg/dl)   Date Value   02/24/2021 229 (H)   02/24/2021 256 (H)   02/24/2021 84   02/23/2021 208 (H)   02/23/2021 305 (H)   02/23/2021 216 (H)   02/23/2021 184 (H)   02/23/2021 105   02/22/2021 196 (H)   02/22/2021 303 (H)       Assessment and plan:    Hypothyroidism  Assessment & Plan  On levothyroxine 112 mcg Monday to Saturday and half a dose on Sunday  Patient takes her medication regularly but not properly         2/21/2021 10:37   TSH 3RD GENERATON 20 900 (H) Agree to increase levothyroxine to 125 mcg daily  Check free T4    Type 2 diabetes mellitus with kidney complication, with long-term current use of insulin [facility])  Assessment & Plan  Lab Results   Component Value Date    HGBA1C 13 2 (H) 02/21/2021       Recent Labs     02/21/21  1537 02/21/21  2100 02/22/21  0624 02/22/21  1042   POCGLU 318* 325* 183* 331*     Uncontrolled the most recent A1c of 13 2%  The goal is less than 7%  Home regiment:  NovoLog 70/30, 10 units b i d  Perform home glucose monitoring twice daily  her readings in the morning is usually between 150-200 at at bedtime are between 250-350 mg/dl  Patient blood sugars has been reasonably good controlled however reports at 4:00 a m  Feeling shaky and for which her blood sugar was checked which was 84 mg/dL, orange juice was given  Which brought her blood sugar up to 200 mg/dl at 6:00 a m  However overall she has pre meal hyperglycemia for which will increase Humalog to 7 units t i d  And continue Lantus 15 units q h s  And correctional sliding scale  Continue to monitor blood sugar over time and make adjustments to the regimen if necessary  Acute on chronic combined systolic and diastolic congestive heart failure (HCC)  Assessment & Plan  Wt Readings from Last 3 Encounters:   02/22/21 77 6 kg (171 lb 1 6 oz)   11/02/20 77 1 kg (170 lb)   08/10/20 76 3 kg (168 lb 3 2 oz)       Management as per primary team            Portions of the record may have been created with voice recognition software

## 2021-02-25 PROBLEM — E88.09 HYPOALBUMINEMIA: Status: ACTIVE | Noted: 2021-02-25

## 2021-02-25 LAB
ABO GROUP BLD BPU: NORMAL
ALBUMIN SERPL BCP-MCNC: 3 G/DL (ref 3.5–5)
ALP SERPL-CCNC: 697 U/L (ref 46–116)
ALT SERPL W P-5'-P-CCNC: 86 U/L (ref 12–78)
ANION GAP SERPL CALCULATED.3IONS-SCNC: 10 MMOL/L (ref 4–13)
ANION GAP SERPL CALCULATED.3IONS-SCNC: 10 MMOL/L (ref 4–13)
ANION GAP SERPL CALCULATED.3IONS-SCNC: 7 MMOL/L (ref 4–13)
AST SERPL W P-5'-P-CCNC: 139 U/L (ref 5–45)
ATRIAL RATE: 56 BPM
BILIRUB SERPL-MCNC: 0.34 MG/DL (ref 0.2–1)
BPU ID: NORMAL
BUN SERPL-MCNC: 76 MG/DL (ref 5–25)
BUN SERPL-MCNC: 79 MG/DL (ref 5–25)
BUN SERPL-MCNC: 79 MG/DL (ref 5–25)
CALCIUM ALBUM COR SERPL-MCNC: 9.5 MG/DL (ref 8.3–10.1)
CALCIUM SERPL-MCNC: 8.6 MG/DL (ref 8.3–10.1)
CALCIUM SERPL-MCNC: 8.7 MG/DL (ref 8.3–10.1)
CALCIUM SERPL-MCNC: 8.8 MG/DL (ref 8.3–10.1)
CHLORIDE SERPL-SCNC: 105 MMOL/L (ref 100–108)
CHLORIDE SERPL-SCNC: 105 MMOL/L (ref 100–108)
CHLORIDE SERPL-SCNC: 106 MMOL/L (ref 100–108)
CO2 SERPL-SCNC: 21 MMOL/L (ref 21–32)
CO2 SERPL-SCNC: 22 MMOL/L (ref 21–32)
CO2 SERPL-SCNC: 23 MMOL/L (ref 21–32)
CREAT SERPL-MCNC: 3.84 MG/DL (ref 0.6–1.3)
CREAT SERPL-MCNC: 4.06 MG/DL (ref 0.6–1.3)
CREAT SERPL-MCNC: 4.19 MG/DL (ref 0.6–1.3)
CROSSMATCH: NORMAL
ERYTHROCYTE [DISTWIDTH] IN BLOOD BY AUTOMATED COUNT: 13.6 % (ref 11.6–15.1)
GFR SERPL CREATININE-BSD FRML MDRD: 10 ML/MIN/1.73SQ M
GFR SERPL CREATININE-BSD FRML MDRD: 11 ML/MIN/1.73SQ M
GFR SERPL CREATININE-BSD FRML MDRD: 12 ML/MIN/1.73SQ M
GLUCOSE SERPL-MCNC: 100 MG/DL (ref 65–140)
GLUCOSE SERPL-MCNC: 106 MG/DL (ref 65–140)
GLUCOSE SERPL-MCNC: 108 MG/DL (ref 65–140)
GLUCOSE SERPL-MCNC: 108 MG/DL (ref 65–140)
GLUCOSE SERPL-MCNC: 109 MG/DL (ref 65–140)
GLUCOSE SERPL-MCNC: 110 MG/DL (ref 65–140)
GLUCOSE SERPL-MCNC: 123 MG/DL (ref 65–140)
GLUCOSE SERPL-MCNC: 124 MG/DL (ref 65–140)
GLUCOSE SERPL-MCNC: 135 MG/DL (ref 65–140)
GLUCOSE SERPL-MCNC: 190 MG/DL (ref 65–140)
GLUCOSE SERPL-MCNC: 66 MG/DL (ref 65–140)
GLUCOSE SERPL-MCNC: 70 MG/DL (ref 65–140)
GLUCOSE SERPL-MCNC: 76 MG/DL (ref 65–140)
GLUCOSE SERPL-MCNC: 77 MG/DL (ref 65–140)
GLUCOSE SERPL-MCNC: 82 MG/DL (ref 65–140)
GLUCOSE SERPL-MCNC: 87 MG/DL (ref 65–140)
GLUCOSE SERPL-MCNC: 88 MG/DL (ref 65–140)
GLUCOSE SERPL-MCNC: 93 MG/DL (ref 65–140)
HAPTOGLOB SERPL-MCNC: 163 MG/DL (ref 37–355)
HCT VFR BLD AUTO: 25.2 % (ref 34.8–46.1)
HGB BLD-MCNC: 7.7 G/DL (ref 11.5–15.4)
MAGNESIUM SERPL-MCNC: 2.1 MG/DL (ref 1.6–2.6)
MCH RBC QN AUTO: 27.8 PG (ref 26.8–34.3)
MCHC RBC AUTO-ENTMCNC: 30.6 G/DL (ref 31.4–37.4)
MCV RBC AUTO: 91 FL (ref 82–98)
P AXIS: 74 DEGREES
PHOSPHATE SERPL-MCNC: 5 MG/DL (ref 2.3–4.1)
PLATELET # BLD AUTO: 138 THOUSANDS/UL (ref 149–390)
PMV BLD AUTO: 11.4 FL (ref 8.9–12.7)
POTASSIUM SERPL-SCNC: 4.1 MMOL/L (ref 3.5–5.3)
POTASSIUM SERPL-SCNC: 4.4 MMOL/L (ref 3.5–5.3)
POTASSIUM SERPL-SCNC: 5 MMOL/L (ref 3.5–5.3)
PR INTERVAL: 168 MS
PROT SERPL-MCNC: 7.3 G/DL (ref 6.4–8.2)
QRS AXIS: 66 DEGREES
QRSD INTERVAL: 92 MS
QT INTERVAL: 468 MS
QTC INTERVAL: 451 MS
RBC # BLD AUTO: 2.77 MILLION/UL (ref 3.81–5.12)
SODIUM SERPL-SCNC: 135 MMOL/L (ref 136–145)
SODIUM SERPL-SCNC: 137 MMOL/L (ref 136–145)
SODIUM SERPL-SCNC: 137 MMOL/L (ref 136–145)
T WAVE AXIS: 163 DEGREES
TRANSFUSION STATUS PATIENT QL: NORMAL
UNIT DISPENSE STATUS: NORMAL
UNIT PRODUCT CODE: NORMAL
UNIT RH: NORMAL
VENTRICULAR RATE: 56 BPM
WBC # BLD AUTO: 4.65 THOUSAND/UL (ref 4.31–10.16)

## 2021-02-25 PROCEDURE — 99233 SBSQ HOSP IP/OBS HIGH 50: CPT | Performed by: INTERNAL MEDICINE

## 2021-02-25 PROCEDURE — 99232 SBSQ HOSP IP/OBS MODERATE 35: CPT | Performed by: INTERNAL MEDICINE

## 2021-02-25 PROCEDURE — 84100 ASSAY OF PHOSPHORUS: CPT | Performed by: PHYSICIAN ASSISTANT

## 2021-02-25 PROCEDURE — 80048 BASIC METABOLIC PNL TOTAL CA: CPT | Performed by: NURSE PRACTITIONER

## 2021-02-25 PROCEDURE — 83735 ASSAY OF MAGNESIUM: CPT | Performed by: PHYSICIAN ASSISTANT

## 2021-02-25 PROCEDURE — 99232 SBSQ HOSP IP/OBS MODERATE 35: CPT | Performed by: NURSE PRACTITIONER

## 2021-02-25 PROCEDURE — 85027 COMPLETE CBC AUTOMATED: CPT | Performed by: INTERNAL MEDICINE

## 2021-02-25 PROCEDURE — 80048 BASIC METABOLIC PNL TOTAL CA: CPT | Performed by: PHYSICIAN ASSISTANT

## 2021-02-25 PROCEDURE — 82948 REAGENT STRIP/BLOOD GLUCOSE: CPT

## 2021-02-25 PROCEDURE — 93010 ELECTROCARDIOGRAM REPORT: CPT | Performed by: INTERNAL MEDICINE

## 2021-02-25 PROCEDURE — 80053 COMPREHEN METABOLIC PANEL: CPT | Performed by: INTERNAL MEDICINE

## 2021-02-25 RX ORDER — NIFEDIPINE 30 MG/1
30 TABLET, EXTENDED RELEASE ORAL DAILY
Status: DISCONTINUED | OUTPATIENT
Start: 2021-02-25 | End: 2021-02-27 | Stop reason: HOSPADM

## 2021-02-25 RX ORDER — INSULIN GLARGINE 100 [IU]/ML
10 INJECTION, SOLUTION SUBCUTANEOUS
Status: DISCONTINUED | OUTPATIENT
Start: 2021-02-25 | End: 2021-02-27 | Stop reason: HOSPADM

## 2021-02-25 RX ADMIN — CARVEDILOL 12.5 MG: 12.5 TABLET, FILM COATED ORAL at 09:20

## 2021-02-25 RX ADMIN — ISOSORBIDE MONONITRATE 30 MG: 30 TABLET, EXTENDED RELEASE ORAL at 09:20

## 2021-02-25 RX ADMIN — CALCIUM ACETATE 667 MG: 667 CAPSULE ORAL at 17:16

## 2021-02-25 RX ADMIN — AMLODIPINE BESYLATE 5 MG: 5 TABLET ORAL at 09:19

## 2021-02-25 RX ADMIN — INSULIN GLARGINE 10 UNITS: 100 INJECTION, SOLUTION SUBCUTANEOUS at 21:43

## 2021-02-25 RX ADMIN — ASPIRIN 81 MG: 81 TABLET, CHEWABLE ORAL at 09:20

## 2021-02-25 RX ADMIN — HYDRALAZINE HYDROCHLORIDE 50 MG: 50 TABLET, FILM COATED ORAL at 17:16

## 2021-02-25 RX ADMIN — NIFEDIPINE 30 MG: 30 TABLET, FILM COATED, EXTENDED RELEASE ORAL at 14:32

## 2021-02-25 RX ADMIN — ATORVASTATIN CALCIUM 40 MG: 40 TABLET, FILM COATED ORAL at 21:43

## 2021-02-25 RX ADMIN — CEFTRIAXONE SODIUM 1000 MG: 10 INJECTION, POWDER, FOR SOLUTION INTRAVENOUS at 14:32

## 2021-02-25 RX ADMIN — HYDRALAZINE HYDROCHLORIDE 50 MG: 50 TABLET, FILM COATED ORAL at 21:44

## 2021-02-25 RX ADMIN — LEVOTHYROXINE SODIUM 125 MCG: 125 TABLET ORAL at 05:48

## 2021-02-25 RX ADMIN — ACETAMINOPHEN 650 MG: 325 TABLET, FILM COATED ORAL at 13:20

## 2021-02-25 RX ADMIN — CALCIUM ACETATE 667 MG: 667 CAPSULE ORAL at 09:20

## 2021-02-25 RX ADMIN — HYDRALAZINE HYDROCHLORIDE 50 MG: 50 TABLET, FILM COATED ORAL at 09:20

## 2021-02-25 RX ADMIN — CARVEDILOL 12.5 MG: 12.5 TABLET, FILM COATED ORAL at 17:17

## 2021-02-25 NOTE — PROGRESS NOTES
This RN was administering medications to patient  A mistake was made and the patients sliding scale insulin was drawn up into a 1ml tuberculin syringe and adminstered subq  RN Imediatly notifed Demond Vail of SLIM 1 amp of dextrose given, ran d10 at 50ml for 1 5 hours  Patient also given snacks as she remained asymptomatic  frequent glucose checks were preformed

## 2021-02-25 NOTE — ASSESSMENT & PLAN NOTE
Lab Results   Component Value Date    HGBA1C 13 2 (H) 02/21/2021       Recent Labs     02/25/21  0411 02/25/21  0451 02/25/21  0549 02/25/21  0617   POCGLU 76 82 66 77       · Uncontrolled as evidenced by A1c  Follows with endocrinology at [facility] (Dr Nasima Lewis)  takes 70/30 at home 10 units b i d    · Medication error 2/24 patient was incorrectly administered 101 units humalog  Patient/family aware  Nurse manager involved  · Endocrinology following, continue Lantus 15 units q h s  And Humalog 7 units t i d  With meals plus sliding scale  · Diabetic diet  · Insulin adjustments per endo

## 2021-02-25 NOTE — PROGRESS NOTES
NEPHROLOGY PROGRESS NOTE   Margarette Castro 76 y o  female MRN: [id number]  Unit/Bed#: [facility] 504-01 Encounter: [id number]      ASSESSMENT/PLAN:  Acute kidney injury (POA) on chronic kidney disease, stage IV:   - etiology suspect secondary to cardiorenal syndrome in the setting of volume overload, failure to auto regulate in the setting of ARB, hemodynamic perturbations, underlying pneumonia with progression to ATN     - outpatient nephrologist: Dr Elizabeth Rivera  - upon review of medical records, creatinine 1 9- 2 2 mg/dL this past year  - creatinine 2 91 mg/dL upon admission on 02/21/2021   - most recent creatinine 3 84 mg/dL today               - suspect elevated creatinine secondary to hemodynamic perturbations, now with improvement in blood pressure  - continue to hold ARB and diuretics for now  - given distended abdomen, will check abdominal ultrasound  - check PVR with bladder scan now               - UA: > 1000 glucose, trace blood, + 4 protein, 2-4 RBCs, 10-20 wbc's, 5-10 hyaline casts  Urine creatinine 142, urine sodium: 34, FENa 0 5%  Repeat UA completed on 02/23 revealed 250 glucose, small leukocytes, +3 protein, 10-20 wbc's, 10-20 hyaline casts  Urine creatinine 135, urine sodium 42, FEa 0 77%  Urine protein creatinine ratio 1  51               - imaging: Ultrasound completed in January 2019 revealed right kidney 9 4 cm, left kidney 9 9 cm  Normal echogenicity and contour bilaterally  Renal artery doppler completed on 2/24/2021 revealed no evidence of significant arterial occlusive disease in the main renal artery bilaterally  Right kidney 9 55 cm, left kidney 10 22 cm  - maintain urinary retention protocol   - avoid NSAIDs, nephrotoxic agents, IV contrast   - adjust medications to appropriate GFR  - monitor volume status with strict intake/output, daily weight    - will check BMP, magnesium, phosphorus in a m      Electrolytes, acid/base:  - mild hyperkalemia with most recent potassium 5 0               - NK the setting of acute renal failure, continue strict low-potassium diet  - most recent bicarbonate 23                - monitor in the setting of advanced chronic kidney disease to determine whether sodium bicarbonate supplements are warranted  Nathaniel Cazares continue monitor with repeat lab studies      Blood pressure, hypertension:  - blood pressure stabilizing more recently  To note, SBP > 200 upon admission    - outpatient regimen includes: Amlodipine 5 mg daily, carvedilol 6 25 mg b i d , hydralazine 25 mg t i d , Imdur 30 mg daily, losartan 25 mg daily, torsemide 40 mg in the a m  and 20 mg in the p m   - currently on: Amlodipine 5 mg daily, carvedilol 12 5 mg b i d , hydralazine 50 mg t i d , Imdur 30 mg daily  - recommendations: Discontinue amlodipine and initiate Procardia XL 30 mg daily to start on 2/26   - secondary workup including metanephrines, catecholamines, renin, aldosterone pending  Renal artery doppler as above  - optimize hemodynamics; avoid hypotension and fluctuations of blood pressure    - maintain MAP > 65       H/H, anemia of chronic kidney disease:  - most recent hemoglobin 7 7 grams/deciliter  - post partial PRBC transfusion yesterday, however discontinued due to possible transfusion reaction   - goal hemoglobin greater than 8 grams/deciliter   - recommend PRBC transfusion for hemoglobin less than 7   - avoid IV Venofer light of infection, recommend follow-up with Hematology post discharge as prior       Mineral bone disease of chronic kidney disease:  - hyperphosphatemia improving with most recent phosphorus 5 0               - continue calcium acetate 667 mg b i d               - continue low phosphorus diet    Nathaniel Cazares continue monitor PTH, magnesium, phosphorus as an outpatient      Other medical problems:  - community-acquired pneumonia, continued on Rocephin per primary team      - acute on chronic combined CHF, chest x-ray revealed interstitial pulmonary edema and small right pleural effusion, echocardiogram completed on 2/22/21 revealed EF of 60% with grade 2 diastolic dysfunction, moderate free flowing pericardial effusion with no evidence of hemodynamic compromise, Cardiology following  Continue 1 5 L fluid restriction       - diabetes, most recent hemoglobin A1c 13 2, on insulin per primary team        SUBJECTIVE:  Patient is resting in bed with daughter at the bedside  Patient is without acute shortness of breath at present  Patient is taking pre from eating her lunch as patient had reflux  She is without vomiting or diarrhea      OBJECTIVE:  Current Weight: Weight - Scale: 80 7 kg (178 lb)  Vitals:    02/25/21 0655   BP: 138/64   Pulse:    Resp:    Temp:    SpO2:        Intake/Output Summary (Last 24 hours) at 2/25/2021 1402  Last data filed at 2/25/2021 0900  Gross per 24 hour   Intake 560 ml   Output 300 ml   Net 260 ml       General:  No acute distress  Skin:  Warm, no rash, jaundice appearing  Eyes:  Sclerae anicteric  ENT:  Moist lips and mucous membranes  Neck:  Supple with trachea midline  Chest:  Diminished breath sounds bilaterally   CVS:  Regular rate regular rhythm  Abdomen:  Rounded, distended  Extremities:  Bilateral lower extremity edema   Neuro:  Awake and interactive  Psych:  Appropriate affect      Medications:  Scheduled Meds:  Current Facility-Administered Medications   Medication Dose Route Frequency Provider Last Rate    acetaminophen  650 mg Oral Q6H PRN Kiran Carver MD      amLODIPine  5 mg Oral Daily Kathleen Lopez DO      aspirin  81 mg Oral Daily Kiran Carver MD      atorvastatin  40 mg Oral HS Kiran Carver MD      calcium acetate  667 mg Oral BID With Meals CLARICE Copeland      carvedilol  12 5 mg Oral BID With Meals Kathleen Balderas DO      cefTRIAXone  1,000 mg Intravenous Q24H CLARICE Olsen      diphenhydrAMINE  25 mg Intravenous Q6H PRN Víctor Mckenzie MD      heparin (porcine)  5,000 Units Subcutaneous [facility] M CLARICE Reagan      hydrALAZINE  50 mg Oral TID CLARICE Copeland      insulin glargine  10 Units Subcutaneous HS Aime Bell DO      insulin lispro  1-6 Units Subcutaneous TID [facility] Sixto Warren MD     [facility] insulin lispro  1-6 Units Subcutaneous HS Sixto Warren MD      isosorbide mononitrate  30 mg Oral Daily Sixto Warren MD      Labetalol HCl  10 mg Intravenous Q4H PRN Sixto Warren MD      levothyroxine  125 mcg Oral Early Morning Elaine CLARICE Gordon      sodium chloride (PF)  3 mL Intravenous Q1H PRN Vi Rowland MD         PRN Meds:   acetaminophen    diphenhydrAMINE    Labetalol HCl    Insert peripheral IV **AND** sodium chloride (PF)    Continuous Infusions:     Laboratory Results:  Results from last 7 days   Lab Units 02/25/21  1125 02/25/21 1997 02/25/21  0034 02/24/21  2023 02/24/21  0543 02/23/21  0536 02/23/21  0532 02/22/21  0433 02/21/21  1037   WBC Thousand/uL  --  4 65  --   --  4 85  --  6 35 7 75 8 36   HEMOGLOBIN g/dL  --  7 7*  --  7 6* 7 2*  --  7 3* 8 1* 8 4*   HEMATOCRIT %  --  25 2*  --  24 2* 23 6*  --  24 4* 26 6* 27 9*   PLATELETS Thousands/uL  --  138*  --   --  111*  --  130* 160 161   SODIUM mmol/L 135* 137 137  --  136 136  --  141 139   POTASSIUM mmol/L 5 0 4 4 4 1  --  4 9 4 9  --  4 8 4 9   CHLORIDE mmol/L 105 105 106  --  107 105  --  109* 108   CO2 mmol/L 23 22 21  --  21 25  --  25 25   BUN mg/dL 79* 76* 79*  --  73* 62*  --  48* 45*   CREATININE mg/dL 3 84* 4 06* 4 19*  --  3 77* 3 35*  --  3 08* 2 91*   CALCIUM mg/dL 8 8 8 7 8 6  --  8 3 8 6  --  9 4 9 3   MAGNESIUM mg/dL  --   --  2 1  --  2 0 2 1  --  2 0  --    PHOSPHORUS mg/dL  --   --  5 0*  --  5 8* 5 7*  --  4 4*  --

## 2021-02-25 NOTE — ASSESSMENT & PLAN NOTE
Lab Results   Component Value Date    EGFR 11 02/25/2021    EGFR 10 02/25/2021    EGFR 12 02/24/2021    CREATININE 4 06 (H) 02/25/2021    CREATININE 4 19 (H) 02/25/2021    CREATININE 3 77 (H) 02/24/2021   · CKD stage 3 with a baseline creatinine around 1 5-2  Recent KELLY related to contrast induced nephropathy/anemia  Creatinine worsening today at 4 06   · Nephrology following  Holding diuretics and losartan  · Avoid nephrotoxins and hypotension  · BMP in a m

## 2021-02-25 NOTE — PROGRESS NOTES
Progress Note - Nieves Quiñonez 76 y o  female MRN: [id number]    [facility] Encounter: [id number]      CC: diabetes f/u    Subjective:   Nieves Quiñonez is a 76y o  year old female with type 2 diabetes  Feels well  Ate 75% of breakfast this morning  Hypoglycemia last night due to insulin admin error  Blood sugars stable this morning without prandial insulin  Objective:     Vitals: Blood pressure 138/64, pulse 64, temperature (!) 97 3 °F (36 3 °C), temperature source Oral, resp  rate 20, height 5' 5" (1 651 m), weight 80 7 kg (178 lb), SpO2 96 %  ,Body mass index is 29 62 kg/m²  Intake/Output Summary (Last 24 hours) at 2/25/2021 1350  Last data filed at 2/25/2021 0900  Gross per 24 hour   Intake 560 ml   Output 300 ml   Net 260 ml       Physical Exam:  General: No acute distress  Alert, awake, and oriented x3  HEENT: Normocephalic, atraumatic  Heart: Regular rate and rhythm  Lungs: Clear  No respiratory distress  Extremities: No edema  Skin: Warm, dry  No rash  Neuro: Moves all 4 extremities spontaneously  Psych: Appropriate mood and affect  Cooperative  Lab, Imaging and other studies: I have personally reviewed pertinent reports  POC Glucose (mg/dl)   Date Value   02/25/2021 123   02/25/2021 135   02/25/2021 77   02/25/2021 66   02/25/2021 82   02/25/2021 76   02/25/2021 93   02/25/2021 108   02/25/2021 109   02/25/2021 110       Assessment/Plan:  1  DM2 with hypoglycemia: As per HPI  To be safe will reduce lantus to 10 units qhs  Stop humalog ac for now  Continue scale for correction  Will continue to follow and make changes as needed  Anticipate need for increase in insulin regimen  Portions of the record may have been created with voice recognition software  Occasional wrong word or "sound a like" substitutions may have occurred due to the inherent limitations of voice recognition software   Read the chart carefully and recognize, using context, where substitutions have occurred

## 2021-02-25 NOTE — PROGRESS NOTES
[facility]    Progress Note - Domenic Alvarenga 1955, 76 y o  female MRN: [id number]    Unit/Bed#: [facility] 504-01 Encounter: [id number]    Primary Care Provider: Amina Ordoñez MD   Date and time admitted to hospital: 2/21/2021 10:21 AM        * Community acquired pneumonia  Assessment & Plan  · Patient presenting with few day history of worsened shortness of breath and productive cough with intermittent chills  · Chest x-ray with right sided basilar infiltrate and vascular congestion  · Continue intravenous Rocephin, to complete 5 day course today  Azithromycin was discontinued given negative Legionella  · COVID-19/influenza/RSV PCR negative  · Procal trending up however clinically improving  Likely 2/2 renal failure  Remains afebrile without leukocytosis  Acute on chronic combined systolic and diastolic congestive heart failure (HCC)  Assessment & Plan  Wt Readings from Last 3 Encounters:   02/25/21 80 7 kg (178 lb)   11/02/20 77 1 kg (170 lb)   08/10/20 76 3 kg (168 lb 3 2 oz)     · Presenting with few day history of worsening shortness of breath and lower extremity edema  · Cardiology following, was given several doses of IV diuretic, now on hold 2/2 worsening renal function  · Continue beta-blocker  · Echo shows EF 60%  Akinesis of basal inferior and basal mid inferior lateral walls  Grade 2 diastolic dysfunction  LA mildly dilated  Mild MR, trace TR, trace PI  Moderate pericardial effusion identified without evidence of hemodynamic compromise  · I&Os, daily weights, low-sodium diet  · Monitor volume status      Anemia  Assessment & Plan  · Chronic in the setting of CKD  Hemoglobin 7 7 today  No signs or symptoms of bleeding  Baseline appears to be between 7 and 8   · Is supposed to get iron infusions as outpatient however has not 2/2 COVID  Should f/u with Dr Jaime Alexander as outpatient     · Attempted to transfuse 1 unit PRBC 2/24 however patient developed chest pain, possible transfusion reaction  EKG/trop/CXR/vitals stable  Seemed more consistent with anxiety rather than transfusion reaction  Transfusion was discontinued (got total 75 CC)  · Trend     Acute renal failure superimposed on stage 4 chronic kidney disease [facility])  Assessment & Plan  Lab Results   Component Value Date    EGFR 11 02/25/2021    EGFR 10 02/25/2021    EGFR 12 02/24/2021    CREATININE 4 06 (H) 02/25/2021    CREATININE 4 19 (H) 02/25/2021    CREATININE 3 77 (H) 02/24/2021   · CKD stage 3 with a baseline creatinine around 1 5-2  Recent KELLY related to contrast induced nephropathy/anemia  Creatinine worsening today at 4 06   · Nephrology following  Holding diuretics and losartan  · Avoid nephrotoxins and hypotension  · BMP in a m  Elevated troponin  Assessment & Plan  · Non MI troponin elevation in the setting of pneumonia/heart failure exacerbation  Coronary artery disease involving native coronary artery of native heart with angina pectoris [facility])  Assessment & Plan  · Has history of PCI in the past   · Continue beta-blocker, aspirin, statin, Imdur  Type 2 diabetes mellitus with kidney complication, with long-term current use of insulin [facility])  Assessment & Plan  Lab Results   Component Value Date    HGBA1C 13 2 (H) 02/21/2021       Recent Labs     02/25/21  0411 02/25/21  0451 02/25/21  0549 02/25/21  0617   POCGLU 76 82 66 77       · Uncontrolled as evidenced by A1c  Follows with endocrinology at [facility] (Dr Julia Wynn)  takes 70/30 at home 10 units b i d    · Medication error 2/24 patient was incorrectly administered 101 units humalog  Patient/family aware  Nurse manager involved  · Endocrinology following, continue Lantus 15 units q h s  And Humalog 7 units t i d  With meals plus sliding scale  · Diabetic diet  · Insulin adjustments per endo  Hypertension  Assessment & Plan  · Overall improved  Continue Norvasc, Coreg, hydralazine, Imdur    Coreg and hydralazine increased 2/22 by cardiology  · Renal artery doppler negative  · Monitor    Hypothyroidism  Assessment & Plan  · TSH 20 800  Increase levothyroxine to 125 mcg  · Reviewed proper administration with patient, was not taking on empty stomach consistently  · Repeat thyroid function testing in 4-6 weeks    Hyperlipidemia  Assessment & Plan  · Continue statin    Pharmacologic: Heparin  Mechanical VTE Prophylaxis in Place: Yes    Patient Centered Rounds: I have performed bedside rounds with nursing staff today  Discussions with Specialists or Other Care Team Provider: nursing, case management, endo, nephro    Education and Discussions with Family / Patient: patient and daughter over phone     Time Spent for Care: 30 minutes  More than 50% of total time spent on counseling and coordination of care as described above  Current Length of Stay: 4 day(s)    Current Patient Status: Inpatient   Certification Statement: The patient will continue to require additional inpatient hospital stay due to KELLY, hypoglycemia    Discharge Plan / Estimated Discharge Date: not stable for discharge  Code Status: Level 1 - Full Code      Subjective:   Patient is upset, states she did not sleep well overnight has she required frequent blood glucose checks  She denies any shortness of breath  Cough has resolved  She does report worsening lower extremity/pedal edema today  Objective:     Vitals:   Temp (24hrs), Av 3 °F (36 3 °C), Min:97 2 °F (36 2 °C), Max:97 5 °F (36 4 °C)    Temp:  [97 2 °F (36 2 °C)-97 5 °F (36 4 °C)] 97 3 °F (36 3 °C)  HR:  [52-64] 64  Resp:  [17-20] 20  BP: (125-191)/(55-80) 138/64  SpO2:  [96 %-99 %] 96 %  Body mass index is 29 62 kg/m²  Input and Output Summary (last 24 hours):        Intake/Output Summary (Last 24 hours) at 2021 0905  Last data filed at 2021 0300  Gross per 24 hour   Intake 470 ml   Output 650 ml   Net -180 ml       Physical Exam:     Physical Exam  Vitals signs and nursing note reviewed  Constitutional:       General: She is not in acute distress  Cardiovascular:      Rate and Rhythm: Normal rate  Pulmonary:      Breath sounds: Decreased breath sounds present  Abdominal:      Tenderness: There is no abdominal tenderness  Musculoskeletal:         General: Swelling present  Skin:     General: Skin is warm  Neurological:      Mental Status: She is alert and oriented to person, place, and time  Mental status is at baseline  Psychiatric:         Mood and Affect: Mood normal       Comments: Can be anxious at times         Additional Data:     Labs:    Results from last 7 days   Lab Units 02/25/21  0433  02/24/21  0543   WBC Thousand/uL 4 65  --  4 85   HEMOGLOBIN g/dL 7 7*   < > 7 2*   HEMATOCRIT % 25 2*   < > 23 6*   PLATELETS Thousands/uL 138*  --  111*   NEUTROS PCT %  --   --  79*   LYMPHS PCT %  --   --  14   MONOS PCT %  --   --  5   EOS PCT %  --   --  2    < > = values in this interval not displayed       Results from last 7 days   Lab Units 02/25/21  0433   POTASSIUM mmol/L 4 4   CHLORIDE mmol/L 105   CO2 mmol/L 22   BUN mg/dL 76*   CREATININE mg/dL 4 06*   CALCIUM mg/dL 8 7   ALK PHOS U/L 697*   ALT U/L 86*   AST U/L 139*     Results from last 7 days   Lab Units 02/21/21  1328   INR  1 10         Recent Cultures (last 7 days):     Results from last 7 days   Lab Units 02/21/21  1344   LEGIONELLA URINARY ANTIGEN  Negative       Last 24 Hours Medication List:   Current Facility-Administered Medications   Medication Dose Route Frequency Provider Last Rate    acetaminophen  650 mg Oral Q6H PRN Karyn Mejia MD      amLODIPine  5 mg Oral Daily Kathleen Lopez DO      aspirin  81 mg Oral Daily Karyn Mejia MD      atorvastatin  40 mg Oral HS Karyn Mejia MD      calcium acetate  667 mg Oral BID With Meals CLARICE Copeland      carvedilol  12 5 mg Oral BID With Meals Kathleen Johnston DO      cefTRIAXone  1,000 mg Intravenous Q24H CLARICE Olsen      dextrose  diphenhydrAMINE  25 mg Intravenous Q6H PRN Rodena Closs, MD      heparin (porcine)  5,000 Units Subcutaneous [facility] CLARICE Bocanegra      hydrALAZINE  50 mg Oral TID CLARICE Copeland      insulin glargine  15 Units Subcutaneous HS Alfredito Gordillo MD      insulin lispro  1-6 Units Subcutaneous TID [facility] Marquis Mcleod MD      insulin lispro  1-6 Units Subcutaneous HS Marquis Mcleod MD     [facility] ON 2/26/2021] insulin lispro  7 Units Subcutaneous TID With Meals CLARICE Olsen      isosorbide mononitrate  30 mg Oral Daily Marquis Mcleod MD      Labetalol HCl  10 mg Intravenous Q4H PRN Marquis Mcleod MD      levothyroxine  125 mcg Oral Early Morning CLARICE Olsen      sodium chloride (PF)  3 mL Intravenous Q1H PRN Lynda Rowland MD          Today, Patient Was Seen By: CLARICE Ramirez    ** Please Note: Dragon 360 Dictation voice to text software may have been used in the creation of this document   **

## 2021-02-25 NOTE — PROGRESS NOTES
1822: Contacted Dr Sean Duque with [facility] regarding patient developing chest tightness after receiving a blood transfusion  1824: Blood administration rate decreased from 75 mL/hr to 55 mL/hr  Patient still not tolerating blood transfusion, as she is complaining of worsening chest pain  EKG obtained  VSS  Blood transfusion stopped  Patient does not have any rash or worsened back pain, as patient already has baseline chronic pain in her back  CXR and transfusion reaction lab values ordered  Blood taken down and sent back down to Blood Bank

## 2021-02-25 NOTE — ASSESSMENT & PLAN NOTE
· Overall improved  Continue Norvasc, Coreg, hydralazine, Imdur  Coreg and hydralazine increased 2/22 by cardiology    · Renal artery doppler negative  · Monitor

## 2021-02-25 NOTE — ASSESSMENT & PLAN NOTE
· Chronic in the setting of CKD  Hemoglobin 7 7 today  No signs or symptoms of bleeding  Baseline appears to be between 7 and 8   · Is supposed to get iron infusions as outpatient however has not 2/2 COVID  Should f/u with Dr Alberto Ladd as outpatient  · Attempted to transfuse 1 unit PRBC 2/24 however patient developed chest pain, possible transfusion reaction  EKG/trop/CXR/vitals stable  Seemed more consistent with anxiety rather than transfusion reaction   Transfusion was discontinued (got total 75 CC)  · Trend

## 2021-02-25 NOTE — PROGRESS NOTES
Patient visited and anointed by Fr Hill     02/25/21 1300   Clinical Encounter Type   Visited With Patient   Quaker Encounters   Quaker Needs Prayer   Sacramental Encounters   Sacrament of Sick-Anointing Anointed

## 2021-02-25 NOTE — ASSESSMENT & PLAN NOTE
Wt Readings from Last 3 Encounters:   02/25/21 80 7 kg (178 lb)   11/02/20 77 1 kg (170 lb)   08/10/20 76 3 kg (168 lb 3 2 oz)     · Presenting with few day history of worsening shortness of breath and lower extremity edema  · Cardiology following, was given several doses of IV diuretic, now on hold 2/2 worsening renal function  · Continue beta-blocker  · Echo shows EF 60%  Akinesis of basal inferior and basal mid inferior lateral walls  Grade 2 diastolic dysfunction  LA mildly dilated  Mild MR, trace TR, trace PI  Moderate pericardial effusion identified without evidence of hemodynamic compromise    · I&Os, daily weights, low-sodium diet  · Monitor volume status

## 2021-02-25 NOTE — ASSESSMENT & PLAN NOTE
· Patient presenting with few day history of worsened shortness of breath and productive cough with intermittent chills  · Chest x-ray with right sided basilar infiltrate and vascular congestion  · Continue intravenous Rocephin, to complete 5 day course today  Azithromycin was discontinued given negative Legionella  · COVID-19/influenza/RSV PCR negative  · Procal trending up however clinically improving  Likely 2/2 renal failure  Remains afebrile without leukocytosis

## 2021-02-25 NOTE — PROGRESS NOTES
Cardiology Progress Note - David Watson 76 y o  female MRN: [id number]    Unit/Bed#: [facility] 504-01 Encounter: [id number]        Subjective:    No significant events overnight  Feels hypoglycemic  No dyspnea  Review of Systems   Cardiovascular: Negative for chest pain, leg swelling and palpitations  Respiratory: Negative for shortness of breath  Objective:   Vitals: Blood pressure 138/64, pulse 64, temperature (!) 97 3 °F (36 3 °C), temperature source Oral, resp  rate 20, height 5' 5" (1 651 m), weight 80 7 kg (178 lb), SpO2 96 %  , Body mass index is 29 62 kg/m² ,   Orthostatic Blood Pressures      Most Recent Value   Blood Pressure  138/64 filed at 02/25/2021 0354   Patient Position - Orthostatic VS  Lying filed at 02/24/2021 0224         Systolic (58TBM), BXX:589 , Min:125 , MITCH:771     Diastolic (42INQ), HWU:07, Min:55, Max:80      Intake/Output Summary (Last 24 hours) at 2/25/2021 0917  Last data filed at 2/25/2021 0300  Gross per 24 hour   Intake 470 ml   Output 650 ml   Net -180 ml     Weight (last 2 days)     Date/Time   Weight    02/25/21 0547   80 7 (178)    02/24/21 0600   78 8 (173 72)    02/23/21 0543   77 5 (170 86)                  Telemetry Review: NSR    Physical Exam  HENT:      Head: Atraumatic  Mouth/Throat:      Mouth: Mucous membranes are moist    Eyes:      Extraocular Movements: Extraocular movements intact  Neurological:      Mental Status: She is alert             Laboratory Results:  Results from last 7 days   Lab Units 02/24/21 2133 02/21/21  1928 02/21/21  1623   TROPONIN I ng/mL 0 09* 0 59* 0 60*       CBC with diff:   Results from last 7 days   Lab Units 02/25/21  0433 02/24/21 2023 02/24/21  0543 02/23/21  0532 02/22/21  0433 02/21/21  1037   WBC Thousand/uL 4 65  --  4 85 6 35 7 75 8 36   HEMOGLOBIN g/dL 7 7* 7 6* 7 2* 7 3* 8 1* 8 4*   HEMATOCRIT % 25 2* 24 2* 23 6* 24 4* 26 6* 27 9*   MCV fL 91  --  92 92 92 92   PLATELETS Thousands/uL 138*  --  111* 130* 160 161   MCH pg 27 8  --  28 0 27 5 27 9 27 5   MCHC g/dL 30 6*  --  30 5* 29 9* 30 5* 30 1*   RDW % 13 6  --  13 3 13 4 13 6 13 6   MPV fL 11 4  --  11 1 11 5 11 3 11 2   NRBC AUTO /100 WBCs  --   --  0 0 0 0         CMP:  Results from last 7 days   Lab Units 21  0433 21  0034 21  0543 21  0536 21  0433 02/21/21  1037   POTASSIUM mmol/L 4 4 4 1 4 9 4 9 4 8 4 9   CHLORIDE mmol/L 105 106 107 105 109* 108   CO2 mmol/L  25 25   BUN mg/dL 76* 79* 73* 62* 48* 45*   CREATININE mg/dL 4 06* 4 19* 3 77* 3 35* 3 08* 2 91*   CALCIUM mg/dL 8 7 8 6 8 3 8 6 9 4 9 3   AST U/L 139*  --   --   --   --  32   ALT U/L 86*  --   --   --   --  39   ALK PHOS U/L 697*  --   --   --   --  591*   EGFR ml/min/1 73sq m 11 10 12 14 15 16         BMP:  Results from last 7 days   Lab Units 21  0543 21  0536 21  1037   POTASSIUM mmol/L 4 4 4 1 4 9 4 9 4 8 4 9   CHLORIDE mmol/L 105 106 107 105 109* 108   CO2 mmol/L  25 25   BUN mg/dL 76* 79* 73* 62* 48* 45*   CREATININE mg/dL 4 06* 4 19* 3 77* 3 35* 3 08* 2 91*   CALCIUM mg/dL 8 7 8 6 8 3 8 6 9 4 9 3       BNP:     Magnesium:   Results from last 7 days   Lab Units 21  0543 21  0536 21  0433   MAGNESIUM mg/dL 2 1 2 0 2 1 2 0       Coags:   Results from last 7 days   Lab Units 21  1328   PTT seconds 36   INR  1 10           Cardiac testing:   Results for orders placed during the hospital encounter of 18   Echo complete with contrast if indicated    Narrative [address]  [phone]    Transthoracic Echocardiogram  2D, M-mode, Doppler, and Color Doppler    Study date:  30-Dec-2018    Patient: Devan Huff  MR number: [id number]  Account number: [de-identified]  : 1955  Age: 58 years  Gender: Female  Status: Inpatient  Location: Bedside  Height: 63 in  Weight: 201 5 lb  BP: 159/ 71 mmHg    Indications: Cardiomyopathy    Diagnoses: I43  - Cardiomyopathy in diseases classified elsewhere    Sonographer:  JOCE Sher  Primary Physician:  Kathy Kinsey MD  Referring Physician:  Gerlean Bence, MD  Group:  [facility]  Interpreting Physician:  True Hdez MD    SUMMARY    LEFT VENTRICLE:  Systolic function was at the lower limits of normal  Ejection fraction was estimated to be 50 %  There was hypokinesis of the mid inferoseptal and apical inferior wall(s)  Wall thickness was mildly increased  There was mild concentric hypertrophy  Features were consistent with a pseudonormal left ventricular filling pattern, with concomitant abnormal relaxation and increased filling pressure (grade 2 diastolic dysfunction)  LEFT ATRIUM:  The atrium was mildly dilated  MITRAL VALVE:  There was mild regurgitation  TRICUSPID VALVE:  There was mild regurgitation  Estimated peak PA pressure was 33 mmHg  PERICARDIUM:  A trace, free-flowing pericardial effusion was identified  There was no evidence of hemodynamic compromise  HISTORY: PRIOR HISTORY: CHF; DM; Hypothyroid; HTN; HLD; F  smoker    PROCEDURE: The procedure was performed at the bedside  This was a routine study  The transthoracic approach was used  The study included complete 2D imaging, M-mode, complete spectral Doppler, and color Doppler  Echocardiographic views  were limited due to decreased penetration and lung interference  This was a technically difficult study  LEFT VENTRICLE: Size was normal  Systolic function was at the lower limits of normal  Ejection fraction was estimated to be 50 %  There was hypokinesis of the mid inferoseptal and apical inferior wall(s)  Wall thickness was mildly  increased  There was mild concentric hypertrophy   DOPPLER: Features were consistent with a pseudonormal left ventricular filling pattern, with concomitant abnormal relaxation and increased filling pressure (grade 2 diastolic dysfunction)  RIGHT VENTRICLE: The size was normal  Systolic function was normal     LEFT ATRIUM: The atrium was mildly dilated  RIGHT ATRIUM: Size was normal     MITRAL VALVE: Valve structure was normal  There was normal leaflet separation  DOPPLER: The transmitral velocity was within the normal range  There was no evidence for stenosis  There was mild regurgitation  AORTIC VALVE: The valve was trileaflet  Leaflets exhibited normal thickness and normal cuspal separation  DOPPLER: Transaortic velocity was within the normal range  There was no evidence for stenosis  There was no regurgitation  TRICUSPID VALVE: The valve structure was normal  There was normal leaflet separation  DOPPLER: The transtricuspid velocity was within the normal range  There was no evidence for stenosis  There was mild regurgitation  Estimated peak PA  pressure was 33 mmHg  PULMONIC VALVE: Leaflets exhibited normal thickness, no calcification, and normal cuspal separation  DOPPLER: The transpulmonic velocity was within the normal range  There was no significant regurgitation  PERICARDIUM: A trace, free-flowing pericardial effusion was identified  There was no evidence of hemodynamic compromise  AORTA: The root exhibited normal size  SYSTEMIC VEINS: IVC: The inferior vena cava was not well visualized      SYSTEM MEASUREMENT TABLES    2D  %FS: 29 85 %  Ao Diam: 2 39 cm  EDV(Teich): 52 8 ml  EF Biplane: 55 93 %  EF(Teich): 57 99 %  ESV(Teich): 22 18 ml  IVSd: 1 14 cm  LA Area: 20 45 cm2  LA Diam: 3 62 cm  LVEDV MOD A2C: 127 02 ml  LVEDV MOD A4C: 116 5 ml  LVEDV MOD BP: 127 96 ml  LVEF MOD A2C: 59 2 %  LVEF MOD A4C: 48 18 %  LVESV MOD A2C: 51 82 ml  LVESV MOD A4C: 60 37 ml  LVESV MOD BP: 56 39 ml  LVIDd: 3 55 cm  LVIDs: 2 49 cm  LVLd A2C: 9 81 cm  LVLd A4C: 8 85 cm  LVLs A2C: 8 03 cm  LVLs A4C: 7 62 cm  LVPWd: 1 18 cm  RA Area: 13 89 cm2  RVIDd: 3 23 cm  SV MOD A2C: 75 2 ml  SV MOD A4C: 56 13 ml  SV(Teich): 30 62 ml    CW  RAP: 10 mmHg  TR Vmax: 2 63 m/s  TR maxP 66 mmHg    MM  TAPSE: 1 85 cm    PW  E': 0 06 m/s  E/E': 19 47  MV A Miko: 1 13 m/s  MV Dec Piatt: 6 89 m/s2  MV DecT: 168 4 ms  MV E Miko: 1 16 m/s  MV E/A Ratio: 1 03  MV PHT: 48 84 ms  MVA By PHT: 4 5 cm2  RVSP: 37 66 mmHg    [facility] Accredited Echocardiography Laboratory    Prepared and electronically signed by    Guanakito Woods MD  Signed 30-Dec-2018 13:04:11       No results found for this or any previous visit  No results found for this or any previous visit  No results found for this or any previous visit      Meds/Allergies   Current Facility-Administered Medications   Medication Dose Route Frequency Provider Last Rate    acetaminophen  650 mg Oral Q6H PRN Donis Lynn MD      amLODIPine  5 mg Oral Daily Kathleen Solomon DO      aspirin  81 mg Oral Daily Donis Lynn MD      atorvastatin  40 mg Oral HS Donis Lynn MD      calcium acetate  667 mg Oral BID With Meals CLARICE Copeland      carvedilol  12 5 mg Oral BID With Meals Kathleen Solomon DO      cefTRIAXone  1,000 mg Intravenous Q24H CLARICE Olsen      dextrose           diphenhydrAMINE  25 mg Intravenous Q6H PRN Marta Hallman MD      heparin (porcine)  5,000 Units Subcutaneous [facility] CLARICE Erwin      hydrALAZINE  50 mg Oral TID CLARICE Copeland      insulin glargine  15 Units Subcutaneous HS Demetrice Villagomez MD      insulin lispro  1-6 Units Subcutaneous TID [facility] Donis Lynn MD      insulin lispro  1-6 Units Subcutaneous HS Donis Lynn MD     [facility] ON 2021] insulin lispro  7 Units Subcutaneous TID With Meals CLARICE Olsen      isosorbide mononitrate  30 mg Oral Daily Donis Lynn MD      Labetalol HCl  10 mg Intravenous Q4H PRN Donis Lynn MD      levothyroxine  125 mcg Oral Early Morning CLARICE Olsen      sodium chloride (PF)  3 mL Intravenous Q1H PRN Selina Rowland MD          Medications Prior to Admission   Medication    amLODIPine (NORVASC) 5 mg tablet    aspirin 81 mg chewable tablet    atorvastatin (LIPITOR) 40 mg tablet    carvedilol (COREG) 6 25 mg tablet    hydrALAZINE (APRESOLINE) 10 mg tablet    insulin aspart protamine-insulin aspart (NovoLOG 70/30) 100 units/mL injection    isosorbide mononitrate (IMDUR) 30 mg 24 hr tablet    levothyroxine 112 mcg tablet    losartan (COZAAR) 25 mg tablet    torsemide (DEMADEX) 20 mg tablet    nitroglycerin (NITROSTAT) 0 4 mg SL tablet       Assessment:  Principal Problem:    Community acquired pneumonia  Active Problems:    Acute on chronic combined systolic and diastolic congestive heart failure (HCC)    Type 2 diabetes mellitus with kidney complication, with long-term current use of insulin ([facility])    Hypothyroidism    Hypertension    Elevated troponin    Hyperlipidemia    Acute renal failure superimposed on stage 4 chronic kidney disease ([facility])    Anemia    Coronary artery disease involving native coronary artery of native heart with angina pectoris ([facility])    History of anemia due to chronic kidney disease    Proteinuria    Hyperphosphatemia      Impression:  1  Acute on chronic diastolic heart failure - on IV diuretics  Some improvement on IV Bumex  2  Hypertension - improved control  3  KELLY on CKD - slightly improved  IVF per nephrology  4  CAD - stable  5  Moderate pericardial effusion - no hemodynamic compromise  6  Anemia - stable  Plan:  1  Hold diuretics  2  IVF per renal   3  Continue remainder of medications

## 2021-02-26 ENCOUNTER — APPOINTMENT (INPATIENT)
Dept: RADIOLOGY | Facility: HOSPITAL | Age: 66
DRG: 291 | End: 2021-02-26
Payer: COMMERCIAL

## 2021-02-26 PROBLEM — R14.0 DISTENDED ABDOMEN: Status: ACTIVE | Noted: 2021-02-26

## 2021-02-26 LAB
ANION GAP SERPL CALCULATED.3IONS-SCNC: 6 MMOL/L (ref 4–13)
BUN SERPL-MCNC: 75 MG/DL (ref 5–25)
CALCIUM SERPL-MCNC: 8.9 MG/DL (ref 8.3–10.1)
CHLORIDE SERPL-SCNC: 106 MMOL/L (ref 100–108)
CO2 SERPL-SCNC: 24 MMOL/L (ref 21–32)
CREAT SERPL-MCNC: 3.82 MG/DL (ref 0.6–1.3)
DOPAMINE 24H UR-MRATE: <30 PG/ML (ref 0–48)
EPINEPH PLAS-MCNC: 128 PG/ML (ref 0–62)
GFR SERPL CREATININE-BSD FRML MDRD: 12 ML/MIN/1.73SQ M
GLUCOSE SERPL-MCNC: 105 MG/DL (ref 65–140)
GLUCOSE SERPL-MCNC: 107 MG/DL (ref 65–140)
GLUCOSE SERPL-MCNC: 175 MG/DL (ref 65–140)
GLUCOSE SERPL-MCNC: 312 MG/DL (ref 65–140)
GLUCOSE SERPL-MCNC: 351 MG/DL (ref 65–140)
GLUCOSE SERPL-MCNC: 85 MG/DL (ref 65–140)
MAGNESIUM SERPL-MCNC: 2.1 MG/DL (ref 1.6–2.6)
NOREPINEPH PLAS-MCNC: 607 PG/ML (ref 0–874)
PHOSPHATE SERPL-MCNC: 4.6 MG/DL (ref 2.3–4.1)
POTASSIUM SERPL-SCNC: 5 MMOL/L (ref 3.5–5.3)
SODIUM SERPL-SCNC: 136 MMOL/L (ref 136–145)

## 2021-02-26 PROCEDURE — 99232 SBSQ HOSP IP/OBS MODERATE 35: CPT | Performed by: NURSE PRACTITIONER

## 2021-02-26 PROCEDURE — 84100 ASSAY OF PHOSPHORUS: CPT | Performed by: NURSE PRACTITIONER

## 2021-02-26 PROCEDURE — 80048 BASIC METABOLIC PNL TOTAL CA: CPT | Performed by: NURSE PRACTITIONER

## 2021-02-26 PROCEDURE — 76705 ECHO EXAM OF ABDOMEN: CPT

## 2021-02-26 PROCEDURE — 99232 SBSQ HOSP IP/OBS MODERATE 35: CPT | Performed by: INTERNAL MEDICINE

## 2021-02-26 PROCEDURE — 83735 ASSAY OF MAGNESIUM: CPT | Performed by: NURSE PRACTITIONER

## 2021-02-26 PROCEDURE — 82948 REAGENT STRIP/BLOOD GLUCOSE: CPT

## 2021-02-26 RX ADMIN — INSULIN LISPRO 5 UNITS: 100 INJECTION, SOLUTION INTRAVENOUS; SUBCUTANEOUS at 17:46

## 2021-02-26 RX ADMIN — ASPIRIN 81 MG: 81 TABLET, CHEWABLE ORAL at 08:49

## 2021-02-26 RX ADMIN — CARVEDILOL 12.5 MG: 12.5 TABLET, FILM COATED ORAL at 17:45

## 2021-02-26 RX ADMIN — HYDRALAZINE HYDROCHLORIDE 50 MG: 50 TABLET, FILM COATED ORAL at 08:52

## 2021-02-26 RX ADMIN — LEVOTHYROXINE SODIUM 125 MCG: 125 TABLET ORAL at 05:28

## 2021-02-26 RX ADMIN — CARVEDILOL 12.5 MG: 12.5 TABLET, FILM COATED ORAL at 08:51

## 2021-02-26 RX ADMIN — HYDRALAZINE HYDROCHLORIDE 50 MG: 50 TABLET, FILM COATED ORAL at 21:38

## 2021-02-26 RX ADMIN — INSULIN LISPRO 1 UNITS: 100 INJECTION, SOLUTION INTRAVENOUS; SUBCUTANEOUS at 11:30

## 2021-02-26 RX ADMIN — INSULIN GLARGINE 10 UNITS: 100 INJECTION, SOLUTION SUBCUTANEOUS at 21:41

## 2021-02-26 RX ADMIN — ISOSORBIDE MONONITRATE 30 MG: 30 TABLET, EXTENDED RELEASE ORAL at 08:51

## 2021-02-26 RX ADMIN — CALCIUM ACETATE 667 MG: 667 CAPSULE ORAL at 17:44

## 2021-02-26 RX ADMIN — CALCIUM ACETATE 667 MG: 667 CAPSULE ORAL at 08:51

## 2021-02-26 RX ADMIN — ATORVASTATIN CALCIUM 40 MG: 40 TABLET, FILM COATED ORAL at 21:38

## 2021-02-26 RX ADMIN — HYDRALAZINE HYDROCHLORIDE 50 MG: 50 TABLET, FILM COATED ORAL at 17:43

## 2021-02-26 RX ADMIN — NIFEDIPINE 30 MG: 30 TABLET, FILM COATED, EXTENDED RELEASE ORAL at 08:52

## 2021-02-26 NOTE — UTILIZATION REVIEW
Continued Stay Review    Date: 2/26/2021                          Current Patient Class:  Inpatient  Current Level of Care:  Med Surg     HPI:65 y o  female initially admitted on 2/21 with  CAP, acute Heart failure, KELLY  Treated with IV abx's, Heparin gtt, Lasix changed to Bumex on 2/22    Assessment/Plan: 2/26 - Pt completed 5 days of Cefriaxone, last dose 2/25 -   KELLY stage 4 , Creatinine to 3 82 today , started on Procardia Norvasc d/c'd due to lower extremity edema  No significant events overnight, LE edema continues, She reports increased urination  No SOB         P/ertinent Labs/Diagnostic Results:   Results from last 7 days   Lab Units 02/21/21  1124   SARS-COV-2  Negative     Results from last 7 days   Lab Units 02/25/21  0433 02/24/21 2023 02/24/21  0543 02/23/21  0532 02/22/21  0433   WBC Thousand/uL 4 65  --  4 85 6 35 7 75   HEMOGLOBIN g/dL 7 7* 7 6* 7 2* 7 3* 8 1*   HEMATOCRIT % 25 2* 24 2* 23 6* 24 4* 26 6*   PLATELETS Thousands/uL 138*  --  111* 130* 160   NEUTROS ABS Thousands/µL  --   --  3 81 4 30 5 19         Results from last 7 days   Lab Units 02/26/21  0436 02/25/21  1125 02/25/21 0433 02/25/21  0034 02/24/21  0543 02/23/21  0536 02/22/21  0433   SODIUM mmol/L 136 135* 137 137 136 136 141   POTASSIUM mmol/L 5 0 5 0 4 4 4 1 4 9 4 9 4 8   CHLORIDE mmol/L 106 105 105 106 107 105 109*   CO2 mmol/L 24 23 22 21 21 25 25   ANION GAP mmol/L 6 7 10 10 8 6 7   BUN mg/dL 75* 79* 76* 79* 73* 62* 48*   CREATININE mg/dL 3 82* 3 84* 4 06* 4 19* 3 77* 3 35* 3 08*   EGFR ml/min/1 73sq m 12 12 11 10 12 14 15   CALCIUM mg/dL 8 9 8 8 8 7 8 6 8 3 8 6 9 4   MAGNESIUM mg/dL 2 1  --   --  2 1 2 0 2 1 2 0   PHOSPHORUS mg/dL 4 6*  --   --  5 0* 5 8* 5 7* 4 4*     Results from last 7 days   Lab Units 02/25/21  0433 02/21/21  1037   AST U/L 139* 32   ALT U/L 86* 39   ALK PHOS U/L 697* 591*   TOTAL PROTEIN g/dL 7 3 7 4   ALBUMIN g/dL 3 0* 2 9*   TOTAL BILIRUBIN mg/dL 0 34 0 64     Results from last 7 days   Lab Units 02/26/21  1100 02/26/21  0556 02/26/21  0439 02/25/21  2030 02/25/21  1600 02/25/21  1039 02/25/21  0928 02/25/21  0617 02/25/21  0549 02/25/21  0451 02/25/21  0411 02/25/21  0305   POC GLUCOSE mg/dl 175* 107 105 190* 124 123 135 77 66 82 76 93     Results from last 7 days   Lab Units 02/26/21  0436 02/25/21  1125 02/25/21  0433 02/25/21  0034 02/24/21  0543 02/23/21  0536 02/22/21  0433 02/21/21  1037   GLUCOSE RANDOM mg/dL 85 106 70 88 242* 93 162* 383*         Results from last 7 days   Lab Units 02/21/21  1037   HEMOGLOBIN A1C % 13 2*   EAG mg/dl 332       Results from last 7 days   Lab Units 02/24/21  2133 02/21/21  1928 02/21/21  1623 02/21/21  1328 02/21/21  1037   TROPONIN I ng/mL 0 09* 0 59* 0 60* 0 42* 0 38*         Results from last 7 days   Lab Units 02/21/21  1328   PROTIME seconds 14 2   INR  1 10   PTT seconds 36     Results from last 7 days   Lab Units 02/21/21  1037   TSH 3RD GENERATON uIU/mL 20 900*     Results from last 7 days   Lab Units 02/24/21  0543 02/23/21  0536 02/22/21  0433 02/21/21  1328   PROCALCITONIN ng/ml 0 62* 0 53* 0 43* 0 38*       Results from last 7 days   Lab Units 02/21/21  1037   NT-PRO BNP pg/mL 12,376*       Results from last 7 days   Lab Units 02/24/21  2027 02/23/21  1604 02/21/21  1344   CLARITY UA  Cloudy Cloudy Clear   COLOR UA  Yellow Yellow Yellow   SPEC GRAV UA  1 018 1 016 1 021   PH UA  5 0 5 5 6 0   GLUCOSE UA mg/dl 100 (1/10%)* 250 (1/4%)* >=1000 (1%)*   KETONES UA mg/dl Negative Negative Negative   BLOOD UA  Negative  Negative Negative Trace*   PROTEIN UA mg/dl 300 (3+)* 300 (3+)* >=1000 (4+)*   NITRITE UA  Negative Negative Negative   BILIRUBIN UA  Negative Negative Negative   UROBILINOGEN UA E U /dl 0 2 1 0 0 2   LEUKOCYTES UA  Small* Small* Elevated glucose may cause decreased leukocyte values   See urine microscopic for [facility] result/*   WBC UA /hpf 30-50* 10-20* 10-20*   RBC UA /hpf 4-10* None Seen 2-4   BACTERIA UA /hpf None Seen None Seen None Seen EPITHELIAL CELLS WET PREP /hpf Moderate* None Seen None Seen   SODIUM UR   --  42 34   CREATININE UR mg/dL  --  141 0  135 0 142 0   PROTEIN UR mg/dL  --  213  --    PROT/CREAT RATIO UR   --  1 51*  --      Results from last 7 days   Lab Units 02/21/21  1344 02/21/21  1124   STREP PNEUMONIAE ANTIGEN, URINE  Negative  --    LEGIONELLA URINARY ANTIGEN  Negative  --    INFLUENZA A PCR   --  Negative   INFLUENZA B PCR   --  Negative   RSV PCR   --  Negative       CXR 2/25 - Small right pleural effusion, smaller  Right middle lobe and right basilar airspace disease may represent passive subsegmental atelectasis, improved  Vital Signs:   Date/Time  Temp  Pulse  Resp  BP  MAP (mmHg)  SpO2  O2 Device  Patient Position - Orthostatic VS   02/26/21 0849  97 3 °F (36 3 °C)Abnormal   71  16  144/68  --  --  --  Sitting   02/25/21 2300  99 3 °F (37 4 °C)  67  20  112/59  --  95 %  None (Room air)  Lying   02/25/21 2143  --  --  --  132/61  --  --  --  --   02/25/21 2040  --  --  --  --  --  --  None (Room air)  --   02/25/21 1530  --  --  --  134/65  88  --  --  --   02/25/21 1430  --  --  --  137/66  90  --  --  --   02/25/21 0900  --  --  --  --  --  --  None (Room air)  --   02/25/21 06:55:08  --  --  --  138/64  89  --  --  --   02/24/21 22:31:43  97 3 °F (36 3 °C)Abnormal   64  20  139/61  87  96 %  None (Room air)  Lying   02/24/21 21:26:29  --  60  --  131/56  81  96 %  --  --   02/24/21 19:45:26  --  60  --  129/56  80  96 %  --  --   02/24/21 1826  97 2 °F (36 2 °C)Abnormal   55  18  127/55  --  --  --  --   02/24/21 1750  97 4 °F (36 3 °C)Abnormal   57  17  142/70  --  98 %  None (Room air)  Sitting   02/24/21 1731  97 2 °F (36 2 °C)Abnormal   60  17  145/68  --  97 %   None (Room air)   --   SpO2: Simultaneous filing  User may not have seen previous data  at 02/24/21 1731   O2 Device: Simultaneous filing  User may not have seen previous data   at 02/24/21 1731 02/24/21 1653  --  --  --  169/70  --  --  --  -- 02/24/21 1500  97 5 °F (36 4 °C)  62  18  143/65  --  98 %  --  --   02/24/21 1113  --  52Abnormal   18  125/61  --  99 %  --  --   02/24/21 1007  --  --  --  191/80Abnormal   --  --  --           Medications:   Scheduled Medications:  aspirin, 81 mg, Oral, Daily  atorvastatin, 40 mg, Oral, HS  calcium acetate, 667 mg, Oral, BID With Meals  carvedilol, 12 5 mg, Oral, BID With Meals  heparin (porcine), 5,000 Units, Subcutaneous, Q8H DEE  hydrALAZINE, 50 mg, Oral, TID  insulin glargine, 10 Units, Subcutaneous, HS  insulin lispro, 1-6 Units, Subcutaneous, TID AC  insulin lispro, 1-6 Units, Subcutaneous, HS  isosorbide mononitrate, 30 mg, Oral, Daily  levothyroxine, 125 mcg, Oral, Early Morning  NIFEdipine, 30 mg, Oral, Daily      Continuous IV Infusions:     PRN Meds:  acetaminophen, 650 mg, Oral, Q6H PRN - 2/25x  1  diphenhydrAMINE, 25 mg, Intravenous, Q6H PRN  Labetalol HCl, 10 mg, Intravenous, Q4H PRN  sodium chloride (PF), 3 mL, Intravenous, Q1H PRN    Nursing Orders - VS - I & O q shift - up & OOB as tolerated -  Diet cons carb - Fluid restriction 1500 ml - Lo Phos, 2 gm NA  Discharge Plan: D     Network Utilization Review Department  ATTENTION: Please call with any questions or concerns to [phone] and carefully listen to the prompts so that you are directed to the right person  All voicemails are confidential   Gail Burgos all requests for admission clinical reviews, approved or denied determinations and any other requests to dedicated fax number below belonging to the campus where the patient is receiving treatment   List of dedicated fax numbers for the Facilities:  [address] DENIALS (Administrative/Medical Necessity) [phone]   [address] (Maternity/NICU/Pediatrics) [phone]   [address] [place] [address]  [address] [facility] [address] ([address] [address] [place] [address] Shaheen Hughes 1481 [phone]   [place] [address] [phone]

## 2021-02-26 NOTE — ASSESSMENT & PLAN NOTE
· Overall improved  Continue Coreg, hydralazine, Imdur  Coreg and hydralazine increased 2/22 by cardiology  Norvasc discontinued secondary to lower extremity edema and started on Procardia    · Renal artery doppler negative  · Monitor

## 2021-02-26 NOTE — ASSESSMENT & PLAN NOTE
· Patient presenting with few day history of worsened shortness of breath and productive cough with intermittent chills  · Chest x-ray with right sided basilar infiltrate and vascular congestion  · Completed 5 days ceftriaxone  · COVID-19/influenza/RSV PCR negative  · Procal trending up however clinically improving  Likely 2/2 renal failure  Remains afebrile without leukocytosis

## 2021-02-26 NOTE — ASSESSMENT & PLAN NOTE
Wt Readings from Last 3 Encounters:   02/26/21 80 7 kg (178 lb)   11/02/20 77 1 kg (170 lb)   08/10/20 76 3 kg (168 lb 3 2 oz)     · Presenting with few day history of worsening shortness of breath and lower extremity edema  · Cardiology following, was given several doses of IV diuretic, now on hold 2/2 KELLY  · Continue beta-blocker  · Echo shows EF 60%  Akinesis of basal inferior and basal mid inferior lateral walls  Grade 2 diastolic dysfunction  LA mildly dilated  Mild MR, trace TR, trace PI  Moderate pericardial effusion identified without evidence of hemodynamic compromise    · I&Os, daily weights, low-sodium diet  · Monitor volume status

## 2021-02-26 NOTE — PLAN OF CARE
Problem: Potential for Falls  Goal: Patient will remain free of falls  Description: INTERVENTIONS:  - Assess patient frequently for physical needs  -  Identify cognitive and physical deficits and behaviors that affect risk of falls    -  [place] fall precautions as indicated by assessment   - Educate patient/family on patient safety including physical limitations  - Instruct patient to call for assistance with activity based on assessment  - Modify environment to reduce risk of injury  - Consider OT/PT consult to assist with strengthening/mobility  Outcome: Progressing     Problem: PAIN - ADULT  Goal: Verbalizes/displays adequate comfort level or baseline comfort level  Description: Interventions:  - Encourage patient to monitor pain and request assistance  - Assess pain using appropriate pain scale  - Administer analgesics based on type and severity of pain and evaluate response  - Implement non-pharmacological measures as appropriate and evaluate response  - Consider cultural and social influences on pain and pain management  - Notify physician/advanced practitioner if interventions unsuccessful or patient reports new pain  Outcome: Progressing     Problem: INFECTION - ADULT  Goal: Absence or prevention of progression during hospitalization  Description: INTERVENTIONS:  - Assess and monitor for signs and symptoms of infection  - Monitor lab/diagnostic results  - Monitor all insertion sites, i e  indwelling lines, tubes, and drains  - Monitor endotracheal if appropriate and nasal secretions for changes in amount and color  - [place] appropriate cooling/warming therapies per order  - Administer medications as ordered  - Instruct and encourage patient and family to use good hand hygiene technique  - Identify and instruct in appropriate isolation precautions for identified infection/condition  Outcome: Progressing  Goal: Absence of fever/infection during neutropenic period  Description: INTERVENTIONS:  - Monitor WBC    Outcome: Progressing     Problem: SAFETY ADULT  Goal: Patient will remain free of falls  Description: INTERVENTIONS:  - Assess patient frequently for physical needs  -  Identify cognitive and physical deficits and behaviors that affect risk of falls    -  [place] fall precautions as indicated by assessment   - Educate patient/family on patient safety including physical limitations  - Instruct patient to call for assistance with activity based on assessment  - Modify environment to reduce risk of injury  - Consider OT/PT consult to assist with strengthening/mobility  Outcome: Progressing  Goal: Maintain or return to baseline ADL function  Description: INTERVENTIONS:  -  Assess patient's ability to carry out ADLs; assess patient's baseline for ADL function and identify physical deficits which impact ability to perform ADLs (bathing, care of mouth/teeth, toileting, grooming, dressing, etc )  - Assess/evaluate cause of self-care deficits   - Assess range of motion  - Assess patient's mobility; develop plan if impaired  - Assess patient's need for assistive devices and provide as appropriate  - Encourage maximum independence but intervene and supervise when necessary  - Involve family in performance of ADLs  - Assess for home care needs following discharge   - Consider OT consult to assist with ADL evaluation and planning for discharge  - Provide patient education as appropriate  Outcome: Progressing  Goal: Maintain or return mobility status to optimal level  Description: INTERVENTIONS:  - Assess patient's baseline mobility status (ambulation, transfers, stairs, etc )    - Identify cognitive and physical deficits and behaviors that affect mobility  - Identify mobility aids required to assist with transfers and/or ambulation (gait belt, sit-to-stand, lift, walker, cane, etc )  - [place] fall precautions as indicated by assessment  - Record patient progress and toleration of activity level on Mobility SBAR; progress patient to next Phase/Stage  - Instruct patient to call for assistance with activity based on assessment  - Consider rehabilitation consult to assist with strengthening/weightbearing, etc   Outcome: Progressing     Problem: DISCHARGE PLANNING  Goal: Discharge to home or other facility with appropriate resources  Description: INTERVENTIONS:  - Identify barriers to discharge w/patient and caregiver  - Arrange for needed discharge resources and transportation as appropriate  - Identify discharge learning needs (meds, wound care, etc )  - Arrange for interpretive services to assist at discharge as needed  - Refer to Case Management Department for coordinating discharge planning if the patient needs post-hospital services based on physician/advanced practitioner order or complex needs related to functional status, cognitive ability, or social support system  Outcome: Progressing     Problem: Knowledge Deficit  Goal: Patient/family/caregiver demonstrates understanding of disease process, treatment plan, medications, and discharge instructions  Description: Complete learning assessment and assess knowledge base    Interventions:  - Provide teaching at level of understanding  - Provide teaching via preferred learning methods  Outcome: Progressing     Problem: CARDIOVASCULAR - ADULT  Goal: Maintains optimal cardiac output and hemodynamic stability  Description: INTERVENTIONS:  - Monitor I/O, vital signs and rhythm  - Monitor for S/S and trends of decreased cardiac output  - Administer and titrate ordered vasoactive medications to optimize hemodynamic stability  - Assess quality of pulses, skin color and temperature  - Assess for signs of decreased coronary artery perfusion  - Instruct patient to report change in severity of symptoms  Outcome: Progressing  Goal: Absence of cardiac dysrhythmias or at baseline rhythm  Description: INTERVENTIONS:  - Continuous cardiac monitoring, vital signs, obtain 12 lead EKG if ordered  - Administer antiarrhythmic and heart rate control medications as ordered  - Monitor electrolytes and administer replacement therapy as ordered  Outcome: Progressing     Problem: RESPIRATORY - ADULT  Goal: Achieves optimal ventilation and oxygenation  Description: INTERVENTIONS:  - Assess for changes in respiratory status  - Assess for changes in mentation and behavior  - Position to facilitate oxygenation and minimize respiratory effort  - Oxygen administered by appropriate delivery if ordered  - Initiate smoking cessation education as indicated  - Encourage broncho-pulmonary hygiene including cough, deep breathe, Incentive Spirometry  - Assess the need for suctioning and aspirate as needed  - Assess and instruct to report SOB or any respiratory difficulty  - Respiratory Therapy support as indicated  Outcome: Progressing     Problem: METABOLIC, FLUID AND ELECTROLYTES - ADULT  Goal: Electrolytes maintained within normal limits  Description: INTERVENTIONS:  - Monitor labs and assess patient for signs and symptoms of electrolyte imbalances  - Administer electrolyte replacement as ordered  - Monitor response to electrolyte replacements, including repeat lab results as appropriate  - Instruct patient on fluid and nutrition as appropriate  Outcome: Progressing  Goal: Fluid balance maintained  Description: INTERVENTIONS:  - Monitor labs   - Monitor I/O and WT  - Instruct patient on fluid and nutrition as appropriate  - Assess for signs & symptoms of volume excess or deficit  Outcome: Progressing  Goal: Glucose maintained within target range  Description: INTERVENTIONS:  - Monitor Blood Glucose as ordered  - Assess for signs and symptoms of hyperglycemia and hypoglycemia  - Administer ordered medications to maintain glucose within target range  - Assess nutritional intake and initiate nutrition service referral as needed  Outcome: Progressing     Problem: Nutrition/Hydration-ADULT  Goal: Nutrient/Hydration intake appropriate for improving, restoring or maintaining nutritional needs  Description: Monitor and assess patient's nutrition/hydration status for malnutrition  Collaborate with interdisciplinary team and initiate plan and interventions as ordered  Monitor patient's weight and dietary intake as ordered or per policy  Utilize nutrition screening tool and intervene as necessary  Determine patient's food preferences and provide high-protein, high-caloric foods as appropriate       INTERVENTIONS:  - Monitor oral intake, urinary output, labs, and treatment plans  - Assess nutrition and hydration status and recommend course of action  - Evaluate amount of meals eaten  - Assist patient with eating if necessary   - Allow adequate time for meals  - Recommend/ encourage appropriate diets, oral nutritional supplements, and vitamin/mineral supplements  - Order, calculate, and assess calorie counts as needed  - Recommend, monitor, and adjust tube feedings and TPN/PPN based on assessed needs  - Assess need for intravenous fluids  - Provide specific nutrition/hydration education as appropriate  - Include patient/family/caregiver in decisions related to nutrition  Outcome: Progressing     Problem: DISCHARGE PLANNING - CARE MANAGEMENT  Goal: Discharge to post-acute care or home with appropriate resources  Description: INTERVENTIONS:  - Conduct assessment to determine patient/family and health care team treatment goals, and need for post-acute services based on payer coverage, community resources, and patient preferences, and barriers to discharge  - Address psychosocial, clinical, and financial barriers to discharge as identified in assessment in conjunction with the patient/family and health care team  - Arrange appropriate level of post-acute services according to patients   needs and preference and payer coverage in collaboration with the physician and health care team  - Communicate with and update the patient/family, physician, and health care team regarding progress on the discharge plan  - Arrange appropriate transportation to post-acute venues  Outcome: Progressing

## 2021-02-26 NOTE — CASE MANAGEMENT
Discussed patient with REESE Henry, and [facility] referral is recommended for both diabetes and CHF  New insulin will need to be price checked when it is determined which med and dose  Met with patient to discuss [facility]  She continues to refuse [facility] referral  Today she argues that her insuranc ewill not pay and is not open to hearing information about the nsurance coverage  Informed SLIM

## 2021-02-26 NOTE — ASSESSMENT & PLAN NOTE
· Chronic in the setting of CKD  Hemoglobin 7 7 2/25  No signs or symptoms of bleeding  Baseline appears to be between 7 and 8   · Is supposed to get iron infusions as outpatient however has not 2/2 COVID  Should f/u with Dr Blanc Model as outpatient  · Attempted to transfuse 1 unit PRBC 2/24 however patient developed chest pain, possible transfusion reaction  EKG/trop/CXR/vitals stable  Seemed more consistent with anxiety rather than transfusion reaction   Transfusion was discontinued (got total 75 CC)  · Trend

## 2021-02-26 NOTE — CASE MANAGEMENT
Message received that [facility] is not in network with insurance so cannot accept referral  [facility] referral made to [facility]

## 2021-02-26 NOTE — PROGRESS NOTES
Progress Note - Nephrology   Tariq Jasmine 76 y o  female MRN: [id number]  Unit/Bed#: [facility] 504-01 Encounter: [id number]      Assessment / Plan:  1  Acute kidney injury, present on admission, on top of CKD stage 4-likely in the setting of cardiorenal syndrome a setting of volume overload/Arb use plus or minus ATN from underlying pneumonia  -baseline serum creatinine 1 9-2 2 this past year, follows with Dr Viv Shelby outpatient for CKD stage 4  -serum creatinine has risen to 4 19 but improving today and stable at 3 8, possibly due to relative hypotension, was on Bumex 2 mg IV b i d  Per Cardiology  -K high normal  -UOP improving  -low fraction excretion of sodium noted but this can be seen in cardiorenal syndrome as well as prerenal KELLY  -renal artery duplex negative, secondary work up pending  -follow-up a m  BMP  -abdominal U/S pending  2  Hyperphosphatemia-phos 4 4-->5 8-->4 6, monitor for now, likely due to Acute kidney injury, continue with phoslo BID, monitor phos  3  Anemia with thrombocytopenia - Hgb 8 1-->7 7, monitor CBC, transfuse prn, no venofer in light of infection, monitor plts  4  Proteinuria - UpCr 1 51g as of 2/23/21  This is while sCr not in steady state  Highly suspect diabetic nephropathy in light of recent uncontrolled A1C 13 2  ACEi/ARB held in light of KELLY  5  HTN - BP improved, secondary work up including renin, sindy, plasma metanephrines pending  Plasma catecholamines ok  amlodipine discontinued and on 30mg to improve LE edema  Renal artery duplex negative for NORMAN  6  Hypoalbuminemia - alb 3, may contribute to LE third spacing, diuretics on hold in light of recovering KELLY        Subjective:   She denies chest pain or shortness of breath  She thinks her urine output is improving  She also thinks her swelling is improving  She very clearly states that she does not want hemodialysis if it was needed  She tells me she has told her daughters that as well        Objective:     Vitals: Blood pressure 144/68, pulse 71, temperature (!) 97 3 °F (36 3 °C), temperature source Oral, resp  rate 16, height 5' 5" (1 651 m), weight 80 7 kg (178 lb), SpO2 95 %  ,Body mass index is 29 62 kg/m²  Temp (24hrs), Av 3 °F (36 8 °C), Min:97 3 °F (36 3 °C), Max:99 3 °F (37 4 °C)      Weight (last 2 days)     Date/Time   Weight    21 0444   80 7 (178)    21 0547   80 7 (178)    21 0600   78 8 (173 72)                Intake/Output Summary (Last 24 hours) at 2021 1515  Last data filed at 2021 1218  Gross per 24 hour   Intake 480 ml   Output 1450 ml   Net -970 ml     I/O last 24 hours: In: 65 [P O :660]  Out: 1450 [Urine:1450]        Physical Exam:   Physical Exam  Vitals signs and nursing note reviewed  Constitutional:       General: She is not in acute distress  Appearance: Normal appearance  She is well-developed  She is obese  She is not diaphoretic  HENT:      Head: Normocephalic and atraumatic  Mouth/Throat:      Pharynx: No oropharyngeal exudate  Eyes:      General: No scleral icterus  Right eye: No discharge  Left eye: No discharge  Neck:      Musculoskeletal: Normal range of motion and neck supple  Thyroid: No thyromegaly  Cardiovascular:      Rate and Rhythm: Normal rate and regular rhythm  Heart sounds: No murmur  Pulmonary:      Effort: Pulmonary effort is normal  No respiratory distress  Breath sounds: Normal breath sounds  No wheezing  Abdominal:      General: Bowel sounds are normal  There is no distension  Palpations: Abdomen is soft  Musculoskeletal: Normal range of motion  General: Swelling (trace pedal edema b/l) present  Skin:     General: Skin is warm and dry  Findings: No rash  Neurological:      General: No focal deficit present  Mental Status: She is alert  Motor: No abnormal muscle tone        Comments: awake   Psychiatric:         Mood and Affect: Mood normal          Behavior: Behavior normal          Invasive Devices     Peripheral Intravenous Line            Peripheral IV 02/25/21 Distal;Left;Ventral (anterior) Forearm 1 day                Medications:    Scheduled Meds:  Current Facility-Administered Medications   Medication Dose Route Frequency Provider Last Rate    acetaminophen  650 mg Oral Q6H PRN Yaakov Chong MD      aspirin  81 mg Oral Daily Yaakov Chong MD      atorvastatin  40 mg Oral HS Yaakov Chong MD      calcium acetate  667 mg Oral BID With Meals CLARICE Copeland      carvedilol  12 5 mg Oral BID With Meals Kathleen Childers DO      diphenhydrAMINE  25 mg Intravenous Q6H PRN Darin Acevedo MD      heparin (porcine)  5,000 Units Subcutaneous [facility] CLARICE Coughlin      hydrALAZINE  50 mg Oral TID CLARICE Copeland      insulin glargine  10 Units Subcutaneous HS Randall Dean DO      insulin lispro  1-6 Units Subcutaneous TID [facility] Yaakov Chong MD      insulin lispro  1-6 Units Subcutaneous HS Yaakov Chong MD      isosorbide mononitrate  30 mg Oral Daily Yaakov Chong MD      Labetalol HCl  10 mg Intravenous Q4H PRN Yaakov Chong MD      levothyroxine  125 mcg Oral Early Morning CLARICE Coughlin      NIFEdipine  30 mg Oral Daily CLARICE Copeland      sodium chloride (PF)  3 mL Intravenous Q1H PRN Vi Rowland MD         PRN Meds:   acetaminophen    diphenhydrAMINE    Labetalol HCl    Insert peripheral IV **AND** sodium chloride (PF)    Continuous Infusions:         LAB RESULTS:      Results from last 7 days   Lab Units 02/26/21  0436 02/25/21  1125 02/25/21  0433 02/25/21  0034 02/24/21  2023 02/24/21  0543 02/23/21  0536 02/23/21  0532 02/22/21  0433 02/21/21  1037   WBC Thousand/uL  --   --  4 65  --   --  4 85  --  6 35 7 75 8 36   HEMOGLOBIN g/dL  --   --  7 7*  --  7 6* 7 2*  --  7 3* 8 1* 8 4*   HEMATOCRIT %  --   --  25 2*  --  24 2* 23 6*  --  24 4* 26 6* 27 9*   PLATELETS Thousands/uL  --   --  138*  --   --  111*  --  130* 160 161 NEUTROS PCT %  --   --   --   --   --  79*  --  66 67 79*   LYMPHS PCT %  --   --   --   --   --  14  --  23 24 12*   MONOS PCT %  --   --   --   --   --  5  --  6 6 6   EOS PCT %  --   --   --   --   --  2  --  3 2 1   POTASSIUM mmol/L 5 0 5 0 4 4 4 1  --  4 9 4 9  --  4 8 4 9   CHLORIDE mmol/L 106 105 105 106  --  107 105  --  109* 108   CO2 mmol/L 24 23 22 21  --  21 25  --  25 25   BUN mg/dL 75* 79* 76* 79*  --  73* 62*  --  48* 45*   CREATININE mg/dL 3 82* 3 84* 4 06* 4 19*  --  3 77* 3 35*  --  3 08* 2 91*   CALCIUM mg/dL 8 9 8 8 8 7 8 6  --  8 3 8 6  --  9 4 9 3   ALK PHOS U/L  --   --  697*  --   --   --   --   --   --  591*   ALT U/L  --   --  86*  --   --   --   --   --   --  39   AST U/L  --   --  139*  --   --   --   --   --   --  32   MAGNESIUM mg/dL 2 1  --   --  2 1  --  2 0 2 1  --  2 0  --    PHOSPHORUS mg/dL 4 6*  --   --  5 0*  --  5 8* 5 7*  --  4 4*  --        CUTURES:  Lab Results   Component Value Date    URINECX No Growth <1000 cfu/mL 07/21/2017                 Portions of the record may have been created with voice recognition software  Occasional wrong word or "sound a like" substitutions may have occurred due to the inherent limitations of voice recognition software  Read the chart carefully and recognize, using context, where substitutions have occurred  If you have any questions, please contact the dictating provider

## 2021-02-26 NOTE — ASSESSMENT & PLAN NOTE
Lab Results   Component Value Date    EGFR 12 02/26/2021    EGFR 12 02/25/2021    EGFR 11 02/25/2021    CREATININE 3 82 (H) 02/26/2021    CREATININE 3 84 (H) 02/25/2021    CREATININE 4 06 (H) 02/25/2021   · CKD stage 3 with a baseline creatinine around 1 5-2  Recent KELLY related to contrast induced nephropathy/anemia  Creatinine improving today at 3 82  · Nephrology following  Holding diuretics and losartan  · Avoid nephrotoxins and hypotension  · BMP in a m

## 2021-02-26 NOTE — PROGRESS NOTES
Cardiology Progress Note - Ban Bowden 76 y o  female MRN: [id number]    Unit/Bed#: [facility] 504-01 Encounter: [id number]        Subjective:    No significant events overnight  Feels well  Review of Systems   Cardiovascular: Negative for chest pain, leg swelling and palpitations  Respiratory: Negative for shortness of breath  Objective:   Vitals: Blood pressure 144/68, pulse 71, temperature (!) 97 3 °F (36 3 °C), temperature source Oral, resp  rate 16, height 5' 5" (1 651 m), weight 80 7 kg (178 lb), SpO2 95 %  , Body mass index is 29 62 kg/m² ,   Orthostatic Blood Pressures      Most Recent Value   Blood Pressure  144/68 filed at 02/26/2021 0849   Patient Position - Orthostatic VS  Sitting filed at 02/26/2021 2912         Systolic (58CSL), SGQ:683 , Min:112 , MEV:284     Diastolic (45YUS), KRS:34, Min:59, Max:68      Intake/Output Summary (Last 24 hours) at 2/26/2021 1337  Last data filed at 2/26/2021 1218  Gross per 24 hour   Intake 480 ml   Output 1450 ml   Net -970 ml     Weight (last 2 days)     Date/Time   Weight    02/26/21 0444   80 7 (178)    02/25/21 0547   80 7 (178)    02/24/21 0600   78 8 (173 72)                  Telemetry Review: NSR    Physical Exam  HENT:      Head: Atraumatic  Mouth/Throat:      Mouth: Mucous membranes are moist    Eyes:      Extraocular Movements: Extraocular movements intact  Neurological:      Mental Status: She is alert             Laboratory Results:  Results from last 7 days   Lab Units 02/24/21 2133 02/21/21  1928 02/21/21  1623   TROPONIN I ng/mL 0 09* 0 59* 0 60*       CBC with diff:   Results from last 7 days   Lab Units 02/25/21  0433 02/24/21  2023 02/24/21  0543 02/23/21  0532 02/22/21  0433 02/21/21  1037   WBC Thousand/uL 4 65  --  4 85 6 35 7 75 8 36   HEMOGLOBIN g/dL 7 7* 7 6* 7 2* 7 3* 8 1* 8 4*   HEMATOCRIT % 25 2* 24 2* 23 6* 24 4* 26 6* 27 9*   MCV fL 91  --  92 92 92 92   PLATELETS Thousands/uL 138*  --  111* 130* 160 161   MCH pg 27 8  -- 28 0 27 5 27 9 27 5   MCHC g/dL 30 6*  --  30 5* 29 9* 30 5* 30 1*   RDW % 13 6  --  13 3 13 4 13 6 13 6   MPV fL 11 4  --  11 1 11 5 11 3 11 2   NRBC AUTO /100 WBCs  --   --  0 0 0 0         CMP:  Results from last 7 days   Lab Units 02/26/21 0436 02/25/21  1125 02/25/21 0433 02/25/21 0034 02/24/21  0543 02/23/21  0536 02/22/21 0433 02/21/21  1037   POTASSIUM mmol/L 5 0 5 0 4 4 4 1 4 9 4 9 4 8 4 9   CHLORIDE mmol/L 106 105 105 106 107 105 109* 108   CO2 mmol/L 24 23 22 21 21 25 25 25   BUN mg/dL 75* 79* 76* 79* 73* 62* 48* 45*   CREATININE mg/dL 3 82* 3 84* 4 06* 4 19* 3 77* 3 35* 3 08* 2 91*   CALCIUM mg/dL 8 9 8 8 8 7 8 6 8 3 8 6 9 4 9 3   AST U/L  --   --  139*  --   --   --   --  32   ALT U/L  --   --  86*  --   --   --   --  39   ALK PHOS U/L  --   --  697*  --   --   --   --  591*   EGFR ml/min/1 73sq m 12 12 11 10 12 14 15 16         BMP:  Results from last 7 days   Lab Units 02/26/21 0436 02/25/21 1125 02/25/21 0433 02/25/21  0034 02/24/21  0543 02/23/21  0536 02/22/21 0433   POTASSIUM mmol/L 5 0 5 0 4 4 4 1 4 9 4 9 4 8   CHLORIDE mmol/L 106 105 105 106 107 105 109*   CO2 mmol/L 24 23 22 21 21 25 25   BUN mg/dL 75* 79* 76* 79* 73* 62* 48*   CREATININE mg/dL 3 82* 3 84* 4 06* 4 19* 3 77* 3 35* 3 08*   CALCIUM mg/dL 8 9 8 8 8 7 8 6 8 3 8 6 9 4       BNP:     Magnesium:   Results from last 7 days   Lab Units 02/26/21 0436 02/25/21  0034 02/24/21  0543 02/23/21  0536 02/22/21  0433   MAGNESIUM mg/dL 2 1 2 1 2 0 2 1 2 0       Coags:   Results from last 7 days   Lab Units 02/21/21  1328   PTT seconds 36   INR  1 10           Cardiac testing:   Results for orders placed during the hospital encounter of 12/29/18   Echo complete with contrast if indicated    Narrative [address]  [facility], [address] [facility]  [phone]    Transthoracic Echocardiogram  2D, M-mode, Doppler, and Color Doppler    Study date:  30-Dec-2018    Patient: Adali Gamboa  MR number: [id number]  Account number: [de-identified]  : 1955  Age: 58 years  Gender: Female  Status: Inpatient  Location: Bedside  Height: 63 in  Weight: 201 5 lb  BP: 159/ 71 mmHg    Indications: Cardiomyopathy    Diagnoses: I43  - Cardiomyopathy in diseases classified elsewhere    Sonographer:  JOCE Vo  Primary Physician:  Arias Winkler MD  Referring Physician:  Godfrey Spencer MD  Group:  [facility]  Interpreting Physician:  Annetta Ying MD    SUMMARY    LEFT VENTRICLE:  Systolic function was at the lower limits of normal  Ejection fraction was estimated to be 50 %  There was hypokinesis of the mid inferoseptal and apical inferior wall(s)  Wall thickness was mildly increased  There was mild concentric hypertrophy  Features were consistent with a pseudonormal left ventricular filling pattern, with concomitant abnormal relaxation and increased filling pressure (grade 2 diastolic dysfunction)  LEFT ATRIUM:  The atrium was mildly dilated  MITRAL VALVE:  There was mild regurgitation  TRICUSPID VALVE:  There was mild regurgitation  Estimated peak PA pressure was 33 mmHg  PERICARDIUM:  A trace, free-flowing pericardial effusion was identified  There was no evidence of hemodynamic compromise  HISTORY: PRIOR HISTORY: CHF; DM; Hypothyroid; HTN; HLD; F  smoker    PROCEDURE: The procedure was performed at the bedside  This was a routine study  The transthoracic approach was used  The study included complete 2D imaging, M-mode, complete spectral Doppler, and color Doppler  Echocardiographic views  were limited due to decreased penetration and lung interference  This was a technically difficult study  LEFT VENTRICLE: Size was normal  Systolic function was at the lower limits of normal  Ejection fraction was estimated to be 50 %  There was hypokinesis of the mid inferoseptal and apical inferior wall(s)  Wall thickness was mildly  increased   There was mild concentric hypertrophy  DOPPLER: Features were consistent with a pseudonormal left ventricular filling pattern, with concomitant abnormal relaxation and increased filling pressure (grade 2 diastolic dysfunction)  RIGHT VENTRICLE: The size was normal  Systolic function was normal     LEFT ATRIUM: The atrium was mildly dilated  RIGHT ATRIUM: Size was normal     MITRAL VALVE: Valve structure was normal  There was normal leaflet separation  DOPPLER: The transmitral velocity was within the normal range  There was no evidence for stenosis  There was mild regurgitation  AORTIC VALVE: The valve was trileaflet  Leaflets exhibited normal thickness and normal cuspal separation  DOPPLER: Transaortic velocity was within the normal range  There was no evidence for stenosis  There was no regurgitation  TRICUSPID VALVE: The valve structure was normal  There was normal leaflet separation  DOPPLER: The transtricuspid velocity was within the normal range  There was no evidence for stenosis  There was mild regurgitation  Estimated peak PA  pressure was 33 mmHg  PULMONIC VALVE: Leaflets exhibited normal thickness, no calcification, and normal cuspal separation  DOPPLER: The transpulmonic velocity was within the normal range  There was no significant regurgitation  PERICARDIUM: A trace, free-flowing pericardial effusion was identified  There was no evidence of hemodynamic compromise  AORTA: The root exhibited normal size  SYSTEMIC VEINS: IVC: The inferior vena cava was not well visualized      SYSTEM MEASUREMENT TABLES    2D  %FS: 29 85 %  Ao Diam: 2 39 cm  EDV(Teich): 52 8 ml  EF Biplane: 55 93 %  EF(Teich): 57 99 %  ESV(Teich): 22 18 ml  IVSd: 1 14 cm  LA Area: 20 45 cm2  LA Diam: 3 62 cm  LVEDV MOD A2C: 127 02 ml  LVEDV MOD A4C: 116 5 ml  LVEDV MOD BP: 127 96 ml  LVEF MOD A2C: 59 2 %  LVEF MOD A4C: 48 18 %  LVESV MOD A2C: 51 82 ml  LVESV MOD A4C: 60 37 ml  LVESV MOD BP: 56 39 ml  LVIDd: 3 55 cm  LVIDs: 2 49 cm  LVLd A2C: 9 81 cm  LVLd A4C: 8 85 cm  LVLs A2C: 8 03 cm  LVLs A4C: 7 62 cm  LVPWd: 1 18 cm  RA Area: 13 89 cm2  RVIDd: 3 23 cm  SV MOD A2C: 75 2 ml  SV MOD A4C: 56 13 ml  SV(Teich): 30 62 ml    CW  RAP: 10 mmHg  TR Vmax: 2 63 m/s  TR maxP 66 mmHg    MM  TAPSE: 1 85 cm    PW  E': 0 06 m/s  E/E': 19 47  MV A Miko: 1 13 m/s  MV Dec Hughes: 6 89 m/s2  MV DecT: 168 4 ms  MV E Miko: 1 16 m/s  MV E/A Ratio: 1 03  MV PHT: 48 84 ms  MVA By PHT: 4 5 cm2  RVSP: 37 66 mmHg    [facility] Accredited [facility]chocardiography Laboratory    Prepared and electronically signed by    Cee Reed MD  Signed 30-Dec-2018 13:04:11       No results found for this or any previous visit  No results found for this or any previous visit  No results found for this or any previous visit      Meds/Allergies   Current Facility-Administered Medications   Medication Dose Route Frequency Provider Last Rate    acetaminophen  650 mg Oral Q6H PRN No Beckwith MD      aspirin  81 mg Oral Daily No Beckwith MD      atorvastatin  40 mg Oral HS No Beckwith MD      calcium acetate  667 mg Oral BID With Meals CLARICE Copeland      carvedilol  12 5 mg Oral BID With Meals Kathleen Montez DO      diphenhydrAMINE  25 mg Intravenous Q6H PRN Esther Ghotra MD      heparin (porcine)  5,000 Units Subcutaneous [facility] CLARICE Dinh      hydrALAZINE  50 mg Oral TID CLARICE Copeland      insulin glargine  10 Units Subcutaneous HS Galdino Iqbal DO      insulin lispro  1-6 Units Subcutaneous TID [facility] MD Moriah Isabel insulin lispro  1-6 Units Subcutaneous HS No Beckwith MD      isosorbide mononitrate  30 mg Oral Daily No Beckwith MD      Labetalol HCl  10 mg Intravenous Q4H PRN No Beckwith MD      levothyroxine  125 mcg Oral Early Morning CLARICE Dinh      NIFEdipine  30 mg Oral Daily CLARICE Copeland      sodium chloride (PF)  3 mL Intravenous Q1H PRN iV Rowland MD          Medications Prior to Admission   Medication    amLODIPine (NORVASC) 5 mg tablet    aspirin 81 mg chewable tablet    atorvastatin (LIPITOR) 40 mg tablet    carvedilol (COREG) 6 25 mg tablet    hydrALAZINE (APRESOLINE) 10 mg tablet    insulin aspart protamine-insulin aspart (NovoLOG 70/30) 100 units/mL injection    isosorbide mononitrate (IMDUR) 30 mg 24 hr tablet    levothyroxine 112 mcg tablet    losartan (COZAAR) 25 mg tablet    torsemide (DEMADEX) 20 mg tablet    nitroglycerin (NITROSTAT) 0 4 mg SL tablet       Assessment:  Principal Problem:    Community acquired pneumonia  Active Problems:    Acute on chronic combined systolic and diastolic congestive heart failure (HCC)    Type 2 diabetes mellitus with kidney complication, with long-term current use of insulin (HCC)    Hypothyroidism    Hypertension    Elevated troponin    Hyperlipidemia    Acute renal failure superimposed on stage 4 chronic kidney disease (HCC)    Anemia    Coronary artery disease involving native coronary artery of native heart with angina pectoris ([facility])    History of anemia due to chronic kidney disease    Proteinuria    Hyperphosphatemia    Hypoalbuminemia    Distended abdomen      Impression:  1  Acute on chronic diastolic heart failure - on IV diuretics  Some improvement on IV Bumex  2  Hypertension - improved control  3  KELLY on CKD - slightly improved  IVF per nephrology  4  CAD - stable  5  Moderate pericardial effusion - no hemodynamic compromise  6  Anemia - stable  Plan:  1  Hold diuretics  2  Continue remainder of medications

## 2021-02-26 NOTE — PROGRESS NOTES
[facility]    Progress Note - Royal Balloon 1955, 76 y o  female MRN: [id number]    Unit/Bed#: [facility] 504-01 Encounter: [id number]    Primary Care Provider: Versie Leventhal, MD   Date and time admitted to hospital: 2/21/2021 10:21 AM        * Community acquired pneumonia  Assessment & Plan  · Patient presenting with few day history of worsened shortness of breath and productive cough with intermittent chills  · Chest x-ray with right sided basilar infiltrate and vascular congestion  · Completed 5 days ceftriaxone  · COVID-19/influenza/RSV PCR negative  · Procal trending up however clinically improving  Likely 2/2 renal failure  Remains afebrile without leukocytosis  Acute on chronic combined systolic and diastolic congestive heart failure (HCC)  Assessment & Plan  Wt Readings from Last 3 Encounters:   02/26/21 80 7 kg (178 lb)   11/02/20 77 1 kg (170 lb)   08/10/20 76 3 kg (168 lb 3 2 oz)     · Presenting with few day history of worsening shortness of breath and lower extremity edema  · Cardiology following, was given several doses of IV diuretic, now on hold 2/2 KELLY  · Continue beta-blocker  · Echo shows EF 60%  Akinesis of basal inferior and basal mid inferior lateral walls  Grade 2 diastolic dysfunction  LA mildly dilated  Mild MR, trace TR, trace PI  Moderate pericardial effusion identified without evidence of hemodynamic compromise  · I&Os, daily weights, low-sodium diet  · Monitor volume status      Anemia  Assessment & Plan  · Chronic in the setting of CKD  Hemoglobin 7 7 2/25  No signs or symptoms of bleeding  Baseline appears to be between 7 and 8   · Is supposed to get iron infusions as outpatient however has not 2/2 COVID  Should f/u with Dr Jem Batres as outpatient  · Attempted to transfuse 1 unit PRBC 2/24 however patient developed chest pain, possible transfusion reaction  EKG/trop/CXR/vitals stable   Seemed more consistent with anxiety rather than transfusion reaction  Transfusion was discontinued (got total 75 CC)  · Trend     Acute renal failure superimposed on stage 4 chronic kidney disease [facility])  Assessment & Plan  Lab Results   Component Value Date    EGFR 12 02/26/2021    EGFR 12 02/25/2021    EGFR 11 02/25/2021    CREATININE 3 82 (H) 02/26/2021    CREATININE 3 84 (H) 02/25/2021    CREATININE 4 06 (H) 02/25/2021   · CKD stage 3 with a baseline creatinine around 1 5-2  Recent KELLY related to contrast induced nephropathy/anemia  Creatinine improving today at 3 82  · Nephrology following  Holding diuretics and losartan  · Avoid nephrotoxins and hypotension  · BMP in a m  Distended abdomen  Assessment & Plan  · Abdominal ultrasound pending  Elevated troponin  Assessment & Plan  · Non MI troponin elevation in the setting of pneumonia/heart failure exacerbation  Coronary artery disease involving native coronary artery of native heart with angina pectoris [facility])  Assessment & Plan  · Has history of PCI in the past   · Continue beta-blocker, aspirin, statin, Imdur  Type 2 diabetes mellitus with kidney complication, with long-term current use of insulin [facility])  Assessment & Plan  Lab Results   Component Value Date    HGBA1C 13 2 (H) 02/21/2021       Recent Labs     02/25/21  1039 02/25/21  1600 02/25/21  2030 02/26/21  0556   POCGLU 123 124 190* 107       · Uncontrolled as evidenced by A1c  Follows with endocrinology at [facility] (Dr Ángel Rios)  takes 70/30 at home 10 units b i d    · Medication error 2/24 patient was incorrectly administered 101 units humalog  Patient/family aware  Nurse manager involved  · Endocrinology following, insulin adjustment as per Endocrinology  · Diabetic diet  · Monitor Accu-Cheks, hypoglycemia protocol  Hypertension  Assessment & Plan  · Overall improved  Continue Coreg, hydralazine, Imdur  Coreg and hydralazine increased 2/22 by cardiology    Norvasc discontinued secondary to lower extremity edema and started on Procardia  · Renal artery doppler negative  · Monitor    Hypothyroidism  Assessment & Plan  · TSH 20 800  Increase levothyroxine to 125 mcg  · Reviewed proper administration with patient, was not taking on empty stomach consistently  · Repeat thyroid function testing in 4-6 weeks    Hyperlipidemia  Assessment & Plan  · Continue statin    Pharmacologic: Heparin  Mechanical VTE Prophylaxis in Place: Yes    Patient Centered Rounds: I have performed bedside rounds with nursing staff today  Discussions with Specialists or Other Care Team Provider: nursing, case management  Will d/w nephrology  Education and Discussions with Family / Patient: patient and daughter     Time Spent for Care: 30 minutes  More than 50% of total time spent on counseling and coordination of care as described above  Current Length of Stay: 5 day(s)    Current Patient Status: Inpatient   Certification Statement: The patient will continue to require additional inpatient hospital stay due to abdominal US, additional Nephrology recommendations and additional lab work, monitor blood sugars    Discharge Plan / Estimated Discharge Date:  Not stable for discharge      Code Status: Level 1 - Full Code      Subjective:   No significant events overnight, Slept better     No shortness of breath  Still with lower extremity edema however this has been stable  Feels as though she is urinating more  Does report that abdomen is distended  Objective:     Vitals:   Temp (24hrs), Av 3 °F (37 4 °C), Min:99 3 °F (37 4 °C), Max:99 3 °F (37 4 °C)    Temp:  [99 3 °F (37 4 °C)] 99 3 °F (37 4 °C)  HR:  [67] 67  Resp:  [20] 20  BP: (112-137)/(59-66) 112/59  SpO2:  [95 %] 95 %  Body mass index is 29 62 kg/m²  Input and Output Summary (last 24 hours):        Intake/Output Summary (Last 24 hours) at 2021 0827  Last data filed at 2021 0529  Gross per 24 hour   Intake 300 ml   Output 1050 ml   Net -750 ml       Physical Exam:     Physical Exam  Vitals signs and nursing note reviewed  Constitutional:       Appearance: She is obese  Cardiovascular:      Rate and Rhythm: Normal rate  Pulmonary:      Breath sounds: Decreased breath sounds present  Abdominal:      General: There is distension  Musculoskeletal:         General: Swelling present  Skin:     General: Skin is warm  Neurological:      Mental Status: She is alert and oriented to person, place, and time  Mental status is at baseline  Psychiatric:         Mood and Affect: Mood normal          Additional Data:     Labs:    Results from last 7 days   Lab Units 02/25/21  0433  02/24/21  0543   WBC Thousand/uL 4 65  --  4 85   HEMOGLOBIN g/dL 7 7*   < > 7 2*   HEMATOCRIT % 25 2*   < > 23 6*   PLATELETS Thousands/uL 138*  --  111*   NEUTROS PCT %  --   --  79*   LYMPHS PCT %  --   --  14   MONOS PCT %  --   --  5   EOS PCT %  --   --  2    < > = values in this interval not displayed  Results from last 7 days   Lab Units 02/26/21  0436  02/25/21  0433   POTASSIUM mmol/L 5 0   < > 4 4   CHLORIDE mmol/L 106   < > 105   CO2 mmol/L 24   < > 22   BUN mg/dL 75*   < > 76*   CREATININE mg/dL 3 82*   < > 4 06*   CALCIUM mg/dL 8 9   < > 8 7   ALK PHOS U/L  --   --  697*   ALT U/L  --   --  86*   AST U/L  --   --  139*    < > = values in this interval not displayed       Results from last 7 days   Lab Units 02/21/21  1328   INR  1 10         Recent Cultures (last 7 days):     Results from last 7 days   Lab Units 02/21/21  1344   LEGIONELLA URINARY ANTIGEN  Negative       Last 24 Hours Medication List:   Current Facility-Administered Medications   Medication Dose Route Frequency Provider Last Rate    acetaminophen  650 mg Oral Q6H PRN Margot Montague MD      aspirin  81 mg Oral Daily Margot Montague MD      atorvastatin  40 mg Oral HS Margot Montague MD      calcium acetate  667 mg Oral BID With Meals CLARICE Copeland      carvedilol  12 5 mg Oral BID With Meals Kathleen Rodriguez DO      diphenhydrAMINE  25 mg Intravenous Q6H PRN Dela Rubinstein, MD      heparin (porcine)  5,000 Units Subcutaneous [facility] CLARICE Fry      hydrALAZINE  50 mg Oral TID CLARICE Copeland      insulin glargine  10 Units Subcutaneous HS Nidia Hammonds DO      insulin lispro  1-6 Units Subcutaneous TID [facility] Angelica Robb MD     Shayy Splinter insulin lispro  1-6 Units Subcutaneous HS Angelica Robb MD      isosorbide mononitrate  30 mg Oral Daily Angelica Robb MD      Labetalol HCl  10 mg Intravenous Q4H PRN Angelica Robb MD      levothyroxine  125 mcg Oral Early Morning CLARICE Fry      NIFEdipine  30 mg Oral Daily CLARICE Copeland      sodium chloride (PF)  3 mL Intravenous Q1H PRN Ayden Rowland MD          Today, Patient Was Seen By: CLARICE Schreiber    ** Please Note: Dragon 360 Dictation voice to text software may have been used in the creation of this document   **

## 2021-02-26 NOTE — ASSESSMENT & PLAN NOTE
Lab Results   Component Value Date    HGBA1C 13 2 (H) 02/21/2021       Recent Labs     02/25/21  1039 02/25/21  1600 02/25/21  2030 02/26/21  0556   POCGLU 123 124 190* 107       · Uncontrolled as evidenced by A1c  Follows with endocrinology at [facility] (Dr Muir )  takes 70/30 at home 10 units b i d    · Medication error 2/24 patient was incorrectly administered 101 units humalog  Patient/family aware  Nurse manager involved  · Endocrinology following, insulin adjustment as per Endocrinology  · Diabetic diet  · Monitor Accu-Cheks, hypoglycemia protocol

## 2021-02-27 VITALS
TEMPERATURE: 98.7 F | BODY MASS INDEX: 29.75 KG/M2 | WEIGHT: 178.57 LBS | RESPIRATION RATE: 18 BRPM | SYSTOLIC BLOOD PRESSURE: 132 MMHG | DIASTOLIC BLOOD PRESSURE: 63 MMHG | HEART RATE: 66 BPM | OXYGEN SATURATION: 95 % | HEIGHT: 65 IN

## 2021-02-27 PROBLEM — J18.9 COMMUNITY ACQUIRED PNEUMONIA: Status: RESOLVED | Noted: 2021-02-21 | Resolved: 2021-02-27

## 2021-02-27 PROBLEM — R77.8 ELEVATED TROPONIN: Status: RESOLVED | Noted: 2018-12-29 | Resolved: 2021-02-27

## 2021-02-27 PROBLEM — R79.89 ELEVATED TROPONIN: Status: RESOLVED | Noted: 2018-12-29 | Resolved: 2021-02-27

## 2021-02-27 PROBLEM — R14.0 DISTENDED ABDOMEN: Status: RESOLVED | Noted: 2021-02-26 | Resolved: 2021-02-27

## 2021-02-27 LAB
ANION GAP SERPL CALCULATED.3IONS-SCNC: 6 MMOL/L (ref 4–13)
BUN SERPL-MCNC: 80 MG/DL (ref 5–25)
CALCIUM SERPL-MCNC: 9 MG/DL (ref 8.3–10.1)
CHLORIDE SERPL-SCNC: 108 MMOL/L (ref 100–108)
CO2 SERPL-SCNC: 24 MMOL/L (ref 21–32)
CREAT SERPL-MCNC: 3.78 MG/DL (ref 0.6–1.3)
GFR SERPL CREATININE-BSD FRML MDRD: 12 ML/MIN/1.73SQ M
GLUCOSE SERPL-MCNC: 165 MG/DL (ref 65–140)
GLUCOSE SERPL-MCNC: 183 MG/DL (ref 65–140)
GLUCOSE SERPL-MCNC: 272 MG/DL (ref 65–140)
GLUCOSE SERPL-MCNC: 303 MG/DL (ref 65–140)
POTASSIUM SERPL-SCNC: 4.7 MMOL/L (ref 3.5–5.3)
SODIUM SERPL-SCNC: 138 MMOL/L (ref 136–145)

## 2021-02-27 PROCEDURE — 80048 BASIC METABOLIC PNL TOTAL CA: CPT | Performed by: INTERNAL MEDICINE

## 2021-02-27 PROCEDURE — 82948 REAGENT STRIP/BLOOD GLUCOSE: CPT

## 2021-02-27 PROCEDURE — 99239 HOSP IP/OBS DSCHRG MGMT >30: CPT | Performed by: NURSE PRACTITIONER

## 2021-02-27 PROCEDURE — 99232 SBSQ HOSP IP/OBS MODERATE 35: CPT | Performed by: INTERNAL MEDICINE

## 2021-02-27 RX ORDER — LEVOTHYROXINE SODIUM 0.12 MG/1
125 TABLET ORAL DAILY
Qty: 30 TABLET | Refills: 0 | Status: SHIPPED | OUTPATIENT
Start: 2021-02-27 | End: 2021-11-08

## 2021-02-27 RX ORDER — CARVEDILOL 12.5 MG/1
12.5 TABLET ORAL 2 TIMES DAILY WITH MEALS
Qty: 60 TABLET | Refills: 0 | Status: SHIPPED | OUTPATIENT
Start: 2021-02-27 | End: 2021-03-10 | Stop reason: HOSPADM

## 2021-02-27 RX ORDER — CALCIUM ACETATE 667 MG/1
667 CAPSULE ORAL 2 TIMES DAILY WITH MEALS
Qty: 60 CAPSULE | Refills: 0 | Status: SHIPPED | OUTPATIENT
Start: 2021-02-27 | End: 2022-02-18 | Stop reason: HOSPADM

## 2021-02-27 RX ORDER — HYDRALAZINE HYDROCHLORIDE 50 MG/1
50 TABLET, FILM COATED ORAL 3 TIMES DAILY
Qty: 90 TABLET | Refills: 0 | Status: SHIPPED | OUTPATIENT
Start: 2021-02-27 | End: 2021-03-10 | Stop reason: HOSPADM

## 2021-02-27 RX ORDER — NIFEDIPINE 30 MG/1
30 TABLET, FILM COATED, EXTENDED RELEASE ORAL DAILY
Qty: 30 TABLET | Refills: 0 | Status: SHIPPED | OUTPATIENT
Start: 2021-02-28 | End: 2021-03-10 | Stop reason: HOSPADM

## 2021-02-27 RX ADMIN — NIFEDIPINE 30 MG: 30 TABLET, FILM COATED, EXTENDED RELEASE ORAL at 08:13

## 2021-02-27 RX ADMIN — INSULIN LISPRO 1 UNITS: 100 INJECTION, SOLUTION INTRAVENOUS; SUBCUTANEOUS at 08:15

## 2021-02-27 RX ADMIN — LEVOTHYROXINE SODIUM 125 MCG: 125 TABLET ORAL at 06:01

## 2021-02-27 RX ADMIN — HYDRALAZINE HYDROCHLORIDE 50 MG: 50 TABLET, FILM COATED ORAL at 08:14

## 2021-02-27 RX ADMIN — INSULIN LISPRO 3 UNITS: 100 INJECTION, SOLUTION INTRAVENOUS; SUBCUTANEOUS at 11:38

## 2021-02-27 RX ADMIN — ISOSORBIDE MONONITRATE 30 MG: 30 TABLET, EXTENDED RELEASE ORAL at 08:14

## 2021-02-27 RX ADMIN — CARVEDILOL 12.5 MG: 12.5 TABLET, FILM COATED ORAL at 08:14

## 2021-02-27 RX ADMIN — ASPIRIN 81 MG: 81 TABLET, CHEWABLE ORAL at 08:13

## 2021-02-27 RX ADMIN — CALCIUM ACETATE 667 MG: 667 CAPSULE ORAL at 08:13

## 2021-02-27 NOTE — PLAN OF CARE
Problem: Potential for Falls  Goal: Patient will remain free of falls  Description: INTERVENTIONS:  - Assess patient frequently for physical needs  -  Identify cognitive and physical deficits and behaviors that affect risk of falls    -  [place] fall precautions as indicated by assessment   - Educate patient/family on patient safety including physical limitations  - Instruct patient to call for assistance with activity based on assessment  - Modify environment to reduce risk of injury  - Consider OT/PT consult to assist with strengthening/mobility  Outcome: Adequate for Discharge     Problem: PAIN - ADULT  Goal: Verbalizes/displays adequate comfort level or baseline comfort level  Description: Interventions:  - Encourage patient to monitor pain and request assistance  - Assess pain using appropriate pain scale  - Administer analgesics based on type and severity of pain and evaluate response  - Implement non-pharmacological measures as appropriate and evaluate response  - Consider cultural and social influences on pain and pain management  - Notify physician/advanced practitioner if interventions unsuccessful or patient reports new pain  Outcome: Adequate for Discharge     Problem: INFECTION - ADULT  Goal: Absence or prevention of progression during hospitalization  Description: INTERVENTIONS:  - Assess and monitor for signs and symptoms of infection  - Monitor lab/diagnostic results  - Monitor all insertion sites, i e  indwelling lines, tubes, and drains  - Monitor endotracheal if appropriate and nasal secretions for changes in amount and color  - [place] appropriate cooling/warming therapies per order  - Administer medications as ordered  - Instruct and encourage patient and family to use good hand hygiene technique  - Identify and instruct in appropriate isolation precautions for identified infection/condition  Outcome: Adequate for Discharge  Goal: Absence of fever/infection during neutropenic period  Description: INTERVENTIONS:  - Monitor WBC    Outcome: Adequate for Discharge     Problem: SAFETY ADULT  Goal: Patient will remain free of falls  Description: INTERVENTIONS:  - Assess patient frequently for physical needs  -  Identify cognitive and physical deficits and behaviors that affect risk of falls    -  [place] fall precautions as indicated by assessment   - Educate patient/family on patient safety including physical limitations  - Instruct patient to call for assistance with activity based on assessment  - Modify environment to reduce risk of injury  - Consider OT/PT consult to assist with strengthening/mobility  Outcome: Adequate for Discharge  Goal: Maintain or return to baseline ADL function  Description: INTERVENTIONS:  -  Assess patient's ability to carry out ADLs; assess patient's baseline for ADL function and identify physical deficits which impact ability to perform ADLs (bathing, care of mouth/teeth, toileting, grooming, dressing, etc )  - Assess/evaluate cause of self-care deficits   - Assess range of motion  - Assess patient's mobility; develop plan if impaired  - Assess patient's need for assistive devices and provide as appropriate  - Encourage maximum independence but intervene and supervise when necessary  - Involve family in performance of ADLs  - Assess for home care needs following discharge   - Consider OT consult to assist with ADL evaluation and planning for discharge  - Provide patient education as appropriate  Outcome: Adequate for Discharge  Goal: Maintain or return mobility status to optimal level  Description: INTERVENTIONS:  - Assess patient's baseline mobility status (ambulation, transfers, stairs, etc )    - Identify cognitive and physical deficits and behaviors that affect mobility  - Identify mobility aids required to assist with transfers and/or ambulation (gait belt, sit-to-stand, lift, walker, cane, etc )  - [place] fall precautions as indicated by assessment  - Record patient progress and toleration of activity level on Mobility SBAR; progress patient to next Phase/Stage  - Instruct patient to call for assistance with activity based on assessment  - Consider rehabilitation consult to assist with strengthening/weightbearing, etc   Outcome: Adequate for Discharge     Problem: DISCHARGE PLANNING  Goal: Discharge to home or other facility with appropriate resources  Description: INTERVENTIONS:  - Identify barriers to discharge w/patient and caregiver  - Arrange for needed discharge resources and transportation as appropriate  - Identify discharge learning needs (meds, wound care, etc )  - Arrange for interpretive services to assist at discharge as needed  - Refer to Case Management Department for coordinating discharge planning if the patient needs post-hospital services based on physician/advanced practitioner order or complex needs related to functional status, cognitive ability, or social support system  Outcome: Adequate for Discharge     Problem: Knowledge Deficit  Goal: Patient/family/caregiver demonstrates understanding of disease process, treatment plan, medications, and discharge instructions  Description: Complete learning assessment and assess knowledge base    Interventions:  - Provide teaching at level of understanding  - Provide teaching via preferred learning methods  Outcome: Adequate for Discharge     Problem: CARDIOVASCULAR - ADULT  Goal: Maintains optimal cardiac output and hemodynamic stability  Description: INTERVENTIONS:  - Monitor I/O, vital signs and rhythm  - Monitor for S/S and trends of decreased cardiac output  - Administer and titrate ordered vasoactive medications to optimize hemodynamic stability  - Assess quality of pulses, skin color and temperature  - Assess for signs of decreased coronary artery perfusion  - Instruct patient to report change in severity of symptoms  Outcome: Adequate for Discharge  Goal: Absence of cardiac dysrhythmias or at baseline rhythm  Description: INTERVENTIONS:  - Continuous cardiac monitoring, vital signs, obtain 12 lead EKG if ordered  - Administer antiarrhythmic and heart rate control medications as ordered  - Monitor electrolytes and administer replacement therapy as ordered  Outcome: Adequate for Discharge     Problem: RESPIRATORY - ADULT  Goal: Achieves optimal ventilation and oxygenation  Description: INTERVENTIONS:  - Assess for changes in respiratory status  - Assess for changes in mentation and behavior  - Position to facilitate oxygenation and minimize respiratory effort  - Oxygen administered by appropriate delivery if ordered  - Initiate smoking cessation education as indicated  - Encourage broncho-pulmonary hygiene including cough, deep breathe, Incentive Spirometry  - Assess the need for suctioning and aspirate as needed  - Assess and instruct to report SOB or any respiratory difficulty  - Respiratory Therapy support as indicated  Outcome: Adequate for Discharge     Problem: METABOLIC, FLUID AND ELECTROLYTES - ADULT  Goal: Electrolytes maintained within normal limits  Description: INTERVENTIONS:  - Monitor labs and assess patient for signs and symptoms of electrolyte imbalances  - Administer electrolyte replacement as ordered  - Monitor response to electrolyte replacements, including repeat lab results as appropriate  - Instruct patient on fluid and nutrition as appropriate  Outcome: Adequate for Discharge  Goal: Fluid balance maintained  Description: INTERVENTIONS:  - Monitor labs   - Monitor I/O and WT  - Instruct patient on fluid and nutrition as appropriate  - Assess for signs & symptoms of volume excess or deficit  Outcome: Adequate for Discharge  Goal: Glucose maintained within target range  Description: INTERVENTIONS:  - Monitor Blood Glucose as ordered  - Assess for signs and symptoms of hyperglycemia and hypoglycemia  - Administer ordered medications to maintain glucose within target range  - Assess nutritional intake and initiate nutrition service referral as needed  Outcome: Adequate for Discharge     Problem: Nutrition/Hydration-ADULT  Goal: Nutrient/Hydration intake appropriate for improving, restoring or maintaining nutritional needs  Description: Monitor and assess patient's nutrition/hydration status for malnutrition  Collaborate with interdisciplinary team and initiate plan and interventions as ordered  Monitor patient's weight and dietary intake as ordered or per policy  Utilize nutrition screening tool and intervene as necessary  Determine patient's food preferences and provide high-protein, high-caloric foods as appropriate       INTERVENTIONS:  - Monitor oral intake, urinary output, labs, and treatment plans  - Assess nutrition and hydration status and recommend course of action  - Evaluate amount of meals eaten  - Assist patient with eating if necessary   - Allow adequate time for meals  - Recommend/ encourage appropriate diets, oral nutritional supplements, and vitamin/mineral supplements  - Order, calculate, and assess calorie counts as needed  - Recommend, monitor, and adjust tube feedings and TPN/PPN based on assessed needs  - Assess need for intravenous fluids  - Provide specific nutrition/hydration education as appropriate  - Include patient/family/caregiver in decisions related to nutrition  Outcome: Adequate for Discharge     Problem: DISCHARGE PLANNING - CARE MANAGEMENT  Goal: Discharge to post-acute care or home with appropriate resources  Description: INTERVENTIONS:  - Conduct assessment to determine patient/family and health care team treatment goals, and need for post-acute services based on payer coverage, community resources, and patient preferences, and barriers to discharge  - Address psychosocial, clinical, and financial barriers to discharge as identified in assessment in conjunction with the patient/family and health care team  - Arrange appropriate level of post-acute services according to patients   needs and preference and payer coverage in collaboration with the physician and health care team  - Communicate with and update the patient/family, physician, and health care team regarding progress on the discharge plan  - Arrange appropriate transportation to post-acute venues  Outcome: Adequate for Discharge

## 2021-02-27 NOTE — PROGRESS NOTES
Progress Note - Nephrology   David Watson 76 y o  female MRN: [id number]  Unit/Bed#: [facility] 504-01 Encounter: [id number]      Assessment / Plan:  1  Acute kidney injury, present on admission, on top of CKD stage 4-likely in the setting of cardiorenal syndrome a setting of volume overload/Arb use plus or minus ATN from underlying pneumonia  -baseline serum creatinine 1 9-2 2 this past year, follows with Dr Miki Nelson outpatient for CKD stage 4  -serum creatinine has risen to 4 19 but improving today and stable at 3 8, possibly due to relative hypotension, was on Bumex 2 mg IV b i d  Per Cardiology  -to be discharged off diuretics  -K high normal  -UOP improving  -low fraction excretion of sodium noted but this can be seen in cardiorenal syndrome as well as prerenal KELLY  -renal artery duplex negative, secondary work up pending including remain, aldosterone, and fractionated metanephrines  -follow-up a m  BMP  -abdominal U/S negative for ascites  2  Hyperphosphatemia-phos 4 4-->5 8-->4 6, monitor for now, likely due to Acute kidney injury, continue with phoslo BID, monitor phos  3  Anemia with thrombocytopenia - Hgb 8 1-->7 7, monitor CBC, transfuse prn, no venofer in light of infection, monitor plts  4  Proteinuria - UpCr 1 51g as of 2/23/21  This is while sCr not in steady state  Highly suspect diabetic nephropathy in light of recent uncontrolled A1C 13 2  ACEi/ARB held in light of KELLY  Can assess this outpatient  5  HTN - BP improved, secondary work up including renin, sindy, plasma metanephrines pending  Plasma catecholamines ok  amlodipine discontinued and on nifedipine XL 30mg to improve LE edema  Renal artery duplex negative for NORMAN  6  Hypoalbuminemia - alb 3, may contribute to LE third spacing, diuretics on hold in light of recovering KELLY  LE edema improving off amlodipine    Ok for d/c from renal perspective with close f/u with Dr Miki Nelson as an outpatient within a week  Office to call with f/u appt   Should have BMP prior to office visit as well as UA with microscopy and urine lytes which have been ordered  Subjective:   She denies chest pain or shortness breath  She is urinating well  She wants to go home  Thinks her leg edema is improving  No other complaints  Objective:     Vitals: Blood pressure 132/63, pulse 66, temperature 98 7 °F (37 1 °C), temperature source Oral, resp  rate 18, height 5' 5" (1 651 m), weight 81 kg (178 lb 9 2 oz), SpO2 95 %  ,Body mass index is 29 72 kg/m²  Temp (24hrs), Av 7 °F (37 1 °C), Min:98 7 °F (37 1 °C), Max:98 7 °F (37 1 °C)      Weight (last 2 days)     Date/Time   Weight    21 0600   81 (178 57)    21 0444   80 7 (178)    21 0547   80 7 (178)                Intake/Output Summary (Last 24 hours) at 2021 1337  Last data filed at 2021 0815  Gross per 24 hour   Intake 180 ml   Output 800 ml   Net -620 ml     I/O last 24 hours: In: 5 [P O :540]  Out: 1200 [Urine:1200]        Physical Exam:   Physical Exam  Vitals signs and nursing note reviewed  Constitutional:       General: She is not in acute distress  Appearance: Normal appearance  She is well-developed  She is obese  She is not diaphoretic  HENT:      Head: Normocephalic and atraumatic  Mouth/Throat:      Pharynx: No oropharyngeal exudate  Eyes:      General: No scleral icterus  Right eye: No discharge  Left eye: No discharge  Neck:      Musculoskeletal: Normal range of motion and neck supple  Thyroid: No thyromegaly  Cardiovascular:      Rate and Rhythm: Normal rate and regular rhythm  Heart sounds: No murmur  Pulmonary:      Effort: Pulmonary effort is normal  No respiratory distress  Breath sounds: Normal breath sounds  No wheezing  Abdominal:      General: Bowel sounds are normal  There is no distension  Palpations: Abdomen is soft  Musculoskeletal: Normal range of motion  General: Swelling (ankles) present  Skin:     General: Skin is warm and dry  Findings: No rash  Neurological:      General: No focal deficit present  Mental Status: She is alert  Motor: No abnormal muscle tone        Comments: awake   Psychiatric:         Mood and Affect: Mood normal          Behavior: Behavior normal          Invasive Devices     None                 Medications:    Scheduled Meds:  Current Facility-Administered Medications   Medication Dose Route Frequency Provider Last Rate    acetaminophen  650 mg Oral Q6H PRN CLARICE Carmen      aspirin  81 mg Oral Daily CLARICE Olsen      atorvastatin  40 mg Oral HS CLARICE Olsen      calcium acetate  667 mg Oral BID With Meals CLARICE Olsen      carvedilol  12 5 mg Oral BID With Meals CLARICE Carmen      diphenhydrAMINE  25 mg Intravenous Q6H PRN CLARICE Carmen      heparin (porcine)  5,000 Units Subcutaneous Q8H [facility] CLARICE Olsen      hydrALAZINE  50 mg Oral TID CLARICE Olsen      insulin glargine  10 Units Subcutaneous HS CLARICE Olsen      insulin lispro  1-6 Units Subcutaneous TID AC CLARICE Oslen      insulin lispro  1-6 Units Subcutaneous HS CLARICE Olsen      isosorbide mononitrate  30 mg Oral Daily CLARICE Olsen      Labetalol HCl  10 mg Intravenous Q4H PRN CLARICE Carmen      levothyroxine  125 mcg Oral Early Morning CLARICE Olsen      NIFEdipine  30 mg Oral Daily CLARICE Olsne      sodium chloride (PF)  3 mL Intravenous Q1H PRN CLARICE Olsen         PRN Meds:   acetaminophen    diphenhydrAMINE    Labetalol HCl    Insert peripheral IV **AND** sodium chloride (PF)    Continuous Infusions:         LAB RESULTS:      Results from last 7 days   Lab Units 02/27/21  0449 02/26/21  0436 02/25/21  1125 02/25/21  0433 02/25/21  0034 02/24/21 2023 02/24/21  0543 02/23/21  0536 02/23/21  0532 02/22/21  0433 02/21/21  1037   WBC Thousand/uL  --   --   -- 4 65  --   --  4 85  --  6 35 7 75 8 36   HEMOGLOBIN g/dL  --   --   --  7 7*  --  7 6* 7 2*  --  7 3* 8 1* 8 4*   HEMATOCRIT %  --   --   --  25 2*  --  24 2* 23 6*  --  24 4* 26 6* 27 9*   PLATELETS Thousands/uL  --   --   --  138*  --   --  111*  --  130* 160 161   NEUTROS PCT %  --   --   --   --   --   --  79*  --  66 67 79*   LYMPHS PCT %  --   --   --   --   --   --  14  --  23 24 12*   MONOS PCT %  --   --   --   --   --   --  5  --  6 6 6   EOS PCT %  --   --   --   --   --   --  2  --  3 2 1   POTASSIUM mmol/L 4 7 5 0 5 0 4 4 4 1  --  4 9 4 9  --  4 8 4 9   CHLORIDE mmol/L 108 106 105 105 106  --  107 105  --  109* 108   CO2 mmol/L 24 24 23 22 21  --  21 25  --  25 25   BUN mg/dL 80* 75* 79* 76* 79*  --  73* 62*  --  48* 45*   CREATININE mg/dL 3 78* 3 82* 3 84* 4 06* 4 19*  --  3 77* 3 35*  --  3 08* 2 91*   CALCIUM mg/dL 9 0 8 9 8 8 8 7 8 6  --  8 3 8 6  --  9 4 9 3   ALK PHOS U/L  --   --   --  697*  --   --   --   --   --   --  591*   ALT U/L  --   --   --  80*  --   --   --   --   --   --  39   AST U/L  --   --   --  139*  --   --   --   --   --   --  32   MAGNESIUM mg/dL  --  2 1  --   --  2 1  --  2 0 2 1  --  2 0  --    PHOSPHORUS mg/dL  --  4 6*  --   --  5 0*  --  5 8* 5 7*  --  4 4*  --        CUTURES:  Lab Results   Component Value Date    URINECX No Growth <1000 cfu/mL 07/21/2017                 Portions of the record may have been created with voice recognition software  Occasional wrong word or "sound a like" substitutions may have occurred due to the inherent limitations of voice recognition software  Read the chart carefully and recognize, using context, where substitutions have occurred  If you have any questions, please contact the dictating provider

## 2021-02-27 NOTE — DISCHARGE INSTRUCTIONS
Acute Kidney Injury (KELLY)  You have been diagnosed with Acute Kidney Injury (KELLY)  The following information has been developed to provide you with information about KELLY and treatment  What is Acute Kidney Injury (KELLY)? KELLY occurs when kidney function decreases over a short period of time  This condition causes a buildup of waste products in the blood and can cause fluid to build up causing swelling in the legs and shortness of breath  Sometimes called Acute Kidney Failure or Acute Renal Failure, KELLY is often reversible if it is found and treated quickly  How do you know if you have KELLY? KELLY is diagnosed by assessing kidney function  This is done by obtaining a blood test to measure the blood level of creatinine  Decreased urine output can sometimes also indicate KELLY  Who is at risk for KELLY? KELLY can happen to anyone but usually happens to people who are already sick and may be in the hospital  People are at higher risk for KELLY if they have any of the following:   age 72 years or older   high or low blood pressure   underlying kidney disease (e g , Chronic Kidney Disease (CKD)   peripheral vascular disease (hardening of arteries)   chronic diseases such as liver disease, heart disease and diabetes   a single kidney    What are the symptoms of KELLY? You may or may not have the symptoms to suggest you have kidney injury until the KELLY has progressed  Some of the symptoms are listed below:   Not making enough urine   Increased swelling in legs    Feeling tired   Trouble breathing or shortness of breath   Nausea   New or worsening confusion    What causes KELLY?   The causes are divided into three categories:   Not enough blood flowing to the kidneys (e g , low blood pressure, bleeding, diarrhea, dehydration)   Injury directly to the kidneys (e g , blood clots, severe infections such as sepsis, medicine toxicity, IV contrast dye used for cardiac catheterization or CT scans)   Blockage to the tubes (ureters) that drain the urine from the kidneys (e g , enlarged prostate, kidney stones, blood clots)    What is the treatment for KELLY? The treatment for KELLY depends on correcting what caused it  Treatment usually involves removing the cause and measures to prevent further injury to the kidneys  This may require the use of intravenous fluids or medications and/or temporary dialysis  Dialysis is a process using a machine that does the job of the kidneys to remove waste and help correct the electrolyte and fluid balance while the kidneys are recovering  If dialysis is needed to treat KELLY, the doctor will assess daily to see if the kidneys are showing signs of recovery  The daily assessments determine how long dialysis needs to continue  Depending on the cause and the extent of damage, an episode of KELLY may resolve in a few days to several weeks to several months  What are the long term effects of having an episode of KELLY?  People who have one episode of KELLY are at an increased risk of having another episode of KELLY as well as other health problems such as kidney disease, stroke, and heart disease   In a small number of people who had unrecognized kidney disease, an KELLY episode may result in Chronic Kidney Disease (CKD) which requires lifelong monitoring and treatment  How do you prevent future episodes of KELLY?  Make sure to follow up with the kidney doctor after hospital discharge and obtain blood work to reassess and monitor kidney function     If you have diabetes, keep your blood sugar in goal range and keep appointments with your diabetes specialist    Nicolette Gonzalez If you have high blood pressure, have your blood pressure checked regularly to make sure it is in target range  o If you take blood pressure medicine called ACEIs or ARBs (e g , Lisinopril, Enalapril, Diovan, Losartan), your doctor may tell you to skip a dose or two if you have severe dehydration and your blood pressure is running low    Avoid using medicines such as NSAIDs (Nonsteroidal Anti-inflammatory Drugs) and Logan-2 Inhibitors (a type of NSAID) that may be harmful to kidney function  These may include the medicines listed in the table that follows  Examples of NSAIDS and Logan-2 Inhibitors   Talk to your doctor or healthcare provider before stopping any medicine ordered for you  Celecoxib (CELEBREX) Ketoprofen (ORUDIS, ORUVAIL)   Diclofenac (VOLTAREN, CATAFLAM) Ketorolac (TORADOL)   Diflunisal (DOLOBID) Meloxicam (MOBIC)   Etodolac (LODINE) Nabumetone (RELAFEN)   Fenoprofen (NALFON) Naproxen (ALEVE, NAPROSYN,    NAPRELAN, ANAPROX)   Flurbiprofen (ANSAID) Oxaprozin (DAYPRO)   Ibuprofen (MOTRIN, ADVIL) Piroxicam (FELDENE)   Indomethacin (INDOCIN) Sulindac (CLINORIL)    Tolmetin (Sherran Back)     Is there a special diet for people with KELLY? People with KELLY and/or other kidney disease often have high potassium and phosphorus levels in their blood  To protect the kidneys from further injury and to avoid complications, most doctors recommend following a healthy diet choosing foods low potassium and low phosphorus   Limiting dietary potassium to 2 5 grams/day and phosphorus to 800 milligrams/day is recommended   A dietitian can help you with learning more about this type of diet   The tables on the following page may help you to choose lower potassium and phosphorus foods  The following websites are also good sources of information:  [employer] at  org/nutrition/Kidney-Disease-Stages-1-4   [employer]   https://www niddk nih gov/health-information/kidney-disease/chronic-kidney-disease-ckd/eating-nutrition  https://[facility] org/health/articles/15641-renal-diet-basics  [employer] cy    If you have any questions or concerns about your condition, please contact your doctor or healthcare provider    These tables may help you to choose lower potassium and phosphorus foods    AVOID these higher phosphorus* foods: CHOOSE these lower phosphorus* foods:   Milk, pudding , yogurt made from animals and from many soy varieties Rice milk (unfortified), nondairy creamer   Hard cheeses, ricotta, cottage cheese, fat-free cream cheese Regular and low-fat cream cheese   Ice cream, frozen yogurt Sherbet, frozen fruit pops, sorbet   Soups made with milk, dried peas, beans, lentils or other high phosphorus ingredients Soups made with broth, are water-based, or other lower phosphorus ingredients   Whole grains, including whole-grain breads, crackers, cereal, rice and pasta, corn tortillas Refined grains, including white bread, crackers, cereals, rice and pasta   Quick breads, biscuits, cornbread, muffins, pancakes, waffles, granola, wheat germ Homemade refined (white) dinner rolls, bagels, English muffins, sugar cookies, shortbread cookies, roland food cake   Dried peas (split, black-eyed), beans (black, garbanzo, lima, kidney, navy, pedroza), lentils Green peas (canned, frozen), green beans, wax beans   Organ meats, walleye, [place], sardines Lean beef, pork, lamb, poultry, other fish   Nuts and seeds Popcorn   Peanut butter, other nut butters; tofu, veggie or soy burgers Jam, jelly, honey   Chocolate, including chocolate drinks Carob (chocolate-flavored) candy, hard candy,  gumdrops   Pattie, pepper-type sodas, flavored hardy, bottled teas, beer Lemon-lime soda, ginger ale or root beer, plain water, cream soda, grape soda   *Always read labels and avoid foods with ingredients containing "phos"  AVOID these higher potassium foods: CHOOSE these lower potassium foods:      Milk (fat free, low fat, whole, buttermilk, Soy), yogurt    Regular and low-fat cream cheese      Beans (white, [place]), [place] sprouts, spinach Swiss       chard, broccoli, avocado, artichoke, potatoes, sweet      potatoes, tomatoes/tomato sauce, beet greens      Green beans, alfalfa sprouts, bamboo shoots (canned),       cabbage, carrots, cauliflower, corn, cucumber, eggplant,      endive, lettuce, mushrooms, onions, radishes,  watercress,       water chestnuts (canned), rice, peas      Halibut, tuna, cod, snapper, tuna fish, turkey    Egg, lean beef, pork, lamb, shellfish, chicken       Banana, papaya, orange, cantaloupe, dates, raisins and      other dried fruit, pomegranate, avocado      Apple, applesauce, blackberries, raspberries, pears,     watermelon, cucumbers, blueberries, cranberries,       peaches      Almonds, peanuts, hazelnuts, [place], cashew, mixed,      seeds (sunflower, pumpkin)     Homemade refined (white) dinner rolls, bagels,      English muffins, flour tortilla, crackers, joanne      crackers, popcorn, pretzels, spaghetti or macaroni,       hummus      Tomato or vegetable juice, prune juice    Papaya, peyman, or pear nectar, cranberry juice cocktail      Molasses

## 2021-02-27 NOTE — DISCHARGE SUMMARY
Discharge Summary - [facility] Internal Medicine    Patient Information: Helene Villasenor 76 y o  female MRN: [id number]  Unit/Bed#: [facility] 504-01 Encounter: [id number]    Discharging Physician / Practitioner: CLARICE Youngblood  PCP: Arlene Montelongo MD  Admission Date: 2/21/2021  Discharge Date: 02/27/21    Reason for Admission:  Pneumonia, acute on chronic heart failure exacerbation, KELLY on CKD    Discharge Diagnoses:     Principal Problem (Resolved):    Community acquired pneumonia  Active Problems:    Acute on chronic combined systolic and diastolic congestive heart failure ([facility] [address] )    Acute renal failure superimposed on stage 4 chronic kidney disease ([facility] )    Anemia    Coronary artery disease involving native coronary artery of native heart with angina pectoris ([facility] )    Type 2 diabetes mellitus with kidney complication, with long-term current use of insulin ([facility] )    Hypertension    Hypothyroidism    Hyperlipidemia    History of anemia due to chronic kidney disease    Proteinuria    Hyperphosphatemia    Hypoalbuminemia  Resolved Problems:    Elevated troponin    Distended abdomen      Consultations During Hospital Stay:  · Cardiology  · Nephrology  · Endocrinology    Procedures Performed:     · None    Significant Findings / Test Results:     · Chest x-ray interstitial pulmonary edema and right small pleural effusion  · Renal artery Doppler negative for renal artery stenosis  · Repeat chest x-ray Small right pleural effusion, smaller  Right middle lobe and right basilar airspace disease may represent passive subsegmental atelectasis, improved  · Echocardiogram EF 60%  Grade 2 diastolic dysfunction  Moderate pericardial effusion without evidence of hemodynamic compromise  No significant valvular abnormalities    · Abdominal ultrasound negative  · Creatinine on day of discharge 3 78  · TSH 20 900, T4 0 86  · Hemoglobin A1c 13 2  · COVID/influenza PCR negative    Incidental Findings:   · None    Test Results Pending at Discharge (will require follow up): · None     Outpatient Tests Requested:  · Outpatient follow-up with PCP  · Outpatient follow-up with endocrinology as scheduled 4/14 at [facility]  Should have repeat thyroid function testing in 4-6 weeks  · BMP on Wednesday with results to Dr Wilman Degroot and Dr Carlos Hackett  Patient should f/u with both specialties as well  Complications:  None    Hospital Course:     Diana Lovett is a 76 y o  female patient with past medical history of combined systolic and diastolic congestive heart failure, anemia, CKD stage 4 with baseline creatinine of approximately 1 5-2, CAD status post PCI, uncontrolled diabetes, hypertension, hypothyroidism, hyperlipidemia who originally presented to the hospital on 2/21/2021 due to worsening shortness of breath, productive cough and intermittent chills  Patient was started on intravenous ceftriaxone completed 5 days for pneumonia  She was also noted to be volume overloaded and Cardiology was consulted  She was given several doses of intravenous diuretic  Developed a KI was seen by Nephrology  Diuretics have subsequently been held and will continue to be held on discharge pending follow-up blood work next week  She was also seen by Endocrinology given uncontrolled diabetes  She was transition to basal bolus regimen in the hospital which we would recommend on discharge however patient is refusing  She should follow-up as scheduled with her primary endocrinologist at [facility]  Patient medically stable for discharge  Discussed with Cardiology, Nephrology and Endocrinology  Cardiology and Nephrology in agreement that diuretics are to be held on discharge with follow-up BMP scheduled for this upcoming Wednesday  Patient will need to follow-up with her primary cardiologist Dr Wilman Degroot next week for additional recommendations regarding diuretics    Message was left with office staff by cardiology team however I advised patient and daughter to call office on Monday  Patient had multiple medication changes during hospitalization all of which were reviewed in detail with daughter  Refused VNA, this was offered multiple times by CM  Medication changes:  Hold torsemide  Discontinue losartan  Start nifedipine ER 30 mg daily  Start calcium a state 667 mg p o  B i d  Increase Coreg to 12 5 mg p o  B i d  Increase hydralazine to 50 mg t i d  Increase levothyroxine to 125 mcg daily    Condition at Discharge: stable     Discharge Day Visit / Exam:     Subjective:  Patient offers no acute complaints  Vitals: Blood Pressure: 132/63 (02/27/21 0700)  Pulse: 66 (02/27/21 0700)  Temperature: 98 7 °F (37 1 °C) (02/26/21 2300)  Temp Source: Oral (02/26/21 2300)  Respirations: 18 (02/27/21 0700)  Height: 5' 5" (165 1 cm) (02/21/21 2108)  Weight - Scale: 81 kg (178 lb 9 2 oz) (02/27/21 0600)  SpO2: 95 % (02/27/21 0700)     Exam:   Physical Exam  Vitals signs and nursing note reviewed  Constitutional:       Appearance: She is obese  Cardiovascular:      Rate and Rhythm: Normal rate  Pulmonary:      Breath sounds: Normal breath sounds  Abdominal:      Tenderness: There is no abdominal tenderness  Musculoskeletal:         General: Swelling present  Skin:     General: Skin is warm  Neurological:      Mental Status: She is alert and oriented to person, place, and time  Mental status is at baseline  Psychiatric:         Mood and Affect: Mood normal          Discussion with Family:  Patient and daughter over the phone, extensive discussion  Discharge instructions/Information to patient and family:   See after visit summary for information provided to patient and family  Provisions for Follow-Up Care:  See after visit summary for information related to follow-up care and any pertinent home health orders        Disposition:     Home    For Discharges to [facility] SNF:   · Not Applicable to this Patient - Not Applicable to this Patient    Planned Readmission: no     Discharge Statement:  I spent 40 minutes discharging the patient  This time was spent on the day of discharge  I had direct contact with the patient on the day of discharge  Greater than 50% of the total time was spent examining patient, answering all patient questions, arranging and discussing plan of care with patient as well as directly providing post-discharge instructions  Additional time then spent on discharge activities  Discharge Medications:  See after visit summary for reconciled discharge medications provided to patient and family        ** Please Note: This note has been constructed using a voice recognition system **

## 2021-02-27 NOTE — PROGRESS NOTES
Patient's BS is 351  Refused insulin coverage dose of 6 units  Educated patient  She agrees to take Lantus dose, but refuses regular insulin dose  SLIM notified

## 2021-02-28 ENCOUNTER — TELEPHONE (OUTPATIENT)
Dept: OTHER | Facility: OTHER | Age: 66
End: 2021-02-28

## 2021-03-01 ENCOUNTER — TELEPHONE (OUTPATIENT)
Dept: NEPHROLOGY | Facility: CLINIC | Age: 66
End: 2021-03-01

## 2021-03-01 ENCOUNTER — APPOINTMENT (EMERGENCY)
Dept: RADIOLOGY | Facility: HOSPITAL | Age: 66
DRG: 291 | End: 2021-03-01
Payer: COMMERCIAL

## 2021-03-01 ENCOUNTER — HOSPITAL ENCOUNTER (INPATIENT)
Facility: HOSPITAL | Age: 66
LOS: 9 days | Discharge: HOME | DRG: 291 | End: 2021-03-10
Attending: EMERGENCY MEDICINE | Admitting: INTERNAL MEDICINE
Payer: COMMERCIAL

## 2021-03-01 DIAGNOSIS — E11.9 T2DM (TYPE 2 DIABETES MELLITUS) (HCC): ICD-10-CM

## 2021-03-01 DIAGNOSIS — I50.43 ACUTE ON CHRONIC COMBINED SYSTOLIC AND DIASTOLIC CONGESTIVE HEART FAILURE (HCC): ICD-10-CM

## 2021-03-01 DIAGNOSIS — N18.4 ACUTE RENAL FAILURE SUPERIMPOSED ON STAGE 4 CHRONIC KIDNEY DISEASE, UNSPECIFIED ACUTE RENAL FAILURE TYPE (HCC): ICD-10-CM

## 2021-03-01 DIAGNOSIS — I16.0 HYPERTENSIVE URGENCY: ICD-10-CM

## 2021-03-01 DIAGNOSIS — I50.9 ACUTE EXACERBATION OF CHF (CONGESTIVE HEART FAILURE) (HCC): Primary | ICD-10-CM

## 2021-03-01 DIAGNOSIS — N17.9 ACUTE RENAL FAILURE SUPERIMPOSED ON STAGE 4 CHRONIC KIDNEY DISEASE, UNSPECIFIED ACUTE RENAL FAILURE TYPE (HCC): ICD-10-CM

## 2021-03-01 DIAGNOSIS — I25.10 CAD (CORONARY ARTERY DISEASE): ICD-10-CM

## 2021-03-01 LAB
ALBUMIN SERPL BCP-MCNC: 3.2 G/DL (ref 3.5–5)
ALP SERPL-CCNC: 692 U/L (ref 46–116)
ALT SERPL W P-5'-P-CCNC: 75 U/L (ref 12–78)
ANION GAP SERPL CALCULATED.3IONS-SCNC: 4 MMOL/L (ref 4–13)
AST SERPL W P-5'-P-CCNC: 52 U/L (ref 5–45)
BASOPHILS # BLD AUTO: 0.04 THOUSANDS/ΜL (ref 0–0.1)
BASOPHILS NFR BLD AUTO: 1 % (ref 0–1)
BILIRUB SERPL-MCNC: 0.25 MG/DL (ref 0.2–1)
BUN SERPL-MCNC: 93 MG/DL (ref 5–25)
CALCIUM ALBUM COR SERPL-MCNC: 9.6 MG/DL (ref 8.3–10.1)
CALCIUM SERPL-MCNC: 9 MG/DL (ref 8.3–10.1)
CHLORIDE SERPL-SCNC: 108 MMOL/L (ref 100–108)
CO2 SERPL-SCNC: 23 MMOL/L (ref 21–32)
CREAT SERPL-MCNC: 3.28 MG/DL (ref 0.6–1.3)
EOSINOPHIL # BLD AUTO: 0.1 THOUSAND/ΜL (ref 0–0.61)
EOSINOPHIL NFR BLD AUTO: 1 % (ref 0–6)
ERYTHROCYTE [DISTWIDTH] IN BLOOD BY AUTOMATED COUNT: 14 % (ref 11.6–15.1)
GFR SERPL CREATININE-BSD FRML MDRD: 14 ML/MIN/1.73SQ M
GLUCOSE SERPL-MCNC: 227 MG/DL (ref 65–140)
GLUCOSE SERPL-MCNC: 250 MG/DL (ref 65–140)
HCT VFR BLD AUTO: 26 % (ref 34.8–46.1)
HGB BLD-MCNC: 7.7 G/DL (ref 11.5–15.4)
IMM GRANULOCYTES # BLD AUTO: 0.06 THOUSAND/UL (ref 0–0.2)
IMM GRANULOCYTES NFR BLD AUTO: 1 % (ref 0–2)
LYMPHOCYTES # BLD AUTO: 1.46 THOUSANDS/ΜL (ref 0.6–4.47)
LYMPHOCYTES NFR BLD AUTO: 21 % (ref 14–44)
MCH RBC QN AUTO: 27.4 PG (ref 26.8–34.3)
MCHC RBC AUTO-ENTMCNC: 29.6 G/DL (ref 31.4–37.4)
MCV RBC AUTO: 93 FL (ref 82–98)
METANEPH FREE SERPL-MCNC: 38.4 PG/ML (ref 0–88)
MONOCYTES # BLD AUTO: 0.45 THOUSAND/ΜL (ref 0.17–1.22)
MONOCYTES NFR BLD AUTO: 6 % (ref 4–12)
NEUTROPHILS # BLD AUTO: 5.01 THOUSANDS/ΜL (ref 1.85–7.62)
NEUTS SEG NFR BLD AUTO: 70 % (ref 43–75)
NORMETANEPHRINE SERPL-MCNC: 80.3 PG/ML (ref 0–191.8)
NRBC BLD AUTO-RTO: 0 /100 WBCS
NT-PROBNP SERPL-MCNC: 9024 PG/ML
PLATELET # BLD AUTO: 169 THOUSANDS/UL (ref 149–390)
PMV BLD AUTO: 11.2 FL (ref 8.9–12.7)
POTASSIUM SERPL-SCNC: 4.9 MMOL/L (ref 3.5–5.3)
PROT SERPL-MCNC: 7.5 G/DL (ref 6.4–8.2)
RBC # BLD AUTO: 2.81 MILLION/UL (ref 3.81–5.12)
SODIUM SERPL-SCNC: 135 MMOL/L (ref 136–145)
TROPONIN I SERPL-MCNC: 0.02 NG/ML
WBC # BLD AUTO: 7.12 THOUSAND/UL (ref 4.31–10.16)

## 2021-03-01 PROCEDURE — 99223 1ST HOSP IP/OBS HIGH 75: CPT | Performed by: INTERNAL MEDICINE

## 2021-03-01 PROCEDURE — 71045 X-RAY EXAM CHEST 1 VIEW: CPT

## 2021-03-01 PROCEDURE — 80053 COMPREHEN METABOLIC PANEL: CPT | Performed by: EMERGENCY MEDICINE

## 2021-03-01 PROCEDURE — 85025 COMPLETE CBC W/AUTO DIFF WBC: CPT | Performed by: EMERGENCY MEDICINE

## 2021-03-01 PROCEDURE — 99285 EMERGENCY DEPT VISIT HI MDM: CPT | Performed by: EMERGENCY MEDICINE

## 2021-03-01 PROCEDURE — 93005 ELECTROCARDIOGRAM TRACING: CPT

## 2021-03-01 PROCEDURE — 84484 ASSAY OF TROPONIN QUANT: CPT | Performed by: EMERGENCY MEDICINE

## 2021-03-01 PROCEDURE — 99285 EMERGENCY DEPT VISIT HI MDM: CPT

## 2021-03-01 PROCEDURE — 93975 VASCULAR STUDY: CPT | Performed by: SURGERY

## 2021-03-01 PROCEDURE — 36415 COLL VENOUS BLD VENIPUNCTURE: CPT

## 2021-03-01 PROCEDURE — 83880 ASSAY OF NATRIURETIC PEPTIDE: CPT | Performed by: INTERNAL MEDICINE

## 2021-03-01 PROCEDURE — 82948 REAGENT STRIP/BLOOD GLUCOSE: CPT

## 2021-03-01 RX ORDER — ONDANSETRON 2 MG/ML
4 INJECTION INTRAMUSCULAR; INTRAVENOUS EVERY 6 HOURS PRN
Status: DISCONTINUED | OUTPATIENT
Start: 2021-03-01 | End: 2021-03-10 | Stop reason: HOSPADM

## 2021-03-01 RX ORDER — NITROGLYCERIN 0.4 MG/1
0.4 TABLET SUBLINGUAL
Status: DISCONTINUED | OUTPATIENT
Start: 2021-03-01 | End: 2021-03-10 | Stop reason: HOSPADM

## 2021-03-01 RX ORDER — ATORVASTATIN CALCIUM 40 MG/1
40 TABLET, FILM COATED ORAL DAILY
Status: DISCONTINUED | OUTPATIENT
Start: 2021-03-02 | End: 2021-03-10 | Stop reason: HOSPADM

## 2021-03-01 RX ORDER — INSULIN ASPART 100 [IU]/ML
10 INJECTION, SUSPENSION SUBCUTANEOUS
Status: DISCONTINUED | OUTPATIENT
Start: 2021-03-02 | End: 2021-03-10 | Stop reason: HOSPADM

## 2021-03-01 RX ORDER — HYDRALAZINE HYDROCHLORIDE 50 MG/1
50 TABLET, FILM COATED ORAL 3 TIMES DAILY
Status: DISCONTINUED | OUTPATIENT
Start: 2021-03-01 | End: 2021-03-02

## 2021-03-01 RX ORDER — HEPARIN SODIUM 5000 [USP'U]/ML
5000 INJECTION, SOLUTION INTRAVENOUS; SUBCUTANEOUS EVERY 8 HOURS SCHEDULED
Status: DISCONTINUED | OUTPATIENT
Start: 2021-03-01 | End: 2021-03-10 | Stop reason: HOSPADM

## 2021-03-01 RX ORDER — DOCUSATE SODIUM 100 MG/1
100 CAPSULE, LIQUID FILLED ORAL 2 TIMES DAILY PRN
Status: DISCONTINUED | OUTPATIENT
Start: 2021-03-01 | End: 2021-03-10 | Stop reason: HOSPADM

## 2021-03-01 RX ORDER — LEVOTHYROXINE SODIUM 0.12 MG/1
125 TABLET ORAL DAILY
Status: DISCONTINUED | OUTPATIENT
Start: 2021-03-02 | End: 2021-03-10 | Stop reason: HOSPADM

## 2021-03-01 RX ORDER — CALCIUM ACETATE 667 MG/1
667 CAPSULE ORAL 2 TIMES DAILY WITH MEALS
Status: DISCONTINUED | OUTPATIENT
Start: 2021-03-02 | End: 2021-03-10 | Stop reason: HOSPADM

## 2021-03-01 RX ORDER — FUROSEMIDE 10 MG/ML
40 INJECTION INTRAMUSCULAR; INTRAVENOUS ONCE
Status: COMPLETED | OUTPATIENT
Start: 2021-03-01 | End: 2021-03-01

## 2021-03-01 RX ORDER — CARVEDILOL 12.5 MG/1
12.5 TABLET ORAL 2 TIMES DAILY WITH MEALS
Status: DISCONTINUED | OUTPATIENT
Start: 2021-03-02 | End: 2021-03-02

## 2021-03-01 RX ORDER — MAGNESIUM HYDROXIDE/ALUMINUM HYDROXICE/SIMETHICONE 120; 1200; 1200 MG/30ML; MG/30ML; MG/30ML
15 SUSPENSION ORAL EVERY 6 HOURS PRN
Status: DISCONTINUED | OUTPATIENT
Start: 2021-03-01 | End: 2021-03-10 | Stop reason: HOSPADM

## 2021-03-01 RX ORDER — ASPIRIN 81 MG/1
81 TABLET, CHEWABLE ORAL DAILY
Status: DISCONTINUED | OUTPATIENT
Start: 2021-03-02 | End: 2021-03-10 | Stop reason: HOSPADM

## 2021-03-01 RX ORDER — ACETAMINOPHEN 325 MG/1
650 TABLET ORAL EVERY 6 HOURS PRN
Status: DISCONTINUED | OUTPATIENT
Start: 2021-03-01 | End: 2021-03-10 | Stop reason: HOSPADM

## 2021-03-01 RX ADMIN — FUROSEMIDE 40 MG: 10 INJECTION, SOLUTION INTRAMUSCULAR; INTRAVENOUS at 20:39

## 2021-03-01 RX ADMIN — NITROGLYCERIN 0.5 INCH: 20 OINTMENT TOPICAL at 22:19

## 2021-03-01 NOTE — TELEPHONE ENCOUNTER
----- Message from Hernando Rm sent at 3/1/2021  9:18 AM EST -----  Regarding: FW: KELLY hospital f/u appt    ----- Message -----  From: Dayanna Lopez DO  Sent: 2/27/2021   1:44 PM EST  To: Galina Barahona DO, #  Subject: KELLY hospital f/u appt                            Patient has been discharged from [facility] and placed on Epic KELLY list  Please schedule hospital follow up for KELLY in 1 week with Dr Jose Raul Batista  Patient should have BMP and urine studies prior to office visit which has already been ordered  Thanks  Last progress note A/P:  1  Acute kidney injury, present on admission, on top of CKD stage 4-likely in the setting of cardiorenal syndrome a setting of volume overload/Arb use plus or minus ATN from underlying pneumonia  -baseline serum creatinine 1 9-2 2 this past year, follows with Dr Olga Yates outpatient for CKD stage 4  -serum creatinine has risen to 4 19 but improving today and stable at 3 8, possibly due to relative hypotension, was on Bumex 2 mg IV b i d  Per Cardiology  -to be discharged off diuretics  -K high normal  -UOP improving  -low fraction excretion of sodium noted but this can be seen in cardiorenal syndrome as well as prerenal KELLY  -renal artery duplex negative, secondary work up pending including remain, aldosterone, and fractionated metanephrines  -follow-up a m  BMP  -abdominal U/S negative for ascites  2  Hyperphosphatemia-phos 4 4-->5 8-->4 6, monitor for now, likely due to Acute kidney injury, continue with phoslo BID, monitor phos  3  Anemia with thrombocytopenia - Hgb 8 1-->7 7, monitor CBC, transfuse prn, no venofer in light of infection, monitor plts  4  Proteinuria - UpCr 1 51g as of 2/23/21  This is while sCr not in steady state  Highly suspect diabetic nephropathy in light of recent uncontrolled A1C 13 2  ACEi/ARB held in light of KELLY  Can assess this outpatient  5  HTN - BP improved, secondary work up including renin, sindy, plasma metanephrines pending   Plasma catecholamines ok  amlodipine discontinued and on nifedipine XL 30mg to improve LE edema  Renal artery duplex negative for NORMAN  6  Hypoalbuminemia - alb 3, may contribute to LE third spacing, diuretics on hold in light of recovering KELLY  LE edema improving off amlodipine     Ok for d/c from renal perspective with close f/u with Dr Gerardo Romberg as an outpatient within a week  Office to call with f/u appt  Should have BMP prior to office visit as well as UA with microscopy and urine lytes which have been ordered

## 2021-03-02 PROBLEM — I16.0 HYPERTENSIVE URGENCY: Status: ACTIVE | Noted: 2021-03-02

## 2021-03-02 LAB
ALBUMIN SERPL BCP-MCNC: 2.8 G/DL (ref 3.5–5)
ALDOST SERPL-MCNC: <1 NG/DL (ref 0–30)
ALP SERPL-CCNC: 610 U/L (ref 46–116)
ALT SERPL W P-5'-P-CCNC: 66 U/L (ref 12–78)
ANION GAP SERPL CALCULATED.3IONS-SCNC: 5 MMOL/L (ref 4–13)
AST SERPL W P-5'-P-CCNC: 45 U/L (ref 5–45)
ATRIAL RATE: 67 BPM
BASOPHILS # BLD AUTO: 0.04 THOUSANDS/ΜL (ref 0–0.1)
BASOPHILS NFR BLD AUTO: 1 % (ref 0–1)
BILIRUB SERPL-MCNC: 0.29 MG/DL (ref 0.2–1)
BUN SERPL-MCNC: 90 MG/DL (ref 5–25)
CALCIUM ALBUM COR SERPL-MCNC: 9.8 MG/DL (ref 8.3–10.1)
CALCIUM SERPL-MCNC: 8.8 MG/DL (ref 8.3–10.1)
CHLORIDE SERPL-SCNC: 109 MMOL/L (ref 100–108)
CHOLEST SERPL-MCNC: 147 MG/DL (ref 50–200)
CO2 SERPL-SCNC: 24 MMOL/L (ref 21–32)
CREAT SERPL-MCNC: 3.04 MG/DL (ref 0.6–1.3)
EOSINOPHIL # BLD AUTO: 0.14 THOUSAND/ΜL (ref 0–0.61)
EOSINOPHIL NFR BLD AUTO: 2 % (ref 0–6)
ERYTHROCYTE [DISTWIDTH] IN BLOOD BY AUTOMATED COUNT: 14.3 % (ref 11.6–15.1)
EST. AVERAGE GLUCOSE BLD GHB EST-MCNC: 289 MG/DL
GFR SERPL CREATININE-BSD FRML MDRD: 15 ML/MIN/1.73SQ M
GLUCOSE SERPL-MCNC: 144 MG/DL (ref 65–140)
GLUCOSE SERPL-MCNC: 171 MG/DL (ref 65–140)
GLUCOSE SERPL-MCNC: 198 MG/DL (ref 65–140)
GLUCOSE SERPL-MCNC: 209 MG/DL (ref 65–140)
GLUCOSE SERPL-MCNC: 226 MG/DL (ref 65–140)
GLUCOSE SERPL-MCNC: 244 MG/DL (ref 65–140)
HBA1C MFR BLD: 11.7 %
HCT VFR BLD AUTO: 23.5 % (ref 34.8–46.1)
HDLC SERPL-MCNC: 76 MG/DL
HGB BLD-MCNC: 7.3 G/DL (ref 11.5–15.4)
IMM GRANULOCYTES # BLD AUTO: 0.06 THOUSAND/UL (ref 0–0.2)
IMM GRANULOCYTES NFR BLD AUTO: 1 % (ref 0–2)
LDLC SERPL CALC-MCNC: 48 MG/DL (ref 0–100)
LYMPHOCYTES # BLD AUTO: 1.66 THOUSANDS/ΜL (ref 0.6–4.47)
LYMPHOCYTES NFR BLD AUTO: 24 % (ref 14–44)
MAGNESIUM SERPL-MCNC: 2.7 MG/DL (ref 1.6–2.6)
MCH RBC QN AUTO: 28.5 PG (ref 26.8–34.3)
MCHC RBC AUTO-ENTMCNC: 31.1 G/DL (ref 31.4–37.4)
MCV RBC AUTO: 92 FL (ref 82–98)
MONOCYTES # BLD AUTO: 0.46 THOUSAND/ΜL (ref 0.17–1.22)
MONOCYTES NFR BLD AUTO: 7 % (ref 4–12)
NEUTROPHILS # BLD AUTO: 4.5 THOUSANDS/ΜL (ref 1.85–7.62)
NEUTS SEG NFR BLD AUTO: 65 % (ref 43–75)
NONHDLC SERPL-MCNC: 71 MG/DL
NRBC BLD AUTO-RTO: 0 /100 WBCS
P AXIS: 64 DEGREES
PHOSPHATE SERPL-MCNC: 4.2 MG/DL (ref 2.3–4.1)
PLATELET # BLD AUTO: 139 THOUSANDS/UL (ref 149–390)
PLATELET # BLD AUTO: 168 THOUSANDS/UL (ref 149–390)
PMV BLD AUTO: 10.7 FL (ref 8.9–12.7)
PMV BLD AUTO: 11 FL (ref 8.9–12.7)
POTASSIUM SERPL-SCNC: 4.8 MMOL/L (ref 3.5–5.3)
PR INTERVAL: 158 MS
PROT SERPL-MCNC: 6.8 G/DL (ref 6.4–8.2)
QRS AXIS: 89 DEGREES
QRSD INTERVAL: 92 MS
QT INTERVAL: 424 MS
QTC INTERVAL: 448 MS
RBC # BLD AUTO: 2.56 MILLION/UL (ref 3.81–5.12)
SODIUM SERPL-SCNC: 138 MMOL/L (ref 136–145)
T WAVE AXIS: 97 DEGREES
TRIGL SERPL-MCNC: 114 MG/DL
TROPONIN I SERPL-MCNC: 0.02 NG/ML
TROPONIN I SERPL-MCNC: 0.02 NG/ML
TSH SERPL DL<=0.05 MIU/L-ACNC: 15.8 UIU/ML (ref 0.36–3.74)
VENTRICULAR RATE: 67 BPM
WBC # BLD AUTO: 6.86 THOUSAND/UL (ref 4.31–10.16)

## 2021-03-02 PROCEDURE — 99232 SBSQ HOSP IP/OBS MODERATE 35: CPT | Performed by: PHYSICIAN ASSISTANT

## 2021-03-02 PROCEDURE — 80061 LIPID PANEL: CPT | Performed by: INTERNAL MEDICINE

## 2021-03-02 PROCEDURE — 84100 ASSAY OF PHOSPHORUS: CPT | Performed by: INTERNAL MEDICINE

## 2021-03-02 PROCEDURE — 36415 COLL VENOUS BLD VENIPUNCTURE: CPT | Performed by: INTERNAL MEDICINE

## 2021-03-02 PROCEDURE — 83735 ASSAY OF MAGNESIUM: CPT | Performed by: INTERNAL MEDICINE

## 2021-03-02 PROCEDURE — 84443 ASSAY THYROID STIM HORMONE: CPT | Performed by: INTERNAL MEDICINE

## 2021-03-02 PROCEDURE — 80053 COMPREHEN METABOLIC PANEL: CPT | Performed by: INTERNAL MEDICINE

## 2021-03-02 PROCEDURE — 82948 REAGENT STRIP/BLOOD GLUCOSE: CPT

## 2021-03-02 PROCEDURE — 99222 1ST HOSP IP/OBS MODERATE 55: CPT | Performed by: INTERNAL MEDICINE

## 2021-03-02 PROCEDURE — 85049 AUTOMATED PLATELET COUNT: CPT | Performed by: INTERNAL MEDICINE

## 2021-03-02 PROCEDURE — 83036 HEMOGLOBIN GLYCOSYLATED A1C: CPT | Performed by: INTERNAL MEDICINE

## 2021-03-02 PROCEDURE — 84484 ASSAY OF TROPONIN QUANT: CPT | Performed by: INTERNAL MEDICINE

## 2021-03-02 PROCEDURE — 85025 COMPLETE CBC W/AUTO DIFF WBC: CPT | Performed by: INTERNAL MEDICINE

## 2021-03-02 PROCEDURE — 93010 ELECTROCARDIOGRAM REPORT: CPT | Performed by: INTERNAL MEDICINE

## 2021-03-02 PROCEDURE — 99255 IP/OBS CONSLTJ NEW/EST HI 80: CPT | Performed by: INTERNAL MEDICINE

## 2021-03-02 RX ORDER — ISOSORBIDE MONONITRATE 30 MG/1
30 TABLET, EXTENDED RELEASE ORAL DAILY
Status: DISCONTINUED | OUTPATIENT
Start: 2021-03-02 | End: 2021-03-02

## 2021-03-02 RX ORDER — ISOSORBIDE MONONITRATE 60 MG/1
60 TABLET, EXTENDED RELEASE ORAL DAILY
Status: DISCONTINUED | OUTPATIENT
Start: 2021-03-02 | End: 2021-03-10 | Stop reason: HOSPADM

## 2021-03-02 RX ORDER — HYDRALAZINE HYDROCHLORIDE 20 MG/ML
20 INJECTION INTRAMUSCULAR; INTRAVENOUS EVERY 6 HOURS PRN
Status: DISCONTINUED | OUTPATIENT
Start: 2021-03-02 | End: 2021-03-10 | Stop reason: HOSPADM

## 2021-03-02 RX ORDER — FUROSEMIDE 10 MG/ML
40 INJECTION INTRAMUSCULAR; INTRAVENOUS
Status: DISCONTINUED | OUTPATIENT
Start: 2021-03-02 | End: 2021-03-03

## 2021-03-02 RX ORDER — CARVEDILOL 12.5 MG/1
12.5 TABLET ORAL 2 TIMES DAILY WITH MEALS
Status: DISCONTINUED | OUTPATIENT
Start: 2021-03-02 | End: 2021-03-10 | Stop reason: HOSPADM

## 2021-03-02 RX ADMIN — LEVOTHYROXINE SODIUM 125 MCG: 125 TABLET ORAL at 08:37

## 2021-03-02 RX ADMIN — CARVEDILOL 12.5 MG: 12.5 TABLET, FILM COATED ORAL at 16:09

## 2021-03-02 RX ADMIN — ATORVASTATIN CALCIUM 40 MG: 40 TABLET, FILM COATED ORAL at 08:38

## 2021-03-02 RX ADMIN — IRON SUCROSE 200 MG: 20 INJECTION, SOLUTION INTRAVENOUS at 11:34

## 2021-03-02 RX ADMIN — ASPIRIN 81 MG: 81 TABLET, CHEWABLE ORAL at 08:37

## 2021-03-02 RX ADMIN — INSULIN LISPRO 2 UNITS: 100 INJECTION, SOLUTION INTRAVENOUS; SUBCUTANEOUS at 13:17

## 2021-03-02 RX ADMIN — CALCIUM ACETATE 667 MG: 667 CAPSULE ORAL at 07:25

## 2021-03-02 RX ADMIN — INSULIN ASPART 10 UNITS: 100 INJECTION, SUSPENSION SUBCUTANEOUS at 07:24

## 2021-03-02 RX ADMIN — CALCIUM ACETATE 667 MG: 667 CAPSULE ORAL at 16:09

## 2021-03-02 RX ADMIN — CARVEDILOL 12.5 MG: 12.5 TABLET, FILM COATED ORAL at 07:25

## 2021-03-02 RX ADMIN — INSULIN LISPRO 1 UNITS: 100 INJECTION, SOLUTION INTRAVENOUS; SUBCUTANEOUS at 00:06

## 2021-03-02 RX ADMIN — HYDRALAZINE HYDROCHLORIDE 50 MG: 50 TABLET ORAL at 08:37

## 2021-03-02 RX ADMIN — FUROSEMIDE 40 MG: 10 INJECTION, SOLUTION INTRAMUSCULAR; INTRAVENOUS at 11:34

## 2021-03-02 RX ADMIN — INSULIN LISPRO 1 UNITS: 100 INJECTION, SOLUTION INTRAVENOUS; SUBCUTANEOUS at 21:59

## 2021-03-02 RX ADMIN — HYDRALAZINE HYDROCHLORIDE 75 MG: 25 TABLET, FILM COATED ORAL at 21:28

## 2021-03-02 RX ADMIN — ISOSORBIDE MONONITRATE 60 MG: 60 TABLET, EXTENDED RELEASE ORAL at 16:09

## 2021-03-02 RX ADMIN — INSULIN ASPART 10 UNITS: 100 INJECTION, SUSPENSION SUBCUTANEOUS at 18:39

## 2021-03-02 RX ADMIN — HYDRALAZINE HYDROCHLORIDE 75 MG: 25 TABLET, FILM COATED ORAL at 16:09

## 2021-03-02 RX ADMIN — FUROSEMIDE 40 MG: 10 INJECTION, SOLUTION INTRAMUSCULAR; INTRAVENOUS at 18:38

## 2021-03-02 RX ADMIN — HYDRALAZINE HYDROCHLORIDE 50 MG: 50 TABLET ORAL at 00:06

## 2021-03-02 NOTE — ASSESSMENT & PLAN NOTE
Wt Readings from Last 3 Encounters:   02/27/21 81 kg (178 lb 9 2 oz)   11/02/20 76 1 kg (170 lb)   08/10/20 76 3 kg (168 lb 3 2 oz)   Patient currently weighs 81 kg and may have been gaining weight judging from orthopnea with leg swelling  CHF protocol with daily weights and I's and O's  Patient may need stricter dietary guidelines  Nutrition consult  Cardiology and nephrology consult is in order to titrate on current afterload unloading medications (hydralazine/nitrates) and use of diuretics  With need stockings that are knee high to compress fluids  CBC with comprehensive metabolic profile and magnesium phosphorus for tomorrow  BNP still pending  Chest x-ray still pending  Currently placed on Nitro-Bid 1/2 inch in order to lower blood pressure (systolic greater than 879 as of the moment with shortness of breath)  Continue hydralazine

## 2021-03-02 NOTE — ASSESSMENT & PLAN NOTE
· Blood pressures are poorly controlled  · Continue carvedilol 12 5 mg BID, hydralazine 50 mg TID  · Monitor BPs closely while diuresing

## 2021-03-02 NOTE — CONSULTS
Advanced Heart Failure/Pulmonary Hypertension Consult Note - Ophelia Player 76 y o  female MRN: [id number]    Unit/Bed#: ED 23 Encounter: [id number]      Assessment:    Active Problems:    Acute on chronic combined systolic and diastolic congestive heart failure (HCC)    Type 2 diabetes mellitus with kidney complication, with long-term current use of insulin (HCC)    Hypothyroidism    Hyperlipidemia    Acute renal failure superimposed on stage 4 chronic kidney disease (HCC)    Anemia    Coronary artery disease involving native coronary artery of native heart with angina pectoris ([facility] [address] )      Plan:  Acute on chronic HFpEF  Recent admitted with PNA and HF exacerbation  Discharged home off diuretic due to KELLY and now presents again with acute decompensation and need for IV diuresis  Start Lasix 40 mg IV BID  Will need daily standing weights, I/O charting, daily BMP  Home diuretic- Torsemide 20 mg BID  Lab Results   Component Value Date    NTBNP 9,024 (H) 03/01/2021      NT Pro BNP 2/21/21: 12 3K    Weight at last OV in 5/2020-173 lbs, at hospital discharge, 178 lbs    TTE 2/22/21: LVEF 60%, akinesis of basal, inferior and basal mid inferolateral walls  Moderate cLVH, grade II DD, mild MR, trace TR, moderate, free flowing pericardial effusion without hemodynamic compromise  RV normal IVC normal  IVSd 0 99 cm  Echo 12/30/18:  EF: 50%, grade 2 DD, PASP 35 mmHg     CAD -No anginal symptoms at present  H/O NSTEMI S/P  LUDY x 2 to LAD 1/4/   80% RCA- plan staged, but has not needed intervention  Imdur 30 mg daily, asa     HTN- Uncontrolled with systolic to the 110N  Currently receiving  Hydral 50 mg TID,  Coreg 12 5 mg BID ,Imdur 30 mg daily    For now, will increase Hydral to 75 mg TID and increase Imdur to 60 mg daily and monitor response  Palpitations  Holter 5/17/19: 53-85, avg 69 bpm  8 PVCs     Anemia- Hgb now in the 7 2-7 5 range  Receiving Venofer infusion    CKD3, new baseline seems to be 2 5-3 0  Nephro on board  Appreciate recs  DM2, hgA1C 3/2/21 11 7    HPI:  57-year-old female, with past medical history as described above, follows with Dr Parker Gomez, who presented to the ED last evening with complaints of shortness of breath, orthopnea, and bilateral lower extremity edema  Was recently admitted with pneumonia and volume overload  General Cardiology team was consulted for diuretic management  Patient was given several doses of IV Lasix but then developed an KELLY  Unfortunately diuretics needed to be placed on hold and patient was discharged home off diuretics, with orders for repeat BMP this week and follow up in our outpatient clinic  In the past patient has had issues with dietary indiscretion, especially with high sodium food  After resuming these habits at home and being off diuretic, she quickly decompensated  According to daughter, patient was prescribed Nifedipine in placed of Amlodipine  However after taking her first dose at home, developed chest and back pain with an acute drop in her BP with a systolic of 90 mmHg  She spoke with the on call MD who gave instructions to discontinue immediately  On admit, patient's creat down to 3 28 from 3 78 on recent discharge  Hgb 7 7  NT Pro BNP 9K, which is down from 12 K last admit  Trops negative  BP has been elevated to the 319-511 systolic  Her creatinine is now improved after one time dose of IV lasix  She continues to complain of SOB and mild abdominal distension/bloating  Tells me her lower extremity edema has been present since last admission  She is unsure of her dry weight but believes it may be in the mid 160s             Past Medical History:   Diagnosis Date    Anemia     CHF (congestive heart failure) (HCC)     Chronic kidney disease     Coronary artery disease     Diabetes mellitus ([facility] [address] )     Hypertension     Hypothyroidism        12 point ROS negative other than that stated in HPI    Allergies   Allergen Reactions    Iv Contrast [Iodinated Diagnostic Agents]      Caused KELLY         Current Facility-Administered Medications:     acetaminophen (TYLENOL) tablet 650 mg, 650 mg, Oral, Q6H PRN, Juan Luis Owens MD    aluminum-magnesium hydroxide-simethicone (MYLANTA) oral suspension 15 mL, 15 mL, Oral, Q6H PRN, Juan Luis Owens MD    aspirin chewable tablet 81 mg, 81 mg, Oral, Daily, Juan Luis Owens MD, 81 mg at 03/02/21 0837    atorvastatin (LIPITOR) tablet 40 mg, 40 mg, Oral, Daily, Juan Luis Owens MD, 40 mg at 03/02/21 9452    calcium acetate (PHOSLO) capsule 667 mg, 667 mg, Oral, BID With Meals, Juan Luis Owens MD, 667 mg at 03/02/21 0725    carvedilol (COREG) tablet 12 5 mg, 12 5 mg, Oral, BID With Meals, Juan Luis Owens MD, 12 5 mg at 03/02/21 0725    docusate sodium (COLACE) capsule 100 mg, 100 mg, Oral, BID PRN, Juan Luis Owens MD    heparin (porcine) subcutaneous injection 5,000 Units, 5,000 Units, Subcutaneous, Q8H [address] **AND** [COMPLETED] Platelet count, , , Once, Juan Luis Owens MD    hydrALAZINE (APRESOLINE) tablet 50 mg, 50 mg, Oral, TID, Juan Luis Owens MD, 50 mg at 03/02/21 0837    insulin aspart protamine-insulin aspart (NovoLOG 70/30) 100 units/mL subcutaneous injection 10 Units, 10 Units, Subcutaneous, BID AC, Juan Luis Owens MD, 10 Units at 03/02/21 0724    insulin lispro (HumaLOG) 100 units/mL subcutaneous injection 1-5 Units, 1-5 Units, Subcutaneous, HS, Juan Luis Owens MD, 1 Units at 03/02/21 0006    insulin lispro (HumaLOG) 100 units/mL subcutaneous injection 1-6 Units, 1-6 Units, Subcutaneous, TID AC, Stopped at 03/02/21 0711 **AND** Fingerstick Glucose (POCT), , , TID AC, Juan Luis Owens MD    levothyroxine tablet 125 mcg, 125 mcg, Oral, Daily, Juan Luis Owens MD, 125 mcg at 03/02/21 0837    nitroglycerin (NITROSTAT) SL tablet 0 4 mg, 0 4 mg, Sublingual, Q5 Min PRN, Juan Luis Owens MD    ondansetron [facility]) injection 4 mg, 4 mg, Intravenous, Q6H PRN, Juan Luis Owens MD    Current Outpatient Medications:     aspirin 81 mg chewable tablet, Chew 81 mg daily, Disp: , Rfl:     atorvastatin (LIPITOR) 40 mg tablet, Take 1 tablet (40 mg total) by mouth daily, Disp: 30 tablet, Rfl: 5    calcium acetate (PHOSLO) capsule, Take 1 capsule (667 mg total) by mouth 2 (two) times a day with meals, Disp: 60 capsule, Rfl: 0    carvedilol (COREG) 12 5 mg tablet, Take 1 tablet (12 5 mg total) by mouth 2 (two) times a day with meals, Disp: 60 tablet, Rfl: 0    hydrALAZINE (APRESOLINE) 50 mg tablet, Take 1 tablet (50 mg total) by mouth 3 (three) times a day, Disp: 90 tablet, Rfl: 0    insulin aspart protamine-insulin aspart (NovoLOG 70/30) 100 units/mL injection, Inject 10 Units under the skin 2 (two) times a day before meals, Disp: , Rfl:     isosorbide mononitrate (IMDUR) 30 mg 24 hr tablet, Take 1 tablet (30 mg total) by mouth daily, Disp: 30 tablet, Rfl: 10    levothyroxine 125 mcg tablet, Take 1 tablet (125 mcg total) by mouth daily, Disp: 30 tablet, Rfl: 0    nitroglycerin (NITROSTAT) 0 4 mg SL tablet, Place 1 tablet (0 4 mg total) under the tongue every 5 (five) minutes as needed for chest pain for up to 30 doses, Disp: 30 tablet, Rfl: 0    NIFEdipine (ADALAT CC) 30 MG 24 hr tablet, Take 1 tablet (30 mg total) by mouth daily (Patient not taking: Reported on 3/1/2021), Disp: 30 tablet, Rfl: 0    Social History     Socioeconomic History    Marital status: /Civil Union     Spouse name: Not on file    Number of children: 3    Years of education: Not on file    Highest education level: Not on file   Occupational History    Occupation: part time   Social Needs    Financial resource strain: Not on file    Food insecurity     Worry: Not on file     Inability: Not on file   [employer] needs     Medical: Not on file     Non-medical: Not on file   Tobacco Use    Smoking status: Former Smoker    Smokeless tobacco: Never Used   Substance and Sexual Activity    Alcohol use: Never Frequency: Never    Drug use: No    Sexual activity: Yes   Lifestyle    Physical activity     Days per week: Not on file     Minutes per session: Not on file    Stress: Not on file   Relationships    Social connections     Talks on phone: Not on file     Gets together: Not on file     Attends Jew service: Not on file     Active member of club or organization: Not on file     Attends meetings of clubs or organizations: Not on file     Relationship status: Not on file    Intimate partner violence     Fear of current or ex partner: Not on file     Emotionally abused: Not on file     Physically abused: Not on file     Forced sexual activity: Not on file   Other Topics Concern    Not on file   Social History Narrative    Always uses seatbelt    Caffeine use    Daily coffee consumption: 1 cp daily    Feels safe at home    Lives with spouse       Family History   Problem Relation Age of Onset    Diabetes Mother     Heart attack Father         MI    Hypertension Brother        Physical Exam:  [employer] (day, reason): Valero catheter (day, reason):    Vitals: Blood pressure 150/67, pulse 62, temperature 97 6 °F (36 4 °C), temperature source Oral, resp  rate 18, SpO2 98 %  , There is no height or weight on file to calculate BMI , No intake/output data recorded  No intake/output data recorded  Wt Readings from Last 3 Encounters:   02/27/21 81 kg (178 lb 9 2 oz)   11/02/20 77 1 kg (170 lb)   08/10/20 76 3 kg (168 lb 3 2 oz)     No intake or output data in the 24 hours ending 03/02/21 0924  No intake/output data recorded  No significant arrhythmias seen on telemetry review       Vitals:    03/02/21 0724 03/02/21 0837 03/02/21 0839 03/02/21 0900   BP: (!) 200/84 (!) 176/79 (!) 176/79 150/67   BP Location:   Left arm Left arm   Pulse: 73  68 62   Resp:   18 18   Temp:       TempSrc:       SpO2:   98% 98%       GEN: Angelica Murry appears well, alert and oriented x 3, pleasant and cooperative   HEENT: pupils equal, round, and reactive to light; extraocular muscles intact  NECK: supple, no carotid bruits   HEART: regular rhythm, normal S1 and S2, no murmurs, clicks, gallops or rubs, JVP is  up  LUNGS: clear to auscultation bilaterally; no wheezes, rales, or rhonchi   ABDOMEN: normal bowel sounds, soft, no tenderness, mild  distention  EXTREMITIES: peripheral pulses normal; no clubbing, cyanosis, +2 BLLE edema  NEURO: no focal findings   SKIN: normal without suspicious lesions on exposed skin    Labs & Results:    Results from last 7 days   Lab Units 03/02/21 0415 03/02/21 0139 03/01/21 1948   TROPONIN I ng/mL 0 02 0 02 0 02     Results from last 7 days   Lab Units 03/02/21  0415 03/02/21 0139 03/01/21 1949 02/25/21  0433   WBC Thousand/uL 6 86  --  7 12 4 65   HEMOGLOBIN g/dL 7 3*  --  7 7* 7 7*   HEMATOCRIT % 23 5*  --  26 0* 25 2*   PLATELETS Thousands/uL 168 139* 169 138*         Results from last 7 days   Lab Units 03/02/21  0140 03/01/21 1948 02/27/21 0449 02/25/21  0433   POTASSIUM mmol/L 4 8 4 9 4 7   < > 4 4   CHLORIDE mmol/L 109* 108 108   < > 105   CO2 mmol/L 24 23 24   < > 22   BUN mg/dL 90* 93* 80*   < > 76*   CREATININE mg/dL 3 04* 3 28* 3 78*   < > 4 06*   CALCIUM mg/dL 8 8 9 0 9 0   < > 8 7   ALK PHOS U/L 610* 692*  --   --  697*   ALT U/L 66 75  --   --  86*   AST U/L 45 52*  --   --  139*    < > = values in this interval not displayed  Chest X-Ray is obtained; result - reviewed  EKG personally reviewed by CLARICE Mendez  Counseling / Coordination of Care  Total floor / unit time spent today 40 minutes  Greater than 50% of total time was spent with the patient and / or family counseling and / or coordination of care  A description of the counseling / coordination of care: 20  Thank you for the opportunity to participate in the care of this patient  Linh Barroso

## 2021-03-02 NOTE — PLAN OF CARE
Problem: SAFETY ADULT  Goal: Patient will remain free of falls  Description: INTERVENTIONS:  - Assess patient frequently for physical needs  -  Identify cognitive and physical deficits and behaviors that affect risk of falls    -  [place] fall precautions as indicated by assessment   - Educate patient/family on patient safety including physical limitations  - Instruct patient to call for assistance with activity based on assessment  - Modify environment to reduce risk of injury  - Consider OT/PT consult to assist with strengthening/mobility  Outcome: Progressing     Problem: CARDIOVASCULAR - ADULT  Goal: Maintains optimal cardiac output and hemodynamic stability  Description: INTERVENTIONS:  - Monitor I/O, vital signs and rhythm  - Monitor for S/S and trends of decreased cardiac output  - Administer and titrate ordered vasoactive medications to optimize hemodynamic stability  - Assess quality of pulses, skin color and temperature  - Assess for signs of decreased coronary artery perfusion  - Instruct patient to report change in severity of symptoms  Outcome: Progressing  Goal: Absence of cardiac dysrhythmias or at baseline rhythm  Description: INTERVENTIONS:  - Continuous cardiac monitoring, vital signs, obtain 12 lead EKG if ordered  - Administer antiarrhythmic and heart rate control medications as ordered  - Monitor electrolytes and administer replacement therapy as ordered  Outcome: Progressing     Problem: RESPIRATORY - ADULT  Goal: Achieves optimal ventilation and oxygenation  Description: INTERVENTIONS:  - Assess for changes in respiratory status  - Assess for changes in mentation and behavior  - Position to facilitate oxygenation and minimize respiratory effort  - Oxygen administered by appropriate delivery if ordered  - Initiate smoking cessation education as indicated  - Encourage broncho-pulmonary hygiene including cough, deep breathe, Incentive Spirometry  - Assess the need for suctioning and aspirate as needed  - Assess and instruct to report SOB or any respiratory difficulty  - Respiratory Therapy support as indicated  Outcome: Progressing

## 2021-03-02 NOTE — ASSESSMENT & PLAN NOTE
Lab Results   Component Value Date    EGFR 14 03/01/2021    EGFR 12 02/27/2021    EGFR 12 02/26/2021    CREATININE 3 28 (H) 03/01/2021    CREATININE 3 78 (H) 02/27/2021    CREATININE 3 82 (H) 02/26/2021   Would continue to watch creatinine  Lasix 40 mg x 1  Nephrology consult  Due to diagnosis of CHF, will place in telemetry

## 2021-03-02 NOTE — ASSESSMENT & PLAN NOTE
Wt Readings from Last 3 Encounters:   02/27/21 81 kg (178 lb 9 2 oz)   11/02/20 77 1 kg (170 lb)   08/10/20 76 3 kg (168 lb 3 2 oz)     · Patient with acute exacerbation of CHF secondary to dietary indiscretions and not resuming her diuretics at time of discharge from recent hospitalization  · Cardiology following and appreciate their input  · Continue IV diuresis  · Continue low-salt diet and fluid restriction  · Chest x-ray (03/01/2021): Mild pulmonary vascular congestion  Bibasilar consolidation likely atelectasis

## 2021-03-02 NOTE — CONSULTS
Consultation - Nephrology   Tariq Jasmine 76 y o  female MRN: [id number]  Unit/Bed#: ED 23 Encounter: [id number]    ASSESSMENT:  1  Acute Kidney Injury, POA- secondary to volume overload/cardiorenal syndrome  - agree with need for diuretics  - may need to tolerate a higher baseline for euvolemia  - check UA  - check PVR  - monitor urine output  - avoid hypotension  - avoid IV contrast, avoid nephrotoxins  2  Chronic Kidney Disease stage IV- Baseline creatinine 1 9-2 2 since 2020  Prior to this in 2019, her baseline was 1 5-2 0  Follows with Dr Viv Shelby outpatient  3  Hypertension- BP elevated, may be due to volume overload  - continue carvedilol, hydralazine  - nifedipine & imdur currently held per primary team  - avoid ACEI/ARB  - secondary workup: renal artery doppler negative for NORMAN, metanephrines 38, norepinephrine 607, renin/aldosterone pending from 2/23/21  4  Volume Overload- would recommend IV diuretics per cardiology  5  Acute Diastolic CHF- cardiology consulted  - recommend IV diuretics BID  6  Anemia- hgb below goal  - transfuse for hgb <7  - iron studies low  - start IV iron 200mg daily x5 days  7  Proteinuria- continue to monitor as an outpatient  - UPC ratio from 2/23/21 = 1 5  8  Electrolytes- acceptable    PLAN:  Would recommend IV diuretics BID per cardiology  Monitor urine output  Hold ACEI/ARB  May need to tolerate a higher baseline for euvolemia  IV venofer        HISTORY OF PRESENT ILLNESS:  Requesting Physician: Amadou Middleton MD  Reason for Consult: KELLY/CKD/volume overload    Tariq Jasmine is a 76y o  year old female who was admitted to [place] [address] after presenting with shortness of breath  She was recently discharged from the hospital off diuretics  That admission, she presented with volume overload and was given bumex 2mg IV by cardiology  Her creatinine brittany however and her diuretics were held upon discharge    At home, she noticed more leg swelling and more shortness of breath  She was also discharged off losartan  Upon speaking with the patient today, it is unclear if she was taking her medications as prescribed  A renal consultation is requested today for assistance in the management of acute kidney injury on chronic kidney disease in the setting of volume overload      PAST MEDICAL HISTORY:  Past Medical History:   Diagnosis Date    Anemia     [facility]est[facility] heart failure) ([facility] [address] )     Chronic kidney disease     Coronary artery disease     [facility]     Hypertension     Hypothyroidism        PAST SURGICAL HISTORY:  Past Surgical History:   Procedure Laterality Date    CORONARY ANGIOPLASTY WITH STENT PLACEMENT  2019       ALLERGIES:  Allergies   Allergen Reactions    Iv Contrast [Iodinated Diagnostic Agents]      Caused KELLY       SOCIAL HISTORY:  Social History     Substance and Sexual Activity   Alcohol Use Never    Frequency: Never     Social History     Substance and Sexual Activity   Drug Use No     Social History     Tobacco Use   Smoking Status Former Smoker   Smokeless Tobacco Never Used       FAMILY HISTORY:  Family History   Problem Relation Age of Onset    Diabetes Mother     Heart attack Father         MI    Hypertension Brother        MEDICATIONS:    Current Facility-Administered Medications:     acetaminophen (TYLENOL) tablet 650 mg, 650 mg, Oral, Q6H PRN, Laura Smith MD    aluminum-magnesium hydroxide-simethicone (MYLANTA) oral suspension 15 mL, 15 mL, Oral, Q6H PRN, Laura Smith MD    aspirin chewable tablet 81 mg, 81 mg, Oral, Daily, Laura Smith MD, 81 mg at 03/02/21 0837    atorvastatin (LIPITOR) tablet 40 mg, 40 mg, Oral, Daily, Laura Smith MD, 40 mg at 03/02/21 0838    calcium acetate (PHOSLO) capsule 667 mg, 667 mg, Oral, BID With Meals, Laura Smith MD, 667 mg at 03/02/21 0725    carvedilol (COREG) tablet 12 5 mg, 12 5 mg, Oral, BID With Meals, Laura Smith MD, 12 5 mg at 03/02/21 0725    docusate sodium (COLACE) capsule 100 mg, 100 mg, Oral, BID PRN, Cassia Estrella MD    heparin (porcine) subcutaneous injection 5,000 Units, 5,000 Units, Subcutaneous, Q8H [address] **AND** [COMPLETED] Platelet count, , , Once, Cassia Estrella MD    hydrALAZINE (APRESOLINE) tablet 50 mg, 50 mg, Oral, TID, Cassia Estrella MD, 50 mg at 03/02/21 0837    insulin aspart protamine-insulin aspart (NovoLOG 70/30) 100 units/mL subcutaneous injection 10 Units, 10 Units, Subcutaneous, BID AC, Cassia Estrella MD, 10 Units at 03/02/21 0724    insulin lispro (HumaLOG) 100 units/mL subcutaneous injection 1-5 Units, 1-5 Units, Subcutaneous, HS, Cassia Estrella MD, 1 Units at 03/02/21 0006    insulin lispro (HumaLOG) 100 units/mL subcutaneous injection 1-6 Units, 1-6 Units, Subcutaneous, TID AC, Stopped at 03/02/21 0711 **AND** Fingerstick Glucose (POCT), , , TID AC, Cassia Estrella MD    iron sucrose (VENOFER) 200 mg in sodium chloride 0 9 % 100 mL IVPB, 200 mg, Intravenous, Daily, [facility], PA-C    levothyroxine tablet 125 mcg, 125 mcg, Oral, Daily, Cassia Estrella MD, 125 mcg at 03/02/21 0837    nitroglycerin (NITROSTAT) SL tablet 0 4 mg, 0 4 mg, Sublingual, Q5 Min PRN, Cassia Estrella MD    ondansetron [facility]) injection 4 mg, 4 mg, Intravenous, Q6H PRN, Cassia Estrella MD    Current Outpatient Medications:     aspirin 81 mg chewable tablet, Chew 81 mg daily, Disp: , Rfl:     atorvastatin (LIPITOR) 40 mg tablet, Take 1 tablet (40 mg total) by mouth daily, Disp: 30 tablet, Rfl: 5    calcium acetate (PHOSLO) capsule, Take 1 capsule (667 mg total) by mouth 2 (two) times a day with meals, Disp: 60 capsule, Rfl: 0    carvedilol (COREG) 12 5 mg tablet, Take 1 tablet (12 5 mg total) by mouth 2 (two) times a day with meals, Disp: 60 tablet, Rfl: 0    hydrALAZINE (APRESOLINE) 50 mg tablet, Take 1 tablet (50 mg total) by mouth 3 (three) times a day, Disp: 90 tablet, Rfl: 0    insulin aspart protamine-insulin aspart (NovoLOG 70/30) 100 units/mL injection, Inject 10 Units under the skin 2 (two) times a day before meals, Disp: , Rfl:     isosorbide mononitrate (IMDUR) 30 mg 24 hr tablet, Take 1 tablet (30 mg total) by mouth daily, Disp: 30 tablet, Rfl: 10    levothyroxine 125 mcg tablet, Take 1 tablet (125 mcg total) by mouth daily, Disp: 30 tablet, Rfl: 0    nitroglycerin (NITROSTAT) 0 4 mg SL tablet, Place 1 tablet (0 4 mg total) under the tongue every 5 (five) minutes as needed for chest pain for up to 30 doses, Disp: 30 tablet, Rfl: 0    NIFEdipine (ADALAT CC) 30 MG 24 hr tablet, Take 1 tablet (30 mg total) by mouth daily (Patient not taking: Reported on 3/1/2021), Disp: 30 tablet, Rfl: 0    REVIEW OF SYSTEMS:  A complete 10 point review of systems was performed and found to be negative unless otherwise noted in the history of present illness  General: No fevers, chills  Cardiovascular:  No chest pain, + leg edema  Respiratory: No cough,  + shortness of breath  Gastrointestinal:  No nausea/vomiting,  No diarrhea,  No abdominal pain  Genitourinary: No hematuria  No foamy urine    No dysuria    PHYSICAL EXAM:  Current Weight:    First Weight:    Vitals:    03/02/21 0724 03/02/21 0837 03/02/21 0839 03/02/21 0900   BP: (!) 200/84 (!) 176/79 (!) 176/79 150/67   BP Location:   Left arm Left arm   Pulse: 73  68 62   Resp:   18 18   Temp:       TempSrc:       SpO2:   98% 98%     No intake or output data in the 24 hours ending 03/02/21 1036  General: NAD  Skin: no rash  Eyes: anicteric  ENMT: mm moist  Neck: no masses  Respiratory: severely decreased breath sounds with some crackles  CVS: RRR  Extremities: ++ bilateral LE edema  GI: soft nt nd  Neuro: alert awake  Psych: mood and affect appropriate     Lab Results:   Results from last 7 days   Lab Units 03/02/21  0415 03/02/21  0140 03/02/21  0139 03/01/21  1949 03/01/21  1948 02/27/21  0449 02/26/21  0436  02/25/21  0433 02/25/21  0034   WBC Thousand/uL 6 86  --   --  7 12  --   --   --   --  4 65  --    HEMOGLOBIN g/dL 7 3*  --   --  7 7*  --   --   --   --  7 7*  --    HEMATOCRIT % 23 5*  --   --  26 0*  --   --   --   --  25 2*  --    PLATELETS Thousands/uL 168  --  139* 169  --   --   --   --  138*  --    POTASSIUM mmol/L  --  4 8  --   --  4 9 4 7 5 0   < > 4 4 4 1   CHLORIDE mmol/L  --  109*  --   --  108 108 106   < > 105 106   CO2 mmol/L  --  24  --   --  23 24 24   < > 22 21   BUN mg/dL  --  90*  --   --  93* 80* 75*   < > 76* 79*   CREATININE mg/dL  --  3 04*  --   --  3 28* 3 78* 3 82*   < > 4 06* 4 19*   CALCIUM mg/dL  --  8 8  --   --  9 0 9 0 8 9   < > 8 7 8 6   MAGNESIUM mg/dL  --  2 7*  --   --   --   --  2 1  --   --  2 1   PHOSPHORUS mg/dL 4 2*  --   --   --   --   --  4 6*  --   --  5 0*   ALK PHOS U/L  --  610*  --   --  692*  --   --   --  697*  --    ALT U/L  --  66  --   --  75  --   --   --  86*  --    AST U/L  --  45  --   --  52*  --   --   --  139*  --     < > = values in this interval not displayed

## 2021-03-02 NOTE — H&P
H&P- Markell Morse 1955, 76 y o  female MRN: [id number]    Unit/Bed#: ED 23 Encounter: [id number]    Primary Care Provider: Francis Bennett MD   Date and time admitted to hospital: 3/1/2021  7:29 PM        Acute on chronic combined systolic and diastolic congestive heart failure ([address] )  Assessment & Plan  Wt Readings from Last 3 Encounters:   02/27/21 81 kg (178 lb 9 2 oz)   11/02/20 76 1 kg (170 lb)   08/10/20 75 3 kg (168 lb 3 2 oz)   Patient currently weighs 81 kg and may have been gaining weight judging from orthopnea with leg swelling  CHF protocol with daily weights and I's and O's  Patient may need stricter dietary guidelines  Nutrition consult  Cardiology and nephrology consult is in order to titrate on current afterload unloading medications (hydralazine/nitrates) and use of diuretics  With need stockings that are knee high to compress fluids  CBC with comprehensive metabolic profile and magnesium phosphorus for tomorrow  BNP still pending  Chest x-ray still pending  Currently placed on Nitro-Bid 1/2 inch in order to lower blood pressure (systolic greater than 209 as of the moment with shortness of breath)  Continue hydralazine  Coronary artery disease involving native coronary artery of native heart with angina pectoris [facility])  Assessment & Plan  Currently without chest discomfort  Continue nitroglycerin currently in form of nitropaste  Also on Coreg 12 5 mg twice daily  Anemia  Assessment & Plan  Currently stable with hemoglobin of 7 7 similar to last time  Patient stays within the level of 7 0's  Acute renal failure superimposed on stage 4 chronic kidney disease [facility])  Assessment & Plan  Lab Results   Component Value Date    EGFR 14 03/01/2021    EGFR 12 02/27/2021    EGFR 12 02/26/2021    CREATININE 3 28 (H) 03/01/2021    CREATININE 3 78 (H) 02/27/2021    CREATININE 3 82 (H) 02/26/2021   Would continue to watch creatinine  Lasix 40 mg x 1  Nephrology consult    Due to diagnosis of CHF, will place in telemetry  Hyperlipidemia  Assessment & Plan  Currently on Lipitor 40 mg daily  Lipid profile  Hypothyroidism  Assessment & Plan  Continue levothyroxine 125 mcg daily   TSH  Type 2 diabetes mellitus with kidney complication, with long-term current use of insulin [facility])  Assessment & Plan  Lab Results   Component Value Date    HGBA1C 13 2 (H) 02/21/2021     Would need hemoglobin A1c; dietary consult  Check blood sugars before meals and before bedtime with insulin sliding scale  Continue NovoLog 70/30 10u b i d  a c  Recent Labs     02/26/21  2140 02/27/21  0604 02/27/21  1045 02/27/21  1134   POCGLU 351* 165* 303* 272*       Blood Sugar Average: Last 72 hrs:            VTE Prophylaxis: Heparin  / sequential compression device   Code Status: Prior full Code as discussed with patient in front of daughter  POLST: There is no POLST form on file for this patient (pre-hospital)    Anticipated Length of Stay:  Patient will be admitted on an Inpatient basis with an anticipated length of stay of  greater than 2 midnights  Justification for Hospital Stay: Please see detailed plans noted above  Chief Complaint:     Increasing shortness of breath with orthopnea  History of Present Illness:  Marcela Heaton is a 76 y o  female who has a past medical history significant for CAD, CHF complicated by acute kidney insufficiency on top of chronic kidney disease with the use of diuretics  Patient also has hyperlipidemia, hypothyroidism, essential hypertension, dietary noncompliance  The patient was recently admitted due to similar complaints of shortness of breath however there was a concern also for elevated creatinine and hence was sent home with the torsemide being discontinued    At home, according to the daughter, she was not drinking much fluids although admittedly drank 2 glasses of or issues; also said to be dietarily compliant this time although claim to have eaten 30 club sandwich  Patient claims that it is of low-salt  In any case she noted there was increasing bilateral lower leg swelling now up to the level of the lower calves as well as shortness of breath with note of orthopnea  Normally she can lie down flat on bed however currently she needs to be at least 60° elevated in order to be comfortable  She also sleeps that way  Currently, patient is comfortable in able to speak however needs to be upright in order to breathe comfortably      Review of Systems:    Constitutional:  Denies fever or chills   Eyes:  Denies change in visual acuity   HENT:  Denies nasal congestion or sore throat   Respiratory:  Denies cough or shortness of breath   Cardiovascular:  Denies chest pain or edema   GI:  Denies abdominal pain, nausea, vomiting, bloody stools or diarrhea   :  Denies dysuria   Musculoskeletal:  Denies back pain or joint pain   Integument:  Denies rash   Neurologic:  Denies headache, focal weakness or sensory changes   Endocrine:  Denies polyuria or polydipsia   Lymphatic:  Denies swollen glands   Psychiatric:  Denies depression or anxiety     Past Medical and Surgical History:   Past Medical History:   Diagnosis Date    Anemia     [facility]congest[facility]) [facility]     Chronic kidney disease     Coronary artery disease     Diabetes mellitus [facility] [address] )     Hypertension     Hypothyroidism      Past Surgical History:   Procedure Laterality Date    CORONARY ANGIOPLASTY WITH STENT PLACEMENT  2019       Meds/Allergies:  (Not in a hospital admission)    aspirin 81 mg chewable tablet Chew 81 mg daily Moreno Hilton MD Reordered   Ordered as: aspirin chewable tablet 81 mg - 81 mg, Oral, Daily, First dose on Tue 3/2/21 at 0900   atorvastatin (LIPITOR) 40 mg tablet Take 1 tablet (40 mg total) by mouth daily Moreno Hilton MD Reordered   Ordered as: atorvastatin (LIPITOR) tablet 40 mg - 40 mg, Oral, Daily, First dose on Tue 3/2/21 at 0900   calcium acetate (PHOSLO) capsule Take 1 capsule (667 mg total) by mouth 2 (two) times a day with meals Richar Rodriguez MD Reordered   Ordered as: calcium acetate (PHOSLO) capsule 667 mg - 667 mg, Oral, 2 times daily with meals, First dose on Tue 3/2/21 at 0730 Take With Food    carvedilol (COREG) 12 5 mg tablet Take 1 tablet (12 5 mg total) by mouth 2 (two) times a day with meals Richar Rodriguez MD Reordered   Ordered as: carvedilol (COREG) tablet 12 5 mg - 12 5 mg, Oral, 2 times daily with meals, First dose on Tue 3/2/21 at 900 [address] for heart rate less than 50 beats per minute  Look alike sound alike  Hold for systolic blood pressure less than (mmHg): 110   hydrALAZINE (APRESOLINE) 50 mg tablet Take 1 tablet (50 mg total) by mouth 3 (three) times a day Richar Rodriguez MD Reordered   Ordered as: hydrALAZINE (APRESOLINE) tablet 50 mg - 50 mg, Oral, 3 times daily, First dose on Mon 3/1/21 at 400 [facility] for systolic blood pressure less than (mmHg): 110   insulin aspart protamine-insulin aspart (NovoLOG 70/30) 100 units/mL injection Inject 10 Units under the skin 2 (two) times a day before meals Richar Rodriguez MD Reordered   Ordered as: insulin aspart protamine-insulin aspart (NovoLOG 70/30) 100 units/mL subcutaneous injection 10 Units - 10 Units, Subcutaneous, 2 times daily before meals, First dose on Tue 3/2/21 at 0700 [facility]  Vial should be gently rolled between the palms ten times to resuspend insulin prior to injection *DISPOSE IN 8 GALLON BLACK CONTAINER* LOOK/SOUND ALIKE MED    isosorbide mononitrate (IMDUR) 30 mg 24 hr tablet Take 1 tablet (30 mg total) by mouth daily Richar Rodriguez MD Not Ordered   levothyroxine 125 mcg tablet Take 1 tablet (125 mcg total) by mouth daily Richar Rodriguez MD Reordered   Ordered as: levothyroxine tablet 125 mcg - 125 mcg, Oral, Daily, First dose on Tue 3/2/21 at 0900 Administer in the morning on an empty stomach, at least 30 to 60 minutes before food   Tablets may be crushed and suspended in 5-10mls of water for administration  LOOK ALIKE SOUND ALIKE MED  nitroglycerin (NITROSTAT) 0 4 mg SL tablet Place 1 tablet (0 4 mg total) under the tongue every 5 (five) minutes as needed for chest pain for up to 30 doses Emily Lofton MD Reordered   Ordered as: nitroglycerin (NITROSTAT) SL tablet 0 4 mg - 0 4 mg, Sublingual, Every 5 minutes PRN, chest pain, Starting Mon 3/1/21 at 2025 May administer up to 3 doses, then call physician  NIFEdipine (ADALAT CC) 30 MG 24 hr tablet Take 1 tablet (30 mg total) by mouth daily Emily Lofton MD Not Ordered    Patient not taking: Reported on 3/1/2021           Allergies: Allergies   Allergen Reactions    Iv Contrast [Iodinated Diagnostic Agents]      Caused KELLY     History:  Marital Status: /Civil Union   Occupation:  Currently retired  Patient Pre-hospital Living Situation:  She lives at home with daughter  Patient Pre-hospital Level of Mobility:  Ambulatory  Patient Pre-hospital Diet Restrictions:  Should be low-salt low-fat diabetic; there is questionable compliance with regards to diet    Substance Use History:   Social History     Substance and Sexual Activity   Alcohol Use Never    Frequency: Never     Social History     Tobacco Use   Smoking Status Former Smoker   Smokeless Tobacco Never Used     Social History     Substance and Sexual Activity   Drug Use No       Family History:  Family History   Problem Relation Age of Onset    Diabetes Mother     Heart attack Father         MI    Hypertension Brother        Physical Exam:     Vitals:   Blood Pressure: 160/91 (03/01/21 1902)  Pulse: 69 (03/01/21 1902)  Temperature: 97 6 °F (36 4 °C) (03/01/21 1902)  Temp Source: Oral (03/01/21 1902)  Respirations: 18 (03/01/21 1902)  SpO2: 95 % (03/01/21 1902)    Constitutional:  Well developed, well nourished, no acute distress, however needs to be upright in order to be comfortable, non-toxic appearance   Eyes:  PERRL, conjunctiva normal   HENT:  Atraumatic, external ears normal, nose normal, oropharynx moist, no pharyngeal exudates  Neck- normal range of motion, no tenderness, supple   Respiratory:  No respiratory distress, decreased breath sounds bilaterally from 1/2 of lung fields no rales, no wheezing ; when tried to lie flat, patient states that she cannot do it as of the moment  Cardiovascular:  Normal rate, normal rhythm, no murmurs, no gallops, no rubs   GI:  Soft, nondistended, normal bowel sounds, nontender, no organomegaly, no mass, no rebound, no guarding   :  No costovertebral angle tenderness   Musculoskeletal:  Bilateral leg swelling seen, no tenderness, no deformities  Back- no tenderness  Integument:  Well hydrated, no rash   Lymphatic:  No lymphadenopathy noted   Neurologic:  Alert &awake, communicative, CN 2-12 normal, normal motor function, normal sensory function, no focal deficits noted   Psychiatric:  Speech and behavior appropriate       Lab Results: I have personally reviewed pertinent reports  Results from last 7 days   Lab Units 03/01/21  1949   WBC Thousand/uL 7 12   HEMOGLOBIN g/dL 7 7*   HEMATOCRIT % 26 0*   PLATELETS Thousands/uL 169   NEUTROS PCT % 70   LYMPHS PCT % 21   MONOS PCT % 6   EOS PCT % 1     Results from last 7 days   Lab Units 03/01/21  1948   POTASSIUM mmol/L 4 9   CHLORIDE mmol/L 108   CO2 mmol/L 23   BUN mg/dL 93*   CREATININE mg/dL 3 28*   CALCIUM mg/dL 9 0   ALK PHOS U/L 692*   ALT U/L 75   AST U/L 52*             Chest x-ray personally reviewed to show bilateral pulmonary congestion much worse than previous film  Imaging: I have personally reviewed pertinent reports  Xr Chest Portable    Result Date: 2/25/2021  Narrative: CHEST INDICATION:   Chest tightness associated with transfusion  COMPARISON:  One view chest 2/21/2021 EXAM PERFORMED/VIEWS:  XR CHEST PORTABLE FINDINGS: Cardiac silhouette is enlarged  Aortic calcification is present   Mild prominence of the central vasculature within normal limits for portable AP technique  Small right pleural effusion, smaller  Right middle lobe and right basilar airspace disease, improved  No pneumothorax  Osseous structures appear within normal limits for patient age  Impression: Small right pleural effusion, smaller  Right middle lobe and right basilar airspace disease may represent passive subsegmental atelectasis, improved  Workstation performed: [id number]     Xr Chest 1 View Portable    Result Date: 2/21/2021  Narrative: CHEST INDICATION:   CP  COMPARISON:  Chest x-ray from 4/30/2019  EXAM PERFORMED/VIEWS:  XR CHEST PORTABLE FINDINGS: Cardiomediastinal silhouette appears unremarkable  Interstitial pulmonary edema and small right pleural effusion  No pneumothorax  Osseous structures appear within normal limits for patient age  Impression: Interstitial pulmonary edema and small right pleural effusion  Workstation performed: [id number]     Vas Renal Artery Complete    Result Date: 3/1/2021  Narrative:  [facility] REPORT CLINICAL: Indications:  Patient presents to evaluate the renal arteries secondary to uncontrolled HTN  Patient is currently on 5 medications for blood pressure  Operative History: 2019-01-01 Coronary angioplasty w stent placement Risk Factors The patient has history of HTN, Diabetes (IDDM), HLD, CKD and CAD  She has no history of Obesity  Clinical Right Pressure:  142/64 mm Hg  FINDINGS:  Unilateral  Impression  PSV (cm/s)  EDV (cm/s)  Sup-Maddie Ao  Normal              70              Celiac                         110          16  Prox   SMA                      271          28   Right          Impression  PSV (cm/s)  EDV (cm/s)   RAR    RI  Kidney (cm)  Prox Renal     Normal              74          21  1 05  0 72               Mid Renal                          65          13  0 92  0 86               Dist Renal                         53          12  0 76  0 78               Celiac Artery  Normal 12           4        0 67               Kidney         Normal                                                 9 55  Hilum 1                            12           4        0 69               Mid Pole                           23           7        0 69               Hilum 3                            10           3        0 72               Renal          Patent                                                        Left           Impression  PSV (cm/s)  EDV (cm/s)   RAR    RI  Kidney (cm)  Prox Renal     Normal              68           2  0 97  1 00               Mid Renal                          69           0  0 98  1 00               Dist Renal                        108          12  1 54                     Celiac Artery  Resistive           31           0        1 00               Kidney         Normal                                                10 22  Hilum 1                            31           0        1 00               Mid Pole                           19           0        1 00               Hilum 3                            33           0        1 00               Renal          Patent                                                          CONCLUSION: Impression The abdominal aorta is widely patent and normal caliber  RIGHT RENAL: No evidence of significant arterial occlusive disease in the main renal artery  Patent renal vein Renovascular resistive index of 0 67 with in normal limits  Renal/Aorta Ratio: 1 0   The Kidney measures 9 55 cm  LEFT RENAL: No evidence of significant arterial occlusive disease in the main renal artery  Patent renal vein Renovascular resistive index of 0 98 which is abnormal and consistent with parenchymal disease  Renal/Aorta Ratio: 1 5   The Kidney measures 10 22 cm  MESENTERIC: Celiac and superior mesenteric arteries are patent  >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  There is no previous study for comparison    SIGNATURE: Electronically Signed by: Joesph Cortes MD on 2021-03-01 06:50:07 PM    Us Abdomen Limited    Result Date: 2/27/2021  Narrative: ABDOMEN ULTRASOUND, LIMITED, FOUR QUADRANT SURVEY INDICATION:    abdominal distentension  COMPARISON:  None  TECHNIQUE: Real-time ultrasound of the abdominal cavity was performed with a curvilinear transducer with standard grey scale imaging techniques  FINDINGS: No ascites  Small right pleural effusion  Layering cholelithiasis with posterior acoustic shadowing versus adjacent bowel loop  This could be followed up with gallbladder ultrasound if clinically warranted  Impression: No evidence of ascites  Workstation performed: [id number]         ** Please Note: Dragon 360 Dictation voice to text software was used in the creation of this document   **

## 2021-03-02 NOTE — ED PROVIDER NOTES
History  Chief Complaint   Patient presents with    Shortness of Breath     pt states she can't breathe  She was discharged from the hospital on saturday after an episode of not being able to breathe     Arley Lind is a 76y o  year old female with PMH of CHF presenting to the [facility] ED for shortness of breath and chest tightness  Patient recently admitted for CAP and CHF exacerbation  Since discharge patient reports daily episodes of chest tightness and shortness of breath  Dyspnea worse with exertion and lying flat  Patient does not wear oxygen supplementation  Patient denies worsening lower extremity edema or weight gain  No fevers or cough  No lightheadedness  Recently discontinued torsemide diuretic  History provided by:  Patient   used: No    Shortness of Breath  Associated symptoms: no abdominal pain, no chest pain, no cough, no fever, no rash, no vomiting and no wheezing        Prior to Admission Medications   Prescriptions Last Dose Informant Patient Reported? Taking?    NIFEdipine (ADALAT CC) 30 MG 24 hr tablet Not Taking at Unknown time  No No   Sig: Take 1 tablet (30 mg total) by mouth daily   Patient not taking: Reported on 3/1/2021   aspirin 81 mg chewable tablet 3/1/2021 at Unknown time  Yes Yes   Sig: Chew 81 mg daily   atorvastatin (LIPITOR) 40 mg tablet 3/1/2021 at Unknown time  No Yes   Sig: Take 1 tablet (40 mg total) by mouth daily   calcium acetate (PHOSLO) capsule 3/1/2021 at Unknown time  No Yes   Sig: Take 1 capsule (667 mg total) by mouth 2 (two) times a day with meals   carvedilol (COREG) 12 5 mg tablet 3/1/2021 at Unknown time  No Yes   Sig: Take 1 tablet (12 5 mg total) by mouth 2 (two) times a day with meals   hydrALAZINE (APRESOLINE) 50 mg tablet 3/1/2021 at Unknown time  No Yes   Sig: Take 1 tablet (50 mg total) by mouth 3 (three) times a day   insulin aspart protamine-insulin aspart (NovoLOG 70/30) 100 units/mL injection 3/1/2021 at Unknown time  Yes Yes Sig: Inject 10 Units under the skin 2 (two) times a day before meals   isosorbide mononitrate (IMDUR) 30 mg 24 hr tablet 3/1/2021 at Unknown time  No Yes   Sig: Take 1 tablet (30 mg total) by mouth daily   levothyroxine 125 mcg tablet 3/1/2021 at Unknown time  No Yes   Sig: Take 1 tablet (125 mcg total) by mouth daily   nitroglycerin (NITROSTAT) 0 4 mg SL tablet  Child No Yes   Sig: Place 1 tablet (0 4 mg total) under the tongue every 5 (five) minutes as needed for chest pain for up to 30 doses      Facility-Administered Medications: None       Past Medical History:   Diagnosis Date    Anemia     [facility])     Chronic kidney disease     Coronary artery disease     Diabetes mellitus ([facility] [address] )     Hypertension     Hypothyroidism        Past Surgical History:   Procedure Laterality Date    CORONARY ANGIOPLASTY WITH STENT PLACEMENT  2019       Family History   Problem Relation Age of Onset    Diabetes Mother     Heart attack Father         MI    Hypertension Brother      I have reviewed and agree with the history as documented  E-Cigarette/Vaping     E-Cigarette/Vaping Substances     Social History     Tobacco Use    Smoking status: Former Smoker    Smokeless tobacco: Never Used   Substance Use Topics    Alcohol use: Never     Frequency: Never    Drug use: No        Review of Systems   Constitutional: Negative for chills and fever  HENT: Negative for congestion and rhinorrhea  Eyes: Negative for photophobia and visual disturbance  Respiratory: Positive for chest tightness and shortness of breath  Negative for cough, choking and wheezing  Cardiovascular: Negative for chest pain and leg swelling  Gastrointestinal: Negative for abdominal distention, abdominal pain, constipation, diarrhea, nausea and vomiting  Endocrine: Negative for polyuria  Genitourinary: Negative for difficulty urinating, dysuria, flank pain and hematuria     Musculoskeletal: Negative for arthralgias  Skin: Negative for rash  Neurological: Negative for seizures, syncope and light-headedness  Psychiatric/Behavioral: Negative for behavioral problems and confusion  All other systems reviewed and are negative  Physical Exam  ED Triage Vitals [03/01/21 1902]   Temperature Pulse Respirations Blood Pressure SpO2   97 6 °F (36 4 °C) 69 18 160/91 95 %      Temp Source Heart Rate Source Patient Position - Orthostatic VS BP Location FiO2 (%)   Oral Monitor Sitting Left arm --      Pain Score       --             Orthostatic Vital Signs  Vitals:    03/02/21 0839 03/02/21 0900 03/02/21 1000 03/02/21 1100   BP: (!) 176/79 150/67 (!) 158/104 (!) 179/77   Pulse: 68 62 60 68   Patient Position - Orthostatic VS: Sitting Sitting Sitting Sitting       Physical Exam  Vitals signs and nursing note reviewed  Constitutional:       General: She is not in acute distress  Appearance: Normal appearance  She is well-developed  She is not ill-appearing, toxic-appearing or diaphoretic  HENT:      Head: Normocephalic and atraumatic  Nose: No congestion or rhinorrhea  Eyes:      General:         Right eye: No discharge  Left eye: No discharge  Neck:      Musculoskeletal: Normal range of motion and neck supple  Cardiovascular:      Rate and Rhythm: Normal rate and regular rhythm  Pulses:           Radial pulses are 2+ on the right side and 2+ on the left side  Pulmonary:      Effort: Pulmonary effort is normal  No accessory muscle usage or respiratory distress  Breath sounds: No stridor  Examination of the right-lower field reveals rales  Examination of the left-lower field reveals rales  Rales present  No decreased breath sounds, wheezing or rhonchi  Abdominal:      General: Bowel sounds are normal  There is no distension  Palpations: Abdomen is soft  Tenderness: There is no abdominal tenderness  There is no guarding or rebound     Musculoskeletal:      Right lower leg: She exhibits no tenderness  Edema present  Left lower leg: She exhibits no tenderness  Edema present  Comments: 1+ pitting edema b/l   Skin:     Capillary Refill: Capillary refill takes less than 2 seconds  Findings: No rash  Neurological:      Mental Status: She is alert and oriented to person, place, and time     Psychiatric:         Mood and Affect: Mood normal          Behavior: Behavior normal          ED Medications  Medications   aspirin chewable tablet 81 mg (81 mg Oral Given 3/2/21 0837)   atorvastatin (LIPITOR) tablet 40 mg (40 mg Oral Given 3/2/21 0838)   calcium acetate (PHOSLO) capsule 667 mg (667 mg Oral Given 3/2/21 0725)   carvedilol (COREG) tablet 12 5 mg (12 5 mg Oral Given 3/2/21 0725)   hydrALAZINE (APRESOLINE) tablet 50 mg (50 mg Oral Given 3/2/21 0837)   nitroglycerin (NITROSTAT) SL tablet 0 4 mg (has no administration in time range)   levothyroxine tablet 125 mcg (125 mcg Oral Given 3/2/21 0837)   insulin aspart protamine-insulin aspart (NovoLOG 70/30) 100 units/mL subcutaneous injection 10 Units (10 Units Subcutaneous Given 3/2/21 0724)   acetaminophen (TYLENOL) tablet 650 mg (has no administration in time range)   docusate sodium (COLACE) capsule 100 mg (has no administration in time range)   ondansetron (ZOFRAN) injection 4 mg (has no administration in time range)   aluminum-magnesium hydroxide-simethicone (MYLANTA) oral suspension 15 mL (has no administration in time range)   heparin (porcine) subcutaneous injection 5,000 Units (5,000 Units Subcutaneous Not Given 3/2/21 0505)   insulin lispro (HumaLOG) 100 units/mL subcutaneous injection 1-6 Units (0 Units Subcutaneous Hold 3/2/21 0711)   insulin lispro (HumaLOG) 100 units/mL subcutaneous injection 1-5 Units (1 Units Subcutaneous Given 3/2/21 0006)   iron sucrose (VENOFER) 200 mg in sodium chloride 0 9 % 100 mL IVPB (has no administration in time range)   furosemide (LASIX) injection 40 mg (has no administration in time range)   furosemide (LASIX) injection 40 mg (40 mg Intravenous Given 3/1/21 2039)   nitroglycerin (NITRO-BID) 2 % TD ointment 0 5 inch (0 5 inches Topical Given 3/1/21 2219)       Diagnostic Studies  Results Reviewed     Procedure Component Value Units Date/Time    Urinalysis with microscopic [[id number]]     Lab Status: No result Specimen: Urine     Fingerstick Glucose (POCT) [[id number]]  (Abnormal) Collected: 03/02/21 0658    Lab Status: Final result Updated: 03/02/21 0659     POC Glucose 144 mg/dl     TSH, 3rd generation [[id number]]  (Abnormal) Collected: 03/02/21 0415    Lab Status: Final result Specimen: Blood from Arm, Right Updated: 03/02/21 0514     TSH 3RD GENERATON 15 800 uIU/mL     Narrative:      Patients undergoing fluorescein dye angiography may retain small amounts of fluorescein in the body for 48-72 hours post procedure  Samples containing fluorescein can produce falsely depressed TSH values  If the patient had this procedure,a specimen should be resubmitted post fluorescein clearance        Lipid panel [[id number]] Collected: 03/02/21 0415    Lab Status: Final result Specimen: Blood from Arm, Right Updated: 03/02/21 0514     Cholesterol 147 mg/dL      Triglycerides 114 mg/dL      HDL, Direct 76 mg/dL      LDL Calculated 48 mg/dL      Non-HDL-Chol (CHOL-HDL) 71 mg/dl     Phosphorus [[id number]]  (Abnormal) Collected: 03/02/21 0415    Lab Status: Final result Specimen: Blood from Arm, Right Updated: 03/02/21 0514     Phosphorus 4 2 mg/dL     Hemoglobin A1c w/EAG Estimation (Orders if not completed within the last 90 days) [[id number]]  (Abnormal) Collected: 03/02/21 0139    Lab Status: Final result Specimen: Blood from Arm, Right Updated: 03/02/21 0511     Hemoglobin A1C 11 7 %       mg/dl     Troponin I [[id number]]  (Normal) Collected: 03/02/21 0415    Lab Status: Final result Specimen: Blood from Arm, Right Updated: 03/02/21 0509     Troponin I 0 02 ng/mL     CBC and differential [[id number]] (Abnormal) Collected: 03/02/21 0415    Lab Status: Final result Specimen: Blood from Arm, Right Updated: 03/02/21 0440     WBC 6 86 Thousand/uL      RBC 2 56 Million/uL      Hemoglobin 7 3 g/dL      Hematocrit 23 5 %      MCV 92 fL      MCH 28 5 pg      MCHC 31 1 g/dL      RDW 14 3 %      MPV 11 0 fL      Platelets 778 Thousands/uL      nRBC 0 /100 WBCs      Neutrophils Relative 65 %      Immat GRANS % 1 %      Lymphocytes Relative 24 %      Monocytes Relative 7 %      Eosinophils Relative 2 %      Basophils Relative 1 %      Neutrophils Absolute 4 50 Thousands/µL      Immature Grans Absolute 0 06 Thousand/uL      Lymphocytes Absolute 1 66 Thousands/µL      Monocytes Absolute 0 46 Thousand/µL      Eosinophils Absolute 0 14 Thousand/µL      Basophils Absolute 0 04 Thousands/µL     Troponin I [[id number]]  (Normal) Collected: 03/02/21 0139    Lab Status: Final result Specimen: Blood from Arm, Right Updated: 03/02/21 0211     Troponin I 0 02 ng/mL     Comprehensive metabolic panel [[id number]]  (Abnormal) Collected: 03/02/21 0140    Lab Status: Final result Specimen: Blood from Arm, Right Updated: 03/02/21 0211     Sodium 138 mmol/L      Potassium 4 8 mmol/L      Chloride 109 mmol/L      CO2 24 mmol/L      ANION GAP 5 mmol/L      BUN 90 mg/dL      Creatinine 3 04 mg/dL      Glucose 171 mg/dL      Calcium 8 8 mg/dL      Corrected Calcium 9 8 mg/dL      AST 45 U/L      ALT 66 U/L      Alkaline Phosphatase 610 U/L      Total Protein 6 8 g/dL      Albumin 2 8 g/dL      Total Bilirubin 0 29 mg/dL      eGFR 15 ml/min/1 73sq m     Narrative:      [place] guidelines for Chronic Kidney Disease (CKD):     Stage 1 with normal or high GFR (GFR > 90 mL/min/1 73 square meters)    Stage 2 Mild CKD (GFR = 60-89 mL/min/1 73 square meters)    Stage 3A Moderate CKD (GFR = 45-59 mL/min/1 73 square meters)    Stage 3B Moderate CKD (GFR = 30-44 mL/min/1 73 square meters)    Stage 4 Severe CKD (GFR = 15-29 mL/min/1 73 square meters)    Stage 5 End Stage CKD (GFR <15 mL/min/1 73 square meters)  Note: GFR calculation is accurate only with a steady state creatinine    Magnesium [[id number]]  (Abnormal) Collected: 03/02/21 0140    Lab Status: Final result Specimen: Blood from Arm, Right Updated: 03/02/21 0211     Magnesium 2 7 mg/dL     Platelet count [[id number]]  (Abnormal) Collected: 03/02/21 0139    Lab Status: Final result Specimen: Blood from Arm, Right Updated: 03/02/21 0152     Platelets 719 Thousands/uL      MPV 10 7 fL     Fingerstick Glucose (POCT) [[id number]]  (Abnormal) Collected: 03/02/21 0005    Lab Status: Final result Updated: 03/02/21 0008     POC Glucose 198 mg/dl     Troponin I [[id number]]     Lab Status: No result Specimen: Blood     NT-BNP PRO [[id number]]  (Abnormal) Collected: 03/01/21 1948    Lab Status: Final result Specimen: Blood from Arm, Right Updated: 03/01/21 2154     NT-proBNP 9,024 pg/mL     Fingerstick Glucose (POCT) [[id number]]  (Abnormal) Collected: 03/01/21 2134    Lab Status: Final result Updated: 03/01/21 2135     POC Glucose 250 mg/dl     Comprehensive metabolic panel [[id number]]  (Abnormal) Collected: 03/01/21 1948    Lab Status: Final result Specimen: Blood from Arm, Right Updated: 03/01/21 2025     Sodium 135 mmol/L      Potassium 4 9 mmol/L      Chloride 108 mmol/L      CO2 23 mmol/L      ANION GAP 4 mmol/L      BUN 93 mg/dL      Creatinine 3 28 mg/dL      Glucose 227 mg/dL      Calcium 9 0 mg/dL      Corrected Calcium 9 6 mg/dL      AST 52 U/L      ALT 75 U/L      Alkaline Phosphatase 692 U/L      Total Protein 7 5 g/dL      Albumin 3 2 g/dL      Total Bilirubin 0 25 mg/dL      eGFR 14 ml/min/1 73sq m     Narrative:      [place] guidelines for Chronic Kidney Disease (CKD):     Stage 1 with normal or high GFR (GFR > 90 mL/min/1 73 square meters)    Stage 2 Mild CKD (GFR = 60-89 mL/min/1 73 square meters)    Stage 3A Moderate CKD (GFR = 45-59 mL/min/1 73 square meters)    Stage 3B Moderate CKD (GFR = 30-44 mL/min/1 73 square meters)    Stage 4 Severe CKD (GFR = 15-29 mL/min/1 73 square meters)    Stage 5 End Stage CKD (GFR <15 mL/min/1 73 square meters)  Note: GFR calculation is accurate only with a steady state creatinine    Troponin I [[id number]]  (Normal) Collected: 03/01/21 1948    Lab Status: Final result Specimen: Blood from Arm, Right Updated: 03/01/21 2025     Troponin I 0 02 ng/mL     CBC and differential [[id number]]  (Abnormal) Collected: 03/01/21 1949    Lab Status: Final result Specimen: Blood from Arm, Right Updated: 03/01/21 2002     WBC 7 12 Thousand/uL      RBC 2 81 Million/uL      Hemoglobin 7 7 g/dL      Hematocrit 26 0 %      MCV 93 fL      MCH 27 4 pg      MCHC 29 6 g/dL      RDW 14 0 %      MPV 11 2 fL      Platelets 438 Thousands/uL      nRBC 0 /100 WBCs      Neutrophils Relative 70 %      Immat GRANS % 1 %      Lymphocytes Relative 21 %      Monocytes Relative 6 %      Eosinophils Relative 1 %      Basophils Relative 1 %      Neutrophils Absolute 5 01 Thousands/µL      Immature Grans Absolute 0 06 Thousand/uL      Lymphocytes Absolute 1 46 Thousands/µL      Monocytes Absolute 0 45 Thousand/µL      Eosinophils Absolute 0 10 Thousand/µL      Basophils Absolute 0 04 Thousands/µL                  XR chest 1 view portable   Final Result by Jeffrey Chaves DO (03/02 6164)      There is mild pulmonary vascular congestion  Bibasilar consolidation likely atelectasis or developing pneumonia              Workstation performed: [id number]               Procedures  Procedures      ED Course  ED Course as of Mar 02 1135   Mon Mar 01, 2021   2111 Troponin I: 0 02 2111 Hemoglobin(!): 7 7   2111 Creatinine(!): 3 28   2139 Procedure Note: EKG  Date/Time: 03/01/21 9:12 PM   Interpreted by: Kamla Andrea DO  Indications / Diagnosis: Dyspnea  ECG reviewed by me, the ED Provider: yes   The EKG demonstrates:  Rhythm: normal sinus rhythm 67 BPM  Intervals: Normal CA and QT intervals  Axis: Normal axis  QRS/Blocks: Normal QRS  ST Changes: No acute ST/T waves changes  No ARUNA  No TWI  Comparison: Compared to prior EKG performed on 02/21/2021                                          MDM  Number of Diagnoses or Management Options  Acute exacerbation of CHF (congestive heart failure) ([place] [address] ):   CAD (coronary artery disease):   T2DM (type 2 diabetes mellitus) [facility]):   Diagnosis management comments: 76 y o  female presenting for shortness of breath and chest tightness  Will order CBC, CMP, troponin, BNP, EKG, CXR to evaluate for ACS, PTX, pulmonary disease, widened mediastinum, CHF exacerbation  Due to dyspnea and volume overload on exam, will admit for further evaluation and management  Patient care discussed with Dr Juan José Mccabe who will assume care and admit patient to the hospital  I have discussed with the patient our recommendation of inpatient admission for further medical care  I have answered all of the patient's questions and concerns   The patient is in agreement with the plan to proceed with admission to the hospital         Amount and/or Complexity of Data Reviewed  Clinical lab tests: ordered and reviewed  Tests in the radiology section of CPT®: reviewed and ordered  Review and summarize past medical records: yes  Discuss the patient with other providers: yes  Independent visualization of images, tracings, or specimens: yes    Patient Progress  Patient progress: stable      Disposition  Final diagnoses:   Acute exacerbation of CHF (congestive heart failure) ([place] [address] )   CAD (coronary artery disease)   T2DM (type 2 diabetes mellitus) ([place] [address] )     Time reflects when diagnosis was documented in both MDM as applicable and the Disposition within this note     Time User Action Codes Description Comment    3/1/2021  8:29 PM Javon Baca Add [I50 43] Acute on chronic combined systolic and diastolic congestive heart failure ([place] [address] )     3/1/2021  8:29 PM Juan José Mccabe, Liam PRESTON Modify [I50 43] Acute on chronic combined systolic and diastolic congestive heart failure ([facility] )     3/1/2021 10:14 PM Dominic Castellon Add [I50 9] Acute exacerbation of CHF (congestive heart failure) ([facility] )     3/1/2021 10:14 PM Dominic Castellon Add [I25 10] CAD (coronary artery disease)     3/1/2021 10:15 PM Dominic Castellon Add [E11 9] T2DM (type 2 diabetes mellitus) ([facility] )     3/1/2021 10:15 PM Dominic Castellon Modify [I50 43] Acute on chronic combined systolic and diastolic congestive heart failure ([place] [address] )     3/1/2021 10:15 PM Dominic Castellon Modify [I50 9] Acute exacerbation of CHF (congestive heart failure) [facility])       ED Disposition     ED Disposition Condition Date/Time Comment    Admit Stable Mon Mar 1, 2021 10:14 PM Case was discussed with Dr Lexi Bianchi and the patient's admission status was agreed to be Admission Status: inpatient status to the service of Dr Lexi Bianchi  Follow-up Information    None         Patient's Medications   Discharge Prescriptions    No medications on file     No discharge procedures on file  PDMP Review     None           ED Provider  Attending physically available and evaluated Carine Gipson I managed the patient along with the ED Attending      Electronically Signed by DO Vitaly Dubose DO  03/02/21 9483

## 2021-03-02 NOTE — ED ATTENDING ATTESTATION
3/1/2021  I, Higinio Alvares MD, saw and evaluated the patient  I have discussed the patient with the resident/non-physician practitioner and agree with the resident's/non-physician practitioner's findings, Plan of Care, and MDM as documented in the resident's/non-physician practitioner's note, except where noted  All available labs and Radiology studies were reviewed  I was present for key portions of any procedure(s) performed by the resident/non-physician practitioner and I was immediately available to provide assistance  At this point I agree with the current assessment done in the Emergency Department  I have conducted an independent evaluation of this patient a history and physical is as follows:    ED Course     Patient location discharge from hospital with diagnosis pneumonia off diuretics for KELLY presents with worsening shortness of breath  Orthopnea and PND per self report  Vitals reviewed patient not hypoxic mild tachypnea noted  Her regular rate rhythm  JVD present Lungs rales bilaterally bases    Abdomen soft nontender nondistended normal bowel sounds    Extremities bilateral pitting edema    Impression:  CHF exacerbation,  labs x-ray,  cardiac enzymes,  BNP diuretics,  admission     Critical Care Time  Procedures

## 2021-03-02 NOTE — ASSESSMENT & PLAN NOTE
Lab Results   Component Value Date    HGBA1C 11 7 (H) 03/02/2021     Recent Labs     03/01/21  2134 03/02/21  0005 03/02/21  0658 03/02/21  1131   POCGLU 250* 198* 144* 244*     Blood Sugar Average: Last 72 hrs:  (P) 209     · Poorly controlled as evidenced by A1c but improved since prior study last month  · Dietary consult: pending  · Continue NovoLog 70/30 10u BID  · Continue Accu-Cheks and sliding scale insulin

## 2021-03-02 NOTE — ASSESSMENT & PLAN NOTE
· TSH is elevated at 15 800 however improved from prior study on 02/21/2021 at 20 900  · Levothyroxine was increased on 02/22/2021  · Continue levothyroxine 125 mcg daily

## 2021-03-02 NOTE — ASSESSMENT & PLAN NOTE
Currently without chest discomfort  Continue nitroglycerin currently in form of nitropaste  Also on Coreg 12 5 mg twice daily

## 2021-03-02 NOTE — ASSESSMENT & PLAN NOTE
Currently stable with hemoglobin of 7 7 similar to last time    Patient stays within the level of 7 0's

## 2021-03-02 NOTE — PROGRESS NOTES
Progress Note - Jessica Dudley 1955, 76 y o  female MRN: [id number]  Unit/Bed#: ED 23 Encounter: [id number]  Primary Care Provider: Alla Zhang MD   Date and time admitted to hospital: 3/1/2021  7:29 PM    * Acute on chronic combined systolic and diastolic congestive heart failure ([address] )  Assessment & Plan  Wt Readings from Last 3 Encounters:   02/27/21 81 kg (178 lb 9 2 oz)   11/02/20 77 1 kg (170 lb)   08/10/20 76 3 kg (168 lb 3 2 oz)     · Patient with acute exacerbation of CHF secondary to dietary indiscretions and not resuming her diuretics at time of discharge from recent hospitalization  · Cardiology following and appreciate their input  · Continue IV diuresis  · Continue low-salt diet and fluid restriction  · Chest x-ray (03/01/2021): Mild pulmonary vascular congestion  Bibasilar consolidation likely atelectasis  Hypertensive urgency  Assessment & Plan  · Blood pressures are poorly controlled  · Continue carvedilol 12 5 mg BID, hydralazine 50 mg TID  · Monitor BPs closely while diuresing  Acute renal failure superimposed on stage 4 chronic kidney disease [facility])  Assessment & Plan  Lab Results   Component Value Date    EGFR 15 03/02/2021    EGFR 14 03/01/2021    EGFR 12 02/27/2021    CREATININE 3 04 (H) 03/02/2021    CREATININE 3 28 (H) 03/01/2021    CREATININE 3 78 (H) 02/27/2021     · Nephrology consulted and appreciate their input  · Baseline creatinine:  1 9-2 2  · Creatinine today:  3 04  · KELLY likely secondary to cardiorenal syndrome as well as not being fully recovered from prior KELLY  · Continue diuretics    Hypothyroidism  Assessment & Plan  · TSH is elevated at 15 800 however improved from prior study on 02/21/2021 at 20 900  · Levothyroxine was increased on 02/22/2021  · Continue levothyroxine 125 mcg daily      Type 2 diabetes mellitus with kidney complication, with long-term current use of insulin [facility])  Assessment & Plan  Lab Results   Component Value Date    HGBA1C 11 7 (H) 2021     Recent Labs     21  2134 21  0005 21  0658 21  1131   POCGLU 250* 198* 144* 244*     Blood Sugar Average: Last 72 hrs:  (P) 209     · Poorly controlled as evidenced by A1c but improved since prior study last month  · Dietary consult: pending  · Continue NovoLog 70/30 10u BID  · Continue Accu-Cheks and sliding scale insulin  VTE Pharmacologic Prophylaxis:   Pharmacologic: Heparin  Mechanical: Mechanical VTE prophylaxis in place  Patient Centered Rounds: I have performed bedside rounds with nursing staff today  Discussions with Specialists or Other Care Team Provider: None  Education and Discussions with Family / Patient:  All patient questions answered to the best my ability  Patient's daughter is at the bedside  Time Spent for Care: 30 minutes  More than 50% of total time spent on counseling and coordination of care as described above  Current Length of Stay: 1 day(s)  Current Patient Status: Inpatient   Certification Statement: The patient will continue to require additional inpatient hospital stay due to Continued need for IV diuresis  Discharge Plan:  Patient is not medically stable for discharge at this time  Code Status: Level 1 - Full Code    Subjective:   Patient is seen while waiting for a bed in the emergency department  She states she feels better since her admission in regards to her respiratory status but is not yet back to baseline  She continues to have pitting edema of the lower extremities  Objective:   Vitals:   Temp (24hrs), Av 6 °F (36 4 °C), Min:97 6 °F (36 4 °C), Max:97 6 °F (36 4 °C)    Temp:  [97 6 °F (36 4 °C)] 97 6 °F (36 4 °C)  HR:  [56-73] 64  Resp:  [14-24] 18  BP: (150-200)/() 177/84  SpO2:  [95 %-98 %] 95 %  There is no height or weight on file to calculate BMI       Input and Output Summary (last 24 hours):     No intake or output data in the 24 hours ending 21 1215    Physical Exam:     Physical Exam  Vitals signs reviewed  HENT:      Head: Normocephalic and atraumatic  Eyes:      General: No scleral icterus  Cardiovascular:      Rate and Rhythm: Normal rate and regular rhythm  Heart sounds: No murmur  Pulmonary:      Breath sounds: Normal breath sounds  No wheezing or rales  Chest:      Chest wall: No tenderness  Abdominal:      General: Bowel sounds are normal  There is no distension  Palpations: Abdomen is soft  Tenderness: There is no abdominal tenderness  Musculoskeletal: Normal range of motion  Right lower le+ Pitting Edema present  Left lower le+ Pitting Edema present  Skin:     General: Skin is warm and dry  Findings: No rash  Additional Data:   Labs:  Results from last 7 days   Lab Units 21  0415   WBC Thousand/uL 6 86   HEMOGLOBIN g/dL 7 3*   HEMATOCRIT % 23 5*   PLATELETS Thousands/uL 168   NEUTROS PCT % 65   LYMPHS PCT % 24   MONOS PCT % 7   EOS PCT % 2     Results from last 7 days   Lab Units 21  0140   POTASSIUM mmol/L 4 8   CHLORIDE mmol/L 109*   CO2 mmol/L 24   BUN mg/dL 90*   CREATININE mg/dL 3 04*   CALCIUM mg/dL 8 8   ALK PHOS U/L 610*   ALT U/L 66   AST U/L 45           * I Have Reviewed All Lab Data Listed Above  * Additional Pertinent Lab Tests Reviewed:  All Labs Within Last 24 Hours Reviewed    Imaging:    Imaging Reports Reviewed Today Include:  None new    Cultures:   Blood Culture: No results found for: BLOODCX  Urine Culture:   Lab Results   Component Value Date    URINECX No Growth <1000 cfu/mL 2017     Sputum Culture: No components found for: SPUTUMCX  Wound Culture: No results found for: WOUNDCULT    Last 24 Hours Medication List:   Current Facility-Administered Medications   Medication Dose Route Frequency Provider Last Rate    acetaminophen  650 mg Oral Q6H PRN Maximus Fernandez MD      aluminum-magnesium hydroxide-simethicone  15 mL Oral Q6H PRN Maximus Fernandez MD      aspirin  81 mg Oral Daily Aranza Martin MD      atorvastatin  40 mg Oral Daily Aranza Martin MD      calcium acetate  667 mg Oral BID With Meals Aranza Martin MD      carvedilol  12 5 mg Oral BID With Meals Aranza Martin MD      docusate sodium  100 mg Oral BID PRN Aranza Martin MD      furosemide  40 mg Intravenous BID (diuretic) [address] [facility] [address] CLARICE Dickson      heparin (porcine)  5,000 Units Subcutaneous [facility] Aranza Martin MD      hydrALAZINE  50 mg Oral TID Aranza Martin MD      insulin aspart protamine-insulin aspart  10 Units Subcutaneous BID AC Aranza Martin MD      insulin lispro  1-5 Units Subcutaneous HS Aranza Martin MD      insulin lispro  1-6 Units Subcutaneous TID [facility] Aranza Martin MD      iron sucrose  200 mg Intravenous Daily [facility] 200 mg (03/02/21 1134)    levothyroxine  125 mcg Oral Daily Aranza Martin MD      nitroglycerin  0 4 mg Sublingual Q5 Min PRN Aranza Martin MD      ondansetron  4 mg Intravenous Q6H PRN Aranza Martin MD          Today, Patient Was Seen By: Aleksandr Mosquera PA-C    ** Please Note: Dragon 360 Dictation voice to text software may have been used in the creation of this document   **

## 2021-03-02 NOTE — ASSESSMENT & PLAN NOTE
Lab Results   Component Value Date    EGFR 15 03/02/2021    EGFR 14 03/01/2021    EGFR 12 02/27/2021    CREATININE 3 04 (H) 03/02/2021    CREATININE 3 28 (H) 03/01/2021    CREATININE 3 78 (H) 02/27/2021     · Nephrology consulted and appreciate their input  · Baseline creatinine:  1 9-2 2  · Creatinine today:  3 04  · KELLY likely secondary to cardiorenal syndrome as well as not being fully recovered from prior KELLY    · Continue diuretics

## 2021-03-02 NOTE — UTILIZATION REVIEW
Initial Clinical Review    Admission: Date/Time/Statement:   Admission Orders (From admission, onward)     Ordered        03/01/21 2035  INPATIENT ADMISSION  Once                   Orders Placed This Encounter   Procedures    INPATIENT ADMISSION     Standing Status:   Standing     Number of Occurrences:   1     Order Specific Question:   Level of Care     Answer:   Med Surg [16]     Order Specific Question:   Estimated length of stay     Answer:   More than 2 Midnights     Order Specific Question:   Certification     Answer:   I certify that inpatient services are medically necessary for this patient for a duration of greater than two midnights  See H&P and MD Progress Notes for additional information about the patient's course of treatment  ED Arrival Information     Expected Arrival Acuity Means of Arrival Escorted By Service Admission Type    - 3/1/2021 18:45 Emergent [address] Emergency    Arrival Complaint    SOB,CHF        Chief Complaint   Patient presents with    Shortness of Breath     pt states she can't breathe  She was discharged from the hospital on saturday after an episode of not being able to breathe     Assessment/Plan:   72y Female to ED presents with increasing shortness of breath with orthopnea, increase BLE swelling now up to lower calves  Usually can lie flat on bed however now needs to be at least 60 degs elevated for comfort  Recent hospitalization on 2/21 to 2/24/2021 for Pneumonia, acute on chronic heart failure exacerbation, KELLY on CKD  D/c home with torsemide  PMH for CAD, CHF complicated by acute kidney insufficiency on top of chronic kidney disease with the use of diuretics, Anemia, HLD, HTN, Hypothyroidism and dietary noncompliance  Admit Inpatient level of care for Acute on chronic combined systolic and diastolic CHF and ARF superimposed on CKD stage 4   Currently weighs 81 kg and may have been gaining weight judging from orthopnea with leg swelling  Cardiology and Nephrology consult to titrate on current afterload unloading medications (hydralazine/nitrates) and use of diuretics  Cbc with CMP, Mag and Phos for tomorrow 3/2  BNP pending  CXR pending  Placed on Nitro-Bid 1/2 to lower BP (sys greater than 160 as of the moment with shortness of breath)  Continue Hydralazine  Hgb stable at 7 7, baseline withing 7 0's  Continue to monitor creat  Tele monitoring  3/2 Nephrology cons; Volume Overload, Acute Diastolic CHF, KELLY - secondary to volume overload/cardiorenal syndrome  Check UA and PVR  Monitor urine outpt  Need to tolerated a higher baseline for euvolemia  BP elevated - continue carvedilol and hydralazine  Nifedipine & Imdur currently held  Iv diuretics Bid  Cardiology consult pending  Start Iv iron 200 mg daily x5 days  Monitor urine outpt  Hold ACEI/ ARB  May need to tolerate a higher baseline for euvolemia  On exam; severely decreased breath sounds with some crackles  3/2 Cardiology cons; Acute on chronic combined systolic and diastolic CHF  Recently admitted with PNA and HF exacerbation  Pt was d/c home off diuretic due to KELLY and now return with acute decompensation and need for Iv diuresis  Start Iv diuretic 40 mg Iv bid  Daily standing wt, I/O and daily BMP     3/2 Progress notes; Continue Iv diuresis, low salt diet and fluid restriction  BP poorly controlled  Continue carvedilol 12 5 mg BID, hydralazine 50 mg TID  Monitor BPs closely while diuresing  Baseline creat 1 9-2 2, trending down from 3 28 to 3 04 today  On exam, 2+ pitting edema to LE         Wt Readings from Last 3 Encounters:   02/27/21 81 kg (178 lb 9 2 oz)   11/02/20 77 1 kg (170 lb)   08/10/20 76 3 kg (168 lb 3 2 oz)       ED Triage Vitals [03/01/21 1902]   Temperature Pulse Respirations Blood Pressure SpO2   97 6 °F (36 4 °C) 69 18 160/91 95 %      Temp Source Heart Rate Source Patient Position - Orthostatic VS BP Location FiO2 (%)   Oral Monitor Sitting Left arm -- Pain Score       --          Wt Readings from Last 1 Encounters:   02/27/21 81 kg (178 lb 9 2 oz)     Additional Vital Signs:   03/02/21 1000  --  60  24  Abnormal   158/104  Abnormal   127  98 %  None (Room air)  Sitting   03/02/21 0900  --  62  18  150/67  97  98 %  None (Room air)  Sitting   03/02/21 0839  --  68  18  176/79  Abnormal   --  98 %  None (Room air)  Sitting   03/02/21 0837  --  --  --  176/79  Abnormal   --  --  --  --   03/02/21 0724  --  73  --  200/84  Abnormal   --  --  --  --   03/02/21 0510  --  56  14  163/72  108  96 %  None (Room air       Pertinent Labs/Diagnostic Test Results:   3/1 PCXR - There is mild pulmonary vascular congestion  Bibasilar consolidation likely atelectasis or developing pneumonia        Lab Units 03/02/21  0415 03/02/21  0139 03/01/21  1949   WBC Thousand/uL 6 86  --  7 12   HEMOGLOBIN g/dL 7 3*  --  7 7*   HEMATOCRIT % 23 5*  --  26 0*   PLATELETS Thousands/uL 168 139* 169   NEUTROS ABS Thousands/µL 4 50  --  5 01         Lab Units 03/02/21  0415 03/02/21  0140 03/01/21  1948   SODIUM mmol/L  --  138 135*   POTASSIUM mmol/L  --  4 8 4 9   CHLORIDE mmol/L  --  109* 108   CO2 mmol/L  --  24 23   ANION GAP mmol/L  --  5 4   BUN mg/dL  --  90* 93*   CREATININE mg/dL  --  3 04* 3 28*   EGFR ml/min/1 73sq m  --  15 14   CALCIUM mg/dL  --  8 8 9 0   MAGNESIUM mg/dL  --  2 7*  --    PHOSPHORUS mg/dL 4 2*  --   --      Lab Units 03/02/21  0140 03/01/21  1948   AST U/L 45 52*   ALT U/L 66 75   ALK PHOS U/L 610* 692*   TOTAL PROTEIN g/dL 6 8 7 5   ALBUMIN g/dL 2 8* 3 2*   TOTAL BILIRUBIN mg/dL 0 29 0 25     Lab Units 03/02/21  0658 03/02/21  0005 03/01/21  2134   POC GLUCOSE mg/dl 144* 198* 250*     Lab Units 03/02/21  0140 03/01/21  1948   GLUCOSE RANDOM mg/dL 171* 227*         Results from last 7 days   Lab Units 03/02/21  0139   HEMOGLOBIN A1C % 11 7*   EAG mg/dl 289       Lab Units 03/02/21  0415 03/02/21  0139 03/01/21 1948   TROPONIN I ng/mL 0 02 0 02 0 02 Results from last 7 days   Lab Units 03/02/21  0415   TSH 3RD GENERATON uIU/mL 15 800*       Results from last 7 days   Lab Units 03/01/21  1948   NT-PRO BNP pg/mL 9,024*       ED Treatment:   Medication Administration from 03/01/2021 1845 to 03/02/2021 1041       Date/Time Order Dose Route Action     03/01/2021 2039 furosemide (LASIX) injection 40 mg 40 mg Intravenous Given     03/01/2021 2219 nitroglycerin (NITRO-BID) 2 % TD ointment 0 5 inch 0 5 inch Topical Given     03/02/2021 0837 aspirin chewable tablet 81 mg 81 mg Oral Given     03/02/2021 0838 atorvastatin (LIPITOR) tablet 40 mg 40 mg Oral Given     03/02/2021 0725 calcium acetate (PHOSLO) capsule 667 mg 667 mg Oral Given     03/02/2021 0725 carvedilol (COREG) tablet 12 5 mg 12 5 mg Oral Given     03/02/2021 0837 hydrALAZINE (APRESOLINE) tablet 50 mg 50 mg Oral Given     03/02/2021 0006 hydrALAZINE (APRESOLINE) tablet 50 mg 50 mg Oral Given     03/02/2021 0837 levothyroxine tablet 125 mcg 125 mcg Oral Given     03/02/2021 0724 insulin aspart protamine-insulin aspart (NovoLOG 70/30) 100 units/mL subcutaneous injection 10 Units 10 Units Subcutaneous Given     03/02/2021 0006 insulin lispro (HumaLOG) 100 units/mL subcutaneous injection 1-5 Units 1 Units Subcutaneous Given        Past Medical History:   Diagnosis Date    Anemia     CHF (congestive heart failure) (HCC)     Chronic kidney disease     Coronary artery disease     Diabetes mellitus [facility]     Hypertension     Hypothyroidism      Present on Admission:   Acute renal failure superimposed on stage 4 chronic kidney disease ([facility] [address] [facility]   Acute on chronic combined systolic and diastolic congestive heart failure (HCC)   Anemia   Coronary artery disease involving native coronary artery of native heart with angina pectoris ([facility] )   Hyperlipidemia   Hypothyroidism      Admitting Diagnosis: SOB (shortness of breath) [R06 02]  Age/Sex: 76 y o  female     Admission Orders:  Scheduled Medications:  aspirin, 81 mg, Oral, Daily  atorvastatin, 40 mg, Oral, Daily  calcium acetate, 667 mg, Oral, BID With Meals  carvedilol, 12 5 mg, Oral, BID With Meals  heparin (porcine), 5,000 Units, Subcutaneous, Q8H DEE  hydrALAZINE, 50 mg, Oral, TID  insulin aspart protamine-insulin aspart, 10 Units, Subcutaneous, BID AC  insulin lispro, 1-5 Units, Subcutaneous, HS  insulin lispro, 1-6 Units, Subcutaneous, TID AC  iron sucrose, 200 mg, Intravenous, Daily  levothyroxine, 125 mcg, Oral, Daily    furosemide (LASIX) injection 40 mg   Dose: 40 mg  Freq: 2 times daily (diuretic) Route: IV  Start: 03/02/21 1100    Continuous IV Infusions: None     PRN Meds:  acetaminophen, 650 mg, Oral, Q6H PRN  aluminum-magnesium hydroxide-simethicone, 15 mL, Oral, Q6H PRN  docusate sodium, 100 mg, Oral, BID PRN  nitroglycerin, 0 4 mg, Sublingual, Q5 Min PRN  ondansetron, 4 mg, Intravenous, Q6H PRN      Tele monitoring  IP CONSULT TO NUTRITION SERVICES  IP CONSULT TO CARDIOLOGY  IP CONSULT TO NEPHROLOGY  IP CONSULT TO CASE MANAGEMENT    Network Utilization Review Department  ATTENTION: Please call with any questions or concerns to [phone] and carefully listen to the prompts so that you are directed to the right person  All voicemails are confidential   Sara Marshall all requests for admission clinical reviews, approved or denied determinations and any other requests to dedicated fax number below belonging to the campus where the patient is receiving treatment   List of dedicated fax numbers for the Facilities:  [address] DENIALS (Administrative/Medical Necessity) [phone]   [address] [place] [address] (Maternity/NICU/Pediatrics) 261 [facility],[address] Denzel Alvarado 0237 ([address])   [address] [facility] [address] [phone]   [place] [address] [phone]     '

## 2021-03-02 NOTE — ASSESSMENT & PLAN NOTE
Lab Results   Component Value Date    HGBA1C 13 2 (H) 02/21/2021     Would need hemoglobin A1c; dietary consult  Check blood sugars before meals and before bedtime with insulin sliding scale  Continue NovoLog 70/30 10u b i d  a c    Recent Labs     02/26/21  2140 02/27/21  0604 02/27/21  1045 02/27/21  1134   POCGLU 351* 165* 303* 272*       Blood Sugar Average: Last 72 hrs:

## 2021-03-03 LAB
ANION GAP SERPL CALCULATED.3IONS-SCNC: 5 MMOL/L (ref 4–13)
BUN SERPL-MCNC: 84 MG/DL (ref 5–25)
CALCIUM SERPL-MCNC: 8.9 MG/DL (ref 8.3–10.1)
CHLORIDE SERPL-SCNC: 109 MMOL/L (ref 100–108)
CO2 SERPL-SCNC: 25 MMOL/L (ref 21–32)
CREAT SERPL-MCNC: 2.81 MG/DL (ref 0.6–1.3)
GFR SERPL CREATININE-BSD FRML MDRD: 17 ML/MIN/1.73SQ M
GLUCOSE SERPL-MCNC: 168 MG/DL (ref 65–140)
GLUCOSE SERPL-MCNC: 171 MG/DL (ref 65–140)
GLUCOSE SERPL-MCNC: 172 MG/DL (ref 65–140)
GLUCOSE SERPL-MCNC: 177 MG/DL (ref 65–140)
GLUCOSE SERPL-MCNC: 271 MG/DL (ref 65–140)
GLUCOSE SERPL-MCNC: 78 MG/DL (ref 65–140)
GLUCOSE SERPL-MCNC: 81 MG/DL (ref 65–140)
PHOSPHATE SERPL-MCNC: 4.3 MG/DL (ref 2.3–4.1)
POTASSIUM SERPL-SCNC: 4.7 MMOL/L (ref 3.5–5.3)
SODIUM SERPL-SCNC: 139 MMOL/L (ref 136–145)

## 2021-03-03 PROCEDURE — 99232 SBSQ HOSP IP/OBS MODERATE 35: CPT | Performed by: INTERNAL MEDICINE

## 2021-03-03 PROCEDURE — 82948 REAGENT STRIP/BLOOD GLUCOSE: CPT

## 2021-03-03 PROCEDURE — 84100 ASSAY OF PHOSPHORUS: CPT | Performed by: INTERNAL MEDICINE

## 2021-03-03 PROCEDURE — 80048 BASIC METABOLIC PNL TOTAL CA: CPT | Performed by: PHYSICIAN ASSISTANT

## 2021-03-03 RX ORDER — HYDRALAZINE HYDROCHLORIDE 50 MG/1
100 TABLET, FILM COATED ORAL 3 TIMES DAILY
Status: DISCONTINUED | OUTPATIENT
Start: 2021-03-03 | End: 2021-03-10 | Stop reason: HOSPADM

## 2021-03-03 RX ORDER — FUROSEMIDE 10 MG/ML
40 INJECTION INTRAMUSCULAR; INTRAVENOUS
Status: DISCONTINUED | OUTPATIENT
Start: 2021-03-03 | End: 2021-03-06

## 2021-03-03 RX ADMIN — IRON SUCROSE 200 MG: 20 INJECTION, SOLUTION INTRAVENOUS at 09:00

## 2021-03-03 RX ADMIN — CALCIUM ACETATE 667 MG: 667 CAPSULE ORAL at 08:45

## 2021-03-03 RX ADMIN — HYDRALAZINE HYDROCHLORIDE 100 MG: 50 TABLET ORAL at 20:56

## 2021-03-03 RX ADMIN — HYDRALAZINE HYDROCHLORIDE 75 MG: 25 TABLET, FILM COATED ORAL at 08:45

## 2021-03-03 RX ADMIN — ISOSORBIDE MONONITRATE 60 MG: 60 TABLET, EXTENDED RELEASE ORAL at 08:45

## 2021-03-03 RX ADMIN — ATORVASTATIN CALCIUM 40 MG: 40 TABLET, FILM COATED ORAL at 08:45

## 2021-03-03 RX ADMIN — FUROSEMIDE 40 MG: 10 INJECTION, SOLUTION INTRAMUSCULAR; INTRAVENOUS at 17:21

## 2021-03-03 RX ADMIN — INSULIN LISPRO 1 UNITS: 100 INJECTION, SOLUTION INTRAVENOUS; SUBCUTANEOUS at 17:24

## 2021-03-03 RX ADMIN — FUROSEMIDE 40 MG: 10 INJECTION, SOLUTION INTRAMUSCULAR; INTRAVENOUS at 08:45

## 2021-03-03 RX ADMIN — INSULIN ASPART 10 UNITS: 100 INJECTION, SUSPENSION SUBCUTANEOUS at 08:55

## 2021-03-03 RX ADMIN — INSULIN LISPRO 1 UNITS: 100 INJECTION, SOLUTION INTRAVENOUS; SUBCUTANEOUS at 21:24

## 2021-03-03 RX ADMIN — ASPIRIN 81 MG: 81 TABLET, CHEWABLE ORAL at 08:45

## 2021-03-03 RX ADMIN — CARVEDILOL 12.5 MG: 12.5 TABLET, FILM COATED ORAL at 17:20

## 2021-03-03 RX ADMIN — FUROSEMIDE 40 MG: 10 INJECTION, SOLUTION INTRAMUSCULAR; INTRAVENOUS at 13:17

## 2021-03-03 RX ADMIN — CARVEDILOL 12.5 MG: 12.5 TABLET, FILM COATED ORAL at 08:45

## 2021-03-03 RX ADMIN — INSULIN ASPART 10 UNITS: 100 INJECTION, SUSPENSION SUBCUTANEOUS at 17:24

## 2021-03-03 RX ADMIN — LEVOTHYROXINE SODIUM 125 MCG: 125 TABLET ORAL at 08:47

## 2021-03-03 RX ADMIN — HYDRALAZINE HYDROCHLORIDE 100 MG: 50 TABLET ORAL at 17:20

## 2021-03-03 RX ADMIN — CALCIUM ACETATE 667 MG: 667 CAPSULE ORAL at 17:20

## 2021-03-03 RX ADMIN — INSULIN LISPRO 1 UNITS: 100 INJECTION, SOLUTION INTRAVENOUS; SUBCUTANEOUS at 12:38

## 2021-03-03 NOTE — ASSESSMENT & PLAN NOTE
Lab Results   Component Value Date    HGBA1C 11 7 (H) 03/02/2021     Recent Labs     03/02/21  0658 03/02/21  1131 03/02/21  1316 03/02/21  1605   POCGLU 144* 244* 209* 226*     Blood Sugar Average: Last 72 hrs:  (P) 911 [id number]     · Poorly controlled as evidenced by A1c but improved since prior study last month  · Continue NovoLog 70/30 10u BID  · Continue Accu-Cheks and sliding scale insulin

## 2021-03-03 NOTE — ASSESSMENT & PLAN NOTE
· Blood pressures are poorly controlled    · Continue carvedilol 12 5 mg BID, hydralazine 50 mg TID  · Improved with diuresis

## 2021-03-03 NOTE — ASSESSMENT & PLAN NOTE
· Chronic anemia, likely due to CKD and iron deficiency  · On IV venofer x 5 doses  · Outpatient follow up with nephrology for EPO

## 2021-03-03 NOTE — ASSESSMENT & PLAN NOTE
· Currently without chest discomfort  Continue nitroglycerin currently in form of nitropaste  · Also on Coreg 12 5 mg twice daily

## 2021-03-03 NOTE — PROGRESS NOTES
Progress Note - Nieves Quiñonez 1955, 76 y o  female MRN: [id number]    Unit/Bed#: [facility] 526-01 Encounter: [id number]    Primary Care Provider: Arias Winkler MD   Date and time admitted to hospital: 3/1/2021  7:29 PM        * Acute on chronic combined systolic and diastolic congestive heart failure ([address] )  Assessment & Plan  Wt Readings from Last 3 Encounters:   03/03/21 79 7 kg (175 lb 11 3 oz)   02/27/21 81 kg (178 lb 9 2 oz)   11/02/20 77 1 kg (170 lb)     · Patient with acute exacerbation of CHF secondary to dietary indiscretions and not resuming her diuretics at time of discharge from recent hospitalization  · Continue IV diuresis, likely change to PO diuretics tomorrow  · Continue low-salt diet and fluid restriction  · Monitor intake, and output  · Monitor daily weights    Hypertensive urgency  Assessment & Plan  · Blood pressures are poorly controlled  · Continue carvedilol 12 5 mg BID, hydralazine 50 mg TID  · Improved with diuresis    Acute renal failure superimposed on stage 4 chronic kidney disease [facility])  Assessment & Plan  Lab Results   Component Value Date    EGFR 17 03/03/2021    EGFR 15 03/02/2021    EGFR 14 03/01/2021    CREATININE 2 81 (H) 03/03/2021    CREATININE 3 04 (H) 03/02/2021    CREATININE 3 28 (H) 03/01/2021     · Nephrology consulted and appreciate their input  · Baseline creatinine:  1 9-2 2  · Creatinine today:  3 04  · KELLY likely secondary to cardiorenal syndrome as well as not being fully recovered from prior KELLY  · Continue diuretics  · Improved today    Anemia  Assessment & Plan  · Chronic anemia, likely due to CKD and iron deficiency  · On IV venofer x 5 doses  · Outpatient follow up with nephrology for EPO    Coronary artery disease involving native coronary artery of native heart with angina pectoris [facility])  Assessment & Plan  · Currently without chest discomfort  Continue nitroglycerin currently in form of nitropaste  · Also on Coreg 12 5 mg twice daily      Type 2 diabetes mellitus with kidney complication, with long-term current use of insulin [facility])  Assessment & Plan  Lab Results   Component Value Date    HGBA1C 11 7 (H) 2021     Recent Labs     21  0658 21  1131 21  1316 21  1605   POCGLU 144* 244* 209* 226*     Blood Sugar Average: Last 72 hrs:  (P) 802 [id number]     · Poorly controlled as evidenced by A1c but improved since prior study last month  · Continue NovoLog 70/30 10u BID  · Continue Accu-Cheks and sliding scale insulin  Hypothyroidism  Assessment & Plan  · TSH is elevated at 15 800 however improved from prior study on 2021 at 20 900  · Levothyroxine was increased on 2021  · Continue levothyroxine 125 mcg daily  VTE Pharmacologic Prophylaxis:   Pharmacologic: Heparin  Mechanical VTE Prophylaxis in Place: Yes    Patient Centered Rounds: I have performed bedside rounds with nursing staff today  Discussions with Specialists or Other Care Team Provider: nephrology    Education and Discussions with Family / Patient: patient, plan of care    Time Spent for Care: 20 minutes  More than 50% of total time spent on counseling and coordination of care as described above  Current Length of Stay: 2 day(s)    Current Patient Status: Inpatient   Certification Statement: The patient will continue to require additional inpatient hospital stay due to continue diuresis    Discharge Plan: home when stable    Code Status: Level 1 - Full Code      Subjective:   Reports that her breathing is better then yesterday but not back to normal  Denies cough or sputum  Denies chest pain  Objective:     Vitals:   Temp (24hrs), Av 7 °F (36 5 °C), Min:97 3 °F (36 3 °C), Max:98 5 °F (36 9 °C)    Temp:  [97 3 °F (36 3 °C)-98 5 °F (36 9 °C)] 97 5 °F (36 4 °C)  HR:  [56-72] 72  Resp:  [16-20] 20  BP: (140-197)/(58-84) 155/64  SpO2:  [93 %-98 %] 93 %  Body mass index is 29 24 kg/m²       Input and Output Summary (last 24 hours): Intake/Output Summary (Last 24 hours) at 3/3/2021 1128  Last data filed at 3/3/2021 1005  Gross per 24 hour   Intake 398 ml   Output 1925 ml   Net -1527 ml       Physical Exam:     Physical Exam  HENT:      Head: Normocephalic and atraumatic  Nose: Nose normal       Mouth/Throat:      Mouth: Mucous membranes are moist       Pharynx: Oropharynx is clear  Eyes:      Extraocular Movements: Extraocular movements intact  Cardiovascular:      Rate and Rhythm: Normal rate and regular rhythm  Pulmonary:      Effort: Pulmonary effort is normal       Breath sounds: No wheezing or rales  Abdominal:      General: Abdomen is flat  Palpations: Abdomen is soft  Musculoskeletal:      Right lower leg: Edema present  Left lower leg: Edema present  Skin:     General: Skin is warm and dry  Neurological:      General: No focal deficit present  Mental Status: She is alert and oriented to person, place, and time     Psychiatric:         Mood and Affect: Mood normal          Behavior: Behavior normal            Additional Data:     Labs:    Results from last 7 days   Lab Units 03/02/21  0415   WBC Thousand/uL 6 86   HEMOGLOBIN g/dL 7 3*   HEMATOCRIT % 23 5*   PLATELETS Thousands/uL 168   NEUTROS PCT % 65   LYMPHS PCT % 24   MONOS PCT % 7   EOS PCT % 2     Results from last 7 days   Lab Units 03/03/21  0524 03/02/21  0140   SODIUM mmol/L 139 138   POTASSIUM mmol/L 4 7 4 8   CHLORIDE mmol/L 109* 109*   CO2 mmol/L 25 24   BUN mg/dL 84* 90*   CREATININE mg/dL 2 81* 3 04*   ANION GAP mmol/L 5 5   CALCIUM mg/dL 8 9 8 8   ALBUMIN g/dL  --  2 8*   TOTAL BILIRUBIN mg/dL  --  0 29   ALK PHOS U/L  --  610*   ALT U/L  --  66   AST U/L  --  45   GLUCOSE RANDOM mg/dL 81 171*         Results from last 7 days   Lab Units 03/02/21  1605 03/02/21  1316 03/02/21  1131 03/02/21  0658 03/02/21  0005 03/01/21  2134 02/27/21  1134 02/27/21  1045 02/27/21  0604 02/26/21  2140 02/26/21  1705 02/26/21  1100   POC GLUCOSE mg/dl 226* 209* 244* 144* 198* 250* 272* 303* 165* 351* 312* 175*     Results from last 7 days   Lab Units 03/02/21  0139   HEMOGLOBIN A1C % 11 7*               * I Have Reviewed All Lab Data Listed Above  * Additional Pertinent Lab Tests Reviewed: All Labs Within Last 24 Hours Reviewed      Recent Cultures (last 7 days):           Last 24 Hours Medication List:   Current Facility-Administered Medications   Medication Dose Route Frequency Provider Last Rate    acetaminophen  650 mg Oral Q6H PRN Isadora Mackey MD      aluminum-magnesium hydroxide-simethicone  15 mL Oral Q6H PRN Isadora Mackey MD      aspirin  81 mg Oral Daily Isadora Mackey MD      atorvastatin  40 mg Oral Daily Isadora Mackey MD      calcium acetate  667 mg Oral BID With Meals Isadora Mackey MD      carvedilol  12 5 mg Oral BID With Meals CLARICE Wang      docusate sodium  100 mg Oral BID PRN Isadora Mackey MD      furosemide  40 mg Intravenous BID (diuretic) CLARICE Hanson      heparin (porcine)  5,000 Units Subcutaneous [facility] Isadora Mackey MD      hydrALAZINE  20 mg Intravenous Q6H PRN Christa Maxwell MD      hydrALAZINE  100 mg Oral TID CLARICE Hanson      insulin aspart protamine-insulin aspart  10 Units Subcutaneous BID AC Isadora Mackey MD      insulin lispro  1-5 Units Subcutaneous HS Isadora Mackey MD      insulin lispro  1-6 Units Subcutaneous TID [facility] Isadora Mackey MD      iron sucrose  200 mg Intravenous Daily Lisandra Cooper PA-C Stopped (03/03/21 1000)    isosorbide mononitrate  60 mg Oral Daily CLARICE Wang      levothyroxine  125 mcg Oral Daily Isadora Mackey MD      nitroglycerin  0 4 mg Sublingual Q5 Min PRN Isadora Mackey MD      ondansetron  4 mg Intravenous Q6H PRN Isadora Mackey MD          Today, Patient Was Seen By: Leigh Ann Tipton MD    ** Please Note: Dictation voice to text software may have been used in the creation of this document  **

## 2021-03-03 NOTE — PROGRESS NOTES
Follow up Consultation    Nephrology   Tariq Jasmine 76 y o  female MRN: [id number]  Unit/Bed#: [facility] 526-01 Encounter: [id number]      Physician Requesting Consult: Maricel Wilhelm MD        ASSESSMENT/PLAN:  [de-identified] 9year-old female with multiple comorbidities including diabetes, hypertension, hypothyroidism, CAD, CHF, anemia and CKD stage 4 status post recent hospitalization with acute on chronic CHF and acute kidney injury now readmitted with shortness of breath  Nephrology consulted for acute kidney injury evaluation and management         · Acute kidney injury (POA) on CKD stage 4:  - EMRE most likely secondary to not having fully recovered from prior Emre I plus cardiorenal syndrome  - After review of records it appears that the patient has a baseline Creatinine of 1 9 to 2 2 mg/dL dating as far back as 2020   - patient was admitted with a creatinine of 3 28 mg/dL on 03/01/2021   - patient's creatinine today is at 2 81 mg/dL, stable and improving   - diuretics per Cardiology, likely convert over to p o  Tomorrow  - check BMP in a m   - Await renal recovery  - Clinically patient is not uremic and there is no acute indication for renal replacement therapy (dialysis)  - Optimize hemodynamic status to avoid delay in renal recovery  - Place on a renal diet when allowed diet order    - Avoid nephrotoxins, adjust meds to appropriate GFR   - Strict I/O   - Daily weights  - Urinary retention protocol if patient does not have a [place]  - Most likely has underlying CKD secondary to diabetic kidney disease plus hypertensive nephrosclerosis plus cardiorenal syndrome plus recurrent episodes of acute kidney injury non dialysis requiring plus age-related nephron loss    - will need to set up patient for follow up with Nephrology as an outpatient post hospitalization   - as an outpatient for nephrology patient follows up with Dr ash     · Blood pressure/hypertension:  - home medications:  Coreg 12 5 mg p o  B i d , hydralazine 50 mg p o  T i d , Imdur 30 mg p o  Q day, Procardia 30 mg p o  Q day, torsemide 20 mg p o  B i d   - current medications:  Lasix 40 mg IV b i d , hydralazine 50 mg p o  T i d , Coreg 12 5 mg p o  B i d   - recommendations:  diuretics b i d  Per Cardiology  , consider converting diuretics to p o  From tomorrow  - Optimize hemodynamics   - Maintain MAP > 65mmHg  - Avoid BP fluctuations      · H/H/anemia:  - most recent hemoglobin at 7 3 grams/deciliter  - maintain hemoglobin greater than 8 grams/deciliter  - likely syncopal removed due to underlying CKD  - complete Venofer 200 mg IV daily x5 days while in the hospital     · Acid-base electrolytes:  ? Hyperphosphatemia  § On PhosLo 1 tab p o  B i d  With meals  § Check phosphorus level in a m  § Most recent phosphorus 4 3   ? Hypermagnesemia:  § Most recent magnesium at 2 7  § Continue to monitor for now  ? Acid-base:    § Most recent bicarb at 25     · Volume status:  ?  Clinically appears to be hypervolemic  IV diuretics per Cardiology     · Proteinuria:   ? Most likely secondary to diabetic kidney disease  ? Most recent protein creatinine ratio of 1 5 g as of 02/23/2021     · Other medical problems:  ? Diabetes:  Management per primary team   On insulin, most recent A1c of 11 7% as of March 2021  ? Acute on chronic CHF:  Management per primary team, diuretics per Cardiology  Follow-up with cardiology  At home was supposed to be on torsemide 20 mg p o  B i d  But was not discharged on any diuretics at the time of discharge         Thanks for the consult  Will continue to follow  Please call with questions/ concerns  Above-mentioned orders and Plan in terms of acute kidney injury was discussed with the team in [address] Devlin MD, FASN, 3/3/2021, 10:19 AM              Objective :   Patient seen and examined in her room no overnight events urine output yesterday documented 800 cc thus far this a m  1 1 L   Blood pressure stable remains afebrile overall feels she is doing better  Weight decreased by 3 lb in last 24 hours  PHYSICAL EXAM  /64   Pulse 72   Temp 97 5 °F (36 4 °C) (Oral)   Resp 20   Ht 5' 5" (1 651 m)   Wt 79 7 kg (175 lb 11 3 oz)   SpO2 93%   BMI 29 24 kg/m²   Temp (24hrs), Av 7 °F (36 5 °C), Min:97 3 °F (36 3 °C), Max:98 5 °F (36 9 °C)        Intake/Output Summary (Last 24 hours) at 3/3/2021 1019  Last data filed at 3/3/2021 1005  Gross per 24 hour   Intake 398 ml   Output 1925 ml   Net -1527 ml       I/O last 24 hours: In: 398 [P O :298; IV Piggyback:100]  Out: 192 [Urine:192]      Current Weight: Weight - Scale: 79 7 kg (175 lb 11 3 oz)  First Weight: Weight - Scale: 80 5 kg (177 lb 8 oz)  Physical Exam  Vitals signs and nursing note reviewed  Constitutional:       General: She is not in acute distress  Appearance: Normal appearance  She is normal weight  She is not ill-appearing, toxic-appearing or diaphoretic  HENT:      Head: Normocephalic and atraumatic  Mouth/Throat:      Mouth: Mucous membranes are moist       Pharynx: Oropharynx is clear  No oropharyngeal exudate  Eyes:      General: No scleral icterus  Conjunctiva/sclera: Conjunctivae normal    Neck:      Musculoskeletal: Normal range of motion and neck supple  Cardiovascular:      Rate and Rhythm: Normal rate  Heart sounds: Normal heart sounds  No friction rub  Pulmonary:      Effort: Pulmonary effort is normal  No respiratory distress  Breath sounds: Normal breath sounds  No stridor  No wheezing  Abdominal:      General: There is no distension  Palpations: Abdomen is soft  There is no mass  Tenderness: There is no abdominal tenderness  Musculoskeletal:         General: Swelling present  Skin:     General: Skin is warm  Coloration: Skin is not jaundiced  Neurological:      General: No focal deficit present  Mental Status: She is alert and oriented to person, place, and time     Psychiatric:         Mood and Affect: Mood normal          Behavior: Behavior normal              Review of Systems   Constitutional: Negative for appetite change, chills and fatigue  HENT: Negative for congestion  Respiratory: Negative for cough, shortness of breath and wheezing  Cardiovascular: Positive for leg swelling  Gastrointestinal: Negative for abdominal pain, constipation, diarrhea, nausea and vomiting  Genitourinary: Negative for difficulty urinating and dysuria  Musculoskeletal: Negative for back pain  Skin: Negative for rash  Neurological: Negative for dizziness and headaches  Psychiatric/Behavioral: Negative for agitation and confusion  All other systems reviewed and are negative        Scheduled Meds:  Current Facility-Administered Medications   Medication Dose Route Frequency Provider Last Rate    acetaminophen  650 mg Oral Q6H PRN Juan Luis Owens MD      aluminum-magnesium hydroxide-simethicone  15 mL Oral Q6H PRN Juan Luis Owens MD      aspirin  81 mg Oral Daily Juan Luis Owens MD      atorvastatin  40 mg Oral Daily Juan Luis Owens MD      calcium acetate  667 mg Oral BID With Meals Juan Luis Owens MD      carvedilol  12 5 mg Oral BID With Meals CLARICE Wang      docusate sodium  100 mg Oral BID PRN Juan Luis Owens MD      furosemide  40 mg Intravenous BID (diuretic) CLARICE Paul      heparin (porcine)  5,000 Units Subcutaneous [facility] Juan Luis Owens MD      hydrALAZINE  20 mg Intravenous Q6H PRN Rebeka Cormier MD      hydrALAZINE  100 mg Oral TID CLARICE Paul      insulin aspart protamine-insulin aspart  10 Units Subcutaneous BID AC Juan Luis Owens MD      insulin lispro  1-5 Units Subcutaneous HS Juan Luis Owens MD      insulin lispro  1-6 Units Subcutaneous TID AC Juan Luis Owens MD      iron sucrose  200 mg Intravenous Daily PEDRO Ladd Stopped (03/03/21 1000)    isosorbide mononitrate  60 mg Oral Daily CLARICE Wang      levothyroxine  125 mcg Oral Daily Moreno Hilton MD      nitroglycerin  0 4 mg Sublingual Q5 Min PRN Moreno Hilton MD      ondansetron  4 mg Intravenous Q6H PRN Moreno Hilton MD         PRN Meds:   acetaminophen    aluminum-magnesium hydroxide-simethicone    docusate sodium    hydrALAZINE    nitroglycerin    ondansetron    Continuous Infusions:       Invasive Devices: Invasive Devices     Peripheral Intravenous Line            Peripheral IV 03/01/21 Right Antecubital 1 day                  LABORATORY:    Results from last 7 days   Lab Units 03/03/21  0524 03/02/21  0415 03/02/21  0140 03/02/21  0139 03/01/21  1949 03/01/21  1948 02/27/21  0449 02/26/21  0436 02/25/21  1125 02/25/21 0433 02/25/21  0034  02/24/21 2023   WBC Thousand/uL  --  6 86  --   --  7 12  --   --   --   --  4 65  --   --   --    HEMOGLOBIN g/dL  --  7 3*  --   --  7 7*  --   --   --   --  7 7*  --   --  7 6*   HEMATOCRIT %  --  23 5*  --   --  26 0*  --   --   --   --  25 2*  --   --  24 2*   PLATELETS Thousands/uL  --  168  --  139* 169  --   --   --   --  138*  --   --   --    POTASSIUM mmol/L 4 7  --  4 8  --   --  4 9 4 7 5 0 5 0 4 4 4 1   < >  --    CHLORIDE mmol/L 109*  --  109*  --   --  108 108 106 105 105 106   < >  --    CO2 mmol/L 25  --  24  --   --  23 24 24 23 22 21   < >  --    BUN mg/dL 84*  --  90*  --   --  93* 80* 75* 79* 76* 79*   < >  --    CREATININE mg/dL 2 81*  --  3 04*  --   --  3 28* 3 78* 3 82* 3 84* 4 06* 4 19*   < >  --    CALCIUM mg/dL 8 9  --  8 8  --   --  9 0 9 0 8 9 8 8 8 7 8 6   < >  --    MAGNESIUM mg/dL  --   --  2 7*  --   --   --   --  2 1  --   --  2 1  --   --    PHOSPHORUS mg/dL 4 3* 4 2*  --   --   --   --   --  4 6*  --   --  5 0*  --   --     < > = values in this interval not displayed  rest all reviewed    RADIOLOGY:  XR chest 1 view portable   Final Result by Jane Jeter DO (03/02 8665)      There is mild pulmonary vascular congestion    Bibasilar consolidation likely atelectasis or developing pneumonia  Workstation performed: [id number]           Rest all reviewed    Portions of the record may have been created with voice recognition software  Occasional wrong word or "sound a like" substitutions may have occurred due to the inherent limitations of voice recognition software  Read the chart carefully and recognize, using context, where substitutions have occurred  If you have any questions, please contact the dictating provider

## 2021-03-03 NOTE — PROGRESS NOTES
Heart Failure/ Pulmonary Hypertension Progress Note - Nadira Serrano 76 y o  female MRN: [id number]    Unit/Bed#: [facility] 526-01 Encounter: [id number]      Assessment:    Principal Problem:    Acute on chronic combined systolic and diastolic congestive heart failure (HCC)  Active Problems:    Type 2 diabetes mellitus with kidney complication, with long-term current use of insulin (HCC)    Hypothyroidism    Acute renal failure superimposed on stage 4 chronic kidney disease (HCC)    Anemia    Coronary artery disease involving native coronary artery of native heart with angina pectoris ([address] )    Hypertensive urgency      Subjective:   Patient seen and examined  No significant events overnight  MAPs improved but still up  Creat down  Still volume up  Objective: Intake/ Output: not recorded  Weight: 178 on admit, 175 today  Tele: SR    Plan:  Acute on chronic HFpEF  Recent admitted with PNA and HF exacerbation  Discharged home off diuretic due to KELLY and now presents again with acute decompensation and need for IV diuresis  Currently on Lasix 40 mg IV BID with unclear I/O's  Will escalate dosing to TID and monitor  Continue  daily standing weights, I/O charting, daily BMP        Home diuretic- Torsemide 20 mg BID             Lab Results   Component Value Date     NTBNP 9,024 (H) 03/01/2021      NT Pro BNP 2/21/21: 12 3K     Weight at last OV in 5/2020-173 lbs, at hospital discharge, 178 lbs     TTE 2/22/21: LVEF 60%, akinesis of basal, inferior and basal mid inferolateral walls  Moderate cLVH, grade II DD, mild MR, trace TR, moderate, free flowing pericardial effusion without hemodynamic compromise  RV normal IVC normal  IVSd 0 99 cm       Echo 12/30/18:  EF: 50%, grade 2 DD, PASP 35 mmHg     CAD -No anginal symptoms at present  H/O NSTEMI S/P  LUDY x 2 to LAD 1/4/   80% RCA- plan staged, but has not needed intervention  Imdur 30 mg daily, asa      HTN- Uncontrolled with systolic to the 912G on admit   Improving but still elevated Currently receiving  Hydral 75 mg TID, will increase to 100 mg TID  Continue   Coreg 12 5 mg BID ,Imdur 60 mg daily        Palpitations  Holter 5/17/19: 53-85, avg 69 bpm  8 PVCs      Anemia- Hgb now in the 7 2-7 5 range  Receiving Venofer infusion     CKD3, new baseline seems to be 2 5-3 0  Creat down 2 8 down from 3 054  Nephro on board  Appreciate recs       DM2, hgA1C 3/2/21 11 7    HLD  FLP 3/2/21: , , HDL 76, LDL 48 on Atorva 40 mg daily         Review of Systems   All other systems reviewed and are negative  [facility] reason[facility] Valero catheter (day, reason):    Vitals: Blood pressure 155/64, pulse 72, temperature 97 5 °F (36 4 °C), temperature source Oral, resp  rate 20, height 5' 5" (1 651 m), weight 79 7 kg (175 lb 11 3 oz), SpO2 93 %  , Body mass index is 29 24 kg/m² , I/O last 3 completed shifts: In: 0   Out: 800 [Urine:800]  I/O this shift:  In: 298 [P O :298]  Out: 700 [Urine:700]  Wt Readings from Last 3 Encounters:   03/03/21 79 7 kg (175 lb 11 3 oz)   02/27/21 81 kg (178 lb 9 2 oz)   11/02/20 77 1 kg (170 lb)       Intake/Output Summary (Last 24 hours) at 3/3/2021 0938  Last data filed at 3/3/2021 0801  Gross per 24 hour   Intake 298 ml   Output 1500 ml   Net -1202 ml     I/O last 3 completed shifts: In: 0   Out: 800 [Urine:800]    No significant arrhythmias seen on telemetry review         Physical Exam:  Vitals:    03/02/21 2316 03/02/21 2317 03/03/21 0530 03/03/21 0626   BP: 154/63 154/63  155/64   BP Location:  Left arm     Pulse: 72 72  72   Resp:  18  20   Temp: 97 7 °F (36 5 °C) 97 7 °F (36 5 °C)  97 5 °F (36 4 °C)   TempSrc:  Oral  Oral   SpO2: 95% 96%  93%   Weight:   79 7 kg (175 lb 11 3 oz)    Height:           GEN: Margarette Gins appears well, alert and oriented x 3, pleasant and cooperative   HEENT: pupils equal, round, and reactive to light; extraocular muscles intact  NECK: supple, no carotid bruits   HEART: regular rhythm, normal S1 and S2, no murmurs, clicks, gallops or rubs, JVP is elevated       LUNGS: clear to auscultation bilaterally; no wheezes, rales, or rhonchi   ABDOMEN: normal bowel sounds, soft, no tenderness, no distention  EXTREMITIES: peripheral pulses normal; no clubbing, cyanosis, +2 BLLE  edema  NEURO: no focal findings   SKIN: normal without suspicious lesions on exposed skin      Current Facility-Administered Medications:     acetaminophen (TYLENOL) tablet 650 mg, 650 mg, Oral, Q6H PRN, Evonne Manning MD    aluminum-magnesium hydroxide-simethicone (MYLANTA) oral suspension 15 mL, 15 mL, Oral, Q6H PRN, Evonne Manning MD    aspirin chewable tablet 81 mg, 81 mg, Oral, Daily, Evonne Manning MD, 81 mg at 03/03/21 0845    atorvastatin (LIPITOR) tablet 40 mg, 40 mg, Oral, Daily, Evonne Manning MD, 40 mg at 03/03/21 0845    calcium acetate (PHOSLO) capsule 667 mg, 667 mg, Oral, BID With Meals, Evonne Manning MD, 667 mg at 03/03/21 0845    carvedilol (COREG) tablet 12 5 mg, 12 5 mg, Oral, BID With Meals, CLARICE Sadler, 12 5 mg at 03/03/21 0845    docusate sodium (COLACE) capsule 100 mg, 100 mg, Oral, BID PRN, Evonne Manning MD    furosemide (LASIX) injection 40 mg, 40 mg, Intravenous, BID (diuretic), CLARICE Wang, 40 mg at 03/03/21 0845    heparin (porcine) subcutaneous injection 5,000 Units, 5,000 Units, Subcutaneous, Q8H [address] **AND** [COMPLETED] Platelet count, , , Once, Evonne Manning MD    hydrALAZINE (APRESOLINE) injection 20 mg, 20 mg, Intravenous, Q6H PRN, Hernando Layton MD    hydrALAZINE (APRESOLINE) tablet 75 mg, 75 mg, Oral, TID, CLARICE Wang, 75 mg at 03/03/21 0845    insulin aspart protamine-insulin aspart (NovoLOG 70/30) 100 units/mL subcutaneous injection 10 Units, 10 Units, Subcutaneous, BID AC, Evonne Manning MD, 10 Units at 03/03/21 0855    insulin lispro (HumaLOG) 100 units/mL subcutaneous injection 1-5 Units, 1-5 Units, Subcutaneous, HS, Evonne Manning MD, 1 Units at 03/02/21 2159    insulin lispro (HumaLOG) 100 units/mL subcutaneous injection 1-6 Units, 1-6 Units, Subcutaneous, TID AC, 2 Units at 03/02/21 1317 **AND** Fingerstick Glucose (POCT), , , TID AC, Gerhardt Norton, MD    iron sucrose (VENOFER) 200 mg in sodium chloride 0 9 % 100 mL IVPB, 200 mg, Intravenous, Daily, PEDRO Ladd, Stopped at 03/02/21 1257    isosorbide mononitrate (IMDUR) 24 hr tablet 60 mg, 60 mg, Oral, Daily, CLARICE Wang, 60 mg at 03/03/21 0845    levothyroxine tablet 125 mcg, 125 mcg, Oral, Daily, Gerhardt Norton, MD, 125 mcg at 03/03/21 0847    nitroglycerin (NITROSTAT) SL tablet 0 4 mg, 0 4 mg, Sublingual, Q5 Min PRN, Gerhardt Norton, MD    ondansetron [facility]) injection 4 mg, 4 mg, Intravenous, Q6H PRN, Gerhardt Norton, MD      Labs & Results:    Results from last 7 days   Lab Units 03/02/21 0415 03/02/21 0139 03/01/21 1948   TROPONIN I ng/mL 0 02 0 02 0 02     Results from last 7 days   Lab Units 03/02/21  0415 03/02/21  0139 03/01/21 1949 02/25/21  0433   WBC Thousand/uL 6 86  --  7 12 4 65   HEMOGLOBIN g/dL 7 3*  --  7 7* 7 7*   HEMATOCRIT % 23 5*  --  26 0* 25 2*   PLATELETS Thousands/uL 168 139* 169 138*         Results from last 7 days   Lab Units 03/03/21  0524 03/02/21  0140 03/01/21 1948 02/25/21  0433   POTASSIUM mmol/L 4 7 4 8 4 9   < > 4 4   CHLORIDE mmol/L 109* 109* 108   < > 105   CO2 mmol/L 25 24 23   < > 22   BUN mg/dL 84* 90* 93*   < > 76*   CREATININE mg/dL 2 81* 3 04* 3 28*   < > 4 06*   CALCIUM mg/dL 8 9 8 8 9 0   < > 8 7   ALK PHOS U/L  --  610* 692*  --  697*   ALT U/L  --  66 75  --  86*   AST U/L  --  45 52*  --  139*    < > = values in this interval not displayed  Counseling / Coordination of Care  Total floor / unit time spent today 20 minutes  Greater than 50% of total time was spent with the patient and / or family counseling and / or coordination of care  A description of the counseling / coordination of care: 20  Thank you for the opportunity to participate in the care of this patient  Linh Ingram

## 2021-03-03 NOTE — ASSESSMENT & PLAN NOTE
Lab Results   Component Value Date    EGFR 17 03/03/2021    EGFR 15 03/02/2021    EGFR 14 03/01/2021    CREATININE 2 81 (H) 03/03/2021    CREATININE 3 04 (H) 03/02/2021    CREATININE 3 28 (H) 03/01/2021     · Nephrology consulted and appreciate their input  · Baseline creatinine:  1 9-2 2  · Creatinine today:  3 04  · KELLY likely secondary to cardiorenal syndrome as well as not being fully recovered from prior KELLY    · Continue diuretics  · Improved today

## 2021-03-03 NOTE — ASSESSMENT & PLAN NOTE
Wt Readings from Last 3 Encounters:   03/03/21 79 7 kg (175 lb 11 3 oz)   02/27/21 81 kg (178 lb 9 2 oz)   11/02/20 77 1 kg (170 lb)     · Patient with acute exacerbation of CHF secondary to dietary indiscretions and not resuming her diuretics at time of discharge from recent hospitalization  · Continue IV diuresis, likely change to PO diuretics tomorrow  · Continue low-salt diet and fluid restriction    · Monitor intake, and output  · Monitor daily weights

## 2021-03-04 LAB
ANION GAP SERPL CALCULATED.3IONS-SCNC: 6 MMOL/L (ref 4–13)
BUN SERPL-MCNC: 83 MG/DL (ref 5–25)
CALCIUM SERPL-MCNC: 8.9 MG/DL (ref 8.3–10.1)
CHLORIDE SERPL-SCNC: 106 MMOL/L (ref 100–108)
CO2 SERPL-SCNC: 27 MMOL/L (ref 21–32)
CREAT SERPL-MCNC: 2.87 MG/DL (ref 0.6–1.3)
GFR SERPL CREATININE-BSD FRML MDRD: 16 ML/MIN/1.73SQ M
GLUCOSE SERPL-MCNC: 210 MG/DL (ref 65–140)
GLUCOSE SERPL-MCNC: 330 MG/DL (ref 65–140)
GLUCOSE SERPL-MCNC: 89 MG/DL (ref 65–140)
GLUCOSE SERPL-MCNC: 93 MG/DL (ref 65–140)
POTASSIUM SERPL-SCNC: 4.5 MMOL/L (ref 3.5–5.3)
SODIUM SERPL-SCNC: 139 MMOL/L (ref 136–145)

## 2021-03-04 PROCEDURE — 82948 REAGENT STRIP/BLOOD GLUCOSE: CPT

## 2021-03-04 PROCEDURE — 99232 SBSQ HOSP IP/OBS MODERATE 35: CPT | Performed by: INTERNAL MEDICINE

## 2021-03-04 PROCEDURE — 80048 BASIC METABOLIC PNL TOTAL CA: CPT | Performed by: INTERNAL MEDICINE

## 2021-03-04 PROCEDURE — 99231 SBSQ HOSP IP/OBS SF/LOW 25: CPT | Performed by: INTERNAL MEDICINE

## 2021-03-04 RX ORDER — ALBUMIN (HUMAN) 12.5 G/50ML
25 SOLUTION INTRAVENOUS EVERY 6 HOURS
Status: COMPLETED | OUTPATIENT
Start: 2021-03-04 | End: 2021-03-04

## 2021-03-04 RX ADMIN — LEVOTHYROXINE SODIUM 125 MCG: 125 TABLET ORAL at 06:39

## 2021-03-04 RX ADMIN — ISOSORBIDE MONONITRATE 60 MG: 60 TABLET, EXTENDED RELEASE ORAL at 08:41

## 2021-03-04 RX ADMIN — HYDRALAZINE HYDROCHLORIDE 100 MG: 50 TABLET ORAL at 08:41

## 2021-03-04 RX ADMIN — IRON SUCROSE 200 MG: 20 INJECTION, SOLUTION INTRAVENOUS at 08:51

## 2021-03-04 RX ADMIN — INSULIN LISPRO 5 UNITS: 100 INJECTION, SOLUTION INTRAVENOUS; SUBCUTANEOUS at 17:19

## 2021-03-04 RX ADMIN — INSULIN LISPRO 2 UNITS: 100 INJECTION, SOLUTION INTRAVENOUS; SUBCUTANEOUS at 12:22

## 2021-03-04 RX ADMIN — HEPARIN SODIUM 5000 UNITS: 5000 INJECTION INTRAVENOUS; SUBCUTANEOUS at 17:18

## 2021-03-04 RX ADMIN — FUROSEMIDE 40 MG: 10 INJECTION, SOLUTION INTRAMUSCULAR; INTRAVENOUS at 12:12

## 2021-03-04 RX ADMIN — ALBUMIN (HUMAN) 25 G: 0.25 INJECTION, SOLUTION INTRAVENOUS at 18:32

## 2021-03-04 RX ADMIN — ALBUMIN (HUMAN) 25 G: 0.25 INJECTION, SOLUTION INTRAVENOUS at 12:13

## 2021-03-04 RX ADMIN — INSULIN LISPRO 1 UNITS: 100 INJECTION, SOLUTION INTRAVENOUS; SUBCUTANEOUS at 21:10

## 2021-03-04 RX ADMIN — CALCIUM ACETATE 667 MG: 667 CAPSULE ORAL at 17:18

## 2021-03-04 RX ADMIN — FUROSEMIDE 40 MG: 10 INJECTION, SOLUTION INTRAMUSCULAR; INTRAVENOUS at 06:38

## 2021-03-04 RX ADMIN — CARVEDILOL 12.5 MG: 12.5 TABLET, FILM COATED ORAL at 17:18

## 2021-03-04 RX ADMIN — INSULIN ASPART 10 UNITS: 100 INJECTION, SUSPENSION SUBCUTANEOUS at 08:40

## 2021-03-04 RX ADMIN — CARVEDILOL 12.5 MG: 12.5 TABLET, FILM COATED ORAL at 08:42

## 2021-03-04 RX ADMIN — HYDRALAZINE HYDROCHLORIDE 100 MG: 50 TABLET ORAL at 17:18

## 2021-03-04 RX ADMIN — ATORVASTATIN CALCIUM 40 MG: 40 TABLET, FILM COATED ORAL at 08:41

## 2021-03-04 RX ADMIN — HYDRALAZINE HYDROCHLORIDE 100 MG: 50 TABLET ORAL at 21:11

## 2021-03-04 RX ADMIN — CALCIUM ACETATE 667 MG: 667 CAPSULE ORAL at 08:41

## 2021-03-04 RX ADMIN — FUROSEMIDE 40 MG: 10 INJECTION, SOLUTION INTRAMUSCULAR; INTRAVENOUS at 17:18

## 2021-03-04 RX ADMIN — INSULIN ASPART 10 UNITS: 100 INJECTION, SUSPENSION SUBCUTANEOUS at 17:27

## 2021-03-04 RX ADMIN — ASPIRIN 81 MG: 81 TABLET, CHEWABLE ORAL at 08:42

## 2021-03-04 NOTE — ASSESSMENT & PLAN NOTE
Wt Readings from Last 3 Encounters:   03/04/21 78 4 kg (172 lb 13 5 oz)   02/27/21 81 kg (178 lb 9 2 oz)   11/02/20 77 1 kg (170 lb)     · Patient with acute exacerbation of CHF secondary to dietary indiscretions and not resuming her diuretics at time of discharge from recent hospitalization  · Continue low-salt diet and fluid restriction    · Monitor intake, and output  · Monitor daily weights  · Weights down by 1kg today  · Continue diuresis per cardiology recommendations

## 2021-03-04 NOTE — CASE MANAGEMENT
PARMJITW met with pt during AHF Rounds  Pt has [address] however she reported that it's difficult to pay her co-pays  Pt said that she has never applied for PA MA  Pt receives SS $480/month  Spouse receives $1400/month  1 house; 1 car  Pt previously declined referral to home health care because she didn't want a bill  YUDELKA messaged [facility] in [employer] Services to see if pt could apply for PA MA with PAT[employer]  Fiorella will refer if appropriate  Pt lives with spouse; has 1 dtr in [facility] and 1 dtr named Lucero Frazier who lives locally

## 2021-03-04 NOTE — ASSESSMENT & PLAN NOTE
Lab Results   Component Value Date    HGBA1C 11 7 (H) 03/02/2021     Recent Labs     03/03/21  1627 03/03/21  2114 03/04/21  0635 03/04/21  1118   POCGLU 168* 172* 89 210*     Blood Sugar Average: Last 72 hrs:  (P) 326 [id number]     · Poorly controlled as evidenced by A1c but improved since prior study last month  · Continue NovoLog 70/30 10u BID  · Continue Accu-Cheks and sliding scale insulin

## 2021-03-04 NOTE — CASE MANAGEMENT
LOS: 2 days   Bundle: no  Readmission: yes    Explained role of CM to pt  CM obtained the following information from pt  Home:Lives with  in an apartment that is all one level and [address]      DME: denies  Ambulation: independent  Drives: self  Pharmacy: [address] [facility]  PCP: Wilton Guaman History: denies, denies inpatient psychiatric stay ever  Substance Use/Abuse History: denies  [address] History: denies  Work/Retired:  retired  Rehab History: denies  POA/LW:no and declined information on LW/ POA  Transport at [employer]:   Daughter Renato Weiner will transport home  CM reviewed d/c planning process including the following: identifying help at home, patient preference for d/c planning needs,  [facility] Meds to Bed program      Cm asked pt if she was unable to make decisions for herself who she would want to make decisions for her she said  Cori Chock  Her daughter and her  Heidi Beach as the first alternate  Cm asked pt if CM can call anyone for her and she declined  CM team will continue to follow through discharge  A post acute care recommendation was made by your care team for [address]  Discussed Freedom of Choice with patient   Pt declined [address] services

## 2021-03-04 NOTE — PROGRESS NOTES
Progress Note - Gordy Diaz 1955, 76 y o  female MRN: [id number]    Unit/Bed#: [facility] 526-01 Encounter: [id number]    Primary Care Provider: Marichuy Griffith MD   Date and time admitted to hospital: 3/1/2021  7:29 PM        * Acute on chronic combined systolic and diastolic congestive heart failure ([address] )  Assessment & Plan  Wt Readings from Last 3 Encounters:   03/04/21 78 4 kg (172 lb 13 5 oz)   02/27/21 81 kg (178 lb 9 2 oz)   11/02/20 77 1 kg (170 lb)     · Patient with acute exacerbation of CHF secondary to dietary indiscretions and not resuming her diuretics at time of discharge from recent hospitalization  · Continue low-salt diet and fluid restriction  · Monitor intake, and output  · Monitor daily weights  · Weights down by 1kg today  · Continue diuresis per cardiology recommendations     Hypertensive urgency  Assessment & Plan  · Blood pressures are poorly controlled  · Continue carvedilol 12 5 mg BID, hydralazine 50 mg TID  · Improved with diuresis    Acute renal failure superimposed on stage 4 chronic kidney disease [facility])  Assessment & Plan  Lab Results   Component Value Date    EGFR 16 03/04/2021    EGFR 17 03/03/2021    EGFR 15 03/02/2021    CREATININE 2 87 (H) 03/04/2021    CREATININE 2 81 (H) 03/03/2021    CREATININE 3 04 (H) 03/02/2021     · Baseline creatinine:  1 9-2 2  · KELLY likely secondary to cardiorenal syndrome as well as not being fully recovered from prior KELLY  · Continue diuretics  · Creatinine is stable now    Anemia  Assessment & Plan  · Chronic anemia, likely due to CKD and iron deficiency  · On IV venofer x 5 doses  · Outpatient follow up with nephrology for EPO    Coronary artery disease involving native coronary artery of native heart with angina pectoris [facility])  Assessment & Plan  · Currently without chest discomfort    · Continue Coreg, aspirin, lipitor, imdur    Type 2 diabetes mellitus with kidney complication, with long-term current use of insulin (HCC)  Assessment & Plan  Lab Results   Component Value Date    HGBA1C 11 7 (H) 2021     Recent Labs     21  1627 21  2114 21  0635 21  1118   POCGLU 168* 172* 89 210*     Blood Sugar Average: Last 72 hrs:  (P) 895 [id number]     · Poorly controlled as evidenced by A1c but improved since prior study last month  · Continue NovoLog 70/30 10u BID  · Continue Accu-Cheks and sliding scale insulin  Hypothyroidism  Assessment & Plan  · TSH is elevated at 15 800 however improved from prior study on 2021 at 20 900  · Levothyroxine was increased on 2021  · Continue levothyroxine 125 mcg daily  VTE Pharmacologic Prophylaxis:   Pharmacologic: Heparin  Mechanical VTE Prophylaxis in Place: Yes    Patient Centered Rounds: I have performed bedside rounds with nursing staff today  Education and Discussions with Family / Patient: patient, plan of care    Time Spent for Care: 20 minutes  More than 50% of total time spent on counseling and coordination of care as described above  Current Length of Stay: 3 day(s)    Current Patient Status: Inpatient   Certification Statement: The patient will continue to require additional inpatient hospital stay due to continue diuresis    Discharge Plan: home when stable    Code Status: Level 1 - Full Code      Subjective:   Reports that her breathing is improving but not at baseline  Objective:     Vitals:   Temp (24hrs), Av 1 °F (36 7 °C), Min:97 7 °F (36 5 °C), Max:98 3 °F (36 8 °C)    Temp:  [97 7 °F (36 5 °C)-98 3 °F (36 8 °C)] 97 7 °F (36 5 °C)  HR:  [60-76] 63  Resp:  [16-18] 16  BP: (125-177)/(54-95) 144/58  SpO2:  [94 %-98 %] 96 %  Body mass index is 28 76 kg/m²  Input and Output Summary (last 24 hours):        Intake/Output Summary (Last 24 hours) at 3/4/2021 1511  Last data filed at 3/4/2021 1206  Gross per 24 hour   Intake 456 ml   Output 1700 ml   Net -1244 ml       Physical Exam:     Physical Exam  Constitutional:       Appearance: Normal appearance  HENT:      Head: Normocephalic and atraumatic  Nose: Nose normal       Mouth/Throat:      Mouth: Mucous membranes are moist       Pharynx: Oropharynx is clear  Eyes:      Extraocular Movements: Extraocular movements intact  Cardiovascular:      Rate and Rhythm: Normal rate and regular rhythm  Pulmonary:      Effort: Pulmonary effort is normal       Breath sounds: Rales present  Musculoskeletal:      Right lower leg: Edema present  Left lower leg: Edema present  Neurological:      Mental Status: She is alert and oriented to person, place, and time  Mental status is at baseline  Psychiatric:         Mood and Affect: Mood normal          Behavior: Behavior normal          Additional Data:     Labs:    Results from last 7 days   Lab Units 03/02/21  0415   WBC Thousand/uL 6 86   HEMOGLOBIN g/dL 7 3*   HEMATOCRIT % 23 5*   PLATELETS Thousands/uL 168   NEUTROS PCT % 65   LYMPHS PCT % 24   MONOS PCT % 7   EOS PCT % 2     Results from last 7 days   Lab Units 03/04/21  0510  03/02/21  0140   SODIUM mmol/L 139   < > 138   POTASSIUM mmol/L 4 5   < > 4 8   CHLORIDE mmol/L 106   < > 109*   CO2 mmol/L 27   < > 24   BUN mg/dL 83*   < > 90*   CREATININE mg/dL 2 87*   < > 3 04*   ANION GAP mmol/L 6   < > 5   CALCIUM mg/dL 8 9   < > 8 8   ALBUMIN g/dL  --   --  2 8*   TOTAL BILIRUBIN mg/dL  --   --  0 29   ALK PHOS U/L  --   --  610*   ALT U/L  --   --  66   AST U/L  --   --  45   GLUCOSE RANDOM mg/dL 93   < > 171*    < > = values in this interval not displayed           Results from last 7 days   Lab Units 03/04/21  1118 03/04/21  0635 03/03/21  2114 03/03/21  1627 03/03/21  1036 03/03/21  0847 03/03/21  0628 03/02/21  2126 03/02/21  1605 03/02/21  1316 03/02/21  1131 03/02/21  0658   POC GLUCOSE mg/dl 210* 89 172* 168* 177* 271* 78 171* 226* 209* 244* 144*     Results from last 7 days   Lab Units 03/02/21  0139   HEMOGLOBIN A1C % 11 7*               * I Have Reviewed All Lab Data Listed Above   * Additional Pertinent Lab Tests Reviewed: All Labs Within Last 24 Hours Reviewed      Recent Cultures (last 7 days):           Last 24 Hours Medication List:   Current Facility-Administered Medications   Medication Dose Route Frequency Provider Last Rate    acetaminophen  650 mg Oral Q6H PRN Solis Sánchez MD      albumin human  25 g Intravenous Q6H Oralee MD David      aluminum-magnesium hydroxide-simethicone  15 mL Oral Q6H PRN Solis Sánchez MD      aspirin  81 mg Oral Daily Solis Sánchez MD      atorvastatin  40 mg Oral Daily Solis Sánchez MD      calcium acetate  667 mg Oral BID With Meals Solis Sánchez MD      carvedilol  12 5 mg Oral BID With Meals CLARICE Wang      docusate sodium  100 mg Oral BID PRN Solis Sánchez MD      furosemide  40 mg Intravenous TID (diuretic) CLARICE Olvera      heparin (porcine)  5,000 Units Subcutaneous [facility] Solis Sánchez MD      hydrALAZINE  20 mg Intravenous Q6H PRN Jay Chavez MD      hydrALAZINE  100 mg Oral TID CLARICE Olvera      insulin aspart protamine-insulin aspart  10 Units Subcutaneous BID AC Solis Sánchez MD      insulin lispro  1-5 Units Subcutaneous HS Solis Sánchez MD      insulin lispro  1-6 Units Subcutaneous TID [facility] Solis Sánchez MD      iron sucrose  200 mg Intravenous Daily [facility], PEDRO Stopped (03/04/21 1001)    isosorbide mononitrate  60 mg Oral Daily CLARICE Wang      levothyroxine  125 mcg Oral Daily Solis Sánchez MD      nitroglycerin  0 4 mg Sublingual Q5 Min PRN Solis Sánchez MD      ondansetron  4 mg Intravenous Q6H PRN Solis Sánchez MD          Today, Patient Was Seen By: Deonte Velásquez MD    ** Please Note: Dictation voice to text software may have been used in the creation of this document   **

## 2021-03-04 NOTE — ASSESSMENT & PLAN NOTE
Lab Results   Component Value Date    EGFR 16 03/04/2021    EGFR 17 03/03/2021    EGFR 15 03/02/2021    CREATININE 2 87 (H) 03/04/2021    CREATININE 2 81 (H) 03/03/2021    CREATININE 3 04 (H) 03/02/2021     · Baseline creatinine:  1 9-2 2  · KELLY likely secondary to cardiorenal syndrome as well as not being fully recovered from prior KELLY    · Continue diuretics  · Creatinine is stable now

## 2021-03-04 NOTE — PROGRESS NOTES
Follow up Consultation    Nephrology   Aries Mosley 76 y o  female MRN: [id number]  Unit/Bed#: [facility] 526-01 Encounter: [id number]      Physician Requesting Consult: Sree Cano MD        ASSESSMENT/PLAN:  70-year-old female with multiple comorbidities including diabetes, hypertension, hypothyroidism, CAD, CHF, anemia and CKD stage 4 status post recent hospitalization with acute on chronic CHF and acute kidney injury now readmitted with shortness of breath  Nephrology consulted for acute kidney injury evaluation and management         · Acute kidney injury (POA) on CKD stage 4:  - EMRE most likely secondary to not having fully recovered from prior Emre I plus cardiorenal syndrome  - After review of records it appears that the patient has a baseline Creatinine of 1 9 to 2 2 mg/dL dating as far back as 2020   - patient was admitted with a creatinine of 3 28 mg/dL on 03/01/2021   - patient's creatinine today is at 2 87 mg/dL, stable   - diuretics per Cardiology, recommend metolazone 5 mg p o  X1 today if okay with Cardiology and albumin 25 g x 2 today  - check BMP in a m   - Await renal recovery  - Clinically patient is not uremic and there is no acute indication for renal replacement therapy (dialysis)  - Optimize hemodynamic status to avoid delay in renal recovery  - Place on a renal diet when allowed diet order    - Avoid nephrotoxins, adjust meds to appropriate GFR   - Strict I/O   - Daily weights  - Urinary retention protocol if patient does not have a Valero  - Most likely has underlying CKD secondary to diabetic kidney disease plus hypertensive nephrosclerosis plus cardiorenal syndrome plus recurrent episodes of acute kidney injury non dialysis requiring plus age-related nephron loss    - will need to set up patient for follow up with Nephrology as an outpatient post hospitalization   - as an outpatient for nephrology patient follows up with Dr ash     · Blood pressure/hypertension:  - home medications: Coreg 12 5 mg p o  B i d , hydralazine 50 mg p o  T i d , Imdur 30 mg p o  Q day, Procardia 30 mg p o  Q day, torsemide 20 mg p o  B i d   - current medications:  Lasix 40 mg IV t i d  , hydralazine 50 mg p o  T i d , Coreg 12 5 mg p o  B i d   - recommendations:  recommend giving albumin 25 g IV Q 6 x 2 and metolazone 5 mg p o  X1 if okay with Cardiology  - Optimize hemodynamics   - Maintain MAP > 65mmHg  - Avoid BP fluctuations      · H/H/anemia:  - most recent hemoglobin at 7 3 grams/deciliter  - maintain hemoglobin greater than 8 grams/deciliter  - likely syncopal removed due to underlying CKD  - complete Venofer 200 mg IV daily x5 days while in the hospital     · Acid-base electrolytes:  ? Hyperphosphatemia  § On PhosLo 1 tab p o  B i d  With meals  § Check phosphorus level in a m  § Most recent phosphorus 4 3   ? Hypermagnesemia:  § Most recent magnesium at 2 7  § Continue to monitor for now  ? Acid-base:    § Most recent bicarb at 27     · Volume status:  ?  Clinically appears to be hypervolemic  IV diuretics per Cardiology  Recommend metolazone 5 mg p o  X1 today if okay with Cardiology     · Proteinuria:   ? Most likely secondary to diabetic kidney disease  ? Most recent protein creatinine ratio of 1 5 g as of 02/23/2021     · Other medical problems:  ? Diabetes:  Management per primary team   On insulin, most recent A1c of 11 7% as of March 2021  ? Acute on chronic CHF:  Management per primary team, diuretics per Cardiology  Follow-up with cardiology  At home was supposed to be on torsemide 20 mg p o  B i d  But was not discharged on any diuretics at the time of discharge         Thanks for the consult  Will continue to follow  Please call with questions/ concerns    Above-mentioned orders and Plan in terms of acute kidney injury was discussed with the team in Shannan Devlin MD, FASN, 3/4/2021, 10:23 AM              Objective :   Patient seen and examined in her room no overnight events , remains afebrile urine output close to 1 9 L last 24 hours  Weight decreased by about 3 lb in last 24 hours  Lasix was increased to t i d  Dosage yesterday patient still reports edema lower extremities of overall feels little better      PHYSICAL EXAM  /95   Pulse 66   Temp 98 3 °F (36 8 °C)   Resp 18   Ht 5' 5" (1 651 m)   Wt 78 4 kg (172 lb 13 5 oz)   SpO2 98%   BMI 28 76 kg/m²   Temp (24hrs), Av 1 °F (36 7 °C), Min:98 °F (36 7 °C), Max:98 3 °F (36 8 °C)        Intake/Output Summary (Last 24 hours) at 3/4/2021 1023  Last data filed at 3/4/2021 1001  Gross per 24 hour   Intake 336 ml   Output 800 ml   Net -464 ml       I/O last 24 hours: In: 875 [P O :534; IV Piggyback:200]  Out: 1925 [Urine:]      Current Weight: Weight - Scale: 78 4 kg (172 lb 13 5 oz)  First Weight: Weight - Scale: 80 5 kg (177 lb 8 oz)  Physical Exam  Vitals signs and nursing note reviewed  Constitutional:       General: She is not in acute distress  Appearance: Normal appearance  She is normal weight  She is ill-appearing  She is not toxic-appearing or diaphoretic  HENT:      Head: Normocephalic and atraumatic  Mouth/Throat:      Mouth: Mucous membranes are moist       Pharynx: Oropharynx is clear  No oropharyngeal exudate  Eyes:      General: No scleral icterus  Conjunctiva/sclera: Conjunctivae normal    Neck:      Musculoskeletal: Normal range of motion and neck supple  Cardiovascular:      Rate and Rhythm: Normal rate  Heart sounds: Normal heart sounds  No friction rub  Pulmonary:      Effort: Pulmonary effort is normal  No respiratory distress  Breath sounds: Normal breath sounds  No wheezing  Abdominal:      General: There is no distension  Palpations: Abdomen is soft  There is no mass  Tenderness: There is no abdominal tenderness  Musculoskeletal:         General: Swelling present        Comments: Plus one edema bilateral lower extremities   Skin:     General: Skin is warm       Coloration: Skin is not jaundiced  Neurological:      General: No focal deficit present  Mental Status: She is alert and oriented to person, place, and time  Psychiatric:         Mood and Affect: Mood normal          Behavior: Behavior normal              Review of Systems   Constitutional: Negative for appetite change, chills and fatigue  HENT: Negative for congestion  Respiratory: Negative for cough, shortness of breath and wheezing  Cardiovascular: Positive for leg swelling  Gastrointestinal: Negative for abdominal pain, constipation, diarrhea, nausea and vomiting  Endocrine: Positive for polyuria  Genitourinary: Negative for dysuria  Musculoskeletal: Negative for back pain  Skin: Negative for rash  Neurological: Negative for dizziness  Psychiatric/Behavioral: Negative for agitation and confusion  All other systems reviewed and are negative        Scheduled Meds:  Current Facility-Administered Medications   Medication Dose Route Frequency Provider Last Rate    acetaminophen  650 mg Oral Q6H PRN Kena Allred MD      aluminum-magnesium hydroxide-simethicone  15 mL Oral Q6H PRN Kena Allred MD      aspirin  81 mg Oral Daily Kena Allred MD      atorvastatin  40 mg Oral Daily Kena Allred MD      calcium acetate  667 mg Oral BID With Meals Kena Allred MD      carvedilol  12 5 mg Oral BID With Meals CLARICE Wang      docusate sodium  100 mg Oral BID PRN Kena Allred MD      furosemide  40 mg Intravenous TID (diuretic) CLARICE Randall      heparin (porcine)  5,000 Units Subcutaneous [facility] Kena Allerd MD      hydrALAZINE  20 mg Intravenous Q6H PRN Mery Nevarez MD      hydrALAZINE  100 mg Oral TID CLARICE Randall      insulin aspart protamine-insulin aspart  10 Units Subcutaneous BID AC Kena Allred MD      insulin lispro  1-5 Units Subcutaneous HS Kena Allred MD      insulin lispro  1-6 Units Subcutaneous TID [facility] Charo Lewis MD      iron sucrose  200 mg Intravenous Daily [facility], PABARBARA Stopped (03/04/21 1001)    isosorbide mononitrate  60 mg Oral Daily CLARICE Wang      levothyroxine  125 mcg Oral Daily Charo Lewis MD      nitroglycerin  0 4 mg Sublingual Q5 Min PRN Charo Lewis MD      ondansetron  4 mg Intravenous Q6H PRN Charo Lewis MD         PRN Meds:   acetaminophen    aluminum-magnesium hydroxide-simethicone    docusate sodium    hydrALAZINE    nitroglycerin    ondansetron    Continuous Infusions:       Invasive Devices: Invasive Devices     Peripheral Intravenous Line            Peripheral IV 03/04/21 Left Antecubital less than 1 day                  LABORATORY:    Results from last 7 days   Lab Units 03/04/21  0510 03/03/21  0524 03/02/21  0415 03/02/21  0140 03/02/21  0139 03/01/21  1949 03/01/21  1948 02/27/21  0449 02/26/21  0436 02/25/21  1125   WBC Thousand/uL  --   --  6 86  --   --  7 12  --   --   --   --    HEMOGLOBIN g/dL  --   --  7 3*  --   --  7 7*  --   --   --   --    HEMATOCRIT %  --   --  23 5*  --   --  26 0*  --   --   --   --    PLATELETS Thousands/uL  --   --  168  --  139* 169  --   --   --   --    POTASSIUM mmol/L 4 5 4 7  --  4 8  --   --  4 9 4 7 5 0 5 0   CHLORIDE mmol/L 106 109*  --  109*  --   --  108 108 106 105   CO2 mmol/L 27 25  --  24  --   --  23 24 24 23   BUN mg/dL 83* 84*  --  90*  --   --  93* 80* 75* 79*   CREATININE mg/dL 2 87* 2 81*  --  3 04*  --   --  3 28* 3 78* 3 82* 3 84*   CALCIUM mg/dL 8 9 8 9  --  8 8  --   --  9 0 9 0 8 9 8 8   MAGNESIUM mg/dL  --   --   --  2 7*  --   --   --   --  2 1  --    PHOSPHORUS mg/dL  --  4 3* 4 2*  --   --   --   --   --  4 6*  --       rest all reviewed    RADIOLOGY:  XR chest 1 view portable   Final Result by Jeffrey Chaves, DO (03/02 0670)      There is mild pulmonary vascular congestion  Bibasilar consolidation likely atelectasis or developing pneumonia  Workstation performed: [id number]           Rest all reviewed    Portions of the record may have been created with voice recognition software  Occasional wrong word or "sound a like" substitutions may have occurred due to the inherent limitations of voice recognition software  Read the chart carefully and recognize, using context, where substitutions have occurred  If you have any questions, please contact the dictating provider

## 2021-03-04 NOTE — PROGRESS NOTES
Heart Failure/ Pulmonary Hypertension Progress Note - Darryl Austin 76 y o  female MRN: [id number]    Unit/Bed#: [facility] 526-01 Encounter: [id number]      Assessment:    Principal Problem:    Acute on chronic combined systolic and diastolic congestive heart failure (HCC)  Active Problems:    Type 2 diabetes mellitus with kidney complication, with long-term current use of insulin (HCC)    Hypothyroidism    Acute renal failure superimposed on stage 4 chronic kidney disease (HCC)    Anemia    Coronary artery disease involving native coronary artery of native heart with angina pectoris ([address] )    Hypertensive urgency      Subjective:   Patient seen and examined  No significant events overnight  MAPs overall improved  Creat up slightly  Still diuresing  Objective: Intake/ Output: 634/1925/-1 3L  Weight: 178 on admit, 172 today  Tele: SR    Plan:  Acute on chronic HFpEF  Recent admitted with PNA and HF exacerbation  Discharged home off diuretic due to KELLY and now presents again with acute decompensation and need for IV diuresis  Currently on Lasix 40 mg IV TID  Seems to be diuresing well on this so would hold on Metolazone  Will continue for now  Continue  daily standing weights, I/O charting, daily BMP        Home diuretic- Torsemide 20 mg BID             Lab Results   Component Value Date     NTBNP 9,024 (H) 03/01/2021      NT Pro BNP 2/21/21: 12 3K        TTE 2/22/21: LVEF 60%, akinesis of basal, inferior and basal mid inferolateral walls  Moderate cLVH, grade II DD, mild MR, trace TR, moderate, free flowing pericardial effusion without hemodynamic compromise  RV normal IVC normal  IVSd 0 99 cm       Echo 12/30/18:  EF: 50%, grade 2 DD, PASP 35 mmHg     CAD -No anginal symptoms at present  H/O NSTEMI S/P  LUDY x 2 to LAD 1/4/   80% RCA- plan staged, but has not needed intervention  Imdur 30 mg daily, asa      HTN- Uncontrolled with systolic to the 375M on admit   Improving but still elevated Currently receiving Hydral 75 mg TID, will increase to 100 mg TID  Continue   Coreg 12 5 mg BID ,Imdur 60 mg daily        Palpitations  Holter 5/17/19: 53-85, avg 69 bpm  8 PVCs      Anemia- Hgb now in the 7 2-7 5 range  Receiving Venofer infusion     CKD3, new baseline seems to be 2 5-3 0  Creat down 2 87 down from 3 054  Nephro on board  Giving IV albumin today  Appreciate recs       DM2, hgA1C 3/2/21 11 7    HLD  FLP 3/2/21: , , HDL 76, LDL 48 on Atorva 40 mg daily         Review of Systems   All other systems reviewed and are negative  [facility] (day, reason): Valero catheter (day, reason):    Vitals: Blood pressure 167/72, pulse 64, temperature 98 3 °F (36 8 °C), resp  rate 18, height 5' 5" (1 651 m), weight 78 4 kg (172 lb 13 5 oz), SpO2 96 %  , Body mass index is 28 76 kg/m² , I/O last 3 completed shifts: In: 362 [P O :534; IV Piggyback:100]  Out: 2725 [Urine:2725]  No intake/output data recorded  Wt Readings from Last 3 Encounters:   03/04/21 78 4 kg (172 lb 13 5 oz)   02/27/21 81 kg (178 lb 9 2 oz)   11/02/20 77 1 kg (170 lb)       Intake/Output Summary (Last 24 hours) at 3/4/2021 0810  Last data filed at 3/3/2021 2000  Gross per 24 hour   Intake 336 ml   Output 1225 ml   Net -889 ml     I/O last 3 completed shifts: In: 56 [P O :534; IV Piggyback:100]  Out: 2043 [Urine:2725]    No significant arrhythmias seen on telemetry review         Physical Exam:  Vitals:    03/03/21 2050 03/03/21 2344 03/04/21 0600 03/04/21 0739   BP: 152/63 144/60  167/72   BP Location: Left arm Left arm     Pulse:  76  64   Resp:  16  18   Temp:  98 2 °F (36 8 °C)  98 3 °F (36 8 °C)   TempSrc:  Oral     SpO2:  94%  96%   Weight:   78 4 kg (172 lb 13 5 oz)    Height:           GEN: Nieves Quiñonez appears well, alert and oriented x 3, pleasant and cooperative   HEENT: pupils equal, round, and reactive to light; extraocular muscles intact  NECK: supple, no carotid bruits   HEART: regular rhythm, normal S1 and S2, no murmurs, clicks, gallops or rubs, JVP is elevated       LUNGS: clear to auscultation bilaterally; no wheezes, rales, or rhonchi   ABDOMEN: normal bowel sounds, soft, no tenderness, no distention  EXTREMITIES: peripheral pulses normal; no clubbing, cyanosis, +2 BLLE  edema  NEURO: no focal findings   SKIN: normal without suspicious lesions on exposed skin      Current Facility-Administered Medications:     acetaminophen (TYLENOL) tablet 650 mg, 650 mg, Oral, Q6H PRN, Xavier Meredith MD    aluminum-magnesium hydroxide-simethicone (MYLANTA) oral suspension 15 mL, 15 mL, Oral, Q6H PRN, Xavier Meredith MD    aspirin chewable tablet 81 mg, 81 mg, Oral, Daily, Xavier Meredith MD, 81 mg at 03/03/21 0845    atorvastatin (LIPITOR) tablet 40 mg, 40 mg, Oral, Daily, Xavier Meredith MD, 40 mg at 03/03/21 0845    calcium acetate (PHOSLO) capsule 667 mg, 667 mg, Oral, BID With Meals, Xavier Meredith MD, 667 mg at 03/03/21 1720    carvedilol (COREG) tablet 12 5 mg, 12 5 mg, Oral, BID With Meals, CLARICE Yee, 12 5 mg at 03/03/21 1720    docusate sodium (COLACE) capsule 100 mg, 100 mg, Oral, BID PRN, Xavier Meredith MD    furosemide (LASIX) injection 40 mg, 40 mg, Intravenous, TID (diuretic), LCARICE Wang, 40 mg at 03/04/21 4808    heparin (porcine) subcutaneous injection 5,000 Units, 5,000 Units, Subcutaneous, Q8H [address] **AND** [COMPLETED] Platelet count, , , Once, Xavier Meredith MD    hydrALAZINE (APRESOLINE) injection 20 mg, 20 mg, Intravenous, Q6H PRN, Natalie Garnica MD    hydrALAZINE (APRESOLINE) tablet 100 mg, 100 mg, Oral, TID, CLARICE Wang, 100 mg at 03/03/21 2056    insulin aspart protamine-insulin aspart (NovoLOG 70/30) 100 units/mL subcutaneous injection 10 Units, 10 Units, Subcutaneous, BID AC, Xavier Meredith MD, 10 Units at 03/03/21 1724    insulin lispro (HumaLOG) 100 units/mL subcutaneous injection 1-5 Units, 1-5 Units, Subcutaneous, HS, Xavier Meredith MD, 1 Units at 03/03/21 2124    insulin lispro (HumaLOG) 100 units/mL subcutaneous injection 1-6 Units, 1-6 Units, Subcutaneous, TID AC, 1 Units at 03/03/21 1724 **AND** Fingerstick Glucose (POCT), , , TID AC, Kaylene Bennett MD    iron sucrose (VENOFER) 200 mg in sodium chloride 0 9 % 100 mL IVPB, 200 mg, Intravenous, Daily, [place], [place], Stopped at 03/03/21 1000    isosorbide mononitrate (IMDUR) 24 hr tablet 60 mg, 60 mg, Oral, Daily, CLARICE Wang, 60 mg at 03/03/21 0845    levothyroxine tablet 125 mcg, 125 mcg, Oral, Daily, Kaylene Bennett MD, 125 mcg at 03/04/21 0639    nitroglycerin (NITROSTAT) SL tablet 0 4 mg, 0 4 mg, Sublingual, Q5 Min PRN, Kaylene Bennett MD    ondansetron [facility]) injection 4 mg, 4 mg, Intravenous, Q6H PRN, Kaylene Bennett MD      Labs & Results:    Results from last 7 days   Lab Units 03/02/21  0415 03/02/21  0139 03/01/21 1948   TROPONIN I ng/mL 0 02 0 02 0 02     Results from last 7 days   Lab Units 03/02/21  0415 03/02/21  0139 03/01/21  1949   WBC Thousand/uL 6 86  --  7 12   HEMOGLOBIN g/dL 7 3*  --  7 7*   HEMATOCRIT % 23 5*  --  26 0*   PLATELETS Thousands/uL 168 139* 169         Results from last 7 days   Lab Units 03/04/21  0510 03/03/21  0524 03/02/21  0140 03/01/21  1948   POTASSIUM mmol/L 4 5 4 7 4 8 4 9   CHLORIDE mmol/L 106 109* 109* 108   CO2 mmol/L 27 25 24 23   BUN mg/dL 83* 84* 90* 93*   CREATININE mg/dL 2 87* 2 81* 3 04* 3 28*   CALCIUM mg/dL 8 9 8 9 8 8 9 0   ALK PHOS U/L  --   --  610* 692*   ALT U/L  --   --  66 75   AST U/L  --   --  45 52*           Counseling / Coordination of Care  Total floor / unit time spent today 20 minutes  Greater than 50% of total time was spent with the patient and / or family counseling and / or coordination of care  A description of the counseling / coordination of care: 20  Thank you for the opportunity to participate in the care of this patient  Linh Yi

## 2021-03-05 PROBLEM — H66.90 ACUTE OTITIS MEDIA: Status: ACTIVE | Noted: 2021-03-05

## 2021-03-05 LAB
ANION GAP SERPL CALCULATED.3IONS-SCNC: 9 MMOL/L (ref 4–13)
BUN SERPL-MCNC: 72 MG/DL (ref 5–25)
CALCIUM SERPL-MCNC: 9.1 MG/DL (ref 8.3–10.1)
CHLORIDE SERPL-SCNC: 105 MMOL/L (ref 100–108)
CO2 SERPL-SCNC: 26 MMOL/L (ref 21–32)
CREAT SERPL-MCNC: 3.05 MG/DL (ref 0.6–1.3)
GFR SERPL CREATININE-BSD FRML MDRD: 15 ML/MIN/1.73SQ M
GLUCOSE SERPL-MCNC: 124 MG/DL (ref 65–140)
GLUCOSE SERPL-MCNC: 125 MG/DL (ref 65–140)
GLUCOSE SERPL-MCNC: 162 MG/DL (ref 65–140)
GLUCOSE SERPL-MCNC: 176 MG/DL (ref 65–140)
GLUCOSE SERPL-MCNC: 180 MG/DL (ref 65–140)
GLUCOSE SERPL-MCNC: 207 MG/DL (ref 65–140)
GLUCOSE SERPL-MCNC: 223 MG/DL (ref 65–140)
POTASSIUM SERPL-SCNC: 4.5 MMOL/L (ref 3.5–5.3)
SODIUM SERPL-SCNC: 140 MMOL/L (ref 136–145)

## 2021-03-05 PROCEDURE — 82948 REAGENT STRIP/BLOOD GLUCOSE: CPT

## 2021-03-05 PROCEDURE — 99231 SBSQ HOSP IP/OBS SF/LOW 25: CPT | Performed by: INTERNAL MEDICINE

## 2021-03-05 PROCEDURE — 99233 SBSQ HOSP IP/OBS HIGH 50: CPT | Performed by: INTERNAL MEDICINE

## 2021-03-05 PROCEDURE — 80048 BASIC METABOLIC PNL TOTAL CA: CPT | Performed by: INTERNAL MEDICINE

## 2021-03-05 RX ORDER — AMLODIPINE BESYLATE 2.5 MG/1
2.5 TABLET ORAL
Status: DISCONTINUED | OUTPATIENT
Start: 2021-03-05 | End: 2021-03-08

## 2021-03-05 RX ORDER — AZITHROMYCIN 250 MG/1
500 TABLET, FILM COATED ORAL EVERY 24 HOURS
Status: COMPLETED | OUTPATIENT
Start: 2021-03-05 | End: 2021-03-09

## 2021-03-05 RX ADMIN — HYDRALAZINE HYDROCHLORIDE 100 MG: 50 TABLET ORAL at 21:36

## 2021-03-05 RX ADMIN — CARVEDILOL 12.5 MG: 12.5 TABLET, FILM COATED ORAL at 17:26

## 2021-03-05 RX ADMIN — HYDRALAZINE HYDROCHLORIDE 100 MG: 50 TABLET ORAL at 17:26

## 2021-03-05 RX ADMIN — INSULIN LISPRO 2 UNITS: 100 INJECTION, SOLUTION INTRAVENOUS; SUBCUTANEOUS at 08:50

## 2021-03-05 RX ADMIN — HYDRALAZINE HYDROCHLORIDE 100 MG: 50 TABLET ORAL at 08:48

## 2021-03-05 RX ADMIN — IRON SUCROSE 200 MG: 20 INJECTION, SOLUTION INTRAVENOUS at 08:52

## 2021-03-05 RX ADMIN — FUROSEMIDE 40 MG: 10 INJECTION, SOLUTION INTRAMUSCULAR; INTRAVENOUS at 11:34

## 2021-03-05 RX ADMIN — ISOSORBIDE MONONITRATE 60 MG: 60 TABLET, EXTENDED RELEASE ORAL at 08:48

## 2021-03-05 RX ADMIN — ASPIRIN 81 MG: 81 TABLET, CHEWABLE ORAL at 08:46

## 2021-03-05 RX ADMIN — ATORVASTATIN CALCIUM 40 MG: 40 TABLET, FILM COATED ORAL at 08:46

## 2021-03-05 RX ADMIN — INSULIN ASPART 10 UNITS: 100 INJECTION, SUSPENSION SUBCUTANEOUS at 17:27

## 2021-03-05 RX ADMIN — FUROSEMIDE 40 MG: 10 INJECTION, SOLUTION INTRAMUSCULAR; INTRAVENOUS at 05:48

## 2021-03-05 RX ADMIN — INSULIN LISPRO 1 UNITS: 100 INJECTION, SOLUTION INTRAVENOUS; SUBCUTANEOUS at 21:37

## 2021-03-05 RX ADMIN — CARVEDILOL 12.5 MG: 12.5 TABLET, FILM COATED ORAL at 08:46

## 2021-03-05 RX ADMIN — CALCIUM ACETATE 667 MG: 667 CAPSULE ORAL at 17:26

## 2021-03-05 RX ADMIN — CEFTRIAXONE 1000 MG: 2 INJECTION, POWDER, FOR SOLUTION INTRAMUSCULAR; INTRAVENOUS at 11:29

## 2021-03-05 RX ADMIN — CALCIUM ACETATE 667 MG: 667 CAPSULE ORAL at 08:46

## 2021-03-05 RX ADMIN — FUROSEMIDE 40 MG: 10 INJECTION, SOLUTION INTRAMUSCULAR; INTRAVENOUS at 17:27

## 2021-03-05 RX ADMIN — AMLODIPINE BESYLATE 2.5 MG: 2.5 TABLET ORAL at 21:36

## 2021-03-05 RX ADMIN — AZITHROMYCIN MONOHYDRATE 500 MG: 250 TABLET ORAL at 11:32

## 2021-03-05 RX ADMIN — LEVOTHYROXINE SODIUM 125 MCG: 125 TABLET ORAL at 05:48

## 2021-03-05 NOTE — ASSESSMENT & PLAN NOTE
Lab Results   Component Value Date    EGFR 15 03/05/2021    EGFR 16 03/04/2021    EGFR 17 03/03/2021    CREATININE 3 05 (H) 03/05/2021    CREATININE 2 87 (H) 03/04/2021    CREATININE 2 81 (H) 03/03/2021     · Baseline creatinine:  1 9-2 2  · KELLY likely secondary to cardiorenal syndrome as well as not being fully recovered from prior KELLY    · Continue diuretics for volume overload

## 2021-03-05 NOTE — ASSESSMENT & PLAN NOTE
Lab Results   Component Value Date    HGBA1C 11 7 (H) 03/02/2021     Recent Labs     03/04/21  0635 03/04/21  1118 03/04/21  1613 03/04/21  2107   POCGLU 89 210* 330* 180*     Blood Sugar Average: Last 72 hrs:  (P) 118 [id number]     · Poorly controlled as evidenced by A1c but improved since prior study last month  · Continue NovoLog 70/30 10u BID  · Continue Accu-Cheks and sliding scale insulin

## 2021-03-05 NOTE — PLAN OF CARE
Problem: SAFETY ADULT  Goal: Patient will remain free of falls  Description: INTERVENTIONS:  - Assess patient frequently for physical needs  -  Identify cognitive and physical deficits and behaviors that affect risk of falls    -  [place] fall precautions as indicated by assessment   - Educate patient/family on patient safety including physical limitations  - Instruct patient to call for assistance with activity based on assessment  - Modify environment to reduce risk of injury  - Consider OT/PT consult to assist with strengthening/mobility  Outcome: Progressing     Problem: CARDIOVASCULAR - ADULT  Goal: Maintains optimal cardiac output and hemodynamic stability  Description: INTERVENTIONS:  - Monitor I/O, vital signs and rhythm  - Monitor for S/S and trends of decreased cardiac output  - Administer and titrate ordered vasoactive medications to optimize hemodynamic stability  - Assess quality of pulses, skin color and temperature  - Assess for signs of decreased coronary artery perfusion  - Instruct patient to report change in severity of symptoms  Outcome: Progressing  Goal: Absence of cardiac dysrhythmias or at baseline rhythm  Description: INTERVENTIONS:  - Continuous cardiac monitoring, vital signs, obtain 12 lead EKG if ordered  - Administer antiarrhythmic and heart rate control medications as ordered  - Monitor electrolytes and administer replacement therapy as ordered  Outcome: Progressing     Problem: RESPIRATORY - ADULT  Goal: Achieves optimal ventilation and oxygenation  Description: INTERVENTIONS:  - Assess for changes in respiratory status  - Assess for changes in mentation and behavior  - Position to facilitate oxygenation and minimize respiratory effort  - Oxygen administered by appropriate delivery if ordered  - Initiate smoking cessation education as indicated  - Encourage broncho-pulmonary hygiene including cough, deep breathe, Incentive Spirometry  - Assess the need for suctioning and aspirate as needed  - Assess and instruct to report SOB or any respiratory difficulty  - Respiratory Therapy support as indicated  Outcome: Progressing     Problem: Nutrition/Hydration-ADULT  Goal: Nutrient/Hydration intake appropriate for improving, restoring or maintaining nutritional needs  Description: Monitor and assess patient's nutrition/hydration status for malnutrition  Collaborate with interdisciplinary team and initiate plan and interventions as ordered  Monitor patient's weight and dietary intake as ordered or per policy  Utilize nutrition screening tool and intervene as necessary  Determine patient's food preferences and provide high-protein, high-caloric foods as appropriate  INTERVENTIONS:  - Monitor oral intake, urinary output, labs, and treatment plans  - Assess nutrition and hydration status and recommend course of action  - Evaluate amount of meals eaten  - Assist patient with eating if necessary   - Allow adequate time for meals  - Recommend/ encourage appropriate diets, oral nutritional supplements, and vitamin/mineral supplements  - Order, calculate, and assess calorie counts as needed  - Recommend, monitor, and adjust tube feedings and TPN/PPN based on assessed needs  - Assess need for intravenous fluids  - Provide specific nutrition/hydration education as appropriate  - Include patient/family/caregiver in decisions related to nutrition  Outcome: Progressing     Problem: Potential for Falls  Goal: Patient will remain free of falls  Description: INTERVENTIONS:  - Assess patient frequently for physical needs  -  Identify cognitive and physical deficits and behaviors that affect risk of falls    -  [place] fall precautions as indicated by assessment   - Educate patient/family on patient safety including physical limitations  - Instruct patient to call for assistance with activity based on assessment  - Modify environment to reduce risk of injury  - Consider OT/PT consult to assist with strengthening/mobility  Outcome: Progressing     Problem: DISCHARGE PLANNING  Goal: Discharge to home or other facility with appropriate resources  Description: INTERVENTIONS:  - Identify barriers to discharge w/patient and caregiver  - Arrange for needed discharge resources and transportation as appropriate  - Identify discharge learning needs (meds, wound care, etc )  - Arrange for interpretive services to assist at discharge as needed  - Refer to Case Management Department for coordinating discharge planning if the patient needs post-hospital services based on physician/advanced practitioner order or complex needs related to functional status, cognitive ability, or social support system  Outcome: Progressing     Problem: Knowledge Deficit  Goal: Patient/family/caregiver demonstrates understanding of disease process, treatment plan, medications, and discharge instructions  Description: Complete learning assessment and assess knowledge base    Interventions:  - Provide teaching at level of understanding  - Provide teaching via preferred learning methods  Outcome: Progressing

## 2021-03-05 NOTE — PROGRESS NOTES
Follow up Consultation    Nephrology   Helene Villasenor 76 y o  female MRN: [id number]  Unit/Bed#: [facility] 526-01 Encounter: [id number]      Physician Requesting Consult: Sindhu Menjivar MD        ASSESSMENT/PLAN:  19-year-old female with multiple comorbidities including diabetes, hypertension, hypothyroidism, CAD, CHF, anemia and CKD stage 4 status post recent hospitalization with acute on chronic CHF and acute kidney injury now readmitted with shortness of breath  Nephrology consulted for acute kidney injury evaluation and management         · Acute kidney injury (POA) on CKD stage 4:  - KELLY most likely secondary to not having fully recovered from prior Kelly I plus cardiorenal syndrome  - After review of records it appears that the patient has a baseline Creatinine of 1 9 to 2 2 mg/dL dating as far back as 2020   - patient was admitted with a creatinine of 3 28 mg/dL on 03/01/2021   - patient's creatinine today is at 2 87 mg/dL, stable   - diuretics per Cardiology, recommend metolazone 5 mg p o  X1 and changing Lasix to Bumex 2 mg IV b i d  If okay with Cardiology  - check BMP in a m   - Await renal recovery  - Clinically patient is not uremic and there is no acute indication for renal replacement therapy (dialysis)  - Optimize hemodynamic status to avoid delay in renal recovery  - Place on a renal diet when allowed diet order    - Avoid nephrotoxins, adjust meds to appropriate GFR   - Strict I/O   - Daily weights  - Urinary retention protocol if patient does not have a Valero  - Most likely has underlying CKD secondary to diabetic kidney disease plus hypertensive nephrosclerosis plus cardiorenal syndrome plus recurrent episodes of acute kidney injury non dialysis requiring plus age-related nephron loss    - will need to set up patient for follow up with Nephrology as an outpatient post hospitalization   - as an outpatient for nephrology patient follows up with Daina Patrick     · Blood pressure/hypertension:  - home medications:  Coreg 12 5 mg p o  B i d , hydralazine 50 mg p o  T i d , Imdur 30 mg p o  Q day, Procardia 30 mg p o  Q day, torsemide 20 mg p o  B i d   - current medications:  Lasix 40 mg IV t i d  , hydralazine 50 mg p o  T i d , Coreg 12 5 mg p o  B i d   - recommendations:  Bumex 2 mg IV b i d  And metolazone 5 mg p o  X1 if okay with Cardiology  - Optimize hemodynamics   - Maintain MAP > 65mmHg  - Avoid BP fluctuations      · H/H/anemia:  - most recent hemoglobin at 7 3 grams/deciliter  - maintain hemoglobin greater than 8 grams/deciliter  - likely syncopal removed due to underlying CKD  - complete Venofer 200 mg IV daily x5 days while in the hospital     · Acid-base electrolytes:  ? Hyperphosphatemia  § On PhosLo 1 tab p o  B i d  With meals  § Check phosphorus level in a m  § Most recent phosphorus 4 3   ? Hypermagnesemia:  § Most recent magnesium at 2 7  § Continue to monitor for now  ? Acid-base:    § Most recent bicarb at 26     · Volume status:  ?  Clinically appears to be hypervolemic  IV diuretics per Cardiology  Recommend metolazone 5 mg p o  X1 today and possibly changing over to Bumex 2 mg IV b i d  if okay with Cardiology     · Proteinuria:   ? Most likely secondary to diabetic kidney disease  ? Most recent protein creatinine ratio of 1 5 g as of 02/23/2021     · Other medical problems:  ? Diabetes:  Management per primary team   On insulin, most recent A1c of 11 7% as of March 2021  ? Acute on chronic CHF:  Management per primary team, diuretics per Cardiology  Follow-up with cardiology  At home was supposed to be on torsemide 20 mg p o  B i d  But was not discharged on any diuretics at the time of discharge         Thanks for the consult  Will continue to follow  Please call with questions/ concerns    Above-mentioned orders and Plan in terms of acute kidney injury was discussed with the team in Shannan Devlin MD, FASN, 3/5/2021, 11:48 AM              Objective :   Patient seen and examined in her room no overnight events , hemodynamically stable remains afebrile urine output 2 3 L  Still with edema  Weight only decreased by 1 lb  PHYSICAL EXAM  /58   Pulse 59   Temp 98 2 °F (36 8 °C)   Resp 18   Ht 5' 5" (1 651 m)   Wt 77 6 kg (171 lb 1 2 oz)   SpO2 96%   BMI 28 47 kg/m²   Temp (24hrs), Av °F (36 7 °C), Min:97 6 °F (36 4 °C), Max:98 3 °F (36 8 °C)        Intake/Output Summary (Last 24 hours) at 3/5/2021 1148  Last data filed at 3/5/2021 0835  Gross per 24 hour   Intake 1120 ml   Output 1425 ml   Net -305 ml       I/O last 24 hours: In: 1700 [P O :1400; IV Piggyback:300]  Out: 2325 [Urine:2325]      Current Weight: Weight - Scale: 77 6 kg (171 lb 1 2 oz)  First Weight: Weight - Scale: 80 5 kg (177 lb 8 oz)  Physical Exam  Vitals signs and nursing note reviewed  Constitutional:       General: She is not in acute distress  Appearance: Normal appearance  She is normal weight  She is not ill-appearing, toxic-appearing or diaphoretic  HENT:      Head: Normocephalic and atraumatic  Mouth/Throat:      Mouth: Mucous membranes are moist       Pharynx: Oropharynx is clear  No oropharyngeal exudate  Eyes:      General: No scleral icterus  Conjunctiva/sclera: Conjunctivae normal    Neck:      Musculoskeletal: Normal range of motion and neck supple  Cardiovascular:      Rate and Rhythm: Normal rate  Heart sounds: Normal heart sounds  No friction rub  Pulmonary:      Effort: Pulmonary effort is normal  No respiratory distress  Breath sounds: Normal breath sounds  No wheezing  Abdominal:      General: There is no distension  Palpations: Abdomen is soft  There is no mass  Tenderness: There is no abdominal tenderness  Musculoskeletal:         General: Swelling present  Skin:     General: Skin is warm  Coloration: Skin is not jaundiced  Neurological:      General: No focal deficit present        Mental Status: She is alert and oriented to person, place, and time  Psychiatric:         Mood and Affect: Mood normal          Behavior: Behavior normal              Review of Systems   Constitutional: Negative for chills and fatigue  HENT: Negative for congestion  Respiratory: Negative for cough, shortness of breath and wheezing  Cardiovascular: Positive for leg swelling  Gastrointestinal: Negative for abdominal pain, constipation, diarrhea, nausea and vomiting  Genitourinary: Negative for dysuria  Musculoskeletal: Negative for back pain  Skin: Negative for rash  Neurological: Negative for dizziness and headaches  Psychiatric/Behavioral: Negative for agitation and confusion  All other systems reviewed and are negative        Scheduled Meds:  Current Facility-Administered Medications   Medication Dose Route Frequency Provider Last Rate    acetaminophen  650 mg Oral Q6H PRN Benra Garvin MD      aluminum-magnesium hydroxide-simethicone  15 mL Oral Q6H PRN Berna Garvin MD      aspirin  81 mg Oral Daily Berna Garvin MD      atorvastatin  40 mg Oral Daily Berna Garvin MD      azithromycin  500 mg Oral Q24H Natanael Connell MD      calcium acetate  667 mg Oral BID With Meals Berna Garvin MD      carvedilol  12 5 mg Oral BID With Meals CLARICE Wang      cefTRIAXone  1,000 mg Intravenous Q24H Natanael Connell MD 1,000 mg (03/05/21 1129)    docusate sodium  100 mg Oral BID PRN Berna Garvin MD      furosemide  40 mg Intravenous TID (diuretic) CLARICE Yanes      heparin (porcine)  5,000 Units Subcutaneous [facility] Berna Garvin MD      hydrALAZINE  20 mg Intravenous Q6H PRN Natanael Connell MD      hydrALAZINE  100 mg Oral TID CLARICE Yanes      insulin aspart protamine-insulin aspart  10 Units Subcutaneous BID AC Berna Garvin MD      insulin lispro  1-5 Units Subcutaneous HS Berna Garvin MD      insulin lispro  1-6 Units Subcutaneous TID [facility] Berna Garvin MD  iron sucrose  200 mg Intravenous Daily [facility] 200 mg (03/05/21 7413)    isosorbide mononitrate  60 mg Oral Daily CLARICE Wang      levothyroxine  125 mcg Oral Daily Celine Pinon MD      nitroglycerin  0 4 mg Sublingual Q5 Min PRN Celine Pinon MD      ondansetron  4 mg Intravenous Q6H PRN Celine Pinon MD         PRN Meds:   acetaminophen    aluminum-magnesium hydroxide-simethicone    docusate sodium    hydrALAZINE    nitroglycerin    ondansetron    Continuous Infusions:       Invasive Devices: Invasive Devices     Peripheral Intravenous Line            Peripheral IV 03/05/21 Right;Upper;Ventral (anterior) Arm less than 1 day                  LABORATORY:    Results from last 7 days   Lab Units 03/05/21  0516 03/04/21  0510 03/03/21  0524 03/02/21  0415 03/02/21  0140 03/02/21  0139 03/01/21  1949 03/01/21  1948 02/27/21  0449   WBC Thousand/uL  --   --   --  6 86  --   --  7 12  --   --    HEMOGLOBIN g/dL  --   --   --  7 3*  --   --  7 7*  --   --    HEMATOCRIT %  --   --   --  23 5*  --   --  26 0*  --   --    PLATELETS Thousands/uL  --   --   --  168  --  139* 169  --   --    POTASSIUM mmol/L 4 5 4 5 4 7  --  4 8  --   --  4 9 4 7   CHLORIDE mmol/L 105 106 109*  --  109*  --   --  108 108   CO2 mmol/L 26 27 25  --  24  --   --  23 24   BUN mg/dL 72* 83* 84*  --  90*  --   --  93* 80*   CREATININE mg/dL 3 05* 2 87* 2 81*  --  3 04*  --   --  3 28* 3 78*   CALCIUM mg/dL 9 1 8 9 8 9  --  8 8  --   --  9 0 9 0   MAGNESIUM mg/dL  --   --   --   --  2 7*  --   --   --   --    PHOSPHORUS mg/dL  --   --  4 3* 4 2*  --   --   --   --   --       rest all reviewed    RADIOLOGY:  XR chest 1 view portable   Final Result by Jeffrey 6, DO (03/02 2293)      There is mild pulmonary vascular congestion  Bibasilar consolidation likely atelectasis or developing pneumonia              Workstation performed: [id number]           Rest all reviewed    Portions of the record may have been created with voice recognition software  Occasional wrong word or "sound a like" substitutions may have occurred due to the inherent limitations of voice recognition software  Read the chart carefully and recognize, using context, where substitutions have occurred  If you have any questions, please contact the dictating provider

## 2021-03-05 NOTE — ASSESSMENT & PLAN NOTE
Wt Readings from Last 3 Encounters:   03/05/21 77 6 kg (171 lb 1 2 oz)   02/27/21 81 kg (178 lb 9 2 oz)   11/02/20 77 1 kg (170 lb)     · Patient with acute exacerbation of CHF secondary to dietary indiscretions and not resuming her diuretics at time of discharge from recent hospitalization  · Continue low-salt diet and fluid restriction    · Monitor intake, and output  · Monitor daily weights  · Weights down by approximate another 1kg today  · Edema and SOB improving  · D/w nephrology possibly change to bumex and metolazone

## 2021-03-05 NOTE — PROGRESS NOTES
Progress Note - Marissa Baum 1955, 76 y o  female MRN: [id number]    Unit/Bed#: [facility] 526-01 Encounter: [id number]    Primary Care Provider: Gabe Carson MD   Date and time admitted to hospital: 3/1/2021  7:29 PM        * Acute on chronic combined systolic and diastolic congestive heart failure ([address] )  Assessment & Plan  Wt Readings from Last 3 Encounters:   03/05/21 77 6 kg (171 lb 1 2 oz)   02/27/21 81 kg (178 lb 9 2 oz)   11/02/20 77 1 kg (170 lb)     · Patient with acute exacerbation of CHF secondary to dietary indiscretions and not resuming her diuretics at time of discharge from recent hospitalization  · Continue low-salt diet and fluid restriction  · Monitor intake, and output  · Monitor daily weights  · Weights down by approximate another 1kg today  · Edema and SOB improving  · D/w nephrology possibly change to bumex and metolazone    Hypertensive urgency  Assessment & Plan  · Blood pressures were poorly controlled upon admission but BP is trending down now  · Continue carvedilol 12 5 mg BID, hydralazine 50 mg TID  · Improved with diuresis    Acute renal failure superimposed on stage 4 chronic kidney disease [facility])  Assessment & Plan  Lab Results   Component Value Date    EGFR 15 03/05/2021    EGFR 16 03/04/2021    EGFR 17 03/03/2021    CREATININE 3 05 (H) 03/05/2021    CREATININE 2 87 (H) 03/04/2021    CREATININE 2 81 (H) 03/03/2021     · Baseline creatinine:  1 9-2 2  · KELLY likely secondary to cardiorenal syndrome as well as not being fully recovered from prior KELLY  · Continue diuretics for volume overload    Anemia  Assessment & Plan  · Chronic anemia, likely due to CKD and iron deficiency  · On IV venofer x 5 doses  · Outpatient follow up with nephrology for EPO    Coronary artery disease involving native coronary artery of native heart with angina pectoris [facility])  Assessment & Plan  · Currently without chest discomfort    · Continue Coreg, aspirin, lipitor, imdur    Type 2 diabetes mellitus with kidney complication, with long-term current use of insulin [facility])  Assessment & Plan  Lab Results   Component Value Date    HGBA1C 11 7 (H) 2021     Recent Labs     21  0635 21  1118 21  1613 21  2107   POCGLU 89 210* 330* 180*     Blood Sugar Average: Last 72 hrs:  (P) 487 [id number]     · Poorly controlled as evidenced by A1c but improved since prior study last month  · Continue NovoLog 70/30 10u BID  · Continue Accu-Cheks and sliding scale insulin  Hypothyroidism  Assessment & Plan  · TSH is elevated at 15 800 however improved from prior study on 2021 at 20 900  · Levothyroxine was increased on 2021  · Continue levothyroxine 125 mcg daily  VTE Pharmacologic Prophylaxis:   Pharmacologic: Heparin  Mechanical VTE Prophylaxis in Place: Yes    Patient Centered Rounds: I have performed bedside rounds with nursing staff today  Discussions with Specialists or Other Care Team Provider: cardiology nephrology    Education and Discussions with Family / Patient: patient, plan of care    Time Spent for Care: 20 minutes  More than 50% of total time spent on counseling and coordination of care as described above  Current Length of Stay: 4 day(s)    Current Patient Status: Inpatient   Certification Statement: The patient will continue to require additional inpatient hospital stay due to continue diuresis    Discharge Plan: home when stable    Code Status: Level 1 - Full Code      Subjective:   Reports that her breathing is improved but has an earache, sore throat and chest congestion    Objective:     Vitals:   Temp (24hrs), Av °F (36 7 °C), Min:97 6 °F (36 4 °C), Max:98 3 °F (36 8 °C)    Temp:  [97 6 °F (36 4 °C)-98 3 °F (36 8 °C)] 98 2 °F (36 8 °C)  HR:  [59-79] 59  Resp:  [16-20] 18  BP: (110-160)/(58-66) 135/58  SpO2:  [93 %-98 %] 96 %  Body mass index is 28 47 kg/m²  Input and Output Summary (last 24 hours):        Intake/Output Summary (Last 24 hours) at 3/5/2021 1316  Last data filed at 3/5/2021 1233  Gross per 24 hour   Intake 1460 ml   Output 1425 ml   Net 35 ml       Physical Exam:     Physical Exam  Constitutional:       Appearance: Normal appearance  HENT:      Head: Normocephalic and atraumatic  Ears:      Comments: Left TM effusion     Nose: Congestion present  Mouth/Throat:      Pharynx: Posterior oropharyngeal erythema present  No oropharyngeal exudate  Eyes:      Extraocular Movements: Extraocular movements intact  Cardiovascular:      Rate and Rhythm: Normal rate and regular rhythm  Pulmonary:      Effort: Pulmonary effort is normal  No respiratory distress  Breath sounds: Rales present  Skin:     General: Skin is warm and dry  Neurological:      General: No focal deficit present  Mental Status: She is alert and oriented to person, place, and time  Psychiatric:         Mood and Affect: Mood normal          Behavior: Behavior normal          Additional Data:     Labs:    Results from last 7 days   Lab Units 03/02/21  0415   WBC Thousand/uL 6 86   HEMOGLOBIN g/dL 7 3*   HEMATOCRIT % 23 5*   PLATELETS Thousands/uL 168   NEUTROS PCT % 65   LYMPHS PCT % 24   MONOS PCT % 7   EOS PCT % 2     Results from last 7 days   Lab Units 03/05/21  0516  03/02/21  0140   SODIUM mmol/L 140   < > 138   POTASSIUM mmol/L 4 5   < > 4 8   CHLORIDE mmol/L 105   < > 109*   CO2 mmol/L 26   < > 24   BUN mg/dL 72*   < > 90*   CREATININE mg/dL 3 05*   < > 3 04*   ANION GAP mmol/L 9   < > 5   CALCIUM mg/dL 9 1   < > 8 8   ALBUMIN g/dL  --   --  2 8*   TOTAL BILIRUBIN mg/dL  --   --  0 29   ALK PHOS U/L  --   --  610*   ALT U/L  --   --  66   AST U/L  --   --  45   GLUCOSE RANDOM mg/dL 125   < > 171*    < > = values in this interval not displayed           Results from last 7 days   Lab Units 03/04/21  2107 03/04/21  1613 03/04/21  1118 03/04/21  0839 03/03/21  2114 03/03/21  1627 03/03/21  1036 03/03/21  4059 03/03/21  1789 03/02/21  2126 03/02/21  1605 03/02/21  1316   POC GLUCOSE mg/dl 180* 330* 210* 89 172* 168* 177* 271* 78 171* 226* 209*     Results from last 7 days   Lab Units 03/02/21  0139   HEMOGLOBIN A1C % 11 7*               * I Have Reviewed All Lab Data Listed Above  * Additional Pertinent Lab Tests Reviewed:  All Labs Within Last 24 Hours Reviewed    Imaging:    Imaging Reports Reviewed Today Include: CXR  Imaging Personally Reviewed by Myself Includes:  CXR    Recent Cultures (last 7 days):           Last 24 Hours Medication List:   Current Facility-Administered Medications   Medication Dose Route Frequency Provider Last Rate    acetaminophen  650 mg Oral Q6H PRN Kaylene Bennett MD      aluminum-magnesium hydroxide-simethicone  15 mL Oral Q6H PRN Kaylene Bennett MD      amLODIPine  2 5 mg Oral HS CLARICE Wang      aspirin  81 mg Oral Daily Kaylene Bennett MD      atorvastatin  40 mg Oral Daily Kaylene Bennett MD      azithromycin  500 mg Oral Q24H Ameya Taveras MD      calcium acetate  667 mg Oral BID With Meals Kaylene Bennett MD      carvedilol  12 5 mg Oral BID With Meals CLARICE Wang      cefTRIAXone  1,000 mg Intravenous Q24H Ameya Taveras MD 1,000 mg (03/05/21 1129)    docusate sodium  100 mg Oral BID PRN Kaylene Bennett MD      furosemide  40 mg Intravenous TID (diuretic) CLARICE Koehler      heparin (porcine)  5,000 Units Subcutaneous Q8H [address] Kaylene Bennett MD      hydrALAZINE  20 mg Intravenous Q6H PRN Ameya Taveras MD      hydrALAZINE  100 mg Oral TID CLARICE Koehler      insulin aspart protamine-insulin aspart  10 Units Subcutaneous BID AC Kaylene Bennett MD      insulin lispro  1-5 Units Subcutaneous HS Kaylene Bennett MD      insulin lispro  1-6 Units Subcutaneous TID AC Kaylene Bennett MD      iron sucrose  200 mg Intravenous Daily PEDRO Ladd 200 mg (03/05/21 0166)    isosorbide mononitrate  60 mg Oral Daily CLARICE Wang      levothyroxine  125 mcg Oral Daily Xavier Meredith MD      nitroglycerin  0 4 mg Sublingual Q5 Min PRN Xavier Meredith MD      ondansetron  4 mg Intravenous Q6H PRN Xavier Meredith MD          Today, Patient Was Seen By: Elbert Trevizo MD    ** Please Note: Dictation voice to text software may have been used in the creation of this document   **

## 2021-03-05 NOTE — ASSESSMENT & PLAN NOTE
· opacification of left TM, likely bacterial OM  · Started on ceftriaxone and azithro, for pneumonia as above

## 2021-03-05 NOTE — PROGRESS NOTES
Had in depth conversation with patient and patient's daughter, patient states that upon pervious discharge she was not weighing herself regularly, nor was she monitoring her salt and fluid intake  Nursing educated patient and daughter the importance following HF guidelines  Patient did state that she was taking her medication

## 2021-03-05 NOTE — ASSESSMENT & PLAN NOTE
· Blood pressures were poorly controlled upon admission but BP is trending down now  · Continue carvedilol 12 5 mg BID, hydralazine 50 mg TID  · Improved with diuresis

## 2021-03-05 NOTE — ASSESSMENT & PLAN NOTE
Suspect RLL PNA based on CXR, cough with sputum and RLL crackles    Start ceftriaxone and azithromycin  Obtain sputum if pt is able to produce

## 2021-03-05 NOTE — PROGRESS NOTES
Heart Failure/ Pulmonary Hypertension Progress Note - Helena Hickey 76 y o  female MRN: [id number]    Unit/Bed#: [facility] 526-01 Encounter: [id number]      Assessment:    Principal Problem:    Acute on chronic combined systolic and diastolic congestive heart failure (HCC)  Active Problems:    Type 2 diabetes mellitus with kidney complication, with long-term current use of insulin (HCC)    Hypothyroidism    Acute renal failure superimposed on stage 4 chronic kidney disease (HCC)    Anemia    Coronary artery disease involving native coronary artery of native heart with angina pectoris (HCC)    Pneumonia    Hypertensive urgency    Acute otitis media      Subjective:   Patient seen and examined  No significant events overnight  Objective: Intake/ Output: 930/1000 w/ furosemide 40 mg IV TID  Weight: 178 on admit, 170 today  Tele: SR     Assessment:   # Acute on chronic HFpEF, Stage C, NYHA II/III  Recent admitted with PNA and HF exacerbation  Discharged home off diuretic due to KELLY and now presents again with acute decompensation and need for IV diuresis  Home diuretic- Torsemide 20 mg BID    Inpatient diuretic: furosemide 40 mg IV TID  Weight:             Lab Results   Component Value Date     NTBNP 9,024 (H) 03/01/2021      NT Pro BNP 2/21/21: 12 3K      Studies- personally reviewed by me    TTE 2/22/21: LVEF 60%, akinesis of basal, inferior and basal mid inferolateral walls  Moderate cLVH, grade II DD, mild MR, trace TR, moderate, free flowing pericardial effusion without hemodynamic compromise  RV normal IVC normal  IVSd 0 99 cm       Echo 12/30/18:  EF: 50%, grade 2 DD, PASP 35 mmHg     # CAD -1/4/19: LUDY x 2 to LAD after NSTEMI (trop 0 2) and abnormal stress test with ischemia in anterior wall on 12/31/18  80% RCA- plan staged, but has not needed intervention  Med Rx: Imdur 30 mg daily, asa         # HTN- Uncontrolled with systolic to the 373A on admit  Improved with Hydral 100 mg TID  Continue Coreg 12 5 mg BID ,Imdur 60 mg daily        H/O Palpitations  Holter 5/17/19: 53-85, avg 69 bpm  8 PVCs      Anemia- Hgb now in the 7 2-7 5 range  Receiving Venofer infusion     # KELLY on CKD3, new baseline seems to be 2 5-3 0  Cr 3 28 on admit, down to [employer] on board  S/P 2 u of IV ablumin  Appreciate recs       DM2, hgA1C 3/2/21 11 7     HLD  FLP 3/2/21: , , HDL 76, LDL 48 on Atorva 40 mg daily     Plan:  Change torsemide to 40 mg BID    Review of Systems   Constitutional: Negative for activity change, appetite change, fatigue and unexpected weight change  HENT: Negative for congestion and nosebleeds  Eyes: Negative  Respiratory: Negative for cough, chest tightness and shortness of breath  Cardiovascular: Negative for chest pain, palpitations and leg swelling  Gastrointestinal: Negative for abdominal distention  Endocrine: Negative  Genitourinary: Negative  Musculoskeletal: Negative  Skin: Negative  Neurological: Negative for dizziness, syncope and weakness  Hematological: Negative  Psychiatric/Behavioral: Negative  [employer] (day, reason): Valero catheter (day, reason):    Vitals: Blood pressure 135/58, pulse 59, temperature 98 2 °F (36 8 °C), resp  rate 18, height 5' 5" (1 651 m), weight 77 6 kg (171 lb 1 2 oz), SpO2 96 %  , Body mass index is 28 47 kg/m² , I/O last 3 completed shifts: In: 1400 [P O :1100; IV Piggyback:300]  Out: 2525 [Urine:2525]  I/O this shift: In: 760 [P O :660; IV Piggyback:100]  Out: -   Wt Readings from Last 3 Encounters:   03/05/21 77 6 kg (171 lb 1 2 oz)   02/27/21 81 kg (178 lb 9 2 oz)   11/02/20 77 1 kg (170 lb)       Intake/Output Summary (Last 24 hours) at 3/5/2021 1334  Last data filed at 3/5/2021 1233  Gross per 24 hour   Intake 1460 ml   Output 1425 ml   Net 35 ml     I/O last 3 completed shifts: In: 1400 [P O :1100; IV Piggyback:300]  Out: 2525 [Urine:2525]    No significant arrhythmias seen on telemetry review  Physical Exam:  Vitals:    03/05/21 0240 03/05/21 0555 03/05/21 0742 03/05/21 1127   BP: 150/66  110/61 135/58   Pulse: 79  75 59   Resp: 20  18 18   Temp: 98 2 °F (36 8 °C)  97 6 °F (36 4 °C) 98 2 °F (36 8 °C)   TempSrc:       SpO2: 95%  93% 96%   Weight:  77 6 kg (171 lb 1 2 oz)     Height:           GEN: Royal Dailey appears well, alert and oriented x 3, pleasant and cooperative   HEENT: pupils equal, round, and reactive to light; extraocular muscles intact  NECK: supple, no carotid bruits   HEART: regular rhythm, normal S1 and S2, no murmurs, clicks, gallops or rubs, JVP is    LUNGS: clear to auscultation bilaterally; no wheezes, rales, or rhonchi   ABDOMEN: normal bowel sounds, soft, no tenderness, no distention  EXTREMITIES: peripheral pulses normal; no clubbing, cyanosis, or edema  NEURO: no focal findings   SKIN: normal without suspicious lesions on exposed skin      Current Facility-Administered Medications:     acetaminophen (TYLENOL) tablet 650 mg, 650 mg, Oral, Q6H PRN, Gerhardt Norton, MD    aluminum-magnesium hydroxide-simethicone (MYLANTA) oral suspension 15 mL, 15 mL, Oral, Q6H PRN, Gerhardt Norton, MD    amLODIPine (NORVASC) tablet 2 5 mg, 2 5 mg, Oral, HS, CLARICE Wang    aspirin chewable tablet 81 mg, 81 mg, Oral, Daily, Gerhardt Norton, MD, 81 mg at 03/05/21 0846    atorvastatin (LIPITOR) tablet 40 mg, 40 mg, Oral, Daily, Gerhardt Norton, MD, 40 mg at 03/05/21 0846    azithromycin (ZITHROMAX) tablet 500 mg, 500 mg, Oral, Q24H, Lyubov Murillo MD, 500 mg at 03/05/21 1132    calcium acetate (PHOSLO) capsule 667 mg, 667 mg, Oral, BID With Meals, Gerhardt Norton, MD, 667 mg at 03/05/21 0846    carvedilol (COREG) tablet 12 5 mg, 12 5 mg, Oral, BID With Meals, CLARICE Head, 12 5 mg at 03/05/21 0846    cefTRIAXone (ROCEPHIN) 1,000 mg in dextrose 5 % 50 mL IVPB, 1,000 mg, Intravenous, Q24H, Lyubov Murillo MD, Last Rate: 100 mL/hr at 03/05/21 1129, 1,000 mg at 03/05/21 1129    docusate sodium (COLACE) capsule 100 mg, 100 mg, Oral, BID PRN, Jarocho Kahn MD    furosemide (LASIX) injection 40 mg, 40 mg, Intravenous, TID (diuretic), CLARICE Wang, 40 mg at 03/05/21 1134    heparin (porcine) subcutaneous injection 5,000 Units, 5,000 Units, Subcutaneous, Q8H [address], 5,000 Units at 03/04/21 1718 **AND** [COMPLETED] Platelet count, , , Once, Jarocho Kahn MD    hydrALAZINE (APRESOLINE) injection 20 mg, 20 mg, Intravenous, Q6H PRN, Roselynn Angelucci, MD    hydrALAZINE (APRESOLINE) tablet 100 mg, 100 mg, Oral, TID, CLARICE Wang, 100 mg at 03/05/21 0848    insulin aspart protamine-insulin aspart (NovoLOG 70/30) 100 units/mL subcutaneous injection 10 Units, 10 Units, Subcutaneous, BID AC, Jarocho Kahn MD, 10 Units at 03/04/21 1727    insulin lispro (HumaLOG) 100 units/mL subcutaneous injection 1-5 Units, 1-5 Units, Subcutaneous, HS, Jarocho Kahn MD, 1 Units at 03/04/21 2110    insulin lispro (HumaLOG) 100 units/mL subcutaneous injection 1-6 Units, 1-6 Units, Subcutaneous, TID AC, 2 Units at 03/05/21 0850 **AND** Fingerstick Glucose (POCT), , , TID AC, Jarocho Kahn MD    iron sucrose (VENOFER) 200 mg in sodium chloride 0 9 % 100 mL IVPB, 200 mg, Intravenous, Daily, [facility], PA-C, Last Rate: 100 mL/hr at 03/05/21 0852, 200 mg at 03/05/21 0852    isosorbide mononitrate (IMDUR) 24 hr tablet 60 mg, 60 mg, Oral, Daily, CLARICE Wang, 60 mg at 03/05/21 0848    levothyroxine tablet 125 mcg, 125 mcg, Oral, Daily, Jarocho Kahn MD, 125 mcg at 03/05/21 0548    nitroglycerin (NITROSTAT) SL tablet 0 4 mg, 0 4 mg, Sublingual, Q5 Min PRN, Jarocho Kahn MD    ondansetron [facility]) injection 4 mg, 4 mg, Intravenous, Q6H PRN, Jarocho Kahn MD    phenol (CHLORASEPTIC) 1 4 % mucosal liquid 1 spray, 1 spray, Mouth/Throat, Q2H PRN, Roselynn Angelucci, MD      Labs & Results:    Results from last 7 days   Lab Units 03/02/21  4862 03/02/21  0139 03/01/21 1948   TROPONIN I ng/mL 0 02 0 02 0 02     Results from last 7 days   Lab Units 03/02/21  0415 03/02/21  0139 03/01/21 1949   WBC Thousand/uL 6 86  --  7 12   HEMOGLOBIN g/dL 7 3*  --  7 7*   HEMATOCRIT % 23 5*  --  26 0*   PLATELETS Thousands/uL 168 139* 169         Results from last 7 days   Lab Units 03/05/21  0516 03/04/21  0510 03/03/21  0524 03/02/21  0140 03/01/21 1948   POTASSIUM mmol/L 4 5 4 5 4 7 4 8 4 9   CHLORIDE mmol/L 105 106 109* 109* 108   CO2 mmol/L 26 27 25 24 23   BUN mg/dL 72* 83* 84* 90* 93*   CREATININE mg/dL 3 05* 2 87* 2 81* 3 04* 3 28*   CALCIUM mg/dL 9 1 8 9 8 9 8 8 9 0   ALK PHOS U/L  --   --   --  610* 692*   ALT U/L  --   --   --  66 75   AST U/L  --   --   --  45 52*           Counseling / Coordination of Care  Total floor / unit time spent today 25 minutes  Greater than 50% of total time was spent with the patient and / or family counseling and / or coordination of care  A description of the counseling / coordination of care: 15      Thank you for the opportunity to participate in the care of this patient    [facility] PULMONARY HYPERTENSION  MEDICAL DIRECTOR OF [facility]em Haskins

## 2021-03-05 NOTE — PROGRESS NOTES
Heart Failure/ Pulmonary Hypertension Progress Note - Arley Lind 76 y o  female MRN: [id number]    [facility] Encounter: [id number]      Assessment:    Principal Problem:    Acute on chronic combined systolic and diastolic congestive heart failure (HCC)  Active Problems:    Type 2 diabetes mellitus with kidney complication, with long-term current use of insulin (HCC)    Hypothyroidism    Acute renal failure superimposed on stage 4 chronic kidney disease (HCC)    Anemia    Coronary artery disease involving native coronary artery of native heart with angina pectoris ([address] )    Hypertensive urgency      Subjective:   Patient seen and examined  No significant events overnight  Had 2 doses of Albumin per nephro yesterday  MAPs elevated through the night but overall improved  Creat up again today     Objective: Intake/ Output: 1400/2325/-925  Weight: 178 on admit, 171 today  Tele: SR    Plan:  Acute on chronic HFpEF  Recent admitted with PNA and HF exacerbation  Discharged home off diuretic due to KELLY and now presents again with acute decompensation and need for IV diuresis  Currently on Lasix 40 mg IV TID  Only negative 1 L but did receive 2 units of albumin yesterday  Will continue with IV diuresis today  Continue  daily standing weights, I/O charting, daily BMP        Home diuretic- Torsemide 20 mg BID             Lab Results   Component Value Date     NTBNP 9,024 (H) 03/01/2021      NT Pro BNP 2/21/21: 12 3K        TTE 2/22/21: LVEF 60%, akinesis of basal, inferior and basal mid inferolateral walls  Moderate cLVH, grade II DD, mild MR, trace TR, moderate, free flowing pericardial effusion without hemodynamic compromise  RV normal IVC normal  IVSd 0 99 cm       Echo 12/30/18:  EF: 50%, grade 2 DD, PASP 35 mmHg     CAD -No anginal symptoms at present  H/O NSTEMI S/P  LUDY x 2 to LAD 1/4/   80% RCA- plan staged, but has not needed intervention   Imdur 30 mg daily, asa      HTN- Uncontrolled with systolic to the 665G on admit  Improved with Hydral 100 mg TID  Continue  Coreg 12 5 mg BID ,Imdur 60 mg daily       H/O Palpitations  Holter 5/17/19: 53-85, avg 69 bpm  8 PVCs      Anemia- Hgb now in the 7 2-7 5 range  Receiving Venofer infusion     CKD3, new baseline seems to be 2 5-3 0  Creat 3 05 today  Nephro on board  S/P 2 u of IV ablumin  Appreciate recs       DM2, hgA1C 3/2/21 11 7    HLD  FLP 3/2/21: , , HDL 76, LDL 48 on Atorva 40 mg daily         Review of Systems   All other systems reviewed and are negative  [employer] (day, reason): Valero catheter (day, reason):    Vitals: Blood pressure 110/61, pulse 75, temperature 97 6 °F (36 4 °C), resp  rate 18, height 5' 5" (1 651 m), weight 77 6 kg (171 lb 1 2 oz), SpO2 93 %  , Body mass index is 28 47 kg/m² , I/O last 3 completed shifts: In: 1400 [P O :1100; IV Piggyback:300]  Out: 2525 [Urine:2525]  I/O this shift:  In: 300 [P O :300]  Out: -   Wt Readings from Last 3 Encounters:   03/05/21 77 6 kg (171 lb 1 2 oz)   02/27/21 81 kg (178 lb 9 2 oz)   11/02/20 77 1 kg (170 lb)       Intake/Output Summary (Last 24 hours) at 3/5/2021 0951  Last data filed at 3/5/2021 0835  Gross per 24 hour   Intake 1700 ml   Output 2325 ml   Net -625 ml     I/O last 3 completed shifts: In: 1400 [P O :1100; IV Piggyback:300]  Out: 2525 [Urine:2525]    No significant arrhythmias seen on telemetry review         Physical Exam:  Vitals:    03/04/21 2315 03/05/21 0240 03/05/21 0555 03/05/21 0742   BP: 133/63 150/66  110/61   Pulse: 76 79  75   Resp: 20 20  18   Temp: 98 2 °F (36 8 °C) 98 2 °F (36 8 °C)  97 6 °F (36 4 °C)   TempSrc:       SpO2: 98% 95%  93%   Weight:   77 6 kg (171 lb 1 2 oz)    Height:           GEN: Angelica Murry appears well, alert and oriented x 3, pleasant and cooperative   HEENT: pupils equal, round, and reactive to light; extraocular muscles intact  NECK: supple, no carotid bruits   HEART: regular rhythm, normal S1 and S2, no murmurs, clicks, gallops or rubs, JVP is elevated       LUNGS: clear to auscultation bilaterally; no wheezes, rales, or rhonchi   ABDOMEN: normal bowel sounds, soft, no tenderness, no distention  EXTREMITIES: peripheral pulses normal; no clubbing, cyanosis,trace BLLE  edema  NEURO: no focal findings   SKIN: normal without suspicious lesions on exposed skin      Current Facility-Administered Medications:     acetaminophen (TYLENOL) tablet 650 mg, 650 mg, Oral, Q6H PRN, Tyler Thomason MD    aluminum-magnesium hydroxide-simethicone (MYLANTA) oral suspension 15 mL, 15 mL, Oral, Q6H PRN, Tyler Thomason MD    aspirin chewable tablet 81 mg, 81 mg, Oral, Daily, Tyler Thomason MD, 81 mg at 03/05/21 0846    atorvastatin (LIPITOR) tablet 40 mg, 40 mg, Oral, Daily, Tyler Thomason MD, 40 mg at 03/05/21 0846    calcium acetate (PHOSLO) capsule 667 mg, 667 mg, Oral, BID With Meals, Tyler Thomason MD, 667 mg at 03/05/21 0846    carvedilol (COREG) tablet 12 5 mg, 12 5 mg, Oral, BID With Meals, CLARICE Saab, 12 5 mg at 03/05/21 0846    docusate sodium (COLACE) capsule 100 mg, 100 mg, Oral, BID PRN, Tyler Thomason MD    furosemide (LASIX) injection 40 mg, 40 mg, Intravenous, TID (diuretic), CLARICE Wang, 40 mg at 03/05/21 0548    heparin (porcine) subcutaneous injection 5,000 Units, 5,000 Units, Subcutaneous, Q8H [address], 5,000 Units at 03/04/21 1718 **AND** [COMPLETED] Platelet count, , , Once, Tyler Thomason MD    hydrALAZINE (APRESOLINE) injection 20 mg, 20 mg, Intravenous, Q6H PRN, Nancy Madsen MD    hydrALAZINE (APRESOLINE) tablet 100 mg, 100 mg, Oral, TID, CLARIEC Wang, 100 mg at 03/05/21 0848    insulin aspart protamine-insulin aspart (NovoLOG 70/30) 100 units/mL subcutaneous injection 10 Units, 10 Units, Subcutaneous, BID AC, Tyler Thomason MD, 10 Units at 03/04/21 1727    insulin lispro (HumaLOG) 100 units/mL subcutaneous injection 1-5 Units, 1-5 Units, Subcutaneous, HS, Mariluz Becerra MD, 1 Units at 03/04/21 2110    insulin lispro (HumaLOG) 100 units/mL subcutaneous injection 1-6 Units, 1-6 Units, Subcutaneous, TID AC, 2 Units at 03/05/21 0850 **AND** Fingerstick Glucose (POCT), , , TID AC, Mariluz Becerra MD    iron sucrose (VENOFER) 200 mg in sodium chloride 0 9 % 100 mL IVPB, 200 mg, Intravenous, Daily, Lis[address], PA-C, Last Rate: 100 mL/hr at 03/05/21 0852, 200 mg at 03/05/21 4390    isosorbide mononitrate (IMDUR) 24 hr tablet 60 mg, 60 mg, Oral, Daily, CLARICE Wang, 60 mg at 03/05/21 0848    levothyroxine tablet 125 mcg, 125 mcg, Oral, Daily, Mariluz Becerra MD, 125 mcg at 03/05/21 0548    nitroglycerin (NITROSTAT) SL tablet 0 4 mg, 0 4 mg, Sublingual, Q5 Min PRN, Mariluz Becerra MD    ondansetron [facility]) injection 4 mg, 4 mg, Intravenous, Q6H PRN, Mariluz Becerra MD      Labs & Results:    Results from last 7 days   Lab Units 03/02/21 0415 03/02/21  0139 03/01/21 1948   TROPONIN I ng/mL 0 02 0 02 0 02     Results from last 7 days   Lab Units 03/02/21  0415 03/02/21  0139 03/01/21 1949   WBC Thousand/uL 6 86  --  7 12   HEMOGLOBIN g/dL 7 3*  --  7 7*   HEMATOCRIT % 23 5*  --  26 0*   PLATELETS Thousands/uL 168 139* 169         Results from last 7 days   Lab Units 03/05/21  0516 03/04/21  0510 03/03/21  0524 03/02/21  0140 03/01/21 1948   POTASSIUM mmol/L 4 5 4 5 4 7 4 8 4 9   CHLORIDE mmol/L 105 106 109* 109* 108   CO2 mmol/L 26 27 25 24 23   BUN mg/dL 72* 83* 84* 90* 93*   CREATININE mg/dL 3 05* 2 87* 2 81* 3 04* 3 28*   CALCIUM mg/dL 9 1 8 9 8 9 8 8 9 0   ALK PHOS U/L  --   --   --  610* 692*   ALT U/L  --   --   --  66 75   AST U/L  --   --   --  45 52*           Counseling / Coordination of Care  Total floor / unit time spent today 20 minutes  Greater than 50% of total time was spent with the patient and / or family counseling and / or coordination of care  A description of the counseling / coordination of care: 20        Thank you for the opportunity to participate in the care of this patient  Linh Ponce

## 2021-03-06 LAB
ANION GAP SERPL CALCULATED.3IONS-SCNC: 7 MMOL/L (ref 4–13)
BASOPHILS # BLD AUTO: 0.05 THOUSANDS/ΜL (ref 0–0.1)
BASOPHILS NFR BLD AUTO: 1 % (ref 0–1)
BUN SERPL-MCNC: 73 MG/DL (ref 5–25)
CALCIUM SERPL-MCNC: 9.3 MG/DL (ref 8.3–10.1)
CHLORIDE SERPL-SCNC: 105 MMOL/L (ref 100–108)
CO2 SERPL-SCNC: 28 MMOL/L (ref 21–32)
CREAT SERPL-MCNC: 3.04 MG/DL (ref 0.6–1.3)
EOSINOPHIL # BLD AUTO: 0.22 THOUSAND/ΜL (ref 0–0.61)
EOSINOPHIL NFR BLD AUTO: 3 % (ref 0–6)
ERYTHROCYTE [DISTWIDTH] IN BLOOD BY AUTOMATED COUNT: 14.5 % (ref 11.6–15.1)
GFR SERPL CREATININE-BSD FRML MDRD: 15 ML/MIN/1.73SQ M
GLUCOSE SERPL-MCNC: 113 MG/DL (ref 65–140)
GLUCOSE SERPL-MCNC: 120 MG/DL (ref 65–140)
GLUCOSE SERPL-MCNC: 149 MG/DL (ref 65–140)
GLUCOSE SERPL-MCNC: 158 MG/DL (ref 65–140)
GLUCOSE SERPL-MCNC: 285 MG/DL (ref 65–140)
HCT VFR BLD AUTO: 24.6 % (ref 34.8–46.1)
HGB BLD-MCNC: 7.3 G/DL (ref 11.5–15.4)
IMM GRANULOCYTES # BLD AUTO: 0.04 THOUSAND/UL (ref 0–0.2)
IMM GRANULOCYTES NFR BLD AUTO: 1 % (ref 0–2)
LYMPHOCYTES # BLD AUTO: 1.36 THOUSANDS/ΜL (ref 0.6–4.47)
LYMPHOCYTES NFR BLD AUTO: 20 % (ref 14–44)
MCH RBC QN AUTO: 27.9 PG (ref 26.8–34.3)
MCHC RBC AUTO-ENTMCNC: 29.7 G/DL (ref 31.4–37.4)
MCV RBC AUTO: 94 FL (ref 82–98)
MONOCYTES # BLD AUTO: 0.46 THOUSAND/ΜL (ref 0.17–1.22)
MONOCYTES NFR BLD AUTO: 7 % (ref 4–12)
NEUTROPHILS # BLD AUTO: 4.58 THOUSANDS/ΜL (ref 1.85–7.62)
NEUTS SEG NFR BLD AUTO: 68 % (ref 43–75)
NRBC BLD AUTO-RTO: 0 /100 WBCS
PLATELET # BLD AUTO: 144 THOUSANDS/UL (ref 149–390)
PMV BLD AUTO: 11.4 FL (ref 8.9–12.7)
POTASSIUM SERPL-SCNC: 4.4 MMOL/L (ref 3.5–5.3)
RBC # BLD AUTO: 2.62 MILLION/UL (ref 3.81–5.12)
SODIUM SERPL-SCNC: 140 MMOL/L (ref 136–145)
WBC # BLD AUTO: 6.71 THOUSAND/UL (ref 4.31–10.16)

## 2021-03-06 PROCEDURE — 80048 BASIC METABOLIC PNL TOTAL CA: CPT | Performed by: NURSE PRACTITIONER

## 2021-03-06 PROCEDURE — 99233 SBSQ HOSP IP/OBS HIGH 50: CPT | Performed by: INTERNAL MEDICINE

## 2021-03-06 PROCEDURE — 99232 SBSQ HOSP IP/OBS MODERATE 35: CPT | Performed by: INTERNAL MEDICINE

## 2021-03-06 PROCEDURE — 82948 REAGENT STRIP/BLOOD GLUCOSE: CPT

## 2021-03-06 PROCEDURE — 85025 COMPLETE CBC W/AUTO DIFF WBC: CPT | Performed by: NURSE PRACTITIONER

## 2021-03-06 RX ORDER — TORSEMIDE 20 MG/1
40 TABLET ORAL
Status: DISCONTINUED | OUTPATIENT
Start: 2021-03-06 | End: 2021-03-07

## 2021-03-06 RX ADMIN — TORSEMIDE 40 MG: 20 TABLET ORAL at 16:26

## 2021-03-06 RX ADMIN — ATORVASTATIN CALCIUM 40 MG: 40 TABLET, FILM COATED ORAL at 08:45

## 2021-03-06 RX ADMIN — HYDRALAZINE HYDROCHLORIDE 100 MG: 50 TABLET ORAL at 08:44

## 2021-03-06 RX ADMIN — INSULIN LISPRO 2 UNITS: 100 INJECTION, SOLUTION INTRAVENOUS; SUBCUTANEOUS at 21:40

## 2021-03-06 RX ADMIN — IRON SUCROSE 200 MG: 20 INJECTION, SOLUTION INTRAVENOUS at 08:45

## 2021-03-06 RX ADMIN — HYDRALAZINE HYDROCHLORIDE 100 MG: 50 TABLET ORAL at 21:39

## 2021-03-06 RX ADMIN — HYDRALAZINE HYDROCHLORIDE 100 MG: 50 TABLET ORAL at 16:26

## 2021-03-06 RX ADMIN — FUROSEMIDE 40 MG: 10 INJECTION, SOLUTION INTRAMUSCULAR; INTRAVENOUS at 12:04

## 2021-03-06 RX ADMIN — CARVEDILOL 12.5 MG: 12.5 TABLET, FILM COATED ORAL at 08:47

## 2021-03-06 RX ADMIN — CARVEDILOL 12.5 MG: 12.5 TABLET, FILM COATED ORAL at 16:27

## 2021-03-06 RX ADMIN — AZITHROMYCIN MONOHYDRATE 500 MG: 250 TABLET ORAL at 10:50

## 2021-03-06 RX ADMIN — FUROSEMIDE 40 MG: 10 INJECTION, SOLUTION INTRAMUSCULAR; INTRAVENOUS at 05:09

## 2021-03-06 RX ADMIN — LEVOTHYROXINE SODIUM 125 MCG: 125 TABLET ORAL at 05:09

## 2021-03-06 RX ADMIN — CALCIUM ACETATE 667 MG: 667 CAPSULE ORAL at 16:27

## 2021-03-06 RX ADMIN — ASPIRIN 81 MG: 81 TABLET, CHEWABLE ORAL at 08:45

## 2021-03-06 RX ADMIN — INSULIN ASPART 10 UNITS: 100 INJECTION, SUSPENSION SUBCUTANEOUS at 16:33

## 2021-03-06 RX ADMIN — ISOSORBIDE MONONITRATE 60 MG: 60 TABLET, EXTENDED RELEASE ORAL at 08:45

## 2021-03-06 RX ADMIN — CALCIUM ACETATE 667 MG: 667 CAPSULE ORAL at 08:47

## 2021-03-06 RX ADMIN — AMLODIPINE BESYLATE 2.5 MG: 2.5 TABLET ORAL at 21:39

## 2021-03-06 RX ADMIN — INSULIN LISPRO 1 UNITS: 100 INJECTION, SOLUTION INTRAVENOUS; SUBCUTANEOUS at 12:04

## 2021-03-06 RX ADMIN — CEFTRIAXONE 1000 MG: 2 INJECTION, POWDER, FOR SOLUTION INTRAMUSCULAR; INTRAVENOUS at 10:50

## 2021-03-06 NOTE — ASSESSMENT & PLAN NOTE
· Chronic anemia, likely due to CKD and iron deficiency  · On IV venofer x 5 doses  · Outpatient follow up with nephrology for EPO  · Discussed possible transfusion with the patient, he had chest pain with transfusion last admission and is hesitant at this time  Per blood bank there was no significant transfusion reaction  Will reserve blood transfusion if symptoms worsen

## 2021-03-06 NOTE — PLAN OF CARE
Problem: SAFETY ADULT  Goal: Patient will remain free of falls  Description: INTERVENTIONS:  - Assess patient frequently for physical needs  -  Identify cognitive and physical deficits and behaviors that affect risk of falls    -  [place] fall precautions as indicated by assessment   - Educate patient/family on patient safety including physical limitations  - Instruct patient to call for assistance with activity based on assessment  - Modify environment to reduce risk of injury  - Consider OT/PT consult to assist with strengthening/mobility  Outcome: Progressing     Problem: CARDIOVASCULAR - ADULT  Goal: Maintains optimal cardiac output and hemodynamic stability  Description: INTERVENTIONS:  - Monitor I/O, vital signs and rhythm  - Monitor for S/S and trends of decreased cardiac output  - Administer and titrate ordered vasoactive medications to optimize hemodynamic stability  - Assess quality of pulses, skin color and temperature  - Assess for signs of decreased coronary artery perfusion  - Instruct patient to report change in severity of symptoms  Outcome: Progressing  Goal: Absence of cardiac dysrhythmias or at baseline rhythm  Description: INTERVENTIONS:  - Continuous cardiac monitoring, vital signs, obtain 12 lead EKG if ordered  - Administer antiarrhythmic and heart rate control medications as ordered  - Monitor electrolytes and administer replacement therapy as ordered  Outcome: Progressing     Problem: RESPIRATORY - ADULT  Goal: Achieves optimal ventilation and oxygenation  Description: INTERVENTIONS:  - Assess for changes in respiratory status  - Assess for changes in mentation and behavior  - Position to facilitate oxygenation and minimize respiratory effort  - Oxygen administered by appropriate delivery if ordered  - Initiate smoking cessation education as indicated  - Encourage broncho-pulmonary hygiene including cough, deep breathe, Incentive Spirometry  - Assess the need for suctioning and aspirate as needed  - Assess and instruct to report SOB or any respiratory difficulty  - Respiratory Therapy support as indicated  Outcome: Progressing     Problem: Nutrition/Hydration-ADULT  Goal: Nutrient/Hydration intake appropriate for improving, restoring or maintaining nutritional needs  Description: Monitor and assess patient's nutrition/hydration status for malnutrition  Collaborate with interdisciplinary team and initiate plan and interventions as ordered  Monitor patient's weight and dietary intake as ordered or per policy  Utilize nutrition screening tool and intervene as necessary  Determine patient's food preferences and provide high-protein, high-caloric foods as appropriate  INTERVENTIONS:  - Monitor oral intake, urinary output, labs, and treatment plans  - Assess nutrition and hydration status and recommend course of action  - Evaluate amount of meals eaten  - Assist patient with eating if necessary   - Allow adequate time for meals  - Recommend/ encourage appropriate diets, oral nutritional supplements, and vitamin/mineral supplements  - Order, calculate, and assess calorie counts as needed  - Recommend, monitor, and adjust tube feedings and TPN/PPN based on assessed needs  - Assess need for intravenous fluids  - Provide specific nutrition/hydration education as appropriate  - Include patient/family/caregiver in decisions related to nutrition  Outcome: Progressing     Problem: Potential for Falls  Goal: Patient will remain free of falls  Description: INTERVENTIONS:  - Assess patient frequently for physical needs  -  Identify cognitive and physical deficits and behaviors that affect risk of falls    -  [place] fall precautions as indicated by assessment   - Educate patient/family on patient safety including physical limitations  - Instruct patient to call for assistance with activity based on assessment  - Modify environment to reduce risk of injury  - Consider OT/PT consult to assist with strengthening/mobility  Outcome: Progressing     Problem: DISCHARGE PLANNING  Goal: Discharge to home or other facility with appropriate resources  Description: INTERVENTIONS:  - Identify barriers to discharge w/patient and caregiver  - Arrange for needed discharge resources and transportation as appropriate  - Identify discharge learning needs (meds, wound care, etc )  - Arrange for interpretive services to assist at discharge as needed  - Refer to Case Management Department for coordinating discharge planning if the patient needs post-hospital services based on physician/advanced practitioner order or complex needs related to functional status, cognitive ability, or social support system  Outcome: Progressing     Problem: Knowledge Deficit  Goal: Patient/family/caregiver demonstrates understanding of disease process, treatment plan, medications, and discharge instructions  Description: Complete learning assessment and assess knowledge base    Interventions:  - Provide teaching at level of understanding  - Provide teaching via preferred learning methods  Outcome: Progressing

## 2021-03-06 NOTE — PROGRESS NOTES
Follow up Consultation    Nephrology   Helene Villasenor 76 y o  female MRN: [id number]  Unit/Bed#: [facility] 526-01 Encounter: [id number]      Physician Requesting Consult: Sindhu Menjivar MD        ASSESSMENT/PLAN:  58-year-old female with multiple comorbidities including diabetes, hypertension, hypothyroidism, CAD, CHF, anemia and CKD stage 4 status post recent hospitalization with acute on chronic CHF and acute kidney injury now readmitted with shortness of breath  Nephrology consulted for acute kidney injury evaluation and management         · Acute kidney injury (POA) on CKD stage 4:  - EMRE most likely secondary to not having fully recovered from prior Emre I plus cardiorenal syndrome  - After review of records it appears that the patient has a baseline Creatinine of 1 9 to 2 2 mg/dL dating as far back as 2020   - patient was admitted with a creatinine of 3 28 mg/dL on 03/01/2021   - patient's creatinine today is at 3 04 mg/dL, level from yesterday   - diuretics per Cardiology, recommend changing over to p o  Diuretics If okay with Cardiology  - check BMP in a m   - Await renal recovery  - Clinically patient is not uremic and there is no acute indication for renal replacement therapy (dialysis)  - Optimize hemodynamic status to avoid delay in renal recovery  - Place on a renal diet when allowed diet order    - Avoid nephrotoxins, adjust meds to appropriate GFR   - Strict I/O   - Daily weights  - Urinary retention protocol if patient does not have a Valero  - Most likely has underlying CKD secondary to diabetic kidney disease plus hypertensive nephrosclerosis plus cardiorenal syndrome plus recurrent episodes of acute kidney injury non dialysis requiring plus age-related nephron loss    - will need to set up patient for follow up with Nephrology as an outpatient post hospitalization   - as an outpatient for nephrology patient follows up with Dr ash     · Blood pressure/hypertension:  - home medications:  Coreg 12 5 mg p o  B i d , hydralazine 50 mg p o  T i d , Imdur 30 mg p o  Q day, Procardia 30 mg p o  Q day, torsemide 20 mg p o  B i d   - current medications:  Lasix 40 mg IV t i d  , Norvasc 2 5 mg p o  Q h s  hydralazine 100 mg p o  T i d , Coreg 12 5 mg p o  B i d  Imdur 60 mg p o  Q day  - recommendations:  changing over to p o  Diuretics if okay with Cardiology  - Optimize hemodynamics   - Maintain MAP > 65mmHg  - Avoid BP fluctuations      · H/H/anemia:  - most recent hemoglobin at 7 3 grams/deciliter  - maintain hemoglobin greater than 8 grams/deciliter  - likely anemia due to underlying CKD  - complete Venofer 200 mg IV daily x5 days while in the hospital  - consider PRBC transfusion     · Acid-base electrolytes:  ? Hyperphosphatemia  § On PhosLo 1 tab p o  B i d  With meals  § Check phosphorus level in a m  § Most recent phosphorus 4 3   ? Hypermagnesemia:  § Most recent magnesium at 2 7  § Continue to monitor for now  ? Acid-base:    § Most recent bicarb at 28     · Volume status:  ?  Clinically appears to be minimally hypervolemic with intravascular volume depletion likely secondary to hypoalbuminemia  Diuretics per Cardiology  Recommend changing over to p o  Diuretics   · Proteinuria:   ? Most likely secondary to diabetic kidney disease  ? Most recent protein creatinine ratio of 1 5 g as of 02/23/2021     · Other medical problems:  ? Diabetes:  Management per primary team   On insulin, most recent A1c of 11 7% as of March 2021  ? Acute on chronic CHF:  Management per primary team, diuretics per Cardiology  Follow-up with cardiology  At home was supposed to be on torsemide 20 mg p o  B i d  But was not discharged on any diuretics at the time of discharge         Thanks for the consult  Will continue to follow  Please call with questions/ concerns    Above-mentioned orders and Plan in terms of acute kidney injury was discussed with the team in [address] MD, FASN, 3/6/2021, 6:25 AM              Objective :   Patient seen and examined in her room, remains afebrile hemodynamically stable urine output Na 1 L last 24 hours documented  Weight only decreased by 1 lb  Overall improving slowly      PHYSICAL EXAM  BP (!) 175/78   Pulse 72   Temp 98 7 °F (37 1 °C)   Resp 20   Ht 5' 5" (1 651 m)   Wt 77 2 kg (170 lb 3 1 oz)   SpO2 96%   BMI 28 32 kg/m²   Temp (24hrs), Av 2 °F (36 8 °C), Min:97 6 °F (36 4 °C), Max:98 7 °F (37 1 °C)        Intake/Output Summary (Last 24 hours) at 3/6/2021 0694  Last data filed at 3/6/2021 0547  Gross per 24 hour   Intake 930 ml   Output 1000 ml   Net -70 ml       I/O last 24 hours: In: 1030 [P O :780; IV Piggyback:250]  Out:  [Urine:]      Current Weight: Weight - Scale: 77 2 kg (170 lb 3 1 oz)  First Weight: Weight - Scale: 80 5 kg (177 lb 8 oz)  Physical Exam  Vitals signs and nursing note reviewed  Constitutional:       General: She is not in acute distress  Appearance: Normal appearance  She is ill-appearing  She is not toxic-appearing or diaphoretic  HENT:      Head: Normocephalic and atraumatic  Mouth/Throat:      Mouth: Mucous membranes are moist       Pharynx: Oropharynx is clear  No oropharyngeal exudate  Eyes:      General: No scleral icterus  Conjunctiva/sclera: Conjunctivae normal    Neck:      Musculoskeletal: Normal range of motion and neck supple  Cardiovascular:      Rate and Rhythm: Normal rate  Heart sounds: Normal heart sounds  No friction rub  Pulmonary:      Effort: Pulmonary effort is normal  No respiratory distress  Breath sounds: Normal breath sounds  No wheezing  Abdominal:      General: There is no distension  Palpations: Abdomen is soft  There is no mass  Tenderness: There is no abdominal tenderness  Musculoskeletal:         General: Swelling present  Skin:     General: Skin is warm  Coloration: Skin is not jaundiced     Neurological:      General: No focal deficit present  Mental Status: She is alert and oriented to person, place, and time  Psychiatric:         Mood and Affect: Mood normal          Behavior: Behavior normal              Review of Systems   Constitutional: Negative for chills and fatigue  HENT: Negative for congestion  Respiratory: Negative for cough, shortness of breath and wheezing  Cardiovascular: Positive for leg swelling  Gastrointestinal: Negative for abdominal pain, constipation, diarrhea, nausea and vomiting  Genitourinary: Negative for difficulty urinating and dysuria  Musculoskeletal: Negative for back pain  Skin: Negative for pallor  Neurological: Negative for headaches  Psychiatric/Behavioral: Negative for agitation and confusion  All other systems reviewed and are negative        Scheduled Meds:  Current Facility-Administered Medications   Medication Dose Route Frequency Provider Last Rate    acetaminophen  650 mg Oral Q6H PRN Charo Lewis MD      aluminum-magnesium hydroxide-simethicone  15 mL Oral Q6H PRN Charo Lewis MD      amLODIPine  2 5 mg Oral HS CLARICE Wang      aspirin  81 mg Oral Daily Charo Lewis MD      atorvastatin  40 mg Oral Daily Charo Lewis MD      azithromycin  500 mg Oral Q24H Wicho Freire MD      calcium acetate  667 mg Oral BID With Meals Charo Lewis MD      carvedilol  12 5 mg Oral BID With Meals CLARICE Wang      cefTRIAXone  1,000 mg Intravenous Q24H Wicho Freire MD 1,000 mg (03/05/21 1129)    docusate sodium  100 mg Oral BID PRN Charo Lewis MD      furosemide  40 mg Intravenous TID (diuretic) CLARICE Bourgeois      heparin (porcine)  5,000 Units Subcutaneous [facility] Charo Lewis MD      hydrALAZINE  20 mg Intravenous Q6H PRN Wicho Freire MD      hydrALAZINE  100 mg Oral TID CLARICE Bourgeois      insulin aspart protamine-insulin aspart  10 Units Subcutaneous BID AC Charo Lewis MD      insulin lispro 1-5 Units Subcutaneous HS Gemma Kelley MD      insulin lispro  1-6 Units Subcutaneous TID [facility] Gemma Kelley MD      iron sucrose  200 mg Intravenous Daily [facility] 200 mg (03/05/21 6817)    isosorbide mononitrate  60 mg Oral Daily CLARICE Wang      levothyroxine  125 mcg Oral Daily Gemma Kelley MD      nitroglycerin  0 4 mg Sublingual Q5 Min PRN Gemma Kelley MD      ondansetron  4 mg Intravenous Q6H PRN Gemma Kelley MD      phenol  1 spray Mouth/Throat Q2H PRN Richard Carter MD         PRN Meds:   acetaminophen    aluminum-magnesium hydroxide-simethicone    docusate sodium    hydrALAZINE    nitroglycerin    ondansetron    phenol    Continuous Infusions:       Invasive Devices:      Invasive Devices     Peripheral Intravenous Line            Peripheral IV 03/05/21 Right;Upper;Ventral (anterior) Arm less than 1 day                  LABORATORY:    Results from last 7 days   Lab Units 03/06/21  0510 03/05/21  0516 03/04/21  0510 03/03/21  0524 03/02/21  0415 03/02/21  0140 03/02/21  0139 03/01/21  1949 03/01/21 1948   WBC Thousand/uL 6 71  --   --   --  6 86  --   --  7 12  --    HEMOGLOBIN g/dL 7 3*  --   --   --  7 3*  --   --  7 7*  --    HEMATOCRIT % 24 6*  --   --   --  23 5*  --   --  26 0*  --    PLATELETS Thousands/uL 144*  --   --   --  168  --  139* 169  --    POTASSIUM mmol/L  --  4 5 4 5 4 7  --  4 8  --   --  4 9   CHLORIDE mmol/L  --  105 106 109*  --  109*  --   --  108   CO2 mmol/L  --  26 27 25  --  24  --   --  23   BUN mg/dL  --  72* 83* 84*  --  90*  --   --  93*   CREATININE mg/dL  --  3 05* 2 87* 2 81*  --  3 04*  --   --  3 28*   CALCIUM mg/dL  --  9 1 8 9 8 9  --  8 8  --   --  9 0   MAGNESIUM mg/dL  --   --   --   --   --  2 7*  --   --   --    PHOSPHORUS mg/dL  --   --   --  4 3* 4 2*  --   --   --   --       rest all reviewed    RADIOLOGY:  XR chest 1 view portable   Final Result by Jeffrey Chaves, DO (03/02 5893)      There is mild pulmonary vascular congestion  Bibasilar consolidation likely atelectasis or developing pneumonia  Workstation performed: [id number]           Rest all reviewed    Portions of the record may have been created with voice recognition software  Occasional wrong word or "sound a like" substitutions may have occurred due to the inherent limitations of voice recognition software  Read the chart carefully and recognize, using context, where substitutions have occurred  If you have any questions, please contact the dictating provider

## 2021-03-06 NOTE — ASSESSMENT & PLAN NOTE
· Blood pressures were poorly controlled upon admission but BP is trending down now  · Continue carvedilol 12 5 mg BID, hydralazine 50 mg TID  · Amlodipine added this admission  · Improved with diuresis

## 2021-03-06 NOTE — ASSESSMENT & PLAN NOTE
Lab Results   Component Value Date    HGBA1C 11 7 (H) 03/02/2021     Recent Labs     03/05/21  2054 03/06/21  0617 03/06/21  1139 03/06/21  1617   POCGLU 223* 113 158* 149*     Blood Sugar Average: Last 72 hrs:  (P) 056 [id number]     · Poorly controlled as evidenced by A1c but improved since prior study last month  · Continue NovoLog 70/30 10u BID  · Continue Accu-Cheks and sliding scale insulin

## 2021-03-06 NOTE — ASSESSMENT & PLAN NOTE
· opacification of left TM, likely bacterial OM  · Started on ceftriaxone and azithro, for pneumonia as above  · improved

## 2021-03-06 NOTE — ASSESSMENT & PLAN NOTE
Suspect RLL PNA based on CXR, cough with sputum and RLL crackles    Continue ceftriaxone and azithromycin x5 days total

## 2021-03-06 NOTE — ASSESSMENT & PLAN NOTE
Wt Readings from Last 3 Encounters:   03/06/21 77 2 kg (170 lb 3 1 oz)   02/27/21 81 kg (178 lb 9 2 oz)   11/02/20 77 1 kg (170 lb)     · Patient with acute exacerbation of CHF secondary to dietary indiscretions and not resuming her diuretics at time of discharge from recent hospitalization  · Continue low-salt diet and fluid restriction    · Monitor intake, and output  · Monitor daily weights  · Weights down by approximate another 0 5kg today  · Edema and SOB improving  · Diuretics changed to PO torsemide, monitor for stability on oral regimen

## 2021-03-06 NOTE — PROGRESS NOTES
Progress Note - Annabella Díaz 1955, 76 y o  female MRN: [id number]    Unit/Bed#: [facility] 526-01 Encounter: [id number]    Primary Care Provider: Rivka Neumann MD   Date and time admitted to hospital: 3/1/2021  7:29 PM        * Acute on chronic combined systolic and diastolic congestive heart failure ([address] )  Assessment & Plan  Wt Readings from Last 3 Encounters:   03/06/21 77 2 kg (170 lb 3 1 oz)   02/27/21 81 kg (178 lb 9 2 oz)   11/02/20 77 1 kg (170 lb)     · Patient with acute exacerbation of CHF secondary to dietary indiscretions and not resuming her diuretics at time of discharge from recent hospitalization  · Continue low-salt diet and fluid restriction  · Monitor intake, and output  · Monitor daily weights  · Weights down by approximate another 0 5kg today  · Edema and SOB improving  · Diuretics changed to PO torsemide, monitor for stability on oral regimen    Hypertensive urgency  Assessment & Plan  · Blood pressures were poorly controlled upon admission but BP is trending down now  · Continue carvedilol 12 5 mg BID, hydralazine 50 mg TID  · Amlodipine added this admission  · Improved with diuresis    Acute renal failure superimposed on stage 4 chronic kidney disease [facility])  Assessment & Plan  Lab Results   Component Value Date    EGFR 15 03/06/2021    EGFR 15 03/05/2021    EGFR 16 03/04/2021    CREATININE 3 04 (H) 03/06/2021    CREATININE 3 05 (H) 03/05/2021    CREATININE 2 87 (H) 03/04/2021     · Baseline creatinine:  1 9-2 2  · KELLY likely secondary to cardiorenal syndrome as well as not being fully recovered from prior KELLY  · Continue diuretics for volume overload    Anemia  Assessment & Plan  · Chronic anemia, likely due to CKD and iron deficiency  · On IV venofer x 5 doses  · Outpatient follow up with nephrology for EPO  · Discussed possible transfusion with the patient, he had chest pain with transfusion last admission and is hesitant at this time   Per blood bank there was no significant transfusion reaction  Will reserve blood transfusion if symptoms worsen  Coronary artery disease involving native coronary artery of native heart with angina pectoris [facility])  Assessment & Plan  · Currently without chest discomfort  · Continue Coreg, aspirin, lipitor, imdur    Type 2 diabetes mellitus with kidney complication, with long-term current use of insulin [facility])  Assessment & Plan  Lab Results   Component Value Date    HGBA1C 11 7 (H) 03/02/2021     Recent Labs     03/05/21  2054 03/06/21  0617 03/06/21  1139 03/06/21  1617   POCGLU 223* 113 158* 149*     Blood Sugar Average: Last 72 hrs:  (P) 458 [id number]     · Poorly controlled as evidenced by A1c but improved since prior study last month  · Continue NovoLog 70/30 10u BID  · Continue Accu-Cheks and sliding scale insulin  Hypothyroidism  Assessment & Plan  · TSH is elevated at 15 800 however improved from prior study on 02/21/2021 at 20 900  · Levothyroxine was increased on 02/22/2021  · Continue levothyroxine 125 mcg daily  Pneumonia  Assessment & Plan  Suspect RLL PNA based on CXR, cough with sputum and RLL crackles  Continue ceftriaxone and azithromycin x5 days total    Acute otitis media  Assessment & Plan  · opacification of left TM, likely bacterial OM  · Started on ceftriaxone and azithro, for pneumonia as above  · improved        VTE Pharmacologic Prophylaxis:   Pharmacologic: Heparin  Mechanical VTE Prophylaxis in Place: Yes    Patient Centered Rounds: I have performed bedside rounds with nursing staff today  Discussions with Specialists or Other Care Team Provider:     Education and Discussions with Family / Patient: patient, plan of care    Time Spent for Care: 20 minutes  More than 50% of total time spent on counseling and coordination of care as described above      Current Length of Stay: 5 day(s)    Current Patient Status: Inpatient   Certification Statement: The patient will continue to require additional inpatient hospital stay due [place] [facility] [place]    Discharge Plan: home when  stable    Code Status: Level 1 - Full Code      Subjective:   Reports that her breathing and lower extremity edema continue to improve  Objective:     Vitals:   Temp (24hrs), Av 4 °F (36 9 °C), Min:98 °F (36 7 °C), Max:98 7 °F (37 1 °C)    Temp:  [98 °F (36 7 °C)-98 7 °F (37 1 °C)] 98 °F (36 7 °C)  HR:  [60-76] 60  Resp:  [16-20] 18  BP: (140-175)/(58-78) 140/67  SpO2:  [93 %-96 %] 95 %  Body mass index is 28 32 kg/m²  Input and Output Summary (last 24 hours): Intake/Output Summary (Last 24 hours) at 3/6/2021 1641  Last data filed at 3/6/2021 1636  Gross per 24 hour   Intake 530 ml   Output 1425 ml   Net -895 ml       Physical Exam:     Physical Exam  Constitutional:       Appearance: Normal appearance  She is obese  HENT:      Head: Normocephalic and atraumatic  Cardiovascular:      Rate and Rhythm: Normal rate and regular rhythm  Pulmonary:      Breath sounds: No wheezing or rales  Musculoskeletal:      Right lower leg: Edema present  Left lower leg: Edema present  Neurological:      Mental Status: She is alert and oriented to person, place, and time  Mental status is at baseline     Psychiatric:         Mood and Affect: Mood normal          Behavior: Behavior normal            Additional Data:     Labs:    Results from last 7 days   Lab Units 21  0510   WBC Thousand/uL 6 71   HEMOGLOBIN g/dL 7 3*   HEMATOCRIT % 24 6*   PLATELETS Thousands/uL 144*   NEUTROS PCT % 68   LYMPHS PCT % 20   MONOS PCT % 7   EOS PCT % 3     Results from last 7 days   Lab Units 21  0510  21  0140   SODIUM mmol/L 140   < > 138   POTASSIUM mmol/L 4 4   < > 4 8   CHLORIDE mmol/L 105   < > 109*   CO2 mmol/L 28   < > 24   BUN mg/dL 73*   < > 90*   CREATININE mg/dL 3 04*   < > 3 04*   ANION GAP mmol/L 7   < > 5   CALCIUM mg/dL 9 3   < > 8 8   ALBUMIN g/dL  --   --  2 8*   TOTAL BILIRUBIN mg/dL  --   --  0 29   ALK PHOS U/L  --   -- 610*   ALT U/L  --   --  66   AST U/L  --   --  45   GLUCOSE RANDOM mg/dL 120   < > 171*    < > = values in this interval not displayed  Results from last 7 days   Lab Units 03/06/21  1617 03/06/21  1139 03/06/21  0617 03/05/21  2054 03/05/21  1612 03/05/21  1128 03/05/21  0847 03/05/21  0625 03/04/21  2107 03/04/21  1613 03/04/21  1118 03/04/21  0635   POC GLUCOSE mg/dl 149* 158* 113 223* 162* 176* 207* 124 180* 330* 210* 89     Results from last 7 days   Lab Units 03/02/21  0139   HEMOGLOBIN A1C % 11 7*               * I Have Reviewed All Lab Data Listed Above  * Additional Pertinent Lab Tests Reviewed:  All Labs Within Last 24 Hours Reviewed      Recent Cultures (last 7 days):           Last 24 Hours Medication List:   Current Facility-Administered Medications   Medication Dose Route Frequency Provider Last Rate    acetaminophen  650 mg Oral Q6H PRN Andreas Lion MD      aluminum-magnesium hydroxide-simethicone  15 mL Oral Q6H PRN Andreas Lion MD      amLODIPine  2 5 mg Oral HS CLARICE Wang      aspirin  81 mg Oral Daily Andreas Lion MD      atorvastatin  40 mg Oral Daily Andreas Lion MD      azithromycin  500 mg Oral Q24H Tariq Baxter MD      calcium acetate  667 mg Oral BID With Meals Andreas Lion MD      carvedilol  12 5 mg Oral BID With Meals CLARICE Wang      cefTRIAXone  1,000 mg Intravenous Q24H Tariq Baxter MD 1,000 mg (03/06/21 1050)    docusate sodium  100 mg Oral BID PRN Andreas Lion MD      heparin (porcine)  5,000 Units Subcutaneous [facility] Andreas Lion MD      hydrALAZINE  20 mg Intravenous Q6H PRN Tariq Baxter MD      hydrALAZINE  100 mg Oral TID Rebbecca Cobble CLARICE Dickson      insulin aspart protamine-insulin aspart  10 Units Subcutaneous BID KAEL Lion MD      insulin lispro  1-5 Units Subcutaneous HS Andreas Lion MD      insulin lispro  1-6 Units Subcutaneous TID AC Andreas Lion MD      isosorbide mononitrate  60 mg Oral Daily CLARICE Wang      levothyroxine  125 mcg Oral Daily Peggy Rivas MD      nitroglycerin  0 4 mg Sublingual Q5 Min PRN Peggy Rivas MD      ondansetron  4 mg Intravenous Q6H PRN Peggy Rivas MD      phenol  1 spray Mouth/Throat Q2H PRN Omaira Caro MD      torsemide  40 mg Oral BID (diuretic) Brittney Gerardo DO          Today, Patient Was Seen By: Candido Leija MD    ** Please Note: Dictation voice to text software may have been used in the creation of this document   **

## 2021-03-06 NOTE — ASSESSMENT & PLAN NOTE
Lab Results   Component Value Date    EGFR 15 03/06/2021    EGFR 15 03/05/2021    EGFR 16 03/04/2021    CREATININE 3 04 (H) 03/06/2021    CREATININE 3 05 (H) 03/05/2021    CREATININE 2 87 (H) 03/04/2021     · Baseline creatinine:  1 9-2 2  · KELLY likely secondary to cardiorenal syndrome as well as not being fully recovered from prior KELLY    · Continue diuretics for volume overload

## 2021-03-07 LAB
ANION GAP SERPL CALCULATED.3IONS-SCNC: 5 MMOL/L (ref 4–13)
BUN SERPL-MCNC: 75 MG/DL (ref 5–25)
CALCIUM SERPL-MCNC: 8.7 MG/DL (ref 8.3–10.1)
CHLORIDE SERPL-SCNC: 105 MMOL/L (ref 100–108)
CO2 SERPL-SCNC: 30 MMOL/L (ref 21–32)
CREAT SERPL-MCNC: 3.54 MG/DL (ref 0.6–1.3)
GFR SERPL CREATININE-BSD FRML MDRD: 13 ML/MIN/1.73SQ M
GLUCOSE SERPL-MCNC: 105 MG/DL (ref 65–140)
GLUCOSE SERPL-MCNC: 107 MG/DL (ref 65–140)
GLUCOSE SERPL-MCNC: 196 MG/DL (ref 65–140)
GLUCOSE SERPL-MCNC: 235 MG/DL (ref 65–140)
GLUCOSE SERPL-MCNC: 348 MG/DL (ref 65–140)
PHOSPHATE SERPL-MCNC: 4 MG/DL (ref 2.3–4.1)
POTASSIUM SERPL-SCNC: 4.4 MMOL/L (ref 3.5–5.3)
SODIUM SERPL-SCNC: 140 MMOL/L (ref 136–145)

## 2021-03-07 PROCEDURE — 80048 BASIC METABOLIC PNL TOTAL CA: CPT | Performed by: INTERNAL MEDICINE

## 2021-03-07 PROCEDURE — 84100 ASSAY OF PHOSPHORUS: CPT | Performed by: INTERNAL MEDICINE

## 2021-03-07 PROCEDURE — 99233 SBSQ HOSP IP/OBS HIGH 50: CPT | Performed by: INTERNAL MEDICINE

## 2021-03-07 PROCEDURE — 99231 SBSQ HOSP IP/OBS SF/LOW 25: CPT | Performed by: INTERNAL MEDICINE

## 2021-03-07 PROCEDURE — 82948 REAGENT STRIP/BLOOD GLUCOSE: CPT

## 2021-03-07 PROCEDURE — 99232 SBSQ HOSP IP/OBS MODERATE 35: CPT | Performed by: INTERNAL MEDICINE

## 2021-03-07 RX ADMIN — ISOSORBIDE MONONITRATE 60 MG: 60 TABLET, EXTENDED RELEASE ORAL at 08:00

## 2021-03-07 RX ADMIN — CARVEDILOL 12.5 MG: 12.5 TABLET, FILM COATED ORAL at 06:47

## 2021-03-07 RX ADMIN — ATORVASTATIN CALCIUM 40 MG: 40 TABLET, FILM COATED ORAL at 08:00

## 2021-03-07 RX ADMIN — INSULIN LISPRO 2 UNITS: 100 INJECTION, SOLUTION INTRAVENOUS; SUBCUTANEOUS at 21:39

## 2021-03-07 RX ADMIN — LEVOTHYROXINE SODIUM 125 MCG: 125 TABLET ORAL at 06:39

## 2021-03-07 RX ADMIN — INSULIN ASPART 10 UNITS: 100 INJECTION, SUSPENSION SUBCUTANEOUS at 17:03

## 2021-03-07 RX ADMIN — HYDRALAZINE HYDROCHLORIDE 100 MG: 50 TABLET ORAL at 17:01

## 2021-03-07 RX ADMIN — AZITHROMYCIN MONOHYDRATE 500 MG: 250 TABLET ORAL at 10:25

## 2021-03-07 RX ADMIN — HYDRALAZINE HYDROCHLORIDE 100 MG: 50 TABLET ORAL at 08:00

## 2021-03-07 RX ADMIN — INSULIN LISPRO 2 UNITS: 100 INJECTION, SOLUTION INTRAVENOUS; SUBCUTANEOUS at 11:35

## 2021-03-07 RX ADMIN — HYDRALAZINE HYDROCHLORIDE 100 MG: 50 TABLET ORAL at 21:39

## 2021-03-07 RX ADMIN — CALCIUM ACETATE 667 MG: 667 CAPSULE ORAL at 06:47

## 2021-03-07 RX ADMIN — ASPIRIN 81 MG: 81 TABLET, CHEWABLE ORAL at 08:00

## 2021-03-07 RX ADMIN — CARVEDILOL 12.5 MG: 12.5 TABLET, FILM COATED ORAL at 17:01

## 2021-03-07 RX ADMIN — AMLODIPINE BESYLATE 2.5 MG: 2.5 TABLET ORAL at 21:40

## 2021-03-07 RX ADMIN — INSULIN LISPRO 5 UNITS: 100 INJECTION, SOLUTION INTRAVENOUS; SUBCUTANEOUS at 17:03

## 2021-03-07 RX ADMIN — CEFTRIAXONE 1000 MG: 2 INJECTION, POWDER, FOR SOLUTION INTRAMUSCULAR; INTRAVENOUS at 10:22

## 2021-03-07 RX ADMIN — CALCIUM ACETATE 667 MG: 667 CAPSULE ORAL at 17:01

## 2021-03-07 RX ADMIN — HEPARIN SODIUM 5000 UNITS: 5000 INJECTION INTRAVENOUS; SUBCUTANEOUS at 06:39

## 2021-03-07 NOTE — ASSESSMENT & PLAN NOTE
Lab Results   Component Value Date    EGFR 13 03/07/2021    EGFR 15 03/06/2021    EGFR 15 03/05/2021    CREATININE 3 54 (H) 03/07/2021    CREATININE 3 04 (H) 03/06/2021    CREATININE 3 05 (H) 03/05/2021     · Baseline creatinine:  1 9-2 2  · KELLY likely secondary to cardiorenal syndrome as well as not being fully recovered from prior KELLY    · Continue diuretics for volume overload

## 2021-03-07 NOTE — PROGRESS NOTES
Follow up Consultation    Nephrology   Markell Morse 76 y o  female MRN: [id number]  Unit/Bed#: [facility] 526-01 Encounter: [id number]      Physician Requesting Consult: Ameya Taveras MD        ASSESSMENT/PLAN:  77-year-old female with multiple comorbidities including diabetes, hypertension, hypothyroidism, CAD, CHF, anemia and CKD stage 4 status post recent hospitalization with acute on chronic CHF and acute kidney injury now readmitted with shortness of breath  Nephrology consulted for acute kidney injury evaluation and management         · Acute kidney injury (POA) on CKD stage 4:  - EMRE most likely secondary to not having fully recovered from prior Emre I plus cardiorenal syndrome  - After review of records it appears that the patient has a baseline Creatinine of 1 9 to 2 2 mg/dL dating as far back as 2020   - patient was admitted with a creatinine of 3 28 mg/dL on 03/01/2021   - patient's creatinine today is at 3 54 mg/dL, elevated from yesterday   - diuretics per Cardiology, recommend holding diuretics today and re-initiated tomorrow to lower dose If okay with Cardiology  - check BMP in a m   - Await renal recovery  - Clinically patient is not uremic and there is no acute indication for renal replacement therapy (dialysis)  - Optimize hemodynamic status to avoid delay in renal recovery  - Place on a renal diet when allowed diet order    - Avoid nephrotoxins, adjust meds to appropriate GFR   - Strict I/O   - Daily weights  - Urinary retention protocol if patient does not have a Valero  - Most likely has underlying CKD secondary to diabetic kidney disease plus hypertensive nephrosclerosis plus cardiorenal syndrome plus recurrent episodes of acute kidney injury non dialysis requiring plus age-related nephron loss    - will need to set up patient for follow up with Nephrology as an outpatient post hospitalization   - as an outpatient for nephrology patient follows up with Dr ash     · Blood pressure/hypertension:  - home medications:  Coreg 12 5 mg p o  B i d , hydralazine 50 mg p o  T i d , Imdur 30 mg p o  Q day, Procardia 30 mg p o  Q day, torsemide 20 mg p o  B i d   - current medications:  torsemide 40 mg p o  B i d   , Norvasc 2 5 mg p o  Q h s  hydralazine 100 mg p o  T i d , Coreg 12 5 mg p o  B i d  Imdur 60 mg p o  Q day  - recommendations:  recommend holding diuretics today and reinitiate in tomorrow to lower dose Diuretics if okay with Cardiology  - Optimize hemodynamics   - Maintain MAP > 65mmHg  - Avoid BP fluctuations      · H/H/anemia:  - most recent hemoglobin at 7 3 grams/deciliter  - maintain hemoglobin greater than 8 grams/deciliter  - likely anemia due to underlying CKD  - complete Venofer 200 mg IV daily x5 days while in the hospital  - consider PRBC transfusion     · Acid-base electrolytes:  ? Hyperphosphatemia  § On PhosLo 1 tab p o  B i d  With meals  § Check phosphorus level in a m  § Most recent phosphorus 4 0, improving  ? Hypermagnesemia:  § Most recent magnesium at 2 7  § Continue to monitor for now  ? Acid-base:    § Most recent bicarb at 30     · Volume status:  ?  Clinically appears to be minimally hypervolemic with intravascular volume depletion likely secondary to hypoalbuminemia  Diuretics per Cardiology  Recommend holding diuretics today and restarted at lower dose tomorrow  · Proteinuria:   ? Most likely secondary to diabetic kidney disease  ? Most recent protein creatinine ratio of 1 5 g as of 02/23/2021     · Other medical problems:  ? Diabetes:  Management per primary team   On insulin, most recent A1c of 11 7% as of March 2021  ? Acute on chronic CHF:  Management per primary team, diuretics per Cardiology  Follow-up with cardiology  At home was supposed to be on torsemide 20 mg p o  B i d   But was not discharged on any diuretics at the time of discharge         Thanks for the consult  Will continue to follow  Please call with questions/ concerns  Above-mentioned orders and Plan in terms of acute kidney injury was discussed with the team in [address] MD, [facility] 3/7/2021, 6:24 AM              Objective :   Patient seen and examined in her room no overnight events hemodynamically stable remains afebrile urine output close to 1 2 L last 24 hours  Overall reports she feels better edema is improved      PHYSICAL EXAM  /61 (BP Location: Left arm)   Pulse 76   Temp 97 5 °F (36 4 °C) (Oral)   Resp 16   Ht 5' 5" (1 651 m)   Wt 77 2 kg (170 lb 3 1 oz)   SpO2 95%   BMI 28 32 kg/m²   Temp (24hrs), Av 8 °F (36 6 °C), Min:97 5 °F (36 4 °C), Max:98 1 °F (36 7 °C)        Intake/Output Summary (Last 24 hours) at 3/7/2021 0624  Last data filed at 3/6/2021 2302  Gross per 24 hour   Intake 480 ml   Output 425 ml   Net 55 ml       I/O last 24 hours: In: 650 [P O :600; IV Piggyback:50]  Out: 825 [Urine:825]      Current Weight: Weight - Scale: 77 2 kg (170 lb 3 1 oz)  First Weight: Weight - Scale: 80 5 kg (177 lb 8 oz)  Physical Exam  Vitals signs and nursing note reviewed  Constitutional:       General: She is not in acute distress  Appearance: Normal appearance  She is normal weight  She is not ill-appearing, toxic-appearing or diaphoretic  HENT:      Head: Normocephalic and atraumatic  Mouth/Throat:      Pharynx: Oropharynx is clear  No oropharyngeal exudate  Eyes:      General: No scleral icterus  Conjunctiva/sclera: Conjunctivae normal    Neck:      Musculoskeletal: Normal range of motion and neck supple  Cardiovascular:      Rate and Rhythm: Normal rate  Heart sounds: Normal heart sounds  No friction rub  Pulmonary:      Effort: No respiratory distress  Breath sounds: Normal breath sounds  No stridor  No wheezing  Abdominal:      General: There is no distension  Palpations: Abdomen is soft  There is no mass  Tenderness: There is no abdominal tenderness     Musculoskeletal:         General: Swelling present  Comments: Trace edema bilateral lower extremities   Skin:     General: Skin is warm  Coloration: Skin is not jaundiced  Neurological:      General: No focal deficit present  Mental Status: She is alert and oriented to person, place, and time  Psychiatric:         Mood and Affect: Mood normal          Behavior: Behavior normal              Review of Systems   Constitutional: Negative for appetite change, chills and fatigue  HENT: Negative for congestion  Respiratory: Negative for cough, shortness of breath and wheezing  Cardiovascular: Positive for leg swelling  Gastrointestinal: Negative for abdominal pain, constipation, diarrhea, nausea and vomiting  Genitourinary: Negative for difficulty urinating and dysuria  Musculoskeletal: Negative for back pain  Skin: Negative for rash  Neurological: Negative for dizziness and headaches  Psychiatric/Behavioral: Negative for agitation and confusion  All other systems reviewed and are negative        Scheduled Meds:  Current Facility-Administered Medications   Medication Dose Route Frequency Provider Last Rate    acetaminophen  650 mg Oral Q6H PRN Emily Lofton MD      aluminum-magnesium hydroxide-simethicone  15 mL Oral Q6H PRN Emily Lofton MD      amLODIPine  2 5 mg Oral HS CLARICE Wang      aspirin  81 mg Oral Daily Emily Lofton MD      atorvastatin  40 mg Oral Daily Emily Lofton MD      azithromycin  500 mg Oral Q24H Melissa Humphrey MD      calcium acetate  667 mg Oral BID With Meals Emily Lofton MD      carvedilol  12 5 mg Oral BID With Meals CLARICE Wang      cefTRIAXone  1,000 mg Intravenous Q24H Melissa Humphrey MD 1,000 mg (03/06/21 1050)    docusate sodium  100 mg Oral BID PRN Emily Lofton MD      heparin (porcine)  5,000 Units Subcutaneous [facility] Emily Lofton MD      hydrALAZINE  20 mg Intravenous Q6H PRN Melissa Humphrey MD      hydrALAZINE  100 mg Oral TID CLARICE Baldwin      insulin aspart protamine-insulin aspart  10 Units Subcutaneous BID AC Suni Oseguera MD      insulin lispro  1-5 Units Subcutaneous HS Suni Oseguera MD      insulin lispro  1-6 Units Subcutaneous TID AC Suni Oseguera MD      isosorbide mononitrate  60 mg Oral Daily Lihn MITA CLARICE Dickson      levothyroxine  125 mcg Oral Daily Suni Oseguera MD      nitroglycerin  0 4 mg Sublingual Q5 Min PRN Suni Oseguera MD      ondansetron  4 mg Intravenous Q6H PRN Suni Oseguera MD      phenol  1 spray Mouth/Throat Q2H PRN Kaylene Sicard, MD      torsemide  40 mg Oral BID (diuretic) Jakob Moffett DO         PRN Meds:   acetaminophen    aluminum-magnesium hydroxide-simethicone    docusate sodium    hydrALAZINE    nitroglycerin    ondansetron    phenol    Continuous Infusions:       Invasive Devices:      Invasive Devices     Peripheral Intravenous Line            Peripheral IV 03/06/21 Left Antecubital less than 1 day                  LABORATORY:    Results from last 7 days   Lab Units 03/07/21  0442 03/06/21  0510 03/05/21  0516 03/04/21  0510 03/03/21  0524 03/02/21  0415 03/02/21  0140 03/02/21  0139 03/01/21  1949 03/01/21  1948   WBC Thousand/uL  --  6 71  --   --   --  6 86  --   --  7 12  --    HEMOGLOBIN g/dL  --  7 3*  --   --   --  7 3*  --   --  7 7*  --    HEMATOCRIT %  --  24 6*  --   --   --  23 5*  --   --  26 0*  --    PLATELETS Thousands/uL  --  144*  --   --   --  168  --  139* 169  --    POTASSIUM mmol/L 4 4 4 4 4 5 4 5 4 7  --  4 8  --   --  4 9   CHLORIDE mmol/L 105 105 105 106 109*  --  109*  --   --  108   CO2 mmol/L 30 28 26 27 25  --  24  --   --  23   BUN mg/dL 75* 73* 72* 83* 84*  --  90*  --   --  93*   CREATININE mg/dL 3 54* 3 04* 3 05* 2 87* 2 81*  --  3 04*  --   --  3 28*   CALCIUM mg/dL 8 7 9 3 9 1 8 9 8 9  --  8 8  --   --  9 0   MAGNESIUM mg/dL  --   --   --   --   --   --  2 7*  --   --   --    PHOSPHORUS mg/dL 4 0  --   --   -- 4  3* 4 2*  --   --   --   --       rest all reviewed    RADIOLOGY:  XR chest 1 view portable   Final Result by Jeanne Wray DO (03/02 5402)      There is mild pulmonary vascular congestion  Bibasilar consolidation likely atelectasis or developing pneumonia  Workstation performed: [id number]           Rest all reviewed    Portions of the record may have been created with voice recognition software  Occasional wrong word or "sound a like" substitutions may have occurred due to the inherent limitations of voice recognition software  Read the chart carefully and recognize, using context, where substitutions have occurred  If you have any questions, please contact the dictating provider

## 2021-03-07 NOTE — ASSESSMENT & PLAN NOTE
Lab Results   Component Value Date    HGBA1C 11 7 (H) 03/02/2021     Recent Labs     03/06/21  1617 03/06/21  2110 03/07/21  0615 03/07/21  1130   POCGLU 149* 285* 105 196*     Blood Sugar Average: Last 72 hrs:  (P) 677 [id number]     · Poorly controlled as evidenced by A1c but improved since prior study last month  · Continue NovoLog 70/30 10u BID  · Continue Accu-Cheks and sliding scale insulin

## 2021-03-07 NOTE — ASSESSMENT & PLAN NOTE
Wt Readings from Last 3 Encounters:   03/07/21 75 9 kg (167 lb 5 3 oz)   02/27/21 81 kg (178 lb 9 2 oz)   11/02/20 77 1 kg (170 lb)     · Patient with acute exacerbation of CHF secondary to dietary indiscretions and not resuming her diuretics at time of discharge from recent hospitalization  · Continue low-salt diet and fluid restriction    · Monitor intake, and output  · Monitor daily weights  · Weights down by approximate another kg today  · Edema and SOB improving  · Holding diuretics today due to slight inrease in creatinine

## 2021-03-07 NOTE — PROGRESS NOTES
Heart Failure/ Pulmonary Hypertension Progress Note - Margarette Castro 76 y o  female MRN: [id number]    Unit/Bed#: [facility] 526-01 Encounter: [id number]      Assessment:    Principal Problem:    Acute on chronic combined systolic and diastolic congestive heart failure (HCC)  Active Problems:    Type 2 diabetes mellitus with kidney complication, with long-term current use of insulin (HCC)    Hypothyroidism    Acute renal failure superimposed on stage 4 chronic kidney disease (HCC)    Anemia    Coronary artery disease involving native coronary artery of native heart with angina pectoris (HCC)    Pneumonia    Hypertensive urgency    Acute otitis media      Subjective:   Patient seen and examined  No significant events overnight  Objective: Intake/ Output: 480/425 w/ torsemide 40 mg and furosemide 40 mg IV x 2  Weight: 178 on admit, 167 today  Tele: SR    Cr 3 5 today     Assessment:   # Acute on chronic HFpEF, Stage C, NYHA II/III  Recent admitted with PNA and HF exacerbation  Discharged home off diuretic due to KELLY and now presents again with acute decompensation and need for IV diuresis  Home diuretic- Torsemide 20 mg BID    Inpatient diuretic: furosemide 40 mg IV TID- hold today  Weight:             Lab Results   Component Value Date     NTBNP 9,024 (H) 03/01/2021      NT Pro BNP 2/21/21: 12 3K      Studies- personally reviewed by me    TTE 2/22/21: LVEF 60%, akinesis of basal, inferior and basal mid inferolateral walls  Moderate cLVH, grade II DD, mild MR, trace TR, moderate, free flowing pericardial effusion without hemodynamic compromise   RV normal IVC normal  IVSd 0 99 cm       Echo 12/30/18:  EF: 50%, grade 2 DD, PASP 35 mmHg     # CAD -1/4/19: LUDY x 2 to LAD after NSTEMI (trop 0 2) and abnormal stress test with ischemia in anterior wall on 12/31/18  80% RCA- plan staged, but has not needed intervention  Med Rx: Imdur 30 mg daily, asa         # HTN- Uncontrolled with systolic to the 514X on admit  Improved with Hydral 100 mg TID  Continue  Coreg 12 5 mg BID ,Imdur 60 mg daily  Amlodipine 2 5 mg daily     H/O Palpitations  Holter 5/17/19: 53-85, avg 69 bpm  8 PVCs      Anemia- Hgb now in the 7 2-7 5 range  Receiving Venofer infusion     # KELLY on CKD3, new baseline seems to be 2 5-3 0  Cr 3 28 on admit, down to Fiksu on board  S/P 2 u of IV ablumin  Appreciate recs       DM2, hgA1C 3/2/21 11 7     HLD  FLP 3/2/21: , , HDL 76, LDL 48 on Atorva 40 mg daily     Plan:  Hold diuretic today given bump in Cr  Continue antihypertensives    Review of Systems   Constitutional: Negative for activity change, appetite change, fatigue and unexpected weight change  HENT: Negative for congestion and nosebleeds  Eyes: Negative  Respiratory: Negative for cough, chest tightness and shortness of breath  Cardiovascular: Negative for chest pain, palpitations and leg swelling  Gastrointestinal: Negative for abdominal distention  Endocrine: Negative  Genitourinary: Negative  Musculoskeletal: Negative  Skin: Negative  Neurological: Negative for dizziness, syncope and weakness  Hematological: Negative  Psychiatric/Behavioral: Negative  [employer] (day, reason): Valero catheter (day, reason):    Vitals: Blood pressure 149/61, pulse 70, temperature 98 1 °F (36 7 °C), resp  rate 18, height 5' 5" (1 651 m), weight 75 9 kg (167 lb 5 3 oz), SpO2 95 %  , Body mass index is 27 85 kg/m² , I/O last 3 completed shifts: In: 650 [P O :600; IV Piggyback:50]  Out: 825 [Urine:825]  No intake/output data recorded  Wt Readings from Last 3 Encounters:   03/07/21 75 9 kg (167 lb 5 3 oz)   02/27/21 81 kg (178 lb 9 2 oz)   11/02/20 77 1 kg (170 lb)       Intake/Output Summary (Last 24 hours) at 3/7/2021 0906  Last data filed at 3/6/2021 2302  Gross per 24 hour   Intake 300 ml   Output 425 ml   Net -125 ml     I/O last 3 completed shifts:   In: 650 [P O :600; IV Piggyback:50]  Out: 193 [Urine:825]    No significant arrhythmias seen on telemetry review         Physical Exam:  Vitals:    03/06/21 2302 03/07/21 0600 03/07/21 0722 03/07/21 0724   BP: 138/61  149/61    BP Location: Left arm      Pulse: 76  70    Resp: 16  18    Temp: 97 5 °F (36 4 °C)  98 1 °F (36 7 °C)    TempSrc: Oral      SpO2: 95%  95% 95%   Weight:  75 9 kg (167 lb 5 3 oz)     Height:           GEN: Celia Hernandez appears well, alert and oriented x 3, pleasant and cooperative   HEENT: pupils equal, round, and reactive to light; extraocular muscles intact  NECK: supple, no carotid bruits   HEART: regular rhythm, normal S1 and S2, no murmurs, clicks, gallops or rubs, JVP is    LUNGS: clear to auscultation bilaterally; no wheezes, rales, or rhonchi   ABDOMEN: normal bowel sounds, soft, no tenderness, no distention  EXTREMITIES: peripheral pulses normal; no clubbing, cyanosis, or edema  NEURO: no focal findings   SKIN: normal without suspicious lesions on exposed skin      Current Facility-Administered Medications:     acetaminophen (TYLENOL) tablet 650 mg, 650 mg, Oral, Q6H PRN, Yemi iDll MD    aluminum-magnesium hydroxide-simethicone (MYLANTA) oral suspension 15 mL, 15 mL, Oral, Q6H PRN, Yemi Dill MD    amLODIPine (NORVASC) tablet 2 5 mg, 2 5 mg, Oral, HS, Linh Dickson, CLARICE, 2 5 mg at 03/06/21 2139    aspirin chewable tablet 81 mg, 81 mg, Oral, Daily, Yemi Dill MD, 81 mg at 03/07/21 0800    atorvastatin (LIPITOR) tablet 40 mg, 40 mg, Oral, Daily, Yeim Dill MD, 40 mg at 03/07/21 0800    azithromycin (ZITHROMAX) tablet 500 mg, 500 mg, Oral, Q24H, Mauri Rodriguez MD, 500 mg at 03/06/21 1050    calcium acetate (PHOSLO) capsule 667 mg, 667 mg, Oral, BID With Meals, Yemi Dill MD, 667 mg at 03/07/21 0647    carvedilol (COREG) tablet 12 5 mg, 12 5 mg, Oral, BID With Meals, CLARICE Gooden, 12 5 mg at 03/07/21 0647    cefTRIAXone (ROCEPHIN) 1,000 mg in dextrose 5 % 50 mL IVPB, 1,000 mg, Intravenous, Q24H, Carlos Aguirre MD, Last Rate: 100 mL/hr at 03/06/21 1050, 1,000 mg at 03/06/21 1050    docusate sodium (COLACE) capsule 100 mg, 100 mg, Oral, BID PRN, Wes Maki MD    heparin (porcine) subcutaneous injection 5,000 Units, 5,000 Units, Subcutaneous, Q8H [facility] & long-term, 5,000 Units at 03/07/21 0639 **AND** [COMPLETED] Platelet count, , , Once, Wes Maki MD    hydrALAZINE (APRESOLINE) injection 20 mg, 20 mg, Intravenous, Q6H PRN, Carlos Aguirre MD    hydrALAZINE (APRESOLINE) tablet 100 mg, 100 mg, Oral, TID, CLARICE Wang, 100 mg at 03/07/21 0800    insulin aspart protamine-insulin aspart (NovoLOG 70/30) 100 units/mL subcutaneous injection 10 Units, 10 Units, Subcutaneous, BID AC, Wes Maki MD, Stopped at 03/07/21 0639    insulin lispro (HumaLOG) 100 units/mL subcutaneous injection 1-5 Units, 1-5 Units, Subcutaneous, HS, Wes Maki MD, 2 Units at 03/06/21 2140    insulin lispro (HumaLOG) 100 units/mL subcutaneous injection 1-6 Units, 1-6 Units, Subcutaneous, TID AC, Stopped at 03/07/21 0640 **AND** Fingerstick Glucose (POCT), , , TID AC, Wes Maki MD    isosorbide mononitrate (IMDUR) 24 hr tablet 60 mg, 60 mg, Oral, Daily, CLARICE Wang, 60 mg at 03/07/21 0800    levothyroxine tablet 125 mcg, 125 mcg, Oral, Daily, Wes Maki MD, 125 mcg at 03/07/21 0639    nitroglycerin (NITROSTAT) SL tablet 0 4 mg, 0 4 mg, Sublingual, Q5 Min PRN, Wes Maki MD    ondansetron [facility]) injection 4 mg, 4 mg, Intravenous, Q6H PRN, Wes Maki MD    phenol (CHLORASEPTIC) 1 4 % mucosal liquid 1 spray, 1 spray, Mouth/Throat, Q2H PRN, Carlos Aguirre MD      Labs & Results:    Results from last 7 days   Lab Units 03/02/21  0415 03/02/21  0139 03/01/21 1948   TROPONIN I ng/mL 0 02 0 02 0 02     Results from last 7 days   Lab Units 03/06/21  0510 03/02/21  0415 03/02/21  0139 03/01/21 1949   WBC Thousand/uL 6 71 6 86  --  7 12   HEMOGLOBIN g/dL 7 3* 7 3* --  7 7*   HEMATOCRIT % 24 6* 23 5*  --  26 0*   PLATELETS Thousands/uL 144* 168 139* 169         Results from last 7 days   Lab Units 03/07/21  0442 03/06/21  0510 03/05/21  0516  03/02/21  0140 03/01/21  1948   POTASSIUM mmol/L 4 4 4 4 4 5   < > 4 8 4 9   CHLORIDE mmol/L 105 105 105   < > 109* 108   CO2 mmol/L 30 28 26   < > 24 23   BUN mg/dL 75* 73* 72*   < > 90* 93*   CREATININE mg/dL 3 54* 3 04* 3 05*   < > 3 04* 3 28*   CALCIUM mg/dL 8 7 9 3 9 1   < > 8 8 9 0   ALK PHOS U/L  --   --   --   --  610* 692*   ALT U/L  --   --   --   --  66 75   AST U/L  --   --   --   --  45 52*    < > = values in this interval not displayed  Counseling / Coordination of Care  Total floor / unit time spent today 25 minutes  Greater than 50% of total time was spent with the patient and / or family counseling and / or coordination of care  A description of the counseling / coordination of care: 15      Thank you for the opportunity to participate in the care of this patient    [address] [facility] PULMONARY HYPERTENSION  MEDICAL DIRECTOR OF [place]

## 2021-03-07 NOTE — PROGRESS NOTES
Progress Note - Bony Dears 1955, 76 y o  female MRN: [id number]    Unit/Bed#: [facility] 526-01 Encounter: [id number]    Primary Care Provider: Wayne Madsen MD   Date and time admitted to hospital: 3/1/2021  7:29 PM        * Acute on chronic combined systolic and diastolic congestive heart failure ([address] )  Assessment & Plan  Wt Readings from Last 3 Encounters:   03/07/21 75 9 kg (167 lb 5 3 oz)   02/27/21 81 kg (178 lb 9 2 oz)   11/02/20 77 1 kg (170 lb)     · Patient with acute exacerbation of CHF secondary to dietary indiscretions and not resuming her diuretics at time of discharge from recent hospitalization  · Continue low-salt diet and fluid restriction  · Monitor intake, and output  · Monitor daily weights  · Weights down by approximate another kg today  · Edema and SOB improving  · Holding diuretics today due to slight inrease in creatinine    Hypertensive urgency  Assessment & Plan  · Blood pressures were poorly controlled upon admission but BP is trending down now  · Continue carvedilol 12 5 mg BID, hydralazine 50 mg TID  · Amlodipine added this admission  · Improved with diuresis    Acute renal failure superimposed on stage 4 chronic kidney disease [facility])  Assessment & Plan  Lab Results   Component Value Date    EGFR 13 03/07/2021    EGFR 15 03/06/2021    EGFR 15 03/05/2021    CREATININE 3 54 (H) 03/07/2021    CREATININE 3 04 (H) 03/06/2021    CREATININE 3 05 (H) 03/05/2021     · Baseline creatinine:  1 9-2 2  · KELLY likely secondary to cardiorenal syndrome as well as not being fully recovered from prior KELLY  · Continue diuretics for volume overload    Anemia  Assessment & Plan  · Chronic anemia, likely due to CKD and iron deficiency  · On IV venofer x 5 doses  · Outpatient follow up with nephrology for EPO  · Discussed possible transfusion with the patient, he had chest pain with transfusion last admission and is hesitant at this time  Per blood bank there was no significant transfusion reaction  Will reserve blood transfusion if symptoms worsen  Coronary artery disease involving native coronary artery of native heart with angina pectoris [facility])  Assessment & Plan  · Currently without chest discomfort  · Continue Coreg, aspirin, lipitor, imdur    Type 2 diabetes mellitus with kidney complication, with long-term current use of insulin [facility])  Assessment & Plan  Lab Results   Component Value Date    HGBA1C 11 7 (H) 03/02/2021     Recent Labs     03/06/21  1617 03/06/21  2110 03/07/21  0615 03/07/21  1130   POCGLU 149* 285* 105 196*     Blood Sugar Average: Last 72 hrs:  (P) 862 [id number]     · Poorly controlled as evidenced by A1c but improved since prior study last month  · Continue NovoLog 70/30 10u BID  · Continue Accu-Cheks and sliding scale insulin  Hypothyroidism  Assessment & Plan  · TSH is elevated at 15 800 however improved from prior study on 02/21/2021 at 20 900  · Levothyroxine was increased on 02/22/2021  · Continue levothyroxine 125 mcg daily  Pneumonia  Assessment & Plan  Suspect RLL PNA based on CXR, cough with sputum and RLL crackles  Continue ceftriaxone and azithromycin x5 days total    Acute otitis media  Assessment & Plan  · opacification of left TM, likely bacterial OM  · Started on ceftriaxone and azithro, for pneumonia as above  · improved        VTE Pharmacologic Prophylaxis:   Pharmacologic: Heparin  Mechanical VTE Prophylaxis in Place: Yes    Patient Centered Rounds: I have performed bedside rounds with nursing staff today  Discussions with Specialists or Other Care Team Provider: cardiology    Education and Discussions with Family / Patient: patient, plan of care    Time Spent for Care: 20 minutes  More than 50% of total time spent on counseling and coordination of care as described above      Current Length of Stay: 6 day(s)    Current Patient Status: Inpatient   Certification Statement: The patient will continue to require additional inpatient hospital stay due to monitor renal function, medication management    Discharge Plan: home when stable    Code Status: Level 1 - Full Code      Subjective:   Reports that her ear pain has resolved and cough is better  Sob and edema continues to improve    Objective:     Vitals:   Temp (24hrs), Av 8 °F (36 6 °C), Min:97 5 °F (36 4 °C), Max:98 1 °F (36 7 °C)    Temp:  [97 5 °F (36 4 °C)-98 1 °F (36 7 °C)] 98 1 °F (36 7 °C)  HR:  [60-76] 70  Resp:  [16-18] 18  BP: (138-149)/(61-67) 149/61  SpO2:  [94 %-95 %] 95 %  Body mass index is 27 85 kg/m²  Input and Output Summary (last 24 hours): Intake/Output Summary (Last 24 hours) at 3/7/2021 1414  Last data filed at 3/7/2021 1248  Gross per 24 hour   Intake 580 ml   Output 1225 ml   Net -645 ml       Physical Exam:     Physical Exam  Constitutional:       Appearance: Normal appearance  HENT:      Head: Normocephalic and atraumatic  Nose: Nose normal       Mouth/Throat:      Mouth: Mucous membranes are moist       Pharynx: Oropharynx is clear  Eyes:      Extraocular Movements: Extraocular movements intact  Cardiovascular:      Rate and Rhythm: Normal rate and regular rhythm  Pulmonary:      Effort: Pulmonary effort is normal       Breath sounds: No wheezing or rales  Abdominal:      General: Abdomen is flat  Musculoskeletal:      Right lower leg: Edema present  Left lower leg: Edema present  Neurological:      Mental Status: She is alert and oriented to person, place, and time  Mental status is at baseline     Psychiatric:         Mood and Affect: Mood normal          Behavior: Behavior normal            Additional Data:     Labs:    Results from last 7 days   Lab Units 21  0510   WBC Thousand/uL 6 71   HEMOGLOBIN g/dL 7 3*   HEMATOCRIT % 24 6*   PLATELETS Thousands/uL 144*   NEUTROS PCT % 68   LYMPHS PCT % 20   MONOS PCT % 7   EOS PCT % 3     Results from last 7 days   Lab Units 21  0442  21  0140   SODIUM mmol/L 140   < > 138 POTASSIUM mmol/L 4 4   < > 4 8   CHLORIDE mmol/L 105   < > 109*   CO2 mmol/L 30   < > 24   BUN mg/dL 75*   < > 90*   CREATININE mg/dL 3 54*   < > 3 04*   ANION GAP mmol/L 5   < > 5   CALCIUM mg/dL 8 7   < > 8 8   ALBUMIN g/dL  --   --  2 8*   TOTAL BILIRUBIN mg/dL  --   --  0 29   ALK PHOS U/L  --   --  610*   ALT U/L  --   --  66   AST U/L  --   --  45   GLUCOSE RANDOM mg/dL 107   < > 171*    < > = values in this interval not displayed  Results from last 7 days   Lab Units 03/07/21  1130 03/07/21  0615 03/06/21  2110 03/06/21  1617 03/06/21  1139 03/06/21  0617 03/05/21  2054 03/05/21  1612 03/05/21  1128 03/05/21  0847 03/05/21  0625 03/04/21  2107   POC GLUCOSE mg/dl 196* 105 285* 149* 158* 113 223* 162* 176* 207* 124 180*     Results from last 7 days   Lab Units 03/02/21  0139   HEMOGLOBIN A1C % 11 7*               * I Have Reviewed All Lab Data Listed Above  * Additional Pertinent Lab Tests Reviewed:  All Labs Within Last 24 Hours Reviewed      Recent Cultures (last 7 days):           Last 24 Hours Medication List:   Current Facility-Administered Medications   Medication Dose Route Frequency Provider Last Rate    acetaminophen  650 mg Oral Q6H PRN Evonne Manning MD      aluminum-magnesium hydroxide-simethicone  15 mL Oral Q6H PRN Evonne Manning MD      amLODIPine  2 5 mg Oral HS CLARICE Wang      aspirin  81 mg Oral Daily Evonne Manning MD      atorvastatin  40 mg Oral Daily Evonne Manning MD      azithromycin  500 mg Oral Q24H Hernando Layton MD      calcium acetate  667 mg Oral BID With Meals Evonne Manning MD      carvedilol  12 5 mg Oral BID With Meals CLARICE Wang      cefTRIAXone  1,000 mg Intravenous Q24H Hernando Layton MD Stopped (03/07/21 1113)    docusate sodium  100 mg Oral BID PRN Evonne Manning MD      heparin (porcine)  5,000 Units Subcutaneous [facility] Evonne Manning MD      hydrALAZINE  20 mg Intravenous Q6H PRN MD Margarette Hathaway hydrALAZINE  100 mg Oral TID CLARICE Baldwin      insulin aspart protamine-insulin aspart  10 Units Subcutaneous BID AC Suni Oseguera MD      insulin lispro  1-5 Units Subcutaneous HS Suni Oseguera MD      insulin lispro  1-6 Units Subcutaneous TID AC Suni Oseguera MD      isosorbide mononitrate  60 mg Oral Daily CLARICE Wang      levothyroxine  125 mcg Oral Daily Suni Oseguera MD      nitroglycerin  0 4 mg Sublingual Q5 Min PRN Suni Oseguera MD      ondansetron  4 mg Intravenous Q6H PRN Suni Oseguera MD      phenol  1 spray Mouth/Throat Q2H PRN Kaylene Sicard, MD          Today, Patient Was Seen By: Pb Lu MD    ** Please Note: Dictation voice to text software may have been used in the creation of this document   **

## 2021-03-08 LAB
ANION GAP SERPL CALCULATED.3IONS-SCNC: 5 MMOL/L (ref 4–13)
ANION GAP SERPL CALCULATED.3IONS-SCNC: 8 MMOL/L (ref 4–13)
BUN SERPL-MCNC: 77 MG/DL (ref 5–25)
BUN SERPL-MCNC: 77 MG/DL (ref 5–25)
CALCIUM SERPL-MCNC: 8.8 MG/DL (ref 8.3–10.1)
CALCIUM SERPL-MCNC: 9 MG/DL (ref 8.3–10.1)
CHLORIDE SERPL-SCNC: 105 MMOL/L (ref 100–108)
CHLORIDE SERPL-SCNC: 106 MMOL/L (ref 100–108)
CO2 SERPL-SCNC: 23 MMOL/L (ref 21–32)
CO2 SERPL-SCNC: 29 MMOL/L (ref 21–32)
CREAT SERPL-MCNC: 3.78 MG/DL (ref 0.6–1.3)
CREAT SERPL-MCNC: 3.9 MG/DL (ref 0.6–1.3)
GFR SERPL CREATININE-BSD FRML MDRD: 11 ML/MIN/1.73SQ M
GFR SERPL CREATININE-BSD FRML MDRD: 12 ML/MIN/1.73SQ M
GLUCOSE SERPL-MCNC: 107 MG/DL (ref 65–140)
GLUCOSE SERPL-MCNC: 139 MG/DL (ref 65–140)
GLUCOSE SERPL-MCNC: 221 MG/DL (ref 65–140)
GLUCOSE SERPL-MCNC: 246 MG/DL (ref 65–140)
GLUCOSE SERPL-MCNC: 262 MG/DL (ref 65–140)
GLUCOSE SERPL-MCNC: 268 MG/DL (ref 65–140)
POTASSIUM SERPL-SCNC: 4.3 MMOL/L (ref 3.5–5.3)
POTASSIUM SERPL-SCNC: 4.5 MMOL/L (ref 3.5–5.3)
SODIUM SERPL-SCNC: 136 MMOL/L (ref 136–145)
SODIUM SERPL-SCNC: 140 MMOL/L (ref 136–145)

## 2021-03-08 PROCEDURE — 80048 BASIC METABOLIC PNL TOTAL CA: CPT | Performed by: NURSE PRACTITIONER

## 2021-03-08 PROCEDURE — 80048 BASIC METABOLIC PNL TOTAL CA: CPT | Performed by: INTERNAL MEDICINE

## 2021-03-08 PROCEDURE — 99231 SBSQ HOSP IP/OBS SF/LOW 25: CPT | Performed by: INTERNAL MEDICINE

## 2021-03-08 PROCEDURE — 82948 REAGENT STRIP/BLOOD GLUCOSE: CPT

## 2021-03-08 PROCEDURE — 99232 SBSQ HOSP IP/OBS MODERATE 35: CPT | Performed by: INTERNAL MEDICINE

## 2021-03-08 PROCEDURE — 99233 SBSQ HOSP IP/OBS HIGH 50: CPT | Performed by: INTERNAL MEDICINE

## 2021-03-08 RX ORDER — FUROSEMIDE 10 MG/ML
40 INJECTION INTRAMUSCULAR; INTRAVENOUS ONCE
Status: COMPLETED | OUTPATIENT
Start: 2021-03-08 | End: 2021-03-08

## 2021-03-08 RX ORDER — AMLODIPINE BESYLATE 5 MG/1
5 TABLET ORAL
Status: DISCONTINUED | OUTPATIENT
Start: 2021-03-08 | End: 2021-03-09

## 2021-03-08 RX ADMIN — HEPARIN SODIUM 5000 UNITS: 5000 INJECTION INTRAVENOUS; SUBCUTANEOUS at 06:33

## 2021-03-08 RX ADMIN — CALCIUM ACETATE 667 MG: 667 CAPSULE ORAL at 16:44

## 2021-03-08 RX ADMIN — CARVEDILOL 12.5 MG: 12.5 TABLET, FILM COATED ORAL at 06:39

## 2021-03-08 RX ADMIN — AMLODIPINE BESYLATE 5 MG: 5 TABLET ORAL at 21:43

## 2021-03-08 RX ADMIN — INSULIN LISPRO 2 UNITS: 100 INJECTION, SOLUTION INTRAVENOUS; SUBCUTANEOUS at 11:30

## 2021-03-08 RX ADMIN — HYDRALAZINE HYDROCHLORIDE 100 MG: 50 TABLET ORAL at 20:00

## 2021-03-08 RX ADMIN — CEFTRIAXONE 1000 MG: 2 INJECTION, POWDER, FOR SOLUTION INTRAMUSCULAR; INTRAVENOUS at 11:16

## 2021-03-08 RX ADMIN — ASPIRIN 81 MG: 81 TABLET, CHEWABLE ORAL at 08:44

## 2021-03-08 RX ADMIN — CALCIUM ACETATE 667 MG: 667 CAPSULE ORAL at 06:33

## 2021-03-08 RX ADMIN — INSULIN LISPRO 2 UNITS: 100 INJECTION, SOLUTION INTRAVENOUS; SUBCUTANEOUS at 21:44

## 2021-03-08 RX ADMIN — CARVEDILOL 12.5 MG: 12.5 TABLET, FILM COATED ORAL at 16:44

## 2021-03-08 RX ADMIN — LEVOTHYROXINE SODIUM 125 MCG: 125 TABLET ORAL at 06:33

## 2021-03-08 RX ADMIN — ATORVASTATIN CALCIUM 40 MG: 40 TABLET, FILM COATED ORAL at 08:44

## 2021-03-08 RX ADMIN — HYDRALAZINE HYDROCHLORIDE 100 MG: 50 TABLET ORAL at 16:44

## 2021-03-08 RX ADMIN — AZITHROMYCIN MONOHYDRATE 500 MG: 250 TABLET ORAL at 11:17

## 2021-03-08 RX ADMIN — INSULIN LISPRO 3 UNITS: 100 INJECTION, SOLUTION INTRAVENOUS; SUBCUTANEOUS at 16:45

## 2021-03-08 RX ADMIN — ISOSORBIDE MONONITRATE 60 MG: 60 TABLET, EXTENDED RELEASE ORAL at 08:44

## 2021-03-08 RX ADMIN — HYDRALAZINE HYDROCHLORIDE 100 MG: 50 TABLET ORAL at 08:44

## 2021-03-08 RX ADMIN — FUROSEMIDE 40 MG: 10 INJECTION, SOLUTION INTRAMUSCULAR; INTRAVENOUS at 11:17

## 2021-03-08 NOTE — CASE MANAGEMENT
Met with pt during [facility] team rounds  Pt added her dtr, Earle Sarah via conference call on her cell phone during physician discussion of status and options  At this time, pt declined HD and dtr - Heidi is aware  Teams will continue discussion during pt's course to ensure that pt understands the outcome of no HD

## 2021-03-08 NOTE — PROGRESS NOTES
Progress Note - Margarette Castro 1955, 76 y o  female MRN: [id number]    Unit/Bed#: [facility] 526-01 Encounter: [id number]    Primary Care Provider: Selene Zhao MD   Date and time admitted to hospital: 3/1/2021  7:29 PM        * Acute on chronic combined systolic and diastolic congestive heart failure ([address] )  Assessment & Plan  Wt Readings from Last 3 Encounters:   03/08/21 76 4 kg (168 lb 6 9 oz)   02/27/21 81 kg (178 lb 9 2 oz)   11/02/20 77 1 kg (170 lb)     · Patient with acute exacerbation of CHF secondary to dietary indiscretions and not resuming her diuretics at time of discharge from recent hospitalization  · Continue low-salt diet and fluid restriction  · Monitor intake, and output  · Monitor daily weights  · Edema and SOB improving  · Worsening renal function but weight increased by one pound, diuretics restarted    Hypertensive urgency  Assessment & Plan  · Blood pressures were poorly controlled upon admission but BP is trending down now  · Continue carvedilol 12 5 mg BID, hydralazine 50 mg TID  · Amlodipine added this admission  · Improved with diuresis    Acute renal failure superimposed on stage 4 chronic kidney disease [facility])  Assessment & Plan  Lab Results   Component Value Date    EGFR 12 03/08/2021    EGFR 13 03/07/2021    EGFR 15 03/06/2021    CREATININE 3 78 (H) 03/08/2021    CREATININE 3 54 (H) 03/07/2021    CREATININE 3 04 (H) 03/06/2021     · Baseline creatinine:  1 9-2 2  · KELLY likely secondary to cardiorenal syndrome as well as not being fully recovered from prior KELLY  · Continue diuretics for volume overload  · May need HD this admission if worsening    Anemia  Assessment & Plan  · Chronic anemia, likely due to CKD and iron deficiency  · On IV venofer x 5 doses  · Outpatient follow up with nephrology for EPO  · Discussed possible transfusion with the patient, she had chest pain with transfusion last admission and is hesitant at this time   Per blood bank there was no significant transfusion reaction  Will reserve blood transfusion if symptoms worsen  Coronary artery disease involving native coronary artery of native heart with angina pectoris [facility])  Assessment & Plan  · Currently without chest discomfort  · Continue Coreg, aspirin, lipitor, imdur    Type 2 diabetes mellitus with kidney complication, with long-term current use of insulin [facility])  Assessment & Plan  Lab Results   Component Value Date    HGBA1C 11 7 (H) 03/02/2021     Recent Labs     03/07/21  2116 03/08/21  0624 03/08/21  1054 03/08/21  1604   POCGLU 235* 139 221* 268*     Blood Sugar Average: Last 72 hrs:  (P) 194 3125     · Poorly controlled as evidenced by A1c but improved since prior study last month  · Continue NovoLog 70/30 10u BID  · Continue Accu-Cheks and sliding scale insulin  Hypothyroidism  Assessment & Plan  · TSH is elevated at 15 800 however improved from prior study on 02/21/2021 at 20 900  · Levothyroxine was increased on 02/22/2021  · Continue levothyroxine 125 mcg daily  Pneumonia  Assessment & Plan  Suspect RLL PNA based on CXR, cough with sputum and RLL crackles  Continue ceftriaxone and azithromycin x5 days total    Acute otitis media  Assessment & Plan  · opacification of left TM, likely bacterial OM  · Started on ceftriaxone and azithro, for pneumonia as above  · improved        VTE Pharmacologic Prophylaxis:   Pharmacologic: Heparin  Mechanical VTE Prophylaxis in Place: Yes    Patient Centered Rounds: I have performed bedside rounds with nursing staff today  Discussions with Specialists or Other Care Team Provider: cardiology, nephrology    Education and Discussions with Family / Patient: patient and family at bedside, diagnosis, prognosis, plan of care    Time Spent for Care: 20 minutes  More than 50% of total time spent on counseling and coordination of care as described above  Current Length of Stay: 7 day(s)    Current Patient Status: Inpatient   Certification Statement:  The patient will continue to require additional inpatient hospital stay due to continue diuresis, awaiting renal recovery    Discharge Plan: TBD    Code Status: Level 1 - Full Code      Subjective:   Reports that her breathing is nearly back to her normal  Ear pain resolved  Cough has nearly resolved  Objective:     Vitals:   Temp (24hrs), Av 2 °F (36 8 °C), Min:98 1 °F (36 7 °C), Max:98 2 °F (36 8 °C)    Temp:  [98 1 °F (36 7 °C)-98 2 °F (36 8 °C)] 98 1 °F (36 7 °C)  HR:  [56-74] 56  Resp:  [18-19] 19  BP: (130-156)/(60-74) 130/74  SpO2:  [95 %-97 %] 97 %  Body mass index is 28 03 kg/m²  Input and Output Summary (last 24 hours): Intake/Output Summary (Last 24 hours) at 3/8/2021 1746  Last data filed at 3/8/2021 1717  Gross per 24 hour   Intake 540 ml   Output 1850 ml   Net -1310 ml       Physical Exam:     Physical Exam  Constitutional:       Appearance: Normal appearance  She is obese  HENT:      Head: Normocephalic and atraumatic  Mouth/Throat:      Mouth: Mucous membranes are moist       Pharynx: Oropharynx is clear  Eyes:      Extraocular Movements: Extraocular movements intact  Cardiovascular:      Rate and Rhythm: Normal rate and regular rhythm  Pulmonary:      Breath sounds: Rales present  Abdominal:      General: Abdomen is flat  Palpations: Abdomen is soft  Musculoskeletal:      Right lower leg: Edema present  Left lower leg: Edema present  Skin:     General: Skin is warm and dry  Neurological:      General: No focal deficit present  Mental Status: She is alert and oriented to person, place, and time     Psychiatric:         Mood and Affect: Mood normal          Behavior: Behavior normal            Additional Data:     Labs:    Results from last 7 days   Lab Units 21  0510   WBC Thousand/uL 6 71   HEMOGLOBIN g/dL 7 3*   HEMATOCRIT % 24 6*   PLATELETS Thousands/uL 144*   NEUTROS PCT % 68   LYMPHS PCT % 20   MONOS PCT % 7   EOS PCT % 3     Results from last 7 days   Lab Units 03/08/21  0452  03/02/21  0140   SODIUM mmol/L 140   < > 138   POTASSIUM mmol/L 4 3   < > 4 8   CHLORIDE mmol/L 106   < > 109*   CO2 mmol/L 29   < > 24   BUN mg/dL 77*   < > 90*   CREATININE mg/dL 3 78*   < > 3 04*   ANION GAP mmol/L 5   < > 5   CALCIUM mg/dL 8 8   < > 8 8   ALBUMIN g/dL  --   --  2 8*   TOTAL BILIRUBIN mg/dL  --   --  0 29   ALK PHOS U/L  --   --  610*   ALT U/L  --   --  66   AST U/L  --   --  45   GLUCOSE RANDOM mg/dL 107   < > 171*    < > = values in this interval not displayed  Results from last 7 days   Lab Units 03/08/21  1604 03/08/21  1054 03/08/21  0624 03/07/21  2116 03/07/21  1602 03/07/21  1130 03/07/21  0615 03/06/21  2110 03/06/21  1617 03/06/21  1139 03/06/21  0617 03/05/21  2054   POC GLUCOSE mg/dl 268* 221* 139 235* 348* 196* 105 285* 149* 158* 113 223*     Results from last 7 days   Lab Units 03/02/21  0139   HEMOGLOBIN A1C % 11 7*               * I Have Reviewed All Lab Data Listed Above  * Additional Pertinent Lab Tests Reviewed:  All Labs Within Last 24 Hours Reviewed      Recent Cultures (last 7 days):           Last 24 Hours Medication List:   Current Facility-Administered Medications   Medication Dose Route Frequency Provider Last Rate    acetaminophen  650 mg Oral Q6H PRN Yemi Dill MD      aluminum-magnesium hydroxide-simethicone  15 mL Oral Q6H PRN Yemi Dill MD      amLODIPine  5 mg Oral HS CLARICE Wang      aspirin  81 mg Oral Daily Yemi Dill MD      atorvastatin  40 mg Oral Daily Yemi Dill MD      azithromycin  500 mg Oral Q24H Mauri Rodriguez MD      calcium acetate  667 mg Oral BID With Meals Yemi Dill MD      carvedilol  12 5 mg Oral BID With Meals CLARICE Wang      cefTRIAXone  1,000 mg Intravenous Q24H Mauri Rodriguez MD 1,000 mg (03/08/21 1116)    docusate sodium  100 mg Oral BID PRN Yemi Dill MD      heparin (porcine)  5,000 Units Subcutaneous Q8H [address] [place] [address] Josefina Schumacher MD      hydrALAZINE  20 mg Intravenous Q6H PRN Kayla Donohue MD      hydrALAZINE  100 mg Oral TID CLARICE Dubois      insulin aspart protamine-insulin aspart  10 Units Subcutaneous BID AC Aranza Martin MD      insulin lispro  1-5 Units Subcutaneous HS Aranza Martin MD      insulin lispro  1-6 Units Subcutaneous TID AC Aranza Martin MD      isosorbide mononitrate  60 mg Oral Daily CLARICE Wang      levothyroxine  125 mcg Oral Daily Aranza Martin MD      nitroglycerin  0 4 mg Sublingual Q5 Min PRN Aranza Martin MD      ondansetron  4 mg Intravenous Q6H PRN Aranza Martin MD      phenol  1 spray Mouth/Throat Q2H PRN Kayla Donohue MD          Today, Patient Was Seen By: Morelia Moon MD    ** Please Note: Dictation voice to text software may have been used in the creation of this document   **

## 2021-03-08 NOTE — PROGRESS NOTES
Heart Failure/ Pulmonary Hypertension Progress Note - Lynnette Thurman 76 y o  female MRN: [id number]    [facility] Encounter: [id number]      Assessment:    Principal Problem:    Acute on chronic combined systolic and diastolic congestive heart failure (HCC)  Active Problems:    Type 2 diabetes mellitus with kidney complication, with long-term current use of insulin (HCC)    Hypothyroidism    Acute renal failure superimposed on stage 4 chronic kidney disease (HCC)    Anemia    Coronary artery disease involving native coronary artery of native heart with angina pectoris (HCC)    Pneumonia    Hypertensive urgency    Acute otitis media      Subjective:   Patient seen and examined  No significant events overnight  Diuretics held yesterday for elevated creat  Up again today to 3 78    Objective: Intake/ Output: 1400/2325/-925  Weight: 178 on admit, 168 today  Tele: SR    Plan:  Acute on chronic HFpEF  Recent admitted with PNA and HF exacerbation  Discharged home off diuretic due to KELLY and now presents again with acute decompensation and need for IV diuresis  Diuretics held yesterday for creat bump from 3 05 to 3 5  Today 3 78 with  some JVD on exam  Will trial with one time dose of Lasix 40 mg IV  Recheck BMP at 1600 to trend  Continue  daily standing weights, I/O charting, daily BMP        Home diuretic- Torsemide 20 mg BID             Lab Results   Component Value Date     NTBNP 9,024 (H) 03/01/2021      NT Pro BNP 2/21/21: 12 3K        TTE 2/22/21: LVEF 60%, akinesis of basal, inferior and basal mid inferolateral walls  Moderate cLVH, grade II DD, mild MR, trace TR, moderate, free flowing pericardial effusion without hemodynamic compromise  RV normal IVC normal  IVSd 0 99 cm       Echo 12/30/18:  EF: 50%, grade 2 DD, PASP 35 mmHg     CAD -No anginal symptoms at present  H/O NSTEMI S/P  LUDY x 2 to LAD 1/4/   80% RCA- plan staged, but has not needed intervention   Imdur 30 mg daily, asa      HTN- Uncontrolled with systolic to the 561X on admit  Improved with Hydral 100 mg TID  Continue  Coreg 12 5 mg BID ,Imdur 60 mg daily       H/O Palpitations  Holter 5/17/19: 53-85, avg 69 bpm  8 PVCs      Anemia- Hgb now in the 7 2-7 5 range  Receiving Venofer infusion     CKD3, new baseline seems to be 2 5-3 0  Creat 3 78 today  Nephro on board  Appreciate recs       DM2, hgA1C 3/2/21 11 7    HLD  FLP 3/2/21: , , HDL 76, LDL 48 on Atorva 40 mg daily         Review of Systems   All other systems reviewed and are negative  [facility] (day, reason): Valero catheter (day, reason):    Vitals: Blood pressure 156/67, pulse 63, temperature 98 1 °F (36 7 °C), temperature source Oral, resp  rate 18, height 5' 5" (1 651 m), weight 76 4 kg (168 lb 6 9 oz), SpO2 95 %  , Body mass index is 28 03 kg/m² , I/O last 3 completed shifts: In: 700 [P O :700]  Out: 1900 [Urine:1900]  No intake/output data recorded  Wt Readings from Last 3 Encounters:   03/08/21 76 4 kg (168 lb 6 9 oz)   02/27/21 81 kg (178 lb 9 2 oz)   11/02/20 77 1 kg (170 lb)       Intake/Output Summary (Last 24 hours) at 3/8/2021 0921  Last data filed at 3/8/2021 0726  Gross per 24 hour   Intake 360 ml   Output 1100 ml   Net -740 ml     I/O last 3 completed shifts: In: 700 [P O :700]  Out: 1900 [Urine:1900]    No significant arrhythmias seen on telemetry review         Physical Exam:  Vitals:    03/07/21 2327 03/08/21 0600 03/08/21 0639 03/08/21 0726   BP: 146/60  152/64 156/67   BP Location: Left arm   Left arm   Pulse: 73  74 63   Resp: 18   18   Temp: 98 2 °F (36 8 °C)   98 1 °F (36 7 °C)   TempSrc: Oral   Oral   SpO2: 96%   95%   Weight:  76 4 kg (168 lb 6 9 oz)     Height:           GEN: Nieves Quiñonez appears well, alert and oriented x 3, pleasant and cooperative   HEENT: pupils equal, round, and reactive to light; extraocular muscles intact  NECK: supple, no carotid bruits   HEART: regular rhythm, normal S1 and S2, no murmurs, clicks, gallops or rubs, JVP is elevated       LUNGS: clear to auscultation bilaterally; no wheezes, rales, or rhonchi   ABDOMEN: normal bowel sounds, soft, no tenderness, no distention  EXTREMITIES: peripheral pulses normal; no clubbing, cyanosis,trace BLLE  edema  NEURO: no focal findings   SKIN: normal without suspicious lesions on exposed skin      Current Facility-Administered Medications:     acetaminophen (TYLENOL) tablet 650 mg, 650 mg, Oral, Q6H PRN, Kraig Stone MD    aluminum-magnesium hydroxide-simethicone (MYLANTA) oral suspension 15 mL, 15 mL, Oral, Q6H PRN, Kraig Stone MD    amLODIPine (NORVASC) tablet 2 5 mg, 2 5 mg, Oral, HS, CLARICE Wang, 2 5 mg at 03/07/21 2140    aspirin chewable tablet 81 mg, 81 mg, Oral, Daily, Kraig Stone MD, 81 mg at 03/08/21 0844    atorvastatin (LIPITOR) tablet 40 mg, 40 mg, Oral, Daily, Kraig Stone MD, 40 mg at 03/08/21 0844    azithromycin (ZITHROMAX) tablet 500 mg, 500 mg, Oral, Q24H, Jorge Hassan MD, 500 mg at 03/07/21 1025    calcium acetate (PHOSLO) capsule 667 mg, 667 mg, Oral, BID With Meals, Kraig Stone MD, 667 mg at 03/08/21 7681    carvedilol (COREG) tablet 12 5 mg, 12 5 mg, Oral, BID With Meals, CLARICE Busby, 12 5 mg at 03/08/21 0639    cefTRIAXone (ROCEPHIN) 1,000 mg in dextrose 5 % 50 mL IVPB, 1,000 mg, Intravenous, Q24H, Jorge Hassan MD, Stopped at 03/07/21 1113    docusate sodium (COLACE) capsule 100 mg, 100 mg, Oral, BID PRN, Kraig Stone MD    heparin (porcine) subcutaneous injection 5,000 Units, 5,000 Units, Subcutaneous, Q8H [facility], 5,000 Units at 03/08/21 9819 **AND** [COMPLETED] Platelet count, , , Once, Kraig Stone MD    hydrALAZINE (APRESOLINE) injection 20 mg, 20 mg, Intravenous, Q6H PRN, Jorge Hassan MD    hydrALAZINE (APRESOLINE) tablet 100 mg, 100 mg, Oral, TID, CLARICE Wang, 100 mg at 03/08/21 0844    insulin aspart protamine-insulin aspart (NovoLOG 70/30) 100 units/mL subcutaneous injection 10 Units, 10 Units, Subcutaneous, BID AC, Emily Lofton MD, Stopped at 03/08/21 0634    insulin lispro (HumaLOG) 100 units/mL subcutaneous injection 1-5 Units, 1-5 Units, Subcutaneous, HS, Emily Lofton MD, 2 Units at 03/07/21 2139    insulin lispro (HumaLOG) 100 units/mL subcutaneous injection 1-6 Units, 1-6 Units, Subcutaneous, TID AC, Stopped at 03/08/21 1376 **AND** Fingerstick Glucose (POCT), , , TID AC, Emily Lofton MD    isosorbide mononitrate (IMDUR) 24 hr tablet 60 mg, 60 mg, Oral, Daily, CLARICE Wang, 60 mg at 03/08/21 0844    levothyroxine tablet 125 mcg, 125 mcg, Oral, Daily, Emily Lofton MD, 125 mcg at 03/08/21 3537    nitroglycerin (NITROSTAT) SL tablet 0 4 mg, 0 4 mg, Sublingual, Q5 Min PRN, Emily Lofton MD    [facility]ans[facility]ron [facility]) injection 4 mg, 4 mg, Intravenous, Q6H PRN, Emily Lofton MD    phenol (CHLORASEPTIC) 1 4 % mucosal liquid 1 spray, 1 spray, Mouth/Throat, Q2H PRN, Melissa Humphrey MD      Labs & Results:    Results from last 7 days   Lab Units 03/02/21  0415 03/02/21  0139 03/01/21 1948   TROPONIN I ng/mL 0 02 0 02 0 02     Results from last 7 days   Lab Units 03/06/21  0510 03/02/21  0415 03/02/21  0139 03/01/21  1949   WBC Thousand/uL 6 71 6 86  --  7 12   HEMOGLOBIN g/dL 7 3* 7 3*  --  7 7*   HEMATOCRIT % 24 6* 23 5*  --  26 0*   PLATELETS Thousands/uL 144* 168 139* 169         Results from last 7 days   Lab Units 03/08/21  0452 03/07/21  0442 03/06/21  0510  03/02/21  0140 03/01/21  1948   POTASSIUM mmol/L 4 3 4 4 4 4   < > 4 8 4 9   CHLORIDE mmol/L 106 105 105   < > 109* 108   CO2 mmol/L 29 30 28   < > 24 23   BUN mg/dL 77* 75* 73*   < > 90* 93*   CREATININE mg/dL 3 78* 3 54* 3 04*   < > 3 04* 3 28*   CALCIUM mg/dL 8 8 8 7 9 3   < > 8 8 9 0   ALK PHOS U/L  --   --   --   --  610* 692*   ALT U/L  --   --   --   --  66 75   AST U/L  --   --   --   --  45 52*    < > = values in this interval not displayed             Counseling / Coordination of Care  Total floor / unit time spent today 20 minutes  Greater than 50% of total time was spent with the patient and / or family counseling and / or coordination of care  A description of the counseling / coordination of care: 20  Thank you for the opportunity to participate in the care of this patient  Linh Tran Cos

## 2021-03-08 NOTE — PLAN OF CARE
Problem: SAFETY ADULT  Goal: Patient will remain free of falls  Description: INTERVENTIONS:  - Assess patient frequently for physical needs  -  Identify cognitive and physical deficits and behaviors that affect risk of falls  -  [place] fall precautions as indicated by assessment   - Educate patient/family on patient safety including physical limitations  - Instruct patient to call for assistance with activity based on assessment  - Modify environment to reduce risk of injury  - Consider OT/PT consult to assist with strengthening/mobility  Outcome: Progressing     Problem: DISCHARGE PLANNING  Goal: Discharge to home or other facility with appropriate resources  Description: INTERVENTIONS:  - Identify barriers to discharge w/patient and caregiver  - Arrange for needed discharge resources and transportation as appropriate  - Identify discharge learning needs (meds, wound care, etc )  - Arrange for interpretive services to assist at discharge as needed  - Refer to Case Management Department for coordinating discharge planning if the patient needs post-hospital services based on physician/advanced practitioner order or complex needs related to functional status, cognitive ability, or social support system  Outcome: Progressing     Problem: Knowledge Deficit  Goal: Patient/family/caregiver demonstrates understanding of disease process, treatment plan, medications, and discharge instructions  Description: Complete learning assessment and assess knowledge base    Interventions:  - Provide teaching at level of understanding  - Provide teaching via preferred learning methods  Outcome: Progressing     Problem: CARDIOVASCULAR - ADULT  Goal: Maintains optimal cardiac output and hemodynamic stability  Description: INTERVENTIONS:  - Monitor I/O, vital signs and rhythm  - Monitor for S/S and trends of decreased cardiac output  - Administer and titrate ordered vasoactive medications to optimize hemodynamic stability  - Assess quality of pulses, skin color and temperature  - Assess for signs of decreased coronary artery perfusion  - Instruct patient to report change in severity of symptoms  Outcome: Completed  Goal: Absence of cardiac dysrhythmias or at baseline rhythm  Description: INTERVENTIONS:  - Continuous cardiac monitoring, vital signs, obtain 12 lead EKG if ordered  - Administer antiarrhythmic and heart rate control medications as ordered  - Monitor electrolytes and administer replacement therapy as ordered  Outcome: Completed     Problem: RESPIRATORY - ADULT  Goal: Achieves optimal ventilation and oxygenation  Description: INTERVENTIONS:  - Assess for changes in respiratory status  - Assess for changes in mentation and behavior  - Position to facilitate oxygenation and minimize respiratory effort  - Oxygen administered by appropriate delivery if ordered  - Initiate smoking cessation education as indicated  - Encourage broncho-pulmonary hygiene including cough, deep breathe, Incentive Spirometry  - Assess the need for suctioning and aspirate as needed  - Assess and instruct to report SOB or any respiratory difficulty  - Respiratory Therapy support as indicated  Outcome: Progressing     Problem: METABOLIC, FLUID AND ELECTROLYTES - ADULT  Goal: Electrolytes maintained within normal limits  Description: INTERVENTIONS:  - Monitor labs and assess patient for signs and symptoms of electrolyte imbalances  - Administer electrolyte replacement as ordered  - Monitor response to electrolyte replacements, including repeat lab results as appropriate  - Instruct patient on fluid and nutrition as appropriate  Outcome: Progressing  Goal: Fluid balance maintained  Description: INTERVENTIONS:  - Monitor labs   - Monitor I/O and WT  - Instruct patient on fluid and nutrition as appropriate  - Assess for signs & symptoms of volume excess or deficit  Outcome: Progressing  Goal: Glucose maintained within target range  Description: INTERVENTIONS:  - Monitor Blood Glucose as ordered  - Assess for signs and symptoms of hyperglycemia and hypoglycemia  - Administer ordered medications to maintain glucose within target range  - Assess nutritional intake and initiate nutrition service referral as needed  Outcome: Progressing     Problem: Nutrition/Hydration-ADULT  Goal: Nutrient/Hydration intake appropriate for improving, restoring or maintaining nutritional needs  Description: Monitor and assess patient's nutrition/hydration status for malnutrition  Collaborate with interdisciplinary team and initiate plan and interventions as ordered  Monitor patient's weight and dietary intake as ordered or per policy  Utilize nutrition screening tool and intervene as necessary  Determine patient's food preferences and provide high-protein, high-caloric foods as appropriate  INTERVENTIONS:  - Monitor oral intake, urinary output, labs, and treatment plans  - Assess nutrition and hydration status and recommend course of action  - Evaluate amount of meals eaten  - Assist patient with eating if necessary   - Allow adequate time for meals  - Recommend/ encourage appropriate diets, oral nutritional supplements, and vitamin/mineral supplements  - Order, calculate, and assess calorie counts as needed  - Recommend, monitor, and adjust tube feedings and TPN/PPN based on assessed needs  - Assess need for intravenous fluids  - Provide specific nutrition/hydration education as appropriate  - Include patient/family/caregiver in decisions related to nutrition  Outcome: Progressing     Problem: Potential for Falls  Goal: Patient will remain free of falls  Description: INTERVENTIONS:  - Assess patient frequently for physical needs  -  Identify cognitive and physical deficits and behaviors that affect risk of falls    -  [place] fall precautions as indicated by assessment   - Educate patient/family on patient safety including physical limitations  - Instruct patient to call for assistance with activity based on assessment  - Modify environment to reduce risk of injury  - Consider OT/PT consult to assist with strengthening/mobility  Outcome: Progressing

## 2021-03-08 NOTE — ASSESSMENT & PLAN NOTE
Wt Readings from Last 3 Encounters:   03/08/21 76 4 kg (168 lb 6 9 oz)   02/27/21 81 kg (178 lb 9 2 oz)   11/02/20 77 1 kg (170 lb)     · Patient with acute exacerbation of CHF secondary to dietary indiscretions and not resuming her diuretics at time of discharge from recent hospitalization  · Continue low-salt diet and fluid restriction    · Monitor intake, and output  · Monitor daily weights  · Edema and SOB improving  · Worsening renal function but weight increased by one pound, diuretics restarted

## 2021-03-08 NOTE — ASSESSMENT & PLAN NOTE
Lab Results   Component Value Date    EGFR 12 03/08/2021    EGFR 13 03/07/2021    EGFR 15 03/06/2021    CREATININE 3 78 (H) 03/08/2021    CREATININE 3 54 (H) 03/07/2021    CREATININE 3 04 (H) 03/06/2021     · Baseline creatinine:  1 9-2 2  · KELLY likely secondary to cardiorenal syndrome as well as not being fully recovered from prior KELLY    · Continue diuretics for volume overload  · May need HD this admission if worsening

## 2021-03-08 NOTE — PROGRESS NOTES
NEPHROLOGY PROGRESS NOTE   Ban Kal 76 y o  female MRN: [id number]  Unit/Bed#: [facility] 526-01 Encounter: [id number]  Reason for Consult:  Acute kidney injury    ASSESSMENT/PLAN:  1  Acute kidney injury, likely multifactorial currently suspect component of cardiorenal syndrome unfortunately creatinine continues to rise   2  Chronic kidney disease with baseline creatinine 1 9-2 2  3  Cardiomyopathy ejection fraction 60% with grade 2 diastolic dysfunction  4  Hypertension, blood pressure overall appears stable  5  CKD associated mineral bone disorder, continue with calcium acetate  6  Diabetes with likely associated nephropathy recent hemoglobin A1c 11 7  7  Diastolic heart failure, maintenance diuretics currently on hold, currently as needed    PLAN:  · Overall renal function unfortunately does continue to worsen  · Again suspecting likely cardiorenal syndrome  · Hold maintenance diuretic therapy, currently as needed  · Hopeful plateau/improvement next 24-48 hours  · I did discuss with the patient regards to worsening renal failure in the possibility of requiring hemodialysis  SUBJECTIVE:  Seen examined  Patient awake alert  Overall appears comfortable  Denies any worsening chest pain/shortness of breath  Denies any abdominal pain  Review of Systems    OBJECTIVE:  Current Weight: Weight - Scale: 76 4 kg (168 lb 6 9 oz)  Vitals:    03/07/21 2327 03/08/21 0600 03/08/21 0639 03/08/21 0726   BP: 146/60  152/64 156/67   BP Location: Left arm   Left arm   Pulse: 73  74 63   Resp: 18   18   Temp: 98 2 °F (36 8 °C)   98 1 °F (36 7 °C)   TempSrc: Oral   Oral   SpO2: 96%   95%   Weight:  76 4 kg (168 lb 6 9 oz)     Height:           Intake/Output Summary (Last 24 hours) at 3/8/2021 1350  Last data filed at 3/8/2021 1101  Gross per 24 hour   Intake 480 ml   Output 1100 ml   Net -620 ml       Physical Exam  Constitutional:       Appearance: She is obese  She is not ill-appearing     HENT:      Head: Normocephalic and atraumatic  Eyes:      General: No scleral icterus  Cardiovascular:      Rate and Rhythm: Normal rate and regular rhythm  Pulmonary:      Breath sounds: Examination of the left-middle field reveals decreased breath sounds  Examination of the left-lower field reveals decreased breath sounds  Decreased breath sounds present  Abdominal:      General: There is no distension  Palpations: Abdomen is soft  Musculoskeletal:      Right lower leg: Edema (Trace to 1+) present  Left lower leg: Edema ( trace to 1+) present  Skin:     General: Skin is warm and dry  Findings: No rash  Neurological:      Mental Status: She is alert and oriented to person, place, and time           Medications:    Current Facility-Administered Medications:     acetaminophen (TYLENOL) tablet 650 mg, 650 mg, Oral, Q6H PRN, Xavier Meredith MD    aluminum-magnesium hydroxide-simethicone (MYLANTA) oral suspension 15 mL, 15 mL, Oral, Q6H PRN, Xavier Meredith MD    amLODIPine (NORVASC) tablet 5 mg, 5 mg, Oral, HS, CLARICE Wang    aspirin chewable tablet 81 mg, 81 mg, Oral, Daily, Xavier Meredith MD, 81 mg at 03/08/21 0844    atorvastatin (LIPITOR) tablet 40 mg, 40 mg, Oral, Daily, Xavier Meredith MD, 40 mg at 03/08/21 0844    azithromycin (ZITHROMAX) tablet 500 mg, 500 mg, Oral, Q24H, Natalie Garnica MD, 500 mg at 03/08/21 1117    calcium acetate (PHOSLO) capsule 667 mg, 667 mg, Oral, BID With Meals, Xavier Meredith MD, 667 mg at 03/08/21 5320    carvedilol (COREG) tablet 12 5 mg, 12 5 mg, Oral, BID With Meals, CLARICE Holley, 12 5 mg at 03/08/21 0639    cefTRIAXone (ROCEPHIN) 1,000 mg in dextrose 5 % 50 mL IVPB, 1,000 mg, Intravenous, Q24H, Natalie Garnica MD, Last Rate: 100 mL/hr at 03/08/21 1116, 1,000 mg at 03/08/21 1116    docusate sodium (COLACE) capsule 100 mg, 100 mg, Oral, BID PRN, Xavier Meredith MD    heparin (porcine) subcutaneous injection 5,000 Units, 5,000 Units, Subcutaneous, Q8H [facility], 5,000 Units at 03/08/21 4202 **AND** [COMPLETED] Platelet count, , , Once, Kaylene Bennett MD    hydrALAZINE (APRESOLINE) injection 20 mg, 20 mg, Intravenous, Q6H PRN, Ameya Taveras MD    hydrALAZINE (APRESOLINE) tablet 100 mg, 100 mg, Oral, TID, CLARICE Wang, 100 mg at 03/08/21 0844    insulin aspart protamine-insulin aspart (NovoLOG 70/30) 100 units/mL subcutaneous injection 10 Units, 10 Units, Subcutaneous, BID AC, Kaylene Bennett MD, Stopped at 03/08/21 0634    insulin lispro (HumaLOG) 100 units/mL subcutaneous injection 1-5 Units, 1-5 Units, Subcutaneous, HS, Kaylene Bennett MD, 2 Units at 03/07/21 2139    insulin lispro (HumaLOG) 100 units/mL subcutaneous injection 1-6 Units, 1-6 Units, Subcutaneous, TID AC, 2 Units at 03/08/21 1130 **AND** Fingerstick Glucose (POCT), , , TID AC, Kaylene Bennett MD    isosorbide mononitrate (IMDUR) 24 hr tablet 60 mg, 60 mg, Oral, Daily, CLARICE Wang, 60 mg at 03/08/21 0844    levothyroxine tablet 125 mcg, 125 mcg, Oral, Daily, Kaylene Bennett MD, 125 mcg at 03/08/21 9092    nitroglycerin (NITROSTAT) SL tablet 0 4 mg, 0 4 mg, Sublingual, Q5 Min PRN, Kaylene Bennett MD    [facility]ron [facility]) injection 4 mg, 4 mg, Intravenous, Q6H PRN, Kaylene Bennett MD    phenol (CHLORASEPTIC) 1 4 % mucosal liquid 1 spray, 1 spray, Mouth/Throat, Q2H PRN, Ameya Taveras MD    Laboratory Results:  Results from last 7 days   Lab Units 03/08/21  0452 03/07/21  0442 03/06/21  0510 03/05/21  0516 03/04/21  0510 03/03/21  0524 03/02/21  0415 03/02/21  0140 03/02/21 0139 03/01/21 1949   WBC Thousand/uL  --   --  6 71  --   --   --  6 86  --   --  7 12   HEMOGLOBIN g/dL  --   --  7 3*  --   --   --  7 3*  --   --  7 7*   HEMATOCRIT %  --   --  24 6*  --   --   --  23 5*  --   --  26 0*   PLATELETS Thousands/uL  --   --  144*  --   --   --  168  --  139* 169   POTASSIUM mmol/L 4 3 4 4 4 4 4 5 4 5 4 7  --  4 8  --   --    CHLORIDE mmol/L 106 105 105 105 106 109*  --  109*  --   --    CO2 mmol/L 29 30 28 26 27 25  --  24  --   --    BUN mg/dL 77* 75* 73* 72* 83* 84*  --  90*  --   --    CREATININE mg/dL 3 78* 3 54* 3 04* 3 05* 2 87* 2 81*  --  3 04*  --   --    CALCIUM mg/dL 8 8 8 7 9 3 9 1 8 9 8 9  --  8 8  --   --    MAGNESIUM mg/dL  --   --   --   --   --   --   --  2 7*  --   --    PHOSPHORUS mg/dL  --  4 0  --   --   --  4 3* 4 2*  --   --   --

## 2021-03-08 NOTE — ASSESSMENT & PLAN NOTE
Lab Results   Component Value Date    HGBA1C 11 7 (H) 03/02/2021     Recent Labs     03/07/21  2116 03/08/21  0624 03/08/21  1054 03/08/21  1604   POCGLU 235* 139 221* 268*     Blood Sugar Average: Last 72 hrs:  (P) 194 3125     · Poorly controlled as evidenced by A1c but improved since prior study last month  · Continue NovoLog 70/30 10u BID  · Continue Accu-Cheks and sliding scale insulin

## 2021-03-08 NOTE — UTILIZATION REVIEW
Continued Stay Review    Date:3/6-3/8/2021                        Current Patient Class: inpatient   Current Level of Care:  Med Surg     HPI:65 y o  female initially admitted on 3/1/2021   With increased SOB due to acute on chronic combined systolic and diastolic  CHF  Stage C, NYHA II/III and ARF superimposed on CKD  Stage 4  Assessment/Plan: 3/6 - diuretics changed from lasix 40 mg  Iv tid  to PO torsemide 40 mg  Po , She reports her breathing and Lower extremity edema continue to improve  Creatinine 3 04 - for BP is on,  Norvasc,  Hydralazine, Coreg and  Imdur  On venofer x 5 doses  for anemia daily last dose 3/6  Rocephin  Iv daily for  Suspected pneumonia based on CXR  RLL infiltrate, started on 3/5 for 5 days  3/7   -   She continues to require monitoring for renal function and medication management  Torsemide d/c'd,Lasix on hold, due to elevated creatinine,   Rocephin continues for PNU         3/8 -  Creatinine remains elevated to 3 78, continue daily weights, OI & O charting  Daily labs           Pertinent Labs/Diagnostic Results:       Results from last 7 days   Lab Units 03/06/21  0510 03/02/21  0415 03/02/21  0139 03/01/21  1949   WBC Thousand/uL 6 71 6 86  --  7 12   HEMOGLOBIN g/dL 7 3* 7 3*  --  7 7*   HEMATOCRIT % 24 6* 23 5*  --  26 0*   PLATELETS Thousands/uL 144* 168 139* 169   NEUTROS ABS Thousands/µL 4 58 4 50  --  5 01         Results from last 7 days   Lab Units 03/08/21  0452 03/07/21  0442 03/06/21  0510 03/05/21  0516 03/04/21  0510 03/03/21  0524 03/02/21  0415 03/02/21  0140   SODIUM mmol/L 140 140 140 140 139 139  --  138   POTASSIUM mmol/L 4 3 4 4 4 4 4 5 4 5 4 7  --  4 8   CHLORIDE mmol/L 106 105 105 105 106 109*  --  109*   CO2 mmol/L 29 30 28 26 27 25  --  24   ANION GAP mmol/L 5 5 7 9 6 5  --  5   BUN mg/dL 77* 75* 73* 72* 83* 84*  --  90*   CREATININE mg/dL 3 78* 3 54* 3 04* 3 05* 2 87* 2 81*  --  3 04*   EGFR ml/min/1 73sq m 12 13 15 15 16 17  --  15   CALCIUM mg/dL 8 8 8 7 9 3 9 1 8 9 8 9  --  8 8   MAGNESIUM mg/dL  --   --   --   --   --   --   --  2 7*   PHOSPHORUS mg/dL  --  4 0  --   --   --  4 3* 4 2*  --      Results from last 7 days   Lab Units 03/02/21  0140 03/01/21 1948   AST U/L 45 52*   ALT U/L 66 75   ALK PHOS U/L 610* 692*   TOTAL PROTEIN g/dL 6 8 7 5   ALBUMIN g/dL 2 8* 3 2*   TOTAL BILIRUBIN mg/dL 0 29 0 25     Results from last 7 days   Lab Units 03/08/21  0624 03/07/21  2116 03/07/21  1602 03/07/21  1130 03/07/21  0615 03/06/21  2110 03/06/21  1617 03/06/21  1139 03/06/21  0617 03/05/21  2054 03/05/21  1612 03/05/21  1128   POC GLUCOSE mg/dl 139 235* 348* 196* 105 285* 149* 158* 113 223* 162* 176*     Results from last 7 days   Lab Units 03/08/21  0452 03/07/21  0442 03/06/21  0510 03/05/21  0516 03/04/21  0510 03/03/21  0524 03/02/21  0140 03/01/21 1948   GLUCOSE RANDOM mg/dL 107 107 120 125 93 81 171* 227*         Results from last 7 days   Lab Units 03/02/21 0139   HEMOGLOBIN A1C % 11 7*   EAG mg/dl 289       Results from last 7 days   Lab Units 03/02/21  0415 03/02/21  0139 03/01/21 1948   TROPONIN I ng/mL 0 02 0 02 0 02     Results from last 7 days   Lab Units 03/02/21 0415   TSH 3RD GENERATON uIU/mL 15 800*     Results from last 7 days   Lab Units 03/01/21 1948   NT-PRO BNP pg/mL 9,024*       Vital Signs:   Date/Time  Temp  Pulse  Resp  BP  MAP (mmHg)  SpO2  O2 Device  Patient Position - Orthostatic VS   03/08/21 07:26:37  98 1 °F (36 7 °C)  63  18  156/67  97  95 %  None (Room air)  Lying   03/08/21 0639  --  74  --  152/64  --  --  --  --   03/07/21 23:27:29  98 2 °F (36 8 °C)  73  18  146/60  89  96 %  None (Room air)  Lying   03/07/21 21:36:25  --  65  --  151/63  92  96 %  --  --   03/07/21 1910  --  --  --  --  --  --  None (Room air)  --   03/07/21 15:21:50  98 1 °F (36 7 °C)  73  16  146/61  89  94 %  --  --   03/07/21 0724  --  --  --  --  --  95 %  None (Room air)  --   03/07/21 07:22:04  98 1 °F (36 7 °C)  70  18  149/61  90  95 %  -- --   03/06/21 23:02:19  97 5 °F (36 4 °C)  76  16  138/61  87  95 %  None (Room air)  Lying   03/06/21 23:01:47  97 5 °F (36 4 °C)  74  --  138/61  87  94 %  --  --   03/06/21 1600  --  --  --  --  --  --  None (Room air)  --   03/06/21 15:25:05  98 °F (36 7 °C)  60  18  140/67  91  95 %  --  --       Medications:   Scheduled Medications:  amLODIPine, 5 mg, Oral, HS  aspirin, 81 mg, Oral, Daily  atorvastatin, 40 mg, Oral, Daily  azithromycin, 500 mg, Oral, Q24H  calcium acetate, 667 mg, Oral, BID With Meals  carvedilol, 12 5 mg, Oral, BID With Meals  cefTRIAXone, 1,000 mg, Intravenous, Q24H  heparin (porcine), 5,000 Units, Subcutaneous, Q8H DEE  hydrALAZINE, 100 mg, Oral, TID  insulin aspart protamine-insulin aspart, 10 Units, Subcutaneous, BID AC  insulin lispro, 1-5 Units, Subcutaneous, HS  insulin lispro, 1-6 Units, Subcutaneous, TID AC  isosorbide mononitrate, 60 mg, Oral, Daily  levothyroxine, 125 mcg, Oral, Daily  Lasxi 40 mg  Iv tid -  Last dose 3/6( 2 doses given on 3/6)       Continuous IV Infusions:     PRN Meds:  acetaminophen, 650 mg, Oral, Q6H PRN  aluminum-magnesium hydroxide-simethicone, 15 mL, Oral, Q6H PRN  docusate sodium, 100 mg, Oral, BID PRN  hydrALAZINE, 20 mg, Intravenous, Q6H PRN  nitroglycerin, 0 4 mg, Sublingual, Q5 Min PRN  ondansetron, 4 mg, Intravenous, Q6H PRN  phenol, 1 spray, Mouth/Throat, Q2H PRN        Discharge Plan: D     Network Utilization Review Department  ATTENTION: Please call with any questions or concerns to [phone] and carefully listen to the prompts so that you are directed to the right person  All voicemails are confidential   Mary Heredia all requests for admission clinical reviews, approved or denied determinations and any other requests to dedicated fax number below belonging to the campus where the patient is receiving treatment   List of dedicated fax numbers for the Facilities:  FACILITY NAME UR FAX NUMBER   ADMISSION DENIALS (Administrative/Medical Necessity) [facility] (Maternity/NICU/Pediatrics) [address] [place] [address],[address]  [phone]   Adolfo Alvarado [address] (  Rose Duncan "[place]" 103) [address] [address] [phone]   [place] [address] [phone]

## 2021-03-08 NOTE — ASSESSMENT & PLAN NOTE
· Chronic anemia, likely due to CKD and iron deficiency  · On IV venofer x 5 doses  · Outpatient follow up with nephrology for EPO  · Discussed possible transfusion with the patient, she had chest pain with transfusion last admission and is hesitant at this time  Per blood bank there was no significant transfusion reaction  Will reserve blood transfusion if symptoms worsen

## 2021-03-09 LAB
ANION GAP SERPL CALCULATED.3IONS-SCNC: 6 MMOL/L (ref 4–13)
BUN SERPL-MCNC: 73 MG/DL (ref 5–25)
CALCIUM SERPL-MCNC: 8.9 MG/DL (ref 8.3–10.1)
CHLORIDE SERPL-SCNC: 108 MMOL/L (ref 100–108)
CO2 SERPL-SCNC: 27 MMOL/L (ref 21–32)
CREAT SERPL-MCNC: 3.71 MG/DL (ref 0.6–1.3)
ERYTHROCYTE [DISTWIDTH] IN BLOOD BY AUTOMATED COUNT: 14.3 % (ref 11.6–15.1)
GFR SERPL CREATININE-BSD FRML MDRD: 12 ML/MIN/1.73SQ M
GLUCOSE SERPL-MCNC: 147 MG/DL (ref 65–140)
GLUCOSE SERPL-MCNC: 163 MG/DL (ref 65–140)
GLUCOSE SERPL-MCNC: 273 MG/DL (ref 65–140)
GLUCOSE SERPL-MCNC: 288 MG/DL (ref 65–140)
GLUCOSE SERPL-MCNC: 292 MG/DL (ref 65–140)
GLUCOSE SERPL-MCNC: 326 MG/DL (ref 65–140)
HCT VFR BLD AUTO: 24.3 % (ref 34.8–46.1)
HGB BLD-MCNC: 7.3 G/DL (ref 11.5–15.4)
MCH RBC QN AUTO: 28.4 PG (ref 26.8–34.3)
MCHC RBC AUTO-ENTMCNC: 30 G/DL (ref 31.4–37.4)
MCV RBC AUTO: 95 FL (ref 82–98)
PLATELET # BLD AUTO: 136 THOUSANDS/UL (ref 149–390)
PMV BLD AUTO: 11.3 FL (ref 8.9–12.7)
POTASSIUM SERPL-SCNC: 4.5 MMOL/L (ref 3.5–5.3)
RBC # BLD AUTO: 2.57 MILLION/UL (ref 3.81–5.12)
SODIUM SERPL-SCNC: 141 MMOL/L (ref 136–145)
WBC # BLD AUTO: 6.08 THOUSAND/UL (ref 4.31–10.16)

## 2021-03-09 PROCEDURE — 80048 BASIC METABOLIC PNL TOTAL CA: CPT | Performed by: INTERNAL MEDICINE

## 2021-03-09 PROCEDURE — 99232 SBSQ HOSP IP/OBS MODERATE 35: CPT | Performed by: INTERNAL MEDICINE

## 2021-03-09 PROCEDURE — 99231 SBSQ HOSP IP/OBS SF/LOW 25: CPT | Performed by: INTERNAL MEDICINE

## 2021-03-09 PROCEDURE — 82948 REAGENT STRIP/BLOOD GLUCOSE: CPT

## 2021-03-09 PROCEDURE — 99233 SBSQ HOSP IP/OBS HIGH 50: CPT | Performed by: INTERNAL MEDICINE

## 2021-03-09 PROCEDURE — 85027 COMPLETE CBC AUTOMATED: CPT | Performed by: INTERNAL MEDICINE

## 2021-03-09 RX ORDER — HYDRALAZINE HYDROCHLORIDE 100 MG/1
100 TABLET, FILM COATED ORAL 3 TIMES DAILY
Qty: 90 TABLET | Refills: 0 | Status: SHIPPED | OUTPATIENT
Start: 2021-03-09 | End: 2021-04-13 | Stop reason: SDUPTHER

## 2021-03-09 RX ORDER — AMLODIPINE BESYLATE 10 MG/1
10 TABLET ORAL
Qty: 30 TABLET | Refills: 0 | Status: SHIPPED | OUTPATIENT
Start: 2021-03-09 | End: 2021-04-12 | Stop reason: SDUPTHER

## 2021-03-09 RX ORDER — CARVEDILOL 12.5 MG/1
12.5 TABLET ORAL 2 TIMES DAILY WITH MEALS
Qty: 60 TABLET | Refills: 0 | Status: SHIPPED | OUTPATIENT
Start: 2021-03-09 | End: 2021-04-13 | Stop reason: SDUPTHER

## 2021-03-09 RX ORDER — TORSEMIDE 20 MG/1
20 TABLET ORAL 2 TIMES DAILY
Status: DISCONTINUED | OUTPATIENT
Start: 2021-03-09 | End: 2021-03-10 | Stop reason: HOSPADM

## 2021-03-09 RX ORDER — TORSEMIDE 20 MG/1
20 TABLET ORAL 2 TIMES DAILY
Qty: 60 TABLET | Refills: 0 | Status: SHIPPED | OUTPATIENT
Start: 2021-03-09 | End: 2021-04-12

## 2021-03-09 RX ORDER — AMLODIPINE BESYLATE 10 MG/1
10 TABLET ORAL
Status: DISCONTINUED | OUTPATIENT
Start: 2021-03-09 | End: 2021-03-10 | Stop reason: HOSPADM

## 2021-03-09 RX ORDER — ISOSORBIDE MONONITRATE 60 MG/1
60 TABLET, EXTENDED RELEASE ORAL DAILY
Qty: 30 TABLET | Refills: 0 | Status: SHIPPED | OUTPATIENT
Start: 2021-03-10 | End: 2021-04-12 | Stop reason: SDUPTHER

## 2021-03-09 RX ADMIN — AZITHROMYCIN MONOHYDRATE 500 MG: 250 TABLET ORAL at 11:45

## 2021-03-09 RX ADMIN — CALCIUM ACETATE 667 MG: 667 CAPSULE ORAL at 17:51

## 2021-03-09 RX ADMIN — HYDRALAZINE HYDROCHLORIDE 100 MG: 50 TABLET ORAL at 08:20

## 2021-03-09 RX ADMIN — LEVOTHYROXINE SODIUM 125 MCG: 125 TABLET ORAL at 06:37

## 2021-03-09 RX ADMIN — INSULIN LISPRO 1 UNITS: 100 INJECTION, SOLUTION INTRAVENOUS; SUBCUTANEOUS at 08:22

## 2021-03-09 RX ADMIN — INSULIN LISPRO 5 UNITS: 100 INJECTION, SOLUTION INTRAVENOUS; SUBCUTANEOUS at 11:46

## 2021-03-09 RX ADMIN — CARVEDILOL 12.5 MG: 12.5 TABLET, FILM COATED ORAL at 17:55

## 2021-03-09 RX ADMIN — HYDRALAZINE HYDROCHLORIDE 100 MG: 50 TABLET ORAL at 17:51

## 2021-03-09 RX ADMIN — ISOSORBIDE MONONITRATE 60 MG: 60 TABLET, EXTENDED RELEASE ORAL at 08:21

## 2021-03-09 RX ADMIN — ASPIRIN 81 MG: 81 TABLET, CHEWABLE ORAL at 08:21

## 2021-03-09 RX ADMIN — AMLODIPINE BESYLATE 10 MG: 10 TABLET ORAL at 22:18

## 2021-03-09 RX ADMIN — INSULIN LISPRO 2 UNITS: 100 INJECTION, SOLUTION INTRAVENOUS; SUBCUTANEOUS at 22:19

## 2021-03-09 RX ADMIN — ATORVASTATIN CALCIUM 40 MG: 40 TABLET, FILM COATED ORAL at 08:21

## 2021-03-09 RX ADMIN — CARVEDILOL 12.5 MG: 12.5 TABLET, FILM COATED ORAL at 08:21

## 2021-03-09 RX ADMIN — CEFTRIAXONE 1000 MG: 2 INJECTION, POWDER, FOR SOLUTION INTRAMUSCULAR; INTRAVENOUS at 11:45

## 2021-03-09 RX ADMIN — INSULIN LISPRO 4 UNITS: 100 INJECTION, SOLUTION INTRAVENOUS; SUBCUTANEOUS at 17:57

## 2021-03-09 RX ADMIN — HYDRALAZINE HYDROCHLORIDE 100 MG: 50 TABLET ORAL at 22:18

## 2021-03-09 RX ADMIN — TORSEMIDE 20 MG: 20 TABLET ORAL at 14:05

## 2021-03-09 RX ADMIN — INSULIN ASPART 10 UNITS: 100 INJECTION, SUSPENSION SUBCUTANEOUS at 17:58

## 2021-03-09 RX ADMIN — CALCIUM ACETATE 667 MG: 667 CAPSULE ORAL at 08:21

## 2021-03-09 RX ADMIN — TORSEMIDE 20 MG: 20 TABLET ORAL at 17:54

## 2021-03-09 NOTE — PROGRESS NOTES
NEPHROLOGY PROGRESS NOTE   Nadira Serrano 76 y o  female MRN: [id number]  Unit/Bed#: [facility] [place] 526-01 Encounter: [id number]  Reason for Consult:  Acute kidney injury    ASSESSMENT/PLAN:  1  Acute kidney injury, likely multifactorial currently suspect component of cardiorenal syndrome unfortunately creatinine continues to rise   2  Chronic kidney disease with baseline creatinine 1 9-2 2  3  Cardiomyopathy ejection fraction 60% with grade 2 diastolic dysfunction  4  Hypertension, blood pressure overall appears stable  5  CKD associated mineral bone disorder, continue with calcium acetate  6  Diabetes with likely associated nephropathy recent hemoglobin A1c 11 7  7  Diastolic heart failure, maintenance diuretics currently on hold, currently as needed   8  Acute on chronic anemia, receiving IV iron  9  Otitis media, currently on antibiotic therapy    PLAN:  · Okay to resume oral diuretic therapy, [id number]  b i d    · Hopeful continued improvement in renal function   · Will need close follow-up as an outpatient given advanced CKD and recent KELLY    SUBJECTIVE:   seen examined  Patient offers no new complaints  Denies any chest pain or shortness of breath  Denies any lower extremity swelling  Denies abdominal pain  Review of Systems    OBJECTIVE:  Current Weight: Weight - Scale: 76 5 kg (168 lb 10 4 oz)  Vitals:    03/08/21 1949 03/08/21 2224 03/09/21 0600 03/09/21 0723   BP: 153/65 143/59  161/67   BP Location:    Right arm   Pulse: 65 71  70   Resp:  16  18   Temp:  (!) 97 2 °F (36 2 °C)  98 4 °F (36 9 °C)   TempSrc:    Oral   SpO2:  96%  97%   Weight:   76 5 kg (168 lb 10 4 oz)    Height:           Intake/Output Summary (Last 24 hours) at 3/9/2021 1302  Last data filed at 3/9/2021 1235  Gross per 24 hour   Intake 718 ml   Output 1800 ml   Net -1082 ml       Physical Exam  Constitutional:       Appearance: Normal appearance  She is not ill-appearing  HENT:      Head: Normocephalic and atraumatic     Eyes: General: No scleral icterus  Cardiovascular:      Rate and Rhythm: Normal rate and regular rhythm  Pulmonary:      Effort: Pulmonary effort is normal       Breath sounds: Examination of the left-lower field reveals decreased breath sounds  Decreased breath sounds present  Abdominal:      General: There is no distension  Palpations: Abdomen is soft  Tenderness: There is no abdominal tenderness  Musculoskeletal:      Right lower leg: No edema  Left lower leg: No edema  Skin:     General: Skin is warm and dry  Neurological:      Mental Status: She is alert and oriented to person, place, and time           Medications:    Current Facility-Administered Medications:     acetaminophen (TYLENOL) tablet 650 mg, 650 mg, Oral, Q6H PRN, Richar Rodriguez MD    aluminum-magnesium hydroxide-simethicone (MYLANTA) oral suspension 15 mL, 15 mL, Oral, Q6H PRN, Richar Rodriguez MD    amLODIPine (NORVASC) tablet 10 mg, 10 mg, Oral, HS, CLARICE Wang    aspirin chewable tablet 81 mg, 81 mg, Oral, Daily, Richar Rodriguez MD, 81 mg at 03/09/21 4508    atorvastatin (LIPITOR) tablet 40 mg, 40 mg, Oral, Daily, Richar Rodriguez MD, 40 mg at 03/09/21 3888    calcium acetate (PHOSLO) capsule 667 mg, 667 mg, Oral, BID With Meals, Richar Rodriguez MD, 667 mg at 03/09/21 0677    carvedilol (COREG) tablet 12 5 mg, 12 5 mg, Oral, BID With Meals, CLARICE Oneill, 12 5 mg at 03/09/21 0821    docusate sodium (COLACE) capsule 100 mg, 100 mg, Oral, BID PRN, Richar Rodriguez MD    heparin (porcine) subcutaneous injection 5,000 Units, 5,000 Units, Subcutaneous, Q8H [facility], Stopped at 03/08/21 1357 **AND** [COMPLETED] Platelet count, , , Once, Richar Rodriguez MD    hydrALAZINE (APRESOLINE) injection 20 mg, 20 mg, Intravenous, Q6H PRN, Netta Griffith MD    hydrALAZINE (APRESOLINE) tablet 100 mg, 100 mg, Oral, TID, CLARICE Wang, 100 mg at 03/09/21 0820    insulin aspart protamine-insulin aspart (NovoLOG 70/30) 100 units/mL subcutaneous injection 10 Units, 10 Units, Subcutaneous, BID ACCeline MD, Stopped at 03/08/21 0634    insulin lispro (HumaLOG) 100 units/mL subcutaneous injection 1-5 Units, 1-5 Units, Subcutaneous, HS, Celine Pinon MD, 2 Units at 03/08/21 2144    insulin lispro (HumaLOG) 100 units/mL subcutaneous injection 1-6 Units, 1-6 Units, Subcutaneous, TID AC, 5 Units at 03/09/21 1146 **AND** Fingerstick Glucose (POCT), , , TID AC, Celine Pinon MD    isosorbide mononitrate (IMDUR) 24 hr tablet 60 mg, 60 mg, Oral, Daily, CLARICE Wang, 60 mg at 03/09/21 9113    levothyroxine tablet 125 mcg, 125 mcg, Oral, Daily, Celine Pinon MD, 125 mcg at 03/09/21 0977    nitroglycerin (NITROSTAT) SL tablet 0 4 mg, 0 4 mg, Sublingual, Q5 Min PRN, Celine Pinon MD    ondansetron [facility]) injection 4 mg, 4 mg, Intravenous, Q6H PRN, Celine Pinon MD    phenol (CHLORASEPTIC) 1 4 % mucosal liquid 1 spray, 1 spray, Mouth/Throat, Q2H PRN, Latia Poe MD    Laboratory Results:  Results from last 7 days   Lab Units 03/09/21  0457 03/08/21  1637 03/08/21  0452 03/07/21  0442 03/06/21  0510 03/05/21  0516 03/04/21  0510 03/03/21  0524   WBC Thousand/uL 6 08  --   --   --  6 71  --   --   --    HEMOGLOBIN g/dL 7 3*  --   --   --  7 3*  --   --   --    HEMATOCRIT % 24 3*  --   --   --  24 6*  --   --   --    PLATELETS Thousands/uL 136*  --   --   --  144*  --   --   --    POTASSIUM mmol/L 4 5 4 5 4 3 4 4 4 4 4 5 4 5 4 7   CHLORIDE mmol/L 108 105 106 105 105 105 106 109*   CO2 mmol/L 27 23 29 30 28 26 27 25   BUN mg/dL 73* 77* 77* 75* 73* 72* 83* 84*   CREATININE mg/dL 3 71* 3 90* 3 78* 3 54* 3 04* 3 05* 2 87* 2 81*   CALCIUM mg/dL 8 9 9 0 8 8 8 7 9 3 9 1 8 9 8 9   PHOSPHORUS mg/dL  --   --   --  4 0  --   --   --  4 3*

## 2021-03-09 NOTE — PLAN OF CARE
Problem: SAFETY ADULT  Goal: Patient will remain free of falls  Description: INTERVENTIONS:  - Assess patient frequently for physical needs  -  Identify cognitive and physical deficits and behaviors that affect risk of falls  -  [place] fall precautions as indicated by assessment   - Educate patient/family on patient safety including physical limitations  - Instruct patient to call for assistance with activity based on assessment  - Modify environment to reduce risk of injury  - Consider OT/PT consult to assist with strengthening/mobility  Outcome: Progressing     Problem: RESPIRATORY - ADULT  Goal: Achieves optimal ventilation and oxygenation  Description: INTERVENTIONS:  - Assess for changes in respiratory status  - Assess for changes in mentation and behavior  - Position to facilitate oxygenation and minimize respiratory effort  - Oxygen administered by appropriate delivery if ordered  - Initiate smoking cessation education as indicated  - Encourage broncho-pulmonary hygiene including cough, deep breathe, Incentive Spirometry  - Assess the need for suctioning and aspirate as needed  - Assess and instruct to report SOB or any respiratory difficulty  - Respiratory Therapy support as indicated  Outcome: Progressing     Problem: Nutrition/Hydration-ADULT  Goal: Nutrient/Hydration intake appropriate for improving, restoring or maintaining nutritional needs  Description: Monitor and assess patient's nutrition/hydration status for malnutrition  Collaborate with interdisciplinary team and initiate plan and interventions as ordered  Monitor patient's weight and dietary intake as ordered or per policy  Utilize nutrition screening tool and intervene as necessary  Determine patient's food preferences and provide high-protein, high-caloric foods as appropriate       INTERVENTIONS:  - Monitor oral intake, urinary output, labs, and treatment plans  - Assess nutrition and hydration status and recommend course of action  - Evaluate amount of meals eaten  - Assist patient with eating if necessary   - Allow adequate time for meals  - Recommend/ encourage appropriate diets, oral nutritional supplements, and vitamin/mineral supplements  - Order, calculate, and assess calorie counts as needed  - Recommend, monitor, and adjust tube feedings and TPN/PPN based on assessed needs  - Assess need for intravenous fluids  - Provide specific nutrition/hydration education as appropriate  - Include patient/family/caregiver in decisions related to nutrition  Outcome: Progressing     Problem: Potential for Falls  Goal: Patient will remain free of falls  Description: INTERVENTIONS:  - Assess patient frequently for physical needs  -  Identify cognitive and physical deficits and behaviors that affect risk of falls    -  [place] fall precautions as indicated by assessment   - Educate patient/family on patient safety including physical limitations  - Instruct patient to call for assistance with activity based on assessment  - Modify environment to reduce risk of injury  - Consider OT/PT consult to assist with strengthening/mobility  Outcome: Progressing     Problem: DISCHARGE PLANNING  Goal: Discharge to home or other facility with appropriate resources  Description: INTERVENTIONS:  - Identify barriers to discharge w/patient and caregiver  - Arrange for needed discharge resources and transportation as appropriate  - Identify discharge learning needs (meds, wound care, etc )  - Arrange for interpretive services to assist at discharge as needed  - Refer to Case Management Department for coordinating discharge planning if the patient needs post-hospital services based on physician/advanced practitioner order or complex needs related to functional status, cognitive ability, or social support system  Outcome: Progressing     Problem: Knowledge Deficit  Goal: Patient/family/caregiver demonstrates understanding of disease process, treatment plan, medications, and discharge instructions  Description: Complete learning assessment and assess knowledge base    Interventions:  - Provide teaching at level of understanding  - Provide teaching via preferred learning methods  Outcome: Progressing     Problem: METABOLIC, FLUID AND ELECTROLYTES - ADULT  Goal: Electrolytes maintained within normal limits  Description: INTERVENTIONS:  - Monitor labs and assess patient for signs and symptoms of electrolyte imbalances  - Administer electrolyte replacement as ordered  - Monitor response to electrolyte replacements, including repeat lab results as appropriate  - Instruct patient on fluid and nutrition as appropriate  Outcome: Progressing  Goal: Fluid balance maintained  Description: INTERVENTIONS:  - Monitor labs   - Monitor I/O and WT  - Instruct patient on fluid and nutrition as appropriate  - Assess for signs & symptoms of volume excess or deficit  Outcome: Progressing  Goal: Glucose maintained within target range  Description: INTERVENTIONS:  - Monitor Blood Glucose as ordered  - Assess for signs and symptoms of hyperglycemia and hypoglycemia  - Administer ordered medications to maintain glucose within target range  - Assess nutritional intake and initiate nutrition service referral as needed  Outcome: Progressing

## 2021-03-09 NOTE — ASSESSMENT & PLAN NOTE
Lab Results   Component Value Date    EGFR 12 03/09/2021    EGFR 11 03/08/2021    EGFR 12 03/08/2021    CREATININE 3 71 (H) 03/09/2021    CREATININE 3 90 (H) 03/08/2021    CREATININE 3 78 (H) 03/08/2021     · Baseline creatinine:  1 9-2 2  · KELLY likely secondary to cardiorenal syndrome as well as not being fully recovered from prior KELLY    · Continue diuretics for volume overload  · May need HD this admission if worsening

## 2021-03-09 NOTE — ASSESSMENT & PLAN NOTE
Wt Readings from Last 3 Encounters:   03/09/21 76 5 kg (168 lb 10 4 oz)   02/27/21 81 kg (178 lb 9 2 oz)   11/02/20 77 1 kg (170 lb)     · Patient with acute exacerbation of CHF secondary to dietary indiscretions and not resuming her diuretics at time of discharge from recent hospitalization  · Continue low-salt diet and fluid restriction    · Monitor intake, and output  · Monitor daily weights  · Edema and SOB improving  · Worsening renal function but weight increased by one pound, diuretics restarted,

## 2021-03-09 NOTE — CASE MANAGEMENT
Spoke with Fiorella in [place] who reported that pt is above income for PA DELFINO Barros sent pt an application for Financial Assistance within [place] left message for Heidi armando to discuss PACE application for possible assist with medication co-pays

## 2021-03-09 NOTE — PROGRESS NOTES
Heart Failure/ Pulmonary Hypertension Progress Note - Ji Miller 76 y o  female MRN: [id number]    Unit/Bed#: [facility] 526-01 Encounter: [id number]      Assessment:    Principal Problem:    Acute on chronic combined systolic and diastolic congestive heart failure (HCC)  Active Problems:    Type 2 diabetes mellitus with kidney complication, with long-term current use of insulin (HCC)    Hypothyroidism    Acute renal failure superimposed on stage 4 chronic kidney disease (HCC)    Anemia    Coronary artery disease involving native coronary artery of native heart with angina pectoris (HCC)    Pneumonia    Hypertensive urgency    Acute otitis media      Subjective:   Patient seen and examined  No significant events overnight  Creat up again yesterday despite holding diuretics x 24 hours  Challenged with one time dose of Lasix IV 40 mg with increase in creat from 3 7 to 3 9  Objective: Intake/ Output: 540/1250/-710  Weight: 178 on admit, 168 today  Tele: SR    Plan:  Acute on chronic HFpEF  Recent admitted with PNA and HF exacerbation  Discharged home off diuretic due to KELLY and now presents again with acute decompensation and need for IV diuresis  Renal function appears to be worsening overall with diuresis  Will discuss diuretic regimen with attending today  Continue  daily standing weights, I/O charting, daily BMP        Home diuretic- Torsemide 20 mg BID             Lab Results   Component Value Date     NTBNP 9,024 (H) 03/01/2021      NT Pro BNP 2/21/21: 12 3K        TTE 2/22/21: LVEF 60%, akinesis of basal, inferior and basal mid inferolateral walls  Moderate cLVH, grade II DD, mild MR, trace TR, moderate, free flowing pericardial effusion without hemodynamic compromise  RV normal IVC normal  IVSd 0 99 cm       Echo 12/30/18:  EF: 50%, grade 2 DD, PASP 35 mmHg     CAD -No anginal symptoms at present  H/O NSTEMI S/P  LUDY x 2 to LAD 1/4/   80% RCA- plan staged, but has not needed intervention   Imdur 30 mg daily, asa      HTN- Uncontrolled with systolic still  to the 784- 160s in the PM and early AM hours  on admit  Would continue Hydral 100 mg TID,  Coreg 12 5 mg BID ,Imdur 60 mg daily  Will increase Amlodipine today to 10 mg at HS  H/O Palpitations  Holter 5/17/19: 53-85, avg 69 bpm  8 PVCs      Anemia- Hgb now in the 7 2-7 5 range  Receiving Venofer infusion     CKD3, new baseline seems to be 2 5-3 0  Creat 3 7 today  Need for eventual HD discussed, patient adamantly against this  Nephro on board  Appreciate recs       DM2, hgA1C 3/2/21 11 7    HLD  FLP 3/2/21: , , HDL 76, LDL 48 on Atorva 40 mg daily         Review of Systems   All other systems reviewed and are negative  [employer] (day, reason): Valero catheter (day, reason):    Vitals: Blood pressure 161/67, pulse 70, temperature 98 4 °F (36 9 °C), temperature source Oral, resp  rate 18, height 5' 5" (1 651 m), weight 76 5 kg (168 lb 10 4 oz), SpO2 97 %  , Body mass index is 28 07 kg/m² , I/O last 3 completed shifts: In: 540 [P O :540]  Out: 2350 [Urine:2350]  I/O this shift:  In: 300 [P O :300]  Out: 550 [Urine:550]  Wt Readings from Last 3 Encounters:   03/09/21 76 5 kg (168 lb 10 4 oz)   02/27/21 81 kg (178 lb 9 2 oz)   11/02/20 77 1 kg (170 lb)       Intake/Output Summary (Last 24 hours) at 3/9/2021 1032  Last data filed at 3/9/2021 0819  Gross per 24 hour   Intake 840 ml   Output 1800 ml   Net -960 ml     I/O last 3 completed shifts:   In: 540 [P O :540]  Out: 2350 [Urine:2350]          Physical Exam:  Vitals:    03/08/21 1949 03/08/21 2224 03/09/21 0600 03/09/21 0723   BP: 153/65 143/59  161/67   BP Location:    Right arm   Pulse: 65 71  70   Resp:  16  18   Temp:  (!) 97 2 °F (36 2 °C)  98 4 °F (36 9 °C)   TempSrc:    Oral   SpO2:  96%  97%   Weight:   76 5 kg (168 lb 10 4 oz)    Height:           GEN: Helene Villasenor appears well, alert and oriented x 3, pleasant and cooperative   HEENT: pupils equal, round, and reactive to light; extraocular muscles intact  NECK: supple, no carotid bruits   HEART: regular rhythm, normal S1 and S2, no murmurs, clicks, gallops or rubs, JVP is down     LUNGS: clear to auscultation bilaterally; no wheezes, rales, or rhonchi   ABDOMEN: normal bowel sounds, soft, no tenderness, no distention  EXTREMITIES: peripheral pulses normal; no clubbing, cyanosis,trace BLLE  edema  NEURO: no focal findings   SKIN: normal without suspicious lesions on exposed skin      Current Facility-Administered Medications:     acetaminophen (TYLENOL) tablet 650 mg, 650 mg, Oral, Q6H PRN, Amadou Middleton MD    aluminum-magnesium hydroxide-simethicone (MYLANTA) oral suspension 15 mL, 15 mL, Oral, Q6H PRN, Amadou Middleton MD    amLODIPine (NORVASC) tablet 5 mg, 5 mg, Oral, HS, CLARICE Wang, 5 mg at 03/08/21 2143    aspirin chewable tablet 81 mg, 81 mg, Oral, Daily, Amadou Middleton MD, 81 mg at 03/09/21 1018    atorvastatin (LIPITOR) tablet 40 mg, 40 mg, Oral, Daily, Amadou Middleton MD, 40 mg at 03/09/21 0821    azithromycin (ZITHROMAX) tablet 500 mg, 500 mg, Oral, Q24H, Maricel Wilhelm MD, 500 mg at 03/08/21 1117    calcium acetate (PHOSLO) capsule 667 mg, 667 mg, Oral, BID With Meals, Amadou Middleton MD, 667 mg at 03/09/21 0821    carvedilol (COREG) tablet 12 5 mg, 12 5 mg, Oral, BID With Meals, CLARICE Retana, 12 5 mg at 03/09/21 0821    cefTRIAXone (ROCEPHIN) 1,000 mg in dextrose 5 % 50 mL IVPB, 1,000 mg, Intravenous, Q24H, Maricel Wilhelm MD, Last Rate: 100 mL/hr at 03/08/21 1116, 1,000 mg at 03/08/21 1116    docusate sodium (COLACE) capsule 100 mg, 100 mg, Oral, BID PRN, Amadou Middleton MD    heparin (porcine) subcutaneous injection 5,000 Units, 5,000 Units, Subcutaneous, Q8H [facility], Stopped at 03/08/21 1357 **AND** [COMPLETED] Platelet count, , , Once, Amadou Middleton MD    hydrALAZINE (APRESOLINE) injection 20 mg, 20 mg, Intravenous, Q6H PRN, Maricel Wilhelm MD    hydrALAZINE (APRESOLINE) tablet 100 mg, 100 mg, Oral, TID, CLARICE Wang, 100 mg at 03/09/21 0820    insulin aspart protamine-insulin aspart (NovoLOG 70/30) 100 units/mL subcutaneous injection 10 Units, 10 Units, Subcutaneous, BID AC, Amadou Middleton MD, Stopped at 03/08/21 0634    insulin lispro (HumaLOG) 100 units/mL subcutaneous injection 1-5 Units, 1-5 Units, Subcutaneous, HS, Amadou Middleton MD, 2 Units at 03/08/21 2144    insulin lispro (HumaLOG) 100 units/mL subcutaneous injection 1-6 Units, 1-6 Units, Subcutaneous, TID AC, 1 Units at 03/09/21 1466 **AND** Fingerstick Glucose (POCT), , , TID ACAmadou MD    isosorbide mononitrate (IMDUR) 24 hr tablet 60 mg, 60 mg, Oral, Daily, CLARICE Wang, 60 mg at 03/09/21 3514    levothyroxine tablet 125 mcg, 125 mcg, Oral, Daily, Amadou Middleton MD, 125 mcg at 03/09/21 1336    nitroglycerin (NITROSTAT) SL tablet 0 4 mg, 0 4 mg, Sublingual, Q5 Min PRN, Amadou Middleton MD    ondansetron [facility]) injection 4 mg, 4 mg, Intravenous, Q6H PRN, Amadou Middleton MD    phenol (CHLORASEPTIC) 1 4 % mucosal liquid 1 spray, 1 spray, Mouth/Throat, Q2H PRN, Maricel Wilhelm MD      Labs & Results:        Results from last 7 days   Lab Units 03/09/21  0457 03/06/21  0510   WBC Thousand/uL 6 08 6 71   HEMOGLOBIN g/dL 7 3* 7 3*   HEMATOCRIT % 24 3* 24 6*   PLATELETS Thousands/uL 136* 144*         Results from last 7 days   Lab Units 03/09/21  0457 03/08/21  1637 03/08/21  0452   POTASSIUM mmol/L 4 5 4 5 4 3   CHLORIDE mmol/L 108 105 106   CO2 mmol/L 27 23 29   BUN mg/dL 73* 77* 77*   CREATININE mg/dL 3 71* 3 90* 3 78*   CALCIUM mg/dL 8 9 9 0 8 8           Counseling / Coordination of Care  Total floor / unit time spent today 20 minutes  Greater than 50% of total time was spent with the patient and / or family counseling and / or coordination of care  A description of the counseling / coordination of care: 20        Thank you for the opportunity to participate in the care of this patient  Linh Luther

## 2021-03-09 NOTE — PROGRESS NOTES
[facility]  Progress Note - Bony Dears 1955, 76 y o  female MRN: [id number]  Unit/Bed#: [facility] 526-01 Encounter: [id number]  Primary Care Provider: Wayne Madsen MD   Date and time admitted to hospital: 3/1/2021  7:29 PM    Acute otitis media  Assessment & Plan  · opacification of left TM, likely bacterial OM  · Started on ceftriaxone and azithro, for pneumonia as above  · improved    Hypertensive urgency  Assessment & Plan  · Blood pressures were poorly controlled upon admission but BP is trending down now  · Continue carvedilol 12 5 mg BID, hydralazine 50 mg TID  · Amlodipine added this admission  · Improved with diuresis    Pneumonia  Assessment & Plan  Suspect RLL PNA based on CXR, cough with sputum and RLL crackles  Continue ceftriaxone and azithromycin x5 days total    Coronary artery disease involving native coronary artery of native heart with angina pectoris [facility])  Assessment & Plan  · Currently without chest discomfort  · Continue Coreg, aspirin, lipitor, imdur    Anemia  Assessment & Plan  · Chronic anemia, likely due to CKD and iron deficiency  · On IV venofer x 5 doses  · Outpatient follow up with nephrology for EPO  · Discussed possible transfusion with the patient, she had chest pain with transfusion last admission and is hesitant at this time  Per blood bank there was no significant transfusion reaction  Will reserve blood transfusion if symptoms worsen  Acute renal failure superimposed on stage 4 chronic kidney disease [facility])  Assessment & Plan  Lab Results   Component Value Date    EGFR 12 03/09/2021    EGFR 11 03/08/2021    EGFR 12 03/08/2021    CREATININE 3 71 (H) 03/09/2021    CREATININE 3 90 (H) 03/08/2021    CREATININE 3 78 (H) 03/08/2021     · Baseline creatinine:  1 9-2 2  · KELLY likely secondary to cardiorenal syndrome as well as not being fully recovered from prior KELLY    · Continue diuretics for volume overload  · May need HD this admission if worsening    Hypothyroidism  Assessment & Plan  · TSH is elevated at 15 800 however improved from prior study on 02/21/2021 at 20 900  · Levothyroxine was increased on 02/22/2021  · Continue levothyroxine 125 mcg daily  * Acute on chronic combined systolic and diastolic congestive heart failure (HCC)  Assessment & Plan  Wt Readings from Last 3 Encounters:   03/09/21 76 5 kg (168 lb 10 4 oz)   02/27/21 81 kg (178 lb 9 2 oz)   11/02/20 77 1 kg (170 lb)     · Patient with acute exacerbation of CHF secondary to dietary indiscretions and not resuming her diuretics at time of discharge from recent hospitalization  · Continue low-salt diet and fluid restriction  · Monitor intake, and output  · Monitor daily weights  · Edema and SOB improving  · Worsening renal function but weight increased by one pound, diuretics restarted,            Subjective/Objective     Subjective:   Patient feels better  Denies of any chest pain or shortness of breath  Denies of any fever or chills  Objective:  Vitals: Blood pressure 144/55, pulse 70, temperature 98 3 °F (36 8 °C), resp  rate 18, height 5' 5" (1 651 m), weight 76 5 kg (168 lb 10 4 oz), SpO2 95 %  ,Body mass index is 28 07 kg/m²        Intake/Output Summary (Last 24 hours) at 3/9/2021 1729  Last data filed at 3/9/2021 1621  Gross per 24 hour   Intake 658 ml   Output 1450 ml   Net -792 ml       Invasive Devices     Peripheral Intravenous Line            Peripheral IV 03/06/21 Left Antecubital 3 days                Physical Exam:   HEENT-PERRLA, moist oral mucosa  Neck-supple, no JVD elevation   Respiratory-equal air entry bilaterally, no rales or rhonchi  Cardiovascular system-S1, S2 heard, no murmur or gallops or rubs  Abdomen-soft, nontender, no guarding or rigidity, bowel sounds heard  Extremities-no pedal edema  Peripheral pulses palpable  Musculoskeletal-no contractures  Central nervous system-no acute focal neurological deficit ,no sensory or motor deficit noted   Skin-no rash noted        Lab, Imaging and other studies: I have personally reviewed pertinent reports      VTE Pharmacologic Prophylaxis: Heparin  VTE Mechanical Prophylaxis: sequential compression device

## 2021-03-10 VITALS
OXYGEN SATURATION: 95 % | TEMPERATURE: 97.6 F | DIASTOLIC BLOOD PRESSURE: 61 MMHG | RESPIRATION RATE: 18 BRPM | WEIGHT: 166.67 LBS | SYSTOLIC BLOOD PRESSURE: 141 MMHG | HEIGHT: 65 IN | BODY MASS INDEX: 27.77 KG/M2 | HEART RATE: 68 BPM

## 2021-03-10 DIAGNOSIS — Z23 ENCOUNTER FOR IMMUNIZATION: ICD-10-CM

## 2021-03-10 LAB
ANION GAP SERPL CALCULATED.3IONS-SCNC: 5 MMOL/L (ref 4–13)
BUN SERPL-MCNC: 69 MG/DL (ref 5–25)
CALCIUM SERPL-MCNC: 9.3 MG/DL (ref 8.3–10.1)
CHLORIDE SERPL-SCNC: 107 MMOL/L (ref 100–108)
CO2 SERPL-SCNC: 28 MMOL/L (ref 21–32)
CREAT SERPL-MCNC: 3.44 MG/DL (ref 0.6–1.3)
GFR SERPL CREATININE-BSD FRML MDRD: 13 ML/MIN/1.73SQ M
GLUCOSE SERPL-MCNC: 129 MG/DL (ref 65–140)
GLUCOSE SERPL-MCNC: 134 MG/DL (ref 65–140)
GLUCOSE SERPL-MCNC: 356 MG/DL (ref 65–140)
POTASSIUM SERPL-SCNC: 4.3 MMOL/L (ref 3.5–5.3)
SODIUM SERPL-SCNC: 140 MMOL/L (ref 136–145)

## 2021-03-10 PROCEDURE — 99231 SBSQ HOSP IP/OBS SF/LOW 25: CPT | Performed by: INTERNAL MEDICINE

## 2021-03-10 PROCEDURE — 99239 HOSP IP/OBS DSCHRG MGMT >30: CPT | Performed by: INTERNAL MEDICINE

## 2021-03-10 PROCEDURE — 82948 REAGENT STRIP/BLOOD GLUCOSE: CPT

## 2021-03-10 PROCEDURE — 99232 SBSQ HOSP IP/OBS MODERATE 35: CPT | Performed by: INTERNAL MEDICINE

## 2021-03-10 PROCEDURE — 80048 BASIC METABOLIC PNL TOTAL CA: CPT | Performed by: INTERNAL MEDICINE

## 2021-03-10 RX ADMIN — INSULIN LISPRO 6 UNITS: 100 INJECTION, SOLUTION INTRAVENOUS; SUBCUTANEOUS at 11:35

## 2021-03-10 RX ADMIN — ISOSORBIDE MONONITRATE 60 MG: 60 TABLET, EXTENDED RELEASE ORAL at 08:41

## 2021-03-10 RX ADMIN — TORSEMIDE 20 MG: 20 TABLET ORAL at 08:42

## 2021-03-10 RX ADMIN — ASPIRIN 81 MG: 81 TABLET, CHEWABLE ORAL at 08:41

## 2021-03-10 RX ADMIN — ATORVASTATIN CALCIUM 40 MG: 40 TABLET, FILM COATED ORAL at 08:41

## 2021-03-10 RX ADMIN — LEVOTHYROXINE SODIUM 125 MCG: 125 TABLET ORAL at 05:25

## 2021-03-10 RX ADMIN — CALCIUM ACETATE 667 MG: 667 CAPSULE ORAL at 08:41

## 2021-03-10 RX ADMIN — CARVEDILOL 12.5 MG: 12.5 TABLET, FILM COATED ORAL at 08:41

## 2021-03-10 RX ADMIN — INSULIN ASPART 10 UNITS: 100 INJECTION, SUSPENSION SUBCUTANEOUS at 08:42

## 2021-03-10 RX ADMIN — HYDRALAZINE HYDROCHLORIDE 100 MG: 50 TABLET ORAL at 08:41

## 2021-03-10 NOTE — PLAN OF CARE
Problem: SAFETY ADULT  Goal: Patient will remain free of falls  Description: INTERVENTIONS:  - Assess patient frequently for physical needs  -  Identify cognitive and physical deficits and behaviors that affect risk of falls  -  Milo fall precautions as indicated by assessment   - Educate patient/family on patient safety including physical limitations  - Instruct patient to call for assistance with activity based on assessment  - Modify environment to reduce risk of injury  - Consider OT/PT consult to assist with strengthening/mobility  Outcome: Progressing     Problem: RESPIRATORY - ADULT  Goal: Achieves optimal ventilation and oxygenation  Description: INTERVENTIONS:  - Assess for changes in respiratory status  - Assess for changes in mentation and behavior  - Position to facilitate oxygenation and minimize respiratory effort  - Oxygen administered by appropriate delivery if ordered  - Initiate smoking cessation education as indicated  - Encourage broncho-pulmonary hygiene including cough, deep breathe, Incentive Spirometry  - Assess the need for suctioning and aspirate as needed  - Assess and instruct to report SOB or any respiratory difficulty  - Respiratory Therapy support as indicated  Outcome: Progressing     Problem: Nutrition/Hydration-ADULT  Goal: Nutrient/Hydration intake appropriate for improving, restoring or maintaining nutritional needs  Description: Monitor and assess patient's nutrition/hydration status for malnutrition  Collaborate with interdisciplinary team and initiate plan and interventions as ordered  Monitor patient's weight and dietary intake as ordered or per policy  Utilize nutrition screening tool and intervene as necessary  Determine patient's food preferences and provide high-protein, high-caloric foods as appropriate       INTERVENTIONS:  - Monitor oral intake, urinary output, labs, and treatment plans  - Assess nutrition and hydration status and recommend course of action  - Evaluate amount of meals eaten  - Assist patient with eating if necessary   - Allow adequate time for meals  - Recommend/ encourage appropriate diets, oral nutritional supplements, and vitamin/mineral supplements  - Order, calculate, and assess calorie counts as needed  - Recommend, monitor, and adjust tube feedings and TPN/PPN based on assessed needs  - Assess need for intravenous fluids  - Provide specific nutrition/hydration education as appropriate  - Include patient/family/caregiver in decisions related to nutrition  Outcome: Progressing     Problem: Potential for Falls  Goal: Patient will remain free of falls  Description: INTERVENTIONS:  - Assess patient frequently for physical needs  -  Identify cognitive and physical deficits and behaviors that affect risk of falls    -  [place] fall precautions as indicated by assessment   - Educate patient/family on patient safety including physical limitations  - Instruct patient to call for assistance with activity based on assessment  - Modify environment to reduce risk of injury  - Consider OT/PT consult to assist with strengthening/mobility  Outcome: Progressing     Problem: DISCHARGE PLANNING  Goal: Discharge to home or other facility with appropriate resources  Description: INTERVENTIONS:  - Identify barriers to discharge w/patient and caregiver  - Arrange for needed discharge resources and transportation as appropriate  - Identify discharge learning needs (meds, wound care, etc )  - Arrange for interpretive services to assist at discharge as needed  - Refer to Case Management Department for coordinating discharge planning if the patient needs post-hospital services based on physician/advanced practitioner order or complex needs related to functional status, cognitive ability, or social support system  Outcome: Progressing     Problem: Knowledge Deficit  Goal: Patient/family/caregiver demonstrates understanding of disease process, treatment plan, medications, and discharge instructions  Description: Complete learning assessment and assess knowledge base    Interventions:  - Provide teaching at level of understanding  - Provide teaching via preferred learning methods  Outcome: Progressing     Problem: METABOLIC, FLUID AND ELECTROLYTES - ADULT  Goal: Electrolytes maintained within normal limits  Description: INTERVENTIONS:  - Monitor labs and assess patient for signs and symptoms of electrolyte imbalances  - Administer electrolyte replacement as ordered  - Monitor response to electrolyte replacements, including repeat lab results as appropriate  - Instruct patient on fluid and nutrition as appropriate  Outcome: Progressing  Goal: Fluid balance maintained  Description: INTERVENTIONS:  - Monitor labs   - Monitor I/O and WT  - Instruct patient on fluid and nutrition as appropriate  - Assess for signs & symptoms of volume excess or deficit  Outcome: Progressing  Goal: Glucose maintained within target range  Description: INTERVENTIONS:  - Monitor Blood Glucose as ordered  - Assess for signs and symptoms of hyperglycemia and hypoglycemia  - Administer ordered medications to maintain glucose within target range  - Assess nutritional intake and initiate nutrition service referral as needed  Outcome: Progressing

## 2021-03-10 NOTE — CASE MANAGEMENT
Spoke with dtrHeidi to discuss PACE/PACENET application and income limits based on pt's concerns about medication and hospital co-pays  Cielo Welch stated that pt and spouse are over the income limit for PACE/PACENET so will not qualify  Heidi explained that what pt was referring to was the gap in prescription coverage when her spouse retired in April 2020 due to [place]  Heidi was using [employer] to help with pt's medication co-pays  Since that time, Cielo Welch researched insurance programs and enrolled pt in [employer] which has helped with medication costs  Dtr, Cielo Welch reported that pt is still worried about medical bills  LCSW explained that [employer] mailed an assistance application to pt's home to see if she qualifies for assistance within [place]  Cielo Welch will look for the application in the mail and assist pt with completion  Cielo Welch has OP LCSW phone contact if needed in the future

## 2021-03-10 NOTE — DISCHARGE SUMMARY
Discharge Summary - [address] [facility]  Patient: Tariq Jasmine 76 y o  female   MRN: [id number]  PCP: Daryl Vila MD  [facility] Encounter: [id number]            Discharging Physician / Practitioner: Reinaldo Sánchez MD  PCP: Daryl Vila MD  Admission Date:   Admission Orders (From admission, onward)     Ordered        03/01/21 2035  INPATIENT ADMISSION  Once                   Discharge Date: 03/10/21      Reason for Admission: Shortness of breath       Discharge Diagnoses:     Principal Problem:    Acute on chronic combined systolic and diastolic CHF    Active Problems:    Insulin-dependent diabetes mellitus    Hypothyroidism    Acute kidney injury on chronic kidney disease     Anemia of chronic disease    Thrombocytopenia    Coronary artery disease       Consultations During Hospital Stay:  · Nephrology  · Cardiology (heart failure service)        Hospital Course:     Tariq Jasmine presented to the hospital on 3/1 with complaints of progressive/worsening shortness of breath and orthopnea  She does have underlying history of congestive heart failure and was initially placed on intravenous diuretics with continuation of afterload reduction with Hydralazine/nitrates along with beta-blockade  She had an acute on chronic kidney injury with her last creatinine of 3 44 on discharge day and a pre-existing baseline of 1 9-2 2  Through the course of her hospitalization, she was given various doses of intravenous albumin  It was agreed that her new baseline creatinine would be higher as to maintain euvolemic status (cardiorenal syndrome)  She will be followed in approximately one week by outpatient nephrology with a repeat BMP  Her diuretic regimen on discharge will be Torsemide 20 mg BID  Today, she was cleared for discharge from both cardiac and renal standpoints  She will follow up with her specialists and PCP as instructed        Condition at Discharge: fair       Discharge Day Visit / Exam:     Vitals: Blood Pressure: 141/61 (03/10/21 0627)  Pulse: 68 (03/10/21 0627)  Temperature: 97 6 °F (36 4 °C) (03/10/21 0627)  Temp Source: Tympanic (03/09/21 1512)  Respirations: 18 (03/10/21 0627)  Height: 5' 5" (165 1 cm) (03/02/21 1525)  Weight - Scale: 75 6 kg (166 lb 10 7 oz) (03/10/21 0548)  SpO2: 95 % (03/10/21 8868)      Physical exam - I had a face-to-face encounter with the patient on day of discharge  Discussion with Patient and/or Family:  The patient has been advised to return to the ER immediately if any symptoms recur or worsen  Discharge instructions/Information to Patient and/or Family:   See after visit summary for information provided to patient and/or family  Provisions for Follow-Up Care:  See after visit summary for information related to follow-up care and any pertinent home health orders  Disposition:   Home      Discharge Medications:  See after visit summary for reconciled discharge medications provided to patient and/or family  Discharge Statement:  I spent 38 minutes discharging the patient  This time was spent on the day of discharge  I had direct contact with the patient on the day of discharge  Greater than 50% of the total time was spent examining patient, answering all patient questions, arranging and discussing plan of care with patient as well as directly providing post-discharge instructions  Additional time then spent on discharge activities  ** Please Note: This note is constructed using a voice recognition dictation system  An occasional wrong word/phrase or sound-a-like substitution may have been picked up by dictation device due to the inherent limitations of voice recognition software  Read the chart carefully and recognize, using reasonable context, where substitutions may have occurred  **

## 2021-03-10 NOTE — PROGRESS NOTES
NEPHROLOGY PROGRESS NOTE   Marcela Heaton 76 y o  female MRN: [id number]  Unit/Bed#: [facility] 526-01 Encounter: [id number]  Reason for Consult: KELLY (POA)    PLAN:   -creatinine improving  Likely will need higher baseline for euvolemia  -status post IV Venofer   -continue torsemide 20 mg b i d  At discharge   -message sent office to arrange follow-up with Dr Taiwo Og, repeat BMP prior to appointment  ASSESSMENT/PLAN:  KELLY (POA) on CKD stage III:   -baseline creatinine 1 9-2 2   -follows with Dr Taiwo Og    -likely will need higher baseline   -received IV albumin x2   -currently on torsemide 20 mg b i d  Souleymane Grant -previous retroperitoneal ultrasound showed normal echogenicity and contour, negative for hydro   -most recent UA showed 3+ protein, 30-50 WBCs, 4-10 RBCs, 3-5 hyaline cast   -continue to avoid nephrotoxic contrast   -will need follow-up with Renal at discharge  -I/O  Hypertension:  Stable and acceptable  -outpatient antihypertensives:  Hydralazine 50 mg 3 times per day, carvedilol 12 5 mg 2 times per day, on Imdur 30 mg daily, nifedipine 30 mg daily   -continues on amlodipine 10 mg daily Coreg 12 5 mg 2 times per day, hydralazine 100 mg 3 times per day, Imdur 60 mg daily, torsemide 20 mg 2 times per day   -avoid hypotension or high fluctuations in blood pressure   -recommend holding parameters for antihypertensive for systolic blood pressure less than 130   -previous renal artery Doppler Ng negative for significant arterial occlusive disease  Cardiomyopathy/diastolic heart failure: With EF of 60% and grade 2 diastolic dysfunction   -cardiology following   -outpatient diuretics: none  -currently on torsemide 20 mg b i d  -continue daily weights, fluid restriction, strict I/O   -chest x-ray showed mild pulmonary vascular congestion  Acute on chronic anemia:  -continue to monitor and transfuse level 7 0   -receieved IV Venofer      MBD in CKD:   -phosphorus level pending   -continues on PhosLo with meals   -will continue to monitor PTH, phos, Mag, vitamin-D as outpatient  Otitis media:  Continues on antibiotics  Other:  Diabetes , CAD, hyperlipidemia  Disposition:  Okay to discharge from Renal   Message sent to arrange hospital follow-up with repeat BMP prior to appointment  Continue torsemide 20 mg b i d  At discharge  SUBJECTIVE:  The patient is resting out of bed in her chair  She denies chest pain or shortness of breath  She remains on room air  She denies nausea, vomiting, diarrhea or changes to her urination  She is eating and drinking well  She is exhibiting stable lower extremity edema      OBJECTIVE:  Current Weight: Weight - Scale: 75 6 kg (166 lb 10 7 oz)  Vitals:    03/09/21 2211 03/09/21 2234 03/10/21 0548 03/10/21 0627   BP: 133/56 138/58  141/61   BP Location:       Pulse: 66 72  68   Resp:  18  18   Temp:  98 1 °F (36 7 °C)  97 6 °F (36 4 °C)   TempSrc:       SpO2: 96% 95%  95%   Weight:   75 6 kg (166 lb 10 7 oz)    Height:           Intake/Output Summary (Last 24 hours) at 3/10/2021 1146  Last data filed at 3/10/2021 7545  Gross per 24 hour   Intake 898 ml   Output 1950 ml   Net -1052 ml     General: NAD  Skin: warm, dry, intact, no rash  HEENT: Moist mucous membranes, sclera anicteric, normocephalic, atraumatic  Neck: No apparent JVD appreciated  Chest: lung sounds clear B/L, on RA   CVS:Regular rate and rhythm, no murmer   Abdomen: Soft, round, non-tender, +BS, obese  Extremities: B/L LE edema present  Neuro: alert and oriented  Psych: appropriate mood and affect     Medications:    Current Facility-Administered Medications:     acetaminophen (TYLENOL) tablet 650 mg, 650 mg, Oral, Q6H PRN, Aranza Martin MD    aluminum-magnesium hydroxide-simethicone (MYLANTA) oral suspension 15 mL, 15 mL, Oral, Q6H PRN, Aranza Martin MD    amLODIPine (NORVASC) tablet 10 mg, 10 mg, Oral, HS, Linh Dickson, CLARICE, 10 mg at 03/09/21 2218    aspirin chewable tablet 81 mg, 81 mg, Oral, Daily, Rayray Ballesteros MD, 81 mg at 03/10/21 0841    atorvastatin (LIPITOR) tablet 40 mg, 40 mg, Oral, Daily, Rayray Ballesteros MD, 40 mg at 03/10/21 0841    calcium acetate (PHOSLO) capsule 667 mg, 667 mg, Oral, BID With Meals, Rayray Ballesteros MD, 667 mg at 03/10/21 0841    carvedilol (COREG) tablet 12 5 mg, 12 5 mg, Oral, BID With Meals, CLARICE Slater, 12 5 mg at 03/10/21 0841    docusate sodium (COLACE) capsule 100 mg, 100 mg, Oral, BID PRN, Rayray Ballesteros MD    heparin (porcine) subcutaneous injection 5,000 Units, 5,000 Units, Subcutaneous, Q8H [facility], Stopped at 03/08/21 1357 **AND** [COMPLETED] Platelet count, , , Once, Rayray Ballesteros MD    hydrALAZINE (APRESOLINE) injection 20 mg, 20 mg, Intravenous, Q6H PRN, Kate Rubin MD    hydrALAZINE (APRESOLINE) tablet 100 mg, 100 mg, Oral, TID, CLARICE Wang, 100 mg at 03/10/21 0841    insulin aspart protamine-insulin aspart (NovoLOG 70/30) 100 units/mL subcutaneous injection 10 Units, 10 Units, Subcutaneous, BID AC, Rayray Ballesteros MD, 10 Units at 03/10/21 0842    insulin lispro (HumaLOG) 100 units/mL subcutaneous injection 1-5 Units, 1-5 Units, Subcutaneous, HS, Rayray Ballesteros MD, 2 Units at 03/09/21 2219    insulin lispro (HumaLOG) 100 units/mL subcutaneous injection 1-6 Units, 1-6 Units, Subcutaneous, TID AC, 6 Units at 03/10/21 1135 **AND** Fingerstick Glucose (POCT), , , TID AC, Rayray Ballesteros MD    isosorbide mononitrate (IMDUR) 24 hr tablet 60 mg, 60 mg, Oral, Daily, CLARICE Wang, 60 mg at 03/10/21 0841    levothyroxine tablet 125 mcg, 125 mcg, Oral, Daily, Rayray Ballesteros MD, 125 mcg at 03/10/21 0525    nitroglycerin (NITROSTAT) SL tablet 0 4 mg, 0 4 mg, Sublingual, Q5 Min PRN, Rayray Ballesteros MD    ondansetron [facility]) injection 4 mg, 4 mg, Intravenous, Q6H PRN, Rayray Ballesteros MD    phenol (CHLORASEPTIC) 1 4 % mucosal liquid 1 spray, 1 spray, Mouth/Throat, Q2H PRN, Kate Rubin MD  [employer] torsemide (DEMADEX) tablet 20 mg, 20 mg, Oral, BID, Yovany Maxwell DO, 20 mg at 03/10/21 1195    Laboratory Results:  Results from last 7 days   Lab Units 03/10/21  0518 03/09/21  0457 03/08/21  1637  03/07/21  0442 03/06/21  0510   WBC Thousand/uL  --  6 08  --   --   --  6 71   HEMOGLOBIN g/dL  --  7 3*  --   --   --  7 3*   HEMATOCRIT %  --  24 3*  --   --   --  24 6*   PLATELETS Thousands/uL  --  136*  --   --   --  144*   SODIUM mmol/L 140 141 136   < > 140 140   POTASSIUM mmol/L 4 3 4 5 4 5   < > 4 4 4 4   CHLORIDE mmol/L 107 108 105   < > 105 105   CO2 mmol/L 28 27 23   < > 30 28   BUN mg/dL 69* 73* 77*   < > 75* 73*   CREATININE mg/dL 3 44* 3 71* 3 90*   < > 3 54* 3 04*   CALCIUM mg/dL 9 3 8 9 9 0   < > 8 7 9 3   PHOSPHORUS mg/dL  --   --   --   --  4 0  --     < > = values in this interval not displayed

## 2021-03-10 NOTE — PROGRESS NOTES
Heart Failure/ Pulmonary Hypertension Progress Note - Diana Lovett 76 y o  female MRN: [id number]    Unit/Bed#: [facility] 526-01 Encounter: [id number]      Assessment:    Principal Problem:    Acute on chronic combined systolic and diastolic congestive heart failure (HCC)  Active Problems:    Type 2 diabetes mellitus with kidney complication, with long-term current use of insulin (HCC)    Hypothyroidism    Acute renal failure superimposed on stage 4 chronic kidney disease (HCC)    Anemia    Coronary artery disease involving native coronary artery of native heart with angina pectoris (HCC)    Pneumonia    Hypertensive urgency    Acute otitis media      Subjective:   Patient seen and examined  No significant events overnight  Creat looks to be improving on oral diuretic  Likely d/c home today  Objective: Intake/ Output: 1018/2500/-1 5L  Weight: 178 on admit, 166 today  Tele: SR    Plan:  Acute on chronic HFpEF  Recent admitted with PNA and HF exacerbation  Discharged home off diuretic due to KELLY and now presents again with acute decompensation and need for IV diuresis  Renal function improving on oral regimen  Likely discharge home today         Home diuretic- Torsemide 20 mg BID             Lab Results   Component Value Date     NTBNP 9,024 (H) 03/01/2021      NT Pro BNP 2/21/21: 12 3K        TTE 2/22/21: LVEF 60%, akinesis of basal, inferior and basal mid inferolateral walls  Moderate cLVH, grade II DD, mild MR, trace TR, moderate, free flowing pericardial effusion without hemodynamic compromise  RV normal IVC normal  IVSd 0 99 cm       Echo 12/30/18:  EF: 50%, grade 2 DD, PASP 35 mmHg     CAD -No anginal symptoms at present  H/O NSTEMI S/P  LUDY x 2 to LAD 1/4/   80% RCA- plan staged, but has not needed intervention  Imdur 30 mg daily, asa      HTN- Improved  Would continue Hydral 100 mg TID,  Coreg 12 5 mg BID ,Imdur 60 mg daily, Amlodipine today to 10 mg at HS  H/O Palpitations    Holter 5/17/19: 53-85, avg 69 bpm  8 PVCs      Anemia- Hgb now in the 7 2-7 5 range  Receiving Venofer infusion     CKD3, new baseline seems to be 2 5-3 0  Creat 3 4 today  Need for eventual HD discussed, patient adamantly against this  Nephro on board  Appreciate recs       DM2, hgA1C 3/2/21 11 7    HLD  FLP 3/2/21: , , HDL 76, LDL 48 on Atorva 40 mg daily         Review of Systems   All other systems reviewed and are negative  [facility] (day, reason): Valero catheter (day, reason):    Vitals: Blood pressure 141/61, pulse 68, temperature 97 6 °F (36 4 °C), resp  rate 18, height 5' 5" (1 651 m), weight 75 6 kg (166 lb 10 7 oz), SpO2 95 %  , Body mass index is 27 73 kg/m² , I/O last 3 completed shifts: In: 8791 [P O :1018]  Out: 3000 [Urine:3000]  I/O this shift:  In: 180 [P O :180]  Out: -   Wt Readings from Last 3 Encounters:   03/10/21 75 6 kg (166 lb 10 7 oz)   02/27/21 81 kg (178 lb 9 2 oz)   11/02/20 77 1 kg (170 lb)       Intake/Output Summary (Last 24 hours) at 3/10/2021 0957  Last data filed at 3/10/2021 0826  Gross per 24 hour   Intake 898 ml   Output 1950 ml   Net -1052 ml     I/O last 3 completed shifts:   In: 1018 [P O :1018]  Out: 3000 [Urine:3000]          Physical Exam:  Vitals:    03/09/21 2211 03/09/21 2234 03/10/21 0548 03/10/21 0627   BP: 133/56 138/58  141/61   BP Location:       Pulse: 66 72  68   Resp:  18  18   Temp:  98 1 °F (36 7 °C)  97 6 °F (36 4 °C)   TempSrc:       SpO2: 96% 95%  95%   Weight:   75 6 kg (166 lb 10 7 oz)    Height:           GEN: Karen Axon appears well, alert and oriented x 3, pleasant and cooperative   HEENT: pupils equal, round, and reactive to light; extraocular muscles intact  NECK: supple, no carotid bruits   HEART: regular rhythm, normal S1 and S2, no murmurs, clicks, gallops or rubs, JVP is down     LUNGS: clear to auscultation bilaterally; no wheezes, rales, or rhonchi   ABDOMEN: normal bowel sounds, soft, no tenderness, no distention  EXTREMITIES: peripheral pulses normal; no clubbing, cyanosis,trace BLLE  edema  NEURO: no focal findings   SKIN: normal without suspicious lesions on exposed skin      Current Facility-Administered Medications:     acetaminophen (TYLENOL) tablet 650 mg, 650 mg, Oral, Q6H PRN, Xavier Meredith MD    aluminum-magnesium hydroxide-simethicone (MYLANTA) oral suspension 15 mL, 15 mL, Oral, Q6H PRN, Xavier Meredith MD    amLODIPine (NORVASC) tablet 10 mg, 10 mg, Oral, HS, CLARICE Wang, 10 mg at 03/09/21 2218    aspirin chewable tablet 81 mg, 81 mg, Oral, Daily, Xavier Meredith MD, 81 mg at 03/10/21 0841    atorvastatin (LIPITOR) tablet 40 mg, 40 mg, Oral, Daily, Xavier Meredith MD, 40 mg at 03/10/21 0841    calcium acetate (PHOSLO) capsule 667 mg, 667 mg, Oral, BID With Meals, Xavier Meredith MD, 667 mg at 03/10/21 0841    carvedilol (COREG) tablet 12 5 mg, 12 5 mg, Oral, BID With Meals, CLARICE Holley, 12 5 mg at 03/10/21 0841    docusate sodium (COLACE) capsule 100 mg, 100 mg, Oral, BID PRN, Xavier Meredith MD    heparin (porcine) subcutaneous injection 5,000 Units, 5,000 Units, Subcutaneous, Q8H [address], Stopped at 03/08/21 1357 **AND** [COMPLETED] Platelet count, , , Once, Xavier Meredith MD    hydrALAZINE (APRESOLINE) injection 20 mg, 20 mg, Intravenous, Q6H PRN, Natalie Garnica MD    hydrALAZINE (APRESOLINE) tablet 100 mg, 100 mg, Oral, TID, CLARICE Wang, 100 mg at 03/10/21 0841    insulin aspart protamine-insulin aspart (NovoLOG 70/30) 100 units/mL subcutaneous injection 10 Units, 10 Units, Subcutaneous, BID AC, Xavier Meredith MD, 10 Units at 03/10/21 0842    insulin lispro (HumaLOG) 100 units/mL subcutaneous injection 1-5 Units, 1-5 Units, Subcutaneous, HS, Xavier Meredith MD, 2 Units at 03/09/21 2219    insulin lispro (HumaLOG) 100 units/mL subcutaneous injection 1-6 Units, 1-6 Units, Subcutaneous, TID AC, 4 Units at 03/09/21 1757 **AND** Fingerstick Glucose (POCT), , , TID AC, Liam Darlene Schlatter, MD    isosorbide mononitrate (IMDUR) 24 hr tablet 60 mg, 60 mg, Oral, Daily, CLARICE Wang, 60 mg at 03/10/21 0841    levothyroxine tablet 125 mcg, 125 mcg, Oral, Daily, Berna Garvin MD, 125 mcg at 03/10/21 0525    nitroglycerin (NITROSTAT) SL tablet 0 4 mg, 0 4 mg, Sublingual, Q5 Min PRN, Berna Garvin MD    ondansetron [facility]) injection 4 mg, 4 mg, Intravenous, Q6H PRN, Berna Garvin MD    phenol (CHLORASEPTIC) 1 4 % mucosal liquid 1 spray, 1 spray, Mouth/Throat, Q2H PRN, Natanael Connell MD    torsemide [facility]) tablet 20 mg, 20 mg, Oral, BID, Candice Maxwell, , 20 mg at 03/10/21 0842      Labs & Results:        Results from last 7 days   Lab Units 03/09/21  0457 03/06/21  0510   WBC Thousand/uL 6 08 6 71   HEMOGLOBIN g/dL 7 3* 7 3*   HEMATOCRIT % 24 3* 24 6*   PLATELETS Thousands/uL 136* 144*         Results from last 7 days   Lab Units 03/10/21  0518 03/09/21  0457 03/08/21  1637   POTASSIUM mmol/L 4 3 4 5 4 5   CHLORIDE mmol/L 107 108 105   CO2 mmol/L 28 27 23   BUN mg/dL 69* 73* 77*   CREATININE mg/dL 3 44* 3 71* 3 90*   CALCIUM mg/dL 9 3 8 9 9 0           Counseling / Coordination of Care  Total floor / unit time spent today 20 minutes  Greater than 50% of total time was spent with the patient and / or family counseling and / or coordination of care  A description of the counseling / coordination of care: 20  Thank you for the opportunity to participate in the care of this patient  Linh Garnett

## 2021-03-13 LAB — RENIN PLAS-CCNC: 0.54 NG/ML/HR (ref 0.17–5.38)

## 2021-03-15 ENCOUNTER — APPOINTMENT (OUTPATIENT)
Dept: LAB | Facility: HOSPITAL | Age: 66
End: 2021-03-15
Payer: COMMERCIAL

## 2021-03-15 DIAGNOSIS — N18.30 STAGE 3 CHRONIC KIDNEY DISEASE (HCC): ICD-10-CM

## 2021-03-15 DIAGNOSIS — R80.1 PERSISTENT PROTEINURIA: ICD-10-CM

## 2021-03-15 DIAGNOSIS — I50.9 ACUTE EXACERBATION OF CHF (CONGESTIVE HEART FAILURE) (HCC): ICD-10-CM

## 2021-03-15 DIAGNOSIS — N17.9 ACUTE RENAL FAILURE SUPERIMPOSED ON STAGE 4 CHRONIC KIDNEY DISEASE (HCC): ICD-10-CM

## 2021-03-15 DIAGNOSIS — N17.9 ACUTE RENAL FAILURE SUPERIMPOSED ON STAGE 4 CHRONIC KIDNEY DISEASE, UNSPECIFIED ACUTE RENAL FAILURE TYPE (HCC): ICD-10-CM

## 2021-03-15 DIAGNOSIS — I50.31 ACUTE DIASTOLIC CONGESTIVE HEART FAILURE (HCC): ICD-10-CM

## 2021-03-15 DIAGNOSIS — I21.A1 TYPE 2 MYOCARDIAL INFARCTION (HCC): ICD-10-CM

## 2021-03-15 DIAGNOSIS — E11.9 DM (DIABETES MELLITUS) (HCC): ICD-10-CM

## 2021-03-15 DIAGNOSIS — I10 ESSENTIAL HYPERTENSION: ICD-10-CM

## 2021-03-15 DIAGNOSIS — N18.4 ANEMIA DUE TO STAGE 4 CHRONIC KIDNEY DISEASE (HCC): ICD-10-CM

## 2021-03-15 DIAGNOSIS — D63.1 ANEMIA DUE TO STAGE 4 CHRONIC KIDNEY DISEASE (HCC): ICD-10-CM

## 2021-03-15 DIAGNOSIS — N18.4 ACUTE RENAL FAILURE SUPERIMPOSED ON STAGE 4 CHRONIC KIDNEY DISEASE (HCC): ICD-10-CM

## 2021-03-15 DIAGNOSIS — N18.4 ACUTE RENAL FAILURE SUPERIMPOSED ON STAGE 4 CHRONIC KIDNEY DISEASE, UNSPECIFIED ACUTE RENAL FAILURE TYPE (HCC): ICD-10-CM

## 2021-03-15 DIAGNOSIS — N18.4 CKD (CHRONIC KIDNEY DISEASE) STAGE 4, GFR 15-29 ML/MIN (HCC): ICD-10-CM

## 2021-03-15 LAB
ALBUMIN SERPL BCP-MCNC: 3.5 G/DL (ref 3.5–5)
ALP SERPL-CCNC: 628 U/L (ref 46–116)
ALT SERPL W P-5'-P-CCNC: 74 U/L (ref 12–78)
ANION GAP SERPL CALCULATED.3IONS-SCNC: 3 MMOL/L (ref 4–13)
AST SERPL W P-5'-P-CCNC: 39 U/L (ref 5–45)
BACTERIA UR QL AUTO: ABNORMAL /HPF
BASOPHILS # BLD AUTO: 0.05 THOUSANDS/ΜL (ref 0–0.1)
BASOPHILS NFR BLD AUTO: 1 % (ref 0–1)
BILIRUB SERPL-MCNC: 0.45 MG/DL (ref 0.2–1)
BILIRUB UR QL STRIP: NEGATIVE
BUN SERPL-MCNC: 65 MG/DL (ref 5–25)
CALCIUM SERPL-MCNC: 9.6 MG/DL (ref 8.3–10.1)
CHLORIDE SERPL-SCNC: 107 MMOL/L (ref 100–108)
CHLORIDE UR-SCNC: 56 MMOL/L
CLARITY UR: CLEAR
CO2 SERPL-SCNC: 28 MMOL/L (ref 21–32)
COLOR UR: YELLOW
CREAT SERPL-MCNC: 3.18 MG/DL (ref 0.6–1.3)
CREAT UR-MCNC: 102 MG/DL
CREAT UR-MCNC: 102 MG/DL
CREAT UR-MCNC: 108 MG/DL
EOSINOPHIL # BLD AUTO: 0.26 THOUSAND/ΜL (ref 0–0.61)
EOSINOPHIL NFR BLD AUTO: 5 % (ref 0–6)
ERYTHROCYTE [DISTWIDTH] IN BLOOD BY AUTOMATED COUNT: 13.6 % (ref 11.6–15.1)
FERRITIN SERPL-MCNC: 983 NG/ML (ref 8–388)
GFR SERPL CREATININE-BSD FRML MDRD: 15 ML/MIN/1.73SQ M
GLUCOSE P FAST SERPL-MCNC: 213 MG/DL (ref 65–99)
GLUCOSE UR STRIP-MCNC: ABNORMAL MG/DL
HCT VFR BLD AUTO: 26.8 % (ref 34.8–46.1)
HGB BLD-MCNC: 8.1 G/DL (ref 11.5–15.4)
HGB UR QL STRIP.AUTO: NEGATIVE
HYALINE CASTS #/AREA URNS LPF: ABNORMAL /LPF
IMM GRANULOCYTES # BLD AUTO: 0.02 THOUSAND/UL (ref 0–0.2)
IMM GRANULOCYTES NFR BLD AUTO: 0 % (ref 0–2)
IRON SATN MFR SERPL: 34 %
IRON SERPL-MCNC: 101 UG/DL (ref 50–170)
KETONES UR STRIP-MCNC: NEGATIVE MG/DL
LEUKOCYTE ESTERASE UR QL STRIP: NEGATIVE
LYMPHOCYTES # BLD AUTO: 1.15 THOUSANDS/ΜL (ref 0.6–4.47)
LYMPHOCYTES NFR BLD AUTO: 21 % (ref 14–44)
MAGNESIUM SERPL-MCNC: 2.3 MG/DL (ref 1.6–2.6)
MCH RBC QN AUTO: 28.3 PG (ref 26.8–34.3)
MCHC RBC AUTO-ENTMCNC: 30.2 G/DL (ref 31.4–37.4)
MCV RBC AUTO: 94 FL (ref 82–98)
MONOCYTES # BLD AUTO: 0.33 THOUSAND/ΜL (ref 0.17–1.22)
MONOCYTES NFR BLD AUTO: 6 % (ref 4–12)
NEUTROPHILS # BLD AUTO: 3.69 THOUSANDS/ΜL (ref 1.85–7.62)
NEUTS SEG NFR BLD AUTO: 67 % (ref 43–75)
NITRITE UR QL STRIP: NEGATIVE
NON-SQ EPI CELLS URNS QL MICRO: ABNORMAL /HPF
NRBC BLD AUTO-RTO: 0 /100 WBCS
PH UR STRIP.AUTO: 6.5 [PH]
PHOSPHATE SERPL-MCNC: 3.6 MG/DL (ref 2.3–4.1)
PLATELET # BLD AUTO: 137 THOUSANDS/UL (ref 149–390)
PMV BLD AUTO: 11.6 FL (ref 8.9–12.7)
POTASSIUM SERPL-SCNC: 4.6 MMOL/L (ref 3.5–5.3)
PROT SERPL-MCNC: 7.5 G/DL (ref 6.4–8.2)
PROT UR STRIP-MCNC: ABNORMAL MG/DL
PROT UR-MCNC: 225 MG/DL
PROT/CREAT UR: 2.21 MG/G{CREAT} (ref 0–0.1)
PTH-INTACT SERPL-MCNC: 107.9 PG/ML (ref 18.4–80.1)
RBC # BLD AUTO: 2.86 MILLION/UL (ref 3.81–5.12)
RBC #/AREA URNS AUTO: ABNORMAL /HPF
SODIUM 24H UR-SCNC: 51 MOL/L
SODIUM SERPL-SCNC: 138 MMOL/L (ref 136–145)
SP GR UR STRIP.AUTO: 1.01 (ref 1–1.03)
TIBC SERPL-MCNC: 295 UG/DL (ref 250–450)
URATE SERPL-MCNC: 9.8 MG/DL (ref 2–6.8)
UROBILINOGEN UR QL STRIP.AUTO: 0.2 E.U./DL
UUN 24H UR-MCNC: 543 MG/DL
WBC # BLD AUTO: 5.5 THOUSAND/UL (ref 4.31–10.16)
WBC #/AREA URNS AUTO: ABNORMAL /HPF

## 2021-03-15 PROCEDURE — 84100 ASSAY OF PHOSPHORUS: CPT

## 2021-03-15 PROCEDURE — 82570 ASSAY OF URINE CREATININE: CPT

## 2021-03-15 PROCEDURE — 81001 URINALYSIS AUTO W/SCOPE: CPT

## 2021-03-15 PROCEDURE — 83970 ASSAY OF PARATHORMONE: CPT

## 2021-03-15 PROCEDURE — 83540 ASSAY OF IRON: CPT

## 2021-03-15 PROCEDURE — 80053 COMPREHEN METABOLIC PANEL: CPT

## 2021-03-15 PROCEDURE — 83735 ASSAY OF MAGNESIUM: CPT

## 2021-03-15 PROCEDURE — 83550 IRON BINDING TEST: CPT

## 2021-03-15 PROCEDURE — 84550 ASSAY OF BLOOD/URIC ACID: CPT

## 2021-03-15 PROCEDURE — 36415 COLL VENOUS BLD VENIPUNCTURE: CPT | Performed by: PHYSICIAN ASSISTANT

## 2021-03-15 PROCEDURE — 84156 ASSAY OF PROTEIN URINE: CPT | Performed by: INTERNAL MEDICINE

## 2021-03-15 PROCEDURE — 84540 ASSAY OF URINE/UREA-N: CPT

## 2021-03-15 PROCEDURE — 82436 ASSAY OF URINE CHLORIDE: CPT

## 2021-03-15 PROCEDURE — 82570 ASSAY OF URINE CREATININE: CPT | Performed by: INTERNAL MEDICINE

## 2021-03-15 PROCEDURE — 85025 COMPLETE CBC W/AUTO DIFF WBC: CPT | Performed by: PHYSICIAN ASSISTANT

## 2021-03-15 PROCEDURE — 82728 ASSAY OF FERRITIN: CPT

## 2021-03-15 PROCEDURE — 84300 ASSAY OF URINE SODIUM: CPT

## 2021-03-15 NOTE — PROGRESS NOTES
Heart Failure Outpatient Progress Note - Domenic Alvarenga 76 y o  female MRN: [id number]    @ Encounter: [id number]      Assessment/Plan:    Patient Active Problem List    Diagnosis Date Noted    Hypoalbuminemia 02/25/2021     Priority: Low    Hyperphosphatemia 02/22/2021     Priority: Low    Pneumonia 02/21/2021     Priority: Low    Anemia due to stage 3 chronic kidney disease 02/25/2020     Priority: Low    Elevated LFTs 02/19/2020     Priority: Low    History of anemia due to chronic kidney disease 05/31/2019     Priority: Low    Vitamin D deficiency 05/31/2019     Priority: Low    Proteinuria 05/31/2019     Priority: Low    Coronary artery disease involving native coronary artery of native heart with angina pectoris ([facility] ) 01/04/2019     Priority: Low    Anemia 12/30/2018     Priority: Low    Acute on chronic combined systolic and diastolic congestive heart failure ([facility] ) 12/29/2018     Priority: Low    Type 2 diabetes mellitus with kidney complication, with long-term current use of insulin ([place] [id number] ) 12/29/2018     Priority: Low    Hypothyroidism 12/29/2018     Priority: Low    Hypertension 12/29/2018     Priority: Low    Hyperlipidemia 12/29/2018     Priority: Low    Acute renal failure superimposed on stage 4 chronic kidney disease ([place] [address] ) 12/29/2018     Priority: Low    Acute otitis media 03/05/2021    Hypertensive urgency 03/02/2021     Plan:  Chronic HFpEF  Recent re-admit with HF exacerbation after being discharged home off diuretics after PNA  BP elevated on admit, improved with med adjustements  Appears compensated on exam today  BP controlled  Weight at discharge 166 lbs, today 165 lbs        Home diuretic- Torsemide 20 mg BID                  Lab Results   Component Value Date     NTBNP 9,024 (H) 03/01/2021      NT Pro BNP 2/21/21: 12 3K        TTE 2/22/21: LVEF 60%, akinesis of basal, inferior and basal mid inferolateral walls   Moderate cLVH, grade II DD, mild MR, trace TR, moderate, free flowing pericardial effusion without hemodynamic compromise  RV normal IVC normal  IVSd 0 99 cm       Echo 12/30/18:  EF: 50%, grade 2 DD, PASP 35 mmHg     CAD -No anginal symptoms at present  H/O NSTEMI S/P  LUDY x 2 to LAD 1/4/   80% RCA- plan staged, but has not needed intervention  Imdur 30 mg daily, asa      HTN- Improved  Continue Hydral 100 mg TID,  Coreg 12 5 mg BID ,Imdur 60 mg daily, Amlodipine 10 mg at HS      H/O Palpitations  Holter 5/17/19: 53-85, avg 69 bpm  8 PVCs      Anemia- Hgb now in the 7 2-7 5 range  On labs from 3/15 8  1     CKD3, new baseline seems to be 2 5-3 0  3 1 on 3/15 labs  Need for eventual HD discussed, patient adamantly against this at this time  Will follow up with nephro      DM2, hgA1C 3/2/21 11 7     HLD  FLP 3/2/21: , , HDL 76, LDL 48 on Atorva 40 mg daily    HPI:  Inpatient HF consult 12/21  "13-year-old female, with past medical history as described above, follows with Dr Jose Herman, who presented to the ED last evening with complaints of shortness of breath, orthopnea, and bilateral lower extremity edema  Was recently admitted with pneumonia and volume overload  General Cardiology team was consulted for diuretic management  Patient was given several doses of IV Lasix but then developed an KELLY  Unfortunately diuretics needed to be placed on hold and patient was discharged home off diuretics, with orders for repeat BMP this week and follow up in our outpatient clinic  In the past patient has had issues with dietary indiscretion, especially with high sodium food  After resuming these habits at home and being off diuretic, she quickly decompensated  According to daughter, patient was prescribed Nifedipine in placed of Amlodipine  However after taking her first dose at home, developed chest and back pain with an acute drop in her BP with a systolic of 90 mmHg   She spoke with the on call MD who gave instructions to discontinue immediately       On admit, patient's creat down to 3 28 from 3 78 on recent discharge  Hgb 7 7  NT Pro BNP 9K, which is down from 12 K last admit  Trops negative  BP has been elevated to the 062-532 systolic  Her creatinine is now improved after one time dose of IV lasix  She continues to complain of SOB and mild abdominal distension/bloating  Tells me her lower extremity edema has been present since last admission  She is unsure of her dry weight but believes it may be in the mid 160s  "    Patient presents today 3/16/21 for post hospital follow up  Recently with prolonged admission for the reasons noted above  Did have issues with rising creatinine and uncontrolled BP which contributed to her increased LOS  Her labs from yesterday show improved creatinine  Hgb 8 1  Feels well today  Struggling with elevated blood sugars  BP and weights stable at home  Otherwise has no complaints  Plans to follow up with nephrology in upcoming weeks  Past Medical History:   Diagnosis Date    Anemia     [facility]     Chronic kidney disease     Coronary artery disease     Diabetes mellitus ([facility] )     Hypertension     Hypothyroidism        12 point ROS negative other than that stated in HPI    Allergies   Allergen Reactions    Iv Contrast [Iodinated Diagnostic Agents] Itching     Caused KELLY    Nifedipine Rash           Current Outpatient Medications:     amLODIPine (NORVASC) 10 mg tablet, Take 1 tablet (10 mg total) by mouth daily at bedtime, Disp: 30 tablet, Rfl: 0    aspirin 81 mg chewable tablet, Chew 81 mg daily, Disp: , Rfl:     atorvastatin (LIPITOR) 40 mg tablet, Take 1 tablet (40 mg total) by mouth daily, Disp: 30 tablet, Rfl: 5    calcium acetate (PHOSLO) capsule, Take 1 capsule (667 mg total) by mouth 2 (two) times a day with meals, Disp: 60 capsule, Rfl: 0    carvedilol (COREG) 12 5 mg tablet, Take 1 tablet (12 5 mg total) by mouth 2 (two) times a day with meals, Disp: 60 tablet, Rfl: 0    hydrALAZINE (APRESOLINE) 100 MG tablet, Take 1 tablet (100 mg total) by mouth 3 (three) times a day, Disp: 90 tablet, Rfl: 0    insulin aspart protamine-insulin aspart (NovoLOG 70/30) 100 units/mL injection, Inject 10 Units under the skin 2 (two) times a day before meals, Disp: , Rfl:     isosorbide mononitrate (IMDUR) 60 mg 24 hr tablet, Take 1 tablet (60 mg total) by mouth daily, Disp: 30 tablet, Rfl: 0    levothyroxine 125 mcg tablet, Take 1 tablet (125 mcg total) by mouth daily, Disp: 30 tablet, Rfl: 0    torsemide (DEMADEX) 20 mg tablet, Take 1 tablet (20 mg total) by mouth 2 (two) times a day, Disp: 60 tablet, Rfl: 0    Social History     Socioeconomic History    Marital status: /Civil Union     Spouse name: Not on file    Number of children: 3    Years of education: Not on file    Highest education level: Not on file   Occupational History    Occupation: part time   Social Needs    Financial resource strain: Not on file    Food insecurity     Worry: Not on file     Inability: Not on file   [employer] needs     Medical: Not on file     Non-medical: Not on file   Tobacco Use    Smoking status: Former Smoker    Smokeless tobacco: Never Used   Substance and Sexual Activity    Alcohol use: Never     Frequency: Never    Drug use: No    Sexual activity: Yes   Lifestyle    Physical activity     Days per week: Not on file     Minutes per session: Not on file    Stress: Not on file   Relationships    Social connections     Talks on phone: Not on file     Gets together: Not on file     Attends Gnosticist service: Not on file     Active member of club or organization: Not on file     Attends meetings of clubs or organizations: Not on file     Relationship status: Not on file    Intimate partner violence     Fear of current or ex partner: Not on file     Emotionally abused: Not on file     Physically abused: Not on file     Forced sexual activity: Not on file   Other Topics Concern    Not on file Social History Narrative    Always uses seatbelt    Caffeine use    Daily coffee consumption: 1 cp daily    Feels safe at home    Lives with spouse       Family History   Problem Relation Age of Onset    Diabetes Mother     Heart attack Father         MI    Hypertension Brother        Physical Exam:    Vitals: /78 (BP Location: Left arm, Patient Position: Sitting, Cuff Size: Standard)   Pulse 62   Ht 5' 5" (1 651 m)   Wt 75 3 kg (165 lb 14 4 oz)   SpO2 99%   BMI 27 61 kg/m²     Wt Readings from Last 3 Encounters:   03/10/21 75 6 kg (166 lb 10 7 oz)   02/27/21 81 kg (178 lb 9 2 oz)   11/02/20 77 1 kg (170 lb)       GEN: Markell Morse appears well, alert and oriented x 3, pleasant and cooperative   HEENT: pupils equal, round, and reactive to light; extraocular muscles intact  NECK: supple, no carotid bruits   HEART: regular rhythm, normal S1 and S2, no murmurs, clicks, gallops or rubs, JVP is flat    LUNGS: clear to auscultation bilaterally; no wheezes, rales, or rhonchi   ABDOMEN: normal bowel sounds, soft, no tenderness, no distention  EXTREMITIES: peripheral pulses normal; no clubbing, cyanosis, or edema  NEURO: no focal findings   SKIN: normal without suspicious lesions on exposed skin    Labs & Results:    Chemistry        Component Value Date/Time     10/07/2015 1309    K 4 3 03/10/2021 0518    K 5 1 10/30/2020 1259     03/10/2021 0518     10/30/2020 1259    CO2 28 03/10/2021 0518    CO2 22 10/30/2020 1259    BUN 69 (H) 03/10/2021 0518    BUN 43 (H) 10/30/2020 1259    CREATININE 3 44 (H) 03/10/2021 0518    CREATININE 1 03 10/07/2015 1309        Component Value Date/Time    CALCIUM 9 3 03/10/2021 0518    CALCIUM 8 5 (L) 10/30/2020 1259    CALCIUM 8 5 (L) 10/30/2020 1259    ALKPHOS 610 (H) 03/02/2021 0140    ALKPHOS 421 (H) 08/08/2020 1015    AST 45 03/02/2021 0140    AST 31 08/08/2020 1015    ALT 66 03/02/2021 0140    ALT 30 (H) 08/08/2020 1015    BILITOT 0 41 10/07/2015 1303 Lab Results   Component Value Date    WBC 6 08 03/09/2021    HGB 7 3 (L) 03/09/2021    HCT 24 3 (L) 03/09/2021    MCV 95 03/09/2021     (L) 03/09/2021     Lab Results   Component Value Date    NTBNP 9,024 (H) 03/01/2021      Lab Results   Component Value Date    LDLCALC 48 03/02/2021       EKG personally reviewed by CLARICE Rodriguez  No results found for this visit on 03/16/21  Counseling / Coordination of Care  Total floor / unit time spent today 40 minutes  Greater than 50% of total time was spent with the patient and / or family counseling and / or coordination of care  A description of the counseling / coordination of care: 20  Thank you for the opportunity to participate in the care of this patient      Efrain Villeda

## 2021-03-16 ENCOUNTER — OFFICE VISIT (OUTPATIENT)
Dept: CARDIOLOGY CLINIC | Facility: CLINIC | Age: 66
End: 2021-03-16
Payer: COMMERCIAL

## 2021-03-16 ENCOUNTER — TELEPHONE (OUTPATIENT)
Dept: NEPHROLOGY | Facility: CLINIC | Age: 66
End: 2021-03-16

## 2021-03-16 VITALS
SYSTOLIC BLOOD PRESSURE: 136 MMHG | HEIGHT: 65 IN | WEIGHT: 165.9 LBS | HEART RATE: 62 BPM | BODY MASS INDEX: 27.64 KG/M2 | DIASTOLIC BLOOD PRESSURE: 78 MMHG | OXYGEN SATURATION: 99 %

## 2021-03-16 DIAGNOSIS — I50.30 DIASTOLIC CONGESTIVE HEART FAILURE, UNSPECIFIED HF CHRONICITY (HCC): Primary | ICD-10-CM

## 2021-03-16 PROCEDURE — 99215 OFFICE O/P EST HI 40 MIN: CPT | Performed by: INTERNAL MEDICINE

## 2021-03-16 NOTE — PATIENT INSTRUCTIONS
Weight yourself daily  If you gain 3 lbs in one day or 5 lbs in one week, please call the office at [phone] and ask for a nurse or the heart failure nurse  Keep your sodium intake to <2 grams, (2000 mg) per day, and fluids <2 Liters (2000 ml) per day  This is around 6-7, 8 oz glasses of fluid per day    Continue to monitor blood pressure at home  Try to walk for a few minutes every day to stay active     Follow up with your nephrologist

## 2021-03-16 NOTE — TELEPHONE ENCOUNTER
----- Message from Scooby Marie DO sent at 3/15/2021  8:51 PM EDT -----  Creatinine trending downward electrolytes stable, cardiology follow up scheduled  Does she have f/u with us scheduled?

## 2021-03-19 NOTE — PROGRESS NOTES
Cabrera Birch  Is under my care for a cardiovascular condition   Under further review, she  Is able to return to work with ability to sit and rest for atleast 5 min once every hour due to edema in her feet    If you have any further questions please contact me at my office at [facility] Medication Appeal Initiation    We have initiated an appeal for the requested medication:  Medication:   Appeal Start Date:     Insurance Company:  [employer]  Comments:   Used last appeal letter and just changed the date.

## 2021-03-22 NOTE — TELEPHONE ENCOUNTER
A message has been left asking pt to contact the office  This is my third attempt in contacting pt via telephone  A letter has been mailed asking requesting that pt call the office

## 2021-03-28 DIAGNOSIS — I25.10 CORONARY ARTERY DISEASE INVOLVING NATIVE CORONARY ARTERY OF NATIVE HEART WITHOUT ANGINA PECTORIS: Primary | ICD-10-CM

## 2021-03-28 RX ORDER — ASPIRIN 81 MG
TABLET,CHEWABLE ORAL
Qty: 30 TABLET | Refills: 5 | Status: SHIPPED | OUTPATIENT
Start: 2021-03-28 | End: 2021-11-23

## 2021-04-09 NOTE — PROGRESS NOTES
Heart Failure Outpatient Progress Note - Kizzy Garcia 78 y o  female MRN: [id number]    @ Encounter: [id number]      Assessment/Plan:    Patient Active Problem List    Diagnosis Date Noted    Acute otitis media 03/05/2021     Priority: Low    Hypertensive urgency 03/02/2021     Priority: Low    Hypoalbuminemia 02/25/2021     Priority: Low    Hyperphosphatemia 02/22/2021     Priority: Low    Pneumonia 02/21/2021     Priority: Low    Anemia due to stage 3 chronic kidney disease 02/25/2020     Priority: Low    Elevated LFTs 02/19/2020     Priority: Low    History of anemia due to chronic kidney disease 05/31/2019     Priority: Low    Vitamin D deficiency 05/31/2019     Priority: Low    Proteinuria 05/31/2019     Priority: Low    Coronary artery disease involving native coronary artery of native heart with angina pectoris ([facility] ) 01/04/2019     Priority: Low    Anemia 12/30/2018     Priority: Low    Acute on chronic combined systolic and diastolic congestive heart failure ([place] [address] ) 12/29/2018     Priority: Low    Type 2 diabetes mellitus with kidney complication, with long-term current use of insulin ([facility] ) 12/29/2018     Priority: Low    Hypothyroidism 12/29/2018     Priority: Low    Hypertension 12/29/2018     Priority: Low    Hyperlipidemia 12/29/2018     Priority: Low    Acute renal failure superimposed on stage 4 chronic kidney disease ([place] [address] ) 12/29/2018     Priority: Low     Plan:  Chronic HFpEF  Recent re-admit with HF exacerbation after being discharged home off diuretics after PNA  BP elevated on admit, improved with med adjustements  Appears compensated on exam today with some mild LE edema which is at baseline  She was instructed to monitor this  If worsens can try taking an extra dose of Torsemide and a mild compression stocking  BP controlled  Weight stable  Continue Torsemide 20 mg BID, Imdur 60 mg daily, Hydral 100 mg TID, Carvedilol 12 5 mg BID and Amlodipine 10 mg daily      Weight at discharge 166 lbs, 165 lbs  today 164 lbs                    Lab Results   Component Value Date     NTBNP 9,024 (H) 03/01/2021      NT Pro BNP 2/21/21: 12 3K        TTE 2/22/21: LVEF 60%, akinesis of basal, inferior and basal mid inferolateral walls  Moderate cLVH, grade II DD, mild MR, trace TR, moderate, free flowing pericardial effusion without hemodynamic compromise  RV normal IVC normal  IVSd 0 99 cm       Echo 12/30/18:  EF: 50%, grade 2 DD, PASP 35 mmHg     CAD -No anginal symptoms at present  H/O NSTEMI S/P  LUDY x 2 to LAD 1/4/   80% RCA- plan staged, but has not needed intervention  Imdur 30 mg daily, asa      HTN- Improved  Continue Hydral 100 mg TID,  Coreg 12 5 mg BID ,Imdur 60 mg daily, Amlodipine 10 mg at HS      H/O Palpitations  Holter 5/17/19: 53-85, avg 69 bpm  8 PVCs      Anemia- Hgb now in the 7 2-7 5 range  On labs from 3/15 8  1     CKD3, new baseline seems to be 2 5-3 0  3 1 on 3/15 labs  Need for eventual HD discussed, patient adamantly against this at this time  Will follow up with nephro      DM2, hgA1C 3/2/21 11 7     HLD  FLP 3/2/21: , , HDL 76, LDL 48 on Atorva 40 mg daily    HPI:  Inpatient HF consult 12/21  "27-year-old female, with past medical history as described above, follows with Dr Norma He, who presented to the ED last evening with complaints of shortness of breath, orthopnea, and bilateral lower extremity edema  Was recently admitted with pneumonia and volume overload  General Cardiology team was consulted for diuretic management  Patient was given several doses of IV Lasix but then developed an KELLY  Unfortunately diuretics needed to be placed on hold and patient was discharged home off diuretics, with orders for repeat BMP this week and follow up in our outpatient clinic  In the past patient has had issues with dietary indiscretion, especially with high sodium food    After resuming these habits at home and being off diuretic, she quickly decompensated  According to daughter, patient was prescribed Nifedipine in placed of Amlodipine  However after taking her first dose at home, developed chest and back pain with an acute drop in her BP with a systolic of 90 mmHg  She spoke with the on call MD who gave instructions to discontinue immediately       On admit, patient's creat down to 3 28 from 3 78 on recent discharge  Hgb 7 7  NT Pro BNP 9K, which is down from 12 K last admit  Trops negative  BP has been elevated to the 762-134 systolic  Her creatinine is now improved after one time dose of IV lasix  She continues to complain of SOB and mild abdominal distension/bloating  Tells me her lower extremity edema has been present since last admission  She is unsure of her dry weight but believes it may be in the mid 160s  "    Patient presents today 3/16/21 for post hospital follow up  Recently with prolonged admission for the reasons noted above  Did have issues with rising creatinine and uncontrolled BP which contributed to her increased LOS  Her labs from yesterday show improved creatinine  Hgb 8 1  Feels well today  Struggling with elevated blood sugars  BP and weights stable at home  Otherwise has no complaints  Plans to follow up with nephrology in upcoming weeks  Today, 4/13/21 patient presents for one month follow up  Feels well  Weights and BP stable  Denies chest pain, SOB, orthopnea or PND  Has some mild LE edeam which is at baseline  She has upcoming dental extractions to be done under local anesthesia and would like clearance for this             Past Medical History:   Diagnosis Date    Anemia     [facility])     Chronic kidney disease     Coronary artery disease     Diabetes mellitus ([facility]     Hypertension     Hypothyroidism        12 point ROS negative other than that stated in HPI    Allergies   Allergen Reactions    Iv Contrast [Iodinated Diagnostic Agents] Itching     Caused KELLY    Nifedipine Rash      Current Outpatient Medications:     amLODIPine (NORVASC) 10 mg tablet, Take 1 tablet (10 mg total) by mouth daily at bedtime, Disp: 30 tablet, Rfl: 0    atorvastatin (LIPITOR) 40 mg tablet, Take 1 tablet (40 mg total) by mouth daily, Disp: 30 tablet, Rfl: 5    calcium acetate (PHOSLO) capsule, Take 1 capsule (667 mg total) by mouth 2 (two) times a day with meals, Disp: 60 capsule, Rfl: 0    carvedilol (COREG) 12 5 mg tablet, Take 1 tablet (12 5 mg total) by mouth 2 (two) times a day with meals, Disp: 60 tablet, Rfl: 0    hydrALAZINE (APRESOLINE) 100 MG tablet, Take 1 tablet (100 mg total) by mouth 3 (three) times a day, Disp: 90 tablet, Rfl: 0    insulin aspart protamine-insulin aspart (NovoLOG 70/30) 100 units/mL injection, Inject 10 Units under the skin 2 (two) times a day before meals, Disp: , Rfl:     isosorbide mononitrate (IMDUR) 60 mg 24 hr tablet, Take 1 tablet (60 mg total) by mouth daily, Disp: 30 tablet, Rfl: 0    levothyroxine 125 mcg tablet, Take 1 tablet (125 mcg total) by mouth daily, Disp: 30 tablet, Rfl: 0    RA Aspirin Adult Low Dose 81 MG chewable tablet, CHEW AND SWALLOW 1 TABLET (81 MG TOTAL) BY MOUTH ONCE DAILY, Disp: 30 tablet, Rfl: 5    torsemide (DEMADEX) 20 mg tablet, Take 1 tablet (20 mg total) by mouth 2 (two) times a day, Disp: 60 tablet, Rfl: 0    Social History     Socioeconomic History    Marital status: /Civil Union     Spouse name: Not on file    Number of children: 3    Years of education: Not on file    Highest education level: Not on file   Occupational History    Occupation: part time   Social Needs    Financial resource strain: Not on file    Food insecurity     Worry: Not on file     Inability: Not on file   [employer] needs     Medical: Not on file     Non-medical: Not on file   Tobacco Use    Smoking status: Former Smoker    Smokeless tobacco: Never Used   Substance and Sexual Activity    Alcohol use: Never     Frequency: Never    Drug use: No    Sexual activity: Yes   Lifestyle    Physical activity     Days per week: Not on file     Minutes per session: Not on file    Stress: Not on file   Relationships    Social connections     Talks on phone: Not on file     Gets together: Not on file     Attends Mandaeism service: Not on file     Active member of club or organization: Not on file     Attends meetings of clubs or organizations: Not on file     Relationship status: Not on file    Intimate partner violence     Fear of current or ex partner: Not on file     Emotionally abused: Not on file     Physically abused: Not on file     Forced sexual activity: Not on file   Other Topics Concern    Not on file   Social History Narrative    Always uses seatbelt    Caffeine use    Daily coffee consumption: 1 cp daily    Feels safe at home    Lives with spouse       Family History   Problem Relation Age of Onset    Diabetes Mother     Heart attack Father         MI    Hypertension Brother        Physical Exam:    Vitals: /64 (BP Location: Left arm, Patient Position: Sitting, Cuff Size: Standard)   Pulse 56   Ht 5' 5" (1 651 m)   Wt 74 6 kg (164 lb 6 4 oz)   SpO2 98% Comment: RA  BMI 27 36 kg/m²       Wt Readings from Last 3 Encounters:   03/16/21 75 3 kg (165 lb 14 4 oz)   03/10/21 75 6 kg (166 lb 10 7 oz)   02/27/21 81 kg (178 lb 9 2 oz)       GEN: Chris Clayton appears well, alert and oriented x 3, pleasant and cooperative   HEENT: pupils equal, round, and reactive to light; extraocular muscles intact  NECK: supple, no carotid bruits   HEART: regular rhythm, normal S1 and S2, no murmurs, clicks, gallops or rubs, JVP is flat    LUNGS: clear to auscultation bilaterally; no wheezes, rales, or rhonchi   ABDOMEN: normal bowel sounds, soft, no tenderness, no distention  EXTREMITIES: peripheral pulses normal; no clubbing, cyanosis, or edema  NEURO: no focal findings   SKIN: normal without suspicious lesions on exposed skin    Labs & Results:    Chemistry        Component Value Date/Time     10/07/2015 1309    K 4 6 03/15/2021 1054    K 5 1 10/30/2020 1259     03/15/2021 1054     10/30/2020 1259    CO2 28 03/15/2021 1054    CO2 22 10/30/2020 1259    BUN 65 (H) 03/15/2021 1054    BUN 43 (H) 10/30/2020 1259    CREATININE 3 18 (H) 03/15/2021 1054    CREATININE 1 03 10/07/2015 1309        Component Value Date/Time    CALCIUM 9 6 03/15/2021 1054    CALCIUM 8 5 (L) 10/30/2020 1259    CALCIUM 8 5 (L) 10/30/2020 1259    ALKPHOS 628 (H) 03/15/2021 1054    ALKPHOS 421 (H) 08/08/2020 1015    AST 39 03/15/2021 1054    AST 31 08/08/2020 1015    ALT 74 03/15/2021 1054    ALT 30 (H) 08/08/2020 1015    BILITOT 0 41 10/07/2015 1309        Lab Results   Component Value Date    WBC 5 50 03/15/2021    HGB 8 1 (L) 03/15/2021    HCT 26 8 (L) 03/15/2021    MCV 94 03/15/2021     (L) 03/15/2021     Lab Results   Component Value Date    NTBNP 9,024 (H) 03/01/2021      Lab Results   Component Value Date    LDLCALC 48 03/02/2021       EKG personally reviewed by CLARICE Gill  No results found for this visit on 04/13/21  Counseling / Coordination of Care  Total floor / unit time spent today 40 minutes  Greater than 50% of total time was spent with the patient and / or family counseling and / or coordination of care  A description of the counseling / coordination of care: 20  Thank you for the opportunity to participate in the care of this patient      Gibson Guadalupe

## 2021-04-12 DIAGNOSIS — N17.9 ACUTE RENAL FAILURE SUPERIMPOSED ON STAGE 4 CHRONIC KIDNEY DISEASE, UNSPECIFIED ACUTE RENAL FAILURE TYPE (HCC): ICD-10-CM

## 2021-04-12 DIAGNOSIS — I25.10 CORONARY ARTERY DISEASE INVOLVING NATIVE CORONARY ARTERY OF NATIVE HEART WITHOUT ANGINA PECTORIS: ICD-10-CM

## 2021-04-12 DIAGNOSIS — I16.0 HYPERTENSIVE URGENCY: ICD-10-CM

## 2021-04-12 DIAGNOSIS — N18.4 ACUTE RENAL FAILURE SUPERIMPOSED ON STAGE 4 CHRONIC KIDNEY DISEASE, UNSPECIFIED ACUTE RENAL FAILURE TYPE (HCC): ICD-10-CM

## 2021-04-13 ENCOUNTER — OFFICE VISIT (OUTPATIENT)
Dept: CARDIOLOGY CLINIC | Facility: CLINIC | Age: 66
End: 2021-04-13
Payer: COMMERCIAL

## 2021-04-13 VITALS
OXYGEN SATURATION: 98 % | HEART RATE: 56 BPM | DIASTOLIC BLOOD PRESSURE: 64 MMHG | HEIGHT: 65 IN | WEIGHT: 164.4 LBS | BODY MASS INDEX: 27.39 KG/M2 | SYSTOLIC BLOOD PRESSURE: 120 MMHG

## 2021-04-13 DIAGNOSIS — N17.9 ACUTE RENAL FAILURE SUPERIMPOSED ON STAGE 4 CHRONIC KIDNEY DISEASE, UNSPECIFIED ACUTE RENAL FAILURE TYPE (HCC): ICD-10-CM

## 2021-04-13 DIAGNOSIS — N18.4 ACUTE RENAL FAILURE SUPERIMPOSED ON STAGE 4 CHRONIC KIDNEY DISEASE, UNSPECIFIED ACUTE RENAL FAILURE TYPE (HCC): ICD-10-CM

## 2021-04-13 DIAGNOSIS — I25.10 CORONARY ARTERY DISEASE INVOLVING NATIVE CORONARY ARTERY OF NATIVE HEART WITHOUT ANGINA PECTORIS: ICD-10-CM

## 2021-04-13 DIAGNOSIS — I16.0 HYPERTENSIVE URGENCY: ICD-10-CM

## 2021-04-13 PROCEDURE — 99214 OFFICE O/P EST MOD 30 MIN: CPT | Performed by: NURSE PRACTITIONER

## 2021-04-13 RX ORDER — AMLODIPINE BESYLATE 10 MG/1
10 TABLET ORAL
Qty: 30 TABLET | Refills: 5 | Status: SHIPPED | OUTPATIENT
Start: 2021-04-13 | End: 2021-05-14 | Stop reason: SDUPTHER

## 2021-04-13 RX ORDER — ATORVASTATIN CALCIUM 40 MG/1
40 TABLET, FILM COATED ORAL DAILY
Qty: 30 TABLET | Refills: 5 | Status: SHIPPED | OUTPATIENT
Start: 2021-04-13 | End: 2021-05-14 | Stop reason: SDUPTHER

## 2021-04-13 RX ORDER — HYDRALAZINE HYDROCHLORIDE 100 MG/1
100 TABLET, FILM COATED ORAL 3 TIMES DAILY
Qty: 90 TABLET | Refills: 3 | Status: SHIPPED | OUTPATIENT
Start: 2021-04-13 | End: 2021-05-14 | Stop reason: SDUPTHER

## 2021-04-13 RX ORDER — AMLODIPINE BESYLATE 10 MG/1
10 TABLET ORAL
Qty: 30 TABLET | Refills: 5 | Status: SHIPPED | OUTPATIENT
Start: 2021-04-13 | End: 2021-04-13 | Stop reason: SDUPTHER

## 2021-04-13 RX ORDER — ATORVASTATIN CALCIUM 40 MG/1
40 TABLET, FILM COATED ORAL DAILY
Qty: 30 TABLET | Refills: 5 | Status: SHIPPED | OUTPATIENT
Start: 2021-04-13 | End: 2021-04-13 | Stop reason: SDUPTHER

## 2021-04-13 RX ORDER — TORSEMIDE 20 MG/1
20 TABLET ORAL 2 TIMES DAILY
Qty: 60 TABLET | Refills: 5 | Status: SHIPPED | OUTPATIENT
Start: 2021-04-13 | End: 2021-05-14 | Stop reason: SDUPTHER

## 2021-04-13 RX ORDER — CARVEDILOL 12.5 MG/1
12.5 TABLET ORAL 2 TIMES DAILY WITH MEALS
Qty: 60 TABLET | Refills: 0 | Status: SHIPPED | OUTPATIENT
Start: 2021-04-13 | End: 2021-05-03 | Stop reason: SDUPTHER

## 2021-04-13 RX ORDER — ISOSORBIDE MONONITRATE 60 MG/1
60 TABLET, EXTENDED RELEASE ORAL DAILY
Qty: 30 TABLET | Refills: 5 | Status: SHIPPED | OUTPATIENT
Start: 2021-04-13 | End: 2021-05-14 | Stop reason: SDUPTHER

## 2021-04-13 RX ORDER — TORSEMIDE 20 MG/1
20 TABLET ORAL 2 TIMES DAILY
Qty: 60 TABLET | Refills: 5 | Status: SHIPPED | OUTPATIENT
Start: 2021-04-13 | End: 2021-04-13 | Stop reason: SDUPTHER

## 2021-04-13 RX ORDER — ISOSORBIDE MONONITRATE 60 MG/1
60 TABLET, EXTENDED RELEASE ORAL DAILY
Qty: 30 TABLET | Refills: 5 | Status: SHIPPED | OUTPATIENT
Start: 2021-04-13 | End: 2021-04-13 | Stop reason: SDUPTHER

## 2021-04-13 NOTE — PATIENT INSTRUCTIONS
Weight yourself daily  If you gain 3 lbs in one day or 5 lbs in one week, please call the office at [phone] and ask for a nurse or the heart failure nurse  Keep your sodium intake to <2 grams, (2000 mg) per day, and fluids <2 Liters (2000 ml) per day  This is around 6-7, 8 oz glasses of fluid per day    You may take an extra Torsemide for worsening lower extremity swelling/edema as needed  Start walking when weather warms up

## 2021-05-03 ENCOUNTER — OFFICE VISIT (OUTPATIENT)
Dept: NEPHROLOGY | Facility: CLINIC | Age: 66
End: 2021-05-03
Payer: COMMERCIAL

## 2021-05-03 VITALS
DIASTOLIC BLOOD PRESSURE: 70 MMHG | SYSTOLIC BLOOD PRESSURE: 138 MMHG | HEART RATE: 57 BPM | RESPIRATION RATE: 16 BRPM | BODY MASS INDEX: 27.66 KG/M2 | HEIGHT: 65 IN | WEIGHT: 166 LBS

## 2021-05-03 DIAGNOSIS — N17.9 ACUTE RENAL FAILURE SUPERIMPOSED ON STAGE 4 CHRONIC KIDNEY DISEASE, UNSPECIFIED ACUTE RENAL FAILURE TYPE (HCC): ICD-10-CM

## 2021-05-03 DIAGNOSIS — R80.1 PERSISTENT PROTEINURIA: ICD-10-CM

## 2021-05-03 DIAGNOSIS — I10 ESSENTIAL HYPERTENSION: ICD-10-CM

## 2021-05-03 DIAGNOSIS — N18.4 ACUTE RENAL FAILURE SUPERIMPOSED ON STAGE 4 CHRONIC KIDNEY DISEASE, UNSPECIFIED ACUTE RENAL FAILURE TYPE (HCC): ICD-10-CM

## 2021-05-03 DIAGNOSIS — I50.43 ACUTE ON CHRONIC COMBINED SYSTOLIC AND DIASTOLIC CONGESTIVE HEART FAILURE (HCC): Primary | ICD-10-CM

## 2021-05-03 DIAGNOSIS — I16.0 HYPERTENSIVE URGENCY: ICD-10-CM

## 2021-05-03 PROCEDURE — 99214 OFFICE O/P EST MOD 30 MIN: CPT | Performed by: INTERNAL MEDICINE

## 2021-05-03 RX ORDER — CARVEDILOL 12.5 MG/1
12.5 TABLET ORAL 2 TIMES DAILY WITH MEALS
Qty: 180 TABLET | Refills: 3 | Status: SHIPPED | OUTPATIENT
Start: 2021-05-03 | End: 2021-05-14 | Stop reason: SDUPTHER

## 2021-05-03 NOTE — PROGRESS NOTES
OFFICE FOLLOW UP - Nephrology   Tatum Thompson 78 y o  female MRN: [id number]    Encounter: [id number]        ASSESSMENT  Diagnoses and all orders for this visit:    19-year-old female with past medical history type 2 diabetes, hypertension who presented with a type 2 NSTEMI, coronary artery disease requiring PCI complicated by acute kidney injury on chronic kidney disease    KELLY (acute kidney injury) ([facility] )  Stage 4 chronic kidney disease ([facility] )  Coronary artery disease involving native coronary artery of native heart with angina pectoris ([facility] )  Acute diastolic congestive heart failure ([facility] )  Essential hypertension  Type 2 myocardial infarction ([facility] )      DISCUSSION & PLAN    1  Acute kidney injury-etiology likely related to contrast induced nephropathy plus decreased effective arterial volume with acute anemia  -creatinine increased to 3 9 in the hospital, now improved to 3 1 this may be new baseline    2  Stage 4 chronic kidney disease  -new baseline creatinine seems to be around 3 1 although it is trending down  -continue cardiovascular risk reduction measures-statin therapy, glycemic control and treatment of coronary artery disease  -her estimated GFR is now around 15 mL/min  -today we did discuss renal replacement therapy, she states at this point she would now want renal replacement therapy if needed we discuss this if renal function worsens this could be life-threatening  Dialysis would keep her alive     -she stated she will think further regarding this will monitor her kidney function closely  -scheduled a CKD education course as well  -for now appears compensated but high risk of progression    3  Coronary artery disease with diastolic congestive heart failure  -follows with Heart failure team regularly    4   Essential hypertension in the setting of diastolic heart failure  -blood pressures remain elevated  -blood pressures are stable  -carvedilol 12 5 mg 2 times daily  -l torsemide 20 mg twice daily  -10 mg daily  -Imdur 60 mg daily  -hydralazine 100 mg 3 times daily    5  diastolic congestive heart failure  -following up with Cardiology  -continue blood pressure control, on a beta-blocker, on Imdur , hydralazine not on ARB because of acute kidney injury  -currently on a statin    6  Type 2 diabetes mellitus  -continue glycemic control     7  Vitamin-D deficiency  -PTH acceptable    8  Proteinuria  -proteinuria now worsening  -monitor longstanding history of diabetes, with worsening blood sugars  -now with high chance of progression    9  Anemia in the setting of chronic kidney disease  -iron level stable  -follows with Hematology    Continue follow-up every 3 months     HPI: Aristides Barroso is a 78 y o  female who is here for follow-up  She has a past medical history of type 2 diabetes mellitus and hypertension originally presented with type 2 myocardial infarction in signs of volume overload requiring catheterization postoperatively now with multiple episodes of acute kidney injury was recently in the hospital on March had a creatinine of 3 9 in volume overload she was diuresed and her creatinine is improving    Her creatinine is now down to 3, her estimated GFR if her creatinine stabilizes will be about 15 mL/minutes her weight has been around 165 lb which is consistent with her weight at the time of discharge, she did have pulmonary edema and shortness of breath and this is currently stable    She denies any acute chest pain or shortness of breath fevers or chills no nausea vomiting diarrhea constipation foamy or bloody urine    ROS:   All the systems were reviewed and were negative except as documented on the HPI  Allergies:  Iv contrast [iodinated diagnostic agents] and Nifedipine    Medications:   Current Outpatient Medications:     amLODIPine (NORVASC) 10 mg tablet, Take 1 tablet (10 mg total) by mouth daily at bedtime, Disp: 30 tablet, Rfl: 5    atorvastatin (LIPITOR) 40 mg tablet, Take 1 tablet (40 mg total) by mouth daily, Disp: 30 tablet, Rfl: 5    calcium acetate (PHOSLO) capsule, Take 1 capsule (667 mg total) by mouth 2 (two) times a day with meals, Disp: 60 capsule, Rfl: 0    carvedilol (COREG) 12 5 mg tablet, Take 1 tablet (12 5 mg total) by mouth 2 (two) times a day with meals, Disp: 180 tablet, Rfl: 3    hydrALAZINE (APRESOLINE) 100 MG tablet, Take 1 tablet (100 mg total) by mouth 3 (three) times a day, Disp: 90 tablet, Rfl: 3    insulin aspart protamine-insulin aspart (NovoLOG 70/30) 100 units/mL injection, Inject 10 Units under the skin 2 (two) times a day before meals, Disp: , Rfl:     isosorbide mononitrate (IMDUR) 60 mg 24 hr tablet, Take 1 tablet (60 mg total) by mouth daily, Disp: 30 tablet, Rfl: 5    levothyroxine 125 mcg tablet, Take 1 tablet (125 mcg total) by mouth daily, Disp: 30 tablet, Rfl: 0    RA Aspirin Adult Low Dose 81 MG chewable tablet, CHEW AND SWALLOW 1 TABLET (81 MG TOTAL) BY MOUTH ONCE DAILY, Disp: 30 tablet, Rfl: 5    torsemide (DEMADEX) 20 mg tablet, Take 1 tablet (20 mg total) by mouth 2 (two) times a day, Disp: 60 tablet, Rfl: 5    Past Medical History:   Diagnosis Date    Anemia     CHF (congestive heart failure) (HCC)     Chronic kidney disease     Coronary artery disease     Diabetes mellitus ([facility] )     Hypertension     Hypothyroidism      Past Surgical History:   Procedure Laterality Date    CORONARY ANGIOPLASTY WITH STENT PLACEMENT  2019     Family History   Problem Relation Age of Onset    Diabetes Mother     Heart attack Father         MI    Hypertension Brother       reports that she has quit smoking  She has never used smokeless tobacco  She reports that she does not drink alcohol or use drugs  Physical Exam:   Vitals:    05/03/21 0959   BP: 138/70   BP Location: Left arm   Patient Position: Sitting   Cuff Size: Standard   Pulse: 57   Resp: 16   Weight: 75 3 kg (166 lb)   Height: 5' 5" (1 651 m)     Body mass index is 27 62 kg/m²     Repeat blood pressure on the right arm was 155 over 72  General: conscious, cooperative, in no acute distress  Eyes: conjunctivae pink, anicteric sclerae  ENT: lips and mucous membranes moist  Neck: supple, no JVD  Chest: clear breath sounds bilateral, no crackles, ronchus or wheezings  CVS: normal rate, regular rhythm  Abdomen: soft, non-tender, non-distended, normoactive bowel sounds  Extremities:  Trace edema  Skin: no rash  Neuro: awake, alert, oriented  Psych:  Pleasant affect    Lab Results:    Results for orders placed or performed in visit on 03/15/21   Comprehensive metabolic panel   Result Value Ref Range    Sodium 138 136 - 145 mmol/L    Potassium 4 6 3 5 - 5 3 mmol/L    Chloride 107 100 - 108 mmol/L    CO2 28 21 - 32 mmol/L    ANION GAP 3 (L) 4 - 13 mmol/L    BUN 65 (H) 5 - 25 mg/dL    Creatinine 3 18 (H) 0 60 - 1 30 mg/dL    Glucose, Fasting 213 (H) 65 - 99 mg/dL    Calcium 9 6 8 3 - 10 1 mg/dL    AST 39 5 - 45 U/L    ALT 74 12 - 78 U/L    Alkaline Phosphatase 628 (H) 46 - 116 U/L    Total Protein 7 5 6 4 - 8 2 g/dL    Albumin 3 5 3 5 - 5 0 g/dL    Total Bilirubin 0 45 0 20 - 1 00 mg/dL    eGFR 15 ml/min/1 73sq m   Magnesium   Result Value Ref Range    Magnesium 2 3 1 6 - 2 6 mg/dL   Phosphorus   Result Value Ref Range    Phosphorus 3 6 2 3 - 4 1 mg/dL   Uric acid   Result Value Ref Range    Uric Acid 9 8 (H) 2 0 - 6 8 mg/dL   PTH, intact   Result Value Ref Range     9 (H) 18 4 - 80 1 pg/mL   Iron Saturation %   Result Value Ref Range    Iron Saturation 34 %    TIBC 295 250 - 450 ug/dL    Iron 101 50 - 170 ug/dL   Ferritin   Result Value Ref Range    Ferritin 983 (H) 8 - 388 ng/mL   Urinalysis with microscopic   Result Value Ref Range    Clarity, UA Clear     Color, UA Yellow     Specific Rice, UA 1 014 1 003 - 1 030    pH, UA 6 5 4 5, 5 0, 5 5, 6 0, 6 5, 7 0, 7 5, 8 0    Glucose,  (1/10%) (A) Negative mg/dl    Ketones, UA Negative Negative mg/dl    Blood, UA Negative Negative    Protein, UA 300 (3+) (A) Negative mg/dl    Nitrite, UA Negative Negative    Bilirubin, UA Negative Negative    Urobilinogen, UA 0 2 0 2, 1 0 E U /dl E U /dl    Leukocytes, UA Negative Negative    WBC, UA None Seen None Seen, 2-4 /hpf    RBC, UA None Seen None Seen, 2-4 /hpf    Hyaline Casts, UA None Seen None Seen /lpf    Bacteria, UA None Seen None Seen, Occasional /hpf    Epithelial Cells None Seen None Seen, Occasional /hpf   Chloride, urine, random   Result Value Ref Range    Chloride, Ur 56 mmol/L   Creatinine, urine, random   Result Value Ref Range    Creatinine, Ur 102 0 mg/dL   Urea nitrogen, urine   Result Value Ref Range    Urea Nitrogen, Ur 543 mg/dL   Sodium, urine, random   Result Value Ref Range    Sodium, Ur 51    Creatinine, urine, random   Result Value Ref Range    Creatinine, Ur 108 0 mg/dL       Portions of the record may have been created with voice recognition software  Occasional wrong word or "sound a like" substitutions may have occurred due to the inherent limitations of voice recognition software  Read the chart carefully and recognize, using context, where substitutions have occurred  If you have any questions, please contact the dictating provider

## 2021-05-03 NOTE — PATIENT INSTRUCTIONS
Chronic Kidney Disease   WHAT YOU NEED TO KNOW:   Chronic kidney disease (CKD) is the gradual and permanent loss of kidney function  It is also called chronic kidney failure, or chronic renal insufficiency  Normally, the kidneys remove fluid, chemicals, and waste from your blood  These wastes are turned into urine by your kidneys  CKD may worsen over time and lead to kidney failure  Your CKD team will help you and your family plan for your care at home  The team will help you create goals and find ways to meet your goals  Your care plan may change over time as your needs change  DISCHARGE INSTRUCTIONS:   Call your local emergency number (911 in the [address]) if:   · You have a seizure  · You have shortness of breath  Call your doctor or nephrologist if:   · You are confused and very drowsy  · You suddenly gain or lose more weight than your healthcare provider has told you is okay  · You have itchy skin or a rash  · You urinate more or less than you normally do  · You have blood in your urine  · You have nausea and are vomiting  · You have fatigue or muscle weakness  · You have hiccups that will not stop  · You have questions or concerns about your condition or care  Medicines:   · Medicines  may be given to decrease blood pressure and get rid of extra fluid  You may also receive medicine to manage health conditions that may occur with CKD, such as anemia, diabetes, and heart disease  · Take your medicine as directed  Contact your healthcare provider if you think your medicine is not helping or if you have side effects  Tell him or her if you are allergic to any medicine  Keep a list of the medicines, vitamins, and herbs you take  Include the amounts, and when and why you take them  Bring the list or the pill bottles to follow-up visits  Carry your medicine list with you in case of an emergency  What you can do to manage CKD: Management may include making some lifestyle changes  Tell your healthcare provider if you have any concerns about being able to make the changes  He or she can help you find solutions, including working with specialists  Ask for help creating a plan to break large goals into smaller steps  Your plan may include any of the following:  · Manage other health conditions  Your healthcare provider will work with you to make a care plan that meets your needs  You will be checked regularly for heart disease or other conditions that can make CKD worse, such as diabetes  Your blood pressure will be closely monitored  You will also get a target blood pressure and help making a plan to reach your target  This may include taking your blood pressure at home  · Maintain a healthy weight  Extra weight can strain your kidneys  Ask what a healthy weight is for you  Your provider can help you create a weight loss plan if you are overweight  · Create an exercise plan  Regular exercise can help you manage CKD, high blood pressure, and diabetes  Exercise also helps control weight  Your provider can help you create exercise goals and a plan to reach those goals  For example, your goal may be to exercise for 30 minutes in a day  Your plan can include breaking exercise into 10 minute sessions, 3 times during the day  · Create a healthy eating plan  Your provider may tell you to eat food low in sodium (salt), potassium, phosphorus, or protein  A dietitian can help you plan meals if needed  Ask how much liquid to drink each day and which liquids are best for you  · Limit alcohol as directed  Alcohol can cause fluid retention and can affect your kidneys  Ask how much alcohol is safe for you  A drink of alcohol is 12 ounces of beer, 5 ounces of wine, or 1½ ounces of liquor  · Do not smoke  Nicotine and other chemicals in cigarettes and cigars can cause kidney damage  Ask your provider for information if you currently smoke and need help to quit   E-cigarettes or smokeless tobacco still contain nicotine  Talk to your provider before you use these products  · Ask about over-the-counter medicines  Medicines such as NSAIDs and laxatives may harm your kidneys  Some cough and cold medicines can raise your blood pressure  Always ask if a medicine is safe before you take it  · Ask about vaccines you may need  Infections such as pneumonia, influenza, and hepatitis can be more harmful or more likely to occur in a person who has CKD  Vaccines lower your risk for infection  Follow up with your doctor as directed: You will need to return for tests to monitor your kidney and nerve function, and your parathyroid hormone level  Your medicines may be changed, based on certain test results  You may also be referred to a nephrologist (kidney specialist)  Write down your questions so you remember to ask them during your visits  © Copyright [address] Information is for End User's use only and may not be sold, redistributed or otherwise used for commercial purposes  All illustrations and images included in CareNotes® are the copyrighted property of A D A M , Inc  or [facility] Teja Tellez   The above information is an  only  It is not intended as medical advice for individual conditions or treatments  Talk to your doctor, nurse or pharmacist before following any medical regimen to see if it is safe and effective for you

## 2021-05-13 NOTE — PROGRESS NOTES
Cardiology Outpatient Progress Note - Tatum Thompson 78 y o  female MRN: [id number]    @ Encounter: [id number]      Patient Active Problem List    Diagnosis Date Noted    Acute otitis media 03/05/2021     Priority: Low    Hypertensive urgency 03/02/2021     Priority: Low    Hypoalbuminemia 02/25/2021     Priority: Low    Hyperphosphatemia 02/22/2021     Priority: Low    Pneumonia 02/21/2021     Priority: Low    Anemia due to stage 3 chronic kidney disease ([facility] ) 02/25/2020     Priority: Low    Elevated LFTs 02/19/2020     Priority: Low    History of anemia due to chronic kidney disease 05/31/2019     Priority: Low    Vitamin D deficiency 05/31/2019     Priority: Low    Proteinuria 05/31/2019     Priority: Low    Coronary artery disease involving native coronary artery of native heart with angina pectoris ([facility] ) 01/04/2019     Priority: Low    Anemia 12/30/2018     Priority: Low    Acute on chronic combined systolic and diastolic congestive heart failure ([facility] ) 12/29/2018     Priority: Low    Type 2 diabetes mellitus with kidney complication, with long-term current use of insulin ([facility] ) 12/29/2018     Priority: Low    Hypothyroidism 12/29/2018     Priority: Low    Hypertension 12/29/2018     Priority: Low    Hyperlipidemia 12/29/2018     Priority: Low    Acute renal failure superimposed on stage 4 chronic kidney disease ([facility] ) 12/29/2018     Priority: Low     Assessment:  # Chronic HFpEF, Stage C  Diuretic: torsemide 20 mg BID  Weight: 166 lbs on 4/13, today:   NT pro BNP 3/1/21: 9024  4/30/19: 702    Studies- personally reviewed by me  Echo 2/22/21  LVEF: 60%, moderate LVH, grade 2 DD  RV: normal  Other: moderate free flowing pericardial effusion     Echo 12/30/18:  EF: 50%, grade 2 DD, PASP 35 mmHg    # pericardial effusion- see echo report above     # CAD - LAD stents  [facility] 1/4/19: LUDY x 2 to LAD after NSTEMI (trop 0 2) and abnormal stress test with ischemia in anterior wall on 12/31/18  80% RCA- plan staged, but has not needed intervention  Med Rx: Imdur 60 mg daily, asa, atorvastatin 40 mg, coreg 12 5 mg BID  Angina: none    # dyslipidemia- atorvastatin 40 mg   3/2/21: LDL 48, HDL 76     # HTN- norvasc 10 mg QHS, coreg 12 5 mg BID, hydralazine 100 mg TID, Imdur 60 mg daily     # palpitations  Holter 5/17/19: 53-85, avg 69 bpm  8 PVCs      # anemia of CKD, HgB 8 1 on 3/15/21  IV iron  # CKD4, Cr 3 18 on 3/15/21  # DM2, HgA1C 3/2/21: 11 7     Today's Plan:  Continue torsemide 20 mg BID for volume for HFpEF  Continue  Asa, imdur, atorvastatin, coreg  for CAD  Lipids at goal     HPI:     77 yo with chronic diastolic CHF, HTN, CAD s/p LUDY x 2 LAD, residual disease in RCA planned for stage PCI, CKD, anemia presents post hospitalization for NSTEMI and acute diastolic CHF  She was having OROSCO for last 3-4 weeks  Eats a lot of salty foods  Had PND, edema for about a month prior  She was hypertensive to 220 mmHg on admit  Held off on RCA stenting last time  Gave lasix 80 mg IV in office - weight next day down a couple of pounts to 171    Interim:  Was admitted in Feb with PNA and HF exacerbation, had KELLY likely due to contrast, was discharged off diuretics and then admitted early March with acute HFpEF, NT proBNP 9024  She received IV diuresis    She states her insurance called her and wondered why she is on so many medications    Past Medical History:   Diagnosis Date    Anemia     [facility]     Chronic kidney disease     Coronary artery disease     Diabetes mellitus [facility]     Hypertension     Hypothyroidism        Review of Systems    Allergies   Allergen Reactions    Iv Contrast [Iodinated Diagnostic Agents] Itching     Caused KELLY    Nifedipine Rash           Current Outpatient Medications:     amLODIPine (NORVASC) 10 mg tablet, Take 1 tablet (10 mg total) by mouth daily at bedtime, Disp: 30 tablet, Rfl: 5    atorvastatin (LIPITOR) 40 mg tablet, Take 1 tablet (40 mg total) by mouth daily, Disp: 30 tablet, Rfl: 5    calcium acetate (PHOSLO) capsule, Take 1 capsule (667 mg total) by mouth 2 (two) times a day with meals, Disp: 60 capsule, Rfl: 0    carvedilol (COREG) 12 5 mg tablet, Take 1 tablet (12 5 mg total) by mouth 2 (two) times a day with meals, Disp: 180 tablet, Rfl: 3    hydrALAZINE (APRESOLINE) 100 MG tablet, Take 1 tablet (100 mg total) by mouth 3 (three) times a day, Disp: 90 tablet, Rfl: 3    insulin aspart protamine-insulin aspart (NovoLOG 70/30) 100 units/mL injection, Inject 10 Units under the skin 2 (two) times a day before meals, Disp: , Rfl:     isosorbide mononitrate (IMDUR) 60 mg 24 hr tablet, Take 1 tablet (60 mg total) by mouth daily, Disp: 30 tablet, Rfl: 5    levothyroxine 125 mcg tablet, Take 1 tablet (125 mcg total) by mouth daily, Disp: 30 tablet, Rfl: 0    RA Aspirin Adult Low Dose 81 MG chewable tablet, CHEW AND SWALLOW 1 TABLET (81 MG TOTAL) BY MOUTH ONCE DAILY, Disp: 30 tablet, Rfl: 5    torsemide (DEMADEX) 20 mg tablet, Take 1 tablet (20 mg total) by mouth 2 (two) times a day, Disp: 60 tablet, Rfl: 5    Social History     Socioeconomic History    Marital status: /Civil Union     Spouse name: Not on file    Number of children: 3    Years of education: Not on file    Highest education level: Not on file   Occupational History    Occupation: part time   Social Needs    Financial resource strain: Not on file    Food insecurity     Worry: Not on file     Inability: Not on file   PT [employer] needs     Medical: Not on file     Non-medical: Not on file   Tobacco Use    Smoking status: Former Smoker    Smokeless tobacco: Never Used   Substance and Sexual Activity    Alcohol use: Never     Frequency: Never    Drug use: No    Sexual activity: Yes   Lifestyle    Physical activity     Days per week: Not on file     Minutes per session: Not on file    Stress: Not on file   Relationships    Social connections     Talks on phone: Not on file     Gets together: Not on file     Attends Denominational service: Not on file     Active member of club or organization: Not on file     Attends meetings of clubs or organizations: Not on file     Relationship status: Not on file    Intimate partner violence     Fear of current or ex partner: Not on file     Emotionally abused: Not on file     Physically abused: Not on file     Forced sexual activity: Not on file   Other Topics Concern    Not on file   Social History Narrative    Always uses seatbelt    Caffeine use    Daily coffee consumption: 1 cp daily    Feels safe at home    Lives with spouse       Family History   Problem Relation Age of Onset    Diabetes Mother     Heart attack Father         MI    Hypertension Brother        Physical Exam:    Vitals: There were no vitals taken for this visit  , There is no height or weight on file to calculate BMI ,   Wt Readings from Last 3 Encounters:   05/03/21 75 3 kg (166 lb)   04/13/21 74 6 kg (164 lb 6 4 oz)   03/16/21 75 3 kg (165 lb 14 4 oz)         Physical Exam    Labs & Results:    Lab Results   Component Value Date    SODIUM 138 03/15/2021    K 4 6 03/15/2021     03/15/2021    CO2 28 03/15/2021    BUN 65 (H) 03/15/2021    CREATININE 3 18 (H) 03/15/2021    GLUC 129 03/10/2021    CALCIUM 9 6 03/15/2021     Lab Results   Component Value Date    WBC 5 50 03/15/2021    HGB 8 1 (L) 03/15/2021    HCT 26 8 (L) 03/15/2021    MCV 94 03/15/2021     (L) 03/15/2021     No results found for: BNP   Lab Results   Component Value Date    CHOLESTEROL 147 03/02/2021    CHOLESTEROL 159 12/29/2018     Lab Results   Component Value Date    HDL 76 03/02/2021    HDL 65 (H) 12/29/2018    HDL 48 05/30/2015     Lab Results   Component Value Date    TRIG 114 03/02/2021    TRIG 109 12/29/2018    TRIG 146 05/30/2015     Lab Results   Component Value Date    [place] 71 03/02/2021    [place] 94 12/29/2018         EKG personally reviewed by Ophelia Tidwell DO       Counseling / Coordination of Care  Time spent today 25 minutes  Greater than 50% of total time was spent with the patient and / or family counseling and / or coordination of care  We discussed diagnoses, most recent studies, tests and any changes in treatment plan  Thank you for the opportunity to participate in the care of this patient      [address] [facility] PULMONARY HYPERTENSION  MEDICAL DIRECTOR OF [place]

## 2021-05-14 ENCOUNTER — OFFICE VISIT (OUTPATIENT)
Dept: CARDIOLOGY CLINIC | Facility: CLINIC | Age: 66
End: 2021-05-14
Payer: COMMERCIAL

## 2021-05-14 VITALS
HEIGHT: 65 IN | OXYGEN SATURATION: 99 % | HEART RATE: 58 BPM | DIASTOLIC BLOOD PRESSURE: 64 MMHG | WEIGHT: 166.9 LBS | BODY MASS INDEX: 27.81 KG/M2 | SYSTOLIC BLOOD PRESSURE: 126 MMHG

## 2021-05-14 DIAGNOSIS — I16.0 HYPERTENSIVE URGENCY: ICD-10-CM

## 2021-05-14 DIAGNOSIS — N17.9 ACUTE RENAL FAILURE SUPERIMPOSED ON STAGE 4 CHRONIC KIDNEY DISEASE, UNSPECIFIED ACUTE RENAL FAILURE TYPE (HCC): ICD-10-CM

## 2021-05-14 DIAGNOSIS — I50.32 CHRONIC DIASTOLIC CHF (CONGESTIVE HEART FAILURE), NYHA CLASS 2 (HCC): ICD-10-CM

## 2021-05-14 DIAGNOSIS — N18.4 ACUTE RENAL FAILURE SUPERIMPOSED ON STAGE 4 CHRONIC KIDNEY DISEASE, UNSPECIFIED ACUTE RENAL FAILURE TYPE (HCC): ICD-10-CM

## 2021-05-14 DIAGNOSIS — E78.2 MIXED HYPERLIPIDEMIA: Primary | ICD-10-CM

## 2021-05-14 DIAGNOSIS — I25.119 CORONARY ARTERY DISEASE INVOLVING NATIVE CORONARY ARTERY OF NATIVE HEART WITH ANGINA PECTORIS (HCC): ICD-10-CM

## 2021-05-14 DIAGNOSIS — I10 HTN (HYPERTENSION), BENIGN: ICD-10-CM

## 2021-05-14 DIAGNOSIS — I25.10 CORONARY ARTERY DISEASE INVOLVING NATIVE CORONARY ARTERY OF NATIVE HEART WITHOUT ANGINA PECTORIS: ICD-10-CM

## 2021-05-14 PROCEDURE — 99214 OFFICE O/P EST MOD 30 MIN: CPT | Performed by: INTERNAL MEDICINE

## 2021-05-14 RX ORDER — ATORVASTATIN CALCIUM 40 MG/1
40 TABLET, FILM COATED ORAL DAILY
Qty: 90 TABLET | Refills: 3 | Status: SHIPPED | OUTPATIENT
Start: 2021-05-14 | End: 2021-10-13

## 2021-05-14 RX ORDER — AMLODIPINE BESYLATE 10 MG/1
10 TABLET ORAL
Qty: 90 TABLET | Refills: 3 | Status: ON HOLD | OUTPATIENT
Start: 2021-05-14 | End: 2021-10-31 | Stop reason: SDUPTHER

## 2021-05-14 RX ORDER — HYDRALAZINE HYDROCHLORIDE 100 MG/1
100 TABLET, FILM COATED ORAL 3 TIMES DAILY
Qty: 90 TABLET | Refills: 3 | Status: SHIPPED | OUTPATIENT
Start: 2021-05-14 | End: 2021-11-07

## 2021-05-14 RX ORDER — CARVEDILOL 12.5 MG/1
12.5 TABLET ORAL 2 TIMES DAILY WITH MEALS
Qty: 180 TABLET | Refills: 3 | Status: SHIPPED | OUTPATIENT
Start: 2021-05-14 | End: 2021-10-20 | Stop reason: HOSPADM

## 2021-05-14 RX ORDER — TORSEMIDE 20 MG/1
20 TABLET ORAL 2 TIMES DAILY
Qty: 180 TABLET | Refills: 3 | Status: SHIPPED | OUTPATIENT
Start: 2021-05-14 | End: 2021-10-20 | Stop reason: HOSPADM

## 2021-05-14 RX ORDER — ISOSORBIDE MONONITRATE 60 MG/1
60 TABLET, EXTENDED RELEASE ORAL DAILY
Qty: 90 TABLET | Refills: 3 | Status: SHIPPED | OUTPATIENT
Start: 2021-05-14 | End: 2021-10-20 | Stop reason: HOSPADM

## 2021-07-02 ENCOUNTER — APPOINTMENT (EMERGENCY)
Dept: RADIOLOGY | Facility: HOSPITAL | Age: 66
DRG: 194 | End: 2021-07-02
Payer: COMMERCIAL

## 2021-07-02 ENCOUNTER — HOSPITAL ENCOUNTER (INPATIENT)
Facility: HOSPITAL | Age: 66
LOS: 3 days | Discharge: HOME | DRG: 194 | End: 2021-07-05
Attending: EMERGENCY MEDICINE | Admitting: FAMILY MEDICINE
Payer: COMMERCIAL

## 2021-07-02 DIAGNOSIS — N18.4 STAGE 4 CHRONIC KIDNEY DISEASE (HCC): ICD-10-CM

## 2021-07-02 DIAGNOSIS — A41.9 SEPSIS (HCC): Primary | ICD-10-CM

## 2021-07-02 PROBLEM — I31.39 PERICARDIAL EFFUSION: Status: ACTIVE | Noted: 2021-07-02

## 2021-07-02 PROBLEM — I50.42 CHRONIC COMBINED SYSTOLIC AND DIASTOLIC CONGESTIVE HEART FAILURE (HCC): Status: ACTIVE | Noted: 2018-12-29

## 2021-07-02 PROBLEM — E87.1 HYPONATREMIA: Status: ACTIVE | Noted: 2021-07-02

## 2021-07-02 PROBLEM — J18.9 RLL PNEUMONIA: Status: ACTIVE | Noted: 2021-07-02

## 2021-07-02 PROBLEM — I31.3 PERICARDIAL EFFUSION: Status: ACTIVE | Noted: 2021-07-02

## 2021-07-02 PROBLEM — R65.10 SIRS (SYSTEMIC INFLAMMATORY RESPONSE SYNDROME) (HCC): Status: ACTIVE | Noted: 2021-07-02

## 2021-07-02 LAB
ABO GROUP BLD: NORMAL
ALBUMIN SERPL BCP-MCNC: 2.7 G/DL (ref 3.5–5)
ALP SERPL-CCNC: 683 U/L (ref 46–116)
ALT SERPL W P-5'-P-CCNC: 72 U/L (ref 12–78)
ANION GAP SERPL CALCULATED.3IONS-SCNC: 11 MMOL/L (ref 4–13)
APTT PPP: 31 SECONDS (ref 23–37)
AST SERPL W P-5'-P-CCNC: 68 U/L (ref 5–45)
ATRIAL RATE: 46 BPM
ATRIAL RATE: 70 BPM
BACTERIA UR QL AUTO: ABNORMAL /HPF
BASE EXCESS BLDA CALC-SCNC: -4 MMOL/L (ref -2–3)
BASOPHILS # BLD AUTO: 0.02 THOUSANDS/ÂΜL (ref 0–0.1)
BASOPHILS NFR BLD AUTO: 0 % (ref 0–1)
BILIRUB SERPL-MCNC: 0.45 MG/DL (ref 0.2–1)
BILIRUB UR QL STRIP: NEGATIVE
BLD GP AB SCN SERPL QL: POSITIVE
BLOOD GROUP ANTIBODIES SERPL: NORMAL
BUN SERPL-MCNC: 96 MG/DL (ref 5–25)
CALCIUM ALBUM COR SERPL-MCNC: 10.2 MG/DL (ref 8.3–10.1)
CALCIUM SERPL-MCNC: 9.2 MG/DL (ref 8.3–10.1)
CHLORIDE SERPL-SCNC: 101 MMOL/L (ref 100–108)
CLARITY UR: CLEAR
CO2 SERPL-SCNC: 19 MMOL/L (ref 21–32)
COLOR UR: YELLOW
CREAT SERPL-MCNC: 3.27 MG/DL (ref 0.6–1.3)
EOSINOPHIL # BLD AUTO: 0.02 THOUSAND/ÂΜL (ref 0–0.61)
EOSINOPHIL NFR BLD AUTO: 0 % (ref 0–6)
ERYTHROCYTE [DISTWIDTH] IN BLOOD BY AUTOMATED COUNT: 12.3 % (ref 11.6–15.1)
GFR SERPL CREATININE-BSD FRML MDRD: 14 ML/MIN/1.73SQ M
GLUCOSE SERPL-MCNC: 224 MG/DL (ref 65–140)
GLUCOSE SERPL-MCNC: 324 MG/DL (ref 65–140)
GLUCOSE SERPL-MCNC: 350 MG/DL (ref 65–140)
GLUCOSE SERPL-MCNC: 362 MG/DL (ref 65–140)
GLUCOSE UR STRIP-MCNC: ABNORMAL MG/DL
HCO3 BLDA-SCNC: 21.6 MMOL/L (ref 24–30)
HCT VFR BLD AUTO: 29.6 % (ref 34.8–46.1)
HCT VFR BLD CALC: 27 % (ref 34.8–46.1)
HGB BLD-MCNC: 9.5 G/DL (ref 11.5–15.4)
HGB BLDA-MCNC: 9.2 G/DL (ref 11.5–15.4)
HGB UR QL STRIP.AUTO: NEGATIVE
HYALINE CASTS #/AREA URNS LPF: ABNORMAL /LPF
IMM GRANULOCYTES # BLD AUTO: 0.05 THOUSAND/UL (ref 0–0.2)
IMM GRANULOCYTES NFR BLD AUTO: 1 % (ref 0–2)
INR PPP: 1.1 (ref 0.84–1.19)
KETONES UR STRIP-MCNC: NEGATIVE MG/DL
LACTATE SERPL-SCNC: 1.3 MMOL/L (ref 0.5–2)
LEUKOCYTE ESTERASE UR QL STRIP: NEGATIVE
LYMPHOCYTES # BLD AUTO: 0.68 THOUSANDS/ÂΜL (ref 0.6–4.47)
LYMPHOCYTES NFR BLD AUTO: 8 % (ref 14–44)
MAGNESIUM SERPL-MCNC: 2.3 MG/DL (ref 1.6–2.6)
MCH RBC QN AUTO: 27.9 PG (ref 26.8–34.3)
MCHC RBC AUTO-ENTMCNC: 32.1 G/DL (ref 31.4–37.4)
MCV RBC AUTO: 87 FL (ref 82–98)
MONOCYTES # BLD AUTO: 0.12 THOUSAND/ÂΜL (ref 0.17–1.22)
MONOCYTES NFR BLD AUTO: 2 % (ref 4–12)
NEUTROPHILS # BLD AUTO: 7.28 THOUSANDS/ÂΜL (ref 1.85–7.62)
NEUTS SEG NFR BLD AUTO: 89 % (ref 43–75)
NITRITE UR QL STRIP: NEGATIVE
NON-SQ EPI CELLS URNS QL MICRO: ABNORMAL /HPF
NRBC BLD AUTO-RTO: 0 /100 WBCS
NT-PROBNP SERPL-MCNC: 5780 PG/ML
P AXIS: 59 DEGREES
PCO2 BLD: 23 MMOL/L (ref 21–32)
PCO2 BLD: 38.7 MM HG (ref 42–50)
PH BLD: 7.36 [PH] (ref 7.3–7.4)
PH UR STRIP.AUTO: 5.5 [PH]
PLATELET # BLD AUTO: 158 THOUSANDS/UL (ref 149–390)
PLATELET # BLD AUTO: 161 THOUSANDS/UL (ref 149–390)
PMV BLD AUTO: 11 FL (ref 8.9–12.7)
PMV BLD AUTO: 11.2 FL (ref 8.9–12.7)
PO2 BLD: 36 MM HG (ref 35–45)
POTASSIUM BLD-SCNC: 5.2 MMOL/L (ref 3.5–5.3)
POTASSIUM SERPL-SCNC: 5 MMOL/L (ref 3.5–5.3)
PR INTERVAL: 184 MS
PROCALCITONIN SERPL-MCNC: 0.47 NG/ML
PROT SERPL-MCNC: 7.3 G/DL (ref 6.4–8.2)
PROT UR STRIP-MCNC: ABNORMAL MG/DL
PROTHROMBIN TIME: 14.2 SECONDS (ref 11.6–14.5)
QRS AXIS: -13 DEGREES
QRS AXIS: -22 DEGREES
QRSD INTERVAL: 88 MS
QRSD INTERVAL: 90 MS
QT INTERVAL: 476 MS
QT INTERVAL: 564 MS
QTC INTERVAL: 467 MS
QTC INTERVAL: 493 MS
RBC # BLD AUTO: 3.4 MILLION/UL (ref 3.81–5.12)
RBC #/AREA URNS AUTO: ABNORMAL /HPF
RH BLD: NEGATIVE
SAO2 % BLD FROM PO2: 67 % (ref 60–85)
SARS-COV-2 RNA RESP QL NAA+PROBE: NEGATIVE
SODIUM BLD-SCNC: 135 MMOL/L (ref 136–145)
SODIUM SERPL-SCNC: 131 MMOL/L (ref 136–145)
SP GR UR STRIP.AUTO: 1.01 (ref 1–1.03)
SPECIMEN EXPIRATION DATE: NORMAL
SPECIMEN SOURCE: ABNORMAL
T WAVE AXIS: 45 DEGREES
T WAVE AXIS: 46 DEGREES
TROPONIN I SERPL-MCNC: <0.02 NG/ML
TSH SERPL DL<=0.05 MIU/L-ACNC: 0.55 UIU/ML (ref 0.36–3.74)
UROBILINOGEN UR QL STRIP.AUTO: 0.2 E.U./DL
VENTRICULAR RATE: 46 BPM
VENTRICULAR RATE: 58 BPM
WBC # BLD AUTO: 8.17 THOUSAND/UL (ref 4.31–10.16)
WBC #/AREA URNS AUTO: ABNORMAL /HPF

## 2021-07-02 PROCEDURE — 86870 RBC ANTIBODY IDENTIFICATION: CPT | Performed by: EMERGENCY MEDICINE

## 2021-07-02 PROCEDURE — 83880 ASSAY OF NATRIURETIC PEPTIDE: CPT | Performed by: EMERGENCY MEDICINE

## 2021-07-02 PROCEDURE — 85049 AUTOMATED PLATELET COUNT: CPT | Performed by: FAMILY MEDICINE

## 2021-07-02 PROCEDURE — 83605 ASSAY OF LACTIC ACID: CPT | Performed by: EMERGENCY MEDICINE

## 2021-07-02 PROCEDURE — 93321 DOPPLER ECHO F-UP/LMTD STD: CPT | Performed by: EMERGENCY MEDICINE

## 2021-07-02 PROCEDURE — 86901 BLOOD TYPING SEROLOGIC RH(D): CPT | Performed by: EMERGENCY MEDICINE

## 2021-07-02 PROCEDURE — U0005 INFEC AGEN DETEC AMPLI PROBE: HCPCS | Performed by: FAMILY MEDICINE

## 2021-07-02 PROCEDURE — 87040 BLOOD CULTURE FOR BACTERIA: CPT | Performed by: FAMILY MEDICINE

## 2021-07-02 PROCEDURE — 93308 TTE F-UP OR LMTD: CPT | Performed by: EMERGENCY MEDICINE

## 2021-07-02 PROCEDURE — 94760 N-INVAS EAR/PLS OXIMETRY 1: CPT

## 2021-07-02 PROCEDURE — 84484 ASSAY OF TROPONIN QUANT: CPT | Performed by: EMERGENCY MEDICINE

## 2021-07-02 PROCEDURE — 80053 COMPREHEN METABOLIC PANEL: CPT | Performed by: EMERGENCY MEDICINE

## 2021-07-02 PROCEDURE — 93010 ELECTROCARDIOGRAM REPORT: CPT | Performed by: INTERNAL MEDICINE

## 2021-07-02 PROCEDURE — 85610 PROTHROMBIN TIME: CPT | Performed by: EMERGENCY MEDICINE

## 2021-07-02 PROCEDURE — 81001 URINALYSIS AUTO W/SCOPE: CPT | Performed by: FAMILY MEDICINE

## 2021-07-02 PROCEDURE — 83735 ASSAY OF MAGNESIUM: CPT | Performed by: EMERGENCY MEDICINE

## 2021-07-02 PROCEDURE — 74176 CT ABD & PELVIS W/O CONTRAST: CPT

## 2021-07-02 PROCEDURE — 82947 ASSAY GLUCOSE BLOOD QUANT: CPT

## 2021-07-02 PROCEDURE — 99291 CRITICAL CARE FIRST HOUR: CPT | Performed by: EMERGENCY MEDICINE

## 2021-07-02 PROCEDURE — U0003 INFECTIOUS AGENT DETECTION BY NUCLEIC ACID (DNA OR RNA); SEVERE ACUTE RESPIRATORY SYNDROME CORONAVIRUS 2 (SARS-COV-2) (CORONAVIRUS DISEASE [COVID-19]), AMPLIFIED PROBE TECHNIQUE, MAKING USE OF HIGH THROUGHPUT TECHNOLOGIES AS DESCRIBED BY CMS-2020-01-R: HCPCS | Performed by: FAMILY MEDICINE

## 2021-07-02 PROCEDURE — 86900 BLOOD TYPING SEROLOGIC ABO: CPT | Performed by: EMERGENCY MEDICINE

## 2021-07-02 PROCEDURE — 84295 ASSAY OF SERUM SODIUM: CPT

## 2021-07-02 PROCEDURE — 87449 NOS EACH ORGANISM AG IA: CPT | Performed by: FAMILY MEDICINE

## 2021-07-02 PROCEDURE — 87081 CULTURE SCREEN ONLY: CPT | Performed by: FAMILY MEDICINE

## 2021-07-02 PROCEDURE — 96365 THER/PROPH/DIAG IV INF INIT: CPT

## 2021-07-02 PROCEDURE — 84132 ASSAY OF SERUM POTASSIUM: CPT

## 2021-07-02 PROCEDURE — 85025 COMPLETE CBC W/AUTO DIFF WBC: CPT | Performed by: EMERGENCY MEDICINE

## 2021-07-02 PROCEDURE — 36415 COLL VENOUS BLD VENIPUNCTURE: CPT | Performed by: EMERGENCY MEDICINE

## 2021-07-02 PROCEDURE — 82803 BLOOD GASES ANY COMBINATION: CPT

## 2021-07-02 PROCEDURE — 99285 EMERGENCY DEPT VISIT HI MDM: CPT

## 2021-07-02 PROCEDURE — 82948 REAGENT STRIP/BLOOD GLUCOSE: CPT

## 2021-07-02 PROCEDURE — 85730 THROMBOPLASTIN TIME PARTIAL: CPT | Performed by: EMERGENCY MEDICINE

## 2021-07-02 PROCEDURE — 86850 RBC ANTIBODY SCREEN: CPT | Performed by: EMERGENCY MEDICINE

## 2021-07-02 PROCEDURE — 85014 HEMATOCRIT: CPT

## 2021-07-02 PROCEDURE — 93005 ELECTROCARDIOGRAM TRACING: CPT

## 2021-07-02 PROCEDURE — 84443 ASSAY THYROID STIM HORMONE: CPT | Performed by: EMERGENCY MEDICINE

## 2021-07-02 PROCEDURE — 71250 CT THORAX DX C-: CPT

## 2021-07-02 PROCEDURE — 99223 1ST HOSP IP/OBS HIGH 75: CPT | Performed by: FAMILY MEDICINE

## 2021-07-02 PROCEDURE — 96375 TX/PRO/DX INJ NEW DRUG ADDON: CPT

## 2021-07-02 PROCEDURE — 84145 PROCALCITONIN (PCT): CPT | Performed by: EMERGENCY MEDICINE

## 2021-07-02 RX ORDER — ATORVASTATIN CALCIUM 40 MG/1
40 TABLET, FILM COATED ORAL
Status: DISCONTINUED | OUTPATIENT
Start: 2021-07-03 | End: 2021-07-05 | Stop reason: HOSPADM

## 2021-07-02 RX ORDER — ACETAMINOPHEN 325 MG/1
650 TABLET ORAL EVERY 6 HOURS PRN
Status: DISCONTINUED | OUTPATIENT
Start: 2021-07-02 | End: 2021-07-05 | Stop reason: HOSPADM

## 2021-07-02 RX ORDER — AMLODIPINE BESYLATE 10 MG/1
10 TABLET ORAL
Status: DISCONTINUED | OUTPATIENT
Start: 2021-07-02 | End: 2021-07-05 | Stop reason: HOSPADM

## 2021-07-02 RX ORDER — MAGNESIUM HYDROXIDE/ALUMINUM HYDROXICE/SIMETHICONE 120; 1200; 1200 MG/30ML; MG/30ML; MG/30ML
30 SUSPENSION ORAL EVERY 6 HOURS PRN
Status: DISCONTINUED | OUTPATIENT
Start: 2021-07-02 | End: 2021-07-05 | Stop reason: HOSPADM

## 2021-07-02 RX ORDER — INSULIN GLARGINE 100 [IU]/ML
15 INJECTION, SOLUTION SUBCUTANEOUS
Status: DISCONTINUED | OUTPATIENT
Start: 2021-07-02 | End: 2021-07-03

## 2021-07-02 RX ORDER — SODIUM CHLORIDE, SODIUM GLUCONATE, SODIUM ACETATE, POTASSIUM CHLORIDE, MAGNESIUM CHLORIDE, SODIUM PHOSPHATE, DIBASIC, AND POTASSIUM PHOSPHATE .53; .5; .37; .037; .03; .012; .00082 G/100ML; G/100ML; G/100ML; G/100ML; G/100ML; G/100ML; G/100ML
500 INJECTION, SOLUTION INTRAVENOUS ONCE
Status: COMPLETED | OUTPATIENT
Start: 2021-07-02 | End: 2021-07-02

## 2021-07-02 RX ORDER — TORSEMIDE 20 MG/1
20 TABLET ORAL 2 TIMES DAILY
Status: DISCONTINUED | OUTPATIENT
Start: 2021-07-02 | End: 2021-07-05 | Stop reason: HOSPADM

## 2021-07-02 RX ORDER — CALCIUM ACETATE 667 MG/1
667 CAPSULE ORAL 2 TIMES DAILY WITH MEALS
Status: DISCONTINUED | OUTPATIENT
Start: 2021-07-03 | End: 2021-07-05 | Stop reason: HOSPADM

## 2021-07-02 RX ORDER — CARVEDILOL 12.5 MG/1
12.5 TABLET ORAL 2 TIMES DAILY WITH MEALS
Status: DISCONTINUED | OUTPATIENT
Start: 2021-07-03 | End: 2021-07-05 | Stop reason: HOSPADM

## 2021-07-02 RX ORDER — LEVOTHYROXINE SODIUM 0.12 MG/1
125 TABLET ORAL
Status: DISCONTINUED | OUTPATIENT
Start: 2021-07-03 | End: 2021-07-05 | Stop reason: HOSPADM

## 2021-07-02 RX ORDER — ISOSORBIDE MONONITRATE 60 MG/1
60 TABLET, EXTENDED RELEASE ORAL DAILY
Status: DISCONTINUED | OUTPATIENT
Start: 2021-07-03 | End: 2021-07-05 | Stop reason: HOSPADM

## 2021-07-02 RX ORDER — HYDRALAZINE HYDROCHLORIDE 50 MG/1
100 TABLET, FILM COATED ORAL 3 TIMES DAILY
Status: DISCONTINUED | OUTPATIENT
Start: 2021-07-02 | End: 2021-07-05 | Stop reason: HOSPADM

## 2021-07-02 RX ORDER — HEPARIN SODIUM 5000 [USP'U]/ML
5000 INJECTION, SOLUTION INTRAVENOUS; SUBCUTANEOUS EVERY 8 HOURS SCHEDULED
Status: DISCONTINUED | OUTPATIENT
Start: 2021-07-02 | End: 2021-07-05 | Stop reason: HOSPADM

## 2021-07-02 RX ORDER — ONDANSETRON 2 MG/ML
4 INJECTION INTRAMUSCULAR; INTRAVENOUS EVERY 6 HOURS PRN
Status: DISCONTINUED | OUTPATIENT
Start: 2021-07-02 | End: 2021-07-05 | Stop reason: HOSPADM

## 2021-07-02 RX ADMIN — AMLODIPINE BESYLATE 10 MG: 10 TABLET ORAL at 23:09

## 2021-07-02 RX ADMIN — HYDRALAZINE HYDROCHLORIDE 100 MG: 50 TABLET, FILM COATED ORAL at 23:08

## 2021-07-02 RX ADMIN — INSULIN LISPRO 10 UNITS: 100 INJECTION, SOLUTION INTRAVENOUS; SUBCUTANEOUS at 18:43

## 2021-07-02 RX ADMIN — CEFEPIME HYDROCHLORIDE 2000 MG: 2 INJECTION, POWDER, FOR SOLUTION INTRAVENOUS at 23:03

## 2021-07-02 RX ADMIN — INSULIN GLARGINE 15 UNITS: 100 INJECTION, SOLUTION SUBCUTANEOUS at 23:08

## 2021-07-02 RX ADMIN — TORSEMIDE 20 MG: 20 TABLET ORAL at 23:09

## 2021-07-02 RX ADMIN — SODIUM CHLORIDE, SODIUM GLUCONATE, SODIUM ACETATE, POTASSIUM CHLORIDE, MAGNESIUM CHLORIDE, SODIUM PHOSPHATE, DIBASIC, AND POTASSIUM PHOSPHATE 500 ML: .53; .5; .37; .037; .03; .012; .00082 INJECTION, SOLUTION INTRAVENOUS at 16:28

## 2021-07-02 RX ADMIN — CEFTRIAXONE SODIUM 1000 MG: 10 INJECTION, POWDER, FOR SOLUTION INTRAVENOUS at 14:03

## 2021-07-02 NOTE — H&P
[address] [facility]  H&P- [facility] 1955, 78 y o  female MRN: [id number]  Unit/Bed#: ED 13 Encounter: [id number]  Primary Care Provider: Vadim Mulligan MD   Date and time admitted to hospital: 7/2/2021 10:19 AM    * SIRS (systemic inflammatory response syndrome) [facility])  Assessment & Plan  Patient presented with hypothermia, bradycardia, chest pain, found to have elevated procalcitonin  Patient was treated with [facility] with improvement of the temperature  Patient was also given 500 cc of bolus fluid, bradycardia improved  She is started on Rocephin in ER  CT scan chest abdomen pelvis shows right lower lobe pneumonia and thickening of the bladder which may indicate UTI   will switch antibiotic to cefepime  Pending urine culture, blood culture, COVID-19    RLL pneumonia  Assessment & Plan  Patient denies any dyspnea or cough  However she presented with chest pain and hypothermia  CT chest reveals a right lower lobe pneumonia  Lactic acid normal, elevated procalcitonin noted  Obtain COVID-19, MRSA, urinary Legionella and strep  Started on broad-spectrum antibiotics cefepime    Hyponatremia  Assessment & Plan  Pseudohyponatremia noted secondary to hyperglycemia  Corrected sodium is 135-137  Stage 4 chronic kidney disease [facility])  Assessment & Plan  Lab Results   Component Value Date    EGFR 14 07/02/2021    EGFR 15 03/15/2021    EGFR 13 03/10/2021    CREATININE 3 27 (H) 07/02/2021    CREATININE 3 18 (H) 03/15/2021    CREATININE 3 44 (H) 03/10/2021   Baseline seems to be 3 1-3 4  Currently at baseline    Pericardial effusion  Assessment & Plan  Noted on CT chest   When compared to previous CT chest, patient has moderate pericardial effusion since February    Will obtain repeat echocardiogram  Bedside ultrasound in the ER did not reveal tamponade    Coronary artery disease involving native coronary artery of native heart with angina pectoris [facility])  Assessment & Plan  Patient presented with chest pain that has resolved since  EKG no acute changes  Troponin negative  Placed on telemetry monitor  Trend troponin    Type 2 diabetes mellitus with kidney complication, with long-term current use of insulin ([facility])  Assessment & Plan  Lab Results   Component Value Date    HGBA1C 11 7 (H) 03/02/2021       No results for input(s): POCGLU in the last 72 hours  Blood Sugar Average: Last 72 hrs:   uncontrolled  Patient takes NovoLog 70/30 10 units b i d  However she missed her dose this morning therefore presented with blood sugar of 350  Order Lantus 15 units at bedtime and Humalog 10 units t i d  with meals with sliding scale  Chronic combined systolic and diastolic congestive heart failure [facility])  Assessment & Plan  Wt Readings from Last 3 Encounters:   07/02/21 75 3 kg (166 lb)   05/14/21 75 7 kg (166 lb 14 4 oz)   05/03/21 75 3 kg (166 lb)     Patient has chronically elevated proBNP  No acute exacerbation noted clinically  Continue home medication carvedilol and Imdur  Continue torsemide 20 mg b i d       VTE Prophylaxis: Heparin  / sequential compression device   Code Status:  Full code    Discussion with family:  Daughter at bedside    Anticipated Length of Stay:  Patient will be admitted on an Inpatient basis with an anticipated length of stay of  > 2 midnights  Justification for Hospital Stay: SIRS, pneumonia    Total Time for Visit, including Counseling / Coordination of Care: 60 minutes  Greater than 50% of this total time spent on direct patient counseling and coordination of care  Chief Complaint:   Chest pain    History of Present Illness:    Jose Rivero is a 78 y o  female with significant PMH of CHF, CAD, IDDM, CKD stage 4, presented to ER with complaint of left-sided chest pain that was radiating toward the left lower abdomen  Patient states 8 characterized as burning sensation that was radiating  Patient called EMS    On arrival to ER, patient was found to be hypothermic and bradycardic  Patient was given fluid and air hunger with improvement of temperature and heart rate  EKG reveals sinus bradycardia  Troponin negative  CT chest abdomen pelvis revealed right lower lobe pneumonia and thickening of the bladder  Patient was given Rocephin in ER and admitted for sepsis secondary to pneumonia  On my exam, patient is lying comfortably  States the chest pain is completely resolved now  No nausea or vomiting  Patient denies any diaphoresis or short of breath  No swelling of the extremities  Review of Systems:    Review of Systems   Constitutional: Negative for activity change, appetite change and fever  HENT: Negative for congestion and sore throat  Eyes: Negative for pain and visual disturbance  Respiratory: Negative for cough, shortness of breath and wheezing  Cardiovascular: Positive for chest pain  Negative for palpitations and leg swelling  Gastrointestinal: Positive for abdominal distention  Negative for abdominal pain  Endocrine: Negative for polyuria  Genitourinary: Negative for difficulty urinating and dysuria  Musculoskeletal: Negative for arthralgias and back pain  Skin: Negative for rash and wound  Allergic/Immunologic: Negative for immunocompromised state  Neurological: Negative for dizziness, speech difficulty and headaches  Hematological: Negative for adenopathy  Psychiatric/Behavioral: Negative for sleep disturbance  The patient is not hyperactive          Past Medical and Surgical History:     Past Medical History:   Diagnosis Date    Anemia     [facility]F [facility]congestive [facility] failure) ([facility] [address] [facility]     Chronic kidney disease     Coronary artery disease     [facility]     Hypertension     Hypothyroidism        Past Surgical History:   Procedure Laterality Date    CORONARY ANGIOPLASTY WITH STENT PLACEMENT  2019       Meds/Allergies:    Prior to Admission medications    Medication Sig Start Date End Date Taking? Authorizing Provider   amLODIPine (NORVASC) 10 mg tablet Take 1 tablet (10 mg total) by mouth daily at bedtime 5/14/21  Yes Lucian Singh DO   atorvastatin (LIPITOR) 40 mg tablet Take 1 tablet (40 mg total) by mouth daily 5/14/21 5/14/22 Yes Lucian Singh DO   calcium acetate (PHOSLO) capsule Take 1 capsule (667 mg total) by mouth 2 (two) times a day with meals 2/27/21  Yes CLARICE Olsen   carvedilol (COREG) 12 5 mg tablet Take 1 tablet (12 5 mg total) by mouth 2 (two) times a day with meals 5/14/21  Yes Lucian Singh DO   hydrALAZINE (APRESOLINE) 100 MG tablet Take 1 tablet (100 mg total) by mouth 3 (three) times a day 5/14/21  Yes Lucian Singh DO   insulin aspart protamine-insulin aspart (NovoLOG 70/30) 100 units/mL injection Inject 10 Units under the skin 2 (two) times a day before meals   Yes Historical Provider, MD   isosorbide mononitrate (IMDUR) 60 mg 24 hr tablet Take 1 tablet (60 mg total) by mouth daily 5/14/21  Yes Lucian Singh DO   levothyroxine 125 mcg tablet Take 1 tablet (125 mcg total) by mouth daily 2/27/21  Yes CLARICE Olsen   RA Aspirin Adult Low Dose 81 MG chewable tablet CHEW AND SWALLOW 1 TABLET (81 MG TOTAL) BY MOUTH ONCE DAILY 3/28/21  Yes Lucian Singh DO   torsemide (DEMADEX) 20 mg tablet Take 1 tablet (20 mg total) by mouth 2 (two) times a day 5/14/21  Yes Lucian Singh DO     I have reviewed home medications using allscripts  Allergies:    Allergies   Allergen Reactions    Iv Contrast [Iodinated Diagnostic Agents] Itching     Caused KELLY    Nifedipine Rash       Social History:     Marital Status: /Civil Union   Occupation:  None  Patient Pre-hospital Living Situation:  Lives with  at home  Patient Pre-hospital Level of Mobility:  Walks without any assistance  Patient Pre-hospital Diet Restrictions:  No restrictions  Substance Use History:   Social History     Substance and Sexual Activity   Alcohol Use Never     Social History     Tobacco Use   Smoking Status Former Smoker   Smokeless Tobacco Never Used     Social History     Substance and Sexual Activity   Drug Use No       Family History:    Family History   Problem Relation Age of Onset    Diabetes Mother     Heart attack Father         MI    Hypertension Brother        Physical Exam:     Vitals:   Blood Pressure: (!) 175/70 (07/02/21 1624)  Pulse: 65 (07/02/21 1624)  Temperature: 98 °F (36 7 °C) (07/02/21 1624)  Temp Source: Oral (07/02/21 1624)  Respirations: 18 (07/02/21 1624)  Weight - Scale: 75 3 kg (166 lb) (07/02/21 1021)  SpO2: 97 % (07/02/21 1624)    Physical Exam  Vitals and nursing note reviewed  Constitutional:       General: She is not in acute distress  Appearance: She is well-developed  HENT:      Head: Normocephalic and atraumatic  Eyes:      Conjunctiva/sclera: Conjunctivae normal    Cardiovascular:      Rate and Rhythm: Normal rate  Rhythm irregular  Heart sounds: No murmur heard  Pulmonary:      Effort: Pulmonary effort is normal  No respiratory distress  Breath sounds: Normal breath sounds  Abdominal:      Palpations: Abdomen is soft  Tenderness: There is no abdominal tenderness  Comments: Abdomen soft, nontender, nondistended   Musculoskeletal:      Cervical back: Neck supple  Right lower leg: No edema  Left lower leg: No edema  Skin:     General: Skin is warm and dry  Neurological:      Mental Status: She is alert  Additional Data:     Lab Results: I have personally reviewed pertinent reports        Results from last 7 days   Lab Units 07/02/21  1050 07/02/21  1035   WBC Thousand/uL  --  8 17   HEMOGLOBIN g/dL  --  9 5*   I STAT HEMOGLOBIN g/dl 9 2*  --    HEMATOCRIT %  --  29 6*   HEMATOCRIT, ISTAT % 27*  --    PLATELETS Thousands/uL  --  161   NEUTROS PCT %  --  89*   LYMPHS PCT %  --  8*   MONOS PCT %  --  2*   EOS PCT %  --  0     Results from last 7 days   Lab Units 07/02/21  1050 07/02/21  1035   SODIUM mmol/L  --  131* POTASSIUM mmol/L  --  5 0   CHLORIDE mmol/L  --  101   CO2 mmol/L  --  19*   CO2, I-STAT mmol/L 23  --    BUN mg/dL  --  96*   CREATININE mg/dL  --  3 27*   ANION GAP mmol/L  --  11   CALCIUM mg/dL  --  9 2   ALBUMIN g/dL  --  2 7*   TOTAL BILIRUBIN mg/dL  --  0 45   ALK PHOS U/L  --  683*   ALT U/L  --  72   AST U/L  --  68*   GLUCOSE RANDOM mg/dL  --  350*     Results from last 7 days   Lab Units 07/02/21  1035   INR  1 10             Results from last 7 days   Lab Units 07/02/21  1051   LACTIC ACID mmol/L 1 3   PROCALCITONIN ng/ml 0 47*       Imaging: I have personally reviewed pertinent reports  CT chest abdomen pelvis wo contrast   Final Result by Carolina Cage MD (07/02 1606)      There is patchy opacity in the right lower lobe of the lung, most pronounced within the superior segment of the right lower lobe of the lung  The appearance suggests pneumonia  Short-term follow-up after a course of treatment is recommended in order to    ensure complete radiographic resolution  There is a moderate-sized pericardial effusion, measuring up to 1 9 cm  Echocardiography is suggested for further evaluation  The liver is enlarged  The surface contour of the liver appears nodular  Clinical and laboratory correlation regarding the possibility of cirrhosis is suggested  The urinary bladder wall appears thickened, however, evaluation is limited by underdistention  Correlation with urinalysis and urine culture and sensitivity is recommended  Mild cardiomegaly  Coronary artery disease  Prior coronary artery stenting  Extensive atherosclerosis  Other nonemergent findings, as described above  Please see discussion  This examination demonstrates findings for which clinical and imaging follow-up is recommended and was logged as such in [address] [place] [address] Rd  The study was marked in [facility] for immediate notification              Workstation performed: [id number]             EKG, Pathology, and Other Studies Reviewed on Admission:   · EKG:  Reviewed    Allscripts / Epic Records Reviewed: Yes     ** Please Note: This note has been constructed using a voice recognition system   **

## 2021-07-02 NOTE — ASSESSMENT & PLAN NOTE
Lab Results   Component Value Date    EGFR 14 07/02/2021    EGFR 15 03/15/2021    EGFR 13 03/10/2021    CREATININE 3 27 (H) 07/02/2021    CREATININE 3 18 (H) 03/15/2021    CREATININE 3 44 (H) 03/10/2021   Baseline seems to be 3 1-3 4  Currently at baseline

## 2021-07-02 NOTE — ED PROVIDER NOTES
History  Chief Complaint   Patient presents with    Chest Pain     Chest pain that woke her up this morning  Mendel Fritter is an 78y o  year old female with PMHx significant for CHF, CAD, IDDM, CKD, who presents to the ED today with chest pain that woke her from sleep this morning  Also having LLQ abdominal pain  Unable to describe exacerbating or relieving factors  Chest pain feels like pressure, does not radiate  8/10 pain prior to nitro administration by EMS, then 5/10  Denies hx of similar chest pain  The patient denies fevers, chills, headaches, lightheadedness or syncope, nausea, vomiting, vision changes, hearing changes, shortness of breath, cough, changes in usual bowel movements including dark or bloody BM, changes with urination, back pain, numbness or tingling, pain anywhere else in body  ROS otherwise negative  History provided by:  Medical records and patient   used: No    Chest Pain  Associated symptoms: abdominal pain    Associated symptoms: no cough, no dizziness, no fever, no nausea, no shortness of breath and not vomiting        Prior to Admission Medications   Prescriptions Last Dose Informant Patient Reported? Taking?    RA Aspirin Adult Low Dose 81 MG chewable tablet  Self No Yes   Sig: CHEW AND SWALLOW 1 TABLET (81 MG TOTAL) BY MOUTH ONCE DAILY   amLODIPine (NORVASC) 10 mg tablet   No Yes   Sig: Take 1 tablet (10 mg total) by mouth daily at bedtime   atorvastatin (LIPITOR) 40 mg tablet   No Yes   Sig: Take 1 tablet (40 mg total) by mouth daily   calcium acetate (PHOSLO) capsule  Self No Yes   Sig: Take 1 capsule (667 mg total) by mouth 2 (two) times a day with meals   carvedilol (COREG) 12 5 mg tablet   No Yes   Sig: Take 1 tablet (12 5 mg total) by mouth 2 (two) times a day with meals   hydrALAZINE (APRESOLINE) 100 MG tablet   No Yes   Sig: Take 1 tablet (100 mg total) by mouth 3 (three) times a day   insulin aspart protamine-insulin aspart (NovoLOG 70/30) 100 units/mL injection  Self Yes Yes   Sig: Inject 10 Units under the skin 2 (two) times a day before meals   isosorbide mononitrate (IMDUR) 60 mg 24 hr tablet   No Yes   Sig: Take 1 tablet (60 mg total) by mouth daily   levothyroxine 125 mcg tablet  Self No Yes   Sig: Take 1 tablet (125 mcg total) by mouth daily   torsemide (DEMADEX) 20 mg tablet   No Yes   Sig: Take 1 tablet (20 mg total) by mouth 2 (two) times a day      Facility-Administered Medications: None       Past Medical History:   Diagnosis Date    Anemia     CHF (congestive heart failure) (HCC)     Chronic kidney disease     Coronary artery disease     Diabetes mellitus ([facility] [address] )     Hypertension     Hypothyroidism        Past Surgical History:   Procedure Laterality Date    CORONARY ANGIOPLASTY WITH STENT PLACEMENT  2019       Family History   Problem Relation Age of Onset    Diabetes Mother     Heart attack Father         MI    Hypertension Brother      I have reviewed and agree with the history as documented  E-Cigarette/Vaping     E-Cigarette/Vaping Substances     Social History     Tobacco Use    Smoking status: Former Smoker    Smokeless tobacco: Never Used   Substance Use Topics    Alcohol use: Never    Drug use: No        Review of Systems   Constitutional: Negative for chills and fever  Eyes: Negative for visual disturbance  Respiratory: Negative for cough and shortness of breath  Cardiovascular: Positive for chest pain  Gastrointestinal: Positive for abdominal pain  Negative for blood in stool, diarrhea, nausea and vomiting  Genitourinary: Negative for difficulty urinating and hematuria  Musculoskeletal: Negative for myalgias  Skin: Positive for pallor  Neurological: Negative for dizziness and syncope  Hematological: Does not bruise/bleed easily         Physical Exam  ED Triage Vitals   Temperature Pulse Respirations Blood Pressure SpO2   07/02/21 1119 07/02/21 1021 07/02/21 1021 07/02/21 1024 07/02/21 1021   (!) 92 3 °F (33 5 °C) (!) 46 20 110/54 98 %      Temp Source Heart Rate Source Patient Position - Orthostatic VS BP Location FiO2 (%)   07/02/21 1119 07/02/21 1021 07/02/21 1021 07/02/21 1021 --   Tympanic Monitor Lying Right arm       Pain Score       07/02/21 1021       5             Orthostatic Vital Signs  Vitals:    07/02/21 1300 07/02/21 1332 07/02/21 1452 07/02/21 1624   BP: 169/69 169/69 155/70 (!) 175/70   Pulse: 58 55 62 65   Patient Position - Orthostatic VS:  Lying Lying Lying       Physical Exam  Vitals and nursing note reviewed  HENT:      Head: Normocephalic and atraumatic  Eyes:      Pupils: Pupils are equal, round, and reactive to light  Comments: Pale conjunctivae   Cardiovascular:      Rate and Rhythm: Regular rhythm  Bradycardia present  Pulmonary:      Effort: Pulmonary effort is normal  No tachypnea or accessory muscle usage  Breath sounds: No decreased breath sounds, wheezing, rhonchi or rales  Abdominal:      Palpations: Abdomen is soft  Musculoskeletal:      Cervical back: Neck supple  Right lower leg: No tenderness  No edema  Left lower leg: No tenderness  No edema  Skin:     Coloration: Skin is pale  Neurological:      Mental Status: She is alert           ED Medications  Medications   multi-electrolyte (ISOLYTE-S PH 7 4) bolus 500 mL (500 mL Intravenous New Bag 7/2/21 1628)   ceftriaxone (ROCEPHIN) 1 g/50 mL in dextrose IVPB (1,000 mg Intravenous New Bag 7/2/21 1403)       Diagnostic Studies  Results Reviewed     Procedure Component Value Units Date/Time    UA w Reflex to Microscopic w Reflex to Culture [[id number]]     Lab Status: No result Specimen: Urine     Procalcitonin [[id number]]  (Abnormal) Collected: 07/02/21 1051    Lab Status: Final result Specimen: Blood from Arm, Left Updated: 07/02/21 1223     Procalcitonin 0 47 ng/ml     Procalcitonin Reflex [[id number]]     Lab Status: No result Specimen: Blood     Lactic acid, plasma [[id number]] (Normal) Collected: 07/02/21 1051    Lab Status: Final result Specimen: Blood from Arm, Left Updated: 07/02/21 1133     LACTIC ACID 1 3 mmol/L     Narrative:      Result may be elevated if tourniquet was used during collection  TSH [[id number]]  (Normal) Collected: 07/02/21 1035    Lab Status: Final result Specimen: Blood from Arm, Left Updated: 07/02/21 1117     TSH 3RD GENERATON 0 552 uIU/mL     Narrative:      Patients undergoing fluorescein dye angiography may retain small amounts of fluorescein in the body for 48-72 hours post procedure  Samples containing fluorescein can produce falsely depressed TSH values  If the patient had this procedure,a specimen should be resubmitted post fluorescein clearance        NT-BNP PRO [[id number]]  (Abnormal) Collected: 07/02/21 1035    Lab Status: Final result Specimen: Blood from Arm, Left Updated: 07/02/21 1117     NT-proBNP 5,780 pg/mL     Troponin I [[id number]] Collected: 07/02/21 1035    Lab Status: Final result Specimen: Blood from Arm, Left Updated: 07/02/21 1115     Troponin I <0 02 ng/mL     Comprehensive metabolic panel [[id number]]  (Abnormal) Collected: 07/02/21 1035    Lab Status: Final result Specimen: Blood from Arm, Left Updated: 07/02/21 1111     Sodium 131 mmol/L      Potassium 5 0 mmol/L      Chloride 101 mmol/L      CO2 19 mmol/L      ANION GAP 11 mmol/L      BUN 96 mg/dL      Creatinine 3 27 mg/dL      Glucose 350 mg/dL      Calcium 9 2 mg/dL      Corrected Calcium 10 2 mg/dL      AST 68 U/L      ALT 72 U/L      Alkaline Phosphatase 683 U/L      Total Protein 7 3 g/dL      Albumin 2 7 g/dL      Total Bilirubin 0 45 mg/dL      eGFR 14 ml/min/1 73sq m     Narrative:      [facility] guidelines for Chronic Kidney Disease (CKD):     Stage 1 with normal or high GFR (GFR > 90 mL/min/1 73 square meters)    Stage 2 Mild CKD (GFR = 60-89 mL/min/1 73 square meters)    Stage 3A Moderate CKD (GFR = 45-59 mL/min/1 73 square meters)    Stage 3B Moderate CKD (GFR = 30-44 mL/min/1 73 square meters)    Stage 4 Severe CKD (GFR = 15-29 mL/min/1 73 square meters)    Stage 5 End Stage CKD (GFR <15 mL/min/1 73 square meters)  Note: GFR calculation is accurate only with a steady state creatinine    Magnesium [[id number]]  (Normal) Collected: 07/02/21 1035    Lab Status: Final result Specimen: Blood from Arm, Left Updated: 07/02/21 1111     Magnesium 2 3 mg/dL     Protime-INR [[id number]]  (Normal) Collected: 07/02/21 1035    Lab Status: Final result Specimen: Blood from Arm, Left Updated: 07/02/21 1108     Protime 14 2 seconds      INR 1 10    APTT [[id number]]  (Normal) Collected: 07/02/21 1035    Lab Status: Final result Specimen: Blood from Arm, Left Updated: 07/02/21 1108     PTT 31 seconds     POCT Blood Gas (CG8+) [[id number]]  (Abnormal) Collected: 07/02/21 1050    Lab Status: Final result Specimen: Venous Updated: 07/02/21 1055     ph, Drew ISTAT 7 355     pCO2, Drew i-STAT 38 7 mm HG      pO2, Drew i-STAT 36 0 mm HG      BE, i-STAT -4 mmol/L      HCO3, Drew i-STAT 21 6 mmol/L      CO2, i-STAT 23 mmol/L      O2 Sat, i-STAT 67 %      SODIUM, I-STAT 135 mmol/l      Potassium, i-STAT 5 2 mmol/L      Hct, i-STAT 27 %      Hgb, i-STAT 9 2 g/dl      Glucose, i-STAT 362 mg/dl      Specimen Type VENOUS    CBC and differential [[id number]]  (Abnormal) Collected: 07/02/21 1035    Lab Status: Final result Specimen: Blood from Arm, Left Updated: 07/02/21 1046     WBC 8 17 Thousand/uL      RBC 3 40 Million/uL      Hemoglobin 9 5 g/dL      Hematocrit 29 6 %      MCV 87 fL      MCH 27 9 pg      MCHC 32 1 g/dL      RDW 12 3 %      MPV 11 0 fL      Platelets 971 Thousands/uL      nRBC 0 /100 WBCs      Neutrophils Relative 89 %      Immat GRANS % 1 %      Lymphocytes Relative 8 %      Monocytes Relative 2 %      Eosinophils Relative 0 %      Basophils Relative 0 %      Neutrophils Absolute 7 28 Thousands/µL      Immature Grans Absolute 0 05 Thousand/uL      Lymphocytes Absolute 0 68 Thousands/µL      Monocytes Absolute 0 12 Thousand/µL      Eosinophils Absolute 0 02 Thousand/µL      Basophils Absolute 0 02 Thousands/µL     POCT urinalysis dipstick [[id number]]     Lab Status: No result Specimen: Urine                  CT chest abdomen pelvis wo contrast   Final Result by Cuco Jenkins MD (07/02 1606)      There is patchy opacity in the right lower lobe of the lung, most pronounced within the superior segment of the right lower lobe of the lung  The appearance suggests pneumonia  Short-term follow-up after a course of treatment is recommended in order to    ensure complete radiographic resolution  There is a moderate-sized pericardial effusion, measuring up to 1 9 cm  Echocardiography is suggested for further evaluation  The liver is enlarged  The surface contour of the liver appears nodular  Clinical and laboratory correlation regarding the possibility of cirrhosis is suggested  The urinary bladder wall appears thickened, however, evaluation is limited by underdistention  Correlation with urinalysis and urine culture and sensitivity is recommended  Mild cardiomegaly  Coronary artery disease  Prior coronary artery stenting  Extensive atherosclerosis  Other nonemergent findings, as described above  Please see discussion  This examination demonstrates findings for which clinical and imaging follow-up is recommended and was logged as such in [facility]  The study was marked in [facility] for immediate notification              Workstation performed: [id number]               Procedures  Procedures      ED Course  ED Course as of Jul 02 1638 Fri Jul 02, 2021   1043 Procedure Note: EKG  Date/Time: 07/02/21 10:43 AM   Interpreted by: Allison Small DO  Indications / Diagnosis: CP  ECG reviewed by me, the ED Provider: yes   The EKG demonstrates:  Rhythm: sinus, rate 46  Intervals: Wtc 493  Axis: left axis  QRS: normal QRS  ST Changes: T wave inversions V5 and V6            1048 Hemoglobin(!): 9 5   1110 Procedure Note: EKG  Date/Time: 07/02/21 11:10 AM   Interpreted by: Koby Rivers DO  Indications / Diagnosis: CP, repeat  ECG reviewed by me, the ED Provider: yes   The EKG demonstrates:  Rhythm: sinus, rate 58  Intervals: normal intervals  Axis: left axis  QRS: normal QRS  ST Changes: No acute ST Changes, no STD/ARUNA           1116 Troponin I: <0 02   1117 NT-proBNP(!): 5,780   1224 Procalcitonin(!): 0 47             HEART Risk Score      Most Recent Value   Heart Score Risk Calculator   History  1 Filed at: 07/02/2021 1117   ECG  1 Filed at: 07/02/2021 1117   Age  2 Filed at: 07/02/2021 1117   Risk Factors  2 Filed at: 07/02/2021 1117   Troponin  0 Filed at: 07/02/2021 1117   HEART Score  6 Filed at: 07/02/2021 1117        Identification of Seniors at Risk      Most Recent Value   (ISAR) Identification of Seniors at Risk   Before the illness or injury that brought you to the Emergency, did you need someone to help you on a regular basis? 0 Filed at: 07/02/2021 1024   In the last 24 hours, have you needed more help than usual?  0 Filed at: 07/02/2021 1024   Have you been hospitalized for one or more nights during the past 6 months? 1 Filed at: 07/02/2021 1024   In general, do you see well?  0 Filed at: 07/02/2021 1024   In general, do you have serious problems with your memory? 0 Filed at: 07/02/2021 1024   Do you take more than three different medications every day? 1 Filed at: 07/02/2021 1024   ISAR Score  2 Filed at: 07/02/2021 1024                    SBIRT 22yo+      Most Recent Value   SBIRT (23 yo +)   In order to provide better care to our patients, we are screening all of our patients for alcohol and drug use  Would it be okay to ask you these screening questions?   Unable to answer at this time Filed at: 07/02/2021 1040                MDM  Number of Diagnoses or Management Options  Sepsis ([facility] [address] )  Diagnosis management comments: Given CP and abdominal pain, CTA dissection study  EKG, troponin, CBC, CMP, type and screen given significant pallor on exam   No symptoms consistent with or exam signs suggestive of active bleeding  FAST negative  Tx per workup  Anticipate admission  HR stable mid to upper 40's, BP low normal range, no respiratory distress, dizziness  No focal neuro deficits  HR improved with improvement in temperature  Amount and/or Complexity of Data Reviewed  Clinical lab tests: ordered and reviewed  Tests in the radiology section of CPT®: ordered and reviewed  Tests in the medicine section of CPT®: ordered and reviewed  Review and summarize past medical records: yes  Discuss the patient with other providers: yes    Risk of Complications, Morbidity, and/or Mortality  Presenting problems: moderate  Diagnostic procedures: moderate  Management options: moderate    Patient Progress  Patient progress: stable      Disposition  Final diagnoses:   Sepsis ([address] )     Time reflects when diagnosis was documented in both MDM as applicable and the Disposition within this note     Time User Action Codes Description Comment    7/2/2021  4:37 PM Torie Branch Add [A41 9] Sepsis [facility])       ED Disposition     ED Disposition Condition Date/Time Comment    Admit Stable Fri Jul 2, 2021  4:37 PM Case was discussed with MELVI and the patient's admission status was agreed to be Admission Status: observation status to the service of Dr Jud Gleason  Follow-up Information    None         Patient's Medications   Discharge Prescriptions    No medications on file     No discharge procedures on file  PDMP Review     None           ED Provider  Attending physically available and evaluated Jose Rivero I managed the patient along with the ED Attending      Electronically Signed by         Shelton Barnhart DO  07/02/21 6227

## 2021-07-02 NOTE — ASSESSMENT & PLAN NOTE
Patient presented with hypothermia, bradycardia, chest pain, found to have elevated procalcitonin  Patient was treated with [facility] with improvement of the temperature  Patient was also given 500 cc of bolus fluid, bradycardia improved  She is started on Rocephin in ER  CT scan chest abdomen pelvis shows right lower lobe pneumonia and thickening of the bladder which may indicate UTI   will switch antibiotic to cefepime    Pending urine culture, blood culture, COVID-19

## 2021-07-02 NOTE — ASSESSMENT & PLAN NOTE
Patient denies any dyspnea or cough  However she presented with chest pain and hypothermia  CT chest reveals a right lower lobe pneumonia  Lactic acid normal, elevated procalcitonin noted    Obtain COVID-19, MRSA, urinary Legionella and strep  Started on broad-spectrum antibiotics cefepime

## 2021-07-02 NOTE — ED PROCEDURE NOTE
Procedure  POC Cardiac US    Date/Time: 7/2/2021 11:00 AM  Performed by: Ania Barrios DO  Authorized by: Ania Barrios DO     Patient location:  ED  Other Assisting Provider: No    Procedure details:     Exam Type:  Diagnostic    Indications: chest pain      Assessment / Evaluation for: cardiac function and pericardial effusion      Exam Type: initial exam      Image quality: limited diagnostic      Image availability:  Images available in PACS, still images obtained and video obtained  Patient Details:     Cardiac Rhythm:  Regular    Mechanical ventilation: No    Cardiac findings:     Echo technique: limited 2D      Views obtained: parasternal long axis, parasternal short axis, subcostal and apical      Pericardial effusion: absent      Tamponade physiology: absent      Wall motion: normal      LV systolic function: normal      RV dilation: none    Interpretation:      Limited cardiac US   No significant abnormalities                      Ania Barrios DO  07/02/21 1105

## 2021-07-02 NOTE — ASSESSMENT & PLAN NOTE
Lab Results   Component Value Date    HGBA1C 11 7 (H) 03/02/2021       No results for input(s): POCGLU in the last 72 hours  Blood Sugar Average: Last 72 hrs:   uncontrolled  Patient takes NovoLog 70/30 10 units b i d  However she missed her dose this morning therefore presented with blood sugar of 350  Order Lantus 15 units at bedtime and Humalog 10 units t i d  with meals with sliding scale

## 2021-07-02 NOTE — ASSESSMENT & PLAN NOTE
Patient presented with chest pain that has resolved since    EKG no acute changes  Troponin negative  Placed on telemetry monitor  Trend troponin

## 2021-07-02 NOTE — ASSESSMENT & PLAN NOTE
Wt Readings from Last 3 Encounters:   07/02/21 75 3 kg (166 lb)   05/14/21 75 7 kg (166 lb 14 4 oz)   05/03/21 75 3 kg (166 lb)     Patient has chronically elevated proBNP  No acute exacerbation noted clinically  Continue home medication carvedilol and Imdur  Continue torsemide 20 mg b i d

## 2021-07-03 ENCOUNTER — APPOINTMENT (INPATIENT)
Dept: NON INVASIVE DIAGNOSTICS | Facility: HOSPITAL | Age: 66
DRG: 194 | End: 2021-07-03
Payer: COMMERCIAL

## 2021-07-03 LAB
ALBUMIN SERPL BCP-MCNC: 2.7 G/DL (ref 3.5–5)
ALP SERPL-CCNC: 601 U/L (ref 46–116)
ALT SERPL W P-5'-P-CCNC: 60 U/L (ref 12–78)
ANION GAP SERPL CALCULATED.3IONS-SCNC: 7 MMOL/L (ref 4–13)
AST SERPL W P-5'-P-CCNC: 39 U/L (ref 5–45)
BILIRUB SERPL-MCNC: 0.34 MG/DL (ref 0.2–1)
BUN SERPL-MCNC: 87 MG/DL (ref 5–25)
CALCIUM ALBUM COR SERPL-MCNC: 10.1 MG/DL (ref 8.3–10.1)
CALCIUM SERPL-MCNC: 9.1 MG/DL (ref 8.3–10.1)
CHLORIDE SERPL-SCNC: 109 MMOL/L (ref 100–108)
CO2 SERPL-SCNC: 22 MMOL/L (ref 21–32)
CREAT SERPL-MCNC: 2.78 MG/DL (ref 0.6–1.3)
CRP SERPL QL: 9.5 MG/L
ERYTHROCYTE [DISTWIDTH] IN BLOOD BY AUTOMATED COUNT: 12.5 % (ref 11.6–15.1)
ERYTHROCYTE [SEDIMENTATION RATE] IN BLOOD: 61 MM/HOUR (ref 0–29)
GFR SERPL CREATININE-BSD FRML MDRD: 17 ML/MIN/1.73SQ M
GLUCOSE SERPL-MCNC: 174 MG/DL (ref 65–140)
GLUCOSE SERPL-MCNC: 229 MG/DL (ref 65–140)
GLUCOSE SERPL-MCNC: 65 MG/DL (ref 65–140)
GLUCOSE SERPL-MCNC: 68 MG/DL (ref 65–140)
GLUCOSE SERPL-MCNC: 75 MG/DL (ref 65–140)
HBV CORE AB SER QL: NORMAL
HBV CORE IGM SER QL: NORMAL
HBV SURFACE AG SER QL: NORMAL
HCT VFR BLD AUTO: 28.5 % (ref 34.8–46.1)
HCV AB SER QL: NORMAL
HGB BLD-MCNC: 9.1 G/DL (ref 11.5–15.4)
L PNEUMO1 AG UR QL IA.RAPID: NEGATIVE
MCH RBC QN AUTO: 27.8 PG (ref 26.8–34.3)
MCHC RBC AUTO-ENTMCNC: 31.9 G/DL (ref 31.4–37.4)
MCV RBC AUTO: 87 FL (ref 82–98)
PLATELET # BLD AUTO: 171 THOUSANDS/UL (ref 149–390)
PMV BLD AUTO: 11.6 FL (ref 8.9–12.7)
POTASSIUM SERPL-SCNC: 4.2 MMOL/L (ref 3.5–5.3)
PROCALCITONIN SERPL-MCNC: 0.59 NG/ML
PROT SERPL-MCNC: 7.2 G/DL (ref 6.4–8.2)
RBC # BLD AUTO: 3.27 MILLION/UL (ref 3.81–5.12)
S PNEUM AG UR QL: NEGATIVE
SODIUM SERPL-SCNC: 138 MMOL/L (ref 136–145)
URATE SERPL-MCNC: 11.4 MG/DL (ref 2–6.8)
WBC # BLD AUTO: 9.36 THOUSAND/UL (ref 4.31–10.16)

## 2021-07-03 PROCEDURE — B24BZZZ ULTRASONOGRAPHY OF HEART WITH AORTA: ICD-10-PCS | Performed by: INTERNAL MEDICINE

## 2021-07-03 PROCEDURE — 85652 RBC SED RATE AUTOMATED: CPT | Performed by: NURSE PRACTITIONER

## 2021-07-03 PROCEDURE — 86140 C-REACTIVE PROTEIN: CPT | Performed by: NURSE PRACTITIONER

## 2021-07-03 PROCEDURE — 82948 REAGENT STRIP/BLOOD GLUCOSE: CPT

## 2021-07-03 PROCEDURE — 86803 HEPATITIS C AB TEST: CPT | Performed by: NURSE PRACTITIONER

## 2021-07-03 PROCEDURE — 99232 SBSQ HOSP IP/OBS MODERATE 35: CPT | Performed by: NURSE PRACTITIONER

## 2021-07-03 PROCEDURE — 93308 TTE F-UP OR LMTD: CPT

## 2021-07-03 PROCEDURE — 86704 HEP B CORE ANTIBODY TOTAL: CPT | Performed by: NURSE PRACTITIONER

## 2021-07-03 PROCEDURE — 87340 HEPATITIS B SURFACE AG IA: CPT | Performed by: NURSE PRACTITIONER

## 2021-07-03 PROCEDURE — 85027 COMPLETE CBC AUTOMATED: CPT | Performed by: FAMILY MEDICINE

## 2021-07-03 PROCEDURE — 93321 DOPPLER ECHO F-UP/LMTD STD: CPT | Performed by: INTERNAL MEDICINE

## 2021-07-03 PROCEDURE — 84550 ASSAY OF BLOOD/URIC ACID: CPT | Performed by: NURSE PRACTITIONER

## 2021-07-03 PROCEDURE — 80053 COMPREHEN METABOLIC PANEL: CPT | Performed by: FAMILY MEDICINE

## 2021-07-03 PROCEDURE — 93325 DOPPLER ECHO COLOR FLOW MAPG: CPT | Performed by: INTERNAL MEDICINE

## 2021-07-03 PROCEDURE — 86705 HEP B CORE ANTIBODY IGM: CPT | Performed by: NURSE PRACTITIONER

## 2021-07-03 PROCEDURE — 86038 ANTINUCLEAR ANTIBODIES: CPT | Performed by: NURSE PRACTITIONER

## 2021-07-03 PROCEDURE — 93308 TTE F-UP OR LMTD: CPT | Performed by: INTERNAL MEDICINE

## 2021-07-03 PROCEDURE — 84145 PROCALCITONIN (PCT): CPT | Performed by: FAMILY MEDICINE

## 2021-07-03 RX ORDER — INSULIN GLARGINE 100 [IU]/ML
10 INJECTION, SOLUTION SUBCUTANEOUS
Status: DISCONTINUED | OUTPATIENT
Start: 2021-07-03 | End: 2021-07-05 | Stop reason: HOSPADM

## 2021-07-03 RX ADMIN — TORSEMIDE 20 MG: 20 TABLET ORAL at 08:56

## 2021-07-03 RX ADMIN — CARVEDILOL 12.5 MG: 12.5 TABLET, FILM COATED ORAL at 08:51

## 2021-07-03 RX ADMIN — ACETAMINOPHEN 650 MG: 325 TABLET, FILM COATED ORAL at 12:05

## 2021-07-03 RX ADMIN — INSULIN LISPRO 10 UNITS: 100 INJECTION, SOLUTION INTRAVENOUS; SUBCUTANEOUS at 11:54

## 2021-07-03 RX ADMIN — AMLODIPINE BESYLATE 10 MG: 10 TABLET ORAL at 21:12

## 2021-07-03 RX ADMIN — HYDRALAZINE HYDROCHLORIDE 100 MG: 50 TABLET, FILM COATED ORAL at 17:05

## 2021-07-03 RX ADMIN — ISOSORBIDE MONONITRATE 60 MG: 60 TABLET, EXTENDED RELEASE ORAL at 08:51

## 2021-07-03 RX ADMIN — LEVOTHYROXINE SODIUM 125 MCG: 125 TABLET ORAL at 05:05

## 2021-07-03 RX ADMIN — CALCIUM ACETATE 667 MG: 667 CAPSULE ORAL at 08:51

## 2021-07-03 RX ADMIN — HYDRALAZINE HYDROCHLORIDE 100 MG: 50 TABLET, FILM COATED ORAL at 08:52

## 2021-07-03 RX ADMIN — CALCIUM ACETATE 667 MG: 667 CAPSULE ORAL at 17:05

## 2021-07-03 RX ADMIN — TORSEMIDE 20 MG: 20 TABLET ORAL at 17:05

## 2021-07-03 RX ADMIN — ATORVASTATIN CALCIUM 40 MG: 40 TABLET, FILM COATED ORAL at 17:05

## 2021-07-03 RX ADMIN — CEFEPIME HYDROCHLORIDE 2000 MG: 2 INJECTION, POWDER, FOR SOLUTION INTRAVENOUS at 21:12

## 2021-07-03 RX ADMIN — INSULIN GLARGINE 10 UNITS: 100 INJECTION, SOLUTION SUBCUTANEOUS at 21:12

## 2021-07-03 RX ADMIN — CARVEDILOL 12.5 MG: 12.5 TABLET, FILM COATED ORAL at 17:05

## 2021-07-03 RX ADMIN — HYDRALAZINE HYDROCHLORIDE 100 MG: 50 TABLET, FILM COATED ORAL at 21:12

## 2021-07-03 NOTE — ASSESSMENT & PLAN NOTE
Lab Results   Component Value Date    EGFR 17 07/03/2021    EGFR 14 07/02/2021    EGFR 15 03/15/2021    CREATININE 2 78 (H) 07/03/2021    CREATININE 3 27 (H) 07/02/2021    CREATININE 3 18 (H) 03/15/2021   Baseline seems to be 3 1-3 4  Currently at baseline

## 2021-07-03 NOTE — PROGRESS NOTES
[address] [facility]  Progress Note - Elizabeth Walker 1955, 78 y o  female MRN: [id number]  Unit/Bed#: CW2 210-02 Encounter: [id number]  Primary Care Provider: Rebecca Diop MD   Date and time admitted to hospital: 7/2/2021 10:19 AM    * SIRS (systemic inflammatory response syndrome) [facility])  Assessment & Plan  Patient presented with hypothermia, bradycardia, chest pain, with mildly elevated procal in setting of ckd   · Patient was treated with Valencia Hugger with improvement of the temperature  · Patient was also given 500 cc of bolus fluid, bradycardia improved  · She is started on Rocephin in ER - increased to cefepime   · CT scan chest abdomen pelvis shows right lower lobe pneumonia and thickening of the bladder  · Pt was placed on cefepime in setting of suspected pna - with no urinary symptoms UA - negative   · blood culture in process ,   · COVID-19 negative   · Will obtain sed rate uric acid crp   As well as lower extremity duplex dt bl left greater leg pain pt reports giving out last night     Pericardial effusion  Assessment & Plan  · Noted on CT chest     · When compared to previous CT chest, patient has moderate pericardial effusion since February  · repeat echocardiogramdemonstrates improved pericardial infusion from prior per discussion with Dr Geovany Bennett  Pt does have pain under the left lower ribs tender to palpation   · Will obtain uric acid, sed rate and crp   - Limited study   LEFT VENTRICLE:  Systolic function was normal  Ejection fraction was estimated to be 60 %  There was hypokinesis of the basal inferior wall(s)  Wall thickness was mildly increased  TRICUSPID VALVE:  There was mild regurgitation  PERICARDIUM:  A small to moderate pericardial effusion was identified posterior to the heart  There was no evidence of hemodynamic compromise    · Bedside ultrasound in the ER did not reveal tamponade    RLL pneumonia  Assessment & Plan  · Clinically does not present as pna  Pt has no leukocytosis/no sob, cough or difficulty breathing/no lactic acid/ negative legionella, negative strep pneumo/ negative covid  · Positive hypothermia poa/tachycardia/ elevated procal in setting of CKD   · Ct chest wo contrast: There is patchy opacity in the right lower lobe of the lung, most pronounced within the superior segment of the right lower lobe of the lung   The appearance suggests pneumonia   Short-term follow-up after a course of treatment is recommended in order to   ensure complete radiographic resolution  There is a moderate-sized pericardial effusion, measuring up to 1 9 cm   Echocardiography is suggested for further evaluation  The liver is enlarged   The surface contour of the liver appears nodular   Clinical and laboratory correlation regarding the possibility of cirrhosis is suggested  The urinary bladder wall appears thickened, however, evaluation is limited by underdistention   Correlation with urinalysis and urine culture and sensitivity is recommended  Mild cardiomegaly   Coronary artery disease   Prior coronary artery stenting   Extensive atherosclerosis  Other nonemergent findings, as described above   Please see discussion  · Started on broad-spectrum antibiotics cefepime unclear at this time - will continue today and monitor     Hyponatremia  Assessment & Plan  · Pseudohyponatremia noted secondary to hyperglycemia  · Corrected sodium is 135-137  · wnl range     Stage 4 chronic kidney disease [facility])  Assessment & Plan  Lab Results   Component Value Date    EGFR 17 07/03/2021    EGFR 14 07/02/2021    EGFR 15 03/15/2021    CREATININE 2 78 (H) 07/03/2021    CREATININE 3 27 (H) 07/02/2021    CREATININE 3 18 (H) 03/15/2021   Baseline seems to be 3 1-3 4  Currently at baseline    Coronary artery disease involving native coronary artery of native heart with angina pectoris [facility])  Assessment & Plan  Patient presented with chest pain that has resolved since    EKG no acute changes  Troponin negative  Placed on telemetry monitor  Trend troponin    Type 2 diabetes mellitus with kidney complication, with long-term current use of insulin [facility])  Assessment & Plan  Lab Results   Component Value Date    HGBA1C 11 7 (H) 03/02/2021       Recent Labs     07/02/21  1814 07/02/21  2131 07/03/21  0644 07/03/21  1028   POCGLU 324* 224* 75 174*       Blood Sugar Average: Last 72 hrs:  (P) 199 25 uncontrolled  Patient takes NovoLog 70/30 10 units b i d  However she missed her dose this morning therefore presented with blood sugar of 350  · Started on Lantus 15 units at bedtime and Humalog 10 units t i d  with meals with sliding scale  · Will reduce lantus to 10 units and reduce humalog to 5 units tid due to bs in the low 70s     Chronic combined systolic and diastolic congestive heart failure [facility])  Assessment & Plan  Wt Readings from Last 3 Encounters:   07/02/21 71 8 kg (158 lb 4 6 oz)   05/14/21 75 7 kg (166 lb 14 4 oz)   05/03/21 75 3 kg (166 lb)     · Patient has chronically elevated proBNP  · No acute exacerbation noted clinically  · Continue home medication carvedilol and Imdur  · Continue torsemide 20 mg b i d   · Echocardiogram completed dt ct findings of pericardial effusion: SUMMARY:        Limited study          LEFT VENTRICLE:  Systolic function was normal  Ejection fraction was estimated to be 60 %  There was hypokinesis of the basal inferior wall(s)  Wall thickness was mildly increased    TRICUSPID VALVE:  · There was mild regurgitation          VTE Pharmacologic Prophylaxis:   High Risk (Score >/= 5) - Pharmacological DVT Prophylaxis Ordered: heparin  Sequential Compression Devices Ordered  Patient Centered Rounds: I performed bedside rounds with nursing staff today  Discussions with Specialists or Other Care Team Provider: nursing     Education and Discussions with Family / Patient: Patient declined call to   Time Spent for Care: 45 minutes   More than 50% of total time spent on counseling and coordination of care as described above  Current Length of Stay: 1 day(s)  Current Patient Status: Inpatient   Certification Statement: The patient will continue to require additional inpatient hospital stay due to ongoing workup   Discharge Plan: Anticipate discharge in 24-48 hrs to home  Code Status: Level 1 - Full Code    Subjective:   Pt reports she came into the hospital because she has pain in her left chest radiating to her abdomen  Today she is tender under her left rib cage no tenderness on anterior chest or right side  She is not sob and denies a cough feels like she has a fair appetite  She also reports that she came in because she had very week legs and states she could not walk  She report more pain in the left leg at this time     Objective:     Vitals:   Temp (24hrs), Av 1 °F (36 7 °C), Min:97 7 °F (36 5 °C), Max:98 5 °F (36 9 °C)    Temp:  [97 7 °F (36 5 °C)-98 5 °F (36 9 °C)] 97 7 °F (36 5 °C)  HR:  [62-70] 67  Resp:  [17-19] 18  BP: (132-192)/() 132/54  SpO2:  [98 %-99 %] 98 %  Body mass index is 26 34 kg/m²  Input and Output Summary (last 24 hours): Intake/Output Summary (Last 24 hours) at 7/3/2021 1800  Last data filed at 7/3/2021 1709  Gross per 24 hour   Intake 940 ml   Output 2225 ml   Net -1285 ml       Physical Exam:   Physical Exam  Constitutional:       General: She is not in acute distress  Appearance: She is not ill-appearing, toxic-appearing or diaphoretic  HENT:      Head: Normocephalic and atraumatic  Nose: No congestion  Mouth/Throat:      Pharynx: Oropharynx is clear  No oropharyngeal exudate  Eyes:      General:         Right eye: No discharge  Left eye: No discharge  Cardiovascular:      Rate and Rhythm: Normal rate  Heart sounds: No murmur heard  No friction rub  No gallop  Pulmonary:      Effort: No respiratory distress  Breath sounds: No stridor   No wheezing, rhonchi or rales  Comments: Diminished   Chest:      Chest wall: No tenderness  Abdominal:      General: There is no distension  Palpations: There is no mass  Tenderness: There is abdominal tenderness (left upper quadrant under rib cage with palpation )  There is no right CVA tenderness, left CVA tenderness, guarding or rebound  Hernia: No hernia is present  Musculoskeletal:         General: Tenderness (left upper quadrante under lower rib cage with palpation ) present  No swelling, deformity or signs of injury  Right lower leg: No edema  Left lower leg: No edema  Skin:     Coloration: Skin is not jaundiced or pale  Findings: No bruising, erythema, lesion or rash  Neurological:      Mental Status: She is alert and oriented to person, place, and time  Psychiatric:         Behavior: Behavior normal           Additional Data:     Labs:  Results from last 7 days   Lab Units 07/03/21  0505 07/02/21  1050 07/02/21  1035   WBC Thousand/uL 9 36  --  8 17   HEMOGLOBIN g/dL 9 1*  --  9 5*   HEMATOCRIT % 28 5*  --  29 6*   PLATELETS Thousands/uL 171   < > 161   NEUTROS PCT %  --   --  89*   LYMPHS PCT %  --   --  8*   MONOS PCT %  --   --  2*   EOS PCT %  --   --  0    < > = values in this interval not displayed       Results from last 7 days   Lab Units 07/03/21  0505   SODIUM mmol/L 138   POTASSIUM mmol/L 4 2   CHLORIDE mmol/L 109*   CO2 mmol/L 22   BUN mg/dL 87*   CREATININE mg/dL 2 78*   ANION GAP mmol/L 7   CALCIUM mg/dL 9 1   ALBUMIN g/dL 2 7*   TOTAL BILIRUBIN mg/dL 0 34   ALK PHOS U/L 601*   ALT U/L 60   AST U/L 39   GLUCOSE RANDOM mg/dL 68     Results from last 7 days   Lab Units 07/02/21  1035   INR  1 10     Results from last 7 days   Lab Units 07/03/21  1601 07/03/21  1028 07/03/21  0644 07/02/21  2131 07/02/21  1814   POC GLUCOSE mg/dl 65 174* 75 224* 324*         Results from last 7 days   Lab Units 07/03/21  0505 07/02/21  1051   LACTIC ACID mmol/L  --  1 3 PROCALCITONIN ng/ml 0 59* 0 47*       Lines/Drains:  Invasive Devices     Peripheral Intravenous Line            Peripheral IV 07/02/21 Left Antecubital 1 day    Peripheral IV 07/02/21 Right Forearm 1 day                  Telemetry:  Telemetry Orders (From admission, onward)             48 Hour Telemetry Monitoring  Continuous x 48 hours     Question:  Reason for 48 Hour Telemetry  Answer:  Arrhythmias Requiring Medical Therapy (eg  SVT, Vtach/fib, Bradycardia, Uncontrolled A-fib)                 Telemetry Reviewed: Normal Sinus Rhythm  Indication for Continued Telemetry Use: Arrthymias requiring medical therapy           Imaging: Reviewed radiology reports from this admission including: abdominal/pelvic CT    Recent Cultures (last 7 days):   Results from last 7 days   Lab Units 07/02/21  2315 07/02/21  1819   BLOOD CULTURE   --  Received in Microbiology Lab  Culture in Progress  Received in Microbiology Lab  Culture in Progress     LEGIONELLA URINARY ANTIGEN  Negative  --        Last 24 Hours Medication List:   Current Facility-Administered Medications   Medication Dose Route Frequency Provider Last Rate    acetaminophen  650 mg Oral Q6H PRN Ayla Monroy MD      aluminum-magnesium hydroxide-simethicone  30 mL Oral Q6H PRN Ayla Monroy MD      amLODIPine  10 mg Oral HS Ayla Monroy MD      atorvastatin  40 mg Oral Daily With Kishor Ortiz MD      calcium acetate  667 mg Oral BID With Refugio Bains MD      carvedilol  12 5 mg Oral BID With Meals Ayla Monroy MD      cefepime  2,000 mg Intravenous Q24H Ayla Monroy MD 2,000 mg (07/02/21 2303)    heparin (porcine)  5,000 Units Subcutaneous Q8H [address], MD      hydrALAZINE  100 mg Oral TID Ayla Monroy MD      insulin glargine  10 Units Subcutaneous HS CLARICE Corrigan      insulin lispro  1-6 Units Subcutaneous TID AC Ayla Monroy MD      [START ON 7/4/2021] insulin lispro  5 Units Subcutaneous TID With [address], CRNP  isosorbide mononitrate  60 mg Oral Daily Shirin Alvarenga MD      levothyroxine  125 mcg Oral Early Morning Shirin Alvarenga MD      ondansetron  4 mg Intravenous Q6H PRN Shirin Alvarenga MD      torsemide  20 mg Oral BID Shirin Alvarenga MD          Today, Patient Was Seen By: CLARICE Alonso    **Please Note: This note may have been constructed using a voice recognition system  **

## 2021-07-03 NOTE — UTILIZATION REVIEW
Initial Clinical Review    Admission: Date/Time/Statement:   Admission Orders (From admission, onward)     Ordered        07/02/21 1638  Inpatient Admission  Once                   Orders Placed This Encounter   Procedures    Inpatient Admission     Standing Status:   Standing     Number of Occurrences:   1     Order Specific Question:   Level of Care     Answer:   Med Surg [16]     Order Specific Question:   Estimated length of stay     Answer:   More than 2 Midnights     Order Specific Question:   Certification     Answer:   I certify that inpatient services are medically necessary for this patient for a duration of greater than two midnights  See H&P and MD Progress Notes for additional information about the patient's course of treatment  ED Arrival Information     Expected Arrival Acuity    - 7/2/2021 10:14 Emergent         Means of arrival Escorted by Service Admission type    Ambulance [facility] Emergency         Arrival complaint    Chest pain        Chief Complaint   Patient presents with    Chest Pain     Chest pain that woke her up this morning  Initial Presentation:  79 y/o female with PMhx of CHF, CAD, IDDM, CKD IV presents to ED by EMS with c/o L-sided chest pain with radiation toward L lower abd  Rates 8/10, describes as burning  On arrival to ED pt found to be hypothermic with temp of 92 3 and bradycardic with HR 46  EKG showed sinus perlita  Valencia hugger applied and IVF's given with improvement in temp and HR  Procal elevated  CT c/a/p with RLL PNA with bladder wall thickening  IV rocephin given in ED    BUN 96, Cr 3 27  Admitted inpatient to M/S/Tele unit with SIRS, RLL PNA, hyponatremia  Continue IV abx  Telem, serial trops  Fingerstick glucose checks w/ ssi  Continue previous home po meds  Date: 7/3   Day 2: pt still with tenderness under her L rib cage as well as more pain in her L leg  Echo demonstrates improved pericardial infusion per cardiology  Obtain uric acid, sed rate and crp  LE duplex  Blood cx pending  Does not appear to be PNA, source unclear at this time  Continue cefepime for now   Reduce lantus to 10 units and reduce humalog to 5 units tid d/t BS in the low 70s     ED Triage Vitals   Temperature Pulse Respirations Blood Pressure SpO2   07/02/21 1119 07/02/21 1021 07/02/21 1021 07/02/21 1024 07/02/21 1021   (!) 92 3 °F (33 5 °C) (!) 46 20 110/54 98 %      Temp Source Heart Rate Source Patient Position - Orthostatic VS BP Location FiO2 (%)   07/02/21 1119 07/02/21 1021 07/02/21 1021 07/02/21 1021 --   Tympanic Monitor Lying Right arm       Pain Score       07/02/21 1021       5          Wt Readings from Last 1 Encounters:   07/02/21 71 8 kg (158 lb 4 6 oz)     Additional Vital Signs:   Date/Time  Temp  Pulse  Resp  BP  MAP (mmHg)  SpO2  O2 Device   07/03/21 11:03:17  98 3 °F (36 8 °C)  --  --  136/58  84  --  --   07/03/21 0850  --  67  --  --  --  98 %  None (Room air)   07/03/21 07:02:42  98 5 °F (36 9 °C)  --  18  153/65  94  --  --   07/03/21 02:38:01  98 3 °F (36 8 °C)  --  18  149/105Abnormal   120  --  --   07/03/21 02:37:40  98 3 °F (36 8 °C)  --  19  149/105Abnormal   120  --  --   07/02/21 2300  97 9 °F (36 6 °C)  --  --  --  --  --  --   07/02/21 22:21:11  --  70  18  168/69  102  99 %  None (Room air)   07/02/21 2120  --  --  --  --  --  98 %  None (Room air)   07/02/21 1930  --  62  18  192/84Abnormal   121  99 %  None (Room air)   07/02/21 1820  --  63  17  163/124Abnormal   --  98 %  None (Room air)   07/02/21 1624  98 °F (36 7 °C)  65  18  175/70Abnormal   --  97 %  --   07/02/21 1452  --  62  17  155/70  --  98 %  --   07/02/21 1451  96 4 °F (35 8 °C)Abnormal   --  --  --  --  --  --   07/02/21 1332  --  55  17  169/69  --  98 %  None (Room air)   07/02/21 1304  93 1 °F (33 9 °C)Abnormal    --  --  --  --  --  --   07/02/21 1300  --  58  20  169/69  99  99 %  --   07/02/21 1119  92 3 °F (33 5 °C)Abnormal   --  --  --  --  --  -- 07/02/21 1115  --  52Abnormal   20  147/69  99  99 %  --   07/02/21 1024  --  --  --  110/54  --  --  --   07/02/21 1021  --  46Abnormal   20  --  --  98 %  None (Room air)       Pertinent Labs/Diagnostic Test Results:   CT c/a/p 7/2 -- There is patchy opacity in the right lower lobe of the lung, most pronounced within the superior segment of the right lower lobe of the lung   The appearance suggests pneumonia   Short-term follow-up after a course of treatment is recommended in order to [facility] complete radiographic resolution  There is a moderate-sized pericardial effusion, measuring up to 1 9 cm   Echocardiography is suggested for further evaluation  The liver is enlarged   The surface contour of the liver appears nodular   Clinical and laboratory correlation regarding the possibility of cirrhosis is suggested  The urinary bladder wall appears thickened, however, evaluation is limited by underdistention   Correlation with urinalysis and urine culture and sensitivity is recommended  Mild cardiomegaly   Coronary artery disease   Prior coronary artery stenting   Extensive atherosclerosis         Results from last 7 days   Lab Units 07/02/21  1728   SARS-COV-2  Negative     Results from last 7 days   Lab Units 07/03/21  0505 07/02/21  2119 07/02/21  1050 07/02/21  1035   WBC Thousand/uL 9 36  --   --  8 17   HEMOGLOBIN g/dL 9 1*  --   --  9 5*   I STAT HEMOGLOBIN g/dl  --   --  9 2*  --    HEMATOCRIT % 28 5*  --   --  29 6*   HEMATOCRIT, ISTAT %  --   --  27*  --    PLATELETS Thousands/uL 171 158  --  161   NEUTROS ABS Thousands/µL  --   --   --  7 28     Results from last 7 days   Lab Units 07/03/21  0505 07/02/21  1050 07/02/21  1035   SODIUM mmol/L 138  --  131*   POTASSIUM mmol/L 4 2  --  5 0   CHLORIDE mmol/L 109*  --  101   CO2 mmol/L 22  --  19*   CO2, I-STAT mmol/L  --  23  --    ANION GAP mmol/L 7  --  11   BUN mg/dL 87*  --  96*   CREATININE mg/dL 2 78*  --  3 27*   EGFR ml/min/1 73sq m 17  --  14 CALCIUM mg/dL 9 1  --  9 2   MAGNESIUM mg/dL  --   --  2 3     Results from last 7 days   Lab Units 07/03/21  0505 07/02/21  1035   AST U/L 39 68*   ALT U/L 60 72   ALK PHOS U/L 601* 683*   TOTAL PROTEIN g/dL 7 2 7 3   ALBUMIN g/dL 2 7* 2 7*   TOTAL BILIRUBIN mg/dL 0 34 0 45     Results from last 7 days   Lab Units 07/03/21  1028 07/03/21  0644 07/02/21  2131 07/02/21  1814   POC GLUCOSE mg/dl 174* 75 224* 324*     Results from last 7 days   Lab Units 07/03/21  0505 07/02/21  1035   GLUCOSE RANDOM mg/dL 68 350*     Results from last 7 days   Lab Units 07/02/21  1050   PH, POLLO I-STAT  7 355   PCO2, POLLO ISTAT mm HG 38 7*   PO2, POLLO ISTAT mm HG 36 0   HCO3, POLLO ISTAT mmol/L 21 6*   I STAT BASE EXC mmol/L -4*   I STAT O2 SAT % 67     Results from last 7 days   Lab Units 07/02/21  1035   TROPONIN I ng/mL <0 02     Results from last 7 days   Lab Units 07/02/21  1035   PROTIME seconds 14 2   INR  1 10   PTT seconds 31     Results from last 7 days   Lab Units 07/02/21  1035   TSH 3RD GENERATON uIU/mL 0 552     Results from last 7 days   Lab Units 07/03/21  0505 07/02/21  1051   PROCALCITONIN ng/ml 0 59* 0 47*     Results from last 7 days   Lab Units 07/02/21  1051   LACTIC ACID mmol/L 1 3     Results from last 7 days   Lab Units 07/02/21  1035   NT-PRO BNP pg/mL 5,780*     Results from last 7 days   Lab Units 07/02/21  1931   CLARITY UA  Clear   COLOR UA  Yellow   SPEC GRAV UA  1 014   PH UA  5 5   GLUCOSE UA mg/dl 500 (1/2%)*   KETONES UA mg/dl Negative   BLOOD UA  Negative   PROTEIN UA mg/dl 100 (2+)*   NITRITE UA  Negative   BILIRUBIN UA  Negative   UROBILINOGEN UA E U /dl 0 2   LEUKOCYTES UA  Negative   WBC UA /hpf 4-10*   RBC UA /hpf None Seen   BACTERIA UA /hpf None Seen   EPITHELIAL CELLS WET PREP /hpf None Seen     Results from last 7 days   Lab Units 07/02/21  2315 07/02/21  2314   STREP PNEUMONIAE ANTIGEN, URINE   --  Negative   LEGIONELLA URINARY ANTIGEN  Negative  --      Results from last 7 days   Lab Units 07/02/21  1819   BLOOD CULTURE  Received in Microbiology Lab  Culture in Progress  Received in Microbiology Lab  Culture in Progress       ED Treatment:   Medication Administration from 07/02/2021 1014 to 07/02/2021 2017       Date/Time Order Dose Route Action     07/02/2021 1403 ceftriaxone (ROCEPHIN) 1 g/50 mL in dextrose IVPB 1,000 mg Intravenous New Bag     07/02/2021 1628 multi-electrolyte (ISOLYTE-S PH 7 4) bolus 500 mL 500 mL Intravenous New Bag     07/02/2021 1843 insulin lispro (HumaLOG) 100 units/mL subcutaneous injection 10 Units 10 Units Subcutaneous Given     Past Medical History:   Diagnosis Date    Anemia     [facility]congestive [facility] failure) ([facility])     Chronic kidney disease     Coronary artery disease     Diabetes mellitus ([facility] )     Hypertension     Hypothyroidism      Present on Admission:   Coronary artery disease involving native coronary artery of native heart with angina pectoris ([facility])   Chronic combined systolic and diastolic congestive heart failure ([facility])      Admitting Diagnosis: Chest pain [R07 9]  Sepsis ([facility] ) [A41 9]  Age/Sex: 78 y o  female  Admission Orders:  Scheduled Medications:  amLODIPine, 10 mg, Oral, HS  atorvastatin, 40 mg, Oral, Daily With Dinner  calcium acetate, 667 mg, Oral, BID With Meals  carvedilol, 12 5 mg, Oral, BID With Meals  cefepime, 2,000 mg, Intravenous, Q24H  heparin (porcine), 5,000 Units, Subcutaneous, Q8H DEE  hydrALAZINE, 100 mg, Oral, TID  insulin glargine, 15 Units, Subcutaneous, HS  insulin lispro, 1-6 Units, Subcutaneous, TID AC  insulin lispro, 10 Units, Subcutaneous, TID With Meals  isosorbide mononitrate, 60 mg, Oral, Daily  levothyroxine, 125 mcg, Oral, Early Morning  torsemide, 20 mg, Oral, BID         PRN Meds:  acetaminophen, 650 mg, Oral, Q6H PRN  aluminum-magnesium hydroxide-simethicone, 30 mL, Oral, Q6H PRN  ondansetron, 4 mg, Intravenous, Q6H PRN        Network Utilization Review Department  ATTENTION: Please call with any questions or concerns to [phone] and carefully listen to the prompts so that you are directed to the right person  All voicemails are confidential   Lucien Cooper all requests for admission clinical reviews, approved or denied determinations and any other requests to dedicated fax number below belonging to the campus where the patient is receiving treatment   List of dedicated fax numbers for the Facilities:  [address] DENIALS (Administrative/Medical Necessity) [phone]   [address] (Maternity/NICU/Pediatrics) [phone]   [address] [place] [address] [id number] [place] [address] Shaheen Sin [address] [place] [address] [phone]

## 2021-07-03 NOTE — PLAN OF CARE
Reviewed    Problem: PAIN - ADULT  Goal: Verbalizes/displays adequate comfort level or baseline comfort level  Description: Interventions:  - Encourage patient to monitor pain and request assistance  - Assess pain using appropriate pain scale  - Administer analgesics based on type and severity of pain and evaluate response  - Implement non-pharmacological measures as appropriate and evaluate response  - Consider cultural and social influences on pain and pain management  - Notify physician/advanced practitioner if interventions unsuccessful or patient reports new pain  Outcome: Progressing     Problem: INFECTION - ADULT  Goal: Absence or prevention of progression during hospitalization  Description: INTERVENTIONS:  - Assess and monitor for signs and symptoms of infection  - Monitor lab/diagnostic results  - Monitor all insertion sites, i e  indwelling lines, tubes, and drains  - Monitor endotracheal if appropriate and nasal secretions for changes in amount and color  - [place] appropriate cooling/warming therapies per order  - Administer medications as ordered  - Instruct and encourage patient and family to use good hand hygiene technique  - Identify and instruct in appropriate isolation precautions for identified infection/condition  Outcome: Progressing  Goal: Absence of fever/infection during neutropenic period  Description: INTERVENTIONS:  - Monitor WBC    Outcome: Progressing     Goal: Maintain or return to baseline ADL function  Description: INTERVENTIONS:  -  Assess patient's ability to carry out ADLs; assess patient's baseline for ADL function and identify physical deficits which impact ability to perform ADLs (bathing, care of mouth/teeth, toileting, grooming, dressing, etc )  - Assess/evaluate cause of self-care deficits   - Assess range of motion  - Assess patient's mobility; develop plan if impaired  - Assess patient's need for assistive devices and provide as appropriate  - Encourage maximum independence but intervene and supervise when necessary  - Involve family in performance of ADLs  - Assess for home care needs following discharge   - Consider OT consult to assist with ADL evaluation and planning for discharge  - Provide patient education as appropriate  Outcome: Progressing     Problem: DISCHARGE PLANNING  Goal: Discharge to home or other facility with appropriate resources  Description: INTERVENTIONS:  - Identify barriers to discharge w/patient and caregiver  - Arrange for needed discharge resources and transportation as appropriate  - Identify discharge learning needs (meds, wound care, etc )  - Arrange for interpretive services to assist at discharge as needed  - Refer to Case Management Department for coordinating discharge planning if the patient needs post-hospital services based on physician/advanced practitioner order or complex needs related to functional status, cognitive ability, or social support system  Outcome: Progressing     Problem: Knowledge Deficit  Goal: Patient/family/caregiver demonstrates understanding of disease process, treatment plan, medications, and discharge instructions  Description: Complete learning assessment and assess knowledge base    Interventions:  - Provide teaching at level of understanding  - Provide teaching via preferred learning methods  Outcome: Progressing

## 2021-07-03 NOTE — ASSESSMENT & PLAN NOTE
Wt Readings from Last 3 Encounters:   07/02/21 71 8 kg (158 lb 4 6 oz)   05/14/21 75 7 kg (166 lb 14 4 oz)   05/03/21 75 3 kg (166 lb)     · Patient has chronically elevated proBNP  · No acute exacerbation noted clinically  · Continue home medication carvedilol and Imdur  · Continue torsemide 20 mg b i d   · Echocardiogram completed dt ct findings of pericardial effusion: SUMMARY:        Limited study          LEFT VENTRICLE:  Systolic function was normal  Ejection fraction was estimated to be 60 %  There was hypokinesis of the basal inferior wall(s)    Wall thickness was mildly increased    TRICUSPID VALVE:  · There was mild regurgitation

## 2021-07-03 NOTE — ASSESSMENT & PLAN NOTE
· Clinically does not present as pna  Pt has no leukocytosis/no sob, cough or difficulty breathing/no lactic acid/ negative legionella, negative strep pneumo/ negative covid  · Positive hypothermia poa/tachycardia/ elevated procal in setting of CKD   · Ct chest wo contrast: There is patchy opacity in the right lower lobe of the lung, most pronounced within the superior segment of the right lower lobe of the lung   The appearance suggests pneumonia   Short-term follow-up after a course of treatment is recommended in order to   ensure complete radiographic resolution  There is a moderate-sized pericardial effusion, measuring up to 1 9 cm   Echocardiography is suggested for further evaluation  The liver is enlarged   The surface contour of the liver appears nodular   Clinical and laboratory correlation regarding the possibility of cirrhosis is suggested  The urinary bladder wall appears thickened, however, evaluation is limited by underdistention   Correlation with urinalysis and urine culture and sensitivity is recommended  Mild cardiomegaly   Coronary artery disease   Prior coronary artery stenting   Extensive atherosclerosis  Other nonemergent findings, as described above   Please see discussion    · Started on broad-spectrum antibiotics cefepime unclear at this time - will continue today and monitor

## 2021-07-03 NOTE — ASSESSMENT & PLAN NOTE
· Pseudohyponatremia noted secondary to hyperglycemia  · Corrected sodium is 135-137    · wnl range Eyeglasses

## 2021-07-03 NOTE — PLAN OF CARE
Problem: Potential for Falls  Goal: Patient will remain free of falls  Description: INTERVENTIONS:  - Educate patient/family on patient safety including physical limitations  - Instruct patient to call for assistance with activity   - Consult OT/PT to assist with strengthening/mobility   - Keep Call bell within reach  - Keep bed low and locked with side rails adjusted as appropriate  - Keep care items and personal belongings within reach  - Initiate and maintain comfort rounds  - Apply yellow socks and bracelet for high fall risk patients  - Consider moving patient to room near nurses station  Outcome: Progressing     Problem: PAIN - ADULT  Goal: Verbalizes/displays adequate comfort level or baseline comfort level  Description: Interventions:  - Encourage patient to monitor pain and request assistance  - Assess pain using appropriate pain scale  - Administer analgesics based on type and severity of pain and evaluate response  - Implement non-pharmacological measures as appropriate and evaluate response  - Consider cultural and social influences on pain and pain management  - Notify physician/advanced practitioner if interventions unsuccessful or patient reports new pain  Outcome: Progressing     Problem: INFECTION - ADULT  Goal: Absence or prevention of progression during hospitalization  Description: INTERVENTIONS:  - Assess and monitor for signs and symptoms of infection  - Monitor lab/diagnostic results  - Monitor all insertion sites, i e  indwelling lines, tubes, and drains  - Monitor endotracheal if appropriate and nasal secretions for changes in amount and color  - [place] appropriate cooling/warming therapies per order  - Administer medications as ordered  - Instruct and encourage patient and family to use good hand hygiene technique  - Identify and instruct in appropriate isolation precautions for identified infection/condition  Outcome: Progressing  Goal: Absence of fever/infection during neutropenic period  Description: INTERVENTIONS:  - Monitor WBC    Outcome: Progressing     Problem: SAFETY ADULT  Goal: Patient will remain free of falls  Description: INTERVENTIONS:  - Educate patient/family on patient safety including physical limitations  - Instruct patient to call for assistance with activity   - Consult OT/PT to assist with strengthening/mobility   - Keep Call bell within reach  - Keep bed low and locked with side rails adjusted as appropriate  - Keep care items and personal belongings within reach  - Initiate and maintain comfort rounds  - Apply yellow socks and bracelet for high fall risk patients  - Consider moving patient to room near nurses station  Outcome: Progressing  Goal: Maintain or return to baseline ADL function  Description: INTERVENTIONS:  -  Assess patient's ability to carry out ADLs; assess patient's baseline for ADL function and identify physical deficits which impact ability to perform ADLs (bathing, care of mouth/teeth, toileting, grooming, dressing, etc )  - Assess/evaluate cause of self-care deficits   - Assess range of motion  - Assess patient's mobility; develop plan if impaired  - Assess patient's need for assistive devices and provide as appropriate  - Encourage maximum independence but intervene and supervise when necessary  - Involve family in performance of ADLs  - Assess for home care needs following discharge   - Consider OT consult to assist with ADL evaluation and planning for discharge  - Provide patient education as appropriate  Outcome: Progressing  Goal: Maintains/Returns to pre admission functional level  Description: INTERVENTIONS:  - Perform BMAT or MOVE assessment daily    - Set and communicate daily mobility goal to care team and patient/family/caregiver     - Collaborate with rehabilitation services on mobility goals if consulted  - Record patient progress and toleration of activity level   Outcome: Progressing     Problem: DISCHARGE PLANNING  Goal: Discharge to home or other facility with appropriate resources  Description: INTERVENTIONS:  - Identify barriers to discharge w/patient and caregiver  - Arrange for needed discharge resources and transportation as appropriate  - Identify discharge learning needs (meds, wound care, etc )  - Arrange for interpretive services to assist at discharge as needed  - Refer to Case Management Department for coordinating discharge planning if the patient needs post-hospital services based on physician/advanced practitioner order or complex needs related to functional status, cognitive ability, or social support system  Outcome: Progressing     Problem: Knowledge Deficit  Goal: Patient/family/caregiver demonstrates understanding of disease process, treatment plan, medications, and discharge instructions  Description: Complete learning assessment and assess knowledge base    Interventions:  - Provide teaching at level of understanding  - Provide teaching via preferred learning methods  Outcome: Progressing

## 2021-07-03 NOTE — ASSESSMENT & PLAN NOTE
· Noted on CT chest     · When compared to previous CT chest, patient has moderate pericardial effusion since February  · repeat echocardiogramdemonstrates improved pericardial infusion from prior per discussion with Dr Jessica Capps  Pt does have pain under the left lower ribs tender to palpation   · Will obtain uric acid, sed rate and crp   - Limited study   LEFT VENTRICLE:  Systolic function was normal  Ejection fraction was estimated to be 60 %  There was hypokinesis of the basal inferior wall(s)  Wall thickness was mildly increased  TRICUSPID VALVE:  There was mild regurgitation  PERICARDIUM:  A small to moderate pericardial effusion was identified posterior to the heart  There was no evidence of hemodynamic compromise    · Bedside ultrasound in the ER did not reveal tamponade

## 2021-07-03 NOTE — ASSESSMENT & PLAN NOTE
Patient presented with hypothermia, bradycardia, chest pain, with mildly elevated procal in setting of ckd   · Patient was treated with [place] Hugger with improvement of the temperature  · Patient was also given 500 cc of bolus fluid, bradycardia improved  · She is started on Rocephin in ER - increased to cefepime   · CT scan chest abdomen pelvis shows right lower lobe pneumonia and thickening of the bladder  · Pt was placed on cefepime in setting of suspected pna - with no urinary symptoms UA - negative   · blood culture in process ,   · COVID-19 negative   · Will obtain sed rate uric acid crp    As well as lower extremity duplex dt bl left greater leg pain pt reports giving out last night

## 2021-07-03 NOTE — ASSESSMENT & PLAN NOTE
Lab Results   Component Value Date    HGBA1C 11 7 (H) 03/02/2021       Recent Labs     07/02/21  1814 07/02/21  2131 07/03/21  0644 07/03/21  1028   POCGLU 324* 224* 75 174*       Blood Sugar Average: Last 72 hrs:  (P) 199 25 uncontrolled  Patient takes NovoLog 70/30 10 units b i d  However she missed her dose this morning therefore presented with blood sugar of 350  · Started on Lantus 15 units at bedtime and Humalog 10 units t i d  with meals with sliding scale    · Will reduce lantus to 10 units and reduce humalog to 5 units tid due to bs in the low 70s

## 2021-07-03 NOTE — ED ATTENDING ATTESTATION
7/2/2021  I, Nasrin Diaz MD, saw and evaluated the patient  I have discussed the patient with the resident/non-physician practitioner and agree with the resident's/non-physician practitioner's findings, Plan of Care, and MDM as documented in the resident's/non-physician practitioner's note, except where noted  All available labs and Radiology studies were reviewed  I was present for key portions of any procedure(s) performed by the resident/non-physician practitioner and I was immediately available to provide assistance  At this point I agree with the current assessment done in the Emergency Department  I have conducted an independent evaluation of this patient a history and physical is as follows:    OA: 79 y/o f with h/o CHF, CAD, IDDM, CKD who presents with acute onset of left sided CP, abdominal pain with radiation to her L arm and leg  + associated nausea  sxms awoke her from sleep at approximately 4 am this morning  Sxms intensified prompting family to call EMS  ASA and NTG given prior to arrival with no improvement  Otherwise denies vomiting, fevers/chills  Normal BM  No recent illnesses  PE, unwell appearing f, cool to the touch, borderline BP on arrival and bradycardic, pt found to be hypothermic with a rectal temp of 93 1, placed on jose de jesus hugger, NC/AT, pale and dry MM, neck supple/FROM, bradycardic, decreased BS thorughout, abd soft, + mil dttp over epigastric and LUQ, intact sensation x 4, intact strength x 4, symmetric face, clear speech  A/p concern for infectious v cardiopulmonary cause, continue Jose De Jesus hugger for improvement in temp and HR, labs, imaging, IVF hydration, treat accordingly  Admit       ED Course      Critical Care Time  CriticalCare Time  Performed by: Nasrin Diaz MD  Authorized by: Nasrin Diaz MD     Critical care provider statement:     Critical care time (minutes):  32    Critical care time was exclusive of:  Separately billable procedures and treating other patients and teaching time    Critical care was necessary to treat or prevent imminent or life-threatening deterioration of the following conditions:  Sepsis and metabolic crisis    Critical care was time spent personally by me on the following activities:  Blood draw for specimens, obtaining history from patient or surrogate, ordering and performing treatments and interventions, ordering and review of laboratory studies, ordering and review of radiographic studies, re-evaluation of patient's condition, review of old charts and discussions with primary provider

## 2021-07-04 ENCOUNTER — APPOINTMENT (INPATIENT)
Dept: NON INVASIVE DIAGNOSTICS | Facility: HOSPITAL | Age: 66
DRG: 194 | End: 2021-07-04
Payer: COMMERCIAL

## 2021-07-04 LAB
ALBUMIN SERPL BCP-MCNC: 2.4 G/DL (ref 3.5–5)
ALP SERPL-CCNC: 544 U/L (ref 46–116)
ALT SERPL W P-5'-P-CCNC: 55 U/L (ref 12–78)
ANION GAP SERPL CALCULATED.3IONS-SCNC: 7 MMOL/L (ref 4–13)
AST SERPL W P-5'-P-CCNC: 44 U/L (ref 5–45)
ATRIAL RATE: 51 BPM
BACTERIA UR QL AUTO: ABNORMAL /HPF
BILIRUB SERPL-MCNC: 0.34 MG/DL (ref 0.2–1)
BILIRUB UR QL STRIP: NEGATIVE
BUN SERPL-MCNC: 80 MG/DL (ref 5–25)
CALCIUM ALBUM COR SERPL-MCNC: 9.9 MG/DL (ref 8.3–10.1)
CALCIUM SERPL-MCNC: 8.6 MG/DL (ref 8.3–10.1)
CHLORIDE SERPL-SCNC: 105 MMOL/L (ref 100–108)
CLARITY UR: CLEAR
CO2 SERPL-SCNC: 23 MMOL/L (ref 21–32)
COLOR UR: YELLOW
CREAT SERPL-MCNC: 3.25 MG/DL (ref 0.6–1.3)
GFR SERPL CREATININE-BSD FRML MDRD: 14 ML/MIN/1.73SQ M
GLUCOSE SERPL-MCNC: 116 MG/DL (ref 65–140)
GLUCOSE SERPL-MCNC: 136 MG/DL (ref 65–140)
GLUCOSE SERPL-MCNC: 169 MG/DL (ref 65–140)
GLUCOSE SERPL-MCNC: 172 MG/DL (ref 65–140)
GLUCOSE SERPL-MCNC: 232 MG/DL (ref 65–140)
GLUCOSE UR STRIP-MCNC: NEGATIVE MG/DL
HGB UR QL STRIP.AUTO: NEGATIVE
HYALINE CASTS #/AREA URNS LPF: ABNORMAL /LPF
KETONES UR STRIP-MCNC: NEGATIVE MG/DL
LEUKOCYTE ESTERASE UR QL STRIP: NEGATIVE
LIPASE SERPL-CCNC: 122 U/L (ref 73–393)
MRSA NOSE QL CULT: NORMAL
NITRITE UR QL STRIP: NEGATIVE
NON-SQ EPI CELLS URNS QL MICRO: ABNORMAL /HPF
P AXIS: 65 DEGREES
PH UR STRIP.AUTO: 5 [PH]
POTASSIUM SERPL-SCNC: 4.5 MMOL/L (ref 3.5–5.3)
PR INTERVAL: 172 MS
PROT SERPL-MCNC: 6.5 G/DL (ref 6.4–8.2)
PROT UR STRIP-MCNC: ABNORMAL MG/DL
QRS AXIS: 0 DEGREES
QRSD INTERVAL: 96 MS
QT INTERVAL: 474 MS
QTC INTERVAL: 436 MS
RBC #/AREA URNS AUTO: ABNORMAL /HPF
SODIUM SERPL-SCNC: 135 MMOL/L (ref 136–145)
SP GR UR STRIP.AUTO: 1.01 (ref 1–1.03)
T WAVE AXIS: 101 DEGREES
UROBILINOGEN UR QL STRIP.AUTO: 0.2 E.U./DL
VENTRICULAR RATE: 51 BPM
WBC #/AREA URNS AUTO: ABNORMAL /HPF

## 2021-07-04 PROCEDURE — 99232 SBSQ HOSP IP/OBS MODERATE 35: CPT | Performed by: NURSE PRACTITIONER

## 2021-07-04 PROCEDURE — 83690 ASSAY OF LIPASE: CPT | Performed by: NURSE PRACTITIONER

## 2021-07-04 PROCEDURE — 81001 URINALYSIS AUTO W/SCOPE: CPT | Performed by: NURSE PRACTITIONER

## 2021-07-04 PROCEDURE — 93970 EXTREMITY STUDY: CPT

## 2021-07-04 PROCEDURE — 82948 REAGENT STRIP/BLOOD GLUCOSE: CPT

## 2021-07-04 PROCEDURE — 93005 ELECTROCARDIOGRAM TRACING: CPT

## 2021-07-04 PROCEDURE — 93010 ELECTROCARDIOGRAM REPORT: CPT | Performed by: INTERNAL MEDICINE

## 2021-07-04 PROCEDURE — 80053 COMPREHEN METABOLIC PANEL: CPT | Performed by: NURSE PRACTITIONER

## 2021-07-04 PROCEDURE — 93970 EXTREMITY STUDY: CPT | Performed by: SURGERY

## 2021-07-04 RX ORDER — COLCHICINE 0.6 MG/1
0.6 TABLET ORAL DAILY
Status: DISCONTINUED | OUTPATIENT
Start: 2021-07-04 | End: 2021-07-04

## 2021-07-04 RX ORDER — COLCHICINE 0.6 MG/1
0.6 TABLET ORAL 2 TIMES DAILY
Status: DISCONTINUED | OUTPATIENT
Start: 2021-07-04 | End: 2021-07-05 | Stop reason: HOSPADM

## 2021-07-04 RX ADMIN — LEVOTHYROXINE SODIUM 125 MCG: 125 TABLET ORAL at 05:10

## 2021-07-04 RX ADMIN — ISOSORBIDE MONONITRATE 60 MG: 60 TABLET, EXTENDED RELEASE ORAL at 08:12

## 2021-07-04 RX ADMIN — INSULIN GLARGINE 10 UNITS: 100 INJECTION, SOLUTION SUBCUTANEOUS at 21:17

## 2021-07-04 RX ADMIN — HYDRALAZINE HYDROCHLORIDE 100 MG: 50 TABLET, FILM COATED ORAL at 08:12

## 2021-07-04 RX ADMIN — TORSEMIDE 20 MG: 20 TABLET ORAL at 08:12

## 2021-07-04 RX ADMIN — INSULIN LISPRO 1 UNITS: 100 INJECTION, SOLUTION INTRAVENOUS; SUBCUTANEOUS at 11:16

## 2021-07-04 RX ADMIN — CALCIUM ACETATE 667 MG: 667 CAPSULE ORAL at 07:38

## 2021-07-04 RX ADMIN — ATORVASTATIN CALCIUM 40 MG: 40 TABLET, FILM COATED ORAL at 17:36

## 2021-07-04 RX ADMIN — HYDRALAZINE HYDROCHLORIDE 100 MG: 50 TABLET, FILM COATED ORAL at 21:17

## 2021-07-04 RX ADMIN — CALCIUM ACETATE 667 MG: 667 CAPSULE ORAL at 17:36

## 2021-07-04 RX ADMIN — AMLODIPINE BESYLATE 10 MG: 10 TABLET ORAL at 21:17

## 2021-07-04 RX ADMIN — CARVEDILOL 12.5 MG: 12.5 TABLET, FILM COATED ORAL at 17:36

## 2021-07-04 RX ADMIN — HYDRALAZINE HYDROCHLORIDE 100 MG: 50 TABLET, FILM COATED ORAL at 17:36

## 2021-07-04 RX ADMIN — CARVEDILOL 12.5 MG: 12.5 TABLET, FILM COATED ORAL at 07:38

## 2021-07-04 RX ADMIN — TORSEMIDE 20 MG: 20 TABLET ORAL at 17:36

## 2021-07-04 NOTE — ASSESSMENT & PLAN NOTE
Wt Readings from Last 3 Encounters:   07/02/21 71 8 kg (158 lb 4 6 oz)   05/14/21 75 7 kg (166 lb 14 4 oz)   05/03/21 75 3 kg (166 lb)     · Patient has chronically elevated proBNP  · No acute exacerbation noted clinically  · Continue home medication carvedilol and Imdur  · Continue torsemide 20 mg b i d   · Echocardiogram completed dt ct findings of pericardial effusion: SUMMARY:        Limited study          LEFT VENTRICLE:  Systolic function was normal  Ejection fraction was estimated to be 60 %  There was hypokinesis of the basal inferior wall(s)  Wall thickness was mildly increased    TRICUSPID VALVE:  · There was mild regurgitation  · PERICARDIUM:A small to moderate pericardial effusion was identified posterior to the heart  There was no evidence of hemodynamic compromise

## 2021-07-04 NOTE — ASSESSMENT & PLAN NOTE
Lab Results   Component Value Date    HGBA1C 11 7 (H) 03/02/2021       Recent Labs     07/03/21  1028 07/03/21  1601 07/03/21 2052 07/04/21  0609   POCGLU 174* 65 229* 136       Blood Sugar Average: Last 72 hrs:  (P) 175 [id number] uncontrolled  Patient takes NovoLog 70/30 10 units b i d  However she missed her dose this morning therefore presented with blood sugar of 350  · Started on Lantus 15 units at bedtime and Humalog 10 units t i d  with meals with sliding scale    · Will reduce lantus to 10 units and reduce humalog to 5 units tid due to bs in the low 70s

## 2021-07-04 NOTE — ASSESSMENT & PLAN NOTE
· Patient presented with chest pain that has resolved since    · EKG no acute changes  · Troponin negative  · Placed on telemetry monitor, no findings will dc today

## 2021-07-04 NOTE — PROGRESS NOTES
[address] [facility]  Progress Note - Nicolette Antunez 1955, 78 y o  female MRN: [id number]  Unit/Bed#: CW2 210-02 Encounter: [id number]  Primary Care Provider: Norma York MD   Date and time admitted to hospital: 7/2/2021 10:19 AM    * SIRS (systemic inflammatory response syndrome) [facility])  Assessment & Plan  Patient presented with hypothermia, bradycardia, chest pain, with mildly elevated procal in setting of ckd   · Patient was treated with Valencia Hugger with improvement of the temperature  · Patient was also given 500 cc of bolus fluid, bradycardia improved  · She is started on Rocephin in ER - increased to cefepime   · CT scan chest abdomen pelvis shows right lower lobe pneumonia and thickening of the bladder  · Pt was placed on cefepime in setting of suspected pna - with no urinary symptoms UA - negative   · blood culture in process ,   · COVID-19 negative   · Will obtain sed rate 61 uric acid 11 4  crp 9 5   ·  As well as lower extremity duplex dt bl left greater leg pain pt reports giving out last night     Pericardial effusion  Assessment & Plan  · Noted on CT chest     · When compared to previous CT chest, patient has moderate pericardial effusion since February  · repeat echocardiogramdemonstrates improved pericardial infusion from prior per discussion with Dr Raad Velez  Pt does have pain under the left lower ribs tender to palpation   · Will obtain uric acid, sed rate and crp   - Limited study   LEFT VENTRICLE:  Systolic function was normal  Ejection fraction was estimated to be 60 %  There was hypokinesis of the basal inferior wall(s)  Wall thickness was mildly increased  TRICUSPID VALVE:  There was mild regurgitation  PERICARDIUM:  A small to moderate pericardial effusion was identified posterior to the heart  There was no evidence of hemodynamic compromise    · Bedside ultrasound in the ER did not reveal tamponade    RLL pneumonia  Assessment & Plan  · Clinically does not present as pna  Pt has no leukocytosis/no sob, cough or difficulty breathing/no lactic acid/ negative legionella, negative strep pneumo/ negative covid  · Positive hypothermia poa/bradycardia/ elevated procal in setting of CKD   · Ct chest wo contrast: There is patchy opacity in the right lower lobe of the lung, most pronounced within the superior segment of the right lower lobe of the lung   The appearance suggests pneumonia   Short-term follow-up after a course of treatment is recommended in order to   ensure complete radiographic resolution  There is a moderate-sized pericardial effusion, measuring up to 1 9 cm   Echocardiography is suggested for further evaluation  The liver is enlarged   The surface contour of the liver appears nodular   Clinical and laboratory correlation regarding the possibility of cirrhosis is suggested  The urinary bladder wall appears thickened, however, evaluation is limited by underdistention   Correlation with urinalysis and urine culture and sensitivity is recommended  Mild cardiomegaly   Coronary artery disease   Prior coronary artery stenting   Extensive atherosclerosis  Other nonemergent findings, as described above   Please see discussion  · Was started on broad-spectrum antibiotics cefepime however clinically I do not feel pt has pneumonia will stop iv abx and based on lab work and exam suspicion for possible pericarditis although symptoms resolving   · Will administer colchicine tonight 0 6   · Uric acid 11 4  · sedrate 61  · c reactive protein 9 5    Hyponatremia  Assessment & Plan  · Pseudohyponatremia noted secondary to hyperglycemia  · Corrected sodium is 135-137    · wnl range     Stage 4 chronic kidney disease [facility])  Assessment & Plan  Lab Results   Component Value Date    EGFR 17 07/03/2021    EGFR 14 07/02/2021    EGFR 15 03/15/2021    CREATININE 2 78 (H) 07/03/2021    CREATININE 3 27 (H) 07/02/2021    CREATININE 3 18 (H) 03/15/2021   Baseline seems to be 3 1-3 4  Currently at baseline    Coronary artery disease involving native coronary artery of native heart with angina pectoris [facility])  Assessment & Plan  Patient presented with chest pain that has resolved since  EKG no acute changes  Troponin negative  Placed on telemetry monitor  Trend troponin    Type 2 diabetes mellitus with kidney complication, with long-term current use of insulin [facility])  Assessment & Plan  Lab Results   Component Value Date    HGBA1C 11 7 (H) 03/02/2021       Recent Labs     07/03/21  1028 07/03/21  1601 07/03/21  2052 07/04/21  0609   POCGLU 174* 65 229* 136       Blood Sugar Average: Last 72 hrs:  (P) 175 [id number] uncontrolled  Patient takes NovoLog 70/30 10 units b i d  However she missed her dose this morning therefore presented with blood sugar of 350  · Started on Lantus 15 units at bedtime and Humalog 10 units t i d  with meals with sliding scale  · Will reduce lantus to 10 units and reduce humalog to 5 units tid due to bs in the low 70s     Chronic combined systolic and diastolic congestive heart failure [facility])  Assessment & Plan  Wt Readings from Last 3 Encounters:   07/02/21 71 8 kg (158 lb 4 6 oz)   05/14/21 75 7 kg (166 lb 14 4 oz)   05/03/21 75 3 kg (166 lb)     · Patient has chronically elevated proBNP  · No acute exacerbation noted clinically  · Continue home medication carvedilol and Imdur  · Continue torsemide 20 mg b i d   · Echocardiogram completed dt ct findings of pericardial effusion: SUMMARY:        Limited study          LEFT VENTRICLE:  Systolic function was normal  Ejection fraction was estimated to be 60 %  There was hypokinesis of the basal inferior wall(s)  Wall thickness was mildly increased    TRICUSPID VALVE:  · There was mild regurgitation  · PERICARDIUM:A small to moderate pericardial effusion was identified posterior to the heart  There was no evidence of hemodynamic compromise            VTE Pharmacologic Prophylaxis:   High Risk (Score >/= 5) - Pharmacological DVT Prophylaxis Ordered: heparin  Sequential Compression Devices Ordered  Patient Centered Rounds: I performed bedside rounds with nursing staff today  Discussions with Specialists or Other Care Team Provider: nursing     Education and Discussions with Family / Patient: Patient declined call to   Time Spent for Care: 45 minutes  More than 50% of total time spent on counseling and coordination of care as described above  Current Length of Stay: 2 day(s)  Current Patient Status: Inpatient   Certification Statement: The patient will continue to require additional inpatient hospital stay due to ongoing treatment   Discharge Plan: Anticipate discharge tomorrow to home  Code Status: Level 1 - Full Code    Subjective:   Pt is doing better today still with some pain in left upper quadrant  She is able to move more comfortably  No sob no cough no sob with ambulation no chest pain  Pt has no abdominal pain but still with some tenderness in left upper quadrant   Legs bl with improved pain   Objective:     Vitals:   Temp (24hrs), Av °F (36 7 °C), Min:97 8 °F (36 6 °C), Max:98 3 °F (36 8 °C)    Temp:  [97 8 °F (36 6 °C)-98 3 °F (36 8 °C)] 97 8 °F (36 6 °C)  Resp:  [18] 18  BP: (135-139)/(54-58) 138/56  SpO2:  [95 %-97 %] 95 %  Body mass index is 26 34 kg/m²  Input and Output Summary (last 24 hours): Intake/Output Summary (Last 24 hours) at 2021 1717  Last data filed at 2021 1437  Gross per 24 hour   Intake 180 ml   Output 700 ml   Net -520 ml       Physical Exam:   Physical Exam  Constitutional:       General: She is not in acute distress  Appearance: She is obese  She is not ill-appearing, toxic-appearing or diaphoretic  HENT:      Head: Normocephalic and atraumatic  Mouth/Throat:      Pharynx: Oropharynx is clear  Eyes:      General:         Right eye: No discharge  Left eye: No discharge        Conjunctiva/sclera: Conjunctivae normal    Cardiovascular:      Rate and Rhythm: Normal rate  Heart sounds: Murmur heard  No friction rub  No gallop  Pulmonary:      Effort: No respiratory distress  Breath sounds: No stridor  No wheezing, rhonchi or rales  Chest:      Chest wall: No tenderness  Abdominal:      General: There is no distension  Palpations: There is no mass  Tenderness: There is no abdominal tenderness (luq  with tenderness on palpation )  There is no right CVA tenderness, left CVA tenderness, guarding or rebound  Hernia: No hernia is present  Musculoskeletal:         General: No swelling, tenderness, deformity or signs of injury  Right lower leg: No edema  Left lower leg: No edema  Skin:     Coloration: Skin is not jaundiced or pale  Findings: No bruising, erythema, lesion or rash  Neurological:      Mental Status: She is alert and oriented to person, place, and time  Psychiatric:         Behavior: Behavior normal           Additional Data:     Labs:  Results from last 7 days   Lab Units 07/03/21  0505 07/02/21  1050 07/02/21  1035   WBC Thousand/uL 9 36  --  8 17   HEMOGLOBIN g/dL 9 1*  --  9 5*   HEMATOCRIT % 28 5*  --  29 6*   PLATELETS Thousands/uL 171   < > 161   NEUTROS PCT %  --   --  89*   LYMPHS PCT %  --   --  8*   MONOS PCT %  --   --  2*   EOS PCT %  --   --  0    < > = values in this interval not displayed       Results from last 7 days   Lab Units 07/04/21  0940   SODIUM mmol/L 135*   POTASSIUM mmol/L 4 5   CHLORIDE mmol/L 105   CO2 mmol/L 23   BUN mg/dL 80*   CREATININE mg/dL 3 25*   ANION GAP mmol/L 7   CALCIUM mg/dL 8 6   ALBUMIN g/dL 2 4*   TOTAL BILIRUBIN mg/dL 0 34   ALK PHOS U/L 544*   ALT U/L 55   AST U/L 44   GLUCOSE RANDOM mg/dL 232*     Results from last 7 days   Lab Units 07/02/21  1035   INR  1 10     Results from last 7 days   Lab Units 07/04/21  1608 07/04/21  1100 07/04/21  0609 07/03/21  2052 07/03/21  1601 07/03/21  1028 07/03/21  5104 07/02/21  2131 07/02/21  1814   POC GLUCOSE mg/dl 116 169* 136 229* 65 174* 75 224* 324*         Results from last 7 days   Lab Units 07/03/21  0505 07/02/21  1051   LACTIC ACID mmol/L  --  1 3   PROCALCITONIN ng/ml 0 59* 0 47*       Lines/Drains:  Invasive Devices     Peripheral Intravenous Line            Peripheral IV 07/02/21 Left Antecubital 2 days    Peripheral IV 07/02/21 Right Forearm 2 days                  Telemetry:  Telemetry Orders (From admission, onward)             48 Hour Telemetry Monitoring  Continuous x 48 hours     Expiring   Question:  Reason for 48 Hour Telemetry  Answer:  Arrhythmias Requiring Medical Therapy (eg  SVT, Vtach/fib, Bradycardia, Uncontrolled A-fib)                 Telemetry Reviewed: Normal Sinus Rhythm  Indication for Continued Telemetry Use: No indication for continued use  Will discontinue  Imaging: No pertinent imaging reviewed  pending duplex bl     Recent Cultures (last 7 days):   Results from last 7 days   Lab Units 07/02/21  2315 07/02/21  1819   BLOOD CULTURE   --  No Growth at 24 hrs  No Growth at 24 hrs     LEGIONELLA URINARY ANTIGEN  Negative  --        Last 24 Hours Medication List:   Current Facility-Administered Medications   Medication Dose Route Frequency Provider Last Rate    acetaminophen  650 mg Oral Q6H PRN Marleni Gomez MD      aluminum-magnesium hydroxide-simethicone  30 mL Oral Q6H PRN Marleni Gomez MD      amLODIPine  10 mg Oral HS Marleni Gomez MD      atorvastatin  40 mg Oral Daily With Una Garcia MD      calcium acetate  667 mg Oral BID With Zohra Valerio MD      carvedilol  12 5 mg Oral BID With Meals Marleni Gomez MD      colchicine  0 6 mg Oral BID Loree Krabbe, CRNP      heparin (porcine)  5,000 Units Subcutaneous Q8H [address], MD      hydrALAZINE  100 mg Oral TID Marleni Gomez MD      insulin glargine  10 Units Subcutaneous HS Loree Krabbe, CRNP      insulin lispro  1-6 Units Subcutaneous TID KAEL Serrano MD Damon      insulin lispro  5 Units Subcutaneous TID With Meals CLARICE Mars      isosorbide mononitrate  60 mg Oral Daily Nunu Palomares MD      levothyroxine  125 mcg Oral Early Morning Nunu Palomares MD      ondansetron  4 mg Intravenous Q6H PRN Nunu Palomares MD      torsemide  20 mg Oral BID Nunu Palomares MD          Today, Patient Was Seen By: CLARICE Mars    **Please Note: This note may have been constructed using a voice recognition system  **

## 2021-07-04 NOTE — ASSESSMENT & PLAN NOTE
· Clinically does not present as pna  Pt has no leukocytosis/no sob, cough or difficulty breathing/no lactic acid/ negative legionella, negative strep pneumo/ negative covid  · Positive hypothermia poa/bradycardia/ elevated procal in setting of CKD   · Ct chest wo contrast: There is patchy opacity in the right lower lobe of the lung, most pronounced within the superior segment of the right lower lobe of the lung   The appearance suggests pneumonia   Short-term follow-up after a course of treatment is recommended in order to   ensure complete radiographic resolution  There is a moderate-sized pericardial effusion, measuring up to 1 9 cm   Echocardiography is suggested for further evaluation  The liver is enlarged   The surface contour of the liver appears nodular   Clinical and laboratory correlation regarding the possibility of cirrhosis is suggested  The urinary bladder wall appears thickened, however, evaluation is limited by underdistention   Correlation with urinalysis and urine culture and sensitivity is recommended  Mild cardiomegaly   Coronary artery disease   Prior coronary artery stenting   Extensive atherosclerosis  Other nonemergent findings, as described above   Please see discussion    · Was started on broad-spectrum antibiotics cefepime however clinically I do not feel pt has pneumonia will stop iv abx and based on lab work and exam suspicion for possible pericarditis although symptoms resolving   · Will administer colchicine tonight 0 6   · Uric acid 11 4  · sedrate 61  · c reactive protein 9 5

## 2021-07-04 NOTE — ASSESSMENT & PLAN NOTE
Lab Results   Component Value Date    EGFR 17 07/03/2021    EGFR 14 07/02/2021    EGFR 15 03/15/2021    CREATININE 2 78 (H) 07/03/2021    CREATININE 3 27 (H) 07/02/2021    CREATININE 3 18 (H) 03/15/2021   · Baseline seems to be 3 1-3 4  · Currently at baseline  · Increased today 3 25  · Will need to follow up outpt with nephrology   · Follow up with Dr Lisa Carroll at discharge

## 2021-07-04 NOTE — ASSESSMENT & PLAN NOTE
· Noted on CT chest     · When compared to previous CT chest, patient has moderate pericardial effusion since February  · repeat echocardiogramdemonstrates improved pericardial infusion from prior per discussion with Dr Darío De Jesus  Pt does have pain under the left lower ribs tender to palpation   · Elevated uric acid, sed rate and crp   - Limited study   LEFT VENTRICLE:  Systolic function was normal  Ejection fraction was estimated to be 60 %  There was hypokinesis of the basal inferior wall(s)  Wall thickness was mildly increased  TRICUSPID VALVE:  There was mild regurgitation  PERICARDIUM:  A small to moderate pericardial effusion was identified posterior to the heart  There was no evidence of hemodynamic compromise    · Bedside ultrasound in the ER did not reveal tamponade

## 2021-07-04 NOTE — ASSESSMENT & PLAN NOTE
Patient presented with hypothermia, bradycardia, chest pain, with mildly elevated procal in setting of ckd   · Patient was treated with Valencia Hugger with improvement of the temperature  · Patient was also given 500 cc of bolus fluid, bradycardia improved  · She is started on Rocephin in ER - increased to cefepime   · CT scan chest abdomen pelvis shows right lower lobe pneumonia and thickening of the bladder     · Pt was placed on cefepime in setting of suspected pna - with no urinary symptoms UA - negative   · blood culture in process ,   · COVID-19 negative   · Will obtain sed rate 61 uric acid 11 4  crp 9 5   ·  As well as lower extremity duplex dt bl left greater leg pain pt reports giving out last night

## 2021-07-05 VITALS
HEIGHT: 65 IN | RESPIRATION RATE: 16 BRPM | OXYGEN SATURATION: 99 % | HEART RATE: 63 BPM | SYSTOLIC BLOOD PRESSURE: 130 MMHG | WEIGHT: 157.7 LBS | BODY MASS INDEX: 26.27 KG/M2 | TEMPERATURE: 98.2 F | DIASTOLIC BLOOD PRESSURE: 61 MMHG

## 2021-07-05 PROBLEM — J18.9 RLL PNEUMONIA: Status: RESOLVED | Noted: 2021-07-02 | Resolved: 2021-07-05

## 2021-07-05 PROBLEM — R65.10 SIRS (SYSTEMIC INFLAMMATORY RESPONSE SYNDROME) (HCC): Status: RESOLVED | Noted: 2021-07-02 | Resolved: 2021-07-05

## 2021-07-05 PROBLEM — E87.1 HYPONATREMIA: Status: RESOLVED | Noted: 2021-07-02 | Resolved: 2021-07-05

## 2021-07-05 LAB
ALBUMIN SERPL BCP-MCNC: 2.9 G/DL (ref 3.5–5)
ALP SERPL-CCNC: 672 U/L (ref 46–116)
ALT SERPL W P-5'-P-CCNC: 68 U/L (ref 12–78)
ANION GAP SERPL CALCULATED.3IONS-SCNC: 5 MMOL/L (ref 4–13)
AST SERPL W P-5'-P-CCNC: 67 U/L (ref 5–45)
BASOPHILS # BLD AUTO: 0.03 THOUSANDS/ÂΜL (ref 0–0.1)
BASOPHILS NFR BLD AUTO: 1 % (ref 0–1)
BILIRUB SERPL-MCNC: 0.34 MG/DL (ref 0.2–1)
BUN SERPL-MCNC: 83 MG/DL (ref 5–25)
CALCIUM ALBUM COR SERPL-MCNC: 10.3 MG/DL (ref 8.3–10.1)
CALCIUM SERPL-MCNC: 9.4 MG/DL (ref 8.3–10.1)
CHLORIDE SERPL-SCNC: 105 MMOL/L (ref 100–108)
CO2 SERPL-SCNC: 26 MMOL/L (ref 21–32)
CORTIS SERPL-MCNC: 16.9 UG/DL
CREAT SERPL-MCNC: 3.64 MG/DL (ref 0.6–1.3)
EOSINOPHIL # BLD AUTO: 0.12 THOUSAND/ÂΜL (ref 0–0.61)
EOSINOPHIL NFR BLD AUTO: 2 % (ref 0–6)
ERYTHROCYTE [DISTWIDTH] IN BLOOD BY AUTOMATED COUNT: 12.4 % (ref 11.6–15.1)
GFR SERPL CREATININE-BSD FRML MDRD: 12 ML/MIN/1.73SQ M
GLUCOSE SERPL-MCNC: 102 MG/DL (ref 65–140)
GLUCOSE SERPL-MCNC: 108 MG/DL (ref 65–140)
GLUCOSE SERPL-MCNC: 298 MG/DL (ref 65–140)
HCT VFR BLD AUTO: 30 % (ref 34.8–46.1)
HGB BLD-MCNC: 9.3 G/DL (ref 11.5–15.4)
IMM GRANULOCYTES # BLD AUTO: 0.02 THOUSAND/UL (ref 0–0.2)
IMM GRANULOCYTES NFR BLD AUTO: 0 % (ref 0–2)
LYMPHOCYTES # BLD AUTO: 1.23 THOUSANDS/ÂΜL (ref 0.6–4.47)
LYMPHOCYTES NFR BLD AUTO: 23 % (ref 14–44)
MCH RBC QN AUTO: 27.4 PG (ref 26.8–34.3)
MCHC RBC AUTO-ENTMCNC: 31 G/DL (ref 31.4–37.4)
MCV RBC AUTO: 88 FL (ref 82–98)
MONOCYTES # BLD AUTO: 0.4 THOUSAND/ÂΜL (ref 0.17–1.22)
MONOCYTES NFR BLD AUTO: 7 % (ref 4–12)
NEUTROPHILS # BLD AUTO: 3.63 THOUSANDS/ÂΜL (ref 1.85–7.62)
NEUTS SEG NFR BLD AUTO: 67 % (ref 43–75)
NRBC BLD AUTO-RTO: 0 /100 WBCS
PLATELET # BLD AUTO: 138 THOUSANDS/UL (ref 149–390)
PMV BLD AUTO: 11.4 FL (ref 8.9–12.7)
POTASSIUM SERPL-SCNC: 4.4 MMOL/L (ref 3.5–5.3)
PROT SERPL-MCNC: 7.4 G/DL (ref 6.4–8.2)
RBC # BLD AUTO: 3.4 MILLION/UL (ref 3.81–5.12)
SODIUM SERPL-SCNC: 136 MMOL/L (ref 136–145)
WBC # BLD AUTO: 5.43 THOUSAND/UL (ref 4.31–10.16)

## 2021-07-05 PROCEDURE — 82948 REAGENT STRIP/BLOOD GLUCOSE: CPT

## 2021-07-05 PROCEDURE — 99239 HOSP IP/OBS DSCHRG MGMT >30: CPT | Performed by: NURSE PRACTITIONER

## 2021-07-05 PROCEDURE — 82533 TOTAL CORTISOL: CPT | Performed by: NURSE PRACTITIONER

## 2021-07-05 PROCEDURE — 80053 COMPREHEN METABOLIC PANEL: CPT | Performed by: NURSE PRACTITIONER

## 2021-07-05 PROCEDURE — 85025 COMPLETE CBC W/AUTO DIFF WBC: CPT | Performed by: NURSE PRACTITIONER

## 2021-07-05 RX ADMIN — ISOSORBIDE MONONITRATE 60 MG: 60 TABLET, EXTENDED RELEASE ORAL at 09:40

## 2021-07-05 RX ADMIN — CALCIUM ACETATE 667 MG: 667 CAPSULE ORAL at 09:34

## 2021-07-05 RX ADMIN — INSULIN LISPRO 4 UNITS: 100 INJECTION, SOLUTION INTRAVENOUS; SUBCUTANEOUS at 11:43

## 2021-07-05 RX ADMIN — LEVOTHYROXINE SODIUM 125 MCG: 125 TABLET ORAL at 05:56

## 2021-07-05 RX ADMIN — TORSEMIDE 20 MG: 20 TABLET ORAL at 09:40

## 2021-07-05 RX ADMIN — HYDRALAZINE HYDROCHLORIDE 100 MG: 50 TABLET, FILM COATED ORAL at 09:40

## 2021-07-05 RX ADMIN — CARVEDILOL 12.5 MG: 12.5 TABLET, FILM COATED ORAL at 09:40

## 2021-07-05 RX ADMIN — COLCHICINE 0.6 MG: 0.6 TABLET ORAL at 09:41

## 2021-07-05 NOTE — DISCHARGE SUMMARY
Discharge Summary - [facility]    Patient Information: Mignon Mendoza 78 y o  female MRN: [id number]  Unit/Bed#: CW2 210-02 Encounter: [id number]    Discharging Physician / Practitioner: CLARICE Perez  PCP: Walker Lazar MD  Admission Date: 7/2/2021  Discharge Date: 07/05/21    Reason for Admission:  Chest pain, hypothermia    Discharge Diagnoses:     Principal Problem:    SIRS (systemic inflammatory response syndrome) ([facility] )  Active Problems:    Chronic combined systolic and diastolic congestive heart failure ([facility] )    Coronary artery disease involving native coronary artery of native heart with angina pectoris ([place] [id number] )    Type 2 diabetes mellitus with kidney complication, with long-term current use of insulin ([facility])    RLL pneumonia    Pericardial effusion    Stage 4 chronic kidney disease ([place] [address] )    Hyponatremia  Resolved Problems:    * No resolved hospital problems  *      Consultations During Hospital Stay:  · None    Procedures Performed:     · None    Significant Findings / Test Results:     · CT chest abdomen pelvis without contrast There is patchy opacity in the right lower lobe of the lung, most pronounced within the superior segment of the right lower lobe of the lung  The appearance suggests pneumonia  Short-term follow-up after a course of treatment is recommended in order to ensure complete radiographic resolution  There is a moderate-sized pericardial effusion, measuring up to 1 9 cm  Echocardiography is suggested for further evaluation  The liver is enlarged  The surface contour of the liver appears nodular  Clinical and laboratory correlation regarding the possibility of cirrhosis is suggested  The urinary bladder wall appears thickened, however, evaluation is limited by underdistention  Correlation with urinalysis and urine culture and sensitivity is recommended  Mild cardiomegaly  Coronary artery disease  Prior coronary artery stenting    Extensive atherosclerosis  · Echocardiogram EF 60%  Mild regurgitation of tricuspid valve  Small to moderate pericardial effusion without evidence of hemodynamic compromise  · Lower extremity venous duplex negative  · Creatinine on day of discharge 3 64   · Hepatitis screening negative  · Urinalysis unremarkable  · Urine strep and Legionella negative  · COVID-19 PCR negative  · Blood cultures negative at 48 hours  · Lipase neck    Incidental Findings:   · None     Test Results Pending at Discharge (will require follow up): · Endpoints of blood cultures     Outpatient Tests Requested:  · Outpatient follow-up with PCP within 1 week  · BMP on Thursday with results to primary nephrologist followed by outpatient follow-up    Complications:  None    Hospital Course:     Jose Wray is a 78 y o  female patient with past medical history of CKD stage 4, CAD, type 2 diabetes, chronic systolic and diastolic congestive heart failure, who originally presented to the hospital on 7/2/2021 due to left-sided chest pain radiating towards left side of abdomen characterized as burning  On arrival to the ED patient was noted to be hypothermic and bradycardic, she improved with fluids and Valencia Hugger  CT chest abdomen pelvis was concerning for right lower lobe pneumonia and patient was started on intravenous antibiotics however patient has no clinical signs or symptoms of pneumonia  No fevers, white blood cell count  Antibiotics were stopped and patient was monitored off  Infectious workup was negative  Patient was noted to have a moderate pericardial effusion noted on CT imaging therefore echocardiogram was performed which showed small to moderate pericardial effusion without hemodynamic compromise  There was some consideration of pericarditis however patient's symptoms improved without intervention  Medically stable for discharge home today    Her renal function is trending up however still within relative baseline with her known history of CKD stage 4  Will repeat BMP on Thursday with results to her primary nephrologist   Given abnormal CT scan, would recommend repeat CT chest without contrast in 1 month  This was discussed with patient and can be ordered by PCP  Condition at Discharge: stable     Discharge Day Visit / Exam:     Subjective:  Patient offers no acute complaints  Left upper quadrant pain has completely resolved  No shortness of breath, palpitations  Feels well  Vitals: Blood Pressure: 130/61 (07/05/21 0654)  Pulse: 63 (07/05/21 0654)  Temperature: 98 2 °F (36 8 °C) (07/05/21 0654)  Temp Source: Oral (07/05/21 0654)  Respirations: 16 (07/05/21 0654)  Height: 5' 5" (165 1 cm) (07/02/21 2300)  Weight - Scale: 71 5 kg (157 lb 11 2 oz) (07/05/21 0600)  SpO2: 99 % (07/05/21 0654)     Exam:   Physical Exam  Vitals and nursing note reviewed  Cardiovascular:      Rate and Rhythm: Normal rate  Heart sounds: No murmur heard  No friction rub  Pulmonary:      Breath sounds: Decreased breath sounds present  Abdominal:      Tenderness: There is no abdominal tenderness  Musculoskeletal:         General: No swelling  Skin:     General: Skin is warm  Neurological:      Mental Status: She is alert and oriented to person, place, and time  Mental status is at baseline  Psychiatric:         Mood and Affect: Mood normal          Discussion with Family:  Patient  Refused family update  Discharge instructions/Information to patient and family:   See after visit summary for information provided to patient and family  Provisions for Follow-Up Care:  See after visit summary for information related to follow-up care and any pertinent home health orders  Disposition:     Home    For Discharges to [facility] SNF:   · Not Applicable to this Patient - Not Applicable to this Patient    Planned Readmission: no     Discharge Statement:  I spent 40 minutes discharging the patient   This time was spent on the day of discharge  I had direct contact with the patient on the day of discharge  Greater than 50% of the total time was spent examining patient, answering all patient questions, arranging and discussing plan of care with patient as well as directly providing post-discharge instructions  Additional time then spent on discharge activities  Discharge Medications:  See after visit summary for reconciled discharge medications provided to patient and family        ** Please Note: This note has been constructed using a voice recognition system **

## 2021-07-06 LAB — RYE IGE QN: NEGATIVE

## 2021-07-08 LAB
BACTERIA BLD CULT: NORMAL
BACTERIA BLD CULT: NORMAL

## 2021-07-10 NOTE — UTILIZATION REVIEW
14[address] [facility]  26[address] [facility]  Ryan Jc   [phone]     Tax ID: [id number]         NPI: [id number]  Michelle David [facility]#[id number] (OJX:[id number]) (NND:[birth year] 78 y o  F) (Adm: 21 1019) Inpatient    BE CW2-CW2 210-CW2            Demographics  Comment          Last edited by  on  at      Address: Home Phone: Work Phone: Mobile Phone:       [facility] [address]   [address] [facility] [address] [phone]      SSN: Insurance: Marital Status: Rastafari:        [address] [facility] REP /Civil [address] [facility] Account [de-identified]     CVG-F/O Precert Number 1300 [employer] Phone Authorization Number     1  [address] [facility] [address]PO  [address] REP    EXT [id number]     2   Shaheen Bagley 950             Primary Visit Coverage (Effective 2021)        Payer Plan Group Number Group Name     254 [facility] [address] [facility]  [address] REP [id number]                 Primary Visit Coverage Subscriber        Subscriber Name Subscriber ID Subscriber  Subscriber WINDY Nath [id number]                 Secondary Visit Coverage (Effective 4/15/2021)        Payer Plan Group Number Group Name     Shaheen João Kevyn [id number]                  Secondary Visit Coverage Subscriber        Subscriber Name Subscriber ID Subscriber  Subscriber JUAQUINN   Yajaira Nath [id number] 1955                 Admission Information      Current Information      Attending Provider Admitting Provider Admission Type Admission Status      Precious Perez MD Emergency Confirmed Discharge              Admission Date/Time Discharge Date/Time Hospital Service Auth/Cert Status       10:19 AM 07/05/21  12:08 PM Hospitalist Incomplete     [facility] Room/Bed Referring Provider     1425 [facility] GO1 CW2 210/CW2 210-02               Point of Origin        Home/Work/Non-Health Care Facilty         Diagnosis      Chest pain       Discharge Disposition Discharge Destination     Home/Self Care Home                Attending NPI#    Goran Lopes Md [[id number]]        Patient Diagnoses      Reasons for Admission Coded Admission Diagnoses      *Chest pain [R07 9]      Sepsis ([address] ) [A41 9]     Inpatient Admission Authorization Request   NOTIFICATION OF INPATIENT ADMISSION/INPATIENT AUTHORIZATION REQUEST   SERVICING FACILITY:   [facility]  Address: [address] [place] [address], [address] [place] [address]  Tax ID: [id number]  NPI: [id number]  Place of Service: Inpatient [address] [place] [address] Code: 24     ATTENDING PROVIDER:  Attending Name and NPI#: Goran Lopes Md [[id number]]  Address: [address] [place] [address], [address] [place] [address]  Phone: [phone]     UTILIZATION REVIEW CONTACT:  Yaritza Hidalgo Utilization   Network Utilization Review Department  Phone: [phone]  Fax: [phone]  Email: [email or web address]     PHYSICIAN ADVISORY SERVICES:  FOR WCVA-WE-IFLN REVIEW - MEDICAL NECESSITY DENIAL  Phone: [phone]  Fax: [phone]  Email: [email or web address]     TYPE OF REQUEST:  Inpatient Status     ADMISSION INFORMATION:  ADMISSION DATE/TIME: 7/2/21  4:38 PM  PATIENT DIAGNOSIS CODE/DESCRIPTION:  Chest pain [R07 9]  Sepsis ([address] ) [A41 9]  DISCHARGE DATE/TIME: 7/5/2021 12:08 PM  DISCHARGE DISPOSITION (IF DISCHARGED): Home/Self Care     IMPORTANT INFORMATION:  Please contact the Yaritza Hidalgo directly with any questions or concerns regarding this request  Department voicemails are confidential     Send requests for admission clinical reviews, concurrent reviews, approvals, and administrative denials due to lack of clinical to fax [phone]

## 2021-07-10 NOTE — UTILIZATION REVIEW
Notification of Discharge   This is a Notification of Discharge from our facility [address]  Please be advised that this patient has been discharge from our facility  Below you will find the admission and discharge date and time including the patients disposition  UTILIZATION REVIEW CONTACT:  Zohra Hendrickson  Utilization   Network Utilization Review Department  Phone: [phone] x carefully listen to the prompts  All voicemails are confidential   Email: [email or web address]     PHYSICIAN ADVISORY SERVICES:  FOR ZODP-UJ-VSEZ REVIEW - MEDICAL NECESSITY DENIAL  Phone: [phone]  Fax: [phone]  Email: [email or web address]     PRESENTATION DATE: 7/2/2021 10:19 AM  OBERVATION ADMISSION DATE:   INPATIENT ADMISSION DATE: 7/2/21  4:38 PM   DISCHARGE DATE: 7/5/2021 12:08 PM  DISPOSITION: Home/Self Care Home/Self Care      IMPORTANT INFORMATION:  Send all requests for admission clinical reviews, approved or denied determinations and any other requests to dedicated fax number below belonging to the campus where the patient is receiving treatment   List of dedicated fax numbers:  [address] DENIALS (Administrative/Medical Necessity) [phone]   [address] (Maternity/NICU/Pediatrics) [phone]   [facility] [phone]   Karlene Osman [phone]   Soledad Rodriguez [phone]   Oscar [address] [phone]   [facility]  [phone]   2207 [facility]  2401 [facility] 1000 [address] [facility] [phone]

## 2021-07-30 ENCOUNTER — TELEPHONE (OUTPATIENT)
Dept: NEPHROLOGY | Facility: CLINIC | Age: 66
End: 2021-07-30

## 2021-07-30 NOTE — TELEPHONE ENCOUNTER
Patient called asking to reschedule to the end of the month of august, no appointments are available and the patient decided to cancled her appointment on 8/4 and asked to be put on the wait-list

## 2021-08-26 ENCOUNTER — APPOINTMENT (OUTPATIENT)
Dept: LAB | Facility: HOSPITAL | Age: 66
End: 2021-08-26
Attending: INTERNAL MEDICINE
Payer: COMMERCIAL

## 2021-08-26 DIAGNOSIS — I50.43 ACUTE ON CHRONIC COMBINED SYSTOLIC AND DIASTOLIC CONGESTIVE HEART FAILURE (HCC): ICD-10-CM

## 2021-08-26 DIAGNOSIS — R80.1 PERSISTENT PROTEINURIA: ICD-10-CM

## 2021-08-26 DIAGNOSIS — I10 ESSENTIAL HYPERTENSION: ICD-10-CM

## 2021-08-26 DIAGNOSIS — N18.4 ACUTE RENAL FAILURE SUPERIMPOSED ON STAGE 4 CHRONIC KIDNEY DISEASE, UNSPECIFIED ACUTE RENAL FAILURE TYPE (HCC): ICD-10-CM

## 2021-08-26 DIAGNOSIS — N17.9 ACUTE RENAL FAILURE SUPERIMPOSED ON STAGE 4 CHRONIC KIDNEY DISEASE, UNSPECIFIED ACUTE RENAL FAILURE TYPE (HCC): ICD-10-CM

## 2021-08-26 DIAGNOSIS — I16.0 HYPERTENSIVE URGENCY: ICD-10-CM

## 2021-08-26 LAB
ALBUMIN SERPL BCP-MCNC: 2.9 G/DL (ref 3.5–5)
ANION GAP SERPL CALCULATED.3IONS-SCNC: 4 MMOL/L (ref 4–13)
BACTERIA UR QL AUTO: ABNORMAL /HPF
BASOPHILS # BLD AUTO: 0.04 THOUSANDS/ΜL (ref 0–0.1)
BASOPHILS NFR BLD AUTO: 1 % (ref 0–1)
BILIRUB UR QL STRIP: NEGATIVE
BUN SERPL-MCNC: 60 MG/DL (ref 5–25)
CALCIUM ALBUM COR SERPL-MCNC: 10 MG/DL (ref 8.3–10.1)
CALCIUM SERPL-MCNC: 9.1 MG/DL (ref 8.3–10.1)
CHLORIDE SERPL-SCNC: 107 MMOL/L (ref 100–108)
CLARITY UR: ABNORMAL
CO2 SERPL-SCNC: 24 MMOL/L (ref 21–32)
COLOR UR: YELLOW
CREAT SERPL-MCNC: 2.95 MG/DL (ref 0.6–1.3)
CREAT UR-MCNC: 129 MG/DL
EOSINOPHIL # BLD AUTO: 0.24 THOUSAND/ΜL (ref 0–0.61)
EOSINOPHIL NFR BLD AUTO: 4 % (ref 0–6)
ERYTHROCYTE [DISTWIDTH] IN BLOOD BY AUTOMATED COUNT: 13.4 % (ref 11.6–15.1)
GFR SERPL CREATININE-BSD FRML MDRD: 16 ML/MIN/1.73SQ M
GLUCOSE P FAST SERPL-MCNC: 188 MG/DL (ref 65–99)
GLUCOSE UR STRIP-MCNC: ABNORMAL MG/DL
HCT VFR BLD AUTO: 31.1 % (ref 34.8–46.1)
HGB BLD-MCNC: 9.7 G/DL (ref 11.5–15.4)
HGB UR QL STRIP.AUTO: NEGATIVE
HYALINE CASTS #/AREA URNS LPF: ABNORMAL /LPF
IMM GRANULOCYTES # BLD AUTO: 0.06 THOUSAND/UL (ref 0–0.2)
IMM GRANULOCYTES NFR BLD AUTO: 1 % (ref 0–2)
KETONES UR STRIP-MCNC: NEGATIVE MG/DL
LEUKOCYTE ESTERASE UR QL STRIP: NEGATIVE
LYMPHOCYTES # BLD AUTO: 1.07 THOUSANDS/ΜL (ref 0.6–4.47)
LYMPHOCYTES NFR BLD AUTO: 17 % (ref 14–44)
MAGNESIUM SERPL-MCNC: 2.1 MG/DL (ref 1.6–2.6)
MCH RBC QN AUTO: 27.6 PG (ref 26.8–34.3)
MCHC RBC AUTO-ENTMCNC: 31.2 G/DL (ref 31.4–37.4)
MCV RBC AUTO: 88 FL (ref 82–98)
MONOCYTES # BLD AUTO: 0.27 THOUSAND/ΜL (ref 0.17–1.22)
MONOCYTES NFR BLD AUTO: 4 % (ref 4–12)
NEUTROPHILS # BLD AUTO: 4.79 THOUSANDS/ΜL (ref 1.85–7.62)
NEUTS SEG NFR BLD AUTO: 73 % (ref 43–75)
NITRITE UR QL STRIP: NEGATIVE
NON-SQ EPI CELLS URNS QL MICRO: ABNORMAL /HPF
NRBC BLD AUTO-RTO: 0 /100 WBCS
PH UR STRIP.AUTO: 5 [PH]
PHOSPHATE SERPL-MCNC: 3.6 MG/DL (ref 2.3–4.1)
PLATELET # BLD AUTO: 168 THOUSANDS/UL (ref 149–390)
PMV BLD AUTO: 11 FL (ref 8.9–12.7)
POTASSIUM SERPL-SCNC: 4.4 MMOL/L (ref 3.5–5.3)
PROT UR STRIP-MCNC: ABNORMAL MG/DL
PROT UR-MCNC: 194 MG/DL
PROT/CREAT UR: 1.5 MG/G{CREAT} (ref 0–0.1)
PTH-INTACT SERPL-MCNC: 145.8 PG/ML (ref 18.4–80.1)
RBC # BLD AUTO: 3.52 MILLION/UL (ref 3.81–5.12)
RBC #/AREA URNS AUTO: ABNORMAL /HPF
SODIUM SERPL-SCNC: 135 MMOL/L (ref 136–145)
SP GR UR STRIP.AUTO: 1.01 (ref 1–1.03)
URATE SERPL-MCNC: 10.4 MG/DL (ref 2–6.8)
UROBILINOGEN UR QL STRIP.AUTO: 0.2 E.U./DL
WBC # BLD AUTO: 6.47 THOUSAND/UL (ref 4.31–10.16)
WBC #/AREA URNS AUTO: ABNORMAL /HPF

## 2021-08-26 PROCEDURE — 82570 ASSAY OF URINE CREATININE: CPT | Performed by: INTERNAL MEDICINE

## 2021-08-26 PROCEDURE — 83735 ASSAY OF MAGNESIUM: CPT

## 2021-08-26 PROCEDURE — 84156 ASSAY OF PROTEIN URINE: CPT | Performed by: INTERNAL MEDICINE

## 2021-08-26 PROCEDURE — 36415 COLL VENOUS BLD VENIPUNCTURE: CPT

## 2021-08-26 PROCEDURE — 81001 URINALYSIS AUTO W/SCOPE: CPT | Performed by: INTERNAL MEDICINE

## 2021-08-26 PROCEDURE — 83970 ASSAY OF PARATHORMONE: CPT

## 2021-08-26 PROCEDURE — 84550 ASSAY OF BLOOD/URIC ACID: CPT

## 2021-08-26 PROCEDURE — 80069 RENAL FUNCTION PANEL: CPT

## 2021-08-26 PROCEDURE — 85025 COMPLETE CBC W/AUTO DIFF WBC: CPT

## 2021-08-30 ENCOUNTER — TELEPHONE (OUTPATIENT)
Dept: NEPHROLOGY | Facility: CLINIC | Age: 66
End: 2021-08-30

## 2021-08-30 NOTE — TELEPHONE ENCOUNTER
----- Message from Any Reyes DO sent at 8/30/2021  3:56 PM EDT -----  Labs reviewed renal function is stable no changes to current medication

## 2021-08-30 NOTE — TELEPHONE ENCOUNTER
I called patient and left a message regarding the following:  Labs reviewed renal function is stable no changes to current medication

## 2021-10-07 ENCOUNTER — APPOINTMENT (OUTPATIENT)
Dept: LAB | Facility: HOSPITAL | Age: 66
End: 2021-10-07
Payer: COMMERCIAL

## 2021-10-07 DIAGNOSIS — N18.4 STAGE 4 CHRONIC KIDNEY DISEASE (HCC): ICD-10-CM

## 2021-10-07 DIAGNOSIS — E11.9 TYPE 2 DIABETES MELLITUS WITHOUT COMPLICATION, WITH LONG-TERM CURRENT USE OF INSULIN (HCC): ICD-10-CM

## 2021-10-07 DIAGNOSIS — E03.9 ACQUIRED HYPOTHYROIDISM: ICD-10-CM

## 2021-10-07 DIAGNOSIS — Z79.4 ENCOUNTER FOR LONG-TERM (CURRENT) USE OF INSULIN (HCC): ICD-10-CM

## 2021-10-07 DIAGNOSIS — Z79.4 TYPE 2 DIABETES MELLITUS WITHOUT COMPLICATION, WITH LONG-TERM CURRENT USE OF INSULIN (HCC): ICD-10-CM

## 2021-10-07 LAB
ALBUMIN SERPL BCP-MCNC: 2.9 G/DL (ref 3.5–5)
ALP SERPL-CCNC: 610 U/L (ref 46–116)
ALT SERPL W P-5'-P-CCNC: 45 U/L (ref 12–78)
ANION GAP SERPL CALCULATED.3IONS-SCNC: 8 MMOL/L (ref 4–13)
AST SERPL W P-5'-P-CCNC: 33 U/L (ref 5–45)
BILIRUB SERPL-MCNC: 0.45 MG/DL (ref 0.2–1)
BUN SERPL-MCNC: 51 MG/DL (ref 5–25)
CALCIUM ALBUM COR SERPL-MCNC: 9.8 MG/DL (ref 8.3–10.1)
CALCIUM SERPL-MCNC: 8.9 MG/DL (ref 8.3–10.1)
CHLORIDE SERPL-SCNC: 106 MMOL/L (ref 100–108)
CHOLEST SERPL-MCNC: 227 MG/DL (ref 50–200)
CO2 SERPL-SCNC: 23 MMOL/L (ref 21–32)
CREAT SERPL-MCNC: 2.83 MG/DL (ref 0.6–1.3)
EST. AVERAGE GLUCOSE BLD GHB EST-MCNC: 252 MG/DL
GFR SERPL CREATININE-BSD FRML MDRD: 17 ML/MIN/1.73SQ M
GLUCOSE P FAST SERPL-MCNC: 116 MG/DL (ref 65–99)
HBA1C MFR BLD: 10.4 %
HDLC SERPL-MCNC: 67 MG/DL
LDLC SERPL CALC-MCNC: 131 MG/DL (ref 0–100)
NONHDLC SERPL-MCNC: 160 MG/DL
POTASSIUM SERPL-SCNC: 3.9 MMOL/L (ref 3.5–5.3)
PROT SERPL-MCNC: 7.2 G/DL (ref 6.4–8.2)
SODIUM SERPL-SCNC: 137 MMOL/L (ref 136–145)
T4 FREE SERPL-MCNC: 1.29 NG/DL (ref 0.76–1.46)
TRIGL SERPL-MCNC: 147 MG/DL
TSH SERPL DL<=0.05 MIU/L-ACNC: 16.3 UIU/ML (ref 0.36–3.74)

## 2021-10-07 PROCEDURE — 83036 HEMOGLOBIN GLYCOSYLATED A1C: CPT

## 2021-10-07 PROCEDURE — 80053 COMPREHEN METABOLIC PANEL: CPT

## 2021-10-07 PROCEDURE — 84439 ASSAY OF FREE THYROXINE: CPT

## 2021-10-07 PROCEDURE — 84443 ASSAY THYROID STIM HORMONE: CPT

## 2021-10-07 PROCEDURE — 80061 LIPID PANEL: CPT

## 2021-10-07 PROCEDURE — 36415 COLL VENOUS BLD VENIPUNCTURE: CPT

## 2021-10-13 ENCOUNTER — DOCUMENTATION (OUTPATIENT)
Dept: CARDIOLOGY CLINIC | Facility: CLINIC | Age: 66
End: 2021-10-13

## 2021-10-13 ENCOUNTER — TELEPHONE (OUTPATIENT)
Dept: NEPHROLOGY | Facility: CLINIC | Age: 66
End: 2021-10-13

## 2021-10-13 ENCOUNTER — OFFICE VISIT (OUTPATIENT)
Dept: CARDIOLOGY CLINIC | Facility: CLINIC | Age: 66
End: 2021-10-13
Payer: COMMERCIAL

## 2021-10-13 VITALS
HEART RATE: 60 BPM | OXYGEN SATURATION: 98 % | SYSTOLIC BLOOD PRESSURE: 166 MMHG | WEIGHT: 168.6 LBS | BODY MASS INDEX: 28.09 KG/M2 | DIASTOLIC BLOOD PRESSURE: 70 MMHG | HEIGHT: 65 IN

## 2021-10-13 DIAGNOSIS — I25.10 CORONARY ARTERY DISEASE INVOLVING NATIVE CORONARY ARTERY OF NATIVE HEART WITHOUT ANGINA PECTORIS: ICD-10-CM

## 2021-10-13 DIAGNOSIS — I50.30 DIASTOLIC CONGESTIVE HEART FAILURE, UNSPECIFIED HF CHRONICITY (HCC): Primary | ICD-10-CM

## 2021-10-13 PROCEDURE — 99215 OFFICE O/P EST HI 40 MIN: CPT | Performed by: NURSE PRACTITIONER

## 2021-10-13 RX ORDER — ATORVASTATIN CALCIUM 40 MG/1
80 TABLET, FILM COATED ORAL DAILY
Qty: 90 TABLET | Refills: 3
Start: 2021-10-13 | End: 2021-11-23

## 2021-10-13 RX ORDER — FUROSEMIDE 10 MG/ML
100 INJECTION INTRAMUSCULAR; INTRAVENOUS ONCE
Status: COMPLETED | OUTPATIENT
Start: 2021-10-13 | End: 2021-10-13

## 2021-10-13 RX ADMIN — FUROSEMIDE 100 MG: 10 INJECTION INTRAMUSCULAR; INTRAVENOUS at 11:27

## 2021-10-15 ENCOUNTER — TELEPHONE (OUTPATIENT)
Dept: CARDIOLOGY CLINIC | Facility: CLINIC | Age: 66
End: 2021-10-15

## 2021-10-15 ENCOUNTER — HOSPITAL ENCOUNTER (INPATIENT)
Facility: HOSPITAL | Age: 66
LOS: 5 days | Discharge: HOME | DRG: 682 | End: 2021-10-20
Attending: EMERGENCY MEDICINE | Admitting: FAMILY MEDICINE
Payer: COMMERCIAL

## 2021-10-15 ENCOUNTER — APPOINTMENT (EMERGENCY)
Dept: RADIOLOGY | Facility: HOSPITAL | Age: 66
DRG: 682 | End: 2021-10-15
Payer: COMMERCIAL

## 2021-10-15 DIAGNOSIS — R05.3 COUGH, PERSISTENT: ICD-10-CM

## 2021-10-15 DIAGNOSIS — D64.9 ACUTE ANEMIA: ICD-10-CM

## 2021-10-15 DIAGNOSIS — J18.9 RIGHT MIDDLE LOBE PNEUMONIA: Primary | ICD-10-CM

## 2021-10-15 DIAGNOSIS — N18.4 ACUTE RENAL FAILURE SUPERIMPOSED ON STAGE 4 CHRONIC KIDNEY DISEASE, UNSPECIFIED ACUTE RENAL FAILURE TYPE (HCC): ICD-10-CM

## 2021-10-15 DIAGNOSIS — N18.9 CKD (CHRONIC KIDNEY DISEASE): ICD-10-CM

## 2021-10-15 DIAGNOSIS — R05.9 COUGH: ICD-10-CM

## 2021-10-15 DIAGNOSIS — R07.9 CHEST PAIN, UNSPECIFIED TYPE: ICD-10-CM

## 2021-10-15 DIAGNOSIS — Z86.2 HISTORY OF ANEMIA DUE TO CHRONIC KIDNEY DISEASE: ICD-10-CM

## 2021-10-15 DIAGNOSIS — I50.42 CHRONIC COMBINED SYSTOLIC AND DIASTOLIC CONGESTIVE HEART FAILURE (HCC): ICD-10-CM

## 2021-10-15 DIAGNOSIS — R93.2 ABNORMAL FINDING ON IMAGING OF LIVER: ICD-10-CM

## 2021-10-15 DIAGNOSIS — N17.9 ACUTE RENAL FAILURE SUPERIMPOSED ON STAGE 4 CHRONIC KIDNEY DISEASE, UNSPECIFIED ACUTE RENAL FAILURE TYPE (HCC): ICD-10-CM

## 2021-10-15 DIAGNOSIS — I50.43 ACUTE ON CHRONIC COMBINED SYSTOLIC AND DIASTOLIC CONGESTIVE HEART FAILURE (HCC): ICD-10-CM

## 2021-10-15 DIAGNOSIS — N18.9 HISTORY OF ANEMIA DUE TO CHRONIC KIDNEY DISEASE: ICD-10-CM

## 2021-10-15 DIAGNOSIS — I50.9 ACUTE EXACERBATION OF CHF (CONGESTIVE HEART FAILURE) (HCC): ICD-10-CM

## 2021-10-15 DIAGNOSIS — J02.9 SORE THROAT: ICD-10-CM

## 2021-10-15 DIAGNOSIS — N18.4 STAGE 4 CHRONIC KIDNEY DISEASE (HCC): ICD-10-CM

## 2021-10-15 DIAGNOSIS — R77.8 ELEVATED TROPONIN: ICD-10-CM

## 2021-10-15 PROBLEM — R79.89 ELEVATED TROPONIN: Status: ACTIVE | Noted: 2021-10-15

## 2021-10-15 PROBLEM — J96.01 ACUTE RESPIRATORY FAILURE WITH HYPOXIA (HCC): Status: ACTIVE | Noted: 2021-10-15

## 2021-10-15 LAB
ALBUMIN SERPL BCP-MCNC: 2.7 G/DL (ref 3.5–5)
ALP SERPL-CCNC: 671 U/L (ref 46–116)
ALT SERPL W P-5'-P-CCNC: 56 U/L (ref 12–78)
ANION GAP SERPL CALCULATED.3IONS-SCNC: 6 MMOL/L (ref 4–13)
APTT PPP: 37 SECONDS (ref 23–37)
AST SERPL W P-5'-P-CCNC: 41 U/L (ref 5–45)
BASOPHILS # BLD AUTO: 0.03 THOUSANDS/ΜL (ref 0–0.1)
BASOPHILS NFR BLD AUTO: 1 % (ref 0–1)
BILIRUB SERPL-MCNC: 0.45 MG/DL (ref 0.2–1)
BUN SERPL-MCNC: 61 MG/DL (ref 5–25)
CALCIUM ALBUM COR SERPL-MCNC: 9.6 MG/DL (ref 8.3–10.1)
CALCIUM SERPL-MCNC: 8.6 MG/DL (ref 8.3–10.1)
CHLORIDE SERPL-SCNC: 103 MMOL/L (ref 100–108)
CO2 SERPL-SCNC: 24 MMOL/L (ref 21–32)
CREAT SERPL-MCNC: 3.19 MG/DL (ref 0.6–1.3)
EOSINOPHIL # BLD AUTO: 0.04 THOUSAND/ΜL (ref 0–0.61)
EOSINOPHIL NFR BLD AUTO: 1 % (ref 0–6)
ERYTHROCYTE [DISTWIDTH] IN BLOOD BY AUTOMATED COUNT: 13.3 % (ref 11.6–15.1)
FLUAV RNA RESP QL NAA+PROBE: NEGATIVE
FLUBV RNA RESP QL NAA+PROBE: NEGATIVE
GFR SERPL CREATININE-BSD FRML MDRD: 14 ML/MIN/1.73SQ M
GLUCOSE SERPL-MCNC: 409 MG/DL (ref 65–140)
HCT VFR BLD AUTO: 24.8 % (ref 34.8–46.1)
HGB BLD-MCNC: 7.6 G/DL (ref 11.5–15.4)
IMM GRANULOCYTES # BLD AUTO: 0.05 THOUSAND/UL (ref 0–0.2)
IMM GRANULOCYTES NFR BLD AUTO: 1 % (ref 0–2)
LYMPHOCYTES # BLD AUTO: 0.78 THOUSANDS/ΜL (ref 0.6–4.47)
LYMPHOCYTES NFR BLD AUTO: 13 % (ref 14–44)
MCH RBC QN AUTO: 27.7 PG (ref 26.8–34.3)
MCHC RBC AUTO-ENTMCNC: 30.6 G/DL (ref 31.4–37.4)
MCV RBC AUTO: 91 FL (ref 82–98)
MONOCYTES # BLD AUTO: 0.48 THOUSAND/ΜL (ref 0.17–1.22)
MONOCYTES NFR BLD AUTO: 8 % (ref 4–12)
NEUTROPHILS # BLD AUTO: 4.49 THOUSANDS/ΜL (ref 1.85–7.62)
NEUTS SEG NFR BLD AUTO: 76 % (ref 43–75)
NRBC BLD AUTO-RTO: 0 /100 WBCS
NT-PROBNP SERPL-MCNC: ABNORMAL PG/ML
PLATELET # BLD AUTO: 125 THOUSANDS/UL (ref 149–390)
PMV BLD AUTO: 10.9 FL (ref 8.9–12.7)
POTASSIUM SERPL-SCNC: 4.5 MMOL/L (ref 3.5–5.3)
PROT SERPL-MCNC: 7 G/DL (ref 6.4–8.2)
RBC # BLD AUTO: 2.74 MILLION/UL (ref 3.81–5.12)
RSV RNA RESP QL NAA+PROBE: NEGATIVE
SARS-COV-2 RNA RESP QL NAA+PROBE: NEGATIVE
SODIUM SERPL-SCNC: 133 MMOL/L (ref 136–145)
TROPONIN I SERPL-MCNC: 0.1 NG/ML
WBC # BLD AUTO: 5.87 THOUSAND/UL (ref 4.31–10.16)

## 2021-10-15 PROCEDURE — 93005 ELECTROCARDIOGRAM TRACING: CPT

## 2021-10-15 PROCEDURE — 84484 ASSAY OF TROPONIN QUANT: CPT | Performed by: EMERGENCY MEDICINE

## 2021-10-15 PROCEDURE — 36415 COLL VENOUS BLD VENIPUNCTURE: CPT | Performed by: EMERGENCY MEDICINE

## 2021-10-15 PROCEDURE — 85025 COMPLETE CBC W/AUTO DIFF WBC: CPT | Performed by: EMERGENCY MEDICINE

## 2021-10-15 PROCEDURE — 96375 TX/PRO/DX INJ NEW DRUG ADDON: CPT

## 2021-10-15 PROCEDURE — 99285 EMERGENCY DEPT VISIT HI MDM: CPT | Performed by: EMERGENCY MEDICINE

## 2021-10-15 PROCEDURE — 83880 ASSAY OF NATRIURETIC PEPTIDE: CPT | Performed by: EMERGENCY MEDICINE

## 2021-10-15 PROCEDURE — 80053 COMPREHEN METABOLIC PANEL: CPT | Performed by: EMERGENCY MEDICINE

## 2021-10-15 PROCEDURE — 96365 THER/PROPH/DIAG IV INF INIT: CPT

## 2021-10-15 PROCEDURE — 99223 1ST HOSP IP/OBS HIGH 75: CPT | Performed by: FAMILY MEDICINE

## 2021-10-15 PROCEDURE — 85730 THROMBOPLASTIN TIME PARTIAL: CPT | Performed by: EMERGENCY MEDICINE

## 2021-10-15 PROCEDURE — 71045 X-RAY EXAM CHEST 1 VIEW: CPT

## 2021-10-15 PROCEDURE — 99285 EMERGENCY DEPT VISIT HI MDM: CPT

## 2021-10-15 PROCEDURE — 0241U HB NFCT DS VIR RESP RNA 4 TRGT: CPT | Performed by: EMERGENCY MEDICINE

## 2021-10-15 RX ORDER — DOCUSATE SODIUM 100 MG/1
100 CAPSULE, LIQUID FILLED ORAL 2 TIMES DAILY
Status: DISCONTINUED | OUTPATIENT
Start: 2021-10-16 | End: 2021-10-20 | Stop reason: HOSPADM

## 2021-10-15 RX ORDER — LEVOTHYROXINE SODIUM 0.12 MG/1
125 TABLET ORAL DAILY
Status: DISCONTINUED | OUTPATIENT
Start: 2021-10-16 | End: 2021-10-20 | Stop reason: HOSPADM

## 2021-10-15 RX ORDER — FUROSEMIDE 10 MG/ML
40 INJECTION INTRAMUSCULAR; INTRAVENOUS ONCE
Status: DISCONTINUED | OUTPATIENT
Start: 2021-10-15 | End: 2021-10-16

## 2021-10-15 RX ORDER — ASPIRIN 81 MG/1
81 TABLET, CHEWABLE ORAL DAILY
Status: DISCONTINUED | OUTPATIENT
Start: 2021-10-16 | End: 2021-10-20 | Stop reason: HOSPADM

## 2021-10-15 RX ORDER — AZITHROMYCIN 250 MG/1
500 TABLET, FILM COATED ORAL EVERY 24 HOURS
Status: DISCONTINUED | OUTPATIENT
Start: 2021-10-16 | End: 2021-10-18

## 2021-10-15 RX ORDER — AMLODIPINE BESYLATE 10 MG/1
10 TABLET ORAL
Status: DISCONTINUED | OUTPATIENT
Start: 2021-10-15 | End: 2021-10-20 | Stop reason: HOSPADM

## 2021-10-15 RX ORDER — CARVEDILOL 12.5 MG/1
12.5 TABLET ORAL 2 TIMES DAILY WITH MEALS
Status: DISCONTINUED | OUTPATIENT
Start: 2021-10-16 | End: 2021-10-20 | Stop reason: HOSPADM

## 2021-10-15 RX ORDER — ISOSORBIDE MONONITRATE 60 MG/1
60 TABLET, EXTENDED RELEASE ORAL DAILY
Status: DISCONTINUED | OUTPATIENT
Start: 2021-10-16 | End: 2021-10-18

## 2021-10-15 RX ORDER — HEPARIN SODIUM 5000 [USP'U]/ML
5000 INJECTION, SOLUTION INTRAVENOUS; SUBCUTANEOUS EVERY 8 HOURS SCHEDULED
Status: DISCONTINUED | OUTPATIENT
Start: 2021-10-15 | End: 2021-10-20 | Stop reason: HOSPADM

## 2021-10-15 RX ORDER — CALCIUM ACETATE 667 MG/1
667 CAPSULE ORAL 2 TIMES DAILY WITH MEALS
Status: DISCONTINUED | OUTPATIENT
Start: 2021-10-16 | End: 2021-10-20 | Stop reason: HOSPADM

## 2021-10-15 RX ORDER — GUAIFENESIN 600 MG
600 TABLET, EXTENDED RELEASE 12 HR ORAL 2 TIMES DAILY
Status: DISCONTINUED | OUTPATIENT
Start: 2021-10-16 | End: 2021-10-20 | Stop reason: HOSPADM

## 2021-10-15 RX ORDER — ATORVASTATIN CALCIUM 80 MG/1
80 TABLET, FILM COATED ORAL DAILY
Status: DISCONTINUED | OUTPATIENT
Start: 2021-10-16 | End: 2021-10-20 | Stop reason: HOSPADM

## 2021-10-15 RX ORDER — LEVALBUTEROL INHALATION SOLUTION 0.63 MG/3ML
0.63 SOLUTION RESPIRATORY (INHALATION) EVERY 8 HOURS PRN
Status: DISCONTINUED | OUTPATIENT
Start: 2021-10-15 | End: 2021-10-16

## 2021-10-15 RX ORDER — HYDRALAZINE HYDROCHLORIDE 50 MG/1
100 TABLET, FILM COATED ORAL 3 TIMES DAILY
Status: DISCONTINUED | OUTPATIENT
Start: 2021-10-15 | End: 2021-10-20 | Stop reason: HOSPADM

## 2021-10-15 RX ORDER — ACETAMINOPHEN 325 MG/1
650 TABLET ORAL EVERY 6 HOURS PRN
Status: DISCONTINUED | OUTPATIENT
Start: 2021-10-15 | End: 2021-10-20 | Stop reason: HOSPADM

## 2021-10-15 RX ORDER — INSULIN ASPART 100 [IU]/ML
10 INJECTION, SUSPENSION SUBCUTANEOUS
Status: DISCONTINUED | OUTPATIENT
Start: 2021-10-16 | End: 2021-10-18

## 2021-10-15 RX ORDER — ONDANSETRON 2 MG/ML
4 INJECTION INTRAMUSCULAR; INTRAVENOUS EVERY 6 HOURS PRN
Status: DISCONTINUED | OUTPATIENT
Start: 2021-10-15 | End: 2021-10-20 | Stop reason: HOSPADM

## 2021-10-15 RX ADMIN — AZITHROMYCIN 500 MG: 500 INJECTION, POWDER, LYOPHILIZED, FOR SOLUTION INTRAVENOUS at 18:26

## 2021-10-15 RX ADMIN — CEFTRIAXONE SODIUM 1000 MG: 10 INJECTION, POWDER, FOR SOLUTION INTRAVENOUS at 17:58

## 2021-10-16 LAB
ANION GAP SERPL CALCULATED.3IONS-SCNC: 8 MMOL/L (ref 4–13)
BUN SERPL-MCNC: 61 MG/DL (ref 5–25)
CALCIUM SERPL-MCNC: 9 MG/DL (ref 8.3–10.1)
CHLORIDE SERPL-SCNC: 107 MMOL/L (ref 100–108)
CHOLEST SERPL-MCNC: 177 MG/DL (ref 50–200)
CO2 SERPL-SCNC: 22 MMOL/L (ref 21–32)
CREAT SERPL-MCNC: 2.95 MG/DL (ref 0.6–1.3)
ERYTHROCYTE [DISTWIDTH] IN BLOOD BY AUTOMATED COUNT: 13.2 % (ref 11.6–15.1)
GFR SERPL CREATININE-BSD FRML MDRD: 16 ML/MIN/1.73SQ M
GLUCOSE SERPL-MCNC: 138 MG/DL (ref 65–140)
GLUCOSE SERPL-MCNC: 147 MG/DL (ref 65–140)
GLUCOSE SERPL-MCNC: 157 MG/DL (ref 65–140)
GLUCOSE SERPL-MCNC: 232 MG/DL (ref 65–140)
GLUCOSE SERPL-MCNC: 246 MG/DL (ref 65–140)
HCT VFR BLD AUTO: 26.1 % (ref 34.8–46.1)
HDLC SERPL-MCNC: 57 MG/DL
HGB BLD-MCNC: 8 G/DL (ref 11.5–15.4)
LDLC SERPL CALC-MCNC: 94 MG/DL (ref 0–100)
LDLC SERPL DIRECT ASSAY-MCNC: 85 MG/DL (ref 0–100)
MCH RBC QN AUTO: 28.1 PG (ref 26.8–34.3)
MCHC RBC AUTO-ENTMCNC: 30.7 G/DL (ref 31.4–37.4)
MCV RBC AUTO: 92 FL (ref 82–98)
NONHDLC SERPL-MCNC: 120 MG/DL
PLATELET # BLD AUTO: 148 THOUSANDS/UL (ref 149–390)
PMV BLD AUTO: 10.9 FL (ref 8.9–12.7)
POTASSIUM SERPL-SCNC: 4.3 MMOL/L (ref 3.5–5.3)
PROCALCITONIN SERPL-MCNC: 0.65 NG/ML
RBC # BLD AUTO: 2.85 MILLION/UL (ref 3.81–5.12)
SODIUM SERPL-SCNC: 137 MMOL/L (ref 136–145)
TRIGL SERPL-MCNC: 129 MG/DL
TROPONIN I SERPL-MCNC: 0.07 NG/ML
TROPONIN I SERPL-MCNC: 0.08 NG/ML
TROPONIN I SERPL-MCNC: 0.08 NG/ML
WBC # BLD AUTO: 9.5 THOUSAND/UL (ref 4.31–10.16)

## 2021-10-16 PROCEDURE — 83721 ASSAY OF BLOOD LIPOPROTEIN: CPT | Performed by: STUDENT IN AN ORGANIZED HEALTH CARE EDUCATION/TRAINING PROGRAM

## 2021-10-16 PROCEDURE — 82948 REAGENT STRIP/BLOOD GLUCOSE: CPT

## 2021-10-16 PROCEDURE — 84484 ASSAY OF TROPONIN QUANT: CPT | Performed by: FAMILY MEDICINE

## 2021-10-16 PROCEDURE — 83540 ASSAY OF IRON: CPT | Performed by: INTERNAL MEDICINE

## 2021-10-16 PROCEDURE — 99223 1ST HOSP IP/OBS HIGH 75: CPT | Performed by: INTERNAL MEDICINE

## 2021-10-16 PROCEDURE — 83550 IRON BINDING TEST: CPT | Performed by: INTERNAL MEDICINE

## 2021-10-16 PROCEDURE — 84145 PROCALCITONIN (PCT): CPT | Performed by: FAMILY MEDICINE

## 2021-10-16 PROCEDURE — 80061 LIPID PANEL: CPT | Performed by: STUDENT IN AN ORGANIZED HEALTH CARE EDUCATION/TRAINING PROGRAM

## 2021-10-16 PROCEDURE — 80048 BASIC METABOLIC PNL TOTAL CA: CPT | Performed by: FAMILY MEDICINE

## 2021-10-16 PROCEDURE — 85027 COMPLETE CBC AUTOMATED: CPT | Performed by: FAMILY MEDICINE

## 2021-10-16 PROCEDURE — 99232 SBSQ HOSP IP/OBS MODERATE 35: CPT | Performed by: PHYSICIAN ASSISTANT

## 2021-10-16 RX ORDER — BUMETANIDE 0.25 MG/ML
1 INJECTION, SOLUTION INTRAMUSCULAR; INTRAVENOUS
Status: COMPLETED | OUTPATIENT
Start: 2021-10-16 | End: 2021-10-17

## 2021-10-16 RX ORDER — ALBUTEROL SULFATE 2.5 MG/3ML
2.5 SOLUTION RESPIRATORY (INHALATION) EVERY 4 HOURS PRN
Status: DISCONTINUED | OUTPATIENT
Start: 2021-10-16 | End: 2021-10-19

## 2021-10-16 RX ADMIN — HYDRALAZINE HYDROCHLORIDE 100 MG: 50 TABLET ORAL at 02:18

## 2021-10-16 RX ADMIN — GUAIFENESIN 600 MG: 600 TABLET, EXTENDED RELEASE ORAL at 17:38

## 2021-10-16 RX ADMIN — BUMETANIDE 1 MG: 0.25 INJECTION, SOLUTION INTRAMUSCULAR; INTRAVENOUS at 14:03

## 2021-10-16 RX ADMIN — CEFTRIAXONE 1000 MG: 1 INJECTION, POWDER, FOR SOLUTION INTRAMUSCULAR; INTRAVENOUS at 17:39

## 2021-10-16 RX ADMIN — GUAIFENESIN 600 MG: 600 TABLET, EXTENDED RELEASE ORAL at 08:35

## 2021-10-16 RX ADMIN — INSULIN LISPRO 1 UNITS: 100 INJECTION, SOLUTION INTRAVENOUS; SUBCUTANEOUS at 11:30

## 2021-10-16 RX ADMIN — HYDRALAZINE HYDROCHLORIDE 100 MG: 50 TABLET ORAL at 17:39

## 2021-10-16 RX ADMIN — INSULIN ASPART 10 UNITS: 100 INJECTION, SUSPENSION SUBCUTANEOUS at 06:41

## 2021-10-16 RX ADMIN — INSULIN LISPRO 2 UNITS: 100 INJECTION, SOLUTION INTRAVENOUS; SUBCUTANEOUS at 21:27

## 2021-10-16 RX ADMIN — AZITHROMYCIN MONOHYDRATE 500 MG: 250 TABLET ORAL at 17:38

## 2021-10-16 RX ADMIN — INSULIN LISPRO 3 UNITS: 100 INJECTION, SOLUTION INTRAVENOUS; SUBCUTANEOUS at 08:41

## 2021-10-16 RX ADMIN — CALCIUM ACETATE 667 MG: 667 CAPSULE ORAL at 08:35

## 2021-10-16 RX ADMIN — ISOSORBIDE MONONITRATE 60 MG: 60 TABLET, EXTENDED RELEASE ORAL at 08:35

## 2021-10-16 RX ADMIN — AMLODIPINE BESYLATE 10 MG: 10 TABLET ORAL at 02:18

## 2021-10-16 RX ADMIN — ATORVASTATIN CALCIUM 80 MG: 80 TABLET, FILM COATED ORAL at 08:35

## 2021-10-16 RX ADMIN — CALCIUM ACETATE 667 MG: 667 CAPSULE ORAL at 17:38

## 2021-10-16 RX ADMIN — LEVOTHYROXINE SODIUM 125 MCG: 125 TABLET ORAL at 06:41

## 2021-10-16 RX ADMIN — HYDRALAZINE HYDROCHLORIDE 100 MG: 50 TABLET ORAL at 08:35

## 2021-10-16 RX ADMIN — CARVEDILOL 12.5 MG: 12.5 TABLET, FILM COATED ORAL at 17:38

## 2021-10-16 RX ADMIN — ASPIRIN 81 MG CHEWABLE TABLET 81 MG: 81 TABLET CHEWABLE at 08:35

## 2021-10-16 RX ADMIN — AMLODIPINE BESYLATE 10 MG: 10 TABLET ORAL at 21:27

## 2021-10-16 RX ADMIN — DOCUSATE SODIUM 100 MG: 100 CAPSULE ORAL at 08:35

## 2021-10-16 RX ADMIN — CARVEDILOL 12.5 MG: 12.5 TABLET, FILM COATED ORAL at 08:35

## 2021-10-16 RX ADMIN — INSULIN ASPART 10 UNITS: 100 INJECTION, SUSPENSION SUBCUTANEOUS at 17:39

## 2021-10-16 RX ADMIN — HYDRALAZINE HYDROCHLORIDE 100 MG: 50 TABLET ORAL at 21:27

## 2021-10-17 LAB
ANION GAP SERPL CALCULATED.3IONS-SCNC: 5 MMOL/L (ref 4–13)
BUN SERPL-MCNC: 61 MG/DL (ref 5–25)
CALCIUM SERPL-MCNC: 8.9 MG/DL (ref 8.3–10.1)
CHLORIDE SERPL-SCNC: 106 MMOL/L (ref 100–108)
CO2 SERPL-SCNC: 26 MMOL/L (ref 21–32)
CREAT SERPL-MCNC: 3.25 MG/DL (ref 0.6–1.3)
ERYTHROCYTE [DISTWIDTH] IN BLOOD BY AUTOMATED COUNT: 13.2 % (ref 11.6–15.1)
FERRITIN SERPL-MCNC: 197 NG/ML (ref 8–388)
GFR SERPL CREATININE-BSD FRML MDRD: 14 ML/MIN/1.73SQ M
GLUCOSE SERPL-MCNC: 177 MG/DL (ref 65–140)
GLUCOSE SERPL-MCNC: 199 MG/DL (ref 65–140)
GLUCOSE SERPL-MCNC: 255 MG/DL (ref 65–140)
GLUCOSE SERPL-MCNC: 283 MG/DL (ref 65–140)
GLUCOSE SERPL-MCNC: 80 MG/DL (ref 65–140)
GLUCOSE SERPL-MCNC: 88 MG/DL (ref 65–140)
HCT VFR BLD AUTO: 25.2 % (ref 34.8–46.1)
HGB BLD-MCNC: 7.8 G/DL (ref 11.5–15.4)
MCH RBC QN AUTO: 28.4 PG (ref 26.8–34.3)
MCHC RBC AUTO-ENTMCNC: 31 G/DL (ref 31.4–37.4)
MCV RBC AUTO: 92 FL (ref 82–98)
PLATELET # BLD AUTO: 145 THOUSANDS/UL (ref 149–390)
PMV BLD AUTO: 11 FL (ref 8.9–12.7)
POTASSIUM SERPL-SCNC: 4.2 MMOL/L (ref 3.5–5.3)
PROCALCITONIN SERPL-MCNC: 0.53 NG/ML
RBC # BLD AUTO: 2.75 MILLION/UL (ref 3.81–5.12)
SODIUM SERPL-SCNC: 137 MMOL/L (ref 136–145)
WBC # BLD AUTO: 6.45 THOUSAND/UL (ref 4.31–10.16)

## 2021-10-17 PROCEDURE — 82728 ASSAY OF FERRITIN: CPT | Performed by: INTERNAL MEDICINE

## 2021-10-17 PROCEDURE — 85027 COMPLETE CBC AUTOMATED: CPT | Performed by: PHYSICIAN ASSISTANT

## 2021-10-17 PROCEDURE — 99232 SBSQ HOSP IP/OBS MODERATE 35: CPT | Performed by: PHYSICIAN ASSISTANT

## 2021-10-17 PROCEDURE — 80048 BASIC METABOLIC PNL TOTAL CA: CPT | Performed by: FAMILY MEDICINE

## 2021-10-17 PROCEDURE — 84145 PROCALCITONIN (PCT): CPT | Performed by: FAMILY MEDICINE

## 2021-10-17 PROCEDURE — 99232 SBSQ HOSP IP/OBS MODERATE 35: CPT | Performed by: INTERNAL MEDICINE

## 2021-10-17 PROCEDURE — 82948 REAGENT STRIP/BLOOD GLUCOSE: CPT

## 2021-10-17 RX ORDER — BENZONATATE 100 MG/1
100 CAPSULE ORAL 3 TIMES DAILY PRN
Status: DISCONTINUED | OUTPATIENT
Start: 2021-10-17 | End: 2021-10-20 | Stop reason: HOSPADM

## 2021-10-17 RX ORDER — BENZONATATE 100 MG/1
CAPSULE ORAL
Status: COMPLETED
Start: 2021-10-17 | End: 2021-10-19

## 2021-10-17 RX ORDER — BUMETANIDE 0.25 MG/ML
2 INJECTION, SOLUTION INTRAMUSCULAR; INTRAVENOUS 2 TIMES DAILY
Status: COMPLETED | OUTPATIENT
Start: 2021-10-17 | End: 2021-10-18

## 2021-10-17 RX ADMIN — INSULIN LISPRO 1 UNITS: 100 INJECTION, SOLUTION INTRAVENOUS; SUBCUTANEOUS at 17:13

## 2021-10-17 RX ADMIN — INSULIN ASPART 10 UNITS: 100 INJECTION, SUSPENSION SUBCUTANEOUS at 17:13

## 2021-10-17 RX ADMIN — HYDRALAZINE HYDROCHLORIDE 100 MG: 50 TABLET ORAL at 21:39

## 2021-10-17 RX ADMIN — INSULIN LISPRO 3 UNITS: 100 INJECTION, SOLUTION INTRAVENOUS; SUBCUTANEOUS at 11:03

## 2021-10-17 RX ADMIN — ASPIRIN 81 MG CHEWABLE TABLET 81 MG: 81 TABLET CHEWABLE at 09:13

## 2021-10-17 RX ADMIN — AZITHROMYCIN MONOHYDRATE 500 MG: 250 TABLET ORAL at 17:12

## 2021-10-17 RX ADMIN — CEFTRIAXONE 1000 MG: 1 INJECTION, POWDER, FOR SOLUTION INTRAMUSCULAR; INTRAVENOUS at 17:13

## 2021-10-17 RX ADMIN — BENZONATATE 100 MG: 100 CAPSULE ORAL at 15:56

## 2021-10-17 RX ADMIN — ATORVASTATIN CALCIUM 80 MG: 80 TABLET, FILM COATED ORAL at 09:13

## 2021-10-17 RX ADMIN — ISOSORBIDE MONONITRATE 60 MG: 60 TABLET, EXTENDED RELEASE ORAL at 09:13

## 2021-10-17 RX ADMIN — CARVEDILOL 12.5 MG: 12.5 TABLET, FILM COATED ORAL at 17:12

## 2021-10-17 RX ADMIN — INSULIN LISPRO 1 UNITS: 100 INJECTION, SOLUTION INTRAVENOUS; SUBCUTANEOUS at 21:40

## 2021-10-17 RX ADMIN — HYDRALAZINE HYDROCHLORIDE 100 MG: 50 TABLET ORAL at 17:12

## 2021-10-17 RX ADMIN — GUAIFENESIN 600 MG: 600 TABLET, EXTENDED RELEASE ORAL at 09:14

## 2021-10-17 RX ADMIN — AMLODIPINE BESYLATE 10 MG: 10 TABLET ORAL at 21:39

## 2021-10-17 RX ADMIN — CALCIUM ACETATE 667 MG: 667 CAPSULE ORAL at 09:13

## 2021-10-17 RX ADMIN — CARVEDILOL 12.5 MG: 12.5 TABLET, FILM COATED ORAL at 09:13

## 2021-10-17 RX ADMIN — CALCIUM ACETATE 667 MG: 667 CAPSULE ORAL at 17:12

## 2021-10-17 RX ADMIN — LEVOTHYROXINE SODIUM 125 MCG: 125 TABLET ORAL at 09:13

## 2021-10-17 RX ADMIN — HYDRALAZINE HYDROCHLORIDE 100 MG: 50 TABLET ORAL at 09:13

## 2021-10-17 RX ADMIN — GUAIFENESIN 600 MG: 600 TABLET, EXTENDED RELEASE ORAL at 17:12

## 2021-10-17 RX ADMIN — LEVOTHYROXINE SODIUM 125 MCG: 125 TABLET ORAL at 06:15

## 2021-10-17 RX ADMIN — BUMETANIDE 2 MG: 0.25 INJECTION, SOLUTION INTRAMUSCULAR; INTRAVENOUS at 17:17

## 2021-10-17 RX ADMIN — INSULIN ASPART 10 UNITS: 100 INJECTION, SUSPENSION SUBCUTANEOUS at 09:14

## 2021-10-17 RX ADMIN — BUMETANIDE 1 MG: 0.25 INJECTION, SOLUTION INTRAMUSCULAR; INTRAVENOUS at 11:03

## 2021-10-17 RX ADMIN — DOCUSATE SODIUM 100 MG: 100 CAPSULE ORAL at 09:14

## 2021-10-18 PROBLEM — R93.2 ABNORMAL FINDING ON IMAGING OF LIVER: Status: ACTIVE | Noted: 2021-10-18

## 2021-10-18 PROBLEM — J18.9 PNEUMONIA: Status: RESOLVED | Noted: 2021-02-21 | Resolved: 2021-10-18

## 2021-10-18 LAB
ANION GAP SERPL CALCULATED.3IONS-SCNC: 4 MMOL/L (ref 4–13)
ATRIAL RATE: 72 BPM
BASOPHILS # BLD AUTO: 0.04 THOUSANDS/ΜL (ref 0–0.1)
BASOPHILS NFR BLD AUTO: 1 % (ref 0–1)
BUN SERPL-MCNC: 67 MG/DL (ref 5–25)
CALCIUM SERPL-MCNC: 8.8 MG/DL (ref 8.3–10.1)
CHLORIDE SERPL-SCNC: 107 MMOL/L (ref 100–108)
CO2 SERPL-SCNC: 25 MMOL/L (ref 21–32)
CREAT SERPL-MCNC: 3.36 MG/DL (ref 0.6–1.3)
EOSINOPHIL # BLD AUTO: 0.22 THOUSAND/ΜL (ref 0–0.61)
EOSINOPHIL NFR BLD AUTO: 4 % (ref 0–6)
ERYTHROCYTE [DISTWIDTH] IN BLOOD BY AUTOMATED COUNT: 13.2 % (ref 11.6–15.1)
GFR SERPL CREATININE-BSD FRML MDRD: 14 ML/MIN/1.73SQ M
GLUCOSE SERPL-MCNC: 130 MG/DL (ref 65–140)
GLUCOSE SERPL-MCNC: 151 MG/DL (ref 65–140)
GLUCOSE SERPL-MCNC: 175 MG/DL (ref 65–140)
GLUCOSE SERPL-MCNC: 200 MG/DL (ref 65–140)
GLUCOSE SERPL-MCNC: 81 MG/DL (ref 65–140)
GLUCOSE SERPL-MCNC: 94 MG/DL (ref 65–140)
HCT VFR BLD AUTO: 23.4 % (ref 34.8–46.1)
HGB BLD-MCNC: 7.1 G/DL (ref 11.5–15.4)
IMM GRANULOCYTES # BLD AUTO: 0.03 THOUSAND/UL (ref 0–0.2)
IMM GRANULOCYTES NFR BLD AUTO: 1 % (ref 0–2)
IRON SATN MFR SERPL: 20 % (ref 15–50)
IRON SERPL-MCNC: 46 UG/DL (ref 50–170)
LYMPHOCYTES # BLD AUTO: 1.13 THOUSANDS/ΜL (ref 0.6–4.47)
LYMPHOCYTES NFR BLD AUTO: 19 % (ref 14–44)
MCH RBC QN AUTO: 27.7 PG (ref 26.8–34.3)
MCHC RBC AUTO-ENTMCNC: 30.3 G/DL (ref 31.4–37.4)
MCV RBC AUTO: 91 FL (ref 82–98)
MONOCYTES # BLD AUTO: 0.39 THOUSAND/ΜL (ref 0.17–1.22)
MONOCYTES NFR BLD AUTO: 7 % (ref 4–12)
NEUTROPHILS # BLD AUTO: 4.15 THOUSANDS/ΜL (ref 1.85–7.62)
NEUTS SEG NFR BLD AUTO: 68 % (ref 43–75)
NRBC BLD AUTO-RTO: 0 /100 WBCS
P AXIS: 69 DEGREES
PLATELET # BLD AUTO: 141 THOUSANDS/UL (ref 149–390)
PMV BLD AUTO: 11.4 FL (ref 8.9–12.7)
POTASSIUM SERPL-SCNC: 4.4 MMOL/L (ref 3.5–5.3)
PR INTERVAL: 168 MS
PROCALCITONIN SERPL-MCNC: 0.69 NG/ML
QRS AXIS: 61 DEGREES
QRSD INTERVAL: 94 MS
QT INTERVAL: 404 MS
QTC INTERVAL: 442 MS
RBC # BLD AUTO: 2.56 MILLION/UL (ref 3.81–5.12)
SODIUM SERPL-SCNC: 136 MMOL/L (ref 136–145)
T WAVE AXIS: 112 DEGREES
TIBC SERPL-MCNC: 228 UG/DL (ref 250–450)
VENTRICULAR RATE: 72 BPM
WBC # BLD AUTO: 5.96 THOUSAND/UL (ref 4.31–10.16)

## 2021-10-18 PROCEDURE — 99232 SBSQ HOSP IP/OBS MODERATE 35: CPT | Performed by: INTERNAL MEDICINE

## 2021-10-18 PROCEDURE — 82948 REAGENT STRIP/BLOOD GLUCOSE: CPT

## 2021-10-18 PROCEDURE — 84145 PROCALCITONIN (PCT): CPT | Performed by: PHYSICIAN ASSISTANT

## 2021-10-18 PROCEDURE — 93010 ELECTROCARDIOGRAM REPORT: CPT | Performed by: INTERNAL MEDICINE

## 2021-10-18 PROCEDURE — 99222 1ST HOSP IP/OBS MODERATE 55: CPT | Performed by: INTERNAL MEDICINE

## 2021-10-18 PROCEDURE — 80048 BASIC METABOLIC PNL TOTAL CA: CPT | Performed by: FAMILY MEDICINE

## 2021-10-18 PROCEDURE — 85025 COMPLETE CBC W/AUTO DIFF WBC: CPT | Performed by: PHYSICIAN ASSISTANT

## 2021-10-18 PROCEDURE — 94760 N-INVAS EAR/PLS OXIMETRY 1: CPT

## 2021-10-18 RX ORDER — INSULIN ASPART 100 [IU]/ML
8 INJECTION, SUSPENSION SUBCUTANEOUS EVERY 8 HOURS
Status: DISCONTINUED | OUTPATIENT
Start: 2021-10-18 | End: 2021-10-19

## 2021-10-18 RX ADMIN — ISOSORBIDE MONONITRATE 60 MG: 60 TABLET, EXTENDED RELEASE ORAL at 08:13

## 2021-10-18 RX ADMIN — CARVEDILOL 12.5 MG: 12.5 TABLET, FILM COATED ORAL at 16:49

## 2021-10-18 RX ADMIN — INSULIN LISPRO 2 UNITS: 100 INJECTION, SOLUTION INTRAVENOUS; SUBCUTANEOUS at 16:48

## 2021-10-18 RX ADMIN — HEPARIN SODIUM 5000 UNITS: 5000 INJECTION INTRAVENOUS; SUBCUTANEOUS at 23:44

## 2021-10-18 RX ADMIN — ASPIRIN 81 MG CHEWABLE TABLET 81 MG: 81 TABLET CHEWABLE at 08:12

## 2021-10-18 RX ADMIN — DOCUSATE SODIUM 100 MG: 100 CAPSULE ORAL at 16:49

## 2021-10-18 RX ADMIN — HYDRALAZINE HYDROCHLORIDE 100 MG: 50 TABLET ORAL at 08:12

## 2021-10-18 RX ADMIN — INSULIN ASPART 8 UNITS: 100 INJECTION, SUSPENSION SUBCUTANEOUS at 16:48

## 2021-10-18 RX ADMIN — BENZONATATE 100 MG: 100 CAPSULE ORAL at 23:44

## 2021-10-18 RX ADMIN — LEVOTHYROXINE SODIUM 125 MCG: 125 TABLET ORAL at 06:29

## 2021-10-18 RX ADMIN — HYDRALAZINE HYDROCHLORIDE 100 MG: 50 TABLET ORAL at 23:41

## 2021-10-18 RX ADMIN — CARVEDILOL 12.5 MG: 12.5 TABLET, FILM COATED ORAL at 08:12

## 2021-10-18 RX ADMIN — ATORVASTATIN CALCIUM 80 MG: 80 TABLET, FILM COATED ORAL at 08:13

## 2021-10-18 RX ADMIN — INSULIN ASPART 8 UNITS: 100 INJECTION, SUSPENSION SUBCUTANEOUS at 23:41

## 2021-10-18 RX ADMIN — CALCIUM ACETATE 667 MG: 667 CAPSULE ORAL at 16:49

## 2021-10-18 RX ADMIN — INSULIN LISPRO 1 UNITS: 100 INJECTION, SOLUTION INTRAVENOUS; SUBCUTANEOUS at 23:40

## 2021-10-18 RX ADMIN — CALCIUM ACETATE 667 MG: 667 CAPSULE ORAL at 08:12

## 2021-10-18 RX ADMIN — INSULIN ASPART 10 UNITS: 100 INJECTION, SUSPENSION SUBCUTANEOUS at 08:12

## 2021-10-18 RX ADMIN — BUMETANIDE 2 MG: 0.25 INJECTION, SOLUTION INTRAMUSCULAR; INTRAVENOUS at 08:16

## 2021-10-18 RX ADMIN — DOCUSATE SODIUM 100 MG: 100 CAPSULE ORAL at 08:13

## 2021-10-18 RX ADMIN — GUAIFENESIN 600 MG: 600 TABLET, EXTENDED RELEASE ORAL at 08:12

## 2021-10-18 RX ADMIN — HYDRALAZINE HYDROCHLORIDE 100 MG: 50 TABLET ORAL at 16:48

## 2021-10-18 RX ADMIN — GUAIFENESIN 600 MG: 600 TABLET, EXTENDED RELEASE ORAL at 16:49

## 2021-10-18 RX ADMIN — AMLODIPINE BESYLATE 10 MG: 10 TABLET ORAL at 23:41

## 2021-10-19 PROBLEM — J96.01 ACUTE RESPIRATORY FAILURE WITH HYPOXIA (HCC): Status: RESOLVED | Noted: 2021-10-15 | Resolved: 2021-10-19

## 2021-10-19 LAB
ANION GAP SERPL CALCULATED.3IONS-SCNC: 7 MMOL/L (ref 4–13)
BUN SERPL-MCNC: 78 MG/DL (ref 5–25)
CALCIUM SERPL-MCNC: 8.9 MG/DL (ref 8.3–10.1)
CHLORIDE SERPL-SCNC: 106 MMOL/L (ref 100–108)
CO2 SERPL-SCNC: 24 MMOL/L (ref 21–32)
CREAT SERPL-MCNC: 3.65 MG/DL (ref 0.6–1.3)
GFR SERPL CREATININE-BSD FRML MDRD: 12 ML/MIN/1.73SQ M
GLUCOSE SERPL-MCNC: 121 MG/DL (ref 65–140)
GLUCOSE SERPL-MCNC: 162 MG/DL (ref 65–140)
GLUCOSE SERPL-MCNC: 185 MG/DL (ref 65–140)
GLUCOSE SERPL-MCNC: 206 MG/DL (ref 65–140)
GLUCOSE SERPL-MCNC: 371 MG/DL (ref 65–140)
GLUCOSE SERPL-MCNC: 54 MG/DL (ref 65–140)
POTASSIUM SERPL-SCNC: 4.2 MMOL/L (ref 3.5–5.3)
PROCALCITONIN SERPL-MCNC: 0.41 NG/ML
SODIUM SERPL-SCNC: 137 MMOL/L (ref 136–145)

## 2021-10-19 PROCEDURE — 94760 N-INVAS EAR/PLS OXIMETRY 1: CPT

## 2021-10-19 PROCEDURE — 99232 SBSQ HOSP IP/OBS MODERATE 35: CPT | Performed by: INTERNAL MEDICINE

## 2021-10-19 PROCEDURE — 84145 PROCALCITONIN (PCT): CPT | Performed by: PHYSICIAN ASSISTANT

## 2021-10-19 PROCEDURE — 99233 SBSQ HOSP IP/OBS HIGH 50: CPT | Performed by: INTERNAL MEDICINE

## 2021-10-19 PROCEDURE — 82948 REAGENT STRIP/BLOOD GLUCOSE: CPT

## 2021-10-19 PROCEDURE — 80048 BASIC METABOLIC PNL TOTAL CA: CPT | Performed by: FAMILY MEDICINE

## 2021-10-19 RX ORDER — BUMETANIDE 1 MG/1
1 TABLET ORAL 2 TIMES DAILY
Status: DISCONTINUED | OUTPATIENT
Start: 2021-10-19 | End: 2021-10-20

## 2021-10-19 RX ORDER — INSULIN ASPART 100 [IU]/ML
5 INJECTION, SUSPENSION SUBCUTANEOUS EVERY 8 HOURS
Status: DISCONTINUED | OUTPATIENT
Start: 2021-10-19 | End: 2021-10-20 | Stop reason: HOSPADM

## 2021-10-19 RX ADMIN — INSULIN LISPRO 1 UNITS: 100 INJECTION, SOLUTION INTRAVENOUS; SUBCUTANEOUS at 17:36

## 2021-10-19 RX ADMIN — INSULIN LISPRO 6 UNITS: 100 INJECTION, SOLUTION INTRAVENOUS; SUBCUTANEOUS at 11:41

## 2021-10-19 RX ADMIN — HYDRALAZINE HYDROCHLORIDE 100 MG: 50 TABLET ORAL at 17:35

## 2021-10-19 RX ADMIN — BENZONATATE 100 MG: 100 CAPSULE ORAL at 08:12

## 2021-10-19 RX ADMIN — ATORVASTATIN CALCIUM 80 MG: 80 TABLET, FILM COATED ORAL at 07:59

## 2021-10-19 RX ADMIN — CARVEDILOL 12.5 MG: 12.5 TABLET, FILM COATED ORAL at 07:59

## 2021-10-19 RX ADMIN — CARVEDILOL 12.5 MG: 12.5 TABLET, FILM COATED ORAL at 17:35

## 2021-10-19 RX ADMIN — AMLODIPINE BESYLATE 10 MG: 10 TABLET ORAL at 21:48

## 2021-10-19 RX ADMIN — HYDRALAZINE HYDROCHLORIDE 100 MG: 50 TABLET ORAL at 21:48

## 2021-10-19 RX ADMIN — BUMETANIDE 1 MG: 1 TABLET ORAL at 17:36

## 2021-10-19 RX ADMIN — BENZONATATE 100 MG: 100 CAPSULE ORAL at 17:36

## 2021-10-19 RX ADMIN — CALCIUM ACETATE 667 MG: 667 CAPSULE ORAL at 17:35

## 2021-10-19 RX ADMIN — BUMETANIDE 1 MG: 1 TABLET ORAL at 13:46

## 2021-10-19 RX ADMIN — ASPIRIN 81 MG CHEWABLE TABLET 81 MG: 81 TABLET CHEWABLE at 07:59

## 2021-10-19 RX ADMIN — INSULIN ASPART 5 UNITS: 100 INJECTION, SUSPENSION SUBCUTANEOUS at 17:41

## 2021-10-19 RX ADMIN — ISOSORBIDE MONONITRATE 90 MG: 30 TABLET, EXTENDED RELEASE ORAL at 08:07

## 2021-10-19 RX ADMIN — CALCIUM ACETATE 667 MG: 667 CAPSULE ORAL at 07:59

## 2021-10-19 RX ADMIN — GUAIFENESIN 600 MG: 600 TABLET, EXTENDED RELEASE ORAL at 07:59

## 2021-10-19 RX ADMIN — HYDRALAZINE HYDROCHLORIDE 100 MG: 50 TABLET ORAL at 07:59

## 2021-10-19 RX ADMIN — BENZONATATE 100 MG: 100 CAPSULE ORAL at 21:49

## 2021-10-19 RX ADMIN — LEVOTHYROXINE SODIUM 125 MCG: 125 TABLET ORAL at 05:13

## 2021-10-19 RX ADMIN — INSULIN LISPRO 1 UNITS: 100 INJECTION, SOLUTION INTRAVENOUS; SUBCUTANEOUS at 08:01

## 2021-10-19 RX ADMIN — INSULIN LISPRO 1 UNITS: 100 INJECTION, SOLUTION INTRAVENOUS; SUBCUTANEOUS at 21:49

## 2021-10-19 RX ADMIN — GUAIFENESIN 600 MG: 600 TABLET, EXTENDED RELEASE ORAL at 17:35

## 2021-10-19 RX ADMIN — INSULIN ASPART 8 UNITS: 100 INJECTION, SUSPENSION SUBCUTANEOUS at 08:07

## 2021-10-20 VITALS
TEMPERATURE: 97.9 F | RESPIRATION RATE: 18 BRPM | OXYGEN SATURATION: 96 % | BODY MASS INDEX: 28.14 KG/M2 | WEIGHT: 168.87 LBS | SYSTOLIC BLOOD PRESSURE: 141 MMHG | HEIGHT: 65 IN | DIASTOLIC BLOOD PRESSURE: 53 MMHG | HEART RATE: 56 BPM

## 2021-10-20 LAB
ANION GAP SERPL CALCULATED.3IONS-SCNC: 6 MMOL/L (ref 4–13)
BUN SERPL-MCNC: 78 MG/DL (ref 5–25)
CALCIUM SERPL-MCNC: 9 MG/DL (ref 8.3–10.1)
CHLORIDE SERPL-SCNC: 103 MMOL/L (ref 100–108)
CO2 SERPL-SCNC: 24 MMOL/L (ref 21–32)
CREAT SERPL-MCNC: 3.78 MG/DL (ref 0.6–1.3)
GFR SERPL CREATININE-BSD FRML MDRD: 12 ML/MIN/1.73SQ M
GLUCOSE SERPL-MCNC: 101 MG/DL (ref 65–140)
GLUCOSE SERPL-MCNC: 128 MG/DL (ref 65–140)
GLUCOSE SERPL-MCNC: 259 MG/DL (ref 65–140)
GLUCOSE SERPL-MCNC: 295 MG/DL (ref 65–140)
HGB BLD-MCNC: 7.5 G/DL (ref 11.5–15.4)
POTASSIUM SERPL-SCNC: 4.4 MMOL/L (ref 3.5–5.3)
SODIUM SERPL-SCNC: 133 MMOL/L (ref 136–145)

## 2021-10-20 PROCEDURE — 82948 REAGENT STRIP/BLOOD GLUCOSE: CPT

## 2021-10-20 PROCEDURE — 99239 HOSP IP/OBS DSCHRG MGMT >30: CPT | Performed by: INTERNAL MEDICINE

## 2021-10-20 PROCEDURE — 99232 SBSQ HOSP IP/OBS MODERATE 35: CPT | Performed by: INTERNAL MEDICINE

## 2021-10-20 PROCEDURE — 85018 HEMOGLOBIN: CPT | Performed by: STUDENT IN AN ORGANIZED HEALTH CARE EDUCATION/TRAINING PROGRAM

## 2021-10-20 PROCEDURE — 80048 BASIC METABOLIC PNL TOTAL CA: CPT | Performed by: STUDENT IN AN ORGANIZED HEALTH CARE EDUCATION/TRAINING PROGRAM

## 2021-10-20 RX ORDER — BENZONATATE 100 MG/1
100 CAPSULE ORAL 3 TIMES DAILY PRN
Qty: 20 CAPSULE | Refills: 0 | Status: SHIPPED | OUTPATIENT
Start: 2021-10-20 | End: 2022-02-18 | Stop reason: HOSPADM

## 2021-10-20 RX ORDER — BENZONATATE 100 MG/1
100 CAPSULE ORAL 3 TIMES DAILY PRN
Qty: 20 CAPSULE | Refills: 0 | Status: SHIPPED | OUTPATIENT
Start: 2021-10-20 | End: 2021-10-20

## 2021-10-20 RX ORDER — GUAIFENESIN 600 MG
600 TABLET, EXTENDED RELEASE 12 HR ORAL 2 TIMES DAILY
Qty: 14 TABLET | Refills: 0 | Status: SHIPPED | OUTPATIENT
Start: 2021-10-20 | End: 2021-10-31 | Stop reason: HOSPADM

## 2021-10-20 RX ORDER — ISOSORBIDE MONONITRATE 30 MG/1
90 TABLET, EXTENDED RELEASE ORAL DAILY
Qty: 90 TABLET | Refills: 0 | Status: SHIPPED | OUTPATIENT
Start: 2021-10-21 | End: 2021-10-31 | Stop reason: HOSPADM

## 2021-10-20 RX ORDER — CARVEDILOL 12.5 MG/1
12.5 TABLET ORAL 2 TIMES DAILY WITH MEALS
Qty: 60 TABLET | Refills: 0 | Status: SHIPPED | OUTPATIENT
Start: 2021-10-20 | End: 2021-11-23

## 2021-10-20 RX ORDER — BUMETANIDE 1 MG/1
1 TABLET ORAL 2 TIMES DAILY
Qty: 60 TABLET | Refills: 0 | Status: SHIPPED | OUTPATIENT
Start: 2021-10-20 | End: 2021-10-20 | Stop reason: SDUPTHER

## 2021-10-20 RX ORDER — BUMETANIDE 1 MG/1
1 TABLET ORAL 2 TIMES DAILY
Qty: 60 TABLET | Refills: 0 | Status: SHIPPED | OUTPATIENT
Start: 2021-10-21 | End: 2021-10-31 | Stop reason: HOSPADM

## 2021-10-20 RX ADMIN — INSULIN ASPART 5 UNITS: 100 INJECTION, SUSPENSION SUBCUTANEOUS at 10:08

## 2021-10-20 RX ADMIN — ATORVASTATIN CALCIUM 80 MG: 80 TABLET, FILM COATED ORAL at 09:58

## 2021-10-20 RX ADMIN — ISOSORBIDE MONONITRATE 90 MG: 30 TABLET, EXTENDED RELEASE ORAL at 09:57

## 2021-10-20 RX ADMIN — GUAIFENESIN 600 MG: 600 TABLET, EXTENDED RELEASE ORAL at 09:57

## 2021-10-20 RX ADMIN — HYDRALAZINE HYDROCHLORIDE 100 MG: 50 TABLET ORAL at 09:57

## 2021-10-20 RX ADMIN — CARVEDILOL 12.5 MG: 12.5 TABLET, FILM COATED ORAL at 09:58

## 2021-10-20 RX ADMIN — CALCIUM ACETATE 667 MG: 667 CAPSULE ORAL at 09:57

## 2021-10-20 RX ADMIN — ASPIRIN 81 MG CHEWABLE TABLET 81 MG: 81 TABLET CHEWABLE at 09:57

## 2021-10-20 RX ADMIN — LEVOTHYROXINE SODIUM 125 MCG: 125 TABLET ORAL at 06:13

## 2021-10-20 RX ADMIN — INSULIN LISPRO 3 UNITS: 100 INJECTION, SOLUTION INTRAVENOUS; SUBCUTANEOUS at 11:46

## 2021-10-20 RX ADMIN — BENZONATATE 100 MG: 100 CAPSULE ORAL at 10:08

## 2021-10-24 ENCOUNTER — HOSPITAL ENCOUNTER (INPATIENT)
Facility: HOSPITAL | Age: 66
LOS: 7 days | Discharge: HOME | DRG: 291 | End: 2021-10-31
Attending: EMERGENCY MEDICINE | Admitting: INTERNAL MEDICINE
Payer: COMMERCIAL

## 2021-10-24 ENCOUNTER — APPOINTMENT (EMERGENCY)
Dept: RADIOLOGY | Facility: HOSPITAL | Age: 66
DRG: 291 | End: 2021-10-24
Payer: COMMERCIAL

## 2021-10-24 ENCOUNTER — NURSE TRIAGE (OUTPATIENT)
Dept: OTHER | Facility: OTHER | Age: 66
End: 2021-10-24

## 2021-10-24 DIAGNOSIS — I16.0 HYPERTENSIVE URGENCY: ICD-10-CM

## 2021-10-24 DIAGNOSIS — N17.9 ACUTE RENAL FAILURE, UNSPECIFIED ACUTE RENAL FAILURE TYPE (HCC): ICD-10-CM

## 2021-10-24 DIAGNOSIS — N18.30 TYPE 2 DIABETES MELLITUS WITH STAGE 3 CHRONIC KIDNEY DISEASE, WITH LONG-TERM CURRENT USE OF INSULIN, UNSPECIFIED WHETHER STAGE 3A OR 3B CKD (HCC): ICD-10-CM

## 2021-10-24 DIAGNOSIS — I50.33 ACUTE ON CHRONIC DIASTOLIC CONGESTIVE HEART FAILURE (HCC): Primary | ICD-10-CM

## 2021-10-24 DIAGNOSIS — E11.22 TYPE 2 DIABETES MELLITUS WITH STAGE 3 CHRONIC KIDNEY DISEASE, WITH LONG-TERM CURRENT USE OF INSULIN, UNSPECIFIED WHETHER STAGE 3A OR 3B CKD (HCC): ICD-10-CM

## 2021-10-24 DIAGNOSIS — R79.89 ELEVATED D-DIMER: ICD-10-CM

## 2021-10-24 DIAGNOSIS — Z79.4 TYPE 2 DIABETES MELLITUS WITH STAGE 3 CHRONIC KIDNEY DISEASE, WITH LONG-TERM CURRENT USE OF INSULIN, UNSPECIFIED WHETHER STAGE 3A OR 3B CKD (HCC): ICD-10-CM

## 2021-10-24 LAB
ALBUMIN SERPL BCP-MCNC: 3.2 G/DL (ref 3.5–5)
ALP SERPL-CCNC: 658 U/L (ref 46–116)
ALT SERPL W P-5'-P-CCNC: 47 U/L (ref 12–78)
ANION GAP SERPL CALCULATED.3IONS-SCNC: 6 MMOL/L (ref 4–13)
AST SERPL W P-5'-P-CCNC: 27 U/L (ref 5–45)
ATRIAL RATE: 78 BPM
BASOPHILS # BLD AUTO: 0.07 THOUSANDS/ΜL (ref 0–0.1)
BASOPHILS NFR BLD AUTO: 1 % (ref 0–1)
BILIRUB SERPL-MCNC: 0.62 MG/DL (ref 0.2–1)
BUN SERPL-MCNC: 87 MG/DL (ref 5–25)
CALCIUM ALBUM COR SERPL-MCNC: 10 MG/DL (ref 8.3–10.1)
CALCIUM SERPL-MCNC: 9.4 MG/DL (ref 8.3–10.1)
CHLORIDE SERPL-SCNC: 107 MMOL/L (ref 100–108)
CO2 SERPL-SCNC: 24 MMOL/L (ref 21–32)
CREAT SERPL-MCNC: 3.2 MG/DL (ref 0.6–1.3)
D DIMER PPP FEU-MCNC: 1.49 UG/ML FEU
EOSINOPHIL # BLD AUTO: 0.1 THOUSAND/ΜL (ref 0–0.61)
EOSINOPHIL NFR BLD AUTO: 1 % (ref 0–6)
ERYTHROCYTE [DISTWIDTH] IN BLOOD BY AUTOMATED COUNT: 13.8 % (ref 11.6–15.1)
FLUAV RNA RESP QL NAA+PROBE: NEGATIVE
FLUBV RNA RESP QL NAA+PROBE: NEGATIVE
GFR SERPL CREATININE-BSD FRML MDRD: 14 ML/MIN/1.73SQ M
GLUCOSE SERPL-MCNC: 259 MG/DL (ref 65–140)
GLUCOSE SERPL-MCNC: 297 MG/DL (ref 65–140)
HCT VFR BLD AUTO: 27 % (ref 34.8–46.1)
HGB BLD-MCNC: 8.2 G/DL (ref 11.5–15.4)
IMM GRANULOCYTES # BLD AUTO: 0.11 THOUSAND/UL (ref 0–0.2)
IMM GRANULOCYTES NFR BLD AUTO: 1 % (ref 0–2)
LYMPHOCYTES # BLD AUTO: 0.97 THOUSANDS/ΜL (ref 0.6–4.47)
LYMPHOCYTES NFR BLD AUTO: 9 % (ref 14–44)
MCH RBC QN AUTO: 27.8 PG (ref 26.8–34.3)
MCHC RBC AUTO-ENTMCNC: 30.4 G/DL (ref 31.4–37.4)
MCV RBC AUTO: 92 FL (ref 82–98)
MONOCYTES # BLD AUTO: 0.58 THOUSAND/ΜL (ref 0.17–1.22)
MONOCYTES NFR BLD AUTO: 5 % (ref 4–12)
NEUTROPHILS # BLD AUTO: 8.85 THOUSANDS/ΜL (ref 1.85–7.62)
NEUTS SEG NFR BLD AUTO: 83 % (ref 43–75)
NRBC BLD AUTO-RTO: 0 /100 WBCS
NT-PROBNP SERPL-MCNC: ABNORMAL PG/ML
P AXIS: 80 DEGREES
PLATELET # BLD AUTO: 253 THOUSANDS/UL (ref 149–390)
PMV BLD AUTO: 10.1 FL (ref 8.9–12.7)
POTASSIUM SERPL-SCNC: 4.6 MMOL/L (ref 3.5–5.3)
PR INTERVAL: 152 MS
PROT SERPL-MCNC: 8.2 G/DL (ref 6.4–8.2)
QRS AXIS: 96 DEGREES
QRSD INTERVAL: 94 MS
QT INTERVAL: 390 MS
QTC INTERVAL: 444 MS
RBC # BLD AUTO: 2.95 MILLION/UL (ref 3.81–5.12)
RSV RNA RESP QL NAA+PROBE: NEGATIVE
SARS-COV-2 RNA RESP QL NAA+PROBE: NEGATIVE
SODIUM SERPL-SCNC: 137 MMOL/L (ref 136–145)
T WAVE AXIS: 37 DEGREES
TROPONIN I SERPL-MCNC: 0.02 NG/ML
VENTRICULAR RATE: 78 BPM
WBC # BLD AUTO: 10.68 THOUSAND/UL (ref 4.31–10.16)

## 2021-10-24 PROCEDURE — 84484 ASSAY OF TROPONIN QUANT: CPT | Performed by: STUDENT IN AN ORGANIZED HEALTH CARE EDUCATION/TRAINING PROGRAM

## 2021-10-24 PROCEDURE — 71045 X-RAY EXAM CHEST 1 VIEW: CPT

## 2021-10-24 PROCEDURE — 83880 ASSAY OF NATRIURETIC PEPTIDE: CPT | Performed by: STUDENT IN AN ORGANIZED HEALTH CARE EDUCATION/TRAINING PROGRAM

## 2021-10-24 PROCEDURE — 85025 COMPLETE CBC W/AUTO DIFF WBC: CPT | Performed by: STUDENT IN AN ORGANIZED HEALTH CARE EDUCATION/TRAINING PROGRAM

## 2021-10-24 PROCEDURE — 36415 COLL VENOUS BLD VENIPUNCTURE: CPT | Performed by: STUDENT IN AN ORGANIZED HEALTH CARE EDUCATION/TRAINING PROGRAM

## 2021-10-24 PROCEDURE — 85379 FIBRIN DEGRADATION QUANT: CPT | Performed by: STUDENT IN AN ORGANIZED HEALTH CARE EDUCATION/TRAINING PROGRAM

## 2021-10-24 PROCEDURE — 0241U HB NFCT DS VIR RESP RNA 4 TRGT: CPT | Performed by: STUDENT IN AN ORGANIZED HEALTH CARE EDUCATION/TRAINING PROGRAM

## 2021-10-24 PROCEDURE — 80053 COMPREHEN METABOLIC PANEL: CPT | Performed by: STUDENT IN AN ORGANIZED HEALTH CARE EDUCATION/TRAINING PROGRAM

## 2021-10-24 PROCEDURE — 99285 EMERGENCY DEPT VISIT HI MDM: CPT | Performed by: EMERGENCY MEDICINE

## 2021-10-24 PROCEDURE — 99285 EMERGENCY DEPT VISIT HI MDM: CPT

## 2021-10-24 PROCEDURE — 99223 1ST HOSP IP/OBS HIGH 75: CPT | Performed by: INTERNAL MEDICINE

## 2021-10-24 PROCEDURE — 93010 ELECTROCARDIOGRAM REPORT: CPT | Performed by: INTERNAL MEDICINE

## 2021-10-24 PROCEDURE — 82948 REAGENT STRIP/BLOOD GLUCOSE: CPT

## 2021-10-24 PROCEDURE — 93005 ELECTROCARDIOGRAM TRACING: CPT

## 2021-10-24 PROCEDURE — 96374 THER/PROPH/DIAG INJ IV PUSH: CPT

## 2021-10-24 RX ORDER — CALCIUM ACETATE 667 MG/1
667 CAPSULE ORAL 2 TIMES DAILY WITH MEALS
Status: DISCONTINUED | OUTPATIENT
Start: 2021-10-25 | End: 2021-10-31 | Stop reason: HOSPADM

## 2021-10-24 RX ORDER — DOCUSATE SODIUM 100 MG/1
100 CAPSULE, LIQUID FILLED ORAL 2 TIMES DAILY PRN
Status: DISCONTINUED | OUTPATIENT
Start: 2021-10-24 | End: 2021-10-29

## 2021-10-24 RX ORDER — GUAIFENESIN 100 MG/5ML
200 SOLUTION ORAL 3 TIMES DAILY PRN
Status: DISCONTINUED | OUTPATIENT
Start: 2021-10-24 | End: 2021-10-31 | Stop reason: HOSPADM

## 2021-10-24 RX ORDER — LEVOTHYROXINE SODIUM 0.12 MG/1
125 TABLET ORAL
Status: DISCONTINUED | OUTPATIENT
Start: 2021-10-25 | End: 2021-10-31 | Stop reason: HOSPADM

## 2021-10-24 RX ORDER — HYDRALAZINE HYDROCHLORIDE 50 MG/1
100 TABLET, FILM COATED ORAL 3 TIMES DAILY
Status: DISCONTINUED | OUTPATIENT
Start: 2021-10-24 | End: 2021-10-31 | Stop reason: HOSPADM

## 2021-10-24 RX ORDER — BENZONATATE 100 MG/1
100 CAPSULE ORAL 3 TIMES DAILY PRN
Status: DISCONTINUED | OUTPATIENT
Start: 2021-10-24 | End: 2021-10-31 | Stop reason: HOSPADM

## 2021-10-24 RX ORDER — GUAIFENESIN 600 MG
600 TABLET, EXTENDED RELEASE 12 HR ORAL 2 TIMES DAILY
Status: COMPLETED | OUTPATIENT
Start: 2021-10-24 | End: 2021-10-27

## 2021-10-24 RX ORDER — CARVEDILOL 12.5 MG/1
12.5 TABLET ORAL 2 TIMES DAILY WITH MEALS
Status: DISCONTINUED | OUTPATIENT
Start: 2021-10-25 | End: 2021-10-29

## 2021-10-24 RX ORDER — FUROSEMIDE 10 MG/ML
40 INJECTION INTRAMUSCULAR; INTRAVENOUS ONCE
Status: COMPLETED | OUTPATIENT
Start: 2021-10-24 | End: 2021-10-24

## 2021-10-24 RX ORDER — ACETAMINOPHEN 325 MG/1
650 TABLET ORAL EVERY 6 HOURS PRN
Status: DISCONTINUED | OUTPATIENT
Start: 2021-10-24 | End: 2021-10-31 | Stop reason: HOSPADM

## 2021-10-24 RX ORDER — ASPIRIN 81 MG/1
81 TABLET, CHEWABLE ORAL DAILY
Status: DISCONTINUED | OUTPATIENT
Start: 2021-10-25 | End: 2021-10-31 | Stop reason: HOSPADM

## 2021-10-24 RX ORDER — MAGNESIUM HYDROXIDE/ALUMINUM HYDROXICE/SIMETHICONE 120; 1200; 1200 MG/30ML; MG/30ML; MG/30ML
15 SUSPENSION ORAL EVERY 6 HOURS PRN
Status: DISCONTINUED | OUTPATIENT
Start: 2021-10-24 | End: 2021-10-31 | Stop reason: HOSPADM

## 2021-10-24 RX ORDER — ATORVASTATIN CALCIUM 80 MG/1
80 TABLET, FILM COATED ORAL DAILY
Status: DISCONTINUED | OUTPATIENT
Start: 2021-10-25 | End: 2021-10-31 | Stop reason: HOSPADM

## 2021-10-24 RX ORDER — AMLODIPINE BESYLATE 10 MG/1
10 TABLET ORAL
Status: DISCONTINUED | OUTPATIENT
Start: 2021-10-24 | End: 2021-10-31 | Stop reason: HOSPADM

## 2021-10-24 RX ORDER — HEPARIN SODIUM 5000 [USP'U]/ML
5000 INJECTION, SOLUTION INTRAVENOUS; SUBCUTANEOUS EVERY 8 HOURS SCHEDULED
Status: DISCONTINUED | OUTPATIENT
Start: 2021-10-24 | End: 2021-10-31 | Stop reason: HOSPADM

## 2021-10-24 RX ORDER — FUROSEMIDE 10 MG/ML
40 INJECTION INTRAMUSCULAR; INTRAVENOUS 2 TIMES DAILY
Status: DISCONTINUED | OUTPATIENT
Start: 2021-10-24 | End: 2021-10-25

## 2021-10-24 RX ORDER — INSULIN ASPART 100 [IU]/ML
10 INJECTION, SUSPENSION SUBCUTANEOUS
Status: DISCONTINUED | OUTPATIENT
Start: 2021-10-25 | End: 2021-10-27

## 2021-10-24 RX ORDER — ONDANSETRON 2 MG/ML
4 INJECTION INTRAMUSCULAR; INTRAVENOUS EVERY 6 HOURS PRN
Status: DISCONTINUED | OUTPATIENT
Start: 2021-10-24 | End: 2021-10-31 | Stop reason: HOSPADM

## 2021-10-24 RX ADMIN — AMLODIPINE BESYLATE 10 MG: 10 TABLET ORAL at 23:10

## 2021-10-24 RX ADMIN — FUROSEMIDE 40 MG: 10 INJECTION, SOLUTION INTRAMUSCULAR; INTRAVENOUS at 21:17

## 2021-10-24 RX ADMIN — INSULIN LISPRO 2 UNITS: 100 INJECTION, SOLUTION INTRAVENOUS; SUBCUTANEOUS at 23:11

## 2021-10-24 RX ADMIN — GUAIFENESIN 600 MG: 600 TABLET, EXTENDED RELEASE ORAL at 23:10

## 2021-10-24 RX ADMIN — HYDRALAZINE HYDROCHLORIDE 100 MG: 50 TABLET ORAL at 23:10

## 2021-10-25 ENCOUNTER — APPOINTMENT (INPATIENT)
Dept: RADIOLOGY | Facility: HOSPITAL | Age: 66
DRG: 291 | End: 2021-10-25
Payer: COMMERCIAL

## 2021-10-25 ENCOUNTER — TELEPHONE (OUTPATIENT)
Dept: NEPHROLOGY | Facility: CLINIC | Age: 66
End: 2021-10-25

## 2021-10-25 DIAGNOSIS — I10 PRIMARY HYPERTENSION: Primary | ICD-10-CM

## 2021-10-25 DIAGNOSIS — N18.4 STAGE 4 CHRONIC KIDNEY DISEASE (HCC): ICD-10-CM

## 2021-10-25 LAB
ALBUMIN SERPL BCP-MCNC: 2.8 G/DL (ref 3.5–5)
ALP SERPL-CCNC: 578 U/L (ref 46–116)
ALT SERPL W P-5'-P-CCNC: 37 U/L (ref 12–78)
ANION GAP SERPL CALCULATED.3IONS-SCNC: 5 MMOL/L (ref 4–13)
AST SERPL W P-5'-P-CCNC: 22 U/L (ref 5–45)
BASOPHILS # BLD AUTO: 0.04 THOUSANDS/ΜL (ref 0–0.1)
BASOPHILS # BLD AUTO: 0.04 THOUSANDS/ΜL (ref 0–0.1)
BASOPHILS NFR BLD AUTO: 0 % (ref 0–1)
BASOPHILS NFR BLD AUTO: 1 % (ref 0–1)
BILIRUB SERPL-MCNC: 0.54 MG/DL (ref 0.2–1)
BUN SERPL-MCNC: 85 MG/DL (ref 5–25)
CALCIUM ALBUM COR SERPL-MCNC: 10.4 MG/DL (ref 8.3–10.1)
CALCIUM SERPL-MCNC: 9.4 MG/DL (ref 8.3–10.1)
CHLORIDE SERPL-SCNC: 109 MMOL/L (ref 100–108)
CHOLEST SERPL-MCNC: 157 MG/DL (ref 50–200)
CO2 SERPL-SCNC: 25 MMOL/L (ref 21–32)
CREAT SERPL-MCNC: 3.12 MG/DL (ref 0.6–1.3)
EOSINOPHIL # BLD AUTO: 0.06 THOUSAND/ΜL (ref 0–0.61)
EOSINOPHIL # BLD AUTO: 0.08 THOUSAND/ΜL (ref 0–0.61)
EOSINOPHIL NFR BLD AUTO: 1 % (ref 0–6)
EOSINOPHIL NFR BLD AUTO: 1 % (ref 0–6)
ERYTHROCYTE [DISTWIDTH] IN BLOOD BY AUTOMATED COUNT: 13.8 % (ref 11.6–15.1)
ERYTHROCYTE [DISTWIDTH] IN BLOOD BY AUTOMATED COUNT: 13.9 % (ref 11.6–15.1)
EST. AVERAGE GLUCOSE BLD GHB EST-MCNC: 232 MG/DL
GFR SERPL CREATININE-BSD FRML MDRD: 15 ML/MIN/1.73SQ M
GLUCOSE SERPL-MCNC: 159 MG/DL (ref 65–140)
GLUCOSE SERPL-MCNC: 160 MG/DL (ref 65–140)
GLUCOSE SERPL-MCNC: 223 MG/DL (ref 65–140)
GLUCOSE SERPL-MCNC: 246 MG/DL (ref 65–140)
GLUCOSE SERPL-MCNC: 288 MG/DL (ref 65–140)
HBA1C MFR BLD: 9.7 %
HCT VFR BLD AUTO: 15.4 % (ref 34.8–46.1)
HCT VFR BLD AUTO: 25.3 % (ref 34.8–46.1)
HDLC SERPL-MCNC: 64 MG/DL
HGB BLD-MCNC: 4.7 G/DL (ref 11.5–15.4)
HGB BLD-MCNC: 7.7 G/DL (ref 11.5–15.4)
IMM GRANULOCYTES # BLD AUTO: 0.06 THOUSAND/UL (ref 0–0.2)
IMM GRANULOCYTES # BLD AUTO: 0.1 THOUSAND/UL (ref 0–0.2)
IMM GRANULOCYTES NFR BLD AUTO: 1 % (ref 0–2)
IMM GRANULOCYTES NFR BLD AUTO: 1 % (ref 0–2)
LDLC SERPL CALC-MCNC: 74 MG/DL (ref 0–100)
LYMPHOCYTES # BLD AUTO: 1.31 THOUSANDS/ΜL (ref 0.6–4.47)
LYMPHOCYTES # BLD AUTO: 1.61 THOUSANDS/ΜL (ref 0.6–4.47)
LYMPHOCYTES NFR BLD AUTO: 16 % (ref 14–44)
LYMPHOCYTES NFR BLD AUTO: 16 % (ref 14–44)
MAGNESIUM SERPL-MCNC: 2.6 MG/DL (ref 1.6–2.6)
MCH RBC QN AUTO: 27.9 PG (ref 26.8–34.3)
MCH RBC QN AUTO: 28 PG (ref 26.8–34.3)
MCHC RBC AUTO-ENTMCNC: 29.9 G/DL (ref 31.4–37.4)
MCHC RBC AUTO-ENTMCNC: 30.4 G/DL (ref 31.4–37.4)
MCV RBC AUTO: 92 FL (ref 82–98)
MCV RBC AUTO: 93 FL (ref 82–98)
MONOCYTES # BLD AUTO: 0.57 THOUSAND/ΜL (ref 0.17–1.22)
MONOCYTES # BLD AUTO: 0.72 THOUSAND/ΜL (ref 0.17–1.22)
MONOCYTES NFR BLD AUTO: 7 % (ref 4–12)
MONOCYTES NFR BLD AUTO: 7 % (ref 4–12)
NEUTROPHILS # BLD AUTO: 6.38 THOUSANDS/ΜL (ref 1.85–7.62)
NEUTROPHILS # BLD AUTO: 7.76 THOUSANDS/ΜL (ref 1.85–7.62)
NEUTS SEG NFR BLD AUTO: 74 % (ref 43–75)
NEUTS SEG NFR BLD AUTO: 75 % (ref 43–75)
NONHDLC SERPL-MCNC: 93 MG/DL
NRBC BLD AUTO-RTO: 0 /100 WBCS
NRBC BLD AUTO-RTO: 0 /100 WBCS
PLATELET # BLD AUTO: 187 THOUSANDS/UL (ref 149–390)
PLATELET # BLD AUTO: 215 THOUSANDS/UL (ref 149–390)
PLATELET # BLD AUTO: 234 THOUSANDS/UL (ref 149–390)
PMV BLD AUTO: 10.3 FL (ref 8.9–12.7)
PMV BLD AUTO: 10.5 FL (ref 8.9–12.7)
PMV BLD AUTO: 10.9 FL (ref 8.9–12.7)
POTASSIUM SERPL-SCNC: 4.7 MMOL/L (ref 3.5–5.3)
PROT SERPL-MCNC: 7.2 G/DL (ref 6.4–8.2)
RBC # BLD AUTO: 1.65 MILLION/UL (ref 3.81–5.12)
RBC # BLD AUTO: 2.75 MILLION/UL (ref 3.81–5.12)
SODIUM SERPL-SCNC: 139 MMOL/L (ref 136–145)
TRIGL SERPL-MCNC: 93 MG/DL
TROPONIN I SERPL-MCNC: 0.04 NG/ML
TROPONIN I SERPL-MCNC: 0.06 NG/ML
TSH SERPL DL<=0.05 MIU/L-ACNC: 3.29 UIU/ML (ref 0.36–3.74)
WBC # BLD AUTO: 10.31 THOUSAND/UL (ref 4.31–10.16)
WBC # BLD AUTO: 8.42 THOUSAND/UL (ref 4.31–10.16)

## 2021-10-25 PROCEDURE — 84443 ASSAY THYROID STIM HORMONE: CPT | Performed by: INTERNAL MEDICINE

## 2021-10-25 PROCEDURE — 85049 AUTOMATED PLATELET COUNT: CPT | Performed by: INTERNAL MEDICINE

## 2021-10-25 PROCEDURE — 85025 COMPLETE CBC W/AUTO DIFF WBC: CPT | Performed by: INTERNAL MEDICINE

## 2021-10-25 PROCEDURE — 99223 1ST HOSP IP/OBS HIGH 75: CPT | Performed by: INTERNAL MEDICINE

## 2021-10-25 PROCEDURE — 83036 HEMOGLOBIN GLYCOSYLATED A1C: CPT | Performed by: INTERNAL MEDICINE

## 2021-10-25 PROCEDURE — 80061 LIPID PANEL: CPT | Performed by: INTERNAL MEDICINE

## 2021-10-25 PROCEDURE — 83735 ASSAY OF MAGNESIUM: CPT | Performed by: INTERNAL MEDICINE

## 2021-10-25 PROCEDURE — 82948 REAGENT STRIP/BLOOD GLUCOSE: CPT

## 2021-10-25 PROCEDURE — 78580 LUNG PERFUSION IMAGING: CPT

## 2021-10-25 PROCEDURE — 36415 COLL VENOUS BLD VENIPUNCTURE: CPT | Performed by: INTERNAL MEDICINE

## 2021-10-25 PROCEDURE — 99232 SBSQ HOSP IP/OBS MODERATE 35: CPT | Performed by: STUDENT IN AN ORGANIZED HEALTH CARE EDUCATION/TRAINING PROGRAM

## 2021-10-25 PROCEDURE — G1004 CDSM NDSC: HCPCS

## 2021-10-25 PROCEDURE — 84484 ASSAY OF TROPONIN QUANT: CPT | Performed by: INTERNAL MEDICINE

## 2021-10-25 PROCEDURE — A9540 TC99M MAA: HCPCS

## 2021-10-25 PROCEDURE — 80053 COMPREHEN METABOLIC PANEL: CPT | Performed by: INTERNAL MEDICINE

## 2021-10-25 RX ORDER — FUROSEMIDE 10 MG/ML
60 INJECTION INTRAMUSCULAR; INTRAVENOUS 2 TIMES DAILY
Status: DISCONTINUED | OUTPATIENT
Start: 2021-10-25 | End: 2021-10-29

## 2021-10-25 RX ADMIN — AMLODIPINE BESYLATE 10 MG: 10 TABLET ORAL at 21:11

## 2021-10-25 RX ADMIN — ASPIRIN 81 MG CHEWABLE TABLET 81 MG: 81 TABLET CHEWABLE at 09:08

## 2021-10-25 RX ADMIN — HYDRALAZINE HYDROCHLORIDE 100 MG: 50 TABLET ORAL at 21:12

## 2021-10-25 RX ADMIN — CARVEDILOL 12.5 MG: 12.5 TABLET, FILM COATED ORAL at 09:22

## 2021-10-25 RX ADMIN — CARVEDILOL 12.5 MG: 12.5 TABLET, FILM COATED ORAL at 16:02

## 2021-10-25 RX ADMIN — HYDRALAZINE HYDROCHLORIDE 100 MG: 50 TABLET ORAL at 16:02

## 2021-10-25 RX ADMIN — INSULIN ASPART 10 UNITS: 100 INJECTION, SUSPENSION SUBCUTANEOUS at 18:29

## 2021-10-25 RX ADMIN — INSULIN ASPART 10 UNITS: 100 INJECTION, SUSPENSION SUBCUTANEOUS at 07:48

## 2021-10-25 RX ADMIN — ACETAMINOPHEN 650 MG: 325 TABLET, FILM COATED ORAL at 12:46

## 2021-10-25 RX ADMIN — INSULIN LISPRO 3 UNITS: 100 INJECTION, SOLUTION INTRAVENOUS; SUBCUTANEOUS at 21:12

## 2021-10-25 RX ADMIN — LEVOTHYROXINE SODIUM 125 MCG: 125 TABLET ORAL at 05:59

## 2021-10-25 RX ADMIN — INSULIN LISPRO 2 UNITS: 100 INJECTION, SOLUTION INTRAVENOUS; SUBCUTANEOUS at 11:44

## 2021-10-25 RX ADMIN — GUAIFENESIN 600 MG: 600 TABLET, EXTENDED RELEASE ORAL at 09:08

## 2021-10-25 RX ADMIN — INSULIN LISPRO 1 UNITS: 100 INJECTION, SOLUTION INTRAVENOUS; SUBCUTANEOUS at 07:56

## 2021-10-25 RX ADMIN — ISOSORBIDE MONONITRATE 90 MG: 60 TABLET, EXTENDED RELEASE ORAL at 09:08

## 2021-10-25 RX ADMIN — FUROSEMIDE 60 MG: 10 INJECTION, SOLUTION INTRAMUSCULAR; INTRAVENOUS at 18:26

## 2021-10-25 RX ADMIN — INSULIN LISPRO 3 UNITS: 100 INJECTION, SOLUTION INTRAVENOUS; SUBCUTANEOUS at 18:27

## 2021-10-25 RX ADMIN — ATORVASTATIN CALCIUM 80 MG: 80 TABLET, FILM COATED ORAL at 09:09

## 2021-10-25 RX ADMIN — CALCIUM ACETATE 667 MG: 667 CAPSULE ORAL at 09:22

## 2021-10-25 RX ADMIN — FUROSEMIDE 40 MG: 10 INJECTION, SOLUTION INTRAMUSCULAR; INTRAVENOUS at 09:25

## 2021-10-25 RX ADMIN — CALCIUM ACETATE 667 MG: 667 CAPSULE ORAL at 16:02

## 2021-10-25 RX ADMIN — GUAIFENESIN 600 MG: 600 TABLET, EXTENDED RELEASE ORAL at 18:27

## 2021-10-25 RX ADMIN — HYDRALAZINE HYDROCHLORIDE 100 MG: 50 TABLET ORAL at 09:08

## 2021-10-26 LAB
ABO GROUP BLD: NORMAL
ALBUMIN SERPL BCP-MCNC: 2.5 G/DL (ref 3.5–5)
ANION GAP SERPL CALCULATED.3IONS-SCNC: 5 MMOL/L (ref 4–13)
BLD GP AB SCN SERPL QL: POSITIVE
BLOOD GROUP ANTIBODIES SERPL: NORMAL
BLOOD GROUP ANTIBODIES SERPL: NORMAL
BUN SERPL-MCNC: 86 MG/DL (ref 5–25)
C AG RBC QL: NEGATIVE
CALCIUM ALBUM COR SERPL-MCNC: 10.2 MG/DL (ref 8.3–10.1)
CALCIUM SERPL-MCNC: 9 MG/DL (ref 8.3–10.1)
CHLORIDE SERPL-SCNC: 107 MMOL/L (ref 100–108)
CO2 SERPL-SCNC: 25 MMOL/L (ref 21–32)
CREAT SERPL-MCNC: 3.37 MG/DL (ref 0.6–1.3)
ERYTHROCYTE [DISTWIDTH] IN BLOOD BY AUTOMATED COUNT: 13.7 % (ref 11.6–15.1)
GFR SERPL CREATININE-BSD FRML MDRD: 13 ML/MIN/1.73SQ M
GLUCOSE SERPL-MCNC: 109 MG/DL (ref 65–140)
GLUCOSE SERPL-MCNC: 112 MG/DL (ref 65–140)
GLUCOSE SERPL-MCNC: 166 MG/DL (ref 65–140)
GLUCOSE SERPL-MCNC: 186 MG/DL (ref 65–140)
GLUCOSE SERPL-MCNC: 256 MG/DL (ref 65–140)
HCT VFR BLD AUTO: 21.9 % (ref 34.8–46.1)
HCT VFR BLD AUTO: 23.9 % (ref 34.8–46.1)
HGB BLD-MCNC: 6.7 G/DL (ref 11.5–15.4)
HGB BLD-MCNC: 7.3 G/DL (ref 11.5–15.4)
MAGNESIUM SERPL-MCNC: 2.6 MG/DL (ref 1.6–2.6)
MCH RBC QN AUTO: 27.9 PG (ref 26.8–34.3)
MCHC RBC AUTO-ENTMCNC: 30.6 G/DL (ref 31.4–37.4)
MCV RBC AUTO: 91 FL (ref 82–98)
PHOSPHATE SERPL-MCNC: 4.1 MG/DL (ref 2.3–4.1)
PLATELET # BLD AUTO: 150 THOUSANDS/UL (ref 149–390)
PMV BLD AUTO: 11 FL (ref 8.9–12.7)
POTASSIUM SERPL-SCNC: 4.6 MMOL/L (ref 3.5–5.3)
RBC # BLD AUTO: 2.4 MILLION/UL (ref 3.81–5.12)
RH BLD: NEGATIVE
SODIUM SERPL-SCNC: 137 MMOL/L (ref 136–145)
SPECIMEN EXPIRATION DATE: NORMAL
WBC # BLD AUTO: 6.8 THOUSAND/UL (ref 4.31–10.16)

## 2021-10-26 PROCEDURE — 85027 COMPLETE CBC AUTOMATED: CPT | Performed by: STUDENT IN AN ORGANIZED HEALTH CARE EDUCATION/TRAINING PROGRAM

## 2021-10-26 PROCEDURE — 86900 BLOOD TYPING SEROLOGIC ABO: CPT | Performed by: STUDENT IN AN ORGANIZED HEALTH CARE EDUCATION/TRAINING PROGRAM

## 2021-10-26 PROCEDURE — 83735 ASSAY OF MAGNESIUM: CPT | Performed by: STUDENT IN AN ORGANIZED HEALTH CARE EDUCATION/TRAINING PROGRAM

## 2021-10-26 PROCEDURE — 86921 COMPATIBILITY TEST INCUBATE: CPT

## 2021-10-26 PROCEDURE — 99233 SBSQ HOSP IP/OBS HIGH 50: CPT | Performed by: INTERNAL MEDICINE

## 2021-10-26 PROCEDURE — 85018 HEMOGLOBIN: CPT | Performed by: STUDENT IN AN ORGANIZED HEALTH CARE EDUCATION/TRAINING PROGRAM

## 2021-10-26 PROCEDURE — 82948 REAGENT STRIP/BLOOD GLUCOSE: CPT

## 2021-10-26 PROCEDURE — 86901 BLOOD TYPING SEROLOGIC RH(D): CPT | Performed by: STUDENT IN AN ORGANIZED HEALTH CARE EDUCATION/TRAINING PROGRAM

## 2021-10-26 PROCEDURE — 86850 RBC ANTIBODY SCREEN: CPT | Performed by: STUDENT IN AN ORGANIZED HEALTH CARE EDUCATION/TRAINING PROGRAM

## 2021-10-26 PROCEDURE — 86922 COMPATIBILITY TEST ANTIGLOB: CPT

## 2021-10-26 PROCEDURE — 99232 SBSQ HOSP IP/OBS MODERATE 35: CPT | Performed by: HOSPITALIST

## 2021-10-26 PROCEDURE — 86905 BLOOD TYPING RBC ANTIGENS: CPT

## 2021-10-26 PROCEDURE — 99232 SBSQ HOSP IP/OBS MODERATE 35: CPT | Performed by: INTERNAL MEDICINE

## 2021-10-26 PROCEDURE — 80069 RENAL FUNCTION PANEL: CPT | Performed by: STUDENT IN AN ORGANIZED HEALTH CARE EDUCATION/TRAINING PROGRAM

## 2021-10-26 PROCEDURE — 85014 HEMATOCRIT: CPT | Performed by: STUDENT IN AN ORGANIZED HEALTH CARE EDUCATION/TRAINING PROGRAM

## 2021-10-26 PROCEDURE — 86870 RBC ANTIBODY IDENTIFICATION: CPT | Performed by: STUDENT IN AN ORGANIZED HEALTH CARE EDUCATION/TRAINING PROGRAM

## 2021-10-26 PROCEDURE — 97162 PT EVAL MOD COMPLEX 30 MIN: CPT

## 2021-10-26 RX ORDER — ISOSORBIDE MONONITRATE 60 MG/1
120 TABLET, EXTENDED RELEASE ORAL DAILY
Status: DISCONTINUED | OUTPATIENT
Start: 2021-10-27 | End: 2021-10-31 | Stop reason: HOSPADM

## 2021-10-26 RX ADMIN — CARVEDILOL 12.5 MG: 12.5 TABLET, FILM COATED ORAL at 08:29

## 2021-10-26 RX ADMIN — HYDRALAZINE HYDROCHLORIDE 100 MG: 50 TABLET ORAL at 20:50

## 2021-10-26 RX ADMIN — ASPIRIN 81 MG CHEWABLE TABLET 81 MG: 81 TABLET CHEWABLE at 08:29

## 2021-10-26 RX ADMIN — LEVOTHYROXINE SODIUM 125 MCG: 125 TABLET ORAL at 05:00

## 2021-10-26 RX ADMIN — INSULIN LISPRO 1 UNITS: 100 INJECTION, SOLUTION INTRAVENOUS; SUBCUTANEOUS at 16:55

## 2021-10-26 RX ADMIN — ATORVASTATIN CALCIUM 80 MG: 80 TABLET, FILM COATED ORAL at 08:29

## 2021-10-26 RX ADMIN — CALCIUM ACETATE 667 MG: 667 CAPSULE ORAL at 08:30

## 2021-10-26 RX ADMIN — INSULIN ASPART 10 UNITS: 100 INJECTION, SUSPENSION SUBCUTANEOUS at 08:30

## 2021-10-26 RX ADMIN — HYDRALAZINE HYDROCHLORIDE 100 MG: 50 TABLET ORAL at 08:29

## 2021-10-26 RX ADMIN — INSULIN ASPART 10 UNITS: 100 INJECTION, SUSPENSION SUBCUTANEOUS at 16:58

## 2021-10-26 RX ADMIN — INSULIN LISPRO 1 UNITS: 100 INJECTION, SOLUTION INTRAVENOUS; SUBCUTANEOUS at 11:25

## 2021-10-26 RX ADMIN — CARVEDILOL 12.5 MG: 12.5 TABLET, FILM COATED ORAL at 16:59

## 2021-10-26 RX ADMIN — ISOSORBIDE MONONITRATE 90 MG: 60 TABLET, EXTENDED RELEASE ORAL at 08:29

## 2021-10-26 RX ADMIN — HYDRALAZINE HYDROCHLORIDE 100 MG: 50 TABLET ORAL at 16:59

## 2021-10-26 RX ADMIN — INSULIN LISPRO 2 UNITS: 100 INJECTION, SOLUTION INTRAVENOUS; SUBCUTANEOUS at 21:14

## 2021-10-26 RX ADMIN — FUROSEMIDE 60 MG: 10 INJECTION, SOLUTION INTRAMUSCULAR; INTRAVENOUS at 17:01

## 2021-10-26 RX ADMIN — CALCIUM ACETATE 667 MG: 667 CAPSULE ORAL at 16:59

## 2021-10-26 RX ADMIN — GUAIFENESIN 600 MG: 600 TABLET, EXTENDED RELEASE ORAL at 08:30

## 2021-10-26 RX ADMIN — FUROSEMIDE 60 MG: 10 INJECTION, SOLUTION INTRAMUSCULAR; INTRAVENOUS at 08:30

## 2021-10-26 RX ADMIN — GUAIFENESIN 600 MG: 600 TABLET, EXTENDED RELEASE ORAL at 17:01

## 2021-10-26 RX ADMIN — AMLODIPINE BESYLATE 10 MG: 10 TABLET ORAL at 21:12

## 2021-10-27 LAB
ANION GAP SERPL CALCULATED.3IONS-SCNC: 5 MMOL/L (ref 4–13)
BASOPHILS # BLD AUTO: 0.04 THOUSANDS/ΜL (ref 0–0.1)
BASOPHILS NFR BLD AUTO: 1 % (ref 0–1)
BUN SERPL-MCNC: 88 MG/DL (ref 5–25)
CALCIUM SERPL-MCNC: 8.7 MG/DL (ref 8.3–10.1)
CHLORIDE SERPL-SCNC: 108 MMOL/L (ref 100–108)
CO2 SERPL-SCNC: 26 MMOL/L (ref 21–32)
CREAT SERPL-MCNC: 3.45 MG/DL (ref 0.6–1.3)
EOSINOPHIL # BLD AUTO: 0.12 THOUSAND/ΜL (ref 0–0.61)
EOSINOPHIL NFR BLD AUTO: 2 % (ref 0–6)
ERYTHROCYTE [DISTWIDTH] IN BLOOD BY AUTOMATED COUNT: 13.6 % (ref 11.6–15.1)
GFR SERPL CREATININE-BSD FRML MDRD: 13 ML/MIN/1.73SQ M
GLUCOSE SERPL-MCNC: 243 MG/DL (ref 65–140)
GLUCOSE SERPL-MCNC: 259 MG/DL (ref 65–140)
GLUCOSE SERPL-MCNC: 261 MG/DL (ref 65–140)
GLUCOSE SERPL-MCNC: 94 MG/DL (ref 65–140)
GLUCOSE SERPL-MCNC: 96 MG/DL (ref 65–140)
HCT VFR BLD AUTO: 23.2 % (ref 34.8–46.1)
HEMOCCULT STL QL: NEGATIVE
HEMOCCULT STL QL: NORMAL
HEMOCCULT STL QL: NORMAL
HGB BLD-MCNC: 7.1 G/DL (ref 11.5–15.4)
IMM GRANULOCYTES # BLD AUTO: 0.02 THOUSAND/UL (ref 0–0.2)
IMM GRANULOCYTES NFR BLD AUTO: 0 % (ref 0–2)
LYMPHOCYTES # BLD AUTO: 1.15 THOUSANDS/ΜL (ref 0.6–4.47)
LYMPHOCYTES NFR BLD AUTO: 20 % (ref 14–44)
MCH RBC QN AUTO: 28.4 PG (ref 26.8–34.3)
MCHC RBC AUTO-ENTMCNC: 30.6 G/DL (ref 31.4–37.4)
MCV RBC AUTO: 93 FL (ref 82–98)
MONOCYTES # BLD AUTO: 0.4 THOUSAND/ΜL (ref 0.17–1.22)
MONOCYTES NFR BLD AUTO: 7 % (ref 4–12)
NEUTROPHILS # BLD AUTO: 4.06 THOUSANDS/ΜL (ref 1.85–7.62)
NEUTS SEG NFR BLD AUTO: 70 % (ref 43–75)
NRBC BLD AUTO-RTO: 0 /100 WBCS
PLATELET # BLD AUTO: 144 THOUSANDS/UL (ref 149–390)
PMV BLD AUTO: 10.8 FL (ref 8.9–12.7)
POTASSIUM SERPL-SCNC: 4.5 MMOL/L (ref 3.5–5.3)
RBC # BLD AUTO: 2.5 MILLION/UL (ref 3.81–5.12)
SODIUM SERPL-SCNC: 139 MMOL/L (ref 136–145)
WBC # BLD AUTO: 5.79 THOUSAND/UL (ref 4.31–10.16)

## 2021-10-27 PROCEDURE — P9016 RBC LEUKOCYTES REDUCED: HCPCS

## 2021-10-27 PROCEDURE — 82272 OCCULT BLD FECES 1-3 TESTS: CPT | Performed by: HOSPITALIST

## 2021-10-27 PROCEDURE — 30233N1 TRANSFUSION OF NONAUTOLOGOUS RED BLOOD CELLS INTO PERIPHERAL VEIN, PERCUTANEOUS APPROACH: ICD-10-PCS | Performed by: HOSPITALIST

## 2021-10-27 PROCEDURE — 99232 SBSQ HOSP IP/OBS MODERATE 35: CPT | Performed by: HOSPITALIST

## 2021-10-27 PROCEDURE — 99233 SBSQ HOSP IP/OBS HIGH 50: CPT | Performed by: INTERNAL MEDICINE

## 2021-10-27 PROCEDURE — 80048 BASIC METABOLIC PNL TOTAL CA: CPT | Performed by: HOSPITALIST

## 2021-10-27 PROCEDURE — 82948 REAGENT STRIP/BLOOD GLUCOSE: CPT

## 2021-10-27 PROCEDURE — 85025 COMPLETE CBC W/AUTO DIFF WBC: CPT | Performed by: HOSPITALIST

## 2021-10-27 PROCEDURE — 94762 N-INVAS EAR/PLS OXIMTRY CONT: CPT

## 2021-10-27 RX ORDER — INSULIN ASPART 100 [IU]/ML
12 INJECTION, SUSPENSION SUBCUTANEOUS
Status: DISCONTINUED | OUTPATIENT
Start: 2021-10-28 | End: 2021-10-28

## 2021-10-27 RX ORDER — INSULIN ASPART 100 [IU]/ML
10 INJECTION, SUSPENSION SUBCUTANEOUS
Status: DISCONTINUED | OUTPATIENT
Start: 2021-10-28 | End: 2021-10-31 | Stop reason: HOSPADM

## 2021-10-27 RX ADMIN — GUAIFENESIN 600 MG: 600 TABLET, EXTENDED RELEASE ORAL at 08:39

## 2021-10-27 RX ADMIN — INSULIN LISPRO 2 UNITS: 100 INJECTION, SOLUTION INTRAVENOUS; SUBCUTANEOUS at 21:16

## 2021-10-27 RX ADMIN — INSULIN LISPRO 3 UNITS: 100 INJECTION, SOLUTION INTRAVENOUS; SUBCUTANEOUS at 17:27

## 2021-10-27 RX ADMIN — HYDRALAZINE HYDROCHLORIDE 100 MG: 50 TABLET ORAL at 08:39

## 2021-10-27 RX ADMIN — FUROSEMIDE 60 MG: 10 INJECTION, SOLUTION INTRAMUSCULAR; INTRAVENOUS at 08:39

## 2021-10-27 RX ADMIN — ASPIRIN 81 MG CHEWABLE TABLET 81 MG: 81 TABLET CHEWABLE at 08:39

## 2021-10-27 RX ADMIN — INSULIN LISPRO 3 UNITS: 100 INJECTION, SOLUTION INTRAVENOUS; SUBCUTANEOUS at 12:33

## 2021-10-27 RX ADMIN — HYDRALAZINE HYDROCHLORIDE 100 MG: 50 TABLET ORAL at 17:26

## 2021-10-27 RX ADMIN — ATORVASTATIN CALCIUM 80 MG: 80 TABLET, FILM COATED ORAL at 08:39

## 2021-10-27 RX ADMIN — CARVEDILOL 12.5 MG: 12.5 TABLET, FILM COATED ORAL at 08:39

## 2021-10-27 RX ADMIN — CALCIUM ACETATE 667 MG: 667 CAPSULE ORAL at 08:39

## 2021-10-27 RX ADMIN — LEVOTHYROXINE SODIUM 125 MCG: 125 TABLET ORAL at 06:41

## 2021-10-27 RX ADMIN — INSULIN ASPART 10 UNITS: 100 INJECTION, SUSPENSION SUBCUTANEOUS at 08:39

## 2021-10-27 RX ADMIN — AMLODIPINE BESYLATE 10 MG: 10 TABLET ORAL at 21:13

## 2021-10-27 RX ADMIN — CARVEDILOL 12.5 MG: 12.5 TABLET, FILM COATED ORAL at 17:26

## 2021-10-27 RX ADMIN — FUROSEMIDE 60 MG: 10 INJECTION, SOLUTION INTRAMUSCULAR; INTRAVENOUS at 17:26

## 2021-10-27 RX ADMIN — INSULIN ASPART 10 UNITS: 100 INJECTION, SUSPENSION SUBCUTANEOUS at 17:27

## 2021-10-27 RX ADMIN — CALCIUM ACETATE 667 MG: 667 CAPSULE ORAL at 17:24

## 2021-10-27 RX ADMIN — HYDRALAZINE HYDROCHLORIDE 100 MG: 50 TABLET ORAL at 21:15

## 2021-10-27 RX ADMIN — ISOSORBIDE MONONITRATE 120 MG: 60 TABLET, EXTENDED RELEASE ORAL at 08:38

## 2021-10-28 LAB
ABO GROUP BLD BPU: NORMAL
ANION GAP SERPL CALCULATED.3IONS-SCNC: 4 MMOL/L (ref 4–13)
BASOPHILS # BLD AUTO: 0.04 THOUSANDS/ΜL (ref 0–0.1)
BASOPHILS NFR BLD AUTO: 1 % (ref 0–1)
BPU ID: NORMAL
BUN SERPL-MCNC: 83 MG/DL (ref 5–25)
CALCIUM SERPL-MCNC: 9.3 MG/DL (ref 8.3–10.1)
CHLORIDE SERPL-SCNC: 106 MMOL/L (ref 100–108)
CO2 SERPL-SCNC: 27 MMOL/L (ref 21–32)
CREAT SERPL-MCNC: 3.31 MG/DL (ref 0.6–1.3)
CROSSMATCH: NORMAL
EOSINOPHIL # BLD AUTO: 0.21 THOUSAND/ΜL (ref 0–0.61)
EOSINOPHIL NFR BLD AUTO: 3 % (ref 0–6)
ERYTHROCYTE [DISTWIDTH] IN BLOOD BY AUTOMATED COUNT: 14 % (ref 11.6–15.1)
GFR SERPL CREATININE-BSD FRML MDRD: 14 ML/MIN/1.73SQ M
GLUCOSE SERPL-MCNC: 107 MG/DL (ref 65–140)
GLUCOSE SERPL-MCNC: 111 MG/DL (ref 65–140)
GLUCOSE SERPL-MCNC: 243 MG/DL (ref 65–140)
GLUCOSE SERPL-MCNC: 283 MG/DL (ref 65–140)
GLUCOSE SERPL-MCNC: 309 MG/DL (ref 65–140)
HCT VFR BLD AUTO: 27.7 % (ref 34.8–46.1)
HGB BLD-MCNC: 8.5 G/DL (ref 11.5–15.4)
IMM GRANULOCYTES # BLD AUTO: 0.05 THOUSAND/UL (ref 0–0.2)
IMM GRANULOCYTES NFR BLD AUTO: 1 % (ref 0–2)
LYMPHOCYTES # BLD AUTO: 1.22 THOUSANDS/ΜL (ref 0.6–4.47)
LYMPHOCYTES NFR BLD AUTO: 18 % (ref 14–44)
MCH RBC QN AUTO: 27.7 PG (ref 26.8–34.3)
MCHC RBC AUTO-ENTMCNC: 30.7 G/DL (ref 31.4–37.4)
MCV RBC AUTO: 90 FL (ref 82–98)
MONOCYTES # BLD AUTO: 0.49 THOUSAND/ΜL (ref 0.17–1.22)
MONOCYTES NFR BLD AUTO: 7 % (ref 4–12)
NEUTROPHILS # BLD AUTO: 4.62 THOUSANDS/ΜL (ref 1.85–7.62)
NEUTS SEG NFR BLD AUTO: 70 % (ref 43–75)
NRBC BLD AUTO-RTO: 0 /100 WBCS
PLATELET # BLD AUTO: 150 THOUSANDS/UL (ref 149–390)
PMV BLD AUTO: 10.6 FL (ref 8.9–12.7)
POTASSIUM SERPL-SCNC: 4.3 MMOL/L (ref 3.5–5.3)
RBC # BLD AUTO: 3.07 MILLION/UL (ref 3.81–5.12)
SODIUM SERPL-SCNC: 137 MMOL/L (ref 136–145)
UNIT DISPENSE STATUS: NORMAL
UNIT PRODUCT CODE: NORMAL
UNIT PRODUCT VOLUME: 350 ML
UNIT RH: NORMAL
WBC # BLD AUTO: 6.63 THOUSAND/UL (ref 4.31–10.16)

## 2021-10-28 PROCEDURE — 99233 SBSQ HOSP IP/OBS HIGH 50: CPT | Performed by: INTERNAL MEDICINE

## 2021-10-28 PROCEDURE — 85025 COMPLETE CBC W/AUTO DIFF WBC: CPT | Performed by: HOSPITALIST

## 2021-10-28 PROCEDURE — 99232 SBSQ HOSP IP/OBS MODERATE 35: CPT | Performed by: HOSPITALIST

## 2021-10-28 PROCEDURE — 82948 REAGENT STRIP/BLOOD GLUCOSE: CPT

## 2021-10-28 PROCEDURE — 80048 BASIC METABOLIC PNL TOTAL CA: CPT | Performed by: HOSPITALIST

## 2021-10-28 RX ORDER — INSULIN ASPART 100 [IU]/ML
14 INJECTION, SUSPENSION SUBCUTANEOUS
Status: DISCONTINUED | OUTPATIENT
Start: 2021-10-29 | End: 2021-10-29

## 2021-10-28 RX ORDER — FUROSEMIDE 10 MG/ML
60 INJECTION INTRAMUSCULAR; INTRAVENOUS ONCE
Status: COMPLETED | OUTPATIENT
Start: 2021-10-28 | End: 2021-10-28

## 2021-10-28 RX ADMIN — HYDRALAZINE HYDROCHLORIDE 100 MG: 50 TABLET ORAL at 17:36

## 2021-10-28 RX ADMIN — CARVEDILOL 12.5 MG: 12.5 TABLET, FILM COATED ORAL at 09:50

## 2021-10-28 RX ADMIN — FUROSEMIDE 60 MG: 10 INJECTION, SOLUTION INTRAMUSCULAR; INTRAVENOUS at 17:38

## 2021-10-28 RX ADMIN — CALCIUM ACETATE 667 MG: 667 CAPSULE ORAL at 09:50

## 2021-10-28 RX ADMIN — AMLODIPINE BESYLATE 10 MG: 10 TABLET ORAL at 21:30

## 2021-10-28 RX ADMIN — HYDRALAZINE HYDROCHLORIDE 100 MG: 50 TABLET ORAL at 09:50

## 2021-10-28 RX ADMIN — ISOSORBIDE MONONITRATE 120 MG: 60 TABLET, EXTENDED RELEASE ORAL at 09:50

## 2021-10-28 RX ADMIN — FUROSEMIDE 60 MG: 10 INJECTION, SOLUTION INTRAMUSCULAR; INTRAVENOUS at 09:50

## 2021-10-28 RX ADMIN — CALCIUM ACETATE 667 MG: 667 CAPSULE ORAL at 15:47

## 2021-10-28 RX ADMIN — INSULIN LISPRO 3 UNITS: 100 INJECTION, SOLUTION INTRAVENOUS; SUBCUTANEOUS at 21:29

## 2021-10-28 RX ADMIN — ASPIRIN 81 MG CHEWABLE TABLET 81 MG: 81 TABLET CHEWABLE at 09:49

## 2021-10-28 RX ADMIN — INSULIN LISPRO 3 UNITS: 100 INJECTION, SOLUTION INTRAVENOUS; SUBCUTANEOUS at 17:38

## 2021-10-28 RX ADMIN — LEVOTHYROXINE SODIUM 125 MCG: 125 TABLET ORAL at 04:45

## 2021-10-28 RX ADMIN — INSULIN ASPART 10 UNITS: 100 INJECTION, SUSPENSION SUBCUTANEOUS at 17:37

## 2021-10-28 RX ADMIN — INSULIN ASPART 12 UNITS: 100 INJECTION, SUSPENSION SUBCUTANEOUS at 09:50

## 2021-10-28 RX ADMIN — HYDRALAZINE HYDROCHLORIDE 100 MG: 50 TABLET ORAL at 21:30

## 2021-10-28 RX ADMIN — ATORVASTATIN CALCIUM 80 MG: 80 TABLET, FILM COATED ORAL at 09:50

## 2021-10-28 RX ADMIN — FUROSEMIDE 60 MG: 10 INJECTION, SOLUTION INTRAMUSCULAR; INTRAVENOUS at 15:47

## 2021-10-28 RX ADMIN — CARVEDILOL 12.5 MG: 12.5 TABLET, FILM COATED ORAL at 15:47

## 2021-10-29 ENCOUNTER — APPOINTMENT (INPATIENT)
Dept: RADIOLOGY | Facility: HOSPITAL | Age: 66
DRG: 291 | End: 2021-10-29
Payer: COMMERCIAL

## 2021-10-29 LAB
ANION GAP SERPL CALCULATED.3IONS-SCNC: 3 MMOL/L (ref 4–13)
BASOPHILS # BLD AUTO: 0.03 THOUSANDS/ΜL (ref 0–0.1)
BASOPHILS NFR BLD AUTO: 1 % (ref 0–1)
BUN SERPL-MCNC: 84 MG/DL (ref 5–25)
CALCIUM SERPL-MCNC: 8.7 MG/DL (ref 8.3–10.1)
CHLORIDE SERPL-SCNC: 105 MMOL/L (ref 100–108)
CO2 SERPL-SCNC: 28 MMOL/L (ref 21–32)
CREAT SERPL-MCNC: 3.43 MG/DL (ref 0.6–1.3)
EOSINOPHIL # BLD AUTO: 0.21 THOUSAND/ΜL (ref 0–0.61)
EOSINOPHIL NFR BLD AUTO: 3 % (ref 0–6)
ERYTHROCYTE [DISTWIDTH] IN BLOOD BY AUTOMATED COUNT: 14.3 % (ref 11.6–15.1)
GFR SERPL CREATININE-BSD FRML MDRD: 13 ML/MIN/1.73SQ M
GLUCOSE SERPL-MCNC: 123 MG/DL (ref 65–140)
GLUCOSE SERPL-MCNC: 160 MG/DL (ref 65–140)
GLUCOSE SERPL-MCNC: 196 MG/DL (ref 65–140)
GLUCOSE SERPL-MCNC: 233 MG/DL (ref 65–140)
GLUCOSE SERPL-MCNC: 271 MG/DL (ref 65–140)
HCT VFR BLD AUTO: 27.2 % (ref 34.8–46.1)
HGB BLD-MCNC: 8.6 G/DL (ref 11.5–15.4)
IMM GRANULOCYTES # BLD AUTO: 0.03 THOUSAND/UL (ref 0–0.2)
IMM GRANULOCYTES NFR BLD AUTO: 1 % (ref 0–2)
LYMPHOCYTES # BLD AUTO: 0.99 THOUSANDS/ΜL (ref 0.6–4.47)
LYMPHOCYTES NFR BLD AUTO: 15 % (ref 14–44)
MCH RBC QN AUTO: 28.1 PG (ref 26.8–34.3)
MCHC RBC AUTO-ENTMCNC: 31.6 G/DL (ref 31.4–37.4)
MCV RBC AUTO: 89 FL (ref 82–98)
MONOCYTES # BLD AUTO: 0.45 THOUSAND/ΜL (ref 0.17–1.22)
MONOCYTES NFR BLD AUTO: 7 % (ref 4–12)
NEUTROPHILS # BLD AUTO: 4.79 THOUSANDS/ΜL (ref 1.85–7.62)
NEUTS SEG NFR BLD AUTO: 73 % (ref 43–75)
NRBC BLD AUTO-RTO: 0 /100 WBCS
PLATELET # BLD AUTO: 131 THOUSANDS/UL (ref 149–390)
PMV BLD AUTO: 10.6 FL (ref 8.9–12.7)
POTASSIUM SERPL-SCNC: 4.2 MMOL/L (ref 3.5–5.3)
RBC # BLD AUTO: 3.06 MILLION/UL (ref 3.81–5.12)
SODIUM SERPL-SCNC: 136 MMOL/L (ref 136–145)
WBC # BLD AUTO: 6.5 THOUSAND/UL (ref 4.31–10.16)

## 2021-10-29 PROCEDURE — 80048 BASIC METABOLIC PNL TOTAL CA: CPT | Performed by: HOSPITALIST

## 2021-10-29 PROCEDURE — 74018 RADEX ABDOMEN 1 VIEW: CPT

## 2021-10-29 PROCEDURE — 85025 COMPLETE CBC W/AUTO DIFF WBC: CPT | Performed by: HOSPITALIST

## 2021-10-29 PROCEDURE — 99232 SBSQ HOSP IP/OBS MODERATE 35: CPT | Performed by: HOSPITALIST

## 2021-10-29 PROCEDURE — 82948 REAGENT STRIP/BLOOD GLUCOSE: CPT

## 2021-10-29 PROCEDURE — 99233 SBSQ HOSP IP/OBS HIGH 50: CPT | Performed by: INTERNAL MEDICINE

## 2021-10-29 RX ORDER — AMLODIPINE BESYLATE 5 MG/1
5 TABLET ORAL EVERY EVENING
Status: DISCONTINUED | OUTPATIENT
Start: 2021-10-29 | End: 2021-10-31 | Stop reason: HOSPADM

## 2021-10-29 RX ORDER — INSULIN ASPART 100 [IU]/ML
15 INJECTION, SUSPENSION SUBCUTANEOUS
Status: DISCONTINUED | OUTPATIENT
Start: 2021-10-30 | End: 2021-10-31 | Stop reason: HOSPADM

## 2021-10-29 RX ORDER — AMOXICILLIN 250 MG
1 CAPSULE ORAL 2 TIMES DAILY
Status: DISCONTINUED | OUTPATIENT
Start: 2021-10-29 | End: 2021-10-31 | Stop reason: HOSPADM

## 2021-10-29 RX ORDER — CARVEDILOL 25 MG/1
25 TABLET ORAL 2 TIMES DAILY WITH MEALS
Status: DISCONTINUED | OUTPATIENT
Start: 2021-10-30 | End: 2021-10-30

## 2021-10-29 RX ORDER — BUMETANIDE 2 MG/1
2 TABLET ORAL 2 TIMES DAILY
Status: DISCONTINUED | OUTPATIENT
Start: 2021-10-29 | End: 2021-10-31 | Stop reason: HOSPADM

## 2021-10-29 RX ADMIN — HYDRALAZINE HYDROCHLORIDE 100 MG: 50 TABLET ORAL at 21:44

## 2021-10-29 RX ADMIN — INSULIN LISPRO 3 UNITS: 100 INJECTION, SOLUTION INTRAVENOUS; SUBCUTANEOUS at 21:44

## 2021-10-29 RX ADMIN — AMLODIPINE BESYLATE 5 MG: 5 TABLET ORAL at 17:46

## 2021-10-29 RX ADMIN — ASPIRIN 81 MG CHEWABLE TABLET 81 MG: 81 TABLET CHEWABLE at 09:28

## 2021-10-29 RX ADMIN — HYDRALAZINE HYDROCHLORIDE 100 MG: 50 TABLET ORAL at 09:28

## 2021-10-29 RX ADMIN — BUMETANIDE 2 MG: 2 TABLET ORAL at 17:46

## 2021-10-29 RX ADMIN — INSULIN LISPRO 2 UNITS: 100 INJECTION, SOLUTION INTRAVENOUS; SUBCUTANEOUS at 17:48

## 2021-10-29 RX ADMIN — DOCUSATE SODIUM AND SENNOSIDES 1 TABLET: 8.6; 5 TABLET ORAL at 17:46

## 2021-10-29 RX ADMIN — BUMETANIDE 2 MG: 2 TABLET ORAL at 12:22

## 2021-10-29 RX ADMIN — INSULIN ASPART 10 UNITS: 100 INJECTION, SUSPENSION SUBCUTANEOUS at 17:51

## 2021-10-29 RX ADMIN — AMLODIPINE BESYLATE 10 MG: 10 TABLET ORAL at 21:44

## 2021-10-29 RX ADMIN — LEVOTHYROXINE SODIUM 125 MCG: 125 TABLET ORAL at 05:15

## 2021-10-29 RX ADMIN — CALCIUM ACETATE 667 MG: 667 CAPSULE ORAL at 17:46

## 2021-10-29 RX ADMIN — INSULIN LISPRO 3 UNITS: 100 INJECTION, SOLUTION INTRAVENOUS; SUBCUTANEOUS at 12:22

## 2021-10-29 RX ADMIN — INSULIN ASPART 14 UNITS: 100 INJECTION, SUSPENSION SUBCUTANEOUS at 07:53

## 2021-10-29 RX ADMIN — HYDRALAZINE HYDROCHLORIDE 100 MG: 50 TABLET ORAL at 17:46

## 2021-10-29 RX ADMIN — CARVEDILOL 12.5 MG: 12.5 TABLET, FILM COATED ORAL at 07:52

## 2021-10-29 RX ADMIN — CALCIUM ACETATE 667 MG: 667 CAPSULE ORAL at 07:52

## 2021-10-29 RX ADMIN — ISOSORBIDE MONONITRATE 120 MG: 60 TABLET, EXTENDED RELEASE ORAL at 09:28

## 2021-10-29 RX ADMIN — FUROSEMIDE 60 MG: 10 INJECTION, SOLUTION INTRAMUSCULAR; INTRAVENOUS at 09:29

## 2021-10-30 PROBLEM — G47.34 NOCTURNAL HYPOXIA: Status: ACTIVE | Noted: 2021-10-30

## 2021-10-30 LAB
ANION GAP SERPL CALCULATED.3IONS-SCNC: 5 MMOL/L (ref 4–13)
BUN SERPL-MCNC: 81 MG/DL (ref 5–25)
CALCIUM SERPL-MCNC: 8.8 MG/DL (ref 8.3–10.1)
CHLORIDE SERPL-SCNC: 105 MMOL/L (ref 100–108)
CO2 SERPL-SCNC: 28 MMOL/L (ref 21–32)
CREAT SERPL-MCNC: 3.24 MG/DL (ref 0.6–1.3)
GFR SERPL CREATININE-BSD FRML MDRD: 14 ML/MIN/1.73SQ M
GLUCOSE SERPL-MCNC: 102 MG/DL (ref 65–140)
GLUCOSE SERPL-MCNC: 138 MG/DL (ref 65–140)
GLUCOSE SERPL-MCNC: 152 MG/DL (ref 65–140)
GLUCOSE SERPL-MCNC: 261 MG/DL (ref 65–140)
POTASSIUM SERPL-SCNC: 3.9 MMOL/L (ref 3.5–5.3)
SODIUM SERPL-SCNC: 138 MMOL/L (ref 136–145)

## 2021-10-30 PROCEDURE — 80048 BASIC METABOLIC PNL TOTAL CA: CPT | Performed by: HOSPITALIST

## 2021-10-30 PROCEDURE — 99232 SBSQ HOSP IP/OBS MODERATE 35: CPT | Performed by: INTERNAL MEDICINE

## 2021-10-30 PROCEDURE — 94762 N-INVAS EAR/PLS OXIMTRY CONT: CPT

## 2021-10-30 PROCEDURE — 99232 SBSQ HOSP IP/OBS MODERATE 35: CPT | Performed by: HOSPITALIST

## 2021-10-30 PROCEDURE — 82948 REAGENT STRIP/BLOOD GLUCOSE: CPT

## 2021-10-30 RX ORDER — POLYETHYLENE GLYCOL 3350 17 G/17G
17 POWDER, FOR SOLUTION ORAL DAILY
Status: DISCONTINUED | OUTPATIENT
Start: 2021-10-30 | End: 2021-10-31 | Stop reason: HOSPADM

## 2021-10-30 RX ORDER — CARVEDILOL 12.5 MG/1
12.5 TABLET ORAL 2 TIMES DAILY WITH MEALS
Status: DISCONTINUED | OUTPATIENT
Start: 2021-10-30 | End: 2021-10-31 | Stop reason: HOSPADM

## 2021-10-30 RX ADMIN — BUMETANIDE 2 MG: 2 TABLET ORAL at 08:48

## 2021-10-30 RX ADMIN — DOCUSATE SODIUM AND SENNOSIDES 1 TABLET: 8.6; 5 TABLET ORAL at 18:09

## 2021-10-30 RX ADMIN — HYDRALAZINE HYDROCHLORIDE 100 MG: 50 TABLET ORAL at 18:09

## 2021-10-30 RX ADMIN — ASPIRIN 81 MG CHEWABLE TABLET 81 MG: 81 TABLET CHEWABLE at 08:49

## 2021-10-30 RX ADMIN — POLYETHYLENE GLYCOL 3350 17 G: 17 POWDER, FOR SOLUTION ORAL at 18:10

## 2021-10-30 RX ADMIN — DOCUSATE SODIUM AND SENNOSIDES 1 TABLET: 8.6; 5 TABLET ORAL at 08:49

## 2021-10-30 RX ADMIN — HYDRALAZINE HYDROCHLORIDE 100 MG: 50 TABLET ORAL at 08:49

## 2021-10-30 RX ADMIN — CARVEDILOL 12.5 MG: 12.5 TABLET, FILM COATED ORAL at 18:10

## 2021-10-30 RX ADMIN — INSULIN ASPART 10 UNITS: 100 INJECTION, SUSPENSION SUBCUTANEOUS at 18:10

## 2021-10-30 RX ADMIN — CALCIUM ACETATE 667 MG: 667 CAPSULE ORAL at 08:48

## 2021-10-30 RX ADMIN — HYDRALAZINE HYDROCHLORIDE 100 MG: 50 TABLET ORAL at 21:42

## 2021-10-30 RX ADMIN — INSULIN LISPRO 1 UNITS: 100 INJECTION, SOLUTION INTRAVENOUS; SUBCUTANEOUS at 11:30

## 2021-10-30 RX ADMIN — ISOSORBIDE MONONITRATE 120 MG: 60 TABLET, EXTENDED RELEASE ORAL at 08:48

## 2021-10-30 RX ADMIN — ATORVASTATIN CALCIUM 80 MG: 80 TABLET, FILM COATED ORAL at 08:49

## 2021-10-30 RX ADMIN — INSULIN LISPRO 2 UNITS: 100 INJECTION, SOLUTION INTRAVENOUS; SUBCUTANEOUS at 21:42

## 2021-10-30 RX ADMIN — LEVOTHYROXINE SODIUM 125 MCG: 125 TABLET ORAL at 05:26

## 2021-10-30 RX ADMIN — BUMETANIDE 2 MG: 2 TABLET ORAL at 18:09

## 2021-10-30 RX ADMIN — AMLODIPINE BESYLATE 5 MG: 5 TABLET ORAL at 18:09

## 2021-10-30 RX ADMIN — CALCIUM ACETATE 667 MG: 667 CAPSULE ORAL at 18:09

## 2021-10-30 RX ADMIN — AMLODIPINE BESYLATE 10 MG: 10 TABLET ORAL at 21:42

## 2021-10-30 RX ADMIN — INSULIN ASPART 15 UNITS: 100 INJECTION, SUSPENSION SUBCUTANEOUS at 08:51

## 2021-10-30 RX ADMIN — CARVEDILOL 25 MG: 25 TABLET, FILM COATED ORAL at 08:49

## 2021-10-31 VITALS
BODY MASS INDEX: 29.79 KG/M2 | HEART RATE: 53 BPM | DIASTOLIC BLOOD PRESSURE: 58 MMHG | HEIGHT: 62 IN | RESPIRATION RATE: 20 BRPM | OXYGEN SATURATION: 97 % | SYSTOLIC BLOOD PRESSURE: 124 MMHG | TEMPERATURE: 98.2 F | WEIGHT: 161.9 LBS

## 2021-10-31 LAB
ANION GAP SERPL CALCULATED.3IONS-SCNC: 4 MMOL/L (ref 4–13)
BUN SERPL-MCNC: 79 MG/DL (ref 5–25)
CALCIUM SERPL-MCNC: 8.8 MG/DL (ref 8.3–10.1)
CHLORIDE SERPL-SCNC: 104 MMOL/L (ref 100–108)
CO2 SERPL-SCNC: 29 MMOL/L (ref 21–32)
CREAT SERPL-MCNC: 3.45 MG/DL (ref 0.6–1.3)
GFR SERPL CREATININE-BSD FRML MDRD: 13 ML/MIN/1.73SQ M
GLUCOSE SERPL-MCNC: 110 MG/DL (ref 65–140)
GLUCOSE SERPL-MCNC: 113 MG/DL (ref 65–140)
GLUCOSE SERPL-MCNC: 311 MG/DL (ref 65–140)
POTASSIUM SERPL-SCNC: 4.3 MMOL/L (ref 3.5–5.3)
SODIUM SERPL-SCNC: 137 MMOL/L (ref 136–145)

## 2021-10-31 PROCEDURE — 82948 REAGENT STRIP/BLOOD GLUCOSE: CPT

## 2021-10-31 PROCEDURE — 99239 HOSP IP/OBS DSCHRG MGMT >30: CPT | Performed by: HOSPITALIST

## 2021-10-31 PROCEDURE — 99232 SBSQ HOSP IP/OBS MODERATE 35: CPT | Performed by: INTERNAL MEDICINE

## 2021-10-31 PROCEDURE — 80048 BASIC METABOLIC PNL TOTAL CA: CPT | Performed by: INTERNAL MEDICINE

## 2021-10-31 RX ORDER — AMLODIPINE BESYLATE 10 MG/1
10 TABLET ORAL 2 TIMES DAILY
Qty: 60 TABLET | Refills: 0 | Status: SHIPPED | OUTPATIENT
Start: 2021-10-31 | End: 2021-10-31 | Stop reason: SDUPTHER

## 2021-10-31 RX ORDER — ISOSORBIDE MONONITRATE 120 MG/1
120 TABLET, EXTENDED RELEASE ORAL DAILY
Qty: 30 TABLET | Refills: 0 | Status: SHIPPED | OUTPATIENT
Start: 2021-11-01 | End: 2021-11-23

## 2021-10-31 RX ORDER — ISOSORBIDE MONONITRATE 120 MG/1
120 TABLET, EXTENDED RELEASE ORAL DAILY
Qty: 30 TABLET | Refills: 0 | Status: SHIPPED | OUTPATIENT
Start: 2021-11-01 | End: 2021-10-31

## 2021-10-31 RX ORDER — INSULIN ASPART 100 [IU]/ML
INJECTION, SUSPENSION SUBCUTANEOUS
Refills: 0
Start: 2021-10-31

## 2021-10-31 RX ORDER — BUMETANIDE 2 MG/1
2 TABLET ORAL 2 TIMES DAILY
Qty: 60 TABLET | Refills: 0 | Status: SHIPPED | OUTPATIENT
Start: 2021-10-31 | End: 2021-11-23

## 2021-10-31 RX ORDER — BUMETANIDE 2 MG/1
2 TABLET ORAL 2 TIMES DAILY
Qty: 60 TABLET | Refills: 0 | Status: SHIPPED | OUTPATIENT
Start: 2021-10-31 | End: 2021-10-31

## 2021-10-31 RX ORDER — AMLODIPINE BESYLATE 10 MG/1
10 TABLET ORAL 2 TIMES DAILY
Qty: 60 TABLET | Refills: 0 | Status: SHIPPED | OUTPATIENT
Start: 2021-10-31 | End: 2021-11-23

## 2021-10-31 RX ADMIN — ISOSORBIDE MONONITRATE 120 MG: 60 TABLET, EXTENDED RELEASE ORAL at 09:25

## 2021-10-31 RX ADMIN — POLYETHYLENE GLYCOL 3350 17 G: 17 POWDER, FOR SOLUTION ORAL at 09:25

## 2021-10-31 RX ADMIN — DOCUSATE SODIUM AND SENNOSIDES 1 TABLET: 8.6; 5 TABLET ORAL at 09:25

## 2021-10-31 RX ADMIN — ASPIRIN 81 MG CHEWABLE TABLET 81 MG: 81 TABLET CHEWABLE at 09:25

## 2021-10-31 RX ADMIN — LEVOTHYROXINE SODIUM 125 MCG: 125 TABLET ORAL at 05:36

## 2021-10-31 RX ADMIN — ATORVASTATIN CALCIUM 80 MG: 80 TABLET, FILM COATED ORAL at 09:25

## 2021-10-31 RX ADMIN — BUMETANIDE 2 MG: 2 TABLET ORAL at 09:25

## 2021-10-31 RX ADMIN — INSULIN ASPART 15 UNITS: 100 INJECTION, SUSPENSION SUBCUTANEOUS at 09:27

## 2021-10-31 RX ADMIN — HYDRALAZINE HYDROCHLORIDE 100 MG: 50 TABLET ORAL at 09:25

## 2021-10-31 RX ADMIN — CARVEDILOL 12.5 MG: 12.5 TABLET, FILM COATED ORAL at 09:25

## 2021-10-31 RX ADMIN — CALCIUM ACETATE 667 MG: 667 CAPSULE ORAL at 09:25

## 2021-11-05 ENCOUNTER — APPOINTMENT (OUTPATIENT)
Dept: LAB | Facility: HOSPITAL | Age: 66
End: 2021-11-05
Payer: COMMERCIAL

## 2021-11-05 DIAGNOSIS — N18.9 CKD (CHRONIC KIDNEY DISEASE): ICD-10-CM

## 2021-11-05 DIAGNOSIS — I50.30 DIASTOLIC CONGESTIVE HEART FAILURE, UNSPECIFIED HF CHRONICITY (HCC): ICD-10-CM

## 2021-11-05 DIAGNOSIS — N18.4 STAGE 4 CHRONIC KIDNEY DISEASE (HCC): ICD-10-CM

## 2021-11-05 DIAGNOSIS — I10 PRIMARY HYPERTENSION: ICD-10-CM

## 2021-11-05 DIAGNOSIS — N17.9 ACUTE RENAL FAILURE, UNSPECIFIED ACUTE RENAL FAILURE TYPE (HCC): ICD-10-CM

## 2021-11-05 LAB
ANION GAP SERPL CALCULATED.3IONS-SCNC: 5 MMOL/L (ref 4–13)
BUN SERPL-MCNC: 80 MG/DL (ref 5–25)
CALCIUM SERPL-MCNC: 9.1 MG/DL (ref 8.3–10.1)
CHLORIDE SERPL-SCNC: 104 MMOL/L (ref 100–108)
CO2 SERPL-SCNC: 29 MMOL/L (ref 21–32)
CREAT SERPL-MCNC: 3.14 MG/DL (ref 0.6–1.3)
GFR SERPL CREATININE-BSD FRML MDRD: 15 ML/MIN/1.73SQ M
GLUCOSE P FAST SERPL-MCNC: 184 MG/DL (ref 65–99)
POTASSIUM SERPL-SCNC: 4.1 MMOL/L (ref 3.5–5.3)
SODIUM SERPL-SCNC: 138 MMOL/L (ref 136–145)

## 2021-11-05 PROCEDURE — 80048 BASIC METABOLIC PNL TOTAL CA: CPT

## 2021-11-05 PROCEDURE — 36415 COLL VENOUS BLD VENIPUNCTURE: CPT

## 2021-11-06 DIAGNOSIS — I16.0 HYPERTENSIVE URGENCY: ICD-10-CM

## 2021-11-07 RX ORDER — HYDRALAZINE HYDROCHLORIDE 100 MG/1
TABLET, FILM COATED ORAL
Qty: 90 TABLET | Refills: 3 | Status: SHIPPED | OUTPATIENT
Start: 2021-11-07 | End: 2021-11-23

## 2021-11-08 ENCOUNTER — OFFICE VISIT (OUTPATIENT)
Dept: CARDIOLOGY CLINIC | Facility: CLINIC | Age: 66
End: 2021-11-08
Payer: COMMERCIAL

## 2021-11-08 VITALS
WEIGHT: 160.8 LBS | HEART RATE: 62 BPM | HEIGHT: 62 IN | BODY MASS INDEX: 29.59 KG/M2 | DIASTOLIC BLOOD PRESSURE: 54 MMHG | SYSTOLIC BLOOD PRESSURE: 142 MMHG

## 2021-11-08 DIAGNOSIS — I50.30 DIASTOLIC HEART FAILURE, UNSPECIFIED HF CHRONICITY (HCC): Primary | ICD-10-CM

## 2021-11-08 PROCEDURE — 99215 OFFICE O/P EST HI 40 MIN: CPT | Performed by: NURSE PRACTITIONER

## 2021-11-08 RX ORDER — LEVOTHYROXINE SODIUM 112 UG/1
TABLET ORAL
COMMUNITY
Start: 2021-09-24 | End: 2022-04-27 | Stop reason: ALTCHOICE

## 2021-11-08 RX ORDER — PEN NEEDLE, DIABETIC 32GX 5/32"
NEEDLE, DISPOSABLE MISCELLANEOUS 2 TIMES DAILY
COMMUNITY
Start: 2021-10-12

## 2021-11-23 ENCOUNTER — OFFICE VISIT (OUTPATIENT)
Dept: CARDIOLOGY CLINIC | Facility: CLINIC | Age: 66
End: 2021-11-23
Payer: COMMERCIAL

## 2021-11-23 VITALS
BODY MASS INDEX: 29.37 KG/M2 | HEIGHT: 62 IN | OXYGEN SATURATION: 100 % | HEART RATE: 62 BPM | DIASTOLIC BLOOD PRESSURE: 82 MMHG | WEIGHT: 159.6 LBS | SYSTOLIC BLOOD PRESSURE: 130 MMHG

## 2021-11-23 DIAGNOSIS — Z79.4 TYPE 2 DIABETES MELLITUS WITH STAGE 4 CHRONIC KIDNEY DISEASE, WITH LONG-TERM CURRENT USE OF INSULIN (HCC): ICD-10-CM

## 2021-11-23 DIAGNOSIS — N18.4 TYPE 2 DIABETES MELLITUS WITH STAGE 4 CHRONIC KIDNEY DISEASE, WITH LONG-TERM CURRENT USE OF INSULIN (HCC): ICD-10-CM

## 2021-11-23 DIAGNOSIS — Z71.9 HEALTH EDUCATION/COUNSELING: ICD-10-CM

## 2021-11-23 DIAGNOSIS — N18.4 STAGE 4 CHRONIC KIDNEY DISEASE (HCC): ICD-10-CM

## 2021-11-23 DIAGNOSIS — I50.32 CHRONIC HEART FAILURE WITH PRESERVED EJECTION FRACTION (HCC): Primary | ICD-10-CM

## 2021-11-23 DIAGNOSIS — I10 HYPERTENSION, UNSPECIFIED TYPE: ICD-10-CM

## 2021-11-23 DIAGNOSIS — E11.22 TYPE 2 DIABETES MELLITUS WITH STAGE 4 CHRONIC KIDNEY DISEASE, WITH LONG-TERM CURRENT USE OF INSULIN (HCC): ICD-10-CM

## 2021-11-23 DIAGNOSIS — E78.2 MIXED HYPERLIPIDEMIA: ICD-10-CM

## 2021-11-23 DIAGNOSIS — I25.10 CORONARY ARTERY DISEASE INVOLVING NATIVE CORONARY ARTERY OF NATIVE HEART WITHOUT ANGINA PECTORIS: ICD-10-CM

## 2021-11-23 PROCEDURE — 99214 OFFICE O/P EST MOD 30 MIN: CPT | Performed by: PHYSICIAN ASSISTANT

## 2021-11-23 RX ORDER — BUMETANIDE 2 MG/1
2 TABLET ORAL 2 TIMES DAILY
Qty: 180 TABLET | Refills: 1 | Status: SHIPPED | OUTPATIENT
Start: 2021-11-23 | End: 2022-01-31 | Stop reason: SDUPTHER

## 2021-11-23 RX ORDER — CARVEDILOL 12.5 MG/1
12.5 TABLET ORAL 2 TIMES DAILY WITH MEALS
Qty: 180 TABLET | Refills: 1 | Status: SHIPPED | OUTPATIENT
Start: 2021-11-23 | End: 2022-01-31 | Stop reason: SDUPTHER

## 2021-11-23 RX ORDER — HYDRALAZINE HYDROCHLORIDE 100 MG/1
100 TABLET, FILM COATED ORAL 3 TIMES DAILY
Qty: 270 TABLET | Refills: 1 | Status: SHIPPED | OUTPATIENT
Start: 2021-11-23 | End: 2022-01-31 | Stop reason: SDUPTHER

## 2021-11-23 RX ORDER — ATORVASTATIN CALCIUM 40 MG/1
80 TABLET, FILM COATED ORAL DAILY
Qty: 90 TABLET | Refills: 1 | Status: SHIPPED | OUTPATIENT
Start: 2021-11-23 | End: 2022-01-31 | Stop reason: SDUPTHER

## 2021-11-23 RX ORDER — ISOSORBIDE MONONITRATE 120 MG/1
120 TABLET, EXTENDED RELEASE ORAL DAILY
Qty: 90 TABLET | Refills: 1 | Status: SHIPPED | OUTPATIENT
Start: 2021-11-23 | End: 2022-01-31 | Stop reason: SDUPTHER

## 2021-11-23 RX ORDER — AMLODIPINE BESYLATE 10 MG/1
10 TABLET ORAL 2 TIMES DAILY
Qty: 180 TABLET | Refills: 1 | Status: SHIPPED | OUTPATIENT
Start: 2021-11-23 | End: 2022-01-31 | Stop reason: SDUPTHER

## 2021-11-23 RX ORDER — ASPIRIN 81 MG/1
81 TABLET, CHEWABLE ORAL DAILY
Qty: 90 TABLET | Refills: 1 | Status: SHIPPED | OUTPATIENT
Start: 2021-11-23

## 2022-01-24 NOTE — PROGRESS NOTES
Cardiology Outpatient Progress Note - Alena Dykes 78 y o  female MRN: [id number]    @ Encounter: [id number]      Patient Active Problem List    Diagnosis Date Noted    Nocturnal hypoxia 10/30/2021    Acute renal failure ([facility] )     Abnormal finding on imaging of liver 10/18/2021    CKD (chronic kidney disease)     Elevated troponin 10/15/2021    Pericardial effusion 07/02/2021    Stage 4 chronic kidney disease ([facility] ) 07/02/2021    Acute otitis media 03/05/2021    Hypoalbuminemia 02/25/2021    Hyperphosphatemia 02/22/2021    Anemia due to stage 3 chronic kidney disease ([facility] ) 02/25/2020    Elevated LFTs 02/19/2020    History of anemia due to chronic kidney disease 05/31/2019    Vitamin D deficiency 05/31/2019    Proteinuria 05/31/2019    Coronary artery disease involving native coronary artery of native heart with angina pectoris ([facility] ) 01/04/2019    Anemia 12/30/2018    Acute on chronic diastolic congestive heart failure ([facility] ) 12/29/2018    Type 2 diabetes mellitus with kidney complication, with long-term current use of insulin ([facility] ) 12/29/2018    Hypothyroidism 12/29/2018    Hypertension 12/29/2018    Hyperlipidemia 12/29/2018    Acute renal failure superimposed on stage 4 chronic kidney disease ([facility] ) 12/29/2018     Assessment:  # Chronic HFpEF, Stage C  Diuretic: bumex 2 mg BID  Weight: 157 lbs  NT pro BNP 3/1/21: 9024  4/30/19: 702    Studies- personally reviewed by me  Echo 7/3/21:  LVEF: 60%, hypo inferior wall  RV: normal  Other: small to moderate pericardial effusion posterior- no compromise    Echo 2/22/21  LVEF: 60%, moderate LVH, grade 2 DD  RV: normal  Other: moderate free flowing pericardial effusion     Echo 12/30/18:  EF: 50%, grade 2 DD, PASP 35 mmHg    # pericardial effusion- see echo report above     # CAD - LAD stents  [facility] 1/4/19: LUDY x 2 to LAD after NSTEMI (trop 0 2) and abnormal stress test with ischemia in anterior wall on 12/31/18  80% RCA- plan staged, but has not needed intervention  Med Rx: Imdur 90 mg daily, asa, atorvastatin 40 mg, coreg 12 5 mg BID  Angina: none    # dyslipidemia- atorvastatin 40 mg   3/2/21: LDL 48, HDL 76     # HTN- norvasc 10 mg QHS, coreg 12 5 mg BID, hydralazine 100 mg TID, Imdur 60 mg daily     # palpitations  Holter 5/17/19: 53-85, avg 69 bpm  8 PVCs      # anemia of CKD, HgB 8 1 on 3/15/21, 7 1 on 10/27  IV iron  # CKD4, Cr 3 14 on 11/5/21  # DM2, HgA1C 10/25/21: 9 7%     Today's Plan:  Continue Bumex 2 BID for volume for HFpEF  Continue  Asa, imdur, atorvastatin, coreg  for CAD  Lipids at goal     HPI:     79 yo with chronic diastolic CHF, HTN, CAD s/p LUDY x 2 LAD, residual disease in RCA planned for stage PCI, CKD, anemia presents post hospitalization for NSTEMI and acute diastolic CHF  She was having OROSCO for last 3-4 weeks  Eats a lot of salty foods  Had PND, edema for about a month prior  She was hypertensive to 220 mmHg on admit  Held off on RCA stenting last time  Gave lasix 80 mg IV in office - weight next day down a couple of pounts to 171      Was admitted in Feb with PNA and HF exacerbation, had KELLY likely due to contrast, was discharged off diuretics and then admitted early March with acute HFpEF, NT proBNP 9024  She received IV diuresis    Admitted in October 2021 with volume overload, stated did not respond to torsemide, switched to Bumex    Interim:  Bumex since doubled to 2 mg BID on follow ups      Past Medical History:   Diagnosis Date    Anemia     CHF (congestive heart failure) [facility] )     Chronic kidney disease     Coronary artery disease     [facility]     Hypertension     Hypothyroidism        Review of Systems   Constitutional: Negative for activity change, appetite change, fatigue and unexpected weight change  HENT: Negative for congestion and nosebleeds  Eyes: Negative  Respiratory: Negative for cough, chest tightness and shortness of breath      Cardiovascular: Negative for chest pain, palpitations and leg swelling  Gastrointestinal: Negative for abdominal distention  Endocrine: Negative  Genitourinary: Negative  Musculoskeletal: Negative  Skin: Negative  Neurological: Negative for dizziness, syncope and weakness  Hematological: Negative  Psychiatric/Behavioral: Negative  Allergies   Allergen Reactions    Iv Contrast [Iodinated Diagnostic Agents] Itching     Caused KELLY    Nifedipine Rash           Current Outpatient Medications:     amLODIPine (NORVASC) 10 mg tablet, Take 1 tablet (10 mg total) by mouth 2 (two) times a day, Disp: 180 tablet, Rfl: 1    aspirin (RA Aspirin Adult Low Dose) 81 mg chewable tablet, Chew 1 tablet (81 mg total) daily, Disp: 90 tablet, Rfl: 1    atorvastatin (LIPITOR) 40 mg tablet, Take 2 tablets (80 mg total) by mouth daily, Disp: 90 tablet, Rfl: 1    bumetanide (BUMEX) 2 mg tablet, Take 1 tablet (2 mg total) by mouth 2 (two) times a day, Disp: 180 tablet, Rfl: 1    carvedilol (COREG) 12 5 mg tablet, Take 1 tablet (12 5 mg total) by mouth 2 (two) times a day with meals, Disp: 180 tablet, Rfl: 1    Droplet Pen Needles 32G X 4 MM MISC, 2 (two) times a day, Disp: , Rfl:     hydrALAZINE (APRESOLINE) 100 MG tablet, Take 1 tablet (100 mg total) by mouth 3 (three) times a day, Disp: 270 tablet, Rfl: 1    insulin aspart protamine-insulin aspart (NovoLOG 70/30) 100 units/mL injection, As per your current blood sugars, take 15 units before breakfast, 10 units before dinner, Disp: , Rfl: 0    isosorbide mononitrate (IMDUR) 120 mg 24 hr tablet, Take 1 tablet (120 mg total) by mouth daily, Disp: 90 tablet, Rfl: 1    levothyroxine 112 mcg tablet, take 1 tablet by mouth daily for 6 days then take 1/2 tablet ON SUNDAY, Disp: , Rfl:     benzonatate (TESSALON PERLES) 100 mg capsule, Take 1 capsule (100 mg total) by mouth 3 (three) times a day as needed for cough (Patient not taking: Reported on 11/23/2021 ), Disp: 20 capsule, Rfl: 0    calcium acetate (PHOSLO) capsule, Take 1 capsule (667 mg total) by mouth 2 (two) times a day with meals (Patient not taking: Reported on 10/13/2021), Disp: 60 capsule, Rfl: 0    guaiFENesin (ROBITUSSIN) 100 MG/5ML oral liquid, Take 10 mL (200 mg total) by mouth 3 (three) times a day as needed for cough (Patient not taking: Reported on 11/23/2021 ), Disp: 120 mL, Rfl: 0    Social History     Socioeconomic History    Marital status: /Civil Union     Spouse name: Not on file    Number of children: 3    Years of education: Not on file    Highest education level: Not on file   Occupational History    Occupation: part time   Tobacco Use    Smoking status: Former Smoker    Smokeless tobacco: Never Used   Vaping Use    Vaping Use: Never used   Substance and Sexual Activity    Alcohol use: Never    Drug use: No    Sexual activity: Yes   Other Topics Concern    Not on file   Social History Narrative    Always uses seatbelt    Caffeine use    Daily coffee consumption: 1 cp daily    Feels safe at home    Lives with spouse     Social Determinants of Health     Financial Resource Strain: Not on file   Food Insecurity: Not on file   Transportation Needs: Not on file   Physical Activity: Not on file   Stress: Not on file   Social Connections: Not on file   Intimate Partner Violence: Not on file   Housing Stability: Not on file       Family History   Problem Relation Age of Onset    Diabetes Mother     Heart attack Father         MI    Hypertension Brother        Physical Exam:    Vitals: Blood pressure 128/64, pulse 76, height 5' 2" (1 575 m), weight 71 2 kg (157 lb), SpO2 100 %, currently breastfeeding , Body mass index is 28 72 kg/m² ,   Wt Readings from Last 3 Encounters:   01/31/22 71 2 kg (157 lb)   11/23/21 72 4 kg (159 lb 9 6 oz)   11/08/21 72 9 kg (160 lb 12 8 oz)         Physical Exam  Constitutional:       Appearance: She is well-developed  HENT:      Head: Normocephalic and atraumatic     Eyes:      Pupils: Pupils are equal, round, and reactive to light  Neck:      Vascular: No JVD  Cardiovascular:      Rate and Rhythm: Normal rate and regular rhythm  Heart sounds: No murmur heard  Pulmonary:      Effort: Pulmonary effort is normal  No respiratory distress  Breath sounds: Normal breath sounds  Abdominal:      General: There is no distension  Palpations: Abdomen is soft  Tenderness: There is no abdominal tenderness  Musculoskeletal:         General: Normal range of motion  Cervical back: Normal range of motion  Skin:     General: Skin is warm and dry  Findings: No rash  Neurological:      Mental Status: She is alert and oriented to person, place, and time  Labs & Results:    Lab Results   Component Value Date    SODIUM 138 11/05/2021    K 4 1 11/05/2021     11/05/2021    CO2 29 11/05/2021    BUN 80 (H) 11/05/2021    CREATININE 3 14 (H) 11/05/2021    GLUC 110 10/31/2021    CALCIUM 9 1 11/05/2021     Lab Results   Component Value Date    WBC 6 50 10/29/2021    HGB 8 6 (L) 10/29/2021    HCT 27 2 (L) 10/29/2021    MCV 89 10/29/2021     (L) 10/29/2021     No results found for: BNP   Lab Results   Component Value Date    CHOLESTEROL 157 10/25/2021    CHOLESTEROL 177 10/16/2021    CHOLESTEROL 227 (H) 10/07/2021     Lab Results   Component Value Date    HDL 64 10/25/2021    HDL 57 10/16/2021    HDL 67 10/07/2021     Lab Results   Component Value Date    TRIG 93 10/25/2021    TRIG 129 10/16/2021    TRIG 147 10/07/2021     Lab Results   Component Value Date    NONHDLC 93 10/25/2021    [place] 120 10/16/2021    [place] 160 10/07/2021         EKG personally reviewed by Worthy Cockayne, DO  Counseling / Coordination of Care  Time spent today 25 minutes  Greater than 50% of total time was spent with the patient and / or family counseling and / or coordination of care    We discussed diagnoses, most recent studies, tests and any changes in treatment plan  Thank you for the opportunity to participate in the care of this patient      [address] [facility] PULMONARY HYPERTENSION  MEDICAL DIRECTOR OF [facility] Carla Haskins

## 2022-01-31 ENCOUNTER — OFFICE VISIT (OUTPATIENT)
Dept: CARDIOLOGY CLINIC | Facility: CLINIC | Age: 67
End: 2022-01-31
Payer: COMMERCIAL

## 2022-01-31 VITALS
SYSTOLIC BLOOD PRESSURE: 128 MMHG | HEIGHT: 62 IN | HEART RATE: 76 BPM | OXYGEN SATURATION: 100 % | WEIGHT: 157 LBS | DIASTOLIC BLOOD PRESSURE: 64 MMHG | BODY MASS INDEX: 28.89 KG/M2

## 2022-01-31 DIAGNOSIS — Z79.4 TYPE 2 DIABETES MELLITUS WITH STAGE 4 CHRONIC KIDNEY DISEASE, WITH LONG-TERM CURRENT USE OF INSULIN (HCC): ICD-10-CM

## 2022-01-31 DIAGNOSIS — E11.22 TYPE 2 DIABETES MELLITUS WITH STAGE 4 CHRONIC KIDNEY DISEASE, WITH LONG-TERM CURRENT USE OF INSULIN (HCC): ICD-10-CM

## 2022-01-31 DIAGNOSIS — E78.00 PURE HYPERCHOLESTEROLEMIA: ICD-10-CM

## 2022-01-31 DIAGNOSIS — I10 HYPERTENSION, UNSPECIFIED TYPE: ICD-10-CM

## 2022-01-31 DIAGNOSIS — E78.2 MIXED HYPERLIPIDEMIA: ICD-10-CM

## 2022-01-31 DIAGNOSIS — N18.4 TYPE 2 DIABETES MELLITUS WITH STAGE 4 CHRONIC KIDNEY DISEASE, WITH LONG-TERM CURRENT USE OF INSULIN (HCC): ICD-10-CM

## 2022-01-31 DIAGNOSIS — I25.119 CORONARY ARTERY DISEASE INVOLVING NATIVE CORONARY ARTERY OF NATIVE HEART WITH ANGINA PECTORIS (HCC): Primary | ICD-10-CM

## 2022-01-31 DIAGNOSIS — I25.10 CORONARY ARTERY DISEASE INVOLVING NATIVE CORONARY ARTERY OF NATIVE HEART WITHOUT ANGINA PECTORIS: ICD-10-CM

## 2022-01-31 DIAGNOSIS — I50.32 CHRONIC HEART FAILURE WITH PRESERVED EJECTION FRACTION (HCC): ICD-10-CM

## 2022-01-31 DIAGNOSIS — I50.32 CHRONIC DIASTOLIC CHF (CONGESTIVE HEART FAILURE), NYHA CLASS 3 (HCC): ICD-10-CM

## 2022-01-31 PROCEDURE — 99214 OFFICE O/P EST MOD 30 MIN: CPT | Performed by: INTERNAL MEDICINE

## 2022-01-31 RX ORDER — CARVEDILOL 12.5 MG/1
12.5 TABLET ORAL 2 TIMES DAILY WITH MEALS
Qty: 180 TABLET | Refills: 3 | Status: SHIPPED | OUTPATIENT
Start: 2022-01-31

## 2022-01-31 RX ORDER — BUMETANIDE 2 MG/1
2 TABLET ORAL 2 TIMES DAILY
Qty: 180 TABLET | Refills: 3 | Status: ON HOLD | OUTPATIENT
Start: 2022-01-31 | End: 2022-02-18 | Stop reason: SDUPTHER

## 2022-01-31 RX ORDER — ATORVASTATIN CALCIUM 40 MG/1
80 TABLET, FILM COATED ORAL DAILY
Qty: 90 TABLET | Refills: 3 | Status: SHIPPED | OUTPATIENT
Start: 2022-01-31

## 2022-01-31 RX ORDER — AMLODIPINE BESYLATE 10 MG/1
10 TABLET ORAL 2 TIMES DAILY
Qty: 180 TABLET | Refills: 3 | Status: ON HOLD | OUTPATIENT
Start: 2022-01-31 | End: 2022-02-18 | Stop reason: SDUPTHER

## 2022-01-31 RX ORDER — HYDRALAZINE HYDROCHLORIDE 100 MG/1
100 TABLET, FILM COATED ORAL 3 TIMES DAILY
Qty: 270 TABLET | Refills: 3 | Status: SHIPPED | OUTPATIENT
Start: 2022-01-31 | End: 2022-06-09 | Stop reason: SDUPTHER

## 2022-01-31 RX ORDER — ISOSORBIDE MONONITRATE 120 MG/1
120 TABLET, EXTENDED RELEASE ORAL DAILY
Qty: 90 TABLET | Refills: 3 | Status: SHIPPED | OUTPATIENT
Start: 2022-01-31

## 2022-02-09 ENCOUNTER — APPOINTMENT (EMERGENCY)
Dept: RADIOLOGY | Facility: HOSPITAL | Age: 67
DRG: 177 | End: 2022-02-09
Payer: COMMERCIAL

## 2022-02-09 ENCOUNTER — HOSPITAL ENCOUNTER (INPATIENT)
Facility: HOSPITAL | Age: 67
LOS: 8 days | Discharge: HOME | DRG: 177 | End: 2022-02-18
Attending: EMERGENCY MEDICINE | Admitting: INTERNAL MEDICINE
Payer: COMMERCIAL

## 2022-02-09 DIAGNOSIS — R52 INTRACTABLE PAIN: Primary | ICD-10-CM

## 2022-02-09 DIAGNOSIS — R19.7 DIARRHEA: ICD-10-CM

## 2022-02-09 DIAGNOSIS — N17.9 ACUTE KIDNEY INJURY SUPERIMPOSED ON CHRONIC KIDNEY DISEASE (HCC): ICD-10-CM

## 2022-02-09 DIAGNOSIS — I10 HYPERTENSION, UNSPECIFIED TYPE: ICD-10-CM

## 2022-02-09 DIAGNOSIS — U07.1 COVID: ICD-10-CM

## 2022-02-09 DIAGNOSIS — I50.32 CHRONIC HEART FAILURE WITH PRESERVED EJECTION FRACTION (HCC): ICD-10-CM

## 2022-02-09 DIAGNOSIS — N18.9 ACUTE KIDNEY INJURY SUPERIMPOSED ON CHRONIC KIDNEY DISEASE (HCC): ICD-10-CM

## 2022-02-09 DIAGNOSIS — N18.4 STAGE 4 CHRONIC KIDNEY DISEASE (HCC): ICD-10-CM

## 2022-02-09 DIAGNOSIS — N17.9 AKI (ACUTE KIDNEY INJURY) (HCC): ICD-10-CM

## 2022-02-09 LAB
ALBUMIN SERPL BCP-MCNC: 2.3 G/DL (ref 3.5–5)
ALBUMIN SERPL BCP-MCNC: 2.4 G/DL (ref 3.5–5)
ALP SERPL-CCNC: 429 U/L (ref 46–116)
ALP SERPL-CCNC: 485 U/L (ref 46–116)
ALT SERPL W P-5'-P-CCNC: 36 U/L (ref 12–78)
ALT SERPL W P-5'-P-CCNC: 40 U/L (ref 12–78)
ANION GAP SERPL CALCULATED.3IONS-SCNC: 11 MMOL/L (ref 4–13)
ANION GAP SERPL CALCULATED.3IONS-SCNC: 9 MMOL/L (ref 4–13)
AST SERPL W P-5'-P-CCNC: 49 U/L (ref 5–45)
AST SERPL W P-5'-P-CCNC: 65 U/L (ref 5–45)
BACTERIA UR QL AUTO: NORMAL /HPF
BASOPHILS # BLD AUTO: 0 THOUSANDS/ΜL (ref 0–0.1)
BASOPHILS # BLD AUTO: 0.01 THOUSANDS/ΜL (ref 0–0.1)
BASOPHILS NFR BLD AUTO: 0 % (ref 0–1)
BASOPHILS NFR BLD AUTO: 0 % (ref 0–1)
BILIRUB SERPL-MCNC: 0.3 MG/DL (ref 0.2–1)
BILIRUB SERPL-MCNC: 0.47 MG/DL (ref 0.2–1)
BILIRUB UR QL STRIP: NEGATIVE
BUN SERPL-MCNC: 68 MG/DL (ref 5–25)
BUN SERPL-MCNC: 69 MG/DL (ref 5–25)
CALCIUM ALBUM COR SERPL-MCNC: 9.7 MG/DL (ref 8.3–10.1)
CALCIUM ALBUM COR SERPL-MCNC: 9.9 MG/DL (ref 8.3–10.1)
CALCIUM SERPL-MCNC: 8.3 MG/DL (ref 8.3–10.1)
CALCIUM SERPL-MCNC: 8.6 MG/DL (ref 8.3–10.1)
CHLORIDE SERPL-SCNC: 103 MMOL/L (ref 100–108)
CHLORIDE SERPL-SCNC: 106 MMOL/L (ref 100–108)
CLARITY UR: CLEAR
CO2 SERPL-SCNC: 20 MMOL/L (ref 21–32)
CO2 SERPL-SCNC: 21 MMOL/L (ref 21–32)
COLOR UR: YELLOW
CREAT SERPL-MCNC: 3.18 MG/DL (ref 0.6–1.3)
CREAT SERPL-MCNC: 3.43 MG/DL (ref 0.6–1.3)
CRP SERPL QL: 22.2 MG/L
D DIMER PPP FEU-MCNC: 1.58 UG/ML FEU
EOSINOPHIL # BLD AUTO: 0 THOUSAND/ΜL (ref 0–0.61)
EOSINOPHIL # BLD AUTO: 0.04 THOUSAND/ΜL (ref 0–0.61)
EOSINOPHIL NFR BLD AUTO: 0 % (ref 0–6)
EOSINOPHIL NFR BLD AUTO: 1 % (ref 0–6)
ERYTHROCYTE [DISTWIDTH] IN BLOOD BY AUTOMATED COUNT: 12.8 % (ref 11.6–15.1)
ERYTHROCYTE [DISTWIDTH] IN BLOOD BY AUTOMATED COUNT: 13 % (ref 11.6–15.1)
FLUAV RNA RESP QL NAA+PROBE: NEGATIVE
FLUBV RNA RESP QL NAA+PROBE: NEGATIVE
GFR SERPL CREATININE-BSD FRML MDRD: 13 ML/MIN/1.73SQ M
GFR SERPL CREATININE-BSD FRML MDRD: 14 ML/MIN/1.73SQ M
GLUCOSE SERPL-MCNC: 197 MG/DL (ref 65–140)
GLUCOSE SERPL-MCNC: 253 MG/DL (ref 65–140)
GLUCOSE UR STRIP-MCNC: ABNORMAL MG/DL
HCT VFR BLD AUTO: 25.3 % (ref 34.8–46.1)
HCT VFR BLD AUTO: 27.6 % (ref 34.8–46.1)
HGB BLD-MCNC: 8 G/DL (ref 11.5–15.4)
HGB BLD-MCNC: 8.6 G/DL (ref 11.5–15.4)
HGB UR QL STRIP.AUTO: NEGATIVE
HYALINE CASTS #/AREA URNS LPF: NORMAL /LPF
IMM GRANULOCYTES # BLD AUTO: 0.03 THOUSAND/UL (ref 0–0.2)
IMM GRANULOCYTES # BLD AUTO: 0.04 THOUSAND/UL (ref 0–0.2)
IMM GRANULOCYTES NFR BLD AUTO: 1 % (ref 0–2)
IMM GRANULOCYTES NFR BLD AUTO: 1 % (ref 0–2)
KETONES UR STRIP-MCNC: NEGATIVE MG/DL
LEUKOCYTE ESTERASE UR QL STRIP: NEGATIVE
LIPASE SERPL-CCNC: 210 U/L (ref 73–393)
LYMPHOCYTES # BLD AUTO: 0.67 THOUSANDS/ΜL (ref 0.6–4.47)
LYMPHOCYTES # BLD AUTO: 0.78 THOUSANDS/ΜL (ref 0.6–4.47)
LYMPHOCYTES NFR BLD AUTO: 13 % (ref 14–44)
LYMPHOCYTES NFR BLD AUTO: 22 % (ref 14–44)
MCH RBC QN AUTO: 27.2 PG (ref 26.8–34.3)
MCH RBC QN AUTO: 27.4 PG (ref 26.8–34.3)
MCHC RBC AUTO-ENTMCNC: 31.2 G/DL (ref 31.4–37.4)
MCHC RBC AUTO-ENTMCNC: 31.6 G/DL (ref 31.4–37.4)
MCV RBC AUTO: 87 FL (ref 82–98)
MCV RBC AUTO: 87 FL (ref 82–98)
MONOCYTES # BLD AUTO: 0.15 THOUSAND/ΜL (ref 0.17–1.22)
MONOCYTES # BLD AUTO: 0.23 THOUSAND/ΜL (ref 0.17–1.22)
MONOCYTES NFR BLD AUTO: 4 % (ref 4–12)
MONOCYTES NFR BLD AUTO: 5 % (ref 4–12)
NEUTROPHILS # BLD AUTO: 2.21 THOUSANDS/ΜL (ref 1.85–7.62)
NEUTROPHILS # BLD AUTO: 5.09 THOUSANDS/ΜL (ref 1.85–7.62)
NEUTS SEG NFR BLD AUTO: 72 % (ref 43–75)
NEUTS SEG NFR BLD AUTO: 81 % (ref 43–75)
NITRITE UR QL STRIP: NEGATIVE
NON-SQ EPI CELLS URNS QL MICRO: NORMAL /HPF
NRBC BLD AUTO-RTO: 0 /100 WBCS
NRBC BLD AUTO-RTO: 0 /100 WBCS
NT-PROBNP SERPL-MCNC: ABNORMAL PG/ML
PH UR STRIP.AUTO: 5.5 [PH]
PLATELET # BLD AUTO: 153 THOUSANDS/UL (ref 149–390)
PLATELET # BLD AUTO: 167 THOUSANDS/UL (ref 149–390)
PMV BLD AUTO: 10.8 FL (ref 8.9–12.7)
PMV BLD AUTO: 11.2 FL (ref 8.9–12.7)
POTASSIUM SERPL-SCNC: 4 MMOL/L (ref 3.5–5.3)
POTASSIUM SERPL-SCNC: 4.2 MMOL/L (ref 3.5–5.3)
PROT SERPL-MCNC: 6.6 G/DL (ref 6.4–8.2)
PROT SERPL-MCNC: 7.2 G/DL (ref 6.4–8.2)
PROT UR STRIP-MCNC: ABNORMAL MG/DL
RBC # BLD AUTO: 2.92 MILLION/UL (ref 3.81–5.12)
RBC # BLD AUTO: 3.16 MILLION/UL (ref 3.81–5.12)
RBC #/AREA URNS AUTO: NORMAL /HPF
RSV RNA RESP QL NAA+PROBE: NEGATIVE
SARS-COV-2 RNA RESP QL NAA+PROBE: POSITIVE
SODIUM SERPL-SCNC: 135 MMOL/L (ref 136–145)
SODIUM SERPL-SCNC: 135 MMOL/L (ref 136–145)
SP GR UR STRIP.AUTO: 1.02 (ref 1–1.03)
UROBILINOGEN UR QL STRIP.AUTO: 0.2 E.U./DL
WBC # BLD AUTO: 3.06 THOUSAND/UL (ref 4.31–10.16)
WBC # BLD AUTO: 6.19 THOUSAND/UL (ref 4.31–10.16)
WBC #/AREA URNS AUTO: NORMAL /HPF

## 2022-02-09 PROCEDURE — 0241U HB NFCT DS VIR RESP RNA 4 TRGT: CPT

## 2022-02-09 PROCEDURE — 96375 TX/PRO/DX INJ NEW DRUG ADDON: CPT

## 2022-02-09 PROCEDURE — 83880 ASSAY OF NATRIURETIC PEPTIDE: CPT | Performed by: INTERNAL MEDICINE

## 2022-02-09 PROCEDURE — 36415 COLL VENOUS BLD VENIPUNCTURE: CPT

## 2022-02-09 PROCEDURE — 99285 EMERGENCY DEPT VISIT HI MDM: CPT | Performed by: EMERGENCY MEDICINE

## 2022-02-09 PROCEDURE — 85379 FIBRIN DEGRADATION QUANT: CPT | Performed by: INTERNAL MEDICINE

## 2022-02-09 PROCEDURE — 96365 THER/PROPH/DIAG IV INF INIT: CPT

## 2022-02-09 PROCEDURE — 99220 PR INITIAL OBSERVATION CARE/DAY 70 MINUTES: CPT | Performed by: INTERNAL MEDICINE

## 2022-02-09 PROCEDURE — 74176 CT ABD & PELVIS W/O CONTRAST: CPT

## 2022-02-09 PROCEDURE — 85025 COMPLETE CBC W/AUTO DIFF WBC: CPT | Performed by: INTERNAL MEDICINE

## 2022-02-09 PROCEDURE — 86140 C-REACTIVE PROTEIN: CPT | Performed by: INTERNAL MEDICINE

## 2022-02-09 PROCEDURE — 83690 ASSAY OF LIPASE: CPT

## 2022-02-09 PROCEDURE — 80053 COMPREHEN METABOLIC PANEL: CPT

## 2022-02-09 PROCEDURE — 84145 PROCALCITONIN (PCT): CPT | Performed by: INTERNAL MEDICINE

## 2022-02-09 PROCEDURE — G1004 CDSM NDSC: HCPCS

## 2022-02-09 PROCEDURE — 99285 EMERGENCY DEPT VISIT HI MDM: CPT

## 2022-02-09 PROCEDURE — 80053 COMPREHEN METABOLIC PANEL: CPT | Performed by: INTERNAL MEDICINE

## 2022-02-09 PROCEDURE — 85025 COMPLETE CBC W/AUTO DIFF WBC: CPT

## 2022-02-09 PROCEDURE — 96376 TX/PRO/DX INJ SAME DRUG ADON: CPT

## 2022-02-09 PROCEDURE — 81001 URINALYSIS AUTO W/SCOPE: CPT

## 2022-02-09 RX ORDER — SODIUM CHLORIDE, SODIUM GLUCONATE, SODIUM ACETATE, POTASSIUM CHLORIDE, MAGNESIUM CHLORIDE, SODIUM PHOSPHATE, DIBASIC, AND POTASSIUM PHOSPHATE .53; .5; .37; .037; .03; .012; .00082 G/100ML; G/100ML; G/100ML; G/100ML; G/100ML; G/100ML; G/100ML
1000 INJECTION, SOLUTION INTRAVENOUS ONCE
Status: COMPLETED | OUTPATIENT
Start: 2022-02-09 | End: 2022-02-09

## 2022-02-09 RX ORDER — HYDROMORPHONE HCL/PF 1 MG/ML
0.5 SYRINGE (ML) INJECTION ONCE
Status: COMPLETED | OUTPATIENT
Start: 2022-02-09 | End: 2022-02-09

## 2022-02-09 RX ORDER — OXYCODONE HYDROCHLORIDE 5 MG/1
2.5 TABLET ORAL EVERY 4 HOURS PRN
Status: DISCONTINUED | OUTPATIENT
Start: 2022-02-09 | End: 2022-02-18 | Stop reason: HOSPADM

## 2022-02-09 RX ORDER — HYDROMORPHONE HCL/PF 1 MG/ML
0.5 SYRINGE (ML) INJECTION ONCE
Status: DISCONTINUED | OUTPATIENT
Start: 2022-02-09 | End: 2022-02-18 | Stop reason: HOSPADM

## 2022-02-09 RX ORDER — CALCIUM ACETATE 667 MG/1
667 CAPSULE ORAL 2 TIMES DAILY WITH MEALS
Status: DISCONTINUED | OUTPATIENT
Start: 2022-02-10 | End: 2022-02-11

## 2022-02-09 RX ORDER — ONDANSETRON 2 MG/ML
4 INJECTION INTRAMUSCULAR; INTRAVENOUS ONCE
Status: COMPLETED | OUTPATIENT
Start: 2022-02-09 | End: 2022-02-09

## 2022-02-09 RX ORDER — ATORVASTATIN CALCIUM 80 MG/1
80 TABLET, FILM COATED ORAL
Status: DISCONTINUED | OUTPATIENT
Start: 2022-02-10 | End: 2022-02-18 | Stop reason: HOSPADM

## 2022-02-09 RX ORDER — SIMETHICONE 80 MG
80 TABLET,CHEWABLE ORAL 4 TIMES DAILY PRN
Status: DISCONTINUED | OUTPATIENT
Start: 2022-02-09 | End: 2022-02-18 | Stop reason: HOSPADM

## 2022-02-09 RX ORDER — LEVOTHYROXINE SODIUM 112 UG/1
112 TABLET ORAL
Status: DISCONTINUED | OUTPATIENT
Start: 2022-02-10 | End: 2022-02-18 | Stop reason: HOSPADM

## 2022-02-09 RX ORDER — OXYCODONE HYDROCHLORIDE 5 MG/1
5 TABLET ORAL EVERY 6 HOURS PRN
Status: DISCONTINUED | OUTPATIENT
Start: 2022-02-09 | End: 2022-02-18 | Stop reason: HOSPADM

## 2022-02-09 RX ORDER — INSULIN ASPART 100 [IU]/ML
10 INJECTION, SUSPENSION SUBCUTANEOUS
Status: DISCONTINUED | OUTPATIENT
Start: 2022-02-10 | End: 2022-02-15

## 2022-02-09 RX ORDER — GUAIFENESIN 100 MG/5ML
200 SOLUTION ORAL 3 TIMES DAILY PRN
Status: DISCONTINUED | OUTPATIENT
Start: 2022-02-09 | End: 2022-02-18 | Stop reason: HOSPADM

## 2022-02-09 RX ORDER — DOCUSATE SODIUM 100 MG/1
100 CAPSULE, LIQUID FILLED ORAL 2 TIMES DAILY
Status: DISCONTINUED | OUTPATIENT
Start: 2022-02-09 | End: 2022-02-11

## 2022-02-09 RX ORDER — HYDRALAZINE HYDROCHLORIDE 50 MG/1
100 TABLET, FILM COATED ORAL 3 TIMES DAILY
Status: DISCONTINUED | OUTPATIENT
Start: 2022-02-09 | End: 2022-02-11

## 2022-02-09 RX ORDER — INSULIN ASPART 100 [IU]/ML
15 INJECTION, SUSPENSION SUBCUTANEOUS
Status: DISCONTINUED | OUTPATIENT
Start: 2022-02-10 | End: 2022-02-09

## 2022-02-09 RX ORDER — POLYETHYLENE GLYCOL 3350 17 G/17G
17 POWDER, FOR SOLUTION ORAL DAILY
Status: DISCONTINUED | OUTPATIENT
Start: 2022-02-10 | End: 2022-02-11

## 2022-02-09 RX ORDER — CARVEDILOL 12.5 MG/1
12.5 TABLET ORAL 2 TIMES DAILY WITH MEALS
Status: DISCONTINUED | OUTPATIENT
Start: 2022-02-10 | End: 2022-02-18 | Stop reason: HOSPADM

## 2022-02-09 RX ORDER — ASPIRIN 81 MG/1
81 TABLET, CHEWABLE ORAL DAILY
Status: DISCONTINUED | OUTPATIENT
Start: 2022-02-10 | End: 2022-02-18 | Stop reason: HOSPADM

## 2022-02-09 RX ORDER — BENZONATATE 100 MG/1
100 CAPSULE ORAL 3 TIMES DAILY PRN
Status: DISCONTINUED | OUTPATIENT
Start: 2022-02-09 | End: 2022-02-18 | Stop reason: HOSPADM

## 2022-02-09 RX ORDER — ACETAMINOPHEN 325 MG/1
650 TABLET ORAL EVERY 6 HOURS PRN
Status: DISCONTINUED | OUTPATIENT
Start: 2022-02-09 | End: 2022-02-18 | Stop reason: HOSPADM

## 2022-02-09 RX ORDER — HYDROMORPHONE HCL/PF 1 MG/ML
0.5 SYRINGE (ML) INJECTION EVERY 6 HOURS PRN
Status: DISCONTINUED | OUTPATIENT
Start: 2022-02-09 | End: 2022-02-10

## 2022-02-09 RX ORDER — ISOSORBIDE MONONITRATE 60 MG/1
120 TABLET, EXTENDED RELEASE ORAL DAILY
Status: DISCONTINUED | OUTPATIENT
Start: 2022-02-10 | End: 2022-02-18 | Stop reason: HOSPADM

## 2022-02-09 RX ORDER — BUMETANIDE 2 MG/1
2 TABLET ORAL 2 TIMES DAILY
Status: DISCONTINUED | OUTPATIENT
Start: 2022-02-09 | End: 2022-02-11

## 2022-02-09 RX ORDER — HEPARIN SODIUM 5000 [USP'U]/ML
5000 INJECTION, SOLUTION INTRAVENOUS; SUBCUTANEOUS EVERY 8 HOURS SCHEDULED
Status: DISCONTINUED | OUTPATIENT
Start: 2022-02-09 | End: 2022-02-18 | Stop reason: HOSPADM

## 2022-02-09 RX ORDER — AMLODIPINE BESYLATE 10 MG/1
10 TABLET ORAL 2 TIMES DAILY
Status: DISCONTINUED | OUTPATIENT
Start: 2022-02-09 | End: 2022-02-11

## 2022-02-09 RX ORDER — ONDANSETRON 2 MG/ML
4 INJECTION INTRAMUSCULAR; INTRAVENOUS EVERY 6 HOURS PRN
Status: DISCONTINUED | OUTPATIENT
Start: 2022-02-09 | End: 2022-02-18 | Stop reason: HOSPADM

## 2022-02-09 RX ADMIN — BUMETANIDE 2 MG: 2 TABLET ORAL at 23:17

## 2022-02-09 RX ADMIN — REMDESIVIR 200 MG: 100 INJECTION, POWDER, LYOPHILIZED, FOR SOLUTION INTRAVENOUS at 23:18

## 2022-02-09 RX ADMIN — HYDRALAZINE HYDROCHLORIDE 100 MG: 50 TABLET, FILM COATED ORAL at 23:17

## 2022-02-09 RX ADMIN — ACETAMINOPHEN 650 MG: 325 TABLET, FILM COATED ORAL at 23:17

## 2022-02-09 RX ADMIN — ONDANSETRON 4 MG: 2 INJECTION INTRAMUSCULAR; INTRAVENOUS at 12:08

## 2022-02-09 RX ADMIN — HYDROMORPHONE HYDROCHLORIDE 0.5 MG: 1 INJECTION, SOLUTION INTRAMUSCULAR; INTRAVENOUS; SUBCUTANEOUS at 13:22

## 2022-02-09 RX ADMIN — AMLODIPINE BESYLATE 10 MG: 10 TABLET ORAL at 23:18

## 2022-02-09 RX ADMIN — ONDANSETRON 4 MG: 2 INJECTION INTRAMUSCULAR; INTRAVENOUS at 17:33

## 2022-02-09 RX ADMIN — SODIUM CHLORIDE, SODIUM GLUCONATE, SODIUM ACETATE, POTASSIUM CHLORIDE, MAGNESIUM CHLORIDE, SODIUM PHOSPHATE, DIBASIC, AND POTASSIUM PHOSPHATE 1000 ML: .53; .5; .37; .037; .03; .012; .00082 INJECTION, SOLUTION INTRAVENOUS at 12:13

## 2022-02-09 RX ADMIN — HYDROMORPHONE HYDROCHLORIDE 0.5 MG: 1 INJECTION, SOLUTION INTRAMUSCULAR; INTRAVENOUS; SUBCUTANEOUS at 12:09

## 2022-02-09 NOTE — ED PROVIDER NOTES
History  Chief Complaint   Patient presents with    Abdominal Pain     Patient reports  generalized abdominal pain for the last few days along with loose stools     Headache     Patient also reports a headache      Patient is a 56-year-old with past medical history significant for hypertension, diabetes, heart failure, hyperlipidemia presenting to the emergency department chief complaint of abdominal pain  Patient states that over the past couple of days she has been experiencing generalized abdominal pain with associated nonbloody diarrhea  Patient also endorses generalized chills, weakness, fatigue, nausea, nasal congestion, sore throat  Patient took a home COVID test yesterday which came back positive  Patient endorses being COVID vaccinated  Patient denies fevers, chest pain, shortness of breath, vomiting, constipation, dysuria, hematuria, hematochezia numbness or tingling in any of her extremities  Patient denies any recent antibiotic use  Patient states she has not been eating or drinking as much she should  Patient denies any abdominal surgeries in the past           Prior to Admission Medications   Prescriptions Last Dose Informant Patient Reported? Taking?    Droplet Pen Needles 32G X 4 MM MISC 2022 at Unknown time Self Yes Yes   Si (two) times a day   amLODIPine (NORVASC) 10 mg tablet 2022 at Unknown time  No Yes   Sig: Take 1 tablet (10 mg total) by mouth 2 (two) times a day   aspirin (RA Aspirin Adult Low Dose) 81 mg chewable tablet 2022 at Unknown time  No Yes   Sig: Chew 1 tablet (81 mg total) daily   atorvastatin (LIPITOR) 40 mg tablet 2022 at Unknown time  No Yes   Sig: Take 2 tablets (80 mg total) by mouth daily   benzonatate (TESSALON PERLES) 100 mg capsule Not Taking at Unknown time Self No No   Sig: Take 1 capsule (100 mg total) by mouth 3 (three) times a day as needed for cough   Patient not taking: Reported on 2021    bumetanide (BUMEX) 2 mg tablet 2022 at Unknown time  No Yes   Sig: Take 1 tablet (2 mg total) by mouth 2 (two) times a day   calcium acetate (PHOSLO) capsule Not Taking at Unknown time Self No No   Sig: Take 1 capsule (667 mg total) by mouth 2 (two) times a day with meals   Patient not taking: Reported on 10/13/2021   carvedilol (COREG) 12 5 mg tablet 2/9/2022 at Unknown time  No Yes   Sig: Take 1 tablet (12 5 mg total) by mouth 2 (two) times a day with meals   guaiFENesin (ROBITUSSIN) 100 MG/5ML oral liquid Not Taking at Unknown time Self No No   Sig: Take 10 mL (200 mg total) by mouth 3 (three) times a day as needed for cough   Patient not taking: Reported on 2/9/2022    hydrALAZINE (APRESOLINE) 100 MG tablet 2/9/2022 at Unknown time  No Yes   Sig: Take 1 tablet (100 mg total) by mouth 3 (three) times a day   insulin aspart protamine-insulin aspart (NovoLOG 70/30) 100 units/mL injection 2/9/2022 at Unknown time Self No Yes   Sig: As per your current blood sugars, take 15 units before breakfast, 10 units before dinner   isosorbide mononitrate (IMDUR) 120 mg 24 hr tablet 2/9/2022 at Unknown time  No Yes   Sig: Take 1 tablet (120 mg total) by mouth daily   levothyroxine 112 mcg tablet 2/9/2022 at Unknown time Self Yes Yes   Sig: take 1 tablet by mouth daily for 6 days then take 1/2 tablet ON SUNDAY      Facility-Administered Medications: None       Past Medical History:   Diagnosis Date    Anemia     CHF (congestive heart failure) (HCC)     Chronic kidney disease     Coronary artery disease     Diabetes mellitus ([facility] [address] )     Hypertension     Hypothyroidism        Past Surgical History:   Procedure Laterality Date    CORONARY ANGIOPLASTY WITH STENT PLACEMENT  2019       Family History   Problem Relation Age of Onset    Diabetes Mother     Heart attack Father         MI    Hypertension Brother      I have reviewed and agree with the history as documented      E-Cigarette/Vaping    E-Cigarette Use Never User      E-Cigarette/Vaping Substances    Nicotine No     THC No     CBD No     Flavoring No     Other No     Unknown No      Social History     Tobacco Use    Smoking status: Former Smoker    Smokeless tobacco: Never Used   Vaping Use    Vaping Use: Never used   Substance Use Topics    Alcohol use: Never    Drug use: No        Review of Systems   Constitutional: Positive for chills and fatigue  Negative for activity change and fever  HENT: Positive for congestion and sore throat  Negative for ear pain  Eyes: Negative for pain and visual disturbance  Respiratory: Negative for cough, chest tightness and shortness of breath  Cardiovascular: Negative for chest pain, palpitations and leg swelling  Gastrointestinal: Positive for abdominal pain, diarrhea and nausea  Negative for abdominal distention, blood in stool, constipation and vomiting  Genitourinary: Negative for difficulty urinating, dysuria, flank pain and hematuria  Musculoskeletal: Negative for arthralgias and back pain  Skin: Negative for color change and rash  Neurological: Positive for headaches  Negative for dizziness, seizures, syncope, facial asymmetry, weakness, light-headedness and numbness  Psychiatric/Behavioral: Negative for agitation and behavioral problems  All other systems reviewed and are negative  Physical Exam  ED Triage Vitals [02/09/22 1141]   Temperature Pulse Respirations Blood Pressure SpO2   97 9 °F (36 6 °C) 55 18 143/67 97 %      Temp Source Heart Rate Source Patient Position - Orthostatic VS BP Location FiO2 (%)   Oral Monitor Lying Left arm --      Pain Score       9             Orthostatic Vital Signs  Vitals:    02/09/22 2134 02/10/22 0153 02/10/22 0415 02/10/22 0955   BP: (!) 197/87 144/63 130/59 142/61   Pulse: 68 64 56 60   Patient Position - Orthostatic VS: Lying Lying Lying        Physical Exam  Vitals and nursing note reviewed  Constitutional:       General: She is not in acute distress       Appearance: She is not ill-appearing or toxic-appearing  HENT:      Head: Normocephalic and atraumatic  Mouth/Throat:      Pharynx: Oropharynx is clear  Eyes:      Extraocular Movements: Extraocular movements intact  Conjunctiva/sclera: Conjunctivae normal       Pupils: Pupils are equal, round, and reactive to light  Cardiovascular:      Rate and Rhythm: Regular rhythm  Bradycardia present  Heart sounds: Normal heart sounds  Pulmonary:      Effort: Pulmonary effort is normal  No respiratory distress  Breath sounds: Normal breath sounds  No wheezing or rhonchi  Abdominal:      General: Abdomen is flat  Bowel sounds are normal  There is no distension  Palpations: Abdomen is soft  Tenderness: There is abdominal tenderness in the suprapubic area and left lower quadrant  There is guarding  There is no rebound  Negative signs include Dejesus's sign, Rovsing's sign, McBurney's sign and psoas sign  Musculoskeletal:      Cervical back: Neck supple  Skin:     General: Skin is warm and dry  Capillary Refill: Capillary refill takes 2 to 3 seconds  Neurological:      General: No focal deficit present  Mental Status: She is alert and oriented to person, place, and time     Psychiatric:         Mood and Affect: Mood normal          Behavior: Behavior normal          ED Medications  Medications   HYDROmorphone (DILAUDID) injection 0 5 mg (0 5 mg Intravenous Not Given 2/9/22 1627)   amLODIPine (NORVASC) tablet 10 mg (10 mg Oral Given 2/10/22 0829)   aspirin chewable tablet 81 mg (81 mg Oral Given 2/10/22 0830)   atorvastatin (LIPITOR) tablet 80 mg (has no administration in time range)   benzonatate (TESSALON PERLES) capsule 100 mg (has no administration in time range)   bumetanide (BUMEX) tablet 2 mg (2 mg Oral Given 2/10/22 0946)   calcium acetate (PHOSLO) capsule 667 mg (667 mg Oral Given 2/10/22 0829)   carvedilol (COREG) tablet 12 5 mg (12 5 mg Oral Given 2/10/22 0829)   guaiFENesin (ROBITUSSIN) oral solution 200 mg (has no administration in time range)   hydrALAZINE (APRESOLINE) tablet 100 mg (100 mg Oral Given 2/10/22 0829)   isosorbide mononitrate (IMDUR) 24 hr tablet 120 mg (120 mg Oral Given 2/10/22 0829)   levothyroxine tablet 112 mcg (112 mcg Oral Given 2/10/22 0521)   acetaminophen (TYLENOL) tablet 650 mg (650 mg Oral Given 2/9/22 2317)   docusate sodium (COLACE) capsule 100 mg (100 mg Oral Given 2/10/22 0829)   polyethylene glycol (MIRALAX) packet 17 g (17 g Oral Given 2/10/22 0828)   ondansetron (ZOFRAN) injection 4 mg (4 mg Intravenous Given 2/10/22 0950)   simethicone (MYLICON) chewable tablet 80 mg (has no administration in time range)   heparin (porcine) subcutaneous injection 5,000 Units (5,000 Units Subcutaneous Not Given 2/10/22 1300)   insulin lispro (HumaLOG) 100 units/mL subcutaneous injection 1-6 Units (4 Units Subcutaneous Given 2/10/22 1244)   oxyCODONE (ROXICODONE) IR tablet 5 mg (has no administration in time range)   oxyCODONE (ROXICODONE) IR tablet 2 5 mg (has no administration in time range)   insulin aspart protamine-insulin aspart (NovoLOG 70/30) 100 units/mL subcutaneous injection 10 Units (10 Units Subcutaneous Given 2/10/22 0828)   loperamide (IMODIUM) capsule 2 mg (has no administration in time range)   dicyclomine (BENTYL) tablet 20 mg (20 mg Oral Given 2/10/22 1243)   multi-electrolyte (ISOLYTE-S PH 7 4) bolus 1,000 mL (0 mL Intravenous Stopped 2/9/22 1324)   ondansetron (ZOFRAN) injection 4 mg (4 mg Intravenous Given 2/9/22 1208)   HYDROmorphone (DILAUDID) injection 0 5 mg (0 5 mg Intravenous Given 2/9/22 1209)   HYDROmorphone (DILAUDID) injection 0 5 mg (0 5 mg Intravenous Given 2/9/22 1322)   ondansetron (ZOFRAN) injection 4 mg (4 mg Intravenous Given 2/9/22 4189)   remdesivir (Veklury) 200 mg in sodium chloride 0 9 % 290 mL IVPB (200 mg Intravenous Given 2/9/22 1781)       Diagnostic Studies  Results Reviewed     Procedure Component Value Units Date/Time Procalcitonin Reflex [[id number]] Collected: 02/10/22 0525    Lab Status: No result Specimen: Blood from Arm, Left     Comprehensive metabolic panel [[id number]] Collected: 02/10/22 0525    Lab Status: No result Specimen: Blood from Arm, Left     CBC and differential [[id number]] Collected: 02/10/22 0525    Lab Status: No result Specimen: Blood from Arm, Left     C-reactive protein [[id number]] Collected: 02/10/22 0525    Lab Status: No result Specimen: Blood from Arm, Left     CK (with reflex to MB) [[id number]] Collected: 02/10/22 0525    Lab Status: No result Specimen: Blood from Arm, Left     Procalcitonin with AM Reflex [[id number]]  (Abnormal) Collected: 02/09/22 2316    Lab Status: Final result Specimen: Blood from Arm, Right Updated: 02/10/22 0043     Procalcitonin 0 79 ng/ml     Comprehensive metabolic panel [[id number]]  (Abnormal) Collected: 02/09/22 2316    Lab Status: Final result Specimen: Blood from Arm, Right Updated: 02/09/22 2357     Sodium 135 mmol/L      Potassium 4 2 mmol/L      Chloride 103 mmol/L      CO2 21 mmol/L      ANION GAP 11 mmol/L      BUN 69 mg/dL      Creatinine 3 18 mg/dL      Glucose 253 mg/dL      Calcium 8 3 mg/dL      Corrected Calcium 9 7 mg/dL      AST 49 U/L      ALT 36 U/L      Alkaline Phosphatase 429 U/L      Total Protein 6 6 g/dL      Albumin 2 3 g/dL      Total Bilirubin 0 30 mg/dL      eGFR 14 ml/min/1 73sq m     Narrative:      [place] guidelines for Chronic Kidney Disease (CKD):     Stage 1 with normal or high GFR (GFR > 90 mL/min/1 73 square meters)    Stage 2 Mild CKD (GFR = 60-89 mL/min/1 73 square meters)    Stage 3A Moderate CKD (GFR = 45-59 mL/min/1 73 square meters)    Stage 3B Moderate CKD (GFR = 30-44 mL/min/1 73 square meters)    Stage 4 Severe CKD (GFR = 15-29 mL/min/1 73 square meters)    Stage 5 End Stage CKD (GFR <15 mL/min/1 73 square meters)  Note: GFR calculation is accurate only with a steady state creatinine    C-reactive protein [[id number]]  (Abnormal) Collected: 02/09/22 2316    Lab Status: Final result Specimen: Blood from Arm, Right Updated: 02/09/22 2357     CRP 22 2 mg/L     D-dimer, quantitative [[id number]]  (Abnormal) Collected: 02/09/22 2316    Lab Status: Final result Specimen: Blood from Arm, Right Updated: 02/09/22 2356     D-Dimer, Quant 1 58 ug/ml FEU     CBC and differential [[id number]]  (Abnormal) Collected: 02/09/22 2316    Lab Status: Final result Specimen: Blood from Arm, Right Updated: 02/09/22 2344     WBC 3 06 Thousand/uL      RBC 2 92 Million/uL      Hemoglobin 8 0 g/dL      Hematocrit 25 3 %      MCV 87 fL      MCH 27 4 pg      MCHC 31 6 g/dL      RDW 12 8 %      MPV 10 8 fL      Platelets 163 Thousands/uL      nRBC 0 /100 WBCs      Neutrophils Relative 72 %      Immat GRANS % 1 %      Lymphocytes Relative 22 %      Monocytes Relative 5 %      Eosinophils Relative 0 %      Basophils Relative 0 %      Neutrophils Absolute 2 21 Thousands/µL      Immature Grans Absolute 0 03 Thousand/uL      Lymphocytes Absolute 0 67 Thousands/µL      Monocytes Absolute 0 15 Thousand/µL      Eosinophils Absolute 0 00 Thousand/µL      Basophils Absolute 0 00 Thousands/µL     COVID/FLU/RSV [[id number]]  (Abnormal) Collected: 02/09/22 1733    Lab Status: Final result Specimen: Nares from Nasopharyngeal Swab Updated: 02/09/22 1852     SARS-CoV-2 Positive     INFLUENZA A PCR Negative     INFLUENZA B PCR Negative     RSV PCR Negative    Narrative:      FOR PEDIATRIC PATIENTS - copy/paste COVID Guidelines URL to browser: [email or web address]  [email or web address]    SARS-CoV-2 assay is a Nucleic Acid Amplification assay intended for the  qualitative detection of nucleic acid from SARS-CoV-2 in nasopharyngeal  swabs  Results are for the presumptive identification of SARS-CoV-2 RNA      Positive results are indicative of infection with SARS-CoV-2, the virus  causing COVID-19, but do not rule out bacterial infection or co-infection  with other viruses  Laboratories within the [place] and its  territories are required to report all positive results to the appropriate  public health authorities  Negative results do not preclude SARS-CoV-2  infection and should not be used as the sole basis for treatment or other  patient management decisions  Negative results must be combined with  clinical observations, patient history, and epidemiological information  This test has not been FDA cleared or approved  This test has been authorized by FDA under an Emergency Use Authorization  (EUA)  This test is only authorized for the duration of time the  declaration that circumstances exist justifying the authorization of the  emergency use of an in vitro diagnostic tests for detection of SARS-CoV-2  virus and/or diagnosis of COVID-19 infection under section 564(b)(1) of  the Act, 21 U  S C  043BLG-3(L)(1), unless the authorization is terminated  or revoked sooner  The test has been validated but independent review by FDA  and CLIA is pending  Test performed using [employer] Gene[employer]pert: This RT-PCR assay targets N2,  a region unique to SARS-CoV-2  A conserved region in the E-gene was chosen  for pan-Sarbecovirus detection which includes SARS-CoV-2      Urine Microscopic [[id number]]  (Normal) Collected: 02/09/22 1738    Lab Status: Final result Specimen: Urine, Clean Catch Updated: 02/09/22 1824     RBC, UA 2-4 /hpf      WBC, UA None Seen /hpf      Epithelial Cells None Seen /hpf      Bacteria, UA None Seen /hpf      Hyaline Casts, UA None Seen /lpf     UA w Reflex to Microscopic w Reflex to Culture [[id number]]  (Abnormal) Collected: 02/09/22 1738    Lab Status: Final result Specimen: Urine, Clean Catch Updated: 02/09/22 1820     Color, UA Yellow     Clarity, UA Clear     Specific [place], UA 1 020     pH, UA 5 5     Leukocytes, UA Negative     Nitrite, UA Negative     Protein,  (2+) mg/dl      Glucose,  (1/10%) mg/dl Ketones, UA Negative mg/dl      Urobilinogen, UA 0 2 E U /dl      Bilirubin, UA Negative     Blood, UA Negative    NT-BNP PRO [[id number]]  (Abnormal) Collected: 02/09/22 1207    Lab Status: Final result Specimen: Blood from Arm, Right Updated: 02/09/22 1645     NT-proBNP 17,351 pg/mL     Comprehensive metabolic panel [[id number]]  (Abnormal) Collected: 02/09/22 1207    Lab Status: Final result Specimen: Blood from Arm, Right Updated: 02/09/22 1252     Sodium 135 mmol/L      Potassium 4 0 mmol/L      Chloride 106 mmol/L      CO2 20 mmol/L      ANION GAP 9 mmol/L      BUN 68 mg/dL      Creatinine 3 43 mg/dL      Glucose 197 mg/dL      Calcium 8 6 mg/dL      Corrected Calcium 9 9 mg/dL      AST 65 U/L      ALT 40 U/L      Alkaline Phosphatase 485 U/L      Total Protein 7 2 g/dL      Albumin 2 4 g/dL      Total Bilirubin 0 47 mg/dL      eGFR 13 ml/min/1 73sq m     Narrative:      [place] guidelines for Chronic Kidney Disease (CKD):     Stage 1 with normal or high GFR (GFR > 90 mL/min/1 73 square meters)    Stage 2 Mild CKD (GFR = 60-89 mL/min/1 73 square meters)    Stage 3A Moderate CKD (GFR = 45-59 mL/min/1 73 square meters)    Stage 3B Moderate CKD (GFR = 30-44 mL/min/1 73 square meters)    Stage 4 Severe CKD (GFR = 15-29 mL/min/1 73 square meters)    Stage 5 End Stage CKD (GFR <15 mL/min/1 73 square meters)  Note: GFR calculation is accurate only with a steady state creatinine    Lipase [[id number]]  (Normal) Collected: 02/09/22 1207    Lab Status: Final result Specimen: Blood from Arm, Right Updated: 02/09/22 1252     Lipase 210 u/L     CBC and differential [[id number]]  (Abnormal) Collected: 02/09/22 1207    Lab Status: Final result Specimen: Blood from Arm, Right Updated: 02/09/22 1231     WBC 6 19 Thousand/uL      RBC 3 16 Million/uL      Hemoglobin 8 6 g/dL      Hematocrit 27 6 %      MCV 87 fL      MCH 27 2 pg      MCHC 31 2 g/dL      RDW 13 0 %      MPV 11 2 fL      Platelets 167 Thousands/uL      nRBC 0 /100 WBCs      Neutrophils Relative 81 %      Immat GRANS % 1 %      Lymphocytes Relative 13 %      Monocytes Relative 4 %      Eosinophils Relative 1 %      Basophils Relative 0 %      Neutrophils Absolute 5 09 Thousands/µL      Immature Grans Absolute 0 04 Thousand/uL      Lymphocytes Absolute 0 78 Thousands/µL      Monocytes Absolute 0 23 Thousand/µL      Eosinophils Absolute 0 04 Thousand/µL      Basophils Absolute 0 01 Thousands/µL                  CT abdomen pelvis wo contrast   Final Result by Snow Black MD (02/09 1556)      Subpleural groundglass opacities noted at the lung bases most consistent with Covid 19 pneumonia  No acute intra-abdominal abnormality  Nodular liver suggesting cirrhosis  Cholelithiasis  Hepatic contour suggesting potential cirrhosis  Small to moderate hiatal hernia  Trace bilateral pleural effusions and small pericardial effusion  Workstation performed: [id number]               Procedures  Procedures      ED Course  ED Course as of 02/10/22 1323   Wed Feb 09, 2022   1156 Will evaluate patient with UA with reflex culture, CBC, CMP, lipase, CT abdomen pelvis  Will treat patient's symptoms with 0 5 of Dilaudid, Zofran, isolate  Patient and her  are aware they have no questions or concerns at this time will frequently re-evaluate  1157 Blood Pressure: 143/67   1157 Pulse(!): 53   1157 SpO2: 98 %   1257 Creatinine(!): 3 43   1257 Glucose, Random(!): 197   1313 Patient in continued pain will treat  [facility] There is one person ahead of Ms Nikolai Garnett for CT scan  1401 Patient re-evaluated  Resting informed her of her lab results so far  Asked her if we could get a urine sample  Patient states she will try   1455 Waiting for official read  2220 [facility] radiology room for patients read   They will send it to a radiologist     [address] [place] patients daughter Daniel [facility] [phone] no answer will try back later  Left a message  [address] with patient about findings  Patient with continued pain  Will treat patient symptoms  Reached out to [facility] for admission  1646 NT-proBNP(!): 17,351   1913 SARS-COV-2(!): Positive               Identification of Seniors at Risk      Most Recent Value   (ISAR) Identification of Seniors at Risk    Before the illness or injury that brought you to the Emergency, did you need someone to help you on a regular basis? 0 Filed at: 02/09/2022 1142   In the last 24 hours, have you needed more help than usual? 0 Filed at: 02/09/2022 1142   Have you been hospitalized for one or more nights during the past 6 months? 0 Filed at: 02/09/2022 1142   In general, do you see well? 0 Filed at: 02/09/2022 1142   In general, do you have serious problems with your memory? 0 Filed at: 02/09/2022 1142   Do you take more than three different medications every day? 1 Filed at: 02/09/2022 1142   ISAR Score 1 Filed at: 02/09/2022 1142                    SBIRT 22yo+      Most Recent Value   SBIRT (25 yo +)    In order to provide better care to our patients, we are screening all of our patients for alcohol and drug use  Would it be okay to ask you these screening questions?  No Filed at: 02/09/2022 1147                MDM    Disposition  Final diagnoses:   Intractable pain   KELLY (acute kidney injury) ([facility] [address] )   Diarrhea   COVID     Time reflects when diagnosis was documented in both MDM as applicable and the Disposition within this note     Time User Action Codes Description Comment    2/9/2022  4:13 PM Neomia Boyers Add [R52] Intractable pain     2/9/2022  4:13 PM Palladino, Emerson Clas Add [N17 9] KELLY (acute kidney injury) ([facility] [address] )     2/9/2022  4:13 PM Neomia Boyers Add [R19 7] Diarrhea     2/9/2022  4:13 PM Neomia Boyers Add [U07 1] John\[facility] [place] [facility]\""       ED Disposition     ED Disposition Condition Date/Time Comment    Admit Stable Wed Feb 9, 2022  4:21 PM Case was discussed with Dr Noemi Eller and the patient's admission status was agreed obs to be  to the service of Dr Martine Daniels    None         Current Discharge Medication List      CONTINUE these medications which have NOT CHANGED    Details   amLODIPine (NORVASC) 10 mg tablet Take 1 tablet (10 mg total) by mouth 2 (two) times a day  Qty: 180 tablet, Refills: 3    Associated Diagnoses: Hypertension, unspecified type      aspirin (RA Aspirin Adult Low Dose) 81 mg chewable tablet Chew 1 tablet (81 mg total) daily  Qty: 90 tablet, Refills: 1    Associated Diagnoses: Coronary artery disease involving native coronary artery of native heart without angina pectoris      atorvastatin (LIPITOR) 40 mg tablet Take 2 tablets (80 mg total) by mouth daily  Qty: 90 tablet, Refills: 3    Associated Diagnoses: Coronary artery disease involving native coronary artery of native heart without angina pectoris; Mixed hyperlipidemia      bumetanide (BUMEX) 2 mg tablet Take 1 tablet (2 mg total) by mouth 2 (two) times a day  Qty: 180 tablet, Refills: 3    Associated Diagnoses: Chronic heart failure with preserved ejection fraction (HCC)      carvedilol (COREG) 12 5 mg tablet Take 1 tablet (12 5 mg total) by mouth 2 (two) times a day with meals  Qty: 180 tablet, Refills: 3    Associated Diagnoses: Chronic heart failure with preserved ejection fraction ([address] ); Coronary artery disease involving native coronary artery of native heart without angina pectoris;  Hypertension, unspecified type      Droplet Pen Needles 32G X 4 MM MISC 2 (two) times a day      hydrALAZINE (APRESOLINE) 100 MG tablet Take 1 tablet (100 mg total) by mouth 3 (three) times a day  Qty: 270 tablet, Refills: 3    Associated Diagnoses: Hypertension, unspecified type      insulin aspart protamine-insulin aspart (NovoLOG 70/30) 100 units/mL injection As per your current blood sugars, take 15 units before breakfast, 10 units before dinner  Refills: 0    Associated Diagnoses: Type 2 diabetes mellitus with stage 3 chronic kidney disease, with long-term current use of insulin, unspecified whether stage 3a or 3b CKD (HCC)      isosorbide mononitrate (IMDUR) 120 mg 24 hr tablet Take 1 tablet (120 mg total) by mouth daily  Qty: 90 tablet, Refills: 3    Associated Diagnoses: Coronary artery disease involving native coronary artery of native heart without angina pectoris      levothyroxine 112 mcg tablet take 1 tablet by mouth daily for 6 days then take 1/2 tablet ON SUNDAY      benzonatate (TESSALON PERLES) 100 mg capsule Take 1 capsule (100 mg total) by mouth 3 (three) times a day as needed for cough  Qty: 20 capsule, Refills: 0    Associated Diagnoses: Cough      calcium acetate (PHOSLO) capsule Take 1 capsule (667 mg total) by mouth 2 (two) times a day with meals  Qty: 60 capsule, Refills: 0    Associated Diagnoses: Acute renal failure superimposed on stage 4 chronic kidney disease (HCC)      guaiFENesin (ROBITUSSIN) 100 MG/5ML oral liquid Take 10 mL (200 mg total) by mouth 3 (three) times a day as needed for cough  Qty: 120 mL, Refills: 0    Associated Diagnoses: Sore throat; Cough, persistent           No discharge procedures on file  PDMP Review     None           ED Provider  Attending physically available and evaluated Deuce Valverde I managed the patient along with the ED Attending      Electronically Signed by         Tamara Petersen DO  02/10/22 1338

## 2022-02-09 NOTE — Clinical Note
Case was discussed with Dr Malissa Douglas and the patient's admission status was agreed to be  to the service of Dr Malissa Douglas

## 2022-02-09 NOTE — ED ATTENDING ATTESTATION
Final Diagnosis:  1  Intractable pain    2  KELLY (acute kidney injury) ([facility] [address] )    3  Diarrhea    4  [facility]      ED Course as of 02/10/22 0819 Wed Feb 09, 2022   1321 Creatinine(!): 3 43  CKD   1321 Hemoglobin(!): 8 6  Stable anemia  Sabra Chakraborty MD, saw and evaluated the patient  All available labs and X-rays were ordered by me or the resident and have been reviewed by myself  I discussed the patient with the resident / non-physician and agree with the resident's / non-physician practitioner's findings and plan as documented in the resident's / non-physician practicitioner's note, except where noted  At this point, I agree with the current assessment done in the ED  I was present during key portions of all procedures performed unless otherwise stated  Chief Complaint   Patient presents with    Abdominal Pain     Patient reports  generalized abdominal pain for the last few days along with loose stools     Headache     Patient also reports a headache      This is a 78 y o  female presenting for evaluation of a few days of abdominal pain, with nausea and diarrhea (NBNM) without vomiting  No f/ch/cp/sob  +weakness all over with poor oral intake  +COVID last night   No ear pain  +throat pain +congestion    PMH:   has a past medical history of Anemia, CHF (congestive heart failure) ([facility] [address] ), Chronic kidney disease, Coronary artery disease, Diabetes mellitus ([facility] [address] ), Hypertension, and Hypothyroidism  PSH:   has a past surgical history that includes Coronary angioplasty with stent (2019)      Social:  Social History     Substance and Sexual Activity   Alcohol Use Never     Social History     Tobacco Use   Smoking Status Former Smoker   Smokeless Tobacco Never Used     Social History     Substance and Sexual Activity   Drug Use No     PE:  Vitals:    02/09/22 1745 02/09/22 2134 02/10/22 0153 02/10/22 0415   BP: (!) 183/86 (!) 197/87 144/63 130/59   BP Location: Right arm Left arm Right arm Right arm Pulse:  68 64 56   Resp: 18 18 16 18   Temp:       TempSrc:       SpO2: 96% 96% 94% 95%   Weight:       Height:       General: VSS, NAD, awake, alert  Well-nourished, well-developed  Appears stated age  Head: Normocephalic, atraumatic, nontender  Eyes: PERRL, EOM-I  No diplopia  No hyphema  No subconjunctival hemorrhages  Symmetrical lids  ENTAtraumatic external nose and ears  Dry MM  No stridor  Normal phonation  No drooling  Base of mouth is soft  No mastoid tenderness  Neck: Symmetric, trachea midline  No JVD  CV: Peripheral pulses +2 throughout  No chest wall tenderness  Lungs:   Unlabored   No retractions  No crepitus  No tachypnea  No paradoxical motion  Abd: +BS, soft, LLQ tenderness   ND  No guarding  No rigidity  MSK:   FROM   No lower extremity edema  Back:   No CVAT  Skin: Dry, intact  Neuro: AAOx3, GCS 15, CN II-XII grossly intact  Motor grossly intact  Psychiatric/Behavioral: Appropriate mood and affect   Exam: deferred  A:  - LLQ abdominal discomfort  - Dry MM  P:  - CT AP for diverticulitis  - Labs  - Fluids  - Re-evaluate, dispo    - 13 point ROS was performed and all are normal unless stated in the history above  - Nursing note reviewed  Vitals reviewed  - Orders placed by myself and/or advanced practitioner / resident     - Previous chart was reviewed  - No language barrier    - History obtained from patient  - There are no limitations to the history obtained  - Critical care time: Not applicable for this patient       Code Status: Level 1 - Full Code  Advance Directive and Living Will:      Power of :    POLST:      Medications   HYDROmorphone (DILAUDID) injection 0 5 mg (0 5 mg Intravenous Not Given 2/9/22 1627)   amLODIPine (NORVASC) tablet 10 mg (10 mg Oral Given 2/9/22 2318)   aspirin chewable tablet 81 mg (has no administration in time range)   atorvastatin (LIPITOR) tablet 80 mg (has no administration in time range)   benzonatate (TESSALON PERLES) capsule 100 mg (has no administration in time range)   bumetanide (BUMEX) tablet 2 mg (2 mg Oral Given 2/9/22 2317)   calcium acetate (PHOSLO) capsule 667 mg (has no administration in time range)   carvedilol (COREG) tablet 12 5 mg (has no administration in time range)   guaiFENesin (ROBITUSSIN) oral solution 200 mg (has no administration in time range)   hydrALAZINE (APRESOLINE) tablet 100 mg (100 mg Oral Given 2/9/22 2317)   isosorbide mononitrate (IMDUR) 24 hr tablet 120 mg (has no administration in time range)   levothyroxine tablet 112 mcg (112 mcg Oral Given 2/10/22 0521)   acetaminophen (TYLENOL) tablet 650 mg (650 mg Oral Given 2/9/22 2317)   docusate sodium (COLACE) capsule 100 mg (100 mg Oral Not Given 2/9/22 2257)   polyethylene glycol (MIRALAX) packet 17 g (has no administration in time range)   ondansetron (ZOFRAN) injection 4 mg (has no administration in time range)   simethicone (MYLICON) chewable tablet 80 mg (has no administration in time range)   heparin (porcine) subcutaneous injection 5,000 Units (5,000 Units Subcutaneous Not Given 2/10/22 0521)   insulin lispro (HumaLOG) 100 units/mL subcutaneous injection 1-6 Units (has no administration in time range)   remdesivir (Veklury) 200 mg in sodium chloride 0 9 % 290 mL IVPB (200 mg Intravenous Given 2/9/22 2318)     Followed by   remdesivir Dunia Nightingale) 100 mg in sodium chloride 0 9 % 270 mL IVPB (has no administration in time range)   oxyCODONE (ROXICODONE) IR tablet 5 mg (has no administration in time range)   oxyCODONE (ROXICODONE) IR tablet 2 5 mg (has no administration in time range)   HYDROmorphone (DILAUDID) injection 0 5 mg (has no administration in time range)   insulin aspart protamine-insulin aspart (NovoLOG 70/30) 100 units/mL subcutaneous injection 10 Units (has no administration in time range)   multi-electrolyte (ISOLYTE-S PH 7 4) bolus 1,000 mL (0 mL Intravenous Stopped 2/9/22 6334)   ondansetron (ZOFRAN) injection 4 mg (4 mg Intravenous Given 2/9/22 1208)   HYDROmorphone (DILAUDID) injection 0 5 mg (0 5 mg Intravenous Given 2/9/22 1209)   HYDROmorphone (DILAUDID) injection 0 5 mg (0 5 mg Intravenous Given 2/9/22 1322)   ondansetron (ZOFRAN) injection 4 mg (4 mg Intravenous Given 2/9/22 1733)     CT abdomen pelvis wo contrast   Final Result      Subpleural groundglass opacities noted at the lung bases most consistent with Covid 19 pneumonia  No acute intra-abdominal abnormality  Nodular liver suggesting cirrhosis  Cholelithiasis  Hepatic contour suggesting potential cirrhosis  Small to moderate hiatal hernia  Trace bilateral pleural effusions and small pericardial effusion  Workstation performed: [id number]           Orders Placed This Encounter   Procedures    COVID/FLU/RSV    CT abdomen pelvis wo contrast    CBC and differential    Comprehensive metabolic panel    Lipase    UA w Reflex to Microscopic w Reflex to Culture    NT-BNP PRO    Urine Microscopic    CBC and differential    Comprehensive metabolic panel    Procalcitonin with AM Reflex    C-reactive protein    D-dimer, quantitative    Comprehensive metabolic panel    CBC and differential    C-reactive protein    CK (with reflex to MB)    Procalcitonin Reflex    Diet Cardiovascular; Cardiac; Fluid Restriction 1800 ML, Consistent Carbohydrate Diet Level 2 (5 carb servings/75 grams CHO/meal)    Nursing communication Continue IV as ordered      Notify admitting physician    Notify admitting physician on arrival    Insulin Subcutaneous Notify Physician    Insulin Subcutaneous Instruction    Hypoglycemia Protocol    Vital Signs per unit routine    Up and OOB as tolerated    I/O    Daily weights    Insert peripheral IV    Maintain IV access    Apply SCD or Foot pumps    Fingerstick Glucose (POCT)    Notify Provider for Increasing O2    Incentive spirometry    Titrate O2 (oxygen) to keep saturation at    Activity as tolerated    Ambulate patient    Level 1-Full Code: all life saving measures are indicated    Contact and airborne isolation status    OT eval and treat    PT eval and treat    Place in Observation     Labs Reviewed   COVID19, INFLUENZA A/B, RSV PCR, [facility] - Abnormal       Result Value Ref Range Status    SARS-CoV-2 Positive (*) Negative Final    Comment:      INFLUENZA A PCR Negative  Negative Final    Comment:      INFLUENZA B PCR Negative  Negative Final    Comment:      RSV PCR Negative  Negative Final    Comment:      Narrative:     FOR PEDIATRIC PATIENTS - copy/paste COVID Guidelines URL to browser: [email or web address]  [email or web address]    SARS-CoV-2 assay is a Nucleic Acid Amplification assay intended for the  qualitative detection of nucleic acid from SARS-CoV-2 in nasopharyngeal  swabs  Results are for the presumptive identification of SARS-CoV-2 RNA  Positive results are indicative of infection with SARS-CoV-2, the virus  causing COVID-19, but do not rule out bacterial infection or co-infection  with other viruses  Laboratories within the [facility] and its  territories are required to report all positive results to the appropriate  public health authorities  Negative results do not preclude SARS-CoV-2  infection and should not be used as the sole basis for treatment or other  patient management decisions  Negative results must be combined with  clinical observations, patient history, and epidemiological information  This test has not been FDA cleared or approved  This test has been authorized by FDA under an Emergency Use Authorization  (EUA)  This test is only authorized for the duration of time the  declaration that circumstances exist justifying the authorization of the  emergency use of an in vitro diagnostic tests for detection of SARS-CoV-2  virus and/or diagnosis of COVID-19 infection under section 564(b)(1) of  the Act, 21 U  S C  589BCP-5(V)(1), unless the authorization is terminated  or revoked sooner  The test has been validated but independent review by FDA  and CLIA is pending  Test performed using [employer] Gene[employer]pert: This RT-PCR assay targets N2,  a region unique to SARS-CoV-2  A conserved region in the E-gene was chosen  for pan-Sarbecovirus detection which includes SARS-CoV-2  CBC AND DIFFERENTIAL - Abnormal    WBC 6 19  4 31 - 10 16 Thousand/uL Final    RBC 3 16 (*) 3 81 - 5 12 Million/uL Final    Hemoglobin 8 6 (*) 11 5 - 15 4 g/dL Final    Hematocrit 27 6 (*) 34 8 - 46 1 % Final    MCV 87  82 - 98 fL Final    MCH 27 2  26 8 - 34 3 pg Final    MCHC 31 2 (*) 31 4 - 37 4 g/dL Final    RDW 13 0  11 6 - 15 1 % Final    MPV 11 2  8 9 - 12 7 fL Final    Platelets 749  124 - 390 Thousands/uL Final    nRBC 0  /100 WBCs Final    Neutrophils Relative 81 (*) 43 - 75 % Final    Immat GRANS % 1  0 - 2 % Final    Lymphocytes Relative 13 (*) 14 - 44 % Final    Monocytes Relative 4  4 - 12 % Final    Eosinophils Relative 1  0 - 6 % Final    Basophils Relative 0  0 - 1 % Final    Neutrophils Absolute 5 09  1 85 - 7 62 Thousands/µL Final    Immature Grans Absolute 0 04  0 00 - 0 20 Thousand/uL Final    Lymphocytes Absolute 0 78  0 60 - 4 47 Thousands/µL Final    Monocytes Absolute 0 23  0 17 - 1 22 Thousand/µL Final    Eosinophils Absolute 0 04  0 00 - 0 61 Thousand/µL Final    Basophils Absolute 0 01  0 00 - 0 10 Thousands/µL Final   COMPREHENSIVE METABOLIC PANEL - Abnormal    Sodium 135 (*) 136 - 145 mmol/L Final    Potassium 4 0  3 5 - 5 3 mmol/L Final    Chloride 106  100 - 108 mmol/L Final    CO2 20 (*) 21 - 32 mmol/L Final    ANION GAP 9  4 - 13 mmol/L Final    BUN 68 (*) 5 - 25 mg/dL Final    Creatinine 3 43 (*) 0 60 - 1 30 mg/dL Final    Comment: Standardized to IDMS reference method    Glucose 197 (*) 65 - 140 mg/dL Final    Comment: If the patient is fasting, the ADA then defines impaired fasting glucose as > 100 mg/dL and diabetes as > or equal to 123 mg/dL    Specimen collection should occur prior to Sulfasalazine administration due to the potential for falsely depressed results  Specimen collection should occur prior to Sulfapyridine administration due to the potential for falsely elevated results  Calcium 8 6  8 3 - 10 1 mg/dL Final    Corrected Calcium 9 9  8 3 - 10 1 mg/dL Final    AST 65 (*) 5 - 45 U/L Final    Comment: Specimen collection should occur prior to Sulfasalazine administration due to the potential for falsely depressed results  ALT 40  12 - 78 U/L Final    Comment: Specimen collection should occur prior to Sulfasalazine and/or Sulfapyridine administration due to the potential for falsely depressed results  Alkaline Phosphatase 485 (*) 46 - 116 U/L Final    Total Protein 7 2  6 4 - 8 2 g/dL Final    Albumin 2 4 (*) 3 5 - 5 0 g/dL Final    Total Bilirubin 0 47  0 20 - 1 00 mg/dL Final    Comment: Use of this assay is not recommended for patients undergoing treatment with eltrombopag due to the potential for falsely elevated results      eGFR 13  ml/min/1 73sq m Final    Narrative:     [place] guidelines for Chronic Kidney Disease (CKD):     Stage 1 with normal or high GFR (GFR > 90 mL/min/1 73 square meters)    Stage 2 Mild CKD (GFR = 60-89 mL/min/1 73 square meters)    Stage 3A Moderate CKD (GFR = 45-59 mL/min/1 73 square meters)    Stage 3B Moderate CKD (GFR = 30-44 mL/min/1 73 square meters)    Stage 4 Severe CKD (GFR = 15-29 mL/min/1 73 square meters)    Stage 5 End Stage CKD (GFR <15 mL/min/1 73 square meters)  Note: GFR calculation is accurate only with a steady state creatinine   UA W REFLEX TO MICROSCOPIC WITH REFLEX TO CULTURE - Abnormal    Color, UA Yellow   Final    Clarity, UA Clear   Final    Specific Gravity, UA 1 020  1 003 - 1 030 Final    pH, UA 5 5  4 5, 5 0, 5 5, 6 0, 6 5, 7 0, 7 5, 8 0 Final    Leukocytes, UA Negative  Negative Final    Nitrite, UA Negative  Negative Final    Protein,  (2+) (*) Negative mg/dl Final    Glucose,  (1/10%) (*) Negative mg/dl Final    Ketones, UA Negative  Negative mg/dl Final    Urobilinogen, UA 0 2  0 2, 1 0 E U /dl E U /dl Final    Bilirubin, UA Negative  Negative Final    Blood, UA Negative   Final   NT-BNP PRO (BRAIN NATRIURETIC PEPTIDE) - Abnormal    NT-proBNP 17,351 (*) <125 pg/mL Final   CBC AND DIFFERENTIAL - Abnormal    WBC 3 06 (*) 4 31 - 10 16 Thousand/uL Final    RBC 2 92 (*) 3 81 - 5 12 Million/uL Final    Hemoglobin 8 0 (*) 11 5 - 15 4 g/dL Final    Hematocrit 25 3 (*) 34 8 - 46 1 % Final    MCV 87  82 - 98 fL Final    MCH 27 4  26 8 - 34 3 pg Final    MCHC 31 6  31 4 - 37 4 g/dL Final    RDW 12 8  11 6 - 15 1 % Final    MPV 10 8  8 9 - 12 7 fL Final    Platelets 180  183 - 390 Thousands/uL Final    nRBC 0  /100 WBCs Final    Neutrophils Relative 72  43 - 75 % Final    Immat GRANS % 1  0 - 2 % Final    Lymphocytes Relative 22  14 - 44 % Final    Monocytes Relative 5  4 - 12 % Final    Eosinophils Relative 0  0 - 6 % Final    Basophils Relative 0  0 - 1 % Final    Neutrophils Absolute 2 21  1 85 - 7 62 Thousands/µL Final    Immature Grans Absolute 0 03  0 00 - 0 20 Thousand/uL Final    Lymphocytes Absolute 0 67  0 60 - 4 47 Thousands/µL Final    Monocytes Absolute 0 15 (*) 0 17 - 1 22 Thousand/µL Final    Eosinophils Absolute 0 00  0 00 - 0 61 Thousand/µL Final    Basophils Absolute 0 00  0 00 - 0 10 Thousands/µL Final   COMPREHENSIVE METABOLIC PANEL - Abnormal    Sodium 135 (*) 136 - 145 mmol/L Final    Potassium 4 2  3 5 - 5 3 mmol/L Final    Chloride 103  100 - 108 mmol/L Final    CO2 21  21 - 32 mmol/L Final    ANION GAP 11  4 - 13 mmol/L Final    BUN 69 (*) 5 - 25 mg/dL Final    Creatinine 3 18 (*) 0 60 - 1 30 mg/dL Final    Comment: Standardized to IDMS reference method    Glucose 253 (*) 65 - 140 mg/dL Final    Comment: If the patient is fasting, the ADA then defines impaired fasting glucose as > 100 mg/dL and diabetes as > or equal to 123 mg/dL    Specimen collection should occur prior to Sulfasalazine administration due to the potential for falsely depressed results  Specimen collection should occur prior to Sulfapyridine administration due to the potential for falsely elevated results  Calcium 8 3  8 3 - 10 1 mg/dL Final    Corrected Calcium 9 7  8 3 - 10 1 mg/dL Final    AST 49 (*) 5 - 45 U/L Final    Comment: Specimen collection should occur prior to Sulfasalazine administration due to the potential for falsely depressed results  ALT 36  12 - 78 U/L Final    Comment: Specimen collection should occur prior to Sulfasalazine and/or Sulfapyridine administration due to the potential for falsely depressed results  Alkaline Phosphatase 429 (*) 46 - 116 U/L Final    Total Protein 6 6  6 4 - 8 2 g/dL Final    Albumin 2 3 (*) 3 5 - 5 0 g/dL Final    Total Bilirubin 0 30  0 20 - 1 00 mg/dL Final    Comment: Use of this assay is not recommended for patients undergoing treatment with eltrombopag due to the potential for falsely elevated results  eGFR 14  ml/min/1 73sq m Final    Narrative:     [place] guidelines for Chronic Kidney Disease (CKD):     Stage 1 with normal or high GFR (GFR > 90 mL/min/1 73 square meters)    Stage 2 Mild CKD (GFR = 60-89 mL/min/1 73 square meters)    Stage 3A Moderate CKD (GFR = 45-59 mL/min/1 73 square meters)    Stage 3B Moderate CKD (GFR = 30-44 mL/min/1 73 square meters)    Stage 4 Severe CKD (GFR = 15-29 mL/min/1 73 square meters)    Stage 5 End Stage CKD (GFR <15 mL/min/1 73 square meters)  Note: GFR calculation is accurate only with a steady state creatinine   PROCALCITONIN TEST - Abnormal    Procalcitonin 0 79 (*) <=0 25 ng/ml Final    Comment: Suspected Lower Respiratory Tract Infection (LRTI):  - LESS than or EQUAL to 0 25 ng/mL:   low likelihood for bacterial LRTI; antibiotics DISCOURAGED   - GREATER than 0 25 ng/mL:   increased likelihood for bacterial LRTI; antibiotics ENCOURAGED      Suspected Sepsis:  - Strongly consider initiating antibiotics in ALL UNSTABLE patients  - LESS than or EQUAL to 0 5 ng/mL:   low likelihood for bacterial sepsis; antibiotics DISCOURAGED   - GREATER than 0 5 ng/mL:   increased likelihood for bacterial sepsis; antibiotics ENCOURAGED   - GREATER than 2 ng/mL:   high risk for severe sepsis / septic shock; antibiotics strongly ENCOURAGED  Decisions on antibiotic use should not be based solely on Procalcitonin (PCT) levels  If PCT is low but uncertainty exists with stopping antibiotics, repeat PCT in 6-24 hours to confirm the low level  If antibiotics are administered (regardless if initial PCT was high or low), repeat PCT every 1-2 days to consider early antibiotic cessation (when GREATER than 80% decrease from the peak OR when PCT drops below designated cutoffs, whichever comes first), so long as the infection is NOT one that typically requires prolonged treatment durations (e g , bone/joint infections, endocarditis, Staph  aureus bacteremia)      Situations of FALSE-POSITIVE Procalcitonin values:  1) Newborns < 67 hours old  2) Massive stress from severe trauma / burns, major surgery, acute pancreatitis, cardiogenic / hemorrhagic shock, sickle cell crisis, or other organ perfusion abnormalities  3) Malaria and some Candidal infections  4) Treatment with agents that stimulate cytokines (e g , OKT3, anti-lymphocyte globulins, alemtuzumab, IL-2, granulocyte transfusion [NOT GCSFs])  5) Chronic renal disease causes elevated baseline levels (consider GREATER than 0 75 ng/mL as an abnormal cut-off); initiating HD/CRRT may cause transient decreases  6) Paraneoplastic syndromes from medullary thyroid or SCLC, some forms of vasculitis, and acute ijjmy-wi-wozj disease    Situations of FALSE-NEGATIVE Procalcitonin values:  1) Too early in clinical course for PCT to have reached its peak (may repeat in 6-24 hours to confirm low level)  2) Localized infection WITHOUT systemic (SIRS / sepsis) response (e g , an abscess, osteomyelitis, cystitis)  3) Mycobacteria (e g , Tuberculosis, MAC)  4) Cystic fibrosis exacerbations     C-REACTIVE PROTEIN - Abnormal    CRP 22 2 (*) <3 0 mg/L Final   D-DIMER, QUANTITATIVE - Abnormal    D-Dimer, Quant 1 58 (*) <0 50 ug/ml FEU Final    Comment: Reference and upper limits to exclude DVT and PE are the same  Do not use to exclude if clinical symptoms are present  Pregnant women:  1st trimester:  <0 22 - 1 06 ug/ml FEU  2nd trimester:  <0 22 - 1 88 ug/ml FEU  3rd trimester:   0 24 - 3 28 ug/ml FEU    Note: Normal ranges may not apply to patients who are transgender, non-binary, or whose legal sex, sex at birth, and gender identity differ       LIPASE - Normal    Lipase 210  73 - 393 u/L Final   URINE MICROSCOPIC - Normal    RBC, UA 2-4  None Seen, 2-4 /hpf Final    WBC, UA None Seen  None Seen, 2-4, 5-60 /hpf Final    Epithelial Cells None Seen  None Seen, Occasional /hpf Final    Bacteria, UA None Seen  None Seen, Occasional /hpf Final    Hyaline Casts, UA None Seen  None Seen /lpf Final   PROCALCITONIN REFLEX   COMPREHENSIVE METABOLIC PANEL   CBC AND DIFFERENTIAL   C-REACTIVE PROTEIN   CK     Time reflects when diagnosis was documented in both MDM as applicable and the Disposition within this note       Time User Action Codes Description Comment    2/9/2022  4:13 PM Dia Irving Add [R52] Intractable pain     2/9/2022  4:13 PM Palladino, Emaline Mode Add [N17 9] KELLY (acute kidney injury) ([facility] [address] )     2/9/2022  4:13 PM Dia Irving Add [R19 7] Diarrhea     2/9/2022  4:13 PM Dia Irving Add [U07 1] COVID           ED Disposition       ED Disposition Condition Date/Time Comment    Admit Stable Wed Feb 9, 2022  4:21 PM Case was discussed with Dr Fran Patrick and the patient's admission status was agreed obs to be  to the service of Dr Sonia Mercer    None       Current Discharge Medication List        CONTINUE these medications which have NOT CHANGED    Details   amLODIPine (NORVASC) 10 mg tablet Take 1 tablet (10 mg total) by mouth 2 (two) times a day  Qty: 180 tablet, Refills: 3    Associated Diagnoses: Hypertension, unspecified type      aspirin (RA Aspirin Adult Low Dose) 81 mg chewable tablet Chew 1 tablet (81 mg total) daily  Qty: 90 tablet, Refills: 1    Associated Diagnoses: Coronary artery disease involving native coronary artery of native heart without angina pectoris      atorvastatin (LIPITOR) 40 mg tablet Take 2 tablets (80 mg total) by mouth daily  Qty: 90 tablet, Refills: 3    Associated Diagnoses: Coronary artery disease involving native coronary artery of native heart without angina pectoris; Mixed hyperlipidemia      bumetanide (BUMEX) 2 mg tablet Take 1 tablet (2 mg total) by mouth 2 (two) times a day  Qty: 180 tablet, Refills: 3    Associated Diagnoses: Chronic heart failure with preserved ejection fraction (HCC)      carvedilol (COREG) 12 5 mg tablet Take 1 tablet (12 5 mg total) by mouth 2 (two) times a day with meals  Qty: 180 tablet, Refills: 3    Associated Diagnoses: Chronic heart failure with preserved ejection fraction ([facility] [address] ); Coronary artery disease involving native coronary artery of native heart without angina pectoris;  Hypertension, unspecified type      Droplet Pen Needles 32G X 4 MM MISC 2 (two) times a day      hydrALAZINE (APRESOLINE) 100 MG tablet Take 1 tablet (100 mg total) by mouth 3 (three) times a day  Qty: 270 tablet, Refills: 3    Associated Diagnoses: Hypertension, unspecified type      insulin aspart protamine-insulin aspart (NovoLOG 70/30) 100 units/mL injection As per your current blood sugars, take 15 units before breakfast, 10 units before dinner  Refills: 0    Associated Diagnoses: Type 2 diabetes mellitus with stage 3 chronic kidney disease, with long-term current use of insulin, unspecified whether stage 3a or 3b CKD (formerly [facility])      isosorbide mononitrate (IMDUR) 120 mg 24 hr tablet Take 1 tablet (120 mg total) by mouth daily  Qty: 90 tablet, Refills: 3    Associated Diagnoses: Coronary artery disease involving native coronary artery of native heart without angina pectoris      levothyroxine 112 mcg tablet take 1 tablet by mouth daily for 6 days then take 1/2 tablet ON       benzonatate (TESSALON PERLES) 100 mg capsule Take 1 capsule (100 mg total) by mouth 3 (three) times a day as needed for cough  Qty: 20 capsule, Refills: 0    Associated Diagnoses: Cough      calcium acetate (PHOSLO) capsule Take 1 capsule (667 mg total) by mouth 2 (two) times a day with meals  Qty: 60 capsule, Refills: 0    Associated Diagnoses: Acute renal failure superimposed on stage 4 chronic kidney disease (HCC)      guaiFENesin (ROBITUSSIN) 100 MG/5ML oral liquid Take 10 mL (200 mg total) by mouth 3 (three) times a day as needed for cough  Qty: 120 mL, Refills: 0    Associated Diagnoses: Sore throat; Cough, persistent           No discharge procedures on file  Prior to Admission Medications   Prescriptions Last Dose Informant Patient Reported? Taking?    Droplet Pen Needles 32G X 4 MM MISC 2022 at Unknown time Self Yes Yes   Si (two) times a day   amLODIPine (NORVASC) 10 mg tablet 2022 at Unknown time  No Yes   Sig: Take 1 tablet (10 mg total) by mouth 2 (two) times a day   aspirin (RA Aspirin Adult Low Dose) 81 mg chewable tablet 2022 at Unknown time  No Yes   Sig: Chew 1 tablet (81 mg total) daily   atorvastatin (LIPITOR) 40 mg tablet 2022 at Unknown time  No Yes   Sig: Take 2 tablets (80 mg total) by mouth daily   benzonatate (TESSALON PERLES) 100 mg capsule Not Taking at Unknown time Self No No   Sig: Take 1 capsule (100 mg total) by mouth 3 (three) times a day as needed for cough   Patient not taking: Reported on 2021    bumetanide (BUMEX) 2 mg tablet 2022 at Unknown time  No Yes   Sig: Take 1 tablet (2 mg total) by mouth 2 (two) times a day   calcium acetate (PHOSLO) capsule Not Taking at Unknown time Self No No   Sig: Take 1 capsule (667 mg total) by mouth 2 (two) times a day with meals   Patient not taking: Reported on 10/13/2021   carvedilol (COREG) 12 5 mg tablet 2/9/2022 at Unknown time  No Yes   Sig: Take 1 tablet (12 5 mg total) by mouth 2 (two) times a day with meals   guaiFENesin (ROBITUSSIN) 100 MG/5ML oral liquid Not Taking at Unknown time Self No No   Sig: Take 10 mL (200 mg total) by mouth 3 (three) times a day as needed for cough   Patient not taking: Reported on 2/9/2022    hydrALAZINE (APRESOLINE) 100 MG tablet 2/9/2022 at Unknown time  No Yes   Sig: Take 1 tablet (100 mg total) by mouth 3 (three) times a day   insulin aspart protamine-insulin aspart (NovoLOG 70/30) 100 units/mL injection 2/9/2022 at Unknown time Self No Yes   Sig: As per your current blood sugars, take 15 units before breakfast, 10 units before dinner   isosorbide mononitrate (IMDUR) 120 mg 24 hr tablet 2/9/2022 at Unknown time  No Yes   Sig: Take 1 tablet (120 mg total) by mouth daily   levothyroxine 112 mcg tablet 2/9/2022 at Unknown time Self Yes Yes   Sig: take 1 tablet by mouth daily for 6 days then take 1/2 tablet ON SUNDAY      Facility-Administered Medications: None       Portions of the record may have been created with voice recognition software  Occasional wrong word or "sound a like" substitutions may have occurred due to the inherent limitations of voice recognition software  Read the chart carefully and recognize, using context, where substitutions have occurred      Electronically signed by:  Guillaume Flower

## 2022-02-10 PROBLEM — R10.9 ABDOMINAL PAIN: Status: ACTIVE | Noted: 2022-02-10

## 2022-02-10 PROBLEM — I50.32 CHRONIC DIASTOLIC HEART FAILURE (HCC): Status: ACTIVE | Noted: 2018-12-29

## 2022-02-10 LAB
GLUCOSE SERPL-MCNC: 178 MG/DL (ref 65–140)
GLUCOSE SERPL-MCNC: 206 MG/DL (ref 65–140)
GLUCOSE SERPL-MCNC: 309 MG/DL (ref 65–140)
PROCALCITONIN SERPL-MCNC: 0.79 NG/ML

## 2022-02-10 PROCEDURE — 97162 PT EVAL MOD COMPLEX 30 MIN: CPT

## 2022-02-10 PROCEDURE — XW033E5 INTRODUCTION OF REMDESIVIR ANTI-INFECTIVE INTO PERIPHERAL VEIN, PERCUTANEOUS APPROACH, NEW TECHNOLOGY GROUP 5: ICD-10-PCS | Performed by: INTERNAL MEDICINE

## 2022-02-10 PROCEDURE — 99225 PR SBSQ OBSERVATION CARE/DAY 25 MINUTES: CPT | Performed by: INTERNAL MEDICINE

## 2022-02-10 PROCEDURE — 82948 REAGENT STRIP/BLOOD GLUCOSE: CPT

## 2022-02-10 RX ORDER — DICYCLOMINE HCL 20 MG
20 TABLET ORAL
Status: DISCONTINUED | OUTPATIENT
Start: 2022-02-10 | End: 2022-02-18 | Stop reason: HOSPADM

## 2022-02-10 RX ORDER — LOPERAMIDE HYDROCHLORIDE 2 MG/1
2 CAPSULE ORAL 3 TIMES DAILY PRN
Status: DISCONTINUED | OUTPATIENT
Start: 2022-02-10 | End: 2022-02-18 | Stop reason: HOSPADM

## 2022-02-10 RX ADMIN — INSULIN ASPART 10 UNITS: 100 INJECTION, SUSPENSION SUBCUTANEOUS at 08:28

## 2022-02-10 RX ADMIN — DICYCLOMINE HYDROCHLORIDE 20 MG: 20 TABLET ORAL at 12:43

## 2022-02-10 RX ADMIN — INSULIN ASPART 10 UNITS: 100 INJECTION, SUSPENSION SUBCUTANEOUS at 18:39

## 2022-02-10 RX ADMIN — HYDRALAZINE HYDROCHLORIDE 100 MG: 50 TABLET, FILM COATED ORAL at 18:46

## 2022-02-10 RX ADMIN — ISOSORBIDE MONONITRATE 120 MG: 60 TABLET, EXTENDED RELEASE ORAL at 08:29

## 2022-02-10 RX ADMIN — CALCIUM ACETATE 667 MG: 667 CAPSULE ORAL at 08:29

## 2022-02-10 RX ADMIN — DICYCLOMINE HYDROCHLORIDE 20 MG: 20 TABLET ORAL at 18:40

## 2022-02-10 RX ADMIN — HYDRALAZINE HYDROCHLORIDE 100 MG: 50 TABLET, FILM COATED ORAL at 08:29

## 2022-02-10 RX ADMIN — LEVOTHYROXINE SODIUM 112 MCG: 112 TABLET ORAL at 05:21

## 2022-02-10 RX ADMIN — DOCUSATE SODIUM 100 MG: 100 CAPSULE ORAL at 18:39

## 2022-02-10 RX ADMIN — INSULIN LISPRO 2 UNITS: 100 INJECTION, SOLUTION INTRAVENOUS; SUBCUTANEOUS at 18:38

## 2022-02-10 RX ADMIN — ASPIRIN 81 MG: 81 TABLET, CHEWABLE ORAL at 08:30

## 2022-02-10 RX ADMIN — INSULIN LISPRO 4 UNITS: 100 INJECTION, SOLUTION INTRAVENOUS; SUBCUTANEOUS at 12:44

## 2022-02-10 RX ADMIN — BUMETANIDE 2 MG: 2 TABLET ORAL at 18:45

## 2022-02-10 RX ADMIN — CARVEDILOL 12.5 MG: 12.5 TABLET, FILM COATED ORAL at 08:29

## 2022-02-10 RX ADMIN — CALCIUM ACETATE 667 MG: 667 CAPSULE ORAL at 18:39

## 2022-02-10 RX ADMIN — CARVEDILOL 12.5 MG: 12.5 TABLET, FILM COATED ORAL at 18:46

## 2022-02-10 RX ADMIN — POLYETHYLENE GLYCOL 3350 17 G: 17 POWDER, FOR SOLUTION ORAL at 08:28

## 2022-02-10 RX ADMIN — DOCUSATE SODIUM 100 MG: 100 CAPSULE ORAL at 08:29

## 2022-02-10 RX ADMIN — AMLODIPINE BESYLATE 10 MG: 10 TABLET ORAL at 08:29

## 2022-02-10 RX ADMIN — BUMETANIDE 2 MG: 2 TABLET ORAL at 09:46

## 2022-02-10 RX ADMIN — ATORVASTATIN CALCIUM 80 MG: 80 TABLET, FILM COATED ORAL at 18:39

## 2022-02-10 RX ADMIN — INSULIN LISPRO 1 UNITS: 100 INJECTION, SOLUTION INTRAVENOUS; SUBCUTANEOUS at 08:28

## 2022-02-10 RX ADMIN — ONDANSETRON 4 MG: 2 INJECTION INTRAMUSCULAR; INTRAVENOUS at 09:50

## 2022-02-10 NOTE — ASSESSMENT & PLAN NOTE
BP relatively well controlled  · Continue hydralazine 100 mg TID, Coreg 12 5 mg BID, Norvasc 10 mg BID?, Bumex 2 mg BID  · Monitor BPs

## 2022-02-10 NOTE — ASSESSMENT & PLAN NOTE
No source on CT, benign abdominal exam  · Suspect related to COVID/diarrhea  · Monitor stool output--reports 2 episodes of diarrhea this morning with some reported nausea/vomiting however not documented  · Will add bentyl, imodium

## 2022-02-10 NOTE — ASSESSMENT & PLAN NOTE
Patient presented with  Generalized abdominal pain, diarrhea, chills, weakness, fatigue and nasal congestion  · Home COVID test +, confirmation test on arrival also +  Reports she is vaccinated   · On RA, not hypoxic   MILD Pathway  · Patient was not requiring oxygen at any point but was started on Remdesivir--will discontinue per protocol  · CRP 22, D-dimer 1 58 (AC with Hep SQ 5000 q8h per VTE Group C), procal 0 79 (unclear significance in setting of chronic renal failure), BNP 17,351  · Supportive care for diarrhea   · Isolation thru 2/19

## 2022-02-10 NOTE — ASSESSMENT & PLAN NOTE
Lab Results   Component Value Date    EGFR 13 02/09/2022    EGFR 15 11/05/2021    EGFR 13 10/31/2021    CREATININE 3 43 (H) 02/09/2022    CREATININE 3 14 (H) 11/05/2021    CREATININE 3 45 (H) 10/31/2021   Patient has creatinine 3 43 that is around her baseline  Continue to monitor daily BMP  Avoid nephrotoxins  Continue with Bumex b i d    Avoid hypotension

## 2022-02-10 NOTE — PLAN OF CARE
Problem: PAIN - ADULT  Goal: Verbalizes/displays adequate comfort level or baseline comfort level  Description: Interventions:  - Encourage patient to monitor pain and request assistance  - Assess pain using appropriate pain scale  - Administer analgesics based on type and severity of pain and evaluate response  - Implement non-pharmacological measures as appropriate and evaluate response  - Consider cultural and social influences on pain and pain management  - Notify physician/advanced practitioner if interventions unsuccessful or patient reports new pain  Outcome: Progressing     Problem: INFECTION - ADULT  Goal: Absence or prevention of progression during hospitalization  Description: INTERVENTIONS:  - Assess and monitor for signs and symptoms of infection  - Monitor lab/diagnostic results  - Monitor all insertion sites, i e  indwelling lines, tubes, and drains  - [place] appropriate cooling/warming therapies per order  - Administer medications as ordered  - Instruct and encourage patient and family to use good hand hygiene technique  - Identify and instruct in appropriate isolation precautions for identified infection/condition  Outcome: Progressing     Problem: SAFETY ADULT  Goal: Patient will remain free of falls  Description: INTERVENTIONS:  - Educate patient/family on patient safety including physical limitations  - Instruct patient to call for assistance with activity   - Consult OT/PT to assist with strengthening/mobility   - Keep Call bell within reach  - Keep bed low and locked with side rails adjusted as appropriate  - Keep care items and personal belongings within reach  - Initiate and maintain comfort rounds  - Make Fall Risk Sign visible to staff  - Offer Toileting every 2 Hours, in advance of need  - Initiate/Maintain bed alarm  - Apply yellow socks and bracelet for high fall risk patients  - Consider moving patient to room near nurses station  Outcome: Progressing  Goal: Maintain or return to baseline ADL function  Description: INTERVENTIONS:  -  Assess patient's ability to carry out ADLs; assess patient's baseline for ADL function and identify physical deficits which impact ability to perform ADLs (bathing, care of mouth/teeth, toileting, grooming, dressing, etc )  - Assess/evaluate cause of self-care deficits   - Assess range of motion  - Assess patient's mobility; develop plan if impaired  - Assess patient's need for assistive devices and provide as appropriate  - Encourage maximum independence but intervene and supervise when necessary  - Involve family in performance of ADLs  - Assess for home care needs following discharge   - Consider OT consult to assist with ADL evaluation and planning for discharge  - Provide patient education as appropriate  Outcome: Progressing  Goal: Maintains/Returns to pre admission functional level  Description: INTERVENTIONS:  - Perform BMAT or MOVE assessment daily    - Set and communicate daily mobility goal to care team and patient/family/caregiver  - Collaborate with rehabilitation services on mobility goals if consulted  - Perform Range of Motion 3 times a day  - Reposition patient every 2 hours    - Dangle patient 3 times a day  - Stand patient 3 times a day  - Ambulate patient 3 times a day  - Out of bed to chair 3 times a day   - Out of bed for meals 3 times a day  - Out of bed for toileting  - Record patient progress and toleration of activity level   Outcome: Progressing     Problem: DISCHARGE PLANNING  Goal: Discharge to home or other facility with appropriate resources  Description: INTERVENTIONS:  - Identify barriers to discharge w/patient and caregiver  - Arrange for needed discharge resources and transportation as appropriate  - Identify discharge learning needs (meds, wound care, etc )  - Arrange for interpretive services to assist at discharge as needed  - Refer to Case Management Department for coordinating discharge planning if the patient needs post-hospital services based on physician/advanced practitioner order or complex needs related to functional status, cognitive ability, or social support system  Outcome: Progressing

## 2022-02-10 NOTE — ASSESSMENT & PLAN NOTE
Lab Results   Component Value Date    HGBA1C 9 7 (H) 10/25/2021       No results for input(s): POCGLU in the last 72 hours      Blood Sugar Average: Last 72 hrs:   continue with 70 30 at 15 units  Sliding scale  Insulin cover  Avoid hypoglycemia

## 2022-02-10 NOTE — ASSESSMENT & PLAN NOTE
[facility] 1/4/19: LUDY x 2 to LAD after NSTEMI (trop 0 2) and abnormal stress test with ischemia in anterior wall on 12/31/18, 80% RCA- plan staged, but has not needed intervention  · On imdur, asa, coreg, statin

## 2022-02-10 NOTE — OCCUPATIONAL THERAPY NOTE
Occupational Therapy Screen        Patient Name: Caitlin Jalloh  LKMLI'I Date: 2/10/2022       02/10/22 1136   OT Last Visit   OT Visit Date 02/10/22   Note Type   Note type Screen   Additional Comments OT orders received  Chart reviewed  Spoke to LOC&ALL who reports pt independent in room w/ functional mobility and ADLs  No further acutE OT needs, d/c OT       Eliezer Schwartz, MS, OTR/L

## 2022-02-10 NOTE — ASSESSMENT & PLAN NOTE
Lab Results   Component Value Date    EGFR 14 02/09/2022    EGFR 13 02/09/2022    EGFR 15 11/05/2021    CREATININE 3 18 (H) 02/09/2022    CREATININE 3 43 (H) 02/09/2022    CREATININE 3 14 (H) 11/05/2021     Baseline creatinine around 3 per prior notes however most recent labs indicate she has been around 3 1-3 4, Estimated GFR around 15-20, stage 4  · Reports she follows with Dr Heaven Palencia  · Monitor creatinine with PO Bumex and reported diarrhea  · Continue to monitor BMP  · Low threshold for renal consult if worsens  · I/O's reviewed with RN

## 2022-02-10 NOTE — ASSESSMENT & PLAN NOTE
Patient presented with fever, abdominal pain diarrhea  CT abdomen shows ground-glass opacities in the lower lung field  Patient was positive for COVID test at home it was confirmed here  Admit to med surge  Air droplet and contact precaution  Telemetry monitoring  Obtain D-dimer, CRP, ESR, procalcitonin  Started on remdesivir as she is high risk due to diabetes and obesity  Monitor oxygen levels  Review of the labs

## 2022-02-10 NOTE — H&P
[facility]  H&P- Beatrice Stephens 1955, 78 y o  female MRN: [id number]  Unit/Bed#Gerard Conor Encounter: [id number]  Primary Care Provider: Kemper Babinski, MD   Date and time admitted to hospital: 2/9/2022 11:38 AM    * COVID-19  Assessment & Plan  Patient presented with fever, abdominal pain diarrhea  CT abdomen shows ground-glass opacities in the lower lung field  Patient was positive for COVID test at home it was confirmed here  Admit to med surge  Air droplet and contact precaution  Telemetry monitoring  Obtain D-dimer, CRP, ESR, procalcitonin  Started on remdesivir as she is high risk due to diabetes and obesity  Monitor oxygen levels  Review of the labs      CKD (chronic kidney disease)  Assessment & Plan  Lab Results   Component Value Date    EGFR 13 02/09/2022    EGFR 15 11/05/2021    EGFR 13 10/31/2021    CREATININE 3 43 (H) 02/09/2022    CREATININE 3 14 (H) 11/05/2021    CREATININE 3 45 (H) 10/31/2021   Patient has creatinine 3 43 that is around her baseline  Continue to monitor daily BMP  Avoid nephrotoxins  Continue with Bumex b i d  Avoid hypotension    Coronary artery disease involving native coronary artery of native heart with angina pectoris [facility])  Assessment & Plan  Continue with aspirin, carvedilol and amlodipine and statin      Hypertension  Assessment & Plan  Continue with hydralazine and Imdur, carvedilol, Bumex, amlodipine  Adjust as need    Type 2 diabetes mellitus with kidney complication, with long-term current use of insulin (HCC)  Assessment & Plan  Lab Results   Component Value Date    HGBA1C 9 7 (H) 10/25/2021       No results for input(s): POCGLU in the last 72 hours      Blood Sugar Average: Last 72 hrs:   continue with 70 30 at 15 units  Sliding scale  Insulin cover  Avoid hypoglycemia    Acute on chronic diastolic congestive heart failure (HCC)  Assessment & Plan  Wt Readings from Last 3 Encounters:   02/09/22 72 6 kg (160 lb)   01/31/22 71 2 kg (157 lb)   11/23/21 72 4 kg (159 lb 9 6 oz)       Patient has acute on chronic heart failure  Her BNP [place] 62441  Input output measured  Daily weight  Continue with isosorbide mononitrate 120 mg daily, Coreg 12 5 mg b i d , amlodipine 10 mg b i d  Lasix 40 mg b i d           VTE Pharmacologic Prophylaxis: VTE Score: 11 High Risk (Score >/= 5) - Pharmacological DVT Prophylaxis Ordered: heparin  Sequential Compression Devices Ordered  Code Status: Level 1 - Full Code    Discussion with family: Patient declined call to   Anticipated Length of Stay: Patient will be admitted on an inpatient basis with an anticipated length of stay of greater than 2 midnights secondary to COVID infection, CHF  Total Time for Visit, including Counseling / Coordination of Care: 45 minutes Greater than 50% of this total time spent on direct patient counseling and coordination of care  Chief Complaint:  COVID positive with abdominal pain and headache    History of Present Illness:  Chris Clayton is a 78 y o  female with a PMH of CHF, coronary artery disease, CKD stage 4, diabetes, nocturnal hypoxia, hyperlipidemia, hypothyroidism, who presents with abdominal pain and headache  She tested positive for COVID at home  She has confirmed positive COVID test in the emergency room  Patient has no fever  She has loose stools and abdominal pain  She does not need sepsis criteria  She is being admitted as she is in heart failure and as she is diabetic she is at risk and need to be treated for COVID with remdesivir  Review of Systems:  Review of Systems   Constitutional: Negative for chills and fever  HENT: Negative for ear pain and sore throat  Eyes: Negative for pain and visual disturbance  Respiratory: Negative for cough and shortness of breath  Cardiovascular: Negative for chest pain and palpitations  Gastrointestinal: Negative for abdominal pain and vomiting     Genitourinary: Negative for dysuria and hematuria  Musculoskeletal: Negative for arthralgias and back pain  Skin: Negative for color change and rash  Neurological: Negative for seizures and syncope  All other systems reviewed and are negative  Past Medical and Surgical History:   Past Medical History:   Diagnosis Date    Anemia     [facility]     Chronic kidney disease     Coronary artery disease     Diabetes mellitus ([facility] )     Hypertension     Hypothyroidism        Past Surgical History:   Procedure Laterality Date    CORONARY ANGIOPLASTY WITH STENT PLACEMENT  2019       Meds/Allergies:  Prior to Admission medications    Medication Sig Start Date End Date Taking?  Authorizing Provider   amLODIPine (NORVASC) 10 mg tablet Take 1 tablet (10 mg total) by mouth 2 (two) times a day 1/31/22  Yes Edwin Song DO   aspirin (RA Aspirin Adult Low Dose) 81 mg chewable tablet Chew 1 tablet (81 mg total) daily 11/23/21  Yes Harish Llamas PA-C   atorvastatin (LIPITOR) 40 mg tablet Take 2 tablets (80 mg total) by mouth daily 1/31/22  Yes Edwin Song DO   bumetanide (BUMEX) 2 mg tablet Take 1 tablet (2 mg total) by mouth 2 (two) times a day 1/31/22  Yes Edwin Song DO   carvedilol (COREG) 12 5 mg tablet Take 1 tablet (12 5 mg total) by mouth 2 (two) times a day with meals 1/31/22  Yes Edwin Song DO   Droplet Pen Needles 32G X 4 MM MISC 2 (two) times a day 10/12/21  Yes Historical Provider, MD   hydrALAZINE (APRESOLINE) 100 MG tablet Take 1 tablet (100 mg total) by mouth 3 (three) times a day 1/31/22  Yes Edwin Song DO   insulin aspart protamine-insulin aspart (NovoLOG 70/30) 100 units/mL injection As per your current blood sugars, take 15 units before breakfast, 10 units before dinner 10/31/21  Yes Sushil Smith MD   isosorbide mononitrate (IMDUR) 120 mg 24 hr tablet Take 1 tablet (120 mg total) by mouth daily 1/31/22  Yes Edwin Song DO   levothyroxine 112 mcg tablet take 1 tablet by mouth daily for 6 days then take 1/2 tablet ON SUNDAY 9/24/21  Yes Historical Provider, MD   benzonatate (TESSALON PERLES) 100 mg capsule Take 1 capsule (100 mg total) by mouth 3 (three) times a day as needed for cough  Patient not taking: Reported on 11/23/2021  10/20/21   Deni Aquino DO   calcium acetate (PHOSLO) capsule Take 1 capsule (667 mg total) by mouth 2 (two) times a day with meals  Patient not taking: Reported on 10/13/2021 2/27/21   CLARICE Olsen   guaiFENesin (ROBITUSSIN) 100 MG/5ML oral liquid Take 10 mL (200 mg total) by mouth 3 (three) times a day as needed for cough  Patient not taking: Reported on 2/9/2022  10/20/21   Deni Aquino DO     I have reviewed home medications with patient personally  Allergies: Allergies   Allergen Reactions    Iv Contrast [Iodinated Diagnostic Agents] Itching     Caused KELLY    Nifedipine Rash       Social History:  Marital Status: /Civil Union   Occupation:    Patient Pre-hospital Living Situation: Home  Patient Pre-hospital Level of Mobility: walks  Patient Pre-hospital Diet Restrictions:  Diabetic  Substance Use History:   Social History     Substance and Sexual Activity   Alcohol Use Never     Social History     Tobacco Use   Smoking Status Former Smoker   Smokeless Tobacco Never Used     Social History     Substance and Sexual Activity   Drug Use No       Family History:  Family History   Problem Relation Age of Onset    Diabetes Mother     Heart attack Father         MI    Hypertension Brother        Physical Exam:     Vitals:   Blood Pressure: (!) 183/86 (02/09/22 1745)  Pulse: 67 (02/09/22 1324)  Temperature: 97 9 °F (36 6 °C) (02/09/22 1141)  Temp Source: Oral (02/09/22 1141)  Respirations: 18 (02/09/22 1745)  Height: 5' 2" (157 5 cm) (02/09/22 1141)  Weight - Scale: 72 6 kg (160 lb) (02/09/22 1141)  SpO2: 96 % (02/09/22 1745)    Physical Exam  Vitals and nursing note reviewed  Constitutional:       General: She is not in acute distress       Appearance: She is well-developed  HENT:      Head: Normocephalic and atraumatic  Eyes:      Conjunctiva/sclera: Conjunctivae normal    Cardiovascular:      Rate and Rhythm: Normal rate and regular rhythm  Heart sounds: No murmur heard  Pulmonary:      Effort: Pulmonary effort is normal  No respiratory distress  Breath sounds: Normal breath sounds  Abdominal:      Palpations: Abdomen is soft  Tenderness: There is no abdominal tenderness  Musculoskeletal:         General: No swelling, tenderness or deformity  Normal range of motion  Cervical back: Neck supple  Skin:     General: Skin is warm and dry  Neurological:      General: No focal deficit present  Mental Status: She is alert and oriented to person, place, and time  Psychiatric:         Mood and Affect: Mood normal          Behavior: Behavior normal            Additional Data:     Lab Results:  Results from last 7 days   Lab Units 02/09/22  1207   WBC Thousand/uL 6 19   HEMOGLOBIN g/dL 8 6*   HEMATOCRIT % 27 6*   PLATELETS Thousands/uL 167   NEUTROS PCT % 81*   LYMPHS PCT % 13*   MONOS PCT % 4   EOS PCT % 1     Results from last 7 days   Lab Units 02/09/22  1207   SODIUM mmol/L 135*   POTASSIUM mmol/L 4 0   CHLORIDE mmol/L 106   CO2 mmol/L 20*   BUN mg/dL 68*   CREATININE mg/dL 3 43*   ANION GAP mmol/L 9   CALCIUM mg/dL 8 6   ALBUMIN g/dL 2 4*   TOTAL BILIRUBIN mg/dL 0 47   ALK PHOS U/L 485*   ALT U/L 40   AST U/L 65*   GLUCOSE RANDOM mg/dL 197*                       Imaging: Reviewed radiology reports from this admission including: abdominal/pelvic CT  CT abdomen pelvis wo contrast   Final Result by Victor M Dean MD (02/09 7316)      Subpleural groundglass opacities noted at the lung bases most consistent with Covid 19 pneumonia  No acute intra-abdominal abnormality  Nodular liver suggesting cirrhosis  Cholelithiasis  Hepatic contour suggesting potential cirrhosis  Small to moderate hiatal hernia  Trace bilateral pleural effusions and small pericardial effusion  Workstation performed: [id number]             EKG and Other Studies Reviewed on Admission:   · EKG: NSR  HR 63     ** Please Note: This note has been constructed using a voice recognition system   **

## 2022-02-10 NOTE — PHYSICAL THERAPY NOTE
Physical Therapy Evaluation    Patient's Name: Nestor Black    Admitting Diagnosis  Diarrhea [R19 7]  Head pain [R51 9]  KELLY (acute kidney injury) ([facility] ) [N17 9]  Intractable pain [R52]  COVID [U07 1]    Problem List  Patient Active Problem List   Diagnosis    Chronic diastolic heart failure ([place] [address] )    Type 2 diabetes mellitus with kidney complication, with long-term current use of insulin ([place] [address] )    Hypothyroidism    Hypertension    Acute renal failure superimposed on stage 4 chronic kidney disease (HCC)    Anemia    Coronary artery disease involving native coronary artery of native heart with angina pectoris ([place] [address] )    History of anemia due to chronic kidney disease    Vitamin D deficiency    Proteinuria    Elevated LFTs    Hyperphosphatemia    Hypoalbuminemia    Acute otitis media    Pericardial effusion    Stage 4 chronic kidney disease (HCC)    CKD (chronic kidney disease)    Abnormal finding on imaging of liver    Nocturnal hypoxia    COVID-19    Abdominal pain       Past Medical History  Past Medical History:   Diagnosis Date    Anemia     [facility]estive heart failure) ([facility])     Chronic kidney disease     Coronary artery disease     Diabetes mellitus ([place] [address] )     Hypertension     Hypothyroidism        Past Surgical History  Past Surgical History:   Procedure Laterality Date    CORONARY ANGIOPLASTY WITH STENT PLACEMENT  2019        02/10/22 1020   PT Last Visit   PT Visit Date 02/10/22   Note Type   Note type Evaluation   Pain Assessment   Pain Assessment Tool 0-10   Pain Score 4   Pain Location/Orientation Location: Abdomen   Hospital Pain Intervention(s) Ambulation/increased activity; Emotional support   Restrictions/Precautions   Weight Bearing Precautions Per Order No   Other Precautions Airborne/isolation;Pain  (COVID-19(+))   Home Living   Type of Home Apartment   Home Layout Stairs to enter with rails  (FF stairs to enter)   Prior Function   Level of [place] Independent with ADLs and functional mobility   Lives With Daughter  (dtr works during the day so pt is alone)   Receives Help From [facility], [address] in the last 6 months 0   Comments PTA pt was I + active, (+)   General   Family/Caregiver Present No   Cognition   Overall Cognitive Status WFL   Arousal/Participation Alert   Orientation Level Oriented X4   Memory Within functional limits   Following Commands Follows all commands and directions without difficulty   Comments pleasant + cooperative   Subjective   Subjective Pt agreeable to mobilize  RLE Assessment   RLE Assessment WFL   LLE Assessment   LLE Assessment WFL   Coordination   Movements are Fluid and Coordinated 1   Bed Mobility   Supine to Sit 6  Modified independent   Sit to Supine Unable to assess   Additional Comments Pt greeted in supine  Transfers   Sit to Stand 7  Independent   Stand to Sit 7  Independent   Stand pivot 7  Independent   Additional Comments no AD   Ambulation/Elevation   Gait pattern WNL   Gait Assistance 7  Independent   Assistive Device None   Distance 75' (limited as pt in quarantine + due to abdominal pain)   Stair Management Assistance Not tested  (in quarantine - unable to test)   Balance   Static Sitting Normal   Dynamic Sitting Good   Static Standing Good   Dynamic Standing Fair +   Ambulatory Fair +   Endurance Deficit   Endurance Deficit No  (SpO2 93% + above on RA )   Activity Tolerance   Activity Tolerance Patient tolerated treatment well   Medical Staff [address] [facility]   Nurse Made Aware yes   Assessment   Assessment Pt is 78 y o  female seen for a moderate complexity PT evaluation s/p admit to [facility] on 2/9/2022  Pt presenting w/ COVID-19  Other active hospital problem list significant for: chronic diastolic heart failure, DM2 w/ kidney complication, hypothyroidism, HTN, CAD, pericardial effusion, CKD, + abdominal pain    PT now consulted to assess functional mobility and needs for safe d/c planning  Prior to admission, pt was I w/ functional mobility w/o AD community distances  Currently pt is I for bed skills; I for functional transfers; I for ambulation w/o AD  No further acute PT needs  Will sign off  Barriers to Discharge Inaccessible home environment   Goals   Patient Goals go home   PT Treatment Day 0   Plan   PT Frequency   (eval only - d/c PT)   Recommendation   PT Discharge Recommendation No rehabilitation needs   AM-PAC Basic Mobility Inpatient   Turning in Bed Without Bedrails 4   Lying on Back to Sitting on Edge of Flat Bed 4   Moving Bed to Chair 4   Standing Up From Chair 4   Walk in Room 4   Climb 3-5 Stairs 4   Basic Mobility Inpatient Raw Score 24   Basic Mobility Standardized Score 57 68   Highest Level Of Mobility   -[facility] Goal 8: Walk 250 feet or more   End of Consult   Patient Position at End of Consult Bedside chair; All needs within reach  (on waffle cushion, in NAD)   End of Consult Comments encouraged pt to continue mobilizing independently     Brent Mcneal, PT, DPT

## 2022-02-10 NOTE — ASSESSMENT & PLAN NOTE
Wt Readings from Last 3 Encounters:   02/09/22 72 6 kg (160 lb)   01/31/22 71 2 kg (157 lb)   11/23/21 72 4 kg (159 lb 9 6 oz)       Patient has acute on chronic heart failure  Her BNP is 055[address]  Input output measured  Daily weight  Continue with isosorbide mononitrate 120 mg daily, Coreg 12 5 mg b i d , amlodipine 10 mg b i d  Lasix 40 mg b i d

## 2022-02-10 NOTE — ASSESSMENT & PLAN NOTE
Wt Readings from Last 3 Encounters:   02/09/22 72 6 kg (160 lb)   01/31/22 71 2 kg (157 lb)   11/23/21 72 4 kg (159 lb 9 6 oz)      Despite admission HPI documenting acute heart failure, actually do not believe patient with acute exacerbation  Clinically examines dry to euvolemic  On RA  No rales on examination  Patient follows with Dr Jr Costa  Stage C HFpEF, echo most recently with EF 60%, hypo-inferior wall, normal RV  ·  NT proBNP 17,351 (20,000 in 10/21), CT A/P wo contrast showed trace bilateral pleural effusions  · Weight was recorded as 160 on admission,  Weight was 157 at recent office visit 1/31  · Will ask for standing accurate weight today  Not done despite orders   I/O's also not accurate despite orders  · Admission note mentions starting on IV lasix however patient was not actually started on this and she was continued on home dose Bumex 2 mg BID, agree with this for now as she does not examine overloaded and in fact has diarrhea

## 2022-02-10 NOTE — PROGRESS NOTES
[address] [facility]  Progress Note - Olivia Shetty 1955, 78 y o  female MRN: [id number]  Unit/Bed#: -01 Encounter: [id number]  Primary Care Provider: Joey Cardozo MD   Date and time admitted to hospital: 2/9/2022 11:38 AM    * COVID-19  Assessment & Plan  Patient presented with  Generalized abdominal pain, diarrhea, chills, weakness, fatigue and nasal congestion  · Home COVID test +, confirmation test on arrival also +  Reports she is vaccinated   · On RA, not hypoxic   MILD Pathway  · Patient was not requiring oxygen at any point but was started on Remdesivir--will discontinue per protocol  · CRP 22, D-dimer 1 58 (AC with Hep SQ 5000 q8h per VTE Group C), procal 0 79 (unclear significance in setting of chronic renal failure), BNP 17,351  · Supportive care for diarrhea   · Isolation thru 2/19      Abdominal pain  Assessment & Plan  No source on CT, benign abdominal exam  · Suspect related to COVID/diarrhea  · Monitor stool output--reports 2 episodes of diarrhea this morning with some reported nausea/vomiting however not documented  · Will add carafate, bentyl, imodium     CKD (chronic kidney disease)  Assessment & Plan  Lab Results   Component Value Date    EGFR 14 02/09/2022    EGFR 13 02/09/2022    EGFR 15 11/05/2021    CREATININE 3 18 (H) 02/09/2022    CREATININE 3 43 (H) 02/09/2022    CREATININE 3 14 (H) 11/05/2021     Baseline creatinine around 3 per prior notes however most recent labs indicate she has been around 3 1-3 4, Estimated GFR around 15-20, stage 4  · Reports she follows with Dr Vanesa Hand  · Monitor creatinine with PO Bumex and reported diarrhea  · Continue to monitor BMP  · Low threshold for renal consult if worsens  · I/O's reviewed with RN    Chronic diastolic heart failure ([facility] )  Assessment & Plan  Wt Readings from Last 3 Encounters:   02/09/22 72 6 kg (160 lb)   01/31/22 71 2 kg (157 lb)   11/23/21 72 4 kg (159 lb 9 6 oz)      Despite admission HPI documenting acute heart failure, actually do not believe patient with acute exacerbation  Clinically examines dry to euvolemic  On RA  No rales on examination  Patient follows with Dr Emily Shipman  Stage C HFpEF, echo most recently with EF 60%, hypo-inferior wall, normal RV  ·  NT proBNP 17,351 (20,000 in 10/21), CT A/P wo contrast showed trace bilateral pleural effusions  · Weight was recorded as 160 on admission,  Weight was 157 at recent office visit 1/31  · Will ask for standing accurate weight today  Not done despite orders   I/O's also not accurate despite orders  · Admission note mentions starting on IV lasix however patient was not actually started on this and she was continued on home dose Bumex 2 mg BID, agree with this for now as she does not examine overloaded and in fact has diarrhea       Pericardial effusion  Assessment & Plan  Chronic, noted on prior echos/imaging  · Monitoring outpatient     Coronary artery disease involving native coronary artery of native heart with angina pectoris [facility])  Assessment & Plan  [facility] 1/4/19: LUDY x 2 to LAD after NSTEMI (trop 0 2) and abnormal stress test with ischemia in anterior wall on 12/31/18, 80% RCA- plan staged, but has not needed intervention  · On imdur, asa, coreg, statin    Hypertension  Assessment & Plan  BP relatively well controlled  · Continue hydralazine 100 mg TID, Coreg 12 5 mg BID, Norvasc 10 mg BID?, Bumex 2 mg BID  · Monitor BPs    Hypothyroidism  Assessment & Plan  · Continue synthroid     Type 2 diabetes mellitus with kidney complication, with long-term current use of insulin ([facility] )  Assessment & Plan  Lab Results   Component Value Date    HGBA1C 9 7 (H) 10/25/2021       Recent Labs     02/10/22  0551   POCGLU 178*       Blood Sugar Average: Last 72 hrs:  (P) 178   · A1C not well controlled as of last check, continue NovoLog 70/30 10 units b i d which is home regimen  · SSI/accuchecks  · Diabetic diet        VTE Pharmacologic Prophylaxis: VTE Score: 11 Moderate Risk (Score 3-4) - Pharmacological DVT Prophylaxis Ordered: heparin  Patient Centered Rounds: I performed bedside rounds with nursing staff today  Discussions with Specialists or Other Care Team Provider: none    Education and Discussions with Family / Patient: Patient declined call to   Time Spent for Care: 30 minutes  More than 50% of total time spent on counseling and coordination of care as described above  Current Length of Stay: 0 day(s)  Current Patient Status: Observation   Certification Statement: The patient, admitted on an observation basis, will now require > 2 midnight hospital stay due to nausea/vomiting, diarrhea   Discharge Plan: Anticipate discharge in 24-48 hrs to home  Code Status: Level 1 - Full Code    Subjective:   Pt reports not feeling well today  She complains of diffuse abdominal pain, tolerated breakfast but reportedly vomited twice after that  She states she also had 2 episodes of non bloody diarrhea  Denies any significant cough or shortness of breath, no leg swelling  Urinating fine  Objective:     Vitals:   No data recorded  HR:  [53-68] 60  Resp:  [16-20] 18  BP: (130-197)/(59-87) 142/61  SpO2:  [94 %-98 %] 95 %  Body mass index is 29 26 kg/m²  Input and Output Summary (last 24 hours): Intake/Output Summary (Last 24 hours) at 2/10/2022 1144  Last data filed at 2/9/2022 1324  Gross per 24 hour   Intake 1000 ml   Output --   Net 1000 ml       Physical Exam:   Physical Exam  Vitals and nursing note reviewed  Constitutional:       General: She is not in acute distress  Appearance: She is not ill-appearing or diaphoretic  Comments: On RA    Cardiovascular:      Rate and Rhythm: Normal rate and regular rhythm  Pulmonary:      Effort: No respiratory distress  Breath sounds: No wheezing or rales  Abdominal:      General: Bowel sounds are normal  There is no distension  Tenderness: There is no abdominal tenderness  Musculoskeletal:      Right lower leg: No edema  Left lower leg: No edema  Skin:     Coloration: Skin is pale  Neurological:      Mental Status: She is oriented to person, place, and time     Psychiatric:         Mood and Affect: Mood normal           Additional Data:     Labs:  Results from last 7 days   Lab Units 02/09/22  2316   WBC Thousand/uL 3 06*   HEMOGLOBIN g/dL 8 0*   HEMATOCRIT % 25 3*   PLATELETS Thousands/uL 153   NEUTROS PCT % 72   LYMPHS PCT % 22   MONOS PCT % 5   EOS PCT % 0     Results from last 7 days   Lab Units 02/09/22  2316   SODIUM mmol/L 135*   POTASSIUM mmol/L 4 2   CHLORIDE mmol/L 103   CO2 mmol/L 21   BUN mg/dL 69*   CREATININE mg/dL 3 18*   ANION GAP mmol/L 11   CALCIUM mg/dL 8 3   ALBUMIN g/dL 2 3*   TOTAL BILIRUBIN mg/dL 0 30   ALK PHOS U/L 429*   ALT U/L 36   AST U/L 49*   GLUCOSE RANDOM mg/dL 253*         Results from last 7 days   Lab Units 02/10/22  0551   POC GLUCOSE mg/dl 178*         Results from last 7 days   Lab Units 02/09/22  2316   PROCALCITONIN ng/ml 0 79*       Lines/Drains:  Invasive Devices  Report    Peripheral Intravenous Line            Peripheral IV 02/09/22 Right Arm <1 day                      Imaging: Reviewed radiology reports from this admission including: chest CT scan    Recent Cultures (last 7 days):         Last 24 Hours Medication List:   Current Facility-Administered Medications   Medication Dose Route Frequency Provider Last Rate    acetaminophen  650 mg Oral Q6H PRN Zelda Carrington MD      amLODIPine  10 mg Oral BID Zelda Carrington MD      aspirin  81 mg Oral Daily Zelda Carrnigton MD      atorvastatin  80 mg Oral Daily With Danny Patel MD      benzonatate  100 mg Oral TID PRN Zelda Carrington MD      bumetanide  2 mg Oral BID Zelda Carrington MD      calcium acetate  667 mg Oral BID With Meals Zelda Carrington MD      carvedilol  12 5 mg Oral BID With Meals Zelda Carrington MD      docusate sodium  100 mg Oral BID Lalita Preciado MD      guaiFENesin  200 mg Oral TID PRN Lalita Preciado MD      heparin (porcine)  5,000 Units Subcutaneous [facility] Lalita Preciado MD      hydrALAZINE  100 mg Oral TID Lalita Preciado MD      HYDROmorphone  0 5 mg Intravenous Once Lalita Preciado MD      HYDROmorphone  0 5 mg Intravenous Q6H PRN Lalita Preciado MD      insulin aspart protamine-insulin aspart  10 Units Subcutaneous BID AC Lucia Morris DO      insulin lispro  1-6 Units Subcutaneous TID AC Lalita Preciado MD      isosorbide mononitrate  120 mg Oral Daily Lalita Preciado MD      levothyroxine  112 mcg Oral Early Morning Lalita Preciado MD      ondansetron  4 mg Intravenous Q6H PRN Lalita Preciaod MD      oxyCODONE  2 5 mg Oral Q4H PRN Lalita Preciado MD      oxyCODONE  5 mg Oral Q6H PRN Lalita Preciado MD      polyethylene glycol  17 g Oral Daily Lalita Preciado MD      simethicone  80 mg Oral 4x Daily PRN Lalita Preciado MD          Today, Patient Was Seen By: Juanita Barrera PA-C    **Please Note: This note may have been constructed using a voice recognition system  **

## 2022-02-10 NOTE — UTILIZATION REVIEW
Initial Clinical Review    OBS 2/9 @ 0374 UPGRADED TO INPATIENT 2/10 @ 5007 FOR CONTINUED TX OF ABDOMINAL PAIN & KELLY    02/10/22 1150  Inpatient Admission  Once        Transfer Service: Hospitalist       Question Answer Comment   Level of Care Med Surg        02/10/22 1149       Chief Complaint   Patient presents with    Abdominal Pain     Patient reports  generalized abdominal pain for the last few days along with loose stools     Headache     Patient also reports a headache        Initial Presentation:  77 y/o female with PMhx of CHF, CAD, CKD IV, DM, noturnal hypoxia, HLD, hypothyroidism presents to ED as a walk-in admitted under observation with Covid-19 infection and Acute Heart Failure  Pt presented with symptoms of abdominal pain, loose stools and headache  Tested positive for covid-19 at home and confirmed positive Covid test in ED  Hgb 8 6, Hct 27 6, Na 135, CO2 20, BUN 68, Cr 3 43, albumin 2 4, Alk phos 485, AST 65   CT showed Subpleural groundglass opacities noted at the lung bases most consistent with Covid 19 pneumonia, Trace b/l pleural effusions and small pericardial effusion  plan for tele monitoring,  Obtain D-dimer, CRP, ESR, procalcitonin, started on remdesivir as she is high risk d/t diabetes and obesity  Monitor O2 needs, currently maintaining sat on room air  Continue with bumex BID, avoid nephrotoxins  I/O's, daily wts  Monitor BMP daily  Continue previous home po meds  Fingerstick glucose checks  Date: 2/10  Day 2:  Pt reports not feeling well today  She c/o diffuse abdominal pain, tolerated breakfast but reportedly vomited twice after that  She states she also had 2 episodes of non bloody diarrhea  Continues on room air  Pt was not requiring O2 at any point but was started on Remdesivir--will discontinue per protocol  CRP 22, D-dimer 1 58 (AC with Hep SQ 5000 q8h per [facility]), procal 0 79 (unclear significance in setting of chronic renal failure), BNP 17,351   Supportive care for diarrhea  Suspect r/t covid  Monitor stool output, add carafate, bentyl, imodium  Isolation thru 2/19  Monitor BMP, continue po meds  I/O's        ED Triage Vitals [02/09/22 1141]   Temperature Pulse Respirations Blood Pressure SpO2   97 9 °F (36 6 °C) 55 18 143/67 97 %      Temp Source Heart Rate Source Patient Position - Orthostatic VS BP Location FiO2 (%)   Oral Monitor Lying Left arm --      Pain Score       9          Wt Readings from Last 1 Encounters:   02/09/22 72 6 kg (160 lb)     Additional Vital Signs:   Date/Time Temp Pulse Resp BP MAP (mmHg) SpO2 O2 Device   02/10/22 0603 -- -- -- -- -- -- None (Room air)   02/10/22 0415 -- 56 18 130/59 85 95 % None (Room air)   02/10/22 0153 -- 64 16 144/63 90 94 % None (Room air)   02/09/22 2134 -- 68 18 197/87 Abnormal  -- 96 % None (Room air)   02/09/22 1745 -- -- 18 183/86 Abnormal  123 96 % None (Room air)   02/09/22 1324 -- 67 18 162/74 -- 95 % None (Room air)   02/09/22 1300 -- 67 20 166/73 -- 95 % None (Room air)   02/09/22 1145 -- 53 Abnormal  -- 143/67 96 98 % None (Room air)   02/09/22 1141 97 9 °F (36 6 °C) 55 18 143/67 -- 97 % None (Room air)       Pertinent Labs/Diagnostic Test Results:   CT c/a/p 2/19 -- Subpleural groundglass opacities noted at the lung bases most consistent with Covid 19 pneumonia  No acute intra-abdominal abnormality  Nodular liver suggesting cirrhosis  Cholelithiasis  Hepatic contour suggesting potential cirrhosis  Small to moderate hiatal hernia  Trace bilateral pleural effusions and small pericardial effusion       Results from last 7 days   Lab Units 02/09/22  1733   SARS-COV-2  Positive*     Results from last 7 days   Lab Units 02/09/22  2316 02/09/22  1207   WBC Thousand/uL 3 06* 6 19   HEMOGLOBIN g/dL 8 0* 8 6*   HEMATOCRIT % 25 3* 27 6*   PLATELETS Thousands/uL 153 167   NEUTROS ABS Thousands/µL 2 21 5 09     Results from last 7 days   Lab Units 02/09/22  2316 02/09/22  1207   SODIUM mmol/L 135* 135* POTASSIUM mmol/L 4 2 4 0   CHLORIDE mmol/L 103 106   CO2 mmol/L 21 20*   ANION GAP mmol/L 11 9   BUN mg/dL 69* 68*   CREATININE mg/dL 3 18* 3 43*   EGFR ml/min/1 73sq m 14 13   CALCIUM mg/dL 8 3 8 6     Results from last 7 days   Lab Units 02/09/22  2316 02/09/22  1207   AST U/L 49* 65*   ALT U/L 36 40   ALK PHOS U/L 429* 485*   TOTAL PROTEIN g/dL 6 6 7 2   ALBUMIN g/dL 2 3* 2 4*   TOTAL BILIRUBIN mg/dL 0 30 0 47     Results from last 7 days   Lab Units 02/10/22  0551   POC GLUCOSE mg/dl 178*     Results from last 7 days   Lab Units 02/09/22  2316 02/09/22  1207   GLUCOSE RANDOM mg/dL 253* 197*     Results from last 7 days   Lab Units 02/09/22  2316   D-DIMER QUANTITATIVE ug/ml FEU 1 58*     Results from last 7 days   Lab Units 02/09/22  2316   PROCALCITONIN ng/ml 0 79*     Results from last 7 days   Lab Units 02/09/22  1207   NT-PRO BNP pg/mL 17,351*     Results from last 7 days   Lab Units 02/09/22  1207   LIPASE u/L 210     Results from last 7 days   Lab Units 02/09/22  2316   CRP mg/L 22 2*     Results from last 7 days   Lab Units 02/09/22  1738   CLARITY UA  Clear   COLOR UA  Yellow   SPEC GRAV UA  1 020   PH UA  5 5   GLUCOSE UA mg/dl 100 (1/10%)*   KETONES UA mg/dl Negative   BLOOD UA  Negative   PROTEIN UA mg/dl 100 (2+)*   NITRITE UA  Negative   BILIRUBIN UA  Negative   UROBILINOGEN UA E U /dl 0 2   LEUKOCYTES UA  Negative   WBC UA /hpf None Seen   RBC UA /hpf 2-4   BACTERIA UA /hpf None Seen   EPITHELIAL CELLS WET PREP /hpf None Seen     Results from last 7 days   Lab Units 02/09/22  1733   INFLUENZA A PCR  Negative   INFLUENZA B PCR  Negative   RSV PCR  Negative     ED Treatment:   Medication Administration from 02/09/2022 1134 to 02/10/2022 0447       Date/Time Order Dose Route Action     02/09/2022 1213 multi-electrolyte (ISOLYTE-S PH 7 4) bolus 1,000 mL 1,000 mL Intravenous New Bag     02/09/2022 1208 ondansetron (ZOFRAN) injection 4 mg 4 mg Intravenous Given     02/09/2022 1209 HYDROmorphone (DILAUDID) injection 0 5 mg 0 5 mg Intravenous Given     02/09/2022 1322 HYDROmorphone (DILAUDID) injection 0 5 mg 0 5 mg Intravenous Given     02/09/2022 1733 ondansetron (ZOFRAN) injection 4 mg 4 mg Intravenous Given     02/09/2022 2318 amLODIPine (NORVASC) tablet 10 mg 10 mg Oral Given     02/09/2022 2317 bumetanide (BUMEX) tablet 2 mg 2 mg Oral Given     02/09/2022 2317 hydrALAZINE (APRESOLINE) tablet 100 mg 100 mg Oral Given     02/09/2022 2317 acetaminophen (TYLENOL) tablet 650 mg 650 mg Oral Given     02/09/2022 2318 remdesivir (Veklury) 200 mg in sodium chloride 0 9 % 290 mL IVPB 200 mg Intravenous Given     Past Medical History:   Diagnosis Date    Anemia     [facility]congestive heart failure) ([facility] )     Chronic kidney disease     Coronary artery disease     Diabetes mellitus ([facility] )     Hypertension     Hypothyroidism      Present on Admission:   COVID-19   Acute on chronic diastolic congestive heart failure (HCC)   Coronary artery disease involving native coronary artery of native heart with angina pectoris (HCC)   CKD (chronic kidney disease)   Hypertension      Admitting Diagnosis: Diarrhea [R19 7]  Head pain [R51 9]  KELLY (acute kidney injury) ([facility] ) [N17 9]  Intractable pain [R52]  COVID [U07 1]  Age/Sex: 78 y o  female  Admission Orders:  Scheduled Medications:  amLODIPine, 10 mg, Oral, BID  aspirin, 81 mg, Oral, Daily  atorvastatin, 80 mg, Oral, Daily With Dinner  bumetanide, 2 mg, Oral, BID  calcium acetate, 667 mg, Oral, BID With Meals  carvedilol, 12 5 mg, Oral, BID With Meals  docusate sodium, 100 mg, Oral, BID  heparin (porcine), 5,000 Units, Subcutaneous, Q8H DEE  hydrALAZINE, 100 mg, Oral, TID  HYDROmorphone, 0 5 mg, Intravenous, Once  insulin aspart protamine-insulin aspart, 10 Units, Subcutaneous, BID AC  insulin lispro, 1-6 Units, Subcutaneous, TID AC  isosorbide mononitrate, 120 mg, Oral, Daily  levothyroxine, 112 mcg, Oral, Early Morning  polyethylene glycol, 17 g, Oral, Daily  remdesivir, 100 mg, Intravenous, Q24H    PRN Meds:  acetaminophen, 650 mg, Oral, Q6H PRN 2/9 x1  benzonatate, 100 mg, Oral, TID PRN  guaiFENesin, 200 mg, Oral, TID PRN  HYDROmorphone, 0 5 mg, Intravenous, Q6H PRN  ondansetron, 4 mg, Intravenous, Q6H PRN  oxyCODONE, 2 5 mg, Oral, Q4H PRN  oxyCODONE, 5 mg, Oral, Q6H PRN  simethicone, 80 mg, Oral, 4x Daily PRN        Network Utilization Review Department  ATTENTION: Please call with any questions or concerns to [phone] and carefully listen to the prompts so that you are directed to the right person  All voicemails are confidential   Candia Siemens all requests for admission clinical reviews, approved or denied determinations and any other requests to dedicated fax number below belonging to the campus where the patient is receiving treatment   List of dedicated fax numbers for the Facilities:  [address] DENIALS (Administrative/Medical Necessity) [phone]   [address] (Maternity/NICU/Pediatrics) [phone]   [address]  [address] [id number] [place] [address] Shaheen Hughes [address] [facility] [phone]

## 2022-02-10 NOTE — ED NOTES
Accidentally clicked Spirometry  Not completed        Joanna Lyles Vitug  02/09/22 204 [facility] Vit  02/09/22 9523

## 2022-02-10 NOTE — ASSESSMENT & PLAN NOTE
Lab Results   Component Value Date    HGBA1C 9 7 (H) 10/25/2021       Recent Labs     02/10/22  0551   POCGLU 178*       Blood Sugar Average: Last 72 hrs:  (P) 178   · A1C not well controlled as of last check, continue NovoLog 70/30 10 units b i d which is home regimen  · SSI/accuchecks  · Diabetic diet

## 2022-02-11 LAB
ALBUMIN SERPL BCP-MCNC: 2.3 G/DL (ref 3.5–5)
ALP SERPL-CCNC: 406 U/L (ref 46–116)
ALT SERPL W P-5'-P-CCNC: 32 U/L (ref 12–78)
ANION GAP SERPL CALCULATED.3IONS-SCNC: 10 MMOL/L (ref 4–13)
ARTIFACT: PRESENT
AST SERPL W P-5'-P-CCNC: 49 U/L (ref 5–45)
BASOPHILS # BLD MANUAL: 0 THOUSAND/UL (ref 0–0.1)
BASOPHILS NFR MAR MANUAL: 0 % (ref 0–1)
BILIRUB SERPL-MCNC: 0.25 MG/DL (ref 0.2–1)
BUN SERPL-MCNC: 82 MG/DL (ref 5–25)
CALCIUM ALBUM COR SERPL-MCNC: 10.3 MG/DL (ref 8.3–10.1)
CALCIUM SERPL-MCNC: 8.9 MG/DL (ref 8.3–10.1)
CHLORIDE SERPL-SCNC: 106 MMOL/L (ref 100–108)
CO2 SERPL-SCNC: 21 MMOL/L (ref 21–32)
CREAT SERPL-MCNC: 4.55 MG/DL (ref 0.6–1.3)
EOSINOPHIL # BLD MANUAL: 0 THOUSAND/UL (ref 0–0.4)
EOSINOPHIL NFR BLD MANUAL: 0 % (ref 0–6)
ERYTHROCYTE [DISTWIDTH] IN BLOOD BY AUTOMATED COUNT: 13.2 % (ref 11.6–15.1)
GFR SERPL CREATININE-BSD FRML MDRD: 9 ML/MIN/1.73SQ M
GLUCOSE SERPL-MCNC: 186 MG/DL (ref 65–140)
GLUCOSE SERPL-MCNC: 188 MG/DL (ref 65–140)
GLUCOSE SERPL-MCNC: 257 MG/DL (ref 65–140)
GLUCOSE SERPL-MCNC: 83 MG/DL (ref 65–140)
GLUCOSE SERPL-MCNC: 93 MG/DL (ref 65–140)
HCT VFR BLD AUTO: 26.3 % (ref 34.8–46.1)
HGB BLD-MCNC: 8.2 G/DL (ref 11.5–15.4)
LYMPHOCYTES # BLD AUTO: 0.68 THOUSAND/UL (ref 0.6–4.47)
LYMPHOCYTES # BLD AUTO: 13 % (ref 14–44)
MCH RBC QN AUTO: 27.3 PG (ref 26.8–34.3)
MCHC RBC AUTO-ENTMCNC: 31.2 G/DL (ref 31.4–37.4)
MCV RBC AUTO: 88 FL (ref 82–98)
METAMYELOCYTES NFR BLD MANUAL: 1 % (ref 0–1)
MONOCYTES # BLD AUTO: 0.11 THOUSAND/UL (ref 0–1.22)
MONOCYTES NFR BLD: 2 % (ref 4–12)
NEUTROPHILS # BLD MANUAL: 4.41 THOUSAND/UL (ref 1.85–7.62)
NEUTS BAND NFR BLD MANUAL: 3 % (ref 0–8)
NEUTS SEG NFR BLD AUTO: 81 % (ref 43–75)
PLATELET # BLD AUTO: 204 THOUSANDS/UL (ref 149–390)
PLATELET BLD QL SMEAR: ADEQUATE
PMV BLD AUTO: 10.4 FL (ref 8.9–12.7)
POTASSIUM SERPL-SCNC: 4 MMOL/L (ref 3.5–5.3)
PROT SERPL-MCNC: 6.5 G/DL (ref 6.4–8.2)
RBC # BLD AUTO: 3 MILLION/UL (ref 3.81–5.12)
RBC MORPH BLD: NORMAL
SODIUM SERPL-SCNC: 137 MMOL/L (ref 136–145)
WBC # BLD AUTO: 5.25 THOUSAND/UL (ref 4.31–10.16)

## 2022-02-11 PROCEDURE — 99232 SBSQ HOSP IP/OBS MODERATE 35: CPT | Performed by: PHYSICIAN ASSISTANT

## 2022-02-11 PROCEDURE — 80053 COMPREHEN METABOLIC PANEL: CPT | Performed by: INTERNAL MEDICINE

## 2022-02-11 PROCEDURE — 85027 COMPLETE CBC AUTOMATED: CPT | Performed by: INTERNAL MEDICINE

## 2022-02-11 PROCEDURE — 99223 1ST HOSP IP/OBS HIGH 75: CPT | Performed by: INTERNAL MEDICINE

## 2022-02-11 PROCEDURE — 85007 BL SMEAR W/DIFF WBC COUNT: CPT | Performed by: INTERNAL MEDICINE

## 2022-02-11 PROCEDURE — 82948 REAGENT STRIP/BLOOD GLUCOSE: CPT

## 2022-02-11 PROCEDURE — NC001 PR NO CHARGE: Performed by: INTERNAL MEDICINE

## 2022-02-11 RX ORDER — HYDRALAZINE HYDROCHLORIDE 50 MG/1
100 TABLET, FILM COATED ORAL 3 TIMES DAILY
Status: DISCONTINUED | OUTPATIENT
Start: 2022-02-11 | End: 2022-02-18 | Stop reason: HOSPADM

## 2022-02-11 RX ORDER — AMLODIPINE BESYLATE 10 MG/1
10 TABLET ORAL 2 TIMES DAILY
Status: DISCONTINUED | OUTPATIENT
Start: 2022-02-11 | End: 2022-02-16

## 2022-02-11 RX ORDER — SODIUM CHLORIDE, SODIUM GLUCONATE, SODIUM ACETATE, POTASSIUM CHLORIDE, MAGNESIUM CHLORIDE, SODIUM PHOSPHATE, DIBASIC, AND POTASSIUM PHOSPHATE .53; .5; .37; .037; .03; .012; .00082 G/100ML; G/100ML; G/100ML; G/100ML; G/100ML; G/100ML; G/100ML
75 INJECTION, SOLUTION INTRAVENOUS CONTINUOUS
Status: DISPENSED | OUTPATIENT
Start: 2022-02-11 | End: 2022-02-12

## 2022-02-11 RX ORDER — SEVELAMER HYDROCHLORIDE 800 MG/1
800 TABLET, FILM COATED ORAL
Status: DISCONTINUED | OUTPATIENT
Start: 2022-02-11 | End: 2022-02-12

## 2022-02-11 RX ADMIN — OXYCODONE HYDROCHLORIDE 5 MG: 5 TABLET ORAL at 11:27

## 2022-02-11 RX ADMIN — SEVELAMER HYDROCHLORIDE 800 MG: 800 TABLET, FILM COATED PARENTERAL at 15:33

## 2022-02-11 RX ADMIN — ASPIRIN 81 MG: 81 TABLET, CHEWABLE ORAL at 08:09

## 2022-02-11 RX ADMIN — ACETAMINOPHEN 650 MG: 325 TABLET, FILM COATED ORAL at 13:20

## 2022-02-11 RX ADMIN — INSULIN LISPRO 1 UNITS: 100 INJECTION, SOLUTION INTRAVENOUS; SUBCUTANEOUS at 11:27

## 2022-02-11 RX ADMIN — HYDRALAZINE HYDROCHLORIDE 100 MG: 50 TABLET, FILM COATED ORAL at 08:08

## 2022-02-11 RX ADMIN — AMLODIPINE BESYLATE 10 MG: 10 TABLET ORAL at 23:02

## 2022-02-11 RX ADMIN — DICYCLOMINE HYDROCHLORIDE 20 MG: 20 TABLET ORAL at 15:30

## 2022-02-11 RX ADMIN — AMLODIPINE BESYLATE 10 MG: 10 TABLET ORAL at 08:09

## 2022-02-11 RX ADMIN — DICYCLOMINE HYDROCHLORIDE 20 MG: 20 TABLET ORAL at 08:08

## 2022-02-11 RX ADMIN — DICYCLOMINE HYDROCHLORIDE 20 MG: 20 TABLET ORAL at 11:27

## 2022-02-11 RX ADMIN — LOPERAMIDE HYDROCHLORIDE 2 MG: 2 CAPSULE ORAL at 12:38

## 2022-02-11 RX ADMIN — CALCIUM ACETATE 667 MG: 667 CAPSULE ORAL at 08:09

## 2022-02-11 RX ADMIN — LEVOTHYROXINE SODIUM 112 MCG: 112 TABLET ORAL at 06:18

## 2022-02-11 RX ADMIN — ISOSORBIDE MONONITRATE 120 MG: 60 TABLET, EXTENDED RELEASE ORAL at 08:09

## 2022-02-11 RX ADMIN — CARVEDILOL 12.5 MG: 12.5 TABLET, FILM COATED ORAL at 08:10

## 2022-02-11 RX ADMIN — ONDANSETRON 4 MG: 2 INJECTION INTRAMUSCULAR; INTRAVENOUS at 11:56

## 2022-02-11 RX ADMIN — HEPARIN SODIUM 5000 UNITS: 5000 INJECTION INTRAVENOUS; SUBCUTANEOUS at 13:14

## 2022-02-11 RX ADMIN — HYDRALAZINE HYDROCHLORIDE 100 MG: 50 TABLET ORAL at 23:02

## 2022-02-11 RX ADMIN — POLYETHYLENE GLYCOL 3350 17 G: 17 POWDER, FOR SOLUTION ORAL at 08:09

## 2022-02-11 RX ADMIN — ATORVASTATIN CALCIUM 80 MG: 80 TABLET, FILM COATED ORAL at 15:30

## 2022-02-11 RX ADMIN — BUMETANIDE 2 MG: 2 TABLET ORAL at 08:12

## 2022-02-11 RX ADMIN — SODIUM CHLORIDE, SODIUM GLUCONATE, SODIUM ACETATE, POTASSIUM CHLORIDE AND MAGNESIUM CHLORIDE 75 ML/HR: 526; 502; 368; 37; 30 INJECTION, SOLUTION INTRAVENOUS at 15:14

## 2022-02-11 NOTE — ASSESSMENT & PLAN NOTE
· [facility] 1/4/19: LUDY x 2 to LAD after NSTEMI (trop 0 2) and abnormal stress test with ischemia in anterior wall on 12/31/18, 80% RCA- plan staged, but has not needed intervention  · On imdur, asa, coreg, statin

## 2022-02-11 NOTE — ASSESSMENT & PLAN NOTE
Wt Readings from Last 3 Encounters:   02/11/22 92 8 kg (204 lb 9 4 oz)   01/31/22 71 2 kg (157 lb)   11/23/21 72 4 kg (159 lb 9 6 oz)     ·  Per record review, H&P documented acute heart failure, however patient examines dry to euvolemic, on RA, no rales on examination  · Patient follows with Dr Stephania Syed    Stage C HFpEF, echo most recently with EF 60%, hypo-inferior wall, normal RV  ·  NT proBNP 17,351 (20,000 in 10/21), CT A/P wo contrast showed trace bilateral pleural effusions  · Weight was recorded as 160 on admission,  Weight was 157 at recent office visit 1/31  · Requesting accurate weights and I&Os  · Bumex on hold given diarrhea/vomiting/volume loss and KELLY  · Nephrology consulted as above

## 2022-02-11 NOTE — UTILIZATION REVIEW
Continued Stay Review    Date: 2/11/2022                       Current Patient Class: inpatient  Current Level of Care: med/surg  HPI:66 y o  female initially admitted on 2/9 with Covid-19 and KELLY  Assessment/Plan:  Pt reports 2 episodes of vomiting and 3 of diarrhea  Pt continues on room air, no current tx for covid-19  Baseline Cr ~ 3 per prior notes however most recent labs indicate she has been ~ 3 1-3 4, Estimated GFR around 15-20, stage 4  Now with KELLY in the setting of vomiting/diarrhea and Bumex use  Nephrology following  Bumex and miralax d/c'd  Avoid nephrotoxins and hypotension  BMP in AM  Continue bentyl and imodium prn  Accurate I/Os, daily wts  Continue to hold bumex  Fingerstick glucose checks w/ ssi        Vital Signs:   Date/Time Temp Pulse Resp BP MAP (mmHg) SpO2 O2 Device   02/11/22 1047 -- -- -- -- -- 97 % None (Room air)   02/11/22 08:15:31 96 °F (35 6 °C) Abnormal  54 Abnormal  -- 126/55 79 95 % --   02/10/22 22:51:53 97 6 °F (36 4 °C) 54 Abnormal  -- 127/53 78 93 % --   02/10/22 2200 -- -- -- -- -- -- None (Room air)       Pertinent Labs/Diagnostic Results:   Results from last 7 days   Lab Units 02/11/22  0640 02/09/22  2316 02/09/22  1207   WBC Thousand/uL 5 25 3 06* 6 19   HEMOGLOBIN g/dL 8 2* 8 0* 8 6*   HEMATOCRIT % 26 3* 25 3* 27 6*   PLATELETS Thousands/uL 204 153 167   NEUTROS ABS Thousands/µL  --  2 21 5 09   BANDS PCT % 3  --   --      Results from last 7 days   Lab Units 02/11/22  0640 02/09/22  2316 02/09/22  1207   SODIUM mmol/L 137 135* 135*   POTASSIUM mmol/L 4 0 4 2 4 0   CHLORIDE mmol/L 106 103 106   CO2 mmol/L 21 21 20*   ANION GAP mmol/L 10 11 9   BUN mg/dL 82* 69* 68*   CREATININE mg/dL 4 55* 3 18* 3 43*   EGFR ml/min/1 73sq m 9 14 13   CALCIUM mg/dL 8 9 8 3 8 6     Results from last 7 days   Lab Units 02/11/22  0640 02/09/22  2316 02/09/22  1207   AST U/L 49* 49* 65*   ALT U/L 32 36 40   ALK PHOS U/L 406* 429* 485*   TOTAL PROTEIN g/dL 6 5 6 6 7 2   ALBUMIN g/dL 2 3* 2 3* 2 4*   TOTAL BILIRUBIN mg/dL 0 25 0 30 0 47     Results from last 7 days   Lab Units 02/11/22  1053 02/11/22  0633 02/10/22  1545 02/10/22  1131 02/10/22  0551   POC GLUCOSE mg/dl 186* 93 206* 309* 178*     Results from last 7 days   Lab Units 02/11/22  0640 02/09/22  2316 02/09/22  1207   GLUCOSE RANDOM mg/dL 83 253* 197*           Medications:   Scheduled Medications:  amLODIPine, 10 mg, Oral, BID  aspirin, 81 mg, Oral, Daily  atorvastatin, 80 mg, Oral, Daily With Dinner  calcium acetate, 667 mg, Oral, BID With Meals  carvedilol, 12 5 mg, Oral, BID With Meals  dicyclomine, 20 mg, Oral, 4x Daily (AC & HS)  heparin (porcine), 5,000 Units, Subcutaneous, Q8H DEE  hydrALAZINE, 100 mg, Oral, TID  HYDROmorphone, 0 5 mg, Intravenous, Once  insulin aspart protamine-insulin aspart, 10 Units, Subcutaneous, BID AC  insulin lispro, 1-6 Units, Subcutaneous, TID AC  isosorbide mononitrate, 120 mg, Oral, Daily  levothyroxine, 112 mcg, Oral, Early Morning    PRN Meds:  acetaminophen, 650 mg, Oral, Q6H PRN  benzonatate, 100 mg, Oral, TID PRN  guaiFENesin, 200 mg, Oral, TID PRN  loperamide, 2 mg, Oral, TID PRN 2/11 x1  ondansetron, 4 mg, Intravenous, Q6H PRN 2/10 x1, 2/11 x1  oxyCODONE, 2 5 mg, Oral, Q4H PRN  oxyCODONE, 5 mg, Oral, Q6H PRN 2/11 x1  simethicone, 80 mg, Oral, 4x Daily PRN      Discharge Plan: D    Network Utilization Review Department  ATTENTION: Please call with any questions or concerns to [phone] and carefully listen to the prompts so that you are directed to the right person  All voicemails are confidential   Elsie Handy all requests for admission clinical reviews, approved or denied determinations and any other requests to dedicated fax number below belonging to the campus where the patient is receiving treatment   List of dedicated fax numbers for the Facilities:  FACILITY NAME UR FAX NUMBER   ADMISSION DENIALS (Administrative/Medical Necessity) [phone]   [address] (Maternity/NICU/Pediatrics) 261 [facility] [address] [place] [address]  [phone]   [facility] 50 [address] [address] [place] [address] Shaheen Hughes 14[address] [facility] [place] [address] [phone]

## 2022-02-11 NOTE — CONSULTS
Consultation - Nephrology   Caitlin Jalloh 78 y o  female MRN: [id number]  Unit/Bed#: -01 Encounter: [id number]    ASSESSMENT and PLAN:  Acute kidney injury  -Baseline creatinine:  3 0-3 4 mg/dl  -Admission creatinine:  3 4 mg/dl  - Work up:   · UA with microscopy:  2-4 RBC per high-power field, no WBC, 2+ protein, previous micro to creatinine ratio was 1 6 g  · Imaging:  CT abdomen pelvis without any hydronephrosis  Finding of cholelithiasis  -Etiology:  Acute kidney injury likely prerenal from nausea vomiting diarrhea while being on Bumex  Also hemodynamic changes with blood pressure fluctuation and COVID 19 infection  contributing  -Hospital Course:  Renal function worsened during hospital stay likely from volume depletion from ongoing diuretics and nausea vomiting diarrhea   -Plan:   · Agree with stopping further Bumex-did receive 1 dose today  Started on IV Plasma-Lyte at 75 mL/hour for next 24 hours  Check bladder scan with postvoid residual   Monitor repeat renal function with BMP tomorrow a m  Karrie Nissen Renal function  expected to improve  · Patient has repeatedly in the past mention that she does not want dialysis and again today expressed she does not want dialysis even if it means end of life  · Avoid nephrotoxins and dose all medications per EGFR  · Avoid hypotension  Chronic Kidney Disease Stage 5  -Outpatient Nephrologist: Dr Angeli Ramirez was last seen in May 2021 and baseline creatinine was 3 1 at that time  -Baseline Creatinine:  Recently has been around 3 0-3 4, EGFR has been less than 15  -Etiology:  Chronic kidney disease from diabetic nephropathy as well as hypertensive nephrosclerosis and age-related nephron loss  -Avoid Nephrotoxins and Dose all medications per eGFR  -Will need outpatient follow up after discharge  BP/Primary hypertension  -blood pressure currently stable, continue same home medication but adjusted hold parameters    Stop further Bumex  Avoid hypotension  -was not on ACE inhibitor or ARB prior to admission    Chronic diastolic CHF  -clinically euvolemic  -was on Bumex 2 mg twice daily which was stopped after a m  Dose on 02/11 due to worsening renal function, continue to hold Bumex at this time, monitor for signs of fluid overload as patient now started on IV fluids due to worsening renal function   -she did have trace bilateral pleural effusion on CT done on admission  Hopefully will stop further IV fluid once renal function improves    Hyperphosphatemia:  Was on PhosLo but in the setting of hypercalcemia will hold off on further PhosLo and switched to Renagel  Monitor phosphorus level  Monitor    Hypercalcemia:  Likely due to use of PhosLo, stopping further PhosLo and switch to Renagel, monitor phosphorus level and calcium level    Anemia:  Hemoglobin stable at 8 2  Possibility of anemia of chronic kidney disease, not checking iron panel in the setting of COVID-19 infection  May need to consider BALTAZAR in future  Previously had received darbepoetin from [facility]  Will need outpatient follow-up with [facility]    COVID-19 infection:  CT abdomen suggestive of ground-glass opacity in lower lung field  Was started on remdesivir but was stopped after 1 dose, continue management per primary team     Diabetes mellitus type 2:  Management per primary team    Discussed with primary team          HISTORY OF PRESENT ILLNESS:  Requesting Physician: Ginny Frey MD  Reason for Consult:  CKD stage 4/5     Mike Schafer is a 78 y o  female history of chronic kidney disease stage 4/stage 5, recent baseline creatinine around 3 0-3 4, hypertension, anemia follows outpatient with Hematology , chronic diastolic CHF , diabetes mellitus who was admitted to [facility] after presenting with complain of abdominal pain nausea vomiting and diarrhea for 2 days prior to admission    Also complained of headache but denies any intake of NSAIDs  Patient mentions she had cough and was tested positive for COVID-19 at home, was conformed by repeat test which was positive for COVID-19  On admission creatinine was at baseline at 3 4 on 02/09, creatinine has trended up to 4 55 on 02/11 and so nephrology consulted  Patient denies any shortness of breath or lower extremity edema, mentions is still having vomiting and diarrhea  She denies any urinary symptoms      PAST MEDICAL HISTORY:  Past Medical History:   Diagnosis Date    Anemia     [facility]congest[facility] failure[facility]     Chronic kidney disease     Coronary artery disease     Diabetes mellitus ([facility] )     Hypertension     Hypothyroidism        PAST SURGICAL HISTORY:  Past Surgical History:   Procedure Laterality Date    CORONARY ANGIOPLASTY WITH STENT PLACEMENT  2019       SOCIAL HISTORY:  Social History     Substance and Sexual Activity   Alcohol Use Never     Social History     Substance and Sexual Activity   Drug Use No     Social History     Tobacco Use   Smoking Status Former Smoker   Smokeless Tobacco Never Used       FAMILY HISTORY:  Family History   Problem Relation Age of Onset    Diabetes Mother     Heart attack Father         MI    Hypertension Brother        ALLERGIES:  Allergies   Allergen Reactions    Iv Contrast [Iodinated Diagnostic Agents] Itching     Caused KELLY    Nifedipine Rash       MEDICATIONS:    Current Facility-Administered Medications:     acetaminophen (TYLENOL) tablet 650 mg, 650 mg, Oral, Q6H PRN, Onel Mathew MD, 650 mg at 02/11/22 1320    amLODIPine (NORVASC) tablet 10 mg, 10 mg, Oral, BID, Keven Lamas MD    aspirin chewable tablet 81 mg, 81 mg, Oral, Daily, Onel Mathew MD, 81 mg at 02/11/22 0809    atorvastatin (LIPITOR) tablet 80 mg, 80 mg, Oral, Daily With Chris Bhakta MD, 80 mg at 02/10/22 1839    benzonatate (TESSALON PERLES) capsule 100 mg, 100 mg, Oral, TID PRN, nOel Mathew MD    calcium acetate (PHOSLO) capsule 667 mg, 667 mg, Oral, BID With Meals, Onel Mathew MD, 667 mg at 02/11/22 0809    carvedilol (COREG) tablet 12 5 mg, 12 5 mg, Oral, BID With Meals, Onle Mathew MD, 12 5 mg at 02/11/22 0810    dicyclomine (BENTYL) tablet 20 mg, 20 mg, Oral, 4x Daily (AC & HS), Vidya Melgar PA-C, 20 mg at 02/11/22 1127    guaiFENesin (ROBITUSSIN) oral solution 200 mg, 200 mg, Oral, TID PRN, Onel Mathew MD    heparin (porcine) subcutaneous injection 5,000 Units, 5,000 Units, Subcutaneous, Q8H [address], 5,000 Units at 02/11/22 1314 **AND** [CANCELED] Platelet count, , , Once, Onel Mathew MD    hydrALAZINE (APRESOLINE) tablet 100 mg, 100 mg, Oral, TID, Keven Lamas MD    HYDROmorphone (DILAUDID) injection 0 5 mg, 0 5 mg, Intravenous, Once, Onel Mathew MD    insulin aspart protamine-insulin aspart (NovoLOG 70/30) 100 units/mL subcutaneous injection 10 Units, 10 Units, Subcutaneous, BID AC, Coreen Chun DO, 10 Units at 02/10/22 1839    insulin lispro (HumaLOG) 100 units/mL subcutaneous injection 1-6 Units, 1-6 Units, Subcutaneous, TID AC, 1 Units at 02/11/22 1127 **AND** Fingerstick Glucose (POCT), , , TID AC, Onel Mathew MD    isosorbide mononitrate (IMDUR) 24 hr tablet 120 mg, 120 mg, Oral, Daily, Onel Mathew MD, 120 mg at 02/11/22 0809    levothyroxine tablet 112 mcg, 112 mcg, Oral, Early Morning, Onel Mathew MD, 112 mcg at 02/11/22 5537    loperamide (IMODIUM) capsule 2 mg, 2 mg, Oral, TID PRN, Lb Green PA-C, 2 mg at 02/11/22 1238    multi-electrolyte (PLASMALYTE-A/ISOLYTE-S PH 7 4) IV solution, 75 mL/hr, Intravenous, Continuous, Keven Lamas MD    ondansetron [facility]) injection 4 mg, 4 mg, Intravenous, Q6H PRN, Onel Mathew MD, 4 mg at 02/11/22 1156    oxyCODONE (ROXICODONE) IR tablet 2 5 mg, 2 5 mg, Oral, Q4H PRN, Onel Mathew MD    oxyCODONE (ROXICODONE) IR tablet 5 mg, 5 mg, Oral, Q6H PRN, Onel Mathew MD, 5 mg at 02/11/22 1127    simethicone (MYLICON) chewable tablet 80 mg, 80 mg, Oral, 4x Daily PRN, Christopher Mckeon MD    REVIEW OF SYSTEMS:   Review of Systems   Constitutional: Negative for activity change, appetite change, chills, diaphoresis, fatigue and fever  HENT: Negative for congestion, facial swelling and nosebleeds  Eyes: Negative for pain and visual disturbance  Respiratory: Negative for cough, chest tightness and shortness of breath  Cardiovascular: Negative for chest pain and palpitations  Gastrointestinal: Positive for abdominal pain, diarrhea and nausea  Negative for abdominal distention and vomiting  Genitourinary: Negative for difficulty urinating, dysuria, flank pain, frequency and hematuria  Musculoskeletal: Negative for arthralgias, back pain and joint swelling  Skin: Negative for rash  Neurological: Negative for dizziness, seizures, syncope, weakness and headaches  Psychiatric/Behavioral: Negative for agitation and confusion  The patient is not nervous/anxious  All the systems were reviewed and were negative except as documented on the HPI  PHYSICAL EXAM:  Current Weight: Weight - Scale: 92 8 kg (204 lb 9 4 oz)  First Weight: Weight - Scale: 72 6 kg (160 lb)  Vitals:    02/10/22 2251 02/11/22 0600 02/11/22 0815 02/11/22 1047   BP: 127/53  126/55    BP Location:       Pulse: (!) 54  (!) 54    Resp:       Temp: 97 6 °F (36 4 °C)  (!) 96 °F (35 6 °C)    TempSrc:       SpO2: 93%  95% 97%   Weight:  92 8 kg (204 lb 9 4 oz)     Height:           Intake/Output Summary (Last 24 hours) at 2/11/2022 1452  Last data filed at 2/11/2022 0900  Gross per 24 hour   Intake 343 ml   Output --   Net 343 ml     Physical Exam  Constitutional:       General: She is not in acute distress  Appearance: Normal appearance  She is well-developed  HENT:      Head: Normocephalic and atraumatic        Nose: Nose normal       Mouth/Throat:      Mouth: Mucous membranes are moist    Eyes:      General: No scleral icterus  Conjunctiva/sclera: Conjunctivae normal       Pupils: Pupils are equal, round, and reactive to light  Neck:      Thyroid: No thyromegaly  Vascular: No JVD  Cardiovascular:      Rate and Rhythm: Normal rate and regular rhythm  Heart sounds: Normal heart sounds  No murmur heard  No friction rub  Pulmonary:      Effort: Pulmonary effort is normal  No respiratory distress  Breath sounds: Normal breath sounds  No wheezing or rales  Abdominal:      General: Bowel sounds are normal  There is no distension  Palpations: Abdomen is soft  Tenderness: There is no abdominal tenderness  Musculoskeletal:         General: No deformity  Cervical back: Neck supple  Right lower leg: No edema  Left lower leg: No edema  Skin:     General: Skin is warm and dry  Findings: No rash  Neurological:      Mental Status: She is alert and oriented to person, place, and time  Psychiatric:         Mood and Affect: Mood normal          Behavior: Behavior normal          Thought Content:  Thought content normal            Invasive Devices:        Lab Results:   Results from last 7 days   Lab Units 02/11/22  0640 02/09/22  2316 02/09/22  1207   WBC Thousand/uL 5 25 3 06* 6 19   HEMOGLOBIN g/dL 8 2* 8 0* 8 6*   HEMATOCRIT % 26 3* 25 3* 27 6*   PLATELETS Thousands/uL 204 153 167   POTASSIUM mmol/L 4 0 4 2 4 0   CHLORIDE mmol/L 106 103 106   CO2 mmol/L 21 21 20*   BUN mg/dL 82* 69* 68*   CREATININE mg/dL 4 55* 3 18* 3 43*   CALCIUM mg/dL 8 9 8 3 8 6   ALK PHOS U/L 406* 429* 485*   ALT U/L 32 36 40   AST U/L 49* 49* 65*       Other Studies: IMPRESSION:     Subpleural groundglass opacities noted at the lung bases most consistent with Covid 19 pneumonia      No acute intra-abdominal abnormality      Nodular liver suggesting cirrhosis      Cholelithiasis      Hepatic contour suggesting potential cirrhosis      Small to moderate hiatal hernia      Trace bilateral pleural effusions and small pericardial effusion  Portions of the record may have been created with voice recognition software  Occasional wrong word or "sound a like" substitutions may have occurred due to the inherent limitations of voice recognition software  Read the chart carefully and recognize, using context, where substitutions have occurred  If you have any questions, please contact the dictating provider

## 2022-02-11 NOTE — PROGRESS NOTES
Discussed with pt's daughter her concerns about her mom's belly pains and that she wants an ultrasound done, made docs aware of her concerns and that they would like an update

## 2022-02-11 NOTE — ASSESSMENT & PLAN NOTE
· CT abdomen/pelvis on admission with no acute intra-abdominal abnormality per the results report   · Suspect related to COVID/diarrhea  · With reported diarrhea   Miralax discontinued   · Bentyl, imodium on board

## 2022-02-11 NOTE — ASSESSMENT & PLAN NOTE
Lab Results   Component Value Date    EGFR 9 02/11/2022    EGFR 14 02/09/2022    EGFR 13 02/09/2022    CREATININE 4 55 (H) 02/11/2022    CREATININE 3 18 (H) 02/09/2022    CREATININE 3 43 (H) 02/09/2022     · Baseline creatinine around 3 per prior notes however most recent labs indicate she has been around 3 1-3 4, Estimated GFR around 15-20, stage 4  · Reports she follows with Dr Sandra Rebolledo  · Now with KELLY in the setting of vomiting/diarrhea and Bumex use  · Nephrology consulted   · Bumex discontinued   · Miralax discontinued  · Avoid nephrotoxins and hypotension  · BMP monitoring

## 2022-02-11 NOTE — ASSESSMENT & PLAN NOTE
· Per record review, patient presented with generalized abdominal pain, diarrhea, chills, weakness, fatigue and nasal congestion  · Home COVID test +, confirmation test on arrival also +  Reported she is vaccinated   · On RA, not hypoxic   MILD Pathway  · Patient was not requiring oxygen at any point but was started on Remdesivir on admission - this was subsequently discontinued per protocol  · CRP 22, D-dimer 1 58 (AC with Hep SQ 5000 q8h per [facility]), procal 0 79 (unclear significance in setting of chronic renal failure), BNP 17,351  · Supportive care for diarrhea   · Isolation thru 2/19

## 2022-02-11 NOTE — CONSULTS
Consultation - Nephrology   Pee Doll 78 y o  female MRN: [id number]  Unit/Bed#: -01 Encounter: [id number]      Assessment/Plan:  1  KELLY in the setting of Covid; likely prerenal given history of nausea/vomiting, poor po intake, bumex  2  Covid-19, tested positive 1/8  3  CKD 5, baseline creatinine roughly 3-3 4  Follows with Dr Brijesh Burden  4  CHF pEF, most recently 60%  5  Hypertension--currently maintained on coreg, amlodipine, hydralazine, isosorbide dinitrate  6  DM2--most recent A1c 9 7    Plan:  · Bumex and miralax stopped by primary team to prevent further volume depletion  · Will start 75 ml/hr plasmalyte for next 24 hours  · Ordered bladder scan to assess for obstruction  · Avoid hypotension--hold paramaters on antihypertensives adjusted  · Avoid nephrotoxins    History of Present Illness   Physician Requesting Consult: Jolene Richards MD  Reason for Consult / Principal Problem: KELLY on CKD  HPI: Pee Doll is a 78y o  year old female with a PMH of CHF pEF, CAD, CKD4, DM2, hld, htn and hypothyroidism, who presented to the [facility] ED on 1/9 complaining of three days of abdominal pain, diarrhea, nausea and vomiting with a positive home covid test on 1/8  She was given 1L of isolyte in the ED, and was admitted for remdesivir treatment  Her creatinine on admission was 3 45  While inpatient, she has continued to have nausea, vomiting, diarrhea and poor oral intake  Due to her CHF, she has been maintained on her home bumex (2mg bid) with PO hydration  Her creatinine initially dropped to 3 18, but brittany to 4 55 today  Bumex was discontinued, and nephrology was consulted to assess and treat her KELLY  Today, she states that she is not feeling well  She continues to complain of nausea, vomiting, diarrhea and poor oral intake which she says is unchanged since admission; describes three watery bowel movements today  She does not remember drinking any miralax   She also states that she has felt weak and dizzy while getting out of bed to use the restroom  Her urine output was initially normal for her, but she thinks it has decreased today since her bumex was discontinued  Review of Systems   Constitutional: Positive for fatigue  Negative for fever  Respiratory: Negative for shortness of breath  Cardiovascular: Negative for chest pain and leg swelling  Gastrointestinal: Positive for nausea and vomiting  Negative for abdominal distention, abdominal pain and constipation  Genitourinary: Negative for decreased urine volume  Musculoskeletal: Positive for back pain  Neurological: Positive for dizziness, weakness and light-headedness         Pertinent findings of a 10 point review of systems noted above otherwise all others negative    Historical Information   Patient Active Problem List   Diagnosis    Chronic diastolic heart failure (HCC)    Type 2 diabetes mellitus with kidney complication, with long-term current use of insulin ([facility]    Hypothyroidism    Hypertension    Acute renal failure superimposed on stage 4 chronic kidney disease (HCC)    Anemia    Coronary artery disease involving native coronary artery of native heart with angina pectoris [facility]    History of anemia due to chronic kidney disease    Vitamin D deficiency    Proteinuria    Elevated LFTs    Hyperphosphatemia    Hypoalbuminemia    Acute otitis media    Pericardial effusion    Stage 4 chronic kidney disease (HCC)    Acute kidney injury superimposed on chronic kidney disease (HCC)    Abnormal finding on imaging of liver    Nocturnal hypoxia    COVID-19    Abdominal pain     Past Medical History:   Diagnosis Date    Anemia     CHF (congestive heart failure) (HCC)     Chronic kidney disease     Coronary artery disease     Diabetes mellitus [facility]     Hypertension     Hypothyroidism      Past Surgical History:   Procedure Laterality Date    CORONARY ANGIOPLASTY WITH STENT PLACEMENT  2019     Social History   Social History     Substance and Sexual Activity   Alcohol Use Never     Social History     Substance and Sexual Activity   Drug Use No     Social History     Tobacco Use   Smoking Status Former Smoker   Smokeless Tobacco Never Used     Family History   Problem Relation Age of Onset    Diabetes Mother     Heart attack Father         MI    Hypertension Brother        Meds/Allergies   all current active meds have been reviewed    Allergies   Allergen Reactions    Iv Contrast [Iodinated Diagnostic Agents] Itching     Caused KELLY    Nifedipine Rash         Objective   /55   Pulse (!) 54   Temp (!) 96 °F (35 6 °C)   Resp 18   Ht 5' 2" (1 575 m)   Wt 92 8 kg (204 lb 9 4 oz)   SpO2 97%   BMI 37 42 kg/m²     Intake/Output Summary (Last 24 hours) at 2/11/2022 1356  Last data filed at 2/11/2022 0900  Gross per 24 hour   Intake 343 ml   Output --   Net 343 ml       Current Weight: Weight - Scale: 92 8 kg (204 lb 9 4 oz)    Physical Exam  Constitutional:       General: She is not in acute distress  Appearance: She is not diaphoretic  HENT:      Head: Normocephalic and atraumatic  Mouth/Throat:      Mouth: Mucous membranes are dry  Eyes:      Extraocular Movements: Extraocular movements intact  Pupils: Pupils are equal, round, and reactive to light  Cardiovascular:      Rate and Rhythm: Normal rate and regular rhythm  Heart sounds: Murmur heard  No friction rub  No gallop  Pulmonary:      Effort: No respiratory distress  Breath sounds: Normal breath sounds  No wheezing, rhonchi or rales  Abdominal:      General: Abdomen is flat  Bowel sounds are normal  There is no distension  Palpations: Abdomen is soft  Tenderness: There is no abdominal tenderness  There is no guarding or rebound  Musculoskeletal:      Cervical back: Normal range of motion and neck supple  Right lower leg: No edema  Left lower leg: No edema  Skin:     General: Skin is warm and dry  Neurological:      General: No focal deficit present  Mental Status: She is alert and oriented to person, place, and time             Lab Results:    Results from last 7 days   Lab Units 02/11/22  0640   WBC Thousand/uL 5 25   HEMOGLOBIN g/dL 8 2*   HEMATOCRIT % 26 3*   PLATELETS Thousands/uL 204     Results from last 7 days   Lab Units 02/11/22  0640   POTASSIUM mmol/L 4 0   CHLORIDE mmol/L 106   CO2 mmol/L 21   BUN mg/dL 82*   CREATININE mg/dL 4 55*   CALCIUM mg/dL 8 9

## 2022-02-11 NOTE — PROGRESS NOTES
[address] [facility]  Progress Note - Delene Prem 1955, 78 y o  female MRN: [id number]  Unit/Bed#: -01 Encounter: [id number]  Primary Care Provider: Rakel Gilliland MD   Date and time admitted to hospital: 2/9/2022 11:38 AM    * COVID-19  Assessment & Plan  · Per record review, patient presented with generalized abdominal pain, diarrhea, chills, weakness, fatigue and nasal congestion  · Home COVID test +, confirmation test on arrival also +  Reported she is vaccinated   · On RA, not hypoxic  MILD Pathway  · Patient was not requiring oxygen at any point but was started on Remdesivir on admission - this was subsequently discontinued per protocol  · CRP 22, D-dimer 1 58 (AC with Hep SQ 5000 q8h per VTE Group C), procal 0 79 (unclear significance in setting of chronic renal failure), BNP 17,351  · Supportive care for diarrhea   · Isolation thru 2/19      Acute kidney injury superimposed on chronic kidney disease [facility])  Assessment & Plan  Lab Results   Component Value Date    EGFR 9 02/11/2022    EGFR 14 02/09/2022    EGFR 13 02/09/2022    CREATININE 4 55 (H) 02/11/2022    CREATININE 3 18 (H) 02/09/2022    CREATININE 3 43 (H) 02/09/2022     · Baseline creatinine around 3 per prior notes however most recent labs indicate she has been around 3 1-3 4, Estimated GFR around 15-20, stage 4  · Reports she follows with Dr Cheng Hirsch  · Now with KELLY in the setting of vomiting/diarrhea and Bumex use  · Nephrology consulted   · Bumex discontinued   · Miralax discontinued  · Avoid nephrotoxins and hypotension  · BMP monitoring     Abdominal pain  Assessment & Plan  · CT abdomen/pelvis on admission with no acute intra-abdominal abnormality per the results report   · Suspect related to COVID/diarrhea  · With reported diarrhea   Miralax discontinued   · Bentyl, imodium on board     Chronic diastolic heart failure ([facility] [address] )  Assessment & Plan  Wt Readings from Last 3 Encounters:   02/11/22 92 8 kg (204 lb 9 4 oz)   01/31/22 71 2 kg (157 lb)   11/23/21 72 4 kg (159 lb 9 6 oz)     ·  Per record review, H&P documented acute heart failure, however patient examines dry to euvolemic, on RA, no rales on examination  · Patient follows with Dr Guerline Muñoz  Stage C HFpEF, echo most recently with EF 60%, hypo-inferior wall, normal RV  ·  NT proBNP 17,351 (20,000 in 10/21), CT A/P wo contrast showed trace bilateral pleural effusions  · Weight was recorded as 160 on admission,  Weight was 157 at recent office visit 1/31  · Requesting accurate weights and I&Os  · Bumex on hold given diarrhea/vomiting/volume loss and KELLY  · Nephrology consulted as above      Type 2 diabetes mellitus with kidney complication, with long-term current use of insulin [facility])  Assessment & Plan  Lab Results   Component Value Date    HGBA1C 9 7 (H) 10/25/2021       Recent Labs     02/10/22  1131 02/10/22  1545 02/11/22  0633 02/11/22  1053   POCGLU 309* 206* 93 186*       Blood Sugar Average: Last 72 hrs:  (P) 194 4   · A1C not well controlled as of last check, continue NovoLog 70/30 10 units b i d which is home regimen  · Update A1c  · SSI/accuchecks  · Diabetic diet    Hypertension  Assessment & Plan  · Avoid hypotension given KELLY  · Nephrology consulted  · Bumex on hold      Pericardial effusion  Assessment & Plan  · Chronic, noted on prior echos/imaging  · Monitoring outpatient     Coronary artery disease involving native coronary artery of native heart with angina pectoris [facility])  Assessment & Plan  · [facility] 1/4/19: LUDY x 2 to LAD after NSTEMI (trop 0 2) and abnormal stress test with ischemia in anterior wall on 12/31/18, 80% RCA- plan staged, but has not needed intervention  · On imdur, asa, coreg, statin    Hypothyroidism  Assessment & Plan  · Continue synthroid         VTE Pharmacologic Prophylaxis: VTE Score: 11 High Risk (Score >/= 5) - Pharmacological DVT Prophylaxis Ordered: heparin  Sequential Compression Devices Ordered      Patient Centered Rounds: I performed bedside rounds with nursing staff today  d/w RN Preethi Alexander   Discussions with Specialists or Other Care Team Provider: [facility] Neph[facility]     Education and Discussions with Family / Patient: Updated  (daughter) via phone  Time Spent for Care: 30 minutes  More than 50% of total time spent on counseling and coordination of care as described above  Current Length of Stay: 1 day(s)  Current Patient Status: Inpatient   Certification Statement: The patient will continue to require additional inpatient hospital stay due to [facility]  Discharge Plan: Anticipate discharge in 48-72 hrs to home  Code Status: Level 1 - Full Code    Subjective:   Ms Ramana Stewart reports 2 episodes of vomiting and 3 of diarrhea  She denies CP or SOB    Objective:     Vitals:   Temp (24hrs), Av 2 °F (36 2 °C), Min:96 °F (35 6 °C), Max:98 1 °F (36 7 °C)    Temp:  [96 °F (35 6 °C)-98 1 °F (36 7 °C)] 96 °F (35 6 °C)  HR:  [52-56] 54  Resp:  [18] 18  BP: (108-133)/(53-58) 126/55  SpO2:  [93 %-97 %] 97 %  Body mass index is 37 42 kg/m²  Input and Output Summary (last 24 hours): Intake/Output Summary (Last 24 hours) at 2022 1242  Last data filed at 2022 0900  Gross per 24 hour   Intake 343 ml   Output --   Net 343 ml       Physical Exam:   Physical Exam  Vitals and nursing note reviewed  Constitutional:       General: She is not in acute distress  Appearance: She is obese  Comments: Patient seen sitting in bed comfortably resting, NAD   Cardiovascular:      Rate and Rhythm: Normal rate and regular rhythm  Pulmonary:      Effort: Pulmonary effort is normal       Breath sounds: Normal breath sounds  Comments: On room air  Abdominal:      General: Bowel sounds are normal       Palpations: Abdomen is soft  Tenderness: There is no abdominal tenderness  Musculoskeletal:      Right lower leg: No edema  Left lower leg: No edema  Skin:     General: Skin is warm and dry     Neurological: Mental Status: She is alert and oriented to person, place, and time  Psychiatric:         Mood and Affect: Mood normal          Behavior: Behavior normal           Additional Data:     Labs:  Results from last 7 days   Lab Units 02/11/22  0640 02/09/22  2316 02/09/22  2316   WBC Thousand/uL 5 25   < > 3 06*   HEMOGLOBIN g/dL 8 2*   < > 8 0*   HEMATOCRIT % 26 3*   < > 25 3*   PLATELETS Thousands/uL 204   < > 153   BANDS PCT % 3  --   --    NEUTROS PCT %  --   --  72   LYMPHS PCT %  --   --  22   LYMPHO PCT % 13*  --   --    MONOS PCT %  --   --  5   MONO PCT % 2*  --   --    EOS PCT % 0  --  0    < > = values in this interval not displayed       Results from last 7 days   Lab Units 02/11/22  0640   SODIUM mmol/L 137   POTASSIUM mmol/L 4 0   CHLORIDE mmol/L 106   CO2 mmol/L 21   BUN mg/dL 82*   CREATININE mg/dL 4 55*   ANION GAP mmol/L 10   CALCIUM mg/dL 8 9   ALBUMIN g/dL 2 3*   TOTAL BILIRUBIN mg/dL 0 25   ALK PHOS U/L 406*   ALT U/L 32   AST U/L 49*   GLUCOSE RANDOM mg/dL 83         Results from last 7 days   Lab Units 02/11/22  1053 02/11/22  0633 02/10/22  1545 02/10/22  1131 02/10/22  0551   POC GLUCOSE mg/dl 186* 93 206* 309* 178*         Results from last 7 days   Lab Units 02/09/22  2316   PROCALCITONIN ng/ml 0 79*       Lines/Drains:  Invasive Devices  Report    Peripheral Intravenous Line            Peripheral IV 02/09/22 Right Arm 2 days                      Imaging: Reviewed radiology reports from this admission including: abdominal/pelvic CT    Recent Cultures (last 7 days):         Last 24 Hours Medication List:   Current Facility-Administered Medications   Medication Dose Route Frequency Provider Last Rate    acetaminophen  650 mg Oral Q6H PRN Alis Alejandra MD      amLODIPine  10 mg Oral BID Alis Alejandra MD      aspirin  81 mg Oral Daily Alis Alejandra MD      atorvastatin  80 mg Oral Daily With Fernando Birch MD      benzonatate  100 mg Oral TID PRN Alis Alejandra MD  calcium acetate  667 mg Oral BID With Meals Marilyn Palmer MD      carvedilol  12 5 mg Oral BID With Meals Marilyn Palmer MD      dicyclomine  20 mg Oral 4x Daily (AC & HS) Kevin Bhatia PA-C      docusate sodium  100 mg Oral BID Marilyn Palmer MD      guaiFENesin  200 mg Oral TID PRN Marilyn Palmer MD      heparin (porcine)  5,000 Units Subcutaneous [facility] Marilyn Palmer MD      hydrALAZINE  100 mg Oral TID Marilyn Palmer MD      HYDROmorphone  0 5 mg Intravenous Once Marilyn Palmer MD      insulin aspart protamine-insulin aspart  10 Units Subcutaneous BID AC Ramiro Weir DO      insulin lispro  1-6 Units Subcutaneous TID AC Marilyn Palmer MD      isosorbide mononitrate  120 mg Oral Daily Marilyn Palmer MD      levothyroxine  112 mcg Oral Early Morning Marilyn Palmer MD      loperamide  2 mg Oral TID PRN Kevin Bhatia PA-C      ondansetron  4 mg Intravenous Q6H PRN Marilyn Palmer MD      oxyCODONE  2 5 mg Oral Q4H PRN Marilyn Palmer MD      oxyCODONE  5 mg Oral Q6H PRN Marilyn Palmer MD      simethicone  80 mg Oral 4x Daily PRN Marilyn Palmer MD          Today, Patient Was Seen By: Carlos Roa PA-C    **Please Note: This note may have been constructed using a voice recognition system  **

## 2022-02-11 NOTE — CASE MANAGEMENT
Case Management Assessment    Patient name Pee Doll  Location [address] [id number] MRN [id number]  : 1955 Date 2022       Current Admission Date: 2022  Current Admission Diagnosis:COVID-19   Patient Active Problem List    Diagnosis Date Noted    Abdominal pain 02/10/2022    COVID-19 2022    Nocturnal hypoxia 10/30/2021    Abnormal finding on imaging of liver 10/18/2021    Acute kidney injury superimposed on chronic kidney disease ([facility] [address] )     Pericardial effusion 2021    Stage 4 chronic kidney disease ([facility] [address] ) 2021    Acute otitis media 2021    Hypoalbuminemia 2021    Hyperphosphatemia 2021    Elevated LFTs 2020    History of anemia due to chronic kidney disease 2019    Vitamin D deficiency 2019    Proteinuria 2019    Coronary artery [facility] involving [facility] coronary artery of [facility]    Anemia 2018    [facility] 2018    Type 2 diabetes mellitus with kidney complication, with long-term current use of insulin [facility] 2018    Hypothyroidism 2018    Hypertension 2018    Acute renal failure superimposed on stage 4 chronic kidney disease ([facility] [address] ) 2018      LOS (days): 1  Geometric Mean LOS (GMLOS) (days): 4 00  Days to GMLOS:2 9     OBJECTIVE:    Risk of Unplanned Readmission Score: 27         Current admission status: Inpatient       Preferred Pharmacy:   [facility] [address] [place] [address], [address] [address]  Phone: [phone] Fax: [phone]    Primary Care Provider: Luis F Ramirez MD    Primary Insurance: [facility]  Secondary Insurance: [address]    ASSESSMENT:  [address] [place] [address] Representative - Daughter   Primary Phone: [phone] (Mobile)  Home Phone: [phone] Readmission Root Cause  30 Day Readmission: No    Patient Information  Admitted from[de-identified] Home  Mental Status: Alert  During Assessment patient was accompanied by: Not accompanied during assessment  Assessment information provided by[de-identified] Patient  Primary Caregiver: Self  Support Systems: Spouse/significant other,Daughter  Home entry access options   Select all that apply : Ramp  Type of Current Residence: Apartment  Floor Level: 1  Upon entering residence, is there a bedroom on the main floor (no further steps)?: Yes  Upon entering residence, is there a bathroom on the main floor (no further steps)?: Yes  Living Arrangements: Lives w/ Spouse/significant other  Is patient a ?: No    Activities of Daily Living Prior to Admission  Functional Status: Independent  Completes ADLs independently?: Yes  Ambulates independently?: Yes  Does patient use assisted devices?: No  Does patient currently own DME?: No  Does patient have a history of Outpatient Therapy (PT/OT)?: No  Does the patient have a history of Short-Term Rehab?: No  Does patient have a history of HHC?: No  Does patient currently have [facility]?: No         Patient Information Continued  Income Source: Unemployed  Does patient have prescription coverage?: Yes  Does patient receive dialysis treatments?: No  Does patient have a history of Mental Health Diagnosis?: No         Means of Transportation  Means of Transport to hospitals[de-identified] Drives Self

## 2022-02-11 NOTE — ASSESSMENT & PLAN NOTE
Lab Results   Component Value Date    HGBA1C 9 7 (H) 10/25/2021       Recent Labs     02/10/22  1131 02/10/22  1545 02/11/22  0633 02/11/22  1053   POCGLU 309* 206* 93 186*       Blood Sugar Average: Last 72 hrs:  (P) 194 4   · A1C not well controlled as of last check, continue NovoLog 70/30 10 units b i d which is home regimen  · Update A1c  · SSI/accuchecks  · Diabetic diet

## 2022-02-12 LAB
ALBUMIN SERPL BCP-MCNC: 2.2 G/DL (ref 3.5–5)
ALP SERPL-CCNC: 338 U/L (ref 46–116)
ALT SERPL W P-5'-P-CCNC: 27 U/L (ref 12–78)
ANION GAP SERPL CALCULATED.3IONS-SCNC: 11 MMOL/L (ref 4–13)
AST SERPL W P-5'-P-CCNC: 37 U/L (ref 5–45)
BACTERIA UR QL AUTO: ABNORMAL /HPF
BASOPHILS # BLD AUTO: 0.01 THOUSANDS/ΜL (ref 0–0.1)
BASOPHILS NFR BLD AUTO: 0 % (ref 0–1)
BILIRUB SERPL-MCNC: 0.31 MG/DL (ref 0.2–1)
BILIRUB UR QL STRIP: NEGATIVE
BUN SERPL-MCNC: 81 MG/DL (ref 5–25)
CALCIUM ALBUM COR SERPL-MCNC: 9.7 MG/DL (ref 8.3–10.1)
CALCIUM SERPL-MCNC: 8.3 MG/DL (ref 8.3–10.1)
CHLORIDE SERPL-SCNC: 103 MMOL/L (ref 100–108)
CLARITY UR: CLEAR
CO2 SERPL-SCNC: 21 MMOL/L (ref 21–32)
COLOR UR: YELLOW
CREAT SERPL-MCNC: 5.21 MG/DL (ref 0.6–1.3)
EOSINOPHIL # BLD AUTO: 0.05 THOUSAND/ΜL (ref 0–0.61)
EOSINOPHIL NFR BLD AUTO: 1 % (ref 0–6)
ERYTHROCYTE [DISTWIDTH] IN BLOOD BY AUTOMATED COUNT: 13.2 % (ref 11.6–15.1)
EST. AVERAGE GLUCOSE BLD GHB EST-MCNC: 269 MG/DL
GFR SERPL CREATININE-BSD FRML MDRD: 7 ML/MIN/1.73SQ M
GLUCOSE SERPL-MCNC: 154 MG/DL (ref 65–140)
GLUCOSE SERPL-MCNC: 157 MG/DL (ref 65–140)
GLUCOSE SERPL-MCNC: 168 MG/DL (ref 65–140)
GLUCOSE SERPL-MCNC: 196 MG/DL (ref 65–140)
GLUCOSE SERPL-MCNC: 269 MG/DL (ref 65–140)
GLUCOSE UR STRIP-MCNC: ABNORMAL MG/DL
HBA1C MFR BLD: 11 %
HCT VFR BLD AUTO: 22.8 % (ref 34.8–46.1)
HGB BLD-MCNC: 7.2 G/DL (ref 11.5–15.4)
HGB UR QL STRIP.AUTO: NEGATIVE
HYALINE CASTS #/AREA URNS LPF: ABNORMAL /LPF
IMM GRANULOCYTES # BLD AUTO: 0.07 THOUSAND/UL (ref 0–0.2)
IMM GRANULOCYTES NFR BLD AUTO: 1 % (ref 0–2)
KETONES UR STRIP-MCNC: NEGATIVE MG/DL
LEUKOCYTE ESTERASE UR QL STRIP: ABNORMAL
LYMPHOCYTES # BLD AUTO: 1.07 THOUSANDS/ΜL (ref 0.6–4.47)
LYMPHOCYTES NFR BLD AUTO: 18 % (ref 14–44)
MCH RBC QN AUTO: 27.7 PG (ref 26.8–34.3)
MCHC RBC AUTO-ENTMCNC: 31.6 G/DL (ref 31.4–37.4)
MCV RBC AUTO: 88 FL (ref 82–98)
MONOCYTES # BLD AUTO: 0.34 THOUSAND/ΜL (ref 0.17–1.22)
MONOCYTES NFR BLD AUTO: 6 % (ref 4–12)
NEUTROPHILS # BLD AUTO: 4.35 THOUSANDS/ΜL (ref 1.85–7.62)
NEUTS SEG NFR BLD AUTO: 74 % (ref 43–75)
NITRITE UR QL STRIP: NEGATIVE
NON-SQ EPI CELLS URNS QL MICRO: ABNORMAL /HPF
NRBC BLD AUTO-RTO: 0 /100 WBCS
PH UR STRIP.AUTO: 5 [PH]
PLATELET # BLD AUTO: 191 THOUSANDS/UL (ref 149–390)
PMV BLD AUTO: 10.8 FL (ref 8.9–12.7)
POTASSIUM SERPL-SCNC: 4.3 MMOL/L (ref 3.5–5.3)
PROT SERPL-MCNC: 5.9 G/DL (ref 6.4–8.2)
PROT UR STRIP-MCNC: ABNORMAL MG/DL
RBC # BLD AUTO: 2.6 MILLION/UL (ref 3.81–5.12)
RBC #/AREA URNS AUTO: ABNORMAL /HPF
SODIUM SERPL-SCNC: 135 MMOL/L (ref 136–145)
SP GR UR STRIP.AUTO: 1.01 (ref 1–1.03)
UROBILINOGEN UR QL STRIP.AUTO: 0.2 E.U./DL
WBC # BLD AUTO: 5.89 THOUSAND/UL (ref 4.31–10.16)
WBC #/AREA URNS AUTO: ABNORMAL /HPF

## 2022-02-12 PROCEDURE — 82948 REAGENT STRIP/BLOOD GLUCOSE: CPT

## 2022-02-12 PROCEDURE — 80053 COMPREHEN METABOLIC PANEL: CPT | Performed by: PHYSICIAN ASSISTANT

## 2022-02-12 PROCEDURE — 81001 URINALYSIS AUTO W/SCOPE: CPT | Performed by: PHYSICIAN ASSISTANT

## 2022-02-12 PROCEDURE — 99233 SBSQ HOSP IP/OBS HIGH 50: CPT | Performed by: INTERNAL MEDICINE

## 2022-02-12 PROCEDURE — 83036 HEMOGLOBIN GLYCOSYLATED A1C: CPT | Performed by: PHYSICIAN ASSISTANT

## 2022-02-12 PROCEDURE — 85025 COMPLETE CBC W/AUTO DIFF WBC: CPT | Performed by: INTERNAL MEDICINE

## 2022-02-12 PROCEDURE — 99232 SBSQ HOSP IP/OBS MODERATE 35: CPT | Performed by: PHYSICIAN ASSISTANT

## 2022-02-12 RX ADMIN — DICYCLOMINE HYDROCHLORIDE 20 MG: 20 TABLET ORAL at 17:06

## 2022-02-12 RX ADMIN — INSULIN ASPART 10 UNITS: 100 INJECTION, SUSPENSION SUBCUTANEOUS at 08:44

## 2022-02-12 RX ADMIN — CARVEDILOL 12.5 MG: 12.5 TABLET, FILM COATED ORAL at 17:06

## 2022-02-12 RX ADMIN — DICYCLOMINE HYDROCHLORIDE 20 MG: 20 TABLET ORAL at 21:39

## 2022-02-12 RX ADMIN — SODIUM CHLORIDE, SODIUM GLUCONATE, SODIUM ACETATE, POTASSIUM CHLORIDE AND MAGNESIUM CHLORIDE 75 ML/HR: 526; 502; 368; 37; 30 INJECTION, SOLUTION INTRAVENOUS at 05:03

## 2022-02-12 RX ADMIN — AMLODIPINE BESYLATE 10 MG: 10 TABLET ORAL at 08:43

## 2022-02-12 RX ADMIN — INSULIN LISPRO 1 UNITS: 100 INJECTION, SOLUTION INTRAVENOUS; SUBCUTANEOUS at 17:06

## 2022-02-12 RX ADMIN — LEVOTHYROXINE SODIUM 112 MCG: 112 TABLET ORAL at 06:27

## 2022-02-12 RX ADMIN — INSULIN LISPRO 3 UNITS: 100 INJECTION, SOLUTION INTRAVENOUS; SUBCUTANEOUS at 11:19

## 2022-02-12 RX ADMIN — DICYCLOMINE HYDROCHLORIDE 20 MG: 20 TABLET ORAL at 09:08

## 2022-02-12 RX ADMIN — SEVELAMER HYDROCHLORIDE 800 MG: 800 TABLET, FILM COATED PARENTERAL at 08:42

## 2022-02-12 RX ADMIN — OXYCODONE HYDROCHLORIDE 5 MG: 5 TABLET ORAL at 11:19

## 2022-02-12 RX ADMIN — ASPIRIN 81 MG: 81 TABLET, CHEWABLE ORAL at 08:42

## 2022-02-12 RX ADMIN — HYDRALAZINE HYDROCHLORIDE 100 MG: 50 TABLET ORAL at 08:43

## 2022-02-12 RX ADMIN — HYDRALAZINE HYDROCHLORIDE 100 MG: 50 TABLET ORAL at 17:06

## 2022-02-12 RX ADMIN — CARVEDILOL 12.5 MG: 12.5 TABLET, FILM COATED ORAL at 08:42

## 2022-02-12 RX ADMIN — ATORVASTATIN CALCIUM 80 MG: 80 TABLET, FILM COATED ORAL at 17:06

## 2022-02-12 RX ADMIN — SODIUM CHLORIDE, SODIUM GLUCONATE, SODIUM ACETATE, POTASSIUM CHLORIDE AND MAGNESIUM CHLORIDE 75 ML/HR: 526; 502; 368; 37; 30 INJECTION, SOLUTION INTRAVENOUS at 19:37

## 2022-02-12 RX ADMIN — ISOSORBIDE MONONITRATE 120 MG: 60 TABLET, EXTENDED RELEASE ORAL at 08:42

## 2022-02-12 NOTE — ASSESSMENT & PLAN NOTE
· Per record review, patient presented with generalized abdominal pain, diarrhea, chills, weakness, fatigue and nasal congestion  · Home COVID test +, confirmation test on arrival also +  Reported she is vaccinated   · On RA, not hypoxic   MILD Pathway  · Patient was not requiring oxygen at any point but was started on Remdesivir on admission - this was subsequently discontinued per protocol  · CRP 22, D-dimer 1 58 (AC with Hep SQ 5000 q8h per VTE Group C), procal 0 79 (unclear significance in setting of chronic renal failure), BNP 17,351  · Supportive care for diarrhea - improving   · Isolation thru 2/19

## 2022-02-12 NOTE — PLAN OF CARE
Problem: PAIN - ADULT  Goal: Verbalizes/displays adequate comfort level or baseline comfort level  Description: Interventions:  - Encourage patient to monitor pain and request assistance  - Assess pain using appropriate pain scale  - Administer analgesics based on type and severity of pain and evaluate response  - Implement non-pharmacological measures as appropriate and evaluate response  - Notify physician/advanced practitioner if interventions unsuccessful or patient reports new pain  Outcome: Progressing     Problem: INFECTION - ADULT  Goal: Absence or prevention of progression during hospitalization  Description: INTERVENTIONS:  - Assess and monitor for signs and symptoms of infection  - Monitor lab/diagnostic results  - Monitor all insertion sites, i e  indwelling lines, tubes, and drains  - Steens appropriate cooling/warming therapies per order  - Administer medications as ordered  - Instruct and encourage patient and family to use good hand hygiene technique  - Identify and instruct in appropriate isolation precautions for identified infection/condition  Outcome: Progressing     Problem: SAFETY ADULT  Goal: Patient will remain free of falls  Description: INTERVENTIONS:  - Educate patient/family on patient safety including physical limitations  - Instruct patient to call for assistance with activity   - Consult OT/PT to assist with strengthening/mobility   - Keep Call bell within reach  - Keep bed low and locked with side rails adjusted as appropriate  - Keep care items and personal belongings within reach  - Initiate and maintain comfort rounds  - Make Fall Risk Sign visible to staff  - Offer Toileting every 2 Hours, in advance of need  - Initiate/Maintain bed alarm  - Apply yellow socks and bracelet for high fall risk patients  - Consider moving patient to room near nurses station  Outcome: Progressing  Goal: Maintain or return to baseline ADL function  Description: INTERVENTIONS:  -  Assess patient's ability to carry out ADLs; assess patient's baseline for ADL function and identify physical deficits which impact ability to perform ADLs (bathing, care of mouth/teeth, toileting, grooming, dressing, etc )  - Assess/evaluate cause of self-care deficits   - Assess range of motion  - Assess patient's mobility; develop plan if impaired  - Assess patient's need for assistive devices and provide as appropriate  - Encourage maximum independence but intervene and supervise when necessary  - Involve family in performance of ADLs  - Assess for home care needs following discharge   - Consider OT consult to assist with ADL evaluation and planning for discharge  - Provide patient education as appropriate  Outcome: Progressing  Goal: Maintains/Returns to pre admission functional level  Description: INTERVENTIONS:  - Perform BMAT or MOVE assessment daily    - Set and communicate daily mobility goal to care team and patient/family/caregiver  - Collaborate with rehabilitation services on mobility goals if consulted  - Perform Range of Motion 3 times a day  - Reposition patient every 2 hours    - Dangle patient 3 times a day  - Stand patient 3 times a day  - Ambulate patient 3 times a day  - Out of bed to chair 3 times a day   - Out of bed for meals 3 times a day  - Out of bed for toileting  - Record patient progress and toleration of activity level   Outcome: Progressing     Problem: DISCHARGE PLANNING  Goal: Discharge to home or other facility with appropriate resources  Description: INTERVENTIONS:  - Identify barriers to discharge w/patient and caregiver  - Arrange for needed discharge resources and transportation as appropriate  - Identify discharge learning needs (meds, wound care, etc )  - Refer to Case Management Department for coordinating discharge planning if the patient needs post-hospital services based on physician/advanced practitioner order or complex needs related to functional status, cognitive ability, or social support system  Outcome: Progressing     Problem: GENITOURINARY - ADULT  Goal: Maintains or returns to baseline urinary function  Description: INTERVENTIONS:  - Assess urinary function  - Encourage oral fluids to ensure adequate hydration if ordered  - Administer IV fluids as ordered to ensure adequate hydration  - Administer ordered medications as needed  - Offer frequent toileting  - Follow urinary retention protocol if ordered  Outcome: Not Progressing     Problem: METABOLIC, FLUID AND ELECTROLYTES - ADULT  Goal: Fluid balance maintained  Description: INTERVENTIONS:  - Monitor labs   - Monitor I/O and WT  - Instruct patient on fluid and nutrition as appropriate  - Assess for signs & symptoms of volume excess or deficit  Outcome: Progressing  Goal: Glucose maintained within target range  Description: INTERVENTIONS:  - Monitor Blood Glucose as ordered  - Assess for signs and symptoms of hyperglycemia and hypoglycemia  - Administer ordered medications to maintain glucose within target range  - Assess nutritional intake and initiate nutrition service referral as needed  Outcome: Progressing

## 2022-02-12 NOTE — ASSESSMENT & PLAN NOTE
· [facility] 1/4/19: LUDY x 2 to LAD after NSTEMI (trop 0 2) and abnormal stress test with ischemia in anterior wall on 12/31/18, 80% RCA- plan staged, but has not needed intervention  · On imdur, asa, coreg, statin

## 2022-02-12 NOTE — PROGRESS NOTES
[facility]  Progress Note - Olivia Shetty 1955, 78 y o  female MRN: [id number]  Unit/Bed#: -01 Encounter: [id number]  Primary Care Provider: Joey Cardozo MD   Date and time admitted to hospital: 2/9/2022 11:38 AM    * COVID-19  Assessment & Plan  · Per record review, patient presented with generalized abdominal pain, diarrhea, chills, weakness, fatigue and nasal congestion  · Home COVID test +, confirmation test on arrival also +  Reported she is vaccinated   · On RA, not hypoxic  MILD Pathway  · Patient was not requiring oxygen at any point but was started on Remdesivir on admission - this was subsequently discontinued per protocol  · CRP 22, D-dimer 1 58 (AC with Hep SQ 5000 q8h per VTE Group C), procal 0 79 (unclear significance in setting of chronic renal failure), BNP 17,351  · Supportive care for diarrhea - improving   · Isolation thru 2/19      Acute kidney injury superimposed on chronic kidney disease [facility])  Assessment & Plan  Lab Results   Component Value Date    EGFR 7 02/12/2022    EGFR 9 02/11/2022    EGFR 14 02/09/2022    CREATININE 5 21 (H) 02/12/2022    CREATININE 4 55 (H) 02/11/2022    CREATININE 3 18 (H) 02/09/2022     · Baseline creatinine around 3 per prior notes however most recent labs indicate she has been around 3 1-3 4, Estimated GFR around 15-20, stage 4  · Reports she follows with Dr Vanesa Hand  · With KELLY in the setting of vomiting/diarrhea and Bumex use  · Nephrology consult appreciated   · Bumex discontinued   · Miralax/colace discontinued  · Continue IVF  · Creatinine worsened, 5 21 today  · Daughter requesting call from Nephrologist today - made them aware   · Avoid nephrotoxins and hypotension  · BMP monitoring     Abdominal pain  Assessment & Plan  · CT abdomen/pelvis on admission with no acute intra-abdominal abnormality per the results report   · Suspect related to COVID/diarrhea  · With reported diarrhea   Miralax/colace discontinued · Bentyl, imodium on board   · Improving     Chronic diastolic heart failure ([address] )  Assessment & Plan  Wt Readings from Last 3 Encounters:   02/12/22 70 4 kg (155 lb 3 3 oz)   01/31/22 71 2 kg (157 lb)   11/23/21 72 4 kg (159 lb 9 6 oz)     · Per record review, H&P documented acute heart failure, however patient examines dry to euvolemic, on RA, no rales on examination  · Patient follows with Dr Thom James  Stage C HFpEF, echo most recently with EF 60%, hypo-inferior wall, normal RV  · NT proBNP 17,351 (20,000 in 10/21), CT A/P wo contrast showed trace bilateral pleural effusions  · Weight was recorded as 160 on admission,  Weight was 157 at recent office visit 1/31  · Requesting accurate weights and I&Os - nurse aware  · Bumex on hold given diarrhea/vomiting/volume loss and KELLY   On IVF per Nephrology   · Nephrology following as above  · Cautious fluid balance       Type 2 diabetes mellitus with kidney complication, with long-term current use of insulin [facility])  Assessment & Plan  Lab Results   Component Value Date    HGBA1C 11 0 (H) 02/12/2022       Recent Labs     02/11/22  1053 02/11/22  1536 02/11/22  2106 02/12/22  0601   POCGLU 186* 188* 257* 196*       Blood Sugar Average: Last 72 hrs:  (P) 201 625   · Uncontrolled as evidenced by A1c  · Continue NovoLog 70/30 10 units b i d which is home regimen  · SSI/accuchecks  · Diabetic diet  · Hypoglycemia protocol     Hypertension  Assessment & Plan  · Avoid hypotension given KELLY  · Nephrology following  · Bumex on hold      Pericardial effusion  Assessment & Plan  · Chronic, noted on prior echos/imaging  · Monitoring outpatient     Coronary artery disease involving native coronary artery of native heart with angina pectoris [facility])  Assessment & Plan  · C 1/4/19: LUDY x 2 to LAD after NSTEMI (trop 0 2) and abnormal stress test with ischemia in anterior wall on 12/31/18, 80% RCA- plan staged, but has not needed intervention  · On imdur, asa, coreg, statin    Hypothyroidism  Assessment & Plan  · Continue synthroid         VTE Pharmacologic Prophylaxis: VTE Score: 11 High Risk (Score >/= 5) - Pharmacological DVT Prophylaxis Ordered: heparin  Sequential Compression Devices Ordered  Patient Centered Rounds: I performed bedside rounds with nursing staff today  Bria   Discussions with Specialists or Other Care Team Provider: Basim Chahal Nephrology     Education and Discussions with Family / Patient: Updated  (daughter) via phone  Nissa     Time Spent for Care: 20 minutes  More than 50% of total time spent on counseling and coordination of care as described above  Current Length of Stay: 2 day(s)  Current Patient Status: Inpatient   Certification Statement: The patient will continue to require additional inpatient hospital stay due to worsening KELLY  Discharge Plan: Anticipate discharge in 48-72 hrs to home  Code Status: Level 1 - Full Code    Subjective:   Ms Coy Hodgkins reports no more diarrhea or vomiting  She reports her abdominal pain has improved  She denies fever, chills, CP, SOB    Objective:     Vitals:   Temp (24hrs), Av 2 °F (36 8 °C), Min:97 4 °F (36 3 °C), Max:98 9 °F (37 2 °C)    Temp:  [97 4 °F (36 3 °C)-98 9 °F (37 2 °C)] 98 9 °F (37 2 °C)  HR:  [55-60] 60  BP: (102-133)/(58-86) 132/58  SpO2:  [92 %-97 %] 93 %  Body mass index is 28 39 kg/m²  Input and Output Summary (last 24 hours): Intake/Output Summary (Last 24 hours) at 2022 0929  Last data filed at 2022 0900  Gross per 24 hour   Intake 1369 ml   Output 300 ml   Net 1069 ml       Physical Exam:   Physical Exam  Vitals and nursing note reviewed  Constitutional:       Comments: Patient see sitting in bed comfortably resting, NAD, daughter on speaker phone    Cardiovascular:      Rate and Rhythm: Normal rate and regular rhythm  Pulmonary:      Effort: Pulmonary effort is normal  No respiratory distress  Breath sounds: Normal breath sounds  Comments: Room air   Abdominal:      General: Bowel sounds are normal       Palpations: Abdomen is soft  Tenderness: There is no abdominal tenderness  Musculoskeletal:      Right lower leg: No edema  Left lower leg: No edema  Skin:     General: Skin is warm  Neurological:      Mental Status: She is alert and oriented to person, place, and time  Psychiatric:         Mood and Affect: Mood normal          Behavior: Behavior normal           Additional Data:     Labs:  Results from last 7 days   Lab Units 02/12/22  0523 02/11/22  0640 02/11/22  0640   WBC Thousand/uL 5 89   < > 5 25   HEMOGLOBIN g/dL 7 2*   < > 8 2*   HEMATOCRIT % 22 8*   < > 26 3*   PLATELETS Thousands/uL 191   < > 204   BANDS PCT %  --   --  3   NEUTROS PCT % 74  --   --    LYMPHS PCT % 18  --   --    LYMPHO PCT %  --   --  13*   MONOS PCT % 6  --   --    MONO PCT %  --   --  2*   EOS PCT % 1  --  0    < > = values in this interval not displayed  Results from last 7 days   Lab Units 02/12/22  0523   SODIUM mmol/L 135*   POTASSIUM mmol/L 4 3   CHLORIDE mmol/L 103   CO2 mmol/L 21   BUN mg/dL 81*   CREATININE mg/dL 5 21*   ANION GAP mmol/L 11   CALCIUM mg/dL 8 3   ALBUMIN g/dL 2 2*   TOTAL BILIRUBIN mg/dL 0 31   ALK PHOS U/L 338*   ALT U/L 27   AST U/L 37   GLUCOSE RANDOM mg/dL 168*         Results from last 7 days   Lab Units 02/12/22  0601 02/11/22  2106 02/11/22  1536 02/11/22  1053 02/11/22  0633 02/10/22  1545 02/10/22  1131 02/10/22  0551   POC GLUCOSE mg/dl 196* 257* 188* 186* 93 206* 309* 178*     Results from last 7 days   Lab Units 02/12/22  0523   HEMOGLOBIN A1C % 11 0*     Results from last 7 days   Lab Units 02/09/22  2316   PROCALCITONIN ng/ml 0 79*       Lines/Drains:  Invasive Devices  Report    Peripheral Intravenous Line            Peripheral IV 02/09/22 Right Arm 2 days                      Imaging: No pertinent imaging reviewed      Recent Cultures (last 7 days):         Last 24 Hours Medication List:   Current Facility-Administered Medications   Medication Dose Route Frequency Provider Last Rate    acetaminophen  650 mg Oral Q6H PRN Leroy Khan MD      amLODIPine  10 mg Oral BID Grecia Arceo MD      aspirin  81 mg Oral Daily Leroy Khan MD      atorvastatin  80 mg Oral Daily With Fe Yoder MD      benzonatate  100 mg Oral TID PRN Leroy Khan MD      carvedilol  12 5 mg Oral BID With Meals Leroy Khan MD      dicyclomine  20 mg Oral 4x Daily (AC & HS) Calixto Milner PA-C      guaiFENesin  200 mg Oral TID PRN Leroy Khan MD      heparin (porcine)  5,000 Units Subcutaneous [facility] Leroy Khan MD      hydrALAZINE  100 mg Oral TID Grecia Arceo MD      HYDROmorphone  0 5 mg Intravenous Once Leroy Khan MD      insulin aspart protamine-insulin aspart  10 Units Subcutaneous BID AC Jamey Poplin, DO      insulin lispro  1-6 Units Subcutaneous TID AC Leroy Khan MD      isosorbide mononitrate  120 mg Oral Daily Leroy Khan MD      levothyroxine  112 mcg Oral Early Morning Leroy Khan MD      loperamide  2 mg Oral TID PRN Calixto Milner PA-C      multi-electrolyte  75 mL/hr Intravenous Continuous Grecia Arceo MD 75 mL/hr (02/12/22 0503)    ondansetron  4 mg Intravenous Q6H PRN Leroy Khan MD      oxyCODONE  2 5 mg Oral Q4H PRN Leroy Khan MD      oxyCODONE  5 mg Oral Q6H PRN Leroy Khan MD      sevelamer  800 mg Oral TID With Meals Grecia Arceo MD      simethicone  80 mg Oral 4x Daily PRN Leroy Khan MD          Today, Patient Was Seen By: Justin Sewell PA-C    **Please Note: This note may have been constructed using a voice recognition system  **

## 2022-02-12 NOTE — ASSESSMENT & PLAN NOTE
Wt Readings from Last 3 Encounters:   02/12/22 70 4 kg (155 lb 3 3 oz)   01/31/22 71 2 kg (157 lb)   11/23/21 72 4 kg (159 lb 9 6 oz)     · Per record review, H&P documented acute heart failure, however patient examines dry to euvolemic, on RA, no rales on examination  · Patient follows with Dr Tunde Sweeney  Stage C HFpEF, echo most recently with EF 60%, hypo-inferior wall, normal RV  · NT proBNP 17,351 (20,000 in 10/21), CT A/P wo contrast showed trace bilateral pleural effusions  · Weight was recorded as 160 on admission,  Weight was 157 at recent office visit 1/31  · Requesting accurate weights and I&Os - nurse aware  · Bumex on hold given diarrhea/vomiting/volume loss and KELLY   On IVF per Nephrology   · Nephrology following as above  · Cautious fluid balance

## 2022-02-12 NOTE — ASSESSMENT & PLAN NOTE
Lab Results   Component Value Date    HGBA1C 11 0 (H) 02/12/2022       Recent Labs     02/11/22  1053 02/11/22  1536 02/11/22  2106 02/12/22  0601   POCGLU 186* 188* 257* 196*       Blood Sugar Average: Last 72 hrs:  (P) 201 625   · Uncontrolled as evidenced by A1c  · Continue NovoLog 70/30 10 units b i d which is home regimen  · SSI/accuchecks  · Diabetic diet  · Hypoglycemia protocol

## 2022-02-12 NOTE — PLAN OF CARE
Problem: PAIN - ADULT  Goal: Verbalizes/displays adequate comfort level or baseline comfort level  Description: Interventions:  - Encourage patient to monitor pain and request assistance  - Assess pain using appropriate pain scale  - Administer analgesics based on type and severity of pain and evaluate response  - Implement non-pharmacological measures as appropriate and evaluate response  - Consider cultural and social influences on pain and pain management  - Notify physician/advanced practitioner if interventions unsuccessful or patient reports new pain  Outcome: Progressing     Problem: INFECTION - ADULT  Goal: Absence or prevention of progression during hospitalization  Description: INTERVENTIONS:  - Assess and monitor for signs and symptoms of infection  - Monitor lab/diagnostic results  - Monitor all insertion sites, i e  indwelling lines, tubes, and drains  - [place] appropriate cooling/warming therapies per order  - Administer medications as ordered  - Instruct and encourage patient and family to use good hand hygiene technique  - Identify and instruct in appropriate isolation precautions for identified infection/condition  Outcome: Progressing     Problem: SAFETY ADULT  Goal: Patient will remain free of falls  Description: INTERVENTIONS:  - Educate patient/family on patient safety including physical limitations  - Instruct patient to call for assistance with activity   - Consult OT/PT to assist with strengthening/mobility   - Keep Call bell within reach  - Keep bed low and locked with side rails adjusted as appropriate  - Keep care items and personal belongings within reach  - Initiate and maintain comfort rounds  - Make Fall Risk Sign visible to staff  - Offer Toileting every 2 Hours, in advance of need  - Initiate/Maintain bed alarm  - Apply yellow socks and bracelet for high fall risk patients  - Consider moving patient to room near nurses station  Outcome: Progressing  Goal: Maintain or return to baseline ADL function  Description: INTERVENTIONS:  -  Assess patient's ability to carry out ADLs; assess patient's baseline for ADL function and identify physical deficits which impact ability to perform ADLs (bathing, care of mouth/teeth, toileting, grooming, dressing, etc )  - Assess/evaluate cause of self-care deficits   - Assess range of motion  - Assess patient's mobility; develop plan if impaired  - Assess patient's need for assistive devices and provide as appropriate  - Encourage maximum independence but intervene and supervise when necessary  - Involve family in performance of ADLs  - Assess for home care needs following discharge   - Consider OT consult to assist with ADL evaluation and planning for discharge  - Provide patient education as appropriate  Outcome: Progressing  Goal: Maintains/Returns to pre admission functional level  Description: INTERVENTIONS:  - Perform BMAT or MOVE assessment daily    - Set and communicate daily mobility goal to care team and patient/family/caregiver  - Collaborate with rehabilitation services on mobility goals if consulted  - Perform Range of Motion 3 times a day  - Reposition patient every 2 hours    - Dangle patient 3 times a day  - Stand patient 3 times a day  - Ambulate patient 3 times a day  - Out of bed to chair 3 times a day   - Out of bed for meals 3 times a day  - Out of bed for toileting  - Record patient progress and toleration of activity level   Outcome: Progressing     Problem: DISCHARGE PLANNING  Goal: Discharge to home or other facility with appropriate resources  Description: INTERVENTIONS:  - Identify barriers to discharge w/patient and caregiver  - Arrange for needed discharge resources and transportation as appropriate  - Identify discharge learning needs (meds, wound care, etc )  - Arrange for interpretive services to assist at discharge as needed  - Refer to Case Management Department for coordinating discharge planning if the patient needs post-hospital services based on physician/advanced practitioner order or complex needs related to functional status, cognitive ability, or social support system  Outcome: Progressing

## 2022-02-12 NOTE — ASSESSMENT & PLAN NOTE
· CT abdomen/pelvis on admission with no acute intra-abdominal abnormality per the results report   · Suspect related to COVID/diarrhea  · With reported diarrhea   Miralax/colace discontinued   · Bentyl, imodium on board   · Improving

## 2022-02-13 LAB
ANION GAP SERPL CALCULATED.3IONS-SCNC: 9 MMOL/L (ref 4–13)
BUN SERPL-MCNC: 76 MG/DL (ref 5–25)
CALCIUM SERPL-MCNC: 8.2 MG/DL (ref 8.3–10.1)
CHLORIDE SERPL-SCNC: 103 MMOL/L (ref 100–108)
CO2 SERPL-SCNC: 22 MMOL/L (ref 21–32)
CREAT SERPL-MCNC: 5.76 MG/DL (ref 0.6–1.3)
GFR SERPL CREATININE-BSD FRML MDRD: 7 ML/MIN/1.73SQ M
GLUCOSE SERPL-MCNC: 108 MG/DL (ref 65–140)
GLUCOSE SERPL-MCNC: 119 MG/DL (ref 65–140)
GLUCOSE SERPL-MCNC: 222 MG/DL (ref 65–140)
GLUCOSE SERPL-MCNC: 248 MG/DL (ref 65–140)
GLUCOSE SERPL-MCNC: 289 MG/DL (ref 65–140)
POTASSIUM SERPL-SCNC: 4.4 MMOL/L (ref 3.5–5.3)
SODIUM SERPL-SCNC: 134 MMOL/L (ref 136–145)

## 2022-02-13 PROCEDURE — 99233 SBSQ HOSP IP/OBS HIGH 50: CPT | Performed by: INTERNAL MEDICINE

## 2022-02-13 PROCEDURE — 80048 BASIC METABOLIC PNL TOTAL CA: CPT | Performed by: INTERNAL MEDICINE

## 2022-02-13 PROCEDURE — 82948 REAGENT STRIP/BLOOD GLUCOSE: CPT

## 2022-02-13 PROCEDURE — 99232 SBSQ HOSP IP/OBS MODERATE 35: CPT | Performed by: PHYSICIAN ASSISTANT

## 2022-02-13 RX ORDER — BUMETANIDE 0.25 MG/ML
2 INJECTION, SOLUTION INTRAMUSCULAR; INTRAVENOUS ONCE
Status: COMPLETED | OUTPATIENT
Start: 2022-02-13 | End: 2022-02-13

## 2022-02-13 RX ADMIN — DICYCLOMINE HYDROCHLORIDE 20 MG: 20 TABLET ORAL at 21:22

## 2022-02-13 RX ADMIN — BUMETANIDE 2 MG: 0.25 INJECTION INTRAMUSCULAR; INTRAVENOUS at 11:35

## 2022-02-13 RX ADMIN — CARVEDILOL 12.5 MG: 12.5 TABLET, FILM COATED ORAL at 16:37

## 2022-02-13 RX ADMIN — INSULIN LISPRO 2 UNITS: 100 INJECTION, SOLUTION INTRAVENOUS; SUBCUTANEOUS at 11:35

## 2022-02-13 RX ADMIN — LEVOTHYROXINE SODIUM 112 MCG: 112 TABLET ORAL at 06:11

## 2022-02-13 RX ADMIN — HYDRALAZINE HYDROCHLORIDE 100 MG: 50 TABLET ORAL at 21:22

## 2022-02-13 RX ADMIN — AMLODIPINE BESYLATE 10 MG: 10 TABLET ORAL at 08:52

## 2022-02-13 RX ADMIN — ASPIRIN 81 MG: 81 TABLET, CHEWABLE ORAL at 08:52

## 2022-02-13 RX ADMIN — DICYCLOMINE HYDROCHLORIDE 20 MG: 20 TABLET ORAL at 06:11

## 2022-02-13 RX ADMIN — HYDRALAZINE HYDROCHLORIDE 100 MG: 50 TABLET ORAL at 08:52

## 2022-02-13 RX ADMIN — CARVEDILOL 12.5 MG: 12.5 TABLET, FILM COATED ORAL at 08:52

## 2022-02-13 RX ADMIN — AMLODIPINE BESYLATE 10 MG: 10 TABLET ORAL at 21:22

## 2022-02-13 RX ADMIN — ISOSORBIDE MONONITRATE 120 MG: 60 TABLET, EXTENDED RELEASE ORAL at 08:52

## 2022-02-13 RX ADMIN — ATORVASTATIN CALCIUM 80 MG: 80 TABLET, FILM COATED ORAL at 16:36

## 2022-02-13 RX ADMIN — HYDRALAZINE HYDROCHLORIDE 100 MG: 50 TABLET ORAL at 16:36

## 2022-02-13 RX ADMIN — INSULIN LISPRO 3 UNITS: 100 INJECTION, SOLUTION INTRAVENOUS; SUBCUTANEOUS at 16:37

## 2022-02-13 NOTE — ASSESSMENT & PLAN NOTE
· CT abdomen/pelvis on admission with no acute intra-abdominal abnormality per the results report   · Suspect related to COVID/diarrhea  · Patient reports abdominal discomfort continues to improve and diarrhea has resolved   · Bentyl, imodium on board   · Improving

## 2022-02-13 NOTE — ASSESSMENT & PLAN NOTE
· [facility] 1/4/19: LUDY x 2 to LAD after NSTEMI (trop 0 2) and abnormal stress test with ischemia in anterior wall on 12/31/18, 80% RCA- plan staged, but has not needed intervention  · On imdur, asa, coreg, statin

## 2022-02-13 NOTE — PLAN OF CARE
Problem: PAIN - ADULT  Goal: Verbalizes/displays adequate comfort level or baseline comfort level  Description: Interventions:  - Encourage patient to monitor pain and request assistance  - Assess pain using appropriate pain scale  - Administer analgesics based on type and severity of pain and evaluate response  - Implement non-pharmacological measures as appropriate and evaluate response  - Notify physician/advanced practitioner if interventions unsuccessful or patient reports new pain  Outcome: Progressing     Problem: INFECTION - ADULT  Goal: Absence or prevention of progression during hospitalization  Description: INTERVENTIONS:  - Assess and monitor for signs and symptoms of infection  - Monitor lab/diagnostic results  - Monitor all insertion sites, i e  indwelling lines, tubes, and drains  - [place] appropriate cooling/warming therapies per order  - Administer medications as ordered  - Instruct and encourage patient and family to use good hand hygiene technique  - Identify and instruct in appropriate isolation precautions for identified infection/condition  Outcome: Progressing     Problem: SAFETY ADULT  Goal: Patient will remain free of falls  Description: INTERVENTIONS:  - Educate patient/family on patient safety including physical limitations  - Instruct patient to call for assistance with activity   - Consult OT/PT to assist with strengthening/mobility   - Keep Call bell within reach  - Keep bed low and locked with side rails adjusted as appropriate  - Keep care items and personal belongings within reach  - Initiate and maintain comfort rounds  - Make Fall Risk Sign visible to staff  - Offer Toileting every 2 Hours, in advance of need  - Initiate/Maintain bed alarm  - Apply yellow socks and bracelet for high fall risk patients  - Consider moving patient to room near nurses station  Outcome: Progressing  Goal: Maintain or return to baseline ADL function  Description: INTERVENTIONS:  -  Assess patient's ability to carry out ADLs; assess patient's baseline for ADL function and identify physical deficits which impact ability to perform ADLs (bathing, care of mouth/teeth, toileting, grooming, dressing, etc )  - Assess/evaluate cause of self-care deficits   - Assess range of motion  - Assess patient's mobility; develop plan if impaired  - Assess patient's need for assistive devices and provide as appropriate  - Encourage maximum independence but intervene and supervise when necessary  - Involve family in performance of ADLs  - Assess for home care needs following discharge   - Consider OT consult to assist with ADL evaluation and planning for discharge  - Provide patient education as appropriate  Outcome: Progressing  Goal: Maintains/Returns to pre admission functional level  Description: INTERVENTIONS:  - Perform BMAT or MOVE assessment daily    - Set and communicate daily mobility goal to care team and patient/family/caregiver  - Collaborate with rehabilitation services on mobility goals if consulted  - Perform Range of Motion 3 times a day  - Reposition patient every 2 hours    - Dangle patient 3 times a day  - Stand patient 3 times a day  - Ambulate patient 3 times a day  - Out of bed to chair 3 times a day   - Out of bed for meals 3 times a day  - Out of bed for toileting  - Record patient progress and toleration of activity level   Outcome: Progressing     Problem: DISCHARGE PLANNING  Goal: Discharge to home or other facility with appropriate resources  Description: INTERVENTIONS:  - Identify barriers to discharge w/patient and caregiver  - Arrange for needed discharge resources and transportation as appropriate  - Identify discharge learning needs (meds, wound care, etc )  - Refer to Case Management Department for coordinating discharge planning if the patient needs post-hospital services based on physician/advanced practitioner order or complex needs related to functional status, cognitive ability, or social support system  Outcome: Progressing     Problem: GENITOURINARY - ADULT  Goal: Maintains or returns to baseline urinary function  Description: INTERVENTIONS:  - Assess urinary function  - Encourage oral fluids to ensure adequate hydration if ordered  - Administer IV fluids as ordered to ensure adequate hydration  - Administer ordered medications as needed  - Offer frequent toileting  - Follow urinary retention protocol if ordered  Outcome: Progressing     Problem: METABOLIC, FLUID AND ELECTROLYTES - ADULT  Goal: Fluid balance maintained  Description: INTERVENTIONS:  - Monitor labs   - Monitor I/O and WT  - Instruct patient on fluid and nutrition as appropriate  - Assess for signs & symptoms of volume excess or deficit  Outcome: Progressing  Goal: Glucose maintained within target range  Description: INTERVENTIONS:  - Monitor Blood Glucose as ordered  - Assess for signs and symptoms of hyperglycemia and hypoglycemia  - Administer ordered medications to maintain glucose within target range  - Assess nutritional intake and initiate nutrition service referral as needed  Outcome: Progressing

## 2022-02-13 NOTE — PLAN OF CARE
Problem: PAIN - ADULT  Goal: Verbalizes/displays adequate comfort level or baseline comfort level  Description: Interventions:  - Encourage patient to monitor pain and request assistance  - Assess pain using appropriate pain scale  - Administer analgesics based on type and severity of pain and evaluate response  - Implement non-pharmacological measures as appropriate and evaluate response  - Notify physician/advanced practitioner if interventions unsuccessful or patient reports new pain  Outcome: Progressing     Problem: INFECTION - ADULT  Goal: Absence or prevention of progression during hospitalization  Description: INTERVENTIONS:  - Assess and monitor for signs and symptoms of infection  - Monitor lab/diagnostic results  - Monitor all insertion sites, i e  indwelling lines, tubes, and drains  - [place] appropriate cooling/warming therapies per order  - Administer medications as ordered  - Instruct and encourage patient and family to use good hand hygiene technique  - Identify and instruct in appropriate isolation precautions for identified infection/condition  Outcome: Progressing     Problem: SAFETY ADULT  Goal: Patient will remain free of falls  Description: INTERVENTIONS:  - Educate patient/family on patient safety including physical limitations  - Instruct patient to call for assistance with activity   - Consult OT/PT to assist with strengthening/mobility   - Keep Call bell within reach  - Keep bed low and locked with side rails adjusted as appropriate  - Keep care items and personal belongings within reach  - Initiate and maintain comfort rounds  - Make Fall Risk Sign visible to staff  - Offer Toileting every 2 Hours, in advance of need  - Initiate/Maintain bed alarm  - Apply yellow socks and bracelet for high fall risk patients  - Consider moving patient to room near nurses station  Outcome: Progressing  Goal: Maintain or return to baseline ADL function  Description: INTERVENTIONS:  -  Assess patient's ability to carry out ADLs; assess patient's baseline for ADL function and identify physical deficits which impact ability to perform ADLs (bathing, care of mouth/teeth, toileting, grooming, dressing, etc )  - Assess/evaluate cause of self-care deficits   - Assess range of motion  - Assess patient's mobility; develop plan if impaired  - Assess patient's need for assistive devices and provide as appropriate  - Encourage maximum independence but intervene and supervise when necessary  - Involve family in performance of ADLs  - Assess for home care needs following discharge   - Consider OT consult to assist with ADL evaluation and planning for discharge  - Provide patient education as appropriate  Outcome: Progressing  Goal: Maintains/Returns to pre admission functional level  Description: INTERVENTIONS:  - Perform BMAT or MOVE assessment daily    - Set and communicate daily mobility goal to care team and patient/family/caregiver  - Collaborate with rehabilitation services on mobility goals if consulted  - Perform Range of Motion 3 times a day  - Reposition patient every 2 hours    - Dangle patient 3 times a day  - Stand patient 3 times a day  - Ambulate patient 3 times a day  - Out of bed to chair 3 times a day   - Out of bed for meals 3 times a day  - Out of bed for toileting  - Record patient progress and toleration of activity level   Outcome: Progressing     Problem: DISCHARGE PLANNING  Goal: Discharge to home or other facility with appropriate resources  Description: INTERVENTIONS:  - Identify barriers to discharge w/patient and caregiver  - Arrange for needed discharge resources and transportation as appropriate  - Identify discharge learning needs (meds, wound care, etc )  - Refer to Case Management Department for coordinating discharge planning if the patient needs post-hospital services based on physician/advanced practitioner order or complex needs related to functional status, cognitive ability, or social support system  Outcome: Progressing     Problem: GENITOURINARY - ADULT  Goal: Maintains or returns to baseline urinary function  Description: INTERVENTIONS:  - Assess urinary function  - Encourage oral fluids to ensure adequate hydration if ordered  - Administer IV fluids as ordered to ensure adequate hydration  - Administer ordered medications as needed  - Offer frequent toileting  - Follow urinary retention protocol if ordered  Outcome: Progressing     Problem: METABOLIC, FLUID AND ELECTROLYTES - ADULT  Goal: Fluid balance maintained  Description: INTERVENTIONS:  - Monitor labs   - Monitor I/O and WT  - Instruct patient on fluid and nutrition as appropriate  - Assess for signs & symptoms of volume excess or deficit  Outcome: Progressing  Goal: Glucose maintained within target range  Description: INTERVENTIONS:  - Monitor Blood Glucose as ordered  - Assess for signs and symptoms of hyperglycemia and hypoglycemia  - Administer ordered medications to maintain glucose within target range  - Assess nutritional intake and initiate nutrition service referral as needed  Outcome: Progressing

## 2022-02-13 NOTE — ASSESSMENT & PLAN NOTE
Lab Results   Component Value Date    EGFR 7 02/13/2022    EGFR 7 02/12/2022    EGFR 9 02/11/2022    CREATININE 5 76 (H) 02/13/2022    CREATININE 5 21 (H) 02/12/2022    CREATININE 4 55 (H) 02/11/2022     · Baseline creatinine around 3 per prior notes however most recent labs indicate she has been around 3 1-3 4, Estimated GFR around 15-20, stage 4  · Reports she follows with Dr Janet Gordon  · With KELLY in the setting of vomiting/diarrhea and Bumex use  · Nephrology consult appreciated   · Bumex discontinued   · Miralax/colace discontinued  · Continue IVF  · Creatinine worsened, 5 76 today  · Avoid nephrotoxins and hypotension  · BMP monitoring

## 2022-02-13 NOTE — PROGRESS NOTES
[address] [facility]  Progress Note - Michelle Villegas 1955, 78 y o  female MRN: [id number]  Unit/Bed#: -01 Encounter: [id number]  Primary Care Provider: Elizabeth Delgado MD   Date and time admitted to hospital: 2/9/2022 11:38 AM    * COVID-19  Assessment & Plan  · Per record review, patient presented with generalized abdominal pain, diarrhea, chills, weakness, fatigue and nasal congestion  · Home COVID test +, confirmation test on arrival also +  Reported she is vaccinated   · On RA, not hypoxic   MILD Pathway  · Patient was not requiring oxygen at any point but was started on Remdesivir on admission - this was subsequently discontinued per protocol  · CRP 22, D-dimer 1 58 (AC with Hep SQ 5000 q8h per VTE Group C), procal 0 79 (unclear significance in setting of chronic renal failure), BNP 17,351  · Patient reports diarrhea has resolved  · Isolation thru 2/19      Acute kidney injury superimposed on chronic kidney disease [facility])  Assessment & Plan  Lab Results   Component Value Date    EGFR 7 02/13/2022    EGFR 7 02/12/2022    EGFR 9 02/11/2022    CREATININE 5 76 (H) 02/13/2022    CREATININE 5 21 (H) 02/12/2022    CREATININE 4 55 (H) 02/11/2022     · Baseline creatinine around 3 per prior notes however most recent labs indicate she has been around 3 1-3 4, Estimated GFR around 15-20, stage 4  · Reports she follows with Dr Aníbal Krueger  · With KELLY in the setting of vomiting/diarrhea and Bumex use  · Nephrology consult appreciated   · Bumex discontinued   · Miralax/colace discontinued  · Continue IVF  · Creatinine worsened, 5 76 today  · Avoid nephrotoxins and hypotension  · BMP monitoring     Abdominal pain  Assessment & Plan  · CT abdomen/pelvis on admission with no acute intra-abdominal abnormality per the results report   · Suspect related to COVID/diarrhea  · Patient reports abdominal discomfort continues to improve and diarrhea has resolved   · Bentyl, imodium on board   · Improving Chronic diastolic heart failure (HCC)  Assessment & Plan  Wt Readings from Last 3 Encounters:   02/13/22 72 1 kg (158 lb 15 2 oz)   01/31/22 71 2 kg (157 lb)   11/23/21 72 4 kg (159 lb 9 6 oz)     · Per record review, H&P documented acute heart failure, however patient examines dry to euvolemic, on RA, no rales on examination  · Patient follows with Dr Luisa Tirado  Stage C HFpEF, echo most recently with EF 60%, hypo-inferior wall, normal RV  · NT proBNP 17,351 (20,000 in 10/21), CT A/P wo contrast showed trace bilateral pleural effusions  · Weight was recorded as 160 on admission,  Weight was 157 at recent office visit 1/31  · Requesting accurate weights and I&Os - nurse aware  · Bumex on hold given diarrhea/vomiting/volume loss and KELLY   On IVF per Nephrology   · Nephrology following as above  · Cautious fluid balance       Type 2 diabetes mellitus with kidney complication, with long-term current use of insulin [facility])  Assessment & Plan  Lab Results   Component Value Date    HGBA1C 11 0 (H) 02/12/2022       Recent Labs     02/12/22  1115 02/12/22  1600 02/12/22  2050 02/13/22  0606   POCGLU 269* 157* 154* 119       Blood Sugar Average: Last 72 hrs:  (P) 554 [id number]   · Uncontrolled as evidenced by A1c  · Continue NovoLog 70/30 10 units b i d which is home regimen  · SSI/accuchecks  · Diabetic diet  · Hypoglycemia protocol     Hypertension  Assessment & Plan  · Avoid hypotension given KELLY  · Nephrology following  · Bumex on hold      Pericardial effusion  Assessment & Plan  · Chronic, noted on prior echos/imaging  · Monitoring outpatient     Coronary artery disease involving native coronary artery of native heart with angina pectoris [facility])  Assessment & Plan  · LHC 1/4/19: LUDY x 2 to LAD after NSTEMI (trop 0 2) and abnormal stress test with ischemia in anterior wall on 12/31/18, 80% RCA- plan staged, but has not needed intervention  · On imdur, asa, coreg, statin    Hypothyroidism  Assessment & Plan  · Continue synthroid         VTE Pharmacologic Prophylaxis: VTE Score: 11 Moderate Risk (Score 3-4) - Pharmacological DVT Prophylaxis Ordered: heparin  Patient Centered Rounds: I performed bedside rounds with nursing staff today  d/w KAREN Fraser  Discussions with Specialists or Other Care Team Provider: Basim Nephrologist, Dr Capri Chang     Education and Discussions with Family / Patient: Updated  (daughter) via phone  Gay Kowalski    Time Spent for Care: 30 minutes  More than 50% of total time spent on counseling and coordination of care as described above  Current Length of Stay: 3 day(s)  Current Patient Status: Inpatient   Certification Statement: The patient will continue to require additional inpatient hospital stay due to worsening sukhwinder  Discharge Plan: Anticipate discharge in >72 hrs to discharge location to be determined pending rehab evaluations  Code Status: Level 1 - Full Code    Subjective:   Ms Camilo Farah reports that her abdominal pain is improved  She reports 1 episode of vomiting yesterday  She denies any more episodes of diarrhea  She denies CP or SOB    Objective:     Vitals:   Temp (24hrs), Av °F (36 7 °C), Min:97 9 °F (36 6 °C), Max:98 1 °F (36 7 °C)    Temp:  [97 9 °F (36 6 °C)-98 1 °F (36 7 °C)] 98 1 °F (36 7 °C)  HR:  [56-67] 66  BP: (127-138)/(55-58) 138/58  SpO2:  [92 %-98 %] 92 %  Body mass index is 29 07 kg/m²  Input and Output Summary (last 24 hours): Intake/Output Summary (Last 24 hours) at 2022 0916  Last data filed at 2022 0610  Gross per 24 hour   Intake 900 ml   Output 350 ml   Net 550 ml       Physical Exam:   Physical Exam  Vitals and nursing note reviewed  Constitutional:       Comments: Patient seen sitting in bed comfortably resting, NAD   Cardiovascular:      Rate and Rhythm: Normal rate and regular rhythm  Pulmonary:      Effort: Pulmonary effort is normal       Breath sounds: Normal breath sounds        Comments: Room air   Abdominal: General: Bowel sounds are normal       Palpations: Abdomen is soft  Tenderness: There is no abdominal tenderness  Musculoskeletal:      Right lower leg: No edema  Left lower leg: No edema  Skin:     General: Skin is warm  Neurological:      Mental Status: She is alert and oriented to person, place, and time  Psychiatric:         Mood and Affect: Mood normal          Behavior: Behavior normal           Additional Data:     Labs:  Results from last 7 days   Lab Units 02/12/22  0523 02/11/22  0640 02/11/22  0640   WBC Thousand/uL 5 89   < > 5 25   HEMOGLOBIN g/dL 7 2*   < > 8 2*   HEMATOCRIT % 22 8*   < > 26 3*   PLATELETS Thousands/uL 191   < > 204   BANDS PCT %  --   --  3   NEUTROS PCT % 74  --   --    LYMPHS PCT % 18  --   --    LYMPHO PCT %  --   --  13*   MONOS PCT % 6  --   --    MONO PCT %  --   --  2*   EOS PCT % 1  --  0    < > = values in this interval not displayed  Results from last 7 days   Lab Units 02/13/22  0747 02/12/22  0523 02/12/22  0523   SODIUM mmol/L 134*   < > 135*   POTASSIUM mmol/L 4 4   < > 4 3   CHLORIDE mmol/L 103   < > 103   CO2 mmol/L 22   < > 21   BUN mg/dL 76*   < > 81*   CREATININE mg/dL 5 76*   < > 5 21*   ANION GAP mmol/L 9   < > 11   CALCIUM mg/dL 8 2*   < > 8 3   ALBUMIN g/dL  --   --  2 2*   TOTAL BILIRUBIN mg/dL  --   --  0 31   ALK PHOS U/L  --   --  338*   ALT U/L  --   --  27   AST U/L  --   --  37   GLUCOSE RANDOM mg/dL 108   < > 168*    < > = values in this interval not displayed           Results from last 7 days   Lab Units 02/13/22  0606 02/12/22  2050 02/12/22  1600 02/12/22  1115 02/12/22  0601 02/11/22  2106 02/11/22  1536 02/11/22  1053 02/11/22  0633 02/10/22  1545 02/10/22  1131 02/10/22  0551   POC GLUCOSE mg/dl 119 154* 157* 269* 196* 257* 188* 186* 93 206* 309* 178*     Results from last 7 days   Lab Units 02/12/22  0523   HEMOGLOBIN A1C % 11 0*     Results from last 7 days   Lab Units 02/09/22  2316   PROCALCITONIN ng/ml 0 79* Lines/Drains:  Invasive Devices  Report    Peripheral Intravenous Line            Peripheral IV 02/09/22 Right Arm 3 days    Peripheral IV 02/13/22 Left;Ventral (anterior) Forearm <1 day                      Imaging: No pertinent imaging reviewed  Recent Cultures (last 7 days):         Last 24 Hours Medication List:   Current Facility-Administered Medications   Medication Dose Route Frequency Provider Last Rate    acetaminophen  650 mg Oral Q6H PRN Zelda Carrington MD      amLODIPine  10 mg Oral BID Luis Angel Real MD      aspirin  81 mg Oral Daily Zelda Carrington MD      atorvastatin  80 mg Oral Daily With Ahsan Buenrostro MD      benzonatate  100 mg Oral TID PRN Zelda Carrington MD      carvedilol  12 5 mg Oral BID With Meals Zelda Carrington MD      dicyclomine  20 mg Oral 4x Daily (AC & HS) Gino Peraza PA-C      guaiFENesin  200 mg Oral TID PRN Zelda Carrington MD      heparin (porcine)  5,000 Units Subcutaneous [facility] Zelda Carrington MD      hydrALAZINE  100 mg Oral TID Luis Angel Rela MD      HYDROmorphone  0 5 mg Intravenous Once Zelda Carrington MD      insulin aspart protamine-insulin aspart  10 Units Subcutaneous BID AC Eufemia Galarza DO      insulin lispro  1-6 Units Subcutaneous TID AC Zelda Carrington MD      isosorbide mononitrate  120 mg Oral Daily Zelda Carrington MD      levothyroxine  112 mcg Oral Early Morning Zelda Carrington MD      loperamide  2 mg Oral TID PRN Gino Peraza PA-C      ondansetron  4 mg Intravenous Q6H PRN Zelda Carrington MD      oxyCODONE  2 5 mg Oral Q4H PRN Zelda Carrington MD      oxyCODONE  5 mg Oral Q6H PRN Zelda Carrington MD      simethicone  80 mg Oral 4x Daily PRN Zelda Carrington MD          Today, Patient Was Seen By: Óscar Andrea PA-C    **Please Note: This note may have been constructed using a voice recognition system  **

## 2022-02-13 NOTE — ASSESSMENT & PLAN NOTE
Lab Results   Component Value Date    HGBA1C 11 0 (H) 02/12/2022       Recent Labs     02/12/22  1115 02/12/22  1600 02/12/22 2050 02/13/22  0606   POCGLU 269* 157* 154* 119       Blood Sugar Average: Last 72 hrs:  (P) 612 [id number]   · Uncontrolled as evidenced by A1c  · Continue NovoLog 70/30 10 units b i d which is home regimen  · SSI/accuchecks  · Diabetic diet  · Hypoglycemia protocol

## 2022-02-13 NOTE — ASSESSMENT & PLAN NOTE
· Per record review, patient presented with generalized abdominal pain, diarrhea, chills, weakness, fatigue and nasal congestion  · Home COVID test +, confirmation test on arrival also +  Reported she is vaccinated   · On RA, not hypoxic   MILD Pathway  · Patient was not requiring oxygen at any point but was started on Remdesivir on admission - this was subsequently discontinued per protocol  · CRP 22, D-dimer 1 58 (AC with Hep SQ 5000 q8h per VTE Group C), procal 0 79 (unclear significance in setting of chronic renal failure), BNP 17,351  · Patient reports diarrhea has resolved  · Isolation thru 2/19

## 2022-02-13 NOTE — PROGRESS NOTES
NEPHROLOGY PROGRESS NOTE   Delene Abo 78 y o  female MRN: [id number]  Unit/Bed#: -01 Encounter: [id number]  Reason for Consult:  Acute kidney injury    ASSESSMENT/PLAN:  1  Acute kidney injury, etiology multifactorial, suspecting prerenal component, COVID-19 infection as well as hemodynamic fluctuations, not responsive to IV fluids,? Cardiorenal syndrome, trial of diuretic therapy  2  Chronic kidney disease with previous baseline creatinine 3 0-3 4, underlying disease most likely secondary to diabetes and hypertension  Patient declining dialysis therapy  3  Chronic diastolic heart failure, volume status currently appears stable appears to be under estimated dry weight  4  Hypercalcemia, resolved  5  CKD associated mineral bone disorder, previously on calcium acetate, as noted above, placed on Renagel will discontinue for now given poor oral intake  6  Anemia chronic kidney disease will continue monitor closely  7  COVID-19 infection, management as per primary service  8  Hypertension, blood pressure overall appears stable     PLAN:  · Unfortunate no significant improvement function  · Does not appear to be volume responsive  · Will trial diuretic therapy  · Discussed with patient at length again regards to potential need hemodialysis  Discussed with patient as well as daughter  At this point time patient is still declining dialysis as a potential therapeutic option  SUBJECTIVE:  Seen and examined  Patient offers no new complaints  Denies any chest pain shortness of breath or abdominal pain  Does not feel fluid overloaded      Review of Systems    OBJECTIVE:  Current Weight: Weight - Scale: 72 1 kg (158 lb 15 2 oz)  Vitals:    02/12/22 2051 02/13/22 0600 02/13/22 0608 02/13/22 0736   BP: 127/55   138/58   Pulse:   67 66   Resp:       Temp: 97 9 °F (36 6 °C)   98 1 °F (36 7 °C)   TempSrc:       SpO2:   98% 92%   Weight:  72 1 kg (158 lb 15 2 oz)     Height:           Intake/Output Summary (Last 24 hours) at 2/13/2022 4696  Last data filed at 2/13/2022 0610  Gross per 24 hour   Intake 900 ml   Output 350 ml   Net 550 ml       Physical Exam  Constitutional:       Appearance: She is not ill-appearing  HENT:      Head: Normocephalic and atraumatic  Eyes:      General: No scleral icterus  Cardiovascular:      Rate and Rhythm: Normal rate and regular rhythm  Pulmonary:      Effort: Pulmonary effort is normal       Breath sounds: Normal breath sounds  Abdominal:      General: There is no distension  Palpations: Abdomen is soft  Musculoskeletal:      Right lower leg: No edema  Left lower leg: No edema  Skin:     General: Skin is warm and dry  Neurological:      Mental Status: She is alert and oriented to person, place, and time           Medications:    Current Facility-Administered Medications:     acetaminophen (TYLENOL) tablet 650 mg, 650 mg, Oral, Q6H PRN, Angela Licea MD, 650 mg at 02/11/22 1320    amLODIPine (NORVASC) tablet 10 mg, 10 mg, Oral, BID, Jeremy Keen MD, 10 mg at 02/13/22 7981    aspirin chewable tablet 81 mg, 81 mg, Oral, Daily, Angela Licea MD, 81 mg at 02/13/22 0852    atorvastatin (LIPITOR) tablet 80 mg, 80 mg, Oral, Daily With Julito Lozoya MD, 80 mg at 02/12/22 1706    benzonatate (TESSALON PERLES) capsule 100 mg, 100 mg, Oral, TID PRN, Angela Licea MD    carvedilol (COREG) tablet 12 5 mg, 12 5 mg, Oral, BID With Meals, Angela Licea MD, 12 5 mg at 02/13/22 0852    dicyclomine (BENTYL) tablet 20 mg, 20 mg, Oral, 4x Daily (AC & HS), Vidya Melgar PA-C, 20 mg at 02/13/22 5803    guaiFENesin (ROBITUSSIN) oral solution 200 mg, 200 mg, Oral, TID PRN, Angela Licea MD    heparin (porcine) subcutaneous injection 5,000 Units, 5,000 Units, Subcutaneous, Q8H [address], 5,000 Units at 02/11/22 1314 **AND** [CANCELED] Platelet count, , , Once, Angela Licea MD    hydrALAZINE (APRESOLINE) tablet 100 mg, 100 mg, Oral, TID, Jeremy Keen, MD, 100 mg at 02/13/22 0852    HYDROmorphone (DILAUDID) injection 0 5 mg, 0 5 mg, Intravenous, Once, Lily Hale MD    insulin aspart protamine-insulin aspart (NovoLOG 70/30) 100 units/mL subcutaneous injection 10 Units, 10 Units, Subcutaneous, BID AC, Cathy Evans DO, 10 Units at 02/12/22 0844    insulin lispro (HumaLOG) 100 units/mL subcutaneous injection 1-6 Units, 1-6 Units, Subcutaneous, TID AC, 1 Units at 02/12/22 1706 **AND** Fingerstick Glucose (POCT), , , TID AC, Lily Hale MD    isosorbide mononitrate (IMDUR) 24 hr tablet 120 mg, 120 mg, Oral, Daily, Lily Hale MD, 120 mg at 02/13/22 8650    levothyroxine tablet 112 mcg, 112 mcg, Oral, Early Morning, Lily Hale MD, 112 mcg at 02/13/22 3925    loperamide (IMODIUM) capsule 2 mg, 2 mg, Oral, TID PRN, Tamanna Pickering PA-C, 2 mg at 02/11/22 1238    ondansetron (ZOFRAN) injection 4 mg, 4 mg, Intravenous, Q6H PRN, Lily Hale MD, 4 mg at 02/11/22 1156    oxyCODONE (ROXICODONE) IR tablet 2 5 mg, 2 5 mg, Oral, Q4H PRN, Lily Hale MD    oxyCODONE (ROXICODONE) IR tablet 5 mg, 5 mg, Oral, Q6H PRN, Lily Hale MD, 5 mg at 02/12/22 1119    simethicone (MYLICON) chewable tablet 80 mg, 80 mg, Oral, 4x Daily PRN, Lily Hale MD    Laboratory Results:  Results from last 7 days   Lab Units 02/13/22  0747 02/12/22  0523 02/11/22  0640 02/09/22  2316 02/09/22  1207   WBC Thousand/uL  --  5 89 5 25 3 06* 6 19   HEMOGLOBIN g/dL  --  7 2* 8 2* 8 0* 8 6*   HEMATOCRIT %  --  22 8* 26 3* 25 3* 27 6*   PLATELETS Thousands/uL  --  191 204 153 167   POTASSIUM mmol/L 4 4 4 3 4 0 4 2 4 0   CHLORIDE mmol/L 103 103 106 103 106   CO2 mmol/L 22 21 21 21 20*   BUN mg/dL 76* 81* 82* 69* 68*   CREATININE mg/dL 5 76* 5 21* 4 55* 3 18* 3 43*   CALCIUM mg/dL 8 2* 8 3 8 9 8 3 8 6

## 2022-02-13 NOTE — NUTRITION
02/13/22 1340   Recommendations/Interventions   Nutrition Recommendations Other (Specify)  (Supplement/Glucerna BID, vanilla flavor B&D meals)

## 2022-02-13 NOTE — ASSESSMENT & PLAN NOTE
Wt Readings from Last 3 Encounters:   02/13/22 72 1 kg (158 lb 15 2 oz)   01/31/22 71 2 kg (157 lb)   11/23/21 72 4 kg (159 lb 9 6 oz)     · Per record review, H&P documented acute heart failure, however patient examines dry to euvolemic, on RA, no rales on examination  · Patient follows with Dr Johnson Killer  Stage C HFpEF, echo most recently with EF 60%, hypo-inferior wall, normal RV  · NT proBNP 17,351 (20,000 in 10/21), CT A/P wo contrast showed trace bilateral pleural effusions  · Weight was recorded as 160 on admission,  Weight was 157 at recent office visit 1/31  · Requesting accurate weights and I&Os - nurse aware  · Bumex on hold given diarrhea/vomiting/volume loss and KELLY   On IVF per Nephrology   · Nephrology following as above  · Cautious fluid balance

## 2022-02-14 LAB
ABO GROUP BLD: NORMAL
ANION GAP SERPL CALCULATED.3IONS-SCNC: 10 MMOL/L (ref 4–13)
BLD GP AB SCN SERPL QL: POSITIVE
BUN SERPL-MCNC: 83 MG/DL (ref 5–25)
CALCIUM SERPL-MCNC: 8 MG/DL (ref 8.3–10.1)
CHLORIDE SERPL-SCNC: 98 MMOL/L (ref 100–108)
CO2 SERPL-SCNC: 23 MMOL/L (ref 21–32)
CREAT SERPL-MCNC: 6.31 MG/DL (ref 0.6–1.3)
ERYTHROCYTE [DISTWIDTH] IN BLOOD BY AUTOMATED COUNT: 13.1 % (ref 11.6–15.1)
GFR SERPL CREATININE-BSD FRML MDRD: 6 ML/MIN/1.73SQ M
GLUCOSE SERPL-MCNC: 174 MG/DL (ref 65–140)
GLUCOSE SERPL-MCNC: 185 MG/DL (ref 65–140)
GLUCOSE SERPL-MCNC: 216 MG/DL (ref 65–140)
GLUCOSE SERPL-MCNC: 250 MG/DL (ref 65–140)
GLUCOSE SERPL-MCNC: 262 MG/DL (ref 65–140)
HCT VFR BLD AUTO: 20.7 % (ref 34.8–46.1)
HGB BLD-MCNC: 6.6 G/DL (ref 11.5–15.4)
MCH RBC QN AUTO: 27.7 PG (ref 26.8–34.3)
MCHC RBC AUTO-ENTMCNC: 31.9 G/DL (ref 31.4–37.4)
MCV RBC AUTO: 87 FL (ref 82–98)
PLATELET # BLD AUTO: 173 THOUSANDS/UL (ref 149–390)
PMV BLD AUTO: 10.6 FL (ref 8.9–12.7)
POTASSIUM SERPL-SCNC: 4.5 MMOL/L (ref 3.5–5.3)
RBC # BLD AUTO: 2.38 MILLION/UL (ref 3.81–5.12)
RH BLD: NEGATIVE
SODIUM SERPL-SCNC: 131 MMOL/L (ref 136–145)
SPECIMEN EXPIRATION DATE: NORMAL
WBC # BLD AUTO: 5.49 THOUSAND/UL (ref 4.31–10.16)

## 2022-02-14 PROCEDURE — 86900 BLOOD TYPING SEROLOGIC ABO: CPT | Performed by: PHYSICIAN ASSISTANT

## 2022-02-14 PROCEDURE — 85027 COMPLETE CBC AUTOMATED: CPT | Performed by: PHYSICIAN ASSISTANT

## 2022-02-14 PROCEDURE — 82948 REAGENT STRIP/BLOOD GLUCOSE: CPT

## 2022-02-14 PROCEDURE — 86922 COMPATIBILITY TEST ANTIGLOB: CPT

## 2022-02-14 PROCEDURE — 99233 SBSQ HOSP IP/OBS HIGH 50: CPT | Performed by: INTERNAL MEDICINE

## 2022-02-14 PROCEDURE — 86901 BLOOD TYPING SEROLOGIC RH(D): CPT | Performed by: PHYSICIAN ASSISTANT

## 2022-02-14 PROCEDURE — 86850 RBC ANTIBODY SCREEN: CPT | Performed by: PHYSICIAN ASSISTANT

## 2022-02-14 PROCEDURE — 99232 SBSQ HOSP IP/OBS MODERATE 35: CPT | Performed by: PHYSICIAN ASSISTANT

## 2022-02-14 PROCEDURE — 30233N1 TRANSFUSION OF NONAUTOLOGOUS RED BLOOD CELLS INTO PERIPHERAL VEIN, PERCUTANEOUS APPROACH: ICD-10-PCS | Performed by: INTERNAL MEDICINE

## 2022-02-14 PROCEDURE — 86921 COMPATIBILITY TEST INCUBATE: CPT

## 2022-02-14 PROCEDURE — 80048 BASIC METABOLIC PNL TOTAL CA: CPT | Performed by: PHYSICIAN ASSISTANT

## 2022-02-14 PROCEDURE — 86870 RBC ANTIBODY IDENTIFICATION: CPT | Performed by: HOSPITALIST

## 2022-02-14 RX ORDER — BUMETANIDE 0.25 MG/ML
2 INJECTION, SOLUTION INTRAMUSCULAR; INTRAVENOUS ONCE
Status: COMPLETED | OUTPATIENT
Start: 2022-02-14 | End: 2022-02-15

## 2022-02-14 RX ADMIN — ASPIRIN 81 MG: 81 TABLET, CHEWABLE ORAL at 11:05

## 2022-02-14 RX ADMIN — INSULIN LISPRO 1 UNITS: 100 INJECTION, SOLUTION INTRAVENOUS; SUBCUTANEOUS at 08:07

## 2022-02-14 RX ADMIN — DICYCLOMINE HYDROCHLORIDE 20 MG: 20 TABLET ORAL at 17:09

## 2022-02-14 RX ADMIN — INSULIN LISPRO 3 UNITS: 100 INJECTION, SOLUTION INTRAVENOUS; SUBCUTANEOUS at 17:09

## 2022-02-14 RX ADMIN — INSULIN LISPRO 2 UNITS: 100 INJECTION, SOLUTION INTRAVENOUS; SUBCUTANEOUS at 11:46

## 2022-02-14 RX ADMIN — CARVEDILOL 12.5 MG: 12.5 TABLET, FILM COATED ORAL at 07:59

## 2022-02-14 RX ADMIN — ATORVASTATIN CALCIUM 80 MG: 80 TABLET, FILM COATED ORAL at 17:09

## 2022-02-14 RX ADMIN — LEVOTHYROXINE SODIUM 112 MCG: 112 TABLET ORAL at 05:15

## 2022-02-14 RX ADMIN — DICYCLOMINE HYDROCHLORIDE 20 MG: 20 TABLET ORAL at 11:05

## 2022-02-14 RX ADMIN — CARVEDILOL 12.5 MG: 12.5 TABLET, FILM COATED ORAL at 17:09

## 2022-02-14 RX ADMIN — DICYCLOMINE HYDROCHLORIDE 20 MG: 20 TABLET ORAL at 08:00

## 2022-02-14 RX ADMIN — AMLODIPINE BESYLATE 10 MG: 10 TABLET ORAL at 08:00

## 2022-02-14 NOTE — ASSESSMENT & PLAN NOTE
· CT abdomen/pelvis on admission with no acute intra-abdominal abnormality per the results report   · Suspect related to COVID/diarrhea  · Patient reports abdominal discomfort and diarrhea have resolved   · Bentyl, imodium on board

## 2022-02-14 NOTE — ASSESSMENT & PLAN NOTE
· Per record review, patient presented with generalized abdominal pain, diarrhea, chills, weakness, fatigue and nasal congestion  · Home COVID test +, confirmation test on arrival also +   Reported she is vaccinated   · Per RN, patient was noted to desat only when sleeping but sats stable on RA when awake  · Patient was not requiring oxygen but was started on Remdesivir on admission - this was subsequently discontinued per protocol  · CRP 22, D-dimer 1 58 (AC with Hep SQ 5000 q8h per VTE Group C), procal 0 79 (unclear significance in setting of chronic renal failure), BNP 17,351  · Patient reports diarrhea has resolved  · Isolation thru 2/19

## 2022-02-14 NOTE — PROGRESS NOTES
[facility]  Progress Note - Jeff Lyons 1955, 78 y o  female MRN: [id number]  Unit/Bed#: -01 Encounter: [id number]  Primary Care Provider: Kandi Evangelista MD   Date and time admitted to hospital: 2/9/2022 11:38 AM    * COVID-19  Assessment & Plan  · Per record review, patient presented with generalized abdominal pain, diarrhea, chills, weakness, fatigue and nasal congestion  · Home COVID test +, confirmation test on arrival also +  Reported she is vaccinated   · Per RN, patient was noted to desat only when sleeping but sats stable on RA when awake  · Patient was not requiring oxygen but was started on Remdesivir on admission - this was subsequently discontinued per protocol  · CRP 22, D-dimer 1 58 (AC with Hep SQ 5000 q8h per VTE Group C), procal 0 79 (unclear significance in setting of chronic renal failure), BNP 17,351  · Patient reports diarrhea has resolved  · Isolation thru 2/19      Acute kidney injury superimposed on chronic kidney disease [facility])  Assessment & Plan  Lab Results   Component Value Date    EGFR 6 02/14/2022    EGFR 7 02/13/2022    EGFR 7 02/12/2022    CREATININE 6 31 (H) 02/14/2022    CREATININE 5 76 (H) 02/13/2022    CREATININE 5 21 (H) 02/12/2022     · Baseline creatinine around 3 per prior notes however most recent labs indicate she has been around 3 1-3 4, Estimated GFR around 15-20, stage 4  · Reports she follows with Dr Kirill Martinez  · With KELLY in the setting of vomiting/diarrhea and Bumex use  · Nephrology consult appreciated   · Miralax/colace discontinued  · Home bumex was discontinued and patient was placed on IVF   Creatinine continued to worsen and IVF were stopped and patient was given IV bumex  · Creatinine worsened, 6 31 today  · Avoid nephrotoxins and hypotension  · BMP monitoring     Abdominal pain  Assessment & Plan  · CT abdomen/pelvis on admission with no acute intra-abdominal abnormality per the results report   · Suspect related to COVID/diarrhea  · Patient reports abdominal discomfort and diarrhea have resolved   · Bentyl, imodium on board     Chronic diastolic heart failure (HCC)  Assessment & Plan  Wt Readings from Last 3 Encounters:   02/14/22 71 6 kg (157 lb 12 8 oz)   01/31/22 71 2 kg (157 lb)   11/23/21 72 4 kg (159 lb 9 6 oz)     · Per record review, H&P documented acute heart failure, however patient examines dry to euvolemic, on RA, no rales on examination  · Patient follows with Dr Skye Lomas    Stage C HFpEF, echo most recently with EF 60%, hypo-inferior wall, normal RV  · NT proBNP 17,351 (20,000 in 10/21), CT A/P wo contrast showed trace bilateral pleural effusions  · Weight was recorded as 160 on admission,  Weight was 157 at recent office visit 1/31  · Requesting accurate weights and I&Os - nurse aware  · S/p IVF then IV Bumex per Nephrology   · Nephrology following as above  · Cautious fluid balance       Type 2 diabetes mellitus with kidney complication, with long-term current use of insulin [facility])  Assessment & Plan  Lab Results   Component Value Date    HGBA1C 11 0 (H) 02/12/2022       Recent Labs     02/13/22  0606 02/13/22  1120 02/13/22  1547 02/13/22  2108   POCGLU 119 222* 248* 289*       Blood Sugar Average: Last 72 hrs:  (P) 970 [id number]   · Uncontrolled as evidenced by A1c  · Continue NovoLog 70/30 10 units b i d which is home regimen - patient has been refusing   · SSI/accuchecks  · Diabetic diet  · Hypoglycemia protocol     Hypertension  Assessment & Plan  · Avoid hypotension given KELLY  · Nephrology following  · Bumex on hold      Pericardial effusion  Assessment & Plan  · Chronic, noted on prior echos/imaging  · Monitoring outpatient     Coronary artery disease involving native coronary artery of native heart with angina pectoris [facility])  Assessment & Plan  · [facility] 1/4/19: LUDY x 2 to LAD after NSTEMI (trop 0 2) and abnormal stress test with ischemia in anterior wall on 12/31/18, 80% RCA- plan staged, but has not needed intervention  · On imdur, asa, coreg, statin    Hypothyroidism  Assessment & Plan  · Continue synthroid         VTE Pharmacologic Prophylaxis: VTE Score: 11 High Risk (Score >/= 5) - Pharmacological DVT Prophylaxis Ordered: heparin  Sequential Compression Devices Ordered  Patient Centered Rounds: I performed bedside rounds with nursing staff julio cesar Gold   Discussions with Specialists or Other Care Team Provider: Basim Salvador Nephrology     Education and Discussions with Family / Patient: Updated  (daughter) via phone  Nissa     Time Spent for Care: 30 minutes  More than 50% of total time spent on counseling and coordination of care as described above  Current Length of Stay: 4 day(s)  Current Patient Status: Inpatient   Certification Statement: The patient will continue to require additional inpatient hospital stay due to worsening KELLY  Discharge Plan: Anticipate discharge in >72 hrs to home  Code Status: Level 1 - Full Code    Subjective:   Ms Tala Moreau denies CP, SOB, abdominal pain, vomiting, diarrhea  She wonders when she can go home     Objective:     Vitals:   Temp (24hrs), Av 9 °F (37 2 °C), Min:98 5 °F (36 9 °C), Max:99 2 °F (37 3 °C)    Temp:  [98 5 °F (36 9 °C)-99 2 °F (37 3 °C)] 99 2 °F (37 3 °C)  HR:  [58-67] 62  Resp:  [20] 20  BP: (119-133)/(51-90) 131/67  SpO2:  [90 %-95 %] 93 %  Body mass index is 28 86 kg/m²  Input and Output Summary (last 24 hours): Intake/Output Summary (Last 24 hours) at 2022 0827  Last data filed at 2022 1530  Gross per 24 hour   Intake 760 ml   Output 500 ml   Net 260 ml       Physical Exam:   Physical Exam  Vitals and nursing note reviewed  Constitutional:       Comments: Patient seen sitting in bed comfortably resting, NAD   Cardiovascular:      Rate and Rhythm: Normal rate and regular rhythm  Pulmonary:      Effort: Pulmonary effort is normal  No respiratory distress  Breath sounds: Normal breath sounds  Abdominal:      General: Bowel sounds are normal       Palpations: Abdomen is soft  Tenderness: There is no abdominal tenderness  Musculoskeletal:      Right lower leg: No edema  Left lower leg: No edema  Skin:     General: Skin is warm  Neurological:      Mental Status: She is alert and oriented to person, place, and time  Psychiatric:         Mood and Affect: Mood normal          Behavior: Behavior normal           Additional Data:     Labs:  Results from last 7 days   Lab Units 02/12/22  0523 02/11/22  0640 02/11/22  0640   WBC Thousand/uL 5 89   < > 5 25   HEMOGLOBIN g/dL 7 2*   < > 8 2*   HEMATOCRIT % 22 8*   < > 26 3*   PLATELETS Thousands/uL 191   < > 204   BANDS PCT %  --   --  3   NEUTROS PCT % 74  --   --    LYMPHS PCT % 18  --   --    LYMPHO PCT %  --   --  13*   MONOS PCT % 6  --   --    MONO PCT %  --   --  2*   EOS PCT % 1  --  0    < > = values in this interval not displayed  Results from last 7 days   Lab Units 02/14/22  0525 02/13/22  0747 02/12/22  0523   SODIUM mmol/L 131*   < > 135*   POTASSIUM mmol/L 4 5   < > 4 3   CHLORIDE mmol/L 98*   < > 103   CO2 mmol/L 23   < > 21   BUN mg/dL 83*   < > 81*   CREATININE mg/dL 6 31*   < > 5 21*   ANION GAP mmol/L 10   < > 11   CALCIUM mg/dL 8 0*   < > 8 3   ALBUMIN g/dL  --   --  2 2*   TOTAL BILIRUBIN mg/dL  --   --  0 31   ALK PHOS U/L  --   --  338*   ALT U/L  --   --  27   AST U/L  --   --  37   GLUCOSE RANDOM mg/dL 174*   < > 168*    < > = values in this interval not displayed           Results from last 7 days   Lab Units 02/13/22  2108 02/13/22  1547 02/13/22  1120 02/13/22  0606 02/12/22  2050 02/12/22  1600 02/12/22  1115 02/12/22  0601 02/11/22  2106 02/11/22  1536 02/11/22  1053 02/11/22  0633   POC GLUCOSE mg/dl 289* 248* 222* 119 154* 157* 269* 196* 257* 188* 186* 93     Results from last 7 days   Lab Units 02/12/22  0523   HEMOGLOBIN A1C % 11 0*     Results from last 7 days   Lab Units 02/09/22  2316   PROCALCITONIN ng/ml 0 79*       Lines/Drains:  Invasive Devices  Report    Peripheral Intravenous Line            Peripheral IV 02/13/22 Left;Ventral (anterior) Forearm 1 day                      Imaging: No pertinent imaging reviewed  Recent Cultures (last 7 days):         Last 24 Hours Medication List:   Current Facility-Administered Medications   Medication Dose Route Frequency Provider Last Rate    acetaminophen  650 mg Oral Q6H PRN Precious Adams MD      amLODIPine  10 mg Oral BID Jamin Beltre MD      aspirin  81 mg Oral Daily Precious Adams MD      atorvastatin  80 mg Oral Daily With Yuly Rocha MD      benzonatate  100 mg Oral TID PRN Precious Adams MD      carvedilol  12 5 mg Oral BID With Meals Precious Adams MD      dicyclomine  20 mg Oral 4x Daily (AC & HS) Joanna Frey PA-C      guaiFENesin  200 mg Oral TID PRN Precious Adams MD      heparin (porcine)  5,000 Units Subcutaneous [facility] Precious Adams MD      hydrALAZINE  100 mg Oral TID Jamin Beltre MD      HYDROmorphone  0 5 mg Intravenous Once Precious Adams MD      insulin aspart protamine-insulin aspart  10 Units Subcutaneous BID AC Alicia Reese DO      insulin lispro  1-6 Units Subcutaneous TID AC Precious Adams MD      isosorbide mononitrate  120 mg Oral Daily Precious Adams MD      levothyroxine  112 mcg Oral Early Morning Precious Adams MD      loperamide  2 mg Oral TID PRN Joanna Frey PA-C      ondansetron  4 mg Intravenous Q6H PRN Precious Adams MD      oxyCODONE  2 5 mg Oral Q4H PRN Precious Adams MD      oxyCODONE  5 mg Oral Q6H PRN Precious Adams MD      simethicone  80 mg Oral 4x Daily PRN Precious Adams MD          Today, Patient Was Seen By: Ana Maria Cavazos PA-C    **Please Note: This note may have been constructed using a voice recognition system  **

## 2022-02-14 NOTE — PLAN OF CARE
Problem: PAIN - ADULT  Goal: Verbalizes/displays adequate comfort level or baseline comfort level  Description: Interventions:  - Encourage patient to monitor pain and request assistance  - Assess pain using appropriate pain scale  - Administer analgesics based on type and severity of pain and evaluate response  - Implement non-pharmacological measures as appropriate and evaluate response  - Notify physician/advanced practitioner if interventions unsuccessful or patient reports new pain  Outcome: Progressing     Problem: INFECTION - ADULT  Goal: Absence or prevention of progression during hospitalization  Description: INTERVENTIONS:  - Assess and monitor for signs and symptoms of infection  - Monitor lab/diagnostic results  - Monitor all insertion sites, i e  indwelling lines, tubes, and drains  - [place] appropriate cooling/warming therapies per order  - Administer medications as ordered  - Instruct and encourage patient and family to use good hand hygiene technique  - Identify and instruct in appropriate isolation precautions for identified infection/condition  Outcome: Progressing

## 2022-02-14 NOTE — ASSESSMENT & PLAN NOTE
Wt Readings from Last 3 Encounters:   02/14/22 71 6 kg (157 lb 12 8 oz)   01/31/22 71 2 kg (157 lb)   11/23/21 72 4 kg (159 lb 9 6 oz)     · Per record review, H&P documented acute heart failure, however patient examines dry to euvolemic, on RA, no rales on examination  · Patient follows with Dr Elias Hatch    Stage C HFpEF, echo most recently with EF 60%, hypo-inferior wall, normal RV  · NT proBNP 17,351 (20,000 in 10/21), CT A/P wo contrast showed trace bilateral pleural effusions  · Weight was recorded as 160 on admission,  Weight was 157 at recent office visit 1/31  · Requesting accurate weights and I&Os - nurse aware  · S/p IVF then IV Bumex per Nephrology   · Nephrology following as above  · Cautious fluid balance

## 2022-02-14 NOTE — ASSESSMENT & PLAN NOTE
· [facility] 1/4/19: LUDY x 2 to LAD after NSTEMI (trop 0 2) and abnormal stress test with ischemia in anterior wall on 12/31/18, 80% RCA- plan staged, but has not needed intervention  · On imdur, asa, coreg, statin

## 2022-02-14 NOTE — QUICK NOTE
Called lab as patient's AM CBC still had not resulted  Was informed of critical value of hemoglobin of 6 6  Discussed with Nephrologist, Dr Angeli Ramirez who recommended to transfuse 1 unit PRBCs followed by 2mg IV Bumex after  I discussed with the patient at bedside as well as her daughter, Blake Vera, who was on video call with the patient  Risks and benefits of blood transfusion discussed with patient and daughter  Patient reported blood transfusion reaction in the past 2/2 blood transfusing to fast  Run blood slowly  Patient agreeable to blood transfusion now  Patient consented and form signed and placed in patient's chart on the unit  Also discussed plan with the patient's nurse  1 unit leukoreduced PRBCs ordered and type and screen ordered  Discussed with blood bank  2mg IV Bumex ordered x1 AFTER blood transfusion

## 2022-02-14 NOTE — ASSESSMENT & PLAN NOTE
Lab Results   Component Value Date    HGBA1C 11 0 (H) 02/12/2022       Recent Labs     02/13/22  0606 02/13/22  1120 02/13/22  1547 02/13/22 2108   POCGLU 119 222* 248* 289*       Blood Sugar Average: Last 72 hrs:  (P) 170 [id number]   · Uncontrolled as evidenced by A1c  · Continue NovoLog 70/30 10 units b i d which is home regimen - patient has been refusing   · SSI/accuchecks  · Diabetic diet  · Hypoglycemia protocol

## 2022-02-14 NOTE — ASSESSMENT & PLAN NOTE
Lab Results   Component Value Date    EGFR 6 02/14/2022    EGFR 7 02/13/2022    EGFR 7 02/12/2022    CREATININE 6 31 (H) 02/14/2022    CREATININE 5 76 (H) 02/13/2022    CREATININE 5 21 (H) 02/12/2022     · Baseline creatinine around 3 per prior notes however most recent labs indicate she has been around 3 1-3 4, Estimated GFR around 15-20, stage 4  · Reports she follows with Dr Joanne Pascual  · With KELLY in the setting of vomiting/diarrhea and Bumex use  · Nephrology consult appreciated   · Miralax/colace discontinued  · Home bumex was discontinued and patient was placed on IVF   Creatinine continued to worsen and IVF were stopped and patient was given IV bumex  · Creatinine worsened, 6 31 today  · Avoid nephrotoxins and hypotension  · BMP monitoring

## 2022-02-14 NOTE — PROGRESS NOTES
NEPHROLOGY PROGRESS NOTE   Daryl Robles 78 y o  female MRN: [id number]  Unit/Bed#: -01 Encounter: [id number]        ASSESSMENT & PLAN    1  KELLY-probable pre renal component, COVID-19 infection as well as hemodynamic fluctuations, cardiorenal syndrome on stage 4 chronic kidney disease with a baseline creatinine of 3-3 4  -creatinine now increased to 6 3 from 5 7  -urine output slightly improved  -will hold further diuretics and IV fluid  -volume expand with packed red blood cells if hemoglobin is less than 7  -will consider restarting diuretics tomorrow  -discussed with patient and patient's daughter patient would not want dialysis at this point if needed has spoken to family members  -continue maximal conservative management  -did discuss potential palliative care involvement and daughter states to hold off for now S further family members discussed patient goals of care  -no urgent need for renal replacement therapy if short of breath give 3 mg of IV Bumex x1    2  Chronic kidney disease stage IV with a baseline creatinine of 3-3 4  -follows chronically in the setting of long-term diabetes hypertension and diastolic congestive heart failure    3  Chronic diastolic congestive heart failure  -holding diuretics at this point likely has ATN and decreased p o  Intake with diarrhea and nausea vomiting with COVID pneumonia    4  Hypercalcemia  -resolved    5  BMD  -was on calcium acetate discontinued and holding because of poor p o  Intake    6   Anemia of chronic kidney disease  -hemoglobin did decrease to 7 2 from 8 2 would repeat hemoglobin level now g drop and last checked on February 12    DISCUSSION/SUMMARY    Hold diuretics  Hold IV fluid  Transfuse if hemoglobin less than 7  Give 3 mg of IV Bumex if short of breath otherwise will consider restarting diuretics tomorrow  Discussed with patient's daughter and patient at length and discussed with slisaak regarding the above    SUBJECTIVE:    Patient was seen today states she is feeling somewhat better but her urine output remains low  Food is tasting better denies any diarrhea her urine output is still decreased    Review of Systems   Constitutional: Positive for activity change, appetite change and fatigue  HENT: Negative  Eyes: Negative  Respiratory: Negative  Gastrointestinal: Positive for abdominal distention, diarrhea, nausea and vomiting  Genitourinary: Positive for difficulty urinating  Musculoskeletal: Negative  Skin: Negative  Allergic/Immunologic: Negative  Neurological: Negative  Hematological: Negative  Psychiatric/Behavioral: Negative  All other systems reviewed and are negative        Medications:    Current Facility-Administered Medications:     acetaminophen (TYLENOL) tablet 650 mg, 650 mg, Oral, Q6H PRN, Salina Bamberger, MD, 650 mg at 02/11/22 1320    amLODIPine (NORVASC) tablet 10 mg, 10 mg, Oral, BID, Eddi Lester MD, 10 mg at 02/14/22 0800    aspirin chewable tablet 81 mg, 81 mg, Oral, Daily, Salina Bamberger, MD, 81 mg at 02/14/22 1105    atorvastatin (LIPITOR) tablet 80 mg, 80 mg, Oral, Daily With Tierra Farnsworth MD, 80 mg at 02/13/22 1636    benzonatate (TESSALON PERLES) capsule 100 mg, 100 mg, Oral, TID PRN, Salina Bamberger, MD    carvedilol (COREG) tablet 12 5 mg, 12 5 mg, Oral, BID With Meals, Salina Bamberger, MD, 12 5 mg at 02/14/22 0759    dicyclomine (BENTYL) tablet 20 mg, 20 mg, Oral, 4x Daily (AC & HS), Vidya Melgar PA-C, 20 mg at 02/14/22 1105    guaiFENesin (ROBITUSSIN) oral solution 200 mg, 200 mg, Oral, TID PRN, Salina Bamberger, MD    heparin (porcine) subcutaneous injection 5,000 Units, 5,000 Units, Subcutaneous, Q8H [address], 5,000 Units at 02/11/22 1314 **AND** [CANCELED] Platelet count, , , Once, Salina Bamberger, MD    hydrALAZINE (APRESOLINE) tablet 100 mg, 100 mg, Oral, TID, Eddi Lester MD, 100 mg at 02/13/22 2122    HYDROmorphone (DILAUDID) injection 0 5 mg, 0 5 mg, Intravenous, Once, Butch Whitfield MD    insulin aspart protamine-insulin aspart (NovoLOG 70/30) 100 units/mL subcutaneous injection 10 Units, 10 Units, Subcutaneous, BID AC, Hernandez Morelos DO, 10 Units at 02/12/22 0844    insulin lispro (HumaLOG) 100 units/mL subcutaneous injection 1-6 Units, 1-6 Units, Subcutaneous, TID AC, 2 Units at 02/14/22 1146 **AND** Fingerstick Glucose (POCT), , , TID AC, Butch Whitfield MD    isosorbide mononitrate (IMDUR) 24 hr tablet 120 mg, 120 mg, Oral, Daily, Butch Whitfield MD, 120 mg at 02/13/22 6887    levothyroxine tablet 112 mcg, 112 mcg, Oral, Early Morning, Butch Whitfield MD, 112 mcg at 02/14/22 0515    loperamide (IMODIUM) capsule 2 mg, 2 mg, Oral, TID PRN, Federica Gao PA-C, 2 mg at 02/11/22 1238    ondansetron (ZOFRAN) injection 4 mg, 4 mg, Intravenous, Q6H PRN, Butch Whitfield MD, 4 mg at 02/11/22 1156    oxyCODONE (ROXICODONE) IR tablet 2 5 mg, 2 5 mg, Oral, Q4H PRN, Butch Whitfield MD    oxyCODONE (ROXICODONE) IR tablet 5 mg, 5 mg, Oral, Q6H PRN, Butch Whitfield MD, 5 mg at 02/12/22 1119    simethicone (MYLICON) chewable tablet 80 mg, 80 mg, Oral, 4x Daily PRN, Butch Whitfield MD    OBJECTIVE:      /67   Pulse 62   Temp 99 2 °F (37 3 °C)   Resp 20   Ht 5' 2" (1 575 m)   Wt 71 6 kg (157 lb 12 8 oz)   SpO2 93%   BMI 28 86 kg/m²  Body mass index is 28 86 kg/m²  Physical exam:  Physical Exam  Vitals and nursing note reviewed  Constitutional:       General: She is not in acute distress  Appearance: She is not diaphoretic  HENT:      Head: Normocephalic and atraumatic  Right Ear: External ear normal       Left Ear: External ear normal       Nose: Nose normal       Mouth/Throat:      Pharynx: Oropharynx is clear  No oropharyngeal exudate  Eyes:      Conjunctiva/sclera: Conjunctivae normal       Pupils: Pupils are equal, round, and reactive to light  Cardiovascular:      Rate and Rhythm: Normal rate        Heart sounds: No friction rub  Pulmonary:      Effort: Pulmonary effort is normal  No respiratory distress  Breath sounds: Normal breath sounds  Abdominal:      General: There is distension  Musculoskeletal:         General: Swelling present  Cervical back: Normal range of motion  Skin:     General: Skin is dry  Neurological:      General: No focal deficit present  Mental Status: She is alert and oriented to person, place, and time  Mental status is at baseline  Psychiatric:         Mood and Affect: Mood normal          Behavior: Behavior normal          Thought Content: Thought content normal          Judgment: Judgment normal            Invasive Devices:      Lab Results:   Results from last 7 days   Lab Units 02/14/22  0525 02/13/22  0747 02/12/22  0637 02/12/22  0523 02/11/22  0640 02/09/22  2316 02/09/22  1738 02/09/22  1207 02/09/22  1207   WBC Thousand/uL  --   --   --  5 89 5 25 3 06*  --   --  6 19   HEMOGLOBIN g/dL  --   --   --  7 2* 8 2* 8 0*  --   --  8 6*   HEMATOCRIT %  --   --   --  22 8* 26 3* 25 3*  --   --  27 6*   PLATELETS Thousands/uL  --   --   --  191 204 153  --   --  167   POTASSIUM mmol/L 4 5 4 4  --  4 3 4 0 4 2  --    < > 4 0   CHLORIDE mmol/L 98* 103  --  103 106 103  --    < > 106   CO2 mmol/L 23 22  --  21 21 21  --    < > 20*   BUN mg/dL 83* 76*  --  81* 82* 69*  --    < > 68*   CREATININE mg/dL 6 31* 5 76*  --  5 21* 4 55* 3 18*  --    < > 3 43*   CALCIUM mg/dL 8 0* 8 2*  --  8 3 8 9 8 3  --    < > 8 6   ALK PHOS U/L  --   --   --  338* 406* 429*  --   --  485*   ALT U/L  --   --   --  27 32 36  --   --  40   AST U/L  --   --   --  37 49* 49*  --   --  65*   LEUKOCYTES UA   --   --  Trace*  --   --   --  Negative  --   --    BLOOD UA   --   --  Negative  --   --   --  Negative  --   --     < > = values in this interval not displayed  Portions of the record may have been created with voice recognition software   Occasional wrong word or "sound a like" substitutions may have occurred due to the inherent limitations of voice recognition software  Read the chart carefully and recognize, using context, where substitutions have occurred  If you have any questions, please contact the dictating provider

## 2022-02-15 PROBLEM — Z79.4 TYPE 2 DIABETES MELLITUS WITH KIDNEY COMPLICATION, WITH LONG-TERM CURRENT USE OF INSULIN (HCC): Chronic | Status: ACTIVE | Noted: 2018-12-29

## 2022-02-15 PROBLEM — E11.29 TYPE 2 DIABETES MELLITUS WITH KIDNEY COMPLICATION, WITH LONG-TERM CURRENT USE OF INSULIN (HCC): Chronic | Status: ACTIVE | Noted: 2018-12-29

## 2022-02-15 LAB
ABO GROUP BLD BPU: NORMAL
ANION GAP SERPL CALCULATED.3IONS-SCNC: 10 MMOL/L (ref 4–13)
BLOOD GROUP ANTIBODIES SERPL: NORMAL
BLOOD GROUP ANTIBODIES SERPL: NORMAL
BPU ID: NORMAL
BUN SERPL-MCNC: 91 MG/DL (ref 5–25)
CALCIUM SERPL-MCNC: 8 MG/DL (ref 8.3–10.1)
CHLORIDE SERPL-SCNC: 98 MMOL/L (ref 100–108)
CO2 SERPL-SCNC: 21 MMOL/L (ref 21–32)
CREAT SERPL-MCNC: 7.32 MG/DL (ref 0.6–1.3)
CROSSMATCH: NORMAL
ERYTHROCYTE [DISTWIDTH] IN BLOOD BY AUTOMATED COUNT: 13.2 % (ref 11.6–15.1)
FERRITIN SERPL-MCNC: 306 NG/ML (ref 8–388)
FOLATE SERPL-MCNC: >20 NG/ML (ref 3.1–17.5)
GFR SERPL CREATININE-BSD FRML MDRD: 5 ML/MIN/1.73SQ M
GLUCOSE SERPL-MCNC: 166 MG/DL (ref 65–140)
GLUCOSE SERPL-MCNC: 167 MG/DL (ref 65–140)
GLUCOSE SERPL-MCNC: 223 MG/DL (ref 65–140)
GLUCOSE SERPL-MCNC: 234 MG/DL (ref 65–140)
GLUCOSE SERPL-MCNC: 333 MG/DL (ref 65–140)
HCT VFR BLD AUTO: 27.8 % (ref 34.8–46.1)
HGB BLD-MCNC: 9 G/DL (ref 11.5–15.4)
HGB RETIC QN AUTO: 29.7 PG (ref 30–38.3)
IMM RETICS NFR: 29.2 % (ref 0–14)
IRON SATN MFR SERPL: 91 % (ref 15–50)
IRON SERPL-MCNC: 158 UG/DL (ref 50–170)
MCH RBC QN AUTO: 27.8 PG (ref 26.8–34.3)
MCHC RBC AUTO-ENTMCNC: 32.4 G/DL (ref 31.4–37.4)
MCV RBC AUTO: 86 FL (ref 82–98)
PLATELET # BLD AUTO: 209 THOUSANDS/UL (ref 149–390)
PMV BLD AUTO: 10.3 FL (ref 8.9–12.7)
POTASSIUM SERPL-SCNC: 4.7 MMOL/L (ref 3.5–5.3)
RBC # BLD AUTO: 3.24 MILLION/UL (ref 3.81–5.12)
RETICS # AUTO: ABNORMAL 10*3/UL (ref 14097–95744)
RETICS # CALC: 3.49 % (ref 0.37–1.87)
SODIUM SERPL-SCNC: 129 MMOL/L (ref 136–145)
TIBC SERPL-MCNC: 174 UG/DL (ref 250–450)
TSH SERPL DL<=0.05 MIU/L-ACNC: 2.18 UIU/ML (ref 0.36–3.74)
UNIT DISPENSE STATUS: NORMAL
UNIT PRODUCT CODE: NORMAL
UNIT PRODUCT VOLUME: 350 ML
UNIT RH: NORMAL
VIT B12 SERPL-MCNC: 572 PG/ML (ref 100–900)
WBC # BLD AUTO: 7.64 THOUSAND/UL (ref 4.31–10.16)

## 2022-02-15 PROCEDURE — 85027 COMPLETE CBC AUTOMATED: CPT | Performed by: PHYSICIAN ASSISTANT

## 2022-02-15 PROCEDURE — 83540 ASSAY OF IRON: CPT | Performed by: INTERNAL MEDICINE

## 2022-02-15 PROCEDURE — 99233 SBSQ HOSP IP/OBS HIGH 50: CPT | Performed by: INTERNAL MEDICINE

## 2022-02-15 PROCEDURE — 82948 REAGENT STRIP/BLOOD GLUCOSE: CPT

## 2022-02-15 PROCEDURE — 84443 ASSAY THYROID STIM HORMONE: CPT | Performed by: INTERNAL MEDICINE

## 2022-02-15 PROCEDURE — 80048 BASIC METABOLIC PNL TOTAL CA: CPT | Performed by: PHYSICIAN ASSISTANT

## 2022-02-15 PROCEDURE — 82746 ASSAY OF FOLIC ACID SERUM: CPT | Performed by: INTERNAL MEDICINE

## 2022-02-15 PROCEDURE — 99222 1ST HOSP IP/OBS MODERATE 55: CPT | Performed by: SURGERY

## 2022-02-15 PROCEDURE — P9016 RBC LEUKOCYTES REDUCED: HCPCS

## 2022-02-15 PROCEDURE — 83550 IRON BINDING TEST: CPT | Performed by: INTERNAL MEDICINE

## 2022-02-15 PROCEDURE — 82728 ASSAY OF FERRITIN: CPT | Performed by: INTERNAL MEDICINE

## 2022-02-15 PROCEDURE — 85046 RETICYTE/HGB CONCENTRATE: CPT | Performed by: INTERNAL MEDICINE

## 2022-02-15 PROCEDURE — 82607 VITAMIN B-12: CPT | Performed by: INTERNAL MEDICINE

## 2022-02-15 RX ORDER — INSULIN ASPART 100 [IU]/ML
10 INJECTION, SUSPENSION SUBCUTANEOUS
Status: DISCONTINUED | OUTPATIENT
Start: 2022-02-15 | End: 2022-02-15

## 2022-02-15 RX ORDER — INSULIN ASPART 100 [IU]/ML
12 INJECTION, SUSPENSION SUBCUTANEOUS
Status: DISCONTINUED | OUTPATIENT
Start: 2022-02-15 | End: 2022-02-15

## 2022-02-15 RX ORDER — INSULIN ASPART 100 [IU]/ML
12 INJECTION, SUSPENSION SUBCUTANEOUS
Status: DISCONTINUED | OUTPATIENT
Start: 2022-02-15 | End: 2022-02-16

## 2022-02-15 RX ADMIN — CARVEDILOL 12.5 MG: 12.5 TABLET, FILM COATED ORAL at 06:53

## 2022-02-15 RX ADMIN — DICYCLOMINE HYDROCHLORIDE 20 MG: 20 TABLET ORAL at 17:36

## 2022-02-15 RX ADMIN — DICYCLOMINE HYDROCHLORIDE 20 MG: 20 TABLET ORAL at 06:53

## 2022-02-15 RX ADMIN — HYDRALAZINE HYDROCHLORIDE 100 MG: 50 TABLET ORAL at 17:36

## 2022-02-15 RX ADMIN — ASPIRIN 81 MG: 81 TABLET, CHEWABLE ORAL at 08:39

## 2022-02-15 RX ADMIN — CARVEDILOL 12.5 MG: 12.5 TABLET, FILM COATED ORAL at 17:48

## 2022-02-15 RX ADMIN — INSULIN ASPART 10 UNITS: 100 INJECTION, SUSPENSION SUBCUTANEOUS at 08:39

## 2022-02-15 RX ADMIN — HYDRALAZINE HYDROCHLORIDE 100 MG: 50 TABLET ORAL at 08:39

## 2022-02-15 RX ADMIN — AMLODIPINE BESYLATE 10 MG: 10 TABLET ORAL at 08:39

## 2022-02-15 RX ADMIN — ISOSORBIDE MONONITRATE 120 MG: 60 TABLET, EXTENDED RELEASE ORAL at 08:39

## 2022-02-15 RX ADMIN — INSULIN LISPRO 1 UNITS: 100 INJECTION, SOLUTION INTRAVENOUS; SUBCUTANEOUS at 17:37

## 2022-02-15 RX ADMIN — BUMETANIDE 2 MG: 0.25 INJECTION INTRAMUSCULAR; INTRAVENOUS at 06:53

## 2022-02-15 RX ADMIN — LEVOTHYROXINE SODIUM 112 MCG: 112 TABLET ORAL at 06:53

## 2022-02-15 RX ADMIN — GUAIFENESIN 200 MG: 100 SOLUTION ORAL at 03:36

## 2022-02-15 RX ADMIN — ATORVASTATIN CALCIUM 80 MG: 80 TABLET, FILM COATED ORAL at 17:48

## 2022-02-15 RX ADMIN — INSULIN LISPRO 5 UNITS: 100 INJECTION, SOLUTION INTRAVENOUS; SUBCUTANEOUS at 12:04

## 2022-02-15 NOTE — PROGRESS NOTES
[facility]  Progress Note - Milla Henry 1955, 78 y o  female MRN: [id number]  Unit/Bed#: -01 Encounter: [id number]  Primary Care Provider: Merlin Gray, MD   Date and time admitted to hospital: 2/9/2022 11:38 AM    * COVID-19  Assessment & Plan  · Per record review, patient presented with generalized abdominal pain, diarrhea, chills, weakness, fatigue and nasal congestion  · Home COVID test +, confirmation test on arrival also +  Reported she is vaccinated   · Per RN, patient was noted to desat only when sleeping but sats stable on RA when awake  · Patient was not requiring oxygen but was started on Remdesivir on admission - this was subsequently discontinued per protocol  · CRP 22, D-dimer 1 58 (AC with Hep SQ 5000 q8h per VTE Group C), procal 0 79 (unclear significance in setting of chronic renal failure), BNP 17,351  · Patient reports diarrhea has resolved  · Isolation thru 2/19      Acute kidney injury superimposed on chronic kidney disease [facility])  Assessment & Plan  Lab Results   Component Value Date    EGFR 5 02/15/2022    EGFR 6 02/14/2022    EGFR 7 02/13/2022    CREATININE 7 32 (H) 02/15/2022    CREATININE 6 31 (H) 02/14/2022    CREATININE 5 76 (H) 02/13/2022     · Baseline creatinine around 3 per prior notes however most recent labs indicate she has been around 3 1-3 4, Estimated GFR around 15-20, stage 4  · Reports she follows with Dr Mignon Mcmahan  · With KELLY in the setting of vomiting/diarrhea and Bumex use  · Nephrology consult appreciated   · Miralax/colace discontinued  · Home bumex was discontinued and patient was placed on IVF  Creatinine continued to worsen and IVF were stopped and patient was given IV bumex  · Creatinine worsened, 7 32 today  · Avoid nephrotoxins and hypotension  · BMP monitoring   · palliative care consulted, patient continued to refuse HD and wants to remain full code and on current therapy      Abdominal pain  Assessment & Plan  · CT abdomen/pelvis on admission with no acute intra-abdominal abnormality per the results report   · Suspect related to COVID/diarrhea  · Patient reports abdominal discomfort and diarrhea have resolved   · Bentyl, imodium on board     Pericardial effusion  Assessment & Plan  · Chronic, noted on prior echos/imaging  · Monitoring outpatient     Coronary artery disease involving native coronary artery of native heart with angina pectoris [facility])  Assessment & Plan  · [facility] 1/4/19: LUDY x 2 to LAD after NSTEMI (trop 0 2) and abnormal stress test with ischemia in anterior wall on 12/31/18, 80% RCA- plan staged, but has not needed intervention  · On imdur, asa, coreg, statin    Hypertension  Assessment & Plan  · Avoid hypotension given KELLY  · Nephrology following  · Bumex on hold      Hypothyroidism  Assessment & Plan  · Continue synthroid     Type 2 diabetes mellitus with kidney complication, with long-term current use of insulin [facility])  Assessment & Plan  Lab Results   Component Value Date    HGBA1C 11 0 (H) 02/12/2022       Recent Labs     02/14/22  1555 02/14/22 2052 02/15/22  0606 02/15/22  1111   POCGLU 262* 250* 223* 333*       Blood Sugar Average: Last 72 hrs:  (P) 223 [id number]   · Uncontrolled as evidenced by A1c  · Continue NovoLog 70/30 10 units b i d which is home regimen - patient has been refusing  Took a dose this morning but has refused before  · Increase dose to12 units BID given uncontrolled hyperglycemia  encourage compliance      · SSI/accuchecks  · Diabetic diet  · Hypoglycemia protocol     Chronic diastolic heart failure (HCC)  Assessment & Plan  Wt Readings from Last 3 Encounters:   02/15/22 72 kg (158 lb 11 7 oz)   01/31/22 71 2 kg (157 lb)   11/23/21 72 4 kg (159 lb 9 6 oz)     · Per record review, H&P documented acute heart failure, however patient examines dry to euvolemic, on RA, no rales on examination  · Patient follows with Dr Tunde Sweeney    Stage C HFpEF, echo most recently with EF 60%, hypo-inferior wall, normal RV  · NT proBNP 17,351 (20,000 in 10/21), CT A/P wo contrast showed trace bilateral pleural effusions  · Weight was recorded as 160 on admission,  Weight was 157 at recent office visit   · Requesting accurate weights and I&Os - nurse aware  · S/p IVF then IV Bumex per Nephrology   · Nephrology following as above  · Cautious fluid balance         VTE Pharmacologic Prophylaxis:   Pharmacologic: Heparin  Mechanical VTE Prophylaxis in Place: Yes    Patient Centered Rounds: I have performed bedside rounds with nursing staff today  Discussions with Specialists or Other Care Team Provider: CM, nephrology, palliative care    Education and Discussions with Family / Patient: plan of care discussed with the patient and also with the daughter on phone  Time Spent for Care: 30 minutes  More than 50% of total time spent on counseling and coordination of care as described above  Current Length of Stay: 5 day(s)    Current Patient Status: Inpatient   Certification Statement: The patient will continue to require additional inpatient hospital stay due to not medically stable given worsneing KELLY    Discharge Plan: when medically stable    Code Status: Level 1 - Full Code      Subjective:   No overnight events  Patient denies any acute pain or discomfort  Continues to decline HD  Objective:     Vitals:   Temp (24hrs), Av 6 °F (37 °C), Min:97 9 °F (36 6 °C), Max:99 5 °F (37 5 °C)    Temp:  [97 9 °F (36 6 °C)-99 5 °F (37 5 °C)] 97 9 °F (36 6 °C)  HR:  [59-66] 66  Resp:  [18] 18  BP: (128-148)/(53-62) 148/62  SpO2:  [89 %-96 %] 96 %  Body mass index is 29 03 kg/m²  Input and Output Summary (last 24 hours): Intake/Output Summary (Last 24 hours) at 2/15/2022 1520  Last data filed at 2/15/2022 0640  Gross per 24 hour   Intake 350 ml   Output --   Net 350 ml       Physical Exam:     Physical Exam  Constitutional:       General: She is not in acute distress    Cardiovascular:      Rate and Rhythm: Normal rate and regular rhythm  Heart sounds: Normal heart sounds  No murmur heard  Abdominal:      General: Bowel sounds are normal  There is no distension  Palpations: Abdomen is soft  Tenderness: There is no abdominal tenderness  Musculoskeletal:         General: No swelling  Skin:     General: Skin is warm  Neurological:      Mental Status: She is alert  Comments: Awake alert and communicative           Additional Data:     Labs:    Results from last 7 days   Lab Units 02/15/22  0945 02/14/22  0525 02/12/22  0523 02/11/22  0640 02/11/22  0640   WBC Thousand/uL 7 64   < > 5 89   < > 5 25   HEMOGLOBIN g/dL 9 0*   < > 7 2*   < > 8 2*   HEMATOCRIT % 27 8*   < > 22 8*   < > 26 3*   PLATELETS Thousands/uL 209   < > 191   < > 204   BANDS PCT %  --   --   --   --  3   NEUTROS PCT %  --   --  74  --   --    LYMPHS PCT %  --   --  18  --   --    LYMPHO PCT %  --   --   --   --  13*   MONOS PCT %  --   --  6  --   --    MONO PCT %  --   --   --   --  2*   EOS PCT %  --   --  1  --  0    < > = values in this interval not displayed  Results from last 7 days   Lab Units 02/15/22  0945 02/13/22  0747 02/12/22  0523   SODIUM mmol/L 129*   < > 135*   POTASSIUM mmol/L 4 7   < > 4 3   CHLORIDE mmol/L 98*   < > 103   CO2 mmol/L 21   < > 21   BUN mg/dL 91*   < > 81*   CREATININE mg/dL 7 32*   < > 5 21*   ANION GAP mmol/L 10   < > 11   CALCIUM mg/dL 8 0*   < > 8 3   ALBUMIN g/dL  --   --  2 2*   TOTAL BILIRUBIN mg/dL  --   --  0 31   ALK PHOS U/L  --   --  338*   ALT U/L  --   --  27   AST U/L  --   --  37   GLUCOSE RANDOM mg/dL 234*   < > 168*    < > = values in this interval not displayed           Results from last 7 days   Lab Units 02/15/22  1111 02/15/22  0606 02/14/22  2052 02/14/22  1555 02/14/22  1145 02/14/22  0806 02/13/22  2108 02/13/22  1547 02/13/22  1120 02/13/22  0606 02/12/22 2050 02/12/22  1600   POC GLUCOSE mg/dl 333* 223* 250* 262* 216* 185* 289* 248* 222* 119 154* 157*     Results from last 7 days   Lab Units 02/12/22  0523   HEMOGLOBIN A1C % 11 0*     Results from last 7 days   Lab Units 02/09/22  2316   PROCALCITONIN ng/ml 0 79*           * I Have Reviewed All Lab Data Listed Above  * Additional Pertinent Lab Tests Reviewed: All Labs Within Last 24 Hours Reviewed    Imaging:    CT abdomen pelvis wo contrast   Final Result by Ruthann Gomez MD (02/09 1556)      Subpleural groundglass opacities noted at the lung bases most consistent with Covid 19 pneumonia  No acute intra-abdominal abnormality  Nodular liver suggesting cirrhosis  Cholelithiasis  Hepatic contour suggesting potential cirrhosis  Small to moderate hiatal hernia  Trace bilateral pleural effusions and small pericardial effusion              Workstation performed: [id number]           Recent Cultures (last 7 days):           Last 24 Hours Medication List:   Current Facility-Administered Medications   Medication Dose Route Frequency Provider Last Rate    acetaminophen  650 mg Oral Q6H PRN Duy Mendoza MD      amLODIPine  10 mg Oral BID Nanette Real MD      aspirin  81 mg Oral Daily Duy Mendoza MD      atorvastatin  80 mg Oral Daily With Anthony Durham MD      benzonatate  100 mg Oral TID PRN Duy Mendoza MD      carvedilol  12 5 mg Oral BID With Meals Duy Mendoza MD      dicyclomine  20 mg Oral 4x Daily (AC & HS) Domingo Melara PA-C      guaiFENesin  200 mg Oral TID PRN Duy Mendoza MD      heparin (porcine)  5,000 Units Subcutaneous [facility] Duy Mendoza MD      hydrALAZINE  100 mg Oral TID Nanette Real MD      HYDROmorphone  0 5 mg Intravenous Once Duy Mendoza MD      insulin aspart protamine-insulin aspart  12 Units Subcutaneous BID AC Aisha Sheffield MD      insulin lispro  1-6 Units Subcutaneous TID AC Duy Mendoza MD      isosorbide mononitrate  120 mg Oral Daily Duy Mendoza MD      levothyroxine  112 mcg Oral Early Morning Martin Lopez MD      loperamide  2 mg Oral TID PRN Trixie Blanca PA-C      ondansetron  4 mg Intravenous Q6H PRN Martin Lopez MD      oxyCODONE  2 5 mg Oral Q4H PRN Martin Lopez MD      oxyCODONE  5 mg Oral Q6H PRN Martin Lopez MD      simethicone  80 mg Oral 4x Daily PRN Martin Lopez MD          Today, Patient Was Seen By: Rafy Alonzo MD    ** Please Note: Dictation voice to text software may have been used in the creation of this document   **

## 2022-02-15 NOTE — ASSESSMENT & PLAN NOTE
Lab Results   Component Value Date    EGFR 5 02/15/2022    EGFR 6 02/14/2022    EGFR 7 02/13/2022    CREATININE 7 32 (H) 02/15/2022    CREATININE 6 31 (H) 02/14/2022    CREATININE 5 76 (H) 02/13/2022     · Baseline creatinine around 3 per prior notes however most recent labs indicate she has been around 3 1-3 4, Estimated GFR around 15-20, stage 4  · Reports she follows with Dr Winnie Humphreys  · With KELLY in the setting of vomiting/diarrhea and Bumex use  · Nephrology consult appreciated   · Miralax/colace discontinued  · Home bumex was discontinued and patient was placed on IVF  Creatinine continued to worsen and IVF were stopped and patient was given IV bumex  · Creatinine worsened, 7 32 today  · Avoid nephrotoxins and hypotension  · BMP monitoring   · palliative care consulted, patient continued to refuse HD and wants to remain full code and on current therapy

## 2022-02-15 NOTE — PROGRESS NOTES
NEPHROLOGY PROGRESS NOTE   Beatrice Stephens 78 y o  female MRN: [id number]  Unit/Bed#: -01 Encounter: [id number]        ASSESSMENT & PLAN    1  KELLY-probable pre renal component, COVID-19 infection as well as hemodynamic fluctuations, cardiorenal syndrome on stage 4 chronic kidney disease with a baseline creatinine of 3-3 4  -creatinine now trending upwards  -urine output slightly improved  -will hold further diuretics and IV fluid  -given packed red blood cells and 2 mg of Bumex yesterday  -will hold off on starting diuretics today weight is 158 lb and when she was last seen in the office in May of 2021 was 166 lb in October of 2021 was 175 lb  -discussed with patient and patient's daughter patient would not want dialysis at this point although may consider if more imminent   -continue maximal conservative management  -appreciate palliative care consultation  -no urgent need for renal replacement therapy if short of breath give 3 mg of IV Bumex x1  -repeat urinalysis  -no urgent need for dialysis hopeful for renal recovery     2  Chronic kidney disease stage IV with a baseline creatinine of 3-3 4  -follows chronically in the setting of long-term diabetes hypertension and diastolic congestive heart failure     3  Chronic diastolic congestive heart failure  -holding diuretics at this point likely has ATN and decreased p o  Intake with diarrhea and nausea vomiting with COVID pneumonia     4  Hypercalcemia  -resolved     5  BMD  -was on calcium acetate discontinued and holding because of poor p o  Intake     6  Anemia of chronic kidney disease  -last hemoglobin improved after packed red blood cell transfusion    DISCUSSION/SUMMARY    Monitor p o  Intake  Monitor serum creatinine and urine output  Hold off on diuretics at this time or intravenous fluids    SUBJECTIVE:    Patient was seen today  Denies any chest pain or shortness of breath no fevers or chills  Tolerating some more p o   Intake    Review of Systems Constitutional: Negative  HENT: Negative  Eyes: Negative  Respiratory: Negative  Negative for apnea  Cardiovascular: Negative  Gastrointestinal: Negative  Endocrine: Negative  Genitourinary: Negative  Musculoskeletal: Negative  Skin: Negative  Neurological: Negative  Hematological: Negative  Psychiatric/Behavioral: Negative  All other systems reviewed and are negative        Medications:    Current Facility-Administered Medications:     acetaminophen (TYLENOL) tablet 650 mg, 650 mg, Oral, Q6H PRN, Yuriy Rushing MD, 650 mg at 02/11/22 1320    amLODIPine (NORVASC) tablet 10 mg, 10 mg, Oral, BID, Rashard Santiago MD, 10 mg at 02/15/22 1166    aspirin chewable tablet 81 mg, 81 mg, Oral, Daily, Yuriy Rushing MD, 81 mg at 02/15/22 0839    atorvastatin (LIPITOR) tablet 80 mg, 80 mg, Oral, Daily With Holli Nolan MD, 80 mg at 02/14/22 1709    benzonatate (TESSALON PERLES) capsule 100 mg, 100 mg, Oral, TID PRN, Yuriy Rushing MD    carvedilol (COREG) tablet 12 5 mg, 12 5 mg, Oral, BID With Meals, Yuriy Rushing MD, 12 5 mg at 02/15/22 0653    dicyclomine (BENTYL) tablet 20 mg, 20 mg, Oral, 4x Daily (AC & HS), Vidya Melgar PA-C, 20 mg at 02/15/22 0653    guaiFENesin (ROBITUSSIN) oral solution 200 mg, 200 mg, Oral, TID PRN, Yuriy Rushing MD, 200 mg at 02/15/22 0336    heparin (porcine) subcutaneous injection 5,000 Units, 5,000 Units, Subcutaneous, Q8H [facility], 5,000 Units at 02/11/22 1314 **AND** [CANCELED] Platelet count, , , Once, Yuriy Rushing MD    hydrALAZINE (APRESOLINE) tablet 100 mg, 100 mg, Oral, TID, Rashard Santiago MD, 100 mg at 02/15/22 0839    HYDROmorphone (DILAUDID) injection 0 5 mg, 0 5 mg, Intravenous, Once, Yuriy Rushing MD    insulin aspart protamine-insulin aspart (NovoLOG 70/30) 100 units/mL subcutaneous injection 12 Units, 12 Units, Subcutaneous, BID AC, Malcolm Wright MD    insulin lispro (HumaLOG) 100 units/mL subcutaneous injection 1-6 Units, 1-6 Units, Subcutaneous, TID AC, 5 Units at 02/15/22 1204 **AND** Fingerstick Glucose (POCT), , , TID AC, Lalita Preciado MD    isosorbide mononitrate (IMDUR) 24 hr tablet 120 mg, 120 mg, Oral, Daily, Lalita Preciado MD, 120 mg at 02/15/22 4843    levothyroxine tablet 112 mcg, 112 mcg, Oral, Early Morning, Lalita Preciado MD, 112 mcg at 02/15/22 9168    loperamide (IMODIUM) capsule 2 mg, 2 mg, Oral, TID PRN, Juanita Barrera PA-C, 2 mg at 02/11/22 1238    ondansetron (ZOFRAN) injection 4 mg, 4 mg, Intravenous, Q6H PRN, Lalita Preciado MD, 4 mg at 02/11/22 1156    oxyCODONE (ROXICODONE) IR tablet 2 5 mg, 2 5 mg, Oral, Q4H PRN, Lalita Preciado MD    oxyCODONE (ROXICODONE) IR tablet 5 mg, 5 mg, Oral, Q6H PRN, Lalita Preciado MD, 5 mg at 02/12/22 1119    simethicone (MYLICON) chewable tablet 80 mg, 80 mg, Oral, 4x Daily PRN, Lalita Preciado MD    OBJECTIVE:      /62   Pulse 66   Temp 97 9 °F (36 6 °C)   Resp 18   Ht 5' 2" (1 575 m)   Wt 72 kg (158 lb 11 7 oz)   SpO2 96%   BMI 29 03 kg/m²  Body mass index is 29 03 kg/m²  Physical exam:  Physical Exam  Vitals and nursing note reviewed  Constitutional:       Appearance: Normal appearance  She is normal weight  HENT:      Head: Normocephalic and atraumatic  Nose: Nose normal       Mouth/Throat:      Mouth: Mucous membranes are moist       Pharynx: Oropharynx is clear  Eyes:      Conjunctiva/sclera: Conjunctivae normal    Cardiovascular:      Rate and Rhythm: Normal rate  Heart sounds: No friction rub  Pulmonary:      Effort: Pulmonary effort is normal  No respiratory distress  Abdominal:      General: There is no distension  Musculoskeletal:         General: No swelling  Cervical back: Normal range of motion  Right lower leg: No edema  Left lower leg: Edema present  Neurological:      General: No focal deficit present        Mental Status: She is oriented to person, place, and time  Psychiatric:         Mood and Affect: Mood normal          Behavior: Behavior normal          Thought Content: Thought content normal            Invasive Devices:      Lab Results:   Results from last 7 days   Lab Units 02/15/22  0945 02/14/22  0525 02/13/22  0747 02/12/22  0637 02/12/22  0523 02/11/22  0640 02/09/22  2316 02/09/22  2316 02/09/22  1738 02/09/22  1207 02/09/22  1207   WBC Thousand/uL 7 64 5 49  --   --  5 89 5 25  --  3 06*  --    < > 6 19   HEMOGLOBIN g/dL 9 0* 6 6*  --   --  7 2* 8 2*  --  8 0*  --    < > 8 6*   HEMATOCRIT % 27 8* 20 7*  --   --  22 8* 26 3*  --  25 3*  --    < > 27 6*   PLATELETS Thousands/uL 209 173  --   --  191 204  --  153  --    < > 167   POTASSIUM mmol/L 4 7 4 5 4 4  --  4 3 4 0   < > 4 2  --    < > 4 0   CHLORIDE mmol/L 98* 98* 103  --  103 106   < > 103  --    < > 106   CO2 mmol/L 21 23 22  --  21 21   < > 21  --    < > 20*   BUN mg/dL 91* 83* 76*  --  81* 82*   < > 69*  --    < > 68*   CREATININE mg/dL 7 32* 6 31* 5 76*  --  5 21* 4 55*   < > 3 18*  --    < > 3 43*   CALCIUM mg/dL 8 0* 8 0* 8 2*  --  8 3 8 9   < > 8 3  --    < > 8 6   ALK PHOS U/L  --   --   --   --  338* 406*  --  429*  --   --  485*   ALT U/L  --   --   --   --  27 32  --  36  --   --  40   AST U/L  --   --   --   --  37 49*  --  49*  --   --  65*   LEUKOCYTES UA   --   --   --  Trace*  --   --   --   --  Negative  --   --    BLOOD UA   --   --   --  Negative  --   --   --   --  Negative  --   --     < > = values in this interval not displayed  Portions of the record may have been created with voice recognition software  Occasional wrong word or "sound a like" substitutions may have occurred due to the inherent limitations of voice recognition software  Read the chart carefully and recognize, using context, where substitutions have occurred  If you have any questions, please contact the dictating provider

## 2022-02-15 NOTE — CONSULTS
Consultation - Palliative & Supportive Care   Caitlin Jalloh 78 y o  female MRN: [id number]  Unit/Bed#: -01 Encounter: [id number]      Physician Requesting Consult: John Dill MD  Reason for Consult / Principal Problem: goals of care      Assessment/Plan:  1  COVID-19  2  KELLY on CKD4  3  Chronic diastolic CHF  4  Pericardial effusion  5  CAD  6  Abdominal pain  7  Goals of care  -continue current medical care/optimization  -although patient states that she does not want HD at this time as she feels she is improving, she is not ready to transition to focus of comfort with/without hospice  -confirmed code status, continue full code  -d/w Nephrology, primary  -consult spiritual care, prayer support  -will follow peripherally as needed    Met with patient  Introduced palliative care and role in care  Approached possible need for dialysis during this hospitalization  Patient feels she is improving, and states that she does not want dialysis at this time  She states that she knows people that declined shortly after going on dialysis and does not want this for herself  We discussed that her time could be more limited if dialysis is recommended and she declines this option  Discussed option of comfort focused care with/without hospice support  Also discussed code status  Patient does not have a living will or advance directive  Patient upset, as she felt she was improving and did not need to make this decision at this time  We called daughter/Nissa and discussed further on speaker phone  I reiterated above conversation  Daughter/Nissa also called other daughter to participate in discussion  Spouse does not get involved in medical decision making for patient  Both daughters fully supportive of patient's wishes  Daughter was under impression that patient could possibly be discharged soon on diuretics without need for dialysis, and determine need as an outpatient   Both daughters clear that patient does not want dialysis at this time  Discussed that palliative care is sometimes a bridge to hospice care  Patient does not want to discuss hospice care or what symptoms/time could look like if dialysis is recommended and she declines this option of care  Also discussed code status again, with daughters present on phone  Patient wishes to keep code status as level 1, full code for now  Encouraged patient to continue discussions with daughters regarding medical wishes, and potential definitive decision regarding dialysis if needed during this hospitalization or in the future  Emotional support and validation provided  Code Status: Level 1 - Full Code  Advance Directive and Living Will:    Not in chart  Power of :    POLST:    Emergency contact: daughter/Nissa- [phone]  No advance directive  Lives with spouse, not involved with medical decisions  2 daughters/Nissa + Maribellis  +spiritual/Faith    History of Present Illness   HPI: Fatuma Clayton is a 78y o  year old female who presents with loose stools, headache, and abdominal pain, COVID + at home, confirmed in ED, admitted 2/9/2022  Started on remdesivir, this discontinued per protocol  Isolation through 2/19  PMH significant for CHF, CAD, CKD4, DM, nocturnal hypoxia, HLD, hypothyroidism  Palliative care consulted for goals of care, dialysis and code status discussion  Patient oob in chair on RA  States loose stools and abdomnial pain improved  Review of Systems   Constitutional: Positive for activity change, appetite change and fatigue  Respiratory: Positive for shortness of breath  Gastrointestinal: Positive for abdominal pain and diarrhea  Genitourinary: Positive for frequency  Neurological: Positive for weakness  Psychiatric/Behavioral: Positive for dysphoric mood  All other systems reviewed and are negative        Historical Information   Past Medical History:   Diagnosis Date    Anemia     CHF (congestive heart failure) ([place] [address] )     Chronic kidney disease     Coronary artery disease     Diabetes mellitus ([place] [address] )     Hypertension     Hypothyroidism      Patient Active Problem List   Diagnosis    Chronic diastolic heart failure ([place] [address] )    Type 2 diabetes mellitus with kidney complication, with long-term current use of insulin ([place] [address] )    Hypothyroidism    Hypertension    Acute renal failure superimposed on stage 4 chronic kidney disease (HCC)    Anemia    Coronary artery disease involving native coronary artery of native heart with angina pectoris (HCC)    History of anemia due to chronic kidney disease    Vitamin D deficiency    Proteinuria    Elevated LFTs    Hyperphosphatemia    Hypoalbuminemia    Acute otitis media    Pericardial effusion    Stage 4 chronic kidney disease (HCC)    Acute kidney injury superimposed on chronic kidney disease (HCC)    Abnormal finding on imaging of liver    Nocturnal hypoxia    COVID-19    Abdominal pain     Past Surgical History:   Procedure Laterality Date    CORONARY ANGIOPLASTY WITH STENT PLACEMENT  2019     Social History     Socioeconomic History    Marital status: /Civil Union     Spouse name: None    Number of children: 3    Years of education: None    Highest education level: None   Occupational History    Occupation: part time   Tobacco Use    Smoking status: Former Smoker    Smokeless tobacco: Never Used   Vaping Use    Vaping Use: Never used   Substance and Sexual Activity    Alcohol use: Never    Drug use: No    Sexual activity: Yes   Other Topics Concern    None   Social History Narrative    Always uses seatbelt    Caffeine use    Daily coffee consumption: 1 cp daily    Feels safe at home    Lives with spouse     Social Determinants of Health     Financial Resource Strain: Not on file   Food Insecurity: Not on file   Transportation Needs: Not on file   Physical Activity: Not on file   Stress: Not on file   Social Connections: Not on file Intimate Partner Violence: Not on file   Housing Stability: Not on file     Family History   Problem Relation Age of Onset    Diabetes Mother     Heart attack Father         MI    Hypertension Brother        Meds/Allergies   all current active meds have been reviewed and current meds:   Current Facility-Administered Medications   Medication Dose Route Frequency    acetaminophen (TYLENOL) tablet 650 mg  650 mg Oral Q6H PRN    amLODIPine (NORVASC) tablet 10 mg  10 mg Oral BID    aspirin chewable tablet 81 mg  81 mg Oral Daily    atorvastatin (LIPITOR) tablet 80 mg  80 mg Oral Daily With Dinner    benzonatate (TESSALON PERLES) capsule 100 mg  100 mg Oral TID PRN    carvedilol (COREG) tablet 12 5 mg  12 5 mg Oral BID With Meals    dicyclomine (BENTYL) tablet 20 mg  20 mg Oral 4x Daily (AC & HS)    guaiFENesin (ROBITUSSIN) oral solution 200 mg  200 mg Oral TID PRN    heparin (porcine) subcutaneous injection 5,000 Units  5,000 Units Subcutaneous Q8H [facility]    hydrALAZINE (APRESOLINE) tablet 100 mg  100 mg Oral TID    HYDROmorphone (DILAUDID) injection 0 5 mg  0 5 mg Intravenous Once    insulin aspart protamine-insulin aspart (NovoLOG 70/30) 100 units/mL subcutaneous injection 10 Units  10 Units Subcutaneous BID AC    insulin lispro (HumaLOG) 100 units/mL subcutaneous injection 1-6 Units  1-6 Units Subcutaneous TID AC    isosorbide mononitrate (IMDUR) 24 hr tablet 120 mg  120 mg Oral Daily    levothyroxine tablet 112 mcg  112 mcg Oral Early Morning    loperamide (IMODIUM) capsule 2 mg  2 mg Oral TID PRN    ondansetron (ZOFRAN) injection 4 mg  4 mg Intravenous Q6H PRN    oxyCODONE (ROXICODONE) IR tablet 2 5 mg  2 5 mg Oral Q4H PRN    oxyCODONE (ROXICODONE) IR tablet 5 mg  5 mg Oral Q6H PRN    simethicone (MYLICON) chewable tablet 80 mg  80 mg Oral 4x Daily PRN       Allergies   Allergen Reactions    Iv Contrast [Iodinated Diagnostic Agents] Itching     Caused KELLY    Nifedipine Rash       I have reviewed the patient's controlled substance dispensing history in the Prescription Drug Monitoring Program in compliance with the [facility] regulations before prescribing any controlled substances  Objective   /62   Pulse 66   Temp 97 9 °F (36 6 °C)   Resp 18   Ht 5' 2" (1 575 m)   Wt 72 kg (158 lb 11 7 oz)   SpO2 96%   BMI 29 03 kg/m²   Physical Exam    Lab Results:   I have personally reviewed pertinent labs  , CBC:   Lab Results   Component Value Date    WBC 7 64 02/15/2022    HGB 9 0 (L) 02/15/2022    HCT 27 8 (L) 02/15/2022    MCV 86 02/15/2022     02/15/2022    MCH 27 8 02/15/2022    MCHC 32 4 02/15/2022    RDW 13 2 02/15/2022    MPV 10 3 02/15/2022   , CMP:   Lab Results   Component Value Date    SODIUM 129 (L) 02/15/2022    K 4 7 02/15/2022    CL 98 (L) 02/15/2022    CO2 21 02/15/2022    BUN 91 (H) 02/15/2022    CREATININE 7 32 (H) 02/15/2022    CALCIUM 8 0 (L) 02/15/2022    EGFR 5 02/15/2022   , PT/PTT:No results found for: PT, PTT  Imaging Studies: I have personally reviewed pertinent reports  EKG, Pathology, and Other Studies: I have personally reviewed pertinent reports  Counseling / Coordination of Care  Total floor / unit time spent today 75 minutes  Greater than 50% of total time was spent with the patient and / or family counseling and / or coordination of care  A description of the counseling / coordination of care: current condition, goals of care, code status, dialysis, outpt f/u      Kaye Braun DO

## 2022-02-15 NOTE — PLAN OF CARE
Problem: PAIN - ADULT  Goal: Verbalizes/displays adequate comfort level or baseline comfort level  Description: Interventions:  - Encourage patient to monitor pain and request assistance  - Assess pain using appropriate pain scale  - Administer analgesics based on type and severity of pain and evaluate response  - Implement non-pharmacological measures as appropriate and evaluate response  - Notify physician/advanced practitioner if interventions unsuccessful or patient reports new pain  Outcome: Progressing     Problem: INFECTION - ADULT  Goal: Absence or prevention of progression during hospitalization  Description: INTERVENTIONS:  - Assess and monitor for signs and symptoms of infection  - Monitor lab/diagnostic results  - Monitor all insertion sites, i e  indwelling lines, tubes, and drains  - [place] appropriate cooling/warming therapies per order  - Administer medications as ordered  - Instruct and encourage patient and family to use good hand hygiene technique  - Identify and instruct in appropriate isolation precautions for identified infection/condition  Outcome: Progressing     Problem: SAFETY ADULT  Goal: Patient will remain free of falls  Description: INTERVENTIONS:  - Educate patient/family on patient safety including physical limitations  - Instruct patient to call for assistance with activity   - Consult OT/PT to assist with strengthening/mobility   - Keep Call bell within reach  - Keep bed low and locked with side rails adjusted as appropriate  - Keep care items and personal belongings within reach  - Initiate and maintain comfort rounds  - Make Fall Risk Sign visible to staff  - Offer Toileting every 2 Hours, in advance of need  - Initiate/Maintain bed alarm  - Apply yellow socks and bracelet for high fall risk patients  - Consider moving patient to room near nurses station  Outcome: Progressing  Goal: Maintain or return to baseline ADL function  Description: INTERVENTIONS:  -  Assess patient's ability to carry out ADLs; assess patient's baseline for ADL function and identify physical deficits which impact ability to perform ADLs (bathing, care of mouth/teeth, toileting, grooming, dressing, etc )  - Assess/evaluate cause of self-care deficits   - Assess range of motion  - Assess patient's mobility; develop plan if impaired  - Assess patient's need for assistive devices and provide as appropriate  - Encourage maximum independence but intervene and supervise when necessary  - Involve family in performance of ADLs  - Assess for home care needs following discharge   - Consider OT consult to assist with ADL evaluation and planning for discharge  - Provide patient education as appropriate  Outcome: Progressing  Goal: Maintains/Returns to pre admission functional level  Description: INTERVENTIONS:  - Perform BMAT or MOVE assessment daily    - Set and communicate daily mobility goal to care team and patient/family/caregiver  - Collaborate with rehabilitation services on mobility goals if consulted  - Perform Range of Motion 3 times a day  - Reposition patient every 2 hours    - Dangle patient 3 times a day  - Stand patient 3 times a day  - Ambulate patient 3 times a day  - Out of bed to chair 3 times a day   - Out of bed for meals 3 times a day  - Out of bed for toileting  - Record patient progress and toleration of activity level   Outcome: Progressing     Problem: DISCHARGE PLANNING  Goal: Discharge to home or other facility with appropriate resources  Description: INTERVENTIONS:  - Identify barriers to discharge w/patient and caregiver  - Arrange for needed discharge resources and transportation as appropriate  - Identify discharge learning needs (meds, wound care, etc )  - Refer to Case Management Department for coordinating discharge planning if the patient needs post-hospital services based on physician/advanced practitioner order or complex needs related to functional status, cognitive ability, or social support system  Outcome: Progressing     Problem: GENITOURINARY - ADULT  Goal: Maintains or returns to baseline urinary function  Description: INTERVENTIONS:  - Assess urinary function  - Encourage oral fluids to ensure adequate hydration if ordered  - Administer IV fluids as ordered to ensure adequate hydration  - Administer ordered medications as needed  - Offer frequent toileting  - Follow urinary retention protocol if ordered  Outcome: Progressing     Problem: METABOLIC, FLUID AND ELECTROLYTES - ADULT  Goal: Fluid balance maintained  Description: INTERVENTIONS:  - Monitor labs   - Monitor I/O and WT  - Instruct patient on fluid and nutrition as appropriate  - Assess for signs & symptoms of volume excess or deficit  Outcome: Progressing  Goal: Glucose maintained within target range  Description: INTERVENTIONS:  - Monitor Blood Glucose as ordered  - Assess for signs and symptoms of hyperglycemia and hypoglycemia  - Administer ordered medications to maintain glucose within target range  - Assess nutritional intake and initiate nutrition service referral as needed  Outcome: Progressing     Problem: Nutrition/Hydration-ADULT  Goal: Nutrient/Hydration intake appropriate for improving, restoring or maintaining nutritional needs  Description: Monitor and assess patient's nutrition/hydration status for malnutrition  Collaborate with interdisciplinary team and initiate plan and interventions as ordered  Monitor patient's weight and dietary intake as ordered or per policy  Utilize nutrition screening tool and intervene as necessary  Determine patient's food preferences and provide high-protein, high-caloric foods as appropriate       INTERVENTIONS:  - Monitor oral intake, urinary output, labs, and treatment plans  - Assess nutrition and hydration status and recommend course of action  - Evaluate amount of meals eaten  - Assist patient with eating if necessary   - Allow adequate time for meals  - Recommend/ encourage appropriate diets, oral nutritional supplements, and vitamin/mineral supplements  - Order, calculate, and assess calorie counts as needed  - Recommend, monitor, and adjust tube feedings and TPN/PPN based on assessed needs  - Assess need for intravenous fluids  - Provide specific nutrition/hydration education as appropriate  - Include patient/family/caregiver in decisions related to nutrition  Outcome: Progressing

## 2022-02-15 NOTE — ASSESSMENT & PLAN NOTE
Lab Results   Component Value Date    HGBA1C 11 0 (H) 02/12/2022       Recent Labs     02/14/22  1555 02/14/22  2052 02/15/22  0606 02/15/22  1111   POCGLU 262* 250* 223* 333*       Blood Sugar Average: Last 72 hrs:  (P) 223 [id number]   · Uncontrolled as evidenced by A1c  · Continue NovoLog 70/30 10 units b i d which is home regimen - patient has been refusing  Took a dose this morning but has refused before  · Increase dose to12 units BID given uncontrolled hyperglycemia  encourage compliance      · SSI/accuchecks  · Diabetic diet  · Hypoglycemia protocol

## 2022-02-15 NOTE — ASSESSMENT & PLAN NOTE
· [facility] 1/4/19: LUDY x 2 to LAD after NSTEMI (trop 0 2) and abnormal stress test with ischemia in anterior wall on 12/31/18, 80% RCA- plan staged, but has not needed intervention  · On imdur, asa, coreg, statin

## 2022-02-15 NOTE — ASSESSMENT & PLAN NOTE
Wt Readings from Last 3 Encounters:   02/15/22 72 kg (158 lb 11 7 oz)   01/31/22 71 2 kg (157 lb)   11/23/21 72 4 kg (159 lb 9 6 oz)     · Per record review, H&P documented acute heart failure, however patient examines dry to euvolemic, on RA, no rales on examination  · Patient follows with Dr Leopoldo Corrigan    Stage C HFpEF, echo most recently with EF 60%, hypo-inferior wall, normal RV  · NT proBNP 17,351 (20,000 in 10/21), CT A/P wo contrast showed trace bilateral pleural effusions  · Weight was recorded as 160 on admission,  Weight was 157 at recent office visit 1/31  · Requesting accurate weights and I&Os - nurse aware  · S/p IVF then IV Bumex per Nephrology   · Nephrology following as above  · Cautious fluid balance

## 2022-02-16 PROBLEM — R10.9 ABDOMINAL PAIN: Status: RESOLVED | Noted: 2022-02-10 | Resolved: 2022-02-16

## 2022-02-16 LAB
ANION GAP SERPL CALCULATED.3IONS-SCNC: 8 MMOL/L (ref 4–13)
BASOPHILS # BLD AUTO: 0.02 THOUSANDS/ΜL (ref 0–0.1)
BASOPHILS NFR BLD AUTO: 0 % (ref 0–1)
BUN SERPL-MCNC: 101 MG/DL (ref 5–25)
CALCIUM SERPL-MCNC: 8.3 MG/DL (ref 8.3–10.1)
CHLORIDE SERPL-SCNC: 100 MMOL/L (ref 100–108)
CO2 SERPL-SCNC: 23 MMOL/L (ref 21–32)
CREAT SERPL-MCNC: 7.03 MG/DL (ref 0.6–1.3)
EOSINOPHIL # BLD AUTO: 0.15 THOUSAND/ΜL (ref 0–0.61)
EOSINOPHIL NFR BLD AUTO: 2 % (ref 0–6)
ERYTHROCYTE [DISTWIDTH] IN BLOOD BY AUTOMATED COUNT: 13.4 % (ref 11.6–15.1)
GFR SERPL CREATININE-BSD FRML MDRD: 5 ML/MIN/1.73SQ M
GLUCOSE SERPL-MCNC: 105 MG/DL (ref 65–140)
GLUCOSE SERPL-MCNC: 115 MG/DL (ref 65–140)
GLUCOSE SERPL-MCNC: 131 MG/DL (ref 65–140)
GLUCOSE SERPL-MCNC: 181 MG/DL (ref 65–140)
GLUCOSE SERPL-MCNC: 216 MG/DL (ref 65–140)
GLUCOSE SERPL-MCNC: 267 MG/DL (ref 65–140)
HCT VFR BLD AUTO: 26.7 % (ref 34.8–46.1)
HGB BLD-MCNC: 8.6 G/DL (ref 11.5–15.4)
IMM GRANULOCYTES # BLD AUTO: 0.12 THOUSAND/UL (ref 0–0.2)
IMM GRANULOCYTES NFR BLD AUTO: 2 % (ref 0–2)
LYMPHOCYTES # BLD AUTO: 1.02 THOUSANDS/ΜL (ref 0.6–4.47)
LYMPHOCYTES NFR BLD AUTO: 16 % (ref 14–44)
MCH RBC QN AUTO: 27.4 PG (ref 26.8–34.3)
MCHC RBC AUTO-ENTMCNC: 32.2 G/DL (ref 31.4–37.4)
MCV RBC AUTO: 85 FL (ref 82–98)
MONOCYTES # BLD AUTO: 0.37 THOUSAND/ΜL (ref 0.17–1.22)
MONOCYTES NFR BLD AUTO: 6 % (ref 4–12)
NEUTROPHILS # BLD AUTO: 4.57 THOUSANDS/ΜL (ref 1.85–7.62)
NEUTS SEG NFR BLD AUTO: 74 % (ref 43–75)
NRBC BLD AUTO-RTO: 0 /100 WBCS
PLATELET # BLD AUTO: 214 THOUSANDS/UL (ref 149–390)
PMV BLD AUTO: 10.3 FL (ref 8.9–12.7)
POTASSIUM SERPL-SCNC: 4.9 MMOL/L (ref 3.5–5.3)
RBC # BLD AUTO: 3.14 MILLION/UL (ref 3.81–5.12)
SODIUM SERPL-SCNC: 131 MMOL/L (ref 136–145)
WBC # BLD AUTO: 6.25 THOUSAND/UL (ref 4.31–10.16)

## 2022-02-16 PROCEDURE — 85025 COMPLETE CBC W/AUTO DIFF WBC: CPT | Performed by: INTERNAL MEDICINE

## 2022-02-16 PROCEDURE — 99232 SBSQ HOSP IP/OBS MODERATE 35: CPT | Performed by: INTERNAL MEDICINE

## 2022-02-16 PROCEDURE — 82948 REAGENT STRIP/BLOOD GLUCOSE: CPT

## 2022-02-16 PROCEDURE — 80048 BASIC METABOLIC PNL TOTAL CA: CPT | Performed by: INTERNAL MEDICINE

## 2022-02-16 RX ORDER — AMLODIPINE BESYLATE 10 MG/1
10 TABLET ORAL DAILY
Status: DISCONTINUED | OUTPATIENT
Start: 2022-02-17 | End: 2022-02-18 | Stop reason: HOSPADM

## 2022-02-16 RX ORDER — INSULIN ASPART 100 [IU]/ML
10 INJECTION, SUSPENSION SUBCUTANEOUS
Status: DISCONTINUED | OUTPATIENT
Start: 2022-02-16 | End: 2022-02-16

## 2022-02-16 RX ORDER — INSULIN ASPART 100 [IU]/ML
12 INJECTION, SUSPENSION SUBCUTANEOUS
Status: DISCONTINUED | OUTPATIENT
Start: 2022-02-16 | End: 2022-02-17

## 2022-02-16 RX ORDER — ALBUMIN (HUMAN) 12.5 G/50ML
25 SOLUTION INTRAVENOUS 2 TIMES DAILY
Status: COMPLETED | OUTPATIENT
Start: 2022-02-16 | End: 2022-02-16

## 2022-02-16 RX ADMIN — INSULIN ASPART 12 UNITS: 100 INJECTION, SUSPENSION SUBCUTANEOUS at 09:42

## 2022-02-16 RX ADMIN — HYDRALAZINE HYDROCHLORIDE 100 MG: 50 TABLET ORAL at 16:32

## 2022-02-16 RX ADMIN — DICYCLOMINE HYDROCHLORIDE 20 MG: 20 TABLET ORAL at 06:05

## 2022-02-16 RX ADMIN — DICYCLOMINE HYDROCHLORIDE 20 MG: 20 TABLET ORAL at 13:27

## 2022-02-16 RX ADMIN — HYDRALAZINE HYDROCHLORIDE 100 MG: 50 TABLET ORAL at 20:37

## 2022-02-16 RX ADMIN — INSULIN LISPRO 3 UNITS: 100 INJECTION, SOLUTION INTRAVENOUS; SUBCUTANEOUS at 13:26

## 2022-02-16 RX ADMIN — ATORVASTATIN CALCIUM 80 MG: 80 TABLET, FILM COATED ORAL at 16:32

## 2022-02-16 RX ADMIN — CARVEDILOL 12.5 MG: 12.5 TABLET, FILM COATED ORAL at 09:52

## 2022-02-16 RX ADMIN — ISOSORBIDE MONONITRATE 120 MG: 60 TABLET, EXTENDED RELEASE ORAL at 09:52

## 2022-02-16 RX ADMIN — ALBUMIN (HUMAN) 25 G: 0.25 INJECTION, SOLUTION INTRAVENOUS at 16:31

## 2022-02-16 RX ADMIN — AMLODIPINE BESYLATE 10 MG: 10 TABLET ORAL at 09:52

## 2022-02-16 RX ADMIN — DICYCLOMINE HYDROCHLORIDE 20 MG: 20 TABLET ORAL at 16:32

## 2022-02-16 RX ADMIN — DICYCLOMINE HYDROCHLORIDE 20 MG: 20 TABLET ORAL at 20:36

## 2022-02-16 RX ADMIN — CARVEDILOL 12.5 MG: 12.5 TABLET, FILM COATED ORAL at 16:32

## 2022-02-16 RX ADMIN — INSULIN ASPART 12 UNITS: 100 INJECTION, SUSPENSION SUBCUTANEOUS at 16:31

## 2022-02-16 RX ADMIN — ALBUMIN (HUMAN) 25 G: 0.25 INJECTION, SOLUTION INTRAVENOUS at 20:37

## 2022-02-16 RX ADMIN — ASPIRIN 81 MG: 81 TABLET, CHEWABLE ORAL at 09:45

## 2022-02-16 RX ADMIN — LEVOTHYROXINE SODIUM 112 MCG: 112 TABLET ORAL at 06:04

## 2022-02-16 NOTE — PROGRESS NOTES
Fr Pierson provided prayer and blessing at the door       02/16/22 1600   Clinical Encounter Type   Visited With Patient   Pentecostalism Encounters   Pentecostalism Needs Prayer   Sacramental Encounters   Sacrament Other Other (Comment)  (blessing)

## 2022-02-16 NOTE — ASSESSMENT & PLAN NOTE
Wt Readings from Last 3 Encounters:   02/16/22 73 9 kg (162 lb 14 7 oz)   01/31/22 71 2 kg (157 lb)   11/23/21 72 4 kg (159 lb 9 6 oz)     · Per record review, H&P documented acute heart failure, however patient examines dry to euvolemic, on RA, no rales on examination  · Patient follows with Dr Guerline Muñoz    Stage C HFpEF, echo most recently with EF 60%, hypo-inferior wall, normal RV  · NT proBNP 17,351 (20,000 in 10/21), CT A/P wo contrast showed trace bilateral pleural effusions  · Weight was recorded as 160 on admission,  Weight was 157 at recent office visit 1/31  · Requesting accurate weights and I&Os - nurse aware  · S/p IVF then IV Bumex per Nephrology   · Nephrology following as above  · Cautious fluid balance

## 2022-02-16 NOTE — ASSESSMENT & PLAN NOTE
Lab Results   Component Value Date    HGBA1C 11 0 (H) 02/12/2022       Recent Labs     02/15/22  1630 02/15/22  1946 02/16/22  0628 02/16/22  1120   POCGLU 167* 166* 115 267*       Blood Sugar Average: Last 72 hrs:  (P) 272 [id number]   · Uncontrolled as evidenced by A1c  · Continue NovoLog 70/30 10 units b i d which is home regimen - patient has been refusing  Took a dose this morning but has refused before  · Continue BID 70/30  -  Encourage compliance     · SSI/accuchecks  · Diabetic diet  · Hypoglycemia protocol

## 2022-02-16 NOTE — PLAN OF CARE
Problem: PAIN - ADULT  Goal: Verbalizes/displays adequate comfort level or baseline comfort level  Description: Interventions:  - Encourage patient to monitor pain and request assistance  - Assess pain using appropriate pain scale  - Administer analgesics based on type and severity of pain and evaluate response  - Implement non-pharmacological measures as appropriate and evaluate response  - Notify physician/advanced practitioner if interventions unsuccessful or patient reports new pain  Outcome: Progressing     Problem: INFECTION - ADULT  Goal: Absence or prevention of progression during hospitalization  Description: INTERVENTIONS:  - Assess and monitor for signs and symptoms of infection  - Monitor lab/diagnostic results  - Monitor all insertion sites, i e  indwelling lines, tubes, and drains  - [place] appropriate cooling/warming therapies per order  - Administer medications as ordered  - Instruct and encourage patient and family to use good hand hygiene technique  - Identify and instruct in appropriate isolation precautions for identified infection/condition  Outcome: Progressing     Problem: SAFETY ADULT  Goal: Patient will remain free of falls  Description: INTERVENTIONS:  - Educate patient/family on patient safety including physical limitations  - Instruct patient to call for assistance with activity   - Consult OT/PT to assist with strengthening/mobility   - Keep Call bell within reach  - Keep bed low and locked with side rails adjusted as appropriate  - Keep care items and personal belongings within reach  - Initiate and maintain comfort rounds  - Make Fall Risk Sign visible to staff  - Offer Toileting every 2 Hours, in advance of need  - Initiate/Maintain bed alarm  - Apply yellow socks and bracelet for high fall risk patients  - Consider moving patient to room near nurses station  Outcome: Progressing  Goal: Maintain or return to baseline ADL function  Description: INTERVENTIONS:  -  Assess patient's ability to carry out ADLs; assess patient's baseline for ADL function and identify physical deficits which impact ability to perform ADLs (bathing, care of mouth/teeth, toileting, grooming, dressing, etc )  - Assess/evaluate cause of self-care deficits   - Assess range of motion  - Assess patient's mobility; develop plan if impaired  - Assess patient's need for assistive devices and provide as appropriate  - Encourage maximum independence but intervene and supervise when necessary  - Involve family in performance of ADLs  - Assess for home care needs following discharge   - Consider OT consult to assist with ADL evaluation and planning for discharge  - Provide patient education as appropriate  Outcome: Progressing  Goal: Maintains/Returns to pre admission functional level  Description: INTERVENTIONS:  - Perform BMAT or MOVE assessment daily    - Set and communicate daily mobility goal to care team and patient/family/caregiver  - Collaborate with rehabilitation services on mobility goals if consulted  - Perform Range of Motion 3 times a day  - Reposition patient every 2 hours    - Dangle patient 3 times a day  - Stand patient 3 times a day  - Ambulate patient 3 times a day  - Out of bed to chair 3 times a day   - Out of bed for meals 3 times a day  - Out of bed for toileting  - Record patient progress and toleration of activity level   Outcome: Progressing     Problem: DISCHARGE PLANNING  Goal: Discharge to home or other facility with appropriate resources  Description: INTERVENTIONS:  - Identify barriers to discharge w/patient and caregiver  - Arrange for needed discharge resources and transportation as appropriate  - Identify discharge learning needs (meds, wound care, etc )  - Refer to Case Management Department for coordinating discharge planning if the patient needs post-hospital services based on physician/advanced practitioner order or complex needs related to functional status, cognitive ability, or social support system  Outcome: Progressing

## 2022-02-16 NOTE — PLAN OF CARE
Problem: PAIN - ADULT  Goal: Verbalizes/displays adequate comfort level or baseline comfort level  Description: Interventions:  - Encourage patient to monitor pain and request assistance  - Assess pain using appropriate pain scale  - Administer analgesics based on type and severity of pain and evaluate response  - Implement non-pharmacological measures as appropriate and evaluate response  - Notify physician/advanced practitioner if interventions unsuccessful or patient reports new pain  Outcome: Progressing     Problem: INFECTION - ADULT  Goal: Absence or prevention of progression during hospitalization  Description: INTERVENTIONS:  - Assess and monitor for signs and symptoms of infection  - Monitor lab/diagnostic results  - Monitor all insertion sites, i e  indwelling lines, tubes, and drains  - Strawberry appropriate cooling/warming therapies per order  - Administer medications as ordered  - Instruct and encourage patient and family to use good hand hygiene technique  - Identify and instruct in appropriate isolation precautions for identified infection/condition  Outcome: Progressing     Problem: SAFETY ADULT  Goal: Patient will remain free of falls  Description: INTERVENTIONS:  - Educate patient/family on patient safety including physical limitations  - Instruct patient to call for assistance with activity   - Consult OT/PT to assist with strengthening/mobility   - Keep Call bell within reach  - Keep bed low and locked with side rails adjusted as appropriate  - Keep care items and personal belongings within reach  - Initiate and maintain comfort rounds  - Make Fall Risk Sign visible to staff  - Offer Toileting every 2 Hours, in advance of need  - Initiate/Maintain bed alarm  - Apply yellow socks and bracelet for high fall risk patients  - Consider moving patient to room near nurses station  Outcome: Progressing     Problem: DISCHARGE PLANNING  Goal: Discharge to home or other facility with appropriate resources  Description: INTERVENTIONS:  - Identify barriers to discharge w/patient and caregiver  - Arrange for needed discharge resources and transportation as appropriate  - Identify discharge learning needs (meds, wound care, etc )  - Refer to Case Management Department for coordinating discharge planning if the patient needs post-hospital services based on physician/advanced practitioner order or complex needs related to functional status, cognitive ability, or social support system  Outcome: Progressing     Problem: GENITOURINARY - ADULT  Goal: Maintains or returns to baseline urinary function  Description: INTERVENTIONS:  - Assess urinary function  - Encourage oral fluids to ensure adequate hydration if ordered  - Administer IV fluids as ordered to ensure adequate hydration  - Administer ordered medications as needed  - Offer frequent toileting  - Follow urinary retention protocol if ordered  Outcome: Progressing     Problem: METABOLIC, FLUID AND ELECTROLYTES - ADULT  Goal: Fluid balance maintained  Description: INTERVENTIONS:  - Monitor labs   - Monitor I/O and WT  - Instruct patient on fluid and nutrition as appropriate  - Assess for signs & symptoms of volume excess or deficit  Outcome: Progressing  Goal: Glucose maintained within target range  Description: INTERVENTIONS:  - Monitor Blood Glucose as ordered  - Assess for signs and symptoms of hyperglycemia and hypoglycemia  - Administer ordered medications to maintain glucose within target range  - Assess nutritional intake and initiate nutrition service referral as needed  Outcome: Progressing     Problem: Nutrition/Hydration-ADULT  Goal: Nutrient/Hydration intake appropriate for improving, restoring or maintaining nutritional needs  Description: Monitor and assess patient's nutrition/hydration status for malnutrition  Collaborate with interdisciplinary team and initiate plan and interventions as ordered    Monitor patient's weight and dietary intake as ordered or per policy  Utilize nutrition screening tool and intervene as necessary  Determine patient's food preferences and provide high-protein, high-caloric foods as appropriate       INTERVENTIONS:  - Monitor oral intake, urinary output, labs, and treatment plans  - Assess nutrition and hydration status and recommend course of action  - Evaluate amount of meals eaten  - Assist patient with eating if necessary   - Allow adequate time for meals  - Recommend/ encourage appropriate diets, oral nutritional supplements, and vitamin/mineral supplements  - Order, calculate, and assess calorie counts as needed  - Recommend, monitor, and adjust tube feedings and TPN/PPN based on assessed needs  - Assess need for intravenous fluids  - Provide specific nutrition/hydration education as appropriate  - Include patient/family/caregiver in decisions related to nutrition  Outcome: Progressing     Problem: RESPIRATORY - ADULT  Goal: Achieves optimal ventilation and oxygenation  Description: INTERVENTIONS:  - Assess for changes in respiratory status  - Assess for changes in mentation and behavior  - Position to facilitate oxygenation and minimize respiratory effort  - Oxygen administered by appropriate delivery if ordered  - Initiate smoking cessation education as indicated  - Encourage broncho-pulmonary hygiene including cough, deep breathe, Incentive Spirometry  - Assess the need for suctioning and aspirate as needed  - Assess and instruct to report SOB or any respiratory difficulty  - Respiratory Therapy support as indicated  Outcome: Progressing     Problem: HEMATOLOGIC - ADULT  Goal: Maintains hematologic stability  Description: INTERVENTIONS  - Assess for signs and symptoms of bleeding or hemorrhage  - Monitor labs  - Administer supportive blood products/factors as ordered and appropriate  Outcome: Progressing     Problem: SAFETY ADULT  Goal: Maintain or return to baseline ADL function  Description: INTERVENTIONS:  -  Assess patient's ability to carry out ADLs; assess patient's baseline for ADL function and identify physical deficits which impact ability to perform ADLs (bathing, care of mouth/teeth, toileting, grooming, dressing, etc )  - Assess/evaluate cause of self-care deficits   - Assess range of motion  - Assess patient's mobility; develop plan if impaired  - Assess patient's need for assistive devices and provide as appropriate  - Encourage maximum independence but intervene and supervise when necessary  - Involve family in performance of ADLs  - Assess for home care needs following discharge   - Consider OT consult to assist with ADL evaluation and planning for discharge  - Provide patient education as appropriate  Outcome: Completed  Goal: Maintains/Returns to pre admission functional level  Description: INTERVENTIONS:  - Perform BMAT or MOVE assessment daily    - Set and communicate daily mobility goal to care team and patient/family/caregiver  - Collaborate with rehabilitation services on mobility goals if consulted  - Perform Range of Motion 3 times a day  - Reposition patient every 2 hours    - Dangle patient 3 times a day  - Stand patient 3 times a day  - Ambulate patient 3 times a day  - Out of bed to chair 3 times a day   - Out of bed for meals 3 times a day  - Out of bed for toileting  - Record patient progress and toleration of activity level   Outcome: Completed

## 2022-02-16 NOTE — PROGRESS NOTES
[address] [facility]  Progress Note - Dayami Tran 1955, 78 y o  female MRN: [id number]  Unit/Bed#: -01 Encounter: [id number]  Primary Care Provider: Kylah Yuen MD   Date and time admitted to hospital: 2/9/2022 11:38 AM    * COVID-19  Assessment & Plan  · Per record review, patient presented with generalized abdominal pain, diarrhea, chills, weakness, fatigue and nasal congestion  · Home COVID test +, confirmation test on arrival also +  Reported she is vaccinated   · Per RN, patient was noted to desat only when sleeping but sats stable on RA when awake  · Patient was not requiring oxygen but was started on Remdesivir on admission - this was subsequently discontinued per protocol  · CRP 22, D-dimer 1 58 (AC with Hep SQ 5000 q8h per VTE Group C), procal 0 79 (unclear significance in setting of chronic renal failure), BNP 17,351  · Patient reports diarrhea has resolved  · Isolation thru 2/19      Acute kidney injury superimposed on chronic kidney disease [facility])  Assessment & Plan  Lab Results   Component Value Date    EGFR 5 02/16/2022    EGFR 5 02/15/2022    EGFR 6 02/14/2022    CREATININE 7 03 (H) 02/16/2022    CREATININE 7 32 (H) 02/15/2022    CREATININE 6 31 (H) 02/14/2022     · Baseline creatinine around 3 per prior notes however most recent labs indicate she has been around 3 1-3 4, Estimated GFR around 15-20, stage 4  · Reports she follows with Dr Amina Arias  · With KELLY in the setting of vomiting/diarrhea and Bumex use  · Nephrology consult appreciated   · Miralax/colace discontinued  · Home bumex was discontinued and patient was placed on IVF  Creatinine continued to worsen and IVF were stopped and patient was given IV bumex  · Creatinine slowly improving now, 7 01 today  · Avoid nephrotoxins and hypotension  · BMP monitoring   · palliative care consulted, patient continued to refuse HD and wants to remain full code and on current therapy      Pericardial effusion  Assessment & Plan  · Chronic, noted on prior echos/imaging  · Monitoring outpatient     Coronary artery disease involving native coronary artery of native heart with angina pectoris [facility])  Assessment & Plan  · [facility] 1/4/19: LUDY x 2 to LAD after NSTEMI (trop 0 2) and abnormal stress test with ischemia in anterior wall on 12/31/18, 80% RCA- plan staged, but has not needed intervention  · On imdur, asa, coreg, statin    Hypertension  Assessment & Plan  · Avoid hypotension given KELLY  · Nephrology following  · Bumex on hold      Hypothyroidism  Assessment & Plan  · Continue synthroid     Type 2 diabetes mellitus with kidney complication, with long-term current use of insulin [facility])  Assessment & Plan  Lab Results   Component Value Date    HGBA1C 11 0 (H) 02/12/2022       Recent Labs     02/15/22  1630 02/15/22  1946 02/16/22  0628 02/16/22  1120   POCGLU 167* 166* 115 267*       Blood Sugar Average: Last 72 hrs:  (P) 114 [id number]   · Uncontrolled as evidenced by A1c  · Continue NovoLog 70/30 10 units b i d which is home regimen - patient has been refusing  Took a dose this morning but has refused before  · Continue BID 70/30  -  Encourage compliance  · SSI/accuchecks  · Diabetic diet  · Hypoglycemia protocol     Chronic diastolic heart failure (HCC)  Assessment & Plan  Wt Readings from Last 3 Encounters:   02/16/22 73 9 kg (162 lb 14 7 oz)   01/31/22 71 2 kg (157 lb)   11/23/21 72 4 kg (159 lb 9 6 oz)     · Per record review, H&P documented acute heart failure, however patient examines dry to euvolemic, on RA, no rales on examination  · Patient follows with Dr Charise Nissen    Stage C HFpEF, echo most recently with EF 60%, hypo-inferior wall, normal RV  · NT proBNP 17,351 (20,000 in 10/21), CT A/P wo contrast showed trace bilateral pleural effusions  · Weight was recorded as 160 on admission,  Weight was 157 at recent office visit 1/31  · Requesting accurate weights and I&Os - nurse aware  · S/p IVF then IV Bumex per Nephrology   · Nephrology following as above  · Cautious fluid balance       Abdominal pain-resolved as of 2022  Assessment & Plan  · CT abdomen/pelvis on admission with no acute intra-abdominal abnormality per the results report   · Suspect related to COVID/diarrhea  · Patient reports abdominal discomfort and diarrhea have resolved   · Bentyl, imodium on board         VTE Pharmacologic Prophylaxis:   Pharmacologic: Heparin  Mechanical VTE Prophylaxis in Place: Yes    Patient Centered Rounds: I have performed bedside rounds with nursing staff today  Discussions with Specialists or Other Care Team Provider: nephrology, CM    Education and Discussions with Family / Patient: plan of care, patient  Left a voicemail for patient' s daughter  Time Spent for Care: 30 minutes  More than 50% of total time spent on counseling and coordination of care as described above  Current Length of Stay: 6 day(s)    Current Patient Status: Inpatient   Certification Statement: The patient will continue to require additional inpatient hospital stay due to not medically stable    Discharge Plan: when medically stable    Code Status: Level 1 - Full Code      Subjective:   No overnight events  No acute complaints  Objective:     Vitals:   Temp (24hrs), Av °F (36 7 °C), Min:97 8 °F (36 6 °C), Max:98 1 °F (36 7 °C)    Temp:  [97 8 °F (36 6 °C)-98 1 °F (36 7 °C)] 97 8 °F (36 6 °C)  HR:  [65-67] 65  BP: (134-147)/(65-89) 147/89  SpO2:  [95 %] 95 %  Body mass index is 29 8 kg/m²  Input and Output Summary (last 24 hours): Intake/Output Summary (Last 24 hours) at 2022 1414  Last data filed at 2022 1301  Gross per 24 hour   Intake 711 ml   Output 1000 ml   Net -289 ml       Physical Exam:     Physical Exam  Constitutional:       General: She is not in acute distress  Cardiovascular:      Rate and Rhythm: Normal rate and regular rhythm  Heart sounds: Normal heart sounds   No murmur heard   Abdominal:      General: Bowel sounds are normal  There is no distension  Palpations: Abdomen is soft  Tenderness: There is no abdominal tenderness  Musculoskeletal:         General: No swelling  Skin:     General: Skin is warm  Neurological:      Mental Status: She is alert  Comments: Awake alert and communicative     Additional Data:     Labs:    Results from last 7 days   Lab Units 02/16/22  0552 02/12/22  0523 02/11/22  0640   WBC Thousand/uL 6 25   < > 5 25   HEMOGLOBIN g/dL 8 6*   < > 8 2*   HEMATOCRIT % 26 7*   < > 26 3*   PLATELETS Thousands/uL 214   < > 204   BANDS PCT %  --   --  3   NEUTROS PCT % 74   < >  --    LYMPHS PCT % 16   < >  --    LYMPHO PCT %  --   --  13*   MONOS PCT % 6   < >  --    MONO PCT %  --   --  2*   EOS PCT % 2   < > 0    < > = values in this interval not displayed  Results from last 7 days   Lab Units 02/16/22  0552 02/13/22  0747 02/12/22  0523   SODIUM mmol/L 131*   < > 135*   POTASSIUM mmol/L 4 9   < > 4 3   CHLORIDE mmol/L 100   < > 103   CO2 mmol/L 23   < > 21   BUN mg/dL 101*   < > 81*   CREATININE mg/dL 7 03*   < > 5 21*   ANION GAP mmol/L 8   < > 11   CALCIUM mg/dL 8 3   < > 8 3   ALBUMIN g/dL  --   --  2 2*   TOTAL BILIRUBIN mg/dL  --   --  0 31   ALK PHOS U/L  --   --  338*   ALT U/L  --   --  27   AST U/L  --   --  37   GLUCOSE RANDOM mg/dL 105   < > 168*    < > = values in this interval not displayed  Results from last 7 days   Lab Units 02/16/22  1120 02/16/22  0628 02/15/22  1946 02/15/22  1630 02/15/22  1111 02/15/22  0658 02/15/22  0606 02/14/22  2052 02/14/22  1555 02/14/22  1145 02/14/22  0806 02/13/22  2108   POC GLUCOSE mg/dl 267* 115 166* 167* 333* 216* 223* 250* 262* 216* 185* 289*     Results from last 7 days   Lab Units 02/12/22  0523   HEMOGLOBIN A1C % 11 0*     Results from last 7 days   Lab Units 02/09/22  2316   PROCALCITONIN ng/ml 0 79*           * I Have Reviewed All Lab Data Listed Above    * Additional Pertinent Lab Tests Reviewed: All Labs Within Last 24 Hours Reviewed    Imaging:    CT abdomen pelvis wo contrast   Final Result by Victor M Dean MD (02/09 7826)      Subpleural groundglass opacities noted at the lung bases most consistent with Covid 19 pneumonia  No acute intra-abdominal abnormality  Nodular liver suggesting cirrhosis  Cholelithiasis  Hepatic contour suggesting potential cirrhosis  Small to moderate hiatal hernia  Trace bilateral pleural effusions and small pericardial effusion              Workstation performed: [id number]             Recent Cultures (last 7 days):           Last 24 Hours Medication List:   Current Facility-Administered Medications   Medication Dose Route Frequency Provider Last Rate    acetaminophen  650 mg Oral Q6H PRN Perez Ferraro MD      amLODIPine  10 mg Oral BID Dewayne Ware MD      aspirin  81 mg Oral Daily Perez Ferraro MD      atorvastatin  80 mg Oral Daily With Thomas Anna MD      benzonatate  100 mg Oral TID PRN Perez Ferraro MD      carvedilol  12 5 mg Oral BID With Meals Perez Ferraro MD      dicyclomine  20 mg Oral 4x Daily (AC & HS) Vasiliy Zarate PA-C      guaiFENesin  200 mg Oral TID PRN Perez Ferraro MD      heparin (porcine)  5,000 Units Subcutaneous [facility] Perez Ferraro MD      hydrALAZINE  100 mg Oral TID Dewayne Ware MD      HYDROmorphone  0 5 mg Intravenous Once Perez Ferraro MD      insulin aspart protamine-insulin aspart  12 Units Subcutaneous BID AC Dewayne Roberts MD      insulin lispro  1-6 Units Subcutaneous TID [facility] Perez Ferraro MD      isosorbide mononitrate  120 mg Oral Daily Perez Ferraro MD      levothyroxine  112 mcg Oral Early Morning Perez Ferraro MD      loperamide  2 mg Oral TID PRN Vasiliy Zarate PA-C      ondansetron  4 mg Intravenous Q6H PRN Perez Ferraro MD      oxyCODONE  2 5 mg Oral Q4H PRN Perez Ferraro MD      oxyCODONE  5 mg Oral Q6H PRN Dilip Pulido MD      simethicone  80 mg Oral 4x Daily PRN Dilip Pulido MD          Today, Patient Was Seen By: Kelley Rodriguez MD    ** Please Note: Dictation voice to text software may have been used in the creation of this document   **

## 2022-02-16 NOTE — ASSESSMENT & PLAN NOTE
· [facility] [place] 1/4/19: LUDY x 2 to LAD after NSTEMI (trop 0 2) and abnormal stress test with ischemia in anterior wall on 12/31/18, 80% RCA- plan staged, but has not needed intervention  · On imdur, asa, coreg, statin

## 2022-02-16 NOTE — ASSESSMENT & PLAN NOTE
Lab Results   Component Value Date    EGFR 5 02/16/2022    EGFR 5 02/15/2022    EGFR 6 02/14/2022    CREATININE 7 03 (H) 02/16/2022    CREATININE 7 32 (H) 02/15/2022    CREATININE 6 31 (H) 02/14/2022     · Baseline creatinine around 3 per prior notes however most recent labs indicate she has been around 3 1-3 4, Estimated GFR around 15-20, stage 4  · Reports she follows with Dr Vanesa Hand  · With KELLY in the setting of vomiting/diarrhea and Bumex use  · Nephrology consult appreciated   · Miralax/colace discontinued  · Home bumex was discontinued and patient was placed on IVF  Creatinine continued to worsen and IVF were stopped and patient was given IV bumex  · Creatinine slowly improving now, 7 01 today  · Avoid nephrotoxins and hypotension  · BMP monitoring   · palliative care consulted, patient continued to refuse HD and wants to remain full code and on current therapy

## 2022-02-16 NOTE — PROGRESS NOTES
NEPHROLOGY PROGRESS NOTE   Aristides Fletcherer 78 y o  female MRN: [id number]  Unit/Bed#: -01 Encounter: [id number]        ASSESSMENT & PLAN    1  KELYL-probable pre renal component, COVID-19 infection as well as hemodynamic fluctuations, cardiorenal syndrome on stage 4 chronic kidney disease with a baseline creatinine of 3-3 4  -creatinine now plateaued  -urine output slightly improved  -will hold further diuretics and IV fluid  -hold further diuretics  -weight is now trending upward will likely start oral Bumex  -discussed with patient and patient's daughter patient would not want dialysis at this point although may consider if more imminent   -continue maximal conservative management  -appreciate palliative care consultation  -no urgent need for renal replacement therapy if short of breath give 3 mg of IV Bumex x1  -repeat urinalysis  -no urgent need for dialysis hopeful for renal recovery  -decrease amlodipine to 10 mg daily  -give albumin 25 gram x 2 daily     2  Chronic kidney disease stage IV with a baseline creatinine of 3-3 4  -follows chronically in the setting of long-term diabetes hypertension and diastolic congestive heart failure     3  Chronic diastolic congestive heart failure  -holding diuretics at this point likely has ATN and decreased p o  Intake with diarrhea and nausea vomiting with COVID pneumonia     4  Hypercalcemia  -resolved     5  BMD  -was on calcium acetate discontinued and holding because of poor p o  Intake     6  Anemia of chronic kidney disease  -last hemoglobin improved after packed red blood cell transfusion    DISCUSSION/SUMMARY    Appetite improving  Creatinine slightly better  Monitor urine output  Decrease amlodipine to 10 mg daily  Updated patient's daughter [facility]  Discussed with slim    SUBJECTIVE:    Patient was seen today  Overall feeling better  No shortness of breath no fevers or chills    Review of Systems   Constitutional: Negative  HENT: Negative      Eyes: Negative  Respiratory: Negative  Cardiovascular: Negative  Gastrointestinal: Negative  Endocrine: Negative  Genitourinary: Negative  Musculoskeletal: Negative  Skin: Negative  Allergic/Immunologic: Negative  Neurological: Negative  Hematological: Negative  Psychiatric/Behavioral: Negative  All other systems reviewed and are negative        Medications:    Current Facility-Administered Medications:     acetaminophen (TYLENOL) tablet 650 mg, 650 mg, Oral, Q6H PRN, Katheryn Watson MD, 650 mg at 02/11/22 1320    amLODIPine (NORVASC) tablet 10 mg, 10 mg, Oral, BID, Arvind Arvizu MD, 10 mg at 02/16/22 2394    aspirin chewable tablet 81 mg, 81 mg, Oral, Daily, Katheryn Watson MD, 81 mg at 02/16/22 0945    atorvastatin (LIPITOR) tablet 80 mg, 80 mg, Oral, Daily With Fifi Deleon MD, 80 mg at 02/15/22 1748    benzonatate (TESSALON PERLES) capsule 100 mg, 100 mg, Oral, TID PRN, Katheryn Watson MD    carvedilol (COREG) tablet 12 5 mg, 12 5 mg, Oral, BID With Meals, Katheryn Watson MD, 12 5 mg at 02/16/22 9125    dicyclomine (BENTYL) tablet 20 mg, 20 mg, Oral, 4x Daily (AC & HS), Vidya Melgar PA-C, 20 mg at 02/16/22 1327    guaiFENesin (ROBITUSSIN) oral solution 200 mg, 200 mg, Oral, TID PRN, Katheryn Watson MD, 200 mg at 02/15/22 0336    heparin (porcine) subcutaneous injection 5,000 Units, 5,000 Units, Subcutaneous, Q8H [facility], 5,000 Units at 02/16/22 1327 **AND** [CANCELED] Platelet count, , , Once, Katheryn Watson MD    hydrALAZINE (APRESOLINE) tablet 100 mg, 100 mg, Oral, TID, Arvind Arvizu MD, 100 mg at 02/15/22 1736    HYDROmorphone (DILAUDID) injection 0 5 mg, 0 5 mg, Intravenous, Once, Katheryn Watson MD    insulin aspart protamine-insulin aspart (NovoLOG 70/30) 100 units/mL subcutaneous injection 12 Units, 12 Units, Subcutaneous, BID ACRyann MD    insulin lispro (HumaLOG) 100 units/mL subcutaneous injection 1-6 Units, 1-6 Units, Subcutaneous, TID AC, 3 Units at 02/16/22 1326 **AND** Fingerstick Glucose (POCT), , , TID AC, Salvador Batista MD    isosorbide mononitrate (IMDUR) 24 hr tablet 120 mg, 120 mg, Oral, Daily, Salvador Batista MD, 120 mg at 02/16/22 0155    levothyroxine tablet 112 mcg, 112 mcg, Oral, Early Morning, Salvador Batista MD, 112 mcg at 02/16/22 0604    loperamide (IMODIUM) capsule 2 mg, 2 mg, Oral, TID PRN, Valerio Hines PA-C, 2 mg at 02/11/22 1238    ondansetron (ZOFRAN) injection 4 mg, 4 mg, Intravenous, Q6H PRN, Salvador Batista MD, 4 mg at 02/11/22 1156    oxyCODONE (ROXICODONE) IR tablet 2 5 mg, 2 5 mg, Oral, Q4H PRN, Salvador Batista MD    oxyCODONE (ROXICODONE) IR tablet 5 mg, 5 mg, Oral, Q6H PRN, Salvador Batista MD, 5 mg at 02/12/22 1119    simethicone (MYLICON) chewable tablet 80 mg, 80 mg, Oral, 4x Daily PRN, Salvador Batista MD    OBJECTIVE:      /89   Pulse 65   Temp 97 8 °F (36 6 °C)   Resp 18   Ht 5' 2" (1 575 m)   Wt 73 9 kg (162 lb 14 7 oz)   SpO2 95%   BMI 29 80 kg/m²  Body mass index is 29 8 kg/m²  Physical exam:  Physical Exam  Vitals and nursing note reviewed  Constitutional:       Appearance: Normal appearance  She is normal weight  HENT:      Head: Normocephalic and atraumatic  Right Ear: External ear normal       Left Ear: External ear normal       Mouth/Throat:      Mouth: Mucous membranes are moist       Pharynx: Oropharynx is clear  Eyes:      Conjunctiva/sclera: Conjunctivae normal    Cardiovascular:      Rate and Rhythm: Normal rate  Pulses: Normal pulses  Heart sounds: Normal heart sounds  Pulmonary:      Effort: Pulmonary effort is normal  No respiratory distress  Breath sounds: Normal breath sounds  Abdominal:      General: Abdomen is flat  Bowel sounds are normal    Musculoskeletal:         General: Swelling present  Normal range of motion  Skin:     General: Skin is warm and dry     Neurological:      Mental Status: She is oriented to person, place, and time  Psychiatric:         Mood and Affect: Mood normal          Behavior: Behavior normal          Thought Content: Thought content normal          Judgment: Judgment normal            Invasive Devices:      Lab Results:   Results from last 7 days   Lab Units 02/16/22  0552 02/15/22  0945 02/14/22  0525 02/13/22  0747 02/12/22  0637 02/12/22  0523 02/11/22  0640 02/11/22  0640 02/09/22  2316 02/09/22  2316 02/09/22  1738   WBC Thousand/uL 6 25 7 64 5 49  --   --  5 89  --  5 25   < > 3 06*  --    HEMOGLOBIN g/dL 8 6* 9 0* 6 6*  --   --  7 2*  --  8 2*   < > 8 0*  --    HEMATOCRIT % 26 7* 27 8* 20 7*  --   --  22 8*  --  26 3*   < > 25 3*  --    PLATELETS Thousands/uL 214 209 173  --   --  191  --  204   < > 153  --    POTASSIUM mmol/L 4 9 4 7 4 5 4 4  --  4 3   < > 4 0   < > 4 2  --    CHLORIDE mmol/L 100 98* 98* 103  --  103   < > 106   < > 103  --    CO2 mmol/L 23 21 23 22  --  21   < > 21   < > 21  --    BUN mg/dL 101* 91* 83* 76*  --  81*   < > 82*   < > 69*  --    CREATININE mg/dL 7 03* 7 32* 6 31* 5 76*  --  5 21*   < > 4 55*   < > 3 18*  --    CALCIUM mg/dL 8 3 8 0* 8 0* 8 2*  --  8 3   < > 8 9   < > 8 3  --    ALK PHOS U/L  --   --   --   --   --  338*  --  406*  --  429*  --    ALT U/L  --   --   --   --   --  27  --  32  --  36  --    AST U/L  --   --   --   --   --  37  --  49*  --  49*  --    LEUKOCYTES UA   --   --   --   --  Trace*  --   --   --   --   --  Negative   BLOOD UA   --   --   --   --  Negative  --   --   --   --   --  Negative    < > = values in this interval not displayed  Portions of the record may have been created with voice recognition software  Occasional wrong word or "sound a like" substitutions may have occurred due to the inherent limitations of voice recognition software  Read the chart carefully and recognize, using context, where substitutions have occurred  If you have any questions, please contact the dictating provider

## 2022-02-17 LAB
ANION GAP SERPL CALCULATED.3IONS-SCNC: 9 MMOL/L (ref 4–13)
BASOPHILS # BLD AUTO: 0.01 THOUSANDS/ΜL (ref 0–0.1)
BASOPHILS NFR BLD AUTO: 0 % (ref 0–1)
BUN SERPL-MCNC: 101 MG/DL (ref 5–25)
CALCIUM SERPL-MCNC: 8.4 MG/DL (ref 8.3–10.1)
CHLORIDE SERPL-SCNC: 100 MMOL/L (ref 100–108)
CO2 SERPL-SCNC: 23 MMOL/L (ref 21–32)
CREAT SERPL-MCNC: 6.75 MG/DL (ref 0.6–1.3)
EOSINOPHIL # BLD AUTO: 0.13 THOUSAND/ΜL (ref 0–0.61)
EOSINOPHIL NFR BLD AUTO: 3 % (ref 0–6)
ERYTHROCYTE [DISTWIDTH] IN BLOOD BY AUTOMATED COUNT: 13.4 % (ref 11.6–15.1)
GFR SERPL CREATININE-BSD FRML MDRD: 5 ML/MIN/1.73SQ M
GLUCOSE SERPL-MCNC: 102 MG/DL (ref 65–140)
GLUCOSE SERPL-MCNC: 120 MG/DL (ref 65–140)
GLUCOSE SERPL-MCNC: 137 MG/DL (ref 65–140)
GLUCOSE SERPL-MCNC: 162 MG/DL (ref 65–140)
GLUCOSE SERPL-MCNC: 200 MG/DL (ref 65–140)
GLUCOSE SERPL-MCNC: 242 MG/DL (ref 65–140)
HCT VFR BLD AUTO: 23.5 % (ref 34.8–46.1)
HGB BLD-MCNC: 7.4 G/DL (ref 11.5–15.4)
IMM GRANULOCYTES # BLD AUTO: 0.04 THOUSAND/UL (ref 0–0.2)
IMM GRANULOCYTES NFR BLD AUTO: 1 % (ref 0–2)
LYMPHOCYTES # BLD AUTO: 0.79 THOUSANDS/ΜL (ref 0.6–4.47)
LYMPHOCYTES NFR BLD AUTO: 16 % (ref 14–44)
MCH RBC QN AUTO: 27.7 PG (ref 26.8–34.3)
MCHC RBC AUTO-ENTMCNC: 31.5 G/DL (ref 31.4–37.4)
MCV RBC AUTO: 88 FL (ref 82–98)
MONOCYTES # BLD AUTO: 0.43 THOUSAND/ΜL (ref 0.17–1.22)
MONOCYTES NFR BLD AUTO: 9 % (ref 4–12)
NEUTROPHILS # BLD AUTO: 3.64 THOUSANDS/ΜL (ref 1.85–7.62)
NEUTS SEG NFR BLD AUTO: 71 % (ref 43–75)
NRBC BLD AUTO-RTO: 0 /100 WBCS
PLATELET # BLD AUTO: 202 THOUSANDS/UL (ref 149–390)
PMV BLD AUTO: 10.5 FL (ref 8.9–12.7)
POTASSIUM SERPL-SCNC: 4.7 MMOL/L (ref 3.5–5.3)
RBC # BLD AUTO: 2.67 MILLION/UL (ref 3.81–5.12)
SODIUM SERPL-SCNC: 132 MMOL/L (ref 136–145)
WBC # BLD AUTO: 5.04 THOUSAND/UL (ref 4.31–10.16)

## 2022-02-17 PROCEDURE — 99232 SBSQ HOSP IP/OBS MODERATE 35: CPT | Performed by: INTERNAL MEDICINE

## 2022-02-17 PROCEDURE — 99232 SBSQ HOSP IP/OBS MODERATE 35: CPT | Performed by: FAMILY MEDICINE

## 2022-02-17 PROCEDURE — 85025 COMPLETE CBC W/AUTO DIFF WBC: CPT | Performed by: INTERNAL MEDICINE

## 2022-02-17 PROCEDURE — 82948 REAGENT STRIP/BLOOD GLUCOSE: CPT

## 2022-02-17 PROCEDURE — 80048 BASIC METABOLIC PNL TOTAL CA: CPT | Performed by: INTERNAL MEDICINE

## 2022-02-17 RX ORDER — INSULIN ASPART 100 [IU]/ML
10 INJECTION, SUSPENSION SUBCUTANEOUS
Status: DISCONTINUED | OUTPATIENT
Start: 2022-02-17 | End: 2022-02-18 | Stop reason: HOSPADM

## 2022-02-17 RX ADMIN — INSULIN ASPART 12 UNITS: 100 INJECTION, SUSPENSION SUBCUTANEOUS at 08:37

## 2022-02-17 RX ADMIN — ISOSORBIDE MONONITRATE 120 MG: 60 TABLET, EXTENDED RELEASE ORAL at 08:37

## 2022-02-17 RX ADMIN — INSULIN LISPRO 3 UNITS: 100 INJECTION, SOLUTION INTRAVENOUS; SUBCUTANEOUS at 11:49

## 2022-02-17 RX ADMIN — ASPIRIN 81 MG: 81 TABLET, CHEWABLE ORAL at 08:37

## 2022-02-17 RX ADMIN — DICYCLOMINE HYDROCHLORIDE 20 MG: 20 TABLET ORAL at 17:51

## 2022-02-17 RX ADMIN — DICYCLOMINE HYDROCHLORIDE 20 MG: 20 TABLET ORAL at 21:52

## 2022-02-17 RX ADMIN — DICYCLOMINE HYDROCHLORIDE 20 MG: 20 TABLET ORAL at 11:49

## 2022-02-17 RX ADMIN — INSULIN LISPRO 1 UNITS: 100 INJECTION, SOLUTION INTRAVENOUS; SUBCUTANEOUS at 17:49

## 2022-02-17 RX ADMIN — CARVEDILOL 12.5 MG: 12.5 TABLET, FILM COATED ORAL at 08:37

## 2022-02-17 RX ADMIN — HEPARIN SODIUM 5000 UNITS: 5000 INJECTION INTRAVENOUS; SUBCUTANEOUS at 14:39

## 2022-02-17 RX ADMIN — HYDRALAZINE HYDROCHLORIDE 100 MG: 50 TABLET ORAL at 21:52

## 2022-02-17 RX ADMIN — INSULIN ASPART 10 UNITS: 100 INJECTION, SUSPENSION SUBCUTANEOUS at 17:50

## 2022-02-17 RX ADMIN — LEVOTHYROXINE SODIUM 112 MCG: 112 TABLET ORAL at 05:25

## 2022-02-17 RX ADMIN — ATORVASTATIN CALCIUM 80 MG: 80 TABLET, FILM COATED ORAL at 17:52

## 2022-02-17 RX ADMIN — DICYCLOMINE HYDROCHLORIDE 20 MG: 20 TABLET ORAL at 08:37

## 2022-02-17 RX ADMIN — HYDRALAZINE HYDROCHLORIDE 100 MG: 50 TABLET ORAL at 08:37

## 2022-02-17 RX ADMIN — CARVEDILOL 12.5 MG: 12.5 TABLET, FILM COATED ORAL at 17:51

## 2022-02-17 RX ADMIN — HYDRALAZINE HYDROCHLORIDE 100 MG: 50 TABLET ORAL at 17:51

## 2022-02-17 NOTE — PROGRESS NOTES
[address] [facility]  Progress Note - Tatum Pap 1955, 78 y o  female MRN: [id number]  Unit/Bed#: -01 Encounter: [id number]  Primary Care Provider: Ruby Peterson MD   Date and time admitted to hospital: 2/9/2022 11:38 AM    * COVID-19  Assessment & Plan  · Per record review, patient presented with generalized abdominal pain, diarrhea, chills, weakness, fatigue and nasal congestion  · Home COVID test +, confirmation test on arrival also +  Reported she is vaccinated   · Per RN, patient was noted to desat only when sleeping but sats stable on RA when awake  · Patient was not requiring oxygen but was started on Remdesivir on admission - this was subsequently discontinued per protocol  · CRP 22, D-dimer 1 58 (AC with Hep SQ 5000 q8h per VTE Group C), procal 0 79 (unclear significance in setting of chronic renal failure), BNP 17,351  · Patient reports diarrhea has resolved  · Isolation thru 2/19      Acute kidney injury superimposed on chronic kidney disease [facility])  Assessment & Plan  Lab Results   Component Value Date    EGFR 5 02/17/2022    EGFR 5 02/16/2022    EGFR 5 02/15/2022    CREATININE 6 75 (H) 02/17/2022    CREATININE 7 03 (H) 02/16/2022    CREATININE 7 32 (H) 02/15/2022     · Baseline creatinine around 3 per prior notes however most recent labs indicate she has been around 3 1-3 4, Estimated GFR around 15-20, stage 4  · Reports she follows with Dr Aden Estevez  · With KELLY in the setting of vomiting/diarrhea and Bumex use  · Nephrology consult appreciated   · Miralax/colace discontinued  · Home bumex was discontinued and patient was placed on IVF  Creatinine continued to worsen and IVF were stopped and patient was given IV bumex  · Creatinine slowly improving now, 6 73 today  · Avoid nephrotoxins and hypotension  · BMP monitoring   · palliative care consulted, patient continued to refuse HD and wants to remain full code and on current therapy      Pericardial effusion  Assessment & Plan  · Chronic, noted on prior echos/imaging  · Monitoring outpatient     Coronary artery disease involving native coronary artery of native heart with angina pectoris [facility])  Assessment & Plan  · [facility] 1/4/19: LUDY x 2 to LAD after NSTEMI (trop 0 2) and abnormal stress test with ischemia in anterior wall on 12/31/18, 80% RCA- plan staged, but has not needed intervention  · On imdur, asa, coreg, statin    Hypertension  Assessment & Plan  · Avoid hypotension given KELLY  · Nephrology following  · Bumex on hold      Hypothyroidism  Assessment & Plan  · Continue synthroid     Type 2 diabetes mellitus with kidney complication, with long-term current use of insulin [facility])  Assessment & Plan  Lab Results   Component Value Date    HGBA1C 11 0 (H) 02/12/2022       Recent Labs     02/16/22  2123 02/17/22  0620 02/17/22  0749 02/17/22  1058   POCGLU 131 137 120 242*       Blood Sugar Average: Last 72 hrs:  (P) 200 6875   · Uncontrolled as evidenced by A1c  · Continue NovoLog 70/30 10 units b i d which is home regimen - patient has been refusing inyt  · Continue BID 70/30  -  Encourage compliance  · SSI/accuchecks  · Diabetic diet  · Hypoglycemia protocol     Chronic diastolic heart failure (HCC)  Assessment & Plan  Wt Readings from Last 3 Encounters:   02/17/22 74 8 kg (164 lb 14 5 oz)   01/31/22 71 2 kg (157 lb)   11/23/21 72 4 kg (159 lb 9 6 oz)     · Per record review, H&P documented acute heart failure, however patient examines dry to euvolemic, on RA, no rales on examination  · Patient follows with Dr Avery Titus    Stage C HFpEF, echo most recently with EF 60%, hypo-inferior wall, normal RV  · NT proBNP 17,351 (20,000 in 10/21), CT A/P wo contrast showed trace bilateral pleural effusions  · Weight was recorded as 160 on admission,  Weight was 157 at recent office visit 1/31  · Requesting accurate weights and I&Os - nurse aware  · S/p IVF then IV Bumex per Nephrology   · Nephrology following as above  · Cautious fluid balance         VTE Pharmacologic Prophylaxis:   Pharmacologic: Heparin  Mechanical VTE Prophylaxis in Place: Yes    Patient Centered Rounds: I have performed bedside rounds with nursing staff today  Discussions with Specialists or Other Care Team Provider: nephrology, CM    Education and Discussions with Family / Patient: plan of care, patient  Also called and updated daughter,    Time Spent for Care: 30 minutes  More than 50% of total time spent on counseling and coordination of care as described above  Current Length of Stay: 7 day(s)    Current Patient Status: Inpatient   Certification Statement: The patient will continue to require additional inpatient hospital stay due to not medically stabkle    Discharge Plan: home when stable    Code Status: Level 1 - Full Code      Subjective:   No overnight events  No acute complaints  Objective:     Vitals:   Temp (24hrs), Av 1 °F (36 7 °C), Min:98 °F (36 7 °C), Max:98 2 °F (36 8 °C)    Temp:  [98 °F (36 7 °C)-98 2 °F (36 8 °C)] 98 2 °F (36 8 °C)  HR:  [61-68] 68  Resp:  [20] 20  BP: (120-144)/(55-89) 120/60  SpO2:  [93 %-96 %] 93 %  Body mass index is 30 16 kg/m²  Input and Output Summary (last 24 hours): Intake/Output Summary (Last 24 hours) at 2022 1431  Last data filed at 2022 0900  Gross per 24 hour   Intake 805 ml   Output 400 ml   Net 405 ml       Physical Exam:     Physical Exam  Constitutional:       General: She is not in acute distress  Cardiovascular:      Rate and Rhythm: Normal rate and regular rhythm       Heart sounds: Normal heart sounds  No murmur heard  Abdominal:      General: Bowel sounds are normal  There is no distension       Palpations: Abdomen is soft       Tenderness: There is no abdominal tenderness  Musculoskeletal:         General: No swelling  Skin:     General: Skin is warm     Neurological:      Mental Status: She is alert       Comments: Awake alert and communicative     Additional Data:     Labs:    Results from last 7 days   Lab Units 02/17/22  0525 02/12/22  0523 02/11/22  0640   WBC Thousand/uL 5 04   < > 5 25   HEMOGLOBIN g/dL 7 4*   < > 8 2*   HEMATOCRIT % 23 5*   < > 26 3*   PLATELETS Thousands/uL 202   < > 204   BANDS PCT %  --   --  3   NEUTROS PCT % 71   < >  --    LYMPHS PCT % 16   < >  --    LYMPHO PCT %  --   --  13*   MONOS PCT % 9   < >  --    MONO PCT %  --   --  2*   EOS PCT % 3   < > 0    < > = values in this interval not displayed  Results from last 7 days   Lab Units 02/17/22  0525 02/13/22  0747 02/12/22  0523   SODIUM mmol/L 132*   < > 135*   POTASSIUM mmol/L 4 7   < > 4 3   CHLORIDE mmol/L 100   < > 103   CO2 mmol/L 23   < > 21   BUN mg/dL 101*   < > 81*   CREATININE mg/dL 6 75*   < > 5 21*   ANION GAP mmol/L 9   < > 11   CALCIUM mg/dL 8 4   < > 8 3   ALBUMIN g/dL  --   --  2 2*   TOTAL BILIRUBIN mg/dL  --   --  0 31   ALK PHOS U/L  --   --  338*   ALT U/L  --   --  27   AST U/L  --   --  37   GLUCOSE RANDOM mg/dL 102   < > 168*    < > = values in this interval not displayed  Results from last 7 days   Lab Units 02/17/22  1058 02/17/22  0749 02/17/22  0620 02/16/22  2123 02/16/22  1615 02/16/22  1120 02/16/22  0628 02/15/22  1946 02/15/22  1630 02/15/22  1111 02/15/22  0658 02/15/22  0606   POC GLUCOSE mg/dl 242* 120 137 131 181* 267* 115 166* 167* 333* 216* 223*     Results from last 7 days   Lab Units 02/12/22  0523   HEMOGLOBIN A1C % 11 0*               * I Have Reviewed All Lab Data Listed Above  * Additional Pertinent Lab Tests Reviewed: All Labs Within Last 24 Hours Reviewed    Imaging:    CT abdomen pelvis wo contrast   Final Result by Ishaan Caban MD (02/09 1556)      Subpleural groundglass opacities noted at the lung bases most consistent with Covid 19 pneumonia  No acute intra-abdominal abnormality  Nodular liver suggesting cirrhosis  Cholelithiasis        Hepatic contour suggesting potential cirrhosis  Small to moderate hiatal hernia  Trace bilateral pleural effusions and small pericardial effusion  Workstation performed: [id number]             Recent Cultures (last 7 days):           Last 24 Hours Medication List:   Current Facility-Administered Medications   Medication Dose Route Frequency Provider Last Rate    acetaminophen  650 mg Oral Q6H PRN Sandra Sprague MD      amLODIPine  10 mg Oral Daily Boeugenio Larose, DO      aspirin  81 mg Oral Daily Sandra Sprague MD      atorvastatin  80 mg Oral Daily With Chucky Willingham MD      benzonatate  100 mg Oral TID PRN Sandra Sprague MD      carvedilol  12 5 mg Oral BID With Meals Sandra Sprague MD      dicyclomine  20 mg Oral 4x Daily (AC & HS) Janie Parada PA-C      guaiFENesin  200 mg Oral TID PRN Sandra Sprague MD      heparin (porcine)  5,000 Units Subcutaneous [facility] Sandra Sprague MD      hydrALAZINE  100 mg Oral TID Priya Horowitz MD      HYDROmorphone  0 5 mg Intravenous Once Sandra Sprague MD      insulin aspart protamine-insulin aspart  10 Units Subcutaneous BID AC Collin Tomlin MD      insulin lispro  1-6 Units Subcutaneous TID [facility] Sandra Sprague MD      isosorbide mononitrate  120 mg Oral Daily Sandra Sprague MD      levothyroxine  112 mcg Oral Early Morning Sandra Sprague MD      loperamide  2 mg Oral TID PRN Janie Parada PA-C      ondansetron  4 mg Intravenous Q6H PRN Sandra Sprague MD      oxyCODONE  2 5 mg Oral Q4H PRN Sandra Sprague MD      oxyCODONE  5 mg Oral Q6H PRN Sandra Sprague MD      simethicone  80 mg Oral 4x Daily PRN Sandra Sprague MD          Today, Patient Was Seen By: Collin Tomlin MD    ** Please Note: Dictation voice to text software may have been used in the creation of this document   **

## 2022-02-17 NOTE — PROGRESS NOTES
NEPHROLOGY PROGRESS NOTE   Milla Henry 78 y o  female MRN: [id number]  Unit/Bed#: -01 Encounter: [id number]        ASSESSMENT & PLAN    1  KELLY-probable pre renal component, COVID-19 infection as well as hemodynamic fluctuations, cardiorenal syndrome on stage 4 chronic kidney disease with a baseline creatinine of 3-3 4  -creatinine now improving  -urine output slightly improved  -will hold further diuretics and IV fluid  -weight is now trending upward will likely start oral Bumex in the next few days  -discussed with patient and patient's daughter patient would not want dialysis at this point although may consider if more imminent   -continue maximal conservative management  -appreciate palliative care consultation  -no urgent need for renal replacement therapy if short of breath give 3 mg of IV Bumex x1  -repeat urinalysis  -no urgent need for dialysis hopeful for renal recovery  -decrease amlodipine to 10 mg daily     2  Chronic kidney disease stage IV with a baseline creatinine of 3-3 4  -follows chronically in the setting of long-term diabetes hypertension and diastolic congestive heart failure     3  Chronic diastolic congestive heart failure  -holding diuretics at this point likely has ATN and decreased p o  Intake with diarrhea and nausea vomiting with COVID pneumonia     4  Hypercalcemia  -resolved     5  BMD  -was on calcium acetate discontinued and holding because of poor p o  Intake     6  Anemia of chronic kidney disease  -last hemoglobin improved after packed red blood cell transfusion    DISCUSSION/SUMMARY     creatinine improving   discussed care with elizabethim and patient's daughter    SUBJECTIVE:     tolerating p o  intake   urinating more weight increasing    Review of Systems   Constitutional: Positive for appetite change and fatigue  HENT: Negative  Eyes: Negative  Respiratory: Negative  Cardiovascular: Negative  Gastrointestinal: Negative  Endocrine: Negative  Genitourinary: Negative  Musculoskeletal: Negative  Skin: Negative  Allergic/Immunologic: Negative  Neurological: Negative  Hematological: Negative  Psychiatric/Behavioral: Negative  All other systems reviewed and are negative        Medications:    Current Facility-Administered Medications:     acetaminophen (TYLENOL) tablet 650 mg, 650 mg, Oral, Q6H PRN, Perez Ferraro MD, 650 mg at 02/11/22 1320    amLODIPine (NORVASC) tablet 10 mg, 10 mg, Oral, Daily, Sharene Saver,     aspirin chewable tablet 81 mg, 81 mg, Oral, Daily, Perez Ferraro MD, 81 mg at 02/17/22 0837    atorvastatin (LIPITOR) tablet 80 mg, 80 mg, Oral, Daily With Thomas Anna MD, 80 mg at 02/16/22 1632    benzonatate (TESSALON PERLES) capsule 100 mg, 100 mg, Oral, TID PRN, Perez Ferraro MD    carvedilol (COREG) tablet 12 5 mg, 12 5 mg, Oral, BID With Meals, Perez Ferraro MD, 12 5 mg at 02/17/22 0837    dicyclomine (BENTYL) tablet 20 mg, 20 mg, Oral, 4x Daily (AC & HS), Vidya Melgar PA-C, 20 mg at 02/17/22 1149    guaiFENesin (ROBITUSSIN) oral solution 200 mg, 200 mg, Oral, TID PRN, Perez Ferraro MD, 200 mg at 02/15/22 0336    heparin (porcine) subcutaneous injection 5,000 Units, 5,000 Units, Subcutaneous, Q8H [address], 5,000 Units at 02/17/22 1439 **AND** [CANCELED] Platelet count, , , Once, Perez Ferraro MD    hydrALAZINE (APRESOLINE) tablet 100 mg, 100 mg, Oral, TID, Dewayne Ware MD, 100 mg at 02/17/22 0837    HYDROmorphone (DILAUDID) injection 0 5 mg, 0 5 mg, Intravenous, Once, Perez Ferraro MD    insulin aspart protamine-insulin aspart (NovoLOG 70/30) 100 units/mL subcutaneous injection 10 Units, 10 Units, Subcutaneous, BID AC, Dewayne Roberts MD    insulin lispro (HumaLOG) 100 units/mL subcutaneous injection 1-6 Units, 1-6 Units, Subcutaneous, TID AC, 3 Units at 02/17/22 1149 **AND** Fingerstick Glucose (POCT), , , TID AC, Perez Ferraro MD    isosorbide mononitrate (IMDUR) 24 hr tablet 120 mg, 120 mg, Oral, Daily, Maria De Jesus Herrera MD, 120 mg at 02/17/22 5956    levothyroxine tablet 112 mcg, 112 mcg, Oral, Early Morning, Maria De Jesus Herrera MD, 112 mcg at 02/17/22 0525    loperamide (IMODIUM) capsule 2 mg, 2 mg, Oral, TID PRN, Shawanda Terrell PA-C, 2 mg at 02/11/22 1238    ondansetron (ZOFRAN) injection 4 mg, 4 mg, Intravenous, Q6H PRN, Maria De Jesus Herrera MD, 4 mg at 02/11/22 1156    oxyCODONE (ROXICODONE) IR tablet 2 5 mg, 2 5 mg, Oral, Q4H PRN, Maria De Jesus Herrera MD    oxyCODONE (ROXICODONE) IR tablet 5 mg, 5 mg, Oral, Q6H PRN, Maria De Jesus Herrera MD, 5 mg at 02/12/22 1119    simethicone (MYLICON) chewable tablet 80 mg, 80 mg, Oral, 4x Daily PRN, Maria De Jesus Herrera MD    OBJECTIVE:      /60   Pulse 68   Temp 98 2 °F (36 8 °C)   Resp 20   Ht 5' 2" (1 575 m)   Wt 74 8 kg (164 lb 14 5 oz)   SpO2 93%   BMI 30 16 kg/m²  Body mass index is 30 16 kg/m²  Physical exam:  Physical Exam  Vitals and nursing note reviewed  Constitutional:       General: She is in acute distress  Appearance: She is normal weight  HENT:      Head: Normocephalic and atraumatic  Nose: Nose normal       Mouth/Throat:      Mouth: Mucous membranes are moist       Pharynx: Oropharynx is clear  Eyes:      Conjunctiva/sclera: Conjunctivae normal    Cardiovascular:      Rate and Rhythm: Normal rate and regular rhythm  Pulses: Normal pulses  Heart sounds: Normal heart sounds  Pulmonary:      Effort: Pulmonary effort is normal       Breath sounds: Normal breath sounds  Abdominal:      General: Abdomen is flat  Bowel sounds are normal    Musculoskeletal:         General: Normal range of motion  Cervical back: Normal range of motion  Skin:     General: Skin is warm and dry  Neurological:      General: No focal deficit present  Mental Status: She is alert and oriented to person, place, and time     Psychiatric:         Mood and Affect: Mood normal  Behavior: Behavior normal          Thought Content: Thought content normal            Invasive Devices:      Lab Results:   Results from last 7 days   Lab Units 02/17/22  0525 02/16/22  0552 02/15/22  0945 02/14/22  0525 02/13/22  0747 02/12/22  0637 02/12/22  0523 02/12/22  0523 02/11/22  0640 02/11/22  0640   WBC Thousand/uL 5 04 6 25 7 64 5 49  --   --   --  5 89   < > 5 25   HEMOGLOBIN g/dL 7 4* 8 6* 9 0* 6 6*  --   --   --  7 2*   < > 8 2*   HEMATOCRIT % 23 5* 26 7* 27 8* 20 7*  --   --   --  22 8*   < > 26 3*   PLATELETS Thousands/uL 202 214 209 173  --   --   --  191   < > 204   POTASSIUM mmol/L 4 7 4 9 4 7 4 5 4 4  --    < > 4 3   < > 4 0   CHLORIDE mmol/L 100 100 98* 98* 103  --    < > 103   < > 106   CO2 mmol/L 23 23 21 23 22  --    < > 21   < > 21   BUN mg/dL 101* 101* 91* 83* 76*  --    < > 81*   < > 82*   CREATININE mg/dL 6 75* 7 03* 7 32* 6 31* 5 76*  --    < > 5 21*   < > 4 55*   CALCIUM mg/dL 8 4 8 3 8 0* 8 0* 8 2*  --    < > 8 3   < > 8 9   ALK PHOS U/L  --   --   --   --   --   --   --  338*  --  406*   ALT U/L  --   --   --   --   --   --   --  27  --  32   AST U/L  --   --   --   --   --   --   --  37  --  49*   LEUKOCYTES UA   --   --   --   --   --  Trace*  --   --   --   --    BLOOD UA   --   --   --   --   --  Negative  --   --   --   --     < > = values in this interval not displayed  Portions of the record may have been created with voice recognition software  Occasional wrong word or "sound a like" substitutions may have occurred due to the inherent limitations of voice recognition software  Read the chart carefully and recognize, using context, where substitutions have occurred  If you have any questions, please contact the dictating provider

## 2022-02-17 NOTE — UTILIZATION REVIEW
Continued Stay Review    Date: 2/17                         Current Patient Class: Inpatient  Current Level of Care: Med Surg    HPI:66 y o  female initially admitted on 2/10    Assessment/Plan: Covid-19, KELLY superimposed CKD  Isolation thru 2/19  Creat slowly improving now, 6 75 today from 7 03 yesterday  BMP monitoring  Pt continued to refuse HD and wants to remain full code and on current therapy  Per Nephrology; Creat now improving  Urine output slightly improved  Hold on further diuretics and IVF  Wt now trending upward, will likely start po Bumex in the next few days  Per pt and pt's daughter, pt would not want dialysis at this point although may consider if more imminent  Continue maximal conservative management  No urgent need for renal replacement therapy if short of breath give 3 mg of IV Bumex x1  Repeat urinalysis  No urgent need for dialysis hopeful for renal recovery  Decrease amlodipine to 10 mg daily    Vital Signs:   02/17/22 0709 98 2 °F (36 8 °C) 68 20 120/60 -- 93 % -- --   02/16/22 21:25:29 98 1 °F (36 7 °C) -- -- 123/55 78 --       Pertinent Labs/Diagnostic Results:       Results from last 7 days   Lab Units 02/17/22  0525 02/16/22  0552 02/15/22  0945 02/14/22  0525 02/14/22  0525 02/12/22  0523 02/12/22  0523 02/11/22  0640 02/11/22  0640   WBC Thousand/uL 5 04 6 25 7 64   < > 5 49   < > 5 89   < > 5 25   HEMOGLOBIN g/dL 7 4* 8 6* 9 0*  --  6 6*  --  7 2*   < > 8 2*   HEMATOCRIT % 23 5* 26 7* 27 8*  --  20 7*  --  22 8*   < > 26 3*   PLATELETS Thousands/uL 202 214 209   < > 173   < > 191   < > 204   NEUTROS ABS Thousands/µL 3 64 4 57  --   --   --   --  4 35  --   --    BANDS PCT %  --   --   --   --   --   --   --   --  3    < > = values in this interval not displayed       Results from last 7 days   Lab Units 02/15/22  0945   RETIC CT ABS  112,700*   RETIC CT PCT % 3 49*     Results from last 7 days   Lab Units 02/17/22  0525 02/16/22  2227 02/15/22  0945 02/14/22  0525 02/13/22  0747 SODIUM mmol/L 132* 131* 129* 131* 134*   POTASSIUM mmol/L 4 7 4 9 4 7 4 5 4 4   CHLORIDE mmol/L 100 100 98* 98* 103   CO2 mmol/L 23 23 21 23 22   ANION GAP mmol/L 9 8 10 10 9   BUN mg/dL 101* 101* 91* 83* 76*   CREATININE mg/dL 6 75* 7 03* 7 32* 6 31* 5 76*   EGFR ml/min/1 73sq m 5 5 5 6 7   CALCIUM mg/dL 8 4 8 3 8 0* 8 0* 8 2*     Results from last 7 days   Lab Units 02/12/22  0523 02/11/22  0640   AST U/L 37 49*   ALT U/L 27 32   ALK PHOS U/L 338* 406*   TOTAL PROTEIN g/dL 5 9* 6 5   ALBUMIN g/dL 2 2* 2 3*   TOTAL BILIRUBIN mg/dL 0 31 0 25     Results from last 7 days   Lab Units 02/17/22  1058 02/17/22  0749 02/17/22  0620 02/16/22  2123 02/16/22  1615 02/16/22  1120 02/16/22  0628 02/15/22  1946 02/15/22  1630 02/15/22  1111 02/15/22  0658 02/15/22  0606   POC GLUCOSE mg/dl 242* 120 137 131 181* 267* 115 166* 167* 333* 216* 223*     Results from last 7 days   Lab Units 02/17/22  0525 02/16/22  0552 02/15/22  0945 02/14/22  0525 02/13/22  0747 02/12/22  0523 02/11/22  0640   GLUCOSE RANDOM mg/dL 102 105 234* 174* 108 168* 83         Results from last 7 days   Lab Units 02/12/22  0523   HEMOGLOBIN A1C % 11 0*   EAG mg/dl 269       Results from last 7 days   Lab Units 02/15/22  0945   TSH 3RD GENERATON uIU/mL 2 180       Results from last 7 days   Lab Units 02/15/22  0945   FERRITIN ng/mL 306       Results from last 7 days   Lab Units 02/12/22  0637   CLARITY UA  Clear   COLOR UA  Yellow   SPEC GRAV UA  1 015   PH UA  5 0   GLUCOSE UA mg/dl 100 (1/10%)*   KETONES UA mg/dl Negative   BLOOD UA  Negative   PROTEIN UA mg/dl 30 (1+)*   NITRITE UA  Negative   BILIRUBIN UA  Negative   UROBILINOGEN UA E U /dl 0 2   LEUKOCYTES UA  Trace*   WBC UA /hpf 4-10*   RBC UA /hpf 2-4   BACTERIA UA /hpf None Seen   EPITHELIAL CELLS WET PREP /hpf None Seen       Medications:   Scheduled Medications:  amLODIPine, 10 mg, Oral, Daily  aspirin, 81 mg, Oral, Daily  atorvastatin, 80 mg, Oral, Daily With Dinner  carvedilol, 12 5 mg, Oral, BID With Meals  dicyclomine, 20 mg, Oral, 4x Daily (AC & HS)  heparin (porcine), 5,000 Units, Subcutaneous, Q8H DEE  hydrALAZINE, 100 mg, Oral, TID  HYDROmorphone, 0 5 mg, Intravenous, Once  insulin aspart protamine-insulin aspart, 10 Units, Subcutaneous, BID AC  insulin lispro, 1-6 Units, Subcutaneous, TID AC  isosorbide mononitrate, 120 mg, Oral, Daily  levothyroxine, 112 mcg, Oral, Early Morning      Continuous IV Infusions: None     PRN Meds:  acetaminophen, 650 mg, Oral, Q6H PRN  benzonatate, 100 mg, Oral, TID PRN  guaiFENesin, 200 mg, Oral, TID PRN  loperamide, 2 mg, Oral, TID PRN  ondansetron, 4 mg, Intravenous, Q6H PRN  oxyCODONE, 2 5 mg, Oral, Q4H PRN  oxyCODONE, 5 mg, Oral, Q6H PRN  simethicone, 80 mg, Oral, 4x Daily PRN      Discharge Plan: [facility]    Network Utilization Review Department  ATTENTION: Please call with any questions or concerns to [phone] and carefully listen to the prompts so that you are directed to the right person  All voicemails are confidential   Porsha Medellin all requests for admission clinical reviews, approved or denied determinations and any other requests to dedicated fax number below belonging to the campus where the patient is receiving treatment   List of dedicated fax numbers for the Facilities:  [address] DENIALS (Administrative/Medical Necessity) [phone]   [address] (Maternity/NICU/Pediatrics) [phone]   [address]  [address] [address] [place] [address] Shaheen Hughes [address] [phone] [place] [address] [phone]

## 2022-02-17 NOTE — ASSESSMENT & PLAN NOTE
· [facility] 1/4/19: LUDY x 2 to LAD after NSTEMI (trop 0 2) and abnormal stress test with ischemia in anterior wall on 12/31/18, 80% RCA- plan staged, but has not needed intervention  · On imdur, asa, coreg, statin

## 2022-02-17 NOTE — PROGRESS NOTES
Progress note - Palliative and Supportive Care   Radha Loyola 78 y o  female [id number]    Patient Active Problem List   Diagnosis    Chronic diastolic heart failure ([facility] [address] [facility] )    Type 2 diabetes mellitus with kidney complication, with long-term current use of insulin (HCC)    Hypothyroidism    Hypertension    Acute renal failure superimposed on stage 4 chronic kidney disease (HCC)    Anemia    Coronary artery disease involving native coronary artery of native heart with angina pectoris ([facility] [address] )    History of anemia due to chronic kidney disease    Vitamin D deficiency    Proteinuria    Elevated LFTs    Hyperphosphatemia    Hypoalbuminemia    Acute otitis media    Pericardial effusion    Stage 4 chronic kidney disease (HCC)    Acute kidney injury superimposed on chronic kidney disease (HCC)    Abnormal finding on imaging of liver    Nocturnal hypoxia    COVID-19     Active issues specifically addressed today include:   COVID 19  Acute renal failure on CKD stage   Palliative care patient  Goals of care    Plan:  1  Symptom management - patient does not have any active symptom management needs, defer to primary     2  Goals - full cares, no limits  Not interested in following with [facility] outpatient  Code Status: full code - Level 1   Decisional apparatus:  Patient is competent on my exam today  If competence is lost, patient's substitute decision maker would default to adult children by [place]  Advance Directive / Living Will / POLST:  None on file    Interval history:       Seen and examined at bedside  Noted to be sitting on the chair  Pleasant on approach  Reports she is going well  Denies any shortness of breath, chest pain, overall pain, NVD or constipation  Eating drinking normally and moving her bowels  States since coming to the hospital she is feeling much better now  We then discussed her over all clinical picture  She is aware her renal numbers are improving   She is aware she has chronic kidney disease which is likely going to progress  She does not want dialysis  We then discussed need for support when / if time comes for her needing dialysis and that palliative can be a supportive service to her  She declines following with [facility] outpatient  Discussed with primary RN  MEDICATIONS / ALLERGIES:     all current active meds have been reviewed    Allergies   Allergen Reactions    Iv Contrast [Iodinated Diagnostic Agents] Itching     Caused KELLY    Nifedipine Rash       OBJECTIVE:    Physical Exam  Physical Exam  Constitutional:       General: She is not in acute distress  Appearance: She is obese  She is not ill-appearing  HENT:      Head: Normocephalic and atraumatic  Nose: No congestion or rhinorrhea  Mouth/Throat:      Pharynx: No oropharyngeal exudate or posterior oropharyngeal erythema  Eyes:      General:         Right eye: No discharge  Left eye: No discharge  Extraocular Movements: Extraocular movements intact  Pulmonary:      Effort: Pulmonary effort is normal  No respiratory distress  Skin:     General: Skin is warm and dry  Capillary Refill: Capillary refill takes less than 2 seconds  Neurological:      Mental Status: She is alert and oriented to person, place, and time     Psychiatric:         Mood and Affect: Mood normal

## 2022-02-17 NOTE — PROGRESS NOTES
Pastoral Care Progress Note    2022  Patient: Beatrice Stephens : 1955  Admission Date & Time: 2022 1138  MRN: [id number] CSN: [id number]       provided phone visit as nurse discouraged in-person visitation  Via phone, Padmini Garcia declined  support   provided reminder of availability of  care   Follow-up as needed/requested          22 1200   Clinical Encounter Type   Visited With Patient  (Phone visit)   Temple Encounters   Temple Needs   (Prayer offered/declined)

## 2022-02-17 NOTE — PLAN OF CARE
Problem: PAIN - ADULT  Goal: Verbalizes/displays adequate comfort level or baseline comfort level  Description: Interventions:  - Encourage patient to monitor pain and request assistance  - Assess pain using appropriate pain scale  - Administer analgesics based on type and severity of pain and evaluate response  - Implement non-pharmacological measures as appropriate and evaluate response  - Notify physician/advanced practitioner if interventions unsuccessful or patient reports new pain  Outcome: Progressing     Problem: INFECTION - ADULT  Goal: Absence or prevention of progression during hospitalization  Description: INTERVENTIONS:  - Assess and monitor for signs and symptoms of infection  - Monitor lab/diagnostic results  - Monitor all insertion sites, i e  indwelling lines, tubes, and drains  - [place] appropriate cooling/warming therapies per order  - Administer medications as ordered  - Instruct and encourage patient and family to use good hand hygiene technique  - Identify and instruct in appropriate isolation precautions for identified infection/condition  Outcome: Progressing     Problem: METABOLIC, FLUID AND ELECTROLYTES - ADULT  Goal: Fluid balance maintained  Description: INTERVENTIONS:  - Monitor labs   - Monitor I/O and WT  - Instruct patient on fluid and nutrition as appropriate  - Assess for signs & symptoms of volume excess or deficit  Outcome: Progressing     Problem: RESPIRATORY - ADULT  Goal: Achieves optimal ventilation and oxygenation  Description: INTERVENTIONS:  - Assess for changes in respiratory status  - Assess for changes in mentation and behavior  - Position to facilitate oxygenation and minimize respiratory effort  - Oxygen administered by appropriate delivery if ordered  - Initiate smoking cessation education as indicated  - Encourage broncho-pulmonary hygiene including cough, deep breathe, Incentive Spirometry  - Assess the need for suctioning and aspirate as needed  - Assess and instruct to report SOB or any respiratory difficulty  - Respiratory Therapy support as indicated  Outcome: Progressing

## 2022-02-17 NOTE — ASSESSMENT & PLAN NOTE
Wt Readings from Last 3 Encounters:   02/17/22 74 8 kg (164 lb 14 5 oz)   01/31/22 71 2 kg (157 lb)   11/23/21 72 4 kg (159 lb 9 6 oz)     · Per record review, H&P documented acute heart failure, however patient examines dry to euvolemic, on RA, no rales on examination  · Patient follows with Dr Mukesh Tilley    Stage C HFpEF, echo most recently with EF 60%, hypo-inferior wall, normal RV  · NT proBNP 17,351 (20,000 in 10/21), CT A/P wo contrast showed trace bilateral pleural effusions  · Weight was recorded as 160 on admission,  Weight was 157 at recent office visit 1/31  · Requesting accurate weights and I&Os - nurse aware  · S/p IVF then IV Bumex per Nephrology   · Nephrology following as above  · Cautious fluid balance

## 2022-02-17 NOTE — ASSESSMENT & PLAN NOTE
Lab Results   Component Value Date    EGFR 5 02/17/2022    EGFR 5 02/16/2022    EGFR 5 02/15/2022    CREATININE 6 75 (H) 02/17/2022    CREATININE 7 03 (H) 02/16/2022    CREATININE 7 32 (H) 02/15/2022     · Baseline creatinine around 3 per prior notes however most recent labs indicate she has been around 3 1-3 4, Estimated GFR around 15-20, stage 4  · Reports she follows with Dr Namita Pittman  · With KELLY in the setting of vomiting/diarrhea and Bumex use  · Nephrology consult appreciated   · Miralax/colace discontinued  · Home bumex was discontinued and patient was placed on IVF  Creatinine continued to worsen and IVF were stopped and patient was given IV bumex  · Creatinine slowly improving now, 6 73 today  · Avoid nephrotoxins and hypotension  · BMP monitoring   · palliative care consulted, patient continued to refuse HD and wants to remain full code and on current therapy

## 2022-02-17 NOTE — CASE MANAGEMENT
Case Management Progress Note    Patient name Pablo Terrell  Location [address] [id number] MRN [id number]  : 1955 Date 2022       LOS (days): 7  Geometric Mean LOS (GMLOS) (days): 4 00  Days to GMLOS:-3 2        OBJECTIVE:        Current admission status: Inpatient  Preferred Pharmacy:   [address] [place] [id number]  Phone: [phone] Fax: [phone]    Primary Care Provider: Gayla Bianchi MD    Primary Insurance: [facility] REP  Secondary Insurance: Ileana Vidales    PROGRESS NOTE:    Cm reviewed pt care coordination rounds with attending  Pt is slowly getting better  Family refused rec of HD [place]  Palliative care following  Pt is still a FC  Cm department will continue to follow up through d/c

## 2022-02-17 NOTE — ASSESSMENT & PLAN NOTE
Lab Results   Component Value Date    HGBA1C 11 0 (H) 02/12/2022       Recent Labs     02/16/22  2123 02/17/22  0620 02/17/22  0749 02/17/22  1058   POCGLU 131 137 120 242*       Blood Sugar Average: Last 72 hrs:  (P) 685 0940   · Uncontrolled as evidenced by A1c  · Continue NovoLog 70/30 10 units b i d which is home regimen - patient has been refusing inyt  · Continue BID 70/30  -  Encourage compliance     · SSI/accuchecks  · Diabetic diet  · Hypoglycemia protocol

## 2022-02-18 VITALS
WEIGHT: 164.9 LBS | HEIGHT: 62 IN | RESPIRATION RATE: 20 BRPM | OXYGEN SATURATION: 94 % | TEMPERATURE: 97.8 F | DIASTOLIC BLOOD PRESSURE: 60 MMHG | SYSTOLIC BLOOD PRESSURE: 155 MMHG | HEART RATE: 57 BPM | BODY MASS INDEX: 30.35 KG/M2

## 2022-02-18 LAB
ANION GAP SERPL CALCULATED.3IONS-SCNC: 8 MMOL/L (ref 4–13)
BUN SERPL-MCNC: 102 MG/DL (ref 5–25)
CALCIUM SERPL-MCNC: 8.3 MG/DL (ref 8.3–10.1)
CHLORIDE SERPL-SCNC: 103 MMOL/L (ref 100–108)
CO2 SERPL-SCNC: 22 MMOL/L (ref 21–32)
CREAT SERPL-MCNC: 6.06 MG/DL (ref 0.6–1.3)
ERYTHROCYTE [DISTWIDTH] IN BLOOD BY AUTOMATED COUNT: 13.5 % (ref 11.6–15.1)
GFR SERPL CREATININE-BSD FRML MDRD: 6 ML/MIN/1.73SQ M
GLUCOSE SERPL-MCNC: 100 MG/DL (ref 65–140)
GLUCOSE SERPL-MCNC: 120 MG/DL (ref 65–140)
HCT VFR BLD AUTO: 24.7 % (ref 34.8–46.1)
HGB BLD-MCNC: 7.8 G/DL (ref 11.5–15.4)
MCH RBC QN AUTO: 27.7 PG (ref 26.8–34.3)
MCHC RBC AUTO-ENTMCNC: 31.6 G/DL (ref 31.4–37.4)
MCV RBC AUTO: 88 FL (ref 82–98)
PLATELET # BLD AUTO: 195 THOUSANDS/UL (ref 149–390)
PMV BLD AUTO: 10.3 FL (ref 8.9–12.7)
POTASSIUM SERPL-SCNC: 4.9 MMOL/L (ref 3.5–5.3)
RBC # BLD AUTO: 2.82 MILLION/UL (ref 3.81–5.12)
SODIUM SERPL-SCNC: 133 MMOL/L (ref 136–145)
WBC # BLD AUTO: 5.59 THOUSAND/UL (ref 4.31–10.16)

## 2022-02-18 PROCEDURE — 82948 REAGENT STRIP/BLOOD GLUCOSE: CPT

## 2022-02-18 PROCEDURE — 80048 BASIC METABOLIC PNL TOTAL CA: CPT | Performed by: INTERNAL MEDICINE

## 2022-02-18 PROCEDURE — 99239 HOSP IP/OBS DSCHRG MGMT >30: CPT | Performed by: INTERNAL MEDICINE

## 2022-02-18 PROCEDURE — 85027 COMPLETE CBC AUTOMATED: CPT | Performed by: INTERNAL MEDICINE

## 2022-02-18 RX ORDER — BUMETANIDE 2 MG/1
1 TABLET ORAL 2 TIMES DAILY
Qty: 60 TABLET | Refills: 0
Start: 2022-02-21 | End: 2022-03-08

## 2022-02-18 RX ORDER — AMLODIPINE BESYLATE 10 MG/1
10 TABLET ORAL DAILY
Qty: 180 TABLET | Refills: 0 | Status: SHIPPED | OUTPATIENT
Start: 2022-02-18 | End: 2022-06-09 | Stop reason: SDUPTHER

## 2022-02-18 RX ORDER — ECHINACEA PURPUREA EXTRACT 125 MG
1 TABLET ORAL EVERY 2 HOUR PRN
Status: DISCONTINUED | OUTPATIENT
Start: 2022-02-18 | End: 2022-02-18 | Stop reason: HOSPADM

## 2022-02-18 RX ADMIN — CARVEDILOL 12.5 MG: 12.5 TABLET, FILM COATED ORAL at 08:16

## 2022-02-18 RX ADMIN — LEVOTHYROXINE SODIUM 112 MCG: 112 TABLET ORAL at 05:24

## 2022-02-18 RX ADMIN — ASPIRIN 81 MG: 81 TABLET, CHEWABLE ORAL at 08:15

## 2022-02-18 RX ADMIN — ISOSORBIDE MONONITRATE 120 MG: 60 TABLET, EXTENDED RELEASE ORAL at 08:15

## 2022-02-18 RX ADMIN — AMLODIPINE BESYLATE 10 MG: 10 TABLET ORAL at 08:16

## 2022-02-18 RX ADMIN — INSULIN ASPART 10 UNITS: 100 INJECTION, SUSPENSION SUBCUTANEOUS at 08:15

## 2022-02-18 RX ADMIN — DICYCLOMINE HYDROCHLORIDE 20 MG: 20 TABLET ORAL at 08:16

## 2022-02-18 RX ADMIN — HYDRALAZINE HYDROCHLORIDE 100 MG: 50 TABLET ORAL at 08:15

## 2022-02-18 NOTE — ASSESSMENT & PLAN NOTE
· Avoid hypotension given KELLY  · Nephrology following  · Bumex on hold , to be restarted on Monday per nephrology

## 2022-02-18 NOTE — ASSESSMENT & PLAN NOTE
Wt Readings from Last 3 Encounters:   02/18/22 74 8 kg (164 lb 14 5 oz)   01/31/22 71 2 kg (157 lb)   11/23/21 72 4 kg (159 lb 9 6 oz)     · Per record review, H&P documented acute heart failure, however patient examines dry to euvolemic, on RA, no rales on examination  · Patient follows with Dr Jyothi Cr  Stage C HFpEF, echo most recently with EF 60%, hypo-inferior wall, normal RV  · NT proBNP 17,351 (20,000 in 10/21), CT A/P wo contrast showed trace bilateral pleural effusions  · Weight was recorded as 160 on admission,  Weight was 157 at recent office visit 1/31  · Requesting accurate weights and I&Os - nurse aware  · S/p IVF then IV Bumex per Nephrology   · Nephrology following as above  · Cautious fluid balance   · PO bumex 1 mg BID to be started on Monday 2/21/22

## 2022-02-18 NOTE — ASSESSMENT & PLAN NOTE
Lab Results   Component Value Date    HGBA1C 11 0 (H) 02/12/2022       Recent Labs     02/17/22  1058 02/17/22  1634 02/17/22  2109 02/18/22  0614   POCGLU 242* 162* 200* 120       Blood Sugar Average: Last 72 hrs:  (P) 385 [id number]   · Uncontrolled as evidenced by A1c  · Continue NovoLog 70/30 10 units b i d which is home regimen - patient has been refusing inyt  · Continue BID 70/30  -  Encourage compliance  · SSI/accuchecks  · Diabetic diet  · Hypoglycemia protocol   · continue home regimen on discharge

## 2022-02-18 NOTE — PROGRESS NOTES
Pt's daughter will be coming to entrance B and will call for nurse once she is out front so we can wheel pt down to entrance

## 2022-02-18 NOTE — ASSESSMENT & PLAN NOTE
Lab Results   Component Value Date    EGFR 6 02/18/2022    EGFR 5 02/17/2022    EGFR 5 02/16/2022    CREATININE 6 06 (H) 02/18/2022    CREATININE 6 75 (H) 02/17/2022    CREATININE 7 03 (H) 02/16/2022     · Baseline creatinine around 3 per prior notes however most recent labs indicate she has been around 3 1-3 4, Estimated GFR around 15-20, stage 4  · Reports she follows with Dr Blanca Gallardo  · With KELLY in the setting of vomiting/diarrhea and Bumex use  · Nephrology consult appreciated   · Miralax/colace discontinued  · Home bumex was discontinued and patient was placed on IVF  Creatinine continued to worsen and IVF were stopped and patient was given IV bumex  · Creatinine slowly improving now, 6 06 today  · Avoid nephrotoxins and hypotension  · BMP monitoring   · palliative care consulted, patient continued to refuse HD and wants to remain full code and on current therapy  · OP nephrology follow up

## 2022-02-18 NOTE — UTILIZATION REVIEW
Inpatient Admission Authorization Request   NOTIFICATION OF INPATIENT ADMISSION/INPATIENT AUTHORIZATION REQUEST   SERVICING FACILITY:   [facility]  Address: [address] [facility], [address] [facility] [address]  Tax ID: [id number]  NPI: [id number]  Place of Service: Inpatient [address] [facility] Code: 24     ATTENDING PROVIDER:  Attending Name and NPI#: Aishadenilson Sheffield Ceceviolama [[id number]]  Address: [address] [place] [address], [address] [place] [address]  Phone: [phone]     UTILIZATION REVIEW CONTACT:  Ian Mayers Utilization   Network Utilization Review Department  Phone: [phone]  Fax: [phone]  Email: [email or web address]     PHYSICIAN ADVISORY SERVICES:  FOR ISYB-ZS-CUTA REVIEW - MEDICAL NECESSITY DENIAL  Phone: [phone]  Fax: [phone]  Email: [email or web address]     TYPE OF REQUEST:  Inpatient Status     ADMISSION INFORMATION:  ADMISSION DATE/TIME: 2/10/22 11:49 AM  PATIENT DIAGNOSIS CODE/DESCRIPTION:  Diarrhea [R19 7]  Head pain [R51 9]  KELLY (acute kidney injury) ([address] ) [N17 9]  Intractable pain [R52]  COVID [U07 1]  DISCHARGE DATE/TIME: No discharge date for patient encounter  DISCHARGE DISPOSITION (IF DISCHARGED): Home/Self Care     IMPORTANT INFORMATION:  Please contact the Ian Mayers directly with any questions or concerns regarding this request  Department voicemails are confidential     Send requests for admission clinical reviews, concurrent reviews, approvals, and administrative denials due to lack of clinical to fax [phone]

## 2022-02-18 NOTE — PROGRESS NOTES
Pt wheeled to entrance via w/c with nurse aides and family waiting at entrance, pt has all her highlighted d/c paperwork  With her, mask on for transport out

## 2022-02-18 NOTE — PROGRESS NOTES
All assessments unchanged from prior notes , pt is ready for d/c per SLIM and nephrology this am, she was agreeable to take all her meds this am, she is anxious to go home

## 2022-02-18 NOTE — PLAN OF CARE
Problem: PAIN - ADULT  Goal: Verbalizes/displays adequate comfort level or baseline comfort level  Description: Interventions:  - Encourage patient to monitor pain and request assistance  - Assess pain using appropriate pain scale  - Administer analgesics based on type and severity of pain and evaluate response  - Implement non-pharmacological measures as appropriate and evaluate response  - Notify physician/advanced practitioner if interventions unsuccessful or patient reports new pain  Outcome: Progressing     Problem: INFECTION - ADULT  Goal: Absence or prevention of progression during hospitalization  Description: INTERVENTIONS:  - Assess and monitor for signs and symptoms of infection  - Monitor lab/diagnostic results  - Monitor all insertion sites, i e  indwelling lines, tubes, and drains  - [place] appropriate cooling/warming therapies per order  - Administer medications as ordered  - Instruct and encourage patient and family to use good hand hygiene technique  - Identify and instruct in appropriate isolation precautions for identified infection/condition  Outcome: Progressing     Problem: RESPIRATORY - ADULT  Goal: Achieves optimal ventilation and oxygenation  Description: INTERVENTIONS:  - Assess for changes in respiratory status  - Assess for changes in mentation and behavior  - Position to facilitate oxygenation and minimize respiratory effort  - Oxygen administered by appropriate delivery if ordered  - Initiate smoking cessation education as indicated  - Encourage broncho-pulmonary hygiene including cough, deep breathe, Incentive Spirometry  - Assess the need for suctioning and aspirate as needed  - Assess and instruct to report SOB or any respiratory difficulty  - Respiratory Therapy support as indicated  Outcome: Progressing

## 2022-02-18 NOTE — DISCHARGE SUMMARY
[address] [facility]  Discharge- Brent Rodriguez 1955, 78 y o  female MRN: [id number]  Unit/Bed#: -01 Encounter: [id number]  Primary Care Provider: Hadley Mcbride MD   Date and time admitted to hospital: 2/9/2022 11:38 AM    * COVID-19  Assessment & Plan  · Per record review, patient presented with generalized abdominal pain, diarrhea, chills, weakness, fatigue and nasal congestion  · Home COVID test +, confirmation test on arrival also +  Reported she is vaccinated   · Per RN, patient was noted to desat only when sleeping but sats stable on RA when awake  · Patient was not requiring oxygen but was started on Remdesivir on admission - this was subsequently discontinued per protocol  · CRP 22, D-dimer 1 58 (AC with Hep SQ 5000 q8h per [facility]), procal 0 79 (unclear significance in setting of chronic renal failure), BNP 17,351  · Patient reports diarrhea has resolved  · Isolation thru 2/19      Acute kidney injury superimposed on chronic kidney disease [facility])  Assessment & Plan  Lab Results   Component Value Date    EGFR 6 02/18/2022    EGFR 5 02/17/2022    EGFR 5 02/16/2022    CREATININE 6 06 (H) 02/18/2022    CREATININE 6 75 (H) 02/17/2022    CREATININE 7 03 (H) 02/16/2022     · Baseline creatinine around 3 per prior notes however most recent labs indicate she has been around 3 1-3 4, Estimated GFR around 15-20, stage 4  · Reports she follows with Dr Winnie Humphreys  · With KELLY in the setting of vomiting/diarrhea and Bumex use  · Nephrology consult appreciated   · Miralax/colace discontinued  · Home bumex was discontinued and patient was placed on IVF  Creatinine continued to worsen and IVF were stopped and patient was given IV bumex  · Creatinine slowly improving now, 6 06 today  · Avoid nephrotoxins and hypotension  · BMP monitoring   · palliative care consulted, patient continued to refuse HD and wants to remain full code and on current therapy    · OP nephrology follow up     Pericardial effusion  Assessment & Plan  · Chronic, noted on prior echos/imaging  · Continue Monitoring outpatient     Coronary artery disease involving native coronary artery of native heart with angina pectoris [facility])  Assessment & Plan  · [facility] 1/4/19: LUDY x 2 to LAD after NSTEMI (trop 0 2) and abnormal stress test with ischemia in anterior wall on 12/31/18, 80% RCA- plan staged, but has not needed intervention  · On imdur, asa, coreg, statin    Hypertension  Assessment & Plan  · Avoid hypotension given KELLY  · Nephrology following  · Bumex on hold , to be restarted on Monday per nephrology  Hypothyroidism  Assessment & Plan  · Continue synthroid     Type 2 diabetes mellitus with kidney complication, with long-term current use of insulin [facility])  Assessment & Plan  Lab Results   Component Value Date    HGBA1C 11 0 (H) 02/12/2022       Recent Labs     02/17/22  1058 02/17/22  1634 02/17/22  2109 02/18/22  0614   POCGLU 242* 162* 200* 120       Blood Sugar Average: Last 72 hrs:  (P) 370 [id number]   · Uncontrolled as evidenced by A1c  · Continue NovoLog 70/30 10 units b i d which is home regimen - patient has been refusing inyt  · Continue BID 70/30  -  Encourage compliance  · SSI/accuchecks  · Diabetic diet  · Hypoglycemia protocol   · continue home regimen on discharge  Chronic diastolic heart failure (HCC)  Assessment & Plan  Wt Readings from Last 3 Encounters:   02/18/22 74 8 kg (164 lb 14 5 oz)   01/31/22 71 2 kg (157 lb)   11/23/21 72 4 kg (159 lb 9 6 oz)     · Per record review, H&P documented acute heart failure, however patient examines dry to euvolemic, on RA, no rales on examination  · Patient follows with Dr Avery Titus    Stage C HFpEF, echo most recently with EF 60%, hypo-inferior wall, normal RV  · NT proBNP 17,351 (20,000 in 10/21), CT A/P wo contrast showed trace bilateral pleural effusions  · Weight was recorded as 160 on admission,  Weight was 157 at recent office visit 1/31  · Requesting accurate weights and I&Os - nurse aware  · S/p IVF then IV Bumex per Nephrology   · Nephrology following as above  · Cautious fluid balance   · PO bumex 1 mg BID to be started on Monday 2/21/22  Discharge Summary - [facility]    Patient Information: Andria Cat 78 y o  female MRN: [id number]  Unit/Bed#: -01 Encounter: [id number]    Discharging Physician / Practitioner: Malcolm Wright MD  PCP: Bibi Ibarra MD  Admission Date: 2/9/2022  Discharge Date: 02/18/22    Reason for Admission: abdominal pain    Discharge Diagnoses:     Principal Problem:    COVID-19  Active Problems:    Acute kidney injury superimposed on chronic kidney disease ([address] )    Chronic diastolic heart failure ([facility] [address] )    Type 2 diabetes mellitus with kidney complication, with long-term current use of insulin ([facility] [address] )    Hypothyroidism    Hypertension    Coronary artery disease involving native coronary artery of native heart with angina pectoris ([facility] [address] )    Pericardial effusion  Resolved Problems:    Abdominal pain      Consultations During Hospital Stay:  · nephrology  · Palliative care    Significant Findings / Test Results:     CT abdomen pelvis wo contrast   Final Result by Debra Bui MD (02/09 1556)      Subpleural groundglass opacities noted at the lung bases most consistent with Covid 19 pneumonia  No acute intra-abdominal abnormality  Nodular liver suggesting cirrhosis  Cholelithiasis  Hepatic contour suggesting potential cirrhosis  Small to moderate hiatal hernia  Trace bilateral pleural effusions and small pericardial effusion  Workstation performed: [id number]             Hospital Course:     Andria Cat is a 78 y o  female patient who originally presented to the hospital on 2/9/2022 due to abdominal pain and diarrhea  CT is as above  Was noted to have covid 23 and also KELLY  Was on room air and received a dose of remdesivir    Was evaluated by nephrology and received ivf and iv diuresis  Eventually renal function started to improve  At one point , there was a discussion about HD and goals of care with palliative Care and Nephrology but patient preferred to continue the current care without hemodialysis  As Renal function was improving, patient is getting discharged to home after Nephrology discussion  Patient will need outpatient follow-up with Nephrology closely along with repeat blood work  Bumex oral should be restarted at lower dose from Monday to [id number] per Nephrology  Patient is on room air  Condition at Discharge: stable     Discharge Day Visit / Ex  Subjective:  No overnight events  Patient feels well  Reports ,ild nasal congestion  Agreeable with discharge plan  No diarrhea  Vitals: Blood Pressure: 155/60 (02/18/22 0824)  Pulse: 57 (02/18/22 0824)  Temperature: 97 8 °F (36 6 °C) (02/18/22 0824)  Temp Source: Oral (02/18/22 0824)  Respirations: 20 (02/18/22 0824)  Height: 5' 2" (157 5 cm) (02/09/22 1141)  Weight - Scale: 74 8 kg (164 lb 14 5 oz) (02/18/22 0533)  SpO2: 94 % (02/18/22 0824)  Exam:   Physical Exam   Constitutional:       General: She is not in acute distress  Cardiovascular:      Rate and Rhythm: Normal rate and regular rhythm       Heart sounds: Normal heart sounds  No murmur heard  Abdominal:      General: Bowel sounds are normal  There is no distension       Palpations: Abdomen is soft       Tenderness: There is no abdominal tenderness  Musculoskeletal:         General: No swelling  Skin:     General: Skin is warm  Neurological:      Mental Status: She is alert       Comments: Awake alert and communicative     Discussion with Family: d/w daughter on phone  Discharge instructions/Information to patient and family:   See after visit summary for information provided to patient and family        Provisions for Follow-Up Care:  See after visit summary for information related to follow-up care and any pertinent home health orders  Disposition:     Home       Discharge Statement:  I spent 45 minutes discharging the patient  This time was spent on the day of discharge  I had direct contact with the patient on the day of discharge  Greater than 50% of the total time was spent examining patient, answering all patient questions, arranging and discussing plan of care with patient as well as directly providing post-discharge instructions  Additional time then spent on discharge activities  Discharge Medications:  See after visit summary for reconciled discharge medications provided to patient and family        ** Please Note: This note has been constructed using a voice recognition system **

## 2022-02-18 NOTE — ASSESSMENT & PLAN NOTE
· [facility] 1/4/19: LUDY x 2 to LAD after NSTEMI (trop 0 2) and abnormal stress test with ischemia in anterior wall on 12/31/18, 80% RCA- plan staged, but has not needed intervention  · On imdur, asa, coreg, statin

## 2022-02-18 NOTE — PLAN OF CARE
Problem: PAIN - ADULT  Goal: Verbalizes/displays adequate comfort level or baseline comfort level  Description: Interventions:  - Encourage patient to monitor pain and request assistance  - Assess pain using appropriate pain scale  - Administer analgesics based on type and severity of pain and evaluate response  - Implement non-pharmacological measures as appropriate and evaluate response  - Notify physician/advanced practitioner if interventions unsuccessful or patient reports new pain  Outcome: Adequate for Discharge

## 2022-02-21 NOTE — UTILIZATION REVIEW
Notification of Discharge   This is a Notification of Discharge from our facility [address]  Please be advised that this patient has been discharge from our facility  Below you will find the admission and discharge date and time including the patients disposition  UTILIZATION REVIEW CONTACT:  Mariia Noel  Utilization   Network Utilization Review Department  Phone: [phone] x carefully listen to the prompts  All voicemails are confidential   Email: [email or web address]     PHYSICIAN ADVISORY SERVICES:  FOR TELI-VP-VFOE REVIEW - MEDICAL NECESSITY DENIAL  Phone: [phone]  Fax: [phone]  Email: [email or web address]     PRESENTATION DATE: 2/9/2022 11:38 AM  OBERVATION ADMISSION DATE:   INPATIENT ADMISSION DATE: 2/10/22 11:49 AM   DISCHARGE DATE: 2/18/2022 11:46 AM  DISPOSITION: Home/Self Care Home/Self Care      IMPORTANT INFORMATION:  Send all requests for admission clinical reviews, approved or denied determinations and any other requests to dedicated fax number below belonging to the campus where the patient is receiving treatment   List of dedicated fax numbers:  [address] DENIALS (Administrative/Medical Necessity) [phone]   [address] (Maternity/NICU/Pediatrics) [phone]   Aleena Flatten [phone]   [address] [facility] [phone]   [address] [place] [address] [phone]   [address] [phone]   [facility]  [phone]   [address] [place] [address], [facility]  [address] [phone]

## 2022-02-22 ENCOUNTER — TELEPHONE (OUTPATIENT)
Dept: NEPHROLOGY | Facility: CLINIC | Age: 67
End: 2022-02-22

## 2022-02-22 DIAGNOSIS — N18.4 STAGE 4 CHRONIC KIDNEY DISEASE (HCC): Primary | ICD-10-CM

## 2022-02-22 NOTE — TELEPHONE ENCOUNTER
Anesthesia ROS/Med Hx    Overall Review:  Pts. EKG was reviewed and Pts.echo was reviewed     Pulmonary Review:    Pt. positive for pneumonia    Neuro/Psych Review:    Negative for all neuro/psych ROS    Cardiovascular Review:    Pt. positive for hyperlipidemia    GI/HEPATIC/RENAL Review:    Pt. positive for renal disease - ARF    End/Other Review:    Pt. positive for anemia  Pt. positive for thrombocytopenia  Overall Review of Systems Comments:  Decreased mag      Anesthesia Plan     ASA Status: 4  Anesthesia Type: General  Induction: Intravenous  Preferred Airway Type: LMA  Reviewed: Lab Results, Medications, DNR Status, Pre-Induction Reassessment, EKG, Nursing Notes, Patient Summary, NPO Status, Problem List, Chest X-Rays, Consultations, Beta Blocker Status, Allergies and Past Med History  The proposed anesthetic plan, including its risks and benefits, have been discussed with the Patient - along with the risks and benefits of alternatives.  Questions were encouraged and answered and the patient and/or representative agrees to proceed.  Informed Consent for Blood: Consented  Plan Comments: Platelets transfused. Decreased mag likely from C diff diarrhea. Will eplace slowly due to renal failure. Heme\Onc request ransfusion of PRBC for hb 6.6    Pt ID, H&P. Pt medically optimized for procedure. Risk/Benefit GA discussed with patient including but not limited to pain, sore throat, dental injury, nausea/vomitting. Questions answered. Pt wishes to proceed.         Physical Exam  Mallampati: III  TM Distance: >3 FB  Neck ROM: Full  Cardio Rhythm: Regular  Cardio Rate: Normal  cardiovascular exam normal  Breath sounds clear to auscultation:  Yes  pulmonary exam normal  abdominal exam normal  dental exam normal                   Can we put her on a recall list for a sooner appointment even if it virtual?

## 2022-02-22 NOTE — TELEPHONE ENCOUNTER
I spoke with the patient and informed her to obtain lab work this week and scheduled a f/u appointment with soonest available which is 03/31/2022 with Hung Burt  Pt verbalized understanding

## 2022-03-03 ENCOUNTER — TELEPHONE (OUTPATIENT)
Dept: NEPHROLOGY | Facility: CLINIC | Age: 67
End: 2022-03-03

## 2022-03-03 NOTE — TELEPHONE ENCOUNTER
----- Message from Jared Medrano DO sent at 3/3/2022 12:50 PM EST -----  Regarding: RE: F/u visit  I can see her next week if she is available    ----- Message -----  From: Gary Sinclair  Sent: 2/21/2022   9:02 AM EST  To: Jared Medrano DO  Subject: F/u visit                                        Hey, I looked into your schedule and AP's schedules as well and there is nothing open until 04/01   ----- Message -----  From: Jared Medrano DO  Sent: 2/18/2022   1:08 PM EST  To: Nephrology [facility]    Please schedule patient for follow-up within the next 2 weeks with advanced practitioner can be virtual as she was covid positive in hospital   Please schedule her for repeat BMP this week

## 2022-03-04 ENCOUNTER — APPOINTMENT (OUTPATIENT)
Dept: LAB | Facility: HOSPITAL | Age: 67
End: 2022-03-04
Attending: INTERNAL MEDICINE
Payer: COMMERCIAL

## 2022-03-04 ENCOUNTER — TELEPHONE (OUTPATIENT)
Dept: NEPHROLOGY | Facility: CLINIC | Age: 67
End: 2022-03-04

## 2022-03-04 DIAGNOSIS — N18.4 STAGE 4 CHRONIC KIDNEY DISEASE (HCC): ICD-10-CM

## 2022-03-04 LAB
ANION GAP SERPL CALCULATED.3IONS-SCNC: 8 MMOL/L (ref 4–13)
BUN SERPL-MCNC: 29 MG/DL (ref 5–25)
CALCIUM SERPL-MCNC: 8.9 MG/DL (ref 8.3–10.1)
CHLORIDE SERPL-SCNC: 107 MMOL/L (ref 100–108)
CO2 SERPL-SCNC: 26 MMOL/L (ref 21–32)
CREAT SERPL-MCNC: 2.3 MG/DL (ref 0.6–1.3)
GFR SERPL CREATININE-BSD FRML MDRD: 21 ML/MIN/1.73SQ M
GLUCOSE P FAST SERPL-MCNC: 113 MG/DL (ref 65–99)
POTASSIUM SERPL-SCNC: 4.1 MMOL/L (ref 3.5–5.3)
SODIUM SERPL-SCNC: 141 MMOL/L (ref 136–145)

## 2022-03-04 PROCEDURE — 36415 COLL VENOUS BLD VENIPUNCTURE: CPT

## 2022-03-04 PROCEDURE — 80048 BASIC METABOLIC PNL TOTAL CA: CPT

## 2022-03-04 NOTE — TELEPHONE ENCOUNTER
Appointment Confirmation   Person confirmed appointment with  If not patient, name of the person lm    Date and time of appointment 03/08   Patient acknowledged and will be at appointment? no   Did you advise the patient that they will need a urine sample if they are a new patient? no   Did you advise the patient to bring their current medications for verification? (including any OTC) no   Additional Information

## 2022-03-08 ENCOUNTER — OFFICE VISIT (OUTPATIENT)
Dept: NEPHROLOGY | Facility: CLINIC | Age: 67
End: 2022-03-08
Payer: COMMERCIAL

## 2022-03-08 VITALS
SYSTOLIC BLOOD PRESSURE: 162 MMHG | WEIGHT: 159 LBS | DIASTOLIC BLOOD PRESSURE: 72 MMHG | HEIGHT: 62 IN | BODY MASS INDEX: 29.26 KG/M2

## 2022-03-08 DIAGNOSIS — I50.32 CHRONIC HEART FAILURE WITH PRESERVED EJECTION FRACTION (HCC): ICD-10-CM

## 2022-03-08 DIAGNOSIS — N18.4 CKD (CHRONIC KIDNEY DISEASE) STAGE 4, GFR 15-29 ML/MIN (HCC): Primary | ICD-10-CM

## 2022-03-08 PROCEDURE — 99214 OFFICE O/P EST MOD 30 MIN: CPT | Performed by: INTERNAL MEDICINE

## 2022-03-08 RX ORDER — BUMETANIDE 2 MG/1
2 TABLET ORAL 2 TIMES DAILY
Qty: 60 TABLET | Refills: 0
Start: 2022-03-08 | End: 2022-06-09 | Stop reason: SDUPTHER

## 2022-03-08 NOTE — PROGRESS NOTES
OFFICE FOLLOW UP - Nephrology   Alena Dykes 78 y o  female MRN: [id number]    Encounter: [id number]        ASSESSMENT  Diagnoses and all orders for this visit:    67-year-old female with past medical history type 2 diabetes, hypertension who presented with a type 2 NSTEMI, coronary artery disease requiring PCI complicated by acute kidney injury on chronic kidney disease    KELLY (acute kidney injury) ([address] )  Stage 4 chronic kidney disease ([facility] [address] [address] )  Coronary artery disease involving native coronary artery of native heart with angina pectoris ([facility] [address] )  Acute diastolic congestive heart failure ([facility] [address] )  Essential hypertension  Type 2 myocardial infarction ([facility] [address] )      DISCUSSION & PLAN    1  Acute kidney injury-recently very severe has had multiple episodes of acute kidney injury this was in the setting of diuresis, diarrhea nausea vomiting COVID infection likely pre renal azotemia that had converted to ATN  -her peak creatinine was 7 3 with a BUN of 103  -fortunately her creatinine improved now to 2 3 with a BUN of 29 sodium has improved weight is stable at 159 lb    2  Stage 4 chronic kidney disease  -her creatinine is below her initial baseline this could be related to some muscle mass loss at this point will monitor  -continue cardiovascular risk reduction measures-statin therapy, glycemic control blood pressure controlled her blood pressure is elevated today in the office but home blood pressure readings are [phone] systolic  -we have discussed renal replacement therapy extensively    We discuss this in the hospital when her creatinine was 7 3 she states she would not want dialysis once maximal conservative management this was discussed with her daughter as well and she has seen palliative care in the hospital   For now will continue our surveillance and make further recommendations on going  -compensated from a renal standpoint  -will repeat blood work in 1 month to ensure stability and if stable will follow-up in 3-4 month    3  Coronary artery disease with diastolic congestive heart failure  -follows with Heart failure team regularly    4  Essential hypertension in the setting of diastolic heart failure  -blood pressures remain elevated  -blood pressures are stable  -carvedilol 12 5 mg 2 times daily  -Bumex 2 mg twice daily  -amlodipine 10 mg daily  -120 mg of Imdur daily  -hydralazine 100 mg 3 times daily    5  diastolic congestive heart failure  -following up with Cardiology  -continue blood pressure control, on a beta-blocker, on Imdur , hydralazine not on RAAS inhibition, multiple episodes of acute kidney injury and hyperkalemia  -currently on a statin    6  Type 2 diabetes mellitus  -continue glycemic control     7  Vitamin-D deficiency  -PTH acceptable    8  Proteinuria  -proteinuria now worsening  -monitor longstanding history of diabetes, with worsening blood sugars  -now with high chance of progression    9  Anemia in the setting of chronic kidney disease  -does follow with Hematology last hemoglobin was 7 8  -did require blood transfusion in the hospital  -will repeat BMP in 1 month to ensure stability  -was on Aranesp and Venofer as an outpatient previously    Continue follow-up in 3-4 months    HPI: Matthew Jaramillo is a 78 y o  female who is here for follow-up  She has a past medical history of type 2 diabetes mellitus and hypertension diastolic congestive heart failure    She was recently hospitalized with COVID-19 pneumonia, nausea vomiting diarrhea had an acute kidney injury with severe azotemia required aggressive intravenous fluid administration which was closely monitored with her history of diastolic congestive heart failure overall for still feeling fatigued no chest pain or shortness of breath fevers or chills nausea vomiting diarrhea currently    ROS:   All the systems were reviewed and were negative except as documented on the HPI  Allergies:  Iv contrast [iodinated diagnostic agents] and Nifedipine    Medications:   Current Outpatient Medications:     amLODIPine (NORVASC) 10 mg tablet, Take 1 tablet (10 mg total) by mouth daily, Disp: 180 tablet, Rfl: 0    aspirin (RA Aspirin Adult Low Dose) 81 mg chewable tablet, Chew 1 tablet (81 mg total) daily, Disp: 90 tablet, Rfl: 1    atorvastatin (LIPITOR) 40 mg tablet, Take 2 tablets (80 mg total) by mouth daily, Disp: 90 tablet, Rfl: 3    bumetanide (BUMEX) 2 mg tablet, Take 1 tablet (2 mg total) by mouth 2 (two) times a day Start bumex 1 mg BID from Monday 2/21/22,, Disp: 60 tablet, Rfl: 0    carvedilol (COREG) 12 5 mg tablet, Take 1 tablet (12 5 mg total) by mouth 2 (two) times a day with meals, Disp: 180 tablet, Rfl: 3    Droplet Pen Needles 32G X 4 MM MISC, 2 (two) times a day, Disp: , Rfl:     hydrALAZINE (APRESOLINE) 100 MG tablet, Take 1 tablet (100 mg total) by mouth 3 (three) times a day, Disp: 270 tablet, Rfl: 3    insulin aspart protamine-insulin aspart (NovoLOG 70/30) 100 units/mL injection, As per your current blood sugars, take 15 units before breakfast, 10 units before dinner, Disp: , Rfl: 0    isosorbide mononitrate (IMDUR) 120 mg 24 hr tablet, Take 1 tablet (120 mg total) by mouth daily, Disp: 90 tablet, Rfl: 3    levothyroxine 112 mcg tablet, take 1 tablet by mouth daily for 6 days then take 1/2 tablet ON SUNDAY, Disp: , Rfl:     Past Medical History:   Diagnosis Date    Anemia     CHF (congestive heart failure) (HCC)     Chronic kidney disease     Coronary artery disease     Diabetes mellitus ([address] )     Hypertension     Hypothyroidism      Past Surgical History:   Procedure Laterality Date    CORONARY ANGIOPLASTY WITH STENT PLACEMENT  2019     Family History   Problem Relation Age of Onset    Diabetes Mother     Heart attack Father         MI    Hypertension Brother       reports that she has quit smoking   She has never used smokeless tobacco  She reports that she does not drink alcohol and does not use drugs       Physical Exam:   Vitals:    03/08/22 0919   BP: 162/72   BP Location: Left arm   Patient Position: Sitting   Cuff Size: Adult   Weight: 72 1 kg (159 lb)   Height: 5' 2" (1 575 m)     Body mass index is 29 08 kg/m²  Repeat blood pressure on the right arm was 155 over 72  General: conscious, cooperative, in no acute distress  Eyes: conjunctivae pink, anicteric sclerae  ENT: lips and mucous membranes moist  Neck: supple, no JVD  Chest: clear breath sounds bilateral, no crackles, ronchus or wheezings  CVS: normal rate, regular rhythm  Abdomen: soft, non-tender, non-distended, normoactive bowel sounds  Extremities:  Trace edema  Skin: no rash  Neuro: awake, alert, oriented  Psych:  Pleasant affect    Lab Results:    Results for orders placed or performed in visit on 88/66/45   Basic metabolic panel   Result Value Ref Range    Sodium 141 136 - 145 mmol/L    Potassium 4 1 3 5 - 5 3 mmol/L    Chloride 107 100 - 108 mmol/L    CO2 26 21 - 32 mmol/L    ANION GAP 8 4 - 13 mmol/L    BUN 29 (H) 5 - 25 mg/dL    Creatinine 2 30 (H) 0 60 - 1 30 mg/dL    Glucose, Fasting 113 (H) 65 - 99 mg/dL    Calcium 8 9 8 3 - 10 1 mg/dL    eGFR 21 ml/min/1 73sq m       Portions of the record may have been created with voice recognition software  Occasional wrong word or "sound a like" substitutions may have occurred due to the inherent limitations of voice recognition software  Read the chart carefully and recognize, using context, where substitutions have occurred  If you have any questions, please contact the dictating provider

## 2022-03-08 NOTE — PATIENT INSTRUCTIONS
Chronic Kidney Disease   WHAT YOU NEED TO KNOW:   Chronic kidney disease (CKD) is the gradual and permanent loss of kidney function  It is also called chronic kidney failure, or chronic renal insufficiency  Normally, the kidneys remove fluid, chemicals, and waste from your blood  These wastes are turned into urine by your kidneys  CKD may worsen over time and lead to kidney failure  Your CKD team will help you and your family plan for your care at home  The team will help you create goals and find ways to meet your goals  Your care plan may change over time as your needs change  DISCHARGE INSTRUCTIONS:   Call your local emergency number (911 in the [address]) if:   · You have a seizure  · You have shortness of breath  Return to the emergency department if:   · You are confused and very drowsy  Call your doctor or nephrologist if:   · You suddenly gain or lose more weight than your healthcare provider has told you is okay  · You have itchy skin or a rash  · You urinate more or less than you normally do  · You have blood in your urine  · You have nausea and are vomiting  · You have fatigue or muscle weakness  · You have hiccups that will not stop  · You have questions or concerns about your condition or care  Medicines:   · Medicines  may be given to decrease blood pressure and get rid of extra fluid  You may also receive medicine to manage health conditions that may occur with CKD, such as anemia, diabetes, and heart disease  · Take your medicine as directed  Contact your healthcare provider if you think your medicine is not helping or if you have side effects  Tell him or her if you are allergic to any medicine  Keep a list of the medicines, vitamins, and herbs you take  Include the amounts, and when and why you take them  Bring the list or the pill bottles to follow-up visits  Carry your medicine list with you in case of an emergency      What you can do to manage CKD: Management may include making some lifestyle changes  Tell your healthcare provider if you have any concerns about being able to make changes  He or she can help you find solutions, including working with specialists  Ask for help creating a plan to break large goals into smaller steps  Your plan may include any of the following:  · Manage other health conditions  Your healthcare provider will work with you to make a care plan that meets your needs  You will be checked regularly for heart disease or other conditions that can make CKD worse, such as diabetes  Your blood pressure will be closely monitored  You will also get a target blood pressure and help making a plan to reach your target  This may include taking your blood pressure at home  · Maintain a healthy weight  Your weight and body mass index (BMI) will be checked regularly  BMI helps find if your weight is healthy for your height  Your healthcare provider will use other tests to check your muscle and protein levels  Extra weight can strain your kidneys  A low weight or low muscle mass can make you feel more tired  You may have trouble doing your daily activities  Ask your provider what a healthy weight is for you  He or she can help you create a plan to lose or gain weight safely, if needed  The plan may include keeping a food diary  This is a list of foods and liquids you have each day  Your provider will use the diary to help you make changes, if needed  Changes are based on your health and any other conditions you have, such as diabetes  · Create an exercise plan  Regular exercise can help you manage CKD, high blood pressure, and diabetes  Exercise also helps control weight  Your provider can help you create exercise goals and a plan to reach those goals  For example, your goal may be to exercise for 30 minutes in a day  Your plan can include breaking exercise into 10 minute sessions, 3 times during the day           · Create a healthy eating plan  Your provider may tell you to eat food low in potassium, phosphorus, or protein  Your provider may also recommend vitamin or mineral supplements  Do not take any supplements without talking to your provider  A dietitian can help you plan meals if needed  Ask how much liquid to drink each day and which liquids are best for you  · Limit sodium (salt) as directed  You may need to limit sodium to less than 2,300 milligrams (mg) each day  Ask your dietitian or healthcare provider how much sodium you can have each day  The amount depends on your stage of kidney disease  Table salt, canned foods, soups, salted snacks, and processed meats, like deli meats and sausage, are high in sodium  Your provider or a dietitian can show you how to read food labels for sodium  · Limit alcohol as directed  Alcohol can cause fluid retention and can affect your kidneys  Ask how much alcohol is safe for you  A drink of alcohol is 12 ounces of beer, 5 ounces of wine, or 1½ ounces of liquor  · Do not smoke  Nicotine and other chemicals in cigarettes and cigars can cause kidney damage  Ask your provider for information if you currently smoke and need help to quit  E-cigarettes or smokeless tobacco still contain nicotine  Talk to your provider before you use these products  · Ask about over-the-counter medicines  Medicines such as NSAIDs and laxatives may harm your kidneys  Some cough and cold medicines can raise your blood pressure  Always ask if a medicine is safe before you take it  · Ask about vaccines you may need  CKD can increase your risk for infections such as pneumonia, influenza, and hepatitis  Vaccines lower your risk for infection  Your healthcare provider will tell you which vaccines you need and when to get them  Follow up with your doctor or nephrologist as directed: You will need to return for tests to monitor your kidney and nerve function, and your parathyroid hormone level   Your medicines may be changed, based on certain test results  Write down your questions so you remember to ask them during your visits  © Copyright [employer] 2022 Information is for End User's use only and may not be sold, redistributed or otherwise used for commercial purposes  All illustrations and images included in CareNotes® are the copyrighted property of [employer]  or Miguel Devlin  The above information is an  only  It is not intended as medical advice for individual conditions or treatments  Talk to your doctor, nurse or pharmacist before following any medical regimen to see if it is safe and effective for you

## 2022-03-23 NOTE — DISCHARGE INSTR - AVS FIRST PAGE
Please weigh yourself daily  If your weight increases more than 2-3 lb in 24 hours or more than 5 lb in 1 week, please call Cardiology immediately  Medication changes as listed below  Please get blood work on Wednesday with results to her cardiologist in kidney doctor  You need to follow-up with cardiology next week, please call the office on Monday to schedule appointment for late next week  [facility]

## 2022-04-11 NOTE — ASSESSMENT & PLAN NOTE
Lab Results   Component Value Date    EGFR 7 02/12/2022    EGFR 9 02/11/2022    EGFR 14 02/09/2022    CREATININE 5 21 (H) 02/12/2022    CREATININE 4 55 (H) 02/11/2022    CREATININE 3 18 (H) 02/09/2022     · Baseline creatinine around 3 per prior notes however most recent labs indicate she has been around 3 1-3 4, Estimated GFR around 15-20, stage 4  · Reports she follows with Dr Shireen Harmon  · With KELLY in the setting of vomiting/diarrhea and Bumex use  · Nephrology consult appreciated   · Bumex discontinued   · Miralax/colace discontinued  · Continue IVF  · Creatinine worsened, 5 21 today  · Daughter requesting call from Nephrologist today - made them aware   · Avoid nephrotoxins and hypotension  · BMP monitoring Patient/Caregiver provided printed discharge information.

## 2022-04-27 ENCOUNTER — OFFICE VISIT (OUTPATIENT)
Dept: OBGYN CLINIC | Facility: CLINIC | Age: 67
End: 2022-04-27
Payer: COMMERCIAL

## 2022-04-27 VITALS
HEIGHT: 62 IN | SYSTOLIC BLOOD PRESSURE: 120 MMHG | BODY MASS INDEX: 28.01 KG/M2 | WEIGHT: 152.2 LBS | DIASTOLIC BLOOD PRESSURE: 74 MMHG

## 2022-04-27 DIAGNOSIS — Z12.11 SCREEN FOR COLON CANCER: ICD-10-CM

## 2022-04-27 DIAGNOSIS — Z13.820 OSTEOPOROSIS SCREENING: ICD-10-CM

## 2022-04-27 DIAGNOSIS — N95.1 POSTMENOPAUSAL DISORDER: ICD-10-CM

## 2022-04-27 DIAGNOSIS — Z01.419 ENCNTR FOR GYN EXAM (GENERAL) (ROUTINE) W/O ABN FINDINGS: Primary | ICD-10-CM

## 2022-04-27 DIAGNOSIS — Z12.31 ENCOUNTER FOR SCREENING MAMMOGRAM FOR MALIGNANT NEOPLASM OF BREAST: ICD-10-CM

## 2022-04-27 PROBLEM — H66.90 ACUTE OTITIS MEDIA: Status: RESOLVED | Noted: 2021-03-05 | Resolved: 2022-04-27

## 2022-04-27 PROBLEM — E11.29 MICROALBUMINURIA DUE TO TYPE 2 DIABETES MELLITUS (HCC): Status: ACTIVE | Noted: 2020-03-21

## 2022-04-27 PROBLEM — R80.9 MICROALBUMINURIA DUE TO TYPE 2 DIABETES MELLITUS (HCC): Status: ACTIVE | Noted: 2020-03-21

## 2022-04-27 PROBLEM — E11.69 HYPERLIPIDEMIA ASSOCIATED WITH TYPE 2 DIABETES MELLITUS (HCC): Status: ACTIVE | Noted: 2020-03-17

## 2022-04-27 PROBLEM — E78.5 HYPERLIPIDEMIA ASSOCIATED WITH TYPE 2 DIABETES MELLITUS (HCC): Status: ACTIVE | Noted: 2020-03-17

## 2022-04-27 PROCEDURE — S0610 ANNUAL GYNECOLOGICAL EXAMINA: HCPCS | Performed by: PHYSICIAN ASSISTANT

## 2022-04-27 RX ORDER — LEVOTHYROXINE SODIUM 0.12 MG/1
125 TABLET ORAL DAILY
COMMUNITY
Start: 2022-04-11

## 2022-04-27 RX ORDER — LANCETS 33 GAUGE
EACH MISCELLANEOUS
COMMUNITY
Start: 2022-03-30

## 2022-04-27 RX ORDER — BLOOD SUGAR DIAGNOSTIC
STRIP MISCELLANEOUS
COMMUNITY
Start: 2022-03-30

## 2022-04-27 NOTE — PROGRESS NOTES
Abida Valdez   1955    CC:  Yearly exam    S:  75 y o  female here for yearly exam  She is postmenopausal since around age 53 and has had no vaginal bleeding  She denies vaginal discharge, itching, odor or dryness  Sexual activity: She is not sexually active due to no interest       Last Pap: 7/21/17 neg  Last Mammo:  Many years ago per pt (no records available)   Last Colonoscopy: never  Last DEXA: never    We reviewed ASCCP guidelines for Pap testing  She has no history of abnormal Pap        Family hx of breast cancer: no  Family hx of ovarian cancer: no  Family hx of colon cancer: no      Current Outpatient Medications:     amLODIPine (NORVASC) 10 mg tablet, Take 1 tablet (10 mg total) by mouth daily, Disp: 180 tablet, Rfl: 0    aspirin (RA Aspirin Adult Low Dose) 81 mg chewable tablet, Chew 1 tablet (81 mg total) daily, Disp: 90 tablet, Rfl: 1    atorvastatin (LIPITOR) 40 mg tablet, Take 2 tablets (80 mg total) by mouth daily, Disp: 90 tablet, Rfl: 3    bumetanide (BUMEX) 2 mg tablet, Take 1 tablet (2 mg total) by mouth 2 (two) times a day Start bumex 1 mg BID from Monday 2/21/22,, Disp: 60 tablet, Rfl: 0    carvedilol (COREG) 12 5 mg tablet, Take 1 tablet (12 5 mg total) by mouth 2 (two) times a day with meals, Disp: 180 tablet, Rfl: 3    Droplet Pen Needles 32G X 4 MM MISC, 2 (two) times a day, Disp: , Rfl:     hydrALAZINE (APRESOLINE) 100 MG tablet, Take 1 tablet (100 mg total) by mouth 3 (three) times a day, Disp: 270 tablet, Rfl: 3    insulin aspart protamine-insulin aspart (NovoLOG 70/30) 100 units/mL injection, As per your current blood sugars, take 15 units before breakfast, 10 units before dinner, Disp: , Rfl: 0    isosorbide mononitrate (IMDUR) 120 mg 24 hr tablet, Take 1 tablet (120 mg total) by mouth daily, Disp: 90 tablet, Rfl: 3    Lancets (OneTouch Delica Plus TZFTKI15M) MISC, use to TEST BLOOD SUGAR three times a day as directed, Disp: , Rfl:     levothyroxine 125 mcg tablet, Take 125 mcg by mouth daily, Disp: , Rfl:     OneTouch Verio test strip, use to TEST BLOOD SUGAR three times a day as directed, Disp: , Rfl:   Social History     Socioeconomic History    Marital status: /Civil Union     Spouse name: Not on file    Number of children: 3    Years of education: Not on file    Highest education level: Not on file   Occupational History    Occupation: part time   Tobacco Use    Smoking status: Former Smoker    Smokeless tobacco: Never Used   Vaping Use    Vaping Use: Never used   Substance and Sexual Activity    Alcohol use: Yes     Comment: occ    Drug use: No    Sexual activity: Not Currently     Partners: Male   Other Topics Concern    Not on file   Social History Narrative    Always uses seatbelt    Caffeine use    Daily coffee consumption: 1 cp daily    Feels safe at home    Lives with spouse     Social Determinants of Health     Financial Resource Strain: Not on file   Food Insecurity: Not on file   Transportation Needs: Not on file   Physical Activity: Not on file   Stress: Not on file   Social Connections: Not on file   Intimate Partner Violence: Not on file   Housing Stability: Not on file     Family History   Problem Relation Age of Onset    Diabetes Mother     Heart attack Father         MI    Hypertension Brother      Past Medical History:   Diagnosis Date    Anemia     [facility]congest[facility] failure) ([facility] )     Chronic kidney disease     Coronary artery disease     Diabetes mellitus ([facility] )     Hypertension     Hypothyroidism         Review of Systems   Respiratory: Negative  Cardiovascular: Negative  Gastrointestinal: Negative for constipation and diarrhea  Genitourinary: Negative for difficulty urinating, pelvic pain, vaginal bleeding, vaginal discharge, itching or odor  O:  Blood pressure 120/74, height 5' 1 81" (1 57 m), weight 69 kg (152 lb 3 2 oz), currently breastfeeding      Patient appears well and is not in distress  Neck is supple without masses  Breasts are symmetrical without mass, tenderness, nipple discharge, skin changes or adenopathy  Abdomen is soft and nontender without masses  External genitals are normal without lesions or rashes  Urethral meatus and urethra are normal  Bladder is normal to palpation  Vagina is normal without discharge or bleeding  Cervix is normal without discharge or lesion  Uterus is normal, mobile, nontender without palpable mass  Adnexa are normal, nontender, without palpable mass  A:  Yearly exam      P:   Pap no longer indicated  Mammo slip given, encouraged to schedule   Colonoscopy recommended, referral placed   DEXA due, slip given     RTO one year for yearly exam or sooner as needed

## 2022-04-28 ENCOUNTER — APPOINTMENT (OUTPATIENT)
Dept: LAB | Facility: HOSPITAL | Age: 67
End: 2022-04-28
Attending: INTERNAL MEDICINE
Payer: COMMERCIAL

## 2022-04-28 ENCOUNTER — TELEPHONE (OUTPATIENT)
Dept: NEPHROLOGY | Facility: CLINIC | Age: 67
End: 2022-04-28

## 2022-04-28 DIAGNOSIS — N18.4 STAGE 4 CHRONIC KIDNEY DISEASE (HCC): Primary | ICD-10-CM

## 2022-04-28 DIAGNOSIS — N18.4 CKD (CHRONIC KIDNEY DISEASE) STAGE 4, GFR 15-29 ML/MIN (HCC): ICD-10-CM

## 2022-04-28 DIAGNOSIS — I50.32 CHRONIC HEART FAILURE WITH PRESERVED EJECTION FRACTION (HCC): ICD-10-CM

## 2022-04-28 LAB
ALBUMIN SERPL BCP-MCNC: 3.4 G/DL (ref 3.5–5)
ALP SERPL-CCNC: 453 U/L (ref 46–116)
ALT SERPL W P-5'-P-CCNC: 51 U/L (ref 12–78)
ANION GAP SERPL CALCULATED.3IONS-SCNC: 7 MMOL/L (ref 4–13)
AST SERPL W P-5'-P-CCNC: 40 U/L (ref 5–45)
BACTERIA UR QL AUTO: ABNORMAL /HPF
BILIRUB SERPL-MCNC: 0.46 MG/DL (ref 0.2–1)
BILIRUB UR QL STRIP: NEGATIVE
BUN SERPL-MCNC: 65 MG/DL (ref 5–25)
CALCIUM ALBUM COR SERPL-MCNC: 9.6 MG/DL (ref 8.3–10.1)
CALCIUM SERPL-MCNC: 9.1 MG/DL (ref 8.3–10.1)
CHLORIDE SERPL-SCNC: 104 MMOL/L (ref 100–108)
CLARITY UR: CLEAR
CO2 SERPL-SCNC: 26 MMOL/L (ref 21–32)
COLOR UR: ABNORMAL
CREAT SERPL-MCNC: 2.95 MG/DL (ref 0.6–1.3)
CREAT UR-MCNC: 114 MG/DL
GFR SERPL CREATININE-BSD FRML MDRD: 15 ML/MIN/1.73SQ M
GLUCOSE P FAST SERPL-MCNC: 147 MG/DL (ref 65–99)
GLUCOSE UR STRIP-MCNC: NEGATIVE MG/DL
HGB UR QL STRIP.AUTO: NEGATIVE
KETONES UR STRIP-MCNC: NEGATIVE MG/DL
LEUKOCYTE ESTERASE UR QL STRIP: ABNORMAL
MAGNESIUM SERPL-MCNC: 2.1 MG/DL (ref 1.6–2.6)
NITRITE UR QL STRIP: NEGATIVE
NON-SQ EPI CELLS URNS QL MICRO: ABNORMAL /HPF
PH UR STRIP.AUTO: 6.5 [PH]
PHOSPHATE SERPL-MCNC: 4.1 MG/DL (ref 2.3–4.1)
POTASSIUM SERPL-SCNC: 4.6 MMOL/L (ref 3.5–5.3)
PROT SERPL-MCNC: 7.7 G/DL (ref 6.4–8.2)
PROT UR STRIP-MCNC: ABNORMAL MG/DL
PROT UR-MCNC: 175 MG/DL
PROT/CREAT UR: 1.54 MG/G{CREAT} (ref 0–0.1)
PTH-INTACT SERPL-MCNC: 144.6 PG/ML (ref 18.4–80.1)
RBC #/AREA URNS AUTO: ABNORMAL /HPF
SODIUM SERPL-SCNC: 137 MMOL/L (ref 136–145)
SP GR UR STRIP.AUTO: 1.01 (ref 1–1.03)
UROBILINOGEN UR STRIP-ACNC: <2 MG/DL
WBC #/AREA URNS AUTO: ABNORMAL /HPF

## 2022-04-28 PROCEDURE — 82570 ASSAY OF URINE CREATININE: CPT

## 2022-04-28 PROCEDURE — 83970 ASSAY OF PARATHORMONE: CPT

## 2022-04-28 PROCEDURE — 80053 COMPREHEN METABOLIC PANEL: CPT

## 2022-04-28 PROCEDURE — 81001 URINALYSIS AUTO W/SCOPE: CPT

## 2022-04-28 PROCEDURE — 84100 ASSAY OF PHOSPHORUS: CPT

## 2022-04-28 PROCEDURE — 36415 COLL VENOUS BLD VENIPUNCTURE: CPT

## 2022-04-28 PROCEDURE — 84156 ASSAY OF PROTEIN URINE: CPT

## 2022-04-28 PROCEDURE — 83735 ASSAY OF MAGNESIUM: CPT

## 2022-04-28 NOTE — TELEPHONE ENCOUNTER
----- Message from Miguelina Daniel DO sent at 4/28/2022  2:33 PM EDT -----  Creatinine up slightly to 2 95  If her weight is stable have her go to Bumex 2 mg daily    If her weight is increased and she is having edema let me know and I will discuss with her

## 2022-05-12 ENCOUNTER — TELEPHONE (OUTPATIENT)
Dept: NEPHROLOGY | Facility: CLINIC | Age: 67
End: 2022-05-12

## 2022-05-27 ENCOUNTER — TELEPHONE (OUTPATIENT)
Dept: NEPHROLOGY | Facility: CLINIC | Age: 67
End: 2022-05-27

## 2022-05-27 ENCOUNTER — APPOINTMENT (OUTPATIENT)
Dept: LAB | Facility: HOSPITAL | Age: 67
End: 2022-05-27
Attending: INTERNAL MEDICINE
Payer: COMMERCIAL

## 2022-05-27 DIAGNOSIS — E11.69 HYPERLIPIDEMIA ASSOCIATED WITH TYPE 2 DIABETES MELLITUS (HCC): ICD-10-CM

## 2022-05-27 DIAGNOSIS — N18.4 STAGE 4 CHRONIC KIDNEY DISEASE (HCC): ICD-10-CM

## 2022-05-27 DIAGNOSIS — E78.5 HYPERLIPIDEMIA ASSOCIATED WITH TYPE 2 DIABETES MELLITUS (HCC): ICD-10-CM

## 2022-05-27 DIAGNOSIS — E11.65 TYPE 2 DIABETES MELLITUS WITH HYPERGLYCEMIA, WITHOUT LONG-TERM CURRENT USE OF INSULIN (HCC): ICD-10-CM

## 2022-05-27 LAB
ANION GAP SERPL CALCULATED.3IONS-SCNC: 6 MMOL/L (ref 4–13)
BUN SERPL-MCNC: 54 MG/DL (ref 5–25)
CALCIUM SERPL-MCNC: 9.6 MG/DL (ref 8.3–10.1)
CHLORIDE SERPL-SCNC: 110 MMOL/L (ref 100–108)
CHOLEST SERPL-MCNC: 216 MG/DL
CO2 SERPL-SCNC: 23 MMOL/L (ref 21–32)
CREAT SERPL-MCNC: 2.96 MG/DL (ref 0.6–1.3)
GFR SERPL CREATININE-BSD FRML MDRD: 15 ML/MIN/1.73SQ M
GLUCOSE P FAST SERPL-MCNC: 118 MG/DL (ref 65–99)
HDLC SERPL-MCNC: 72 MG/DL
LDLC SERPL CALC-MCNC: 126 MG/DL (ref 0–100)
NONHDLC SERPL-MCNC: 144 MG/DL
POTASSIUM SERPL-SCNC: 4.6 MMOL/L (ref 3.5–5.3)
SODIUM SERPL-SCNC: 139 MMOL/L (ref 136–145)
TRIGL SERPL-MCNC: 91 MG/DL

## 2022-05-27 PROCEDURE — 80048 BASIC METABOLIC PNL TOTAL CA: CPT

## 2022-05-27 PROCEDURE — 80061 LIPID PANEL: CPT

## 2022-05-27 NOTE — TELEPHONE ENCOUNTER
Spoke with Patient and schedule appointment for 8/8 with Dr Saranya Hood in the [facility]

## 2022-06-02 ENCOUNTER — TELEPHONE (OUTPATIENT)
Dept: NEPHROLOGY | Facility: CLINIC | Age: 67
End: 2022-06-02

## 2022-06-02 NOTE — TELEPHONE ENCOUNTER
----- Message from Miguelina Daniel DO sent at 6/1/2022  3:53 PM EDT -----  Labs reviewed creatinine similar to the 1 month ago    If she is stable and feels well no changes to current medication

## 2022-06-02 NOTE — TELEPHONE ENCOUNTER
Left a detailed voice mail informing pt of lab results per Dr Milagros Rucker  I requested a c/b to ensure patient is feeling stable at this time

## 2022-06-09 DIAGNOSIS — I50.32 CHRONIC HEART FAILURE WITH PRESERVED EJECTION FRACTION (HCC): ICD-10-CM

## 2022-06-09 DIAGNOSIS — I10 HYPERTENSION, UNSPECIFIED TYPE: ICD-10-CM

## 2022-06-09 RX ORDER — AMLODIPINE BESYLATE 10 MG/1
10 TABLET ORAL DAILY
Qty: 90 TABLET | Refills: 3 | Status: SHIPPED | OUTPATIENT
Start: 2022-06-09

## 2022-06-09 RX ORDER — BUMETANIDE 2 MG/1
2 TABLET ORAL 2 TIMES DAILY
Qty: 60 TABLET | Refills: 0
Start: 2022-06-09

## 2022-06-09 RX ORDER — BUMETANIDE 2 MG/1
2 TABLET ORAL 2 TIMES DAILY
Qty: 180 TABLET | Refills: 3 | Status: SHIPPED | OUTPATIENT
Start: 2022-06-09

## 2022-06-09 RX ORDER — HYDRALAZINE HYDROCHLORIDE 100 MG/1
100 TABLET, FILM COATED ORAL 3 TIMES DAILY
Qty: 270 TABLET | Refills: 0 | Status: SHIPPED | OUTPATIENT
Start: 2022-06-09

## 2022-06-09 NOTE — TELEPHONE ENCOUNTER
Requested medication(s) are due for refill today: Yes  Patient has already received a courtesy refill: No         Other reason request has been forwarded to provider: per protocol

## 2022-06-09 NOTE — TELEPHONE ENCOUNTER
----- Message from Markell Morse sent at 6/9/2022 12:30 PM EDT -----  Regarding: Prescription Refill  Please send  a refill to the pharmacy for my medication amlodipine 10 mg

## 2022-08-05 ENCOUNTER — TELEPHONE (OUTPATIENT)
Dept: NEPHROLOGY | Facility: CLINIC | Age: 67
End: 2022-08-05

## 2022-08-05 NOTE — TELEPHONE ENCOUNTER
Appointment Confirmation   Person confirmed appointment with  If not patient, name of the person Voice msg    Date and time of appointment 8/8  9:30    Patient acknowledged and will be at appointment? no    Did you advise the patient that they will need a urine sample if they are a new patient?  N/A    Did you advise the patient to bring their current medications for verification? (including any OTC) Yes    Additional Information

## 2022-08-08 ENCOUNTER — OFFICE VISIT (OUTPATIENT)
Dept: NEPHROLOGY | Facility: CLINIC | Age: 67
End: 2022-08-08
Payer: COMMERCIAL

## 2022-08-08 VITALS
BODY MASS INDEX: 28.71 KG/M2 | SYSTOLIC BLOOD PRESSURE: 162 MMHG | HEIGHT: 62 IN | HEART RATE: 70 BPM | DIASTOLIC BLOOD PRESSURE: 70 MMHG | WEIGHT: 156 LBS | OXYGEN SATURATION: 98 %

## 2022-08-08 DIAGNOSIS — R80.1 PERSISTENT PROTEINURIA: ICD-10-CM

## 2022-08-08 DIAGNOSIS — N25.81 SECONDARY HYPERPARATHYROIDISM (HCC): ICD-10-CM

## 2022-08-08 DIAGNOSIS — N18.4 STAGE 4 CHRONIC KIDNEY DISEASE (HCC): ICD-10-CM

## 2022-08-08 DIAGNOSIS — I50.32 CHRONIC DIASTOLIC HEART FAILURE (HCC): ICD-10-CM

## 2022-08-08 DIAGNOSIS — Z79.4 TYPE 2 DIABETES MELLITUS WITH CHRONIC KIDNEY DISEASE, WITH LONG-TERM CURRENT USE OF INSULIN, UNSPECIFIED CKD STAGE (HCC): Primary | ICD-10-CM

## 2022-08-08 DIAGNOSIS — E11.22 TYPE 2 DIABETES MELLITUS WITH CHRONIC KIDNEY DISEASE, WITH LONG-TERM CURRENT USE OF INSULIN, UNSPECIFIED CKD STAGE (HCC): Primary | ICD-10-CM

## 2022-08-08 PROCEDURE — 99214 OFFICE O/P EST MOD 30 MIN: CPT | Performed by: INTERNAL MEDICINE

## 2022-08-08 RX ORDER — MELATONIN
2000 DAILY
Qty: 180 TABLET | Refills: 3 | Status: SHIPPED | OUTPATIENT
Start: 2022-08-08

## 2022-08-08 NOTE — LETTER
August 8, 2022     Laine Oppenheim, MD  [address]  [address] [place] [address]    Patient: Brent Rodriguez   YOB: 1955   Date of Visit: 8/8/2022       Dear Dr Jesus Ruggiero Recipients: Thank you for referring Brent Rodriguez to me for evaluation  Below are my notes for this consultation  If you have questions, please do not hesitate to call me  I look forward to following your patient along with you  Sincerely,        Pascale Trevino DO        CC: No Recipients  Pascale Trevino DO  8/8/2022  9:43 AM  Sign when Signing Visit  OFFICE FOLLOW UP - Nephrology   Brent Rodriguez 75 y o  female MRN: [id number]    Encounter: [id number]        ASSESSMENT  Diagnoses and all orders for this visit:    74-year-old female with past medical history type 2 diabetes, hypertension who presented with a type 2 NSTEMI, coronary artery disease requiring PCI complicated by acute kidney injury on chronic kidney disease    KELLY (acute kidney injury) ([address] )  Stage 4 chronic kidney disease ([address] )  Coronary artery disease involving native coronary artery of native heart with angina pectoris ([address] )  Acute diastolic congestive heart failure ([address] )  Essential hypertension  Type 2 myocardial infarction ([facility] )      DISCUSSION & PLAN    1  Acute kidney injury-recently very severe has had multiple episodes of acute kidney injury this was in the setting of diuresis, diarrhea nausea vomiting COVID infection likely pre renal azotemia that had converted to ATN  -her peak creatinine was 7 3 with a BUN of 103  -fortunately her creatinine have improved and are stable    2   Stage 4 chronic kidney disease  -her creatinine is below her initial baseline this could be related to some muscle mass loss at this point will monitor  -continue cardiovascular risk reduction measures-statin therapy, glycemic control blood pressure controlled her blood pressure is elevated today in the office but home blood pressure readings are 550-771 systolic  -we revisited goals of care again today previously she had stated and has consistently stated she would not want renal replacement therapy if needed to stay alive  she is 75years old and has a decent performance status and quality of life I discussed potential transplant evaluation as well  She was open to this however  I discussed that she may need to bridge to transplant if she is a candidate with dialysis and she was interested in getting more information  Therefore at this point will plan on CKD education and renal transplant evaluation in the setting of her cardiac history benefits and risks will need to be evaluated by [address] repeat blood work now as her last blood work was in May and again in 3 months    3  Coronary artery disease with diastolic congestive heart failure  -follows with Heart failure team regularly    4  Essential hypertension in the setting of diastolic heart failure  -blood pressures remain elevated  -blood pressures are stable  -carvedilol 12 5 mg 2 times daily  -Bumex 2 mg twice daily  -amlodipine 10 mg daily  -120 mg of Imdur daily  -hydralazine 100 mg 3 times daily    5  diastolic congestive heart failure  -following up with Cardiology  -continue blood pressure control, on a beta-blocker, on Imdur , hydralazine not on RAAS inhibition, multiple episodes of acute kidney injury and hyperkalemia  -currently on a statin    6  Type 2 diabetes mellitus  -continue glycemic control     7  Vitamin-D deficiency  -PTH acceptable    8  Proteinuria  -proteinuria now stabilized  -monitor longstanding history of diabetes, with worsening blood sugars  -now with high chance of progression    9   Anemia in the setting of chronic kidney disease  -does follow with Hematology last hemoglobin was 7 8  -did require blood transfusion in the hospital  -will repeat BMP in 1 month to ensure stability  -was on Aranesp and Venofer as an outpatient previously    Continue follow-up in 3-4 months    HPI: Jeff Lyons is a 75 y o  female who is here for follow-up  She has a past medical history of type 2 diabetes mellitus and hypertension diastolic congestive heart failure    She appears compensated from a volume standpoint and a heart failure standpoint her most recent blood work shows that her creatinine is stable at 2 96 she currently denies any chest pain or shortness of breath no fevers or chills nausea vomiting diarrhea constipation no foamy or bloody urine    ROS:   All the systems were reviewed and were negative except as documented on the HPI  Allergies:  Iv contrast [iodinated diagnostic agents] and Nifedipine    Medications:   Current Outpatient Medications:     amLODIPine (NORVASC) 10 mg tablet, Take 1 tablet (10 mg total) by mouth daily, Disp: 90 tablet, Rfl: 3    aspirin (RA Aspirin Adult Low Dose) 81 mg chewable tablet, Chew 1 tablet (81 mg total) daily, Disp: 90 tablet, Rfl: 1    atorvastatin (LIPITOR) 40 mg tablet, Take 2 tablets (80 mg total) by mouth daily, Disp: 90 tablet, Rfl: 3    bumetanide (BUMEX) 2 mg tablet, Take 1 tablet (2 mg total) by mouth 2 (two) times a day Start bumex 1 mg BID from Monday 2/21/22,, Disp: 60 tablet, Rfl: 0    carvedilol (COREG) 12 5 mg tablet, Take 1 tablet (12 5 mg total) by mouth 2 (two) times a day with meals, Disp: 180 tablet, Rfl: 3    cholecalciferol (VITAMIN D3) 1,000 units tablet, Take 2 tablets (2,000 Units total) by mouth daily, Disp: 180 tablet, Rfl: 3    Droplet Pen Needles 32G X 4 MM MISC, 2 (two) times a day, Disp: , Rfl:     hydrALAZINE (APRESOLINE) 100 MG tablet, Take 1 tablet (100 mg total) by mouth 3 (three) times a day, Disp: 270 tablet, Rfl: 0    insulin aspart protamine-insulin aspart (NovoLOG 70/30) 100 units/mL injection, As per your current blood sugars, take 15 units before breakfast, 10 units before dinner, Disp: , Rfl: 0    isosorbide mononitrate (IMDUR) 120 mg 24 hr tablet, Take 1 tablet (120 mg total) by mouth daily, Disp: 90 tablet, Rfl: 3    Lancets (OneTouch Delica Plus [id number]) MISC, use to TEST BLOOD SUGAR three times a day as directed, Disp: , Rfl:     levothyroxine 125 mcg tablet, Take 125 mcg by mouth daily, Disp: , Rfl:     OneTouch Verio test strip, use to TEST BLOOD SUGAR three times a day as directed, Disp: , Rfl:     bumetanide (BUMEX) 2 mg tablet, Take 1 tablet (2 mg total) by mouth 2 (two) times a day Start bumex 1 mg BID from Monday 2/21/22, (Patient not taking: Reported on 8/8/2022), Disp: 180 tablet, Rfl: 3    Past Medical History:   Diagnosis Date    Anemia     [facility]est[facility]     Chronic kidney disease     Coronary artery disease     Diabetes mellitus [facility] )     Hypertension     Hypothyroidism      Past Surgical History:   Procedure Laterality Date    CORONARY ANGIOPLASTY WITH STENT PLACEMENT  2019     Family History   Problem Relation Age of Onset    Diabetes Mother     Heart attack Father         MI    Hypertension Brother       reports that she has quit smoking  She has never used smokeless tobacco  She reports current alcohol use  She reports that she does not use drugs  Physical Exam:   Vitals:    08/08/22 0916   BP: 162/70   BP Location: Left arm   Patient Position: Sitting   Cuff Size: Adult   Pulse: 70   SpO2: 98%   Weight: 70 8 kg (156 lb)   Height: 5' 2" (1 575 m)     Body mass index is 28 53 kg/m²     Repeat blood pressure on the right arm was 155 over 72  General: conscious, cooperative, in no acute distress  Eyes: conjunctivae pink, anicteric sclerae  ENT: lips and mucous membranes moist  Neck: supple, no JVD  Chest: clear breath sounds bilateral, no crackles, ronchus or wheezings  CVS: normal rate, regular rhythm  Abdomen: soft, non-tender, non-distended, normoactive bowel sounds  Extremities:  Trace edema  Skin: no rash  Neuro: awake, alert, oriented  Psych:  Pleasant affect    Lab Results:    Results for orders placed or performed in visit on 31/17/46   Basic metabolic panel   Result Value Ref Range    Sodium 139 136 - 145 mmol/L    Potassium 4 6 3 5 - 5 3 mmol/L    Chloride 110 (H) 100 - 108 mmol/L    CO2 23 21 - 32 mmol/L    ANION GAP 6 4 - 13 mmol/L    BUN 54 (H) 5 - 25 mg/dL    Creatinine 2 96 (H) 0 60 - 1 30 mg/dL    Glucose, Fasting 118 (H) 65 - 99 mg/dL    Calcium 9 6 8 3 - 10 1 mg/dL    eGFR 15 ml/min/1 73sq m   Lipid panel   Result Value Ref Range    Cholesterol 216 (H) See Comment mg/dL    Triglycerides 91 See Comment mg/dL    HDL, Direct 72 >=50 mg/dL    LDL Calculated 126 (H) 0 - 100 mg/dL    Non-HDL-Chol (CHOL-HDL) 144 mg/dl       Portions of the record may have been created with voice recognition software  Occasional wrong word or "sound a like" substitutions may have occurred due to the inherent limitations of voice recognition software  Read the chart carefully and recognize, using context, where substitutions have occurred  If you have any questions, please contact the dictating provider

## 2022-08-08 NOTE — PATIENT INSTRUCTIONS
Low-Sodium Diet   WHAT YOU NEED TO KNOW:   What is a low-sodium diet? A low-sodium diet limits foods that are high in sodium (salt)  You will need to follow a low-sodium diet if you have high blood pressure, kidney disease, or heart failure  You may also need to follow this diet if you have a condition that is causing your body to retain (hold) extra fluid  You may need to limit the amount of sodium you eat in a day to 1,500 to 2,000 mg  Ask your healthcare provider how much sodium you can have each day  How can I use food labels to choose foods that are low in sodium? Read food labels to find the amount of sodium they contain  The amount of sodium is listed in milligrams (mg)  The % Daily Value (DV) column tells you how much of your daily needs are met by 1 serving of the food for each nutrient listed  Choose foods that have less than 5% of the DV of sodium  These foods are considered low in sodium  Foods that have 20% or more of the DV of sodium are considered high in sodium  Some food labels may also list any of the following terms that tell you about the sodium content in the food:  Sodium-free:  Less than 5 mg in each serving    Very low sodium:  35 mg of sodium or less in each serving    Low sodium:  140 mg of sodium or less in each serving    Reduced sodium: At least 25% less sodium in each serving than the regular type    Light in sodium:  50% less sodium in each serving    Unsalted or no added salt:  No extra salt is added during processing (the food may still contain sodium)       Which foods should I avoid? Salty foods are high in sodium   You should avoid the following:  Processed foods:      Mixes for cornbread, biscuits, cake, and pudding     Instant foods, such as potatoes, cereals, noodles, and rice     Packaged foods, such as bread stuffing, rice and pasta mixes, snack dip mixes, and macaroni and cheese     Canned foods, such as canned vegetables, soups, broths, sauces, and vegetable or tomato juice    Snack foods, such as salted chips, popcorn, pretzels, pork rinds, salted crackers, and salted nuts    Frozen foods, such as dinners, entrees, vegetables with sauces, and breaded meats    Sauerkraut, pickled vegetables, and other foods prepared in brine    Meats and cheeses:      Smoked or cured meat, such as corned beef, gagnon, ham, hot dogs, and sausage    Canned meats or spreads, such as potted meats, sardines, anchovies, and imitation seafood    Deli or lunch meats, such as bologna, ham, turkey, and roast beef    Processed cheese, such as American cheese and cheese spreads    Condiments, sauces, and seasonings:      Salt (¼ teaspoon of salt contains 575 mg of sodium)    Seasonings made with salt, such as garlic salt, celery salt, onion salt, and seasoned salt    Regular soy sauce, barbecue sauce, teriyaki sauce, steak sauce, Worcestershire sauce, and most flavored vinegars    Canned gravy and mixes     Regular condiments, such as mustard, ketchup, and salad dressings    Pickles and olives    Meat tenderizers and monosodium glutamate (MSG)    Which foods can I include? Read the food label to find the amount of sodium in each serving  Bread and cereal:  Try to choose breads with less than 80 mg of sodium per serving  Bread, roll, navid, tortilla, or unsalted crackers  Ready-to-eat cereals with less than 5% DV of sodium (examples include shredded wheat and puffed rice)    Pasta    Vegetables and fruits:      Unsalted fresh, frozen, or canned vegetables    Fresh, frozen, or canned fruits    Fruit juice    Dairy:  One serving has about 150 mg of sodium  Milk, all types    Yogurt    Hard cheese, such as cheddar, Swiss, [employer], or [employer] and other protein foods:  Some raw meats may have added sodium       Plain meats, fish, and poultry     Egg    Other foods:      Homemade pudding    Unsalted nuts, popcorn, or pretzels    Unsalted butter or margarine    What are some ways that I can decrease sodium? Add spices and herbs to foods instead of salt during cooking  Use salt-free seasonings to add flavor to foods  Examples include onion powder, garlic powder, basil, multani powder, paprika, and parsley  Try lemon or lime juice or vinegar to give foods a tart flavor  Use hot peppers, pepper, or cayenne pepper to add a spicy flavor  Do not keep a salt shaker at your kitchen table  This may help keep you from adding salt to food at the table  A teaspoon of salt has 2,300 mg of sodium  It may take time to get used to enjoying the natural flavor of food instead of adding salt  Talk to your healthcare provider before you use salt substitutes  Some salt substitutes have a high amount of potassium and need to be avoided if you have kidney disease  Choose low-sodium foods at restaurants  Meals from restaurants are often high in sodium  Some restaurants have nutrition information on the menu that tells you the amount of sodium in their foods  If possible, ask for your food to be prepared with less, or no salt  Shop for unsalted or low-sodium foods and snacks at the grocery store  Examples include unsalted or low-sodium broths, soups, and canned vegetables  Choose fresh or frozen vegetables instead  Choose unsalted nuts or seeds or fresh fruits or vegetables as snacks  Read food labels and choose salt-free, very low-sodium, or low-sodium foods  CARE AGREEMENT:   You have the right to help plan your care  Discuss treatment options with your healthcare provider to decide what care you want to receive  You always have the right to refuse treatment  The above information is an  only  It is not intended as medical advice for individual conditions or treatments  Talk to your doctor, nurse or pharmacist before following any medical regimen to see if it is safe and effective for you    © Copyright [employer] 2022 Information is for End User's use only and may not be sold, redistributed or otherwise used for commercial purposes   All illustrations and images included in CareNotes® are the copyrighted property of A D A M , Inc  or [facility] Teja Devlin

## 2022-08-08 NOTE — PROGRESS NOTES
OFFICE FOLLOW UP - Nephrology   Milad Lake 75 y o  female MRN: [id number]    Encounter: [id number]        ASSESSMENT  Diagnoses and all orders for this visit:    19-year-old female with past medical history type 2 diabetes, hypertension who presented with a type 2 NSTEMI, coronary artery disease requiring PCI complicated by acute kidney injury on chronic kidney disease    KELLY (acute kidney injury) ([facility] )  Stage 4 chronic kidney disease ([facility] )  Coronary artery disease involving native coronary artery of native heart with angina pectoris ([facility] )  Acute diastolic congestive heart failure ([facility] )  Essential hypertension  Type 2 myocardial infarction ([facility] )      DISCUSSION & PLAN    1  Acute kidney injury-recently very severe has had multiple episodes of acute kidney injury this was in the setting of diuresis, diarrhea nausea vomiting COVID infection likely pre renal azotemia that had converted to ATN  -her peak creatinine was 7 3 with a BUN of 103  -fortunately her creatinine have improved and are stable    2  Stage 4 chronic kidney disease  -her creatinine is below her initial baseline this could be related to some muscle mass loss at this point will monitor  -continue cardiovascular risk reduction measures-statin therapy, glycemic control blood pressure controlled her blood pressure is elevated today in the office but home blood pressure readings are 755-944 systolic  -we revisited goals of care again today previously she had stated and has consistently stated she would not want renal replacement therapy if needed to stay alive  she is 75years old and has a decent performance status and quality of life I discussed potential transplant evaluation as well  She was open to this however  I discussed that she may need to bridge to transplant if she is a candidate with dialysis and she was interested in getting more information    Therefore at this point will plan on CKD education and renal transplant evaluation in the setting of her cardiac history benefits and risks will need to be evaluated by [address] repeat blood work now as her last blood work was in May and again in 3 months    3  Coronary artery disease with diastolic congestive heart failure  -follows with Heart failure team regularly    4  Essential hypertension in the setting of diastolic heart failure  -blood pressures remain elevated  -blood pressures are stable  -carvedilol 12 5 mg 2 times daily  -Bumex 2 mg twice daily  -amlodipine 10 mg daily  -120 mg of Imdur daily  -hydralazine 100 mg 3 times daily    5  diastolic congestive heart failure  -following up with Cardiology  -continue blood pressure control, on a beta-blocker, on Imdur , hydralazine not on RAAS inhibition, multiple episodes of acute kidney injury and hyperkalemia  -currently on a statin    6  Type 2 diabetes mellitus  -continue glycemic control     7  Vitamin-D deficiency  -PTH acceptable    8  Proteinuria  -proteinuria now stabilized  -monitor longstanding history of diabetes, with worsening blood sugars  -now with high chance of progression    9  Anemia in the setting of chronic kidney disease  -does follow with Hematology last hemoglobin was 7 8  -did require blood transfusion in the hospital  -will repeat BMP in 1 month to ensure stability  -was on Aranesp and Venofer as an outpatient previously    Continue follow-up in 3-4 months    HPI: Ed Mascorro is a 75 y o  female who is here for follow-up      She has a past medical history of type 2 diabetes mellitus and hypertension diastolic congestive heart failure    She appears compensated from a volume standpoint and a heart failure standpoint her most recent blood work shows that her creatinine is stable at 2 96 she currently denies any chest pain or shortness of breath no fevers or chills nausea vomiting diarrhea constipation no foamy or bloody urine    ROS:   All the systems were reviewed and were negative except as documented on the HPI  Allergies:  Iv contrast [iodinated diagnostic agents] and Nifedipine    Medications:   Current Outpatient Medications:     amLODIPine (NORVASC) 10 mg tablet, Take 1 tablet (10 mg total) by mouth daily, Disp: 90 tablet, Rfl: 3    aspirin (RA Aspirin Adult Low Dose) 81 mg chewable tablet, Chew 1 tablet (81 mg total) daily, Disp: 90 tablet, Rfl: 1    atorvastatin (LIPITOR) 40 mg tablet, Take 2 tablets (80 mg total) by mouth daily, Disp: 90 tablet, Rfl: 3    bumetanide (BUMEX) 2 mg tablet, Take 1 tablet (2 mg total) by mouth 2 (two) times a day Start bumex 1 mg BID from Monday 2/21/22,, Disp: 60 tablet, Rfl: 0    carvedilol (COREG) 12 5 mg tablet, Take 1 tablet (12 5 mg total) by mouth 2 (two) times a day with meals, Disp: 180 tablet, Rfl: 3    cholecalciferol (VITAMIN D3) 1,000 units tablet, Take 2 tablets (2,000 Units total) by mouth daily, Disp: 180 tablet, Rfl: 3    Droplet Pen Needles 32G X 4 MM MISC, 2 (two) times a day, Disp: , Rfl:     hydrALAZINE (APRESOLINE) 100 MG tablet, Take 1 tablet (100 mg total) by mouth 3 (three) times a day, Disp: 270 tablet, Rfl: 0    insulin aspart protamine-insulin aspart (NovoLOG 70/30) 100 units/mL injection, As per your current blood sugars, take 15 units before breakfast, 10 units before dinner, Disp: , Rfl: 0    isosorbide mononitrate (IMDUR) 120 mg 24 hr tablet, Take 1 tablet (120 mg total) by mouth daily, Disp: 90 tablet, Rfl: 3    Lancets (OneTouch Delica Plus [id number]) MISC, use to TEST BLOOD SUGAR three times a day as directed, Disp: , Rfl:     levothyroxine 125 mcg tablet, Take 125 mcg by mouth daily, Disp: , Rfl:     OneTouch Verio test strip, use to TEST BLOOD SUGAR three times a day as directed, Disp: , Rfl:     bumetanide (BUMEX) 2 mg tablet, Take 1 tablet (2 mg total) by mouth 2 (two) times a day Start bumex 1 mg BID from Monday 2/21/22, (Patient not taking: Reported on 8/8/2022), Disp: 180 tablet, Rfl: 3    Past Medical History:   Diagnosis Date    Anemia     CHF (congestive heart failure) (HCC)     Chronic kidney disease     Coronary artery disease     Diabetes mellitus ([address] )     Hypertension     Hypothyroidism      Past Surgical History:   Procedure Laterality Date    CORONARY ANGIOPLASTY WITH STENT PLACEMENT  2019     Family History   Problem Relation Age of Onset    Diabetes Mother     Heart attack Father         MI    Hypertension Brother       reports that she has quit smoking  She has never used smokeless tobacco  She reports current alcohol use  She reports that she does not use drugs  Physical Exam:   Vitals:    08/08/22 0916   BP: 162/70   BP Location: Left arm   Patient Position: Sitting   Cuff Size: Adult   Pulse: 70   SpO2: 98%   Weight: 70 8 kg (156 lb)   Height: 5' 2" (1 575 m)     Body mass index is 28 53 kg/m²     Repeat blood pressure on the right arm was 155 over 72  General: conscious, cooperative, in no acute distress  Eyes: conjunctivae pink, anicteric sclerae  ENT: lips and mucous membranes moist  Neck: supple, no JVD  Chest: clear breath sounds bilateral, no crackles, ronchus or wheezings  CVS: normal rate, regular rhythm  Abdomen: soft, non-tender, non-distended, normoactive bowel sounds  Extremities:  Trace edema  Skin: no rash  Neuro: awake, alert, oriented  Psych:  Pleasant affect    Lab Results:    Results for orders placed or performed in visit on 03/28/57   Basic metabolic panel   Result Value Ref Range    Sodium 139 136 - 145 mmol/L    Potassium 4 6 3 5 - 5 3 mmol/L    Chloride 110 (H) 100 - 108 mmol/L    CO2 23 21 - 32 mmol/L    ANION GAP 6 4 - 13 mmol/L    BUN 54 (H) 5 - 25 mg/dL    Creatinine 2 96 (H) 0 60 - 1 30 mg/dL    Glucose, Fasting 118 (H) 65 - 99 mg/dL    Calcium 9 6 8 3 - 10 1 mg/dL    eGFR 15 ml/min/1 73sq m   Lipid panel   Result Value Ref Range    Cholesterol 216 (H) See Comment mg/dL    Triglycerides 91 See Comment mg/dL    HDL, Direct 72 >=50 mg/dL    LDL Calculated 126 (H) 0 - 100 mg/dL Non-HDL-Chol (CHOL-HDL) 144 mg/dl       Portions of the record may have been created with voice recognition software  Occasional wrong word or "sound a like" substitutions may have occurred due to the inherent limitations of voice recognition software  Read the chart carefully and recognize, using context, where substitutions have occurred  If you have any questions, please contact the dictating provider

## 2022-08-17 ENCOUNTER — TELEPHONE (OUTPATIENT)
Dept: NEPHROLOGY | Facility: CLINIC | Age: 67
End: 2022-08-17

## 2022-08-17 NOTE — PROGRESS NOTES
Lm for the patient to call the office and verify if she will accept sooner appt on 8/8/22 at 11 am here in [place]  Asked that she have her provider information ready so we can also do a transplant intake form prior

## 2022-08-17 NOTE — TELEPHONE ENCOUNTER
----- Message from Óscar Rudolph MD sent at 8/17/2022 11:39 AM EDT -----  Hello    MA team - Can we have pt rescheduled with me at 11 am on sept 8th  Ok to open up one hour time slot   This is to facilitate sooner evaluation given pt is preemptive    Thanks    np  ----- Message -----  From: Susan Mansfield DO  Sent: 8/8/2022   9:44 AM EDT  To: Óscar Rudolph MD

## 2022-08-17 NOTE — TELEPHONE ENCOUNTER
Lm for the patient asking if she can cone in September 8th here in [place] for a sooner pre eval appointment with Dr Muller rather then [facility] scheduled visit  Advised she call back and have provider info ready to complete transplant eval form   - please send Alma pre-eval form once complete if she calls back  Thank you

## 2022-08-18 ENCOUNTER — TELEPHONE (OUTPATIENT)
Dept: NEPHROLOGY | Facility: CLINIC | Age: 67
End: 2022-08-18

## 2022-08-18 NOTE — TELEPHONE ENCOUNTER
Left message to offer the 9/8 appt at 11 am with Dr Jalen Blunt in [address] [place] [address]

## 2022-08-18 NOTE — TELEPHONE ENCOUNTER
Lm for the patient x2 offering her sept 8 appt at 11 am in our [place] for her transplant eval with Dr Crane  Message was left in [address]  and [address]

## 2022-08-18 NOTE — TELEPHONE ENCOUNTER
----- Message from Salvador Hernandez MD sent at 8/17/2022 11:39 AM EDT -----  Hello    MA team - Can we have pt rescheduled with me at 11 am on sept 8th  Ok to open up one hour time slot   This is to facilitate sooner evaluation given pt is preemptive    Thanks    np  ----- Message -----  From: Rico Mariscal DO  Sent: 8/8/2022   9:44 AM EDT  To: Salvador Hernandez MD

## 2022-08-19 NOTE — TELEPHONE ENCOUNTER
I have called the patient in 3 sperate occassions and have not been able to get a hold of [place]  Messages where left in [address]  and [address] so she could understand the reason for the call  Offering sooner Transplant eval on September 8th, 2022 at 11 Am in [place]

## 2022-08-22 ENCOUNTER — TELEPHONE (OUTPATIENT)
Dept: CARDIOLOGY CLINIC | Facility: CLINIC | Age: 67
End: 2022-08-22

## 2022-08-22 ENCOUNTER — TELEPHONE (OUTPATIENT)
Dept: NEPHROLOGY | Facility: CLINIC | Age: 67
End: 2022-08-22

## 2022-08-22 NOTE — TELEPHONE ENCOUNTER
P/C stated that about three weeks ago started vomiting, usually happen twice daily  Accompanied by stomach pains    Feel it is from hydrAlazine     Advised pt to call pcp, might not be related   But pt is insist that it is from the pill    HydrAlazine 100 mg one tablet three times qd  Amlodipine 10 mg one tablet two times qd  bumex 2 mg one tablet two times daily    Dr Luisa Tirado on vacation     Please advise

## 2022-08-22 NOTE — TELEPHONE ENCOUNTER
She has been on hydralazine for over a year  Unlikely to be vomiting twice daily from it now  F/u with PCP  No med changes from our end for the moment

## 2022-08-22 NOTE — TELEPHONE ENCOUNTER
Left message with patient that per Lisha Frank the patient needs to contact Dr Sidra Le office in regards to Hydralazine medication

## 2022-08-22 NOTE — TELEPHONE ENCOUNTER
Patient called and stated the Hydralazine medication is causing stomach pain and vomiting  I advised her to call Dr Britt's office and make him aware also because his name is on this medication  Patient stated she needs this medication for her high blood pressure but it is causing all these side effects  Patient also confirmed she is taking amlodipine

## 2022-08-24 NOTE — TELEPHONE ENCOUNTER
Left third message to offer sooner appt on 9/8 with Dr Almodovar Barrier  Asked for a call back to confirm and go over appt details

## 2022-09-02 DIAGNOSIS — I10 HYPERTENSION, UNSPECIFIED TYPE: ICD-10-CM

## 2022-09-02 RX ORDER — HYDRALAZINE HYDROCHLORIDE 100 MG/1
100 TABLET, FILM COATED ORAL 3 TIMES DAILY
Qty: 270 TABLET | Refills: 3 | Status: SHIPPED | OUTPATIENT
Start: 2022-09-02 | End: 2022-09-07 | Stop reason: SDUPTHER

## 2022-09-15 ENCOUNTER — APPOINTMENT (EMERGENCY)
Dept: RADIOLOGY | Facility: HOSPITAL | Age: 67
End: 2022-09-15
Payer: COMMERCIAL

## 2022-09-15 ENCOUNTER — APPOINTMENT (OUTPATIENT)
Dept: NON INVASIVE DIAGNOSTICS | Facility: HOSPITAL | Age: 67
End: 2022-09-15
Payer: COMMERCIAL

## 2022-09-15 ENCOUNTER — HOSPITAL ENCOUNTER (OUTPATIENT)
Facility: HOSPITAL | Age: 67
Setting detail: OBSERVATION
Discharge: HOME | End: 2022-09-16
Attending: EMERGENCY MEDICINE | Admitting: INTERNAL MEDICINE
Payer: COMMERCIAL

## 2022-09-15 ENCOUNTER — APPOINTMENT (OUTPATIENT)
Dept: RADIOLOGY | Facility: HOSPITAL | Age: 67
End: 2022-09-15
Payer: COMMERCIAL

## 2022-09-15 ENCOUNTER — TELEPHONE (OUTPATIENT)
Dept: RADIOLOGY | Facility: HOSPITAL | Age: 67
End: 2022-09-15

## 2022-09-15 DIAGNOSIS — H92.02 LEFT EAR PAIN: ICD-10-CM

## 2022-09-15 DIAGNOSIS — R29.898 WEAKNESS OF RIGHT LOWER EXTREMITY: ICD-10-CM

## 2022-09-15 DIAGNOSIS — R93.1 ABNORMAL ECHOCARDIOGRAM: ICD-10-CM

## 2022-09-15 DIAGNOSIS — R42 DIZZINESS: Primary | ICD-10-CM

## 2022-09-15 DIAGNOSIS — R29.90 STROKE-LIKE SYMPTOMS: ICD-10-CM

## 2022-09-15 LAB
2HR DELTA HS TROPONIN: -4 NG/L
ALBUMIN SERPL BCP-MCNC: 2.9 G/DL (ref 3.5–5)
ALP SERPL-CCNC: 422 U/L (ref 46–116)
ALT SERPL W P-5'-P-CCNC: 43 U/L (ref 12–78)
ANION GAP SERPL CALCULATED.3IONS-SCNC: 8 MMOL/L (ref 4–13)
AORTIC ROOT: 3 CM
APICAL FOUR CHAMBER EJECTION FRACTION: 41 %
ASCENDING AORTA: 2.9 CM
AST SERPL W P-5'-P-CCNC: 37 U/L (ref 5–45)
ATRIAL RATE: 79 BPM
BACTERIA UR QL AUTO: ABNORMAL /HPF
BASOPHILS # BLD AUTO: 0.06 THOUSANDS/ΜL (ref 0–0.1)
BASOPHILS NFR BLD AUTO: 1 % (ref 0–1)
BILIRUB SERPL-MCNC: 0.73 MG/DL (ref 0.2–1)
BILIRUB UR QL STRIP: NEGATIVE
BUN SERPL-MCNC: 66 MG/DL (ref 5–25)
CALCIUM ALBUM COR SERPL-MCNC: 9.6 MG/DL (ref 8.3–10.1)
CALCIUM SERPL-MCNC: 8.7 MG/DL (ref 8.3–10.1)
CARDIAC TROPONIN I PNL SERPL HS: 36 NG/L
CARDIAC TROPONIN I PNL SERPL HS: 40 NG/L
CHLORIDE SERPL-SCNC: 111 MMOL/L (ref 96–108)
CLARITY UR: CLEAR
CO2 SERPL-SCNC: 18 MMOL/L (ref 21–32)
COLOR UR: ABNORMAL
CREAT SERPL-MCNC: 3.08 MG/DL (ref 0.6–1.3)
E WAVE DECELERATION TIME: 137 MS
EOSINOPHIL # BLD AUTO: 0.08 THOUSAND/ΜL (ref 0–0.61)
EOSINOPHIL NFR BLD AUTO: 1 % (ref 0–6)
ERYTHROCYTE [DISTWIDTH] IN BLOOD BY AUTOMATED COUNT: 13.5 % (ref 11.6–15.1)
FRACTIONAL SHORTENING: 16 % (ref 28–44)
GFR SERPL CREATININE-BSD FRML MDRD: 14 ML/MIN/1.73SQ M
GLUCOSE SERPL-MCNC: 158 MG/DL (ref 65–140)
GLUCOSE SERPL-MCNC: 167 MG/DL (ref 65–140)
GLUCOSE SERPL-MCNC: 215 MG/DL (ref 65–140)
GLUCOSE UR STRIP-MCNC: ABNORMAL MG/DL
HCT VFR BLD AUTO: 29.6 % (ref 34.8–46.1)
HGB BLD-MCNC: 8.9 G/DL (ref 11.5–15.4)
HGB UR QL STRIP.AUTO: NEGATIVE
IMM GRANULOCYTES # BLD AUTO: 0.03 THOUSAND/UL (ref 0–0.2)
IMM GRANULOCYTES NFR BLD AUTO: 0 % (ref 0–2)
INTERVENTRICULAR SEPTUM IN DIASTOLE (PARASTERNAL SHORT AXIS VIEW): 1.3 CM
INTERVENTRICULAR SEPTUM: 1.3 CM (ref 0.6–1.1)
KETONES UR STRIP-MCNC: NEGATIVE MG/DL
LAAS-AP2: 20.1 CM2
LAAS-AP4: 20.6 CM2
LEFT ATRIUM SIZE: 4.5 CM
LEFT INTERNAL DIMENSION IN SYSTOLE: 4.2 CM (ref 2.1–4)
LEFT VENTRICLE DIASTOLIC VOLUME (MOD BIPLANE): 162 ML
LEFT VENTRICLE SYSTOLIC VOLUME (MOD BIPLANE): 89 ML
LEFT VENTRICULAR INTERNAL DIMENSION IN DIASTOLE: 5 CM (ref 3.5–6)
LEFT VENTRICULAR POSTERIOR WALL IN END DIASTOLE: 1.1 CM
LEFT VENTRICULAR STROKE VOLUME: 40 ML
LEUKOCYTE ESTERASE UR QL STRIP: ABNORMAL
LV EF: 45 %
LVSV (TEICH): 40 ML
LYMPHOCYTES # BLD AUTO: 1.13 THOUSANDS/ΜL (ref 0.6–4.47)
LYMPHOCYTES NFR BLD AUTO: 15 % (ref 14–44)
MCH RBC QN AUTO: 28.1 PG (ref 26.8–34.3)
MCHC RBC AUTO-ENTMCNC: 30.1 G/DL (ref 31.4–37.4)
MCV RBC AUTO: 93 FL (ref 82–98)
MONOCYTES # BLD AUTO: 0.36 THOUSAND/ΜL (ref 0.17–1.22)
MONOCYTES NFR BLD AUTO: 5 % (ref 4–12)
MV E'TISSUE VEL-SEP: 5 CM/S
MV PEAK A VEL: 1.11 M/S
MV PEAK E VEL: 133 CM/S
MV STENOSIS PRESSURE HALF TIME: 40 MS
MV VALVE AREA P 1/2 METHOD: 5.5 CM2
NEUTROPHILS # BLD AUTO: 5.79 THOUSANDS/ΜL (ref 1.85–7.62)
NEUTS SEG NFR BLD AUTO: 78 % (ref 43–75)
NITRITE UR QL STRIP: NEGATIVE
NON-SQ EPI CELLS URNS QL MICRO: ABNORMAL /HPF
NRBC BLD AUTO-RTO: 0 /100 WBCS
NT-PROBNP SERPL-MCNC: ABNORMAL PG/ML
P AXIS: 123 DEGREES
PH UR STRIP.AUTO: 5.5 [PH]
PLATELET # BLD AUTO: 168 THOUSANDS/UL (ref 149–390)
PMV BLD AUTO: 11 FL (ref 8.9–12.7)
POTASSIUM SERPL-SCNC: 5.2 MMOL/L (ref 3.5–5.3)
PR INTERVAL: 138 MS
PROT SERPL-MCNC: 7.7 G/DL (ref 6.4–8.4)
PROT UR STRIP-MCNC: ABNORMAL MG/DL
QRS AXIS: 84 DEGREES
QRSD INTERVAL: 92 MS
QT INTERVAL: 388 MS
QTC INTERVAL: 424 MS
RA PRESSURE ESTIMATED: 3 MMHG
RBC # BLD AUTO: 3.17 MILLION/UL (ref 3.81–5.12)
RBC #/AREA URNS AUTO: ABNORMAL /HPF
RIGHT ATRIUM AREA SYSTOLE A4C: 14.7 CM2
RIGHT VENTRICLE ID DIMENSION: 3.2 CM
SARS-COV-2 RNA RESP QL NAA+PROBE: NEGATIVE
SL CV LEFT ATRIUM LENGTH A2C: 5.5 CM
SL CV LV EF: 40
SL CV PED ECHO LEFT VENTRICLE DIASTOLIC VOLUME (MOD BIPLANE) 2D: 120 ML
SL CV PED ECHO LEFT VENTRICLE SYSTOLIC VOLUME (MOD BIPLANE) 2D: 79 ML
SODIUM SERPL-SCNC: 137 MMOL/L (ref 135–147)
SP GR UR STRIP.AUTO: 1.02 (ref 1–1.03)
T WAVE AXIS: 247 DEGREES
TSH SERPL DL<=0.05 MIU/L-ACNC: 2.57 UIU/ML (ref 0.45–4.5)
UROBILINOGEN UR STRIP-ACNC: <2 MG/DL
VENTRICULAR RATE: 72 BPM
WBC # BLD AUTO: 7.45 THOUSAND/UL (ref 4.31–10.16)
WBC #/AREA URNS AUTO: ABNORMAL /HPF

## 2022-09-15 PROCEDURE — 99220 PR INITIAL OBSERVATION CARE/DAY 70 MINUTES: CPT | Performed by: INTERNAL MEDICINE

## 2022-09-15 PROCEDURE — 36415 COLL VENOUS BLD VENIPUNCTURE: CPT | Performed by: EMERGENCY MEDICINE

## 2022-09-15 PROCEDURE — 99285 EMERGENCY DEPT VISIT HI MDM: CPT

## 2022-09-15 PROCEDURE — 70450 CT HEAD/BRAIN W/O DYE: CPT

## 2022-09-15 PROCEDURE — 99204 OFFICE O/P NEW MOD 45 MIN: CPT | Performed by: PSYCHIATRY & NEUROLOGY

## 2022-09-15 PROCEDURE — 85025 COMPLETE CBC W/AUTO DIFF WBC: CPT | Performed by: EMERGENCY MEDICINE

## 2022-09-15 PROCEDURE — 81001 URINALYSIS AUTO W/SCOPE: CPT | Performed by: EMERGENCY MEDICINE

## 2022-09-15 PROCEDURE — 99285 EMERGENCY DEPT VISIT HI MDM: CPT | Performed by: EMERGENCY MEDICINE

## 2022-09-15 PROCEDURE — 93306 TTE W/DOPPLER COMPLETE: CPT | Performed by: INTERNAL MEDICINE

## 2022-09-15 PROCEDURE — 96375 TX/PRO/DX INJ NEW DRUG ADDON: CPT

## 2022-09-15 PROCEDURE — G1004 CDSM NDSC: HCPCS

## 2022-09-15 PROCEDURE — 87186 SC STD MICRODIL/AGAR DIL: CPT | Performed by: EMERGENCY MEDICINE

## 2022-09-15 PROCEDURE — 84484 ASSAY OF TROPONIN QUANT: CPT | Performed by: EMERGENCY MEDICINE

## 2022-09-15 PROCEDURE — U0003 INFECTIOUS AGENT DETECTION BY NUCLEIC ACID (DNA OR RNA); SEVERE ACUTE RESPIRATORY SYNDROME CORONAVIRUS 2 (SARS-COV-2) (CORONAVIRUS DISEASE [COVID-19]), AMPLIFIED PROBE TECHNIQUE, MAKING USE OF HIGH THROUGHPUT TECHNOLOGIES AS DESCRIBED BY CMS-2020-01-R: HCPCS | Performed by: EMERGENCY MEDICINE

## 2022-09-15 PROCEDURE — 93010 ELECTROCARDIOGRAM REPORT: CPT | Performed by: INTERNAL MEDICINE

## 2022-09-15 PROCEDURE — 80053 COMPREHEN METABOLIC PANEL: CPT | Performed by: EMERGENCY MEDICINE

## 2022-09-15 PROCEDURE — 93005 ELECTROCARDIOGRAM TRACING: CPT

## 2022-09-15 PROCEDURE — 82948 REAGENT STRIP/BLOOD GLUCOSE: CPT

## 2022-09-15 PROCEDURE — 87077 CULTURE AEROBIC IDENTIFY: CPT | Performed by: EMERGENCY MEDICINE

## 2022-09-15 PROCEDURE — 83880 ASSAY OF NATRIURETIC PEPTIDE: CPT | Performed by: EMERGENCY MEDICINE

## 2022-09-15 PROCEDURE — 84443 ASSAY THYROID STIM HORMONE: CPT | Performed by: EMERGENCY MEDICINE

## 2022-09-15 PROCEDURE — U0005 INFEC AGEN DETEC AMPLI PROBE: HCPCS | Performed by: EMERGENCY MEDICINE

## 2022-09-15 PROCEDURE — 96374 THER/PROPH/DIAG INJ IV PUSH: CPT

## 2022-09-15 PROCEDURE — 87086 URINE CULTURE/COLONY COUNT: CPT | Performed by: EMERGENCY MEDICINE

## 2022-09-15 PROCEDURE — 93306 TTE W/DOPPLER COMPLETE: CPT

## 2022-09-15 PROCEDURE — 71046 X-RAY EXAM CHEST 2 VIEWS: CPT

## 2022-09-15 RX ORDER — ATORVASTATIN CALCIUM 40 MG/1
40 TABLET, FILM COATED ORAL EVERY EVENING
Status: DISCONTINUED | OUTPATIENT
Start: 2022-09-15 | End: 2022-09-16

## 2022-09-15 RX ORDER — INSULIN LISPRO 100 [IU]/ML
1-5 INJECTION, SOLUTION INTRAVENOUS; SUBCUTANEOUS
Status: DISCONTINUED | OUTPATIENT
Start: 2022-09-15 | End: 2022-09-16 | Stop reason: HOSPADM

## 2022-09-15 RX ORDER — LANOLIN ALCOHOL/MO/W.PET/CERES
3 CREAM (GRAM) TOPICAL
Status: DISCONTINUED | OUTPATIENT
Start: 2022-09-15 | End: 2022-09-16 | Stop reason: HOSPADM

## 2022-09-15 RX ORDER — CARVEDILOL 12.5 MG/1
12.5 TABLET ORAL 2 TIMES DAILY WITH MEALS
Status: DISCONTINUED | OUTPATIENT
Start: 2022-09-15 | End: 2022-09-16 | Stop reason: HOSPADM

## 2022-09-15 RX ORDER — METOCLOPRAMIDE HYDROCHLORIDE 5 MG/ML
10 INJECTION INTRAMUSCULAR; INTRAVENOUS ONCE
Status: COMPLETED | OUTPATIENT
Start: 2022-09-15 | End: 2022-09-15

## 2022-09-15 RX ORDER — ISOSORBIDE MONONITRATE 60 MG/1
120 TABLET, EXTENDED RELEASE ORAL DAILY
Status: DISCONTINUED | OUTPATIENT
Start: 2022-09-16 | End: 2022-09-16 | Stop reason: HOSPADM

## 2022-09-15 RX ORDER — ACETAMINOPHEN 325 MG/1
650 TABLET ORAL ONCE
Status: COMPLETED | OUTPATIENT
Start: 2022-09-15 | End: 2022-09-15

## 2022-09-15 RX ORDER — ONDANSETRON 2 MG/ML
4 INJECTION INTRAMUSCULAR; INTRAVENOUS EVERY 6 HOURS PRN
Status: DISCONTINUED | OUTPATIENT
Start: 2022-09-15 | End: 2022-09-16 | Stop reason: HOSPADM

## 2022-09-15 RX ORDER — LORAZEPAM 2 MG/ML
0.5 INJECTION INTRAMUSCULAR ONCE AS NEEDED
Status: DISCONTINUED | OUTPATIENT
Start: 2022-09-15 | End: 2022-09-16 | Stop reason: HOSPADM

## 2022-09-15 RX ORDER — DIPHENHYDRAMINE HYDROCHLORIDE 50 MG/ML
25 INJECTION INTRAMUSCULAR; INTRAVENOUS ONCE
Status: COMPLETED | OUTPATIENT
Start: 2022-09-15 | End: 2022-09-15

## 2022-09-15 RX ORDER — LEVOTHYROXINE SODIUM 0.12 MG/1
125 TABLET ORAL DAILY
Status: DISCONTINUED | OUTPATIENT
Start: 2022-09-16 | End: 2022-09-16 | Stop reason: HOSPADM

## 2022-09-15 RX ORDER — LORAZEPAM 2 MG/ML
0.5 INJECTION INTRAMUSCULAR ONCE AS NEEDED
Status: COMPLETED | OUTPATIENT
Start: 2022-09-15 | End: 2022-09-16

## 2022-09-15 RX ORDER — MELATONIN
2000 DAILY
Status: DISCONTINUED | OUTPATIENT
Start: 2022-09-16 | End: 2022-09-16 | Stop reason: HOSPADM

## 2022-09-15 RX ORDER — INSULIN ASPART 100 [IU]/ML
10 INJECTION, SUSPENSION SUBCUTANEOUS
Status: DISCONTINUED | OUTPATIENT
Start: 2022-09-15 | End: 2022-09-16 | Stop reason: HOSPADM

## 2022-09-15 RX ORDER — HYDRALAZINE HYDROCHLORIDE 50 MG/1
100 TABLET, FILM COATED ORAL ONCE
Status: COMPLETED | OUTPATIENT
Start: 2022-09-15 | End: 2022-09-15

## 2022-09-15 RX ORDER — BUMETANIDE 2 MG/1
2 TABLET ORAL 2 TIMES DAILY
Status: DISCONTINUED | OUTPATIENT
Start: 2022-09-15 | End: 2022-09-16 | Stop reason: HOSPADM

## 2022-09-15 RX ORDER — MECLIZINE HYDROCHLORIDE 25 MG/1
25 TABLET ORAL ONCE
Status: COMPLETED | OUTPATIENT
Start: 2022-09-15 | End: 2022-09-15

## 2022-09-15 RX ORDER — CARVEDILOL 12.5 MG/1
12.5 TABLET ORAL ONCE
Status: COMPLETED | OUTPATIENT
Start: 2022-09-15 | End: 2022-09-15

## 2022-09-15 RX ORDER — ASPIRIN 81 MG/1
81 TABLET, CHEWABLE ORAL DAILY
Status: DISCONTINUED | OUTPATIENT
Start: 2022-09-15 | End: 2022-09-16 | Stop reason: HOSPADM

## 2022-09-15 RX ORDER — ENOXAPARIN SODIUM 100 MG/ML
40 INJECTION SUBCUTANEOUS DAILY
Status: DISCONTINUED | OUTPATIENT
Start: 2022-09-15 | End: 2022-09-15

## 2022-09-15 RX ORDER — ACETAMINOPHEN 325 MG/1
650 TABLET ORAL EVERY 4 HOURS PRN
Status: DISCONTINUED | OUTPATIENT
Start: 2022-09-15 | End: 2022-09-16 | Stop reason: HOSPADM

## 2022-09-15 RX ORDER — HEPARIN SODIUM 5000 [USP'U]/ML
5000 INJECTION, SOLUTION INTRAVENOUS; SUBCUTANEOUS EVERY 8 HOURS SCHEDULED
Status: DISCONTINUED | OUTPATIENT
Start: 2022-09-15 | End: 2022-09-16 | Stop reason: HOSPADM

## 2022-09-15 RX ADMIN — INSULIN ASPART 10 UNITS: 100 INJECTION, SUSPENSION SUBCUTANEOUS at 17:32

## 2022-09-15 RX ADMIN — DIPHENHYDRAMINE HYDROCHLORIDE 25 MG: 50 INJECTION INTRAMUSCULAR; INTRAVENOUS at 10:06

## 2022-09-15 RX ADMIN — ASPIRIN 81 MG CHEWABLE TABLET 81 MG: 81 TABLET CHEWABLE at 17:32

## 2022-09-15 RX ADMIN — CARVEDILOL 12.5 MG: 12.5 TABLET, FILM COATED ORAL at 17:32

## 2022-09-15 RX ADMIN — HYDRALAZINE HYDROCHLORIDE 100 MG: 50 TABLET, FILM COATED ORAL at 10:40

## 2022-09-15 RX ADMIN — INSULIN LISPRO 2 UNITS: 100 INJECTION, SOLUTION INTRAVENOUS; SUBCUTANEOUS at 22:01

## 2022-09-15 RX ADMIN — Medication 3 MG: at 22:03

## 2022-09-15 RX ADMIN — HEPARIN SODIUM 5000 UNITS: 5000 INJECTION INTRAVENOUS; SUBCUTANEOUS at 22:03

## 2022-09-15 RX ADMIN — ACETAMINOPHEN 650 MG: 325 TABLET ORAL at 10:05

## 2022-09-15 RX ADMIN — BUMETANIDE 2 MG: 2 TABLET ORAL at 17:32

## 2022-09-15 RX ADMIN — METOCLOPRAMIDE HYDROCHLORIDE 10 MG: 5 INJECTION INTRAMUSCULAR; INTRAVENOUS at 10:06

## 2022-09-15 RX ADMIN — MECLIZINE HYDROCHLORIDE 25 MG: 25 TABLET ORAL at 10:05

## 2022-09-15 RX ADMIN — ATORVASTATIN CALCIUM 40 MG: 40 TABLET, FILM COATED ORAL at 17:32

## 2022-09-15 RX ADMIN — CARVEDILOL 12.5 MG: 12.5 TABLET, FILM COATED ORAL at 10:40

## 2022-09-15 RX ADMIN — INSULIN LISPRO 1 UNITS: 100 INJECTION, SOLUTION INTRAVENOUS; SUBCUTANEOUS at 17:33

## 2022-09-15 NOTE — PLAN OF CARE
Problem: Nutrition/Hydration-ADULT  Goal: Nutrient/Hydration intake appropriate for improving, restoring or maintaining nutritional needs  Description: Monitor and assess patient's nutrition/hydration status for malnutrition  Collaborate with interdisciplinary team and initiate plan and interventions as ordered  Monitor patient's weight and dietary intake as ordered or per policy  Utilize nutrition screening tool and intervene as necessary  Determine patient's food preferences and provide high-protein, high-caloric foods as appropriate       INTERVENTIONS:  - Monitor oral intake, urinary output, labs, and treatment plans  - Assess nutrition and hydration status and recommend course of action  - Evaluate amount of meals eaten  - Assist patient with eating if necessary   - Allow adequate time for meals  - Recommend/ encourage appropriate diets, oral nutritional supplements, and vitamin/mineral supplements  - Order, calculate, and assess calorie counts as needed  - Recommend, monitor, and adjust tube feedings and TPN/PPN based on assessed needs  - Assess need for intravenous fluids  - Provide specific nutrition/hydration education as appropriate  - Include patient/family/caregiver in decisions related to nutrition  Outcome: Progressing     Problem: MOBILITY - ADULT  Goal: Maintain or return to baseline ADL function  Description: INTERVENTIONS:  -  Assess patient's ability to carry out ADLs; assess patient's baseline for ADL function and identify physical deficits which impact ability to perform ADLs (bathing, care of mouth/teeth, toileting, grooming, dressing, etc )  - Assess/evaluate cause of self-care deficits   - Assess range of motion  - Assess patient's mobility; develop plan if impaired  - Assess patient's need for assistive devices and provide as appropriate  - Encourage maximum independence but intervene and supervise when necessary  - Involve family in performance of ADLs  - Assess for home care needs following discharge   - Consider OT consult to assist with ADL evaluation and planning for discharge  - Provide patient education as appropriate  Outcome: Progressing  Goal: Maintains/Returns to pre admission functional level  Description: INTERVENTIONS:  - Perform BMAT or MOVE assessment daily    - Set and communicate daily mobility goal to care team and patient/family/caregiver     - Collaborate with rehabilitation services on mobility goals if consulted    - Record patient progress and toleration of activity level   Outcome: Progressing     Problem: Potential for Falls  Goal: Patient will remain free of falls  Description: INTERVENTIONS:  - Educate patient/family on patient safety including physical limitations  - Instruct patient to call for assistance with activity   - Consult OT/PT to assist with strengthening/mobility   - Keep Call bell within reach  - Keep bed low and locked with side rails adjusted as appropriate  - Keep care items and personal belongings within reach  - Initiate and maintain comfort rounds  - Consider moving patient to room near nurses station  Outcome: Progressing     Problem: PAIN - ADULT  Goal: Verbalizes/displays adequate comfort level or baseline comfort level  Description: Interventions:  - Encourage patient to monitor pain and request assistance  - Assess pain using appropriate pain scale  - Administer analgesics based on type and severity of pain and evaluate response  - Implement non-pharmacological measures as appropriate and evaluate response  - Consider cultural and social influences on pain and pain management  - Notify physician/advanced practitioner if interventions unsuccessful or patient reports new pain  Outcome: Progressing     Problem: INFECTION - ADULT  Goal: Absence or prevention of progression during hospitalization  Description: INTERVENTIONS:  - Assess and monitor for signs and symptoms of infection  - Monitor lab/diagnostic results  - Monitor all insertion sites, i e  indwelling lines, tubes, and drains  - Monitor endotracheal if appropriate and nasal secretions for changes in amount and color  - Whipple appropriate cooling/warming therapies per order  - Administer medications as ordered  - Instruct and encourage patient and family to use good hand hygiene technique  - Identify and instruct in appropriate isolation precautions for identified infection/condition  Outcome: Progressing  Goal: Absence of fever/infection during neutropenic period  Description: INTERVENTIONS:  - Monitor WBC    Outcome: Progressing     Problem: SAFETY ADULT  Goal: Maintain or return to baseline ADL function  Description: INTERVENTIONS:  -  Assess patient's ability to carry out ADLs; assess patient's baseline for ADL function and identify physical deficits which impact ability to perform ADLs (bathing, care of mouth/teeth, toileting, grooming, dressing, etc )  - Assess/evaluate cause of self-care deficits   - Assess range of motion  - Assess patient's mobility; develop plan if impaired  - Assess patient's need for assistive devices and provide as appropriate  - Encourage maximum independence but intervene and supervise when necessary  - Involve family in performance of ADLs  - Assess for home care needs following discharge   - Consider OT consult to assist with ADL evaluation and planning for discharge  - Provide patient education as appropriate  Outcome: Progressing  Goal: Maintains/Returns to pre admission functional level  Description: INTERVENTIONS:  - Perform BMAT or MOVE assessment daily    - Set and communicate daily mobility goal to care team and patient/family/caregiver     - Collaborate with rehabilitation services on mobility goals if consulted    - Ambulate patient 3 times a day    - Out of bed for toileting  - Record patient progress and toleration of activity level   Outcome: Progressing  Goal: Patient will remain free of falls  Description: INTERVENTIONS:  - Educate patient/family on patient safety including physical limitations  - Instruct patient to call for assistance with activity   - Consult OT/PT to assist with strengthening/mobility   - Keep Call bell within reach  - Keep bed low and locked with side rails adjusted as appropriate  - Keep care items and personal belongings within reach  - Initiate and maintain comfort rounds  - Make Fall Risk Sign visible to staff  - Offer Toileting every 2  Hours, in advance of need  - Initiate/Maintain bed alarm  - Obtain necessary fall risk management equipment:   - Apply yellow socks and bracelet for high fall risk patients  - Consider moving patient to room near nurses station  Outcome: Progressing     Problem: DISCHARGE PLANNING  Goal: Discharge to home or other facility with appropriate resources  Description: INTERVENTIONS:  - Identify barriers to discharge w/patient and caregiver  - Arrange for needed discharge resources and transportation as appropriate  - Identify discharge learning needs (meds, wound care, etc )  - Arrange for interpretive services to assist at discharge as needed  - Refer to Case Management Department for coordinating discharge planning if the patient needs post-hospital services based on physician/advanced practitioner order or complex needs related to functional status, cognitive ability, or social support system  Outcome: Progressing     Problem: Knowledge Deficit  Goal: Patient/family/caregiver demonstrates understanding of disease process, treatment plan, medications, and discharge instructions  Description: Complete learning assessment and assess knowledge base    Interventions:  - Provide teaching at level of understanding  - Provide teaching via preferred learning methods  Outcome: Progressing     Problem: NEUROSENSORY - ADULT  Goal: Achieves stable or improved neurological status  Description: INTERVENTIONS  - Monitor and report changes in neurological status  - Monitor vital signs such as temperature, blood pressure, glucose, and any other labs ordered   - Initiate measures to prevent increased intracranial pressure  - Monitor for seizure activity and implement precautions if appropriate      Outcome: Progressing  Goal: Remains free of injury related to seizures activity  Description: INTERVENTIONS  - Maintain airway, patient safety  and administer oxygen as ordered  - Monitor patient for seizure activity, document and report duration and description of seizure to physician/advanced practitioner  - If seizure occurs,  ensure patient safety during seizure  - Reorient patient post seizure  - Seizure pads on all 4 side rails  - Instruct patient/family to notify RN of any seizure activity including if an aura is experienced  - Instruct patient/family to call for assistance with activity based on nursing assessment  - Administer anti-seizure medications if ordered    Outcome: Progressing  Goal: Achieves maximal functionality and self care  Description: INTERVENTIONS  - Monitor swallowing and airway patency with patient fatigue and changes in neurological status  - Encourage and assist patient to increase activity and self care     - Encourage visually impaired, hearing impaired and aphasic patients to use assistive/communication devices  Outcome: Progressing

## 2022-09-15 NOTE — H&P
[facility]  H&P- Matthew Merrill 1955, 75 y o  female MRN: [id number]  Unit/Bed#: KINJAL Encounter: [id number]  Primary Care Provider: Marc Saez MD   Date and time admitted to hospital: 9/15/2022  9:26 AM    * Stroke-like symptoms  Assessment & Plan  - presented with 24 hour history of dizziness, headache, and right lower extremity numbness  - headache has since resolved with Tylenol, and dizziness has subsided, however she still has some persistent right lower extremity subjective weakness  - workup in the ED unremarkable including CT of the head, no focal deficits on examination though does have subjective weakness in the right lower extremity    - will admit for stroke protocol  - MRI of the brain  - echocardiogram  - telemetry monitoring  - neurochecks per standard  - hemoglobin A1c and lipid panel  - neurology consultation    Stage 4 chronic kidney disease ([facility] )  Assessment & Plan  - at baseline; continue to monitor    Coronary artery disease involving native coronary artery of native heart with angina pectoris ([facility] [address] )  Assessment & Plan  - no acute concerns from this standpoint  - continue aspirin, statin, beta-blocker, Imdur    Hypertension  Assessment & Plan  - on multiple antihypertensive agents  - will hold home hydralazine and amlodipine for permissive hypertension  - continue Coreg and Imdur    Type 2 diabetes mellitus with kidney complication, with long-term current use of insulin ([facility])  Assessment & Plan  - poorly controlled; last hemoglobin A1c 8 7%    - recheck hemoglobin A1c  - continue 70/30 insulin 10 units b i d  with meals  - ISS for additional coverage  - hypoglycemia protocol      VTE Pharmacologic Prophylaxis: VTE Score: 3 Moderate Risk (Score 3-4) - Pharmacological DVT Prophylaxis Ordered: heparin  Code Status: Level 1 - Full Code   Discussion with family: Updated  () at bedside      Anticipated Length of Stay: Patient will be admitted on an observation basis with an anticipated length of stay of less than 2 midnights secondary to Stroke-like symptoms  Total Time for Visit, including Counseling / Coordination of Care: 45 minutes Greater than 50% of this total time spent on direct patient counseling and coordination of care  Chief Complaint:  Stroke-like symptoms    History of Present Illness:  Pablo Terrell is a 75 y o  female with a PMH of hypertension, hyperlipidemia, diabetes, CAD, CKD 4, hypothyroidism who presents with stroke-like symptoms  She reports if 24 hour history of headache, dizziness, and right lower extremity and numbness  Fortunately her headache has resolved with administration of Tylenol  Her dizziness has also subsided over time  However she continues to have right lower extremity subjective weakness  In the emergency room a stroke alert was called, however she was not a candidate for tPA  More over, her workup thus far including routine blood work and imaging with CT of the head has been unremarkable  Hospitalist have been asked for admission for further evaluation and workup for possible stroke  At the time of my evaluation patient was pleasant and conversive  She has no focal neurologic deficits and the mentioned right lower extremity weakness seems to be subjective in nature   was at bedside  All questions and concerns were addressed  REVIEW OF SYSTEMS  General Denies fevers or chills  Denies generalized weakness or fatigue  HEENT Denies hearing or vision changes  Cardiovascular Denies chest pain  Denies LE swelling  Denies palpitations  Denies dyspnea on exertion  Respiratory Denies cough  Denies difficulty breathing  Denies shortness of breath  Genitourinary Denies hematuria  Denies dysuria  Denies difficulty voiding  Denies incontinence  Gastrointestinal Denies nausea, vomiting, or diarrhea  Denies hematochezia, melena, or hematemesis      Musculoskeletal Denies arthralgias or myalgias  Denies joint swelling  Psychiatric  Denies changes in mood  Denies anxiety or depression  Neurologic Positive for headache  Positive for dizziness  Positive for right lower extremity weakness  Endocrine Denies weight loss or weight gain  Denies excessive thirst, sweating, urination  Past Medical and Surgical History:   Past Medical History:   Diagnosis Date    Anemia     CHF [facility]congestive heart failure) ([facility] )     Chronic kidney disease     Coronary artery disease     Diabetes mellitus ([facility] )     Hypertension     Hypothyroidism        Past Surgical History:   Procedure Laterality Date    CORONARY ANGIOPLASTY WITH STENT PLACEMENT  2019       Meds/Allergies:  Prior to Admission medications    Medication Sig Start Date End Date Taking?  Authorizing Provider   amLODIPine (NORVASC) 10 mg tablet Take 1 tablet (10 mg total) by mouth daily 6/9/22   Cari Luis DO   aspirin (RA Aspirin Adult Low Dose) 81 mg chewable tablet Chew 1 tablet (81 mg total) daily 11/23/21   Sofie Chanel PA-C   atorvastatin (LIPITOR) 40 mg tablet Take 2 tablets (80 mg total) by mouth daily 1/31/22   Willy Ramos DO   bumetanide (BUMEX) 2 mg tablet Take 1 tablet (2 mg total) by mouth 2 (two) times a day Start bumex 1 mg BID from Monday 2/21/22, 6/9/22   Cari Luis DO   carvedilol (COREG) 12 5 mg tablet Take 1 tablet (12 5 mg total) by mouth 2 (two) times a day with meals 9/7/22   Willy Ramos DO   cholecalciferol (VITAMIN D3) 1,000 units tablet Take 2 tablets (2,000 Units total) by mouth daily 8/8/22   Cari Luis DO   Droplet Pen Needles 32G X 4 MM MISC 2 (two) times a day 10/12/21   Historical Provider, MD   hydrALAZINE (APRESOLINE) 100 MG tablet Take 1 tablet (100 mg total) by mouth 3 (three) times a day 9/7/22   Willy Ramos DO   insulin aspart protamine-insulin aspart (NovoLOG 70/30) 100 units/mL injection As per your current blood sugars, take 15 units before breakfast, 10 units before dinner 10/31/21 Abhijit De Jesus MD   isosorbide mononitrate (IMDUR) 120 mg 24 hr tablet Take 1 tablet (120 mg total) by mouth daily 1/31/22   Mone Hines DO   Lancets (OneTouch Delica Plus [id number]) MISC use to TEST BLOOD SUGAR three times a day as directed 3/30/22   Historical Provider, MD   levothyroxine 125 mcg tablet Take 125 mcg by mouth daily 4/11/22   Historical Provider, MD   OneTouch Verio test strip use to TEST BLOOD SUGAR three times a day as directed 3/30/22   Historical Provider, MD   bumetanide (BUMEX) 2 mg tablet Take 1 tablet (2 mg total) by mouth 2 (two) times a day Start bumex 1 mg BID from Monday 2/21/22,  Patient not taking: Reported on 8/8/2022 6/9/22 9/15/22  Gordy Dc DO     I have reviewed home medications using recent Epic encounter  Allergies: Allergies   Allergen Reactions    Iv Contrast [Iodinated Diagnostic Agents] Itching     Caused KELLY    Nifedipine Rash       Social History:  Marital Status: /Civil Union   Occupation:   Patient Pre-hospital Living Situation: Home  Patient Pre-hospital Level of Mobility: walks  Patient Pre-hospital Diet Restrictions: none  Substance Use History:   Social History     Substance and Sexual Activity   Alcohol Use Yes    Comment: occ     Social History     Tobacco Use   Smoking Status Former Smoker   Smokeless Tobacco Never Used     Social History     Substance and Sexual Activity   Drug Use No       Family History:  Family History   Problem Relation Age of Onset    Diabetes Mother     Heart attack Father         MI    Hypertension Brother        Physical Exam:     Vitals:   Blood Pressure: 151/66 (09/15/22 1300)  Pulse: 60 (09/15/22 1300)  Temperature: 98 8 °F (37 1 °C) (09/15/22 0933)  Respirations: (!) 25 (09/15/22 1300)  SpO2: 98 % (09/15/22 1300)    PHYSICAL EXAM:    Vitals signs reviewed  Constitutional   Awake and cooperative  NAD  Head/Neck   Normocephalic  Atraumatic  HEENT   No scleral icterus  EOMI     Heart   Regular rate and rhythm  No murmers  Lungs   Clear to auscultation bilaterally  Respirations unlaboured  Abdomen   Soft  Nontender  Nondistended  Skin   Skin color normal  No rashes  Extremities   No deformities  No peripheral edema  Neuro   Alert and oriented  Cranial nerves grossly intact  5/5 strength throughout  No sensation deficits  No facial asymmetry  No dysarthria  Psych   Mood stable  Affect normal          Additional Data:     Lab Results:  Results from last 7 days   Lab Units 09/15/22  0946   WBC Thousand/uL 7 45   HEMOGLOBIN g/dL 8 9*   HEMATOCRIT % 29 6*   PLATELETS Thousands/uL 168   NEUTROS PCT % 78*   LYMPHS PCT % 15   MONOS PCT % 5   EOS PCT % 1     Results from last 7 days   Lab Units 09/15/22  0946   SODIUM mmol/L 137   POTASSIUM mmol/L 5 2   CHLORIDE mmol/L 111*   CO2 mmol/L 18*   BUN mg/dL 66*   CREATININE mg/dL 3 08*   ANION GAP mmol/L 8   CALCIUM mg/dL 8 7   ALBUMIN g/dL 2 9*   TOTAL BILIRUBIN mg/dL 0 73   ALK PHOS U/L 422*   ALT U/L 43   AST U/L 37   GLUCOSE RANDOM mg/dL 167*                       Imaging: Reviewed radiology reports from this admission including: chest xray and CT head  XR chest 2 views   Final Result by Tiesha Carpio MD (09/15 1129)   Improved right effusion with small residual fluid  No acute cardiopulmonary disease  Workstation performed: [id number]         CT head without contrast   Final Result by Saulo Torres MD (09/15 1012)      No acute intracranial abnormality  Workstation performed: [id number]         MRI Inpatient Order    (Results Pending)       EKG and Other Studies Reviewed on Admission:   · EKG: Ectopic atrial rhythm  ** Please Note: This note has been constructed using a voice recognition system   **

## 2022-09-15 NOTE — CONSULTS
NEUROLOGY RESIDENCY CONSULT NOTE     Name: Morales Pugh   Age & Sex: 75 y o  female   MRN: [id number]  Unit/Bed#: [facility] 499-92   Encounter: [id number]  Length of Stay: 0    ASSESSMENT & PLAN     * Stroke-like symptoms  Assessment & Plan  Presented with dzziness and right dorsal foot numbness started at the same time on 09/14/2022 morning  In ED, elevated BP  Patient's dizziness resolved with meclizine, metoclopramide and diphenhydramine  However, right foot numbness is still lingering  Exam: reduced pinprick sensation and questionable reduced vibration sense on right dorsal foot  No weakness  May 2022: A1C 8 4,     Plan:  Continue ASA 81mg QD and atorvastatin 40mg QD  MRI brain wo pending  A1c and LDL pending  Echo pending  If MRI brain wo is negative, no further management from neurology  Recommend reduction of stroke risk factors  Neurology will follow for MRI result  Recommendations for outpatient neurological follow up have yet to be determined  Pending for discharge: MRI brain wo pending, Echo result pending, Labs pending    SUBJECTIVE     Reason for Consult / Principal Problem: Dizziness and RLE weakness  Hx and PE limited by: None    HPI: Morales Pugh is a 75 y o  right handed female with CHF, CKD, CAD, HTN, and hypothyroidism presented to the ED due to dizziness started on 9/14/2022 AM  Dizziness is described as "the room is spinning" and mild frontal throbbing headache associated with nausea, vomiting x1, generalized weakness  At the same time as dizziness started, patient noticed the feeling of her right dorsal foot numbness described as "foot asleep"  No chest pain, SOB, fevers/chills, no visual disturbance and no other neurological deficits or weakness  In ED, /88, HR 66, afebrile and comfortable in room air  Patient was given meclizine 25mg PO, metoclopramide 10mg IV, and diphenhydramine 25mg IV with improvement of dizziness   However, right dorsal foot numbness lingers  CT H was unremarkable  Neurology is consulted due to stroke like symptoms of dizziness and right dorsal foot numbness  Inpatient consult to Neurology  Consult performed by:  Sylvia Weiss MD  Consult ordered by: Disha Zamudio DO        Historical Information   Past Medical History:   Diagnosis Date    Anemia     [facility]congest[facility] failure) [facility] [address] [facility]     Chronic kidney disease     Coronary artery disease     Diabetes mellitus ([facility] )     Hypertension     Hypothyroidism      Past Surgical History:   Procedure Laterality Date    CORONARY ANGIOPLASTY WITH STENT PLACEMENT  2019     Social History   Social History     Substance and Sexual Activity   Alcohol Use Yes    Comment: occ     Social History     Substance and Sexual Activity   Drug Use No     E-Cigarette/Vaping    E-Cigarette Use Never User      E-Cigarette/Vaping Substances    Nicotine No     THC No     CBD No     Flavoring No     Other No     Unknown No      Social History     Tobacco Use   Smoking Status Former Smoker   Smokeless Tobacco Never Used     Family History:   Family History   Problem Relation Age of Onset    Diabetes Mother     Heart attack Father         MI    Hypertension Brother      Meds/Allergies   current meds:   Current Facility-Administered Medications   Medication Dose Route Frequency    acetaminophen (TYLENOL) tablet 650 mg  650 mg Oral Q4H PRN    aspirin chewable tablet 81 mg  81 mg Oral Daily    atorvastatin (LIPITOR) tablet 40 mg  40 mg Oral QPM    bumetanide (BUMEX) tablet 2 mg  2 mg Oral BID    carvedilol (COREG) tablet 12 5 mg  12 5 mg Oral BID With Meals    [START ON 9/16/2022] cholecalciferol (VITAMIN D3) tablet 2,000 Units  2,000 Units Oral Daily    heparin (porcine) subcutaneous injection 5,000 Units  5,000 Units Subcutaneous Q8H [address]    insulin aspart protamine-insulin aspart (NovoLOG 70/30) 100 units/mL subcutaneous injection 10 Units  10 Units Subcutaneous BID AC    insulin lispro (HumaLOG) 100 units/mL subcutaneous injection 1-5 Units  1-5 Units Subcutaneous TID AC    insulin lispro (HumaLOG) 100 units/mL subcutaneous injection 1-5 Units  1-5 Units Subcutaneous HS    [START ON 2022] isosorbide mononitrate (IMDUR) 24 hr tablet 120 mg  120 mg Oral Daily    [START ON 2022] levothyroxine tablet 125 mcg  125 mcg Oral Daily    ondansetron (ZOFRAN) injection 4 mg  4 mg Intravenous Q6H PRN     Allergies   Allergen Reactions    Iv Contrast [Iodinated Diagnostic Agents] Itching     Caused KELLY    Nifedipine Rash       Review of previous medical records was completed  Review of Systems   Constitutional: Negative for chills and fever  HENT: Negative for ear pain and sore throat  Eyes: Negative for pain and visual disturbance  Respiratory: Negative for cough and shortness of breath  Cardiovascular: Negative for chest pain and palpitations  Gastrointestinal: Negative for abdominal pain and vomiting  Genitourinary: Negative for dysuria and hematuria  Musculoskeletal: Negative for arthralgias and back pain  Skin: Negative for color change and rash  Neurological: Positive for numbness  Negative for seizures and syncope  All other systems reviewed and are negative  OBJECTIVE     Patient ID: Irina Henriquez is a 75 y o  female  Vitals:   Vitals:    09/15/22 1040 09/15/22 1200 09/15/22 1300 09/15/22 1500   BP: (!) 189/88 123/57 151/66 151/66   BP Location:  Right arm Right arm    Pulse: 70 58 60 60   Resp:  (!) 24 (!) 25    Temp:       SpO2:  97% 98%    Weight:    70 8 kg (156 lb)   Height:    5' 2" (1 575 m)      Body mass index is 28 53 kg/m²  No intake or output data in the 24 hours ending 09/15/22 1704    Temperature:   Temp (24hrs), Av 8 °F (37 1 °C), Min:98 8 °F (37 1 °C), Max:98 8 °F (37 1 °C)    Temperature: 98 8 °F (37 1 °C)    Invasive Devices:    Invasive Devices  Report    Peripheral Intravenous Line  Duration           Peripheral IV 09/15/22 Left Antecubital <1 day                Physical Exam  Vitals and nursing note reviewed  Constitutional:       General: She is not in acute distress  Appearance: She is well-developed  HENT:      Head: Normocephalic and atraumatic  Eyes:      Conjunctiva/sclera: Conjunctivae normal       Pupils: Pupils are equal, round, and reactive to light  Cardiovascular:      Rate and Rhythm: Normal rate  Pulmonary:      Effort: Pulmonary effort is normal  No respiratory distress  Abdominal:      Palpations: Abdomen is soft  Tenderness: There is no abdominal tenderness  Musculoskeletal:      Cervical back: Neck supple  Skin:     General: Skin is warm and dry  Neurological:      Mental Status: She is alert and oriented to person, place, and time  Coordination: Romberg Test normal       Gait: Gait is intact  Neurologic Exam     Mental Status   Oriented to person, place, and time  Level of consciousness: alert    Cranial Nerves     CN II   Visual fields full to confrontation  CN III, IV, VI   Pupils are equal, round, and reactive to light  CN V   Facial sensation intact  CN VII   Facial expression full, symmetric  CN VIII   CN VIII normal      CN IX, X   CN IX normal    CN X normal      CN XI   CN XI normal      CN XII   CN XII normal      Motor Exam   Muscle bulk: normal  Overall muscle tone: normal  Right arm pronator drift: absent  Left arm pronator drift: absent    Strength   Strength 5/5 except as noted  Sensory Exam   Light touch normal    Right leg vibration: decreased from ankle  Proprioception normal    Right leg pinprick: decreased from ankle  Questionable R foot reduced vibratory sensation as at one point, patient reported feeling the same vibratory sensation on both feet  Gait, Coordination, and Reflexes     Gait  Gait: normal    Coordination   Romberg: negative    Reflexes   Reflexes 2+ except as noted  LABORATORY DATA     Labs:  I have personally reviewed pertinent reports  Results from last 7 days   Lab Units 09/15/22  0946   WBC Thousand/uL 7 45   HEMOGLOBIN g/dL 8 9*   HEMATOCRIT % 29 6*   PLATELETS Thousands/uL 168   NEUTROS PCT % 78*   MONOS PCT % 5      Results from last 7 days   Lab Units 09/15/22  0946   POTASSIUM mmol/L 5 2   CHLORIDE mmol/L 111*   CO2 mmol/L 18*   BUN mg/dL 66*   CREATININE mg/dL 3 08*   CALCIUM mg/dL 8 7   ALK PHOS U/L 422*   ALT U/L 43   AST U/L 37       IMAGING & DIAGNOSTIC TESTING     Radiology Results: I have personally reviewed pertinent reports  XR chest 2 views   Final Result by Nick Hatch MD (09/15 1129)   Improved right effusion with small residual fluid  No acute cardiopulmonary disease  Workstation performed: [id number]         CT head without contrast   Final Result by Agnieszka Duran MD (09/15 1012)      No acute intracranial abnormality  Workstation performed: [id number]         MRI Inpatient Order    (Results Pending)       Other Diagnostic Testing: I have personally reviewed pertinent reports        ACTIVE MEDICATIONS     Current Facility-Administered Medications   Medication Dose Route Frequency    acetaminophen (TYLENOL) tablet 650 mg  650 mg Oral Q4H PRN    aspirin chewable tablet 81 mg  81 mg Oral Daily    atorvastatin (LIPITOR) tablet 40 mg  40 mg Oral QPM    bumetanide (BUMEX) tablet 2 mg  2 mg Oral BID    carvedilol (COREG) tablet 12 5 mg  12 5 mg Oral BID With Meals    [START ON 9/16/2022] cholecalciferol (VITAMIN D3) tablet 2,000 Units  2,000 Units Oral Daily    heparin (porcine) subcutaneous injection 5,000 Units  5,000 Units Subcutaneous Q8H [address]    insulin aspart protamine-insulin aspart (NovoLOG 70/30) 100 units/mL subcutaneous injection 10 Units  10 Units Subcutaneous BID AC    insulin lispro (HumaLOG) 100 units/mL subcutaneous injection 1-5 Units  1-5 Units Subcutaneous TID AC    insulin lispro (HumaLOG) 100 units/mL subcutaneous injection 1-5 Units  1-5 Units Subcutaneous HS    [START ON 9/16/2022] isosorbide mononitrate (IMDUR) 24 hr tablet 120 mg  120 mg Oral Daily    [START ON 9/16/2022] levothyroxine tablet 125 mcg  125 mcg Oral Daily    ondansetron (ZOFRAN) injection 4 mg  4 mg Intravenous Q6H PRN       Prior to Admission medications    Medication Sig Start Date End Date Taking?  Authorizing Provider   amLODIPine (NORVASC) 10 mg tablet Take 1 tablet (10 mg total) by mouth daily 6/9/22   Magui Hastings DO   aspirin (RA Aspirin Adult Low Dose) 81 mg chewable tablet Chew 1 tablet (81 mg total) daily 11/23/21   Ed PEDRO Ortiz   atorvastatin (LIPITOR) 40 mg tablet Take 2 tablets (80 mg total) by mouth daily 1/31/22   Gunner Castellanos DO   bumetanide (BUMEX) 2 mg tablet Take 1 tablet (2 mg total) by mouth 2 (two) times a day Start bumex 1 mg BID from Monday 2/21/22, 6/9/22   Magui Hastings DO   carvedilol (COREG) 12 5 mg tablet Take 1 tablet (12 5 mg total) by mouth 2 (two) times a day with meals 9/7/22   Gunner Castellanos DO   cholecalciferol (VITAMIN D3) 1,000 units tablet Take 2 tablets (2,000 Units total) by mouth daily 8/8/22   Magui Hastings DO   Droplet Pen Needles 32G X 4 MM MISC 2 (two) times a day 10/12/21   Historical Provider, MD   hydrALAZINE (APRESOLINE) 100 MG tablet Take 1 tablet (100 mg total) by mouth 3 (three) times a day 9/7/22   Gunner Castellanos DO   insulin aspart protamine-insulin aspart (NovoLOG 70/30) 100 units/mL injection As per your current blood sugars, take 15 units before breakfast, 10 units before dinner 10/31/21   Boone Peraza MD   isosorbide mononitrate (IMDUR) 120 mg 24 hr tablet Take 1 tablet (120 mg total) by mouth daily 1/31/22   Gunner Castellanos DO   Lancets (OneTouch Delica Plus [id number]) MISC use to TEST BLOOD SUGAR three times a day as directed 3/30/22   Historical Provider, MD   levothyroxine 125 mcg tablet Take 125 mcg by mouth daily 4/11/22   Historical Provider, MD   OneTouch Verio test strip use to TEST BLOOD SUGAR three times a day as directed 3/30/22   Historical Provider, MD   bumetanide (BUMEX) 2 mg tablet Take 1 tablet (2 mg total) by mouth 2 (two) times a day Start bumex 1 mg BID from Monday 2/21/22,  Patient not taking: Reported on 8/8/2022 6/9/22 9/15/22  Areta Rudy, DO         CODE STATUS & ADVANCED DIRECTIVES     Code Status: Level 1 - Full Code  Advance Directive and Living Will:      Power of :    POLST:      VTE Pharmacologic Prophylaxis: Enoxaparin (Lovenox)  VTE Mechanical Prophylaxis: sequential compression device    ==  MD [facility] Neurology Residency, PGY-2

## 2022-09-15 NOTE — APP STUDENT NOTE
Stroke-like symptoms  Assessment and Plan  - Pt presented to ED 9/15/2022 with complaints of dizziness, weakness, and right LE weakness/numbess  - Initial CT in ED was negative for any acute intracranial abnormalities  - Pt continued to have only right LE weakness  - Inpatient stroke workup initiated  Chronic diastolic heart failure  Assessment and Plan  - Stage C HFpEF, echo most recently on 7/3/2021 with EF 60%, hypo-inferior wall, normal RV  - Will complete another echo today per inpatient stroke workup  - GDMT: Continue carvedilol 12 5mg, bumetanide tablet 2mg  - Monitor I&0, daily weights, low salt diet  Type 2 diabetes mellitus with kidney complication, with long term use of insulin  Assessment and Plan  - Hold home oral agents while in hospital   - Continue home regimen on d/c    - Fingerstick glucose before meals and at bedtime   - Initiate hypoglycemia protocol    - Diabetic diet  Stage 4 chronic kidney disease  Assessment and Plan  - Cr today was 3 08   - Baseline creatinine around 3   - Will continue to monitor  Hypertension   Assessment and Plan  - Continue carvedilol 12 5mg, bumetanide tablet 2mg     Coronary Artery Disease  Assessment and Plan  -Continue isosorbide mononitrate 24hr tablet 120mg    Hypothyroidism  Assessment and Plan   - Continue synthroid  VTE Pharmacologic Prophylaxis:   Moderate Risk (Score 3-4) - Pharmacological DVT Prophylaxis Ordered: enoxaparin (Lovenox)  Code Status: Level 1 - Full Code   Discussion with family: Discussion with  in the room  Ammon Manifold Anticipated Length of Stay: Patient will be admitted on an inpatient basis with an anticipated length of stay of greater than 2 midnights secondary to inpatient stroke work-up       Total Time for Visit, including Counseling / Coordination of Care: 30 minutes Greater than 50% of this total time spent on direct patient counseling and coordination of care  Chief Complaint: Stroke-like symptoms       History of Present Illness:  Kizzy Garcia is a 75 y o  female with a PMH of CHF, CKD, CAD, HTN, type 2 DM, and hypothyroidism who presents with complaints of dizziness/weakness x 1 day  She has never felt like this before  She also had a mild frontal throbbing headache which was relieved with Tylenol  Patient has nausea and one episode of vomiting this morning  She also states she has residual weakness in the RLE since this morning but it seems to be improving  She denies lightheadedness, vision changes, difficulty speaking or swallowing, facial asymmetry or difficulty walking  Review of Systems:  Review of Systems   Constitutional: Negative for chills and fever  HENT: Negative for trouble swallowing and voice change  Eyes: Negative for photophobia and visual disturbance  Respiratory: Negative for apnea, choking, chest tightness and shortness of breath  Cardiovascular: Negative for chest pain, palpitations and leg swelling  Gastrointestinal: Negative for abdominal pain  Musculoskeletal: Negative for gait problem  Neurological: Positive for dizziness and weakness  Weakness in RLE         Past Medical and Surgical History:   Past Medical History:   Diagnosis Date    Anemia     [facility]) ([facility]     Chronic kidney disease     Coronary artery disease     Diabetes mellitus ([facility] )     Hypertension     Hypothyroidism        Past Surgical History:   Procedure Laterality Date    CORONARY ANGIOPLASTY WITH STENT PLACEMENT  2019       Meds/Allergies:  Prior to Admission medications    Medication Sig Start Date End Date Taking?  Authorizing Provider   amLODIPine (NORVASC) 10 mg tablet Take 1 tablet (10 mg total) by mouth daily 6/9/22   Azalia Mora DO   aspirin (RA Aspirin Adult Low Dose) 81 mg chewable tablet Chew 1 tablet (81 mg total) daily 11/23/21   Naman Chan PA-C   atorvastatin (LIPITOR) 40 mg tablet Take 2 tablets (80 mg total) by mouth daily 1/31/22   Darline Arreaga,    bumetanide (BUMEX) 2 mg tablet Take 1 tablet (2 mg total) by mouth 2 (two) times a day Start bumex 1 mg BID from Monday 2/21/22, 6/9/22   Ammy Pizano DO   carvedilol (COREG) 12 5 mg tablet Take 1 tablet (12 5 mg total) by mouth 2 (two) times a day with meals 9/7/22   Idris Greenfield DO   cholecalciferol (VITAMIN D3) 1,000 units tablet Take 2 tablets (2,000 Units total) by mouth daily 8/8/22   Ammy Pizano DO   Droplet Pen Needles 32G X 4 MM MISC 2 (two) times a day 10/12/21   Historical Provider, MD   hydrALAZINE (APRESOLINE) 100 MG tablet Take 1 tablet (100 mg total) by mouth 3 (three) times a day 9/7/22   Idris Greenfield DO   insulin aspart protamine-insulin aspart (NovoLOG 70/30) 100 units/mL injection As per your current blood sugars, take 15 units before breakfast, 10 units before dinner 10/31/21   Alison Baltazar MD   isosorbide mononitrate (IMDUR) 120 mg 24 hr tablet Take 1 tablet (120 mg total) by mouth daily 1/31/22   Idris Greenfield DO   Lancets (OneTouch Delica Plus [id number]) MISC use to TEST BLOOD SUGAR three times a day as directed 3/30/22   Historical Provider, MD   levothyroxine 125 mcg tablet Take 125 mcg by mouth daily 4/11/22   Historical Provider, MD   OneTouch Verio test strip use to TEST BLOOD SUGAR three times a day as directed 3/30/22   Historical Provider, MD   bumetanide (BUMEX) 2 mg tablet Take 1 tablet (2 mg total) by mouth 2 (two) times a day Start bumex 1 mg BID from Monday 2/21/22,  Patient not taking: Reported on 8/8/2022 6/9/22 9/15/22  Ammy Pizano DO     I have reviewed home medications using recent Epic encounter  Allergies:    Allergies   Allergen Reactions    Iv Contrast [Iodinated Diagnostic Agents] Itching     Caused KELLY    Nifedipine Rash       Social History:  Marital Status: /Civil Union   Occupation:   Patient Pre-hospital Living Situation: Home  Patient Pre-hospital Level of Mobility: walks  Patient Pre-hospital Diet Restrictions:   Substance Use History: Social History     Substance and Sexual Activity   Alcohol Use Yes    Comment: occ     Social History     Tobacco Use   Smoking Status Former Smoker   Smokeless Tobacco Never Used     Social History     Substance and Sexual Activity   Drug Use No       Family History:  Family History   Problem Relation Age of Onset    Diabetes Mother     Heart attack Father         MI    Hypertension Brother        Physical Exam:     Vitals:   Blood Pressure: 151/66 (09/15/22 1300)  Pulse: 60 (09/15/22 1300)  Temperature: 98 8 °F (37 1 °C) (09/15/22 0933)  Respirations: (!) 25 (09/15/22 1300)  SpO2: 98 % (09/15/22 1300)    Physical Exam  Constitutional:       General: She is not in acute distress  Appearance: She is not ill-appearing  HENT:      Head: Normocephalic and atraumatic  Eyes:      Extraocular Movements: Extraocular movements intact  Cardiovascular:      Rate and Rhythm: Normal rate and regular rhythm  Pulses: Normal pulses  Pulmonary:      Effort: Pulmonary effort is normal       Breath sounds: Normal breath sounds  Abdominal:      General: Bowel sounds are normal       Palpations: Abdomen is soft  Musculoskeletal:      Right lower leg: No edema  Left lower leg: No edema  Neurological:      General: No focal deficit present  Mental Status: She is alert and oriented to person, place, and time     Psychiatric:         Mood and Affect: Mood normal          Behavior: Behavior normal       Additional Data:     Lab Results:  Results from last 7 days   Lab Units 09/15/22  0946   WBC Thousand/uL 7 45   HEMOGLOBIN g/dL 8 9*   HEMATOCRIT % 29 6*   PLATELETS Thousands/uL 168   NEUTROS PCT % 78*   LYMPHS PCT % 15   MONOS PCT % 5   EOS PCT % 1     Results from last 7 days   Lab Units 09/15/22  0946   SODIUM mmol/L 137   POTASSIUM mmol/L 5 2   CHLORIDE mmol/L 111*   CO2 mmol/L 18*   BUN mg/dL 66*   CREATININE mg/dL 3 08*   ANION GAP mmol/L 8   CALCIUM mg/dL 8 7   ALBUMIN g/dL 2 9*   TOTAL BILIRUBIN mg/dL 0 73   ALK PHOS U/L 422*   ALT U/L 43   AST U/L 37   GLUCOSE RANDOM mg/dL 167*                       Imaging: Personally reviewed the following imaging: CT head  XR chest 2 views   Final Result by Enrico Walters MD (09/15 1129)   Improved right effusion with small residual fluid  No acute cardiopulmonary disease  Workstation performed: [id number]         CT head without contrast   Final Result by Gloria Boles MD (09/15 1012)      No acute intracranial abnormality  Workstation performed: [id number]         MRI Inpatient Order    (Results Pending)       EKG and Other Studies Reviewed on Admission:   · EKG: NSR  HR 72     ** Please Note: This note has been constructed using a voice recognition system   **

## 2022-09-15 NOTE — ASSESSMENT & PLAN NOTE
- presented with 24 hour history of dizziness, headache, and right lower extremity numbness  - headache has since resolved with Tylenol, and dizziness has subsided, however she still has some persistent right lower extremity subjective weakness  - workup in the ED unremarkable including CT of the head, no focal deficits on examination though does have subjective weakness in the right lower extremity    - will admit for stroke protocol  - MRI of the brain  - echocardiogram  - telemetry monitoring  - neurochecks per standard  - hemoglobin A1c and lipid panel  - neurology consultation

## 2022-09-15 NOTE — ASSESSMENT & PLAN NOTE
- poorly controlled; last hemoglobin A1c 8 7%    - recheck hemoglobin A1c  - continue 70/30 insulin 10 units b i d  with meals  - ISS for additional coverage  - hypoglycemia protocol

## 2022-09-15 NOTE — ASSESSMENT & PLAN NOTE
Presented with dzziness and right dorsal foot numbness started at the same time on 09/14/2022 morning  In ED, elevated BP  Patient's dizziness resolved with meclizine, metoclopramide and diphenhydramine  However, right foot numbness is still lingering  Exam: reduced pinprick sensation and questionable reduced vibration sense on right dorsal foot  No weakness  May 2022: A1C 8 4,     Plan:  Continue ASA 81mg QD and atorvastatin 40mg QD  MRI brain wo pending  A1c and LDL pending  Echo pending  If MRI brain wo is negative, no further management from neurology  Recommend reduction of stroke risk factors  Neurology will follow for MRI result

## 2022-09-15 NOTE — ASSESSMENT & PLAN NOTE
- on multiple antihypertensive agents  - will hold home hydralazine and amlodipine for permissive hypertension  - continue Coreg and Imdur

## 2022-09-15 NOTE — ED PROVIDER NOTES
History  Chief Complaint   Patient presents with    Dizziness     Pt reports dizziness and weakness that started yesterday   Pt reports that she is dizzy all the time  Pt reports that she vomited this morning and has not been able to eat since yesterday  Pt states she has been drinking water  Pt states she also has a headache  pt reported " I am spinning "      74 yo morbidly obese female with a complicated past medical history including CHF, CKD, CAD, HTN, and hypothyroidism presents to the ED complaining of dizziness x 1 day  The patient says she feels like "the room is spinning" since yesterday morning  (+) Nausea and one episode of vomiting this morning  (+) Mild frontal "throbbing" headache  (+) Generalized weakness  The patient also states that her left leg "felt asleep" this morning when she woke up but this sensation has been slowly improving  No chest pain, shortness of breath, or cough  No fevers/chills  No visual disturbance  No photophobia  No facial asymmetry or speech difficulty  No other specific complaints  Prior to Admission Medications   Prescriptions Last Dose Informant Patient Reported? Taking?    Droplet Pen Needles 32G X 4 MM MISC  Self Yes No   Si (two) times a day   Lancets (OneTouch Delica Plus [id number]) MISC   Yes No   Sig: use to TEST BLOOD SUGAR three times a day as directed   OneTouch Verio test strip   Yes No   Sig: use to TEST BLOOD SUGAR three times a day as directed   amLODIPine (NORVASC) 10 mg tablet   No No   Sig: Take 1 tablet (10 mg total) by mouth daily   aspirin (RA Aspirin Adult Low Dose) 81 mg chewable tablet   No No   Sig: Chew 1 tablet (81 mg total) daily   atorvastatin (LIPITOR) 40 mg tablet   No No   Sig: Take 2 tablets (80 mg total) by mouth daily   bumetanide (BUMEX) 2 mg tablet   No No   Sig: Take 1 tablet (2 mg total) by mouth 2 (two) times a day Start bumex 1 mg BID from 22,   bumetanide (BUMEX) 2 mg tablet   No No   Sig: Take 1 tablet (2 mg total) by mouth 2 (two) times a day Start bumex 1 mg BID from Monday 2/21/22,   Patient not taking: Reported on 8/8/2022   carvedilol (COREG) 12 5 mg tablet   No No   Sig: Take 1 tablet (12 5 mg total) by mouth 2 (two) times a day with meals   cholecalciferol (VITAMIN D3) 1,000 units tablet   No No   Sig: Take 2 tablets (2,000 Units total) by mouth daily   hydrALAZINE (APRESOLINE) 100 MG tablet   No No   Sig: Take 1 tablet (100 mg total) by mouth 3 (three) times a day   insulin aspart protamine-insulin aspart (NovoLOG 70/30) 100 units/mL injection  Self No No   Sig: As per your current blood sugars, take 15 units before breakfast, 10 units before dinner   isosorbide mononitrate (IMDUR) 120 mg 24 hr tablet   No No   Sig: Take 1 tablet (120 mg total) by mouth daily   levothyroxine 125 mcg tablet   Yes No   Sig: Take 125 mcg by mouth daily      Facility-Administered Medications: None       Past Medical History:   Diagnosis Date    Anemia     CHF (congestive heart failure) (HCC)     Chronic kidney disease     Coronary artery disease     Diabetes mellitus ([facility] [address] )     Hypertension     Hypothyroidism        Past Surgical History:   Procedure Laterality Date    CORONARY ANGIOPLASTY WITH STENT PLACEMENT  2019       Family History   Problem Relation Age of Onset    Diabetes Mother     Heart attack Father         MI    Hypertension Brother      I have reviewed and agree with the history as documented  E-Cigarette/Vaping    E-Cigarette Use Never User      E-Cigarette/Vaping Substances    Nicotine No     THC No     CBD No     Flavoring No     Other No     Unknown No      Social History     Tobacco Use    Smoking status: Former Smoker    Smokeless tobacco: Never Used   Vaping Use    Vaping Use: Never used   Substance Use Topics    Alcohol use: Yes     Comment: occ    Drug use: No       Review of Systems   Constitutional: Negative for chills and fever  HENT: Negative for sore throat      Eyes: Negative for visual disturbance  Respiratory: Negative for shortness of breath  Cardiovascular: Negative for chest pain  Gastrointestinal: Positive for nausea and vomiting  Negative for abdominal pain and diarrhea  Endocrine: Negative for cold intolerance and heat intolerance  Genitourinary: Negative for dysuria and frequency  Musculoskeletal: Negative for back pain  Skin: Negative for rash  Allergic/Immunologic: Negative for immunocompromised state  Neurological: Positive for dizziness, weakness and headaches  Negative for facial asymmetry, speech difficulty and numbness  Hematological: Negative for adenopathy  Psychiatric/Behavioral: Negative for self-injury  Physical Exam  Physical Exam  Constitutional:       General: She is not in acute distress  Appearance: She is well-developed  HENT:      Head: Normocephalic and atraumatic  Eyes:      Pupils: Pupils are equal, round, and reactive to light  Cardiovascular:      Rate and Rhythm: Normal rate and regular rhythm  Pulmonary:      Effort: Pulmonary effort is normal       Breath sounds: Normal breath sounds  Abdominal:      General: There is no distension  Palpations: Abdomen is soft  Tenderness: There is no abdominal tenderness  Musculoskeletal:         General: Normal range of motion  Cervical back: Normal range of motion and neck supple  Skin:     General: Skin is warm and dry  Neurological:      General: No focal deficit present  Mental Status: She is alert and oriented to person, place, and time  Cranial Nerves: Cranial nerves are intact  Motor: Motor function is intact  Coordination: Coordination is intact  Gait: Gait is intact           Vital Signs  ED Triage Vitals   Temperature Pulse Respirations Blood Pressure SpO2   09/15/22 0933 09/15/22 0933 09/15/22 0933 09/15/22 0933 09/15/22 0933   98 8 °F (37 1 °C) 66 20 (!) 207/88 99 %      Temp src Heart Rate Source Patient Position - Orthostatic VS BP Location FiO2 (%)   -- 09/15/22 0933 09/15/22 0933 09/15/22 0933 --    Monitor Sitting Right arm       Pain Score       09/15/22 1005       8           Vitals:    09/15/22 0933 09/15/22 1040 09/15/22 1200 09/15/22 1300   BP: (!) 207/88 (!) 189/88 123/57 151/66   Pulse: 66 70 58 60   Patient Position - Orthostatic VS: Sitting   Lying         Visual Acuity  Visual Acuity    Flowsheet Row Most Recent Value   L Pupil Size (mm) 3   R Pupil Size (mm) 3          ED Medications  Medications   meclizine (ANTIVERT) tablet 25 mg (25 mg Oral Given 9/15/22 1005)   metoclopramide (REGLAN) injection 10 mg (10 mg Intravenous Given 9/15/22 1006)   diphenhydrAMINE (BENADRYL) injection 25 mg (25 mg Intravenous Given 9/15/22 1006)   acetaminophen (TYLENOL) tablet 650 mg (650 mg Oral Given 9/15/22 1005)   hydrALAZINE (APRESOLINE) tablet 100 mg (100 mg Oral Given 9/15/22 1040)   carvedilol (COREG) tablet 12 5 mg (12 5 mg Oral Given 9/15/22 1040)       Diagnostic Studies  Results Reviewed     Procedure Component Value Units Date/Time    HS Troponin I 2hr [[id number]]  (Normal) Collected: 09/15/22 1146    Lab Status: Final result Specimen: Blood from Arm, Left Updated: 09/15/22 1221     hs TnI 2hr 36 ng/L      Delta 2hr hsTnI -4 ng/L     Comprehensive metabolic panel [[id number]]  (Abnormal) Collected: 09/15/22 0946    Lab Status: Final result Specimen: Blood from Arm, Left Updated: 09/15/22 1109     Sodium 137 mmol/L      Potassium 5 2 mmol/L      Chloride 111 mmol/L      CO2 18 mmol/L      ANION GAP 8 mmol/L      BUN 66 mg/dL      Creatinine 3 08 mg/dL      Glucose 167 mg/dL      Calcium 8 7 mg/dL      Corrected Calcium 9 6 mg/dL      AST 37 U/L      ALT 43 U/L      Alkaline Phosphatase 422 U/L      Total Protein 7 7 g/dL      Albumin 2 9 g/dL      Total Bilirubin 0 73 mg/dL      eGFR 14 ml/min/1 73sq m     Narrative:      [place] guidelines for Chronic Kidney Disease (CKD):     Stage 1 with normal or high GFR (GFR > 90 mL/min/1 73 square meters)    Stage 2 Mild CKD (GFR = 60-89 mL/min/1 73 square meters)    Stage 3A Moderate CKD (GFR = 45-59 mL/min/1 73 square meters)    Stage 3B Moderate CKD (GFR = 30-44 mL/min/1 73 square meters)    Stage 4 Severe CKD (GFR = 15-29 mL/min/1 73 square meters)    Stage 5 End Stage CKD (GFR <15 mL/min/1 73 square meters)  Note: GFR calculation is accurate only with a steady state creatinine    TSH [[id number]]  (Normal) Collected: 09/15/22 0946    Lab Status: Final result Specimen: Blood from Arm, Left Updated: 09/15/22 1109     TSH 3RD GENERATON 2 570 uIU/mL     Narrative:      Patients undergoing fluorescein dye angiography may retain small amounts of fluorescein in the body for 48-72 hours post procedure  Samples containing fluorescein can produce falsely depressed TSH values  If the patient had this procedure,a specimen should be resubmitted post fluorescein clearance  COVID only [[id number]]  (Normal) Collected: 09/15/22 0946    Lab Status: Final result Specimen: Nares from Nose Updated: 09/15/22 1057     SARS-CoV-2 Negative    Narrative:      FOR PEDIATRIC PATIENTS - copy/paste COVID Guidelines URL to browser: [email or web address]    SARS-CoV-2 assay is a Nucleic Acid Amplification assay intended for the  qualitative detection of nucleic acid from SARS-CoV-2 in nasopharyngeal  swabs  Results are for the presumptive identification of SARS-CoV-2 RNA  Positive results are indicative of infection with SARS-CoV-2, the virus  causing COVID-19, but do not rule out bacterial infection or co-infection  with other viruses  Laboratories within the [place] and its  territories are required to report all positive results to the appropriate  public health authorities   Negative results do not preclude SARS-CoV-2  infection and should not be used as the sole basis for treatment or other  patient management decisions  Negative results must be combined with  clinical observations, patient history, and epidemiological information  This test has not been FDA cleared or approved  This test has been authorized by FDA under an Emergency Use Authorization  (EUA)  This test is only authorized for the duration of time the  declaration that circumstances exist justifying the authorization of the  emergency use of an in vitro diagnostic tests for detection of SARS-CoV-2  virus and/or diagnosis of COVID-19 infection under section 564(b)(1) of  the Act, 21 U  S C  599ZMF-0(I)(4), unless the authorization is terminated  or revoked sooner  The test has been validated but independent review by FDA  and CLIA is pending  Test performed using [employer] Gene[employer]pert: This RT-PCR assay targets N2,  a region unique to SARS-CoV-2  A conserved region in the E-gene was chosen  for pan-Sarbecovirus detection which includes SARS-CoV-2  Urine Microscopic [[id number]]  (Abnormal) Collected: 09/15/22 1014    Lab Status: Final result Specimen: Urine, Other Updated: 09/15/22 1039     RBC, UA 1-2 /hpf      WBC, UA 20-30 /hpf      Epithelial Cells Occasional /hpf      Bacteria, UA None Seen /hpf     Urine culture [[id number]] Collected: 09/15/22 1014    Lab Status:  In process Specimen: Urine, Other Updated: 09/15/22 1039    HS Troponin 0hr (reflex protocol) [[id number]]  (Normal) Collected: 09/15/22 0946    Lab Status: Final result Specimen: Blood from Arm, Left Updated: 09/15/22 1038     hs TnI 0hr 40 ng/L     UA w Reflex to Microscopic w Reflex to Culture [[id number]]  (Abnormal) Collected: 09/15/22 1014    Lab Status: Final result Specimen: Urine, Other Updated: 09/15/22 1037     Color, UA Light Yellow     Clarity, UA Clear     Specific Gravity, UA 1 016     pH, UA 5 5     Leukocytes, UA Small     Nitrite, UA Negative     Protein,  (3+) mg/dl      Glucose, UA Trace mg/dl      Ketones, UA Negative mg/dl      Urobilinogen, UA <2 0 mg/dl Bilirubin, UA Negative     Occult Blood, UA Negative    NT-BNP PRO [[id number]]  (Abnormal) Collected: 09/15/22 0946    Lab Status: Final result Specimen: Blood from Arm, Left Updated: 09/15/22 1034     NT-proBNP 18,052 pg/mL     CBC and differential [[id number]]  (Abnormal) Collected: 09/15/22 0946    Lab Status: Final result Specimen: Blood from Arm, Left Updated: 09/15/22 0959     WBC 7 45 Thousand/uL      RBC 3 17 Million/uL      Hemoglobin 8 9 g/dL      Hematocrit 29 6 %      MCV 93 fL      MCH 28 1 pg      MCHC 30 1 g/dL      RDW 13 5 %      MPV 11 0 fL      Platelets 098 Thousands/uL      nRBC 0 /100 WBCs      Neutrophils Relative 78 %      Immat GRANS % 0 %      Lymphocytes Relative 15 %      Monocytes Relative 5 %      Eosinophils Relative 1 %      Basophils Relative 1 %      Neutrophils Absolute 5 79 Thousands/µL      Immature Grans Absolute 0 03 Thousand/uL      Lymphocytes Absolute 1 13 Thousands/µL      Monocytes Absolute 0 36 Thousand/µL      Eosinophils Absolute 0 08 Thousand/µL      Basophils Absolute 0 06 Thousands/µL                  XR chest 2 views   Final Result by Glenny Hills MD (09/15 1129)   Improved right effusion with small residual fluid  No acute cardiopulmonary disease  Workstation performed: [id number]         CT head without contrast   Final Result by Arline Baumgarten, MD (09/15 1012)      No acute intracranial abnormality                    Workstation performed: [id number]                    Procedures  ECG 12 Lead Documentation Only    Date/Time: 9/15/2022 9:50 AM  Performed by: Bria Shaw MD  Authorized by: Bria Shaw MD     Indications / Diagnosis:  Dizziness  ECG reviewed by me, the ED Provider: yes    Patient location:  ED  Interpretation:     Interpretation: abnormal    Quality:     Tracing quality:  Limited by artifact  Rate:     ECG rate:  72 bpm    ECG rate assessment: normal    Rhythm:     Rhythm: sinus rhythm    Ectopy:     Ectopy: none    QRS:     QRS axis:  Normal    QRS intervals:  Normal  Conduction:     Conduction: normal    ST segments:     ST segments:  Non-specific  T waves:     T waves: non-specific               ED Course                                             MDM  Number of Diagnoses or Management Options  Dizziness  Weakness of right lower extremity  Diagnosis management comments: The patient is well appearing with a benign neurologic exam  BP moderately elevated on arrival, likely secondary to admitted medication noncompliance this morning  Unclear etiology of her symptoms  Will check EKG, CXR, head CT, basic labs, troponin, BNP, TSH, and UA  Meclizine, Reglan, APAP, and home BP medications ordered  Will continue to monitor in the ED  13:30 Workup unremarkable  Patient reassessed --> dizziness resolved but right lower leg still feels "a little asleep"  Case discussed with Neurology --> they recommended admission to [facility] for Neurology consult and further workup  SLIM paged  13:55 Patient accepted to Dr Bill Carrington service  Inpatient bed requested         Amount and/or Complexity of Data Reviewed  Clinical lab tests: ordered and reviewed  Tests in the radiology section of CPT®: ordered and reviewed  Tests in the medicine section of CPT®: ordered and reviewed    Patient Progress  Patient progress: stable      Disposition  Final diagnoses:   Dizziness   Weakness of right lower extremity     Time reflects when diagnosis was documented in both MDM as applicable and the Disposition within this note     Time User Action Codes Description Comment    9/15/2022  1:54 PM Harish Gill Add [R42] Dizziness     9/15/2022  1:54 PM Parker Cardenas Add [R29 [id number]] Weakness of right lower extremity       ED Disposition     ED Disposition   Admit    Condition   Stable    Date/Time   u Sep 15, 2022  1:54 PM    Comment   Case was discussed with MELVI and the patient's admission status was agreed to be Admission Status: observation status to the service of Dr Skye Chambers  Follow-up Information    None         Patient's Medications   Discharge Prescriptions    No medications on file       No discharge procedures on file      PDMP Review       Value Time User    PDMP Reviewed  Yes 2/15/2022  1:24 PM Meño Jordan 52, DO          ED Provider  Electronically Signed by           Giorgio Teran MD  09/15/22 [id number]

## 2022-09-16 ENCOUNTER — APPOINTMENT (OUTPATIENT)
Dept: RADIOLOGY | Facility: HOSPITAL | Age: 67
End: 2022-09-16
Payer: COMMERCIAL

## 2022-09-16 VITALS
HEIGHT: 62 IN | BODY MASS INDEX: 28.71 KG/M2 | RESPIRATION RATE: 16 BRPM | OXYGEN SATURATION: 97 % | TEMPERATURE: 97.4 F | DIASTOLIC BLOOD PRESSURE: 61 MMHG | WEIGHT: 156 LBS | HEART RATE: 55 BPM | SYSTOLIC BLOOD PRESSURE: 138 MMHG

## 2022-09-16 PROBLEM — E11.9 TYPE 2 DIABETES MELLITUS, WITH LONG-TERM CURRENT USE OF INSULIN (HCC): Status: ACTIVE | Noted: 2022-09-16

## 2022-09-16 PROBLEM — Z79.4 TYPE 2 DIABETES MELLITUS, WITH LONG-TERM CURRENT USE OF INSULIN (HCC): Status: ACTIVE | Noted: 2022-09-16

## 2022-09-16 PROBLEM — I65.23 STENOSIS OF BOTH INTERNAL CAROTID ARTERIES: Status: ACTIVE | Noted: 2022-09-16

## 2022-09-16 PROBLEM — R29.90 STROKE-LIKE SYMPTOMS: Status: RESOLVED | Noted: 2022-09-15 | Resolved: 2022-09-16

## 2022-09-16 LAB
ANION GAP SERPL CALCULATED.3IONS-SCNC: 6 MMOL/L (ref 4–13)
BUN SERPL-MCNC: 73 MG/DL (ref 5–25)
CALCIUM SERPL-MCNC: 8.9 MG/DL (ref 8.3–10.1)
CHLORIDE SERPL-SCNC: 109 MMOL/L (ref 96–108)
CHOLEST SERPL-MCNC: 221 MG/DL
CO2 SERPL-SCNC: 22 MMOL/L (ref 21–32)
CREAT SERPL-MCNC: 3.07 MG/DL (ref 0.6–1.3)
ERYTHROCYTE [DISTWIDTH] IN BLOOD BY AUTOMATED COUNT: 13.2 % (ref 11.6–15.1)
EST. AVERAGE GLUCOSE BLD GHB EST-MCNC: 189 MG/DL
GFR SERPL CREATININE-BSD FRML MDRD: 14 ML/MIN/1.73SQ M
GLUCOSE SERPL-MCNC: 201 MG/DL (ref 65–140)
GLUCOSE SERPL-MCNC: 266 MG/DL (ref 65–140)
GLUCOSE SERPL-MCNC: 300 MG/DL (ref 65–140)
GLUCOSE SERPL-MCNC: 89 MG/DL (ref 65–140)
GLUCOSE SERPL-MCNC: 96 MG/DL (ref 65–140)
HBA1C MFR BLD: 8.2 %
HCT VFR BLD AUTO: 25.7 % (ref 34.8–46.1)
HDLC SERPL-MCNC: 59 MG/DL
HGB BLD-MCNC: 7.8 G/DL (ref 11.5–15.4)
LDLC SERPL CALC-MCNC: 141 MG/DL (ref 0–100)
MCH RBC QN AUTO: 27.9 PG (ref 26.8–34.3)
MCHC RBC AUTO-ENTMCNC: 30.4 G/DL (ref 31.4–37.4)
MCV RBC AUTO: 92 FL (ref 82–98)
PLATELET # BLD AUTO: 118 THOUSANDS/UL (ref 149–390)
PMV BLD AUTO: 11.8 FL (ref 8.9–12.7)
POTASSIUM SERPL-SCNC: 4.7 MMOL/L (ref 3.5–5.3)
RBC # BLD AUTO: 2.8 MILLION/UL (ref 3.81–5.12)
SODIUM SERPL-SCNC: 137 MMOL/L (ref 135–147)
TRIGL SERPL-MCNC: 106 MG/DL
WBC # BLD AUTO: 4.67 THOUSAND/UL (ref 4.31–10.16)

## 2022-09-16 PROCEDURE — 83036 HEMOGLOBIN GLYCOSYLATED A1C: CPT | Performed by: INTERNAL MEDICINE

## 2022-09-16 PROCEDURE — G1004 CDSM NDSC: HCPCS

## 2022-09-16 PROCEDURE — 80061 LIPID PANEL: CPT | Performed by: INTERNAL MEDICINE

## 2022-09-16 PROCEDURE — 80048 BASIC METABOLIC PNL TOTAL CA: CPT | Performed by: INTERNAL MEDICINE

## 2022-09-16 PROCEDURE — 99217 PR OBSERVATION CARE DISCHARGE MANAGEMENT: CPT | Performed by: PHYSICIAN ASSISTANT

## 2022-09-16 PROCEDURE — 82948 REAGENT STRIP/BLOOD GLUCOSE: CPT

## 2022-09-16 PROCEDURE — 97166 OT EVAL MOD COMPLEX 45 MIN: CPT

## 2022-09-16 PROCEDURE — 70551 MRI BRAIN STEM W/O DYE: CPT

## 2022-09-16 PROCEDURE — 85027 COMPLETE CBC AUTOMATED: CPT | Performed by: INTERNAL MEDICINE

## 2022-09-16 PROCEDURE — 70544 MR ANGIOGRAPHY HEAD W/O DYE: CPT

## 2022-09-16 PROCEDURE — 70547 MR ANGIOGRAPHY NECK W/O DYE: CPT

## 2022-09-16 PROCEDURE — 97162 PT EVAL MOD COMPLEX 30 MIN: CPT

## 2022-09-16 PROCEDURE — NC001 PR NO CHARGE: Performed by: PHYSICIAN ASSISTANT

## 2022-09-16 PROCEDURE — 99215 OFFICE O/P EST HI 40 MIN: CPT | Performed by: INTERNAL MEDICINE

## 2022-09-16 RX ORDER — AMLODIPINE BESYLATE 10 MG/1
10 TABLET ORAL DAILY
Status: DISCONTINUED | OUTPATIENT
Start: 2022-09-17 | End: 2022-09-16 | Stop reason: HOSPADM

## 2022-09-16 RX ORDER — ATORVASTATIN CALCIUM 80 MG/1
80 TABLET, FILM COATED ORAL EVERY EVENING
Status: DISCONTINUED | OUTPATIENT
Start: 2022-09-16 | End: 2022-09-16 | Stop reason: HOSPADM

## 2022-09-16 RX ORDER — HYDRALAZINE HYDROCHLORIDE 50 MG/1
100 TABLET, FILM COATED ORAL EVERY 8 HOURS SCHEDULED
Status: DISCONTINUED | OUTPATIENT
Start: 2022-09-16 | End: 2022-09-16 | Stop reason: HOSPADM

## 2022-09-16 RX ADMIN — INSULIN ASPART 10 UNITS: 100 INJECTION, SUSPENSION SUBCUTANEOUS at 09:26

## 2022-09-16 RX ADMIN — INSULIN LISPRO 2 UNITS: 100 INJECTION, SOLUTION INTRAVENOUS; SUBCUTANEOUS at 11:18

## 2022-09-16 RX ADMIN — BUMETANIDE 2 MG: 2 TABLET ORAL at 09:14

## 2022-09-16 RX ADMIN — ISOSORBIDE MONONITRATE 120 MG: 60 TABLET, EXTENDED RELEASE ORAL at 09:13

## 2022-09-16 RX ADMIN — HEPARIN SODIUM 5000 UNITS: 5000 INJECTION INTRAVENOUS; SUBCUTANEOUS at 06:25

## 2022-09-16 RX ADMIN — LORAZEPAM 0.5 MG: 2 INJECTION INTRAMUSCULAR; INTRAVENOUS at 00:07

## 2022-09-16 RX ADMIN — ATORVASTATIN CALCIUM 80 MG: 80 TABLET, FILM COATED ORAL at 16:42

## 2022-09-16 RX ADMIN — CARVEDILOL 12.5 MG: 12.5 TABLET, FILM COATED ORAL at 09:18

## 2022-09-16 RX ADMIN — ASPIRIN 81 MG CHEWABLE TABLET 81 MG: 81 TABLET CHEWABLE at 09:13

## 2022-09-16 RX ADMIN — CARVEDILOL 12.5 MG: 12.5 TABLET, FILM COATED ORAL at 16:42

## 2022-09-16 RX ADMIN — BUMETANIDE 2 MG: 2 TABLET ORAL at 16:43

## 2022-09-16 RX ADMIN — INSULIN LISPRO 1 UNITS: 100 INJECTION, SOLUTION INTRAVENOUS; SUBCUTANEOUS at 16:48

## 2022-09-16 RX ADMIN — HYDRALAZINE HYDROCHLORIDE 100 MG: 50 TABLET, FILM COATED ORAL at 14:57

## 2022-09-16 RX ADMIN — Medication 2000 UNITS: at 09:14

## 2022-09-16 RX ADMIN — INSULIN ASPART 10 UNITS: 100 INJECTION, SUSPENSION SUBCUTANEOUS at 16:48

## 2022-09-16 RX ADMIN — LEVOTHYROXINE SODIUM 125 MCG: 125 TABLET ORAL at 09:14

## 2022-09-16 RX ADMIN — HEPARIN SODIUM 5000 UNITS: 5000 INJECTION INTRAVENOUS; SUBCUTANEOUS at 14:07

## 2022-09-16 NOTE — ASSESSMENT & PLAN NOTE
MRA noting mild stenosis in bilateral ICA cavernous segments (left worse than right)    · Continue with aspirin and statin

## 2022-09-16 NOTE — ASSESSMENT & PLAN NOTE
· Initially allowing for permissive HTN, now goal normotension   · Continue home dose hydralazine, amlodipine, Coreg and Imdur

## 2022-09-16 NOTE — PLAN OF CARE
Problem: Potential for Falls  Goal: Patient will remain free of falls  Description: INTERVENTIONS:  - Educate patient/family on patient safety including physical limitations  - Instruct patient to call for assistance with activity   - Consult OT/PT to assist with strengthening/mobility   - Keep Call bell within reach  - Keep bed low and locked with side rails adjusted as appropriate  - Keep care items and personal belongings within reach  - Initiate and maintain comfort rounds  - Apply yellow socks and bracelet for high fall risk patients  - Consider moving patient to room near nurses station  Outcome: Progressing     Problem: Neurological Deficit  Goal: Neurological status is stable or improving  Description: Interventions:  - Monitor and assess patient's level of consciousness, motor function, sensory function, and level of assistance needed for ADLs  - Monitor and report changes from baseline  Collaborate with interdisciplinary team to initiate plan and implement interventions as ordered  - Provide and maintain a safe environment    Outcome: Progressing

## 2022-09-16 NOTE — ASSESSMENT & PLAN NOTE
Lab Results   Component Value Date    HGBA1C 8 2 (H) 09/16/2022       Recent Labs     09/15/22  2039 09/16/22  0642 09/16/22  0920 09/16/22  1045   POCGLU 215* 96 300* 266*       Blood Sugar Average: Last 72 hrs:  (P) 207   · Poorly controlled evidenced by A1c as above  · Continue home dose 70/30 insulin 10 units b i d  with meals  · QID accuchecks with SSI for additional coverage  · Hypoglycemia protocol

## 2022-09-16 NOTE — CONSULTS
Consultation - Electrophysiology - Cardiology  Deuce Valverde 75 y o  female MRN: [id number]  Unit/Bed#: [facility] 702-01 Encounter: [id number]      Inpatient consult to Cardiology  Consult performed by: Irina Mahoney MD  Consult ordered by: Mara Sam PA-C        This is breathing incentiv  History of Present Illness   Physician Requesting Consult: aKth Flowers MD  Reason for Consult / Principal Problem:     Outpatient Cardiologist: Dr Dolan Erps  Assessment/Plan   Assessment/Plan:   Deuce Valverde is a 75y o  year old female with PMH of CAD (NSTEMI s/p LUDY x2 to LAD, also has 80% stenosis of RCA that was not intervened on), hx of HFpEF stage C, HTN , HLD, CKD4, DM2 who presented to the ED with dizziness, headache, RLE numbness- cardiology consulted for newly reduced LVEF to 40%  #HFrEF  Has hx of HFpEF (LVEF 60% with hypokinesis of inferior wall 7/2021) now with LVEF of 40% with akinesis of basal inferoseptal and basal inferior wall as well as hypokinesis of basal inerolateral, mid inferior, and mid inferolateral, and apical inferior  NT pro BNP 18K recent baseline 15K-20K  Her LVEF decreased despite medical management however patient wants to avoid further contrast as she has had ARTEM in the past    Despite the reduction in LVEF she has not decompensated- her weight is stable, she is not volume overloaded- and is still able to walk long distances with only some shortness of breath  Given that she is not having any worsening CHF of CAD symptoms will continue with medical management for now  Patient does not want another cardiac cath because she wants to avoid contrast given history of ARTEM  -CKD precludes ACEi, ARB, ARNI   -continue home bumex 2mg BID  -weight: 156lbs, dry weight 157 lbs  -follow up with Dr Dolan Erps  #CAD  Hx NSTEMI 2019 s/p LUDY x2 to LAD, also has 80% stenosis of RCA that was not intervened on   -continue home aspirin 81mg, atorvastatin 80mg     -repeat EKG     #HTN: Was hypertensive on arrival but BP improved now  Continue home amlodipine 10mg, imdur 120mg, hydralazine 100mg TID, coreg 12 5mg BID  #CKD 4: Cr 3 0 on admission which is slightly more than baseline  Patient wants to avoid contrast to prevent another ARTEM episode  #HLD  LDL 74 in 2021 to 141 now  On atorvastatin 80mg daily     -add zetia 10mg  HPI: Madelyn Mcgee is a 75y o  year old female with PMH of CAD (NSTEMI s/p LUDY x2 to LAD, also has 80% stenosis of RCA that was not intervened on), hx of HFpEF stage C, HTN , HLD, CKD4, DM2 who presented to the ED with dizziness, headache, RLE numbness- cardiology consulted for newly reduced LVEF to 40%  Due to presenting symptoms of numbness and lightheadedness, neurology was involved for stroke work up  Headache and lightheadedness quickly resolved however RLE numbness has persisted  CT head without acute intracranial abnormality  MRI brain was normal  MRA brain showed mild stenosis in bilateral ICA cavernous segments (left worse than right)  Otherwise, normal MRA Brain  TTE was done yesterday for stroke work up and showed:     Left Ventricle: Left ventricular cavity size is mildly dilated  Wall thickness is increased  There is eccentric hypertrophy  The left ventricular ejection fraction is 40%  Systolic function is mildly reduced  Diastolic function is moderately abnormal, consistent with grade II (pseudonormal) relaxation    The following segments are akinetic: basal inferoseptal and basal inferior    The following segments are hypokinetic: basal inferolateral, mid inferior, mid inferolateral and apical inferior    All other segments are normal     Right Ventricle: Systolic function is low normal     Left Atrium: The atrium is mildly dilated    Mitral Valve: There is mild annular calcification    Pericardium: There is a small pericardial effusion circumferential to the heart   There is no echocardiographic evidence of tamponade      hstrop 40-> 36  NT pro BNP 18K recent baseline 15K-20K     The patient feels well today  Her headache, lightheadedness, and numbness have all resolved  She has no chest pain, shortness of breath, swollen legs, orthopnea, or PND  Historical Information   Past Medical History:   Diagnosis Date    Anemia     [facility]congest[facility]) [facility]     Chronic kidney disease     Coronary artery disease     Diabetes mellitus ([facility] )     Hypertension     Hypothyroidism      Past Surgical History:   Procedure Laterality Date    CORONARY ANGIOPLASTY WITH STENT PLACEMENT  2019     Social History     Substance and Sexual Activity   Alcohol Use Yes    Comment: occ     Social History     Substance and Sexual Activity   Drug Use No     Social History     Tobacco Use   Smoking Status Former Smoker   Smokeless Tobacco Never Used     Family History: father had heart attack       Meds/Allergies   Hospital Medications:   Current Facility-Administered Medications   Medication Dose Route Frequency    acetaminophen (TYLENOL) tablet 650 mg  650 mg Oral Q4H PRN    [START ON 9/17/2022] amLODIPine (NORVASC) tablet 10 mg  10 mg Oral Daily    aspirin chewable tablet 81 mg  81 mg Oral Daily    atorvastatin (LIPITOR) tablet 40 mg  40 mg Oral QPM    bumetanide (BUMEX) tablet 2 mg  2 mg Oral BID    carvedilol (COREG) tablet 12 5 mg  12 5 mg Oral BID With Meals    cholecalciferol (VITAMIN D3) tablet 2,000 Units  2,000 Units Oral Daily    heparin (porcine) subcutaneous injection 5,000 Units  5,000 Units Subcutaneous Q8H [facility]    hydrALAZINE (APRESOLINE) tablet 100 mg  100 mg Oral Q8H [facility]    insulin aspart protamine-insulin aspart (NovoLOG 70/30) 100 units/mL subcutaneous injection 10 Units  10 Units Subcutaneous BID AC    insulin lispro (HumaLOG) 100 units/mL subcutaneous injection 1-5 Units  1-5 Units Subcutaneous TID AC    insulin lispro (HumaLOG) 100 units/mL subcutaneous injection 1-5 Units  1-5 Units Subcutaneous HS    isosorbide mononitrate (IMDUR) 24 hr tablet 120 mg  120 mg Oral Daily    levothyroxine tablet 125 mcg  125 mcg Oral Daily    LORazepam (ATIVAN) injection 0 5 mg  0 5 mg Intravenous Once PRN    melatonin tablet 3 mg  3 mg Oral HS    ondansetron (ZOFRAN) injection 4 mg  4 mg Intravenous Q6H PRN     Home Medications:   Medications Prior to Admission   Medication    amLODIPine (NORVASC) 10 mg tablet    aspirin (RA Aspirin Adult Low Dose) 81 mg chewable tablet    atorvastatin (LIPITOR) 40 mg tablet    bumetanide (BUMEX) 2 mg tablet    carvedilol (COREG) 12 5 mg tablet    cholecalciferol (VITAMIN D3) 1,000 units tablet    Droplet Pen Needles 32G X 4 MM MISC    hydrALAZINE (APRESOLINE) 100 MG tablet    insulin aspart protamine-insulin aspart (NovoLOG 70/30) 100 units/mL injection    isosorbide mononitrate (IMDUR) 120 mg 24 hr tablet    Lancets (OneTouch Delica Plus [id number]) MISC    levothyroxine 125 mcg tablet    OneTouch Verio test strip       Allergies   Allergen Reactions    Iv Contrast [Iodinated Diagnostic Agents] Itching     Caused KELLY    Nifedipine Rash       Objective   Vitals: Blood pressure 154/65, pulse 57, temperature (!) 97 4 °F (36 3 °C), resp  rate 16, height 5' 2" (1 575 m), weight 70 8 kg (156 lb), SpO2 96 %, currently breastfeeding  Orthostatic Blood Pressures    Flowsheet Row Most Recent Value   Blood Pressure 154/65 filed at 09/16/2022 1421   Patient Position - Orthostatic VS Lying filed at 09/15/2022 2034            Intake/Output Summary (Last 24 hours) at 9/16/2022 1431  Last data filed at 9/16/2022 0900  Gross per 24 hour   Intake 300 ml   Output --   Net 300 ml       Invasive Devices  Report    Peripheral Intravenous Line  Duration           Peripheral IV 09/15/22 Left Antecubital 1 day                Review of Systems:  ROS  ROS as noted above, otherwise 12 point review of systems was performed and is negative       Physical Exam:   Physical Exam  Constitutional:       General: She is not in acute distress  HENT:      Head: Normocephalic  Eyes:      Extraocular Movements: Extraocular movements intact  Neck:      Comments: No JVD seen  Cardiovascular:      Rate and Rhythm: Normal rate and regular rhythm  Heart sounds: No murmur heard  Pulmonary:      Effort: Pulmonary effort is normal  No respiratory distress  Comments: Decreased breath sounds  Abdominal:      General: There is no distension  Musculoskeletal:         General: No swelling  Cervical back: Normal range of motion  Skin:     General: Skin is warm and dry  Coloration: Skin is not jaundiced  Neurological:      General: No focal deficit present  Mental Status: She is alert  Lab Results: I have personally reviewed pertinent lab results      Results from last 7 days   Lab Units 09/16/22  0458 09/15/22  0946   WBC Thousand/uL 4 67 7 45   HEMOGLOBIN g/dL 7 8* 8 9*   HEMATOCRIT % 25 7* 29 6*   PLATELETS Thousands/uL 118* 168     Results from last 7 days   Lab Units 09/16/22  0458 09/15/22  0946   POTASSIUM mmol/L 4 7 5 2   CHLORIDE mmol/L 109* 111*   CO2 mmol/L 22 18*   BUN mg/dL 73* 66*   CREATININE mg/dL 3 07* 3 08*   CALCIUM mg/dL 8 9 8 7

## 2022-09-16 NOTE — OCCUPATIONAL THERAPY NOTE
Occupational Therapy Evaluation     Patient Name: Aristides Barroso  Today's Date: 9/16/2022  Problem List  Principal Problem:    Stroke-like symptoms  Active Problems:    Type 2 diabetes mellitus with kidney complication, with long-term current use of insulin ([facility] )    Hypertension    Coronary artery disease involving native coronary artery of native heart with angina pectoris ([facility] )    Stage 4 chronic kidney disease ([facility] )    Past Medical History  Past Medical History:   Diagnosis Date    Anemia     CHF (congestive heart failure) ([facility] )     Chronic kidney disease     Coronary artery disease     Diabetes mellitus ([facility] )     Hypertension     Hypothyroidism      Past Surgical History  Past Surgical History:   Procedure Laterality Date    CORONARY ANGIOPLASTY WITH STENT PLACEMENT  2019 09/16/22 0815   OT Last Visit   OT Visit Date 09/16/22   Note Type   Note type Evaluation   Restrictions/Precautions   Weight Bearing Precautions Per Order No   Other Precautions Telemetry; Fall Risk   Pain Assessment   Pain Assessment Tool 0-10   Pain Score No Pain   Home Living   Type of Home Apartment   Home Layout One level   Bathroom Shower/Tub Tub/shower unit   Bathroom Toilet Raised   Bathroom Accessibility Accessible   Additional Comments pt lives in a 2nd floor apartment with 10 ARUNA and no elevator to access apartment   Prior Function   Level of [place] Independent with ADLs and functional mobility   Lives With Spouse;Daughter   Receives Help From Family   ADL Assistance Independent   IADLs Independent   Falls in the last 6 months 0   Lifestyle   Autonomy I with ADL's/I ADL's, no AD with functional ambulation   Reciprocal Relationships Spouse home to assist as needed during the day, daughter (local)   Service to Others retired   Stacy TRAYLOR  [address]  [address]  [address] [place] [address] Dr Andrade  Supervision/Setup UB Dressing Assistance 5  Supervision/Setup   LB Dressing Assistance 5  Supervision/Setup   Toileting Assistance  5  Supervision/Setup   Transfers   Sit to Stand 5  Supervision   Additional items Assist x 1   Stand to Sit 5  Supervision   Additional items Assist x 1   Additional Comments No AD with functional ambulation   Functional Mobility   Functional Mobility 5  Supervision   Additional Comments S without AD short distance functional mobility, no overt LOB -good activity tolerance  Additional items   (No AD)   Balance   Static Sitting Normal   Dynamic Sitting Good   Static Standing Fair +   Dynamic Standing Fair   Ambulatory Fair   Activity Tolerance   Activity Tolerance Patient tolerated treatment well   Medical Staff Made Aware PT tiffany , MADY parr due to the patient's co-morbidities, clinically unstable presentation, and present impairments which are a regression from the patient's baseline  Nurse Made Aware Rn cleared pt for therapy   RUE Assessment   RUE Assessment WFL   LUE Assessment   LUE Assessment WFL   Hand Function   Gross Motor Coordination Functional   Fine Motor Coordination Functional   Sensation   Light Touch No apparent deficits   Vision-Basic Assessment   Current Vision No visual deficits   Vision - Complex Assessment   Acuity Able to read clock/calendar on wall without difficulty   Perception   Inattention/Neglect Appears intact   Cognition   Overall Cognitive Status [facility]   Arousal/Participation Alert; Responsive; Cooperative   Attention Within functional limits   Orientation Level Oriented X4   Memory Within functional limits   Following Commands Follows all commands and directions without difficulty   Comments pt alert and oriented good memory, direction following   Assessment   Assessment Pt is a 75 y o  female who was admitted to [facility] on 9/15/2022 with dizziness, headache, right LE numbness and how here with  Stroke-like symptoms, CKD, CAD, HTN, diabetes     Pt's problem list also includes PMH of  has a past medical history of Anemia, CHF (congestive heart failure) ([facility] ), Chronic kidney disease, Coronary artery disease, Diabetes mellitus ([facility] ), Hypertension, and Hypothyroidism    At baseline pt was completing I with ADL's/IaDL's, no AD with functional ambulation  Pt lives with spouse in a 2nd floor apartment with 10 [address]  Currently pt requires Set-up for overall ADLS and mod I without AD for functional mobility/transfers  Pt currently presents with impairments in the following categories -difficulty performing IADLS  activity tolerance  These impairments, as well as pt's fatigue, decreased caregiver support and risk for falls  limit pt's ability to safely engage in all baseline areas of occupation, includingcommunity mobility From OT standpoint, recommend home with increased family support , no DME needs upon D/C  No further acute OT needs indicated at this time - Anticipate d/c home with family support when medically cleared - d/c from caseload   Goals   Patient Goals go home   Recommendation   OT Discharge Recommendation No rehabilitation needs   Equipment Recommended (NO DME needs)   AM-PAC Daily Activity Inpatient   Lower Body Dressing 4   Bathing 4   Toileting 4   Upper Body Dressing 4   Grooming 4   Eating 4   Daily Activity Raw Score 24   Daily Activity Standardized Score (Calc for Raw Score >=11) 57 54   AM-PAC Applied Cognition Inpatient   Following a Speech/Presentation 3   Understanding Ordinary Conversation 4   Taking Medications 4   Remembering Where Things Are Placed or Put Away 4   Remembering List of 4-5 Errands 4   Taking Care of Complicated Tasks 4   Applied Cognition Raw Score 23   Applied Cognition Standardized Score 53 08   Tamiko MONTES, OTR/L

## 2022-09-16 NOTE — ASSESSMENT & PLAN NOTE
Lipid panel: cholesterol 221, ,   · Consider increasing Lipitor to 80 mg daily   · Outpatient follow-up

## 2022-09-16 NOTE — RESTORATIVE TECHNICIAN NOTE
Restorative Technician Note      Patient Name: Aristides Barroso     Note Type: Mobility  Patient Position Upon Consult: Bedside chair  Activity Performed: Ambulated; Dangled; Stood  Assistive Device: Other (Comment) (none)  Education Provided: Yes  Patient Position at End of Consult: Bedside chair;  All needs within reach; Bed/Chair alarm activated      Roberto MORRIS, Restorative Technician, [employer]

## 2022-09-16 NOTE — ASSESSMENT & PLAN NOTE
Wt Readings from Last 3 Encounters:   09/15/22 70 8 kg (156 lb)   08/08/22 70 8 kg (156 lb)   04/27/22 69 kg (152 lb 3 2 oz)     · Appearing euvolemic on exam   · ProBNP > 18,000 on admission and CXR with small pleural effusions   · Repeat echocardiogram shows decrease in LVEF from 60% last year to now 40% with progressive akinesis/hypokinesis   · Continued on home dose Bumex, BB, ACE  · Appreciate cardiology consult to determine need for further workup   · Monitor daily weights, intake and output

## 2022-09-16 NOTE — APP STUDENT NOTE
KEERTHI STUDENT  Inpatient Progress Note for TRAINING ONLY  Not Part of Legal Medical Record     Progress Note - Zuleyma Lorenz 75 y o  female MRN: [id number]  [facility] Encounter: [id number]      Stroke- like symptoms   Assessment and Plan   Patient presented to the ED on 09/15 with new onset dizziness, HA, and right sided weakness  Patient noted period of nausea and vomiting with a "thorbbing" headache  Patient notes relief of dizziness with meclizine  Notes relief of HA with tylenol  Persistent RT sided weakness    CTH on 09/15 revealed no acute cranial abnormalities     MRA head w/o contrast 09/16 revealed mild stenosis in b/l ICA cavernous segments  Otherwise normal    MRA carotids w/o contrast 09/016 revealed unremarkable MR angiogram of the cervical  vasculature    MRI 09/16 revealed normal brain MRI     Echocaridogram 09/15 EF 40%   Telemetry started overnight, patient with episodes of bradycardia continue for now   Neurology consulted 09/15, appreciate recommendations     Noted reduced pinprick sensation and questionable reduced vibration sensation  No   weakness       Pending negative results of MRI, no further management from neurology   Continue ASA 81 mg QD    Continue atorvastatin 40 mg QD   Hgb stable at baseline 7-8      Hyperlipidemia  Assessment and Plan    Lipid panel revealed elevated LDL, cholesterol   Patient managed on atorvastatin 40 mg QD    Consider dose increase pending MRI results         Stage 4 chronic kidney disease   Assessment and Plan    Baseline creatinine 2-3    Patient currently at baseline    Continue to monitor BMP     Coronary artery disease involving native coronary artery of native heart with angina pectoris   Assessment and Plan    Currently stable    Echocardiogram 09/15 revealed reduced EF of 40% with hypokinetic and akinetic segments    Cardiology consult    Continue ASA 81 mg QD    Continue atorvastatin 40 mg QD    Continue Coreg 12 5 mg BID    Continue Imdur 120 mg QD     Hypertension   Assessment and Plan   Continue to hold hydralazine and amlodipine for permissive hypertension pending MRI results    Continue Coreg and Imdur    Type 2 diabetes mellitus with kidney complication with long term use of insulin   Assessment and Plan    Poorly controlled, Hgb A1C 8 2 on    Continue HumaLog 100 units TID before meals    Continue HumaLog 100 units HS    Continue NovaLog 100 units BID before meals     Monitor AccuChecks    VTE Pharmacologic Prophylaxis:   Pharmacologic: Heparin  Mechanical VTE Prophylaxis in Place: Yes    Patient Centered Rounds: I have performed bedside rounds with nursing staff today  Time Spent for Care: 15 minutes  More than 50% of total time spent on counseling and coordination of care as described above  Current Length of Stay: 0 day(s)    Current Patient Status: Observation   Certification Statement: The patient will continue to require additional inpatient hospital stay due to pending Neurology clearance     Discharge Plan: 24-28 hours discharge home     Code Status: Level 1 - Full Code    Subjective:   74 yo female with PMH of anemia, hypothyroidism, type II DM, HTN, CAD, CHF, stage 4 CKD presented to the ED on 09/15 complaining of dizziness x 1 day  Patient also notes period of nausea and vomiting during the morning of 09/15 and a mild "throbbing" headache  She also notes RT lower extremity numbness stating "her leg felt asleep " Patient notes improvement of HA, nausea, lightheadedness, and dizziness  Denies increased dizziness and lightheadedness with ambulation  Patient notes improvement in RT sided weakness  Muscle strength equal bilaterally on exam  Denies SOB, chest pain, palpitations  Denies fever, chills       Objective:     Vitals:   Temp (24hrs), Av 5 °F (36 9 °C), Min:97 7 °F (36 5 °C), Max:99 4 °F (37 4 °C)    Temp:  [97 7 °F (36 5 °C)-99 4 °F (37 4 °C)] 97 7 °F (36 5 °C)  HR:  [56-70] 56  Resp:  [18-25] 18  BP: (123-207)/(57-88) 174/75  SpO2:  [95 %-99 %] 95 %  Body mass index is 28 53 kg/m²  Input and Output Summary (last 24 hours):     No intake or output data in the 24 hours ending 09/16/22 0844    Physical Exam:     Physical Exam  HENT:      Head: Normocephalic  Eyes:      Extraocular Movements: Extraocular movements intact  Pupils: Pupils are equal, round, and reactive to light  Cardiovascular:      Rate and Rhythm: Regular rhythm  Bradycardia present  Pulses: Normal pulses  Heart sounds: Normal heart sounds  No murmur heard  No friction rub  No gallop  Pulmonary:      Effort: Pulmonary effort is normal       Breath sounds: Normal breath sounds  No wheezing, rhonchi or rales  Abdominal:      General: Bowel sounds are normal  There is no distension  Palpations: Abdomen is soft  Musculoskeletal:      Comments: RT sided weakness improved  Skin:     General: Skin is warm and dry  Neurological:      Mental Status: She is alert and oriented to person, place, and time  Motor: Motor function is intact  No weakness  Comments: Muscle strength equal bilaterally      Psychiatric:         Mood and Affect: Mood normal            Historical Information   Past Medical History:   Diagnosis Date    Anemia     [facility]     Chronic kidney disease     Coronary artery disease     Diabetes mellitus ([facility] )     Hypertension     Hypothyroidism      Past Surgical History:   Procedure Laterality Date    CORONARY ANGIOPLASTY WITH STENT PLACEMENT  2019     Social History   Social History     Substance and Sexual Activity   Alcohol Use Yes    Comment: occ     Social History     Substance and Sexual Activity   Drug Use No     Social History     Tobacco Use   Smoking Status Former Smoker   Smokeless Tobacco Never Used     Family History:   Family History   Problem Relation Age of Onset    Diabetes Mother     Heart attack Father         MI    Hypertension Brother Meds/Allergies   all medications and allergies reviewed  Allergies   Allergen Reactions    Iv Contrast [Iodinated Diagnostic Agents] Itching     Caused KELLY    Nifedipine Rash       Additional Data:     Labs:    Results from last 7 days   Lab Units 09/16/22  0458 09/15/22  0946   WBC Thousand/uL 4 67 7 45   HEMOGLOBIN g/dL 7 8* 8 9*   HEMATOCRIT % 25 7* 29 6*   PLATELETS Thousands/uL 118* 168   NEUTROS PCT %  --  78*   LYMPHS PCT %  --  15   MONOS PCT %  --  5   EOS PCT %  --  1     Results from last 7 days   Lab Units 09/16/22  0458 09/15/22  0946   SODIUM mmol/L 137 137   POTASSIUM mmol/L 4 7 5 2   CHLORIDE mmol/L 109* 111*   CO2 mmol/L 22 18*   BUN mg/dL 73* 66*   CREATININE mg/dL 3 07* 3 08*   ANION GAP mmol/L 6 8   CALCIUM mg/dL 8 9 8 7   ALBUMIN g/dL  --  2 9*   TOTAL BILIRUBIN mg/dL  --  0 73   ALK PHOS U/L  --  422*   ALT U/L  --  43   AST U/L  --  37   GLUCOSE RANDOM mg/dL 89 167*         Results from last 7 days   Lab Units 09/16/22  0642 09/15/22  2039 09/15/22  1624   POC GLUCOSE mg/dl 96 215* 158*     Results from last 7 days   Lab Units 09/16/22  0458   HEMOGLOBIN A1C % 8 2*             * I Have Reviewed All Lab Data Listed Above  * Additional Pertinent Lab Tests Reviewed: All Labs Within Last 24 Hours Reviewed    Imaging:    Imaging Reports Reviewed Today Include:    MRI w/ w/o contrast 09/16: Normal brain MRI    MRA head w/o contrast 09/16: Mild stenosis in bilateral ICA cavernous segments (left worse than  right)  Otherwise, normal MRA Brain     MRA carotids w/o contrast 09/16: Unremarkable MR angiogram of the cervical vasculature   CTH 09/15: No acute cranial abnormalities     Recent Cultures (last 7 days):           Last 24 Hours Medication List:   Current Facility-Administered Medications   Medication Dose Route Frequency Provider Last Rate    acetaminophen  650 mg Oral Q4H PRN Azell Milling Starsinic, DO      aspirin  81 mg Oral Daily Azell Milling Starsinic, DO      atorvastatin  40 mg Oral QPM Rowan Zamudio, DO      bumetanide  2 mg Oral BID Rowan Zamudio, DO      carvedilol  12 5 mg Oral BID With Meals Rowan Zamudio, DO      cholecalciferol  2,000 Units Oral Daily Rowan Zamudio, DO      heparin (porcine)  5,000 Units Subcutaneous [facility] Rowan Zamudio, DO      insulin aspart protamine-insulin aspart  10 Units Subcutaneous BID AC Rowan Zamudio, DO      insulin lispro  1-5 Units Subcutaneous TID [facility] Rowan Zamudio, DO      insulin lispro  1-5 Units Subcutaneous HS Rowan Zamudio, DO      isosorbide mononitrate  120 mg Oral Daily Rowan Zamudio, DO      levothyroxine  125 mcg Oral Daily Rowan Zamudio,       LORazepam  0 5 mg Intravenous Once PRN Pillo Coronado MD      melatonin  3 mg Oral HS Pillo Coronado MD      ondansetron  4 mg Intravenous Q6H PRN Rowan Zamudio, DO          Today, Patient Was Seen By: Zakia Rivers    ** Please Note: Dictation voice to text software may have been used in the creation of this document   **

## 2022-09-16 NOTE — ASSESSMENT & PLAN NOTE
Status post LUDY x 2 to LAD in 2019  Noted to have 80% stenosed RCA at that time    · Patient currently without chest pain or SOB  · Continue aspirin, statin, beta-blocker, Imdur  · Echocardiogram concerning for progressive akinesis/hypokinesis as above  · Appreciate cardiology inputs, patient not interested in cardiac catheterization at this time   · Outpatient follow-up

## 2022-09-16 NOTE — ASSESSMENT & PLAN NOTE
Wt Readings from Last 3 Encounters:   09/15/22 70 8 kg (156 lb)   08/08/22 70 8 kg (156 lb)   04/27/22 69 kg (152 lb 3 2 oz)     · Appearing euvolemic on exam   · ProBNP > 18,000 on admission and CXR with small pleural effusions   · Repeat echocardiogram shows decrease in LVEF from 60% last year to now 40% with progressive akinesis/hypokinesis   · Continued on home dose Bumex, BB, ACE  · Appreciate cardiology consult, no need for further workup   · Outpatient follow-up with Dr Johnson Killer

## 2022-09-16 NOTE — PHYSICAL THERAPY NOTE
Physical Therapy Evaluation  Patient Name: Chris RODRIGUEZ Date: 9/16/2022     Problem List  Principal Problem:    Stroke-like symptoms  Active Problems:    Type 2 diabetes mellitus with kidney complication, with long-term current use of insulin ([place] [address] )    Hypertension    Coronary artery disease involving native coronary artery of native heart with angina pectoris ([facility] )    Stage 4 chronic kidney disease ([facility] )       Past Medical History  Past Medical History:   Diagnosis Date    Anemia     CHF (congestive heart failure) ([facility])     Chronic kidney disease     Coronary artery disease     Diabetes mellitus ([facility] )     Hypertension     Hypothyroidism         Past Surgical History  Past Surgical History:   Procedure Laterality Date    CORONARY ANGIOPLASTY WITH STENT PLACEMENT  2019 09/16/22 0845   PT Last Visit   PT Visit Date 09/16/22   Note Type   Note type Evaluation   Pain Assessment   Pain Assessment Tool 0-10   Pain Score No Pain   Restrictions/Precautions   Weight Bearing Precautions Per Order No   Other Precautions Fall Risk;Multiple lines; Chair Alarm; Bed Alarm   Home Living   Type of [address]   (has 10 steps to get to apt on 2nd floor; no stairs upon entrance)   Bathroom Shower/Tub Tub/shower unit   Bathroom Toilet Standard   Bathroom Accessibility Accessible   Additional Comments Pt states no use of AD prior to admission   Prior Function   Level of [place] Independent with ADLs and functional mobility   Lives With [place] Help From Family  ( and local daughter)   ADL Assistance Independent   IADLs Independent   Falls in the last 6 months 0   General   Family/Caregiver Present No   Cognition   Overall Cognitive Status WFL   Arousal/Participation Cooperative   Orientation Level Oriented X4   Memory Within functional limits   Following Commands Follows one step commands without difficulty   Subjective Subjective Pt pleasant to work with and agreeable to participate in PT   RLE Assessment   RLE Assessment X   Strength RLE   RLE Overall Strength 4+/5   LLE Assessment   LLE Assessment X  (4+/5 overall)   Light Touch   RLE Light Touch Grossly intact   LLE Light Touch Grossly intact   Bed Mobility   Additional Comments Unable to assess; seated upon encounter   Transfers   Sit to Stand 5  Supervision  (1x)   Additional items Increased time required   Stand to Sit 5  Supervision  (1x)   Additional items Increased time required   Ambulation/Elevation   Gait pattern Short stride; Excessively slow; Inconsistent courtney;Narrow NABOR   Gait Assistance 5  Supervision   Assistive Device None   Distance 150'   Stair Management Technique One rail R;One rail L;Alternating pattern; Foreward; Step to pattern  (Pt demonstrated alternating pattern upon ascending stairs and step to pattern descending stairs)   Number of Stairs 7   Balance   Static Sitting Good   Static Standing Fair +   Dynamic Standing Fair   Ambulatory Fair   Endurance Deficit   Endurance Deficit No   Activity Tolerance   Activity Tolerance Patient tolerated treatment well   Medical Staff Made Aware Layne Lyles OT; LUX BryantT   Nurse Made Aware Yes   Assessment   Prognosis Good   Problem List Decreased strength; Impaired balance   Assessment Pt is a 76 y/o female admitted to [facility] on 9/15/22 with a primary diagnosis of stoke-like symptoms  Patient has a pmhx of anemia, CHF, CAD, chronic kidney disease, diabetes, HTN, and hypothyroidism all of which affect PT treatment  PT was consulted to evaluate pt's functional mobility and discharge needs  Upon evaluation, pt presents as supervision for transfers, ambulation, and stairs  Pt presents with the following impairments: decreased balance   The patient is considered a moderate complexity pt d/t Ongoing medical management for primary dx, Decreased activity tolerance compared to baseline, Fall risk, Increased assistance needed from caregiver at current time, trending lab values, diagnostic imaging pending, multiple lines, continuous SPO2 monitoring  The patient's AM-PAC Basic Mobility Inpatient Short Form Raw Score is 18  A Raw score of greater than 16 suggests the patient may benefit from discharge to home  Please also refer to the recommendation of the Physical Therapist for safe discharge planning  At the end of tx, the patient was left seated on bedside chair with chair alarm on, call bell and all other personal needs within functional reach  Based on evaluation, patient demonstrates good functional mobility  The patient states having supportive family at home that can assist when needed, and reports no concerns at this time D/c recommendations home with no PT needs     Barriers to Discharge None   Goals   Patient Goals Return home with family   Recommendation   PT Discharge Recommendation No rehabilitation needs  (Home with increased support of  and daughter)   AM-PAC Basic Mobility Inpatient   Turning in Bed Without Bedrails 3   Lying on Back to Sitting on Edge of Flat Bed 3   Moving Bed to Chair 3   Standing Up From Chair 3   Walk in Room 3   Climb 3-5 Stairs 3   Basic Mobility Inpatient Raw Score 18   Basic Mobility Standardized Score 41 05   Highest Level Of Mobility   JH-HLM Goal 6: Walk 10 steps or more   JH-HLM Achieved 7: Walk 25 feet or more   Modified Blair Scale   Modified Blair Scale 1   Barthel Index   Feeding 10   Bathing 5   Grooming Score 5   Dressing Score 10   Bladder Score 10   Bowels Score 10   Toilet Use Score 10   Transfers (Bed/Chair) Score 15   Mobility (Level Surface) Score 15   Stairs Score 10   Barthel Index Score 100   Cinthya Cross, MADY

## 2022-09-16 NOTE — PROGRESS NOTES
[facility]  Progress Note - Radha Loyola 1955, 75 y o  female MRN: [id number]  [facility] Encounter: [id number]  Primary Care Provider: Chrissy Gomez MD   Date and time admitted to hospital: 9/15/2022  9:26 AM    * Stroke-like symptoms-resolved as of 9/16/2022  Assessment & Plan  Presented with 24 hour history of dizziness, headache, and right lower extremity numbness  Headache has since resolved with Tylenol, and dizziness has subsided, however she still has some persistent right lower extremity subjective weakness  · CT of the head without acute intracranial abnormality   · Admitted to stroke pathway   · A1c and lipid panel noted   · MRI of the brain negative   · MRA with mild ICA stenosis   · Echocardiogram with newly reduced EF and RWMA, see plan below  · Telemetry review sowing sinus bradycardia into 40s otherwise unremarkable   · Appreciate neurology consultation, no need for further workup   · Therapy cleared for discharge home with no needs   · Symptoms have now resolved     Chronic diastolic heart failure ([address] )  Assessment & Plan  Wt Readings from Last 3 Encounters:   09/15/22 70 8 kg (156 lb)   08/08/22 70 8 kg (156 lb)   04/27/22 69 kg (152 lb 3 2 oz)     · Appearing euvolemic on exam   · ProBNP > 18,000 on admission and CXR with small pleural effusions   · Repeat echocardiogram shows decrease in LVEF from 60% last year to now 40% with progressive akinesis/hypokinesis   · Continued on home dose Bumex, BB, ACE  · Appreciate cardiology consult, no need for further workup   · Outpatient follow-up with Dr Quincy Foley     Coronary artery disease involving native coronary artery of native heart with angina pectoris [facility])  Assessment & Plan  Status post LUDY x 2 to LAD in 2019  Noted to have 80% stenosed RCA at that time    · Patient currently without chest pain or SOB  · Continue aspirin, statin, beta-blocker, Imdur  · Echocardiogram concerning for progressive akinesis/hypokinesis as above  · Appreciate cardiology inputs, patient not interested in cardiac catheterization at this time   · Outpatient follow-up     Anemia  Assessment & Plan  Appears to have chronic anemia, likely secondary to CKD   · Hemoglobin stable within baseline 7-8     Type 2 diabetes mellitus, with long-term current use of insulin (HCC)  Assessment & Plan  · Poorly controlled evidenced by A1c   · Resume home medical management on discharge   · Outpatient follow-up with PCP     Stage 4 chronic kidney disease ([facility] )  Assessment & Plan  · Creatinine stable within baseline    Hypertension  Assessment & Plan  · Initially allowing for permissive HTN, now goal normotension   · Continue home dose hydralazine, amlodipine, Coreg and Imdur    Stenosis of both internal carotid arteries  Assessment & Plan  MRA noting mild stenosis in bilateral ICA cavernous segments (left worse than right)    · Continue with aspirin and statin    Hyperlipidemia  Assessment & Plan  Lipid panel: cholesterol 221, ,   · Consider increasing Lipitor to 80 mg daily   · Outpatient follow-up       Medical Problems             Resolved Problems  Date Reviewed: 9/16/2022          Resolved    * (Principal) Stroke-like symptoms 9/16/2022     Resolved by  Simeon Toussaint PA-C              Discharging Physician / Practitioner: Simeon Toussaint PA-C  PCP: Stephanie Shane MD  Admission Date:   Admission Orders (From admission, onward)     Ordered        09/15/22 1355  Place in Observation  Once                      Discharge Date: 09/16/22    Consultations During Hospital Stay:  · Neurology   · Cardiology     Procedures Performed:   · none    Significant Findings / Test Results:   · Outlined above    Incidental Findings:   · Mild bilateral ICA stenosis    · Newly reduced LVEF     Test Results Pending at Discharge (will require follow up):   · none     Outpatient Tests Requested:  · Follow-up with PCP and Cardiology     Complications: none    Reason for Admission: stroke like symptoms     Hospital Course:   Daryl Robles is a 75 y o  female patient who originally presented to the hospital on 9/15/2022 due to stroke like symptoms including headache, dizziness, RLE numbness/weakness  Patient was admitted to stroke pathway and seen in consultation by neurology  MRI brain negative for ischemia  MRA noting mild bilateral ICA stenosis  Symptoms resolved and no need for further neurologic workup  Echocardiogram did note newly reduced LVEF and progressive akinesis/hypokinesis  Seen by cardiology, no need for further workup at this time  OK for discharge and outpatient follow-up with Dr Elias Hatch  No changes to patient's medication regimen were made  As an outpatient,  needs better optimization of lipids and glucose  Please see above list of diagnoses and related plan for additional information  Condition at Discharge: stable    Discharge Day Visit / Exam:   * Please refer to separate progress note for these details *    Discussion with Family: Patient declined call to   Discharge instructions/Information to patient and family:   See after visit summary for information provided to patient and family  Provisions for Follow-Up Care:  See after visit summary for information related to follow-up care and any pertinent home health orders  Disposition:   Home    Planned Readmission: no     Discharge Statement:  I spent 35 minutes discharging the patient  This time was spent on the day of discharge  I had direct contact with the patient on the day of discharge  Greater than 50% of the total time was spent examining patient, answering all patient questions, arranging and discussing plan of care with patient as well as directly providing post-discharge instructions  Additional time then spent on discharge activities      Discharge Medications:  See after visit summary for reconciled discharge medications provided to patient and/or family        **Please Note: This note may have been constructed using a voice recognition system**

## 2022-09-16 NOTE — ASSESSMENT & PLAN NOTE
Presented with 24 hour history of dizziness, headache, and right lower extremity numbness  Headache has since resolved with Tylenol, and dizziness has subsided, however she still has some persistent right lower extremity subjective weakness    · CT of the head without acute intracranial abnormality   · Admitted to stroke pathway   · A1c and lipid panel noted   · MRI of the brain negative   · MRA with mild ICA stenosis   · Echocardiogram with newly reduced EF and RWMA, see plan below  · Telemetry review sowing sinus bradycardia into 40s otherwise unremarkable   · Appreciate neurology consultation, no need for further workup   · Therapy cleared for discharge home with no needs   · Symptoms have now resolved

## 2022-09-16 NOTE — PROGRESS NOTES
[facility]  Progress Note - Natasha Aburto 1955, 75 y o  female MRN: [id number]  [facility] Encounter: [id number]  Primary Care Provider: Jackeline Lazo MD   Date and time admitted to hospital: 9/15/2022  9:26 AM    * Stroke-like symptoms  Assessment & Plan  Presented with 24 hour history of dizziness, headache, and right lower extremity numbness  Headache has since resolved with Tylenol, and dizziness has subsided, however she still has some persistent right lower extremity subjective weakness  · CT of the head without acute intracranial abnormality   · Admitted to stroke pathway   · A1c and lipid panel noted   · MRI of the brain negative   · MRA with mild ICA stenosis   · Echocardiogram with newly reduced EF and RWMA, see plan below  · Telemetry review sowing sinus bradycardia into 45s  · Appreciate neurology consultation, no need for further workup   · Therapy cleared for discharge home with no needs   · Symptoms have now resolved     Chronic diastolic heart failure ([address] )  Assessment & Plan  Wt Readings from Last 3 Encounters:   09/15/22 70 8 kg (156 lb)   08/08/22 70 8 kg (156 lb)   04/27/22 69 kg (152 lb 3 2 oz)     · Appearing euvolemic on exam   · ProBNP > 18,000 on admission and CXR with small pleural effusions   · Repeat echocardiogram shows decrease in LVEF from 60% last year to now 40% with progressive akinesis/hypokinesis   · Continued on home dose Bumex, BB, ACE  · Appreciate cardiology consult to determine need for further workup   · Monitor daily weights, intake and output     Coronary artery disease involving native coronary artery of native heart with angina pectoris [facility])  Assessment & Plan  Status post LUDY x 2 to LAD in 2019  Noted to have 80% stenosed RCA at that time    · Patient currently without chest pain or SOB  · Continue aspirin, statin, beta-blocker, Imdur  · Echocardiogram concerning for progressive akinesis/hypokinesis as above  · Appreciate cardiology inputs     Anemia  Assessment & Plan  Appears to have chronic anemia, likely secondary to CKD   · Hemoglobin stable within baseline 7-8     Type 2 diabetes mellitus, with long-term current use of insulin [facility])  Assessment & Plan  Lab Results   Component Value Date    HGBA1C 8 2 (H) 09/16/2022       Recent Labs     09/15/22  2039 09/16/22  0642 09/16/22  0920 09/16/22  1045   POCGLU 215* 96 300* 266*       Blood Sugar Average: Last 72 hrs:  (P) 207   · Poorly controlled evidenced by A1c as above  · Continue home dose 70/30 insulin 10 units b i d  with meals  · QID accuchecks with SSI for additional coverage  · Hypoglycemia protocol    Stage 4 chronic kidney disease (HCC)  Assessment & Plan  · Creatinine stable within baseline  · Avoid nephrotoxins and hypotension  · Monitor BMP    Hypertension  Assessment & Plan  · Initially allowing for permissive HTN, now goal normotension   · Continue home dose hydralazine, amlodipine, Coreg and Imdur    Stenosis of both internal carotid arteries  Assessment & Plan  MRA noting mild stenosis in bilateral ICA cavernous segments (left worse than right)  · Continue with aspirin and statin    Hyperlipidemia  Assessment & Plan  Lipid panel: cholesterol 221, ,   · Increase Lipitor to 80 mg daily   · Outpatient follow-up       VTE Pharmacologic Prophylaxis: VTE Score: 3 Moderate Risk (Score 3-4) - Pharmacological DVT Prophylaxis Ordered: heparin  Patient Centered Rounds: I performed bedside rounds with nursing staff today  Discussions with Specialists or Other Care Team Provider: primary RN, will f/u cardiology recommendations      Education and Discussions with Family / Patient: Patient declined call to   Time Spent for Care: 20 minutes  More than 50% of total time spent on counseling and coordination of care as described above      Current Length of Stay: 0 day(s)  Current Patient Status: Observation   Certification Statement: The patient will continue to require additional inpatient hospital stay due to awaiting cardiology evaluation  Discharge Plan: possible d/c home later today pending cardiology recommendations    Code Status: Level 1 - Full Code    Subjective:   Patient complains of acute onset left ear pain today  Otherwise denies lightheadedness/dizziness, chest pain, SOB, weakness, headache, Notes that presenting symptoms have resolved  Objective:     Vitals:   Temp (24hrs), Av 2 °F (36 8 °C), Min:97 4 °F (36 3 °C), Max:99 4 °F (37 4 °C)    Temp:  [97 4 °F (36 3 °C)-99 4 °F (37 4 °C)] 97 4 °F (36 3 °C)  HR:  [47-70] 57  Resp:  [15-23] 16  BP: (132-176)/(52-75) 154/65  SpO2:  [95 %-97 %] 96 %  Body mass index is 28 53 kg/m²  Input and Output Summary (last 24 hours): Intake/Output Summary (Last 24 hours) at 2022 1458  Last data filed at 2022 0900  Gross per 24 hour   Intake 300 ml   Output --   Net 300 ml       Physical Exam:   Physical Exam  Vitals reviewed  Constitutional:       General: She is not in acute distress  Cardiovascular:      Rate and Rhythm: Normal rate and regular rhythm  Pulmonary:      Effort: Pulmonary effort is normal  No respiratory distress  Neurological:      General: No focal deficit present  Mental Status: She is alert and oriented to person, place, and time  Mental status is at baseline            Additional Data:     Labs:  Results from last 7 days   Lab Units 228 09/15/22  0946   WBC Thousand/uL 4 67 7 45   HEMOGLOBIN g/dL 7 8* 8 9*   HEMATOCRIT % 25 7* 29 6*   PLATELETS Thousands/uL 118* 168   NEUTROS PCT %  --  78*   LYMPHS PCT %  --  15   MONOS PCT %  --  5   EOS PCT %  --  1     Results from last 7 days   Lab Units 228 09/15/22  0946   SODIUM mmol/L 137 137   POTASSIUM mmol/L 4 7 5 2   CHLORIDE mmol/L 109* 111*   CO2 mmol/L 22 18*   BUN mg/dL 73* 66*   CREATININE mg/dL 3 07* 3 08*   ANION GAP mmol/L 6 8   CALCIUM mg/dL 8 9 8 7 ALBUMIN g/dL  --  2 9*   TOTAL BILIRUBIN mg/dL  --  0 73   ALK PHOS U/L  --  422*   ALT U/L  --  43   AST U/L  --  37   GLUCOSE RANDOM mg/dL 89 167*         Results from last 7 days   Lab Units 09/16/22  1045 09/16/22  0920 09/16/22  0642 09/15/22  2039 09/15/22  1624   POC GLUCOSE mg/dl 266* 300* 96 215* 158*     Results from last 7 days   Lab Units 09/16/22  0458   HEMOGLOBIN A1C % 8 2*           Lines/Drains:  Invasive Devices  Report    Peripheral Intravenous Line  Duration           Peripheral IV 09/15/22 Left Antecubital 1 day                  Telemetry:  Telemetry Orders (From admission, onward)             48 Hour Telemetry Monitoring  Continuous x 48 hours        References:    Telemetry Guidelines   Question:  Reason for 48 Hour Telemetry  Answer:  Acute CVA (<24 hrs old, hemispheric strokes, selected brainstem strokes, cardiac arrhythmias)                 Telemetry Reviewed: Sinus Bradycardia  Indication for Continued Telemetry Use: No indication for continued use  Will discontinue                Imaging: Reviewed radiology reports from this admission including: MRI brain    Recent Cultures (last 7 days):   Results from last 7 days   Lab Units 09/15/22  1014   URINE CULTURE  Culture too young- will reincubate       Last 24 Hours Medication List:   Current Facility-Administered Medications   Medication Dose Route Frequency Provider Last Rate    acetaminophen  650 mg Oral Q4H PRN Bisi Zamudio DO      [START ON 9/17/2022] amLODIPine  10 mg Oral Daily Vi Rosenthal PA-C      aspirin  81 mg Oral Daily Bisi Zamudio DO      atorvastatin  80 mg Oral QPM Vi Rosenthal PA-C      bumetanide  2 mg Oral BID Bisi Zamudio DO      carvedilol  12 5 mg Oral BID With Meals Bisi Zamudio DO      cholecalciferol  2,000 Units Oral Daily Bisi Zamudio DO      heparin (porcine)  5,000 Units Subcutaneous [facility] Bisi Zamudio DO      hydrALAZINE  100 mg Oral Q8H [facility] Lincoln Rosenthal PA-C      insulin aspart protamine-insulin aspart  10 Units Subcutaneous BID AC Rowan Zamudio, DO      insulin lispro  1-5 Units Subcutaneous TID [facility] Rowan Zamudio, DO      insulin lispro  1-5 Units Subcutaneous HS Rowan Zamudio, DO      isosorbide mononitrate  120 mg Oral Daily Rowan Zamudio, DO      levothyroxine  125 mcg Oral Daily Rowan Zamudio,       LORazepam  0 5 mg Intravenous Once PRN Pillo Coronado MD      melatonin  3 mg Oral HS Pillo Coronado MD      ondansetron  4 mg Intravenous Q6H PRN Rowan Zamudio,           Today, Patient Was Seen By: Lisa Valverde PA-C    **Please Note: This note may have been constructed using a voice recognition system  **

## 2022-09-16 NOTE — ASSESSMENT & PLAN NOTE
Status post LUDY x 2 to LAD in 2019  Noted to have 80% stenosed RCA at that time    · Patient currently without chest pain or SOB  · Continue aspirin, statin, beta-blocker, Imdur  · Echocardiogram concerning for progressive akinesis/hypokinesis as above  · Appreciate cardiology inputs

## 2022-09-16 NOTE — ASSESSMENT & PLAN NOTE
· Poorly controlled evidenced by A1c   · Resume home medical management on discharge   · Outpatient follow-up with PCP

## 2022-09-16 NOTE — DISCHARGE SUMMARY
[facility]  [facility] 1955[facility] 75 [facility]  [facility] MRN: [id number]  Unit/Bed#: [facility] 702-01 Encounter: [id number]  Primary Care Provider: Lamin Johnston MD   Date and time admitted to hospital: 9/15/2022  9:26 AM    * Stroke-like symptoms-resolved as of 9/16/2022  Assessment & Plan  Presented with 24 hour history of dizziness, headache, and right lower extremity numbness  Headache has since resolved with Tylenol, and dizziness has subsided, however she still has some persistent right lower extremity subjective weakness  · CT of the head without acute intracranial abnormality   · Admitted to stroke pathway   · A1c and lipid panel noted   · MRI of the brain negative   · MRA with mild ICA stenosis   · Echocardiogram with newly reduced EF and RWMA, see plan below  · Telemetry review sowing sinus bradycardia into 40s otherwise unremarkable   · Appreciate neurology consultation, no need for further workup   · Therapy cleared for discharge home with no needs   · Symptoms have now resolved     Chronic diastolic heart failure ([address] )  Assessment & Plan  Wt Readings from Last 3 Encounters:   09/15/22 70 8 kg (156 lb)   08/08/22 70 8 kg (156 lb)   04/27/22 69 kg (152 lb 3 2 oz)     · Appearing euvolemic on exam   · ProBNP > 18,000 on admission and CXR with small pleural effusions   · Repeat echocardiogram shows decrease in LVEF from 60% last year to now 40% with progressive akinesis/hypokinesis   · Continued on home dose Bumex, BB, ACE  · Appreciate cardiology consult, no need for further workup   · Outpatient follow-up with Dr Stephania Syed     Coronary artery disease involving native coronary artery of native heart with angina pectoris [facility])  Assessment & Plan  Status post LUDY x 2 to LAD in 2019  Noted to have 80% stenosed RCA at that time    · Patient currently without chest pain or SOB  · Continue aspirin, statin, beta-blocker, Imdur  · Echocardiogram concerning for progressive akinesis/hypokinesis as above  · Appreciate cardiology inputs, patient not interested in cardiac catheterization at this time   · Outpatient follow-up     Anemia  Assessment & Plan  Appears to have chronic anemia, likely secondary to CKD   · Hemoglobin stable within baseline 7-8     Type 2 diabetes mellitus, with long-term current use of insulin (HCC)  Assessment & Plan  · Poorly controlled evidenced by A1c   · Resume home medical management on discharge   · Outpatient follow-up with PCP     Stage 4 chronic kidney disease ([facility] [address] )  Assessment & Plan  · Creatinine stable within baseline    Hypertension  Assessment & Plan  · Initially allowing for permissive HTN, now goal normotension   · Continue home dose hydralazine, amlodipine, Coreg and Imdur    Stenosis of both internal carotid arteries  Assessment & Plan  MRA noting mild stenosis in bilateral ICA cavernous segments (left worse than right)    · Continue with aspirin and statin    Hyperlipidemia  Assessment & Plan  Lipid panel: cholesterol 221, ,   · Consider increasing Lipitor to 80 mg daily   · Outpatient follow-up       Medical Problems                            Resolved Problems  Date Reviewed: 9/16/2022                    Resolved      * (Principal) Stroke-like symptoms 9/16/2022        Resolved by  Diana Sánchez PA-C                  Discharging Physician / Practitioner: Diana Sánchez PA-C  PCP: Elizabeth Delgado MD  Admission Date:       Admission Orders (From admission, onward)              Ordered          09/15/22 1355   Place in Observation  Once                          Discharge Date: 09/16/22     Consultations During Hospital Stay:  · Neurology   · Cardiology      Procedures Performed:   · none     Significant Findings / Test Results:   · Outlined above     Incidental Findings:   · Mild bilateral ICA stenosis    · Newly reduced LVEF      Test Results Pending at Discharge (will require follow up):   · none     Outpatient Tests Requested:  · Follow-up with PCP and Cardiology      Complications:  none     Reason for Admission: stroke like symptoms      Hospital Course:   Zuleyma Lorenz is a 75 y o  female patient who originally presented to the hospital on 9/15/2022 due to stroke like symptoms including headache, dizziness, RLE numbness/weakness  Patient was admitted to stroke pathway and seen in consultation by neurology  MRI brain negative for ischemia  MRA noting mild bilateral ICA stenosis  Symptoms resolved and no need for further neurologic workup  Echocardiogram did note newly reduced LVEF and progressive akinesis/hypokinesis  Seen by cardiology, no need for further workup at this time  OK for discharge and outpatient follow-up with Dr Yaakov Oro  No changes to patient's medication regimen were made  As an outpatient,  needs better optimization of lipids and glucose       Please see above list of diagnoses and related plan for additional information       Condition at Discharge: stable     Discharge Day Visit / Exam:   * Please refer to separate progress note for these details *     Discussion with Family: Patient declined call to        Discharge instructions/Information to patient and family:   See after visit summary for information provided to patient and family        Provisions for Follow-Up Care:  See after visit summary for information related to follow-up care and any pertinent home health orders  Disposition:   Home     Planned Readmission: no     Discharge Statement:  I spent 35 minutes discharging the patient  This time was spent on the day of discharge  I had direct contact with the patient on the day of discharge  Greater than 50% of the total time was spent examining patient, answering all patient questions, arranging and discussing plan of care with patient as well as directly providing post-discharge instructions    Additional time then spent on discharge activities      Discharge Medications:  See after visit summary for reconciled discharge medications provided to patient and/or family        **Please Note: This note may have been constructed using a voice recognition system**

## 2022-09-16 NOTE — ASSESSMENT & PLAN NOTE
Presented with 24 hour history of dizziness, headache, and right lower extremity numbness  Headache has since resolved with Tylenol, and dizziness has subsided, however she still has some persistent right lower extremity subjective weakness    · CT of the head without acute intracranial abnormality   · Admitted to stroke pathway   · A1c and lipid panel noted   · MRI of the brain negative   · MRA with mild ICA stenosis   · Echocardiogram with newly reduced EF and RWMA, see plan below  · Telemetry review sowing sinus bradycardia into 45s  · Appreciate neurology consultation, no need for further workup   · Therapy cleared for discharge home with no needs   · Symptoms have now resolved

## 2022-09-18 LAB — BACTERIA UR CULT: ABNORMAL

## 2022-09-23 ENCOUNTER — APPOINTMENT (INPATIENT)
Dept: NON INVASIVE DIAGNOSTICS | Facility: HOSPITAL | Age: 67
DRG: 291 | End: 2022-09-23
Payer: COMMERCIAL

## 2022-09-23 ENCOUNTER — APPOINTMENT (OUTPATIENT)
Dept: RADIOLOGY | Facility: HOSPITAL | Age: 67
DRG: 291 | End: 2022-09-23
Payer: COMMERCIAL

## 2022-09-23 ENCOUNTER — HOSPITAL ENCOUNTER (INPATIENT)
Facility: HOSPITAL | Age: 67
LOS: 6 days | Discharge: HOME | DRG: 291 | End: 2022-09-29
Attending: EMERGENCY MEDICINE | Admitting: FAMILY MEDICINE
Payer: COMMERCIAL

## 2022-09-23 DIAGNOSIS — I13.0: ICD-10-CM

## 2022-09-23 DIAGNOSIS — R06.02 SOB (SHORTNESS OF BREATH): ICD-10-CM

## 2022-09-23 DIAGNOSIS — N17.9 ACUTE KIDNEY INJURY SUPERIMPOSED ON CHRONIC KIDNEY DISEASE (HCC): ICD-10-CM

## 2022-09-23 DIAGNOSIS — I50.9 ACUTE ON CHRONIC CONGESTIVE HEART FAILURE, UNSPECIFIED HEART FAILURE TYPE (HCC): Primary | ICD-10-CM

## 2022-09-23 DIAGNOSIS — I25.5 ISCHEMIC CARDIOMYOPATHY: ICD-10-CM

## 2022-09-23 DIAGNOSIS — N18.9 ACUTE KIDNEY INJURY SUPERIMPOSED ON CHRONIC KIDNEY DISEASE (HCC): ICD-10-CM

## 2022-09-23 PROBLEM — I25.10 CORONARY ARTERY DISEASE: Status: ACTIVE | Noted: 2019-01-04

## 2022-09-23 LAB
2HR DELTA HS TROPONIN: -6 NG/L
4HR DELTA HS TROPONIN: -1 NG/L
ALBUMIN SERPL BCP-MCNC: 3 G/DL (ref 3.5–5)
ALP SERPL-CCNC: 393 U/L (ref 46–116)
ALT SERPL W P-5'-P-CCNC: 49 U/L (ref 12–78)
ANION GAP SERPL CALCULATED.3IONS-SCNC: 7 MMOL/L (ref 4–13)
AST SERPL W P-5'-P-CCNC: 37 U/L (ref 5–45)
ATRIAL RATE: 65 BPM
BASOPHILS # BLD AUTO: 0.04 THOUSANDS/ΜL (ref 0–0.1)
BASOPHILS NFR BLD AUTO: 1 % (ref 0–1)
BILIRUB SERPL-MCNC: 0.42 MG/DL (ref 0.2–1)
BUN SERPL-MCNC: 67 MG/DL (ref 5–25)
CALCIUM ALBUM COR SERPL-MCNC: 10.1 MG/DL (ref 8.3–10.1)
CALCIUM SERPL-MCNC: 9.3 MG/DL (ref 8.3–10.1)
CARDIAC TROPONIN I PNL SERPL HS: 17 NG/L
CARDIAC TROPONIN I PNL SERPL HS: 22 NG/L
CARDIAC TROPONIN I PNL SERPL HS: 23 NG/L
CHLORIDE SERPL-SCNC: 111 MMOL/L (ref 96–108)
CO2 SERPL-SCNC: 20 MMOL/L (ref 21–32)
CREAT SERPL-MCNC: 2.87 MG/DL (ref 0.6–1.3)
D DIMER PPP FEU-MCNC: 1.25 UG/ML FEU
EOSINOPHIL # BLD AUTO: 0.14 THOUSAND/ΜL (ref 0–0.61)
EOSINOPHIL NFR BLD AUTO: 3 % (ref 0–6)
ERYTHROCYTE [DISTWIDTH] IN BLOOD BY AUTOMATED COUNT: 13.5 % (ref 11.6–15.1)
GFR SERPL CREATININE-BSD FRML MDRD: 16 ML/MIN/1.73SQ M
GLUCOSE SERPL-MCNC: 181 MG/DL (ref 65–140)
GLUCOSE SERPL-MCNC: 201 MG/DL (ref 65–140)
GLUCOSE SERPL-MCNC: 241 MG/DL (ref 65–140)
HCT VFR BLD AUTO: 28.1 % (ref 34.8–46.1)
HGB BLD-MCNC: 8.6 G/DL (ref 11.5–15.4)
IMM GRANULOCYTES # BLD AUTO: 0.01 THOUSAND/UL (ref 0–0.2)
IMM GRANULOCYTES NFR BLD AUTO: 0 % (ref 0–2)
LYMPHOCYTES # BLD AUTO: 0.77 THOUSANDS/ΜL (ref 0.6–4.47)
LYMPHOCYTES NFR BLD AUTO: 14 % (ref 14–44)
MCH RBC QN AUTO: 28.1 PG (ref 26.8–34.3)
MCHC RBC AUTO-ENTMCNC: 30.6 G/DL (ref 31.4–37.4)
MCV RBC AUTO: 92 FL (ref 82–98)
MONOCYTES # BLD AUTO: 0.24 THOUSAND/ΜL (ref 0.17–1.22)
MONOCYTES NFR BLD AUTO: 4 % (ref 4–12)
NEUTROPHILS # BLD AUTO: 4.21 THOUSANDS/ΜL (ref 1.85–7.62)
NEUTS SEG NFR BLD AUTO: 78 % (ref 43–75)
NRBC BLD AUTO-RTO: 0 /100 WBCS
NT-PROBNP SERPL-MCNC: ABNORMAL PG/ML
P AXIS: 63 DEGREES
PLATELET # BLD AUTO: 127 THOUSANDS/UL (ref 149–390)
PLATELET # BLD AUTO: 161 THOUSANDS/UL (ref 149–390)
PMV BLD AUTO: 11.4 FL (ref 8.9–12.7)
PMV BLD AUTO: 11.5 FL (ref 8.9–12.7)
POTASSIUM SERPL-SCNC: 5.4 MMOL/L (ref 3.5–5.3)
PR INTERVAL: 154 MS
PROT SERPL-MCNC: 7.3 G/DL (ref 6.4–8.4)
QRS AXIS: 104 DEGREES
QRSD INTERVAL: 98 MS
QT INTERVAL: 426 MS
QTC INTERVAL: 443 MS
RBC # BLD AUTO: 3.06 MILLION/UL (ref 3.81–5.12)
SODIUM SERPL-SCNC: 138 MMOL/L (ref 135–147)
T WAVE AXIS: 244 DEGREES
VENTRICULAR RATE: 65 BPM
WBC # BLD AUTO: 5.41 THOUSAND/UL (ref 4.31–10.16)

## 2022-09-23 PROCEDURE — 93010 ELECTROCARDIOGRAM REPORT: CPT | Performed by: INTERNAL MEDICINE

## 2022-09-23 PROCEDURE — 80053 COMPREHEN METABOLIC PANEL: CPT

## 2022-09-23 PROCEDURE — 71045 X-RAY EXAM CHEST 1 VIEW: CPT

## 2022-09-23 PROCEDURE — 82948 REAGENT STRIP/BLOOD GLUCOSE: CPT

## 2022-09-23 PROCEDURE — 93005 ELECTROCARDIOGRAM TRACING: CPT

## 2022-09-23 PROCEDURE — 99285 EMERGENCY DEPT VISIT HI MDM: CPT | Performed by: EMERGENCY MEDICINE

## 2022-09-23 PROCEDURE — 99285 EMERGENCY DEPT VISIT HI MDM: CPT

## 2022-09-23 PROCEDURE — 36415 COLL VENOUS BLD VENIPUNCTURE: CPT

## 2022-09-23 PROCEDURE — 85049 AUTOMATED PLATELET COUNT: CPT | Performed by: FAMILY MEDICINE

## 2022-09-23 PROCEDURE — 96374 THER/PROPH/DIAG INJ IV PUSH: CPT

## 2022-09-23 PROCEDURE — 99223 1ST HOSP IP/OBS HIGH 75: CPT | Performed by: FAMILY MEDICINE

## 2022-09-23 PROCEDURE — 85025 COMPLETE CBC W/AUTO DIFF WBC: CPT

## 2022-09-23 PROCEDURE — 93970 EXTREMITY STUDY: CPT | Performed by: SURGERY

## 2022-09-23 PROCEDURE — 83880 ASSAY OF NATRIURETIC PEPTIDE: CPT

## 2022-09-23 PROCEDURE — 93970 EXTREMITY STUDY: CPT

## 2022-09-23 PROCEDURE — 84484 ASSAY OF TROPONIN QUANT: CPT

## 2022-09-23 PROCEDURE — 85379 FIBRIN DEGRADATION QUANT: CPT

## 2022-09-23 RX ORDER — ATORVASTATIN CALCIUM 80 MG/1
80 TABLET, FILM COATED ORAL DAILY
Status: DISCONTINUED | OUTPATIENT
Start: 2022-09-24 | End: 2022-09-29 | Stop reason: HOSPADM

## 2022-09-23 RX ORDER — DOCUSATE SODIUM 100 MG/1
100 CAPSULE, LIQUID FILLED ORAL 2 TIMES DAILY
Status: DISCONTINUED | OUTPATIENT
Start: 2022-09-23 | End: 2022-09-29 | Stop reason: HOSPADM

## 2022-09-23 RX ORDER — ONDANSETRON 2 MG/ML
4 INJECTION INTRAMUSCULAR; INTRAVENOUS EVERY 6 HOURS PRN
Status: DISCONTINUED | OUTPATIENT
Start: 2022-09-23 | End: 2022-09-29 | Stop reason: HOSPADM

## 2022-09-23 RX ORDER — INSULIN LISPRO 100 [IU]/ML
1-5 INJECTION, SOLUTION INTRAVENOUS; SUBCUTANEOUS
Status: DISCONTINUED | OUTPATIENT
Start: 2022-09-23 | End: 2022-09-29 | Stop reason: HOSPADM

## 2022-09-23 RX ORDER — ACETAMINOPHEN 325 MG/1
650 TABLET ORAL EVERY 6 HOURS PRN
Status: DISCONTINUED | OUTPATIENT
Start: 2022-09-23 | End: 2022-09-29 | Stop reason: HOSPADM

## 2022-09-23 RX ORDER — AMLODIPINE BESYLATE 10 MG/1
10 TABLET ORAL DAILY
Status: DISCONTINUED | OUTPATIENT
Start: 2022-09-24 | End: 2022-09-24

## 2022-09-23 RX ORDER — ISOSORBIDE MONONITRATE 60 MG/1
120 TABLET, EXTENDED RELEASE ORAL DAILY
Status: DISCONTINUED | OUTPATIENT
Start: 2022-09-24 | End: 2022-09-29 | Stop reason: HOSPADM

## 2022-09-23 RX ORDER — ASPIRIN 81 MG/1
81 TABLET, CHEWABLE ORAL DAILY
Status: DISCONTINUED | OUTPATIENT
Start: 2022-09-24 | End: 2022-09-29 | Stop reason: HOSPADM

## 2022-09-23 RX ORDER — CARVEDILOL 12.5 MG/1
12.5 TABLET ORAL 2 TIMES DAILY WITH MEALS
Status: DISCONTINUED | OUTPATIENT
Start: 2022-09-23 | End: 2022-09-29 | Stop reason: HOSPADM

## 2022-09-23 RX ORDER — INSULIN ASPART 100 [IU]/ML
10 INJECTION, SUSPENSION SUBCUTANEOUS
Status: DISCONTINUED | OUTPATIENT
Start: 2022-09-23 | End: 2022-09-27

## 2022-09-23 RX ORDER — POLYETHYLENE GLYCOL 3350 17 G/17G
17 POWDER, FOR SOLUTION ORAL DAILY PRN
Status: DISCONTINUED | OUTPATIENT
Start: 2022-09-23 | End: 2022-09-29 | Stop reason: HOSPADM

## 2022-09-23 RX ORDER — HYDRALAZINE HYDROCHLORIDE 20 MG/ML
10 INJECTION INTRAMUSCULAR; INTRAVENOUS EVERY 6 HOURS PRN
Status: DISCONTINUED | OUTPATIENT
Start: 2022-09-23 | End: 2022-09-29 | Stop reason: HOSPADM

## 2022-09-23 RX ORDER — HYDRALAZINE HYDROCHLORIDE 50 MG/1
100 TABLET, FILM COATED ORAL 3 TIMES DAILY
Status: DISCONTINUED | OUTPATIENT
Start: 2022-09-23 | End: 2022-09-24

## 2022-09-23 RX ORDER — LEVOTHYROXINE SODIUM 0.12 MG/1
125 TABLET ORAL
Status: DISCONTINUED | OUTPATIENT
Start: 2022-09-24 | End: 2022-09-29 | Stop reason: HOSPADM

## 2022-09-23 RX ORDER — FUROSEMIDE 10 MG/ML
80 INJECTION INTRAMUSCULAR; INTRAVENOUS ONCE
Status: COMPLETED | OUTPATIENT
Start: 2022-09-23 | End: 2022-09-23

## 2022-09-23 RX ORDER — LABETALOL HYDROCHLORIDE 5 MG/ML
10 INJECTION, SOLUTION INTRAVENOUS EVERY 4 HOURS PRN
Status: DISCONTINUED | OUTPATIENT
Start: 2022-09-23 | End: 2022-09-29 | Stop reason: HOSPADM

## 2022-09-23 RX ORDER — SENNOSIDES 8.6 MG
1 TABLET ORAL DAILY
Status: DISCONTINUED | OUTPATIENT
Start: 2022-09-24 | End: 2022-09-29 | Stop reason: HOSPADM

## 2022-09-23 RX ORDER — BUMETANIDE 0.25 MG/ML
2 INJECTION, SOLUTION INTRAMUSCULAR; INTRAVENOUS 2 TIMES DAILY
Status: DISCONTINUED | OUTPATIENT
Start: 2022-09-24 | End: 2022-09-27

## 2022-09-23 RX ORDER — HEPARIN SODIUM 5000 [USP'U]/ML
5000 INJECTION, SOLUTION INTRAVENOUS; SUBCUTANEOUS EVERY 8 HOURS SCHEDULED
Status: DISCONTINUED | OUTPATIENT
Start: 2022-09-23 | End: 2022-09-29 | Stop reason: HOSPADM

## 2022-09-23 RX ORDER — MELATONIN
2000 DAILY
Status: DISCONTINUED | OUTPATIENT
Start: 2022-09-24 | End: 2022-09-29 | Stop reason: HOSPADM

## 2022-09-23 RX ADMIN — INSULIN LISPRO 2 UNITS: 100 INJECTION, SOLUTION INTRAVENOUS; SUBCUTANEOUS at 21:18

## 2022-09-23 RX ADMIN — INSULIN LISPRO 1 UNITS: 100 INJECTION, SOLUTION INTRAVENOUS; SUBCUTANEOUS at 19:33

## 2022-09-23 RX ADMIN — INSULIN ASPART 10 UNITS: 100 INJECTION, SUSPENSION SUBCUTANEOUS at 19:32

## 2022-09-23 RX ADMIN — FUROSEMIDE 80 MG: 10 INJECTION, SOLUTION INTRAMUSCULAR; INTRAVENOUS at 16:04

## 2022-09-23 RX ADMIN — CARVEDILOL 12.5 MG: 12.5 TABLET, FILM COATED ORAL at 19:30

## 2022-09-23 RX ADMIN — HYDRALAZINE HYDROCHLORIDE 100 MG: 50 TABLET, FILM COATED ORAL at 19:30

## 2022-09-23 RX ADMIN — HEPARIN SODIUM 5000 UNITS: 5000 INJECTION INTRAVENOUS; SUBCUTANEOUS at 21:18

## 2022-09-23 NOTE — ASSESSMENT & PLAN NOTE
· Status post LUDY x 2 to LAD in 2019   Noted to have 80% stenosed RCA at that time  · Cardiology evaluated the patient during the recent hospital stay due to lack of ischemic skin times it was decided that the patient will be managed medically  · Continue with beta-blocker statin aspirin  · Continue with Imdur

## 2022-09-23 NOTE — ASSESSMENT & PLAN NOTE
· On coreg, hydralazine, Norvasc and Imdur as outpatient    Patient reported that she took her medications in the morning  · Hydralazine was added during the recent hospital  · Blood pressure elevated  · Add p r n  hydralazine and labetalol

## 2022-09-23 NOTE — H&P
[address] [facility]  H&P- Radha Hives 1955, 75 y o  female MRN: [id number]  Unit/Bed#: ED 26 Encounter: [id number]  Primary Care Provider: Chrissy Gomez MD   Date and time admitted to hospital: 9/23/2022  2:14 PM    * Shortness of breath  Assessment & Plan  · Patient came to the hospital with shortness of Breath  Mainly on exertion  Patient's family provided some of the history reported that shortness of breath is gradually worsening  By the time she walks from her room to the bathroom she gets short of breath  · Also gives history of orthopnea  · No weight gain or lower extremity edema  · Patient reported that she has been compliant with salt and water restriction  · Has been complaint with diuretics  · Obtain chest x-ray  Troponin negative so far  · Patient was given 1 time dose of Lasix  BNP elevated about 21,000  · Patient is on Bumex 2 mg b i d   · Continue with diuresis  · Consult Cardiology  · Recent echocardiogram showed ejection fraction 40% with grade 2 diastolic dysfunction    Ischemic cardiomyopathy  Assessment & Plan  · Patient with underlying coronary artery disease  Recent echocardiogram showed left ventricle ejection fraction 40% with regional wall motion abnormalities  · Patient with known diagnosis of coronary artery disease  Patient also have grade 2 diastolic dysfunction  · Medical management  · Continue aspirin statin and beta-blocker and Imdur    Type 2 diabetes mellitus, with long-term current use of insulin (HCC)  Assessment & Plan  Lab Results   Component Value Date    HGBA1C 8 2 (H) 09/16/2022       No results for input(s): POCGLU in the last 72 hours      Blood Sugar Average: Last 72 hrs:   patient is on 10 units b i d NovoLog 70 30  Will add sliding scale of insulin  Last hemoglobin A1c is 8 2 showing uncontrolled diabetes mellitus    Hyperlipidemia  Assessment & Plan  · Continue with statin    Stage 4 chronic kidney disease ([facility] [address] )  Assessment & Plan  Lab Results   Component Value Date    EGFR 16 09/23/2022    EGFR 14 09/16/2022    EGFR 14 09/15/2022    CREATININE 2 87 (H) 09/23/2022    CREATININE 3 07 (H) 09/16/2022    CREATININE 3 08 (H) 09/15/2022   Creatinine 2 87 which is at baseline  Followed by Nephrology as outpatient  Nephrology evaluation if the creatinine is up trending  Hyperkalemia noted but received Lasix recheck in the morning    Elevated d-dimer  Assessment & Plan  · Patient with elevated D-dimer by reviewing the records it appears to be chronic  · Patient also with CKD stage 4  · Will obtain Lower extremity Doppler      Coronary artery disease  Assessment & Plan  · By reviewing the recent cardiology progress note patient with known history of coronary artery disease with 80% stenosis RCA  Medical management  · Continue with beta-blocker statin aspirin  · Continue with Imdur    Anemia  Assessment & Plan  · Hemoglobin today is 8 6 which is higher than the last hemoglobin before discharge  Likely secondary to CKD  · Monitor hemoglobin    Hypertension  Assessment & Plan  · On coreg, hydralazine, Norvasc and Imdur as outpatient  Patient reported that she took her medications in the morning  · Hydralazine was added during the recent hospital  · Blood pressure elevated  · Add p r n  hydralazine and labetalol    Hypothyroidism  Assessment & Plan  · Continue Synthroid      VTE Pharmacologic Prophylaxis: VTE Score: 4 Moderate Risk (Score 3-4) - Pharmacological DVT Prophylaxis Ordered: heparin  Code Status: Level 1 - Full Code   Discussion with family: Updated  (daughter) at bedside  Anticipated Length of Stay: Patient will be admitted on an inpatient basis with an anticipated length of stay of greater than 2 midnights secondary to Acute congestive heart failure      Total Time for Visit, including Counseling / Coordination of Care: 70 minutes Greater than 50% of this total time spent on direct patient counseling and coordination of care  Chief Complaint:  Shortness of breath and chest tightness    History of Present Illness:  Morales Pugh is a 75 y o  female with a PMH of coronary artery disease ischemic cardiomyopathy who presents with worsening shortness of Breath  Patient was recently admitted to the hospital 9 says 15/916 with stroke-like symptoms  Neurology workup was negative  Patient was evaluated by Cardiology due to drop in ejection fraction  Patient was continued on the diuretics  Plan was to follow-up with outpatient cardiologist   Patient reported that since discharge from the hospital she continued to have shortness of breath  Family in the room provided translation and part of the history since the patient is [place]ese-speaking  Patient reported that even with minimal activity she got short of breath  Daughter reported that when she goes from the bed room to the bathroom she gets short of breath  Also reported that she cannot lie flat  Patient uses hospital bed at home do not do not use any oxygen  Patient also gives history of chest tightness with activities  Denies any lower extremity swelling or weight gain  Reported that she is compliant with salt and fluid restriction  She is complaint with medications    Review of Systems:  Review of Systems   Constitutional: Positive for activity change and fatigue  HENT: Negative  Eyes: Negative  Respiratory: Positive for chest tightness and shortness of breath  Cardiovascular: Positive for palpitations  Negative for chest pain  Gastrointestinal: Negative  Endocrine: Negative  Genitourinary: Negative  Musculoskeletal: Negative  Allergic/Immunologic: Negative  Neurological: Negative  Hematological: Negative  Psychiatric/Behavioral: Negative          Past Medical and Surgical History:   Past Medical History:   Diagnosis Date    Anemia     CHF (congestive heart failure) (HCC)     Chronic kidney disease     Coronary artery disease     Diabetes mellitus ([facility] [address] )     Hypertension     Hypothyroidism        Past Surgical History:   Procedure Laterality Date    CORONARY ANGIOPLASTY WITH STENT PLACEMENT  2019       Meds/Allergies:  Prior to Admission medications    Medication Sig Start Date End Date Taking?  Authorizing Provider   amLODIPine (NORVASC) 10 mg tablet Take 1 tablet (10 mg total) by mouth daily 6/9/22   [facility], DO   aspirin (RA Aspirin Adult Low Dose) 81 mg chewable tablet Chew 1 tablet (81 mg total) daily 11/23/21   Saud Rowe PA-C   atorvastatin (LIPITOR) 40 mg tablet Take 2 tablets (80 mg total) by mouth daily 1/31/22   Otto Meza DO   bumetanide (BUMEX) 2 mg tablet Take 1 tablet (2 mg total) by mouth 2 (two) times a day Start bumex 1 mg BID from Monday 2/21/22, 6/9/22   [facility], DO   carbamide peroxide (DEBROX) 6 5 % otic solution Administer 5 drops into the left ear 2 (two) times a day for 3 days 9/16/22 9/19/22  iV Rosenthal PA-C   carvedilol (COREG) 12 5 mg tablet Take 1 tablet (12 5 mg total) by mouth 2 (two) times a day with meals 9/7/22   Otto Meza DO   cholecalciferol (VITAMIN D3) 1,000 units tablet Take 2 tablets (2,000 Units total) by mouth daily 8/8/22   [facility], DO   Droplet Pen Needles 32G X 4 MM MISC 2 (two) times a day 10/12/21   Historical Provider, MD   hydrALAZINE (APRESOLINE) 100 MG tablet Take 1 tablet (100 mg total) by mouth 3 (three) times a day 9/7/22   Otto Meza DO   insulin aspart protamine-insulin aspart (NovoLOG 70/30) 100 units/mL injection As per your current blood sugars, take 15 units before breakfast, 10 units before dinner 10/31/21   Leslie Mckeon MD   isosorbide mononitrate (IMDUR) 120 mg 24 hr tablet Take 1 tablet (120 mg total) by mouth daily 1/31/22   Otto MurryDO   Lancets (OneTouch Delica Plus [id number]) MISC use to TEST BLOOD SUGAR three times a day as directed 3/30/22   Historical Provider, MD   levothyroxine 125 mcg tablet Take 125 mcg by mouth daily 4/11/22   Historical Provider, MD   OneTouch Verio test strip use to TEST BLOOD SUGAR three times a day as directed 3/30/22   Historical Provider, MD     I have reviewed home medications with patient personally  Allergies: Allergies   Allergen Reactions    Iv Contrast [Iodinated Diagnostic Agents] Itching     Caused KELLY    Nifedipine Rash       Social History:  Marital Status: /Civil Union   Occupation:   Patient Pre-hospital Living Situation: Home  Patient Pre-hospital Level of Mobility: walks  Patient Pre-hospital Diet Restrictions:   Substance Use History:   Social History     Substance and Sexual Activity   Alcohol Use Yes    Comment: occ     Social History     Tobacco Use   Smoking Status Former Smoker   Smokeless Tobacco Never Used     Social History     Substance and Sexual Activity   Drug Use No       Family History:  Family History   Problem Relation Age of Onset    Diabetes Mother     Heart attack Father         MI    Hypertension Brother        Physical Exam:     Vitals:   Blood Pressure: (!) 189/93 (09/23/22 1700)  Pulse: 64 (09/23/22 1700)  Temperature: 97 9 °F (36 6 °C) (09/23/22 1346)  Temp Source: Oral (09/23/22 1346)  Respirations: 18 (09/23/22 1700)  SpO2: 97 % (09/23/22 1700)    Physical Exam  Constitutional:       General: She is not in acute distress  HENT:      Head: Normocephalic and atraumatic  Nose: Nose normal    Eyes:      General: No scleral icterus  Neck:      Comments: JVD present  Cardiovascular:      Rate and Rhythm: Normal rate and regular rhythm  Pulmonary:      Comments: Decreased breath sounds bilateral  Abdominal:      General: Abdomen is flat  There is no distension  Palpations: Abdomen is soft  Musculoskeletal:         General: No swelling  Normal range of motion  Cervical back: Normal range of motion  Skin:     General: Skin is warm  Coloration: Skin is not jaundiced  Neurological:      General: No focal deficit present  Mental Status: She is alert  Cranial Nerves: No cranial nerve deficit  Sensory: No sensory deficit  Additional Data:     Lab Results:  Results from last 7 days   Lab Units 09/23/22  1448   WBC Thousand/uL 5 41   HEMOGLOBIN g/dL 8 6*   HEMATOCRIT % 28 1*   PLATELETS Thousands/uL 127*   NEUTROS PCT % 78*   LYMPHS PCT % 14   MONOS PCT % 4   EOS PCT % 3     Results from last 7 days   Lab Units 09/23/22  1448   SODIUM mmol/L 138   POTASSIUM mmol/L 5 4*   CHLORIDE mmol/L 111*   CO2 mmol/L 20*   BUN mg/dL 67*   CREATININE mg/dL 2 87*   ANION GAP mmol/L 7   CALCIUM mg/dL 9 3   ALBUMIN g/dL 3 0*   TOTAL BILIRUBIN mg/dL 0 42   ALK PHOS U/L 393*   ALT U/L 49   AST U/L 37   GLUCOSE RANDOM mg/dL 201*                       Imaging:  No new images to review  XR chest pa & lateral    (Results Pending)   VAS lower limb venous duplex study, complete bilateral    (Results Pending)       EKG and Other Studies Reviewed on Admission:   · EKG: NSR  HR 65     ** Please Note: This note has been constructed using a voice recognition system   **

## 2022-09-23 NOTE — ASSESSMENT & PLAN NOTE
· Patient came to the hospital with shortness of Breath  Mainly on exertion  Patient's family provided some of the history reported that shortness of breath is gradually worsening  By the time she walks from her room to the bathroom she gets short of breath  · Also gives history of orthopnea  · No weight gain or lower extremity edema  · Patient reported that she has been compliant with salt and water restriction  · Has been complaint with diuretics  · Obtain chest x-ray  Troponin negative so far  · Patient was given 1 time dose of Lasix    BNP elevated about 21,000  · Patient is on Bumex 2 mg b i d   · Continue with diuresis  · Consult Cardiology  · Recent echocardiogram showed ejection fraction 40% with grade 2 diastolic dysfunction

## 2022-09-23 NOTE — ASSESSMENT & PLAN NOTE
· Hemoglobin today is 8 6 which is higher than the last hemoglobin before discharge    Likely secondary to CKD  · Monitor hemoglobin

## 2022-09-23 NOTE — ASSESSMENT & PLAN NOTE
· Patient with underlying coronary artery disease  Recent echocardiogram showed left ventricle ejection fraction 40% with regional wall motion abnormalities  · Patient with known diagnosis of coronary artery disease  Patient also have grade 2 diastolic dysfunction    · Medical management  · Continue aspirin statin and beta-blocker and Imdur

## 2022-09-23 NOTE — ASSESSMENT & PLAN NOTE
Lab Results   Component Value Date    EGFR 16 09/23/2022    EGFR 14 09/16/2022    EGFR 14 09/15/2022    CREATININE 2 87 (H) 09/23/2022    CREATININE 3 07 (H) 09/16/2022    CREATININE 3 08 (H) 09/15/2022   Creatinine 2 87 which is at baseline  Followed by Nephrology as outpatient  Nephrology evaluation if the creatinine is up trending  Hyperkalemia noted but received Lasix recheck in the morning  Hyperkalemia and low bicarb noted-recheck in the morning

## 2022-09-23 NOTE — ED PROVIDER NOTES
History  Chief Complaint   Patient presents with    Shortness of Breath     Pt has been having SOB and "chest pounding" with activity, symptoms has gotten worse this past week  Reports having to rest in order to "catch her breath"     Patient is a 75year old female with PMHx CHF who presents to the ED for evaluation of worsening shortness of breath on exertion and palpitations for the past 1 week  Patient reports chest pain on exertion, shortness of breath on exertion, and orthopnea progressively worsening over the past 1 week  She states she takes a water pill twice a day and has not missed any doses  She reports a hospitalization approximately 1 week ago with negative CVA workup  She denies any similar symptoms at this time  She denies any abdominal pain, nausea vomiting diarrhea, headache or dizziness, changes in vision or hearing, numbness weakness or tingling in the extremities, or other complaints or concerns at this time  Prior to Admission Medications   Prescriptions Last Dose Informant Patient Reported? Taking?    Droplet Pen Needles 32G X 4 MM MISC  Self Yes No   Si (two) times a day   Lancets (OneTouch Delica Plus [id number]) MISC   Yes No   Sig: use to TEST BLOOD SUGAR three times a day as directed   OneTouch Verio test strip   Yes No   Sig: use to TEST BLOOD SUGAR three times a day as directed   amLODIPine (NORVASC) 10 mg tablet   No No   Sig: Take 1 tablet (10 mg total) by mouth daily   aspirin (RA Aspirin Adult Low Dose) 81 mg chewable tablet   No No   Sig: Chew 1 tablet (81 mg total) daily   atorvastatin (LIPITOR) 40 mg tablet   No No   Sig: Take 2 tablets (80 mg total) by mouth daily   bumetanide (BUMEX) 2 mg tablet   No No   Sig: Take 1 tablet (2 mg total) by mouth 2 (two) times a day Start bumex 1 mg BID from 22,   carbamide peroxide (DEBROX) 6 5 % otic solution   No No   Sig: Administer 5 drops into the left ear 2 (two) times a day for 3 days   carvedilol (COREG) 12 5 mg tablet   No No   Sig: Take 1 tablet (12 5 mg total) by mouth 2 (two) times a day with meals   cholecalciferol (VITAMIN D3) 1,000 units tablet   No No   Sig: Take 2 tablets (2,000 Units total) by mouth daily   hydrALAZINE (APRESOLINE) 100 MG tablet   No No   Sig: Take 1 tablet (100 mg total) by mouth 3 (three) times a day   insulin aspart protamine-insulin aspart (NovoLOG 70/30) 100 units/mL injection  Self No No   Sig: As per your current blood sugars, take 15 units before breakfast, 10 units before dinner   isosorbide mononitrate (IMDUR) 120 mg 24 hr tablet   No No   Sig: Take 1 tablet (120 mg total) by mouth daily   levothyroxine 125 mcg tablet   Yes No   Sig: Take 125 mcg by mouth daily      Facility-Administered Medications: None       Past Medical History:   Diagnosis Date    Anemia     [facility])     Chronic kidney disease     Coronary artery disease     Diabetes mellitus ([facility] [address] )     Hypertension     Hypothyroidism        Past Surgical History:   Procedure Laterality Date    CORONARY ANGIOPLASTY WITH STENT PLACEMENT  2019       Family History   Problem Relation Age of Onset    Diabetes Mother     Heart attack Father         MI    Hypertension Brother      I have reviewed and agree with the history as documented  E-Cigarette/Vaping    E-Cigarette Use Never User      E-Cigarette/Vaping Substances    Nicotine No     THC No     CBD No     Flavoring No     Other No     Unknown No      Social History     Tobacco Use    Smoking status: Former Smoker    Smokeless tobacco: Never Used   Vaping Use    Vaping Use: Never used   Substance Use Topics    Alcohol use: Yes     Comment: occ    Drug use: No        Review of Systems   Constitutional: Negative for chills and fever  HENT: Negative for ear pain and sore throat  Eyes: Negative for pain and visual disturbance  Respiratory: Positive for shortness of breath  Negative for wheezing      Cardiovascular: Positive for chest pain and palpitations  Gastrointestinal: Negative for abdominal pain and vomiting  Genitourinary: Negative for dysuria and hematuria  Musculoskeletal: Negative for arthralgias and back pain  Skin: Negative for color change and rash  Neurological: Negative for seizures and syncope  All other systems reviewed and are negative  Physical Exam  ED Triage Vitals   Temperature Pulse Respirations Blood Pressure SpO2   09/23/22 1346 09/23/22 1346 09/23/22 1346 09/23/22 1346 09/23/22 1346   97 9 °F (36 6 °C) 64 16 (!) 181/95 97 %      Temp Source Heart Rate Source Patient Position - Orthostatic VS BP Location FiO2 (%)   09/23/22 1346 09/23/22 1346 09/23/22 1454 09/23/22 1346 --   Oral Monitor Sitting Left arm       Pain Score       09/23/22 1346       6             Orthostatic Vital Signs  Vitals:    09/23/22 1346 09/23/22 1454 09/23/22 1545   BP: (!) 181/95 (!) 173/68 (!) 189/73   Pulse: 64 66 72   Patient Position - Orthostatic VS:  Sitting Sitting       Physical Exam  Vitals and nursing note reviewed  Constitutional:       General: She is not in acute distress  Appearance: She is well-developed  She is not ill-appearing or diaphoretic  HENT:      Head: Normocephalic and atraumatic  Mouth/Throat:      Mouth: Mucous membranes are moist       Pharynx: Oropharynx is clear  Eyes:      Extraocular Movements: Extraocular movements intact  Conjunctiva/sclera: Conjunctivae normal    Neck:      Vascular: No JVD  Cardiovascular:      Rate and Rhythm: Normal rate and regular rhythm  Pulses: Normal pulses  Heart sounds: Normal heart sounds  No murmur heard  Pulmonary:      Effort: Pulmonary effort is normal  No accessory muscle usage or respiratory distress  Breath sounds: Examination of the right-lower field reveals decreased breath sounds  Decreased breath sounds present  Abdominal:      Palpations: Abdomen is soft  Tenderness: There is no abdominal tenderness  Musculoskeletal:         General: Normal range of motion  Cervical back: Normal range of motion and neck supple  Right lower leg: No tenderness  No edema  Left lower leg: No tenderness  No edema  Skin:     General: Skin is warm and dry  Capillary Refill: Capillary refill takes less than 2 seconds  Coloration: Skin is not cyanotic  Findings: No rash  Neurological:      Mental Status: She is alert and oriented to person, place, and time  Motor: No weakness  Psychiatric:         Mood and Affect: Mood normal          Behavior: Behavior normal          ED Medications  Medications   furosemide (LASIX) injection 80 mg (80 mg Intravenous Given 9/23/22 1604)       Diagnostic Studies  Results Reviewed     Procedure Component Value Units Date/Time    HS Troponin I 4hr [[id number]]     Lab Status: No result Specimen: Blood     HS Troponin I 2hr [[id number]]     Lab Status: No result Specimen: Blood     HS Troponin 0hr (reflex protocol) [[id number]]  (Normal) Collected: 09/23/22 1448    Lab Status: Final result Specimen: Blood from Arm, Left Updated: 09/23/22 1539     hs TnI 0hr 23 ng/L     D-Dimer [[id number]]  (Abnormal) Collected: 09/23/22 1448    Lab Status: Final result Specimen: Blood from Arm, Left Updated: 09/23/22 1531     D-Dimer, Quant 1 25 ug/ml FEU     Narrative: In the evaluation for possible pulmonary embolism, in the appropriate (Well's Score of 4 or less) patient, the age adjusted d-dimer cutoff for this patient can be calculated as:    Age x 0 01 (in ug/mL) for Age-adjusted D-dimer exclusion threshold for a patient over 50 years      NT-BNP PRO [[id number]]  (Abnormal) Collected: 09/23/22 1448    Lab Status: Final result Specimen: Blood from Arm, Left Updated: 09/23/22 1528     NT-proBNP 21,757 pg/mL     Comprehensive metabolic panel [[id number]]  (Abnormal) Collected: 09/23/22 1448    Lab Status: Final result Specimen: Blood from Arm, Left Updated: 09/23/22 1527 Sodium 138 mmol/L      Potassium 5 4 mmol/L      Chloride 111 mmol/L      CO2 20 mmol/L      ANION GAP 7 mmol/L      BUN 67 mg/dL      Creatinine 2 87 mg/dL      Glucose 201 mg/dL      Calcium 9 3 mg/dL      Corrected Calcium 10 1 mg/dL      AST 37 U/L      ALT 49 U/L      Alkaline Phosphatase 393 U/L      Total Protein 7 3 g/dL      Albumin 3 0 g/dL      Total Bilirubin 0 42 mg/dL      eGFR 16 ml/min/1 73sq m     Narrative:      National Kid[employer] Foundation guidelines for Chronic Kidney Disease (CKD):     Stage 1 with normal or high GFR (GFR > 90 mL/min/1 73 square meters)    Stage 2 Mild CKD (GFR = 60-89 mL/min/1 73 square meters)    Stage 3A Moderate CKD (GFR = 45-59 mL/min/1 73 square meters)    Stage 3B Moderate CKD (GFR = 30-44 mL/min/1 73 square meters)    Stage 4 Severe CKD (GFR = 15-29 mL/min/1 73 square meters)    Stage 5 End Stage CKD (GFR <15 mL/min/1 73 square meters)  Note: GFR calculation is accurate only with a steady state creatinine    CBC and differential [[id number]]  (Abnormal) Collected: 09/23/22 1448    Lab Status: Final result Specimen: Blood from Arm, Left Updated: 09/23/22 1513     WBC 5 41 Thousand/uL      RBC 3 06 Million/uL      Hemoglobin 8 6 g/dL      Hematocrit 28 1 %      MCV 92 fL      MCH 28 1 pg      MCHC 30 6 g/dL      RDW 13 5 %      MPV 11 5 fL      Platelets 381 Thousands/uL      nRBC 0 /100 WBCs      Neutrophils Relative 78 %      Immat GRANS % 0 %      Lymphocytes Relative 14 %      Monocytes Relative 4 %      Eosinophils Relative 3 %      Basophils Relative 1 %      Neutrophils Absolute 4 21 Thousands/µL      Immature Grans Absolute 0 01 Thousand/uL      Lymphocytes Absolute 0 77 Thousands/µL      Monocytes Absolute 0 24 Thousand/µL      Eosinophils Absolute 0 14 Thousand/µL      Basophils Absolute 0 04 Thousands/µL                  XR chest pa & lateral    (Results Pending)         Procedures  ECG 12 Lead Documentation Only    Date/Time: 9/23/2022 5:20 PM  Performed by: Rob Sprague DO  Authorized by: Rob Sprague DO     ECG reviewed by me, the ED Provider: yes    Patient location:  ED  Previous ECG:     Previous ECG:  Compared to current    Comparison ECG info:  15 sep 22    Similarity:  No change    Comparison to cardiac monitor: Yes    Interpretation:     Interpretation: non-specific    Rate:     ECG rate:  65    ECG rate assessment: normal    Ectopy:     Ectopy: none    QRS:     QRS axis:  Right    QRS intervals:  Normal  Conduction:     Conduction: normal    ST segments:     ST segments:  Normal  T waves:     T waves: inverted      Inverted:  V5 and V6          ED Course  ED Course as of 09/23/22 1638   Fri Sep 23, 2022   1547 NT-BNP PRO(!)  Noted, elevated from prior  Lasix ordered  1548 CBC and differential(!)  Stable compared to prior   1548 Comprehensive metabolic panel(!)  Stable compared to prior   1548 HS Troponin 0hr (reflex protocol)  Will repeat 2hr delta   1612 Spoke with SLIM who will admit patient, discussed with patient and family at bedside who are agreeable                             SBIRT 20yo+    Flowsheet Row Most Recent Value   SBIRT (23 yo +)    In order to provide better care to our patients, we are screening all of our patients for alcohol and drug use  Would it be okay to ask you these screening questions? Yes Filed at: 09/23/2022 1449   Initial Alcohol Screen: US AUDIT-C     1  How often do you have a drink containing alcohol? 0 Filed at: 09/23/2022 1449   2  How many drinks containing alcohol do you have on a typical day you are drinking? 0 Filed at: 09/23/2022 1449   3a  Male UNDER 65: How often do you have five or more drinks on one occasion? 0 Filed at: 09/23/2022 1449   3b  FEMALE Any Age, or MALE 65+: How often do you have 4 or more drinks on one occassion? 0 Filed at: 09/23/2022 1449   Audit-C Score 0 Filed at: 09/23/2022 1449   CAROLEE: How many times in the past year have you        Used an illegal drug or used a prescription medication for non-medical reasons? Never Filed at: 09/23/2022 1449                [facility]  Number of Diagnoses or Management Options  Diagnosis management comments: Patient is a 75year old female with PMHx CHF who presents to the ED for evaluation of worsening shortness of breath on exertion and palpitations for the past 1 week  Labs ordered  See ED course for further work up  Spoke with family and patient at bedside including results and plan for admission due to likely CHF exacerbation  Case discussed with MELVI who will admit patient  Disposition  Final diagnoses:   Acute on chronic congestive heart failure, unspecified heart failure type (HCC)   SOB (shortness of breath)     Time reflects when diagnosis was documented in both MDM as applicable and the Disposition within this note     Time User Action Codes Description Comment    9/23/2022  4:11 PM Griselda Alpha Add [I50 9] Acute on chronic congestive heart failure, unspecified heart failure type ([facility] [address] )     9/23/2022  4:11 PM Radha Doty Add [R06 02] SOB (shortness of breath)       ED Disposition     ED Disposition   Admit    Condition   Stable    Date/Time   Fri Sep 23, 2022  4:11 PM    Comment   Case was discussed with MELVI and the patient's admission status was agreed to be Admission Status: inpatient status to the service of Dr David Tang  Follow-up Information    None         Patient's Medications   Discharge Prescriptions    No medications on file     No discharge procedures on file  PDMP Review       Value Time User    PDMP Reviewed  Yes 2/15/2022  1:24 PM Tima Braun DO           ED Provider  Attending physically available and evaluated Deuce Valverde I managed the patient along with the ED Attending      Electronically Signed by         Abhijeet Barnes DO  09/24/22 3560

## 2022-09-23 NOTE — ASSESSMENT & PLAN NOTE
· Patient with elevated D-dimer by reviewing the records it appears to be chronic  · Patient also with CKD stage 4  · Will obtain Lower extremity Doppler

## 2022-09-23 NOTE — ASSESSMENT & PLAN NOTE
Lab Results   Component Value Date    HGBA1C 8 2 (H) 09/16/2022       No results for input(s): POCGLU in the last 72 hours      Blood Sugar Average: Last 72 hrs:   patient is on 10 units b i d NovoLog 70 30  Will add sliding scale of insulin  Last hemoglobin A1c is 8 2 showing uncontrolled diabetes mellitus

## 2022-09-24 LAB
ANION GAP SERPL CALCULATED.3IONS-SCNC: 6 MMOL/L (ref 4–13)
BUN SERPL-MCNC: 70 MG/DL (ref 5–25)
CALCIUM SERPL-MCNC: 9 MG/DL (ref 8.3–10.1)
CHLORIDE SERPL-SCNC: 111 MMOL/L (ref 96–108)
CO2 SERPL-SCNC: 24 MMOL/L (ref 21–32)
CREAT SERPL-MCNC: 3 MG/DL (ref 0.6–1.3)
ERYTHROCYTE [DISTWIDTH] IN BLOOD BY AUTOMATED COUNT: 13.3 % (ref 11.6–15.1)
GFR SERPL CREATININE-BSD FRML MDRD: 15 ML/MIN/1.73SQ M
GLUCOSE SERPL-MCNC: 100 MG/DL (ref 65–140)
GLUCOSE SERPL-MCNC: 102 MG/DL (ref 65–140)
GLUCOSE SERPL-MCNC: 193 MG/DL (ref 65–140)
GLUCOSE SERPL-MCNC: 211 MG/DL (ref 65–140)
GLUCOSE SERPL-MCNC: 222 MG/DL (ref 65–140)
HCT VFR BLD AUTO: 26.8 % (ref 34.8–46.1)
HGB BLD-MCNC: 8.1 G/DL (ref 11.5–15.4)
MCH RBC QN AUTO: 27.7 PG (ref 26.8–34.3)
MCHC RBC AUTO-ENTMCNC: 30.2 G/DL (ref 31.4–37.4)
MCV RBC AUTO: 92 FL (ref 82–98)
PLATELET # BLD AUTO: 125 THOUSANDS/UL (ref 149–390)
PMV BLD AUTO: 11.3 FL (ref 8.9–12.7)
POTASSIUM SERPL-SCNC: 4.8 MMOL/L (ref 3.5–5.3)
RBC # BLD AUTO: 2.92 MILLION/UL (ref 3.81–5.12)
SODIUM SERPL-SCNC: 141 MMOL/L (ref 135–147)
WBC # BLD AUTO: 5.55 THOUSAND/UL (ref 4.31–10.16)

## 2022-09-24 PROCEDURE — 85027 COMPLETE CBC AUTOMATED: CPT | Performed by: FAMILY MEDICINE

## 2022-09-24 PROCEDURE — 99233 SBSQ HOSP IP/OBS HIGH 50: CPT | Performed by: INTERNAL MEDICINE

## 2022-09-24 PROCEDURE — 82948 REAGENT STRIP/BLOOD GLUCOSE: CPT

## 2022-09-24 PROCEDURE — 80048 BASIC METABOLIC PNL TOTAL CA: CPT | Performed by: FAMILY MEDICINE

## 2022-09-24 PROCEDURE — 99223 1ST HOSP IP/OBS HIGH 75: CPT | Performed by: INTERNAL MEDICINE

## 2022-09-24 RX ORDER — HYDRALAZINE HYDROCHLORIDE 50 MG/1
50 TABLET, FILM COATED ORAL 3 TIMES DAILY
Status: DISCONTINUED | OUTPATIENT
Start: 2022-09-24 | End: 2022-09-25

## 2022-09-24 RX ADMIN — INSULIN LISPRO 1 UNITS: 100 INJECTION, SOLUTION INTRAVENOUS; SUBCUTANEOUS at 21:14

## 2022-09-24 RX ADMIN — ASPIRIN 81 MG CHEWABLE TABLET 81 MG: 81 TABLET CHEWABLE at 08:01

## 2022-09-24 RX ADMIN — BUMETANIDE 2 MG: 0.25 INJECTION, SOLUTION INTRAMUSCULAR; INTRAVENOUS at 08:00

## 2022-09-24 RX ADMIN — ATORVASTATIN CALCIUM 80 MG: 80 TABLET, FILM COATED ORAL at 08:01

## 2022-09-24 RX ADMIN — CARVEDILOL 12.5 MG: 12.5 TABLET, FILM COATED ORAL at 07:41

## 2022-09-24 RX ADMIN — HEPARIN SODIUM 5000 UNITS: 5000 INJECTION INTRAVENOUS; SUBCUTANEOUS at 13:54

## 2022-09-24 RX ADMIN — INSULIN LISPRO 1 UNITS: 100 INJECTION, SOLUTION INTRAVENOUS; SUBCUTANEOUS at 11:23

## 2022-09-24 RX ADMIN — LEVOTHYROXINE SODIUM 125 MCG: 125 TABLET ORAL at 05:14

## 2022-09-24 RX ADMIN — HYDRALAZINE HYDROCHLORIDE 50 MG: 50 TABLET, FILM COATED ORAL at 21:14

## 2022-09-24 RX ADMIN — INSULIN ASPART 10 UNITS: 100 INJECTION, SUSPENSION SUBCUTANEOUS at 07:41

## 2022-09-24 RX ADMIN — BUMETANIDE 2 MG: 0.25 INJECTION, SOLUTION INTRAMUSCULAR; INTRAVENOUS at 17:11

## 2022-09-24 RX ADMIN — AMLODIPINE BESYLATE 10 MG: 10 TABLET ORAL at 08:00

## 2022-09-24 RX ADMIN — INSULIN LISPRO 1 UNITS: 100 INJECTION, SOLUTION INTRAVENOUS; SUBCUTANEOUS at 16:49

## 2022-09-24 RX ADMIN — HEPARIN SODIUM 5000 UNITS: 5000 INJECTION INTRAVENOUS; SUBCUTANEOUS at 21:14

## 2022-09-24 RX ADMIN — HYDRALAZINE HYDROCHLORIDE 50 MG: 50 TABLET, FILM COATED ORAL at 16:49

## 2022-09-24 RX ADMIN — INSULIN ASPART 10 UNITS: 100 INJECTION, SUSPENSION SUBCUTANEOUS at 16:48

## 2022-09-24 RX ADMIN — ISOSORBIDE MONONITRATE 120 MG: 60 TABLET, EXTENDED RELEASE ORAL at 08:01

## 2022-09-24 RX ADMIN — HYDRALAZINE HYDROCHLORIDE 100 MG: 50 TABLET, FILM COATED ORAL at 08:01

## 2022-09-24 RX ADMIN — HEPARIN SODIUM 5000 UNITS: 5000 INJECTION INTRAVENOUS; SUBCUTANEOUS at 05:14

## 2022-09-24 RX ADMIN — Medication 2000 UNITS: at 08:01

## 2022-09-24 NOTE — CONSULTS
Consultation - General Cardiology Team 2  Pee Doll 75 y o  female MRN: [id number]  Unit/Bed#: [facility] 405-01 Encounter: [id number]      Consults  PCP: Luis F Ramirez MD   Outpatient Cardiologist: Gunner Castellanos DO    History of Present Illness   Physician Requesting Consult: Stephanie Hawkins DO  Reason for Consult / Principal Problem: Worsening SOB     HPI: Pee Doll is a 75y o  year old female with a history of CAD/Ischemic cardiomyopathy (NSTEMI in 2019 s/p LUDY x2 to LAD, also has 80% stenosis of RCA that was not intervened on), hx of HFrEF of 40% stage C, IDDM, HTN , hypothyrodisim HLD, CKD4,  who presented to the ED with progressively worsening SOB over the past 2 weeks  As per patient and her daughter, SOB is mostly on exertion but over the past 14 days she is gradually becoming more short of breath  She experienced chest tightness two weeks prior to admission, but contributed the pain to SOB and did not seek help at the time  She also has orthopnea  Patricia Mack contacted Dr Jonathan William office to see him for the aforementioned issues but wasn't able to get an appointment for the next day so she decided to come to the ED for further evaluation  Review of Systems  Review of system was conducted and was negative except for as stated in the HPI        Historical Information   Past Medical History:   Diagnosis Date    Anemia     CHF [facility]congestive [facility] failure) [facility] [address] [facility]     Chronic kidney disease     Coronary artery disease     Diabetes mellitus ([facility] [address] )     Hypertension     Hypothyroidism      Past Surgical History:   Procedure Laterality Date    CORONARY ANGIOPLASTY WITH STENT PLACEMENT  2019     Social History     Substance and Sexual Activity   Alcohol Use Yes    Comment: occ     Social History     Substance and Sexual Activity   Drug Use No     Social History     Tobacco Use   Smoking Status Former Smoker   Smokeless Tobacco Never Used     Family History: non-contributory    Meds/Allergies   [facility] Medications:   Current Facility-Administered Medications   Medication Dose Route Frequency    acetaminophen (TYLENOL) tablet 650 mg  650 mg Oral Q6H PRN    aspirin chewable tablet 81 mg  81 mg Oral Daily    atorvastatin (LIPITOR) tablet 80 mg  80 mg Oral Daily    bumetanide (BUMEX) injection 2 mg  2 mg Intravenous BID    carvedilol (COREG) tablet 12 5 mg  12 5 mg Oral BID With Meals    cholecalciferol (VITAMIN D3) tablet 2,000 Units  2,000 Units Oral Daily    docusate sodium (COLACE) capsule 100 mg  100 mg Oral BID    heparin (porcine) subcutaneous injection 5,000 Units  5,000 Units Subcutaneous Q8H [address]    hydrALAZINE (APRESOLINE) injection 10 mg  10 mg Intravenous Q6H PRN    hydrALAZINE (APRESOLINE) tablet 50 mg  50 mg Oral TID    insulin aspart protamine-insulin aspart (NovoLOG 70/30) 100 units/mL subcutaneous injection 10 Units  10 Units Subcutaneous BID AC    insulin lispro (HumaLOG) 100 units/mL subcutaneous injection 1-5 Units  1-5 Units Subcutaneous TID AC    insulin lispro (HumaLOG) 100 units/mL subcutaneous injection 1-5 Units  1-5 Units Subcutaneous HS    isosorbide mononitrate (IMDUR) 24 hr tablet 120 mg  120 mg Oral Daily    labetalol (NORMODYNE) injection 10 mg  10 mg Intravenous Q4H PRN    levothyroxine tablet 125 mcg  125 mcg Oral Early Morning    ondansetron (ZOFRAN) injection 4 mg  4 mg Intravenous Q6H PRN    polyethylene glycol (MIRALAX) packet 17 g  17 g Oral Daily PRN    senna (SENOKOT) tablet 8 6 mg  1 tablet Oral Daily     Home Medications:   Medications Prior to Admission   Medication    amLODIPine (NORVASC) 10 mg tablet    aspirin (RA Aspirin Adult Low Dose) 81 mg chewable tablet    atorvastatin (LIPITOR) 40 mg tablet    bumetanide (BUMEX) 2 mg tablet    carbamide peroxide (DEBROX) 6 5 % otic solution    carvedilol (COREG) 12 5 mg tablet    cholecalciferol (VITAMIN D3) 1,000 units tablet    Droplet Pen Needles 32G X 4 MM MISC    hydrALAZINE (APRESOLINE) 100 MG tablet    insulin aspart protamine-insulin aspart (NovoLOG 70/30) 100 units/mL injection    isosorbide mononitrate (IMDUR) 120 mg 24 hr tablet    Lancets (OneTouch Delica Plus [id number]) MISC    levothyroxine 125 mcg tablet    OneTouch Verio test strip       Allergies   Allergen Reactions    Iv Contrast [Iodinated Diagnostic Agents] Itching     Caused KELLY    Nifedipine Rash       Objective   Vitals: Blood pressure (!) 107/43, pulse (!) 51, temperature 97 9 °F (36 6 °C), resp  rate 18, height 5' 2" (1 575 m), weight 70 4 kg (155 lb 3 3 oz), SpO2 98 %, currently breastfeeding  Orthostatic Blood Pressures    Flowsheet Row Most Recent Value   Blood Pressure 107/43 filed at 09/24/2022 1126   Patient Position - Orthostatic VS Lying filed at 09/23/2022 2122            Invasive Devices  Report    Peripheral Intravenous Line  Duration           Peripheral IV 09/23/22 Left Antecubital <1 day                Physical Exam    GEN: Beatrice Stephens appears well, alert and oriented x 3, pleasant and cooperative   HEENT:  Normocephalic, atraumatic, anicteric, moist mucous membranes  NECK: mild JVD at 45 degree   HEART: normal rate and rhythm, normal S1 and S2, no murmurs, clicks, gallops or rubs   LUNGS: Decreased breath sounds, no crackles/rales noted   ABDOMEN:  Normoactive bowel sounds, soft, no tenderness, no distention  EXTREMITIES: No edema in LEs   NEURO: no gross focal findings; cranial nerves grossly intact   SKIN:  Dry, intact, warm to touch    Lab Results: I have personally reviewed pertinent lab results      Results from last 7 days   Lab Units 09/23/22  1904 09/23/22  1728 09/23/22  1448   HS TNI 0HR ng/L  --   --  23   HS TNI 2HR ng/L  --  17  --    HS TNI 4HR ng/L 22  --   --      Results from last 7 days   Lab Units 09/23/22  1448   NT-PRO BNP pg/mL 21,757*     Results from last 7 days   Lab Units 09/24/22  0426 09/23/22  1448   POTASSIUM mmol/L 4 8 5 4*   CO2 mmol/L 24 20*   CHLORIDE mmol/L 111* 111*   BUN mg/dL 70* 67*   CREATININE mg/dL 3 00* 2 87*     Results from last 7 days   Lab Units 22  0426 22  1904 22  1448   HEMOGLOBIN g/dL 8 1*  --  8 6*   HEMATOCRIT % 26 8*  --  28 1*   PLATELETS Thousands/uL 125* 161 127*             Imaging: I have personally reviewed pertinent reports  EKG:   SNR, chronic TWIs         Previous STRESS TEST:  No results found for this or any previous visit  No results found for this or any previous visit  Results for orders placed during the hospital encounter of 18    NM myocardial perfusion spect (rx stress and/or rest)    Narrative  [address]  [facility], [address] [facility]  [phone]    Stress SPECT Myocardial Perfusion Imaging after Regadenoson    Patient: Marcie Fang  MR number: [id number]  Account number: [de-identified]  : 1955  Age: 58 years  Gender: Female  Status: Inpatient  Location: Stress lab  Height: 63 in  Weight: 169 lb  BP: 141/ 72 mmHg    Allergies: NO KNOWN ALLERGIES    Diagnosis: R07 9 - Chest pain, unspecified    Primary Physician:  Keli Jerry MD  Technician:  Janae Hays  RN:  Kristyn Art RN  Group:  [facility]  Report Prepared by[de-identified]  Kristyn Art RN  Interpreting Physician:  Analilia Mckeon MD    INDICATIONS: Chest pain, Dyspnea, Detection CAD    HISTORY: The patient is a 58year old  female  Chest pain status: recent onset chest pain  Coronary artery disease risk factors: dyslipidemia, hypertension, smoking, family history of premature coronary artery disease, and diabetes  mellitus  Cardiovascular history: none significant  Medications: a calcium channel blocker, an ACE inhibitor/ARB, a lipid lowering agent, and an antihypertensive agent  PHYSICAL EXAM: Baseline physical exam screening: normal and no wheezes audible  REST ECG: Normal sinus rhythm at 71 beats per minute  PROCEDURE: The study was performed in the the Stress lab  A regadenoson infusion pharmacologic stress test was performed  SPECT myocardial perfusion study performed during stress  Systolic blood pressure was 141 mmHg, at the start of the  study  Diastolic blood pressure was 72 mmHg, at the start of the study  The heart rate was 71 bpm, at the start of the study  IV double checked  Regadenoson protocol:  HR bpm SBP mmHg DBP mmHg Symptoms  Baseline 71 141 72 none  Immediate 81 130 59 dizziness, nausea  1 min 68 102 56 same as above  2 min 66 105 47 same as above  3 min 65 103 55 subsiding  4 min 66 121 59 none  No medications or fluids given  The patient also performed low level exercise with leg lifts  STRESS SUMMARY: Duration of pharmacologic stress was 3 min and 0 sec  Functional capacity could not be adequately assessed  Maximal heart rate during stress was 81 bpm  The heart rate response to stress was normal  There was resting  hypertension with an appropriate blood pressure response to stress  The rate-pressure product for the peak heart rate and blood pressure was 57216  There was no chest pain during stress  The stress test was terminated due to protocol  completion  Pre oxygen saturation: 97 %  Peak oxygen saturation: 99 %  The stress ECG was negative for ischemia with vasodilator stress  There were no stress arrhythmias or conduction abnormalities  ISOTOPE ADMINISTRATION:  Resting isotope administration Stress isotope administration  Agent Tetrofosmin Tetrofosmin  Dose 10 5 mCi 32 1 mCi  Date 12/31/2018 12/31/2018  Injection time 11:50 13:09  Injection-image interval 38 min 51 min    The radiopharmaceutical was injected at the peak effect of pharmacologic stress  MYOCARDIAL PERFUSION IMAGING:  The image quality was fair  Rotating projection images reveal mild breast attenuation  Left ventricular size was normal  The TID ratio was 1 01      PERFUSION DEFECTS:  -  There was a small, mildly severe, partially reversible myocardial perfusion defect of the mid anterior wall  GATED SPECT:  The calculated left ventricular ejection fraction was 52 %  There was no diagnostic evidence for left ventricular regional abnormality  SUMMARY:  -  Stress results: There was resting hypertension with an appropriate blood pressure response to stress  There was no chest pain during stress  -  ECG conclusions: The stress ECG was negative for ischemia with vasodilator stress  -  Perfusion imaging: There was a small, mildly severe, partially reversible myocardial perfusion defect of the mid anterior wall  -  Gated SPECT: The calculated left ventricular ejection fraction was 52 %  There was no diagnostic evidence for left ventricular regional abnormality  IMPRESSIONS: Abnormal study after pharmacologic vasodilation  There was a small amount of ischemia of the mid anterior wall  Left ventricular systolic function was normal     Prepared and signed by    Analilia Mckeon MD  Signed 2018 15:54:13      Previous Cath/PCI:  Results for orders placed during the hospital encounter of 18    Cardiac catheterization    Narrative  [address]  [address] [facility], [address] [place] [address]  [phone]    [facility]    Invasive Cardiovascular Lab Complete Report    Patient: Marcie Fang  MR number: [id number]  Account number: [de-identified]  Study date: 2019  Gender: Female  : 1955  Height: 63 in  Weight: 168 7 lb  BSA: 1 8 m squared    Allergies: NO KNOWN ALLERGIES    Diagnostic Cardiologist:  Laurence Gómez MD  Interventional Cardiologist:  Laurence Gómez MD  Primary Physician:  Keli Jerry MD    SUMMARY    CORONARY CIRCULATION:  Proximal LAD: There was a 50 % stenosis  Mid LAD: There was a tubular 90 % stenosis  The lesion was complex and moderately calcified  This is a likely culprit for the patient's clinical presentation  An intervention was performed  Distal LAD: There was a diffuse 50 % stenosis    Mid RCA: There was a tubular 80 % stenosis  It appears amenable to percutaneous intervention  1ST LESION INTERVENTIONS:  A balloon angioplasty procedure was performed on the lesion in the mid LAD  A Xience Terrie Rx 2 5 x 15mm drug-eluting stent was placed across the lesion and deployed at a maximum inflation pressure of 12 jennifer  A Xience Terrie Rx 2 5 x 15mm drug-eluting stent was placed across the lesion and deployed at a maximum inflation pressure of 11 jennifer  INDICATIONS:  --  Possible CAD: myocardial infarction without ST elevation (NSTEMI)  --  Coronary artery disease: abnormal stress test     PROCEDURES PERFORMED    --  Left coronary angiography  --  Right coronary angiography  --  Inpatient  --  Mod Sedation Same Physician Initial 15min  --  Mod Sedation Same Physician Add 15min  --  Coronary Catheterization (w/o LHC)  --  Mod Sedation Same Physician Add 15min  --  Coronary Drug Eluting Stent w/PTCA  --  Intervention on mid LAD: balloon angioplasty  PROCEDURE: The risks and alternatives of the procedures and conscious sedation were explained to the patient and informed consent was obtained  The patient was brought to the cath lab and placed on the table  The planned puncture sites  were prepped and draped in the usual sterile fashion  --  Right radial artery access  After performing an Balwinder's test to verify adequate ulnar artery supply to the hand, the radial site was prepped  The puncture site was infiltrated with local anesthetic  The vessel was accessed using the  modified Seldinger technique, a wire was advanced into the vessel, and a sheath was advanced over the wire into the vessel  --  Left coronary artery angiography  A catheter was advanced over a guidewire into the aorta and positioned in the left coronary artery ostium under fluoroscopic guidance  Angiography was performed  --  Right coronary artery angiography   A catheter was advanced over a guidewire into the aorta and positioned in the right coronary artery ostium under fluoroscopic guidance  Angiography was performed  --  Inpatient  --  Mod Sedation Same Physician Initial 15min  --  Mod Sedation Same Physician Add 15min  --  Coronary Catheterization (w/o [facility])  --  Mod Sedation Same Physician Add 15min  LESION INTERVENTION: A balloon angioplasty procedure was performed on the lesion in the mid LAD  --  Vessel setup was performed  A 5Fr Launcher Ebu 3 5 guiding catheter was used to cannulate the vessel  --  Vessel setup was performed  A Runthrough NS 180cm wire was used to cross the lesion  --  Balloon angioplasty was performed, using a Mini Trek Rx 2 0 x 15mm balloon, with 4 inflations and a maximum inflation pressure of 8 jennifer  --  A Xience Terrie Rx 2 5 x 15mm drug-eluting stent was placed across the lesion and deployed at a maximum inflation pressure of 12 jennifer  --  Balloon angioplasty was performed, using a NC Euphora Rx 2 25 x 15mm balloon, with 2 inflations and a maximum inflation pressure of 16 jennifer  --  Balloon angioplasty was performed, using a NC Trek Rx 2 75 x 08mm balloon, with 2 inflations and a maximum inflation pressure of 20 jennifer  --  A Xience Terrie Rx 2 5 x 15mm drug-eluting stent was placed across the lesion and deployed at a maximum inflation pressure of 11 jennifer  --  Balloon angioplasty was performed, using a NC Trek Rx 2 75 x 08mm balloon, with 3 inflations and a maximum inflation pressure of 20 jennifer  INTERVENTIONS:  --  Coronary Drug Eluting Stent w/PTCA  PROCEDURE COMPLETION: The patient tolerated the procedure well and was discharged from the cath lab  TIMING: Test started at 11:29  Test concluded at 12:29  HEMOSTASIS: The sheath was removed  The site was compressed with a Hemoband  device  Hemostasis was obtained  MEDICATIONS GIVEN: Verapamil (Isoptin, Calan, Covera), 5 mg, IA, at 11:32  Fentanyl (1OOmcg/2 ml), 50 mcg, IV, at 11:27   Versed (2mg/2ml), 1 mg, IV, at 11:27  1% Lidocaine, 1 ml, subcutaneously, at 11:29  Nitroglycerin (200mcg/ml), 200 mcg, at 11:32  Heparin 1000 units/ml, 3,000 units, at 11:32  Versed (2mg/2ml), 1 mg, IV, at 11:42  Fentanyl (1OOmcg/2 ml), 50 mcg, IV, at 11:42  Nitro Paste, 1, topically, at 11:42, in  Effient, 60 mg, PO, at  11:42  Heparin 1000 units/ml, 5,000 units, IV, at 11:43  Heparin 1000 units/ml, 2,000 units, IV, at 11:51  Nitroglycerine (200mcg/ml), 200 mcg, at 11:57  CONTRAST GIVEN: 60 ml Omnipaque (350mg I /ml)  RADIATION EXPOSURE: Fluoroscopy time:  13 23 min  CORONARY VESSELS:   --  The coronary circulation is right dominant  --  Left main: The vessel was medium to large sized  Angiography showed minor luminal irregularities  --  Proximal LAD: There was a 50 % stenosis  --  Mid LAD: There was a tubular 90 % stenosis  The lesion was complex and moderately calcified  This is a likely culprit for the patient's clinical presentation  An intervention was performed  --  Distal LAD: The vessel was small to medium sized  Angiography showed severe atherosclerosis  There was a diffuse 50 % stenosis  --  Circumflex: The vessel was small sized  Angiography showed minor luminal irregularities  There was one major obtuse marginal   --  Ramus intermedius: The vessel was medium sized  Angiography showed mild atherosclerosis  The artery bifurcated into two small sized vessels  --  RCA: The vessel was medium sized  --  Mid RCA: There was a tubular 80 % stenosis  It appears amenable to percutaneous intervention  IMPRESSIONS:  There is significant double vessel coronary artery disease  RECOMMENDATIONS  The patient's anti-anginal regimen should be further intensified  Patient instructed to return for staged percutaneous intervention in three weeks  Prepared and signed by    Sanaz Hernandez MD  Signed 01/04/2019 12:37:00    Study diagram    Angiographic findings  Native coronary lesions:  ·Proximal LAD: Lesion 1: 50 % stenosis    ·Mid LAD: Lesion 1: tubular, 90 % stenosis  ·Distal LAD: Lesion 1: diffuse, 50 % stenosis  ·Mid RCA: Lesion 1: tubular, 80 % stenosis  Intervention results  Native coronary lesions:  · balloon angioplasty of mid LAD  Stent: Carina Shane Rx 2 5 x 15mm drug-eluting  Stent: Carina Shane Rx 2 5 x 15mm drug-eluting  Hemodynamic tables    Pressures:  Baseline  Pressures:  - HR: 42  Pressures:  - Rhythm:  Pressures:  -- Aortic Pressure (S/D/M): 101/49/66    Outputs:  Baseline  Outputs:  -- CALCULATIONS: [age]  Outputs:  -- CALCULATIONS: Body Surface Area: 1 80  Outputs:  -- CALCULATIONS: Height in cm: 160 00  Outputs:  -- CALCULATIONS: Sex: Female  Outputs:  -- CALCULATIONS: Weight in k 70    No results found for this or any previous visit  No results found for this or any previous visit  ECHO:  Results for orders placed during the hospital encounter of 21    Echo complete with contrast if indicated    Narrative  [facility] [address]  [facility], [address] [facility]  [phone]    Transthoracic Echocardiogram  2D, M-mode, Doppler, and Color Doppler    Study date:  2021    Patient: Marcie Fang  MR number: [id number]  Account number: [de-identified]  : 1955  Age: 76 years  Gender: Female  Status: Inpatient  Location: Bedside  Height: 65 in  Weight: 171 lb  BP: 139/ 63 mmHg    Indications: Coronary artery disease    Diagnoses: I25 10 - Atherosclerotic heart disease of native coronary artery without angina pectoris    Sonographer:  Yony Hassan RDCS  Primary Physician:  Keli Jerry MD  Referring Physician:  Alondra Petersen MD  Group:  [facility]  Cardiology Fellow:  Denise Easley DO  Interpreting Physician:  Harmeet Llanes MD    SUMMARY    LEFT VENTRICLE:  Systolic function was normal  Ejection fraction was estimated to be 60 %  There was akinesis of the basal inferior and basal-mid inferolateral wall(s)    Wall thickness was moderately increased  There was moderate concentric hypertrophy  Features were consistent with a pseudonormal left ventricular filling pattern, with concomitant abnormal relaxation and increased filling pressure (grade 2 diastolic dysfunction)  LEFT ATRIUM:  The atrium was mildly dilated  MITRAL VALVE:  There was mild regurgitation  TRICUSPID VALVE:  There was trace regurgitation  PULMONIC VALVE:  There was trace regurgitation  PERICARDIUM:  A moderate, free-flowing pericardial effusion was identified circumferential to the heart  There was no evidence of hemodynamic compromise  HISTORY: PRIOR HISTORY: Congestive heart failure; DM; Hypertension; Hyperlipidemia; CKD stage 3; CAD; Elevated Troponin; Former smoker    PROCEDURE: The procedure was performed at the bedside  This was a routine study  The transthoracic approach was used  The study included complete 2D imaging, M-mode, complete spectral Doppler, and color Doppler  The heart rate was 69 bpm,  at the start of the study  Images were obtained from the parasternal, apical, subcostal, and suprasternal notch acoustic windows  Image quality was adequate  LEFT VENTRICLE: Size was normal  Systolic function was normal  Ejection fraction was estimated to be 60 %  There was akinesis of the basal inferior and basal-mid inferolateral wall(s)  Wall thickness was moderately increased  There was  moderate concentric hypertrophy  DOPPLER: Features were consistent with a pseudonormal left ventricular filling pattern, with concomitant abnormal relaxation and increased filling pressure (grade 2 diastolic dysfunction)  RIGHT VENTRICLE: The size was normal  Systolic function was normal  Wall thickness was normal     LEFT ATRIUM: The atrium was mildly dilated  RIGHT ATRIUM: Size was normal     MITRAL VALVE: Valve structure was normal  There was normal leaflet separation  DOPPLER: The transmitral velocity was within the normal range   There was no evidence for stenosis  There was mild regurgitation  AORTIC VALVE: The valve was trileaflet  Leaflets exhibited normal thickness and normal cuspal separation  DOPPLER: Transaortic velocity was within the normal range  There was no evidence for stenosis  There was no regurgitation  TRICUSPID VALVE: The valve structure was normal  There was normal leaflet separation  DOPPLER: The transtricuspid velocity was within the normal range  There was no evidence for stenosis  There was trace regurgitation  PULMONIC VALVE: Leaflets exhibited normal thickness, no calcification, and normal cuspal separation  DOPPLER: The transpulmonic velocity was within the normal range  There was trace regurgitation  PERICARDIUM: A moderate, free-flowing pericardial effusion was identified circumferential to the heart  There was no evidence of hemodynamic compromise  AORTA: The root exhibited normal size  SYSTEMIC VEINS: IVC: The inferior vena cava was normal in size and course   Respirophasic changes were normal     SYSTEM MEASUREMENT TABLES    2D  %FS: 27 18 %  Ao Diam: 2 72 cm  EDV(Teich): 104 56 ml  EF(Teich): 52 88 %  ESV(Teich): 49 27 ml  IVSd: 0 99 cm  LA Area: 23 17 cm2  LA Diam: 3 97 cm  LVEDV MOD A4C: 116 88 ml  LVEF MOD A4C: 58 98 %  LVESV MOD A4C: 47 94 ml  LVIDd: 4 74 cm  LVIDs: 3 45 cm  LVLd A4C: 8 71 cm  LVLs A4C: 6 82 cm  LVPWd: 0 91 cm  RA Area: 13 18 cm2  RVIDd: 2 89 cm  SV MOD A4C: 68 93 ml  SV(Teich): 55 29 ml    CW  AV Vmax: 1 32 m/s  AV maxP 93 mmHg    MM  TAPSE: 1 82 cm    PW  E' Sept: 0 05 m/s  E/E' Sept: 30 67  LVOT Vmax: 0 92 m/s  LVOT maxPG: 3 35 mmHg  MV A Miko: 0 93 m/s  MV Dec Hardee: 8 21 m/s2  MV DecT: 171 63 ms  MV E Miko: 1 41 m/s  MV E/A Ratio: 1 52    [employer] Accredited Echocardiography Laboratory    Prepared and electronically signed by    Carlyn Spicer MD  Signed 2021 18:10:17    Results for orders placed during the hospital encounter of 09/15/22    Echo complete w/ contrast if indicated    Interpretation Summary    Left Ventricle: Left ventricular cavity size is mildly dilated  Wall thickness is increased  There is eccentric hypertrophy  The left ventricular ejection fraction is 40%  Systolic function is mildly reduced  Diastolic function is moderately abnormal, consistent with grade II (pseudonormal) relaxation    The following segments are akinetic: basal inferoseptal and basal inferior    The following segments are hypokinetic: basal inferolateral, mid inferior, mid inferolateral and apical inferior    All other segments are normal     Right Ventricle: Systolic function is low normal     Left Atrium: The atrium is mildly dilated    Mitral Valve: There is mild annular calcification    Pericardium: There is a small pericardial effusion circumferential to the heart  There is no echocardiographic evidence of tamponade  HOLTER  Results for orders placed during the hospital encounter of 19    Holter monitor - 24 hour    Narrative  PT NAME: Madelyn Mcgee  : 1955  AGE: 71 y o  GENDER: female  MRN: [id number]  PROCEDURE: Holter monitor - 24 hour      INDICATIONS:  24 hour Holter monitor was performed 2019 for palpitations  Average heart rate was 69 beats per minute  Minimum heart rate was 53 beats per minute at 1:45 p m     Maximum heart rate was 85 beats per minute at 12:13 p m  Cesar Turk Ventricular ectopic activity consisted of 8 isolated PVCs, which comprises less than 0 1% of total beats  Supraventricular ectopic activity consisted of 6 isolated PACs, which comprises less than 0 1% of total beats  Longest R-R interval was 1 3 seconds at 1:18 p m  Cesar Turk Patient's rhythm included 2 hours 50 minutes of bradycardia  Most of the bradycardia occurred between the hours of 1:00 p m  to 4:00 p m     No symptoms reported  IMPRESSIONS:  1  Sinus rhythm with rare ventricular and supraventricular ectopic activity    2  No symptoms reported  Interpreted by: Manda Dominguez MD    No results found for this or any previous visit  VTE Prophylaxis: Heparin       Assessment/Plan    Michelle Villegas is a 75y o  year old female with a history of CAD/Ischemic cardiomyopathy (NSTEMI in 2019 s/p LUDY x2 to LAD, also has 80% stenosis of RCA that was not intervened on), hx of HFrEF of 40% stage C, IDDM HTN , HLD, CKD4, DM2 who presented to the ED with progressively worsening SOB over the past 2 weeks  Assessment:    1  Acute decompensated heart failure - with reduced EF of 40%  -Echocardiogram (09/15/2022):  LVEF 98% moderate diastolic dysfunction, mildly dilated LV cavity, akinesis in basal inferoseptal and basal inferior, hypokinetic and basal inferolateral, mid inferior, mid inferior lateral and apical inferior walls  - reports compliant with medications  -NT proBNP elevated to 21,757, recent baseline 15-20K  -home medication:  Coreg 12 5 b i d , Imdur 120 mg daily, atorvastatin 40 mg daily, Bumex 2 mg b i d , hydralazine 100 mg t i d   2  CAD status post PCI LUDY x2 to LAD in 2019- residual disease in RCA 80%   - Mildly elevated troponin I, likely due to renal disease   3  Chronic kidney disease stage 4-known history of ARTEM  4  Hypertension  -on amlodipine 10 mg, Coreg 12 5 mg b i d , hydralazine 100 mg t i d , Imdur 120 mg daily  5  Hyperlipidemia - on Atorvastatin 80mg   6  IDDM  7  Hypothyroidism-on levothyroxine 125 mcg daily  8  Anemia     - Recent admission on 9/15/2022 due to concerns for stroke, workup negative however she was found to have a newly reduced EF at 40%  She denied cardiac catheterization due to history of contrast induced nephropathy    - presented to ED with 2 week history of progressively worsening shortness of breath, initially on exertion and later at rest  - chest x-ray ED concerning for pulmonary congestion  - no weight change since previous admission  - received x1 Lasix in the ED, now on Bumex drip  - denied chest pain  - endorses compliance with medications    Plan:    1  Considering recent drop in EF (LVEF 60% in 07/2021 to 40% in 9/15/2022) and coronary admission for decompensation, she will benefit from ischemic workup  2  CKD precludes ACE inhibitor,ARB, ARNI, continue with home medications for CHF/CAD   3  May transition Bumex IV to p o , appears to be close to euvolemic state  4  Will obtain a nuclear medicine stress test  5  Consider Nephrology consult, in setting of new ischemic findings on nuclear medicine stress, patient would certainly benefit from cardiac catheterization  In spite of history of contrast induced nephropathy, benefits would outweigh arms from a cardiac standpoint  Would greatly appreciate Nephrology's recommendation        Case discussed and reviewed with Dr Rajesh Rubio who agrees with my assessment and plan  Thank you for involving us in the care of your patient        Yue Flores MD  Cardiology Fellow PGY-4

## 2022-09-24 NOTE — PROGRESS NOTES
[facility]  Progress Note - Matthew Jaramillo 1955, 75 y o  female MRN: [id number]  Unit/Bed#: [facility] 405-01 Encounter: [id number]  Primary Care Provider: Marc Saez MD   Date and time admitted to hospital: 9/23/2022  2:14 PM    * Shortness of breath  Assessment & Plan  · Patient came to the hospital with shortness of Breath  Mainly on exertion  Patient's family provided some of the history reported that shortness of breath is gradually worsening  By the time she walks from her room to the bathroom she gets short of breath  · Also gives history of orthopnea  · No weight gain or lower extremity edema  · Patient reported that she has been compliant with salt and water restriction  · Has been complaint with diuretics  · Obtain chest x-ray  Troponin negative so far  · Patient was given 1 time dose of Lasix  BNP elevated about 21,000  · Patient is on Bumex 2 mg b i d   · Continue with diuresis  · Consult Cardiology  · Recent echocardiogram showed ejection fraction 40% with grade 2 diastolic dysfunction    Ischemic cardiomyopathy  Assessment & Plan  · Patient with underlying coronary artery disease  Recent echocardiogram showed left ventricle ejection fraction 40% with regional wall motion abnormalities  · Patient with known diagnosis of coronary artery disease  Patient also have grade 2 diastolic dysfunction  · Medical management  · Continue aspirin statin and beta-blocker and Imdur    Hyperlipidemia  Assessment & Plan  · Continue with statin    Coronary artery disease  Assessment & Plan  · Status post LUDY x 2 to LAD in 2019   Noted to have 80% stenosed RCA at that time  · Cardiology evaluated the patient during the recent hospital stay due to lack of ischemic skin times it was decided that the patient will be managed medically  · Continue with beta-blocker statin aspirin  · Continue with Imdur    Anemia  Assessment & Plan  · Hemoglobin today is 8 6 which is higher than the last hemoglobin before discharge  Likely secondary to CKD  · Monitor hemoglobin    Hypertension  Assessment & Plan  · On coreg, hydralazine, Norvasc and Imdur as outpatient  Patient reported that she took her medications in the morning  · Hydralazine was added during the recent hospital  · Blood pressure elevated  · Add p r n  hydralazine and labetalol    Hypothyroidism  Assessment & Plan  · Continue Synthroid      VTE Pharmacologic Prophylaxis:   Pharmacologic: Heparin  Mechanical VTE Prophylaxis in Place: No    Patient Centered Rounds: I have performed bedside rounds with nursing staff today  Time Spent for Care: 15 minutes  More than 50% of total time spent on counseling and coordination of care as described above  Current Length of Stay: 1 day(s)    Current Patient Status: Inpatient       Code Status: Level 1 - Full Code      Subjective:   nad    Objective:     Vitals:   Temp (24hrs), Av 9 °F (36 6 °C), Min:97 1 °F (36 2 °C), Max:98 3 °F (36 8 °C)    Temp:  [97 1 °F (36 2 °C)-98 3 °F (36 8 °C)] 97 9 °F (36 6 °C)  HR:  [63-72] 63  Resp:  [16-18] 16  BP: (100-189)/(55-95) 167/90  SpO2:  [94 %-98 %] 96 %  Body mass index is 28 39 kg/m²  Input and Output Summary (last 24 hours): Intake/Output Summary (Last 24 hours) at 2022 0929  Last data filed at 2022 0747  Gross per 24 hour   Intake 380 ml   Output 1500 ml   Net -1120 ml       Physical Exam:     Physical Exam  Constitutional:       Appearance: Normal appearance  HENT:      Head: Normocephalic and atraumatic  Cardiovascular:      Rate and Rhythm: Normal rate and regular rhythm  Pulmonary:      Effort: Pulmonary effort is normal       Breath sounds: Wheezing and rales present  Abdominal:      General: Abdomen is flat  Palpations: Abdomen is soft  Skin:     General: Skin is warm and dry  Neurological:      General: No focal deficit present        Mental Status: She is alert and oriented to person, place, and time    Psychiatric:         Mood and Affect: Mood normal          Behavior: Behavior normal          Additional Data:     Labs:    Results from last 7 days   Lab Units 09/24/22  0426 09/23/22  1904 09/23/22  1448   WBC Thousand/uL 5 55  --  5 41   HEMOGLOBIN g/dL 8 1*  --  8 6*   HEMATOCRIT % 26 8*  --  28 1*   PLATELETS Thousands/uL 125*   < > 127*   NEUTROS PCT %  --   --  78*   LYMPHS PCT %  --   --  14   MONOS PCT %  --   --  4   EOS PCT %  --   --  3    < > = values in this interval not displayed  Results from last 7 days   Lab Units 09/24/22  0426 09/23/22  1448   POTASSIUM mmol/L 4 8 5 4*   CHLORIDE mmol/L 111* 111*   CO2 mmol/L 24 20*   BUN mg/dL 70* 67*   CREATININE mg/dL 3 00* 2 87*   CALCIUM mg/dL 9 0 9 3   ALK PHOS U/L  --  393*   ALT U/L  --  49   AST U/L  --  37           * I Have Reviewed All Lab Data Listed Above  * Additional Pertinent Lab Tests Reviewed:  All Labs Within Last 24 Hours Reviewed      Recent Cultures (last 7 days):           Last 24 Hours Medication List:   Current Facility-Administered Medications   Medication Dose Route Frequency Provider Last Rate    acetaminophen  650 mg Oral Q6H PRN Eveline Young MD      amLODIPine  10 mg Oral Daily Remer Covert, MD      aspirin  81 mg Oral Daily Eveline Covert, MD      atorvastatin  80 mg Oral Daily Remer Covert, MD      bumetanide  2 mg Intravenous BID Eveline Covert, MD      carvedilol  12 5 mg Oral BID With Meals Eveline Young MD      cholecalciferol  2,000 Units Oral Daily Eveline Covert, MD      docusate sodium  100 mg Oral BID Eveline Young MD      heparin (porcine)  5,000 Units Subcutaneous [facility] Eveline Young MD      hydrALAZINE  10 mg Intravenous Q6H PRN Eveline Young MD      hydrALAZINE  100 mg Oral TID Eveline Young MD      insulin aspart protamine-insulin aspart  10 Units Subcutaneous BID AC Eveline Young MD      insulin lispro  1-5 Units Subcutaneous TID [facility] Eveline Young MD      insulin lispro  1-5 Units Subcutaneous HS Alisson Mata, MD      isosorbide mononitrate  120 mg Oral Daily Alisson Mata, MD      labetalol  10 mg Intravenous Q4H PRN Alisson Mata, MD      levothyroxine  125 mcg Oral Early Morning Alisson Mata, MD      ondansetron  4 mg Intravenous Q6H PRN Alisson Mata, MD      polyethylene glycol  17 g Oral Daily PRN Alisson Mata, MD      senna  1 tablet Oral Daily Alisson Mata, MD          Today, Patient Was Seen By: Maryln Mohs, DO    ** Please Note: Dictation voice to text software may have been used in the creation of this document   **

## 2022-09-24 NOTE — PLAN OF CARE
Problem: Potential for Falls  Goal: Patient will remain free of falls  Description: INTERVENTIONS:  - Educate patient/family on patient safety including physical limitations  - Instruct patient to call for assistance with activity   - Consult OT/PT to assist with strengthening/mobility   - Keep Call bell within reach  - Keep bed low and locked with side rails adjusted as appropriate  - Keep care items and personal belongings within reach  - Initiate and maintain comfort rounds  - Make Fall Risk Sign visible to staff  - Offer Toileting every 4 Hours, in advance of need  - Initiate/Maintain bed alarm  - Obtain necessary fall risk management equipment: yes  - Apply yellow socks and bracelet for high fall risk patients  - Consider moving patient to room near nurses station  Outcome: Progressing

## 2022-09-25 LAB
ANION GAP SERPL CALCULATED.3IONS-SCNC: 6 MMOL/L (ref 4–13)
BUN SERPL-MCNC: 74 MG/DL (ref 5–25)
CALCIUM SERPL-MCNC: 8.6 MG/DL (ref 8.3–10.1)
CHLORIDE SERPL-SCNC: 108 MMOL/L (ref 96–108)
CO2 SERPL-SCNC: 24 MMOL/L (ref 21–32)
CREAT SERPL-MCNC: 3.52 MG/DL (ref 0.6–1.3)
ERYTHROCYTE [DISTWIDTH] IN BLOOD BY AUTOMATED COUNT: 13.4 % (ref 11.6–15.1)
GFR SERPL CREATININE-BSD FRML MDRD: 12 ML/MIN/1.73SQ M
GLUCOSE SERPL-MCNC: 161 MG/DL (ref 65–140)
GLUCOSE SERPL-MCNC: 225 MG/DL (ref 65–140)
GLUCOSE SERPL-MCNC: 248 MG/DL (ref 65–140)
GLUCOSE SERPL-MCNC: 349 MG/DL (ref 65–140)
GLUCOSE SERPL-MCNC: 97 MG/DL (ref 65–140)
HCT VFR BLD AUTO: 25.1 % (ref 34.8–46.1)
HGB BLD-MCNC: 7.6 G/DL (ref 11.5–15.4)
MCH RBC QN AUTO: 27.7 PG (ref 26.8–34.3)
MCHC RBC AUTO-ENTMCNC: 30.3 G/DL (ref 31.4–37.4)
MCV RBC AUTO: 92 FL (ref 82–98)
PLATELET # BLD AUTO: 104 THOUSANDS/UL (ref 149–390)
PMV BLD AUTO: 11.4 FL (ref 8.9–12.7)
POTASSIUM SERPL-SCNC: 4.7 MMOL/L (ref 3.5–5.3)
RBC # BLD AUTO: 2.74 MILLION/UL (ref 3.81–5.12)
SODIUM SERPL-SCNC: 138 MMOL/L (ref 135–147)
WBC # BLD AUTO: 3.9 THOUSAND/UL (ref 4.31–10.16)

## 2022-09-25 PROCEDURE — 80048 BASIC METABOLIC PNL TOTAL CA: CPT | Performed by: PHYSICIAN ASSISTANT

## 2022-09-25 PROCEDURE — 99223 1ST HOSP IP/OBS HIGH 75: CPT | Performed by: INTERNAL MEDICINE

## 2022-09-25 PROCEDURE — 85027 COMPLETE CBC AUTOMATED: CPT | Performed by: PHYSICIAN ASSISTANT

## 2022-09-25 PROCEDURE — 99233 SBSQ HOSP IP/OBS HIGH 50: CPT | Performed by: INTERNAL MEDICINE

## 2022-09-25 PROCEDURE — 82948 REAGENT STRIP/BLOOD GLUCOSE: CPT

## 2022-09-25 RX ADMIN — BUMETANIDE 2 MG: 0.25 INJECTION, SOLUTION INTRAMUSCULAR; INTRAVENOUS at 08:42

## 2022-09-25 RX ADMIN — HYDRALAZINE HYDROCHLORIDE 50 MG: 50 TABLET, FILM COATED ORAL at 08:40

## 2022-09-25 RX ADMIN — CARVEDILOL 12.5 MG: 12.5 TABLET, FILM COATED ORAL at 16:39

## 2022-09-25 RX ADMIN — ATORVASTATIN CALCIUM 80 MG: 80 TABLET, FILM COATED ORAL at 08:39

## 2022-09-25 RX ADMIN — HYDRALAZINE HYDROCHLORIDE 75 MG: 25 TABLET, FILM COATED ORAL at 21:00

## 2022-09-25 RX ADMIN — CARVEDILOL 12.5 MG: 12.5 TABLET, FILM COATED ORAL at 08:40

## 2022-09-25 RX ADMIN — HEPARIN SODIUM 5000 UNITS: 5000 INJECTION INTRAVENOUS; SUBCUTANEOUS at 06:06

## 2022-09-25 RX ADMIN — HEPARIN SODIUM 5000 UNITS: 5000 INJECTION INTRAVENOUS; SUBCUTANEOUS at 21:00

## 2022-09-25 RX ADMIN — ASPIRIN 81 MG CHEWABLE TABLET 81 MG: 81 TABLET CHEWABLE at 08:39

## 2022-09-25 RX ADMIN — ISOSORBIDE MONONITRATE 120 MG: 60 TABLET, EXTENDED RELEASE ORAL at 08:40

## 2022-09-25 RX ADMIN — HYDRALAZINE HYDROCHLORIDE 75 MG: 25 TABLET, FILM COATED ORAL at 16:39

## 2022-09-25 RX ADMIN — Medication 2000 UNITS: at 08:40

## 2022-09-25 RX ADMIN — BUMETANIDE 2 MG: 0.25 INJECTION, SOLUTION INTRAMUSCULAR; INTRAVENOUS at 18:01

## 2022-09-25 RX ADMIN — INSULIN LISPRO 2 UNITS: 100 INJECTION, SOLUTION INTRAVENOUS; SUBCUTANEOUS at 21:00

## 2022-09-25 RX ADMIN — LEVOTHYROXINE SODIUM 125 MCG: 125 TABLET ORAL at 06:06

## 2022-09-25 RX ADMIN — INSULIN LISPRO 1 UNITS: 100 INJECTION, SOLUTION INTRAVENOUS; SUBCUTANEOUS at 16:40

## 2022-09-25 RX ADMIN — SENNOSIDES 8.6 MG: 8.6 TABLET, FILM COATED ORAL at 08:40

## 2022-09-25 RX ADMIN — INSULIN ASPART 10 UNITS: 100 INJECTION, SUSPENSION SUBCUTANEOUS at 12:03

## 2022-09-25 RX ADMIN — INSULIN LISPRO 3 UNITS: 100 INJECTION, SOLUTION INTRAVENOUS; SUBCUTANEOUS at 12:00

## 2022-09-25 RX ADMIN — INSULIN ASPART 10 UNITS: 100 INJECTION, SUSPENSION SUBCUTANEOUS at 16:40

## 2022-09-25 RX ADMIN — DOCUSATE SODIUM 100 MG: 100 CAPSULE, LIQUID FILLED ORAL at 18:01

## 2022-09-25 RX ADMIN — DOCUSATE SODIUM 100 MG: 100 CAPSULE, LIQUID FILLED ORAL at 08:40

## 2022-09-25 NOTE — UTILIZATION REVIEW
Initial Clinical Review    Admission: Date/Time/Statement:   Admission Orders (From admission, onward)     Ordered        09/23/22 1612  INPATIENT ADMISSION  Once                      Orders Placed This Encounter   Procedures    INPATIENT ADMISSION     Standing Status:   Standing     Number of Occurrences:   1     Order Specific Question:   Level of Care     Answer:   Med Surg [16]     Order Specific Question:   Estimated length of stay     Answer:   More than 2 Midnights     Order Specific Question:   Certification     Answer:   I certify that inpatient services are medically necessary for this patient for a duration of greater than two midnights  See H&P and MD Progress Notes for additional information about the patient's course of treatment  ED Arrival Information     Expected   -    Arrival   9/23/2022 13:29    Acuity   Urgent            Means of arrival   Walk-In    Escorted by   Family Member    Service   Hospitalist    Admission type   Urgent            Arrival complaint   Rapid Heart Rate, SOB            Chief Complaint   Patient presents with    Shortness of Breath     Pt has been having SOB and "chest pounding" with activity, symptoms has gotten worse this past week  Reports having to rest in order to "catch her breath"       Initial Presentation: 75 y o  female with PMHx of CAD, ischemic cardiomyopathy, T2 DM, hypothyroidism, anemia, HTN, CKD IV presents to ED as a walk-in with c/o sob worse on exertion  Reports compliance with her sodium and water restriction as well as home meds including diuretics  In ED BP elevated, sat okay on room air  On exam JVD present, decreased breath sounds b/l  Initial trop neg  BNP 21,757  K 54, BUN 67, Cr 2 87  Hgb 8 6, Hct 28 1  CXR obtained  IV Lasix x1 given  Admit inpatient to M/S/Tele unit with acute on chronic heart failure -- continue diuresis with IV Bumex  Continue pta home po meds  Cardiology consulted       Date: 9/24  Day 2:  Continue with wheezing and rales on exam  Continue I/Os, daily wts  SCDs  OOB with assist  Cardiac meds per cardiology    Cardiology consult 9/24 -- A: Acute on chronic heart failure with mildly reduced EF  Currently on IV Bumex 2 mg b i d  and appears to be diuresing well  UOP 1 5 L overnight and 750 cc this morning so far  Symptoms improving, and she appears to be approaching euvolemia  Will continue IV diuresis tonight, possibly transition to p o  tomorrow  She has known residual 80% stenosis of RCA on prior cath, and echo had shown new wall motion abnormalities in inferior wall recently  No anginal chest pain though and are symptoms seem to be mainly related to sob/volume overload + uncontrolled HTN  Cr is up to 3, and this further complicates ischemic eval  Will monitor progress, and if continued symptoms then will decide on nuclear stress versus cath based on creatinine progression  Optimize BP  Continue amlodipine 10 mg, carvedilol 12 5 mg b i d , Imdur 120 mg daily         ED Triage Vitals   Temperature Pulse Respirations Blood Pressure SpO2   09/23/22 1346 09/23/22 1346 09/23/22 1346 09/23/22 1346 09/23/22 1346   97 9 °F (36 6 °C) 64 16 (!) 181/95 97 %      Temp Source Heart Rate Source Patient Position - Orthostatic VS BP Location FiO2 (%)   09/23/22 1346 09/23/22 1346 09/23/22 1454 09/23/22 1346 --   Oral Monitor Sitting Left arm       Pain Score       09/23/22 1346       6          Wt Readings from Last 1 Encounters:   09/25/22 68 7 kg (151 lb 8 oz)     Additional Vital Signs:   Date/Time Temp Pulse Resp BP MAP (mmHg) SpO2 O2 Device Patient Position - Orthostatic VS   09/24/22 2000 -- -- -- -- -- -- None (Room air) --   09/24/22 19:01:43 98 °F (36 7 °C) 56 -- 137/58 84 97 % -- --   09/24/22 1649 -- 49 Abnormal  -- 127/58 -- -- -- --   09/24/22 15:37:38 97 8 °F (36 6 °C) 50 Abnormal  18 132/52 79 97 % None (Room air) Sitting   09/24/22 1126 -- 51 Abnormal  -- 107/43 Abnormal  64 Abnormal  98 % -- --   09/24/22 11:16:38 -- 47 Abnormal  18 107/43 Abnormal  64 Abnormal  96 % -- --   09/24/22 11:05:15 -- 51 Abnormal  18 110/43 Abnormal  65 97 % -- --   09/24/22 0800 -- -- -- -- -- -- None (Room air) --   09/24/22 07:40:33 97 9 °F (36 6 °C) 63 -- 167/90 116 96 % -- --   09/24/22 02:41:37 98 3 °F (36 8 °C) 64 16 169/72 104 95 % -- --   09/23/22 23:04:39 97 8 °F (36 6 °C) 65 16 170/73 105 97 % -- --   09/23/22 21:22:59 -- 69 -- 178/75 Abnormal  109 95 % -- Lying   09/23/22 2010 -- -- -- -- -- -- None (Room air) --   09/23/22 1950 97 1 °F (36 2 °C) Abnormal  72 18 100/55 66 94 % None (Room air) Sitting   09/23/22 1849 -- -- -- -- -- -- None (Room air) --   09/23/22 18:45:39 98 2 °F (36 8 °C) 70 17 130/90 103 96 % -- --   09/23/22 1700 -- 64 18 189/93 Abnormal  144 97 % None (Room air) Sitting   09/23/22 1545 -- 72 18 189/73 Abnormal  124 96 % None (Room air) Sitting   09/23/22 1454 -- 66 18 173/68 Abnormal  -- 98 % None (Room air) Sitting   09/23/22 1346 97 9 °F (36 6 °C) 64 16 181/95 Abnormal  -- 97 % None (Room air) --       Pertinent Labs/Diagnostic Test Results:   XR chest portable   Final Result by Delilah Sidhu MD (09/24 0932)      Congestive heart failure  VAS lower limb venous duplex study, complete bilateral   Final Result by Ludin Patel MD (09/23 7338)     Impression:     RIGHT LOWER LIMB:  No evidence of acute or chronic deep vein thrombosis  There is evidence of 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  Doppler evaluation shows a normal response to augmentation maneuvers  Popliteal, posterior tibial and anterior tibial arterial Doppler waveforms are  biphasic  LEFT LOWER LIMB:  No evidence of acute or chronic deep vein thrombosis  No evidence of superficial thrombophlebitis noted  Doppler evaluation shows a normal response to augmentation maneuvers  Popliteal, posterior tibial and anterior tibial arterial Doppler waveforms are  triphasic/biphasic  Results from last 7 days   Lab Units 09/24/22  0426 09/23/22  1904 09/23/22  1448   WBC Thousand/uL 5 55  --  5 41   HEMOGLOBIN g/dL 8 1*  --  8 6*   HEMATOCRIT % 26 8*  --  28 1*   PLATELETS Thousands/uL 125* 161 127*   NEUTROS ABS Thousands/µL  --   --  4 21     Results from last 7 days   Lab Units 09/24/22  0426 09/23/22  1448   SODIUM mmol/L 141 138   POTASSIUM mmol/L 4 8 5 4*   CHLORIDE mmol/L 111* 111*   CO2 mmol/L 24 20*   ANION GAP mmol/L 6 7   BUN mg/dL 70* 67*   CREATININE mg/dL 3 00* 2 87*   EGFR ml/min/1 73sq m 15 16   CALCIUM mg/dL 9 0 9 3     Results from last 7 days   Lab Units 09/23/22  1448   AST U/L 37   ALT U/L 49   ALK PHOS U/L 393*   TOTAL PROTEIN g/dL 7 3   ALBUMIN g/dL 3 0*   TOTAL BILIRUBIN mg/dL 0 42     Results from last 7 days   Lab Units 09/24/22  2055 09/24/22  1603 09/24/22  1100 09/24/22  0544 09/23/22  2036 09/23/22  1906   POC GLUCOSE mg/dl 222* 193* 211* 100 241* 181*     Results from last 7 days   Lab Units 09/24/22  0426 09/23/22  1448   GLUCOSE RANDOM mg/dL 102 201*     Results from last 7 days   Lab Units 09/23/22  1904 09/23/22  1728 09/23/22  1448   HS TNI 0HR ng/L  --   --  23   HS TNI 2HR ng/L  --  17  --    HSTNI D2 ng/L  --  -6  --    HS TNI 4HR ng/L 22  --   --    HSTNI D4 ng/L -1  --   --      Results from last 7 days   Lab Units 09/23/22  1448   D-DIMER QUANTITATIVE ug/ml FEU 1 25*     Results from last 7 days   Lab Units 09/23/22  1448   NT-PRO BNP pg/mL 21,757*     ED Treatment:   Medication Administration from 09/23/2022 1329 to 09/23/2022 1836       Date/Time Order Dose Route Action     09/23/2022 1604 furosemide (LASIX) injection 80 mg 80 mg Intravenous Given     Past Medical History:   Diagnosis Date    Anemia     [facility]congestive heart failure) ([facility] )     Chronic kidney disease     Coronary artery disease     Diabetes mellitus ([facility] )     Hypertension     Hypothyroidism      Present on Admission:   Hypothyroidism   Hypertension   Anemia   Stage 4 chronic kidney disease (HCC)   Hyperlipidemia      Admitting Diagnosis: Rapid heart rate [R00 0]  SOB (shortness of breath) [R06 02]  Acute on chronic congestive heart failure, unspecified heart failure type ([facility] ) [I50 9]  Age/Sex: 75 y o  female  Admission Orders:  Scheduled Medications:  aspirin, 81 mg, Oral, Daily  atorvastatin, 80 mg, Oral, Daily  bumetanide, 2 mg, Intravenous, BID  carvedilol, 12 5 mg, Oral, BID With Meals  cholecalciferol, 2,000 Units, Oral, Daily  docusate sodium, 100 mg, Oral, BID  heparin (porcine), 5,000 Units, Subcutaneous, Q8H DEE  hydrALAZINE, 75 mg, Oral, TID  insulin aspart protamine-insulin aspart, 10 Units, Subcutaneous, BID AC  insulin lispro, 1-5 Units, Subcutaneous, TID AC  insulin lispro, 1-5 Units, Subcutaneous, HS  isosorbide mononitrate, 120 mg, Oral, Daily  levothyroxine, 125 mcg, Oral, Early Morning  senna, 1 tablet, Oral, Daily         PRN Meds:  acetaminophen, 650 mg, Oral, Q6H PRN  hydrALAZINE, 10 mg, Intravenous, Q6H PRN  labetalol, 10 mg, Intravenous, Q4H PRN  ondansetron, 4 mg, Intravenous, Q6H PRN  polyethylene glycol, 17 g, Oral, Daily PRN        Network Utilization Review Department  ATTENTION: Please call with any questions or concerns to [phone] and carefully listen to the prompts so that you are directed to the right person  All voicemails are confidential   Lynette Kussmaul all requests for admission clinical reviews, approved or denied determinations and any other requests to dedicated fax number below belonging to the campus where the patient is receiving treatment   List of dedicated fax numbers for the Facilities:  [address] DENIALS (Administrative/Medical Necessity) [phone]   [address] (Maternity/NICU/Pediatrics) [address] Akil Bob Christiano 1317 [id number] [place] [address] Shaheen Hughes [phone] [place] [address] [phone]

## 2022-09-25 NOTE — ASSESSMENT & PLAN NOTE
Lab Results   Component Value Date    EGFR 15 09/24/2022    EGFR 16 09/23/2022    EGFR 14 09/16/2022    CREATININE 3 00 (H) 09/24/2022    CREATININE 2 87 (H) 09/23/2022    CREATININE 3 07 (H) 09/16/2022   Creatinine 2 87 which is at baseline  Followed by Nephrology as outpatient  Nephrology evaluation if the creatinine is up trending  Hyperkalemia noted but received Lasix recheck in the morning  Hyperkalemia and low bicarb noted-recheck in the morning

## 2022-09-25 NOTE — CONSULTS
Heart Failure/ Pulmonary Hypertension Progress Note - Kizzy Garcia 75 y o  female MRN: [id number]    Unit/Bed#: [facility] 405-01 Encounter: [id number]      Assessment:    Principal Problem:    Shortness of breath  Active Problems:    Ischemic cardiomyopathy    Hypothyroidism    Hypertension    Anemia    Coronary artery disease    Elevated d-dimer    Stage 4 chronic kidney disease (HCC)    Hyperlipidemia    Type 2 diabetes mellitus, with long-term current use of insulin (HCC)    Assessment:  # Chronic HFmrEF, Stage C  Etiology: pt previously had inferior wall hypo with relatively preserved EF but inferior hypo more pronounced now   Possibly due to accelerated HTN as MAPs very high on previous admit and heart did not reverse remodel with drop in EF so may be due to high afterload and filling pressures  Diuretic: bumex 2 mg BID  Weight: 157 lbs  NT pro BNP 3/1/21: 9024  4/30/19: 702     Studies- personally reviewed by me  Echo 9/15/22:  LVEF: 40-45%  LVEDd: 5 cm  RV: normal    Echo 7/3/21:  LVEF: 60%, hypo inferior wall  RV: normal  Other: small to moderate pericardial effusion posterior- no compromise     Echo 2/22/21  LVEF: 60%, moderate LVH, grade 2 DD  RV: normal  Other: moderate free flowing pericardial effusion      Echo 12/30/18:  EF: 50%, grade 2 DD, PASP 35 mmHg     # pericardial effusion- see echo report above     # CAD - LAD stents  [facility] 1/4/19: LUDY x 2 to LAD after NSTEMI (trop 0 2) and abnormal stress test with ischemia in anterior wall on 12/31/18  80% RCA- plan staged, but has not needed intervention  Med Rx: Imdur 90 mg daily, asa, atorvastatin 40 mg, coreg 12 5 mg BID  Angina: none     # dyslipidemia- atorvastatin 40 mg   3/2/21: LDL 48, HDL 76     # HTN- norvasc 10 mg QHS, coreg 12 5 mg BID, hydralazine 50 mg TID, Imdur 120 mg daily     # palpitations  Holter 5/17/19: 53-85, avg 69 bpm  8 PVCs      # anemia of CKD, HgB 8 1 on 3/15/21, 7 1 on 10/27  IV iron  # CKD4, Cr 3 14 on 11/5/21  # DM2, HgA1C 10/25/21: 9 7%     Today's Plan:  Target volume removal and BP control  Continue Bumex 2 BID for volume for HFpEF  Previously on hydralazine 100 gm TID  Would hold off on cath as target will be pressure and volume control first  Continue  Asa, imdur, atorvastatin, coreg  for CAD    HPI:     76 yo female with hx of HFpEF, HTN, CAD s/p LUDY x 2 LAD and known residual dz n RCA for possible planned stage PCI, CKD, anemia  Also with hx of accelerated HTN on previous admit with SBP up to 220 mmHg  Previous admits for volume overload responded better to bumex  Pt s/p recent admit earlier this month with dizziness and headache, evaluated for stroke  MRI and MRA brain negative  Echo done and EF down to 40% with same, worsening inferior wall hypokinesis  Given no ischemic symptoms and CKD, LHC not pursued  Her BP was 207/88 mmHg on presentation to ED  On 9/16, recorded BP down to 154/65 mmHg  One week after discharge presents back to ED with chest pain on exertion and OROSCO and orthopnea  BP in /95 mmHg  Review of Systems   Constitutional: Negative for activity change, appetite change, fatigue and unexpected weight change  HENT: Negative for congestion and nosebleeds  Eyes: Negative  Respiratory: Positive for shortness of breath  Negative for cough and chest tightness  Cardiovascular: Negative for chest pain, palpitations and leg swelling  Gastrointestinal: Negative for abdominal distention  Endocrine: Negative  Genitourinary: Negative  Musculoskeletal: Negative  Skin: Negative  Neurological: Negative for dizziness, syncope and weakness  Hematological: Negative  Psychiatric/Behavioral: Negative  [employer] (day, reason): Valero catheter (day, reason):    Vitals: Blood pressure 149/61, pulse 68, temperature 98 3 °F (36 8 °C), resp   rate 18, height 5' 2" (1 575 m), weight 68 7 kg (151 lb 8 oz), SpO2 99 %, currently breastfeeding , Body mass index is 27 71 kg/m² , I/O last 3 completed shifts: In: 2739 [P O :1038]  Out: 9829 [Urine:3050]  No intake/output data recorded  Wt Readings from Last 3 Encounters:   09/25/22 68 7 kg (151 lb 8 oz)   09/15/22 70 8 kg (156 lb)   08/08/22 70 8 kg (156 lb)       Intake/Output Summary (Last 24 hours) at 9/25/2022 0946  Last data filed at 9/25/2022 0611  Gross per 24 hour   Intake 658 ml   Output 1550 ml   Net -892 ml     I/O last 3 completed shifts: In: 7176 [P O :1038]  Out: 0546 [Urine:3050]    No significant arrhythmias seen on telemetry review         Physical Exam:  Vitals:    09/25/22 0600 09/25/22 0712 09/25/22 0840 09/25/22 0844   BP:  165/66 160/60 149/61   Pulse:  56  68   Resp:  18     Temp:  98 3 °F (36 8 °C)     TempSrc:       SpO2:  94%  99%   Weight: 68 7 kg (151 lb 8 oz)      Height:           GEN: Ed Mascorro appears well, alert and oriented x 3, pleasant and cooperative   HEENT: pupils equal, round, and reactive to light; extraocular muscles intact  NECK: supple, no carotid bruits   HEART: regular rhythm, normal S1 and S2, no murmurs, clicks, gallops or rubs, JVP is elevated  LUNGS: clear to auscultation bilaterally; no wheezes, rales, or rhonchi   ABDOMEN: normal bowel sounds, soft, no tenderness, no distention  EXTREMITIES: peripheral pulses normal; no clubbing, cyanosis, or edema  NEURO: no focal findings   SKIN: normal without suspicious lesions on exposed skin      Current Facility-Administered Medications:     acetaminophen (TYLENOL) tablet 650 mg, 650 mg, Oral, Q6H PRN, Fanta Mota MD    aspirin chewable tablet 81 mg, 81 mg, Oral, Daily, Fanta Mota MD, 81 mg at 09/25/22 0839    atorvastatin (LIPITOR) tablet 80 mg, 80 mg, Oral, Daily, Fanta Mota MD, 80 mg at 09/25/22 0839    bumetanide (BUMEX) injection 2 mg, 2 mg, Intravenous, BID, Fanta Mota MD, 2 mg at 09/25/22 2640    carvedilol (COREG) tablet 12 5 mg, 12 5 mg, Oral, BID With Meals, Fanta Mota MD, 12 5 mg at 09/25/22 4053   cholecalciferol (VITAMIN D3) tablet 2,000 Units, 2,000 Units, Oral, Daily, Debbie Engle MD, 2,000 Units at 09/25/22 0840    docusate sodium (COLACE) capsule 100 mg, 100 mg, Oral, BID, Debbie Engle MD, 100 mg at 09/25/22 0840    heparin (porcine) subcutaneous injection 5,000 Units, 5,000 Units, Subcutaneous, Q8H [address], 5,000 Units at 09/25/22 0606 **AND** [COMPLETED] Platelet count, , , Once, Debbie Engle MD    hydrALAZINE (APRESOLINE) injection 10 mg, 10 mg, Intravenous, Q6H PRN, Debbie Engle MD    hydrALAZINE (APRESOLINE) tablet 50 mg, 50 mg, Oral, TID, Hetul Pollock, DO, 50 mg at 09/25/22 0840    insulin aspart protamine-insulin aspart (NovoLOG 70/30) 100 units/mL subcutaneous injection 10 Units, 10 Units, Subcutaneous, BID ACDebbie MD, 10 Units at 09/24/22 1648    insulin lispro (HumaLOG) 100 units/mL subcutaneous injection 1-5 Units, 1-5 Units, Subcutaneous, TID AC, 1 Units at 09/24/22 1649 **AND** Fingerstick Glucose (POCT), , , TID ACDebbie MD    insulin lispro (HumaLOG) 100 units/mL subcutaneous injection 1-5 Units, 1-5 Units, Subcutaneous, HS, Debbie Engle MD, 1 Units at 09/24/22 2114    isosorbide mononitrate (IMDUR) 24 hr tablet 120 mg, 120 mg, Oral, Daily, Debbie Engle MD, 120 mg at 09/25/22 0840    labetalol (NORMODYNE) injection 10 mg, 10 mg, Intravenous, Q4H PRN, Debbie Engle MD    levothyroxine tablet 125 mcg, 125 mcg, Oral, Early Morning, Debbie Engle MD, 125 mcg at 09/25/22 0606    ondansetron (ZOFRAN) injection 4 mg, 4 mg, Intravenous, Q6H PRN, Debbie Engle MD    polyethylene glycol [facility]) packet 17 g, 17 g, Oral, Daily PRN, Debbie Engle MD    [facility]) tablet 8 6 mg, 1 tablet, Oral, Daily, Debbie Engle MD, 8 6 mg at 09/25/22 0840      Labs & Results:        Results from last 7 days   Lab Units 09/25/22  0843 09/24/22  0426 09/23/22  1904 09/23/22  1448   WBC Thousand/uL 3 90* 5 55  --  5 41   HEMOGLOBIN g/dL 7 6* 8 1*  --  8 6* HEMATOCRIT % 25 1* 26 8*  --  28 1*   PLATELETS Thousands/uL 104* 125* 161 127*         Results from last 7 days   Lab Units 09/25/22  0843 09/24/22  0426 09/23/22  1448   POTASSIUM mmol/L 4 7 4 8 5 4*   CHLORIDE mmol/L 108 111* 111*   CO2 mmol/L 24 24 20*   BUN mg/dL 74* 70* 67*   CREATININE mg/dL 3 52* 3 00* 2 87*   CALCIUM mg/dL 8 6 9 0 9 3   ALK PHOS U/L  --   --  393*   ALT U/L  --   --  49   AST U/L  --   --  37           Counseling / Coordination of Care  Total floor / unit time spent today 40 minutes  Greater than 50% of total time was spent with the patient and / or family counseling and / or coordination of care  A description of the counseling / coordination of care: 25  Thank you for the opportunity to participate in the care of this patient    [address] [facility] PULMONARY HYPERTENSION  MEDICAL DIRECTOR OF [place]

## 2022-09-25 NOTE — PLAN OF CARE
Problem: Knowledge Deficit  Goal: Patient/family/caregiver demonstrates understanding of disease process, treatment plan, medications, and discharge instructions  Description: Complete learning assessment and assess knowledge base    Interventions:  - Provide teaching at level of understanding  - Provide teaching via preferred learning methods  9/25/2022 1250 by Isidro Carrion RN  Outcome: Progressing  9/25/2022 1249 by Isidro Carrion RN  Outcome: Progressing

## 2022-09-25 NOTE — ASSESSMENT & PLAN NOTE
Lab Results   Component Value Date    HGBA1C 8 2 (H) 09/16/2022       Recent Labs     09/24/22  1100 09/24/22  1603 09/24/22 2055 09/25/22  0601   POCGLU 211* 193* 222* 97       Blood Sugar Average: Last 72 hrs:  (P) 177 [id number] patient is on 10 units b i d NovoLog 70 30  Will add sliding scale of insulin  Last hemoglobin A1c is 8 2 showing uncontrolled diabetes mellitus

## 2022-09-25 NOTE — PROGRESS NOTES
[address] [facility]  Progress Note - Tatum Pap 1955, 75 y o  female MRN: [id number]  Unit/Bed#: [facility] 405-01 Encounter: [id number]  Primary Care Provider: Ruby Peterson MD   Date and time admitted to hospital: 9/23/2022  2:14 PM    * Shortness of breath  Assessment & Plan  · Patient came to the hospital with shortness of Breath  Mainly on exertion  Patient's family provided some of the history reported that shortness of breath is gradually worsening  By the time she walks from her room to the bathroom she gets short of breath  · Also gives history of orthopnea  · No weight gain or lower extremity edema  · Patient reported that she has been compliant with salt and water restriction  · Has been complaint with diuretics  · Obtain chest x-ray  Troponin negative so far  · Patient was given 1 time dose of Lasix  BNP elevated about 21,000  · Patient is on Bumex 2 mg b i d   · Continue with diuresis  · Consult Cardiology  · Recent echocardiogram showed ejection fraction 40% with grade 2 diastolic dysfunction    Ischemic cardiomyopathy  Assessment & Plan  · Patient with underlying coronary artery disease  Recent echocardiogram showed left ventricle ejection fraction 40% with regional wall motion abnormalities  · Patient with known diagnosis of coronary artery disease  Patient also have grade 2 diastolic dysfunction    · Medical management  · Continue aspirin statin and beta-blocker and Imdur    Type 2 diabetes mellitus, with long-term current use of insulin [facility])  Assessment & Plan  Lab Results   Component Value Date    HGBA1C 8 2 (H) 09/16/2022       Recent Labs     09/24/22  1100 09/24/22  1603 09/24/22  2055 09/25/22  0601   POCGLU 211* 193* 222* 97       Blood Sugar Average: Last 72 hrs:  (P) 177 [id number] patient is on 10 units b i d NovoLog 70 30  Will add sliding scale of insulin  Last hemoglobin A1c is 8 2 showing uncontrolled diabetes mellitus    Hyperlipidemia  Assessment & Plan  · Continue with statin    Stage 4 chronic kidney disease [facility])  Assessment & Plan  Lab Results   Component Value Date    EGFR 15 09/24/2022    EGFR 16 09/23/2022    EGFR 14 09/16/2022    CREATININE 3 00 (H) 09/24/2022    CREATININE 2 87 (H) 09/23/2022    CREATININE 3 07 (H) 09/16/2022   Creatinine 2 87 which is at baseline  Followed by Nephrology as outpatient  Nephrology evaluation if the creatinine is up trending  Hyperkalemia noted but received Lasix recheck in the morning  Hyperkalemia and low bicarb noted-recheck in the morning    Elevated d-dimer  Assessment & Plan  · Patient with elevated D-dimer by reviewing the records it appears to be chronic  · Patient also with CKD stage 4  · Will obtain Lower extremity Doppler      Coronary artery disease  Assessment & Plan  · Status post LUDY x 2 to LAD in 2019  Noted to have 80% stenosed RCA at that time  · Cardiology evaluated the patient during the recent hospital stay due to lack of ischemic skin times it was decided that the patient will be managed medically  · Continue with beta-blocker statin aspirin  · Continue with Imdur    Anemia  Assessment & Plan  · Hemoglobin today is 8 6 which is higher than the last hemoglobin before discharge  Likely secondary to CKD  · Monitor hemoglobin    Hypertension  Assessment & Plan  · On coreg, hydralazine, Norvasc and Imdur as outpatient  Patient reported that she took her medications in the morning  · Hydralazine was added during the recent hospital  · Blood pressure elevated  · Add p r n  hydralazine and labetalol    Hypothyroidism  Assessment & Plan  · Continue Synthroid        VTE Pharmacologic Prophylaxis:   Pharmacologic: Heparin  Mechanical VTE Prophylaxis in Place: No    Patient Centered Rounds: I have performed bedside rounds with nursing staff today  Time Spent for Care: 15 minutes    More than 50% of total time spent on counseling and coordination of care as described above  Current Length of Stay: 2 day(s)    Current Patient Status: Inpatient       Code Status: Level 1 - Full Code      Subjective:   nad    Objective:     Vitals:   Temp (24hrs), Av 1 °F (36 7 °C), Min:97 8 °F (36 6 °C), Max:98 3 °F (36 8 °C)    Temp:  [97 8 °F (36 6 °C)-98 3 °F (36 8 °C)] 98 3 °F (36 8 °C)  HR:  [47-68] 68  Resp:  [18] 18  BP: (107-165)/(43-66) 149/61  SpO2:  [94 %-99 %] 99 %  Body mass index is 27 71 kg/m²  Input and Output Summary (last 24 hours): Intake/Output Summary (Last 24 hours) at 2022 0903  Last data filed at 2022 9453  Gross per 24 hour   Intake 658 ml   Output 1550 ml   Net -892 ml       Physical Exam:     Physical Exam  Constitutional:       Appearance: Normal appearance  HENT:      Head: Normocephalic and atraumatic  Cardiovascular:      Rate and Rhythm: Normal rate and regular rhythm  Heart sounds: No murmur heard  Pulmonary:      Effort: Pulmonary effort is normal       Breath sounds: Normal breath sounds  Abdominal:      General: Abdomen is flat  Palpations: Abdomen is soft  Skin:     General: Skin is warm and dry  Neurological:      General: No focal deficit present  Mental Status: She is alert and oriented to person, place, and time  Psychiatric:         Mood and Affect: Mood normal          Additional Data:     Labs:    Results from last 7 days   Lab Units 22  0843 22  1904 22  1448   WBC Thousand/uL 3 90*   < > 5 41   HEMOGLOBIN g/dL 7 6*   < > 8 6*   HEMATOCRIT % 25 1*   < > 28 1*   PLATELETS Thousands/uL 104*   < > 127*   NEUTROS PCT %  --   --  78*   LYMPHS PCT %  --   --  14   MONOS PCT %  --   --  4   EOS PCT %  --   --  3    < > = values in this interval not displayed       Results from last 7 days   Lab Units 22  0426 22  1448   POTASSIUM mmol/L 4 8 5 4*   CHLORIDE mmol/L 111* 111*   CO2 mmol/L 24 20*   BUN mg/dL 70* 67*   CREATININE mg/dL 3 00* 2 87*   CALCIUM mg/dL 9 0 9 3 ALK PHOS U/L  --  393*   ALT U/L  --  49   AST U/L  --  37           * I Have Reviewed All Lab Data Listed Above  * Additional Pertinent Lab Tests Reviewed: All Labs Within Last 24 Hours Reviewed        Recent Cultures (last 7 days):           Last 24 Hours Medication List:   Current Facility-Administered Medications   Medication Dose Route Frequency Provider Last Rate    acetaminophen  650 mg Oral Q6H PRN Debbie Engle MD      aspirin  81 mg Oral Daily Debbie Engle MD      atorvastatin  80 mg Oral Daily Debbie Engle MD      bumetanide  2 mg Intravenous BID Debbie Engle MD      carvedilol  12 5 mg Oral BID With Meals Debbie Engle MD      cholecalciferol  2,000 Units Oral Daily Debbie Engle MD      docusate sodium  100 mg Oral BID Debbie Engle MD      heparin (porcine)  5,000 Units Subcutaneous [facility] Debbie Engle MD      hydrALAZINE  10 mg Intravenous Q6H PRN Debbie Engle MD      hydrALAZINE  50 mg Oral TID Dominga Pollock DO      insulin aspart protamine-insulin aspart  10 Units Subcutaneous BID AC Debbie Engle MD      insulin lispro  1-5 Units Subcutaneous TID [facility] Debbie Engle MD      insulin lispro  1-5 Units Subcutaneous HS Debbie Engle MD      isosorbide mononitrate  120 mg Oral Daily Debbie Engle MD      labetalol  10 mg Intravenous Q4H PRN Debbie Engle MD      levothyroxine  125 mcg Oral Early Morning Debbie Engle MD      ondansetron  4 mg Intravenous Q6H PRN Debbie Engle MD      polyethylene glycol  17 g Oral Daily PRN Debbie Engle MD      senna  1 tablet Oral Daily Debbie Engle MD          Today, Patient Was Seen By: Óscar Carr DO    ** Please Note: Dictation voice to text software may have been used in the creation of this document   **

## 2022-09-26 LAB
ANION GAP SERPL CALCULATED.3IONS-SCNC: 6 MMOL/L (ref 4–13)
BASOPHILS # BLD AUTO: 0.02 THOUSANDS/ΜL (ref 0–0.1)
BASOPHILS NFR BLD AUTO: 1 % (ref 0–1)
BUN SERPL-MCNC: 78 MG/DL (ref 5–25)
CALCIUM SERPL-MCNC: 8.2 MG/DL (ref 8.3–10.1)
CHLORIDE SERPL-SCNC: 106 MMOL/L (ref 96–108)
CO2 SERPL-SCNC: 23 MMOL/L (ref 21–32)
CREAT SERPL-MCNC: 3.64 MG/DL (ref 0.6–1.3)
EOSINOPHIL # BLD AUTO: 0.06 THOUSAND/ΜL (ref 0–0.61)
EOSINOPHIL NFR BLD AUTO: 2 % (ref 0–6)
ERYTHROCYTE [DISTWIDTH] IN BLOOD BY AUTOMATED COUNT: 13.1 % (ref 11.6–15.1)
GFR SERPL CREATININE-BSD FRML MDRD: 12 ML/MIN/1.73SQ M
GLUCOSE SERPL-MCNC: 177 MG/DL (ref 65–140)
GLUCOSE SERPL-MCNC: 305 MG/DL (ref 65–140)
GLUCOSE SERPL-MCNC: 96 MG/DL (ref 65–140)
HCT VFR BLD AUTO: 25.4 % (ref 34.8–46.1)
HGB BLD-MCNC: 7.8 G/DL (ref 11.5–15.4)
IMM GRANULOCYTES # BLD AUTO: 0.01 THOUSAND/UL (ref 0–0.2)
IMM GRANULOCYTES NFR BLD AUTO: 0 % (ref 0–2)
LYMPHOCYTES # BLD AUTO: 0.78 THOUSANDS/ΜL (ref 0.6–4.47)
LYMPHOCYTES NFR BLD AUTO: 24 % (ref 14–44)
MCH RBC QN AUTO: 28.2 PG (ref 26.8–34.3)
MCHC RBC AUTO-ENTMCNC: 30.7 G/DL (ref 31.4–37.4)
MCV RBC AUTO: 92 FL (ref 82–98)
MONOCYTES # BLD AUTO: 0.2 THOUSAND/ΜL (ref 0.17–1.22)
MONOCYTES NFR BLD AUTO: 6 % (ref 4–12)
NEUTROPHILS # BLD AUTO: 2.14 THOUSANDS/ΜL (ref 1.85–7.62)
NEUTS SEG NFR BLD AUTO: 67 % (ref 43–75)
NRBC BLD AUTO-RTO: 0 /100 WBCS
PLATELET # BLD AUTO: 91 THOUSANDS/UL (ref 149–390)
PMV BLD AUTO: 11.8 FL (ref 8.9–12.7)
POTASSIUM SERPL-SCNC: 4.8 MMOL/L (ref 3.5–5.3)
RBC # BLD AUTO: 2.77 MILLION/UL (ref 3.81–5.12)
SODIUM SERPL-SCNC: 135 MMOL/L (ref 135–147)
WBC # BLD AUTO: 3.21 THOUSAND/UL (ref 4.31–10.16)

## 2022-09-26 PROCEDURE — 82948 REAGENT STRIP/BLOOD GLUCOSE: CPT

## 2022-09-26 PROCEDURE — 99233 SBSQ HOSP IP/OBS HIGH 50: CPT | Performed by: INTERNAL MEDICINE

## 2022-09-26 PROCEDURE — 85025 COMPLETE CBC W/AUTO DIFF WBC: CPT | Performed by: INTERNAL MEDICINE

## 2022-09-26 PROCEDURE — 99223 1ST HOSP IP/OBS HIGH 75: CPT | Performed by: INTERNAL MEDICINE

## 2022-09-26 PROCEDURE — 80048 BASIC METABOLIC PNL TOTAL CA: CPT | Performed by: INTERNAL MEDICINE

## 2022-09-26 RX ADMIN — CARVEDILOL 12.5 MG: 12.5 TABLET, FILM COATED ORAL at 07:40

## 2022-09-26 RX ADMIN — HYDRALAZINE HYDROCHLORIDE 75 MG: 25 TABLET, FILM COATED ORAL at 21:30

## 2022-09-26 RX ADMIN — INSULIN ASPART 5 UNITS: 100 INJECTION, SUSPENSION SUBCUTANEOUS at 07:38

## 2022-09-26 RX ADMIN — INSULIN ASPART 10 UNITS: 100 INJECTION, SUSPENSION SUBCUTANEOUS at 17:54

## 2022-09-26 RX ADMIN — ATORVASTATIN CALCIUM 80 MG: 80 TABLET, FILM COATED ORAL at 09:00

## 2022-09-26 RX ADMIN — HYDRALAZINE HYDROCHLORIDE 75 MG: 25 TABLET, FILM COATED ORAL at 15:51

## 2022-09-26 RX ADMIN — Medication 2000 UNITS: at 09:00

## 2022-09-26 RX ADMIN — HYDRALAZINE HYDROCHLORIDE 75 MG: 25 TABLET, FILM COATED ORAL at 09:00

## 2022-09-26 RX ADMIN — INSULIN LISPRO 3 UNITS: 100 INJECTION, SOLUTION INTRAVENOUS; SUBCUTANEOUS at 21:30

## 2022-09-26 RX ADMIN — LEVOTHYROXINE SODIUM 125 MCG: 125 TABLET ORAL at 06:24

## 2022-09-26 RX ADMIN — INSULIN LISPRO 3 UNITS: 100 INJECTION, SOLUTION INTRAVENOUS; SUBCUTANEOUS at 11:40

## 2022-09-26 RX ADMIN — BUMETANIDE 2 MG: 0.25 INJECTION, SOLUTION INTRAMUSCULAR; INTRAVENOUS at 17:52

## 2022-09-26 RX ADMIN — ASPIRIN 81 MG CHEWABLE TABLET 81 MG: 81 TABLET CHEWABLE at 09:00

## 2022-09-26 RX ADMIN — INSULIN LISPRO 1 UNITS: 100 INJECTION, SOLUTION INTRAVENOUS; SUBCUTANEOUS at 17:55

## 2022-09-26 RX ADMIN — ISOSORBIDE MONONITRATE 120 MG: 60 TABLET, EXTENDED RELEASE ORAL at 09:00

## 2022-09-26 RX ADMIN — BUMETANIDE 2 MG: 0.25 INJECTION, SOLUTION INTRAMUSCULAR; INTRAVENOUS at 09:00

## 2022-09-26 RX ADMIN — HEPARIN SODIUM 5000 UNITS: 5000 INJECTION INTRAVENOUS; SUBCUTANEOUS at 15:50

## 2022-09-26 RX ADMIN — HEPARIN SODIUM 5000 UNITS: 5000 INJECTION INTRAVENOUS; SUBCUTANEOUS at 06:24

## 2022-09-26 RX ADMIN — CARVEDILOL 12.5 MG: 12.5 TABLET, FILM COATED ORAL at 17:52

## 2022-09-26 RX ADMIN — HEPARIN SODIUM 5000 UNITS: 5000 INJECTION INTRAVENOUS; SUBCUTANEOUS at 21:30

## 2022-09-26 NOTE — CONSULTS
Consultation - Nephrology   Delene Abo 75 y o  female MRN: [id number]  Unit/Bed#: [facility] 405-01 Encounter: [id number]    ASSESSMENT and PLAN:  Acute kidney injury on Chronic kidney disease stage 4  -Baseline creatinine:  Has been fluctuating and had recurrent acute kidney injury  Has been around 2 9 to  3 0 based on blood work from April to September 2022  -Admission creatinine:  2 87 mg/dl, worsened to 3 6 on 09/26 when nephrology consulted  - Work up:   · UA with microscopy:  UA with 3+ protein, 1-2 RBC per high-power field, 20-30 WBC per high-power field  Last microalbumin creatinine ratio was 1 6 g  -Etiology:  Acute kidney injury likely due to diuresis but she has been at this creatinine in the past   Increase in creatinine after admission due to diuresis  May have to tolerate higher creatinine to maintain volume status   -Hospital Course: With IV Bumex 2 mg twice daily   -Plan:   · Renal function worsened to creatinine 3 6 in the setting of diuresis with IV Bumex 2 mg twice daily  Continue diuresis per Cardiology but clinically appears euvolemic, possibly transition to oral Bumex tomorrow but defer to Cardiology  · Discussed with patient that he may have to tolerate higher creatinine to maintain volume status and she verbalized understanding  · No Indication for replacement therapy  · Avoid nephrotoxins and dose all medications per EGFR  · Avoid hypotension  Chronic Kidney Disease Stage 4  -Outpatient Nephrologist: Dr Cheng Hirsch  -Baseline Creatinine:  2 9-3 0 recently  -Etiology:  Likely in the setting of long-term diabetes, hypertension and CHF  -Avoid Nephrotoxins and Dose all medications per eGFR  -Will need outpatient follow up after discharge      BP/Primary hypertension  -Currently BP stable and at goal  - due to low blood pressure on was taken off amlodipine and dose of hydralazine was decreased  -continue current treatment with Coreg hydralazine 75 mg t i d , Imdur  -avoid hypotension     Acute on Chronic systolic and diastolic CHF/ Ischemic cardiomyopahty  -echo showed ejection fraction 30% with diastolic dysfunction grade 2  -currently being diuresed with IV Bumex 2 mg twice daily by Cardiology  -volume status has improved and weight is trending down   -discussed with Cardiology, Cardiology will reassess tomorrow and consider transition to oral diuretics if volume status continues to improve  Overall maintaining negative balance since admission  Anemia due to chronic kidney disease stage 4  - Hb currently stable at 7 8 g/dl  -continue to monitor per primary team   -check iron stores   -also with low WBC and platelet count indicating pancytopenia  Continue to monitor per primary team   Workup per primary team    Diabetes mellitus type 2 chronic disease stage IV, management of diabetes per primary team    Discussed with Dr Emily Martinez and with Cardiology      HISTORY OF PRESENT ILLNESS:  Requesting Physician: Julian Mckoy DO  Reason for Consult:  Acute kidney injury on chronic kidney disease    Caitlin Jalloh is a 75 y o  female with history of chronic kidney disease stage 4, diabetes mellitus type 2, hypertension, CAD s/p stents  who was admitted to [facility] after presenting with complain of shortness of breath  Patient had recent hospital admission with stroke-like symptoms and had negative neurology workup  Was started on diuresis with Bumex and seen by Cardiology  Recent echo with ejection fraction 40%  She still on IV Bumex 2 mg twice daily and maintaining negative balance, creatinine trending up to 3 6 today and so nephrology consulted    Shortness of breath has improved with IV diuresis    Currently with no extremity edema    PAST MEDICAL HISTORY:  Past Medical History:   Diagnosis Date    Anemia     CHF (congestive heart failure) (HCC)     Chronic kidney disease     Coronary artery disease     Diabetes mellitus ([facility] [address] [facility] )     Hypertension     Hypothyroidism        PAST SURGICAL HISTORY:  Past Surgical History:   Procedure Laterality Date    CORONARY ANGIOPLASTY WITH STENT PLACEMENT  2019       SOCIAL HISTORY:  Social History     Substance and Sexual Activity   Alcohol Use Yes    Comment: occ     Social History     Substance and Sexual Activity   Drug Use No     Social History     Tobacco Use   Smoking Status Former Smoker   Smokeless Tobacco Never Used       FAMILY HISTORY:  Family History   Problem Relation Age of Onset    Diabetes Mother     Heart attack Father         MI    Hypertension Brother        ALLERGIES:  Allergies   Allergen Reactions    Iv Contrast [Iodinated Diagnostic Agents] Itching     Caused KELLY    Nifedipine Rash       MEDICATIONS:    Current Facility-Administered Medications:     acetaminophen (TYLENOL) tablet 650 mg, 650 mg, Oral, Q6H PRN, Shane Jackson MD    aspirin chewable tablet 81 mg, 81 mg, Oral, Daily, Shane Jackson MD, 81 mg at 09/26/22 0900    atorvastatin (LIPITOR) tablet 80 mg, 80 mg, Oral, Daily, Shane Jackson MD, 80 mg at 09/26/22 0900    bumetanide (BUMEX) injection 2 mg, 2 mg, Intravenous, BID, Shane Jackson MD, 2 mg at 09/26/22 0900    carvedilol (COREG) tablet 12 5 mg, 12 5 mg, Oral, BID With Meals, Shane Jackson MD, 12 5 mg at 09/26/22 0740    cholecalciferol (VITAMIN D3) tablet 2,000 Units, 2,000 Units, Oral, Daily, Shane Jackson MD, 2,000 Units at 09/26/22 0900    docusate sodium (COLACE) capsule 100 mg, 100 mg, Oral, BID, Shane Jackson MD, 100 mg at 09/25/22 1801    heparin (porcine) subcutaneous injection 5,000 Units, 5,000 Units, Subcutaneous, Q8H [facility], 5,000 Units at 09/26/22 1550 **AND** [COMPLETED] Platelet count, , , Once, Shane Jackson MD    hydrALAZINE (APRESOLINE) injection 10 mg, 10 mg, Intravenous, Q6H PRN, Shane Jackson MD    hydrALAZINE (APRESOLINE) tablet 75 mg, 75 mg, Oral, TID, Mone Hines DO, 75 mg at 09/26/22 1551    insulin aspart protamine-insulin aspart (NovoLOG 70/30) 100 units/mL subcutaneous injection 10 Units, 10 Units, Subcutaneous, BID AC, Marc Daniel MD, 5 Units at 09/26/22 0738    insulin lispro (HumaLOG) 100 units/mL subcutaneous injection 1-5 Units, 1-5 Units, Subcutaneous, TID AC, 3 Units at 09/26/22 1140 **AND** Fingerstick Glucose (POCT), , , TID ACMarc MD    insulin lispro (HumaLOG) 100 units/mL subcutaneous injection 1-5 Units, 1-5 Units, Subcutaneous, HS, Marc Daniel MD, 2 Units at 09/25/22 2100    isosorbide mononitrate (IMDUR) 24 hr tablet 120 mg, 120 mg, Oral, Daily, Marc Daniel MD, 120 mg at 09/26/22 0900    labetalol (NORMODYNE) injection 10 mg, 10 mg, Intravenous, Q4H PRN, Marc Daniel MD    levothyroxine tablet 125 mcg, 125 mcg, Oral, Early Morning, Marc Daneil MD, 125 mcg at 09/26/22 8363    ondansetron (ZOFRAN) injection 4 mg, 4 mg, Intravenous, Q6H PRN, Marc Daniel MD    polyethylene glycol [facility]) packet 17 g, 17 g, Oral, Daily PRN, Marc Daniel MD    [facility]) tablet 8 6 mg, 1 tablet, Oral, Daily, Marc Daniel MD, 8 6 mg at 09/25/22 0840    REVIEW OF SYSTEMS:   Review of Systems   Constitutional: Negative for chills and fever  HENT: Negative for ear pain and sore throat  Eyes: Negative for pain and visual disturbance  Respiratory: Negative for cough and shortness of breath  Cardiovascular: Negative for chest pain and palpitations  Gastrointestinal: Negative for abdominal pain and vomiting  Genitourinary: Negative for dysuria and hematuria  Musculoskeletal: Negative for arthralgias and back pain  Skin: Negative for color change and rash  Neurological: Negative for seizures and syncope  All other systems reviewed and are negative  All the systems were reviewed and were negative except as documented on the HPI      PHYSICAL EXAM:  Current Weight: Weight - Scale: 68 8 kg (151 lb 11 2 oz)  First Weight: Weight - Scale: 70 4 kg (155 lb 3 3 oz)  Vitals: 09/26/22 0600 09/26/22 0738 09/26/22 1119 09/26/22 1453   BP:  167/65 (!) 113/46 128/53   BP Location:       Pulse:  63 (!) 51 (!) 54   Resp:  20     Temp:  97 9 °F (36 6 °C) 97 6 °F (36 4 °C) 98 1 °F (36 7 °C)   TempSrc:  Oral     SpO2:  98% 97% 98%   Weight: 68 8 kg (151 lb 11 2 oz)      Height:           Intake/Output Summary (Last 24 hours) at 9/26/2022 1556  Last data filed at 9/26/2022 1230  Gross per 24 hour   Intake 598 ml   Output 1700 ml   Net -1102 ml     Physical Exam  Constitutional:       General: She is not in acute distress  Appearance: Normal appearance  She is well-developed  HENT:      Head: Normocephalic and atraumatic  Nose: Nose normal       Mouth/Throat:      Mouth: Mucous membranes are moist    Eyes:      General: No scleral icterus  Conjunctiva/sclera: Conjunctivae normal       Pupils: Pupils are equal, round, and reactive to light  Neck:      Thyroid: No thyromegaly  Vascular: No JVD  Cardiovascular:      Rate and Rhythm: Normal rate and regular rhythm  Heart sounds: Normal heart sounds  No murmur heard  No friction rub  Pulmonary:      Effort: Pulmonary effort is normal  No respiratory distress  Breath sounds: Normal breath sounds  No wheezing or rales  Abdominal:      General: Bowel sounds are normal  There is no distension  Palpations: Abdomen is soft  Tenderness: There is no abdominal tenderness  Musculoskeletal:         General: No deformity  Cervical back: Neck supple  Right lower leg: No edema  Left lower leg: No edema  Skin:     General: Skin is warm and dry  Findings: No rash  Neurological:      Mental Status: She is alert and oriented to person, place, and time  Psychiatric:         Mood and Affect: Mood normal          Behavior: Behavior normal          Thought Content:  Thought content normal            Invasive Devices:        Lab Results:   Results from last 7 days   Lab Units 09/26/22  0932 09/25/22  0843 09/24/22  0426 09/23/22  1904 09/23/22  1448   WBC Thousand/uL 3 21* 3 90* 5 55  --  5 41   HEMOGLOBIN g/dL 7 8* 7 6* 8 1*  --  8 6*   HEMATOCRIT % 25 4* 25 1* 26 8*  --  28 1*   PLATELETS Thousands/uL 91* 104* 125*   < > 127*   POTASSIUM mmol/L 4 8 4 7 4 8  --  5 4*   CHLORIDE mmol/L 106 108 111*  --  111*   CO2 mmol/L 23 24 24  --  20*   BUN mg/dL 78* 74* 70*  --  67*   CREATININE mg/dL 3 64* 3 52* 3 00*  --  2 87*   CALCIUM mg/dL 8 2* 8 6 9 0  --  9 3   ALK PHOS U/L  --   --   --   --  393*   ALT U/L  --   --   --   --  49   AST U/L  --   --   --   --  37    < > = values in this interval not displayed  Other Studies:   CXR reviewed in PACS- pulmonary congestion       Portions of the record may have been created with voice recognition software  Occasional wrong word or "sound a like" substitutions may have occurred due to the inherent limitations of voice recognition software  Read the chart carefully and recognize, using context, where substitutions have occurred  If you have any questions, please contact the dictating provider

## 2022-09-26 NOTE — PLAN OF CARE
Problem: Potential for Falls  Goal: Patient will remain free of falls  Description: INTERVENTIONS:  - Educate patient/family on patient safety including physical limitations  - Instruct patient to call for assistance with activity   - Consult OT/PT to assist with strengthening/mobility   - Keep Call bell within reach  - Keep bed low and locked with side rails adjusted as appropriate  - Keep care items and personal belongings within reach  - Initiate and maintain comfort rounds  - Make Fall Risk Sign visible to staff  - Apply yellow socks and bracelet for high fall risk patients  - Consider moving patient to room near nurses station  Outcome: Progressing     Problem: SAFETY ADULT  Goal: Patient will remain free of falls  Description: INTERVENTIONS:  - Educate patient/family on patient safety including physical limitations  - Instruct patient to call for assistance with activity   - Consult OT/PT to assist with strengthening/mobility   - Keep Call bell within reach  - Keep bed low and locked with side rails adjusted as appropriate  - Keep care items and personal belongings within reach  - Initiate and maintain comfort rounds  - Make Fall Risk Sign visible to staff  - Apply yellow socks and bracelet for high fall risk patients  - Consider moving patient to room near nurses station  Outcome: Progressing     Problem: DISCHARGE PLANNING  Goal: Discharge to home or other facility with appropriate resources  Description: INTERVENTIONS:  - Identify barriers to discharge w/patient and caregiver  - Arrange for needed discharge resources and transportation as appropriate  - Identify discharge learning needs (meds, wound care, etc )  - Arrange for interpretive services to assist at discharge as needed  - Refer to Case Management Department for coordinating discharge planning if the patient needs post-hospital services based on physician/advanced practitioner order or complex needs related to functional status, cognitive ability, or social support system  Outcome: Progressing     Problem: Knowledge Deficit  Goal: Patient/family/caregiver demonstrates understanding of disease process, treatment plan, medications, and discharge instructions  Description: Complete learning assessment and assess knowledge base  Interventions:  - Provide teaching at level of understanding  - Provide teaching via preferred learning methods  Outcome: Progressing     Problem: Knowledge Deficit  Goal: Patient/family/caregiver demonstrates understanding of disease process, treatment plan, medications, and discharge instructions  Description: Complete learning assessment and assess knowledge base    Interventions:  - Provide teaching at level of understanding  - Provide teaching via preferred learning methods  Outcome: Progressing

## 2022-09-26 NOTE — CASE MANAGEMENT
Case Management Assessment & Discharge Planning Note    Patient name Guillermo Terry  Location [facility] 405/[facility] 405-01 MRN [id number]  : 1955 Date 2022       Current Admission Date: 2022  Current Admission Diagnosis:Shortness of breath   Patient Active Problem List    Diagnosis Date Noted    Shortness of breath 2022    Ischemic cardiomyopathy 2022    Type 2 diabetes mellitus, with long-term current use [facility] 2022    Stenosis of both internal carotid arteries 2022    COVID-19 2022    Nocturnal hypoxia 10/30/2021    Abnormal finding on imaging of liver 10/18/2021    Acute kidney injury superimposed on chronic kidney disease ([facility] [address] )     Pericardial effusion 2021    Stage 4 chronic kidney disease ([facility] ) 2021    Hypoalbuminemia 2021    Hyperphosphatemia 2021    Microalbuminuria due to type 2 diabetes mellitus ([facility] ) 2020    Hyperlipidemia 2020    Elevated d-dimer 2020    History of anemia due to chronic kidney disease 2019    Vitamin D deficiency 2019    Proteinuria 2019    Coronary artery disease 2019    Anemia 2018    Chronic diastolic heart failure ([facility] [address] ) 2018    Hypothyroidism 2018    Hypertension 2018    Acute renal failure superimposed on stage 4 chronic kidney disease ([facility] [address] ) 2018      LOS (days): 3  Geometric Mean LOS (GMLOS) (days):   Days to GMLOS:     OBJECTIVE:    Risk of Unplanned Readmission Score: 24 4         Current admission status: Inpatient       Preferred Pharmacy:   [address] [place] [address] [address] [facility] [address] [address]  Phone: [phone] Fax: [phone]    Primary Care Provider: Rob Henry MD    Primary Insurance: [address] [facility]  Secondary Insurance: [address]:  [address], [address] Care Representative - Daughter   Primary Phone: [phone] [facility])  Home Phone: [phone]               Advance Directives  Does patient have a [address]?: No  Was patient offered paperwork?: Yes (declined)  Does patient currently have a Health Care decision maker?: Yes, please see Health Care Proxy section  Does patient have Advance Directives?: No  Was patient offered paperwork?: Yes (declined)  Primary Contact: daughter Bing Erwin         Readmission Root Cause  30 Day Readmission: No    Patient Information  Admitted from[de-identified] Home  Mental Status: Alert  During Assessment patient was accompanied by: Daughter  Assessment information provided by[de-identified] Patient  Primary Caregiver: Self  Support Systems: Spouse/significant other, Daughter, Family members  [place] of Residence: [address] [place] [address] do you live in?: [address] entry access options   Select all that apply : Stairs  Number of steps to enter home : One Flight  Do the steps have railings?: Yes  Type of Current Residence: Apartment  Floor Level: 2  Upon entering residence, is there a bedroom on the main floor (no further steps)?: Yes  Upon entering residence, is there a bathroom on the main floor (no further steps)?: Yes  In the last 12 months, was there a time when you were not able to pay the mortgage or rent on time?: No  In the last 12 months, how many places have you lived?: 1  In the last 12 months, was there a time when you did not have a steady place to sleep or slept in a shelter (including now)?: No  Homeless/housing insecurity resource given?: N/A  Living Arrangements: Lives w/ Spouse/significant other  Is patient a ?: No    Activities of Daily Living Prior to Admission  Functional Status: Independent  Completes ADLs independently?: Yes  Ambulates independently?: Yes  Does patient use assisted devices?: No  Does patient currently own DME?: No  Does patient have a history of Outpatient Therapy (PT/OT)?: No  Does the patient have a history of Short-Term Rehab?: No  Does patient have a history of HHC?: No  Does patient currently have [facility]?: No         Patient Information Continued  Income Source: Pension/half-way  Does patient have prescription coverage?: Yes  Within the past 12 months, you worried that your food would run out before you got the money to buy more : Never true  Within the past 12 months, the food you bought just didn't last and you didn't have money to get more : Never true  Food insecurity resource given?: N/A  Does patient receive dialysis treatments?: No  Does patient have a history of substance abuse?: No  Does patient have a history of Mental Health Diagnosis?: No         Means of Transportation  Means of Transport to Appts[de-identified] Drives Self        DISCHARGE DETAILS:    Discharge planning discussed with[de-identified] pt and daughter--no HHC needs at this time  Freedom of Choice: Yes     CM contacted family/caregiver?: No- see comments (pt and daughter here)  Were Treatment Team discharge recommendations reviewed with patient/caregiver?: Yes  Did patient/caregiver verbalize understanding of patient care needs?: Yes  Were patient/caregiver advised of the risks associated with not following Treatment Team discharge recommendations?: Yes    Contacts  Contact Method: Phone  Phone Number: [phone]  Reason/Outcome: Continuity of Care, Emergency Contact, Discharge [address]         Is the patient interested in [facility] at discharge?: No    DME Referral Provided  Referral made for DME?: No    Other Referral/Resources/Interventions Provided:  Programs[de-identified] CHF    Would you like to participate in our [address] service program?  : No - Declined    Treatment Team Recommendation: Home  Discharge Destination Plan[de-identified] Home  Transport at Discharge : Family                                      Additional Comments: 64yo female admitted with rapid heart rate and SOB  Hx CHF, ischemic CM, DM2 with insulin  Currently on Bumex IV and creatinine climbing to 6 64  Recommend cardiac cath but not until stabilized  No HHC needs

## 2022-09-26 NOTE — ASSESSMENT & PLAN NOTE
Lab Results   Component Value Date    EGFR 12 09/25/2022    EGFR 15 09/24/2022    EGFR 16 09/23/2022    CREATININE 3 52 (H) 09/25/2022    CREATININE 3 00 (H) 09/24/2022    CREATININE 2 87 (H) 09/23/2022   Creatinine 2 87 which is at baseline  Followed by Nephrology as outpatient  Nephrology evaluation if the creatinine is up trending  Hyperkalemia noted but received Lasix recheck in the morning  Hyperkalemia and low bicarb noted-recheck in the morning

## 2022-09-26 NOTE — ASSESSMENT & PLAN NOTE
Lab Results   Component Value Date    HGBA1C 8 2 (H) 09/16/2022       Recent Labs     09/25/22  1635 09/25/22  2044 09/26/22  0623 09/26/22  0736   POCGLU 161* 225* 96 177*       Blood Sugar Average: Last 72 hrs:  (P) 187 75 patient is on 10 units b i d NovoLog 70 30  Will add sliding scale of insulin  Last hemoglobin A1c is 8 2 showing uncontrolled diabetes mellitus

## 2022-09-26 NOTE — PROGRESS NOTES
[address] [facility]  Progress Note - Guillermo Terry 1955, 75 y o  female MRN: [id number]  Unit/Bed#: [facility] 405-01 Encounter: [id number]  Primary Care Provider: Rob Henry MD   Date and time admitted to hospital: 9/23/2022  2:14 PM  Addendum:  Discussed with Cardiology  Will consult Nephrology given worsening renal function  * Shortness of breath  Assessment & Plan  · Patient came to the hospital with shortness of Breath  Mainly on exertion  Patient's family provided some of the history reported that shortness of breath is gradually worsening  By the time she walks from her room to the bathroom she gets short of breath  · Also gives history of orthopnea  · No weight gain or lower extremity edema  · Patient reported that she has been compliant with salt and water restriction  · Has been complaint with diuretics  · Obtain chest x-ray  Troponin negative so far  · Patient was given 1 time dose of Lasix  BNP elevated about 21,000  · Patient is on Bumex 2 mg b i d   · Continue with intravenous diuresis  Cardiology to evaluate today  · Updated family yesterday  No cardiac catheterization for now  Continue monitor blood pressure  · Recent echocardiogram showed ejection fraction 40% with grade 2 diastolic dysfunction    Ischemic cardiomyopathy  Assessment & Plan  · Patient with underlying coronary artery disease  Recent echocardiogram showed left ventricle ejection fraction 40% with regional wall motion abnormalities  · Patient with known diagnosis of coronary artery disease  Patient also have grade 2 diastolic dysfunction    · Medical management  · Continue aspirin statin and beta-blocker and Imdur    Type 2 diabetes mellitus, with long-term current use of insulin [facility])  Assessment & Plan  Lab Results   Component Value Date    HGBA1C 8 2 (H) 09/16/2022       Recent Labs     09/25/22  1635 09/25/22  2044 09/26/22  0623 09/26/22  0736   POCGLU 161* 225* 96 177*       Blood Sugar Average: Last 72 hrs:  (P) 187 75 patient is on 10 units b i d NovoLog 70 30  Will add sliding scale of insulin  Last hemoglobin A1c is 8 2 showing uncontrolled diabetes mellitus    Hyperlipidemia  Assessment & Plan  · Continue with statin    Stage 4 chronic kidney disease [facility])  Assessment & Plan  Lab Results   Component Value Date    EGFR 12 09/25/2022    EGFR 15 09/24/2022    EGFR 16 09/23/2022    CREATININE 3 52 (H) 09/25/2022    CREATININE 3 00 (H) 09/24/2022    CREATININE 2 87 (H) 09/23/2022   Creatinine 2 87 which is at baseline  Followed by Nephrology as outpatient  Nephrology evaluation if the creatinine is up trending  Hyperkalemia noted but received Lasix recheck in the morning  Hyperkalemia and low bicarb noted-recheck in the morning    Elevated d-dimer  Assessment & Plan  · Patient with elevated D-dimer by reviewing the records it appears to be chronic  · Patient also with CKD stage 4  · Will obtain Lower extremity Doppler      Coronary artery disease  Assessment & Plan  · Status post LUDY x 2 to LAD in 2019  Noted to have 80% stenosed RCA at that time  · Cardiology evaluated the patient during the recent hospital stay due to lack of ischemic skin times it was decided that the patient will be managed medically  · Continue with beta-blocker statin aspirin  · Continue with Imdur    Anemia  Assessment & Plan  · Hemoglobin today is 8 6 which is higher than the last hemoglobin before discharge  Likely secondary to CKD  · Monitor hemoglobin    Hypertension  Assessment & Plan  · On coreg, hydralazine, Norvasc and Imdur as outpatient  Patient reported that she took her medications in the morning  · Hydralazine was added during the recent hospital  · Blood pressure elevated  · Add p r n  hydralazine and labetalol    Hypothyroidism  Assessment & Plan  · Continue Synthroid        VTE Pharmacologic Prophylaxis:   Pharmacologic: Heparin  Mechanical VTE Prophylaxis in Place: No    Patient Centered Rounds:  I have performed bedside rounds with nursing staff today  Time Spent for Care: 15 minutes  More than 50% of total time spent on counseling and coordination of care as described above  Current Length of Stay: 3 day(s)    Current Patient Status: Inpatient     Code Status: Level 1 - Full Code      Subjective:   no acute distress  Patient will  Still requiring IV diuretics  Objective:     Vitals:   Temp (24hrs), Av 9 °F (36 6 °C), Min:97 4 °F (36 3 °C), Max:98 3 °F (36 8 °C)    Temp:  [97 4 °F (36 3 °C)-98 3 °F (36 8 °C)] 97 9 °F (36 6 °C)  HR:  [50-70] 63  Resp:  [15-20] 20  BP: (117-167)/(46-67) 167/65  SpO2:  [96 %-98 %] 98 %  Body mass index is 27 75 kg/m²  Input and Output Summary (last 24 hours): Intake/Output Summary (Last 24 hours) at 2022 0850  Last data filed at 2022 0741  Gross per 24 hour   Intake 340 ml   Output 1700 ml   Net -1360 ml       Physical Exam:     Physical Exam  Constitutional:       Appearance: Normal appearance  HENT:      Head: Normocephalic and atraumatic  Cardiovascular:      Rate and Rhythm: Normal rate and regular rhythm  Heart sounds: No murmur heard  Pulmonary:      Effort: Pulmonary effort is normal       Breath sounds: Normal breath sounds  Abdominal:      General: Abdomen is flat  There is no distension  Palpations: Abdomen is soft  There is no mass  Skin:     General: Skin is warm and dry  Coloration: Skin is not jaundiced  Neurological:      General: No focal deficit present  Mental Status: She is alert and oriented to person, place, and time     Psychiatric:         Mood and Affect: Mood normal          Additional Data:     Labs:    Results from last 7 days   Lab Units 22  0843 22  1904 22  1448   WBC Thousand/uL 3 90*   < > 5 41   HEMOGLOBIN g/dL 7 6*   < > 8 6*   HEMATOCRIT % 25 1*   < > 28 1*   PLATELETS Thousands/uL 104*   < > 127*   NEUTROS PCT %  --   --  78*   LYMPHS PCT %  --   --  14 MONOS PCT %  --   --  4   EOS PCT %  --   --  3    < > = values in this interval not displayed  Results from last 7 days   Lab Units 09/25/22  0843 09/24/22  0426 09/23/22  1448   POTASSIUM mmol/L 4 7   < > 5 4*   CHLORIDE mmol/L 108   < > 111*   CO2 mmol/L 24   < > 20*   BUN mg/dL 74*   < > 67*   CREATININE mg/dL 3 52*   < > 2 87*   CALCIUM mg/dL 8 6   < > 9 3   ALK PHOS U/L  --   --  393*   ALT U/L  --   --  49   AST U/L  --   --  37    < > = values in this interval not displayed  * I Have Reviewed All Lab Data Listed Above  * Additional Pertinent Lab Tests Reviewed:  All Labs Within Last 24 Hours Reviewed    Recent Cultures (last 7 days):           Last 24 Hours Medication List:   Current Facility-Administered Medications   Medication Dose Route Frequency Provider Last Rate    acetaminophen  650 mg Oral Q6H PRN Katie Whitlock MD      aspirin  81 mg Oral Daily Katie Whitlock MD      atorvastatin  80 mg Oral Daily Katie Whitlock MD      bumetanide  2 mg Intravenous BID Katie Whitlock MD      carvedilol  12 5 mg Oral BID With Meals Katie Whitlock MD      cholecalciferol  2,000 Units Oral Daily Katie Whitlock MD      docusate sodium  100 mg Oral BID Katie Whitlock MD      heparin (porcine)  5,000 Units Subcutaneous [facility] Katie Whitlock MD      hydrALAZINE  10 mg Intravenous Q6H PRN Katie Whitlock MD      hydrALAZINE  75 mg Oral TID Diane Barba DO      insulin aspart protamine-insulin aspart  10 Units Subcutaneous BID AC Katie Whitlock MD      insulin lispro  1-5 Units Subcutaneous TID [facility] Katie Whitlock MD      insulin lispro  1-5 Units Subcutaneous HS Katie Whitlock MD      isosorbide mononitrate  120 mg Oral Daily Katie Whitlock MD      labetalol  10 mg Intravenous Q4H PRN Katie Whitlock MD      levothyroxine  125 mcg Oral Early Morning Katie Whitlock MD      ondansetron  4 mg Intravenous Q6H PRN Katie Whitlock MD      polyethylene glycol  17 g Oral Daily PRN Katie Whitlock MD  senna  1 tablet Oral Daily Alma Rodriguez MD          Today, Patient Was Seen By: Kimberly Campbell DO    ** Please Note: Dictation voice to text software may have been used in the creation of this document   **

## 2022-09-26 NOTE — ASSESSMENT & PLAN NOTE
· Patient came to the hospital with shortness of Breath  Mainly on exertion  Patient's family provided some of the history reported that shortness of breath is gradually worsening  By the time she walks from her room to the bathroom she gets short of breath  · Also gives history of orthopnea  · No weight gain or lower extremity edema  · Patient reported that she has been compliant with salt and water restriction  · Has been complaint with diuretics  · Obtain chest x-ray  Troponin negative so far  · Patient was given 1 time dose of Lasix  BNP elevated about 21,000  · Patient is on Bumex 2 mg b i d   · Continue with diuresis  · Consult Cardiology  Updated family yesterday  No cardiac catheterization for now    Continue monitor blood pressure  · Recent echocardiogram showed ejection fraction 40% with grade 2 diastolic dysfunction

## 2022-09-26 NOTE — PROGRESS NOTES
PROGRESS NOTE - Cardiology  Tatum Thompson 75 y o  female MRN: [id number]  Unit/Bed#: [facility] 405-01 Encounter: [id number]  Assessment/Plan   HFmEF  -etiology:  Most likely ischemic  Will need to discuss left heart catheterization benefits versus risk given worsening of kidney disease   -volume status:  Hypervolemic, continue with  Bumex 2 mg IV b i d    -hemodynamics:  Continue adjusting blood pressure medications (see below)    Hypertension  -home medications:  Coreg 12 5 mg b i d , hydralazine 100 mg t i d , Imdur 120 mg daily, amlodipine 10 mg daily  Patient with fluctuating blood pressures with episodes of lower blood pressures thus hydralazine was decreased to 75 mg t i d  And amlodipine were held  Will see woods average blood pressure for further medication adjustments    CAD  -[facility] 1/4/19: LUDY x 2 to LAD after NSTEMI (trop 0 2) and abnormal stress test with ischemia in anterior wall on 12/31/18  80% RCA- plan staged, but has not needed intervention  -continue aspirin, atorvastatin, Coreg, Imdur    KELLY on CKD  -patient with worsening kidney functions  Will ask nephrology input    Interval History:  25-year-old female HFmEF, CAD presented with dyspnea, poorly controlled hypertension for which was getting diuresed and blood pressure control  Previously known low EF which improved on echo from 2-21 but EF was down to 40-45% from   Vitals:  Fluctuating blood pressure, documented weight about 151 lb which is about 4 lb down from admission  Ins and outs 100/1 7 L net -1 6 L (net -3 6 L since admission)  No labs this morning, labs from yesterday creatinine 3 52, potassium 4 7, hemoglobin 7 6  Review of Systems  ROS as noted above, otherwise 12 point review of systems was performed and is negative       Historical Information   Past Medical History:   Diagnosis Date    Anemia     CH[facility]congestive [facility] failure) [facility] [place] [address] [facility]     Chronic kidney disease     Coronary artery disease     Diabetes mellitus ([facility] [place] [address] )  Hypertension     Hypothyroidism      Past Surgical History:   Procedure Laterality Date    CORONARY ANGIOPLASTY WITH STENT PLACEMENT  2019     Social History     Substance and Sexual Activity   Alcohol Use Yes    Comment: occ     Social History     Substance and Sexual Activity   Drug Use No     Social History     Tobacco Use   Smoking Status Former Smoker   Smokeless Tobacco Never Used     Meds/Allergies   Hospital Medications:   Current Facility-Administered Medications   Medication Dose Route Frequency    acetaminophen (TYLENOL) tablet 650 mg  650 mg Oral Q6H PRN    aspirin chewable tablet 81 mg  81 mg Oral Daily    atorvastatin (LIPITOR) tablet 80 mg  80 mg Oral Daily    bumetanide (BUMEX) injection 2 mg  2 mg Intravenous BID    carvedilol (COREG) tablet 12 5 mg  12 5 mg Oral BID With Meals    cholecalciferol (VITAMIN D3) tablet 2,000 Units  2,000 Units Oral Daily    docusate sodium (COLACE) capsule 100 mg  100 mg Oral BID    heparin (porcine) subcutaneous injection 5,000 Units  5,000 Units Subcutaneous Q8H [facility]    hydrALAZINE (APRESOLINE) injection 10 mg  10 mg Intravenous Q6H PRN    hydrALAZINE (APRESOLINE) tablet 75 mg  75 mg Oral TID    insulin aspart protamine-insulin aspart (NovoLOG 70/30) 100 units/mL subcutaneous injection 10 Units  10 Units Subcutaneous BID AC    insulin lispro (HumaLOG) 100 units/mL subcutaneous injection 1-5 Units  1-5 Units Subcutaneous TID AC    insulin lispro (HumaLOG) 100 units/mL subcutaneous injection 1-5 Units  1-5 Units Subcutaneous HS    isosorbide mononitrate (IMDUR) 24 hr tablet 120 mg  120 mg Oral Daily    labetalol (NORMODYNE) injection 10 mg  10 mg Intravenous Q4H PRN    levothyroxine tablet 125 mcg  125 mcg Oral Early Morning    ondansetron (ZOFRAN) injection 4 mg  4 mg Intravenous Q6H PRN    polyethylene glycol (MIRALAX) packet 17 g  17 g Oral Daily PRN    senna (SENOKOT) tablet 8 6 mg  1 tablet Oral Daily     Home Medications:   Medications Prior to Admission   Medication    amLODIPine (NORVASC) 10 mg tablet    aspirin (RA Aspirin Adult Low Dose) 81 mg chewable tablet    atorvastatin (LIPITOR) 40 mg tablet    bumetanide (BUMEX) 2 mg tablet    carbamide peroxide (DEBROX) 6 5 % otic solution    carvedilol (COREG) 12 5 mg tablet    cholecalciferol (VITAMIN D3) 1,000 units tablet    Droplet Pen Needles 32G X 4 MM MISC    hydrALAZINE (APRESOLINE) 100 MG tablet    insulin aspart protamine-insulin aspart (NovoLOG 70/30) 100 units/mL injection    isosorbide mononitrate (IMDUR) 120 mg 24 hr tablet    Lancets (OneTouch Delica Plus [id number]) MISC    levothyroxine 125 mcg tablet    OneTouch Verio test strip     Allergies   Allergen Reactions    Iv Contrast [Iodinated Diagnostic Agents] Itching     Caused KELLY    Nifedipine Rash     Objective   Vitals: Blood pressure 167/65, pulse 63, temperature 97 9 °F (36 6 °C), temperature source Oral, resp  rate 20, height 5' 2" (1 575 m), weight 68 8 kg (151 lb 11 2 oz), SpO2 98 %, currently breastfeeding    Orthostatic Blood Pressures    Flowsheet Row Most Recent Value   Blood Pressure 167/65 filed at 09/26/2022 5205   Patient Position - Orthostatic VS Lying filed at 09/26/2022 0309          Intake/Output Summary (Last 24 hours) at 9/26/2022 0842  Last data filed at 9/26/2022 0741  Gross per 24 hour   Intake 340 ml   Output 1700 ml   Net -1360 ml     Invasive Devices  Report    Peripheral Intravenous Line  Duration           Peripheral IV 09/23/22 Left Antecubital 2 days              Physical Exam   GEN: NAD, alert and oriented, well appearing  SKIN: dry without significant lesions or rashes  HEENT: NCAT, PERRL, EOMs intact  NECK:  Positive JVD  CARDIOVASCULAR: RRR, normal S1, S2 without murmurs, rubs, or gallops appreciated  LUNGS: Clear to auscultation bilaterally without wheezes, rhonchi, or rales  ABDOMEN: Soft, nontender, nondistended  EXTREMITIES/VASCULAR: perfused without clubbing, cyanosis, trace edema b/l  PSYCH: Normal mood and affect  NEURO: CN ll-Xll grossly intact    Lab Results: I have personally reviewed pertinent lab results  Results from last 7 days   Lab Units 09/25/22  0843 09/24/22  0426 09/23/22  1904 09/23/22  1448   WBC Thousand/uL 3 90* 5 55  --  5 41   HEMOGLOBIN g/dL 7 6* 8 1*  --  8 6*   HEMATOCRIT % 25 1* 26 8*  --  28 1*   PLATELETS Thousands/uL 104* 125* 161 127*     Results from last 7 days   Lab Units 09/25/22  0843 09/24/22  0426 09/23/22  1448   POTASSIUM mmol/L 4 7 4 8 5 4*   CHLORIDE mmol/L 108 111* 111*   CO2 mmol/L 24 24 20*   BUN mg/dL 74* 70* 67*   CREATININE mg/dL 3 52* 3 00* 2 87*   CALCIUM mg/dL 8 6 9 0 9 3             Imaging: I have personally reviewed pertinent reports

## 2022-09-26 NOTE — ED ATTENDING ATTESTATION
9/23/2022  Wilman AREVALO DO, saw and evaluated the patient  I have discussed the patient with the resident/non-physician practitioner and agree with the resident's/non-physician practitioner's findings, Plan of Care, and MDM as documented in the resident's/non-physician practitioner's note, except where noted  All available labs and Radiology studies were reviewed  I was present for key portions of any procedure(s) performed by the resident/non-physician practitioner and I was immediately available to provide assistance  At this point I agree with the current assessment done in the Emergency Department  I have conducted an independent evaluation of this patient a history and physical is as follows:    43-year-old female presents for shortness of breath  History of CHF  Has been taking her medications as appropriate  She reports that for the past week she has had worsening exertional dyspnea with orthopnea  No other modifying factors no chest pain  No cough no fever no chills  On exam she appears somewhat volume overloaded decreased breath sounds bilaterally  No hypoxia    Plan cardiac workup likely admit for diuresis    ED Course         Critical Care Time  Procedures

## 2022-09-27 LAB
ANION GAP SERPL CALCULATED.3IONS-SCNC: 12 MMOL/L (ref 4–13)
BASOPHILS # BLD AUTO: 0.02 THOUSANDS/ΜL (ref 0–0.1)
BASOPHILS NFR BLD AUTO: 1 % (ref 0–1)
BUN SERPL-MCNC: 88 MG/DL (ref 5–25)
CALCIUM SERPL-MCNC: 8.8 MG/DL (ref 8.3–10.1)
CHLORIDE SERPL-SCNC: 105 MMOL/L (ref 96–108)
CO2 SERPL-SCNC: 22 MMOL/L (ref 21–32)
CREAT SERPL-MCNC: 3.73 MG/DL (ref 0.6–1.3)
EOSINOPHIL # BLD AUTO: 0.09 THOUSAND/ΜL (ref 0–0.61)
EOSINOPHIL NFR BLD AUTO: 2 % (ref 0–6)
ERYTHROCYTE [DISTWIDTH] IN BLOOD BY AUTOMATED COUNT: 13.1 % (ref 11.6–15.1)
FERRITIN SERPL-MCNC: 108 NG/ML (ref 8–388)
GFR SERPL CREATININE-BSD FRML MDRD: 11 ML/MIN/1.73SQ M
GLUCOSE SERPL-MCNC: 123 MG/DL (ref 65–140)
GLUCOSE SERPL-MCNC: 124 MG/DL (ref 65–140)
GLUCOSE SERPL-MCNC: 204 MG/DL (ref 65–140)
GLUCOSE SERPL-MCNC: 230 MG/DL (ref 65–140)
GLUCOSE SERPL-MCNC: 230 MG/DL (ref 65–140)
GLUCOSE SERPL-MCNC: 301 MG/DL (ref 65–140)
GLUCOSE SERPL-MCNC: 346 MG/DL (ref 65–140)
HCT VFR BLD AUTO: 23.9 % (ref 34.8–46.1)
HGB BLD-MCNC: 7.3 G/DL (ref 11.5–15.4)
IMM GRANULOCYTES # BLD AUTO: 0.01 THOUSAND/UL (ref 0–0.2)
IMM GRANULOCYTES NFR BLD AUTO: 0 % (ref 0–2)
IRON SATN MFR SERPL: 20 % (ref 15–50)
IRON SERPL-MCNC: 52 UG/DL (ref 50–170)
LYMPHOCYTES # BLD AUTO: 1.01 THOUSANDS/ΜL (ref 0.6–4.47)
LYMPHOCYTES NFR BLD AUTO: 23 % (ref 14–44)
MCH RBC QN AUTO: 27.9 PG (ref 26.8–34.3)
MCHC RBC AUTO-ENTMCNC: 30.5 G/DL (ref 31.4–37.4)
MCV RBC AUTO: 91 FL (ref 82–98)
MONOCYTES # BLD AUTO: 0.37 THOUSAND/ΜL (ref 0.17–1.22)
MONOCYTES NFR BLD AUTO: 9 % (ref 4–12)
NEUTROPHILS # BLD AUTO: 2.87 THOUSANDS/ΜL (ref 1.85–7.62)
NEUTS SEG NFR BLD AUTO: 65 % (ref 43–75)
NRBC BLD AUTO-RTO: 0 /100 WBCS
PHOSPHATE SERPL-MCNC: 4.1 MG/DL (ref 2.3–4.1)
PLATELET # BLD AUTO: 103 THOUSANDS/UL (ref 149–390)
PMV BLD AUTO: 12 FL (ref 8.9–12.7)
POTASSIUM SERPL-SCNC: 4.6 MMOL/L (ref 3.5–5.3)
RBC # BLD AUTO: 2.62 MILLION/UL (ref 3.81–5.12)
SODIUM SERPL-SCNC: 139 MMOL/L (ref 135–147)
TIBC SERPL-MCNC: 261 UG/DL (ref 250–450)
WBC # BLD AUTO: 4.37 THOUSAND/UL (ref 4.31–10.16)

## 2022-09-27 PROCEDURE — 85025 COMPLETE CBC W/AUTO DIFF WBC: CPT | Performed by: INTERNAL MEDICINE

## 2022-09-27 PROCEDURE — 82728 ASSAY OF FERRITIN: CPT | Performed by: INTERNAL MEDICINE

## 2022-09-27 PROCEDURE — 99232 SBSQ HOSP IP/OBS MODERATE 35: CPT | Performed by: HOSPITALIST

## 2022-09-27 PROCEDURE — 84100 ASSAY OF PHOSPHORUS: CPT | Performed by: INTERNAL MEDICINE

## 2022-09-27 PROCEDURE — 82948 REAGENT STRIP/BLOOD GLUCOSE: CPT

## 2022-09-27 PROCEDURE — 83540 ASSAY OF IRON: CPT | Performed by: INTERNAL MEDICINE

## 2022-09-27 PROCEDURE — 99233 SBSQ HOSP IP/OBS HIGH 50: CPT | Performed by: INTERNAL MEDICINE

## 2022-09-27 PROCEDURE — 80048 BASIC METABOLIC PNL TOTAL CA: CPT | Performed by: INTERNAL MEDICINE

## 2022-09-27 PROCEDURE — 83550 IRON BINDING TEST: CPT | Performed by: INTERNAL MEDICINE

## 2022-09-27 PROCEDURE — 99232 SBSQ HOSP IP/OBS MODERATE 35: CPT | Performed by: INTERNAL MEDICINE

## 2022-09-27 RX ORDER — HYDRALAZINE HYDROCHLORIDE 50 MG/1
100 TABLET, FILM COATED ORAL 3 TIMES DAILY
Status: DISCONTINUED | OUTPATIENT
Start: 2022-09-27 | End: 2022-09-28

## 2022-09-27 RX ORDER — INSULIN ASPART 100 [IU]/ML
13 INJECTION, SUSPENSION SUBCUTANEOUS
Status: DISCONTINUED | OUTPATIENT
Start: 2022-09-28 | End: 2022-09-29 | Stop reason: HOSPADM

## 2022-09-27 RX ORDER — INSULIN ASPART 100 [IU]/ML
10 INJECTION, SUSPENSION SUBCUTANEOUS
Status: DISCONTINUED | OUTPATIENT
Start: 2022-09-27 | End: 2022-09-29 | Stop reason: HOSPADM

## 2022-09-27 RX ORDER — BUMETANIDE 2 MG/1
2 TABLET ORAL
Status: DISCONTINUED | OUTPATIENT
Start: 2022-09-27 | End: 2022-09-29 | Stop reason: HOSPADM

## 2022-09-27 RX ADMIN — HYDRALAZINE HYDROCHLORIDE 100 MG: 50 TABLET, FILM COATED ORAL at 22:04

## 2022-09-27 RX ADMIN — INSULIN LISPRO 2 UNITS: 100 INJECTION, SOLUTION INTRAVENOUS; SUBCUTANEOUS at 22:10

## 2022-09-27 RX ADMIN — CARVEDILOL 12.5 MG: 12.5 TABLET, FILM COATED ORAL at 08:07

## 2022-09-27 RX ADMIN — ASPIRIN 81 MG CHEWABLE TABLET 81 MG: 81 TABLET CHEWABLE at 08:07

## 2022-09-27 RX ADMIN — HEPARIN SODIUM 5000 UNITS: 5000 INJECTION INTRAVENOUS; SUBCUTANEOUS at 22:04

## 2022-09-27 RX ADMIN — LEVOTHYROXINE SODIUM 125 MCG: 125 TABLET ORAL at 06:41

## 2022-09-27 RX ADMIN — BUMETANIDE 2 MG: 0.25 INJECTION, SOLUTION INTRAMUSCULAR; INTRAVENOUS at 08:08

## 2022-09-27 RX ADMIN — HEPARIN SODIUM 5000 UNITS: 5000 INJECTION INTRAVENOUS; SUBCUTANEOUS at 15:33

## 2022-09-27 RX ADMIN — Medication 2000 UNITS: at 08:06

## 2022-09-27 RX ADMIN — INSULIN LISPRO 3 UNITS: 100 INJECTION, SOLUTION INTRAVENOUS; SUBCUTANEOUS at 11:25

## 2022-09-27 RX ADMIN — INSULIN ASPART 10 UNITS: 100 INJECTION, SUSPENSION SUBCUTANEOUS at 06:41

## 2022-09-27 RX ADMIN — INSULIN ASPART 10 UNITS: 100 INJECTION, SUSPENSION SUBCUTANEOUS at 17:52

## 2022-09-27 RX ADMIN — BUMETANIDE 2 MG: 2 TABLET ORAL at 15:32

## 2022-09-27 RX ADMIN — DOCUSATE SODIUM 100 MG: 100 CAPSULE, LIQUID FILLED ORAL at 08:07

## 2022-09-27 RX ADMIN — HEPARIN SODIUM 5000 UNITS: 5000 INJECTION INTRAVENOUS; SUBCUTANEOUS at 06:41

## 2022-09-27 RX ADMIN — ATORVASTATIN CALCIUM 80 MG: 80 TABLET, FILM COATED ORAL at 08:06

## 2022-09-27 RX ADMIN — HYDRALAZINE HYDROCHLORIDE 10 MG: 20 INJECTION INTRAMUSCULAR; INTRAVENOUS at 03:54

## 2022-09-27 RX ADMIN — CARVEDILOL 12.5 MG: 12.5 TABLET, FILM COATED ORAL at 15:32

## 2022-09-27 RX ADMIN — HYDRALAZINE HYDROCHLORIDE 100 MG: 50 TABLET, FILM COATED ORAL at 15:32

## 2022-09-27 RX ADMIN — INSULIN LISPRO 1 UNITS: 100 INJECTION, SOLUTION INTRAVENOUS; SUBCUTANEOUS at 17:53

## 2022-09-27 RX ADMIN — SENNOSIDES 8.6 MG: 8.6 TABLET, FILM COATED ORAL at 08:07

## 2022-09-27 RX ADMIN — HYDRALAZINE HYDROCHLORIDE 75 MG: 25 TABLET, FILM COATED ORAL at 08:07

## 2022-09-27 RX ADMIN — ISOSORBIDE MONONITRATE 120 MG: 60 TABLET, EXTENDED RELEASE ORAL at 08:07

## 2022-09-27 RX ADMIN — EPOETIN ALFA 6000 UNITS: 3000 SOLUTION INTRAVENOUS; SUBCUTANEOUS at 15:33

## 2022-09-27 NOTE — PLAN OF CARE
Problem: Potential for Falls  Goal: Patient will remain free of falls  Description: INTERVENTIONS:  - Educate patient/family on patient safety including physical limitations  - Instruct patient to call for assistance with activity   - Consult OT/PT to assist with strengthening/mobility   - Keep Call bell within reach  - Keep bed low and locked with side rails adjusted as appropriate  - Keep care items and personal belongings within reach  - Initiate and maintain comfort rounds  - Make Fall Risk Sign visible to staff  - Offer Toileting every  Hours, in advance of need  - Initiate/Maintain alarm  - Obtain necessary fall risk management equipment:   - Apply yellow socks and bracelet for high fall risk patients  - Consider moving patient to room near nurses station  Outcome: Progressing     Problem: SAFETY ADULT  Goal: Patient will remain free of falls  Description: INTERVENTIONS:  - Educate patient/family on patient safety including physical limitations  - Instruct patient to call for assistance with activity   - Consult OT/PT to assist with strengthening/mobility   - Keep Call bell within reach  - Keep bed low and locked with side rails adjusted as appropriate  - Keep care items and personal belongings within reach  - Initiate and maintain comfort rounds  - Make Fall Risk Sign visible to staff  - Offer Toileting every  Hours, in advance of need  - Initiate/Maintain alarm  - Obtain necessary fall risk management equipment  - Apply yellow socks and bracelet for high fall risk patients  - Consider moving patient to room near nurses station  Outcome: Progressing     Problem: DISCHARGE PLANNING  Goal: Discharge to home or other facility with appropriate resources  Description: INTERVENTIONS:  - Identify barriers to discharge w/patient and caregiver  - Arrange for needed discharge resources and transportation as appropriate  - Identify discharge learning needs (meds, wound care, etc )  - Arrange for interpretive services to assist at discharge as needed  - Refer to Case Management Department for coordinating discharge planning if the patient needs post-hospital services based on physician/advanced practitioner order or complex needs related to functional status, cognitive ability, or social support system  Outcome: Progressing     Problem: Knowledge Deficit  Goal: Patient/family/caregiver demonstrates understanding of disease process, treatment plan, medications, and discharge instructions  Description: Complete learning assessment and assess knowledge base    Interventions:  - Provide teaching at level of understanding  - Provide teaching via preferred learning methods  Outcome: Progressing

## 2022-09-27 NOTE — PROGRESS NOTES
Heart Failure/ Pulmonary Hypertension Progress Note - Olivia Shetty 75 y o  female MRN: [id number]    Unit/Bed#: [facility] 405-01 Encounter: [id number]      Assessment:    Principal Problem:    Shortness of breath  Active Problems:    Hypothyroidism    Hypertension    Anemia    Coronary artery disease    Elevated d-dimer    Stage 4 chronic kidney disease (HCC)    Hyperlipidemia    Type 2 diabetes mellitus, with long-term current use of insulin (HCC)    Ischemic cardiomyopathy    # Chronic HFmrEF, Stage C  Etiology: pt previously had inferior wall hypo with relatively preserved EF but inferior hypo more pronounced now   Possibly due to accelerated HTN as MAPs very high on previous admit and heart did not reverse remodel with drop in EF so may be due to high afterload and filling pressures  Diuretic: bumex 2 mg BID  Weight: 157 lbs  NT pro BNP 3/1/21: 9024  4/30/19: 702     Studies- personally reviewed by me  Echo 9/15/22:  LVEF: 40-45%  LVEDd: 5 cm  RV: normal     Echo 7/3/21:  LVEF: 60%, hypo inferior wall  RV: normal  Other: small to moderate pericardial effusion posterior- no compromise     Echo 2/22/21  LVEF: 60%, moderate LVH, grade 2 DD  RV: normal  Other: moderate free flowing pericardial effusion      Echo 12/30/18:  EF: 50%, grade 2 DD, PASP 35 mmHg     # pericardial effusion- see echo report above     # CAD - LAD stents  [facility] 1/4/19: LUDY x 2 to LAD after NSTEMI (trop 0 2) and abnormal stress test with ischemia in anterior wall on 12/31/18  80% RCA- plan staged, but has not needed intervention  Med Rx: Imdur 90 mg daily, asa, atorvastatin 40 mg, coreg 12 5 mg BID  Angina: none     # dyslipidemia- atorvastatin 40 mg   3/2/21: LDL 48, HDL 76     # HTN- norvasc 10 mg QHS - off, coreg 12 5 mg BID, hydralazine 75 mg TID, Imdur 120 mg daily     # palpitations  Holter 5/17/19: 53-85, avg 69 bpm  8 PVCs      # anemia of CKD, HgB 8 1 on 3/15/21, 7 1 on 10/27  IV iron  # CKD4, Cr 3 14 on 11/5/21; 3 64 9/26/22; 3 73 9/27/22  # DM2, HgA1C 10/25/21: 9 7%    Plan:  Transition to oral bumex 2mg BID  Increase hydralazine to 100mg TID  Would hold off on cath as target will be pressure and volume control first  Continue  Asa, imdur, atorvastatin, coreg  for CAD       Subjective:   Patient seen and examined  No significant events overnight  Review of Systems   Constitutional: Negative for chills, fatigue and fever  HENT: Negative for congestion, nosebleeds and sore throat  Eyes: Negative for discharge and visual disturbance  Respiratory: Negative for cough, chest tightness and shortness of breath  Cardiovascular: Negative for chest pain, palpitations and leg swelling  Gastrointestinal: Negative for abdominal distention, abdominal pain, diarrhea, nausea and vomiting  Endocrine: Negative for cold intolerance and heat intolerance  Genitourinary: Negative for dysuria, flank pain and hematuria  Musculoskeletal: Negative for arthralgias, back pain and myalgias  Skin: Negative for color change, pallor and rash  Neurological: Negative for dizziness, syncope, weakness and light-headedness  Hematological: Negative for adenopathy  Does not bruise/bleed easily  Psychiatric/Behavioral: Negative for agitation and confusion  Objective: Intake/ Output: 598/500? Weight: 153 lbs      Vitals: Blood pressure 137/53, pulse 59, temperature 98 °F (36 7 °C), resp  rate 17, height 5' 2" (1 575 m), weight 69 5 kg (153 lb 3 5 oz), SpO2 97 %, currently breastfeeding , Body mass index is 28 02 kg/m² , I/O last 3 completed shifts:   In: 598 [P O :598]  Out: 1300 [Urine:1300]  I/O this shift:  In: 200 [P O :200]  Out: 650 [Urine:650]  Wt Readings from Last 3 Encounters:   09/27/22 69 5 kg (153 lb 3 5 oz)   09/15/22 70 8 kg (156 lb)   08/08/22 70 8 kg (156 lb)       Intake/Output Summary (Last 24 hours) at 9/27/2022 0953  Last data filed at 9/27/2022 0723  Gross per 24 hour   Intake 318 ml   Output 650 ml   Net -332 ml     I/O last 3 completed shifts: In: 0 [P O :598]  Out: 1300 [Urine:1300]    No significant arrhythmias seen on telemetry review         Physical Exam:  Vitals:    09/27/22 0604 09/27/22 0640 09/27/22 0722 09/27/22 0806   BP:  145/60 137/53    Pulse:  60 59    Resp:   17    Temp:   98 °F (36 7 °C)    TempSrc:       SpO2:  97% 96% 97%   Weight: 69 5 kg (153 lb 3 5 oz)      Height:           GEN: Irina Henriquez appears well, alert and oriented x 3, pleasant and cooperative   HEENT: NC/AT, moist mucosa, anicteric sclerae; extraocular muscles intact  NECK: supple, no carotid bruits   HEART: regular rhythm, normal S1 and S2, no murmurs, clicks, gallops or rubs, JVP is mildly elevated  LUNGS: clear to auscultation bilaterally; no wheezes, rales, or rhonchi   ABDOMEN: normal bowel sounds, soft, no tenderness, no distention  EXTREMITIES: peripheral pulses normal; no clubbing, cyanosis, or edema  NEURO: no focal findings   SKIN: normal without suspicious lesions on exposed skin      Current Facility-Administered Medications:     acetaminophen (TYLENOL) tablet 650 mg, 650 mg, Oral, Q6H PRN, James Stiles MD    aspirin chewable tablet 81 mg, 81 mg, Oral, Daily, James Stiles MD, 81 mg at 09/27/22 0807    atorvastatin (LIPITOR) tablet 80 mg, 80 mg, Oral, Daily, James Stiles MD, 80 mg at 09/27/22 0806    bumetanide (BUMEX) injection 2 mg, 2 mg, Intravenous, BID, James Stiles MD, 2 mg at 09/27/22 0808    carvedilol (COREG) tablet 12 5 mg, 12 5 mg, Oral, BID With Meals, James Stiles MD, 12 5 mg at 09/27/22 9428    cholecalciferol (VITAMIN D3) tablet 2,000 Units, 2,000 Units, Oral, Daily, James Stiles MD, 2,000 Units at 09/27/22 0806    docusate sodium (COLACE) capsule 100 mg, 100 mg, Oral, BID, James Stiles MD, 100 mg at 09/27/22 2171    heparin (porcine) subcutaneous injection 5,000 Units, 5,000 Units, Subcutaneous, Q8H [address], 5,000 Units at 09/27/22 0641 **AND** [COMPLETED] Platelet count, , , Once, James Stiles, MD    hydrALAZINE (APRESOLINE) injection 10 mg, 10 mg, Intravenous, Q6H PRN, Katie Whitlock MD, 10 mg at 09/27/22 0354    hydrALAZINE (APRESOLINE) tablet 75 mg, 75 mg, Oral, TID, Diane Barba DO, 75 mg at 09/27/22 0807    insulin aspart protamine-insulin aspart (NovoLOG 70/30) 100 units/mL subcutaneous injection 10 Units, 10 Units, Subcutaneous, BID AC, Katie Whitlock MD, 10 Units at 09/27/22 0641    insulin lispro (HumaLOG) 100 units/mL subcutaneous injection 1-5 Units, 1-5 Units, Subcutaneous, TID AC, 1 Units at 09/26/22 1755 **AND** Fingerstick Glucose (POCT), , , TID AC, Katie Whitlock MD    insulin lispro (HumaLOG) 100 units/mL subcutaneous injection 1-5 Units, 1-5 Units, Subcutaneous, HS, Katie Whitlock MD, 3 Units at 09/26/22 2130    isosorbide mononitrate (IMDUR) 24 hr tablet 120 mg, 120 mg, Oral, Daily, Katie Whitlock MD, 120 mg at 09/27/22 7135    labetalol (NORMODYNE) injection 10 mg, 10 mg, Intravenous, Q4H PRN, Katie Whitlock MD    levothyroxine tablet 125 mcg, 125 mcg, Oral, Early Morning, Katie Whitlock MD, 125 mcg at 09/27/22 0641    ondansetron (ZOFRAN) injection 4 mg, 4 mg, Intravenous, Q6H PRN, Katie Whitlock MD    polyethylene glycol [facility]) packet 17 g, 17 g, Oral, Daily PRN, MD [facility]) tablet 8 6 mg, 1 tablet, Oral, Daily, Katie Whitlock MD, 8 6 mg at 09/27/22 4968      Labs & Results:        Results from last 7 days   Lab Units 09/27/22  0439 09/26/22  0932 09/25/22  0843   WBC Thousand/uL 4 37 3 21* 3 90*   HEMOGLOBIN g/dL 7 3* 7 8* 7 6*   HEMATOCRIT % 23 9* 25 4* 25 1*   PLATELETS Thousands/uL 103* 91* 104*         Results from last 7 days   Lab Units 09/27/22  0439 09/26/22  0932 09/25/22  0843 09/24/22  0426 09/23/22  1448   POTASSIUM mmol/L 4 6 4 8 4 7   < > 5 4*   CHLORIDE mmol/L 105 106 108   < > 111*   CO2 mmol/L 22 23 24   < > 20*   BUN mg/dL 88* 78* 74*   < > 67*   CREATININE mg/dL 3 73* 3 64* 3 52*   < > 2 87*   CALCIUM mg/dL 8 8 8 2* 8 6   < > 9  3   ALK PHOS U/L  --   --   --   --  393*   ALT U/L  --   --   --   --  49   AST U/L  --   --   --   --  37    < > = values in this interval not displayed               Naomi Gonsalves MD  Advanced Heart Failure and Mechanical Circulatory Ul  [facility]

## 2022-09-27 NOTE — PROGRESS NOTES
SLIM PROGRESS NOTE     Name: Kizzy Garcia   Age & Sex: 75 y o  female   MRN: [id number]  Unit/Bed#: [facility] 405-01   Encounter: [id number]  Team: Zak Clayton    PATIENT INFORMATION     Name: Kizzy Garcia   Age & Sex: 75 y o  female   MRN: [id number]  Hospital Stay Days: 4    ASSESSMENT/PLAN     Principal Problem:    Shortness of breath  Active Problems:    Hypothyroidism    Hypertension    Anemia    Coronary artery disease    Elevated d-dimer    Stage 4 chronic kidney disease ([facility] [address] )    Hyperlipidemia    Type 2 diabetes mellitus, with long-term current use of insulin ([facility])    Ischemic cardiomyopathy      * Shortness of breath  Assessment & Plan  · Patient came to the hospital with shortness of Breath  Mainly on exertion  Patient's family provided some of the history reported that shortness of breath is gradually worsening  By the time she walks from her room to the bathroom she gets short of breath    Shortness of breath due to worsening HF with relatively preserved EF  Evidenced by BNP 21,757, CXR with pulmonary vascular congestion with small right pleural effusion  Inferior regional wall abnormality now more pronounced  Possibly due to accelerated HTN HTN as MAPs very high on previous admit and heart did not reverse remodel with drop in EF so may be due to high afterload and filling pressures requiring HF consult, hydralazine and Bumex for pressure and volume control  · Also gives history of orthopnea  · No weight gain or lower extremity edema  · Patient reported that she has been compliant with salt and water restriction  · Has been complaint with diuretics  · Obtain chest x-ray  Troponin negative so far  · Patient was given 1 time dose of Lasix  BNP elevated about 21,000  · Patient is transitioned to oral Bumex on 09/27 per cardiology  · Per nephrology can consider addition of metolazone  · Continue with diuresis  · Consult Cardiology  No cardiac catheterization for now    Continue monitor blood pressure  · Recent echocardiogram showed ejection fraction 40% with grade 2 diastolic dysfunction    Ischemic cardiomyopathy  Assessment & Plan  · Patient with underlying coronary artery disease  Recent echocardiogram showed left ventricle ejection fraction 40% with regional wall motion abnormalities  · Patient with known diagnosis of coronary artery disease  Patient also have grade 2 diastolic dysfunction  · Medical management  · Continue aspirin statin and beta-blocker and Imdur    Type 2 diabetes mellitus, with long-term current use of insulin [facility])  Assessment & Plan  Lab Results   Component Value Date    HGBA1C 8 2 (H) 09/16/2022       Recent Labs     09/26/22  1526 09/26/22  2056 09/27/22  0600 09/27/22  1044   POCGLU 230* 301* 124 346*       Blood Sugar Average: Last 72 hrs:  (P) 429 [id number] patient is on 10 units b i d NovoLog 70 30  Will increase AM 70/30 insulin to 13 units, continue 10 units in PM  Will add sliding scale of insulin  Last hemoglobin A1c is 8 2 showing uncontrolled diabetes mellitus    Hyperlipidemia  Assessment & Plan  · Continue with statin    Stage 4 chronic kidney disease [facility])  Assessment & Plan  Lab Results   Component Value Date    EGFR 11 09/27/2022    EGFR 12 09/26/2022    EGFR 12 09/25/2022    CREATININE 3 73 (H) 09/27/2022    CREATININE 3 64 (H) 09/26/2022    CREATININE 3 52 (H) 09/25/2022   Creatinine elevated from baseline at 3 73 which is at baseline  Followed by Nephrology as outpatient  Nephrology consulted, recommendations are appreciated    Elevated d-dimer  Assessment & Plan  · Patient with elevated D-dimer by reviewing the records it appears to be chronic  · Patient also with CKD stage 4  · Will obtain Lower extremity Doppler      Coronary artery disease  Assessment & Plan  · Status post LUDY x 2 to LAD in 2019   Noted to have 80% stenosed RCA at that time  · Cardiology evaluated the patient during the recent hospital stay due to lack of ischemic skin times it was decided that the patient will be managed medically  · Continue with beta-blocker statin aspirin  · Continue with Imdur    Anemia  Assessment & Plan  · Hgb 7 3 today  Likely secondary to CKD  · Monitor hemoglobin  · Epo per nephrology    Hypertension  Assessment & Plan  · On coreg, hydralazine, Norvasc and Imdur as outpatient  Patient reported that she took her medications in the morning  · Hydralazine was added during the recent hospital  · Blood pressure elevated  · Add p r n  hydralazine and labetalol    Hypothyroidism  Assessment & Plan  · Continue Synthroid      Disposition: Continue inpatient care for diuretic and fluid management  Holding cath for now, continue medical management  SUBJECTIVE     Patient seen and examined  No acute events overnight  Upon my encounter patient was lying comfortably in hospital bed  Presently denies any fever, chills, nausea, vomiting, diarrhea, constipation, chest pain, shortness a breath  Otherwise denies any new or worsening complaints  OBJECTIVE     Vitals:    22 0640 22 0722 22 0806 22 1116   BP: 145/60 137/53  134/53   Pulse: 60 59  62   Resp:  17     Temp:  98 °F (36 7 °C)  98 3 °F (36 8 °C)   TempSrc:       SpO2: 97% 96% 97% 98%   Weight:       Height:          Temperature:   Temp (24hrs), Av 1 °F (36 7 °C), Min:97 9 °F (36 6 °C), Max:98 3 °F (36 8 °C)    Temperature: 98 3 °F (36 8 °C)  Intake & Output:  I/O        07 07 07 07 07 0700    P  O  100 598 200    Total Intake(mL/kg) 100 (1 5) 598 (8 6) 200 (2 9)    Urine (mL/kg/hr) 1700 (1) 500 (0 3) 650 (5 4)    Total Output 1700 500 650    Net -1600 +98 -450               Weights:        Body mass index is 28 02 kg/m²  Weight (last 2 days)     Date/Time Weight    22 0604 69 5 (153 22)    22 0600 68 8 (151 7)    22 0600 68 7 (151 5)        Physical Exam  Constitutional:       General: She is not in acute distress  Appearance: Normal appearance  She is not ill-appearing  Cardiovascular:      Rate and Rhythm: Normal rate and regular rhythm  Pulses: Normal pulses  Heart sounds: Normal heart sounds  No murmur heard  Pulmonary:      Effort: Pulmonary effort is normal  No respiratory distress  Breath sounds: Normal breath sounds  No wheezing, rhonchi or rales  Abdominal:      General: Abdomen is flat  Bowel sounds are normal  There is no distension  Palpations: Abdomen is soft  Tenderness: There is no abdominal tenderness  Musculoskeletal:      Right lower leg: No edema  Left lower leg: No edema  Neurological:      Mental Status: She is alert and oriented to person, place, and time  Psychiatric:         Mood and Affect: Mood normal          Behavior: Behavior normal          Thought Content: Thought content normal        LABORATORY DATA     Labs: I have personally reviewed pertinent reports  Results from last 7 days   Lab Units 09/27/22 0439 09/26/22  0932 09/25/22  0843 09/23/22  1904 09/23/22  1448   WBC Thousand/uL 4 37 3 21* 3 90*   < > 5 41   HEMOGLOBIN g/dL 7 3* 7 8* 7 6*   < > 8 6*   HEMATOCRIT % 23 9* 25 4* 25 1*   < > 28 1*   PLATELETS Thousands/uL 103* 91* 104*   < > 127*   NEUTROS PCT % 65 67  --   --  78*   MONOS PCT % 9 6  --   --  4    < > = values in this interval not displayed  Results from last 7 days   Lab Units 09/27/22 0439 09/26/22  0932 09/25/22  0843 09/24/22  0426 09/23/22  1448   POTASSIUM mmol/L 4 6 4 8 4 7   < > 5 4*   CHLORIDE mmol/L 105 106 108   < > 111*   CO2 mmol/L 22 23 24   < > 20*   BUN mg/dL 88* 78* 74*   < > 67*   CREATININE mg/dL 3 73* 3 64* 3 52*   < > 2 87*   CALCIUM mg/dL 8 8 8 2* 8 6   < > 9 3   ALK PHOS U/L  --   --   --   --  393*   ALT U/L  --   --   --   --  49   AST U/L  --   --   --   --  37    < > = values in this interval not displayed                              IMAGING & DIAGNOSTIC TESTING     Radiology Results: I have personally reviewed pertinent reports  XR chest portable    Result Date: 9/24/2022  Impression: Congestive heart failure  Workstation performed: [id number]     Other Diagnostic Testing: I have personally reviewed pertinent reports      ACTIVE MEDICATIONS     Current Facility-Administered Medications   Medication Dose Route Frequency    acetaminophen (TYLENOL) tablet 650 mg  650 mg Oral Q6H PRN    aspirin chewable tablet 81 mg  81 mg Oral Daily    atorvastatin (LIPITOR) tablet 80 mg  80 mg Oral Daily    bumetanide (BUMEX) tablet 2 mg  2 mg Oral BID (diuretic)    carvedilol (COREG) tablet 12 5 mg  12 5 mg Oral BID With Meals    cholecalciferol (VITAMIN D3) tablet 2,000 Units  2,000 Units Oral Daily    docusate sodium (COLACE) capsule 100 mg  100 mg Oral BID    epoetin doug (EPOGEN,PROCRIT) injection 6,000 Units  6,000 Units Subcutaneous Once    heparin (porcine) subcutaneous injection 5,000 Units  5,000 Units Subcutaneous Q8H [facility]    hydrALAZINE (APRESOLINE) injection 10 mg  10 mg Intravenous Q6H PRN    hydrALAZINE (APRESOLINE) tablet 100 mg  100 mg Oral TID    insulin aspart protamine-insulin aspart (NovoLOG 70/30) 100 units/mL subcutaneous injection 10 Units  10 Units Subcutaneous Daily before evening meal    [START ON 9/28/2022] insulin aspart protamine-insulin aspart (NovoLOG 70/30) 100 units/mL subcutaneous injection 13 Units  13 Units Subcutaneous Daily With Breakfast    insulin lispro (HumaLOG) 100 units/mL subcutaneous injection 1-5 Units  1-5 Units Subcutaneous TID AC    insulin lispro (HumaLOG) 100 units/mL subcutaneous injection 1-5 Units  1-5 Units Subcutaneous HS    isosorbide mononitrate (IMDUR) 24 hr tablet 120 mg  120 mg Oral Daily    labetalol (NORMODYNE) injection 10 mg  10 mg Intravenous Q4H PRN    levothyroxine tablet 125 mcg  125 mcg Oral Early Morning    ondansetron (ZOFRAN) injection 4 mg  4 mg Intravenous Q6H PRN    polyethylene glycol (MIRALAX) packet 17 g  17 g Oral Daily PRN    senna (SENOKOT) tablet 8 6 mg  1 tablet Oral Daily       VTE Pharmacologic Prophylaxis: Heparin  VTE Mechanical Prophylaxis: sequential compression device    Portions of the record may have been created with voice recognition software  Occasional wrong word or "sound a like" substitutions may have occurred due to the inherent limitations of voice recognition software    Read the chart carefully and recognize, using context, where substitutions have occurred   ==  Immanuel Barrera, DO  [address]  Internal Medicine Residency PGY-2

## 2022-09-27 NOTE — ASSESSMENT & PLAN NOTE
· On coreg, hydralazine, Norvasc and Imdur as outpatient  Patient reported that she took her medications in the morning  · Hydralazine was added during the recent hospital  · Blood pressures fluctuate  · Hydralazine decreased to 75 mg TID due to patient's symptoms that developed when uptitrated to 100 mg  Amlodipine 5 mg BID to be continued for patient  Continue to monitor blood pressure

## 2022-09-27 NOTE — ASSESSMENT & PLAN NOTE
· Hgb remains stable today   Likely secondary to CKD  · Monitor hemoglobin  · Epo as outpatient per nephrology

## 2022-09-27 NOTE — ASSESSMENT & PLAN NOTE
· Patient came to the hospital with shortness of Breath  Mainly on exertion  Patient's family provided some of the history reported that shortness of breath is gradually worsening  By the time she walks from her room to the bathroom she gets short of breath    Shortness of breath due to worsening HF with relatively preserved EF  Evidenced by BNP 21,757, CXR with pulmonary vascular congestion with small right pleural effusion  Inferior regional wall abnormality now more pronounced  Possibly due to accelerated HTN HTN as MAPs very high on previous admit and heart did not reverse remodel with drop in EF so may be due to high afterload and filling pressures requiring HF consult, hydralazine and Bumex for pressure and volume control  · Also gives history of orthopnea  · No weight gain or lower extremity edema  · Patient reported that she has been compliant with salt and water restriction  · Has been compliant with diuretics  · Obtain chest x-ray  Troponin negative so far  · Patient was given 1 time dose of Lasix  BNP elevated about 21,000  · Patient is transitioned to oral Bumex on 09/27 per cardiology  · Per nephrology can consider addition of metolazone  · Continue with diuresis  · Consult Cardiology  No cardiac catheterization for now  Continue monitor blood pressure  · Recent echocardiogram showed ejection fraction 40% with grade 2 diastolic dysfunction    Patient was stable for discharge on 09/28 with blood pressure regimen including hydralazine 100 mg t i d  in amlodipine 5 mg b i d, along with Coreg and Imdur  On morning of 09/28, patient developed vague symptoms including dizziness, headache, and mild abdominal pain  After speaking to both patient and patient's daughter, whenever patient takes 100 mg dosing of hydralazine she starts to develop the symptoms, even though her blood pressure still remains slightly elevated on this dose    In further conversation, amlodipine has caused some lower extremity edema for patient in the past   Decreased hydralazine dose to 75 mg t i d  on 09/28  On morning of 09/29, patient's symptoms had completely resolved  I spoke to both cardiology and nephrology team concerning blood pressure  Patient is to be discharged on 75 mg t i d  as she is agreeable to, and patient will also continue amlodipine 5 mg b i d   Patient to follow-up with both Cardiology and Nephrology  BMP and CBC ordered to be completed next week

## 2022-09-27 NOTE — PROGRESS NOTES
NEPHROLOGY PROGRESS NOTE   Pee Doll 75 y o  female MRN: [id number]  Unit/Bed#: [facility] 405-01 Encounter: [id number]        ASSESSMENT & PLAN    19-year-old female with a history of stage 4 chronic kidney disease, type 2 diabetes, coronary artery disease status post stent presents with shortness of breath    1  KELLY on stage 4 chronic kidney disease  o She has had multiple episodes of acute kidney injury  o A baseline creatinine has been anywhere from 2 8-3 3  o She consistently does have episodes of volume overload  o Her estimated GFR is likely closer to 15 mL/minute  o We have discussed renal replacement therapy extensively in the past we discussed somewhat again today  I have discuss this as well with her daughter and have had family meeting  He has expressed that she would want maximal conservative measures would not want dialysis  She states however she may change her mind at this point will continue maximal conservative management     2  Ischemic cardiomyopathy with volume overload in the setting of chronic kidney disease  o Her weight was slightly up to 69 5 kg 68 8 kg  o She is on Bumex 2 mg IV x2  o She is making urine about 1 2 L thus far today  o Okay to up titrate dose as needed with close monitoring of renal function  o Can consider adding metolazone    3  HTN  o Her blood pressures remain elevated in the 236 systolic range  o She is on hydralazine 100 mg 3 times daily  o Imdur 120 mg daily  o Carvedilol 12 5 mg 2 times daily  o Bumex 2 mg IV 2 times daily     4  Anemia in the setting of chronic kidney disease  o Ferritin was 108  o Iron saturation was 20%  o No episodes of DVT no episodes of stroke  o Will give Epogen 6000 units x 1 as hemoglobins are in the 7    5  CKD-BMD  o Will add a phosphorus    6    Type 2 diabetes mellitus  o Moderate glycemic control    SUBJECTIVE:    Patient was seen today  Resting comfortably  Shortness of breath improved    12 point review of systems was otherwise negative besides what is mentioned above      Medications:    Current Facility-Administered Medications:     acetaminophen (TYLENOL) tablet 650 mg, 650 mg, Oral, Q6H PRN, Debbie Engle MD    aspirin chewable tablet 81 mg, 81 mg, Oral, Daily, Debbie Engle MD, 81 mg at 09/27/22 0807    atorvastatin (LIPITOR) tablet 80 mg, 80 mg, Oral, Daily, Debbie Engle MD, 80 mg at 09/27/22 0806    bumetanide (BUMEX) injection 2 mg, 2 mg, Intravenous, BID, Debbei Engle MD, 2 mg at 09/27/22 0808    carvedilol (COREG) tablet 12 5 mg, 12 5 mg, Oral, BID With Meals, Debbie Engle MD, 12 5 mg at 09/27/22 1938    cholecalciferol (VITAMIN D3) tablet 2,000 Units, 2,000 Units, Oral, Daily, Debbie Engle MD, 2,000 Units at 09/27/22 0806    docusate sodium (COLACE) capsule 100 mg, 100 mg, Oral, BID, Debbie Engle MD, 100 mg at 09/27/22 0764    heparin (porcine) subcutaneous injection 5,000 Units, 5,000 Units, Subcutaneous, Q8H [address], 5,000 Units at 09/27/22 0641 **AND** [COMPLETED] Platelet count, , , Once, Debbie Engle MD    hydrALAZINE (APRESOLINE) injection 10 mg, 10 mg, Intravenous, Q6H PRN, Debbie Engle MD, 10 mg at 09/27/22 0354    hydrALAZINE (APRESOLINE) tablet 100 mg, 100 mg, Oral, TID, Otto Bruce MD    insulin aspart protamine-insulin aspart (NovoLOG 70/30) 100 units/mL subcutaneous injection 10 Units, 10 Units, Subcutaneous, BID AC, Debbie Engle MD, 10 Units at 09/27/22 0641    insulin lispro (HumaLOG) 100 units/mL subcutaneous injection 1-5 Units, 1-5 Units, Subcutaneous, TID AC, 3 Units at 09/27/22 1125 **AND** Fingerstick Glucose (POCT), , , TID ACDebbie MD    insulin lispro (HumaLOG) 100 units/mL subcutaneous injection 1-5 Units, 1-5 Units, Subcutaneous, HS, Debbie Engle MD, 3 Units at 09/26/22 2130    isosorbide mononitrate (IMDUR) 24 hr tablet 120 mg, 120 mg, Oral, Daily, Debbie Engle MD, 120 mg at 09/27/22 0807    labetalol (NORMODYNE) injection 10 mg, 10 mg, Intravenous, Q4H PRN, Valente Mckeon MD    levothyroxine tablet 125 mcg, 125 mcg, Oral, Early Morning, Valente Mckeon MD, 125 mcg at 09/27/22 0641    ondansetron [facility]) injection 4 mg, 4 mg, Intravenous, Q6H PRN, Valente Mckeon MD    polyethylene glycol [facility]) packet 17 g, 17 g, Oral, Daily PRN, Valente Mckeon MD    [facility]) tablet 8 6 mg, 1 tablet, Oral, Daily, Valente Mckeon MD, 8 6 mg at 09/27/22 0807    OBJECTIVE:    Vitals:    09/27/22 0640 09/27/22 0722 09/27/22 0806 09/27/22 1116   BP: 145/60 137/53  134/53   Pulse: 60 59  62   Resp:  17     Temp:  98 °F (36 7 °C)     TempSrc:       SpO2: 97% 96% 97% 98%   Weight:       Height:            Temp:  [97 9 °F (36 6 °C)-98 1 °F (36 7 °C)] 98 °F (36 7 °C)  HR:  [54-73] 62  Resp:  [17-18] 17  BP: (128-167)/(53-65) 134/53  SpO2:  [96 %-99 %] 98 %     Body mass index is 28 02 kg/m²  Weight (last 2 days)     Date/Time Weight    09/27/22 0604 69 5 (153 22)    09/26/22 0600 68 8 (151 7)    09/25/22 0600 68 7 (151 5)          I/O last 3 completed shifts: In: 598 [P O :598]  Out: 1300 [Urine:1300]    I/O this shift:   In: 440 [P O :440]  Out: 1200 [Urine:1200]      Physical exam:    General: no acute distress, cooperative  Eyes: conjunctivae pink, anicteric sclerae  ENT: lips and mucous membranes moist, no exudates, normal external ears  Neck: ROM intact, no JVD  Chest: No respiratory distress, no accessory muscle use  CVS: normal rate, non pericardial friction rub  Abdomen: soft, non-tender, non-distended, normoactive bowel sounds  Extremities: no edema of both legs  Skin: no rash  Neuro: awake, alert, oriented, grossly intact  Psych:  Pleasant affect    Invasive Devices:      Lab Results:   Results from last 7 days   Lab Units 09/27/22  0439 09/26/22  0932 09/25/22  0843 09/24/22  0426 09/23/22  1904 09/23/22  1448   WBC Thousand/uL 4 37 3 21* 3 90* 5 55  --  5 41   HEMOGLOBIN g/dL 7 3* 7 8* 7 6* 8 1*  --  8 6*   HEMATOCRIT % 23 9* 25 4* 25 1* 26 8*  --  28 1* PLATELETS Thousands/uL 103* 91* 104* 125* 161 127*   POTASSIUM mmol/L 4 6 4 8 4 7 4 8  --  5 4*   CHLORIDE mmol/L 105 106 108 111*  --  111*   CO2 mmol/L 22 23 24 24  --  20*   BUN mg/dL 88* 78* 74* 70*  --  67*   CREATININE mg/dL 3 73* 3 64* 3 52* 3 00*  --  2 87*   CALCIUM mg/dL 8 8 8 2* 8 6 9 0  --  9 3   ALK PHOS U/L  --   --   --   --   --  393*   ALT U/L  --   --   --   --   --  49   AST U/L  --   --   --   --   --  37         Portions of the record may have been created with voice recognition software  Occasional wrong word or "sound a like" substitutions may have occurred due to the inherent limitations of voice recognition software  Read the chart carefully and recognize, using context, where substitutions have occurred  If you have any questions, please contact the dictating provider

## 2022-09-27 NOTE — UTILIZATION REVIEW
Inpatient Admission Authorization Request   NOTIFICATION OF INPATIENT ADMISSION/INPATIENT AUTHORIZATION REQUEST   SERVICING FACILITY:   [facility]  Address: [address] [place] [address], [address] [place] [address]  Tax ID: [id number]  NPI: [id number]  Place of Service: Inpatient [address] [facility] Code: 24     ATTENDING PROVIDER:  Attending Name and NPI#: Diamante Velez, Siria Newman [[id number]]  Address: [address] [place] [address], [address] [place] [address]  Phone: [phone]     UTILIZATION REVIEW CONTACT:  Malissa Lofton Utilization   Network Utilization Review Department  Phone: [phone]  Fax: [phone]  Email: [email or web address]     PHYSICIAN ADVISORY SERVICES:  FOR MRIO-GY-PFZB REVIEW - MEDICAL NECESSITY DENIAL  Phone: [phone]  Fax: [phone]  Email: [email or web address]     TYPE OF REQUEST:  Inpatient Status     ADMISSION INFORMATION:  ADMISSION DATE/TIME: 9/23/22  4:12 PM  PATIENT DIAGNOSIS CODE/DESCRIPTION:  Rapid heart rate [R00 0]  SOB (shortness of breath) [R06 02]  Acute on chronic congestive heart failure, unspecified heart failure type ([facility] [address] ) [I50 9]  DISCHARGE DATE/TIME: No discharge date for patient encounter  IMPORTANT INFORMATION:  Please contact Malissa Lofton directly with any questions or concerns regarding this request  Department voicemails are confidential     Send requests for admission clinical reviews, concurrent reviews, approvals, and administrative denials due to lack of clinical to fax [phone]

## 2022-09-27 NOTE — ASSESSMENT & PLAN NOTE
Lab Results   Component Value Date    HGBA1C 8 2 (H) 09/16/2022       Recent Labs     09/26/22  1526 09/26/22  2056 09/27/22  0600 09/27/22  1044   POCGLU 230* 301* 124 346*       Blood Sugar Average: Last 72 hrs:  (P) 116 [id number] patient is on 10 units b i d NovoLog 70 30  Will increase AM 70/30 insulin to 13 units, continue 10 units in PM  Will add sliding scale of insulin  Last hemoglobin A1c is 8 2 showing uncontrolled diabetes mellitus

## 2022-09-27 NOTE — ASSESSMENT & PLAN NOTE
Lab Results   Component Value Date    EGFR 11 09/27/2022    EGFR 12 09/26/2022    EGFR 12 09/25/2022    CREATININE 3 73 (H) 09/27/2022    CREATININE 3 64 (H) 09/26/2022    CREATININE 3 52 (H) 09/25/2022   Creatinine elevated though stable  Followed by Nephrology as outpatient  Nephrology consulted, recommendations are appreciated  Patient refusing any form of renal replacement therapy at this time

## 2022-09-27 NOTE — CASE MANAGEMENT
Case Management Discharge Planning Note    Patient name Matthew Jaramillo  [facility]N [id number]  : 1955 Date 2022       Current Admission Date: 2022  Current Admission Diagnosis:Shortness of breath   Patient Active Problem List    Diagnosis Date Noted    Shortness of breath 2022    Ischemic cardiomyopathy 2022    Type 2 diabetes mellitus, with long-term current use of insulin ([facility] [address] ) 2022    Stenosis of both internal carotid arteries 2022    COVID-19 2022    Nocturnal hypoxia 10/30/2021    Abnormal finding on imaging of liver 10/18/2021    Acute kidney injury superimposed on chronic kidney disease ([facility] [address] )     Pericardial effusion 2021    Stage 4 chronic kidney disease ([facility] [address] ) 2021    Hypoalbuminemia 2021    Hyperphosphatemia 2021    Microalbuminuria due to type 2 diabetes mellitus ([facility] [address] ) 2020    Hyperlipidemia 2020    Elevated d-dimer 2020    History of anemia due to chronic kidney disease 2019    Vitamin D deficiency 2019    Proteinuria 2019    Coronary artery disease 2019    Anemia 2018    Chronic diastolic heart failure ([address] ) 2018    Hypothyroidism 2018    Hypertension 2018    Acute renal failure superimposed on stage 4 chronic kidney disease ([facility] [address] ) 2018      LOS (days): 4  Geometric Mean LOS (GMLOS) (days):   Days to GMLOS:     OBJECTIVE:  Risk of Unplanned Readmission Score: 21 71         Current admission status: Inpatient   Preferred Pharmacy:   [facility], [address] [address]  Phone: [phone] Fax: [phone]    Primary Care Provider: Marc Saez MD    Primary Insurance: [facility]  Secondary Insurance: [id number]    DISCHARGE DETAILS: Additional Comments: Continue to follow, Bumex IV bid, creatinine countinues to increase to 3 73  Holding off on cardiac cath until stable

## 2022-09-28 LAB
ANION GAP SERPL CALCULATED.3IONS-SCNC: 8 MMOL/L (ref 4–13)
BASOPHILS # BLD AUTO: 0.03 THOUSANDS/ΜL (ref 0–0.1)
BASOPHILS NFR BLD AUTO: 1 % (ref 0–1)
BUN SERPL-MCNC: 97 MG/DL (ref 5–25)
CALCIUM SERPL-MCNC: 9.2 MG/DL (ref 8.3–10.1)
CHLORIDE SERPL-SCNC: 105 MMOL/L (ref 96–108)
CO2 SERPL-SCNC: 25 MMOL/L (ref 21–32)
CREAT SERPL-MCNC: 3.69 MG/DL (ref 0.6–1.3)
EOSINOPHIL # BLD AUTO: 0.11 THOUSAND/ΜL (ref 0–0.61)
EOSINOPHIL NFR BLD AUTO: 3 % (ref 0–6)
ERYTHROCYTE [DISTWIDTH] IN BLOOD BY AUTOMATED COUNT: 13.2 % (ref 11.6–15.1)
GFR SERPL CREATININE-BSD FRML MDRD: 11 ML/MIN/1.73SQ M
GLUCOSE SERPL-MCNC: 175 MG/DL (ref 65–140)
GLUCOSE SERPL-MCNC: 184 MG/DL (ref 65–140)
GLUCOSE SERPL-MCNC: 194 MG/DL (ref 65–140)
GLUCOSE SERPL-MCNC: 252 MG/DL (ref 65–140)
GLUCOSE SERPL-MCNC: 93 MG/DL (ref 65–140)
GLUCOSE SERPL-MCNC: 99 MG/DL (ref 65–140)
HCT VFR BLD AUTO: 25.4 % (ref 34.8–46.1)
HGB BLD-MCNC: 7.5 G/DL (ref 11.5–15.4)
IMM GRANULOCYTES # BLD AUTO: 0.01 THOUSAND/UL (ref 0–0.2)
IMM GRANULOCYTES NFR BLD AUTO: 0 % (ref 0–2)
LYMPHOCYTES # BLD AUTO: 1.04 THOUSANDS/ΜL (ref 0.6–4.47)
LYMPHOCYTES NFR BLD AUTO: 25 % (ref 14–44)
MCH RBC QN AUTO: 26.9 PG (ref 26.8–34.3)
MCHC RBC AUTO-ENTMCNC: 29.5 G/DL (ref 31.4–37.4)
MCV RBC AUTO: 91 FL (ref 82–98)
MONOCYTES # BLD AUTO: 0.32 THOUSAND/ΜL (ref 0.17–1.22)
MONOCYTES NFR BLD AUTO: 8 % (ref 4–12)
NEUTROPHILS # BLD AUTO: 2.74 THOUSANDS/ΜL (ref 1.85–7.62)
NEUTS SEG NFR BLD AUTO: 63 % (ref 43–75)
NRBC BLD AUTO-RTO: 0 /100 WBCS
PLATELET # BLD AUTO: 102 THOUSANDS/UL (ref 149–390)
PMV BLD AUTO: 11.5 FL (ref 8.9–12.7)
POTASSIUM SERPL-SCNC: 4.5 MMOL/L (ref 3.5–5.3)
RBC # BLD AUTO: 2.79 MILLION/UL (ref 3.81–5.12)
SODIUM SERPL-SCNC: 138 MMOL/L (ref 135–147)
WBC # BLD AUTO: 4.25 THOUSAND/UL (ref 4.31–10.16)

## 2022-09-28 PROCEDURE — 99233 SBSQ HOSP IP/OBS HIGH 50: CPT | Performed by: INTERNAL MEDICINE

## 2022-09-28 PROCEDURE — 85025 COMPLETE CBC W/AUTO DIFF WBC: CPT | Performed by: STUDENT IN AN ORGANIZED HEALTH CARE EDUCATION/TRAINING PROGRAM

## 2022-09-28 PROCEDURE — 97166 OT EVAL MOD COMPLEX 45 MIN: CPT

## 2022-09-28 PROCEDURE — 80048 BASIC METABOLIC PNL TOTAL CA: CPT | Performed by: STUDENT IN AN ORGANIZED HEALTH CARE EDUCATION/TRAINING PROGRAM

## 2022-09-28 PROCEDURE — 99232 SBSQ HOSP IP/OBS MODERATE 35: CPT | Performed by: HOSPITALIST

## 2022-09-28 PROCEDURE — 99232 SBSQ HOSP IP/OBS MODERATE 35: CPT | Performed by: INTERNAL MEDICINE

## 2022-09-28 PROCEDURE — 97162 PT EVAL MOD COMPLEX 30 MIN: CPT

## 2022-09-28 PROCEDURE — 97110 THERAPEUTIC EXERCISES: CPT

## 2022-09-28 PROCEDURE — 82948 REAGENT STRIP/BLOOD GLUCOSE: CPT

## 2022-09-28 RX ORDER — AMLODIPINE BESYLATE 5 MG/1
5 TABLET ORAL 2 TIMES DAILY
Status: DISCONTINUED | OUTPATIENT
Start: 2022-09-28 | End: 2022-09-29 | Stop reason: HOSPADM

## 2022-09-28 RX ADMIN — CARVEDILOL 12.5 MG: 12.5 TABLET, FILM COATED ORAL at 07:53

## 2022-09-28 RX ADMIN — HEPARIN SODIUM 5000 UNITS: 5000 INJECTION INTRAVENOUS; SUBCUTANEOUS at 14:00

## 2022-09-28 RX ADMIN — HYDRALAZINE HYDROCHLORIDE 75 MG: 50 TABLET, FILM COATED ORAL at 21:08

## 2022-09-28 RX ADMIN — INSULIN LISPRO 1 UNITS: 100 INJECTION, SOLUTION INTRAVENOUS; SUBCUTANEOUS at 17:54

## 2022-09-28 RX ADMIN — INSULIN ASPART 10 UNITS: 100 INJECTION, SUSPENSION SUBCUTANEOUS at 17:53

## 2022-09-28 RX ADMIN — CARVEDILOL 12.5 MG: 12.5 TABLET, FILM COATED ORAL at 17:55

## 2022-09-28 RX ADMIN — AMLODIPINE BESYLATE 5 MG: 5 TABLET ORAL at 21:08

## 2022-09-28 RX ADMIN — HYDRALAZINE HYDROCHLORIDE 75 MG: 50 TABLET, FILM COATED ORAL at 17:54

## 2022-09-28 RX ADMIN — HEPARIN SODIUM 5000 UNITS: 5000 INJECTION INTRAVENOUS; SUBCUTANEOUS at 05:22

## 2022-09-28 RX ADMIN — ASPIRIN 81 MG CHEWABLE TABLET 81 MG: 81 TABLET CHEWABLE at 10:14

## 2022-09-28 RX ADMIN — INSULIN ASPART 13 UNITS: 100 INJECTION, SUSPENSION SUBCUTANEOUS at 07:53

## 2022-09-28 RX ADMIN — LEVOTHYROXINE SODIUM 125 MCG: 125 TABLET ORAL at 05:22

## 2022-09-28 RX ADMIN — BUMETANIDE 2 MG: 2 TABLET ORAL at 17:55

## 2022-09-28 RX ADMIN — HYDRALAZINE HYDROCHLORIDE 100 MG: 50 TABLET, FILM COATED ORAL at 10:15

## 2022-09-28 RX ADMIN — ATORVASTATIN CALCIUM 80 MG: 80 TABLET, FILM COATED ORAL at 10:15

## 2022-09-28 RX ADMIN — BUMETANIDE 2 MG: 2 TABLET ORAL at 10:14

## 2022-09-28 RX ADMIN — ISOSORBIDE MONONITRATE 120 MG: 60 TABLET, EXTENDED RELEASE ORAL at 10:14

## 2022-09-28 RX ADMIN — Medication 2000 UNITS: at 10:14

## 2022-09-28 RX ADMIN — INSULIN LISPRO 1 UNITS: 100 INJECTION, SOLUTION INTRAVENOUS; SUBCUTANEOUS at 21:08

## 2022-09-28 RX ADMIN — HEPARIN SODIUM 5000 UNITS: 5000 INJECTION INTRAVENOUS; SUBCUTANEOUS at 21:08

## 2022-09-28 RX ADMIN — INSULIN LISPRO 1 UNITS: 100 INJECTION, SOLUTION INTRAVENOUS; SUBCUTANEOUS at 11:54

## 2022-09-28 NOTE — PROGRESS NOTES
SLIM PROGRESS NOTE     Name: Ed Mascorro   Age & Sex: 75 y o  female   MRN: [id number]  Unit/Bed#: [facility] 405-01   Encounter: [id number]  Team: [place]    PATIENT INFORMATION     Name: Ed Mascorro   Age & Sex: 75 y o  female   MRN: [id number]  Hospital Stay Days: 5    ASSESSMENT/PLAN     Principal Problem:    Shortness of breath  Active Problems:    Hypothyroidism    Hypertension    Anemia    Coronary artery disease    Elevated d-dimer    Stage 4 chronic kidney disease ([facility] [address] )    Hyperlipidemia    Type 2 diabetes mellitus, with long-term current use of insulin ([facility])    Ischemic cardiomyopathy      * Shortness of breath  Assessment & Plan  · Patient came to the hospital with shortness of Breath  Mainly on exertion  Patient's family provided some of the history reported that shortness of breath is gradually worsening  By the time she walks from her room to the bathroom she gets short of breath    Shortness of breath due to worsening HF with relatively preserved EF  Evidenced by BNP 21,757, CXR with pulmonary vascular congestion with small right pleural effusion  Inferior regional wall abnormality now more pronounced  Possibly due to accelerated HTN HTN as MAPs very high on previous admit and heart did not reverse remodel with drop in EF so may be due to high afterload and filling pressures requiring HF consult, hydralazine and Bumex for pressure and volume control  · Also gives history of orthopnea  · No weight gain or lower extremity edema  · Patient reported that she has been compliant with salt and water restriction  · Has been complaint with diuretics  · Obtain chest x-ray  Troponin negative so far  · Patient was given 1 time dose of Lasix  BNP elevated about 21,000  · Patient is transitioned to oral Bumex on 09/27 per cardiology  · Per nephrology can consider addition of metolazone  · Continue with diuresis  · Consult Cardiology  No cardiac catheterization for now    Continue monitor blood pressure  · Recent echocardiogram showed ejection fraction 40% with grade 2 diastolic dysfunction    Ischemic cardiomyopathy  Assessment & Plan  · Patient with underlying coronary artery disease  Recent echocardiogram showed left ventricle ejection fraction 40% with regional wall motion abnormalities  · Patient with known diagnosis of coronary artery disease  Patient also have grade 2 diastolic dysfunction  · Medical management  · Continue aspirin statin and beta-blocker and Imdur    Type 2 diabetes mellitus, with long-term current use of insulin [facility])  Assessment & Plan  Lab Results   Component Value Date    HGBA1C 8 2 (H) 09/16/2022       Recent Labs     09/26/22  1526 09/26/22  2056 09/27/22  0600 09/27/22  1044   POCGLU 230* 301* 124 346*       Blood Sugar Average: Last 72 hrs:  (P) 043 [id number] patient is on 10 units b i d NovoLog 70 30  Will increase AM 70/30 insulin to 13 units, continue 10 units in PM  Will add sliding scale of insulin  Last hemoglobin A1c is 8 2 showing uncontrolled diabetes mellitus    Hyperlipidemia  Assessment & Plan  · Continue with statin    Stage 4 chronic kidney disease [facility])  Assessment & Plan  Lab Results   Component Value Date    EGFR 11 09/27/2022    EGFR 12 09/26/2022    EGFR 12 09/25/2022    CREATININE 3 73 (H) 09/27/2022    CREATININE 3 64 (H) 09/26/2022    CREATININE 3 52 (H) 09/25/2022   Creatinine elevated from baseline at 3 73 which is at baseline  Followed by Nephrology as outpatient  Nephrology consulted, recommendations are appreciated    Elevated d-dimer  Assessment & Plan  · Patient with elevated D-dimer by reviewing the records it appears to be chronic  · Patient also with CKD stage 4  · Will obtain Lower extremity Doppler      Coronary artery disease  Assessment & Plan  · Status post LUDY x 2 to LAD in 2019   Noted to have 80% stenosed RCA at that time  · Cardiology evaluated the patient during the recent hospital stay due to lack of ischemic skin times it was decided that the patient will be managed medically  · Continue with beta-blocker statin aspirin  · Continue with Imdur    Anemia  Assessment & Plan  · Hgb 7 3 today  Likely secondary to CKD  · Monitor hemoglobin  · Epo per nephrology    Hypertension  Assessment & Plan  · On coreg, hydralazine, Norvasc and Imdur as outpatient  Patient reported that she took her medications in the morning  · Hydralazine was added during the recent hospital  · Blood pressures fluctuate  · Hydralazine uptitrated to 100 mg TID  Amlodipine 5 mg BID also started for patient  Continue to monitor blood pressure  Hypothyroidism  Assessment & Plan  · Continue Synthroid      Disposition: Anticipate discharge within 24-48 hours  SUBJECTIVE     Patient seen and examined  No acute events overnight  Upon my encounter patient was lying comfortably in hospital bed  Presently denies any fever, chills, nausea, vomiting, diarrhea, constipation, chest pain, shortness a breath  I called and spoke to patient's daughter, Leif oRse, via telephone  Leif Rose informed me that patient was feeling more dizziness, headache, and abdominal pain today  Per daughter, the symptoms occurred prior when patient was on hydralazine 100 mg t i d   At conversation with patient's daughter regarding blood pressure management  Agreed to continue to monitor patient overnight for improvement in symptoms and to further adjust blood pressure medications as needed  OBJECTIVE     Vitals:    22 0720 22 1401 22 1401 22 1505   BP: 159/63 121/51 121/51 120/50   Pulse: 70  (!) 50 (!) 52   Resp: 16      Temp: 97 8 °F (36 6 °C)      TempSrc:       SpO2: 99%  98% 97%   Weight:       Height:          Temperature:   Temp (24hrs), Av °F (36 7 °C), Min:97 8 °F (36 6 °C), Max:98 2 °F (36 8 °C)    Temperature: 97 8 °F (36 6 °C)  Intake & Output:  I/O           P  O  598 560 Total Intake(mL/kg) 598 (8 6) 560 (8 1)     Urine (mL/kg/hr) 500 (0 3) 2625 (1 6)     Total Output 500 2625     Net +98 -2065                Weights:        Body mass index is 28 02 kg/m²  Weight (last 2 days)     Date/Time Weight    09/27/22 0604 69 5 (153 22)    09/26/22 0600 68 8 (151 7)        Physical Exam  Constitutional:       General: She is not in acute distress  Appearance: Normal appearance  She is not ill-appearing  Cardiovascular:      Rate and Rhythm: Normal rate and regular rhythm  Pulses: Normal pulses  Heart sounds: Normal heart sounds  No murmur heard  Pulmonary:      Effort: Pulmonary effort is normal  No respiratory distress  Breath sounds: Normal breath sounds  No wheezing, rhonchi or rales  Abdominal:      General: Abdomen is flat  Bowel sounds are normal  There is no distension  Palpations: Abdomen is soft  Tenderness: There is no abdominal tenderness  Musculoskeletal:      Right lower leg: No edema  Left lower leg: No edema  Neurological:      Mental Status: She is alert and oriented to person, place, and time  Psychiatric:         Mood and Affect: Mood normal          Behavior: Behavior normal          Thought Content: Thought content normal        LABORATORY DATA     Labs: I have personally reviewed pertinent reports    Results from last 7 days   Lab Units 09/28/22  0440 09/27/22  0439 09/26/22  0932   WBC Thousand/uL 4 25* 4 37 3 21*   HEMOGLOBIN g/dL 7 5* 7 3* 7 8*   HEMATOCRIT % 25 4* 23 9* 25 4*   PLATELETS Thousands/uL 102* 103* 91*   NEUTROS PCT % 63 65 67   MONOS PCT % 8 9 6      Results from last 7 days   Lab Units 09/28/22  0440 09/27/22  0439 09/26/22  0932 09/24/22  0426 09/23/22  1448   POTASSIUM mmol/L 4 5 4 6 4 8   < > 5 4*   CHLORIDE mmol/L 105 105 106   < > 111*   CO2 mmol/L 25 22 23   < > 20*   BUN mg/dL 97* 88* 78*   < > 67*   CREATININE mg/dL 3 69* 3 73* 3 64*   < > 2 87*   CALCIUM mg/dL 9 2 8 8 8 2*   < > 9 3   ALK PHOS U/L --   --   --   --  393*   ALT U/L  --   --   --   --  49   AST U/L  --   --   --   --  37    < > = values in this interval not displayed  Results from last 7 days   Lab Units 09/27/22  0439   PHOSPHORUS mg/dL 4 1                    IMAGING & DIAGNOSTIC TESTING     Radiology Results: I have personally reviewed pertinent reports  XR chest portable    Result Date: 9/24/2022  Impression: Congestive heart failure  Workstation performed: [id number]     Other Diagnostic Testing: I have personally reviewed pertinent reports      ACTIVE MEDICATIONS     Current Facility-Administered Medications   Medication Dose Route Frequency    acetaminophen (TYLENOL) tablet 650 mg  650 mg Oral Q6H PRN    amLODIPine (NORVASC) tablet 5 mg  5 mg Oral BID    aspirin chewable tablet 81 mg  81 mg Oral Daily    atorvastatin (LIPITOR) tablet 80 mg  80 mg Oral Daily    bumetanide (BUMEX) tablet 2 mg  2 mg Oral BID (diuretic)    carvedilol (COREG) tablet 12 5 mg  12 5 mg Oral BID With Meals    cholecalciferol (VITAMIN D3) tablet 2,000 Units  2,000 Units Oral Daily    docusate sodium (COLACE) capsule 100 mg  100 mg Oral BID    heparin (porcine) subcutaneous injection 5,000 Units  5,000 Units Subcutaneous Q8H [address]    hydrALAZINE (APRESOLINE) injection 10 mg  10 mg Intravenous Q6H PRN    hydrALAZINE (APRESOLINE) tablet 100 mg  100 mg Oral TID    insulin aspart protamine-insulin aspart (NovoLOG 70/30) 100 units/mL subcutaneous injection 10 Units  10 Units Subcutaneous Daily before evening meal    insulin aspart protamine-insulin aspart (NovoLOG 70/30) 100 units/mL subcutaneous injection 13 Units  13 Units Subcutaneous Daily With Breakfast    insulin lispro (HumaLOG) 100 units/mL subcutaneous injection 1-5 Units  1-5 Units Subcutaneous TID AC    insulin lispro (HumaLOG) 100 units/mL subcutaneous injection 1-5 Units  1-5 Units Subcutaneous HS    isosorbide mononitrate (IMDUR) 24 hr tablet 120 mg  120 mg Oral Daily    labetalol (NORMODYNE) injection 10 mg  10 mg Intravenous Q4H PRN    levothyroxine tablet 125 mcg  125 mcg Oral Early Morning    ondansetron (ZOFRAN) injection 4 mg  4 mg Intravenous Q6H PRN    polyethylene glycol (MIRALAX) packet 17 g  17 g Oral Daily PRN    senna (SENOKOT) tablet 8 6 mg  1 tablet Oral Daily       VTE Pharmacologic Prophylaxis: Heparin  VTE Mechanical Prophylaxis: sequential compression device    Portions of the record may have been created with voice recognition software  Occasional wrong word or "sound a like" substitutions may have occurred due to the inherent limitations of voice recognition software    Read the chart carefully and recognize, using context, where substitutions have occurred   ==  [facility]  Internal Medicine Residency PGY-2

## 2022-09-28 NOTE — UTILIZATION REVIEW
Continued Stay Review    Date:  9/28/2022                    Current Patient Class: inpatient   Current Level of Care: med/surg  HPI:67 y o  female initially admitted on 9/23 with HFmEF, KELLY on CKD IV  Assessment/Plan: Weight from yesterday 153 lb which is about 2 lb up  I/O: 560 cc/2 6 L with net -2 L (net -5 5 L since admission)  Received Bumex 2 mg IV yesterday in the morning and 2 mg p o  in afternoon  Continues on po Bumex 2 mg BID  Cr 3 69, hgb 7 5, pts 102  Hydralazine increased to 100 mg t i d  from yesterday  Still with elevated MAPs, will consider to add amlodipine back  Pt would not like to have left [facility] understanding that she will have high risk for worsening of the kidney disease and need for hemodialysis  No rehab needs on d/c per PT  Continue supportive care       Vital Signs:   Date/Time Temp Pulse Resp BP MAP (mmHg) SpO2 O2 Device Patient Position - Orthostatic VS   09/28/22 1505 -- 52 Abnormal  -- 120/50 73 97 % -- --   09/28/22 14:01:34 -- 50 Abnormal  -- 121/51 74 98 % -- --   09/28/22 1401 -- -- -- 121/51 -- -- -- --   09/28/22 07:20:58 97 8 °F (36 6 °C) 70 16 159/63 95 99 % -- --   09/27/22 23:40:32 98 2 °F (36 8 °C) 70 18 159/64 96 95 % -- --   09/27/22 2204 -- -- -- 158/60 -- -- -- --   09/27/22 22:01:57 -- 75 -- 158/60 93 98 % -- --   09/27/22 2100 -- -- -- -- -- 98 % None (Room air) --   09/27/22 14:36:10 97 8 °F (36 6 °C) 59 -- 134/53 80 98 % -- --   09/27/22 11:16:39 98 3 °F (36 8 °C) 62 -- 134/53 80 98 % -- --   09/27/22 0806 -- -- -- -- -- 97 % None (Room air) --   09/27/22 07:22:06 98 °F (36 7 °C) 59 17 137/53 81 96 % -- --   09/27/22 06:40:51 -- 60 -- 145/60 88 97 % -- --   09/27/22 05:45:38 -- 63 -- 143/61 88 98 % -- --   09/27/22 04:21:26 -- 73 -- 163/65 98 97 % -- --   09/27/22 0354 -- -- -- 162/65 -- -- -- --   09/27/22 03:18:05 97 9 °F (36 6 °C) 69 -- 162/65 97 97 % -- --   09/26/22 23:10:35 98 1 °F (36 7 °C) 64 18 164/62 96 99 % -- --   09/26/22 21:32:08 -- 66 -- 167/61 96 97 % -- --   09/26/22 2130 -- -- -- 167/61 -- -- -- --   09/26/22 2000 -- -- -- -- -- 97 % None (Room air) --   09/26/22 19:14:33 98 °F (36 7 °C) 62 -- 142/58 86 97 % -- --   09/26/22 17:54:41 -- 63 -- 142/58 86 98 % -- --   09/26/22 1752 -- 68 -- 142/58 -- -- -- --   09/26/22 14:53:25 98 1 °F (36 7 °C) 54 Abnormal  -- 128/53 78 98 % -- --   09/26/22 11:19:01 97 6 °F (36 4 °C) 51 Abnormal  -- 113/46 Abnormal  68 97 % -- --   09/26/22 07:38:21 97 9 °F (36 6 °C) 63 20 167/65 99 98 % None (Room air) --   09/26/22 03:09:26 97 6 °F (36 4 °C) 67 18 160/66 97 96 % None (Room air) Lying       Pertinent Labs/Diagnostic Results:     Results from last 7 days   Lab Units 09/28/22 0440 09/27/22 0439 09/26/22 0932 09/25/22 0843 09/24/22  0426   WBC Thousand/uL 4 25* 4 37 3 21* 3 90* 5 55   HEMOGLOBIN g/dL 7 5* 7 3* 7 8* 7 6* 8 1*   HEMATOCRIT % 25 4* 23 9* 25 4* 25 1* 26 8*   PLATELETS Thousands/uL 102* 103* 91* 104* 125*   NEUTROS ABS Thousands/µL 2 74 2 87 2 14  --   --      Results from last 7 days   Lab Units 09/28/22 0440 09/27/22 0439 09/26/22 0932 09/25/22 0843 09/24/22  0426   SODIUM mmol/L 138 139 135 138 141   POTASSIUM mmol/L 4 5 4 6 4 8 4 7 4 8   CHLORIDE mmol/L 105 105 106 108 111*   CO2 mmol/L 25 22 23 24 24   ANION GAP mmol/L 8 12 6 6 6   BUN mg/dL 97* 88* 78* 74* 70*   CREATININE mg/dL 3 69* 3 73* 3 64* 3 52* 3 00*   EGFR ml/min/1 73sq m 11 11 12 12 15   CALCIUM mg/dL 9 2 8 8 8 2* 8 6 9 0   PHOSPHORUS mg/dL  --  4 1  --   --   --      Results from last 7 days   Lab Units 09/28/22  1503 09/28/22  1052 09/28/22  0517 09/27/22  2040 09/27/22  1534 09/27/22  1044 09/27/22  0600 09/26/22  2056 09/26/22  1526 09/26/22  0736 09/26/22  0623 09/25/22  2044   POC GLUCOSE mg/dl 175* 184* 93 230* 204* 346* 124 301* 230* 177* 96 225*     Results from last 7 days   Lab Units 09/28/22  0440 09/27/22  0439 09/26/22  0932 09/25/22  0843 09/24/22  0426 09/23/22  1448   GLUCOSE RANDOM mg/dL 99 123 305* 248* 102 201*     Results from last 7 days   Lab Units 09/27/22  0439   FERRITIN ng/mL 108       Medications:   Scheduled Medications:  amLODIPine, 5 mg, Oral, BID  aspirin, 81 mg, Oral, Daily  atorvastatin, 80 mg, Oral, Daily  bumetanide, 2 mg, Oral, BID (diuretic)  carvedilol, 12 5 mg, Oral, BID With Meals  cholecalciferol, 2,000 Units, Oral, Daily  docusate sodium, 100 mg, Oral, BID  heparin (porcine), 5,000 Units, Subcutaneous, Q8H DEE  hydrALAZINE, 100 mg, Oral, TID  insulin aspart protamine-insulin aspart, 10 Units, Subcutaneous, Daily before evening meal  insulin aspart protamine-insulin aspart, 13 Units, Subcutaneous, Daily With Breakfast  insulin lispro, 1-5 Units, Subcutaneous, TID AC  insulin lispro, 1-5 Units, Subcutaneous, HS  isosorbide mononitrate, 120 mg, Oral, Daily  levothyroxine, 125 mcg, Oral, Early Morning  senna, 1 tablet, Oral, Daily     PRN Meds:  acetaminophen, 650 mg, Oral, Q6H PRN  hydrALAZINE, 10 mg, Intravenous, Q6H PRN  labetalol, 10 mg, Intravenous, Q4H PRN  ondansetron, 4 mg, Intravenous, Q6H PRN  polyethylene glycol, 17 g, Oral, Daily PRN        Discharge Plan: [facility]    Network Utilization Review Department  ATTENTION: Please call with any questions or concerns to [phone] and carefully listen to the prompts so that you are directed to the right person  All voicemails are confidential   Tony Galarza all requests for admission clinical reviews, approved or denied determinations and any other requests to dedicated fax number below belonging to the campus where the patient is receiving treatment   List of dedicated fax numbers for the Facilities:  [address] DENIALS (Administrative/Medical Necessity) [phone]   [address] (Maternity/NICU/Pediatrics) [phone]   [address] [place] [address] Jasvir Boothe  [phone]     [address] Akil Alvarado [address] [address] [place] [address] Shaheen Hughes [facility] [address] [place] [id number] [phone]

## 2022-09-28 NOTE — CASE MANAGEMENT
Case Management Discharge Planning Note    Patient name Ed Mascorro  Location [facility] 405/[facility] 405-01 MRN [id number]  : 1955 Date 2022       Current Admission Date: 2022  Current Admission Diagnosis:Shortness of breath   Patient Active Problem List    Diagnosis Date Noted    Shortness of breath 2022    Ischemic cardiomyopathy 2022    Type 2 diabetes mellitus, with long-term current use of insulin ([facility] ) 2022    Stenosis of both internal carotid arteries 2022    COVID-19 2022    Nocturnal hypoxia 10/30/2021    Abnormal finding on imaging of liver 10/18/2021    Acute kidney injury superimposed on chronic kidney disease ([facility] )     Pericardial effusion 2021    Stage 4 chronic kidney disease ([facility] ) 2021    Hypoalbuminemia 2021    Hyperphosphatemia 2021    Microalbuminuria due to type 2 diabetes mellitus ([facility] ) 2020    Hyperlipidemia 2020    Elevated d-dimer 2020    History of anemia due to chronic kidney disease 2019    Vitamin D deficiency 2019    Proteinuria 2019    Coronary artery disease 2019    Anemia 2018    Chronic diastolic heart failure ([facility] ) 2018    Hypothyroidism 2018    Hypertension 2018    Acute renal failure superimposed on stage 4 chronic kidney disease ([facility] ) 2018      LOS (days): 5  Geometric Mean LOS (GMLOS) (days): 3 80  Days to GMLOS:-1 1     OBJECTIVE:  Risk of Unplanned Readmission Score: 24 21         Current admission status: Inpatient   Preferred Pharmacy:   [address] [place] [address] [address] [facility] [address] [address]  Phone: [phone] Fax: [phone]    Primary Care Provider: Reema Vásquez MD    Primary Insurance: [facility]  Secondary Insurance: [id number]    DISCHARGE DETAILS: IMM Given (Date):: 09/28/22 (given at 0900)  IMM Given to[de-identified] Patient     Additional Comments: Plan home today, no HHC needs  Does not want cardiac cath

## 2022-09-28 NOTE — PHYSICAL THERAPY NOTE
Physical Therapy Evaluation     Patient's Name: Jeff Lyons    Admitting Diagnosis  Rapid heart rate [R00 0]  SOB (shortness of breath) [R06 02]  Acute on chronic congestive heart failure, unspecified heart failure type ([facility] ) [I50 9]    Problem List  Patient Active Problem List   Diagnosis    Chronic diastolic heart failure (HCC)    Hypothyroidism    Hypertension    Acute renal failure superimposed on stage 4 chronic kidney disease (HCC)    Anemia    Coronary artery disease    History of anemia due to chronic kidney disease    Vitamin D deficiency    Proteinuria    Elevated d-dimer    Hyperphosphatemia    Hypoalbuminemia    Pericardial effusion    Stage 4 chronic kidney disease ([facility] )    Acute kidney injury superimposed on chronic kidney disease (HCC)    Abnormal finding on imaging of liver    Nocturnal hypoxia    COVID-19    Hyperlipidemia    Microalbuminuria due to type 2 diabetes mellitus ([place] [address] )    Type 2 diabetes mellitus, with long-term current use of insulin ([place] [address] )    Stenosis of both internal carotid arteries    Shortness of breath    Ischemic cardiomyopathy       Past Medical History  Past Medical History:   Diagnosis Date    Anemia     CHF (congestive heart failure) ([facility])     Chronic kidney disease     Coronary artery disease     Diabetes mellitus ([place] [address] )     Hypertension     Hypothyroidism        Past Surgical History  Past Surgical History:   Procedure Laterality Date    CORONARY ANGIOPLASTY WITH STENT PLACEMENT  2019 09/28/22 0944   PT Last Visit   PT Visit Date 09/28/22   Note Type   Note type Evaluation  (and Tx)   Pain Assessment   Pain Assessment Tool 0-10   Pain Score No Pain   Restrictions/Precautions   Braces or Orthoses   (denies)   Other Precautions Cardiac/sternal;Telemetry   Home Living   Type of Home Apartment   Home Layout One level  (10 steps up into the apt)   Prior Function   Level of [place] Independent with ADLs and functional mobility  (amb w/o AD)   Lives With Spouse Receives Help From [facility]   General   Additional Pertinent History cleared for assessment (spoke to nscady)   Cognition   Overall Cognitive Status [facility]   Arousal/Participation Alert   Orientation Level Oriented to person;Oriented to place;Oriented to situation   Memory Within functional limits   Following Commands Follows one step commands without difficulty   Subjective   Subjective Alert; in the chair; flat affect; agreeable to mobilize   RUE Assessment   RUE Assessment WFL  (AROM)   LUE Assessment   LUE Assessment WFL  (AROM)   RLE Assessment   RLE Assessment WFL  (AROM)   Strength RLE   RLE Overall Strength   (good - (grossly))   LLE Assessment   LLE Assessment WFL  (AROM)   Vision-Basic Assessment   Current Vision   (good - (grossly))   Transfers   Sit to Stand 6  Modified independent   Stand to Sit 6  Modified independent   Ambulation/Elevation   Gait pattern Short stride; Inconsistent courtney  (no overt LOB or swaying)   Gait Assistance 6  Modified independent   Assistive Device None   Distance 2 x 110 ft w/ steps negotiation in between   Stair Management Assistance 5  Supervision   Additional items Verbal cues  (reviewed sequencing)   Stair Management Technique One rail R;Alternating pattern; Step to pattern;Reciprocal;Nonreciprocal  (reciprocal going up and nonreciprocal going down)   Number of Stairs 7   Balance   Static Sitting Good   Static Standing Fair +   Ambulatory Fair   Activity Tolerance   Activity Tolerance Patient tolerated treatment well   Medical Staff Made Aware Co-eval performed w/ OTR due to complexity of medical status and multiple comorbidities   Nurse Made Aware spoke to [address]   Assessment   Prognosis Good   Problem List Decreased strength;Decreased endurance   Assessment Pt is 75 y o  female admitted with hx of shortness of breath and Dx of Acute on chronic CHF with mrEF and KELLY  Pt 's comorbidities affecting POC include:  Anemia, Chronic kidney disease, Coronary artery disease, Diabetes mellitus ([facility] ), and Hypertension and personal factors of: steps into the apt  Pt's clinical presentation is currently evolving which is evident in ongoing telem monitoring and abn lab values  Pt presents w/ min generalized weakness, min deconditioning, and inconsistent gait patterns (no overt uncorrected LOB, gross knee buckling, or swaying observed) but w/ overall observed mobility status on level surfaces and elevations appearing to be at or near premorbid level; based on above, anticipate pt will return home w/ family support upon D/C from PT/mobility stand point and when medically cleared; will follow pt in PT for one more session to instruct in HEP (pt does not feel she needs home PT follow up); also recommend restorative amb while in the hospital w/ staff;   Goals   Patient Goals to go home   STG Expiration Date 09/30/22   Short Term Goal #1 1-2 days  Pt will perform and be (I) w/ LE HEP   PT Treatment Day 0   Plan   Treatment/Interventions LE strengthening/ROM; Therapeutic exercise;Spoke to nursing;Spoke to case management;OT   PT Frequency 1-2x/wk   Recommendation   PT Discharge Recommendation No rehabilitation needs   AM-PAC Basic Mobility Inpatient   Turning in Bed Without Bedrails 4   Lying on Back to Sitting on Edge of Flat Bed 4   Moving Bed to Chair 4   Standing Up From Chair 4   Walk in Room 4   Climb 3-5 Stairs 3   Basic Mobility Inpatient Raw Score 23   Basic Mobility Standardized Score 50 88   Highest Level Of Mobility   JH-HLM Goal 7: Walk 25 feet or more   JH-HLM Achieved 8: Walk 250 feet ot more   Modified Glady Scale   Modified Glady Scale 2   Additional Treatment Session   Start Time 0944   End Time 2404   Treatment Assessment Additional follow up consecutive session performed to initiate LE therex in order to max strength and to instruct in HEP   Pt expressed understanding of the program and was able to complete it actively w/ no increased discomfort or excessive fatigue expressed; verbal instructions for proper form provided as well as rest periods provided during the session as needed; pt remained in NAD and appeared to be comfortable at the end of session; currently, cont to anticipate pt return home w/ family support when medically cleared; no other immediate PT needs otherwise identified; will sign off   Additional Treatment Day 1   Exercises   Knee AROM Long Arc Quad Sitting;10 reps;AROM; Bilateral  (2 sets)   Ankle Pumps Sitting;10 reps;AROM; Bilateral  (2 sets)   Marching Sitting;5 reps;AROM; Bilateral  (2 sets)   End of Consult   Patient Position at End of Consult Bedside chair; All needs within reach         [facility]

## 2022-09-28 NOTE — PLAN OF CARE
Problem: PHYSICAL THERAPY ADULT  Goal: Performs mobility at highest level of function for planned discharge setting  See evaluation for individualized goals  Description: Treatment/Interventions: LE strengthening/ROM, Therapeutic exercise, Spoke to nursing, Spoke to case management, OT          See flowsheet documentation for full assessment, interventions and recommendations  Note: Prognosis: Good  Problem List: Decreased strength, Decreased endurance  Assessment: Pt is 75 y o  female admitted with hx of shortness of breath and Dx of Acute on chronic CHF with mrEF and KELLY  Pt 's comorbidities affecting POC include: Anemia, Chronic kidney disease, Coronary artery disease, Diabetes mellitus ([address] ), and Hypertension and personal factors of: steps into the apt  Pt's clinical presentation is currently evolving which is evident in ongoing telem monitoring and abn lab values  Pt presents w/ min generalized weakness, min deconditioning, and inconsistent gait patterns (no overt uncorrected LOB, gross knee buckling, or swaying observed) but w/ overall observed mobility status on level surfaces and elevations appearing to be at or near premorbid level; based on above, anticipate pt will return home w/ family support upon D/C from PT/mobility stand point and when medically cleared; will follow pt in PT for one more session to instruct in HEP (pt does not feel she needs home PT follow up); also recommend restorative amb while in the hospital w/ staff;        PT Discharge Recommendation: No rehabilitation needs    See flowsheet documentation for full assessment

## 2022-09-28 NOTE — PROGRESS NOTES
NEPHROLOGY PROGRESS NOTE   Georgia Myers 75 y o  female MRN: [id number]  Unit/Bed#: [facility] 405-01 Encounter: [id number]        ASSESSMENT & PLAN    18-year-old female with a history of stage 4 chronic kidney disease, type 2 diabetes, coronary artery disease status post stent presents with shortness of breath     1  KELLY on stage 4 chronic kidney disease  ? She has had multiple episodes of acute kidney injury  ? A baseline creatinine has been anywhere from 2 8-3 3  ? She consistently does have episodes of volume overload  ? Her estimated GFR is likely closer to 15 mL/minute  ? We have discussed renal replacement therapy extensively  I have discuss this as well with her daughter and have had family meeting  He has expressed that she would want maximal conservative measures would not want dialysis  She states however she may change her mind at this point will continue maximal conservative management                2  Ischemic cardiomyopathy with volume overload in the setting of chronic kidney disease  ? Her weight was slightly up to 69 5 kg 68 8 kg  ? She is on Bumex 2 mg p o  B i d   ? She is making urine about 1 2 L thus far today  ? Okay to up titrate dose as needed with close monitoring of renal function     3  HTN  ? Her blood pressures remain elevated in the 762 systolic range  ? She is on hydralazine 100 mg 3 times daily  ? Imdur 120 mg daily  ? Carvedilol 12 5 mg 2 times daily  ? Bumex 2 mg IV 2 times daily                4  Anemia in the setting of chronic kidney disease  ? Ferritin was 108  ? Iron saturation was 20%  ? No episodes of DVT no episodes of stroke  ? Will consider starting Epogen as an outpatient will consider Feraheme infusion as well  ? Will repeat BMP and CBC in 1 week with follow-up     5  CKD-BMD  ? Phosphorus remains acceptable     6  Type 2 diabetes mellitus  ? Moderate glycemic control     Repeat BMP he, repeat CBC in 1 week and will schedule post hospital follow-up    Okay for discharge from a renal standpoint    SUBJECTIVE:    Patient was seen today  Resting comfortably  Breathing is stable  Ambulating without difficulty    12 point review of systems was otherwise negative besides what is mentioned above      Medications:    Current Facility-Administered Medications:     acetaminophen (TYLENOL) tablet 650 mg, 650 mg, Oral, Q6H PRN, Katie Whitlock MD    amLODIPine (NORVASC) tablet 5 mg, 5 mg, Oral, BID, Isabella Hawley DO    aspirin chewable tablet 81 mg, 81 mg, Oral, Daily, Katie Whitlock MD, 81 mg at 09/28/22 1014    atorvastatin (LIPITOR) tablet 80 mg, 80 mg, Oral, Daily, Katie Whitlock MD, 80 mg at 09/28/22 1015    bumetanide (BUMEX) tablet 2 mg, 2 mg, Oral, BID (diuretic), Roland Mir MD, 2 mg at 09/28/22 1014    carvedilol (COREG) tablet 12 5 mg, 12 5 mg, Oral, BID With Meals, Katie Whitlock MD, 12 5 mg at 09/28/22 0753    cholecalciferol (VITAMIN D3) tablet 2,000 Units, 2,000 Units, Oral, Daily, Katie Whitlock MD, 2,000 Units at 09/28/22 1014    docusate sodium (COLACE) capsule 100 mg, 100 mg, Oral, BID, Katie Whitlock MD, 100 mg at 09/27/22 6694    heparin (porcine) subcutaneous injection 5,000 Units, 5,000 Units, Subcutaneous, Q8H [address], 5,000 Units at 09/28/22 1400 **AND** [COMPLETED] Platelet count, , , Once, Katie Whitlock MD    hydrALAZINE (APRESOLINE) injection 10 mg, 10 mg, Intravenous, Q6H PRN, Katie Whitlock MD, 10 mg at 09/27/22 0354    hydrALAZINE (APRESOLINE) tablet 100 mg, 100 mg, Oral, TID, Roland Mir MD, 100 mg at 09/28/22 1015    insulin aspart protamine-insulin aspart (NovoLOG 70/30) 100 units/mL subcutaneous injection 10 Units, 10 Units, Subcutaneous, Daily before evening meal, Gloria Fong DO, 10 Units at 09/27/22 1752    insulin aspart protamine-insulin aspart (NovoLOG 70/30) 100 units/mL subcutaneous injection 13 Units, 13 Units, Subcutaneous, Daily With Breakfast, Gloria Fong DO, 13 Units at 09/28/22 0753    insulin lispro (HumaLOG) 100 units/mL subcutaneous injection 1-5 Units, 1-5 Units, Subcutaneous, TID AC, 1 Units at 09/28/22 1154 **AND** Fingerstick Glucose (POCT), , , TID AC, Jono Cruz MD    insulin lispro (HumaLOG) 100 units/mL subcutaneous injection 1-5 Units, 1-5 Units, Subcutaneous, HS, Jono Cruz MD, 2 Units at 09/27/22 2210    isosorbide mononitrate (IMDUR) 24 hr tablet 120 mg, 120 mg, Oral, Daily, Jono Cruz MD, 120 mg at 09/28/22 1014    labetalol (NORMODYNE) injection 10 mg, 10 mg, Intravenous, Q4H PRN, Jono Cruz MD    levothyroxine tablet 125 mcg, 125 mcg, Oral, Early Morning, Jono Cruz MD, 125 mcg at 09/28/22 0522    ondansetron [facility]) injection 4 mg, 4 mg, Intravenous, Q6H PRN, Jono Cruz MD    polyethylene glycol [facility]) packet 17 g, 17 g, Oral, Daily PRN, Jono Cruz MD    [facility]) tablet 8 6 mg, 1 tablet, Oral, Daily, Jono Cruz MD, 8 6 mg at 09/27/22 0807    OBJECTIVE:    Vitals:    09/27/22 2340 09/28/22 0720 09/28/22 1401 09/28/22 1401   BP: 159/64 159/63 121/51 121/51   Pulse: 70 70  (!) 50   Resp: 18 16     Temp: 98 2 °F (36 8 °C) 97 8 °F (36 6 °C)     TempSrc:       SpO2: 95% 99%  98%   Weight:       Height:            Temp:  [97 8 °F (36 6 °C)-98 2 °F (36 8 °C)] 97 8 °F (36 6 °C)  HR:  [50-75] 50  Resp:  [16-18] 16  BP: (121-159)/(51-64) 121/51  SpO2:  [95 %-99 %] 98 %     Body mass index is 28 02 kg/m²  Weight (last 2 days)     Date/Time Weight    09/27/22 0604 69 5 (153 22)    09/26/22 0600 68 8 (151 7)          I/O last 3 completed shifts: In: 560 [P O :560]  Out: 2625 [Urine:2625]    I/O this shift:   In: 12 [P O :560]  Out: -       Physical exam:    General: no acute distress, cooperative  Eyes: conjunctivae pink, anicteric sclerae  ENT: lips and mucous membranes moist, no exudates, normal external ears  Neck: ROM intact, no JVD  Chest: No respiratory distress, no accessory muscle use  CVS: normal rate, non pericardial friction rub  Abdomen: soft, non-tender, non-distended, normoactive bowel sounds  Extremities: no edema of both legs  Skin: no rash  Neuro: awake, alert, oriented, grossly intact  Psych:  Pleasant affect    Invasive Devices:      Lab Results:   Results from last 7 days   Lab Units 09/28/22  0440 09/27/22  0439 09/26/22  0932 09/25/22  0843 09/24/22  0426 09/23/22  1904 09/23/22  1448   WBC Thousand/uL 4 25* 4 37 3 21* 3 90* 5 55  --  5 41   HEMOGLOBIN g/dL 7 5* 7 3* 7 8* 7 6* 8 1*  --  8 6*   HEMATOCRIT % 25 4* 23 9* 25 4* 25 1* 26 8*  --  28 1*   PLATELETS Thousands/uL 102* 103* 91* 104* 125*   < > 127*   POTASSIUM mmol/L 4 5 4 6 4 8 4 7 4 8  --  5 4*   CHLORIDE mmol/L 105 105 106 108 111*  --  111*   CO2 mmol/L 25 22 23 24 24  --  20*   BUN mg/dL 97* 88* 78* 74* 70*  --  67*   CREATININE mg/dL 3 69* 3 73* 3 64* 3 52* 3 00*  --  2 87*   CALCIUM mg/dL 9 2 8 8 8 2* 8 6 9 0  --  9 3   PHOSPHORUS mg/dL  --  4 1  --   --   --   --   --    ALK PHOS U/L  --   --   --   --   --   --  393*   ALT U/L  --   --   --   --   --   --  49   AST U/L  --   --   --   --   --   --  37    < > = values in this interval not displayed  Portions of the record may have been created with voice recognition software  Occasional wrong word or "sound a like" substitutions may have occurred due to the inherent limitations of voice recognition software  Read the chart carefully and recognize, using context, where substitutions have occurred  If you have any questions, please contact the dictating provider

## 2022-09-28 NOTE — OCCUPATIONAL THERAPY NOTE
Occupational Therapy Evaluation     Patient Name: Ed Mascorro  TTBVR'L Date: 9/28/2022  Problem List  Principal Problem:    Shortness of breath  Active Problems:    Hypothyroidism    Hypertension    Anemia    Coronary artery disease    Elevated d-dimer    Stage 4 chronic kidney disease ([facility] )    Hyperlipidemia    Type 2 diabetes mellitus, with long-term current use of insulin ([facility] )    Ischemic cardiomyopathy    Past Medical History  Past Medical History:   Diagnosis Date    Anemia     CHF (congestive heart failure) ([facility])     Chronic kidney disease     Coronary artery disease     Diabetes mellitus ([facility] )     Hypertension     Hypothyroidism      Past Surgical History  Past Surgical History:   Procedure Laterality Date    CORONARY ANGIOPLASTY WITH STENT PLACEMENT  2019 09/28/22 0943   OT Last Visit   OT Visit Date 09/28/22   Note Type   Note type Evaluation   Restrictions/Precautions   Other Precautions Telemetry   Pain Assessment   Pain Assessment Tool 0-10   Pain Score No Pain   Home Living   Type of [address] One level;Stairs to enter with rails   Bathroom Shower/Tub Tub/shower unit   Bathroom Toilet Standard   Bathroom Equipment   (denies)   [address]   (denvincent)   Additional Comments Pt reports living in an apartment with 10 ARUNA  Prior Function   Level of [place] Independent with ADLs and functional mobility   Lives With Spouse   Receives Help From Family   ADL Assistance Independent   IADLs Independent   Falls in the last 6 months 0   Vocational Retired   Lifestyle   Autonomy Pt reports being I with ADLS, IADLS and mobility without device PTA  (+)    Reciprocal Relationships Pt lives with her significant other who is able to assist upon d/c   Service to Others Retired   [address] Enjoys going for walks   Subjective   Subjective Pt reports that she has no concerns about returning home upon d/c     ADL   Where Assessed Chair   Eating Assistance 7  Independent   Grooming Assistance 5  Supervision/Setup   UB Bathing Assistance 5  Supervision/Setup   LB Bathing Assistance 5  Supervision/Setup   UB Dressing Assistance 5  Supervision/Setup   LB Dressing Assistance 5  Supervision/Setup   Toileting Assistance  5  Supervision/Setup   Transfers   Sit to Stand 5  Supervision   Stand to Sit 5  Supervision   Functional Mobility   Functional Mobility 5  Supervision   Additional Comments Pt demonstrated household mobility with no device or rest breaks  Balance   Static Sitting Good   Dynamic Sitting Fair +   Static Standing Fair +   Dynamic Standing Fair   Ambulatory Fair   Activity Tolerance   Activity Tolerance Patient tolerated treatment well   Medical Staff Made Aware Seen with PT 2* medical complexity/ instability   Nurse Made Aware RN confirmed okay to see pt   RUE Assessment   RUE Assessment WFL   LUE Assessment   LUE Assessment WFL   Cognition   Overall Cognitive Status WFL   Arousal/Participation Alert; Cooperative   Attention Within functional limits   Orientation Level Oriented X4   Memory Within functional limits   Following Commands Follows all commands and directions without difficulty   Comments Pt is pleasant and cooperative  Assessment   Assessment Pt is a 75 y o  female admitted to [facility] on 9/23/2022 w/ SOB  Pt  has a past medical history of Anemia, CHF (congestive heart failure) ([facility] ), Chronic kidney disease, Coronary artery disease, Diabetes mellitus ([facility] ), Hypertension, and Hypothyroidism  Pt with active OT orders and up and OOB as tolerated orders  Pt resides in an apartment with 10 ARUNA  Pt lives with her  who is able to assist as needed upon d/c  Pt was I w/  ADLS and IADLS, (+) drove, & required no use of DME PTA  Currently pt is supervision for functional transfers, funcitonal mobility and ADLS overall   Based on the aforementioned OT evaluation, functional performance deficits, and assessments, pt has been identified as a moderate complexity evaluation  From OT standpoint, anticipate d/c home with family support  Recommend continued participation in [address] [facility] and functional mobility with staff  No further acute OT needs, d/c OT     Goals   Patient Goals To return home   Recommendation   OT Discharge Recommendation No rehabilitation needs  (Home with increased social support)   AM-PAC Daily Activity Inpatient   Lower Body Dressing 4   Bathing 4   Toileting 4   Upper Body Dressing 4   Grooming 4   Eating 4   Daily Activity Raw Score 24   Daily Activity Standardized Score (Calc for Raw Score >=11) 57 54   AM-PAC Applied Cognition Inpatient   Following a Speech/Presentation 4   Understanding Ordinary Conversation 4   Taking Medications 4   Remembering Where Things Are Placed or Put Away 4   Remembering List of 4-5 Errands 4   Taking Care of Complicated Tasks 4   Applied Cognition Raw Score 24   Applied Cognition Standardized Score 62 21   Modified [place] Scale   Modified [place] Scale 2       Dhara Moyer, JYOTI, OTR/L

## 2022-09-28 NOTE — PROGRESS NOTES
PROGRESS NOTE - Cardiology  Ed Mascorro 75 y o  female MRN: [id number]  Unit/Bed#: [facility] 405-01 Encounter: [id number]    Assessment/Plan   HFmEF  -etiology:   multifactorial, ischemic, hypertension     -volume status:   euvolemic, continue with   p o  Bumex 2 mg IV b i d    -hemodynamics:  Continue adjusting blood pressure medications (see below)  -patient is very adamant to be on dialysis  With extensive discussions that left heart catheterization has very high risk to worsened kidney function with need for hemodialysis and she would not like that  At this point with risk of left heart catheterization overweighing the benefit will hold off on it and manage volume and hemodynamics      Hypertension  -home medications:  Coreg 12 5 mg b i d , hydralazine 100 mg t i d , Imdur 120 mg daily, amlodipine 10 mg daily  Patient with fluctuating blood pressures with episodes of lower blood pressures thus hydralazine was decreased to 75 mg t i d  and amlodipine were held  Hydralazine was increased to 100 mg t i d  from yesterday  She still with elevated maps thus will consider to add amlodipine back     CAD  -C 1/4/19: LUDY x 2 to LAD after NSTEMI (trop 0 2) and abnormal stress test with ischemia in anterior wall on 12/31/18  80% RCA- plan staged, but has not needed intervention  -continue aspirin, atorvastatin, Coreg, Imdur  -patient would not like to have left heart catheterization understanding that she will have high risk for worsening of the kidney disease and need for hemodialysis      KELLY on CKD  -stable at new baseline    Interval History:  Maps is still elevated in mid 90s  Weight from yesterday 153 lb which is about 2 lb up  Will check today  Intake and output 560 cc/2 6 L with net -2 L (net -5 5 L since admission)  She received Bumex 2 mg IV yesterday in the morning and 2 mg p o  in afternoon  Labs is creatinine 3 69, hemoglobin 7 5, platelets 247      Left heart catheterization 01/04/2019  CORONARY CIRCULATION:  Proximal LAD: There was a 50 % stenosis  Mid LAD: There was a tubular 90 % stenosis  The lesion was complex and moderately calcified  This is a likely culprit for the patient's clinical presentation  An intervention was performed  Distal LAD: There was a diffuse 50 % stenosis  Mid RCA: There was a tubular 80 % stenosis  It appears amenable to percutaneous intervention      1ST LESION INTERVENTIONS:  A balloon angioplasty procedure was performed on the lesion in the mid LAD  A Xience Terrie Rx 2 5 x 15mm drug-eluting stent was placed across the lesion and deployed at a maximum inflation pressure of 12 jennifer  A Xience Terrie Rx 2 5 x 15mm drug-eluting stent was placed across the lesion and deployed at a maximum inflation pressure of 11 jennifer  Review of Systems  ROS as noted above, otherwise 12 point review of systems was performed and is negative       Historical Information   Past Medical History:   Diagnosis Date    Anemia     [facility]congestive [facility] failure) ([facility] )     Chronic kidney disease     Coronary artery disease     [facility]     Hypertension     Hypothyroidism      Past Surgical History:   Procedure Laterality Date    CORONARY ANGIOPLASTY WITH STENT PLACEMENT  2019     Social History     Substance and Sexual Activity   Alcohol Use Yes    Comment: occ     Social History     Substance and Sexual Activity   Drug Use No     Social History     Tobacco Use   Smoking Status Former Smoker   Smokeless Tobacco Never Used       Meds/Allergies   Hospital Medications:   Current Facility-Administered Medications   Medication Dose Route Frequency    acetaminophen (TYLENOL) tablet 650 mg  650 mg Oral Q6H PRN    aspirin chewable tablet 81 mg  81 mg Oral Daily    atorvastatin (LIPITOR) tablet 80 mg  80 mg Oral Daily    bumetanide (BUMEX) tablet 2 mg  2 mg Oral BID (diuretic)    carvedilol (COREG) tablet 12 5 mg  12 5 mg Oral BID With Meals    cholecalciferol (VITAMIN D3) tablet 2,000 Units  2,000 Units Oral Daily    docusate sodium (COLACE) capsule 100 mg  100 mg Oral BID    heparin (porcine) subcutaneous injection 5,000 Units  5,000 Units Subcutaneous Q8H [facility]    hydrALAZINE (APRESOLINE) injection 10 mg  10 mg Intravenous Q6H PRN    hydrALAZINE (APRESOLINE) tablet 100 mg  100 mg Oral TID    insulin aspart protamine-insulin aspart (NovoLOG 70/30) 100 units/mL subcutaneous injection 10 Units  10 Units Subcutaneous Daily before evening meal    insulin aspart protamine-insulin aspart (NovoLOG 70/30) 100 units/mL subcutaneous injection 13 Units  13 Units Subcutaneous Daily With Breakfast    insulin lispro (HumaLOG) 100 units/mL subcutaneous injection 1-5 Units  1-5 Units Subcutaneous TID AC    insulin lispro (HumaLOG) 100 units/mL subcutaneous injection 1-5 Units  1-5 Units Subcutaneous HS    isosorbide mononitrate (IMDUR) 24 hr tablet 120 mg  120 mg Oral Daily    labetalol (NORMODYNE) injection 10 mg  10 mg Intravenous Q4H PRN    levothyroxine tablet 125 mcg  125 mcg Oral Early Morning    ondansetron (ZOFRAN) injection 4 mg  4 mg Intravenous Q6H PRN    polyethylene glycol (MIRALAX) packet 17 g  17 g Oral Daily PRN    senna (SENOKOT) tablet 8 6 mg  1 tablet Oral Daily     Home Medications:   Medications Prior to Admission   Medication    amLODIPine (NORVASC) 10 mg tablet    aspirin (RA Aspirin Adult Low Dose) 81 mg chewable tablet    atorvastatin (LIPITOR) 40 mg tablet    bumetanide (BUMEX) 2 mg tablet    carbamide peroxide (DEBROX) 6 5 % otic solution    carvedilol (COREG) 12 5 mg tablet    cholecalciferol (VITAMIN D3) 1,000 units tablet    Droplet Pen Needles 32G X 4 MM MISC    hydrALAZINE (APRESOLINE) 100 MG tablet    insulin aspart protamine-insulin aspart (NovoLOG 70/30) 100 units/mL injection    isosorbide mononitrate (IMDUR) 120 mg 24 hr tablet    Lancets (OneTouch Delica Plus [id number]) MISC    levothyroxine 125 mcg tablet    OneTouch Verio test strip     Allergies Allergen Reactions    Iv Contrast [Iodinated Diagnostic Agents] Itching     Caused KELLY    Nifedipine Rash     Objective   Vitals: Blood pressure 159/63, pulse 70, temperature 97 8 °F (36 6 °C), resp  rate 16, height 5' 2" (1 575 m), weight 69 5 kg (153 lb 3 5 oz), SpO2 99 %, currently breastfeeding  Orthostatic Blood Pressures    Flowsheet Row Most Recent Value   Blood Pressure 159/63 filed at 09/28/2022 0720   Patient Position - Orthostatic VS Lying filed at 09/26/2022 0309          Intake/Output Summary (Last 24 hours) at 9/28/2022 0756  Last data filed at 9/28/2022 0755  Gross per 24 hour   Intake 600 ml   Output 1975 ml   Net -1375 ml     Invasive Devices  Report    Peripheral Intravenous Line  Duration           Peripheral IV 09/27/22 Right;Ventral (anterior) Forearm 1 day              Physical Exam   GEN: NAD, alert and oriented, well appearing  SKIN: dry without significant lesions or rashes  HEENT: NCAT, PERRL, EOMs intact  NECK: No JVD or carotid bruits appreciated  CARDIOVASCULAR: RRR, normal S1, S2 without murmurs, rubs, or gallops appreciated  LUNGS: Clear to auscultation bilaterally without wheezes, rhonchi, or rales  ABDOMEN: Soft, nontender, nondistended  EXTREMITIES/VASCULAR: perfused without clubbing, cyanosis, or edema b/l  PSYCH: Normal mood and affect  NEURO: CN ll-Xll grossly intact    Lab Results: I have personally reviewed pertinent lab results  Results from last 7 days   Lab Units 09/28/22  0440 09/27/22  0439 09/26/22  0932   WBC Thousand/uL 4 25* 4 37 3 21*   HEMOGLOBIN g/dL 7 5* 7 3* 7 8*   HEMATOCRIT % 25 4* 23 9* 25 4*   PLATELETS Thousands/uL 102* 103* 91*     Results from last 7 days   Lab Units 09/28/22  0440 09/27/22  0439 09/26/22  0932   POTASSIUM mmol/L 4 5 4 6 4 8   CHLORIDE mmol/L 105 105 106   CO2 mmol/L 25 22 23   BUN mg/dL 97* 88* 78*   CREATININE mg/dL 3 69* 3 73* 3 64*   CALCIUM mg/dL 9 2 8 8 8 2*               Imaging: I have personally reviewed pertinent reports

## 2022-09-29 ENCOUNTER — TELEPHONE (OUTPATIENT)
Dept: NEPHROLOGY | Facility: CLINIC | Age: 67
End: 2022-09-29

## 2022-09-29 VITALS
RESPIRATION RATE: 17 BRPM | HEIGHT: 62 IN | WEIGHT: 150.79 LBS | DIASTOLIC BLOOD PRESSURE: 54 MMHG | HEART RATE: 53 BPM | BODY MASS INDEX: 27.75 KG/M2 | SYSTOLIC BLOOD PRESSURE: 150 MMHG | TEMPERATURE: 98.2 F | OXYGEN SATURATION: 96 %

## 2022-09-29 DIAGNOSIS — N18.4 STAGE 4 CHRONIC KIDNEY DISEASE (HCC): Primary | ICD-10-CM

## 2022-09-29 DIAGNOSIS — R80.1 PERSISTENT PROTEINURIA: ICD-10-CM

## 2022-09-29 LAB
ANION GAP SERPL CALCULATED.3IONS-SCNC: 6 MMOL/L (ref 4–13)
BASOPHILS # BLD AUTO: 0.02 THOUSANDS/ΜL (ref 0–0.1)
BASOPHILS NFR BLD AUTO: 0 % (ref 0–1)
BUN SERPL-MCNC: 93 MG/DL (ref 5–25)
CALCIUM SERPL-MCNC: 8.5 MG/DL (ref 8.3–10.1)
CHLORIDE SERPL-SCNC: 104 MMOL/L (ref 96–108)
CO2 SERPL-SCNC: 25 MMOL/L (ref 21–32)
CREAT SERPL-MCNC: 3.77 MG/DL (ref 0.6–1.3)
EOSINOPHIL # BLD AUTO: 0.1 THOUSAND/ΜL (ref 0–0.61)
EOSINOPHIL NFR BLD AUTO: 2 % (ref 0–6)
ERYTHROCYTE [DISTWIDTH] IN BLOOD BY AUTOMATED COUNT: 13.2 % (ref 11.6–15.1)
GFR SERPL CREATININE-BSD FRML MDRD: 11 ML/MIN/1.73SQ M
GLUCOSE SERPL-MCNC: 106 MG/DL (ref 65–140)
GLUCOSE SERPL-MCNC: 171 MG/DL (ref 65–140)
GLUCOSE SERPL-MCNC: 96 MG/DL (ref 65–140)
HCT VFR BLD AUTO: 23.3 % (ref 34.8–46.1)
HGB BLD-MCNC: 7.3 G/DL (ref 11.5–15.4)
IMM GRANULOCYTES # BLD AUTO: 0.01 THOUSAND/UL (ref 0–0.2)
IMM GRANULOCYTES NFR BLD AUTO: 0 % (ref 0–2)
LYMPHOCYTES # BLD AUTO: 1.21 THOUSANDS/ΜL (ref 0.6–4.47)
LYMPHOCYTES NFR BLD AUTO: 23 % (ref 14–44)
MCH RBC QN AUTO: 28.4 PG (ref 26.8–34.3)
MCHC RBC AUTO-ENTMCNC: 31.3 G/DL (ref 31.4–37.4)
MCV RBC AUTO: 91 FL (ref 82–98)
MONOCYTES # BLD AUTO: 0.43 THOUSAND/ΜL (ref 0.17–1.22)
MONOCYTES NFR BLD AUTO: 8 % (ref 4–12)
NEUTROPHILS # BLD AUTO: 3.42 THOUSANDS/ΜL (ref 1.85–7.62)
NEUTS SEG NFR BLD AUTO: 67 % (ref 43–75)
NRBC BLD AUTO-RTO: 0 /100 WBCS
PLATELET # BLD AUTO: 102 THOUSANDS/UL (ref 149–390)
PMV BLD AUTO: 11.7 FL (ref 8.9–12.7)
POTASSIUM SERPL-SCNC: 4.6 MMOL/L (ref 3.5–5.3)
RBC # BLD AUTO: 2.57 MILLION/UL (ref 3.81–5.12)
SODIUM SERPL-SCNC: 135 MMOL/L (ref 135–147)
WBC # BLD AUTO: 5.19 THOUSAND/UL (ref 4.31–10.16)

## 2022-09-29 PROCEDURE — 82948 REAGENT STRIP/BLOOD GLUCOSE: CPT

## 2022-09-29 PROCEDURE — 99232 SBSQ HOSP IP/OBS MODERATE 35: CPT | Performed by: INTERNAL MEDICINE

## 2022-09-29 PROCEDURE — 80048 BASIC METABOLIC PNL TOTAL CA: CPT | Performed by: STUDENT IN AN ORGANIZED HEALTH CARE EDUCATION/TRAINING PROGRAM

## 2022-09-29 PROCEDURE — 99239 HOSP IP/OBS DSCHRG MGMT >30: CPT | Performed by: HOSPITALIST

## 2022-09-29 PROCEDURE — 85025 COMPLETE CBC W/AUTO DIFF WBC: CPT | Performed by: STUDENT IN AN ORGANIZED HEALTH CARE EDUCATION/TRAINING PROGRAM

## 2022-09-29 RX ORDER — HYDRALAZINE HYDROCHLORIDE 25 MG/1
75 TABLET, FILM COATED ORAL 3 TIMES DAILY
Qty: 270 TABLET | Refills: 0 | Status: SHIPPED | OUTPATIENT
Start: 2022-09-29 | End: 2022-10-04 | Stop reason: SDUPTHER

## 2022-09-29 RX ORDER — AMLODIPINE BESYLATE 5 MG/1
5 TABLET ORAL 2 TIMES DAILY
Qty: 60 TABLET | Refills: 0 | Status: SHIPPED | OUTPATIENT
Start: 2022-09-29 | End: 2022-10-04 | Stop reason: SDUPTHER

## 2022-09-29 RX ADMIN — INSULIN LISPRO 1 UNITS: 100 INJECTION, SOLUTION INTRAVENOUS; SUBCUTANEOUS at 12:24

## 2022-09-29 RX ADMIN — Medication 2000 UNITS: at 09:04

## 2022-09-29 RX ADMIN — INSULIN ASPART 13 UNITS: 100 INJECTION, SUSPENSION SUBCUTANEOUS at 09:05

## 2022-09-29 RX ADMIN — HYDRALAZINE HYDROCHLORIDE 75 MG: 50 TABLET, FILM COATED ORAL at 09:04

## 2022-09-29 RX ADMIN — ISOSORBIDE MONONITRATE 120 MG: 60 TABLET, EXTENDED RELEASE ORAL at 09:04

## 2022-09-29 RX ADMIN — LEVOTHYROXINE SODIUM 125 MCG: 125 TABLET ORAL at 06:30

## 2022-09-29 RX ADMIN — HEPARIN SODIUM 5000 UNITS: 5000 INJECTION INTRAVENOUS; SUBCUTANEOUS at 06:30

## 2022-09-29 RX ADMIN — ASPIRIN 81 MG CHEWABLE TABLET 81 MG: 81 TABLET CHEWABLE at 09:04

## 2022-09-29 RX ADMIN — ATORVASTATIN CALCIUM 80 MG: 80 TABLET, FILM COATED ORAL at 09:04

## 2022-09-29 RX ADMIN — AMLODIPINE BESYLATE 5 MG: 5 TABLET ORAL at 09:04

## 2022-09-29 RX ADMIN — BUMETANIDE 2 MG: 2 TABLET ORAL at 09:04

## 2022-09-29 RX ADMIN — CARVEDILOL 12.5 MG: 12.5 TABLET, FILM COATED ORAL at 09:04

## 2022-09-29 NOTE — PLAN OF CARE
Problem: Potential for Falls  Goal: Patient will remain free of falls  Description: INTERVENTIONS:  - Educate patient/family on patient safety including physical limitations  - Instruct patient to call for assistance with activity   - Consult OT/PT to assist with strengthening/mobility   - Keep Call bell within reach  - Keep bed low and locked with side rails adjusted as appropriate  - Keep care items and personal belongings within reach  - Initiate and maintain comfort rounds  - Make Fall Risk Sign visible to staff  - Apply yellow socks and bracelet for high fall risk patients  - Consider moving patient to room near nurses station  Outcome: Progressing     Problem: SAFETY ADULT  Goal: Patient will remain free of falls  Description: INTERVENTIONS:  - Educate patient/family on patient safety including physical limitations  - Instruct patient to call for assistance with activity   - Consult OT/PT to assist with strengthening/mobility   - Keep Call bell within reach  - Keep bed low and locked with side rails adjusted as appropriate  - Keep care items and personal belongings within reach  - Initiate and maintain comfort rounds  - Make Fall Risk Sign visible to staff  - Apply yellow socks and bracelet for high fall risk patients  - Consider moving patient to room near nurses station  Outcome: Progressing     Problem: DISCHARGE PLANNING  Goal: Discharge to home or other facility with appropriate resources  Description: INTERVENTIONS:  - Identify barriers to discharge w/patient and caregiver  - Arrange for needed discharge resources and transportation as appropriate  - Identify discharge learning needs (meds, wound care, etc )  - Arrange for interpretive services to assist at discharge as needed  - Refer to Case Management Department for coordinating discharge planning if the patient needs post-hospital services based on physician/advanced practitioner order or complex needs related to functional status, cognitive ability, or social support system  Outcome: Progressing     Problem: Knowledge Deficit  Goal: Patient/family/caregiver demonstrates understanding of disease process, treatment plan, medications, and discharge instructions  Description: Complete learning assessment and assess knowledge base    Interventions:  - Provide teaching at level of understanding  - Provide teaching via preferred learning methods  Outcome: Progressing     Problem: CARDIOVASCULAR - ADULT  Goal: Absence of cardiac dysrhythmias or at baseline rhythm  Description: INTERVENTIONS:  - Continuous cardiac monitoring, vital signs, obtain 12 lead EKG if ordered  - Administer antiarrhythmic and heart rate control medications as ordered  - Monitor electrolytes and administer replacement therapy as ordered  Outcome: Progressing

## 2022-09-29 NOTE — TELEPHONE ENCOUNTER
----- Message from Korina Humphries DO sent at 9/28/2022  2:23 PM EDT -----  Please schedule the following patient for follow-up in [place] office please have the patient get a CBC and BMP patient will need follow-up in 1-2 weeks

## 2022-09-29 NOTE — PROGRESS NOTES
PROGRESS NOTE - Cardiology  Pee Doll 75 y o  female MRN: [id number]  Unit/Bed#: [facility] 405-01 Encounter: [id number]  Assessment/Plan   HFmEF  -etiology:   multifactorial, ischemic, hypertension     -volume status:   euvolemic, continue with   p o  Bumex 2 mg IV b i d    -hemodynamics:  Continue adjusting blood pressure medications (see below)  -patient is very adamant to be on dialysis  With extensive discussions that left heart catheterization has very high risk to worsened kidney function with need for hemodialysis and she would not like that  At this point with risk of left heart catheterization overweighing the benefit will hold off on it and manage volume and hemodynamics      Hypertension  -home medications:  Coreg 12 5 mg b i d , hydralazine 100 mg t i d , Imdur 120 mg daily, amlodipine 10 mg daily   Patient with fluctuating blood pressures with episodes of lower blood pressures thus hydralazine was decreased to 75 mg t i d  and amlodipine were held at some point but due to persistently elevated maps, hydralazine was increased back to 100 mg t i d  and amlodipine 5 mg b i d  was added  Ufortunately patient declines use increased dose of hydralazine as she believes it gives her abdominal pain and headache which is okay to continue with amlodipine  Thus will discharge on hydralazine 75 mg t i d  With a plan to up titrate as outpatient    CAD  -[facility] 1/4/19: LUDY x 2 to LAD after NSTEMI (trop 0 2) and abnormal stress test with ischemia in anterior wall on 12/31/18  80% RCA- plan staged, but has not needed intervention  -continue aspirin, atorvastatin, Coreg, Imdur  -patient would not like to have left heart catheterization understanding that she will have high risk for worsening of the kidney disease and need for hemodialysis      KELLY on CKD  -stable at new baseline    Interval History:  No acute events overnight, continue diuresing well      Review of Systems  ROS as noted above, otherwise 12 point review of systems was performed and is negative       Historical Information   Past Medical History:   Diagnosis Date    Anemia     [facility]est[facility]) [facility]     Chronic kidney disease     Coronary artery disease     Diabetes mellitus ([facility] )     Hypertension     Hypothyroidism      Past Surgical History:   Procedure Laterality Date    CORONARY ANGIOPLASTY WITH STENT PLACEMENT  2019     Social History     Substance and Sexual Activity   Alcohol Use Yes    Comment: occ     Social History     Substance and Sexual Activity   Drug Use No     Social History     Tobacco Use   Smoking Status Former Smoker   Smokeless Tobacco Never Used     Meds/Allergies   Hospital Medications:   Current Facility-Administered Medications   Medication Dose Route Frequency    acetaminophen (TYLENOL) tablet 650 mg  650 mg Oral Q6H PRN    amLODIPine (NORVASC) tablet 5 mg  5 mg Oral BID    aspirin chewable tablet 81 mg  81 mg Oral Daily    atorvastatin (LIPITOR) tablet 80 mg  80 mg Oral Daily    bumetanide (BUMEX) tablet 2 mg  2 mg Oral BID (diuretic)    carvedilol (COREG) tablet 12 5 mg  12 5 mg Oral BID With Meals    cholecalciferol (VITAMIN D3) tablet 2,000 Units  2,000 Units Oral Daily    docusate sodium (COLACE) capsule 100 mg  100 mg Oral BID    heparin (porcine) subcutaneous injection 5,000 Units  5,000 Units Subcutaneous Q8H [facility]    hydrALAZINE (APRESOLINE) injection 10 mg  10 mg Intravenous Q6H PRN    hydrALAZINE (APRESOLINE) tablet 75 mg  75 mg Oral TID    insulin aspart protamine-insulin aspart (NovoLOG 70/30) 100 units/mL subcutaneous injection 10 Units  10 Units Subcutaneous Daily before evening meal    insulin aspart protamine-insulin aspart (NovoLOG 70/30) 100 units/mL subcutaneous injection 13 Units  13 Units Subcutaneous Daily With Breakfast    insulin lispro (HumaLOG) 100 units/mL subcutaneous injection 1-5 Units  1-5 Units Subcutaneous TID AC    insulin lispro (HumaLOG) 100 units/mL subcutaneous injection 1-5 Units  1-5 Units Subcutaneous HS    isosorbide mononitrate (IMDUR) 24 hr tablet 120 mg  120 mg Oral Daily    labetalol (NORMODYNE) injection 10 mg  10 mg Intravenous Q4H PRN    levothyroxine tablet 125 mcg  125 mcg Oral Early Morning    ondansetron (ZOFRAN) injection 4 mg  4 mg Intravenous Q6H PRN    polyethylene glycol (MIRALAX) packet 17 g  17 g Oral Daily PRN    senna (SENOKOT) tablet 8 6 mg  1 tablet Oral Daily     Home Medications:   Medications Prior to Admission   Medication    amLODIPine (NORVASC) 10 mg tablet    aspirin (RA Aspirin Adult Low Dose) 81 mg chewable tablet    atorvastatin (LIPITOR) 40 mg tablet    bumetanide (BUMEX) 2 mg tablet    carbamide peroxide (DEBROX) 6 5 % otic solution    carvedilol (COREG) 12 5 mg tablet    cholecalciferol (VITAMIN D3) 1,000 units tablet    Droplet Pen Needles 32G X 4 MM MISC    hydrALAZINE (APRESOLINE) 100 MG tablet    insulin aspart protamine-insulin aspart (NovoLOG 70/30) 100 units/mL injection    isosorbide mononitrate (IMDUR) 120 mg 24 hr tablet    Lancets (OneTouch Delica Plus [id number]) MISC    levothyroxine 125 mcg tablet    OneTouch Verio test strip     Allergies   Allergen Reactions    Iv Contrast [Iodinated Diagnostic Agents] Itching     Caused KELLY    Nifedipine Rash     Objective   Vitals: Blood pressure 150/54, pulse (!) 53, temperature 98 2 °F (36 8 °C), resp  rate 17, height 5' 2" (1 575 m), weight 68 4 kg (150 lb 12 7 oz), SpO2 96 %, currently breastfeeding    Orthostatic Blood Pressures    Flowsheet Row Most Recent Value   Blood Pressure 150/54 filed at 09/29/2022 2330   Patient Position - Orthostatic VS Lying filed at 09/26/2022 0309          Intake/Output Summary (Last 24 hours) at 9/29/2022 1318  Last data filed at 9/29/2022 1246  Gross per 24 hour   Intake 720 ml   Output 1700 ml   Net -980 ml       Invasive Devices  Report    Peripheral Intravenous Line  Duration           Peripheral IV 09/27/22 Right;Ventral (anterior) Forearm 2 days              Physical Exam   GEN: NAD, alert and oriented, well appearing  SKIN: dry without significant lesions or rashes  HEENT: NCAT, PERRL, EOMs intact  NECK: No JVD or carotid bruits appreciated  CARDIOVASCULAR: RRR, normal S1, S2 without murmurs, rubs, or gallops appreciated  LUNGS: Clear to auscultation bilaterally without wheezes, rhonchi, or rales  ABDOMEN: Soft, nontender, nondistended  EXTREMITIES/VASCULAR: perfused without clubbing, cyanosis, or edema b/l  NEURO: CN ll-Xll grossly intact    Lab Results: I have personally reviewed pertinent lab results  Results from last 7 days   Lab Units 09/29/22 0449 09/28/22 0440 09/27/22  0439   WBC Thousand/uL 5 19 4 25* 4 37   HEMOGLOBIN g/dL 7 3* 7 5* 7 3*   HEMATOCRIT % 23 3* 25 4* 23 9*   PLATELETS Thousands/uL 102* 102* 103*     Results from last 7 days   Lab Units 09/29/22 0449 09/28/22 0440 09/27/22  0439   POTASSIUM mmol/L 4 6 4 5 4 6   CHLORIDE mmol/L 104 105 105   CO2 mmol/L 25 25 22   BUN mg/dL 93* 97* 88*   CREATININE mg/dL 3 77* 3 69* 3 73*   CALCIUM mg/dL 8 5 9 2 8 8               Imaging: I have personally reviewed pertinent reports

## 2022-09-29 NOTE — DISCHARGE INSTRUCTIONS
Please call your PCP's office at the number provided, [phone], to schedule a follow-up visit within 1-2 weeks of discharge  Please keep and attend your appointment with Dr Darline Arreaga, cardiologist, on 10/04/2022  You should receive a phone call from Dr Bertha Simmonds, nephrologist's, office in order to schedule a follow-up appointment  If you do not receive this phone call, please call his office at [phone] to schedule this appointment within the next 1-2 weeks of discharge  You will need to get blood work done 1 week following your discharge  A CBC and BMP should be performed  Please follow-up with your nephrologist's office to complete these labs  A change has been made in some of your blood pressure medications  Please take hydralazine 3 tablets (75 mg total) by mouth 3 times a day  Please also take amlodipine 1 tablet (5 mg total) by mouth 2 (two) times a day  These prescriptions have been sent to your pharmacy  Please continue to take all other medications as instructed in your discharge paperwork

## 2022-09-29 NOTE — PROGRESS NOTES
NEPHROLOGY PROGRESS NOTE   Beatrice Stephens 75 y o  female MRN: [id number]  Unit/Bed#: [facility] 405-01 Encounter: [id number]        ASSESSMENT & PLAN    78-year-old female with a history of stage 4 chronic kidney disease, type 2 diabetes, coronary artery disease status post stent presents with shortness of breath     1  KELLY on stage 4 chronic kidney disease  ? She has had multiple episodes of acute kidney injury  ? A baseline creatinine has been anywhere from 2 8-3 3  ? She consistently does have episodes of volume overload  ? Her estimated GFR is likely closer to 15 mL/minute  ? We have discussed renal replacement therapy extensively   I have discuss this as well with her daughter and have had family meeting  [facility] has expressed that she would want maximal conservative measures would not want dialysis  [facility] however she may change her mind at this point will continue maximal conservative management                2  Ischemic cardiomyopathy with volume overload in the setting of chronic kidney disease  ? Her weight is stable at 60 4 kg  ? She is on Bumex 2 mg p o  B i d   ? Okay to up titrate dose as needed with close monitoring of renal function     3  HTN  ? Her blood pressures remain do fluctuate and she was having some dizziness yesterday  ? Her hydralazine was decreased back to 75 mg t i d  And she is now on amlodipine 5 mg b i d    ? Imdur 120 mg daily  ? Carvedilol 12 5 mg 2 times daily  ? Bumex 2 mg IV 2 times daily                4  Anemia in the setting of chronic kidney disease  ? Ferritin was 108  ? Iron saturation was 20%  ? No episodes of DVT no episodes of stroke  ? Will consider starting Epogen as an outpatient will consider Feraheme infusion as well  ? Will repeat BMP and CBC in 1 week with follow-up     5  CKD-BMD  ? Phosphorus remains acceptable     6   Type 2 diabetes mellitus  ?  Moderate glycemic control     Discussed overall clinical course again today Repeat BMP he, repeat CBC in 1 week and will schedule post hospital follow-up  Okay for discharge from a renal standpoint    SUBJECTIVE:    Patient was seen today states she is feeling better  Dizziness has improved  No chest pain or shortness of breath    12 point review of systems was otherwise negative besides what is mentioned above      Medications:    Current Facility-Administered Medications:     acetaminophen (TYLENOL) tablet 650 mg, 650 mg, Oral, Q6H PRN, Shane Jackson MD    amLODIPine (NORVASC) tablet 5 mg, 5 mg, Oral, BID, Isabella Hawley DO, 5 mg at 09/29/22 0904    aspirin chewable tablet 81 mg, 81 mg, Oral, Daily, Shane Jackson MD, 81 mg at 09/29/22 0904    atorvastatin (LIPITOR) tablet 80 mg, 80 mg, Oral, Daily, Shane Jackson MD, 80 mg at 09/29/22 0904    bumetanide (BUMEX) tablet 2 mg, 2 mg, Oral, BID (diuretic), Wai Pratt MD, 2 mg at 09/29/22 1988    carvedilol (COREG) tablet 12 5 mg, 12 5 mg, Oral, BID With Meals, Shane Jackson MD, 12 5 mg at 09/29/22 2028    cholecalciferol (VITAMIN D3) tablet 2,000 Units, 2,000 Units, Oral, Daily, Shane Jackson MD, 2,000 Units at 09/29/22 0904    docusate sodium (COLACE) capsule 100 mg, 100 mg, Oral, BID, Shane Jackson MD, 100 mg at 09/27/22 5330    heparin (porcine) subcutaneous injection 5,000 Units, 5,000 Units, Subcutaneous, Q8H [facility], 5,000 Units at 09/29/22 0630 **AND** [COMPLETED] Platelet count, , , Once, Shane Jackson MD    hydrALAZINE (APRESOLINE) injection 10 mg, 10 mg, Intravenous, Q6H PRN, Shane Jackson MD, 10 mg at 09/27/22 0354    hydrALAZINE (APRESOLINE) tablet 75 mg, 75 mg, Oral, TID, Pat Platt DO, 75 mg at 09/29/22 0904    insulin aspart protamine-insulin aspart (NovoLOG 70/30) 100 units/mL subcutaneous injection 10 Units, 10 Units, Subcutaneous, Daily before evening meal, Pat Platt DO, 10 Units at 09/28/22 1753    insulin aspart protamine-insulin aspart (NovoLOG 70/30) 100 units/mL subcutaneous injection 13 Units, 13 Units, Subcutaneous, Daily With Breakfast, Brendon Hickey, DO, 13 Units at 09/29/22 0905    insulin lispro (HumaLOG) 100 units/mL subcutaneous injection 1-5 Units, 1-5 Units, Subcutaneous, TID AC, 1 Units at 09/28/22 1754 **AND** Fingerstick Glucose (POCT), , , TID AC, Rose Joe MD    insulin lispro (HumaLOG) 100 units/mL subcutaneous injection 1-5 Units, 1-5 Units, Subcutaneous, HS, Rose Joe MD, 1 Units at 09/28/22 2108    isosorbide mononitrate (IMDUR) 24 hr tablet 120 mg, 120 mg, Oral, Daily, Rose Joe MD, 120 mg at 09/29/22 0904    labetalol (NORMODYNE) injection 10 mg, 10 mg, Intravenous, Q4H PRN, Rose Joe MD    levothyroxine tablet 125 mcg, 125 mcg, Oral, Early Morning, Rose Joe MD, 125 mcg at 09/29/22 0630    ondansetron (ZOFRAN) injection 4 mg, 4 mg, Intravenous, Q6H PRN, Rose Joe MD    polyethylene glycol [facility]) packet 17 g, 17 g, Oral, Daily PRN, Rose Joe MD    [facility]) tablet 8 6 mg, 1 tablet, Oral, Daily, Rose Joe MD, 8 6 mg at 09/27/22 0807    OBJECTIVE:    Vitals:    09/28/22 2254 09/29/22 0600 09/29/22 0636 09/29/22 0904   BP: (!) 130/46  151/62 150/54   Pulse: (!) 53  59 (!) 53   Resp: 17      Temp: 98 2 °F (36 8 °C)  98 2 °F (36 8 °C)    TempSrc:       SpO2: 96%  96%    Weight:  68 4 kg (150 lb 12 7 oz)     Height:            Temp:  [98 2 °F (36 8 °C)] 98 2 °F (36 8 °C)  HR:  [50-62] 53  Resp:  [17] 17  BP: (120-155)/(46-62) 150/54  SpO2:  [96 %-99 %] 96 %     Body mass index is 27 58 kg/m²  Weight (last 2 days)     Date/Time Weight    09/29/22 0600 68 4 (150 79)    09/27/22 0604 69 5 (153 22)          I/O last 3 completed shifts:   In: 680 [P O :680]  Out: 2600 [Urine:2600]    I/O this shift:  In: 360 [P O :360]  Out: -       Physical exam:    General: no acute distress, cooperative  Eyes: conjunctivae pink, anicteric sclerae  ENT: lips and mucous membranes moist, no exudates, normal external ears  Neck: ROM intact, no JVD  Chest: No respiratory distress, no accessory muscle use  CVS: normal rate, non pericardial friction rub  Abdomen: soft, non-tender, non-distended, normoactive bowel sounds  Extremities: no edema of both legs  Skin: no rash  Neuro: awake, alert, oriented, grossly intact  Psych:  Pleasant affect    Invasive Devices:      Lab Results:   Results from last 7 days   Lab Units 09/29/22  0449 09/28/22  0440 09/27/22  0439 09/26/22  0932 09/25/22  0843 09/23/22  1904 09/23/22  1448   WBC Thousand/uL 5 19 4 25* 4 37 3 21* 3 90*   < > 5 41   HEMOGLOBIN g/dL 7 3* 7 5* 7 3* 7 8* 7 6*   < > 8 6*   HEMATOCRIT % 23 3* 25 4* 23 9* 25 4* 25 1*   < > 28 1*   PLATELETS Thousands/uL 102* 102* 103* 91* 104*   < > 127*   POTASSIUM mmol/L 4 6 4 5 4 6 4 8 4 7   < > 5 4*   CHLORIDE mmol/L 104 105 105 106 108   < > 111*   CO2 mmol/L 25 25 22 23 24   < > 20*   BUN mg/dL 93* 97* 88* 78* 74*   < > 67*   CREATININE mg/dL 3 77* 3 69* 3 73* 3 64* 3 52*   < > 2 87*   CALCIUM mg/dL 8 5 9 2 8 8 8 2* 8 6   < > 9 3   PHOSPHORUS mg/dL  --   --  4 1  --   --   --   --    ALK PHOS U/L  --   --   --   --   --   --  393*   ALT U/L  --   --   --   --   --   --  49   AST U/L  --   --   --   --   --   --  37    < > = values in this interval not displayed  Portions of the record may have been created with voice recognition software  Occasional wrong word or "sound a like" substitutions may have occurred due to the inherent limitations of voice recognition software  Read the chart carefully and recognize, using context, where substitutions have occurred  If you have any questions, please contact the dictating provider

## 2022-09-30 NOTE — UTILIZATION REVIEW
Notification of Discharge   This is a Notification of Discharge from our facility [address]  Please be advised that this patient has been discharge from our facility  Below you will find the admission and discharge date and time including the patients disposition  UTILIZATION REVIEW CONTACT:  Letta Dubin  Utilization   Network Utilization Review Department  Phone: [phone] x carefully listen to the prompts  All voicemails are confidential   Email: [email or web address]     PHYSICIAN ADVISORY SERVICES:  FOR [id number] REVIEW - MEDICAL NECESSITY DENIAL  Phone: [phone]  Fax: [phone]  Email: [email or web address]     PRESENTATION DATE: 9/23/2022  2:14 PM  OBERVATION ADMISSION DATE:   INPATIENT ADMISSION DATE: 9/23/22  4:12 PM   DISCHARGE DATE: 9/29/2022  3:18 PM  DISPOSITION: Home/Self Care Home/Self Care      IMPORTANT INFORMATION:  Send all requests for admission clinical reviews, approved or denied determinations and any other requests to dedicated fax number below belonging to the campus where the patient is receiving treatment   List of dedicated fax numbers:  [address] [place] [address] DENIALS (Administrative/Medical Necessity) [phone]   [address] (Maternity/NICU/Pediatrics) [phone]   Nissa Sewell [phone]   [address] [facility] [phone]   [address] [place] [address] [phone]   [address] [place] [address] [address] [place] [address],[address] [phone]   [facility]  [phone]   [address] [place] [address], [facility]  [address] [facility] 1000 [facility] [phone]

## 2022-09-30 NOTE — TELEPHONE ENCOUNTER
Pt discharged yesterday evening,    I called pt and informed her Labs are due 1 week after d/c date  Currently scheduled for November, I advised we will keep that scheduled date due to no openings and call her when labs are compete

## 2022-10-03 NOTE — PROGRESS NOTES
Cardiology Outpatient Progress Note - [place] 75 y o  female MRN: [id number]    @ Encounter: [id number]      Patient Active Problem List    Diagnosis Date Noted    Nocturnal hypoxia 10/30/2021    Abnormal finding on imaging of liver 10/18/2021    Acute kidney injury superimposed on chronic kidney disease ([facility] )     Pericardial effusion 07/02/2021    Stage 4 chronic kidney disease ([facility] ) 07/02/2021    Hypoalbuminemia 02/25/2021    Hyperphosphatemia 02/22/2021    Elevated d-dimer 02/19/2020    History of anemia due to chronic kidney disease 05/31/2019    Vitamin D deficiency 05/31/2019    Proteinuria 05/31/2019    Coronary artery disease 01/04/2019    Anemia 12/30/2018    Chronic diastolic heart failure ([facility] ) 12/29/2018    Hypothyroidism 12/29/2018    Hypertension 12/29/2018    Acute renal failure superimposed on stage 4 chronic kidney disease ([place] [address] ) 12/29/2018    Shortness of breath 09/23/2022    Ischemic cardiomyopathy 09/23/2022    Type 2 diabetes mellitus, with long-term current use of insulin ([place] [address] ) 09/16/2022    Stenosis of both internal carotid arteries 09/16/2022    COVID-19 02/09/2022    Microalbuminuria due to type 2 diabetes mellitus ([place] [address] ) 03/21/2020    Hyperlipidemia 03/17/2020     Assessment:  # Chronic HFpEF, Stage C  Etiology: pt previously had inferior wall hypo with relatively preserved EF but inferior hypo more pronounced now   Possibly due to accelerated HTN as MAPs very high on previous admit and heart did not reverse remodel with drop in EF so may be due to high afterload and filling pressures  Diuretic: bumex 2 mg BID  Weight: 149 lbs  NT pro BNP 9/23/22: 21,757  3/1/21: 9024    Studies- personally reviewed by me  Echo 9/15/22:  LVEF: 40-45%  LVEDd: 5 cm  RV: normal  Small pericardial effusion    Echo 7/3/21:  LVEF: 60%, hypo inferior wall  RV: normal  Other: small to moderate pericardial effusion posterior- no compromise    Echo 2/22/21  LVEF: 60%, moderate LVH, grade 2 DD  RV: normal  Other: moderate free flowing pericardial effusion     Echo 12/30/18:  EF: 50%, grade 2 DD, PASP 35 mmHg    # pericardial effusion- see echo report above     # CAD - LAD stents  [facility] 1/4/19: LUDY x 2 to LAD after NSTEMI (trop 0 2) and abnormal stress test with ischemia in anterior wall on 12/31/18  80% RCA- plan staged, but has not needed intervention  Med Rx: Imdur 120 mg daily, asa, atorvastatin 40 mg, coreg 12 5 mg BID  Angina: none    # dyslipidemia- atorvastatin 40 mg   3/2/21: LDL 48, HDL 76     # HTN- coreg 12 5 mg BID, hydralazine 75 mg TID, Imdur 120 mg daily  Amlodipine 5 mg BID     # palpitations  Holter 5/17/19: 53-85, avg 69 bpm  8 PVCs      # anemia of CKD, HgB 8 1 on 3/15/21, 7 1 on 10/27  IV iron  # CKD4, Cr 3 14 on 11/5/21  # DM2, HgA1C 10/25/21: 9 7%     Today's Plan:  Continue Bumex 2 BID for volume for HFpEF  Continue  Asa, imdur, atorvastatin, coreg  for CAD  Lipids at goal  BP now at goal on her meds     HPI:     76 yo with chronic diastolic CHF, HTN, CAD s/p LUDY x 2 LAD, residual disease in RCA planned for stage PCI, CKD, anemia presents post hospitalization for NSTEMI and acute diastolic CHF  She was having OROSCO for last 3-4 weeks  Eats a lot of salty foods  Had PND, edema for about a month prior  She was hypertensive to 220 mmHg on admit  Held off on RCA stenting last time  Gave lasix 80 mg IV in office - weight next day down a couple of pounts to 171      Was admitted in Feb with PNA and HF exacerbation, had KELLY likely due to contrast, was discharged off diuretics and then admitted early March with acute HFpEF, NT proBNP 9024  She received IV diuresis    Admitted in October 2021 with volume overload, stated did not respond to torsemide, switched to Bumex     Pt s/p recent admit earlier this month with dizziness and headache, evaluated for stroke  MRI and MRA brain negative  Echo done and EF down to 40% with same, worsening inferior wall hypokinesis   Given no ischemic symptoms and CKD, LHC not pursued  Her BP was 207/88 mmHg on presentation to ED  On 9/16, recorded BP down to 154/65 mmHg  One week after discharge presents back to ED with chest pain on exertion and OROSCO and orthopnea  BP in /95 mmHg       Interim:  S/p hospitalization, chest pain, OROSCO and orthopnea  BP in /95 mmHg  Echo 9/15/22:  LVEF: 40-45%  LVEDd: 5 cm  RV: normal     Wt down 8 lbs  Past Medical History:   Diagnosis Date    Anemia     [facility])     Chronic kidney disease     Coronary artery disease     Diabetes mellitus ([facility] )     Hypertension     Hypothyroidism        Review of Systems   Constitutional: Negative for activity change, appetite change, fatigue and unexpected weight change (wt down 8 lbs)  HENT: Negative for congestion and nosebleeds  Eyes: Negative  Respiratory: Negative for cough, chest tightness and shortness of breath  Cardiovascular: Negative for chest pain, palpitations and leg swelling  Gastrointestinal: Negative for abdominal distention  Endocrine: Negative  Genitourinary: Negative  Musculoskeletal: Negative  Skin: Negative  Neurological: Negative for dizziness, syncope and weakness  Hematological: Negative  Psychiatric/Behavioral: Negative  Allergies   Allergen Reactions    Iv Contrast [Iodinated Diagnostic Agents] Itching     Caused KELLY    Nifedipine Rash           Current Outpatient Medications:     amLODIPine (NORVASC) 5 mg tablet, Take 1 tablet (5 mg total) by mouth 2 (two) times a day, Disp: 60 tablet, Rfl: 0    aspirin (RA Aspirin Adult Low Dose) 81 mg chewable tablet, Chew 1 tablet (81 mg total) daily, Disp: 90 tablet, Rfl: 1    atorvastatin (LIPITOR) 40 mg tablet, Take 2 tablets (80 mg total) by mouth daily, Disp: 90 tablet, Rfl: 3    bumetanide (BUMEX) 2 mg tablet, Take 1 tablet (2 mg total) by mouth 2 (two) times a day Start bumex 1 mg BID from Monday 2/21/22,, Disp: 60 tablet, Rfl: 0   carvedilol (COREG) 12 5 mg tablet, Take 1 tablet (12 5 mg total) by mouth 2 (two) times a day with meals, Disp: 180 tablet, Rfl: 3    cholecalciferol (VITAMIN D3) 1,000 units tablet, Take 2 tablets (2,000 Units total) by mouth daily, Disp: 180 tablet, Rfl: 3    Droplet Pen Needles 32G X 4 MM MISC, 2 (two) times a day, Disp: , Rfl:     hydrALAZINE (APRESOLINE) 25 mg tablet, Take 3 tablets (75 mg total) by mouth 3 (three) times a day, Disp: 270 tablet, Rfl: 0    insulin aspart protamine-insulin aspart (NovoLOG 70/30) 100 units/mL injection, As per your current blood sugars, take 15 units before breakfast, 10 units before dinner, Disp: , Rfl: 0    isosorbide mononitrate (IMDUR) 120 mg 24 hr tablet, Take 1 tablet (120 mg total) by mouth daily, Disp: 90 tablet, Rfl: 3    Lancets (OneTouch Delica Plus [id number]) MISC, use to TEST BLOOD SUGAR three times a day as directed, Disp: , Rfl:     levothyroxine 125 mcg tablet, Take 125 mcg by mouth daily, Disp: , Rfl:     OneTouch Verio test strip, use to TEST BLOOD SUGAR three times a day as directed, Disp: , Rfl:     carbamide peroxide (DEBROX) 6 5 % otic solution, Administer 5 drops into the left ear 2 (two) times a day for 3 days, Disp: 15 mL, Rfl: 0    Social History     Socioeconomic History    Marital status: /Civil Union     Spouse name: Not on file    Number of children: 3    Years of education: Not on file    Highest education level: Not on file   Occupational History    Occupation: part time   Tobacco Use    Smoking status: Former Smoker    Smokeless tobacco: Never Used   Vaping Use    Vaping Use: Never used   Substance and Sexual Activity    Alcohol use: Yes     Comment: occ    Drug use: No    Sexual activity: Not Currently     Partners: Male   Other Topics Concern    Not on file   Social History Narrative    Always uses seatbelt    Caffeine use    Daily coffee consumption: 1 cp daily    Feels safe at home    Lives with spouse     Social Determinants of Health     Financial Resource Strain: Not on file   Food Insecurity: No Food Insecurity    Worried About Running Out of Food in the Last Year: Never true    Ran Out of Food in the Last Year: Never true   Transportation Needs: Not on file   Physical Activity: Not on file   Stress: Not on file   Social Connections: Not on file   Intimate Partner Violence: Not on file   Housing Stability: [address] Unable to Pay for Housing in the Last Year: No    Number of [place] in the Last Year: 1    Unstable Housing in the Last Year: No       Family History   Problem Relation Age of Onset    Diabetes Mother     Heart attack Father         MI    Hypertension Brother        Physical Exam:    Vitals: Blood pressure 134/64, pulse 66, weight 68 kg (149 lb 14 4 oz), SpO2 100 %, currently breastfeeding , Body mass index is 27 42 kg/m² ,   Wt Readings from Last 3 Encounters:   10/04/22 68 kg (149 lb 14 4 oz)   09/29/22 68 4 kg (150 lb 12 7 oz)   09/15/22 70 8 kg (156 lb)         Physical Exam  Constitutional:       Appearance: She is well-developed  HENT:      Head: Normocephalic and atraumatic  Eyes:      Pupils: Pupils are equal, round, and reactive to light  Neck:      Vascular: No JVD  Cardiovascular:      Rate and Rhythm: Normal rate and regular rhythm  Heart sounds: No murmur heard  Pulmonary:      Effort: Pulmonary effort is normal  No respiratory distress  Breath sounds: Normal breath sounds  Abdominal:      General: There is no distension  Palpations: Abdomen is soft  Tenderness: There is no abdominal tenderness  Musculoskeletal:         General: Normal range of motion  Cervical back: Normal range of motion  Skin:     General: Skin is warm and dry  Findings: Bruising (from hospital IVs) present  No rash  Neurological:      Mental Status: She is alert and oriented to person, place, and time           Labs & Results:    Lab Results   Component Value Date SODIUM 135 09/29/2022    K 4 6 09/29/2022     09/29/2022    CO2 25 09/29/2022    BUN 93 (H) 09/29/2022    CREATININE 3 77 (H) 09/29/2022    GLUC 106 09/29/2022    CALCIUM 8 5 09/29/2022     Lab Results   Component Value Date    WBC 5 19 09/29/2022    HGB 7 3 (L) 09/29/2022    HCT 23 3 (L) 09/29/2022    MCV 91 09/29/2022     (L) 09/29/2022     No results found for: BNP   Lab Results   Component Value Date    CHOLESTEROL 221 (H) 09/16/2022    CHOLESTEROL 216 (H) 05/27/2022    CHOLESTEROL 157 10/25/2021     Lab Results   Component Value Date    HDL 59 09/16/2022    HDL 72 05/27/2022    HDL 64 10/25/2021     Lab Results   Component Value Date    TRIG 106 09/16/2022    TRIG 91 05/27/2022    TRIG 93 10/25/2021     Lab Results   Component Value Date    [place] 144 05/27/2022    [place] 93 10/25/2021    [place] 120 10/16/2021         EKG personally reviewed by Marcus Rodgers  Counseling / Coordination of Care  Time spent today 25 minutes  Greater than 50% of total time was spent with the patient and / or family counseling and / or coordination of care  We discussed diagnoses, most recent studies, tests and any changes in treatment plan  Thank you for the opportunity to participate in the care of this patient      [facility] PULMONARY HYPERTENSION  MEDICAL DIRECTOR OF [place]

## 2022-10-04 ENCOUNTER — OFFICE VISIT (OUTPATIENT)
Dept: CARDIOLOGY CLINIC | Facility: CLINIC | Age: 67
End: 2022-10-04
Payer: COMMERCIAL

## 2022-10-04 VITALS
SYSTOLIC BLOOD PRESSURE: 134 MMHG | HEART RATE: 66 BPM | WEIGHT: 149.9 LBS | DIASTOLIC BLOOD PRESSURE: 64 MMHG | BODY MASS INDEX: 27.42 KG/M2 | OXYGEN SATURATION: 100 %

## 2022-10-04 DIAGNOSIS — I50.32 CHRONIC HEART FAILURE WITH PRESERVED EJECTION FRACTION (HCC): ICD-10-CM

## 2022-10-04 DIAGNOSIS — I25.5 ISCHEMIC CARDIOMYOPATHY: ICD-10-CM

## 2022-10-04 DIAGNOSIS — I50.32 CHRONIC DIASTOLIC HEART FAILURE (HCC): Primary | ICD-10-CM

## 2022-10-04 DIAGNOSIS — E78.00 PURE HYPERCHOLESTEROLEMIA: ICD-10-CM

## 2022-10-04 DIAGNOSIS — I25.10 CORONARY ARTERY DISEASE INVOLVING NATIVE CORONARY ARTERY OF NATIVE HEART WITHOUT ANGINA PECTORIS: ICD-10-CM

## 2022-10-04 DIAGNOSIS — I10 HYPERTENSION, UNSPECIFIED TYPE: ICD-10-CM

## 2022-10-04 DIAGNOSIS — I10 HTN (HYPERTENSION), BENIGN: ICD-10-CM

## 2022-10-04 DIAGNOSIS — I13.0: ICD-10-CM

## 2022-10-04 PROCEDURE — 99214 OFFICE O/P EST MOD 30 MIN: CPT | Performed by: INTERNAL MEDICINE

## 2022-10-04 RX ORDER — AMLODIPINE BESYLATE 5 MG/1
5 TABLET ORAL 2 TIMES DAILY
Qty: 180 TABLET | Refills: 3 | Status: SHIPPED | OUTPATIENT
Start: 2022-10-04

## 2022-10-04 RX ORDER — HYDRALAZINE HYDROCHLORIDE 25 MG/1
75 TABLET, FILM COATED ORAL 3 TIMES DAILY
Qty: 270 TABLET | Refills: 3 | Status: SHIPPED | OUTPATIENT
Start: 2022-10-04

## 2022-10-04 RX ORDER — CARVEDILOL 12.5 MG/1
12.5 TABLET ORAL 2 TIMES DAILY WITH MEALS
Qty: 180 TABLET | Refills: 3 | Status: SHIPPED | OUTPATIENT
Start: 2022-10-04

## 2022-10-04 RX ORDER — BUMETANIDE 2 MG/1
2 TABLET ORAL 2 TIMES DAILY
Qty: 180 TABLET | Refills: 3 | Status: SHIPPED | OUTPATIENT
Start: 2022-10-04

## 2022-10-04 RX ORDER — ISOSORBIDE MONONITRATE 120 MG/1
120 TABLET, EXTENDED RELEASE ORAL DAILY
Qty: 90 TABLET | Refills: 3 | Status: SHIPPED | OUTPATIENT
Start: 2022-10-04

## 2022-10-14 ENCOUNTER — TELEPHONE (OUTPATIENT)
Dept: NEPHROLOGY | Facility: CLINIC | Age: 67
End: 2022-10-14

## 2022-10-14 ENCOUNTER — APPOINTMENT (OUTPATIENT)
Dept: LAB | Facility: CLINIC | Age: 67
End: 2022-10-14
Payer: COMMERCIAL

## 2022-10-14 DIAGNOSIS — N18.4 STAGE 4 CHRONIC KIDNEY DISEASE (HCC): ICD-10-CM

## 2022-10-14 DIAGNOSIS — N18.4 CKD (CHRONIC KIDNEY DISEASE) STAGE 4, GFR 15-29 ML/MIN (HCC): ICD-10-CM

## 2022-10-14 DIAGNOSIS — E03.9 HYPOTHYROIDISM, UNSPECIFIED TYPE: ICD-10-CM

## 2022-10-14 DIAGNOSIS — I10 ESSENTIAL HYPERTENSION: ICD-10-CM

## 2022-10-14 DIAGNOSIS — R80.1 PERSISTENT PROTEINURIA: ICD-10-CM

## 2022-10-14 DIAGNOSIS — I50.32 CHRONIC HEART FAILURE WITH PRESERVED EJECTION FRACTION (HCC): ICD-10-CM

## 2022-10-14 DIAGNOSIS — E78.5 HYPERLIPIDEMIA ASSOCIATED WITH TYPE 2 DIABETES MELLITUS (HCC): ICD-10-CM

## 2022-10-14 DIAGNOSIS — E11.65 UNCONTROLLED TYPE 2 DIABETES MELLITUS WITH HYPERGLYCEMIA (HCC): ICD-10-CM

## 2022-10-14 DIAGNOSIS — E11.69 HYPERLIPIDEMIA ASSOCIATED WITH TYPE 2 DIABETES MELLITUS (HCC): ICD-10-CM

## 2022-10-14 LAB
ALBUMIN SERPL BCP-MCNC: 3 G/DL (ref 3.5–5)
ALP SERPL-CCNC: 320 U/L (ref 46–116)
ALT SERPL W P-5'-P-CCNC: 48 U/L (ref 12–78)
ANION GAP SERPL CALCULATED.3IONS-SCNC: 7 MMOL/L (ref 4–13)
AST SERPL W P-5'-P-CCNC: 33 U/L (ref 5–45)
BACTERIA UR QL AUTO: ABNORMAL /HPF
BILIRUB SERPL-MCNC: 0.47 MG/DL (ref 0.2–1)
BILIRUB UR QL STRIP: NEGATIVE
BUN SERPL-MCNC: 102 MG/DL (ref 5–25)
CALCIUM ALBUM COR SERPL-MCNC: 10.4 MG/DL (ref 8.3–10.1)
CALCIUM SERPL-MCNC: 9.6 MG/DL (ref 8.3–10.1)
CHLORIDE SERPL-SCNC: 107 MMOL/L (ref 96–108)
CHOLEST SERPL-MCNC: 174 MG/DL
CLARITY UR: CLEAR
CO2 SERPL-SCNC: 24 MMOL/L (ref 21–32)
COLOR UR: ABNORMAL
CREAT SERPL-MCNC: 3.63 MG/DL (ref 0.6–1.3)
CREAT UR-MCNC: 112 MG/DL
ERYTHROCYTE [DISTWIDTH] IN BLOOD BY AUTOMATED COUNT: 12.8 % (ref 11.6–15.1)
EST. AVERAGE GLUCOSE BLD GHB EST-MCNC: 189 MG/DL
GFR SERPL CREATININE-BSD FRML MDRD: 12 ML/MIN/1.73SQ M
GLUCOSE P FAST SERPL-MCNC: 130 MG/DL (ref 65–99)
GLUCOSE UR STRIP-MCNC: NEGATIVE MG/DL
HBA1C MFR BLD: 8.2 %
HCT VFR BLD AUTO: 28.7 % (ref 34.8–46.1)
HDLC SERPL-MCNC: 61 MG/DL
HGB BLD-MCNC: 8.7 G/DL (ref 11.5–15.4)
HGB UR QL STRIP.AUTO: NEGATIVE
KETONES UR STRIP-MCNC: NEGATIVE MG/DL
LDLC SERPL CALC-MCNC: 88 MG/DL (ref 0–100)
LEUKOCYTE ESTERASE UR QL STRIP: NEGATIVE
MAGNESIUM SERPL-MCNC: 2 MG/DL (ref 1.6–2.6)
MCH RBC QN AUTO: 27.7 PG (ref 26.8–34.3)
MCHC RBC AUTO-ENTMCNC: 30.3 G/DL (ref 31.4–37.4)
MCV RBC AUTO: 91 FL (ref 82–98)
NITRITE UR QL STRIP: NEGATIVE
NON-SQ EPI CELLS URNS QL MICRO: ABNORMAL /HPF
NONHDLC SERPL-MCNC: 113 MG/DL
PH UR STRIP.AUTO: 5.5 [PH]
PHOSPHATE SERPL-MCNC: 4.7 MG/DL (ref 2.3–4.1)
PLATELET # BLD AUTO: 116 THOUSANDS/UL (ref 149–390)
PMV BLD AUTO: 11.7 FL (ref 8.9–12.7)
POTASSIUM SERPL-SCNC: 4.5 MMOL/L (ref 3.5–5.3)
PROT SERPL-MCNC: 7.5 G/DL (ref 6.4–8.4)
PROT UR STRIP-MCNC: ABNORMAL MG/DL
PROT UR-MCNC: 109 MG/DL
PROT/CREAT UR: 0.97 MG/G{CREAT} (ref 0–0.1)
PTH-INTACT SERPL-MCNC: 204.8 PG/ML (ref 18.4–80.1)
RBC # BLD AUTO: 3.14 MILLION/UL (ref 3.81–5.12)
RBC #/AREA URNS AUTO: ABNORMAL /HPF
SODIUM SERPL-SCNC: 138 MMOL/L (ref 135–147)
SP GR UR STRIP.AUTO: 1.01 (ref 1–1.03)
TRIGL SERPL-MCNC: 123 MG/DL
TSH SERPL DL<=0.05 MIU/L-ACNC: 0.35 UIU/ML (ref 0.45–4.5)
URATE SERPL-MCNC: 11 MG/DL (ref 2–7.5)
UROBILINOGEN UR STRIP-ACNC: <2 MG/DL
WBC # BLD AUTO: 5.22 THOUSAND/UL (ref 4.31–10.16)
WBC #/AREA URNS AUTO: ABNORMAL /HPF

## 2022-10-14 PROCEDURE — 84550 ASSAY OF BLOOD/URIC ACID: CPT | Performed by: INTERNAL MEDICINE

## 2022-10-14 PROCEDURE — 80061 LIPID PANEL: CPT

## 2022-10-14 PROCEDURE — 84100 ASSAY OF PHOSPHORUS: CPT | Performed by: INTERNAL MEDICINE

## 2022-10-14 PROCEDURE — 84443 ASSAY THYROID STIM HORMONE: CPT

## 2022-10-14 PROCEDURE — 83735 ASSAY OF MAGNESIUM: CPT | Performed by: INTERNAL MEDICINE

## 2022-10-14 PROCEDURE — 84244 ASSAY OF RENIN: CPT

## 2022-10-14 PROCEDURE — 82088 ASSAY OF ALDOSTERONE: CPT

## 2022-10-14 PROCEDURE — 84156 ASSAY OF PROTEIN URINE: CPT | Performed by: INTERNAL MEDICINE

## 2022-10-14 PROCEDURE — 36415 COLL VENOUS BLD VENIPUNCTURE: CPT | Performed by: INTERNAL MEDICINE

## 2022-10-14 PROCEDURE — 81001 URINALYSIS AUTO W/SCOPE: CPT | Performed by: INTERNAL MEDICINE

## 2022-10-14 PROCEDURE — 82570 ASSAY OF URINE CREATININE: CPT | Performed by: INTERNAL MEDICINE

## 2022-10-14 PROCEDURE — 85027 COMPLETE CBC AUTOMATED: CPT | Performed by: INTERNAL MEDICINE

## 2022-10-14 PROCEDURE — 83036 HEMOGLOBIN GLYCOSYLATED A1C: CPT

## 2022-10-14 PROCEDURE — 80053 COMPREHEN METABOLIC PANEL: CPT | Performed by: INTERNAL MEDICINE

## 2022-10-14 PROCEDURE — 83970 ASSAY OF PARATHORMONE: CPT

## 2022-10-14 NOTE — TELEPHONE ENCOUNTER
----- Message from Kendall Lorenz DO sent at 10/14/2022  2:07 PM EDT -----  Her hemoglobin is improved to 8 7 her creatinine is stable at 3 6 her electrolytes are stable  From a renal standpoint she can continue her current medication    Thank you

## 2022-10-14 NOTE — TELEPHONE ENCOUNTER
Lm for the patient advised hemoglobin stable, creatinine stable and electrolytes  no changes at this time

## 2022-10-20 LAB
ALDOST SERPL-MCNC: 6.4 NG/DL (ref 0–30)
ALDOST/RENIN PLAS-RTO: 7.7 {RATIO} (ref 0–30)
RENIN PLAS-CCNC: 0.83 NG/ML/HR (ref 0.17–5.38)

## 2022-11-08 ENCOUNTER — TELEPHONE (OUTPATIENT)
Dept: NEPHROLOGY | Facility: CLINIC | Age: 67
End: 2022-11-08

## 2022-11-08 NOTE — TELEPHONE ENCOUNTER
Called and spoke with Patient to complete their bloodwork prior to their appointment on / with Dr Jono Alston at the [facility]

## 2022-11-15 ENCOUNTER — TELEPHONE (OUTPATIENT)
Dept: NEPHROLOGY | Facility: CLINIC | Age: 67
End: 2022-11-15

## 2022-11-15 NOTE — TELEPHONE ENCOUNTER
Appointment Confirmation   Person confirmed appointment with  If not patient, name of the person Answering Machine    Date and time of appointment 11/16    Patient acknowledged and will be at appointment? no    Did you advise the patient that they will need a urine sample if they are a new patient?  N/A    Did you advise the patient to bring their current medications for verification? (including any OTC) Yes    Additional Information

## 2022-11-16 ENCOUNTER — OFFICE VISIT (OUTPATIENT)
Dept: NEPHROLOGY | Facility: CLINIC | Age: 67
End: 2022-11-16

## 2022-11-16 VITALS
WEIGHT: 141 LBS | HEIGHT: 62 IN | SYSTOLIC BLOOD PRESSURE: 138 MMHG | DIASTOLIC BLOOD PRESSURE: 72 MMHG | BODY MASS INDEX: 25.95 KG/M2

## 2022-11-16 DIAGNOSIS — N18.5 CKD (CHRONIC KIDNEY DISEASE) STAGE 5, GFR LESS THAN 15 ML/MIN (HCC): Primary | ICD-10-CM

## 2022-11-16 NOTE — PROGRESS NOTES
OFFICE FOLLOW UP - Nephrology   Alena Dykes 75 y o  female MRN: [id number]    Encounter: [id number]        ASSESSMENT  Diagnoses and all orders for this visit:    60-year-old female with past medical history type 2 diabetes, hypertension, coronary artery disease s/p PCI complicated by acute kidney injury on chronic kidney disease    KELLY (acute kidney injury) ([facility] )  Stage 4 chronic kidney disease ([facility] )  Coronary artery disease involving native coronary artery of native heart with angina pectoris (HCC)  Acute diastolic congestive heart failure ([facility] )  Essential hypertension  Type 2 myocardial infarction ([facility] )      DISCUSSION & PLAN    1  Acute kidney injury-recently very severe has had multiple episodes of acute kidney injury this was in the setting of diuresis, diarrhea nausea vomiting COVID infection likely pre renal azotemia that had converted to ATN  -her peak creatinine was 7 3 with a BUN of 103  -fortunately her creatinine have improved and are stable  -baseline creatinine is now around 3 6    2  Stage 5 chronic kidney disease-baseline creatinine around 3 6, estimated GFR around 12  -her creatinine is below her initial baseline this could be related to some muscle mass loss at this point will monitor  -continue cardiovascular risk reduction measures-statin therapy, glycemic control blood pressure controlled her blood pressure is elevated today in the office but home blood pressure readings are 316-637 systolic  -she in still unsure whether she would want dialysis  She states that right now  Multiple discussions regarding goals of care  Will continue attempts to educate  Continue cardiovascular risk reduction and maximal conservative management  If she does want to pursue renal replacement therapy we can schedule modality training but for now continuing maximal conservative management    3  Coronary artery disease with diastolic congestive heart failure  -follows with Heart failure team regularly    4  Essential hypertension in the setting of diastolic heart failure  -blood pressures remain elevated  -blood pressures are stable  -carvedilol 12 5 mg 2 times daily  -Bumex 2 mg twice daily  -amlodipine 5 mg 2 times a day  -120 mg of Imdur daily  -hydralazine 75 mg 3 times daily    5  diastolic congestive heart failure  -following up with Cardiology  -continue blood pressure control, on a beta-blocker, on Imdur , hydralazine not on RAAS inhibition, multiple episodes of acute kidney injury and hyperkalemia  -currently on a statin    6  Type 2 diabetes mellitus  -continue glycemic control     7  Vitamin-D deficiency  -PTH acceptable    8  Proteinuria  -proteinuria now stabilized  -monitor longstanding history of diabetes, with worsening blood sugars  -now with high chance of progression    9  Anemia in the setting of chronic kidney disease  -does follow with Hematology last hemoglobin was 7 8  -did require blood transfusion in the hospital  -will repeat BMP in 1 month to ensure stability  -was on Aranesp and Venofer as an outpatient previously    Continue follow-up in 3-4 months    HPI: Caitlin Jalloh is a 75 y o  female who is here for follow-up  She has a past medical history of type 2 diabetes mellitus and hypertension diastolic congestive heart failure    She appears compensated from a volume standpoint and a heart failure standpoint her most recent blood work shows that her creatinine is stable around 3 6  Currently denies any acute complaints no chest pain or shortness of breath no fevers or chills    ROS:   All the systems were reviewed and were negative except as documented on the HPI  Allergies:  Iv contrast [iodinated diagnostic agents] and Nifedipine    Medications:   Current Outpatient Medications:   •  amLODIPine (NORVASC) 5 mg tablet, Take 1 tablet (5 mg total) by mouth 2 (two) times a day, Disp: 180 tablet, Rfl: 3  •  aspirin (RA Aspirin Adult Low Dose) 81 mg chewable tablet, Chew 1 tablet (81 mg total) daily, Disp: 90 tablet, Rfl: 1  •  atorvastatin (LIPITOR) 40 mg tablet, Take 2 tablets (80 mg total) by mouth daily, Disp: 90 tablet, Rfl: 3  •  bumetanide (BUMEX) 2 mg tablet, Take 1 tablet (2 mg total) by mouth 2 (two) times a day Start bumex 1 mg BID from Monday 2/21/22,, Disp: 180 tablet, Rfl: 3  •  carvedilol (COREG) 12 5 mg tablet, Take 1 tablet (12 5 mg total) by mouth 2 (two) times a day with meals, Disp: 180 tablet, Rfl: 3  •  cholecalciferol (VITAMIN D3) 1,000 units tablet, Take 2 tablets (2,000 Units total) by mouth daily, Disp: 180 tablet, Rfl: 3  •  Droplet Pen Needles 32G X 4 MM MISC, 2 (two) times a day, Disp: , Rfl:   •  hydrALAZINE (APRESOLINE) 25 mg tablet, Take 3 tablets (75 mg total) by mouth 3 (three) times a day, Disp: 270 tablet, Rfl: 3  •  insulin aspart protamine-insulin aspart (NovoLOG 70/30) 100 units/mL injection, As per your current blood sugars, take 15 units before breakfast, 10 units before dinner, Disp: , Rfl: 0  •  isosorbide mononitrate (IMDUR) 120 mg 24 hr tablet, Take 1 tablet (120 mg total) by mouth daily, Disp: 90 tablet, Rfl: 3  •  Lancets (OneTouch Delica Plus GVSZEL50H) MISC, use to TEST BLOOD SUGAR three times a day as directed, Disp: , Rfl:   •  levothyroxine 125 mcg tablet, Take 112 mcg by mouth daily, Disp: , Rfl:   •  OneTouch Verio test strip, use to TEST BLOOD SUGAR three times a day as directed, Disp: , Rfl:   •  carbamide peroxide (DEBROX) 6 5 % otic solution, Administer 5 drops into the left ear 2 (two) times a day for 3 days, Disp: 15 mL, Rfl: 0    Past Medical History:   Diagnosis Date   • Anemia    • CHF (congestive heart failure) (HCC)    • Chronic kidney disease    • Coronary artery disease    • Diabetes mellitus ([facility] )    • Hypertension    • Hypothyroidism      Past Surgical History:   Procedure Laterality Date   • CORONARY ANGIOPLASTY WITH STENT PLACEMENT  2019     Family History   Problem Relation Age of Onset   • Diabetes Mother    • Heart attack Father         MI   • Hypertension Brother       reports that she has quit smoking  She has never used smokeless tobacco  She reports current alcohol use  She reports that she does not use drugs  Physical Exam:   Vitals:    11/16/22 0955 11/16/22 1007   BP: 160/72 138/72   BP Location: Left arm    Patient Position: Sitting    Cuff Size: Adult    Weight: 64 kg (141 lb)    Height: 5' 2" (1 575 m)      Body mass index is 25 79 kg/m²  Repeat blood pressure on the right arm was 155 over 72  General: conscious, cooperative, in no acute distress  Eyes: conjunctivae pink, anicteric sclerae  ENT: lips and mucous membranes moist  Neck: supple, no JVD  Chest: clear breath sounds bilateral, no crackles, ronchus or wheezings  CVS: normal rate, regular rhythm  Abdomen: soft, non-tender, non-distended, normoactive bowel sounds  Extremities:  Trace edema  Skin: no rash  Neuro: awake, alert, oriented  Psych:  Pleasant affect    Lab Results:    Results for orders placed or performed in visit on 10/14/22   PTH, intact   Result Value Ref Range     8 (H) 18 4 - 80 1 pg/mL   Hemoglobin A1C   Result Value Ref Range    Hemoglobin A1C 8 2 (H) Normal 3 8-5 6%; PreDiabetic 5 7-6 4%; Diabetic >=6 5%; Glycemic control for adults with diabetes <7 0% %     mg/dl   Lipid panel   Result Value Ref Range    Cholesterol 174 See Comment mg/dL    Triglycerides 123 See Comment mg/dL    HDL, Direct 61 >=50 mg/dL    LDL Calculated 88 0 - 100 mg/dL    Non-HDL-Chol (CHOL-HDL) 113 mg/dl   Aldosterone/Renin Ratio   Result Value Ref Range    Renin 0 828 0 167 - 5 380 ng/mL/hr    Aldosterone 6 4 0 0 - 30 0 ng/dL    Aldos/Renin Ratio 7 7 0 0 - 30 0   TSH, 3rd generation   Result Value Ref Range    TSH 3RD GENERATON 0 346 (L) 0 450 - 4 500 uIU/mL       Portions of the record may have been created with voice recognition software   Occasional wrong word or "sound a like" substitutions may have occurred due to the inherent limitations of voice recognition software  Read the chart carefully and recognize, using context, where substitutions have occurred  If you have any questions, please contact the dictating provider

## 2022-11-16 NOTE — PATIENT INSTRUCTIONS
Chronic Kidney Disease   WHAT YOU NEED TO KNOW:   Chronic kidney disease (CKD) is the gradual and permanent loss of kidney function  It is also called chronic kidney failure, or chronic renal insufficiency  Normally, the kidneys remove fluid, chemicals, and waste from your blood  These wastes are turned into urine by your kidneys  CKD may worsen over time and lead to kidney failure  Your CKD team will help you and your family plan for your care at home  The team will help you create goals and find ways to meet your goals  Your care plan may change over time as your needs change  DISCHARGE INSTRUCTIONS:   Call your local emergency number (911 in the [address]) if:   You have a seizure  You have shortness of breath  Return to the emergency department if:   You are confused and very drowsy  Call your doctor or nephrologist if:   You suddenly gain or lose more weight than your healthcare provider has told you is okay  You have itchy skin or a rash  You urinate more or less than you normally do  You have blood in your urine  You have nausea and are vomiting  You have fatigue or muscle weakness  You have hiccups that will not stop  You have questions or concerns about your condition or care  Medicines:   Medicines  may be given to decrease blood pressure and get rid of extra fluid  You may also receive medicine to manage health conditions that may occur with CKD, such as anemia, diabetes, and heart disease  Take your medicine as directed  Contact your healthcare provider if you think your medicine is not helping or if you have side effects  Tell him or her if you are allergic to any medicine  Keep a list of the medicines, vitamins, and herbs you take  Include the amounts, and when and why you take them  Bring the list or the pill bottles to follow-up visits  Carry your medicine list with you in case of an emergency  What you can do to manage CKD:   Management may include making some lifestyle changes  Tell your healthcare provider if you have any concerns about being able to make changes  He or she can help you find solutions, including working with specialists  Ask for help creating a plan to break large goals into smaller steps  Your plan may include any of the following:  Manage other health conditions  Your healthcare provider will work with you to make a care plan that meets your needs  You will be checked regularly for heart disease or other conditions that can make CKD worse, such as diabetes  Your blood pressure will be closely monitored  You will also get a target blood pressure and help making a plan to reach your target  This may include taking your blood pressure at home  Maintain a healthy weight  Your weight and body mass index (BMI) will be checked regularly  BMI helps find if your weight is healthy for your height  Your healthcare provider will use other tests to check your muscle and protein levels  Extra weight can strain your kidneys  A low weight or low muscle mass can make you feel more tired  You may have trouble doing your daily activities  Ask your provider what a healthy weight is for you  He or she can help you create a plan to lose or gain weight safely, if needed  The plan may include keeping a food diary  This is a list of foods and liquids you have each day  Your provider will use the diary to help you make changes, if needed  Changes are based on your health and any other conditions you have, such as diabetes  Create an exercise plan  Regular exercise can help you manage CKD, high blood pressure, and diabetes  Exercise also helps control weight  Your provider can help you create exercise goals and a plan to reach those goals  For example, your goal may be to exercise for 30 minutes in a day  Your plan can include breaking exercise into 10 minute sessions, 3 times during the day  Create a healthy eating plan    Your provider may tell you to eat food low in potassium, phosphorus, or protein  Your provider may also recommend vitamin or mineral supplements  Do not take any supplements without talking to your provider  A dietitian can help you plan meals if needed  Ask how much liquid to drink each day and which liquids are best for you  Limit sodium (salt) as directed  You may need to limit sodium to less than 2,300 milligrams (mg) each day  Ask your dietitian or healthcare provider how much sodium you can have each day  The amount depends on your stage of kidney disease  Table salt, canned foods, soups, salted snacks, and processed meats, like deli meats and sausage, are high in sodium  Your provider or a dietitian can show you how to read food labels for sodium  Limit alcohol as directed  Alcohol can cause fluid retention and can affect your kidneys  Ask how much alcohol is safe for you  A drink of alcohol is 12 ounces of beer, 5 ounces of wine, or 1½ ounces of liquor  Do not smoke  Nicotine and other chemicals in cigarettes and cigars can cause kidney damage  Ask your provider for information if you currently smoke and need help to quit  E-cigarettes or smokeless tobacco still contain nicotine  Talk to your provider before you use these products  Ask about over-the-counter medicines  Medicines such as NSAIDs and laxatives may harm your kidneys  Some cough and cold medicines can raise your blood pressure  Always ask if a medicine is safe before you take it  Ask about vaccines you may need  CKD can increase your risk for infections such as pneumonia, influenza, and hepatitis  Vaccines lower your risk for infection  Your healthcare provider will tell you which vaccines you need and when to get them  Follow up with your doctor or nephrologist as directed: You will need to return for tests to monitor your kidney and nerve function, and your parathyroid hormone level   Your medicines may be changed, based on certain test results  Write down your questions so you remember to ask them during your visits  © Copyright [employer] 2022 Information is for End User's use only and may not be sold, redistributed or otherwise used for commercial purposes  All illustrations and images included in CareNotes® are the copyrighted property of [employer]  or [employer]  The above information is an  only  It is not intended as medical advice for individual conditions or treatments  Talk to your doctor, nurse or pharmacist before following any medical regimen to see if it is safe and effective for you

## 2022-11-28 ENCOUNTER — TELEPHONE (OUTPATIENT)
Dept: NEPHROLOGY | Facility: CLINIC | Age: 67
End: 2022-11-28

## 2022-11-28 NOTE — TELEPHONE ENCOUNTER
Patient has an appointment on 1/23 with Dr Brendon Oscar that needs to be rescheduled due to provider being out of the office  Left message for patient  This is the first attempt

## 2022-12-01 NOTE — TELEPHONE ENCOUNTER
Left message to reschedule 1/23/2023 follow up appointment with Dr Sharalyn Nissen in [place]  This is the 2nd attempt

## 2023-02-16 ENCOUNTER — APPOINTMENT (OUTPATIENT)
Dept: LAB | Facility: CLINIC | Age: 68
End: 2023-02-16

## 2023-02-16 DIAGNOSIS — E11.22 TYPE 2 DIABETES MELLITUS WITH STAGE 4 CHRONIC KIDNEY DISEASE, WITH LONG-TERM CURRENT USE OF INSULIN (HCC): ICD-10-CM

## 2023-02-16 DIAGNOSIS — N18.4 TYPE 2 DIABETES MELLITUS WITH STAGE 4 CHRONIC KIDNEY DISEASE, WITH LONG-TERM CURRENT USE OF INSULIN (HCC): ICD-10-CM

## 2023-02-16 DIAGNOSIS — E78.5 HYPERLIPIDEMIA ASSOCIATED WITH TYPE 2 DIABETES MELLITUS (HCC): ICD-10-CM

## 2023-02-16 DIAGNOSIS — E78.49 OTHER HYPERLIPIDEMIA: ICD-10-CM

## 2023-02-16 DIAGNOSIS — Z79.4 TYPE 2 DIABETES MELLITUS WITH STAGE 4 CHRONIC KIDNEY DISEASE, WITH LONG-TERM CURRENT USE OF INSULIN (HCC): ICD-10-CM

## 2023-02-16 DIAGNOSIS — E11.69 HYPERLIPIDEMIA ASSOCIATED WITH TYPE 2 DIABETES MELLITUS (HCC): ICD-10-CM

## 2023-02-16 DIAGNOSIS — N18.5 CKD (CHRONIC KIDNEY DISEASE) STAGE 5, GFR LESS THAN 15 ML/MIN (HCC): ICD-10-CM

## 2023-02-16 LAB
CHOLEST SERPL-MCNC: 235 MG/DL
HDLC SERPL-MCNC: 76 MG/DL
LDLC SERPL CALC-MCNC: 135 MG/DL (ref 0–100)
NONHDLC SERPL-MCNC: 159 MG/DL
PTH-INTACT SERPL-MCNC: 246.2 PG/ML (ref 18.4–80.1)
TRIGL SERPL-MCNC: 121 MG/DL
TSH SERPL DL<=0.05 MIU/L-ACNC: 2.19 UIU/ML (ref 0.45–4.5)

## 2023-02-17 ENCOUNTER — TELEPHONE (OUTPATIENT)
Dept: NEPHROLOGY | Facility: CLINIC | Age: 68
End: 2023-02-17

## 2023-02-17 LAB
EST. AVERAGE GLUCOSE BLD GHB EST-MCNC: 192 MG/DL
HBA1C MFR BLD: 8.3 %

## 2023-02-17 NOTE — TELEPHONE ENCOUNTER
----- Message from Aura Jacobs DO sent at 2/17/2023 10:37 AM EST -----  Labs stable to be dicsussed at upcoming appointment

## 2023-02-17 NOTE — TELEPHONE ENCOUNTER
Called patient and went over the following information from Dr Gissel Alvarez:    Labs stable to be dicsussed at upcoming appointment  Patient verbally understood and had no further questions for me at this time

## 2023-03-08 DIAGNOSIS — I25.10 CORONARY ARTERY DISEASE INVOLVING NATIVE CORONARY ARTERY OF NATIVE HEART WITHOUT ANGINA PECTORIS: ICD-10-CM

## 2023-03-08 DIAGNOSIS — N18.5 ANEMIA DUE TO STAGE 5 CHRONIC KIDNEY DISEASE, NOT ON CHRONIC DIALYSIS (HCC): ICD-10-CM

## 2023-03-08 DIAGNOSIS — I13.0: Primary | ICD-10-CM

## 2023-03-08 DIAGNOSIS — Z86.2 HISTORY OF ANEMIA DUE TO CHRONIC KIDNEY DISEASE: ICD-10-CM

## 2023-03-08 DIAGNOSIS — N18.4 STAGE 4 CHRONIC KIDNEY DISEASE (HCC): ICD-10-CM

## 2023-03-08 DIAGNOSIS — N18.9 HISTORY OF ANEMIA DUE TO CHRONIC KIDNEY DISEASE: ICD-10-CM

## 2023-03-08 DIAGNOSIS — D63.1 ANEMIA DUE TO STAGE 5 CHRONIC KIDNEY DISEASE, NOT ON CHRONIC DIALYSIS (HCC): ICD-10-CM

## 2023-03-08 RX ORDER — ISOSORBIDE MONONITRATE 120 MG/1
120 TABLET, EXTENDED RELEASE ORAL DAILY
Qty: 90 TABLET | Refills: 0 | Status: SHIPPED | OUTPATIENT
Start: 2023-03-08

## 2023-03-08 RX ORDER — HYDRALAZINE HYDROCHLORIDE 25 MG/1
75 TABLET, FILM COATED ORAL 3 TIMES DAILY
Qty: 270 TABLET | Refills: 3 | Status: ON HOLD | OUTPATIENT
Start: 2023-03-08

## 2023-03-16 NOTE — PROGRESS NOTES
Cardiology Outpatient Progress Note - Pee Doll 75 y o  female MRN: [id number]    @ Encounter: [id number]      Patient Active Problem List    Diagnosis Date Noted   • Nocturnal hypoxia 10/30/2021   • Abnormal finding on imaging of liver 10/18/2021   • Acute kidney injury superimposed on chronic kidney disease ([facility] )    • Pericardial effusion 07/02/2021   • Stage 4 chronic kidney disease ([facility] ) 07/02/2021   • Hypoalbuminemia 02/25/2021   • Hyperphosphatemia 02/22/2021   • Elevated d-dimer 02/19/2020   • History of anemia due to chronic kidney disease 05/31/2019   • Vitamin D deficiency 05/31/2019   • Proteinuria 05/31/2019   • Coronary artery disease 01/04/2019   • Anemia 12/30/2018   • Chronic diastolic heart failure ([facility] ) 12/29/2018   • Hypothyroidism 12/29/2018   • Hypertension 12/29/2018   • Acute renal failure superimposed on stage 4 chronic kidney disease ([place] [address] ) 12/29/2018   • Shortness of breath 09/23/2022   • Ischemic cardiomyopathy 09/23/2022   • Type 2 diabetes mellitus, with long-term current use of insulin ([place] [address] ) 09/16/2022   • Stenosis of both internal carotid arteries 09/16/2022   • COVID-19 02/09/2022   • Microalbuminuria due to type 2 diabetes mellitus ([place] [address] ) 03/21/2020   • Hyperlipidemia 03/17/2020     Assessment:  # Chronic HFpEF, Stage C  Etiology: pt previously had inferior wall hypo with relatively preserved EF but inferior hypo more pronounced now   Possibly due to accelerated HTN as MAPs very high on previous admit and heart did not reverse remodel with drop in EF so may be due to high afterload and filling pressures  Diuretic: bumex 2 mg BID  Weight: 149 lbs  NT pro BNP 9/23/22: 21,757  3/1/21: 9024    Studies- personally reviewed by me  Echo 9/15/22:  LVEF: 40-45%  LVEDd: 5 cm  RV: normal  Small pericardial effusion    Echo 7/3/21:  LVEF: 60%, hypo inferior wall  RV: normal  Other: small to moderate pericardial effusion posterior- no compromise    Echo 2/22/21  LVEF: 60%, moderate LVH, grade 2 DD  RV: normal  Other: moderate free flowing pericardial effusion     Echo 12/30/18:  EF: 50%, grade 2 DD, PASP 35 mmHg    # pericardial effusion- see echo report above     # CAD - LAD stents  [facility] 1/4/19: LUDY x 2 to LAD after NSTEMI (trop 0 2) and abnormal stress test with ischemia in anterior wall on 12/31/18  80% RCA- plan staged, but has not needed intervention  Med Rx: Imdur 120 mg daily, asa, atorvastatin 40 mg, coreg 12 5 mg BID  Angina: none    # dyslipidemia- atorvastatin 40 mg   2/16/23: , HDL 76  3/2/21: LDL 48, HDL 76     # HTN- coreg 12 5 mg BID, hydralazine 75 mg TID, Imdur 120 mg daily  Amlodipine 5 mg BID     # palpitations  Holter 5/17/19: 53-85, avg 69 bpm  8 PVCs      # anemia of CKD, HgB 8 1 on 3/15/21, 7 1 on 10/27  IV iron  # CKD4, Cr 3 14 on 11/5/21  # DM2, HgA1C 2/16/23: 8 3%     Today's Plan:  Continue Bumex 2 BID for volume for HFpEF  Continue  Asa, imdur, atorvastatin, coreg  for CAD  Lipids at goal  BP now at goal on her meds  Renewed Imdur at 60 mg daily (not 120 mg)     HPI:     76 yo with chronic diastolic CHF, HTN, CAD s/p LUDY x 2 LAD, residual disease in RCA planned for stage PCI, CKD, anemia presents post hospitalization for NSTEMI and acute diastolic CHF  She was having OROSCO for last 3-4 weeks  Eats a lot of salty foods  Had PND, edema for about a month prior  She was hypertensive to 220 mmHg on admit  Held off on RCA stenting last time  Gave lasix 80 mg IV in office - weight next day down a couple of pounts to 171      Was admitted in Feb with PNA and HF exacerbation, had KELLY likely due to contrast, was discharged off diuretics and then admitted early March with acute HFpEF, NT proBNP 9024  She received IV diuresis    Admitted in October 2021 with volume overload, stated did not respond to torsemide, switched to Bumex     Pt s/p recent admit earlier this month with dizziness and headache, evaluated for stroke  MRI and MRA brain negative   Echo done and EF down to 40% with same, worsening inferior wall hypokinesis  Given no ischemic symptoms and CKD, LHC not pursued  Her BP was 207/88 mmHg on presentation to ED  On 9/16, recorded BP down to 154/65 mmHg  One week after discharge presents back to ED with chest pain on exertion and OROSCO and orthopnea  BP in /95 mmHg       Interim:  No chest pain, no shortness of breath    Past Medical History:   Diagnosis Date   • Anemia    • CHF (congestive heart failure) ([facility])    • Chronic kidney disease    • Coronary artery disease    • Diabetes mellitus ([facility] )    • Hypertension    • Hypothyroidism        Review of Systems   Constitutional: Negative for activity change, appetite change, fatigue and unexpected weight change (wt down 8 lbs)  HENT: Negative for congestion and nosebleeds  Eyes: Negative  Respiratory: Negative for cough, chest tightness and shortness of breath  Cardiovascular: Negative for chest pain, palpitations and leg swelling  Gastrointestinal: Negative for abdominal distention  Endocrine: Negative  Genitourinary: Negative  Musculoskeletal: Negative  Skin: Negative  Neurological: Negative for dizziness, syncope and weakness  Hematological: Negative  Psychiatric/Behavioral: Negative  Allergies   Allergen Reactions   • Iv Contrast [Iodinated Contrast Media] Itching     Caused KELLY   • Nifedipine Rash           Current Outpatient Medications:   •  amLODIPine (NORVASC) 5 mg tablet, Take 1 tablet (5 mg total) by mouth 2 (two) times a day, Disp: 180 tablet, Rfl: 3  •  aspirin (RA Aspirin Adult Low Dose) 81 mg chewable tablet, Chew 1 tablet (81 mg total) daily, Disp: 90 tablet, Rfl: 1  •  atorvastatin (LIPITOR) 40 mg tablet, Take 2 tablets (80 mg total) by mouth daily, Disp: 90 tablet, Rfl: 3  •  bumetanide (BUMEX) 2 mg tablet, Take 1 tablet (2 mg total) by mouth 2 (two) times a day Start bumex 1 mg BID from Monday 2/21/22,, Disp: 180 tablet, Rfl: 3  •  carvedilol (COREG) 12 5 mg tablet, Take 1 tablet (12 5 mg total) by mouth 2 (two) times a day with meals, Disp: 180 tablet, Rfl: 3  •  cholecalciferol (VITAMIN D3) 1,000 units tablet, Take 2 tablets (2,000 Units total) by mouth daily, Disp: 180 tablet, Rfl: 3  •  hydrALAZINE (APRESOLINE) 25 mg tablet, Take 3 tablets (75 mg total) by mouth 3 (three) times a day, Disp: 270 tablet, Rfl: 3  •  insulin aspart protamine-insulin aspart (NovoLOG 70/30) 100 units/mL injection, As per your current blood sugars, take 15 units before breakfast, 10 units before dinner, Disp: , Rfl: 0  •  levothyroxine 112 mcg tablet, Take 112 mcg by mouth daily, Disp: , Rfl:   •  carbamide peroxide (DEBROX) 6 5 % otic solution, Administer 5 drops into the left ear 2 (two) times a day for 3 days, Disp: 15 mL, Rfl: 0  •  Droplet Pen Needles 32G X 4 MM MISC, 2 (two) times a day, Disp: , Rfl:   •  isosorbide mononitrate (IMDUR) 120 mg 24 hr tablet, Take 1 tablet (120 mg total) by mouth daily (Patient not taking: Reported on 3/21/2023), Disp: 90 tablet, Rfl: 0  •  Lancets (OneTouch Delica Plus [id number]) MISC, use to TEST BLOOD SUGAR three times a day as directed, Disp: , Rfl:   •  OneTouch Verio test strip, use to TEST BLOOD SUGAR three times a day as directed, Disp: , Rfl:     Social History     Socioeconomic History   • Marital status: /Civil Union     Spouse name: Not on file   • Number of children: 3   • Years of education: Not on file   • Highest education level: Not on file   Occupational History   • Occupation: part time   Tobacco Use   • Smoking status: Former   • Smokeless tobacco: Never   Vaping Use   • Vaping Use: Never used   Substance and Sexual Activity   • Alcohol use: Yes     Comment: occ   • Drug use: No   • Sexual activity: Not Currently     Partners: Male   Other Topics Concern   • Not on file   Social History Narrative    Always uses seatbelt    Caffeine use    Daily coffee consumption: 1 cp daily    Feels safe at home    Lives with spouse     Social Determinants of Health     Financial Resource Strain: Not on file   Food Insecurity: No Food Insecurity   • Worried About Running Out of Food in the Last Year: Never true   • Ran Out of Food in the Last Year: Never true   Transportation Needs: Not on file   Physical Activity: Not on file   Stress: Not on file   Social Connections: Not on file   Intimate Partner Violence: Not on file   Housing Stability: [address]    • Unable to Pay for Housing in the Last Year: No   • Number of Places Lived in the Last Year: 1   • Unstable Housing in the Last Year: No       Family History   Problem Relation Age of Onset   • Diabetes Mother    • Heart attack Father         MI   • Hypertension Brother        Physical Exam:    Vitals: Blood pressure 116/70, pulse 62, height 5' 5" (1 651 m), weight 70 4 kg (155 lb 4 8 oz), SpO2 99 %, currently breastfeeding , Body mass index is 25 84 kg/m² ,   Wt Readings from Last 3 Encounters:   03/21/23 70 4 kg (155 lb 4 8 oz)   11/16/22 64 kg (141 lb)   10/04/22 68 kg (149 lb 14 4 oz)       Physical Exam  Constitutional:       Appearance: She is well-developed  HENT:      Head: Normocephalic and atraumatic  Eyes:      Pupils: Pupils are equal, round, and reactive to light  Neck:      Vascular: No JVD  Cardiovascular:      Rate and Rhythm: Normal rate and regular rhythm  Heart sounds: No murmur heard  Pulmonary:      Effort: Pulmonary effort is normal  No respiratory distress  Breath sounds: Normal breath sounds  Abdominal:      General: There is no distension  Palpations: Abdomen is soft  Tenderness: There is no abdominal tenderness  Musculoskeletal:         General: Normal range of motion  Cervical back: Normal range of motion  Skin:     General: Skin is warm and dry  Findings: Bruising (from hospital IVs) present  No rash  Neurological:      Mental Status: She is alert and oriented to person, place, and time         Labs & Results:    Lab Results   Component Value Date    SODIUM 137 02/16/2023    K 4 6 02/16/2023     02/16/2023    CO2 23 02/16/2023    BUN 64 (H) 02/16/2023    CREATININE 3 04 (H) 02/16/2023    GLUC 106 09/29/2022    CALCIUM 9 0 02/16/2023     Lab Results   Component Value Date    WBC 5 58 02/16/2023    HGB 8 4 (L) 02/16/2023    HCT 28 1 (L) 02/16/2023    MCV 93 02/16/2023     (L) 02/16/2023     No results found for: BNP   Lab Results   Component Value Date    CHOLESTEROL 235 (H) 02/16/2023    CHOLESTEROL 174 10/14/2022    CHOLESTEROL 221 (H) 09/16/2022     Lab Results   Component Value Date    HDL 76 02/16/2023    HDL 61 10/14/2022    HDL 59 09/16/2022     Lab Results   Component Value Date    TRIG 121 02/16/2023    TRIG 123 10/14/2022    TRIG 106 09/16/2022     Lab Results   Component Value Date    NONHDLC 159 02/16/2023    [place] 113 10/14/2022    [place] 144 05/27/2022         EKG personally reviewed by Mariaelena Mon  Counseling / Coordination of Care  Time spent today 25 minutes  Greater than 50% of total time was spent with the patient and / or family counseling and / or coordination of care  We discussed diagnoses, most recent studies, tests and any changes in treatment plan  Thank you for the opportunity to participate in the care of this patient      [facility] PULMONARY HYPERTENSION  MEDICAL DIRECTOR OF [place]

## 2023-03-21 ENCOUNTER — OFFICE VISIT (OUTPATIENT)
Dept: CARDIOLOGY CLINIC | Facility: CLINIC | Age: 68
End: 2023-03-21

## 2023-03-21 VITALS
OXYGEN SATURATION: 99 % | SYSTOLIC BLOOD PRESSURE: 116 MMHG | HEIGHT: 65 IN | BODY MASS INDEX: 25.87 KG/M2 | WEIGHT: 155.3 LBS | DIASTOLIC BLOOD PRESSURE: 70 MMHG | HEART RATE: 62 BPM

## 2023-03-21 DIAGNOSIS — E78.00 PURE HYPERCHOLESTEROLEMIA: ICD-10-CM

## 2023-03-21 DIAGNOSIS — I25.10 CORONARY ARTERY DISEASE INVOLVING NATIVE CORONARY ARTERY OF NATIVE HEART WITHOUT ANGINA PECTORIS: ICD-10-CM

## 2023-03-21 DIAGNOSIS — I25.5 ISCHEMIC CARDIOMYOPATHY: ICD-10-CM

## 2023-03-21 DIAGNOSIS — N18.5 CKD (CHRONIC KIDNEY DISEASE) STAGE 5, GFR LESS THAN 15 ML/MIN (HCC): ICD-10-CM

## 2023-03-21 DIAGNOSIS — I50.32 CHRONIC DIASTOLIC HEART FAILURE (HCC): Primary | ICD-10-CM

## 2023-03-21 RX ORDER — ISOSORBIDE MONONITRATE 60 MG/1
60 TABLET, EXTENDED RELEASE ORAL DAILY
Qty: 90 TABLET | Refills: 3 | Status: ON HOLD | OUTPATIENT
Start: 2023-03-21

## 2023-03-21 RX ORDER — NITROGLYCERIN 0.4 MG/1
0.4 TABLET SUBLINGUAL
Qty: 15 TABLET | Refills: 1 | Status: ON HOLD | OUTPATIENT
Start: 2023-03-21 | End: 2023-03-31

## 2023-03-23 ENCOUNTER — OFFICE VISIT (OUTPATIENT)
Dept: NEPHROLOGY | Facility: CLINIC | Age: 68
End: 2023-03-23

## 2023-03-23 VITALS
DIASTOLIC BLOOD PRESSURE: 74 MMHG | BODY MASS INDEX: 25.99 KG/M2 | SYSTOLIC BLOOD PRESSURE: 124 MMHG | HEART RATE: 73 BPM | WEIGHT: 156 LBS | HEIGHT: 65 IN

## 2023-03-23 DIAGNOSIS — I25.10 CORONARY ARTERY DISEASE INVOLVING NATIVE CORONARY ARTERY OF NATIVE HEART WITHOUT ANGINA PECTORIS: ICD-10-CM

## 2023-03-23 DIAGNOSIS — N18.5 ANEMIA DUE TO STAGE 5 CHRONIC KIDNEY DISEASE, NOT ON CHRONIC DIALYSIS (HCC): ICD-10-CM

## 2023-03-23 DIAGNOSIS — Z86.2 HISTORY OF ANEMIA DUE TO CHRONIC KIDNEY DISEASE: ICD-10-CM

## 2023-03-23 DIAGNOSIS — I50.32 CHRONIC DIASTOLIC HEART FAILURE (HCC): ICD-10-CM

## 2023-03-23 DIAGNOSIS — D63.1 ANEMIA DUE TO STAGE 5 CHRONIC KIDNEY DISEASE, NOT ON CHRONIC DIALYSIS (HCC): ICD-10-CM

## 2023-03-23 DIAGNOSIS — I25.5 ISCHEMIC CARDIOMYOPATHY: ICD-10-CM

## 2023-03-23 DIAGNOSIS — N18.4 TYPE 2 DIABETES MELLITUS WITH STAGE 4 CHRONIC KIDNEY DISEASE, WITH LONG-TERM CURRENT USE OF INSULIN (HCC): ICD-10-CM

## 2023-03-23 DIAGNOSIS — E11.22 TYPE 2 DIABETES MELLITUS WITH STAGE 4 CHRONIC KIDNEY DISEASE, WITH LONG-TERM CURRENT USE OF INSULIN (HCC): ICD-10-CM

## 2023-03-23 DIAGNOSIS — Z79.4 TYPE 2 DIABETES MELLITUS WITH STAGE 4 CHRONIC KIDNEY DISEASE, WITH LONG-TERM CURRENT USE OF INSULIN (HCC): ICD-10-CM

## 2023-03-23 DIAGNOSIS — N18.9 HISTORY OF ANEMIA DUE TO CHRONIC KIDNEY DISEASE: ICD-10-CM

## 2023-03-23 DIAGNOSIS — R80.1 PERSISTENT PROTEINURIA: ICD-10-CM

## 2023-03-23 DIAGNOSIS — N18.4 STAGE 4 CHRONIC KIDNEY DISEASE (HCC): ICD-10-CM

## 2023-03-23 DIAGNOSIS — N18.5 CKD (CHRONIC KIDNEY DISEASE) STAGE 5, GFR LESS THAN 15 ML/MIN (HCC): ICD-10-CM

## 2023-03-23 DIAGNOSIS — N25.81 SECONDARY HYPERPARATHYROIDISM (HCC): Primary | ICD-10-CM

## 2023-03-23 PROBLEM — D64.9 ANEMIA: Status: RESOLVED | Noted: 2018-12-30 | Resolved: 2023-03-23

## 2023-03-23 RX ORDER — CALCITRIOL 0.25 UG/1
0.25 CAPSULE, LIQUID FILLED ORAL DAILY
Qty: 90 CAPSULE | Refills: 3 | Status: ON HOLD | OUTPATIENT
Start: 2023-03-23

## 2023-03-23 RX ORDER — FERROUS SULFATE TAB EC 324 MG (65 MG FE EQUIVALENT) 324 (65 FE) MG
324 TABLET DELAYED RESPONSE ORAL
Qty: 90 TABLET | Refills: 2 | Status: ON HOLD | OUTPATIENT
Start: 2023-03-23

## 2023-03-23 NOTE — PROGRESS NOTES
OFFICE FOLLOW UP - Nephrology   Fatuma Clayton 75 y o  female MRN: [id number]    Encounter: [id number]        ASSESSMENT & PLAN    16-year-old female with a past medical history of type 2 diabetes, ischemic cardiomyopathy, stage V chronic kidney disease presents for follow-up    CKD (chronic kidney disease) stage 4/5, GFR less than 15 ml/min ([facility])  -Her creatinine is now around 3-3 5 her estimated GFR is around 15 mL/min  -She has had advanced diabetic kidney disease in the setting of an ischemic cardiomyopathy  -Her volume status is compensated and she is currently tolerating medical therapy  -Several discussions regarding renal replacement therapy and she has elected for maximal conservative management  I did discuss with her that given her age hemodynamics she would likely tolerate renal replacement therapy and can change her mind if she wishes  Multiple discussions with her daughter as well  -Medications are appropriately dosed  -Blood pressure is optimal    Secondary hyperparathyroidism ([facility] )  -Her PTH is now in the 200s  -She is on vitamin D3 2000 units daily  -Calcium is around 9-9 5  -Start calcitriol 0 25 mcg daily this was sent to her pharmacy  -calcitriol (ROCALTROL) 0 25 mcg capsule; Take 1 capsule (0 25 mcg total) by mouth daily    Type 2 diabetes mellitus with stage 4 chronic kidney disease, with long-term current use of insulin ([facility])  -A1c is consistently around 8  -Continue ongoing follow-up currently on insulin    Anemia due to stage 5 chronic kidney disease, not on chronic dialysis ([facility])  - ferrous sulfate 324 (65 Fe) mg;  Take 1 tablet (324 mg total) by mouth daily before breakfast  -Iron saturation was around 20% we will start oral iron once daily and these can be uptitrated to twice daily  -We will schedule Epogen injections 8000 units twice monthly to start to get hemoglobin to 10 and then will go to a monthly dosing schedule    History of anemia due to chronic kidney disease  - ferrous sulfate 324 (65 Fe) mg; Take 1 tablet (324 mg total) by mouth daily before breakfast    Chronic diastolic heart failure ([address] ), Coronary artery disease involving native coronary artery of native heart without angina pectoris, Ischemic cardiomyopathy  -Her volume status is currently stable  -Following with heart failure team  -Carvedilol 12 5 mg 2 times daily  -Amlodipine 5 mg 2 times daily  -Bumetanide 2 mg 2 times daily  -Imdur 60 mg daily  -Hydralazine 75 mg 3 times daily    Persistent proteinuria  -This is presumably secondary to diabetic kidney disease  -Has had a serologic work-up in the past  -With the advanced nature and her disease we are monitoring without kidney biopsy as this will likely not     DISCUSSION:    Katherine overall feels well  She does have extrarenal manifestations of her CKD include anemia and secondary hyperparathyroidism  We are uptitrating therapies for this  Her volume status appears compensated  We will continue close follow-up every 3 months    HPI: Madelyn Mcgee is a 75 y o  female who is here for follow-up    Her blood pressures are well controlled, she has no acute complaints, she is tolerating her medications without difficulty, she appears compensated, her blood pressures are well controlled, her creatinine is stable, her urine output is stable, shortness of breath is acceptable and she offers no acute complaints      ROS:   Review of Systems   Constitutional: Negative  HENT: Negative  Negative for congestion  Eyes: Negative  Respiratory: Negative  Cardiovascular: Negative  Gastrointestinal: Negative  Endocrine: Negative  Genitourinary: Negative  Musculoskeletal: Negative  Skin: Negative  Allergic/Immunologic: Negative  Neurological: Negative  Hematological: Negative  Psychiatric/Behavioral: Negative  All other systems reviewed and are negative  Allergies:  Iv contrast [iodinated contrast media] and Nifedipine    Medications:   Current Outpatient Medications:   •  amLODIPine (NORVASC) 5 mg tablet, Take 1 tablet (5 mg total) by mouth 2 (two) times a day, Disp: 180 tablet, Rfl: 3  •  aspirin (RA Aspirin Adult Low Dose) 81 mg chewable tablet, Chew 1 tablet (81 mg total) daily, Disp: 90 tablet, Rfl: 1  •  atorvastatin (LIPITOR) 40 mg tablet, Take 2 tablets (80 mg total) by mouth daily, Disp: 90 tablet, Rfl: 3  •  bumetanide (BUMEX) 2 mg tablet, Take 1 tablet (2 mg total) by mouth 2 (two) times a day Start bumex 1 mg BID from Monday 2/21/22,, Disp: 180 tablet, Rfl: 3  •  calcitriol (ROCALTROL) 0 25 mcg capsule, Take 1 capsule (0 25 mcg total) by mouth daily, Disp: 90 capsule, Rfl: 3  •  carvedilol (COREG) 12 5 mg tablet, Take 1 tablet (12 5 mg total) by mouth 2 (two) times a day with meals, Disp: 180 tablet, Rfl: 3  •  cholecalciferol (VITAMIN D3) 1,000 units tablet, Take 2 tablets (2,000 Units total) by mouth daily, Disp: 180 tablet, Rfl: 3  •  Droplet Pen Needles 32G X 4 MM MISC, 2 (two) times a day, Disp: , Rfl:   •  ferrous sulfate 324 (65 Fe) mg, Take 1 tablet (324 mg total) by mouth daily before breakfast, Disp: 90 tablet, Rfl: 2  •  hydrALAZINE (APRESOLINE) 25 mg tablet, Take 3 tablets (75 mg total) by mouth 3 (three) times a day, Disp: 270 tablet, Rfl: 3  •  insulin aspart protamine-insulin aspart (NovoLOG 70/30) 100 units/mL injection, As per your current blood sugars, take 15 units before breakfast, 10 units before dinner, Disp: , Rfl: 0  •  isosorbide mononitrate (IMDUR) 60 mg 24 hr tablet, Take 1 tablet (60 mg total) by mouth daily, Disp: 90 tablet, Rfl: 3  •  Lancets (OneTouch Delica Plus [id number]) MISC, use to TEST BLOOD SUGAR three times a day as directed, Disp: , Rfl:   •  levothyroxine 112 mcg tablet, Take 112 mcg by mouth daily, Disp: , Rfl:   •  nitroglycerin (NITROSTAT) 0 4 mg SL tablet, Place 1 tablet (0 4 mg total) under the tongue every 5 (five) minutes as needed for chest pain, Disp: 15 tablet, Rfl: 1  •  OneTouch Verio test strip, use to TEST BLOOD SUGAR three times a day as directed, Disp: , Rfl:   •  carbamide peroxide (DEBROX) 6 5 % otic solution, Administer 5 drops into the left ear 2 (two) times a day for 3 days, Disp: 15 mL, Rfl: 0    Past Medical History:   Diagnosis Date   • Anemia    • CHF (congestive heart failure) ([facility])    • Chronic kidney disease    • Coronary artery disease    • Diabetes mellitus ([address] )    • Hypertension    • Hypothyroidism      Past Surgical History:   Procedure Laterality Date   • CORONARY ANGIOPLASTY WITH STENT PLACEMENT  2019     Family History   Problem Relation Age of Onset   • Diabetes Mother    • Heart attack Father         MI   • Hypertension Brother       reports that she has quit smoking  She has never used smokeless tobacco  She reports current alcohol use  She reports that she does not use drugs  OBJECTIVE:    Vitals:    03/23/23 1200   BP: 124/74   BP Location: Left arm   Patient Position: Sitting   Cuff Size: Standard   Pulse: 73   Weight: 70 8 kg (156 lb)   Height: 5' 5" (1 651 m)        Body mass index is 25 96 kg/m²  [unfilled]     Weight (last 2 days)     Date/Time Weight    03/23/23 1200 70 8 (156)             Physical exam:  Physical Exam  Vitals reviewed  Constitutional:       Appearance: She is normal weight  HENT:      Head: Normocephalic and atraumatic  Right Ear: External ear normal       Left Ear: External ear normal  There is no impacted cerumen  Nose: Nose normal    Eyes:      Extraocular Movements: Extraocular movements intact  Pupils: Pupils are equal, round, and reactive to light  Cardiovascular:      Rate and Rhythm: Normal rate and regular rhythm  Pulses: Normal pulses  Heart sounds: Normal heart sounds  Pulmonary:      Effort: Pulmonary effort is normal       Breath sounds: Normal breath sounds  Abdominal:      General: Abdomen is flat   Bowel sounds are normal       Palpations: Abdomen is soft    Genitourinary:     General: Normal vulva  Rectum: Normal    Musculoskeletal:         General: Normal range of motion  Cervical back: Normal range of motion  Skin:     General: Skin is warm and dry  Neurological:      General: No focal deficit present  Mental Status: She is alert and oriented to person, place, and time  Mental status is at baseline  Psychiatric:         Mood and Affect: Mood normal          Behavior: Behavior normal          Thought Content: Thought content normal          Judgment: Judgment normal              Lab Results:        Invalid input(s): ALBUMIN    Portions of the record may have been created with voice recognition software  Occasional wrong word or "sound a like" substitutions may have occurred due to the inherent limitations of voice recognition software  Read the chart carefully and recognize, using context, where substitutions have occurred  If you have any questions, please contact the dictating provider

## 2023-03-27 ENCOUNTER — HOSPITAL ENCOUNTER (OUTPATIENT)
Dept: INFUSION CENTER | Facility: HOSPITAL | Age: 68
Discharge: HOME | End: 2023-03-27
Attending: INTERNAL MEDICINE

## 2023-03-27 VITALS — TEMPERATURE: 97.6 F

## 2023-03-27 DIAGNOSIS — N18.4 STAGE 4 CHRONIC KIDNEY DISEASE (HCC): ICD-10-CM

## 2023-03-27 DIAGNOSIS — N18.5 ANEMIA DUE TO STAGE 5 CHRONIC KIDNEY DISEASE, NOT ON CHRONIC DIALYSIS (HCC): ICD-10-CM

## 2023-03-27 DIAGNOSIS — N18.9 HISTORY OF ANEMIA DUE TO CHRONIC KIDNEY DISEASE: Primary | ICD-10-CM

## 2023-03-27 DIAGNOSIS — Z86.2 HISTORY OF ANEMIA DUE TO CHRONIC KIDNEY DISEASE: Primary | ICD-10-CM

## 2023-03-27 DIAGNOSIS — D63.1 ANEMIA DUE TO STAGE 5 CHRONIC KIDNEY DISEASE, NOT ON CHRONIC DIALYSIS (HCC): ICD-10-CM

## 2023-03-27 RX ADMIN — EPOETIN ALFA 8000 UNITS: 4000 SOLUTION INTRAVENOUS; SUBCUTANEOUS at 13:59

## 2023-03-30 PROBLEM — N18.4 CKD (CHRONIC KIDNEY DISEASE) STAGE 4, GFR 15-29 ML/MIN (HCC): Status: ACTIVE | Noted: 2023-03-21

## 2023-03-31 DIAGNOSIS — I25.10 CORONARY ARTERY DISEASE INVOLVING NATIVE CORONARY ARTERY OF NATIVE HEART WITHOUT ANGINA PECTORIS: ICD-10-CM

## 2023-03-31 RX ORDER — NITROGLYCERIN 0.4 MG/1
TABLET SUBLINGUAL
Qty: 25 TABLET | Refills: 1 | Status: SHIPPED | OUTPATIENT
Start: 2023-03-31

## 2023-04-06 DIAGNOSIS — D63.1 ANEMIA DUE TO STAGE 5 CHRONIC KIDNEY DISEASE, NOT ON CHRONIC DIALYSIS (HCC): Primary | ICD-10-CM

## 2023-04-06 DIAGNOSIS — N18.4 STAGE 4 CHRONIC KIDNEY DISEASE (HCC): ICD-10-CM

## 2023-04-06 DIAGNOSIS — N18.9 HISTORY OF ANEMIA DUE TO CHRONIC KIDNEY DISEASE: ICD-10-CM

## 2023-04-06 DIAGNOSIS — Z86.2 HISTORY OF ANEMIA DUE TO CHRONIC KIDNEY DISEASE: ICD-10-CM

## 2023-04-06 DIAGNOSIS — N18.5 ANEMIA DUE TO STAGE 5 CHRONIC KIDNEY DISEASE, NOT ON CHRONIC DIALYSIS (HCC): Primary | ICD-10-CM

## 2023-04-09 PROBLEM — Z71.89 GOALS OF CARE, COUNSELING/DISCUSSION: Status: ACTIVE | Noted: 2023-04-09

## 2023-04-22 ENCOUNTER — APPOINTMENT (OUTPATIENT)
Dept: LAB | Facility: CLINIC | Age: 68
End: 2023-04-22

## 2023-04-22 DIAGNOSIS — N18.5 ANEMIA DUE TO STAGE 5 CHRONIC KIDNEY DISEASE, NOT ON CHRONIC DIALYSIS (HCC): ICD-10-CM

## 2023-04-22 DIAGNOSIS — Z86.2 HISTORY OF ANEMIA DUE TO CHRONIC KIDNEY DISEASE: ICD-10-CM

## 2023-04-22 DIAGNOSIS — N18.9 HISTORY OF ANEMIA DUE TO CHRONIC KIDNEY DISEASE: ICD-10-CM

## 2023-04-22 DIAGNOSIS — D63.1 ANEMIA DUE TO STAGE 5 CHRONIC KIDNEY DISEASE, NOT ON CHRONIC DIALYSIS (HCC): ICD-10-CM

## 2023-04-22 DIAGNOSIS — N18.4 STAGE 4 CHRONIC KIDNEY DISEASE (HCC): ICD-10-CM

## 2023-04-22 LAB — HGB BLD-MCNC: 9.5 G/DL (ref 11.5–15.4)

## 2023-04-24 ENCOUNTER — TELEPHONE (OUTPATIENT)
Dept: NEPHROLOGY | Facility: CLINIC | Age: 68
End: 2023-04-24

## 2023-04-24 ENCOUNTER — HOSPITAL ENCOUNTER (OUTPATIENT)
Dept: INFUSION CENTER | Facility: HOSPITAL | Age: 68
Discharge: HOME | End: 2023-04-24
Attending: INTERNAL MEDICINE

## 2023-04-24 VITALS — SYSTOLIC BLOOD PRESSURE: 138 MMHG | DIASTOLIC BLOOD PRESSURE: 70 MMHG

## 2023-04-24 DIAGNOSIS — Z86.2 HISTORY OF ANEMIA DUE TO CHRONIC KIDNEY DISEASE: Primary | ICD-10-CM

## 2023-04-24 DIAGNOSIS — N18.9 HISTORY OF ANEMIA DUE TO CHRONIC KIDNEY DISEASE: Primary | ICD-10-CM

## 2023-04-24 DIAGNOSIS — D63.1 ANEMIA DUE TO STAGE 5 CHRONIC KIDNEY DISEASE, NOT ON CHRONIC DIALYSIS (HCC): ICD-10-CM

## 2023-04-24 DIAGNOSIS — N18.5 ANEMIA DUE TO STAGE 5 CHRONIC KIDNEY DISEASE, NOT ON CHRONIC DIALYSIS (HCC): ICD-10-CM

## 2023-04-24 DIAGNOSIS — N18.4 STAGE 4 CHRONIC KIDNEY DISEASE (HCC): ICD-10-CM

## 2023-04-24 RX ADMIN — EPOETIN ALFA 8000 UNITS: 4000 SOLUTION INTRAVENOUS; SUBCUTANEOUS at 15:07

## 2023-04-24 NOTE — TELEPHONE ENCOUNTER
----- Message from Arabella Barrera DO sent at 4/24/2023  2:52 PM EDT -----  Hemoglobin slowly trending upwards we will continue to monitor with Epogen

## 2023-04-24 NOTE — PROGRESS NOTES
Epogen injection given without incident  AVS with future appointments given  Patient aware to get blood work before next appointment

## 2023-04-24 NOTE — TELEPHONE ENCOUNTER
Called and left voicemail for patient  Hemoglobin slowly trending upwards we will continue to monitor with Epogen

## 2023-04-25 NOTE — TELEPHONE ENCOUNTER
Called and left voicemail for patient  Hemoglobin slowly trending upwards we will continue to monitor with Epogen  2nd attempt

## 2023-04-26 NOTE — TELEPHONE ENCOUNTER
Called and left voicemail for patient  Hemoglobin slowly trending upwards we will continue to monitor with Epogen  3rd attempt

## 2023-05-03 DIAGNOSIS — I25.10 CORONARY ARTERY DISEASE INVOLVING NATIVE CORONARY ARTERY OF NATIVE HEART WITHOUT ANGINA PECTORIS: ICD-10-CM

## 2023-05-03 RX ORDER — NITROGLYCERIN 0.4 MG/1
TABLET SUBLINGUAL
Qty: 25 TABLET | Refills: 1 | Status: ON HOLD | OUTPATIENT
Start: 2023-05-03

## 2023-05-04 ENCOUNTER — HOSPITAL ENCOUNTER (INPATIENT)
Facility: HOSPITAL | Age: 68
LOS: 4 days | Discharge: HOME | End: 2023-05-08
Attending: EMERGENCY MEDICINE | Admitting: INTERNAL MEDICINE

## 2023-05-04 ENCOUNTER — APPOINTMENT (EMERGENCY)
Dept: RADIOLOGY | Facility: HOSPITAL | Age: 68
End: 2023-05-04

## 2023-05-04 DIAGNOSIS — I25.10 CORONARY ARTERY DISEASE INVOLVING NATIVE CORONARY ARTERY OF NATIVE HEART WITHOUT ANGINA PECTORIS: ICD-10-CM

## 2023-05-04 DIAGNOSIS — R07.9 CHEST PAIN: Primary | ICD-10-CM

## 2023-05-04 DIAGNOSIS — I50.9 CHF (CONGESTIVE HEART FAILURE) (HCC): ICD-10-CM

## 2023-05-04 DIAGNOSIS — N18.4 CKD (CHRONIC KIDNEY DISEASE) STAGE 4, GFR 15-29 ML/MIN (HCC): ICD-10-CM

## 2023-05-04 DIAGNOSIS — N18.4 ACUTE RENAL FAILURE SUPERIMPOSED ON STAGE 4 CHRONIC KIDNEY DISEASE, UNSPECIFIED ACUTE RENAL FAILURE TYPE (HCC): ICD-10-CM

## 2023-05-04 DIAGNOSIS — N17.9 ACUTE RENAL FAILURE SUPERIMPOSED ON STAGE 4 CHRONIC KIDNEY DISEASE, UNSPECIFIED ACUTE RENAL FAILURE TYPE (HCC): ICD-10-CM

## 2023-05-04 DIAGNOSIS — I10 HYPERTENSION: ICD-10-CM

## 2023-05-04 LAB
2HR DELTA HS TROPONIN: -3 NG/L
ALBUMIN SERPL BCP-MCNC: 3.4 G/DL (ref 3.5–5)
ALP SERPL-CCNC: 422 U/L (ref 46–116)
ALT SERPL W P-5'-P-CCNC: 63 U/L (ref 12–78)
ANION GAP SERPL CALCULATED.3IONS-SCNC: 7 MMOL/L (ref 4–13)
AST SERPL W P-5'-P-CCNC: 45 U/L (ref 5–45)
BASOPHILS # BLD AUTO: 0.02 THOUSANDS/ÂΜL (ref 0–0.1)
BASOPHILS NFR BLD AUTO: 0 % (ref 0–1)
BILIRUB SERPL-MCNC: 0.68 MG/DL (ref 0.2–1)
BUN SERPL-MCNC: 60 MG/DL (ref 5–25)
CALCIUM ALBUM COR SERPL-MCNC: 9.3 MG/DL (ref 8.3–10.1)
CALCIUM SERPL-MCNC: 8.8 MG/DL (ref 8.3–10.1)
CARDIAC TROPONIN I PNL SERPL HS: 32 NG/L
CARDIAC TROPONIN I PNL SERPL HS: 35 NG/L
CHLORIDE SERPL-SCNC: 108 MMOL/L (ref 96–108)
CO2 SERPL-SCNC: 21 MMOL/L (ref 21–32)
CREAT SERPL-MCNC: 2.96 MG/DL (ref 0.6–1.3)
EOSINOPHIL # BLD AUTO: 0.06 THOUSAND/ÂΜL (ref 0–0.61)
EOSINOPHIL NFR BLD AUTO: 1 % (ref 0–6)
ERYTHROCYTE [DISTWIDTH] IN BLOOD BY AUTOMATED COUNT: 14.2 % (ref 11.6–15.1)
GFR SERPL CREATININE-BSD FRML MDRD: 15 ML/MIN/1.73SQ M
GLUCOSE SERPL-MCNC: 194 MG/DL (ref 65–140)
HCT VFR BLD AUTO: 33.4 % (ref 34.8–46.1)
HGB BLD-MCNC: 10 G/DL (ref 11.5–15.4)
IMM GRANULOCYTES # BLD AUTO: 0.03 THOUSAND/UL (ref 0–0.2)
IMM GRANULOCYTES NFR BLD AUTO: 1 % (ref 0–2)
LYMPHOCYTES # BLD AUTO: 0.25 THOUSANDS/ÂΜL (ref 0.6–4.47)
LYMPHOCYTES NFR BLD AUTO: 5 % (ref 14–44)
MCH RBC QN AUTO: 27.7 PG (ref 26.8–34.3)
MCHC RBC AUTO-ENTMCNC: 29.9 G/DL (ref 31.4–37.4)
MCV RBC AUTO: 93 FL (ref 82–98)
MONOCYTES # BLD AUTO: 0.31 THOUSAND/ÂΜL (ref 0.17–1.22)
MONOCYTES NFR BLD AUTO: 6 % (ref 4–12)
NEUTROPHILS # BLD AUTO: 4.21 THOUSANDS/ÂΜL (ref 1.85–7.62)
NEUTS SEG NFR BLD AUTO: 87 % (ref 43–75)
NRBC BLD AUTO-RTO: 0 /100 WBCS
PLATELET # BLD AUTO: 130 THOUSANDS/UL (ref 149–390)
PMV BLD AUTO: 11 FL (ref 8.9–12.7)
POTASSIUM SERPL-SCNC: 4.4 MMOL/L (ref 3.5–5.3)
PROT SERPL-MCNC: 7.5 G/DL (ref 6.4–8.4)
RBC # BLD AUTO: 3.61 MILLION/UL (ref 3.81–5.12)
SODIUM SERPL-SCNC: 136 MMOL/L (ref 135–147)
WBC # BLD AUTO: 4.88 THOUSAND/UL (ref 4.31–10.16)

## 2023-05-04 PROCEDURE — XW033E5 INTRODUCTION OF REMDESIVIR ANTI-INFECTIVE INTO PERIPHERAL VEIN, PERCUTANEOUS APPROACH, NEW TECHNOLOGY GROUP 5: ICD-10-PCS | Performed by: STUDENT IN AN ORGANIZED HEALTH CARE EDUCATION/TRAINING PROGRAM

## 2023-05-04 RX ORDER — HEPARIN SODIUM 5000 [USP'U]/ML
5000 INJECTION, SOLUTION INTRAVENOUS; SUBCUTANEOUS EVERY 8 HOURS SCHEDULED
Status: DISCONTINUED | OUTPATIENT
Start: 2023-05-04 | End: 2023-05-08 | Stop reason: HOSPADM

## 2023-05-04 RX ORDER — ISOSORBIDE MONONITRATE 60 MG/1
60 TABLET, EXTENDED RELEASE ORAL DAILY
Status: DISCONTINUED | OUTPATIENT
Start: 2023-05-05 | End: 2023-05-05

## 2023-05-04 RX ORDER — CALCITRIOL 0.25 UG/1
0.25 CAPSULE, LIQUID FILLED ORAL DAILY
Status: DISCONTINUED | OUTPATIENT
Start: 2023-05-05 | End: 2023-05-08 | Stop reason: HOSPADM

## 2023-05-04 RX ORDER — INSULIN GLARGINE 100 [IU]/ML
20 INJECTION, SOLUTION SUBCUTANEOUS
Status: DISCONTINUED | OUTPATIENT
Start: 2023-05-04 | End: 2023-05-08 | Stop reason: HOSPADM

## 2023-05-04 RX ORDER — ATORVASTATIN CALCIUM 80 MG/1
80 TABLET, FILM COATED ORAL DAILY
Status: DISCONTINUED | OUTPATIENT
Start: 2023-05-05 | End: 2023-05-08 | Stop reason: HOSPADM

## 2023-05-04 RX ORDER — NITROGLYCERIN 0.4 MG/1
0.4 TABLET SUBLINGUAL ONCE
Status: COMPLETED | OUTPATIENT
Start: 2023-05-04 | End: 2023-05-04

## 2023-05-04 RX ORDER — INSULIN LISPRO 100 [IU]/ML
1-5 INJECTION, SOLUTION INTRAVENOUS; SUBCUTANEOUS EVERY 6 HOURS SCHEDULED
Status: DISCONTINUED | OUTPATIENT
Start: 2023-05-05 | End: 2023-05-05

## 2023-05-04 RX ORDER — AMLODIPINE BESYLATE 5 MG/1
5 TABLET ORAL 2 TIMES DAILY
Status: DISCONTINUED | OUTPATIENT
Start: 2023-05-04 | End: 2023-05-08 | Stop reason: HOSPADM

## 2023-05-04 RX ORDER — ASPIRIN 81 MG/1
162 TABLET, CHEWABLE ORAL ONCE
Status: COMPLETED | OUTPATIENT
Start: 2023-05-04 | End: 2023-05-04

## 2023-05-04 RX ORDER — ASPIRIN 81 MG/1
81 TABLET, CHEWABLE ORAL DAILY
Status: DISCONTINUED | OUTPATIENT
Start: 2023-05-05 | End: 2023-05-08 | Stop reason: HOSPADM

## 2023-05-04 RX ORDER — CARVEDILOL 12.5 MG/1
12.5 TABLET ORAL 2 TIMES DAILY WITH MEALS
Status: DISCONTINUED | OUTPATIENT
Start: 2023-05-04 | End: 2023-05-08 | Stop reason: HOSPADM

## 2023-05-04 RX ORDER — MELATONIN
2000 DAILY
Status: DISCONTINUED | OUTPATIENT
Start: 2023-05-05 | End: 2023-05-08 | Stop reason: HOSPADM

## 2023-05-04 RX ORDER — CARVEDILOL 12.5 MG/1
12.5 TABLET ORAL 2 TIMES DAILY WITH MEALS
Status: DISCONTINUED | OUTPATIENT
Start: 2023-05-05 | End: 2023-05-04

## 2023-05-04 RX ORDER — SODIUM BICARBONATE 650 MG/1
650 TABLET ORAL
Status: DISCONTINUED | OUTPATIENT
Start: 2023-05-04 | End: 2023-05-08 | Stop reason: HOSPADM

## 2023-05-04 RX ORDER — LEVOTHYROXINE SODIUM 112 UG/1
112 TABLET ORAL
Status: DISCONTINUED | OUTPATIENT
Start: 2023-05-05 | End: 2023-05-08 | Stop reason: HOSPADM

## 2023-05-04 RX ADMIN — NITROGLYCERIN 0.4 MG: 0.4 TABLET SUBLINGUAL at 19:43

## 2023-05-04 RX ADMIN — ASPIRIN 162 MG: 81 TABLET, CHEWABLE ORAL at 19:41

## 2023-05-04 RX ADMIN — NITROGLYCERIN 0.4 MG: 0.4 TABLET SUBLINGUAL at 20:00

## 2023-05-04 NOTE — Clinical Note
Case was discussed with MELVI and the patient's admission status was agreed to be Admission Status: observation status to the service of Dr Kristina Herndon 
no known allergies

## 2023-05-04 NOTE — ED PROVIDER NOTES
History  Chief Complaint   Patient presents with   • Chest Pain     Pt developed chest pain this morning around 1000 w/ pain being most prominent in the center of her chest  Pt states she also has SOB, denies dizziness  HPI     78-year-old female with past medical history of CHF, hypertension, hyperlipidemia and CKD who presents for evaluation of chest pain  Patient states chest pain started at around 10:00 this morning  She was sitting on the couch when the pain started  She states pain is in the center of her chest   Denies radiation of the pain  Pain feels like a pressure sensation  Pain has gotten worse since it started  She has also had associated shortness of breath with exertion today  She had nausea but no vomiting  Denies lightheadedness or dizziness  Denies fevers, chills, or cough  Denies abdominal pain or diarrhea  Denies leg pain or leg swelling  Denies any recent weight gain  Patient states she was recently hospitalized 1 month ago for CHF exacerbation  Per chart review, her EF was 45%  Prior to Admission Medications   Prescriptions Last Dose Informant Patient Reported? Taking?    Droplet Pen Needles 32G X 4 MM MISC   Yes No   Si (two) times a day   Lancets (OneTouch Delica Plus [id number]) MISC   Yes No   Sig: use to TEST BLOOD SUGAR three times a day as directed   amLODIPine (NORVASC) 5 mg tablet   No No   Sig: Take 1 tablet (5 mg total) by mouth 2 (two) times a day   aspirin (RA Aspirin Adult Low Dose) 81 mg chewable tablet   No No   Sig: Chew 1 tablet (81 mg total) daily   atorvastatin (LIPITOR) 40 mg tablet   No No   Sig: Take 2 tablets (80 mg total) by mouth daily   bumetanide (BUMEX) 1 mg tablet   No No   Sig: Take 3 tablets (3 mg total) by mouth 2 (two) times a day   calcitriol (ROCALTROL) 0 25 mcg capsule   No No   Sig: Take 1 capsule (0 25 mcg total) by mouth daily   carbamide peroxide (DEBROX) 6 5 % otic solution   No No   Sig: Administer 5 drops into the left ear 2 (two) times a day for 3 days   carvedilol (COREG) 12 5 mg tablet   No No   Sig: Take 1 tablet (12 5 mg total) by mouth 2 (two) times a day with meals   cholecalciferol (VITAMIN D3) 1,000 units tablet   No No   Sig: Take 2 tablets (2,000 Units total) by mouth daily   ferrous sulfate 324 (65 Fe) mg   No No   Sig: Take 1 tablet (324 mg total) by mouth daily before breakfast   hydrALAZINE (APRESOLINE) 25 mg tablet   No No   Sig: Take 3 tablets (75 mg total) by mouth 3 (three) times a day   insulin aspart protamine-insulin aspart (NovoLOG 70/30) 100 units/mL injection   No No   Sig: As per your current blood sugars, take 15 units before breakfast, 10 units before dinner   isosorbide mononitrate (IMDUR) 60 mg 24 hr tablet   No No   Sig: Take 1 tablet (60 mg total) by mouth daily   levothyroxine 112 mcg tablet   Yes No   Sig: Take 112 mcg by mouth daily   nitroglycerin (NITROSTAT) 0 4 mg SL tablet   No No   Sig: PLACE 1 TABLET UNDER THE TONGUE EVERY 5 MINUTES AS NEEDED FOR CHEST PAIN   sodium bicarbonate 650 mg tablet   No No   Sig: Take 1 tablet (650 mg total) by mouth 2 (two) times daily after meals      Facility-Administered Medications: None       Past Medical History:   Diagnosis Date   • Anemia    • CHF (congestive heart failure) (HCC)    • Chronic kidney disease    • Coronary artery disease    • Diabetes mellitus (HCC)    • Hypertension    • Hypothyroidism        Past Surgical History:   Procedure Laterality Date   • CORONARY ANGIOPLASTY WITH STENT PLACEMENT  2019       Family History   Problem Relation Age of Onset   • Diabetes Mother    • Heart attack Father         MI   • Hypertension Brother      I have reviewed and agree with the history as documented      E-Cigarette/Vaping   • E-Cigarette Use Never User      E-Cigarette/Vaping Substances   • Nicotine No    • THC No    • CBD No    • Flavoring No    • Other No    • Unknown No      Social History     Tobacco Use   • Smoking status: Former   • Smokeless tobacco: Never   Vaping Use   • Vaping Use: Never used   Substance Use Topics   • Alcohol use: Yes     Comment: occ   • Drug use: No        Review of Systems   Constitutional: Negative for appetite change, chills and fever  HENT: Negative for congestion, rhinorrhea and sore throat  Respiratory: Positive for shortness of breath  Negative for cough  Cardiovascular: Positive for chest pain  Gastrointestinal: Negative for abdominal pain, diarrhea, nausea and vomiting  Musculoskeletal: Negative for arthralgias and myalgias  Skin: Negative for rash  Neurological: Negative for dizziness, weakness, light-headedness, numbness and headaches  All other systems reviewed and are negative  Physical Exam  ED Triage Vitals [05/04/23 1923]   Temperature Pulse Respirations Blood Pressure SpO2   97 6 °F (36 4 °C) 83 18 (!) 185/90 94 %      Temp Source Heart Rate Source Patient Position - Orthostatic VS BP Location FiO2 (%)   Temporal Monitor Lying Right arm --      Pain Score       9             Orthostatic Vital Signs  Vitals:    05/07/23 2137 05/08/23 0719 05/08/23 0749 05/08/23 0954   BP: 168/72 (!) 171/74 138/70 170/74   Pulse: 68 64  63   Patient Position - Orthostatic VS:   Lying        Physical Exam  Vitals and nursing note reviewed  Constitutional:       General: She is not in acute distress  Appearance: Normal appearance  She is well-developed and normal weight  She is not ill-appearing, toxic-appearing or diaphoretic  HENT:      Head: Normocephalic and atraumatic  Right Ear: External ear normal       Left Ear: External ear normal       Nose: Nose normal       Mouth/Throat:      Mouth: Mucous membranes are moist       Pharynx: Oropharynx is clear  Eyes:      Extraocular Movements: Extraocular movements intact  Conjunctiva/sclera: Conjunctivae normal    Cardiovascular:      Rate and Rhythm: Normal rate and regular rhythm  Pulses: Normal pulses             Radial pulses are 2+ on the right side and 2+ on the left side  Dorsalis pedis pulses are 2+ on the right side and 2+ on the left side  Heart sounds: Normal heart sounds  No murmur heard  No friction rub  No gallop  Pulmonary:      Effort: Pulmonary effort is normal  No respiratory distress  Breath sounds: Normal breath sounds  No decreased breath sounds, wheezing, rhonchi or rales  Abdominal:      General: There is no distension  Palpations: Abdomen is soft  Tenderness: There is no abdominal tenderness  There is no guarding or rebound  Musculoskeletal:         General: No tenderness  Cervical back: Neck supple  Right lower leg: No tenderness  No edema  Left lower leg: No tenderness  No edema  Skin:     General: Skin is warm and dry  Coloration: Skin is not pale  Findings: No erythema or rash  Neurological:      General: No focal deficit present  Mental Status: She is alert and oriented to person, place, and time  Cranial Nerves: No cranial nerve deficit  Sensory: No sensory deficit  Motor: No weakness     Psychiatric:         Mood and Affect: Mood normal          Behavior: Behavior normal          ED Medications  Medications   nitroglycerin (NITROSTAT) SL tablet 0 4 mg (0 4 mg Sublingual Given 5/4/23 1943)   aspirin chewable tablet 162 mg (162 mg Oral Given 5/4/23 1941)   nitroglycerin (NITROSTAT) SL tablet 0 4 mg (0 4 mg Sublingual Given 5/4/23 2000)   magnesium sulfate 2 g/50 mL IVPB (premix) 2 g (0 g Intravenous Stopped 5/5/23 1521)   isosorbide mononitrate (IMDUR) 24 hr tablet 60 mg (60 mg Oral Given 5/5/23 1347)   bumetanide (BUMEX) injection 2 mg (2 mg Intravenous Given 5/5/23 1346)   remdesivir (Veklury) 200 mg in sodium chloride 0 9 % 290 mL IVPB (0 mg Intravenous Stopped 5/6/23 2301)     Followed by   remdesivir Karijusta Nava) 100 mg in sodium chloride 0 9 % 270 mL IVPB (0 mg Intravenous Stopped 5/8/23 1413)       Diagnostic Studies  Results Reviewed Procedure Component Value Units Date/Time    NT-BNP [facility] only [[id number]]  (Abnormal) Collected: 05/05/23 0406    Lab Status: Final result Specimen: Blood from Arm, Left Updated: 05/05/23 1126     NT-proBNP 37,548 pg/mL     Fingerstick Glucose (POCT) [[id number]]  (Abnormal) Collected: 05/05/23 0703    Lab Status: Final result Updated: 05/05/23 0705     POC Glucose 210 mg/dl     APTT [[id number]]  (Normal) Collected: 05/05/23 0406    Lab Status: Final result Specimen: Blood from Arm, Left Updated: 05/05/23 0451     PTT 32 seconds     Protime-INR [[id number]]  (Abnormal) Collected: 05/05/23 0406    Lab Status: Final result Specimen: Blood from Arm, Left Updated: 05/05/23 0451     Protime 15 5 seconds      INR 1 21    Comprehensive metabolic panel [[id number]]  (Abnormal) Collected: 05/05/23 0406    Lab Status: Final result Specimen: Blood from Arm, Left Updated: 05/05/23 0450     Sodium 134 mmol/L      Potassium 4 5 mmol/L      Chloride 107 mmol/L      CO2 23 mmol/L      ANION GAP 4 mmol/L      BUN 64 mg/dL      Creatinine 3 15 mg/dL      Glucose 244 mg/dL      Calcium 8 8 mg/dL      Corrected Calcium 9 6 mg/dL      AST 45 U/L      ALT 53 U/L      Alkaline Phosphatase 347 U/L      Total Protein 6 7 g/dL      Albumin 3 0 g/dL      Total Bilirubin 0 57 mg/dL      eGFR 14 ml/min/1 73sq m     Narrative:      [place] guidelines for Chronic Kidney Disease (CKD):   •  Stage 1 with normal or high GFR (GFR > 90 mL/min/1 73 square meters)  •  Stage 2 Mild CKD (GFR = 60-89 mL/min/1 73 square meters)  •  Stage 3A Moderate CKD (GFR = 45-59 mL/min/1 73 square meters)  •  Stage 3B Moderate CKD (GFR = 30-44 mL/min/1 73 square meters)  •  Stage 4 Severe CKD (GFR = 15-29 mL/min/1 73 square meters)  •  Stage 5 End Stage CKD (GFR <15 mL/min/1 73 square meters)  Note: GFR calculation is accurate only with a steady state creatinine    Magnesium [[id number]]  (Normal) Collected: 05/05/23 0406    Lab Status: Final result Specimen: Blood from Arm, Left Updated: 05/05/23 0450     Magnesium 1 8 mg/dL     Lipid Panel with Direct LDL reflex [[id number]]  (Normal) Collected: 05/05/23 0406    Lab Status: Final result Specimen: Blood from Arm, Left Updated: 05/05/23 0450     Cholesterol 186 mg/dL      Triglycerides 92 mg/dL      HDL, Direct 72 mg/dL      LDL Calculated 96 mg/dL     CBC and differential [[id number]]  (Abnormal) Collected: 05/05/23 0406    Lab Status: Final result Specimen: Blood from Arm, Left Updated: 05/05/23 0426     WBC 5 37 Thousand/uL      RBC 3 13 Million/uL      Hemoglobin 8 8 g/dL      Hematocrit 28 8 %      MCV 92 fL      MCH 28 1 pg      MCHC 30 6 g/dL      RDW 14 2 %      MPV 11 1 fL      Platelets 549 Thousands/uL      nRBC 0 /100 WBCs      Neutrophils Relative 89 %      Immat GRANS % 0 %      Lymphocytes Relative 4 %      Monocytes Relative 7 %      Eosinophils Relative 0 %      Basophils Relative 0 %      Neutrophils Absolute 4 74 Thousands/µL      Immature Grans Absolute 0 02 Thousand/uL      Lymphocytes Absolute 0 21 Thousands/µL      Monocytes Absolute 0 37 Thousand/µL      Eosinophils Absolute 0 01 Thousand/µL      Basophils Absolute 0 02 Thousands/µL     Fingerstick Glucose (POCT) [[id number]]  (Abnormal) Collected: 05/05/23 0311    Lab Status: Final result Updated: 05/05/23 0318     POC Glucose 235 mg/dl     HS Troponin I 4hr [[id number]]  (Normal) Collected: 05/05/23 0011    Lab Status: Final result Specimen: Blood from Arm, Left Updated: 05/05/23 0111     hs TnI 4hr 32 ng/L      Delta 4hr hsTnI -3 ng/L     HS Troponin I 2hr [[id number]]  (Normal) Collected: 05/04/23 2158    Lab Status: Final result Specimen: Blood from Arm, Left Updated: 05/04/23 2231     hs TnI 2hr 32 ng/L      Delta 2hr hsTnI -3 ng/L     HS Troponin 0hr (reflex protocol) [[id number]]  (Normal) Collected: 05/04/23 1943    Lab Status: Final result Specimen: Blood from Arm, Right Updated: 05/04/23 2041     hs TnI 0hr 35 ng/L Comprehensive metabolic panel [[id number]]  (Abnormal) Collected: 05/04/23 1943    Lab Status: Final result Specimen: Blood from Arm, Right Updated: 05/04/23 2023     Sodium 136 mmol/L      Potassium 4 4 mmol/L      Chloride 108 mmol/L      CO2 21 mmol/L      ANION GAP 7 mmol/L      BUN 60 mg/dL      Creatinine 2 96 mg/dL      Glucose 194 mg/dL      Calcium 8 8 mg/dL      Corrected Calcium 9 3 mg/dL      AST 45 U/L      ALT 63 U/L      Alkaline Phosphatase 422 U/L      Total Protein 7 5 g/dL      Albumin 3 4 g/dL      Total Bilirubin 0 68 mg/dL      eGFR 15 ml/min/1 73sq m     Narrative:      National Kid[employer] guidelines for Chronic Kidney Disease (CKD):   •  Stage 1 with normal or high GFR (GFR > 90 mL/min/1 73 square meters)  •  Stage 2 Mild CKD (GFR = 60-89 mL/min/1 73 square meters)  •  Stage 3A Moderate CKD (GFR = 45-59 mL/min/1 73 square meters)  •  Stage 3B Moderate CKD (GFR = 30-44 mL/min/1 73 square meters)  •  Stage 4 Severe CKD (GFR = 15-29 mL/min/1 73 square meters)  •  Stage 5 End Stage CKD (GFR <15 mL/min/1 73 square meters)  Note: GFR calculation is accurate only with a steady state creatinine    CBC and differential [[id number]]  (Abnormal) Collected: 05/04/23 1943    Lab Status: Final result Specimen: Blood from Arm, Right Updated: 05/04/23 1953     WBC 4 88 Thousand/uL      RBC 3 61 Million/uL      Hemoglobin 10 0 g/dL      Hematocrit 33 4 %      MCV 93 fL      MCH 27 7 pg      MCHC 29 9 g/dL      RDW 14 2 %      MPV 11 0 fL      Platelets 595 Thousands/uL      nRBC 0 /100 WBCs      Neutrophils Relative 87 %      Immat GRANS % 1 %      Lymphocytes Relative 5 %      Monocytes Relative 6 %      Eosinophils Relative 1 %      Basophils Relative 0 %      Neutrophils Absolute 4 21 Thousands/µL      Immature Grans Absolute 0 03 Thousand/uL      Lymphocytes Absolute 0 25 Thousands/µL      Monocytes Absolute 0 31 Thousand/µL      Eosinophils Absolute 0 06 Thousand/µL      Basophils Absolute 0 02 Thousands/µL                  XR chest 2 views   Final Result by Radha Coyle MD (05/05 9408)      Moderate pulmonary venous congestion with trace effusions  Workstation performed: [id number]               Procedures  ECG 12 Lead Documentation Only    Date/Time: 5/5/2023 2:31 AM  Performed by: Jolene Olivera MD  Authorized by: Jolene Olivera MD     Indications / Diagnosis:  Chest pain  ECG reviewed by me, the ED Provider: yes    Patient location:  ED  Previous ECG:     Previous ECG:  Compared to current    Similarity:  No change    Comparison to cardiac monitor: Yes    Interpretation:     Interpretation: abnormal    Rate:     ECG rate:  76    ECG rate assessment: normal    Rhythm:     Rhythm: sinus rhythm    Ectopy:     Ectopy: none    QRS:     QRS axis:  Right    QRS intervals:  Normal  Conduction:     Conduction: normal    ST segments:     ST segments:  Non-specific  T waves:     T waves: inverted            ED Course             HEART Risk Score    Flowsheet Row Most Recent Value   Heart Score Risk Calculator    History 2 Filed at: 05/04/2023 2112   ECG 2 Filed at: 05/04/2023 2112   Age 2 Filed at: 05/04/2023 2112   Risk Factors 2 Filed at: 05/04/2023 2112   Troponin 1 Filed at: 05/04/2023 2112   HEART Score 9 Filed at: 05/04/2023 2112                                MDM     71-year-old female with past medical history of CHF, hypertension, hyperlipidemia and CKD who presents for evaluation of chest pain starting at 10 am, worse with exertion, also with sob with exertion  Patient is hypertensive  Differential diagnosis includes: ACS, hypertensive urgency, anemia, CHF  Will check CBC to evaluate for anemia, CMP to evaluate renal function, liver function and electrolytes, troponin and EKG to evaluate for ACS or arrhythmia  Will give nitro for chest pain  Reviewed labs, no marked abnormalities  Troponin normal   Hemoglobin and creatinine around baseline    Reviewed and interpreted EKG, shows normal sinus rhythm with nonspecific ST and T wave changes similar to prior  Reviewed and interpreted chest x-ray, shows no acute cardiopulmonary disease  Reassessed patient, chest pain is improving with nitro  Given concerning history, will admit for ACS rule out  Patient is agreeable for admission  Discussed with SLIM for admission  Disposition  Final diagnoses:   Chest pain   Hypertension     Time reflects when diagnosis was documented in both MDM as applicable and the Disposition within this note     Time User Action Codes Description Comment    5/4/2023  9:21 PM Jeppie Dakins Add [R07 9] Chest pain     5/4/2023  9:21 PM Jeppie Dakins Add [I10] Hypertension     5/5/2023  9:03 AM Hilarybenito Cantu Add [N18 4] CKD (chronic kidney disease) stage 4, GFR 15-29 ml/min ([facility] [address] )     5/8/2023 11:06 AM O'HerbAlexey ilnder Human Add [N17 9,  N18 4] Acute renal failure superimposed on stage 4 chronic kidney disease, unspecified acute renal failure type ([facility] [address] )     5/8/2023 12:01 PM Vi Rosenthal Add [I50 9] CHF (congestive heart failure) ([facility] [place] [address] )     5/8/2023 12:01 PM Paola Rosenthal Add [I25 10] Coronary artery disease involving native coronary artery of native heart without angina pectoris       ED Disposition     ED Disposition   Admit    Condition   Stable    Date/Time   Thu May 4, 2023  9:38 PM    Comment   Case was discussed with MELVI and the patient's admission status was agreed to be Admission Status: inpatient status to the service of Dr Magdalene Cook  Follow-up Information     Follow up With Specialties Details Why [address] [facility] Nephrology Follow up Office to call to schedule hospital follow-up    Please get BMP weekly x2 starting Monday [phone]      Yumiko Cruz MD Internal Medicine Schedule an appointment as soon as possible for a visit in 1 week(s)  [address] [place] [phone]  [address] [place] [address] Cardiology Follow up in 1 week(s)  [address] [facility]  [phone]            Discharge Medication List as of 5/8/2023 12:51 PM      CONTINUE these medications which have CHANGED    Details   bumetanide (BUMEX) 2 mg tablet Take 2 tablets (4 mg total) by mouth 2 (two) times a day, Starting Mon 5/8/2023, Until Wed 6/7/2023, Normal      isosorbide mononitrate (IMDUR) 60 mg 24 hr tablet Take 2 tablets (120 mg total) by mouth daily, Starting Mon 5/8/2023, Normal         CONTINUE these medications which have NOT CHANGED    Details   amLODIPine (NORVASC) 5 mg tablet Take 1 tablet (5 mg total) by mouth 2 (two) times a day, Starting Tue 10/4/2022, Normal      aspirin (RA Aspirin Adult Low Dose) 81 mg chewable tablet Chew 1 tablet (81 mg total) daily, Starting Tue 11/23/2021, Normal      atorvastatin (LIPITOR) 40 mg tablet Take 2 tablets (80 mg total) by mouth daily, Starting Mon 1/31/2022, Normal      calcitriol (ROCALTROL) 0 25 mcg capsule Take 1 capsule (0 25 mcg total) by mouth daily, Starting Thu 3/23/2023, Normal      carvedilol (COREG) 12 5 mg tablet Take 1 tablet (12 5 mg total) by mouth 2 (two) times a day with meals, Starting Tue 10/4/2022, Normal      cholecalciferol (VITAMIN D3) 1,000 units tablet Take 2 tablets (2,000 Units total) by mouth daily, Starting Mon 8/8/2022, Normal      Droplet Pen Needles 32G X 4 MM MISC 2 (two) times a day, Starting Tue 10/12/2021, Historical Med      ferrous sulfate 324 (65 Fe) mg Take 1 tablet (324 mg total) by mouth daily before breakfast, Starting Thu 3/23/2023, Normal      hydrALAZINE (APRESOLINE) 25 mg tablet Take 3 tablets (75 mg total) by mouth 3 (three) times a day, Starting Wed 3/8/2023, Normal      insulin aspart protamine-insulin aspart (NovoLOG 70/30) 100 units/mL injection As per your current blood sugars, take 15 units before breakfast, 10 units before dinner, No Print      Lancets (OneTouch Delica Plus [id number]) MISC use to TEST BLOOD SUGAR three times a day as directed, Historical Med      levothyroxine 112 mcg tablet Take 112 mcg by mouth daily, Starting Mon 4/11/2022, Historical Med      nitroglycerin (NITROSTAT) 0 4 mg SL tablet PLACE 1 TABLET UNDER THE TONGUE EVERY 5 MINUTES AS NEEDED FOR CHEST PAIN, Normal      sodium bicarbonate 650 mg tablet Take 1 tablet (650 mg total) by mouth 2 (two) times daily after meals, Starting Sun 4/9/2023, Normal         STOP taking these medications       carbamide peroxide (DEBROX) 6 5 % otic solution Comments:   Reason for Stopping:             Outpatient Discharge Orders   Basic metabolic panel   Standing Status: Standing Number of Occurrences: 2 Standing Exp  Date: 05/08/24       PDMP Review       Value Time User    PDMP Reviewed  Yes 3/29/2023 10:19 PM Jaxson Abdullahi DO           ED Provider  Attending physically available and evaluated Tonio Dominiques  I managed the patient along with the ED Attending      Electronically Signed by         Jean Velazquez MD  05/10/23 3071

## 2023-05-04 NOTE — ED ATTENDING ATTESTATION
5/4/2023  I, Emilie Hicks MD, saw and evaluated the patient  I have discussed the patient with the resident/non-physician practitioner and agree with the resident's/non-physician practitioner's findings, Plan of Care, and MDM as documented in the resident's/non-physician practitioner's note, except where noted  All available labs and Radiology studies were reviewed  I was present for key portions of any procedure(s) performed by the resident/non-physician practitioner and I was immediately available to provide assistance  At this point I agree with the current assessment done in the Emergency Department    I have conducted an independent evaluation of this patient a history and physical is as follows:  Pt started with sscp at 10:30 am which is constant and progressive + sob with exertion which is new + nausea no vomiting no diaphoresis No abd no fevers no chills no cough PE: alert heart reg lungs clear abd soft nontender ext nad MDM: will do cardiac pena give ntg and asa admit  ED Course         Critical Care Time  Procedures

## 2023-05-05 ENCOUNTER — APPOINTMENT (INPATIENT)
Dept: NON INVASIVE DIAGNOSTICS | Facility: HOSPITAL | Age: 68
End: 2023-05-05

## 2023-05-05 LAB
4HR DELTA HS TROPONIN: -3 NG/L
ALBUMIN SERPL BCP-MCNC: 3 G/DL (ref 3.5–5)
ALP SERPL-CCNC: 347 U/L (ref 46–116)
ALT SERPL W P-5'-P-CCNC: 53 U/L (ref 12–78)
ANA SER QL IA: POSITIVE
ANION GAP SERPL CALCULATED.3IONS-SCNC: 4 MMOL/L (ref 4–13)
APICAL FOUR CHAMBER EJECTION FRACTION: 50 %
APTT PPP: 32 SECONDS (ref 23–37)
AST SERPL W P-5'-P-CCNC: 45 U/L (ref 5–45)
BASOPHILS # BLD AUTO: 0.02 THOUSANDS/ÂΜL (ref 0–0.1)
BASOPHILS NFR BLD AUTO: 0 % (ref 0–1)
BILIRUB SERPL-MCNC: 0.57 MG/DL (ref 0.2–1)
BUN SERPL-MCNC: 64 MG/DL (ref 5–25)
CALCIUM ALBUM COR SERPL-MCNC: 9.6 MG/DL (ref 8.3–10.1)
CALCIUM SERPL-MCNC: 8.8 MG/DL (ref 8.3–10.1)
CARDIAC TROPONIN I PNL SERPL HS: 32 NG/L
CHLORIDE SERPL-SCNC: 107 MMOL/L (ref 96–108)
CHOLEST SERPL-MCNC: 186 MG/DL
CO2 SERPL-SCNC: 23 MMOL/L (ref 21–32)
CREAT SERPL-MCNC: 3.15 MG/DL (ref 0.6–1.3)
EOSINOPHIL # BLD AUTO: 0.01 THOUSAND/ÂΜL (ref 0–0.61)
EOSINOPHIL NFR BLD AUTO: 0 % (ref 0–6)
ERYTHROCYTE [DISTWIDTH] IN BLOOD BY AUTOMATED COUNT: 14.2 % (ref 11.6–15.1)
GFR SERPL CREATININE-BSD FRML MDRD: 14 ML/MIN/1.73SQ M
GLOBAL LONGITUIDAL STRAIN: 8 %
GLUCOSE SERPL-MCNC: 171 MG/DL (ref 65–140)
GLUCOSE SERPL-MCNC: 182 MG/DL (ref 65–140)
GLUCOSE SERPL-MCNC: 210 MG/DL (ref 65–140)
GLUCOSE SERPL-MCNC: 235 MG/DL (ref 65–140)
GLUCOSE SERPL-MCNC: 244 MG/DL (ref 65–140)
GLUCOSE SERPL-MCNC: 306 MG/DL (ref 65–140)
HCT VFR BLD AUTO: 28.8 % (ref 34.8–46.1)
HDLC SERPL-MCNC: 72 MG/DL
HGB BLD-MCNC: 8.8 G/DL (ref 11.5–15.4)
IMM GRANULOCYTES # BLD AUTO: 0.02 THOUSAND/UL (ref 0–0.2)
IMM GRANULOCYTES NFR BLD AUTO: 0 % (ref 0–2)
INR PPP: 1.21 (ref 0.84–1.19)
IVC: 2.7 MM
LDLC SERPL CALC-MCNC: 96 MG/DL (ref 0–100)
LYMPHOCYTES # BLD AUTO: 0.21 THOUSANDS/ÂΜL (ref 0.6–4.47)
LYMPHOCYTES NFR BLD AUTO: 4 % (ref 14–44)
MAGNESIUM SERPL-MCNC: 1.8 MG/DL (ref 1.6–2.6)
MCH RBC QN AUTO: 28.1 PG (ref 26.8–34.3)
MCHC RBC AUTO-ENTMCNC: 30.6 G/DL (ref 31.4–37.4)
MCV RBC AUTO: 92 FL (ref 82–98)
MONOCYTES # BLD AUTO: 0.37 THOUSAND/ÂΜL (ref 0.17–1.22)
MONOCYTES NFR BLD AUTO: 7 % (ref 4–12)
NEUTROPHILS # BLD AUTO: 4.74 THOUSANDS/ÂΜL (ref 1.85–7.62)
NEUTS SEG NFR BLD AUTO: 89 % (ref 43–75)
NRBC BLD AUTO-RTO: 0 /100 WBCS
NT-PROBNP SERPL-MCNC: ABNORMAL PG/ML
PLATELET # BLD AUTO: 116 THOUSANDS/UL (ref 149–390)
PMV BLD AUTO: 11.1 FL (ref 8.9–12.7)
POTASSIUM SERPL-SCNC: 4.5 MMOL/L (ref 3.5–5.3)
PROT SERPL-MCNC: 6.7 G/DL (ref 6.4–8.4)
PROTHROMBIN TIME: 15.5 SECONDS (ref 11.6–14.5)
RA PRESSURE ESTIMATED: 8 MMHG
RBC # BLD AUTO: 3.13 MILLION/UL (ref 3.81–5.12)
RV PSP: 52 MMHG
SL CV ECHO PERICARDIAL EFFUSION SIZE: 1.3 CM
SL CV LV EF: 45
SODIUM SERPL-SCNC: 134 MMOL/L (ref 135–147)
TR MAX PG: 44 MMHG
TR PEAK VELOCITY: 3.3 M/S
TRICUSPID VALVE PEAK REGURGITATION VELOCITY: 3.33 M/S
TRIGL SERPL-MCNC: 92 MG/DL
WBC # BLD AUTO: 5.37 THOUSAND/UL (ref 4.31–10.16)

## 2023-05-05 RX ORDER — ISOSORBIDE MONONITRATE 60 MG/1
60 TABLET, EXTENDED RELEASE ORAL ONCE
Status: COMPLETED | OUTPATIENT
Start: 2023-05-05 | End: 2023-05-05

## 2023-05-05 RX ORDER — INSULIN LISPRO 100 [IU]/ML
1-5 INJECTION, SOLUTION INTRAVENOUS; SUBCUTANEOUS
Status: DISCONTINUED | OUTPATIENT
Start: 2023-05-05 | End: 2023-05-08 | Stop reason: HOSPADM

## 2023-05-05 RX ORDER — ISOSORBIDE MONONITRATE 60 MG/1
120 TABLET, EXTENDED RELEASE ORAL DAILY
Status: DISCONTINUED | OUTPATIENT
Start: 2023-05-06 | End: 2023-05-08 | Stop reason: HOSPADM

## 2023-05-05 RX ORDER — BUMETANIDE 0.25 MG/ML
2 INJECTION INTRAMUSCULAR; INTRAVENOUS ONCE
Status: COMPLETED | OUTPATIENT
Start: 2023-05-05 | End: 2023-05-05

## 2023-05-05 RX ORDER — ALBUTEROL SULFATE 90 UG/1
2 AEROSOL, METERED RESPIRATORY (INHALATION) EVERY 4 HOURS PRN
Status: DISCONTINUED | OUTPATIENT
Start: 2023-05-05 | End: 2023-05-08 | Stop reason: HOSPADM

## 2023-05-05 RX ORDER — BUMETANIDE 2 MG/1
4 TABLET ORAL
Status: DISCONTINUED | OUTPATIENT
Start: 2023-05-05 | End: 2023-05-08 | Stop reason: HOSPADM

## 2023-05-05 RX ORDER — MAGNESIUM SULFATE HEPTAHYDRATE 40 MG/ML
2 INJECTION, SOLUTION INTRAVENOUS ONCE
Status: COMPLETED | OUTPATIENT
Start: 2023-05-05 | End: 2023-05-05

## 2023-05-05 RX ADMIN — INSULIN LISPRO 3 UNITS: 100 INJECTION, SOLUTION INTRAVENOUS; SUBCUTANEOUS at 12:08

## 2023-05-05 RX ADMIN — MAGNESIUM SULFATE HEPTAHYDRATE 2 G: 40 INJECTION, SOLUTION INTRAVENOUS at 12:08

## 2023-05-05 RX ADMIN — CALCITRIOL CAPSULES 0.25 MCG 0.25 MCG: 0.25 CAPSULE ORAL at 08:28

## 2023-05-05 RX ADMIN — INSULIN GLARGINE 20 UNITS: 100 INJECTION, SOLUTION SUBCUTANEOUS at 02:34

## 2023-05-05 RX ADMIN — Medication 2000 UNITS: at 08:28

## 2023-05-05 RX ADMIN — BUMETANIDE 3 MG: 1 TABLET ORAL at 03:12

## 2023-05-05 RX ADMIN — SODIUM BICARBONATE 650 MG: 650 TABLET ORAL at 02:34

## 2023-05-05 RX ADMIN — CARVEDILOL 12.5 MG: 12.5 TABLET, FILM COATED ORAL at 03:12

## 2023-05-05 RX ADMIN — INSULIN LISPRO 1 UNITS: 100 INJECTION, SOLUTION INTRAVENOUS; SUBCUTANEOUS at 17:37

## 2023-05-05 RX ADMIN — HYDRALAZINE HYDROCHLORIDE 75 MG: 50 TABLET ORAL at 02:34

## 2023-05-05 RX ADMIN — ISOSORBIDE MONONITRATE 60 MG: 60 TABLET, EXTENDED RELEASE ORAL at 08:28

## 2023-05-05 RX ADMIN — SODIUM BICARBONATE 650 MG: 650 TABLET ORAL at 08:20

## 2023-05-05 RX ADMIN — INSULIN LISPRO 2 UNITS: 100 INJECTION, SOLUTION INTRAVENOUS; SUBCUTANEOUS at 03:12

## 2023-05-05 RX ADMIN — CARVEDILOL 12.5 MG: 12.5 TABLET, FILM COATED ORAL at 16:19

## 2023-05-05 RX ADMIN — AMLODIPINE BESYLATE 5 MG: 5 TABLET ORAL at 00:05

## 2023-05-05 RX ADMIN — ATORVASTATIN CALCIUM 80 MG: 80 TABLET, FILM COATED ORAL at 08:28

## 2023-05-05 RX ADMIN — AMLODIPINE BESYLATE 5 MG: 5 TABLET ORAL at 21:31

## 2023-05-05 RX ADMIN — INSULIN LISPRO 1 UNITS: 100 INJECTION, SOLUTION INTRAVENOUS; SUBCUTANEOUS at 07:04

## 2023-05-05 RX ADMIN — HYDRALAZINE HYDROCHLORIDE 75 MG: 50 TABLET ORAL at 08:20

## 2023-05-05 RX ADMIN — HYDRALAZINE HYDROCHLORIDE 75 MG: 50 TABLET ORAL at 16:19

## 2023-05-05 RX ADMIN — ISOSORBIDE MONONITRATE 60 MG: 60 TABLET, EXTENDED RELEASE ORAL at 13:47

## 2023-05-05 RX ADMIN — BUMETANIDE 4 MG: 2 TABLET ORAL at 16:19

## 2023-05-05 RX ADMIN — HYDRALAZINE HYDROCHLORIDE 75 MG: 50 TABLET ORAL at 23:34

## 2023-05-05 RX ADMIN — SODIUM BICARBONATE 650 MG: 650 TABLET ORAL at 16:21

## 2023-05-05 RX ADMIN — AMLODIPINE BESYLATE 5 MG: 5 TABLET ORAL at 08:28

## 2023-05-05 RX ADMIN — INSULIN GLARGINE 6 UNITS: 100 INJECTION, SOLUTION SUBCUTANEOUS at 21:31

## 2023-05-05 RX ADMIN — ASPIRIN 81 MG 81 MG: 81 TABLET ORAL at 08:28

## 2023-05-05 RX ADMIN — BUMETANIDE 2 MG: 0.25 INJECTION INTRAMUSCULAR; INTRAVENOUS at 13:46

## 2023-05-05 NOTE — ASSESSMENT & PLAN NOTE
· Review of previous notes show that patient has been opposed to dialysis under any circumstances  · I spoke with patient with daughter at bedside about dialysis if she were to need it and she again affirms that she would never want dialysis under any circumstance  · I then discussed code status with patient as well as daughter at bedside given her feelings against dialysis and they report that patient would want CPR and would want to remain a full code  I did bring up that I was concerned in her case that performing CPR could affect her quality of life and given her comments against dialysis I was afraid this may be against her wishes  Patient again affirmed that she would want CPR

## 2023-05-05 NOTE — ASSESSMENT & PLAN NOTE
Lab Results   Component Value Date    HGBA1C 8 3 (H) 02/16/2023       No results for input(s): POCGLU in the last 72 hours      Blood Sugar Average: Last 72 hrs:     · On PTA aspart/protamine 15 and 10 units daily  · We will put on glargine 20 units nightly as well as sliding scale insulin while hospitalized

## 2023-05-05 NOTE — UTILIZATION REVIEW
NOTIFICATION OF INPATIENT ADMISSION   AUTHORIZATION REQUEST   SERVICING FACILITY:   [facility]  Address: [address] [place] [address], [address] [place] [address]  Tax ID: [id number]  NPI: [id number] ATTENDING PROVIDER:  Attending Name and NPI#: Sage Hudson [[id number]]  Address: [address] [place] [address], [address] [place] [address]  Phone: [phone]   ADMISSION INFORMATION:  Place of Service: Inpatient [address] [facility]  [address]  Place of Service Code: 21  Inpatient Admission Date/Time: 5/4/23  9:38 PM  Discharge Date/Time: No discharge date for patient encounter  Admitting Diagnosis Code/Description:  Chest pain [R07 9]  Hypertension [I10]     UTILIZATION REVIEW CONTACT:  Monica Maxwell Utilization   Network Utilization Review Department  Phone: [phone]  Fax: [phone]  Email: [email or web address]  Contact for approvals/pending authorizations, clinical reviews, and discharge  PHYSICIAN ADVISORY SERVICES:  Medical Necessity Denial & Dlgu-uu-Sngk Review  Phone: [phone]  Fax: [phone]  Email: [email or web address]

## 2023-05-05 NOTE — H&P
[facility]  H&P  Name: Maksim Quintana 70 y o  female I MRN: [id number]  Unit/Bed#: CRB I Date of Admission: 5/4/2023   Date of Service: 5/4/2023 I Hospital Day: 0      Assessment/Plan   * Chest pain  Assessment & Plan  · Retrosternal chest pain beginning at 10 AM this morning; rated 10/10 in intensity and persisted until patient received nitroglycerin in the ER; additionally, reports worsening dyspnea with exertion over the last several weeks  · On exam, patient endorses tenderness to palpation of the sternal area and reports that this reproduces the chest pain she was having  · Noted systolic blood pressure in the 180s  · Initial troponin 35  · EKG sinus, with noted T wave inversions in inferior leads as well as ST depressions in V4 through V6; these EKG changes appear similar to EKG from March 29  · Chest x-ray reviewed and compared to prior and no large effusion or infiltrate on my read awaiting official read  · History of ischemic cardiomyopathy with LVEF 40% since September 2022 and had echocardiogram last month with LVEF 45% as well as numerous hypokinetic segments and noted moderate pericardial effusion  · History of drug-eluting stent x2 to the LAD after NSTEMI in 2019; also noted to have 80% stenosis of the RCA with reported planned staged PCI but has not required additional intervention since then  · Has reported allergy to IV contrast and advanced CKD  · On PTA Bumex 3 mg twice daily, aspirin, carvedilol, Imdur  · History of anemia with baseline hemoglobin 1-48  · Noted systolic blood pressure in the 180s in the ER  · Admit to medicine on telemetry  We will repeat troponin and trend patient's troponin to ensure no rise as well as monitor serial EKGs    With respect to patient's chest pain, exact etiology currently unclear as patient does have a significant cardiac history as well as her pain improved with nitroglycerin, but the fact that she reports her pain is reproducible with palpation would be less typical for cardiac pain  In terms of patient's volume status, she appears euvolemic  We will continue patient's PTA medications of aspirin, carvedilol, Bumex 3 mg twice daily and Imdur  We will repeat an echocardiogram to evaluate patient's LVEF further as well as to further evaluate the pericardial effusion seen on her most recent echocardiogram   A repeat echo was also ordered as patient has a reported allergy to contrast and reports that she would not want dialysis  This when able is to get another review of her LVEF  Consult cardiology for their assistance on case  Goals of care, counseling/discussion  Assessment & Plan  · Review of previous notes show that patient has been opposed to dialysis under any circumstances  · I spoke with patient with daughter at bedside about dialysis if she were to need it and she again affirms that she would never want dialysis under any circumstance  · I then discussed code status with patient as well as daughter at bedside given her feelings against dialysis and they report that patient would want CPR and would want to remain a full code  I did bring up that I was concerned in her case that performing CPR could affect her quality of life and given her comments against dialysis I was afraid this may be against her wishes  Patient again affirmed that she would want CPR      CKD (chronic kidney disease) stage 4, GFR 15-29 ml/min [facility])  Assessment & Plan  Lab Results   Component Value Date    EGFR 15 05/04/2023    EGFR 8 04/09/2023    EGFR 8 04/08/2023    CREATININE 2 96 (H) 05/04/2023    CREATININE 4 72 (H) 04/09/2023    CREATININE 4 76 (H) 04/08/2023   · Creatinine 2 96  · Baseline most recently in the mid fours  · On PTA Bumex 3 mg twice daily as well as sodium bicarb twice daily  · Continue PTA meds and consider nephrology consultation in the morning  · Spoke with patient and reviewed chart about her previous comments that she would never want dialysis and patient again affirms this to me in her ER room with daughter present  Ischemic cardiomyopathy  Assessment & Plan  · Echo in April 2022 with LVEF 29%, systolic function mildly reduced, hypokinetic segments including the basal inferior, mid inferior, mid inferolateral, mid anterolateral, apical inferior and apical lateral   Noted to have a moderate pericardial effusion circumferential to the heart  · Combined systolic and diastolic heart failure  · History of coronary artery disease status post drug-eluting stent x2 to the LAD in 2019; noted to have 80% RCA stenosis and appears from last cardiology note that there was planned staged PCI but has not needed intervention on the RCA  · On PTA Bumex 3 mg twice daily, Imdur, carvedilol and aspirin  · Plan as above in chest pain section  · Continue PTA Bumex, Imdur, carvedilol, aspirin    Type 2 diabetes mellitus, with long-term current use of insulin ([facility] [address] )  Assessment & Plan  Lab Results   Component Value Date    HGBA1C 8 3 (H) 02/16/2023       No results for input(s): POCGLU in the last 72 hours  Blood Sugar Average: Last 72 hrs:     · On PTA aspart/protamine 15 and 10 units daily  · We will put on glargine 20 units nightly as well as sliding scale insulin while hospitalized    Pericardial effusion  Assessment & Plan  · Noted to have a moderate pericardial effusion circumferential to the heart during last hospitalization last month for dyspnea on exertion  · See chest pain section above           VTE Prophylaxis: Heparin  / sequential compression device   Code Status: Level 1 - Full Code discussed code status with patient and daughter at bedside  Anticipated Length of Stay:  Patient will be admitted on an Inpatient basis with an anticipated length of stay of  > 2 midnights  Justification for Hospital Stay: Please see detailed plans noted above      Chief Complaint:     Chest pain  History of Present Illness:  Gabriela Harding is a 70 y o  female who has past medical history significant for ischemic cardiomyopathy, coronary artery disease status post drug-eluting stent to the LAD x2, stage IV-5 CKD, anemia, diabetes who presented to [facility] ER on the evening of 5/4 with symptoms of retrosternal chest pain that she reports began this morning at 10 AM and persisted throughout the day  She rates the pain as a 10/10 in intensity  She reports the chest pain is associated with some shortness of breath but denies any radiation to her arms or neck  Patient reports that she was given nitroglycerin in the ER which improved her chest pain to a 4/10  Patient reports that her chest pain is worse with exertion  She reports that she was hospitalized at the beginning of April for heart failure exacerbation and after that hospitalization she reported feeling improved for several days  She then reports about a week or so after the hospitalization she began to have worsening dyspnea with exertion which she reports is still present today  Patient denies any fevers or chills        Review of Systems:    Constitutional:  Denies fever or chills   Eyes:  Denies change in visual acuity   HENT:  Denies nasal congestion or sore throat   Respiratory: Positive for dyspnea on exertion  Cardiovascular: Positive for chest pain  GI:  Denies abdominal pain or bloody stools  :  Denies dysuria   Musculoskeletal:  Denies back pain or joint pain   Integument:  Denies rash   Neurologic:  Denies headache or sensory changes   Endocrine:  Denies polyuria or polydipsia   Lymphatic:  Denies swollen glands   Psychiatric:  Denies depression or anxiety     Past Medical and Surgical History:   Past Medical History:   Diagnosis Date   • Anemia    • CHF (congestive heart failure) (HCC)    • Chronic kidney disease    • Coronary artery disease    • Diabetes mellitus ([facility] [address] )    • Hypertension    • Hypothyroidism      Past Surgical History:   Procedure Laterality Date   • CORONARY ANGIOPLASTY WITH STENT PLACEMENT  2019       Meds/Allergies:  No current facility-administered medications on file prior to encounter       Current Outpatient Medications on File Prior to Encounter   Medication Sig Dispense Refill   • amLODIPine (NORVASC) 5 mg tablet Take 1 tablet (5 mg total) by mouth 2 (two) times a day 180 tablet 3   • aspirin (RA Aspirin Adult Low Dose) 81 mg chewable tablet Chew 1 tablet (81 mg total) daily 90 tablet 1   • atorvastatin (LIPITOR) 40 mg tablet Take 2 tablets (80 mg total) by mouth daily 90 tablet 3   • bumetanide (BUMEX) 1 mg tablet Take 3 tablets (3 mg total) by mouth 2 (two) times a day 180 tablet 0   • calcitriol (ROCALTROL) 0 25 mcg capsule Take 1 capsule (0 25 mcg total) by mouth daily 90 capsule 3   • carbamide peroxide (DEBROX) 6 5 % otic solution Administer 5 drops into the left ear 2 (two) times a day for 3 days 15 mL 0   • carvedilol (COREG) 12 5 mg tablet Take 1 tablet (12 5 mg total) by mouth 2 (two) times a day with meals 180 tablet 3   • cholecalciferol (VITAMIN D3) 1,000 units tablet Take 2 tablets (2,000 Units total) by mouth daily 180 tablet 3   • Droplet Pen Needles 32G X 4 MM MISC 2 (two) times a day     • ferrous sulfate 324 (65 Fe) mg Take 1 tablet (324 mg total) by mouth daily before breakfast 90 tablet 2   • hydrALAZINE (APRESOLINE) 25 mg tablet Take 3 tablets (75 mg total) by mouth 3 (three) times a day 270 tablet 3   • insulin aspart protamine-insulin aspart (NovoLOG 70/30) 100 units/mL injection As per your current blood sugars, take 15 units before breakfast, 10 units before dinner  0   • isosorbide mononitrate (IMDUR) 60 mg 24 hr tablet Take 1 tablet (60 mg total) by mouth daily 90 tablet 3   • Lancets (OneTouch Delica Plus [id number]) MISC use to TEST BLOOD SUGAR three times a day as directed     • levothyroxine 112 mcg tablet Take 112 mcg by mouth daily     • nitroglycerin (NITROSTAT) 0 4 mg SL tablet PLACE 1 TABLET UNDER THE TONGUE EVERY 5 MINUTES AS NEEDED FOR CHEST PAIN 25 tablet 1   • sodium bicarbonate 650 mg tablet Take 1 tablet (650 mg total) by mouth 2 (two) times daily after meals 60 tablet 0     Allergies: Allergies   Allergen Reactions   • Iv Contrast [Iodinated Contrast Media] Itching     Caused KELLY   • Nifedipine Rash       History:  Marital Status: /Civil Union     Substance Use History:   Social History     Substance and Sexual Activity   Alcohol Use Yes    Comment: occ     Social History     Tobacco Use   Smoking Status Former   Smokeless Tobacco Never     Social History     Substance and Sexual Activity   Drug Use No       Family History:  Family History   Problem Relation Age of Onset   • Diabetes Mother    • Heart attack Father         MI   • Hypertension Brother        Physical Exam:     Vitals:   Blood Pressure: (!) 204/84 (05/04/23 2000)  Pulse: 87 (05/04/23 2000)  Temperature: 97 6 °F (36 4 °C) (05/04/23 1923)  Temp Source: Temporal (05/04/23 1923)  Respirations: 18 (05/04/23 2000)  SpO2: 95 % (05/04/23 2000)    Constitutional:  Alert and Oriented x 4, Appears chronically ill at baseline  Eyes:  EOMI, No scleral icterus   HENT:   oropharynx moist, external ears normal, external nose normal   Respiratory:  No respiratory distress, no wheezing   Cardiovascular:  Normal rate, no murmurs   GI:  Soft, nondistended, no guarding   :  No costovertebral angle tenderness   Musculoskeletal:  no tenderness, no deformities  Back- no tenderness  Integument:  no jaundice, no rash   Neurologic:  Alert &awake, communicative, CN 2-12 normal,  no focal deficits noted         Lab Results: I have personally reviewed pertinent reports        Results from last 7 days   Lab Units 05/04/23 1943   WBC Thousand/uL 4 88   HEMOGLOBIN g/dL 10 0*   HEMATOCRIT % 33 4*   PLATELETS Thousands/uL 130*   NEUTROS PCT % 87*   LYMPHS PCT % 5*   MONOS PCT % 6   EOS PCT % 1     Results from last 7 days   Lab Units 05/04/23 1943   POTASSIUM mmol/L 4 4 CHLORIDE mmol/L 108   CO2 mmol/L 21   BUN mg/dL 60*   CREATININE mg/dL 2 96*   CALCIUM mg/dL 8 8   ALK PHOS U/L 422*   ALT U/L 63   AST U/L 45             Imaging: I have personally reviewed pertinent reports  CT chest wo contrast    Result Date: 4/8/2023  Narrative: CT CHEST WITHOUT IV CONTRAST INDICATION:   Dyspnea, chronic, unclear etiology sob  Per my review of the medical record, the patient presented with a 5 day history of nonproductive cough and orthopnea, not improved with antibiotic and Tessalon Perles for possible bronchitis  Admitted for acute on chronic combined CHF  COMPARISON:  CXR 04/04/2023, chest CT 7/2/2021, abdomen CT 2/9/2022  TECHNIQUE: Chest CT without intravenous contrast   Axial, sagittal, coronal 2D reformats and coronal MIPS from source data  Radiation dose length product (DLP):  431 18 mGy-cm   Radiation dose exposure minimized using iterative reconstruction and automated exposure control  FINDINGS: LUNGS:  Mild septal thickening due to interstitial edema  Minimal benign linear atelectasis  Benign calcified granulomas  AIRWAYS: No significant filling defects  PLEURA:  Trace right effusion  HEART/GREAT VESSELS:  Moderate cardiomegaly  Chronic small pericardial effusion  Severe coronary artery calcification indicating atherosclerotic heart disease  Coronary stents  MEDIASTINUM AND JORI:  Small hiatal hernia  CHEST WALL AND LOWER NECK: Punctate calcification in the left lobe of the thyroid gland  UPPER ABDOMEN:  Nodular cirrhotic liver  OSSEOUS STRUCTURES: Mild degenerative disease in the spine  Impression: Mild interstitial edema with trace right effusion  Cardiomegaly with small chronic pericardial effusion  Small hiatal hernia  Nodular cirrhotic liver   Workstation performed: [id number]     Echo follow up/limited w/ contrast if indicated    Result Date: 4/5/2023  Narrative: •  Left Ventricle: Left ventricular cavity size is normal  Wall thickness is normal  There is no concentric hypertrophy  The left ventricular ejection fraction is 45%  Systolic function is mildly reduced  Global longitudinal strain is reduced at -12%  •  The following segments are hypokinetic: basal inferior, mid inferior, mid inferolateral, mid anterolateral, apical inferior and apical lateral  •  All other segments are normal  •  Left Atrium: The atrium is mildly dilated  •  Right Atrium: The atrium is mildly dilated  •  Mitral Valve: There is mild annular calcification  •  Pericardium: There is a moderate pericardial effusion circumferential to the heart  The fluid exhibits no internal echoes  There is no echocardiographic evidence of tamponade  Strain was performed to quantify interventricular dyssynchrony and evaluate components of myocardial function due to cardiomyopathy   Results from the utilization of Strain Analysis are listed in the report below  Total time for visit, including counseling/coordination of care: 45 minutes  Greater than 50% of this total time spent on direct patient counseling and coorination of care  Epic Records Reviewed as well as Records in Care Everywhere    ** Please Note: Dragon 360 Dictation voice to text software was used in the creation of this document   **

## 2023-05-05 NOTE — PROGRESS NOTES
[facility]  Progress Note  Name: Elda Bailey I  MRN: [id number]  [facility] Date of Admission: 5/4/2023   Date of Service: 5/5/2023 I Hospital Day: 1    Assessment/Plan   * Chest pain  Assessment & Plan  · Per record review, patient presented with retrosternal chest pain beginning at 10 AM the morning of admission, rated 10/10 in intensity, and persisted until patient received nitroglycerin in the ER; additionally, reported worsening dyspnea with exertion over the last several weeks  · Per record review, for admitting provider, patient endorsed tenderness to palpation of the sternal area and reported that this reproduced the chest pain she was having, however she denied tenderness to palpation on my exam today  · History of ischemic cardiomyopathy with LVEF 40% since September 2022 and had echocardiogram last month with LVEF 45% as well as numerous hypokinetic segments and noted moderate pericardial effusion  · History of drug-eluting stent x2 to the LAD after NSTEMI in 2019; also noted to have 80% stenosis of the RCA with reported planned staged PCI but has not required additional intervention since then  · Cardiology consult appreciated - received IV Bumex today and Imdur was increased   No plan for LHC at this time given renal function and refusal of dialysis    CKD (chronic kidney disease) stage 4, GFR 15-29 ml/min [facility])  Assessment & Plan  Lab Results   Component Value Date    EGFR 14 05/05/2023    EGFR 15 05/04/2023    EGFR 8 04/09/2023    CREATININE 3 15 (H) 05/05/2023    CREATININE 2 96 (H) 05/04/2023    CREATININE 4 72 (H) 04/09/2023   ·   · Baseline most recently in the mid fours  · On PTA Bumex 3 mg twice daily as well as sodium bicarb twice daily  · Low threshold for Nephrology consult   · Patient continues to refuse dialysis    Type 2 diabetes mellitus, with long-term current use of insulin [facility])  Assessment & Plan  Lab Results   Component Value Date    HGBA1C 8 3 (H) 02/16/2023       Recent Labs     05/05/23  0311 05/05/23  0703 05/05/23  1155   POCGLU 235* 210* 306*       Blood Sugar Average: Last 72 hrs:  (P) 323 [id number]   · On PTA aspart/protamine 15 and 10 units daily  · Currently on Lantus 20 units nightly as well as sliding scale insulin   · Adjust as indicated     Goals of care, counseling/discussion  Assessment & Plan  · Patient continued to refuse dialysis  · Per admitting physician's discussion, patient is Level 1 full code    Hypertension  Assessment & Plan  · BP improved since admission  · S/p IV Bumex per Heart Failure Team and Imdur was increased    Ischemic cardiomyopathy  Assessment & Plan  · Echo in April 2022 with LVEF 47%, systolic function mildly reduced, hypokinetic segments including the basal inferior, mid inferior, mid inferolateral, mid anterolateral, apical inferior and apical lateral   Noted to have a moderate pericardial effusion circumferential to the heart  · Combined systolic and diastolic heart failure  · History of coronary artery disease status post drug-eluting stent x2 to the LAD in 2019; noted to have 80% RCA stenosis and appears from last cardiology note that there was planned staged PCI but has not needed intervention on the RCA    Coronary artery disease  Assessment & Plan  · S/p LAD stents  · Cardiology following  · On aspirin, statin, Coreg, Imdur    Hypothyroidism  Assessment & Plan  · Continue levothyroxine            VTE Pharmacologic Prophylaxis: VTE Score: 3 Moderate Risk (Score 3-4) - Pharmacological DVT Prophylaxis Ordered: heparin  Patient Centered Rounds: I performed bedside rounds with nursing staff today  Discussions with Specialists or Other Care Team Provider:     Education and Discussions with Family / Patient: Updated  (daughter) via phone   Kaylee    Total Time Spent on Date of Encounter in care of patient: 35 minutes This time was spent on one or more of the following: performing physical exam; counseling and coordination of care; obtaining or reviewing history; documenting in the medical record; reviewing/ordering tests, medications or procedures; communicating with other healthcare professionals and discussing with patient's family/caregivers  Current Length of Stay: 1 day(s)  Current Patient Status: Inpatient   Certification Statement: The patient will continue to require additional inpatient hospital stay due to CP, IV Bumex, cardiology adjusting meds  Discharge Plan: pending cardiology clearance    Code Status: Level 1 - Full Code    Subjective:   Ms Ernestina Santo refuses dialysis  She reports that she had chest pain until she got the nitro  Objective:     Vitals:   Temp (24hrs), Av 5 °F (36 9 °C), Min:97 6 °F (36 4 °C), Max:99 6 °F (37 6 °C)    Temp:  [97 6 °F (36 4 °C)-99 6 °F (37 6 °C)] 98 3 °F (36 8 °C)  HR:  [67-87] 68  Resp:  [12-20] 20  BP: (146-204)/(66-90) 153/75  SpO2:  [91 %-95 %] 94 %  Body mass index is 25 96 kg/m²  Input and Output Summary (last 24 hours): Intake/Output Summary (Last 24 hours) at 2023 1636  Last data filed at 2023 1624  Gross per 24 hour   Intake 650 ml   Output 200 ml   Net 450 ml       Physical Exam:   Physical Exam  Vitals reviewed  Constitutional:       Comments: Patient seen sitting, NAD   Cardiovascular:      Rate and Rhythm: Normal rate and regular rhythm  Pulmonary:      Effort: Pulmonary effort is normal  No respiratory distress  Breath sounds: Normal breath sounds  Chest:      Chest wall: No tenderness  Abdominal:      General: Bowel sounds are normal       Palpations: Abdomen is soft  Tenderness: There is no abdominal tenderness  Musculoskeletal:      Right lower leg: No edema  Left lower leg: No edema  Skin:     General: Skin is warm  Neurological:      Mental Status: She is alert     Psychiatric:         Mood and Affect: Mood normal          Behavior: Behavior normal  Additional Data:     Labs:  Results from last 7 days   Lab Units 05/05/23  0406   WBC Thousand/uL 5 37   HEMOGLOBIN g/dL 8 8*   HEMATOCRIT % 28 8*   PLATELETS Thousands/uL 116*   NEUTROS PCT % 89*   LYMPHS PCT % 4*   MONOS PCT % 7   EOS PCT % 0     Results from last 7 days   Lab Units 05/05/23  0406   SODIUM mmol/L 134*   POTASSIUM mmol/L 4 5   CHLORIDE mmol/L 107   CO2 mmol/L 23   BUN mg/dL 64*   CREATININE mg/dL 3 15*   ANION GAP mmol/L 4   CALCIUM mg/dL 8 8   ALBUMIN g/dL 3 0*   TOTAL BILIRUBIN mg/dL 0 57   ALK PHOS U/L 347*   ALT U/L 53   AST U/L 45   GLUCOSE RANDOM mg/dL 244*     Results from last 7 days   Lab Units 05/05/23  0406   INR  1 21*     Results from last 7 days   Lab Units 05/05/23  1155 05/05/23  0703 05/05/23  0311   POC GLUCOSE mg/dl 306* 210* 235*               Lines/Drains:  Invasive Devices     Peripheral Intravenous Line  Duration           Peripheral IV 05/04/23 Left Antecubital <1 day                  Telemetry:  Telemetry Orders (From admission, onward)             48 Hour Telemetry Monitoring  Continuous x 48 hours        References:    Telemetry Guidelines   Question:  Reason for 48 Hour Telemetry  Answer:  Acute MI, chest pain - R/O MI, or unstable angina                 Telemetry Reviewed: Normal Sinus Rhythm  Indication for Continued Telemetry Use: Acute MI/Unstable Angina/Rule out ACS             Imaging: Reviewed radiology reports from this admission including: chest xray    Recent Cultures (last 7 days):         Last 24 Hours Medication List:   Current Facility-Administered Medications   Medication Dose Route Frequency Provider Last Rate   • albuterol  2 puff Inhalation Q4H PRN Timothy Mustafa, DO     • amLODIPine  5 mg Oral BID Timothy Mustafa, DO     • aspirin  81 mg Oral Daily Timothy Mustafa, DO     • atorvastatin  80 mg Oral Daily Timothy Mustafa, DO     • bumetanide  4 mg Oral BID (diuretic) Eugenia Ortiz PA-C     • calcitriol  0 25 mcg Oral Daily Timothy Mustafa, DO     • carvedilol  12 5 mg Oral BID With Meals Timothy Mustafa, DO     • cholecalciferol  2,000 Units Oral Daily Timothy Mustafa, DO     • heparin (porcine)  5,000 Units Subcutaneous [facility] Timothy Mustafa, DO     • hydrALAZINE  75 mg Oral TID Timothy Mustafa, DO     • insulin glargine  20 Units Subcutaneous HS Timothy Mustafa, DO     • insulin lispro  1-5 Units Subcutaneous Q6H [address] Timothy Mustafa, DO     • [START ON 5/6/2023] isosorbide mononitrate  120 mg Oral Daily Mustapha Nicholson DO     • levothyroxine  112 mcg Oral Early Morning Timothy Mustafa, DO     • sodium bicarbonate  650 mg Oral BID after meals Timothy Mustafa,           Today, Patient Was Seen By: Shona Matos PA-C    **Please Note: This note may have been constructed using a voice recognition system  **

## 2023-05-05 NOTE — ASSESSMENT & PLAN NOTE
· Echo in April 2022 with LVEF 55%, systolic function mildly reduced, hypokinetic segments including the basal inferior, mid inferior, mid inferolateral, mid anterolateral, apical inferior and apical lateral   Noted to have a moderate pericardial effusion circumferential to the heart    · Combined systolic and diastolic heart failure  · History of coronary artery disease status post drug-eluting stent x2 to the LAD in 2019; noted to have 80% RCA stenosis and appears from last cardiology note that there was planned staged PCI but has not needed intervention on the RCA  · On PTA Bumex 3 mg twice daily, Imdur, carvedilol and aspirin  · Plan as above in chest pain section  · Continue PTA Bumex, Imdur, carvedilol, aspirin

## 2023-05-05 NOTE — ASSESSMENT & PLAN NOTE
Lab Results   Component Value Date    HGBA1C 8 3 (H) 02/16/2023       Recent Labs     05/05/23  0311 05/05/23  0703 05/05/23  1155   POCGLU 235* 210* 306*       Blood Sugar Average: Last 72 hrs:  (P) 642 [id number]   · On PTA aspart/protamine 15 and 10 units daily  · Currently on Lantus 20 units nightly as well as sliding scale insulin   · Adjust as indicated

## 2023-05-05 NOTE — ASSESSMENT & PLAN NOTE
Lab Results   Component Value Date    EGFR 15 05/04/2023    EGFR 8 04/09/2023    EGFR 8 04/08/2023    CREATININE 2 96 (H) 05/04/2023    CREATININE 4 72 (H) 04/09/2023    CREATININE 4 76 (H) 04/08/2023   · Creatinine 2 96  · Baseline most recently in the mid fours  · On PTA Bumex 3 mg twice daily as well as sodium bicarb twice daily  · Continue PTA meds and consider nephrology consultation in the morning  · Spoke with patient and reviewed chart about her previous comments that she would never want dialysis and patient again affirms this to me in her ER room with daughter present

## 2023-05-05 NOTE — CASE MANAGEMENT
Case Management Assessment    Patient name Korina Alan  Location [address] 886[address]97 MRN [id number]  : 1955 Date 2023       Current Admission Date: 2023  Current Admission Diagnosis:Chest pain   Patient Active Problem List    Diagnosis Date Noted   • Chest pain 2023   • Goals of care, counseling/discussion 2023   • Anemia due to stage 5 chronic kidney disease, not on chronic dialysis ([place] [address] ) 2023   • CKD (chronic kidney disease) stage 4, GFR 15-29 ml/min ([place] [address] ) 2023   • Shortness of breath 2022   • Ischemic cardiomyopathy 2022   • Type 2 diabetes mellitus, with long-term current use of insulin ([place] [address] ) 2022   • Stenosis of both internal carotid arteries 2022   • COVID-19 2022   • Nocturnal hypoxia 10/30/2021   • Abnormal finding on imaging of liver 10/18/2021   • Acute kidney injury superimposed on chronic kidney disease ([place] [address] )    • Pericardial effusion 2021   • Stage 4 chronic kidney disease ([place] [address] ) 2021   • Hypoalbuminemia 2021   • Hyperphosphatemia 2021   • Microalbuminuria due to type 2 diabetes mellitus ([place] [address] ) 2020   • Hyperlipidemia 2020   • Elevated d-dimer 2020   • History of anemia due to chronic kidney disease 2019   • Vitamin D deficiency 2019   • Proteinuria 2019   • Coronary artery disease 2019   • Anemia 2018   • Acute on chronic diastolic heart failure ([facility] ) 2018   • Hypothyroidism 2018   • Hypertension 2018   • Acute renal failure superimposed on stage 4 chronic kidney disease ([facility] ) 2018      LOS (days): 1  Geometric Mean LOS (GMLOS) (days): 1 70  Days to GMLOS:1 1     OBJECTIVE:  PATIENT READMITTED TO HOSPITAL  Risk of Unplanned Readmission Score: 24 02         Current admission status: Inpatient       Preferred Pharmacy:   [facility],[address] [address]  Phone: [phone] Fax: [phone]    Primary Care Provider: Tio Beckman MD    Primary Insurance: [facility] RE[facility]  Secondary Insurance: [facility]    ASSESSMENT:  [id number] [facility] Representative - Daughter   Primary Phone: [phone] [facility])  Home Phone: [phone]               Advance Directives  Does patient have a [address] [place] [address] Avenue?: No  Does patient currently have a Health Care decision maker?: Yes, please see Health Care Proxy section (Pt is a readmission  Information obtained from 3/29/23 CM assessment note by Trey BRIGHT )  Does patient have Advance Directives?: No         Readmission Root Cause  30 Day Readmission: Yes  Who directed you to return to the hospital?: Family  Did you understand whom to contact if you had questions or problems?: Yes  Did you get your prescriptions before you left the hospital?: Yes  Were you able to get your prescriptions filled when you left the hospital?: Yes  Did you take your medications as prescribed?: Yes  Were you able to get to your follow-up appointments?: Yes, No  Reason[de-identified] Compliance  Patient was readmitted due to: chest pain    Patient Information  Admitted from[de-identified] Home  Mental Status: Alert  Assessment information provided by[de-identified] Other - please comment, Daughter (Pt is a readmission  Information obtained from 3/29/23 CM assessment note by Lucy Underwood and TC to daughter Ephraim Hernandez to confirm some questions )  Support Systems: Spouse/significant other, Daughter  What city do you live in?: [facility] entry access options  Select all that apply : Stairs  Number of steps to enter home  : 1  Do the steps have railings?: No  Type of Current Residence: 2 story home  Upon entering residence, is there a bedroom on the main floor (no further steps)?: No  A bedroom is located on the following floor levels of residence (select all that apply):: 2nd Floor  Upon entering residence, is there a bathroom on the main floor (no further steps)?: Yes  Indicate which floors of current residence have a bathroom (select all the apply):: 2nd Floor  Number of steps to 2nd floor from main floor: 10  In the last 12 months, was there a time when you were not able to pay the mortgage or rent on time?: No  In the last 12 months, how many places have you lived?: 2  In the last 12 months, was there a time when you did not have a steady place to sleep or slept in a shelter (including now)?: No  Homeless/housing insecurity resource given?: N/A  Living Arrangements: Lives w/ Spouse/significant other, Lives w/ Daughter    Activities of Daily Living Prior to Admission  Functional Status: Independent      Means of Transportation  Means of Transport to Appts[de-identified] Family transport  In the past 12 months, has lack of transportation kept you from medical appointments or from getting medications?: No  In the past 12 months, has lack of transportation kept you from meetings, work, or from getting things needed for daily living?: No  Was application for public transport provided?: N/A

## 2023-05-05 NOTE — RESPIRATORY THERAPY NOTE
RT Protocol Note  Tootie Campbell 70 y o  female MRN: [id number]  Unit/Bed#: CRB Encounter: [id number]    Assessment    Principal Problem:    Chest pain  Active Problems:    Pericardial effusion    Type 2 diabetes mellitus, with long-term current use of insulin ([facility])    Ischemic cardiomyopathy    CKD (chronic kidney disease) stage 4, GFR 15-29 ml/min ([facility])    Goals of care, counseling/discussion      Home Pulmonary Medications:  Albuterol mdi prn         Past Medical History:   Diagnosis Date    Anemia     CHF (congestive heart failure) ([facility])     Chronic kidney disease     Coronary artery disease     Diabetes mellitus ([address] )     Hypertension     Hypothyroidism      Social History     Socioeconomic History    Marital status: /Civil Union     Spouse name: None    Number of children: 3    Years of education: None    Highest education level: None   Occupational History    Occupation: part time   Tobacco Use    Smoking status: Former    Smokeless tobacco: Never   Vaping Use    Vaping Use: Never used   Substance and Sexual Activity    Alcohol use: Yes     Comment: occ    Drug use: No    Sexual activity: Not Currently     Partners: Male   Other Topics Concern    None   Social History Narrative    Always uses seatbelt    Caffeine use    Daily coffee consumption: 1 cp daily    Feels safe at home    Lives with spouse     Social Determinants of Health     Financial Resource Strain: Not on file   Food Insecurity: No Food Insecurity    Worried About Running Out of Food in the Last Year: Never true    Ran Out of Food in the Last Year: Never true   Transportation Needs: Not on file   Physical Activity: Not on file   Stress: Not on file   Social Connections: Not on file   Intimate Partner Violence: Not on file   Housing Stability: [address] to Pay for Housing in the Last Year: No    Number of Places Lived in the Last Year: 1    Unstable Housing in the Last Year: No       Subjective         Objective    Physical Exam: Assessment Type: Assess only  General Appearance: Awake  Respiratory Pattern: Normal  Chest Assessment: Chest expansion symmetrical  Bilateral Breath Sounds: Clear    Vitals:  Blood pressure (!) 189/83, pulse 87, temperature 97 6 °F (36 4 °C), temperature source Temporal, resp  rate 18, SpO2 92 %, not currently breastfeeding  Imaging and other studies: I have personally reviewed pertinent reports  Plan    Respiratory Plan: No distress/Pulmonary history, Home Bronchodilator Patient pathway, Discontinue Protocol        Resp Comments: Pt is a 69 y/o female admitted with chest pain  No respiratory distress noted  Breathe sounds are clear  Pt states she uses albuterol mdi at home as needed  Will order prn albuterol mdi and discontinue protocol

## 2023-05-05 NOTE — ASSESSMENT & PLAN NOTE
· Retrosternal chest pain beginning at 10 AM this morning; rated 10/10 in intensity and persisted until patient received nitroglycerin in the ER; additionally, reports worsening dyspnea with exertion over the last several weeks  · On exam, patient endorses tenderness to palpation of the sternal area and reports that this reproduces the chest pain she was having  · Noted systolic blood pressure in the 180s  · Initial troponin 35  · EKG sinus, with noted T wave inversions in inferior leads as well as ST depressions in V4 through V6; these EKG changes appear similar to EKG from March 29  · Chest x-ray reviewed and compared to prior and no large effusion or infiltrate on my read awaiting official read  · History of ischemic cardiomyopathy with LVEF 40% since September 2022 and had echocardiogram last month with LVEF 45% as well as numerous hypokinetic segments and noted moderate pericardial effusion  · History of drug-eluting stent x2 to the LAD after NSTEMI in 2019; also noted to have 80% stenosis of the RCA with reported planned staged PCI but has not required additional intervention since then  · Has reported allergy to IV contrast and advanced CKD  · On PTA Bumex 3 mg twice daily, aspirin, carvedilol, Imdur  · History of anemia with baseline hemoglobin 6-74  · Noted systolic blood pressure in the 180s in the ER  · Admit to medicine on telemetry  We will repeat troponin and trend patient's troponin to ensure no rise as well as monitor serial EKGs  With respect to patient's chest pain, exact etiology currently unclear as patient does have a significant cardiac history as well as her pain improved with nitroglycerin, but the fact that she reports her pain is reproducible with palpation would be less typical for cardiac pain  In terms of patient's volume status, she appears euvolemic  We will continue patient's PTA medications of aspirin, carvedilol, Bumex 3 mg twice daily and Imdur    We will repeat an echocardiogram to evaluate patient's LVEF further as well as to further evaluate the pericardial effusion seen on her most recent echocardiogram   A repeat echo was also ordered as patient has a reported allergy to contrast and reports that she would not want dialysis  This when able is to get another review of her LVEF  Consult cardiology for their assistance on case

## 2023-05-05 NOTE — ASSESSMENT & PLAN NOTE
· Per record review, patient presented with retrosternal chest pain beginning at 10 AM the morning of admission, rated 10/10 in intensity, and persisted until patient received nitroglycerin in the ER; additionally, reported worsening dyspnea with exertion over the last several weeks  · Per record review, for admitting provider, patient endorsed tenderness to palpation of the sternal area and reported that this reproduced the chest pain she was having, however she denied tenderness to palpation on my exam today  · History of ischemic cardiomyopathy with LVEF 40% since September 2022 and had echocardiogram last month with LVEF 45% as well as numerous hypokinetic segments and noted moderate pericardial effusion  · History of drug-eluting stent x2 to the LAD after NSTEMI in 2019; also noted to have 80% stenosis of the RCA with reported planned staged PCI but has not required additional intervention since then  · Cardiology consult appreciated - received IV Bumex today and Imdur was increased   No plan for LHC at this time given renal function and refusal of dialysis

## 2023-05-05 NOTE — CONSULTS
Advanced Heart Failure / Pulmonary Hypertension Service Consultation    Josefa Spencer 70 y o  female  MRN: [id number]  Unit/Bed#: [facility] 508-01; Encounter: [id number]    Assessment:  Principal Problem:    Chest pain  Active Problems:    Pericardial effusion    Type 2 diabetes mellitus, with long-term current use of insulin ([facility])    Ischemic cardiomyopathy    CKD (chronic kidney disease) stage 4, GFR 15-29 ml/min ([facility])    Goals of care, counseling/discussion      HPI:   Josefa Spencer is a 79-year-old woman with PMH as below who presented to [facility] on 05/04/2023 with persistent chest pain for approximately 9 hours  Notably hypertensive in ED (MAPs >130 mmHg)  Given 162 mg ASA  Symptoms improved with SL nitro in ED  Troponins unremarkable x3  EKG without ischemic changes  Cardiology consulted  Patient seen and examined  Reports developing 10/10 central chest pain yesterday morning  Describes chest pain as pressure and without radiation to neck, upper extremities, or abdomen  Reports that this pain is different from any pain she had at time of stenting in 2019 and is not reproducible with palpation  Reports with the development of chest pain she also noted new onset OROSCO and nausea/vomiting  Took a couple Tylenol with no relief in chest pain  Did not try SL nitro at home as she was under the impression she was not permitted to  Denies recent falls or trauma to chest wall  Denies recent dietary discretion or rapid weight gain on home scale  Also denies missing medication doses at home  Reports recent weights trending in 150-153 lbs  Often checks blood pressure daily at home; recent -150 mg on her home cuff  Patient reports that after receiving SL nitro in ER her chest pain has now fully resolved and not recurred  Primary complaint today of fatigue given her quality and quantity of sleep in ED  She denies any active chest pain, palpitations, SOB, near-syncope, or syncope   Continues with "poor appetite due to nausea and reports vomiting 3 times in ED overnight  Heart failure service was consulted for \"chest pain  \" Patient follows with Dr Nidia Victor for outpatient cardiology  Objective: Intake/ Output: Not done  Weight: Not done  Telemetry: NSR, rates in 70-80s  Occasional PVCs  Today's Plan:  • Lesser urine output s/p PO Bumex this AM, and volume up on exam  Will give IV Bumex 2 mg x1 now  • Increase Imdur to 120 mg daily  • Check JOHN and histone antibody given persistent pericardial effusion and chronic hydralazine use  • Refused AM meds as medications were given at 0315 today  • Hemoglobin 8 8 mg/dL this AM (10 0 mg/dL on 05/04)  Goal >8 mg/dL  • Echocardiogram already in process  • Add on NT-proBNP to AM labs  • Orders placed for I/Os, daily weights, and 2L fluid restriction  • Magnesium 1 8 this AM  Will give 2g IV magnesium today  • Renal function limiting addition of HF GDMT (ARNi, MRA, SGLT2i, etc )  • Concern for infiltrative cardiomyopathy given restrictive phenotype during last admission  SPEP and free light chains were ordered  SPEP without monoclonal bands and kappa/lambda ratio of 1 38   • Concerned about missing Epogen injection on 05/08/2023  Plan:  Chest pain  Impression: in setting poorly controlled BP and mild volume overload  hs Troponins from 05/04/2023: 35 -->32-->32  EKG without ischemic changes  Coronary artery disease  LHC 01/04/2019: 50% stenosis proximal LAD; 90% stenosis mid LAD; 50% stenosis distal LAD; 80% stenosis mid RCA  S/p LUDY x2 to mid LAD              Continue on aspirin, statin, Imdur 120 mg daily, and BB as below  Continue medical management of CAD  High risk for needing RRT if LHC would be repeated  Patient adamantly does not want RRT  Chronic HFrEF; LVEF 40-45%; LVIDd 5 cm; Carrington Mckay; ACC/AHA Stage C              Etiology: uncontrolled HTN resulting in recent drop in LVEF?              TTE 12/30/2018: LVEF 50%   " LVIDd 3 55 cm  Grade 2 DD  Normal RV  Mild MR and TR  Trace free-flowing pericardial effusion                TTE 02/22/2021: LVEF 60%  LVIDd 4 74 cm  Grade 2 DD  Normal RV and RVSF  Mild MR  Trace TR  Moderate pericardial effusion                TTE (limited) 07/03/2021: LVEF 60%  Normal RV  Mild TR  Small to moderate pericardial effusion                TTE 09/15/2022: LVEF 40-45%  LVIDd 5 cm  Grade 2 DD  Normal RV size with low normal RVSF  Trace MR and TR     TTE 04/04/2023: LVEF 45%  LVIDd 4 8 cm  Grade 2 DD  Normal RV  Mild MR and TR  Small pericardial effusion  TTE (limited with strain) 04/05/2023: LVEF 45%  Global longitudinal strain -12%  Mild ABIGAIL  Moderate pericardial effusion without evidence of tamponade  Pharmacotherapies / Neurohormonal Blockade:  --Beta Blocker: carvedilol 12 5 mg q12 hours  --ARNi / ACEi / ARB: No, CKD precludes    --Aldosterone Antagonist: No, CKD precludes    --SGLT2 Inhibitor: No, CKD precludes    --SVR Reducing Agents: hydralazine 75 mg q8 hours    --Home Diuretic: Bumex 3 mg BID    --Inpatient Diuretic: PO Bumex 3 mg BID         Chronic kidney disease, stage IV              Baseline creatinine of 2 5-3 2               Today, creatinine of 3 15 (2 96 of 05/04)                 OGLVVQC with Dr Priyanka Zuniga outpatient  Uninterested in pursuing RRT per prior documentation       Hypertension: continues on amlodipine 5 mg BID    Hyperlipidemia  Diabetes mellitus, type II    Anemia of chronic disease  Hypothyroidism     Past Medical History:   Diagnosis Date   • Anemia    • CHF (congestive heart failure) (HCC)    • Chronic kidney disease    • Coronary artery disease    • Diabetes mellitus ([facility] [address] )    • Hypertension    • Hypothyroidism        Review of Systems   Constitutional: Positive for fatigue  Negative for activity change, appetite change, chills, fever and unexpected weight change  HENT: Negative for congestion, rhinorrhea and sneezing  Eyes: Negative      Respiratory: Positive for shortness of breath (with exertion)  Negative for cough and chest tightness  Cardiovascular: Positive for chest pain  Negative for palpitations and leg swelling  Gastrointestinal: Positive for nausea and vomiting  Negative for abdominal distention, abdominal pain and diarrhea  Endocrine: Negative  Genitourinary: Negative for decreased urine volume, difficulty urinating, dysuria and urgency  Musculoskeletal: Negative  Skin: Negative  Allergic/Immunologic: Negative  Neurological: Negative for dizziness, syncope, weakness and light-headedness  Psychiatric/Behavioral: Negative for confusion and sleep disturbance  The patient is not nervous/anxious  14-point ROS completed and negative except as stated above and/or in the HPI      Current Facility-Administered Medications:   •  albuterol (PROVENTIL HFA,VENTOLIN HFA) inhaler 2 puff, 2 puff, Inhalation, Q4H PRN, Rodolfo Hendricks DO  •  amLODIPine (NORVASC) tablet 5 mg, 5 mg, Oral, BID, Rodolfo Hendricks DO, 5 mg at 05/05/23 7254  •  aspirin chewable tablet 81 mg, 81 mg, Oral, Daily, Rodolfo Hendricks DO, 81 mg at 05/05/23 7884  •  atorvastatin (LIPITOR) tablet 80 mg, 80 mg, Oral, Daily, Rodolfo Hendricks DO, 80 mg at 05/05/23 7426  •  bumetanide (BUMEX) injection 2 mg, 2 mg, Intravenous, Once, Dennise Purvis PA-C  •  bumetanide (BUMEX) tablet 3 mg, 3 mg, Oral, BID, Rodolfo Hendricks DO, 3 mg at 05/05/23 8239  •  calcitriol (ROCALTROL) capsule 0 25 mcg, 0 25 mcg, Oral, Daily, Rodolfo Hendricks DO, 0 25 mcg at 05/05/23 1853  •  carvedilol (COREG) tablet 12 5 mg, 12 5 mg, Oral, BID With Meals, Rodolfo Hendricks DO, 12 5 mg at 05/05/23 5841  •  cholecalciferol (VITAMIN D3) tablet 2,000 Units, 2,000 Units, Oral, Daily, Rodolfo Hendricks DO, 2,000 Units at 05/05/23 7156  •  heparin (porcine) subcutaneous injection 5,000 Units, 5,000 Units, Subcutaneous, Q8H [facility], Reynolds Flock, DO  •  hydrALAZINE (APRESOLINE) tablet 75 mg, 75 mg, Oral, TID, Rodolfo Hendricks DO, 75 mg at 05/05/23 0820  •  insulin glargine (LANTUS) subcutaneous injection 20 Units 0 2 mL, 20 Units, Subcutaneous, HS, Soila Hebert DO, 20 Units at 05/05/23 0234  •  insulin lispro (HumaLOG) 100 units/mL subcutaneous injection 1-5 Units, 1-5 Units, Subcutaneous, Q6H [address], 3 Units at 05/05/23 1208 **AND** Fingerstick Glucose (POCT), , , Q6H, Soila Hebert DO  •  [START ON 5/6/2023] isosorbide mononitrate (IMDUR) 24 hr tablet 120 mg, 120 mg, Oral, Daily, Phyllis Lopez,   •  isosorbide mononitrate (IMDUR) 24 hr tablet 60 mg, 60 mg, Oral, Once, Phyllis Lopez,   •  levothyroxine tablet 112 mcg, 112 mcg, Oral, Early Morning, Soila Hebert DO  •  magnesium sulfate 2 g/50 mL IVPB (premix) 2 g, 2 g, Intravenous, Once, Trino Rodríguez PA-C, Last Rate: 25 mL/hr at 05/05/23 1208, 2 g at 05/05/23 1208  •  sodium bicarbonate tablet 650 mg, 650 mg, Oral, BID after meals, Soila Hebert DO, 650 mg at 05/05/23 0820    Allergies   Allergen Reactions   • Iv Contrast [Iodinated Contrast Media] Itching     Caused KELLY   • Nifedipine Rash     Social History     Socioeconomic History   • Marital status: /Civil Union     Spouse name: Not on file   • Number of children: 3   • Years of education: Not on file   • Highest education level: Not on file   Occupational History   • Occupation: part time   Tobacco Use   • Smoking status: Former   • Smokeless tobacco: Never   Vaping Use   • Vaping Use: Never used   Substance and Sexual Activity   • Alcohol use: Yes     Comment: occ   • Drug use: No   • Sexual activity: Not Currently     Partners: Male   Other Topics Concern   • Not on file   Social History Narrative    Always uses seatbelt    Caffeine use    Daily coffee consumption: 1 cp daily    Feels safe at home    Lives with spouse     Social Determinants of Health     Financial Resource Strain: Not on file   Food Insecurity: No Food Insecurity   • Worried About Running Out of Food in the Last Year: Never true   • Ran Out of Food in "the Last Year: Never true   Transportation Needs: No Transportation Needs   • Lack of Transportation (Medical): No   • Lack of Transportation (Non-Medical): No   Physical Activity: Not on file   Stress: Not on file   Social Connections: Not on file   Intimate Partner Violence: Not on file   Housing Stability: Low Risk    • Unable to Pay for Housing in the Last Year: No   • Number of Places Lived in the Last Year: 2   • Unstable Housing in the Last Year: No     Family History   Problem Relation Age of Onset   • Diabetes Mother    • Heart attack Father         MI   • Hypertension Brother        Vitals:  Blood pressure 146/66, pulse 67, temperature 98 6 °F (37 °C), temperature source Oral, resp  rate 20, height 5' 5\" (1 651 m), weight 70 8 kg (156 lb), SpO2 94 %, not currently breastfeeding  Body mass index is 25 96 kg/m²  No intake/output data recorded  Weight (last 2 days)     Date/Time Weight    05/05/23 08:20:12 70 8 (156)        Wt Readings from Last 10 Encounters:   05/05/23 70 8 kg (156 lb)   04/09/23 70 8 kg (156 lb)   03/23/23 70 8 kg (156 lb)   03/21/23 70 4 kg (155 lb 4 8 oz)   11/16/22 64 kg (141 lb)   10/04/22 68 kg (149 lb 14 4 oz)   09/29/22 68 4 kg (150 lb 12 7 oz)   09/15/22 70 8 kg (156 lb)   08/08/22 70 8 kg (156 lb)   04/27/22 69 kg (152 lb 3 2 oz)       Vitals:    05/05/23 0312 05/05/23 0315 05/05/23 0820 05/05/23 1114   BP: 163/73 163/73 151/72 146/66   BP Location:  Right arm Right arm Right arm   Pulse: 86 86 80 67   Resp:  20 20 20   Temp:   99 6 °F (37 6 °C) 98 6 °F (37 °C)   TempSrc:   Oral Oral   SpO2:  94% 95% 94%   Weight:   70 8 kg (156 lb)    Height:   5' 5\" (1 651 m)        Physical Exam  Vitals reviewed  Constitutional:       General: She is awake  She is not in acute distress  Appearance: Normal appearance  She is well-developed and overweight  She is not toxic-appearing or diaphoretic  HENT:      Head: Normocephalic        Nose: Nose normal       Mouth/Throat:      " Mouth: Mucous membranes are moist    Eyes:      General: No scleral icterus  Conjunctiva/sclera: Conjunctivae normal    Neck:      Vascular: JVD present  Trachea: No tracheal deviation  Cardiovascular:      Rate and Rhythm: Normal rate and regular rhythm  Heart sounds: No murmur heard  Pulmonary:      Effort: Pulmonary effort is normal  No tachypnea, bradypnea or respiratory distress  Breath sounds: Decreased air movement present  No wheezing or rhonchi  Abdominal:      General: Bowel sounds are normal  There is no distension  Palpations: Abdomen is soft  Tenderness: There is no abdominal tenderness  Musculoskeletal:      Cervical back: Neck supple  Right lower leg: Edema (trace) present  Left lower leg: Edema (trace) present  Skin:     General: Skin is warm and dry  Coloration: Skin is pale  Skin is not jaundiced  Neurological:      General: No focal deficit present  Mental Status: She is alert and oriented to person, place, and time  Psychiatric:         Attention and Perception: Attention normal          Mood and Affect: Mood and affect normal          Speech: Speech normal          Behavior: Behavior normal  Behavior is cooperative  Thought Content:  Thought content normal      Central Line: No   Valero Catheter: No      Labs & Results:      Results from last 7 days   Lab Units 05/05/23 0406 05/04/23 1943   WBC Thousand/uL 5 37 4 88   HEMOGLOBIN g/dL 8 8* 10 0*   HEMATOCRIT % 28 8* 33 4*   PLATELETS Thousands/uL 116* 130*         Results from last 7 days   Lab Units 05/05/23 0406 05/04/23 1943   POTASSIUM mmol/L 4 5 4 4   CHLORIDE mmol/L 107 108   CO2 mmol/L 23 21   BUN mg/dL 64* 60*   CREATININE mg/dL 3 15* 2 96*   CALCIUM mg/dL 8 8 8 8   ALK PHOS U/L 347* 422*   ALT U/L 53 63   AST U/L 45 45     Results from last 7 days   Lab Units 05/05/23 0406   INR  1 21*     Ting Hodges PA-C

## 2023-05-05 NOTE — ASSESSMENT & PLAN NOTE
· Noted to have a moderate pericardial effusion circumferential to the heart during last hospitalization last month for dyspnea on exertion  · See chest pain section above

## 2023-05-05 NOTE — ASSESSMENT & PLAN NOTE
· Patient continued to refuse dialysis  · Per admitting physician's discussion, patient is Level 1 full code

## 2023-05-05 NOTE — ASSESSMENT & PLAN NOTE
Lab Results   Component Value Date    EGFR 14 05/05/2023    EGFR 15 05/04/2023    EGFR 8 04/09/2023    CREATININE 3 15 (H) 05/05/2023    CREATININE 2 96 (H) 05/04/2023    CREATININE 4 72 (H) 04/09/2023   ·   · Baseline most recently in the mid fours  · On PTA Bumex 3 mg twice daily as well as sodium bicarb twice daily  · Low threshold for Nephrology consult   · Patient continues to refuse dialysis

## 2023-05-05 NOTE — PLAN OF CARE
Problem: PAIN - ADULT  Goal: Verbalizes/displays adequate comfort level or baseline comfort level  Description: Interventions:  - Encourage patient to monitor pain and request assistance  - Assess pain using appropriate pain scale  - Administer analgesics based on type and severity of pain and evaluate response  - Implement non-pharmacological measures as appropriate and evaluate response  - Consider cultural and social influences on pain and pain management  - Notify physician/advanced practitioner if interventions unsuccessful or patient reports new pain  Outcome: Progressing     Problem: INFECTION - ADULT  Goal: Absence or prevention of progression during hospitalization  Description: INTERVENTIONS:  - Assess and monitor for signs and symptoms of infection  - Monitor lab/diagnostic results  - Monitor all insertion sites, i e  indwelling lines, tubes, and drains  - Monitor endotracheal if appropriate and nasal secretions for changes in amount and color  - [place] appropriate cooling/warming therapies per order  - Administer medications as ordered  - Instruct and encourage patient and family to use good hand hygiene technique  - Identify and instruct in appropriate isolation precautions for identified infection/condition  Outcome: Progressing  Goal: Absence of fever/infection during neutropenic period  Description: INTERVENTIONS:  - Monitor WBC    Outcome: Progressing     Problem: SAFETY ADULT  Goal: Patient will remain free of falls  Description: INTERVENTIONS:  - Educate patient/family on patient safety including physical limitations  - Instruct patient to call for assistance with activity   - Consult OT/PT to assist with strengthening/mobility   - Keep Call bell within reach  - Keep bed low and locked with side rails adjusted as appropriate  - Keep care items and personal belongings within reach  - Initiate and maintain comfort rounds  - Make Fall Risk Sign visible to staff  - Offer Toileting every 2 Hours, in advance of need  - Initiate/Maintain bed and chair alarm  - Obtain necessary fall risk management equipment  - Apply yellow socks and bracelet for high fall risk patients  - Consider moving patient to room near nurses station  Outcome: Progressing  Goal: Maintain or return to baseline ADL function  Description: INTERVENTIONS:  -  Assess patient's ability to carry out ADLs; assess patient's baseline for ADL function and identify physical deficits which impact ability to perform ADLs (bathing, care of mouth/teeth, toileting, grooming, dressing, etc )  - Assess/evaluate cause of self-care deficits   - Assess range of motion  - Assess patient's mobility; develop plan if impaired  - Assess patient's need for assistive devices and provide as appropriate  - Encourage maximum independence but intervene and supervise when necessary  - Involve family in performance of ADLs  - Assess for home care needs following discharge   - Consider OT consult to assist with ADL evaluation and planning for discharge  - Provide patient education as appropriate  Outcome: Progressing  Goal: Maintains/Returns to pre admission functional level  Description: INTERVENTIONS:  - Perform BMAT or MOVE assessment daily    - Set and communicate daily mobility goal to care team and patient/family/caregiver  - Collaborate with rehabilitation services on mobility goals if consulted  - Perform Range of Motion 3 times a day  - Reposition patient every 2 hours    - Dangle patient 3 times a day  - Stand patient 3 times a day  - Ambulate patient 3 times a day  - Out of bed to chair 3 times a day   - Out of bed for meals 3 times a day  - Out of bed for toileting  - Record patient progress and toleration of activity level   Outcome: Progressing     Problem: DISCHARGE PLANNING  Goal: Discharge to home or other facility with appropriate resources  Description: INTERVENTIONS:  - Identify barriers to discharge w/patient and caregiver  - Arrange for needed discharge resources and transportation as appropriate  - Identify discharge learning needs (meds, wound care, etc )  - Arrange for interpretive services to assist at discharge as needed  - Refer to Case Management Department for coordinating discharge planning if the patient needs post-hospital services based on physician/advanced practitioner order or complex needs related to functional status, cognitive ability, or social support system  Outcome: Progressing     Problem: Knowledge Deficit  Goal: Patient/family/caregiver demonstrates understanding of disease process, treatment plan, medications, and discharge instructions  Description: Complete learning assessment and assess knowledge base    Interventions:  - Provide teaching at level of understanding  - Provide teaching via preferred learning methods  Outcome: Progressing

## 2023-05-05 NOTE — ASSESSMENT & PLAN NOTE
· Echo in April 2022 with LVEF 67%, systolic function mildly reduced, hypokinetic segments including the basal inferior, mid inferior, mid inferolateral, mid anterolateral, apical inferior and apical lateral   Noted to have a moderate pericardial effusion circumferential to the heart    · Combined systolic and diastolic heart failure  · History of coronary artery disease status post drug-eluting stent x2 to the LAD in 2019; noted to have 80% RCA stenosis and appears from last cardiology note that there was planned staged PCI but has not needed intervention on the RCA

## 2023-05-05 NOTE — UTILIZATION REVIEW
Initial Clinical Review    Admission: Date/Time/Statement:   Admission Orders (From admission, onward)     Ordered        05/04/23 2138  Inpatient Admission  Once                      Orders Placed This Encounter   Procedures   • Inpatient Admission     Standing Status:   Standing     Number of Occurrences:   1     Order Specific Question:   Level of Care     Answer:   Med Surg [16]     Order Specific Question:   Estimated length of stay     Answer:   More than 2 Midnights     Order Specific Question:   Certification     Answer:   I certify that inpatient services are medically necessary for this patient for a duration of greater than two midnights  See H&P and MD Progress Notes for additional information about the patient's course of treatment  ED Arrival Information     Expected   -    Arrival   5/4/2023 19:15    Acuity   Urgent            Means of arrival   Walk-In    Escorted by   Family Member    Service   Hospitalist    Admission type   Emergency            Arrival complaint   chest pain           Chief Complaint   Patient presents with   • Chest Pain     Pt developed chest pain this morning around 1000 w/ pain being most prominent in the center of her chest  Pt states she also has SOB, denies dizziness  Initial Presentation: 70 y o  female to ED presents for persistent retrosternal chest pain with shortness of breath, started this am and continued throughout the day  Given nitroglycerin in ED with relief  Chest pain worse on exertion  Hospitalized in April for heart failure exacerbation  Post hospitalization about a wk after she began to have worsening dyspnea with exertion which she reports is still present today  PMH for ischemic cardiomyopathy EF 45%, coronary artery disease status post drug-eluting stent to the LAD x2, stage IV-5 CKD, anemia, diabetes  Admit Inpatient level of care for Chest pain   EKG sinus, with noted T wave inversions in inferior leads as well as ST depressions in V4 through V6; these EKG changes appear similar to EKG from March 29  Tele monitoring  Trend troponin  Echo  Cardiology consult  Continue home meds;  aspirin, carvedilol, Bumex 3 mg twice daily and Imdur  Creat 2 96 today  Date: 5/5   Day 2:   Cardiology cons; Accelerated hypertension  Chronic HFpEF, Stage C  pt previously had inferior wall hypo with relatively preserved EF but inferior hypo more pronounced now  Possibly due to accelerated HTN as MAPs very high on previous admit and heart did not reverse remodel with drop in EF so may be due to high afterload and filling pressures  Volume up on exam; giving Iv Bumex 2 mg now  Increase Imdur to 120 mg daily with 60 mg now dose- has been on 120 mg daily in past and her high filling pressures could be leading to pressure overload and thus angina  No immediate plan for LHC given her low GFr and strong desire, refusal of HD     Progress notes; Given Iv Bumex today and Imdur was increased  No plan for LHC at this time given renal function and refusal of dialysis  Cardiology adjusting meds  Creat trended up to 3 15 today  Baseline most recently in the mid 4s         ED Triage Vitals [05/04/23 1923]   Temperature Pulse Respirations Blood Pressure SpO2   97 6 °F (36 4 °C) 83 18 (!) 185/90 94 %      Temp Source Heart Rate Source Patient Position - Orthostatic VS BP Location FiO2 (%)   Temporal Monitor Lying Right arm --      Pain Score       9          Wt Readings from Last 1 Encounters:   05/05/23 70 8 kg (156 lb)     Additional Vital Signs:   05/05/23 08:20:12 99 6 °F (37 6 °C) 80 20 151/72 98 95 % None (Room air) Sitting   05/05/23 0315 -- 86 20 163/73 105 94 % None (Room air) Lying     05/05/23 0005 -- -- -- 189/83   Abnormal  -- -- -- --   05/04/23 2000 -- 87 18 204/84   normal  -- 95 % None (Room air) Sitting   05/04/23 1948 -- 81 18 180/73   Abnormal  -- 93 % None (Room air) Lying     Pertinent Labs/Diagnostic Test Results:   XR chest 2 views   Final Result by Ange Schreiber Gallito Soto MD (05/05 0072)      Moderate pulmonary venous congestion with trace effusions  Workstation performed: [id number]           5/5  Echo -  Left Ventricle: Left ventricular cavity size is normal  Wall thickness is normal  The left ventricular ejection fraction is 45%  Systolic function is mildly reduced  Global longitudinal strain is reduced at 8%  Diastolic function is normal   •  The following segments are hypokinetic: basal inferior, basal inferolateral, basal anterolateral, mid inferior, mid inferolateral, mid anterolateral, apical inferior, apical lateral and apex  •  All other segments are normal   •  Mitral Valve: There is mild regurgitation  •  Tricuspid Valve: There is mild to moderate regurgitation  The estimated right ventricular systolic pressure is 64 41 mmHg  This is suggestive of pulmonary hypertension  •  Pulmonic Valve: There is mild regurgitation  •  Pericardium: There is a moderate pericardial effusion circumferential to the heart  The fluid contains focal strands along the right ventricular free wall  The largest diameter measures 1 3 cm  There is a low probability of cardiac tamponade  There is mild invagination of the basal right ventricle in late diastole  The pericardium is normal in appearance          Results from last 7 days   Lab Units 05/05/23  0406 05/04/23 1943   WBC Thousand/uL 5 37 4 88   HEMOGLOBIN g/dL 8 8* 10 0*   HEMATOCRIT % 28 8* 33 4*   PLATELETS Thousands/uL 116* 130*   NEUTROS ABS Thousands/µL 4 74 4 21         Results from last 7 days   Lab Units 05/05/23 0406 05/04/23 1943   SODIUM mmol/L 134* 136   POTASSIUM mmol/L 4 5 4 4   CHLORIDE mmol/L 107 108   CO2 mmol/L 23 21   ANION GAP mmol/L 4 7   BUN mg/dL 64* 60*   CREATININE mg/dL 3 15* 2 96*   EGFR ml/min/1 73sq m 14 15   CALCIUM mg/dL 8 8 8 8   MAGNESIUM mg/dL 1 8  --      Results from last 7 days   Lab Units 05/05/23  0406 05/04/23 1943   AST U/L 45 45   ALT U/L 53 63   ALK PHOS U/L 347* 422*   TOTAL PROTEIN g/dL 6 7 7 5   ALBUMIN g/dL 3 0* 3 4*   TOTAL BILIRUBIN mg/dL 0 57 0 68     Results from last 7 days   Lab Units 05/05/23  0703 05/05/23  0311   POC GLUCOSE mg/dl 210* 235*     Results from last 7 days   Lab Units 05/05/23  0406 05/04/23 1943   GLUCOSE RANDOM mg/dL 244* 194*       Results from last 7 days   Lab Units 05/05/23  0011 05/04/23  2158 05/04/23 1943   HS TNI 0HR ng/L  --   --  35   HS TNI 2HR ng/L  --  32  --    HSTNI D2 ng/L  --  -3  --    HS TNI 4HR ng/L 32  --   --    HSTNI D4 ng/L -3  --   --          Results from last 7 days   Lab Units 05/05/23  0406   PROTIME seconds 15 5*   INR  1 21*   PTT seconds 32         Results from last 7 days   Lab Units 05/05/23  0406   NT-PRO BNP pg/mL 37,548*       ED Treatment:   Medication Administration from 05/04/2023 1915 to 05/05/2023 0809       Date/Time Order Dose Route Action     05/04/2023 1943 EDT nitroglycerin (NITROSTAT) SL tablet 0 4 mg 0 4 mg Sublingual Given     05/04/2023 1941 EDT aspirin chewable tablet 162 mg 162 mg Oral Given     05/04/2023 2000 EDT nitroglycerin (NITROSTAT) SL tablet 0 4 mg 0 4 mg Sublingual Given     05/05/2023 0005 EDT amLODIPine (NORVASC) tablet 5 mg 5 mg Oral Given     05/05/2023 0234 EDT hydrALAZINE (APRESOLINE) tablet 75 mg 75 mg Oral Given     05/05/2023 0234 EDT sodium bicarbonate tablet 650 mg 650 mg Oral Given     05/05/2023 0704 EDT insulin lispro (HumaLOG) 100 units/mL subcutaneous injection 1-5 Units 1 Units Subcutaneous Given     05/05/2023 9362 EDT insulin lispro (HumaLOG) 100 units/mL subcutaneous injection 1-5 Units 2 Units Subcutaneous Given     05/05/2023 0234 EDT insulin glargine (LANTUS) subcutaneous injection 20 Units 0 2 mL 20 Units Subcutaneous Given     05/05/2023 3977 EDT carvedilol (COREG) tablet 12 5 mg 12 5 mg Oral Given     05/05/2023 7356 EDT bumetanide (BUMEX) tablet 3 mg 3 mg Oral Given        Past Medical History:   Diagnosis Date   • Anemia    • CHF (congestive heart failure) [facility])    • Chronic kidney disease    • Coronary artery disease    • Diabetes mellitus ([facility] [address] )    • Hypertension    • Hypothyroidism      Present on Admission:  • CKD (chronic kidney disease) stage 4, GFR 15-29 ml/min ([facility])  • Ischemic cardiomyopathy  • Pericardial effusion      Admitting Diagnosis: Chest pain [R07 9]  Hypertension [I10]  Age/Sex: 70 y o  female     Admission Orders:  Scheduled Medications:  amLODIPine, 5 mg, Oral, BID  aspirin, 81 mg, Oral, Daily  atorvastatin, 80 mg, Oral, Daily  bumetanide, 3 mg, Oral, BID  calcitriol, 0 25 mcg, Oral, Daily  carvedilol, 12 5 mg, Oral, BID With Meals  cholecalciferol, 2,000 Units, Oral, Daily  heparin (porcine), 5,000 Units, Subcutaneous, Q8H DEE  hydrALAZINE, 75 mg, Oral, TID  insulin glargine, 20 Units, Subcutaneous, HS  insulin lispro, 1-5 Units, Subcutaneous, Q6H DEE  isosorbide mononitrate, 60 mg, Oral, Daily  levothyroxine, 112 mcg, Oral, Early Morning  magnesium sulfate, 2 g, Intravenous, Once  sodium bicarbonate, 650 mg, Oral, BID after meals    bumetanide (BUMEX) injection 2 mg  Dose: 2 mg  Freq: Once Route: IV  Start: 05/05/23 1330 End: 05/05/23 1346    Continuous IV Infusions: None     PRN Meds:  albuterol, 2 puff, Inhalation, Q4H PRN      Tele monitoring  IP CONSULT TO CARDIOLOGY    Network Utilization Review Department  ATTENTION: Please call with any questions or concerns to [phone] and carefully listen to the prompts so that you are directed to the right person  All voicemails are confidential   Margarita Guzman all requests for admission clinical reviews, approved or denied determinations and any other requests to dedicated fax number below belonging to the campus where the patient is receiving treatment   List of dedicated fax numbers for the Facilities:  [address] [place] [address] DENIALS (Administrative/Medical Necessity) [phone]   [address] (Maternity/NICU/Pediatrics) Michelle Landers [address] Maryann Obrien 77 [phone]   [address] [place] [address] Steph Santos  [phone]   [address] Dejesus Burgaw 134 [address] [facility] [phone]

## 2023-05-06 PROBLEM — E87.20 ACIDOSIS: Status: ACTIVE | Noted: 2023-05-06

## 2023-05-06 PROBLEM — N25.81 SECONDARY HYPERPARATHYROIDISM OF RENAL ORIGIN (HCC): Status: ACTIVE | Noted: 2023-05-06

## 2023-05-06 PROBLEM — E87.1 HYPONATREMIA: Status: ACTIVE | Noted: 2023-05-06

## 2023-05-06 LAB
ANION GAP SERPL CALCULATED.3IONS-SCNC: 7 MMOL/L (ref 4–13)
ATRIAL RATE: 82 BPM
ATRIAL RATE: 84 BPM
ATRIAL RATE: 85 BPM
BUN SERPL-MCNC: 72 MG/DL (ref 5–25)
CALCIUM SERPL-MCNC: 8.4 MG/DL (ref 8.3–10.1)
CHLORIDE SERPL-SCNC: 107 MMOL/L (ref 96–108)
CO2 SERPL-SCNC: 21 MMOL/L (ref 21–32)
CREAT SERPL-MCNC: 3.44 MG/DL (ref 0.6–1.3)
FLUAV RNA RESP QL NAA+PROBE: NEGATIVE
FLUBV RNA RESP QL NAA+PROBE: NEGATIVE
GFR SERPL CREATININE-BSD FRML MDRD: 12 ML/MIN/1.73SQ M
GLUCOSE SERPL-MCNC: 129 MG/DL (ref 65–140)
GLUCOSE SERPL-MCNC: 159 MG/DL (ref 65–140)
GLUCOSE SERPL-MCNC: 213 MG/DL (ref 65–140)
GLUCOSE SERPL-MCNC: 217 MG/DL (ref 65–140)
GLUCOSE SERPL-MCNC: 248 MG/DL (ref 65–140)
MAGNESIUM SERPL-MCNC: 2.5 MG/DL (ref 1.6–2.6)
P AXIS: 69 DEGREES
P AXIS: 72 DEGREES
P AXIS: 76 DEGREES
POTASSIUM SERPL-SCNC: 4.2 MMOL/L (ref 3.5–5.3)
PR INTERVAL: 154 MS
PR INTERVAL: 166 MS
PR INTERVAL: 168 MS
QRS AXIS: 105 DEGREES
QRS AXIS: 87 DEGREES
QRS AXIS: 93 DEGREES
QRSD INTERVAL: 100 MS
QT INTERVAL: 370 MS
QT INTERVAL: 380 MS
QT INTERVAL: 406 MS
QTC INTERVAL: 440 MS
QTC INTERVAL: 449 MS
QTC INTERVAL: 474 MS
RSV RNA RESP QL NAA+PROBE: NEGATIVE
SARS-COV-2 RNA RESP QL NAA+PROBE: POSITIVE
SODIUM SERPL-SCNC: 135 MMOL/L (ref 135–147)
T WAVE AXIS: 156 DEGREES
T WAVE AXIS: 178 DEGREES
T WAVE AXIS: 236 DEGREES
VENTRICULAR RATE: 82 BPM
VENTRICULAR RATE: 84 BPM
VENTRICULAR RATE: 85 BPM

## 2023-05-06 RX ADMIN — INSULIN LISPRO 1 UNITS: 100 INJECTION, SOLUTION INTRAVENOUS; SUBCUTANEOUS at 08:27

## 2023-05-06 RX ADMIN — SODIUM BICARBONATE 650 MG: 650 TABLET ORAL at 08:19

## 2023-05-06 RX ADMIN — REMDESIVIR 200 MG: 100 INJECTION, POWDER, LYOPHILIZED, FOR SOLUTION INTRAVENOUS at 15:57

## 2023-05-06 RX ADMIN — BUMETANIDE 4 MG: 2 TABLET ORAL at 15:57

## 2023-05-06 RX ADMIN — LEVOTHYROXINE SODIUM 112 MCG: 112 TABLET ORAL at 05:36

## 2023-05-06 RX ADMIN — CARVEDILOL 12.5 MG: 12.5 TABLET, FILM COATED ORAL at 08:19

## 2023-05-06 RX ADMIN — BUMETANIDE 4 MG: 2 TABLET ORAL at 08:18

## 2023-05-06 RX ADMIN — HYDRALAZINE HYDROCHLORIDE 75 MG: 50 TABLET ORAL at 15:57

## 2023-05-06 RX ADMIN — INSULIN GLARGINE 20 UNITS: 100 INJECTION, SOLUTION SUBCUTANEOUS at 21:34

## 2023-05-06 RX ADMIN — ATORVASTATIN CALCIUM 80 MG: 80 TABLET, FILM COATED ORAL at 08:19

## 2023-05-06 RX ADMIN — HYDRALAZINE HYDROCHLORIDE 75 MG: 50 TABLET ORAL at 08:19

## 2023-05-06 RX ADMIN — AMLODIPINE BESYLATE 5 MG: 5 TABLET ORAL at 21:35

## 2023-05-06 RX ADMIN — Medication 2000 UNITS: at 08:19

## 2023-05-06 RX ADMIN — INSULIN LISPRO 1 UNITS: 100 INJECTION, SOLUTION INTRAVENOUS; SUBCUTANEOUS at 17:33

## 2023-05-06 RX ADMIN — ISOSORBIDE MONONITRATE 120 MG: 60 TABLET, EXTENDED RELEASE ORAL at 08:19

## 2023-05-06 RX ADMIN — INSULIN LISPRO 2 UNITS: 100 INJECTION, SOLUTION INTRAVENOUS; SUBCUTANEOUS at 21:34

## 2023-05-06 RX ADMIN — CARVEDILOL 12.5 MG: 12.5 TABLET, FILM COATED ORAL at 15:57

## 2023-05-06 RX ADMIN — CALCITRIOL CAPSULES 0.25 MCG 0.25 MCG: 0.25 CAPSULE ORAL at 08:19

## 2023-05-06 RX ADMIN — AMLODIPINE BESYLATE 5 MG: 5 TABLET ORAL at 08:19

## 2023-05-06 RX ADMIN — SODIUM BICARBONATE 650 MG: 650 TABLET ORAL at 17:33

## 2023-05-06 RX ADMIN — HYDRALAZINE HYDROCHLORIDE 75 MG: 50 TABLET ORAL at 21:35

## 2023-05-06 RX ADMIN — ASPIRIN 81 MG 81 MG: 81 TABLET ORAL at 08:21

## 2023-05-06 RX ADMIN — INSULIN LISPRO 1 UNITS: 100 INJECTION, SOLUTION INTRAVENOUS; SUBCUTANEOUS at 11:07

## 2023-05-06 NOTE — PROGRESS NOTES
[address] [facility]  Progress Note  Name: Lilly Tolbert I  MRN: [id number]  [facility] Date of Admission: 5/4/2023   Date of Service: 5/6/2023 I Hospital Day: 2    Assessment/Plan   * Chest pain  Assessment & Plan  · Per record review, patient presented with retrosternal chest pain beginning at 10 AM the morning of admission, rated 10/10 in intensity, and persisted until patient received nitroglycerin in the ER; additionally, reported worsening dyspnea with exertion over the last several weeks  · Per record review, for admitting provider, patient endorsed tenderness to palpation of the sternal area and reported that this reproduced the chest pain she was having  · History of ischemic cardiomyopathy with LVEF 40% since September 2022 and had echocardiogram last month with LVEF 45% as well as numerous hypokinetic segments and noted moderate pericardial effusion  · History of drug-eluting stent x2 to the LAD after NSTEMI in 2019; also noted to have 80% stenosis of the RCA with reported planned staged PCI but has not required additional intervention since then  · Cardiology consult appreciated - received IV Bumex 5/5 and Imdur was increased  No plan for LHC at this time given renal function and refusal of dialysis    CKD (chronic kidney disease) stage 4, GFR 15-29 ml/min [facility])  Assessment & Plan  Lab Results   Component Value Date    EGFR 12 05/06/2023    EGFR 14 05/05/2023    EGFR 15 05/04/2023    CREATININE 3 44 (H) 05/06/2023    CREATININE 3 15 (H) 05/05/2023    CREATININE 2 96 (H) 05/04/2023     · Baseline most recently in the mid fours  · S/p IV Bumex yesterday and now on 4mg PO BID with low urine output per my discussion with Heart Failure Team  Creatinine up-trending   Nephrology consulted  · Patient continues to refuse dialysis    Type 2 diabetes mellitus, with long-term current use of insulin [facility])  Assessment & Plan  Lab Results   Component Value Date    HGBA1C 8 3 (H) 02/16/2023       Recent Labs     05/05/23  1649 05/05/23  2125 05/06/23  0813 05/06/23  1033   POCGLU 171* 182* 217* 213*       Blood Sugar Average: Last 72 hrs:  (P) 219 [id number]   · On PTA aspart/protamine 15 and 10 units daily  · Currently on Lantus 20 units nightly as well as sliding scale insulin   · Adjust as indicated     Goals of care, counseling/discussion  Assessment & Plan  · Patient continued to refuse dialysis  · Per admitting physician's discussion, patient is Level 1 full code    Hypertension  Assessment & Plan  · BP improved since admission  · On Bumex per Heart Failure Team and Imdur was increased    Ischemic cardiomyopathy  Assessment & Plan  · Echo in April 2022 with LVEF 76%, systolic function mildly reduced, hypokinetic segments including the basal inferior, mid inferior, mid inferolateral, mid anterolateral, apical inferior and apical lateral   Noted to have a moderate pericardial effusion circumferential to the heart  · Combined systolic and diastolic heart failure  · History of coronary artery disease status post drug-eluting stent x2 to the LAD in 2019; noted to have 80% RCA stenosis and appears from last cardiology note that there was planned staged PCI but has not needed intervention on the RCA    Coronary artery disease  Assessment & Plan  · S/p LAD stents  · Cardiology following  · On aspirin, statin, Coreg, Imdur    Hypothyroidism  Assessment & Plan  · Continue levothyroxine            VTE Pharmacologic Prophylaxis: VTE Score: 3 Moderate Risk (Score 3-4) - Pharmacological DVT Prophylaxis Ordered: heparin  Patient Centered Rounds: I performed bedside rounds with nursing staff today  Melonie  Discussions with Specialists or Other Care Team Provider: Heart Failure AP and Nephrologist     Education and Discussions with Family / Patient: Patient declined call to        Total Time Spent on Date of Encounter in care of patient: 35 minutes This time was spent on one or more of the following: performing physical exam; counseling and coordination of care; obtaining or reviewing history; documenting in the medical record; reviewing/ordering tests, medications or procedures; communicating with other healthcare professionals and discussing with patient's family/caregivers  Current Length of Stay: 2 day(s)  Current Patient Status: Inpatient   Certification Statement: The patient will continue to require additional inpatient hospital stay due to [address], nephrology consult  Discharge Plan: pending heart failure and nephro clearance    Code Status: Level 1 - Full Code    Subjective:   Ms Judah Fuller reports that she has a cough and feels congested since yesterday  She reports being around someone last week who is reportedly now COVID positive  Denies any more chest pain    Objective:     Vitals:   Temp (24hrs), Av 2 °F (36 8 °C), Min:97 8 °F (36 6 °C), Max:98 6 °F (37 °C)    Temp:  [97 8 °F (36 6 °C)-98 6 °F (37 °C)] 97 8 °F (36 6 °C)  HR:  [62-71] 62  Resp:  [18-20] 18  BP: (146-171)/(65-78) 149/65  SpO2:  [93 %-96 %] 95 %  Body mass index is 25 68 kg/m²  Input and Output Summary (last 24 hours): Intake/Output Summary (Last 24 hours) at 2023 1042  Last data filed at 2023 3908  Gross per 24 hour   Intake 1244 ml   Output 1000 ml   Net 244 ml       Physical Exam:   Physical Exam  Vitals reviewed  Constitutional:       Comments: Patient seen lying in bed, NAD   Cardiovascular:      Rate and Rhythm: Normal rate and regular rhythm  Pulmonary:      Effort: Pulmonary effort is normal  No respiratory distress  Breath sounds: Normal breath sounds  Abdominal:      General: Bowel sounds are normal       Palpations: Abdomen is soft  Tenderness: There is no abdominal tenderness  Musculoskeletal:      Right lower leg: No edema  Left lower leg: No edema  Skin:     General: Skin is warm     Neurological:      Mental Status: She is alert and oriented to person, place, and time  Psychiatric:         Mood and Affect: Mood normal          Behavior: Behavior normal          Additional Data:     Labs:  Results from last 7 days   Lab Units 05/05/23  0406   WBC Thousand/uL 5 37   HEMOGLOBIN g/dL 8 8*   HEMATOCRIT % 28 8*   PLATELETS Thousands/uL 116*   NEUTROS PCT % 89*   LYMPHS PCT % 4*   MONOS PCT % 7   EOS PCT % 0     Results from last 7 days   Lab Units 05/06/23  0538 05/05/23  0406   SODIUM mmol/L 135 134*   POTASSIUM mmol/L 4 2 4 5   CHLORIDE mmol/L 107 107   CO2 mmol/L 21 23   BUN mg/dL 72* 64*   CREATININE mg/dL 3 44* 3 15*   ANION GAP mmol/L 7 4   CALCIUM mg/dL 8 4 8 8   ALBUMIN g/dL  --  3 0*   TOTAL BILIRUBIN mg/dL  --  0 57   ALK PHOS U/L  --  347*   ALT U/L  --  53   AST U/L  --  45   GLUCOSE RANDOM mg/dL 129 244*     Results from last 7 days   Lab Units 05/05/23  0406   INR  1 21*     Results from last 7 days   Lab Units 05/06/23  1033 05/06/23  0813 05/05/23  2125 05/05/23  1649 05/05/23  1155 05/05/23  0703 05/05/23  0311   POC GLUCOSE mg/dl 213* 217* 182* 171* 306* 210* 235*               Lines/Drains:  Invasive Devices     Peripheral Intravenous Line  Duration           Peripheral IV 05/04/23 Left Antecubital 1 day                  Telemetry:  Telemetry Orders (From admission, onward)             48 Hour Telemetry Monitoring  Continuous x 48 hours        Expiring   References:    Telemetry Guidelines   Question:  Reason for 48 Hour Telemetry  Answer:  Acute MI, chest pain - R/O MI, or unstable angina                 Telemetry Reviewed: Normal Sinus Rhythm and PVCs  Indication for Continued Telemetry Use: No indication for continued use  Will discontinue  discussed with cardiology              Imaging: No pertinent imaging reviewed      Recent Cultures (last 7 days):         Last 24 Hours Medication List:   Current Facility-Administered Medications   Medication Dose Route Frequency Provider Last Rate   • albuterol  2 puff Inhalation Q4H PRN Keya Plain Alexandria Edouard, DO     • amLODIPine  5 mg Oral BID Zachary Grise, DO     • aspirin  81 mg Oral Daily Zachary Grise, DO     • atorvastatin  80 mg Oral Daily Zachary Grise, DO     • bumetanide  4 mg Oral BID (diuretic) Lazaro Mackey PA-C     • calcitriol  0 25 mcg Oral Daily Azeem Grise, DO     • carvedilol  12 5 mg Oral BID With Meals Azeem Grise, DO     • cholecalciferol  2,000 Units Oral Daily Zachary Grise, DO     • heparin (porcine)  5,000 Units Subcutaneous Q8H [address] Zachary Grise, DO     • hydrALAZINE  75 mg Oral TID Zachary Grise, DO     • insulin glargine  20 Units Subcutaneous HS Azeem Grise, DO     • insulin lispro  1-5 Units Subcutaneous 4x Daily (AC & HS) Maron November, CRNP     • isosorbide mononitrate  120 mg Oral Daily Meena Sorensen, DO     • levothyroxine  112 mcg Oral Early Morning Zachary Grise, DO     • sodium bicarbonate  650 mg Oral BID after meals Zachary Grise, DO          Today, Patient Was Seen By: Arielle Charlton PA-C    **Please Note: This note may have been constructed using a voice recognition system  **

## 2023-05-06 NOTE — ASSESSMENT & PLAN NOTE
Lab Results   Component Value Date    EGFR 12 05/06/2023    EGFR 14 05/05/2023    EGFR 15 05/04/2023    CREATININE 3 44 (H) 05/06/2023    CREATININE 3 15 (H) 05/05/2023    CREATININE 2 96 (H) 05/04/2023     · Baseline most recently in the mid fours  · S/p IV Bumex yesterday and now on 4mg PO BID with low urine output per my discussion with Heart Failure Team  Creatinine up-trending   Nephrology consulted  · Patient continues to refuse dialysis

## 2023-05-06 NOTE — PROGRESS NOTES
Advanced Heart Failure / Pulmonary Hypertension Service Progress Note    Elda Bailey 70 y o  female   MRN: [id number]  Unit/Bed#: [facility] 508-01; Encounter: [id number]    Assessment:  Principal Problem:    Chest pain  Active Problems:    Hypothyroidism    Hypertension    Coronary artery disease    Pericardial effusion    Type 2 diabetes mellitus, with long-term current use of insulin ([facility])    Ischemic cardiomyopathy    CKD (chronic kidney disease) stage 4, GFR 15-29 ml/min ([facility])    Goals of care, counseling/discussion      Subjective:   Patient seen and examined  No significant events overnight  No additional episodes of chest pain  Objective: Intake/ Output: 890 mL / 1000 mL  Weight: 154 lbs (156 lbs on 05/05)  Unspecified scale  Telemetry: NSR, rates in 60s  Occasional PVCs  Today's Plan:  • Minimal urine output with IV Bumex yesterday afternoon, and so PO Bumex was increased to 4 mg BID (first dose yesterday PM)  Continue on 4 mg BID  May need to accept higher creatinine to maintain euvolemia? • Continue Imdur 120 mg daily  • JOHN positive (titer in process)  Histone antibody in process  Checking given persistent moderate pericardial effusion and chronic hydralazine use  • Hemoglobin 8 8 mg/dL on 05/05 (10 0 mg/dL on 05/04)  Goal >8 mg/dL  • Renal function limiting addition of HF GDMT (ARNi, MRA, SGLT2i, etc )      Plan:  Chest pain, now resolved  Impression: in setting poorly controlled BP and mild volume overload  Resolved s/p SL nitro x2 in ED                hs Troponins from 05/04/2023: 35 -->32-->32  EKG without ischemic changes      Coronary artery disease  [facility] 01/04/2019: 50% stenosis proximal LAD; 90% stenosis mid LAD; 50% stenosis distal LAD; 80% stenosis mid RCA  S/p LUDY x2 to mid LAD              Continue on aspirin, statin, Imdur 120 mg daily, and BB as below  Continue medical management of CAD   High risk for needing RRT if [facility] would be repeated  Patient adamantly does not want RRT  Chronic HFrEF; LVEF 40-45%; LVIDd 5 cm; Chris Lawson; ACC/AHA Stage C              Etiology: uncontrolled HTN resulting in recent drop in LVEF?              TTE 12/30/2018: LVEF 50%  LVIDd 3 55 cm  Grade 2 DD  Normal RV  Mild MR and TR  Trace free-flowing pericardial effusion                TTE 02/22/2021: LVEF 60%  LVIDd 4 74 cm  Grade 2 DD  Normal RV and RVSF  Mild MR  Trace TR  Moderate pericardial effusion                TTE (limited) 07/03/2021: LVEF 60%  Normal RV  Mild TR  Small to moderate pericardial effusion                TTE 09/15/2022: LVEF 40-45%  LVIDd 5 cm  Grade 2 DD  Normal RV size with low normal RVSF  Trace MR and TR                TTE 04/04/2023: LVEF 45%  LVIDd 4 8 cm  Grade 2 DD  Normal RV  Mild MR and TR  Small pericardial effusion  TTE (limited with strain) 04/05/2023: LVEF 45%  Global longitudinal strain -12%  Mild ABIGAIL  Moderate pericardial effusion without evidence of tamponade  TTE (limited) 05/05/2023: LVEF 45%  Normal RV  Mild MR  Mild to moderate TR  Moderate pericardial effusion without suggestion of tamponade       Pharmacotherapies / Neurohormonal Blockade:  --Beta Blocker: carvedilol 12 5 mg q12 hours  --ARNi / ACEi / ARB: No, CKD precludes    --Aldosterone Antagonist: No, CKD precludes    --SGLT2 Inhibitor: No, CKD precludes    --SVR Reducing Agents: hydralazine 75 mg q8 hours    --Home Diuretic: Bumex 3 mg BID    --Inpatient Diuretic: PO Bumex 4 mg BID         Chronic kidney disease, stage IV              Baseline creatinine of 2 5-3 2               Today, creatinine of 3 44 (3 15 of 05/05)                  VNCHSIE with Dr Bessie Fish outpatient   Uninterested in pursuing RRT per prior documentation       Hypertension: continues on amlodipine 5 mg BID    Hyperlipidemia  Diabetes mellitus, type II    Anemia of chronic disease  Hypothyroidism     Vitals:   Blood pressure 149/65, pulse 62, temperature 97 8 °F (36 6 "°C), resp  rate 18, height 5' 5\" (1 651 m), weight 70 kg (154 lb 5 2 oz), SpO2 95 %, not currently breastfeeding  Body mass index is 25 68 kg/m²  I/O last 3 completed shifts: In: 56 [P O :840; IV Piggyback:50]  Out: 1000 [Urine:1000]    Weight (last 2 days)     Date/Time Weight    05/06/23 0538 70 (154 32)    05/05/23 08:20:12 70 8 (156)        Wt Readings from Last 10 Encounters:   05/06/23 70 kg (154 lb 5 2 oz)   04/09/23 70 8 kg (156 lb)   03/23/23 70 8 kg (156 lb)   03/21/23 70 4 kg (155 lb 4 8 oz)   11/16/22 64 kg (141 lb)   10/04/22 68 kg (149 lb 14 4 oz)   09/29/22 68 4 kg (150 lb 12 7 oz)   09/15/22 70 8 kg (156 lb)   08/08/22 70 8 kg (156 lb)   04/27/22 69 kg (152 lb 3 2 oz)     Vitals:    05/06/23 0254 05/06/23 0538 05/06/23 0719 05/06/23 1017   BP: 148/67  147/66 149/65   BP Location:       Pulse: 66  66 62   Resp:       Temp:   97 9 °F (36 6 °C) 97 8 °F (36 6 °C)   TempSrc:   Oral    SpO2: 93%  94% 95%   Weight:  70 kg (154 lb 5 2 oz)     Height:           Physical Exam  Vitals reviewed  Constitutional:       General: She is awake  She is not in acute distress  Appearance: Normal appearance  She is well-developed and overweight  She is not toxic-appearing or diaphoretic  HENT:      Head: Normocephalic  Nose: Nose normal       Mouth/Throat:      Mouth: Mucous membranes are moist    Eyes:      General: No scleral icterus  Conjunctiva/sclera: Conjunctivae normal    Neck:      Trachea: No tracheal deviation  Cardiovascular:      Rate and Rhythm: Normal rate and regular rhythm  No extrasystoles are present  Heart sounds: Murmur heard  No friction rub  Pulmonary:      Effort: Pulmonary effort is normal  No tachypnea, bradypnea or respiratory distress  Breath sounds: Decreased air movement present  No wheezing or rhonchi  Abdominal:      General: Bowel sounds are normal  There is no distension  Palpations: Abdomen is soft  Tenderness:  There is no abdominal " tenderness  Musculoskeletal:      Cervical back: Neck supple  Right lower leg: Edema (trace) present  Left lower leg: Edema (trace) present  Skin:     General: Skin is warm and dry  Coloration: Skin is pale  Skin is not jaundiced  Neurological:      General: No focal deficit present  Mental Status: She is alert and oriented to person, place, and time  Psychiatric:         Attention and Perception: Attention normal          Mood and Affect: Mood normal  Affect is flat  Speech: Speech normal          Behavior: Behavior normal  Behavior is cooperative  Thought Content:  Thought content normal      Central Line: No   Valero Catheter: No        Current Facility-Administered Medications:   •  albuterol (PROVENTIL HFA,VENTOLIN HFA) inhaler 2 puff, 2 puff, Inhalation, Q4H PRN, Rodolfo Hendricks DO  •  amLODIPine (NORVASC) tablet 5 mg, 5 mg, Oral, BID, Rodolfo Hendricks DO, 5 mg at 05/06/23 2254  •  aspirin chewable tablet 81 mg, 81 mg, Oral, Daily, Rodolfo Hendricks DO, 81 mg at 05/06/23 4496  •  atorvastatin (LIPITOR) tablet 80 mg, 80 mg, Oral, Daily, Rodolfo Hendricks DO, 80 mg at 05/06/23 1250  •  bumetanide (BUMEX) tablet 4 mg, 4 mg, Oral, BID (diuretic), Dennise Purvis PA-C, 4 mg at 05/06/23 3879  •  calcitriol (ROCALTROL) capsule 0 25 mcg, 0 25 mcg, Oral, Daily, Rodolfo Hendricks DO, 0 25 mcg at 05/06/23 5220  •  carvedilol (COREG) tablet 12 5 mg, 12 5 mg, Oral, BID With Meals, Rodolfo Hendricks DO, 12 5 mg at 05/06/23 1066  •  cholecalciferol (VITAMIN D3) tablet 2,000 Units, 2,000 Units, Oral, Daily, Rodolfo Hendricks DO, 2,000 Units at 05/06/23 6413  •  heparin (porcine) subcutaneous injection 5,000 Units, 5,000 Units, Subcutaneous, Q8H [facility], Rodolfo Hendricks DO  •  hydrALAZINE (APRESOLINE) tablet 75 mg, 75 mg, Oral, TID, Rodolfo Hendricks DO, 75 mg at 05/06/23 4756  •  insulin glargine (LANTUS) subcutaneous injection 20 Units 0 2 mL, 20 Units, Subcutaneous, HS, Rodolfo Hendricks DO, 6 Units at 05/05/23 2131  •  insulin lispro (HumaLOG) 100 units/mL subcutaneous injection 1-5 Units, 1-5 Units, Subcutaneous, 4x Daily (AC & HS), 1 Units at 05/06/23 0827 **AND** Fingerstick Glucose (POCT), , , 4x Daily AC and at bedtime, CLARICE Nayak  •  isosorbide mononitrate (IMDUR) 24 hr tablet 120 mg, 120 mg, Oral, Daily, Earna Clara, DO, 120 mg at 05/06/23 8491  •  levothyroxine tablet 112 mcg, 112 mcg, Oral, Early Morning, Angela Golas, DO, 112 mcg at 05/06/23 0536  •  sodium bicarbonate tablet 650 mg, 650 mg, Oral, BID after meals, Angela Golas, DO, 650 mg at 05/06/23 0819    Labs & Results:      Results from last 7 days   Lab Units 05/05/23  0406 05/04/23  1943   WBC Thousand/uL 5 37 4 88   HEMOGLOBIN g/dL 8 8* 10 0*   HEMATOCRIT % 28 8* 33 4*   PLATELETS Thousands/uL 116* 130*         Results from last 7 days   Lab Units 05/06/23  0538 05/05/23  0406 05/04/23  1943   POTASSIUM mmol/L 4 2 4 5 4 4   CHLORIDE mmol/L 107 107 108   CO2 mmol/L 21 23 21   BUN mg/dL 72* 64* 60*   CREATININE mg/dL 3 44* 3 15* 2 96*   CALCIUM mg/dL 8 4 8 8 8 8   ALK PHOS U/L  --  347* 422*   ALT U/L  --  53 63   AST U/L  --  45 45     Results from last 7 days   Lab Units 05/05/23  0406   INR  1 21*     Sallie Barahona PA-C

## 2023-05-06 NOTE — CONSULTS
Consultation - Nephrology   Zain Kruger 70 y o  female MRN: [id number]  Unit/Bed#: [facility] 508-01 Encounter: [id number]    ASSESSMENT and PLAN:  1  CKD stage 4 in setting of diabetic kidney disease as well as ischemic cardiomyopathy  - Patient would not want dialysis if needed  - Serum creatinine remains within her baseline of 3-3 5  - Follows outpatient with Dr Clarke Prado  - Monitor renal indices on Bumex 4 mg p o  twice daily per cardiology  - Avoid relative hypotension/IV dye/nephrotoxins  - Suspect lower serum creatinine on admission due to dilution from volume overload    2  Mild hyponatremia, intermittent-resolved currently on oral Bumex as above, on 2 L/day fluid restriction    3  COVID-19- receiving remdesivir per primary team    4  Anemia-hemoglobin 8 8, monitor CBC, transfuse as needed    5  Proteinuria-microalbumin to creatinine ratio 1 1 g as of February 2023, likely d/t diabetic nephropathy, last A1c 8 3-diabetes uncontrolled    6  Diabetes mellitus type 2- uncontrolled, management per primary team    7  Secondary hyperparathyroidism of renal origin-continue calcitriol 0 25 mcg daily, monitor PTH outpatient    8  Hypertension-continue Coreg 12 5 mg p o  twice daily, hydralazine 75 mg p o  3 times daily, Imdur 120 mg p o  daily-per cardiology    9  Metabolic acidosis due to CKD-continue sodium bicarbonate 650 mg p o  twice daily, monitor bicarb, below goal    10  Chronic heart failure with preserved ejection fraction-on diuretics per cardiology    HISTORY OF PRESENT ILLNESS:  Requesting Physician: Dyllan Russell MD  Reason for Consult: CKD stage 4    Zain Kruger is a 70y o  year old female who was admitted to [facility] after presenting with chest pain  A renal consultation is requested today for assistance in the management of CKD  The patient states that she was having chest pain which is what led her to come to the hospital   This is now resolved    She denies headache, dizziness, fever but has had chills  She also had nausea for day prior to coming in without vomiting  She denies diarrhea, constipation, shortness of breath or leg edema  She states that she is urinating and has gone 3 times today  She admits to exposure to someone with COVID  She has tested COVID-positive today  She appears comfortable  No other complaints    Follows with Dr Ronda Brito outpatient for her history of CKD    PAST MEDICAL HISTORY:  Past Medical History:   Diagnosis Date   • Anemia    • CHF (congestive heart failure) ([facility] )    • Chronic kidney disease    • Coronary artery disease    • Diabetes mellitus ([facility] )    • Hypertension    • Hypothyroidism        PAST SURGICAL HISTORY:  Past Surgical History:   Procedure Laterality Date   • CORONARY ANGIOPLASTY WITH STENT PLACEMENT  2019       ALLERGIES:  Allergies   Allergen Reactions   • Iv Contrast [Iodinated Contrast Media] Itching     Caused KELLY   • Nifedipine Rash       SOCIAL HISTORY:  Social History     Substance and Sexual Activity   Alcohol Use Yes    Comment: occ     Social History     Substance and Sexual Activity   Drug Use No     Social History     Tobacco Use   Smoking Status Former   Smokeless Tobacco Never       FAMILY HISTORY:  Family History   Problem Relation Age of Onset   • Diabetes Mother    • Heart attack Father         MI   • Hypertension Brother        MEDICATIONS:    Current Facility-Administered Medications:   •  albuterol (PROVENTIL HFA,VENTOLIN HFA) inhaler 2 puff, 2 puff, Inhalation, Q4H PRN, Evans Slater DO  •  amLODIPine (NORVASC) tablet 5 mg, 5 mg, Oral, BID, Evans Slater DO, 5 mg at 05/06/23 8074  •  aspirin chewable tablet 81 mg, 81 mg, Oral, Daily, Evans Slater DO, 81 mg at 05/06/23 1974  •  atorvastatin (LIPITOR) tablet 80 mg, 80 mg, Oral, Daily, Evans Slater DO, 80 mg at 05/06/23 4208  •  bumetanide (BUMEX) tablet 4 mg, 4 mg, Oral, BID (diuretic), Beverly Lange PA-C, 4 mg at 05/06/23 1557  •  calcitriol (ROCALTROL) capsule 0 25 mcg, 0 25 mcg, Oral, Daily, Rodolfo Hendricks DO, 0 25 mcg at 05/06/23 9698  •  carvedilol (COREG) tablet 12 5 mg, 12 5 mg, Oral, BID With Meals, Rodolfo Hendricks DO, 12 5 mg at 05/06/23 1557  •  cholecalciferol (VITAMIN D3) tablet 2,000 Units, 2,000 Units, Oral, Daily, Rodolfo Hendricks DO, 2,000 Units at 05/06/23 8055  •  heparin (porcine) subcutaneous injection 5,000 Units, 5,000 Units, Subcutaneous, Q8H [facility], Rodolfo Hendricks DO  •  hydrALAZINE (APRESOLINE) tablet 75 mg, 75 mg, Oral, TID, Rodolfo Hendricks DO, 75 mg at 05/06/23 1557  •  insulin glargine (LANTUS) subcutaneous injection 20 Units 0 2 mL, 20 Units, Subcutaneous, HS, Rodolfo Hendricks DO, 6 Units at 05/05/23 2131  •  insulin lispro (HumaLOG) 100 units/mL subcutaneous injection 1-5 Units, 1-5 Units, Subcutaneous, 4x Daily (AC & HS), 1 Units at 05/06/23 1107 **AND** Fingerstick Glucose (POCT), , , 4x Daily AC and at bedtime, CLARICE Carr  •  isosorbide mononitrate (IMDUR) 24 hr tablet 120 mg, 120 mg, Oral, Daily, Rita Stringer, DO, 120 mg at 05/06/23 7823  •  levothyroxine tablet 112 mcg, 112 mcg, Oral, Early Morning, Rodolfo Hendricks DO, 112 mcg at 05/06/23 0536  •  [COMPLETED] remdesivir [place]) 200 mg in sodium chloride 0 9 % 290 mL IVPB, 200 mg, Intravenous, Q24H, 200 mg at 05/06/23 1557 **FOLLOWED BY** [START ON 5/7/2023] remdesivir (Veklury) 100 mg in sodium chloride 0 9 % 270 mL IVPB, 100 mg, Intravenous, Q24H, Lavelle Gale PA-C  •  sodium bicarbonate tablet 650 mg, 650 mg, Oral, BID after meals, Rodolfo Hendricks DO, 650 mg at 05/06/23 8177    REVIEW OF SYSTEMS:  More than 10 systems were reviewed  No other pertinent positive findings other than those mentioned in HPI      PHYSICAL EXAM:  Current Weight: Weight - Scale: 70 kg (154 lb 5 2 oz)  First Weight: Weight - Scale: 70 8 kg (156 lb)  Vitals:    05/06/23 0538 05/06/23 0719 05/06/23 1017 05/06/23 1606   BP:  147/66 149/65 152/68   BP Location:    Right arm   Pulse:  66 62 68   Resp:    19 Temp:  97 9 °F (36 6 °C) 97 8 °F (36 6 °C) 98 1 °F (36 7 °C)   TempSrc:  Oral  Oral   SpO2:  94% 95% 95%   Weight: 70 kg (154 lb 5 2 oz)      Height:           Intake/Output Summary (Last 24 hours) at 5/6/2023 1718  Last data filed at 5/6/2023 1607  Gross per 24 hour   Intake 594 ml   Output 1200 ml   Net -606 ml     Physical Exam  Vitals and nursing note reviewed  Constitutional:       General: She is not in acute distress  Appearance: Normal appearance  She is well-developed  She is not diaphoretic  Comments: Sitting comfortably in chair   HENT:      Head: Normocephalic and atraumatic  Nose: Nose normal       Mouth/Throat:      Mouth: Mucous membranes are moist       Pharynx: No oropharyngeal exudate  Eyes:      General: No scleral icterus  Right eye: No discharge  Left eye: No discharge  Neck:      Thyroid: No thyromegaly  Cardiovascular:      Rate and Rhythm: Normal rate and regular rhythm  Heart sounds: No murmur heard  Pulmonary:      Effort: Pulmonary effort is normal  No respiratory distress  Breath sounds: Normal breath sounds  No wheezing  Abdominal:      General: Bowel sounds are normal  There is no distension  Palpations: Abdomen is soft  Musculoskeletal:         General: No swelling  Cervical back: Normal range of motion and neck supple  Skin:     General: Skin is warm and dry  Coloration: Skin is pale  Findings: No rash  Neurological:      General: No focal deficit present  Mental Status: She is alert  Motor: No abnormal muscle tone        Comments: awake   Psychiatric:         Mood and Affect: Mood normal          Behavior: Behavior normal          Invasive Devices:      Lab Results:   Results from last 7 days   Lab Units 05/06/23  0538 05/05/23  0406 05/04/23  1943   WBC Thousand/uL  --  5 37 4 88   HEMOGLOBIN g/dL  --  8 8* 10 0*   HEMATOCRIT %  --  28 8* 33 4*   PLATELETS Thousands/uL  --  116* 130*   POTASSIUM mmol/L 4 2 4 5 4 4   CHLORIDE mmol/L 107 107 108   CO2 mmol/L 21 23 21   BUN mg/dL 72* 64* 60*   CREATININE mg/dL 3 44* 3 15* 2 96*   CALCIUM mg/dL 8 4 8 8 8 8   MAGNESIUM mg/dL 2 5 1 8  --    ALK PHOS U/L  --  347* 422*   ALT U/L  --  53 63   AST U/L  --  45 45

## 2023-05-06 NOTE — ASSESSMENT & PLAN NOTE
· Per record review, patient presented with retrosternal chest pain beginning at 10 AM the morning of admission, rated 10/10 in intensity, and persisted until patient received nitroglycerin in the ER; additionally, reported worsening dyspnea with exertion over the last several weeks  · Per record review, for admitting provider, patient endorsed tenderness to palpation of the sternal area and reported that this reproduced the chest pain she was having  · History of ischemic cardiomyopathy with LVEF 40% since September 2022 and had echocardiogram last month with LVEF 45% as well as numerous hypokinetic segments and noted moderate pericardial effusion  · History of drug-eluting stent x2 to the LAD after NSTEMI in 2019; also noted to have 80% stenosis of the RCA with reported planned staged PCI but has not required additional intervention since then  · Cardiology consult appreciated - received IV Bumex 5/5 and Imdur was increased   No plan for LHC at this time given renal function and refusal of dialysis

## 2023-05-06 NOTE — ASSESSMENT & PLAN NOTE
· Echo in April 2022 with LVEF 62%, systolic function mildly reduced, hypokinetic segments including the basal inferior, mid inferior, mid inferolateral, mid anterolateral, apical inferior and apical lateral   Noted to have a moderate pericardial effusion circumferential to the heart    · Combined systolic and diastolic heart failure  · History of coronary artery disease status post drug-eluting stent x2 to the LAD in 2019; noted to have 80% RCA stenosis and appears from last cardiology note that there was planned staged PCI but has not needed intervention on the RCA

## 2023-05-06 NOTE — QUICK NOTE
Patient reported cough and congestion  She reports that she was in contact with someone last week who is now reportedly COVID positive  COVID/FLU/RSV swab was performed and patient noted to be COVID positive  Contact and airborne precautions ordered  Fortunately, patient is on room air  Per inpatient protocol, as patient was admitted for non-COVID reasons and not on oxygen, but is high-risk (age >71, CKD, heart disease) will treat with remdesivir x 3 days

## 2023-05-06 NOTE — ASSESSMENT & PLAN NOTE
Lab Results   Component Value Date    HGBA1C 8 3 (H) 02/16/2023       Recent Labs     05/05/23  1649 05/05/23  2125 05/06/23  0813 05/06/23  1033   POCGLU 171* 182* 217* 213*       Blood Sugar Average: Last 72 hrs:  (P) 219 [id number]   · On PTA aspart/protamine 15 and 10 units daily  · Currently on Lantus 20 units nightly as well as sliding scale insulin   · Adjust as indicated

## 2023-05-07 LAB
ANION GAP SERPL CALCULATED.3IONS-SCNC: 7 MMOL/L (ref 4–13)
BUN SERPL-MCNC: 80 MG/DL (ref 5–25)
CALCIUM SERPL-MCNC: 8.3 MG/DL (ref 8.3–10.1)
CHLORIDE SERPL-SCNC: 106 MMOL/L (ref 96–108)
CO2 SERPL-SCNC: 21 MMOL/L (ref 21–32)
CREAT SERPL-MCNC: 3.56 MG/DL (ref 0.6–1.3)
ERYTHROCYTE [DISTWIDTH] IN BLOOD BY AUTOMATED COUNT: 13.8 % (ref 11.6–15.1)
GFR SERPL CREATININE-BSD FRML MDRD: 12 ML/MIN/1.73SQ M
GLUCOSE SERPL-MCNC: 134 MG/DL (ref 65–140)
GLUCOSE SERPL-MCNC: 167 MG/DL (ref 65–140)
GLUCOSE SERPL-MCNC: 211 MG/DL (ref 65–140)
GLUCOSE SERPL-MCNC: 86 MG/DL (ref 65–140)
GLUCOSE SERPL-MCNC: 87 MG/DL (ref 65–140)
HCT VFR BLD AUTO: 27.6 % (ref 34.8–46.1)
HGB BLD-MCNC: 8.7 G/DL (ref 11.5–15.4)
MCH RBC QN AUTO: 28.6 PG (ref 26.8–34.3)
MCHC RBC AUTO-ENTMCNC: 31.5 G/DL (ref 31.4–37.4)
MCV RBC AUTO: 91 FL (ref 82–98)
PLATELET # BLD AUTO: 100 THOUSANDS/UL (ref 149–390)
PMV BLD AUTO: 11.2 FL (ref 8.9–12.7)
POTASSIUM SERPL-SCNC: 4.3 MMOL/L (ref 3.5–5.3)
RBC # BLD AUTO: 3.04 MILLION/UL (ref 3.81–5.12)
SODIUM SERPL-SCNC: 134 MMOL/L (ref 135–147)
WBC # BLD AUTO: 4.53 THOUSAND/UL (ref 4.31–10.16)

## 2023-05-07 RX ADMIN — ASPIRIN 81 MG 81 MG: 81 TABLET ORAL at 08:02

## 2023-05-07 RX ADMIN — INSULIN LISPRO 1 UNITS: 100 INJECTION, SOLUTION INTRAVENOUS; SUBCUTANEOUS at 21:38

## 2023-05-07 RX ADMIN — HYDRALAZINE HYDROCHLORIDE 75 MG: 50 TABLET ORAL at 21:39

## 2023-05-07 RX ADMIN — INSULIN GLARGINE 20 UNITS: 100 INJECTION, SOLUTION SUBCUTANEOUS at 21:39

## 2023-05-07 RX ADMIN — CARVEDILOL 12.5 MG: 12.5 TABLET, FILM COATED ORAL at 16:50

## 2023-05-07 RX ADMIN — CARVEDILOL 12.5 MG: 12.5 TABLET, FILM COATED ORAL at 08:02

## 2023-05-07 RX ADMIN — LEVOTHYROXINE SODIUM 112 MCG: 112 TABLET ORAL at 05:37

## 2023-05-07 RX ADMIN — BUMETANIDE 4 MG: 2 TABLET ORAL at 08:02

## 2023-05-07 RX ADMIN — BUMETANIDE 4 MG: 2 TABLET ORAL at 16:50

## 2023-05-07 RX ADMIN — AMLODIPINE BESYLATE 5 MG: 5 TABLET ORAL at 08:02

## 2023-05-07 RX ADMIN — ATORVASTATIN CALCIUM 80 MG: 80 TABLET, FILM COATED ORAL at 08:02

## 2023-05-07 RX ADMIN — Medication 2000 UNITS: at 08:02

## 2023-05-07 RX ADMIN — ISOSORBIDE MONONITRATE 120 MG: 60 TABLET, EXTENDED RELEASE ORAL at 08:01

## 2023-05-07 RX ADMIN — CALCITRIOL CAPSULES 0.25 MCG 0.25 MCG: 0.25 CAPSULE ORAL at 08:02

## 2023-05-07 RX ADMIN — REMDESIVIR 100 MG: 100 INJECTION, POWDER, LYOPHILIZED, FOR SOLUTION INTRAVENOUS at 14:16

## 2023-05-07 RX ADMIN — HYDRALAZINE HYDROCHLORIDE 75 MG: 50 TABLET ORAL at 16:50

## 2023-05-07 RX ADMIN — AMLODIPINE BESYLATE 5 MG: 5 TABLET ORAL at 21:38

## 2023-05-07 RX ADMIN — INSULIN LISPRO 1 UNITS: 100 INJECTION, SOLUTION INTRAVENOUS; SUBCUTANEOUS at 16:51

## 2023-05-07 RX ADMIN — SODIUM BICARBONATE 650 MG: 650 TABLET ORAL at 08:02

## 2023-05-07 RX ADMIN — HYDRALAZINE HYDROCHLORIDE 75 MG: 50 TABLET ORAL at 08:01

## 2023-05-07 RX ADMIN — SODIUM BICARBONATE 650 MG: 650 TABLET ORAL at 16:50

## 2023-05-07 NOTE — PROGRESS NOTES
"    Progress Note - Nephrology   Berna Espana 70 y o  female MRN: [id number]  Unit/Bed#: [facility] 508-01 Encounter: [id number]      Assessment / Plan:  1  CKD stage 4 in setting of diabetic kidney disease as well as ischemic cardiomyopathy  - Patient would not want dialysis if needed  - Serum creatinine remains within her baseline of 3-3 5  - Follows outpatient with Dr Yony Contreras  - Monitor renal indices on Bumex 4 mg p o  twice daily per cardiology  - Avoid relative hypotension/IV dye/nephrotoxins  - Suspect lower serum creatinine on admission due to dilution from volume overload     2  Mild hyponatremia, intermittent-monitor BMP on oral Bumex as above, on 2 L/day fluid restriction-will reduce to 1 8L/day FR     3   COVID-19- receiving remdesivir per primary team     4  Anemia-hemoglobin 8 7, monitor CBC, transfuse as needed     5  Proteinuria-microalbumin to creatinine ratio 1 1 g as of 2023, likely d/t diabetic nephropathy, last A1c 8 3-diabetes uncontrolled     6  Diabetes mellitus type 2- uncontrolled, management per primary team     7  Secondary hyperparathyroidism of renal origin-continue calcitriol 0 25 mcg daily, monitor PTH outpatient     8  Hypertension-continue Coreg 12 5 mg p o  twice daily, hydralazine 75 mg p o  3 times daily, Imdur 120 mg p o  daily-per cardiology     9  Metabolic acidosis due to CKD-continue sodium bicarbonate 650 mg p o  twice daily, monitor bicarb, below goal     10  Chronic heart failure with preserved ejection fraction-on diuretics per cardiology        Subjective:   She denies chest pain or shortness of breath  Admits to decreased appetite  No other complaints  Objective:     Vitals: Blood pressure 157/69, pulse 64, temperature 98 5 °F (36 9 °C), temperature source Oral, resp  rate 16, height 5' 5\" (1 651 m), weight 70 8 kg (156 lb 1 4 oz), SpO2 92 %, not currently breastfeeding  ,Body mass index is 25 97 kg/m²  Temp (24hrs), Av 6 °F (37 °C), Min:98 5 °F " (36 9 °C), Max:98 6 °F (37 °C)      Weight (last 2 days)     Date/Time Weight    05/07/23 0543 70 8 (156 09)    05/06/23 0538 70 (154 32)    05/05/23 08:20:12 70 8 (156)            Intake/Output Summary (Last 24 hours) at 5/7/2023 1859  Last data filed at 5/7/2023 1702  Gross per 24 hour   Intake 948 ml   Output 1000 ml   Net -52 ml     I/O last 24 hours: In: 7298 [P O :950; I V :30; IV Piggyback:560]  Out: 1400 [Urine:1400]        Physical Exam:   Physical Exam  Vitals and nursing note reviewed  Constitutional:       General: She is not in acute distress  Appearance: Normal appearance  She is well-developed  She is not diaphoretic  HENT:      Head: Normocephalic and atraumatic  Nose: Nose normal       Mouth/Throat:      Pharynx: No oropharyngeal exudate  Eyes:      General: No scleral icterus  Right eye: No discharge  Left eye: No discharge  Neck:      Thyroid: No thyromegaly  Cardiovascular:      Rate and Rhythm: Normal rate and regular rhythm  Heart sounds: No murmur heard  Pulmonary:      Effort: Pulmonary effort is normal  No respiratory distress  Breath sounds: Normal breath sounds  No wheezing  Abdominal:      General: Bowel sounds are normal  There is no distension  Palpations: Abdomen is soft  Musculoskeletal:         General: No swelling  Cervical back: Normal range of motion and neck supple  Skin:     General: Skin is warm and dry  Findings: No rash  Neurological:      General: No focal deficit present  Mental Status: She is alert  Motor: No abnormal muscle tone        Comments: awake   Psychiatric:         Mood and Affect: Mood normal          Behavior: Behavior normal          Invasive Devices     Peripheral Intravenous Line  Duration           Peripheral IV 05/04/23 Left Antecubital 2 days    Peripheral IV 05/07/23 Right;Ventral (anterior) Forearm <1 day                Medications:    Scheduled Meds:  Current Facility-Administered Medications   Medication Dose Route Frequency Provider Last Rate   • albuterol  2 puff Inhalation Q4H PRN Pamalee Paddy, DO     • amLODIPine  5 mg Oral BID Pamalee Paddy, DO     • aspirin  81 mg Oral Daily Pamalee Paddy, DO     • atorvastatin  80 mg Oral Daily Pamalee Paddy, DO     • bumetanide  4 mg Oral BID (diuretic) Quan Zaldivar PA-C     • calcitriol  0 25 mcg Oral Daily Pamalee Paddy, DO     • carvedilol  12 5 mg Oral BID With Meals Pamalee Paddy, DO     • cholecalciferol  2,000 Units Oral Daily Pamalee Paddy, DO     • heparin (porcine)  5,000 Units Subcutaneous Q8H [address] Pamalee Paddy, DO     • hydrALAZINE  75 mg Oral TID Pamalee Paddy, DO     • insulin glargine  20 Units Subcutaneous HS Pamalee Paddy, DO     • insulin lispro  1-5 Units Subcutaneous 4x Daily (AC & HS) CLARICE Jeffers     • isosorbide mononitrate  120 mg Oral Daily Phyllis Lopez, DO     • levothyroxine  112 mcg Oral Early Morning Pamalee Paddy, DO     • remdesivir  100 mg Intravenous Q24H Margarita Najera PA-C Stopped (05/07/23 1702)   • sodium bicarbonate  650 mg Oral BID after meals Pamalee Paddy, DO         PRN Meds: •  albuterol    Continuous Infusions:         LAB RESULTS:      Results from last 7 days   Lab Units 05/07/23  0536 05/06/23  0538 05/05/23  0406 05/04/23  1943   WBC Thousand/uL 4 53  --  5 37 4 88   HEMOGLOBIN g/dL 8 7*  --  8 8* 10 0*   HEMATOCRIT % 27 6*  --  28 8* 33 4*   PLATELETS Thousands/uL 100*  --  116* 130*   NEUTROS PCT %  --   --  89* 87*   LYMPHS PCT %  --   --  4* 5*   MONOS PCT %  --   --  7 6   EOS PCT %  --   --  0 1   POTASSIUM mmol/L 4 3 4 2 4 5 4 4   CHLORIDE mmol/L 106 107 107 108   CO2 mmol/L 21 21 23 21   BUN mg/dL 80* 72* 64* 60*   CREATININE mg/dL 3 56* 3 44* 3 15* 2 96*   CALCIUM mg/dL 8 3 8 4 8 8 8 8   ALK PHOS U/L  --   --  347* 422*   ALT U/L  --   --  53 63   AST U/L  --   --  45 45   MAGNESIUM mg/dL  --  2 5 1 8  --        CUTURES:  Lab Results   Component Value Date "   BLOODCX No Growth After 5 Days  07/02/2021    BLOODCX No Growth After 5 Days  07/02/2021    URINECX 10,000-19,000 cfu/ml Serratia marcescens (A) 09/15/2022    URINECX No Growth <1000 cfu/mL 07/21/2017                 Portions of the record may have been created with voice recognition software  Occasional wrong word or \"sound a like\" substitutions may have occurred due to the inherent limitations of voice recognition software  Read the chart carefully and recognize, using context, where substitutions have occurred  If you have any questions, please contact the dictating provider      "

## 2023-05-07 NOTE — ASSESSMENT & PLAN NOTE
· On 5/6/23 patient reported cough and congestion and that she was in contact with someone the week prior who is now reportedly COVID positive   COVID swab performed and patient noted to be COVID positive  · Fortunately, patient remains on room air  · Per inpatient protocol, as patient was admitted for non-COVID reasons and is not on oxygen, but is high risk (age >71, CKD, heart disease) was started on remdesivir x3 days

## 2023-05-07 NOTE — ASSESSMENT & PLAN NOTE
· Echo in April 2022 with LVEF 48%, systolic function mildly reduced, hypokinetic segments including the basal inferior, mid inferior, mid inferolateral, mid anterolateral, apical inferior and apical lateral   Noted to have a moderate pericardial effusion circumferential to the heart    · Combined systolic and diastolic heart failure  · History of coronary artery disease status post drug-eluting stent x2 to the LAD in 2019; noted to have 80% RCA stenosis and appears from last cardiology note that there was planned staged PCI but has not needed intervention on the RCA

## 2023-05-07 NOTE — ASSESSMENT & PLAN NOTE
Lab Results   Component Value Date    EGFR 12 05/07/2023    EGFR 12 05/06/2023    EGFR 14 05/05/2023    CREATININE 3 56 (H) 05/07/2023    CREATININE 3 44 (H) 05/06/2023    CREATININE 3 15 (H) 05/05/2023     · Baseline most recently in the mid fours  · S/p IV Bumex 5/5  · Now on 4mg PO BID per Heart Failure Team  · Creatinine up-trending   Nephrology following   · Patient continues to refuse dialysis

## 2023-05-07 NOTE — PLAN OF CARE
Problem: SAFETY ADULT  Goal: Patient will remain free of falls  Description: INTERVENTIONS:  - Educate patient/family on patient safety including physical limitations  - Instruct patient to call for assistance with activity   - Consult OT/PT to assist with strengthening/mobility   - Keep Call bell within reach  - Keep bed low and locked with side rails adjusted as appropriate  - Keep care items and personal belongings within reach  - Initiate and maintain comfort rounds  - Make Fall Risk Sign visible to staff  - Apply yellow socks and bracelet for high fall risk patients  - Consider moving patient to room near nurses station  Outcome: Progressing  Goal: Maintain or return to baseline ADL function  Description: INTERVENTIONS:  -  Assess patient's ability to carry out ADLs; assess patient's baseline for ADL function and identify physical deficits which impact ability to perform ADLs (bathing, care of mouth/teeth, toileting, grooming, dressing, etc )  - Assess/evaluate cause of self-care deficits   - Assess range of motion  - Assess patient's mobility; develop plan if impaired  - Assess patient's need for assistive devices and provide as appropriate  - Encourage maximum independence but intervene and supervise when necessary  - Involve family in performance of ADLs  - Assess for home care needs following discharge   - Consider OT consult to assist with ADL evaluation and planning for discharge  - Provide patient education as appropriate  Outcome: Progressing  Goal: Maintains/Returns to pre admission functional level  Description: INTERVENTIONS:  - Perform BMAT or MOVE assessment daily    - Set and communicate daily mobility goal to care team and patient/family/caregiver  - Collaborate with rehabilitation services on mobility goals if consulted  - Perform Range of Motion 4 times a day  - Reposition patient every 2 hours    - Stand patient 4 times a day  - Ambulate patient 4 times a day  - Out of bed for toileting  - Record patient progress and toleration of activity level   Outcome: Progressing

## 2023-05-07 NOTE — ASSESSMENT & PLAN NOTE
Lab Results   Component Value Date    HGBA1C 8 3 (H) 02/16/2023       Recent Labs     05/06/23  1635 05/06/23  2130 05/07/23  0533 05/07/23  1114   POCGLU 159* 248* 87 134       Blood Sugar Average: Last 72 hrs:  (P) 413 [id number]   · On PTA aspart/protamine 15 and 10 units daily  · Currently on Lantus 20 units nightly as well as sliding scale insulin   · Adjust as indicated

## 2023-05-07 NOTE — PROGRESS NOTES
[address] [facility]  Progress Note  Name: Mariana Garay  MRN: [id number]  Unit/Bed#: [facility] 287-44 I Date of Admission: 5/4/2023   Date of Service: 5/7/2023 I Hospital Day: 3    Assessment/Plan   COVID-19  Assessment & Plan  · On 5/6/23 patient reported cough and congestion and that she was in contact with someone the week prior who is now reportedly COVID positive  COVID swab performed and patient noted to be COVID positive  · Fortunately, patient remains on room air  · Per inpatient protocol, as patient was admitted for non-COVID reasons and is not on oxygen, but is high risk (age >71, CKD, heart disease) was started on remdesivir x3 days     * Chest pain  Assessment & Plan  · Per record review, patient presented with retrosternal chest pain beginning at 10 AM the morning of admission, rated 10/10 in intensity, and persisted until patient received nitroglycerin in the ER; additionally, reported worsening dyspnea with exertion over the last several weeks  · Per record review, for admitting provider, patient endorsed tenderness to palpation of the sternal area and reported that this reproduced the chest pain she was having  · History of ischemic cardiomyopathy with LVEF 40% since September 2022 and had echocardiogram last month with LVEF 45% as well as numerous hypokinetic segments and noted moderate pericardial effusion  · History of drug-eluting stent x2 to the LAD after NSTEMI in 2019; also noted to have 80% stenosis of the RCA with reported planned staged PCI but has not required additional intervention since then  · Cardiology consult appreciated - received IV Bumex 5/5 and Imdur was increased   No plan for C at this time given renal function and refusal of dialysis    CKD (chronic kidney disease) stage 4, GFR 15-29 ml/min [facility])  Assessment & Plan  Lab Results   Component Value Date    EGFR 12 05/07/2023    EGFR 12 05/06/2023    EGFR 14 05/05/2023    CREATININE 3 56 (H) 05/07/2023    CREATININE 3 44 (H) 05/06/2023    CREATININE 3 15 (H) 05/05/2023     · Baseline most recently in the mid fours  · S/p IV Bumex 5/5  · Now on 4mg PO BID per Heart Failure Team  · Creatinine up-trending  Nephrology following   · Patient continues to refuse dialysis    Type 2 diabetes mellitus, with long-term current use of insulin [facility])  Assessment & Plan  Lab Results   Component Value Date    HGBA1C 8 3 (H) 02/16/2023       Recent Labs     05/06/23  1635 05/06/23  2130 05/07/23  0533 05/07/23  1114   POCGLU 159* 248* 87 134       Blood Sugar Average: Last 72 hrs:  (P) 197 [id number]   · On PTA aspart/protamine 15 and 10 units daily  · Currently on Lantus 20 units nightly as well as sliding scale insulin   · Adjust as indicated     Goals of care, counseling/discussion  Assessment & Plan  · Patient continued to refuse dialysis  · Per admitting physician's discussion, patient is Level 1 full code    Hypertension  Assessment & Plan  · BP improved since admission  · On Bumex per Heart Failure Team and Imdur was increased    Ischemic cardiomyopathy  Assessment & Plan  · Echo in April 2022 with LVEF 67%, systolic function mildly reduced, hypokinetic segments including the basal inferior, mid inferior, mid inferolateral, mid anterolateral, apical inferior and apical lateral   Noted to have a moderate pericardial effusion circumferential to the heart    · Combined systolic and diastolic heart failure  · History of coronary artery disease status post drug-eluting stent x2 to the LAD in 2019; noted to have 80% RCA stenosis and appears from last cardiology note that there was planned staged PCI but has not needed intervention on the RCA    Coronary artery disease  Assessment & Plan  · S/p LAD stents  · Cardiology following  · On aspirin, statin, Coreg, Imdur    Hypothyroidism  Assessment & Plan  · Continue levothyroxine            VTE Pharmacologic Prophylaxis: VTE Score: 3 Moderate Risk (Score 3-4) - Pharmacological DVT Prophylaxis Ordered: heparin  Patient Centered Rounds: I performed bedside rounds with nursing staff today  Discussions with Specialists or Other Care Team Provider: Heart Failure Team AP and Nephrologist     Education and Discussions with Family / Patient: Patient declined call to   Total Time Spent on Date of Encounter in care of patient: 25 minutes This time was spent on one or more of the following: performing physical exam; counseling and coordination of care; obtaining or reviewing history; documenting in the medical record; reviewing/ordering tests, medications or procedures; communicating with other healthcare professionals and discussing with patient's family/caregivers  Current Length of Stay: 3 day(s)  Current Patient Status: Inpatient   Certification Statement: The patient will continue to require additional inpatient hospital stay due to rising creatinine  Discharge Plan: pending heart failure team and nephrology clearance    Code Status: Level 1 - Full Code    Subjective:   Ms Issac Castellon denies complaint  She reports not eating very much  Denies CP or SOB or leg swelling     Objective:     Vitals:   Temp (24hrs), Av 4 °F (36 9 °C), Min:98 1 °F (36 7 °C), Max:98 6 °F (37 °C)    Temp:  [98 1 °F (36 7 °C)-98 6 °F (37 °C)] 98 5 °F (36 9 °C)  HR:  [62-69] 62  Resp:  [16-19] 16  BP: (150-154)/(66-69) 154/69  SpO2:  [91 %-96 %] 95 %  Body mass index is 25 97 kg/m²  Input and Output Summary (last 24 hours): Intake/Output Summary (Last 24 hours) at 2023 1541  Last data filed at 2023 1416  Gross per 24 hour   Intake 796 ml   Output 1400 ml   Net -604 ml       Physical Exam:   Physical Exam  Vitals reviewed  Constitutional:       Comments: Patient seen lying in bed, NAD   Cardiovascular:      Rate and Rhythm: Normal rate and regular rhythm  Pulmonary:      Effort: Pulmonary effort is normal       Breath sounds: Normal breath sounds        Comments: Room air  Abdominal:      General: Bowel sounds are normal       Palpations: Abdomen is soft  Tenderness: There is no abdominal tenderness  Musculoskeletal:      Right lower leg: No edema  Left lower leg: No edema  Skin:     General: Skin is warm  Neurological:      Mental Status: She is alert  Psychiatric:         Mood and Affect: Mood normal          Behavior: Behavior normal          Additional Data:     Labs:  Results from last 7 days   Lab Units 05/07/23  0536 05/05/23  0406   WBC Thousand/uL 4 53 5 37   HEMOGLOBIN g/dL 8 7* 8 8*   HEMATOCRIT % 27 6* 28 8*   PLATELETS Thousands/uL 100* 116*   NEUTROS PCT %  --  89*   LYMPHS PCT %  --  4*   MONOS PCT %  --  7   EOS PCT %  --  0     Results from last 7 days   Lab Units 05/07/23  0536 05/06/23  0538 05/05/23  0406   SODIUM mmol/L 134*   < > 134*   POTASSIUM mmol/L 4 3   < > 4 5   CHLORIDE mmol/L 106   < > 107   CO2 mmol/L 21   < > 23   BUN mg/dL 80*   < > 64*   CREATININE mg/dL 3 56*   < > 3 15*   ANION GAP mmol/L 7   < > 4   CALCIUM mg/dL 8 3   < > 8 8   ALBUMIN g/dL  --   --  3 0*   TOTAL BILIRUBIN mg/dL  --   --  0 57   ALK PHOS U/L  --   --  347*   ALT U/L  --   --  53   AST U/L  --   --  45   GLUCOSE RANDOM mg/dL 86   < > 244*    < > = values in this interval not displayed  Results from last 7 days   Lab Units 05/05/23  0406   INR  1 21*     Results from last 7 days   Lab Units 05/07/23  1114 05/07/23  0533 05/06/23  2130 05/06/23  1635 05/06/23  1033 05/06/23  0813 05/05/23  2125 05/05/23  1649 05/05/23  1155 05/05/23  0703 05/05/23  0311   POC GLUCOSE mg/dl 134 87 248* 159* 213* 217* 182* 171* 306* 210* 235*               Lines/Drains:  Invasive Devices     Peripheral Intravenous Line  Duration           Peripheral IV 05/04/23 Left Antecubital 2 days                      Imaging: No pertinent imaging reviewed      Recent Cultures (last 7 days):         Last 24 Hours Medication List:   Current Facility-Administered Medications Medication Dose Route Frequency Provider Last Rate   • albuterol  2 puff Inhalation Q4H PRN Bryan Azevedo DO     • amLODIPine  5 mg Oral BID Bryan Azevedo DO     • aspirin  81 mg Oral Daily Bryan Azevedo DO     • atorvastatin  80 mg Oral Daily Bryan Azevedo DO     • bumetanide  4 mg Oral BID (diuretic) Bogdan Michelle PA-C     • calcitriol  0 25 mcg Oral Daily Bryan Azevedo DO     • carvedilol  12 5 mg Oral BID With Meals Bryan Azevedo DO     • cholecalciferol  2,000 Units Oral Daily Bryan Azevedo DO     • heparin (porcine)  5,000 Units Subcutaneous Q8H [address] Bryan Azevedo DO     • hydrALAZINE  75 mg Oral TID Bryan Azevedo DO     • insulin glargine  20 Units Subcutaneous HS Bryan Azevedo DO     • insulin lispro  1-5 Units Subcutaneous 4x Daily (AC & HS) CLARICE Fraser     • isosorbide mononitrate  120 mg Oral Daily Sreedhar Mosley DO     • levothyroxine  112 mcg Oral Early Morning Bryan Azevedo DO     • remdesivir  100 mg Intravenous Q24H Ana Mars PA-C     • sodium bicarbonate  650 mg Oral BID after meals Bryan Azevedo DO          Today, Patient Was Seen By: Ana Mars PA-C    **Please Note: This note may have been constructed using a voice recognition system  **

## 2023-05-08 ENCOUNTER — TELEPHONE (OUTPATIENT)
Dept: NEPHROLOGY | Facility: CLINIC | Age: 68
End: 2023-05-08

## 2023-05-08 ENCOUNTER — HOSPITAL ENCOUNTER (OUTPATIENT)
Dept: INFUSION CENTER | Facility: HOSPITAL | Age: 68
Discharge: HOME | End: 2023-05-08
Attending: INTERNAL MEDICINE

## 2023-05-08 VITALS
TEMPERATURE: 98.3 F | WEIGHT: 156.31 LBS | BODY MASS INDEX: 26.04 KG/M2 | SYSTOLIC BLOOD PRESSURE: 170 MMHG | RESPIRATION RATE: 19 BRPM | DIASTOLIC BLOOD PRESSURE: 74 MMHG | HEIGHT: 65 IN | HEART RATE: 63 BPM | OXYGEN SATURATION: 92 %

## 2023-05-08 PROBLEM — R07.9 CHEST PAIN: Status: RESOLVED | Noted: 2023-05-04 | Resolved: 2023-05-08

## 2023-05-08 PROBLEM — I50.42 CHRONIC COMBINED SYSTOLIC AND DIASTOLIC HEART FAILURE (HCC): Status: ACTIVE | Noted: 2022-09-23

## 2023-05-08 LAB
ANA HOMOGEN SER QL IF: NORMAL
ANA HOMOGEN TITR SER: NORMAL {TITER}
ANION GAP SERPL CALCULATED.3IONS-SCNC: 8 MMOL/L (ref 4–13)
BUN SERPL-MCNC: 88 MG/DL (ref 5–25)
CALCIUM SERPL-MCNC: 8.2 MG/DL (ref 8.3–10.1)
CHLORIDE SERPL-SCNC: 104 MMOL/L (ref 96–108)
CO2 SERPL-SCNC: 22 MMOL/L (ref 21–32)
CREAT SERPL-MCNC: 4.04 MG/DL (ref 0.6–1.3)
ERYTHROCYTE [DISTWIDTH] IN BLOOD BY AUTOMATED COUNT: 13.6 % (ref 11.6–15.1)
GFR SERPL CREATININE-BSD FRML MDRD: 10 ML/MIN/1.73SQ M
GLUCOSE SERPL-MCNC: 126 MG/DL (ref 65–140)
GLUCOSE SERPL-MCNC: 91 MG/DL (ref 65–140)
GLUCOSE SERPL-MCNC: 95 MG/DL (ref 65–140)
HCT VFR BLD AUTO: 28.9 % (ref 34.8–46.1)
HGB BLD-MCNC: 9.1 G/DL (ref 11.5–15.4)
MCH RBC QN AUTO: 28.6 PG (ref 26.8–34.3)
MCHC RBC AUTO-ENTMCNC: 31.5 G/DL (ref 31.4–37.4)
MCV RBC AUTO: 91 FL (ref 82–98)
PLATELET # BLD AUTO: 114 THOUSANDS/UL (ref 149–390)
PMV BLD AUTO: 11.8 FL (ref 8.9–12.7)
POTASSIUM SERPL-SCNC: 4.3 MMOL/L (ref 3.5–5.3)
RBC # BLD AUTO: 3.18 MILLION/UL (ref 3.81–5.12)
SODIUM SERPL-SCNC: 134 MMOL/L (ref 135–147)
WBC # BLD AUTO: 4.83 THOUSAND/UL (ref 4.31–10.16)

## 2023-05-08 RX ORDER — ISOSORBIDE MONONITRATE 60 MG/1
120 TABLET, EXTENDED RELEASE ORAL DAILY
Qty: 90 TABLET | Refills: 0 | Status: SHIPPED | OUTPATIENT
Start: 2023-05-08

## 2023-05-08 RX ORDER — BUMETANIDE 2 MG/1
4 TABLET ORAL 2 TIMES DAILY
Qty: 120 TABLET | Refills: 0 | Status: SHIPPED | OUTPATIENT
Start: 2023-05-08 | End: 2023-06-07

## 2023-05-08 RX ORDER — HYDRALAZINE HYDROCHLORIDE 50 MG/1
100 TABLET, FILM COATED ORAL 3 TIMES DAILY
Status: DISCONTINUED | OUTPATIENT
Start: 2023-05-08 | End: 2023-05-08 | Stop reason: HOSPADM

## 2023-05-08 RX ADMIN — AMLODIPINE BESYLATE 5 MG: 5 TABLET ORAL at 09:55

## 2023-05-08 RX ADMIN — CARVEDILOL 12.5 MG: 12.5 TABLET, FILM COATED ORAL at 09:54

## 2023-05-08 RX ADMIN — BUMETANIDE 4 MG: 2 TABLET ORAL at 09:55

## 2023-05-08 RX ADMIN — CALCITRIOL CAPSULES 0.25 MCG 0.25 MCG: 0.25 CAPSULE ORAL at 09:55

## 2023-05-08 RX ADMIN — ISOSORBIDE MONONITRATE 120 MG: 60 TABLET, EXTENDED RELEASE ORAL at 09:54

## 2023-05-08 RX ADMIN — SODIUM BICARBONATE 650 MG: 650 TABLET ORAL at 09:55

## 2023-05-08 RX ADMIN — ASPIRIN 81 MG 81 MG: 81 TABLET ORAL at 09:55

## 2023-05-08 RX ADMIN — Medication 2000 UNITS: at 09:54

## 2023-05-08 RX ADMIN — ATORVASTATIN CALCIUM 80 MG: 80 TABLET, FILM COATED ORAL at 09:55

## 2023-05-08 RX ADMIN — HYDRALAZINE HYDROCHLORIDE 75 MG: 50 TABLET ORAL at 09:54

## 2023-05-08 RX ADMIN — LEVOTHYROXINE SODIUM 112 MCG: 112 TABLET ORAL at 05:45

## 2023-05-08 RX ADMIN — REMDESIVIR 100 MG: 100 INJECTION, POWDER, LYOPHILIZED, FOR SOLUTION INTRAVENOUS at 12:23

## 2023-05-08 NOTE — ASSESSMENT & PLAN NOTE
· On 5/6/23 patient reported cough and congestion and that she was in contact with someone the week prior who is now reportedly COVID positive  · Fortunately, patient remains stable on room air  · Per inpatient protocol, as patient was admitted for non-COVID reasons and is not on oxygen, but is high risk (age >71, CKD, heart disease) completed remdesivir x3 days

## 2023-05-08 NOTE — ASSESSMENT & PLAN NOTE
· BP improved since admission  · Cardiology following, Imdur dose increased as above   · Continued on home dose Coreg 12 5 mg BID, hydralazine 75 mg TID

## 2023-05-08 NOTE — PROGRESS NOTES
Progress Note - Nephrology   Richa Salcedo 70 y o  female MRN: [id number]  Unit/Bed#: [facility] 508-01 Encounter: [id number]    ASSESSMENT and PLAN:  Acute kidney injury on top of CKD IV versus progression  Etiology KELLY: Suspect prerenal in setting of COVID infection and diuretic use in the setting of recent KELLY in April-  Etiology of CKD-suspect secondary to diabetic kidney disease, ischemic cardiomyopathy  Assessment:  • After review of medical records through [address] [place] [address] it appears that the patient has a baseline Creatinine of 3 0-3 5  • Admission creatinine 2 96 on 5/4/2023-likely below baseline and likely dilutional in the setting of volume overload  • Current creatinine 4 0-diuretic use   • May be new baseline in the setting of diuretic use with recent hospitalization  • As per Dr Jose Thompson note from yesterday patient would not want dialysis if needed  • Patient follows with Dr Tyler Randall outpatient  • Avoid hypotension and perturbations of blood pressure to event decreased renal perfusion  • To NSAIDs, nephrotoxins and IV contrast  • BMP closely  • Will Arrange hospital follow-up with Dr Bustos-once discharge    Blood pressure/Hypertension:  Assessment and Plan:  • Current blood pressure: Elevated  • Current medications include: Amlodipine 5 mg 2 times daily, Bumex 4 mg 2 times daily, carvedilol 12 5 mg 2 times daily, Dralzine 75 mg 3 times daily, Imdur 120 mg daily  • Maximize hemodynamics to maintain MAP >65  • Avoid hypotension or fluctuations in blood pressure  • Will continue to trend    H&H/anemia: Likely of CKD along with infection  Assessment and Plan:  • Current hemoglobin 9 1  • Per primary team  • Transfuse if hemoglobin less than 7 0    Acid-base: Metabolic acidosis in the setting of advanced CKD  Assessment and Plan:  • Currently on sodium bicarbonate tablets 650 mg 2 times daily  • Bicarb stable at 22  • To monitor     Mild hyponatremia  Assessment and Plan:  · Likely volume mediated in the setting of volume overload  · Sodium 134  · Monitor with diuretics  · Commend fluid restriction of 1 5 L daily-1 8 L    COVID-19 infection  · Management per primary team  · Receiving remdesivir    Proteinuria:  Assessment and plan  · Etiology likely secondary to diabetic nephropathy  · With recent A1c 8 3  · Encourage improved glucose control for A1c less than 7 to slow the progression of CKD    Diabetes II-management per primary team     Chronic heart failure with preserved EF  · Cardiology following  · On oral diuretics 4 mg 2 times daily  · May need to accept higher creatinine to keep euvolemic    Disposition: Renal function appears stable on diuretics  Okay for discharge from nephrology standpoint when medically stable per primary team   Will need outpatient follow-up for CKD will arrange once discharged with weekly BMP x 2 ( ordered)  Discussed plan with primary team via [employer] text are in agreement with plan    Review of systems  Patient seen and examined at bedside: Reports feeling well and wants to go home  Review of Systems   Constitutional: Negative  Negative for activity change, appetite change, chills, diaphoresis, fatigue and fever  HENT: Negative  Negative for congestion and facial swelling  Respiratory: Negative  Cardiovascular: Negative  Gastrointestinal: Negative  Endocrine: Negative  Genitourinary: Negative  Musculoskeletal: Negative  Skin: Negative  Allergic/Immunologic: Negative  Neurological: Negative  Hematological: Negative  Psychiatric/Behavioral: Negative             Physical exam:  Current Weight: Weight - Scale: 70 9 kg (156 lb 4 9 oz)  Vitals:    05/08/23 0557 05/08/23 0719 05/08/23 0749 05/08/23 0954   BP:  (!) 171/74 138/70 170/74   BP Location:   Right arm    Pulse:  64  63   Resp:   19    Temp:   98 3 °F (36 8 °C)    TempSrc:   Oral    SpO2:  96%     Weight: 70 9 kg (156 lb 4 9 oz)      Height:           Intake/Output Summary (Last 24 hours) at 5/8/2023 1038  Last data filed at 5/8/2023 6877  Gross per 24 hour   Intake 628 ml   Output 800 ml   Net -172 ml       Physical Exam  Vitals and nursing note reviewed  Constitutional:       Appearance: Normal appearance  Comments: NAD, OOB   HENT:      Head: Normocephalic and atraumatic  Nose: Nose normal       Mouth/Throat:      Pharynx: Oropharynx is clear  Eyes:      Extraocular Movements: Extraocular movements intact  Conjunctiva/sclera: Conjunctivae normal    Cardiovascular:      Rate and Rhythm: Normal rate and regular rhythm  Pulses: Normal pulses  Heart sounds: Normal heart sounds  Pulmonary:      Effort: Pulmonary effort is normal       Breath sounds: Normal breath sounds  Comments: Decreased bases  Abdominal:      General: Bowel sounds are normal       Palpations: Abdomen is soft  Musculoskeletal:         General: Normal range of motion  Cervical back: Normal range of motion  Skin:     General: Skin is dry  Neurological:      General: No focal deficit present  Mental Status: She is alert and oriented to person, place, and time     Psychiatric:         Mood and Affect: Mood normal          Behavior: Behavior normal             Medications:    Current Facility-Administered Medications:   •  albuterol (PROVENTIL HFA,VENTOLIN HFA) inhaler 2 puff, 2 puff, Inhalation, Q4H PRN, Ruth Stewart DO  •  amLODIPine (NORVASC) tablet 5 mg, 5 mg, Oral, BID, Ruth Stewart, DO, 5 mg at 05/08/23 2830  •  aspirin chewable tablet 81 mg, 81 mg, Oral, Daily, Ruth Stewart, DO, 81 mg at 05/08/23 7103  •  atorvastatin (LIPITOR) tablet 80 mg, 80 mg, Oral, Daily, Ruth Stewart, DO, 80 mg at 05/08/23 0955  •  bumetanide (BUMEX) tablet 4 mg, 4 mg, Oral, BID (diuretic), Roxanne Abarca PA-C, 4 mg at 05/08/23 6998  •  calcitriol (ROCALTROL) capsule 0 25 mcg, 0 25 mcg, Oral, Daily, Ruth Stewart, DO, 0 25 mcg at 05/08/23 0955  •  carvedilol (COREG) tablet 12 5 mg, 12 5 mg, Oral, BID With Meals, Enrico Bane, DO, 12 5 mg at 05/08/23 3978  •  cholecalciferol (VITAMIN D3) tablet 2,000 Units, 2,000 Units, Oral, Daily, Enrico Bane, DO, 2,000 Units at 05/08/23 7242  •  heparin (porcine) subcutaneous injection 5,000 Units, 5,000 Units, Subcutaneous, Q8H [address], Enrico Bane, DO  •  hydrALAZINE (APRESOLINE) tablet 75 mg, 75 mg, Oral, TID, Enrico Jessiee, DO, 75 mg at 05/08/23 0954  •  insulin glargine (LANTUS) subcutaneous injection 20 Units 0 2 mL, 20 Units, Subcutaneous, HS, Enrico Jessiee, DO, 20 Units at 05/07/23 2139  •  insulin lispro (HumaLOG) 100 units/mL subcutaneous injection 1-5 Units, 1-5 Units, Subcutaneous, 4x Daily (AC & HS), 1 Units at 05/07/23 2138 **AND** Fingerstick Glucose (POCT), , , 4x Daily AC and at bedtime, Lilly Sandifer, CRNP  •  isosorbide mononitrate (IMDUR) 24 hr tablet 120 mg, 120 mg, Oral, Daily, Mino Alvarenga DO, 120 mg at 05/08/23 5610  •  levothyroxine tablet 112 mcg, 112 mcg, Oral, Early Morning, Enrico Álvarez, DO, 112 mcg at 05/08/23 0545  •  [COMPLETED] remdesivir Mariea Maximiliano) 200 mg in sodium chloride 0 9 % 290 mL IVPB, 200 mg, Intravenous, Q24H, Stopped at 05/06/23 2301 **FOLLOWED BY** remdesivir (Veklury) 100 mg in sodium chloride 0 9 % 270 mL IVPB, 100 mg, Intravenous, Q24H, Flory Frederick PA-C, Stopped at 05/07/23 1702  •  sodium bicarbonate tablet 650 mg, 650 mg, Oral, BID after meals, Enrico Romane, DO, 650 mg at 05/08/23 0955    Laboratory Results:  Results from last 7 days   Lab Units 05/08/23  0611 05/07/23  0536 05/06/23  0538 05/05/23  0406 05/04/23 1943   WBC Thousand/uL 4 83 4 53  --  5 37 4 88   HEMOGLOBIN g/dL 9 1* 8 7*  --  8 8* 10 0*   HEMATOCRIT % 28 9* 27 6*  --  28 8* 33 4*   PLATELETS Thousands/uL 114* 100*  --  116* 130*   SODIUM mmol/L 134* 134* 135 134* 136   POTASSIUM mmol/L 4 3 4 3 4 2 4 5 4 4   CHLORIDE mmol/L 104 106 107 107 108   CO2 mmol/L 22 21 21 23 21   BUN mg/dL 88* 80* 72* 64* 60*   CREATININE mg/dL 4 04* 3 56* 3 44* 3 15* 2 96*   CALCIUM mg/dL 8 2* 8 3 8 4 8 8 8 8   MAGNESIUM mg/dL  --   --  2 5 1 8  --

## 2023-05-08 NOTE — DISCHARGE SUMMARY
[address] [facility]  Discharge- Yuni Monroe 1955, 70 y o  female MRN: [id number]  Unit/Bed#: [facility] 508-01 Encounter: [id number]  Primary Care Provider: Tesha Humphries MD   Date and time admitted to hospital: 5/4/2023  7:25 PM    * Chest pain-resolved as of 5/8/2023  Assessment & Plan  · Per record review, patient presented with retrosternal chest pain beginning at 10 AM the morning of admission, rated 10/10 in intensity, and persisted until patient received nitroglycerin in the ER; additionally, reported worsening dyspnea with exertion over the last several weeks  · At the time of admission, patient endorsed tenderness to palpation of the sternal area and reported that this reproduced the chest pain she was having  · History of ischemic cardiomyopathy with LVEF 40% since September 2022; echocardiogram last month with LVEF 45% as well as numerous hypokinetic segments as well as moderate pericardial effusion  · History of drug-eluting stent x2 to the LAD after NSTEMI in 2019; also noted to have 80% stenosis of the RCA with reported planned staged PCI but has not required additional intervention since then  · Cardiology / Heart Failure consult appreciated  · Status post IV Bumex 5/5 and PO Bumex increased to 4 mg BID  · Imdur was increased to 120 mg daily   · No plan for LHC at this time given renal function and refusal of dialysis    Pericardial effusion  Assessment & Plan  · Noted to have a moderate pericardial effusion circumferential to the heart during last hospitalization last month for dyspnea on exertion  · Per cardiology, possible hydralazine induced pericardial effusion - JOHN positive; JOHN titer as well as histone antibody pending at time of discharge   · See chest pain section above    CKD (chronic kidney disease) stage 4, GFR 15-29 ml/min [facility])  Assessment & Plan  Lab Results   Component Value Date    EGFR 10 05/08/2023    EGFR 12 05/07/2023    EGFR 12 05/06/2023 CREATININE 4 04 (H) 05/08/2023    CREATININE 3 56 (H) 05/07/2023    CREATININE 3 44 (H) 05/06/2023     · Prior baseline creatinine approximately 3-3 5  · Per chart review, patient continues to refuse dialysis if needed   · S/p IV Bumex 5/5 and now on increased dose PO Bumex 4 mg BID per Heart Failure Team  · Nephrology consulted given up-trending creatinine, appreciate recommendations    · Renal function stable on current dose diuretics, likely need to accept higher creatinine to maintain euvolemia  · Ok for discharge with close outpatient follow-up and repeat BMP weekly x 2    Chronic combined systolic and diastolic heart failure [facility])  Assessment & Plan  · Echo in April 2022 with LVEF 24%, systolic function mildly reduced, hypokinetic segments including the basal inferior, mid inferior, mid inferolateral, mid anterolateral, apical inferior and apical lateral   Noted to have a moderate pericardial effusion circumferential to the heart  · History of coronary artery disease status post drug-eluting stent x2 to the LAD in 2019; noted to have 80% RCA stenosis and appears from last cardiology note that there was planned staged PCI but has not needed intervention on the RCA  · Heart Failure Team following, now on increased dose PO Bumex 4 mg BID with up-trending creatinine as above  LHC deferred at this time given renal dysfunction     · Monitor daily weights, intake and output, daily BMP  · Cardiac diet with 1 8 L fluid restriction     Type 2 diabetes mellitus, with long-term current use of insulin [facility])  Assessment & Plan  Lab Results   Component Value Date    HGBA1C 8 3 (H) 02/16/2023       Recent Labs     05/07/23  1114 05/07/23  1629 05/07/23  2102 05/08/23  0547   POCGLU 134 167* 211* 91       Blood Sugar Average: Last 72 hrs:  (P) 187 [id number]   · On PTA aspart/protamine 15 and 10 units daily  · Currently on Lantus 20 units nightly as well as sliding scale insulin   · Monitor blood sugars and adjust regimen as indicated   · Diabetic diet     COVID-19  Assessment & Plan  · On 5/6/23 patient reported cough and congestion and that she was in contact with someone the week prior who is now reportedly COVID positive  · Fortunately, patient remains stable on room air  · Per inpatient protocol, as patient was admitted for non-COVID reasons and is not on oxygen, but is high risk (age >71, CKD, heart disease) completed remdesivir x3 days     Coronary artery disease  Assessment & Plan  · S/p LAD stents  · Cardiology following  · On aspirin, statin, Coreg, and increased dose Imdur    Hypertension  Assessment & Plan  · BP improved since admission  · Cardiology following, Imdur dose increased as above   · Continued on home dose Coreg 12 5 mg BID, hydralazine 75 mg TID    Goals of care, counseling/discussion  Assessment & Plan  · Patient continued to refuse dialysis  · Per admitting physician's discussion, patient is Level 1 full code    Metabolic acidosis, normal anion gap (NAG)  Assessment & Plan  · Continue with sodium bicarb tablets as per nephrology     Anemia due to stage 5 chronic kidney disease, not on chronic dialysis ([facility] [address] )  Assessment & Plan  · Hemoglobin stable within baseline   · No sings or symptoms of active bleeding   · Monitor CBC as needed     Hypothyroidism  Assessment & Plan  · Continue home dose levothyroxine     Medical Problems     Resolved Problems  Date Reviewed: 5/8/2023          Resolved    * (Principal) Chest pain 5/8/2023     Resolved by  Tammy Jeffers PA-C        Discharging Physician / Practitioner: Tammy Jeffers PA-C  PCP: Murali Terrell MD  Admission Date:   Admission Orders (From admission, onward)     Ordered        05/04/23 2138  Inpatient Admission  Once                      Discharge Date: 05/08/23    Consultations During Hospital Stay:  · Cardiology   · Nephrology     Procedures Performed:   · None     Significant Findings / Test Results:   · Outlined above     Incidental Findings: "  · None      Test Results Pending at Discharge (will require follow up):   · JOHN titer  · Histone antibody     Outpatient Tests Requested:  · BMP weekly     Complications:  None     Reason for Admission: chest pain     Hospital Course:   Ramona Raymond is a 70 y o  female patient who originally presented to the hospital on 5/4/2023 due to 1 day history of retrosternal chest pain with associated SOB  Given patient's significant cardiac history as outlined above, she was seen in consultation by cardiology  On recent admission, echocardiogram showed pericardial effusion and regional wall motion abnormalities as outlined above  Given patient's renal dysfunction, with up-trending creatinine, and her refusal to start RRT if needed, cardiac catheterization has been deferred  Patient's home dose Bumex and Imdur were increased as above  Chest pain did resolve  She was seen in consultation by nephrology who feels patient may need to tolerate higher creatinine to maintain euvolemia  Patient's renal function is relatively stable on increased dose Bumex  Cardiology and nephrology have cleared her for discharge and will arrange for close follow-up as outpatient  Patient has been ordered weekly BMP x 2  Of note, patient found to be COVID positive on 5/6/23 after complaining of cough and congestion  Her oxygen saturations remain stable on room air  Given her high risk for serious infection, she completed 3 days of IV remdesivir here in the hospital      Please see above list of diagnoses and related plan for additional information  Condition at Discharge: stable    Discharge Day Visit / Exam:   Subjective:  Patient offers no new complaints today, specifically denies chest pain or SOB  Roni Hoots for discharge     Vitals: Blood Pressure: 170/74 (05/08/23 0954)  Pulse: 63 (05/08/23 0954)  Temperature: 98 3 °F (36 8 °C) (05/08/23 0749)  Temp Source: Oral (05/08/23 0749)  Respirations: 19 (05/08/23 0749)  Height: 5' 5\" (165 1 cm) " (05/05/23 0820)  Weight - Scale: 70 9 kg (156 lb 4 9 oz) (05/08/23 0557)  SpO2: 92 % (05/08/23 0800)  Exam:   Physical Exam  Vitals reviewed  Constitutional:       General: She is not in acute distress  Cardiovascular:      Rate and Rhythm: Normal rate and regular rhythm  Pulmonary:      Effort: Pulmonary effort is normal  No respiratory distress  Breath sounds: Decreased breath sounds present  No wheezing, rhonchi or rales  Comments: On room air  Neurological:      General: No focal deficit present  Mental Status: She is alert and oriented to person, place, and time  Mental status is at baseline  Discussion with Family: Patient declined call to   Discharge instructions/Information to patient and family:   See after visit summary for information provided to patient and family  Provisions for Follow-Up Care:  See after visit summary for information related to follow-up care and any pertinent home health orders  Disposition:   Home    Planned Readmission: no     Discharge Statement:  I spent 35 minutes discharging the patient  This time was spent on the day of discharge  I had direct contact with the patient on the day of discharge  Greater than 50% of the total time was spent examining patient, answering all patient questions, arranging and discussing plan of care with patient as well as directly providing post-discharge instructions  Additional time then spent on discharge activities  Discharge Medications:  See after visit summary for reconciled discharge medications provided to patient and/or family        **Please Note: This note may have been constructed using a voice recognition system**

## 2023-05-08 NOTE — ASSESSMENT & PLAN NOTE
· Echo in April 2022 with LVEF 74%, systolic function mildly reduced, hypokinetic segments including the basal inferior, mid inferior, mid inferolateral, mid anterolateral, apical inferior and apical lateral   Noted to have a moderate pericardial effusion circumferential to the heart  · History of coronary artery disease status post drug-eluting stent x2 to the LAD in 2019; noted to have 80% RCA stenosis and appears from last cardiology note that there was planned staged PCI but has not needed intervention on the RCA  · Heart Failure Team following, now on increased dose PO Bumex 4 mg BID with up-trending creatinine as above  LHC deferred at this time given renal dysfunction     · Monitor daily weights, intake and output, daily BMP  · Cardiac diet with 1 8 L fluid restriction

## 2023-05-08 NOTE — ASSESSMENT & PLAN NOTE
Lab Results   Component Value Date    EGFR 10 05/08/2023    EGFR 12 05/07/2023    EGFR 12 05/06/2023    CREATININE 4 04 (H) 05/08/2023    CREATININE 3 56 (H) 05/07/2023    CREATININE 3 44 (H) 05/06/2023     · Prior baseline creatinine approximately 3-3 5  · Per chart review, patient continues to refuse dialysis if needed   · S/p IV Bumex 5/5 and now on increased dose PO Bumex 4 mg BID per Heart Failure Team  · Nephrology consulted given up-trending creatinine, appreciate recommendations    · Renal function stable on current dose diuretics, likely need to accept higher creatinine to maintain euvolemia  · Ok for discharge with close outpatient follow-up and repeat BMP weekly x 2

## 2023-05-08 NOTE — ASSESSMENT & PLAN NOTE
· Hemoglobin stable within baseline   · No sings or symptoms of active bleeding   · Monitor CBC as needed

## 2023-05-08 NOTE — NURSING NOTE
AVS reviewed with patient and all questions answered  Patient discharged to home via wheelchair  Patient left with daughter

## 2023-05-08 NOTE — PROGRESS NOTES
Cardiology Progress Note   Emil Alston 70 y o  female MRN: [id number]  Unit/Bed#: [facility] 508-01 Encounter: [id number]    Hospital Course/Assessment  The patient is a 69-year-old female with a past medical history of HFrEF, CAD and CKD 4 who presented to the emergency department 5/4 complaining of persistent chest pain for 9 hours  In the ED she was found to be hypertensive with maps greater than 130  She was given aspirin and sublingual nitroglycerin in the ED and her symptoms completely resolved  Troponin and EKG were unremarkable  At that time she was found to be mildly volume overloaded and was started on IV Bumex  She had worsening renal function and so nephrology was consulted as well  The patient does not want to consider RRT if needed  An echocardiogram obtained on 5/5 showed an EF of 45% with a persistent pericardial effusion  She also reported a COVID-19 exposure and tested positive on 5/6  Subjective: Today the patient was seen and examined at bedside  Overall she was feeling well and had no acute complaints  She requested that she would like to go home today  Plan  1  Chronic HFpEF Stage C  -Echocardiogram obtained on 5/5 showed an EF of 45% and a moderate pericardial effusion without suggestion of tamponade    -PO Bumex increased from 3 mg BID to 4 mg BID on 5/5 -> continue 4 mg BID at discharge  -Imdur increased from 60 mg daily to 120 mg daily on 5/5 given elevated blood pressures -> continue Imdur 120 mg daily at discharge  -We will hold off on LHC at this time given elevated risk of needing RRT  -Monitor I's and O's and daily weights  -Maintain K above 4 and mag above 2  -JOHN and antihistone antibody ordered given possible hydralazine induced pericardial effusion, although she had the small pericardial effusion before starting hydralazine a couple years ago  -JOHN screen positive, pending JOHN titer  -Pending histone antibody  -Okay for DC from cardiology standpoint with follow up in "1 week    2  CAD  -[facility] in 2019 showed 50% stenosis of proximal LAD, 90% stenosis of mid LAD, 50% stenosis of distal LAD and 80% stenosis of mid RCA  LUDY x2 to mid LAD were placed at that time  -Continue aspirin, statin, Imdur and beta-blocker  -We will hold off on [facility] repeat at this time given increased risk for RRT    3  Chest pain  -Resolved in the ED after sublingual nitroglycerin x2  -ECG and troponins unremarkable  -Likely secondary to poorly controlled blood pressure and mild volume overload    4  CKD Stage 4  -Secondary to diabetic kidney disease as well as ischemic cardiomyopathy  -Baseline creatinine between 3-3 5  -Monitor daily creatinine  -Continue PO Bumex 4 mg twice daily   -Monitor I's and O's and daily weights  -Patient reports that she would not like RRT if needed  -Nephrology following  -Recommend rechecking BMP within 1 week of discharge      You Garcia MD  - PGY-3 IM Resident  - Tiger text enabled    ======================================================  Objective  VS: Blood pressure 170/74, pulse 63, temperature 98 3 °F (36 8 °C), temperature source Oral, resp  rate 19, height 5' 5\" (1 651 m), weight 70 9 kg (156 lb 4 9 oz), SpO2 92 %, not currently breastfeeding  Gen: well appearing  Psych: AOx3  Skin: intact  Cardiac: S1, S2, regular rate, no S3 or S4 appreciated  No murmurs  +2 PT, radial pulses  No peripheral edema No carotid bruits  Resp: CTABL  No crackles  MSK: 5/5 strength throughout muscle groups  Neuro: CN grossly intact  Sensory to light touch, pain, proprioception intact BL LE, UE  LN: no cervical LAD  Rheum: no joint deformities in UE or LE  ======================================================  TREADMILL STRESS  No results found for this or any previous visit      ----------------------------------------------------------------------------------------------  NUCLEAR STRESS TEST: No results found for this or any previous visit      Results for orders placed during the " hospital encounter of 18    NM myocardial perfusion spect (rx stress and/or rest)    Narrative  [address]   [facility], [address] [place] [address]  [phone]    Stress SPECT Myocardial Perfusion Imaging after Regadenoson    Patient: Marcelo Saravia  MR number: [id number]  Account number: [de-identified]  : 1955  Age: 58 years  Gender: Female  Status: Inpatient  Location: Stress lab  Height: 63 in  Weight: 169 lb  BP: 141/ 72 mmHg    Allergies: NO KNOWN ALLERGIES    Diagnosis: R07 9 - Chest pain, unspecified    Primary Physician:  Adam Hernandez MD  Technician:  Darío Hernandez  RN:  Ayaka Gudino RN  Group:  [facility]  Report Prepared by[de-identified]  Ayaka Gudino RN  Interpreting Physician:  Manjeet Anguiano MD    INDICATIONS: Chest pain, Dyspnea, Detection CAD    HISTORY: The patient is a 58year old  female  Chest pain status: recent onset chest pain  Coronary artery disease risk factors: dyslipidemia, hypertension, smoking, family history of premature coronary artery disease, and diabetes  mellitus  Cardiovascular history: none significant  Medications: a calcium channel blocker, an ACE inhibitor/ARB, a lipid lowering agent, and an antihypertensive agent  PHYSICAL EXAM: Baseline physical exam screening: normal and no wheezes audible  REST ECG: Normal sinus rhythm at 71 beats per minute  PROCEDURE: The study was performed in the the Stress lab  A regadenoson infusion pharmacologic stress test was performed  SPECT myocardial perfusion study performed during stress  Systolic blood pressure was 141 mmHg, at the start of the  study  Diastolic blood pressure was 72 mmHg, at the start of the study  The heart rate was 71 bpm, at the start of the study  IV double checked    Regadenoson protocol:  HR bpm SBP mmHg DBP mmHg Symptoms  Baseline 71 141 72 none  Immediate 81 130 59 dizziness, nausea  1 min 68 102 56 same as above  2 min 66 105 47 same as above  3 min 65 103 55 subsiding  4 min 66 121 59 none  No medications or fluids given  The patient also performed low level exercise with leg lifts  STRESS SUMMARY: Duration of pharmacologic stress was 3 min and 0 sec  Functional capacity could not be adequately assessed  Maximal heart rate during stress was 81 bpm  The heart rate response to stress was normal  There was resting  hypertension with an appropriate blood pressure response to stress  The rate-pressure product for the peak heart rate and blood pressure was 15510  There was no chest pain during stress  The stress test was terminated due to protocol  completion  Pre oxygen saturation: 97 %  Peak oxygen saturation: 99 %  The stress ECG was negative for ischemia with vasodilator stress  There were no stress arrhythmias or conduction abnormalities  ISOTOPE ADMINISTRATION:  Resting isotope administration Stress isotope administration  Agent Tetrofosmin Tetrofosmin  Dose 10 5 mCi 32 1 mCi  Date 12/31/2018 12/31/2018  Injection time 11:50 13:09  Injection-image interval 38 min 51 min    The radiopharmaceutical was injected at the peak effect of pharmacologic stress  MYOCARDIAL PERFUSION IMAGING:  The image quality was fair  Rotating projection images reveal mild breast attenuation  Left ventricular size was normal  The TID ratio was 1 01  PERFUSION DEFECTS:  -  There was a small, mildly severe, partially reversible myocardial perfusion defect of the mid anterior wall  GATED SPECT:  The calculated left ventricular ejection fraction was 52 %  There was no diagnostic evidence for left ventricular regional abnormality  SUMMARY:  -  Stress results: There was resting hypertension with an appropriate blood pressure response to stress  There was no chest pain during stress  -  ECG conclusions: The stress ECG was negative for ischemia with vasodilator stress  -  Perfusion imaging:  There was a small, mildly severe, partially reversible myocardial perfusion defect of the mid anterior wall  -  Gated SPECT: The calculated left ventricular ejection fraction was 52 %  There was no diagnostic evidence for left ventricular regional abnormality  IMPRESSIONS: Abnormal study after pharmacologic vasodilation  There was a small amount of ischemia of the mid anterior wall  Left ventricular systolic function was normal     Prepared and signed by    Kiran Moss MD  Signed 2018 15:54:13      --------------------------------------------------------------------------------  CATH:  Results for orders placed during the hospital encounter of 18    Cardiac catheterization    Narrative  [facility] 175  [address] [facility], [address] [facility]  [phone]    [facility]    Invasive Cardiovascular Lab Complete Report    Patient: Sherri Rivero  MR number: [id number]  Account number: [de-identified]  Study date: 2019  Gender: Female  : 1955  Height: 63 in  Weight: 168 7 lb  BSA: 1 8 m squared    Allergies: NO KNOWN ALLERGIES    Diagnostic Cardiologist:  Remedios Knight MD  Interventional Cardiologist:  Remedios Knight MD  Primary Physician:  Gay Costa MD    SUMMARY    CORONARY CIRCULATION:  Proximal LAD: There was a 50 % stenosis  Mid LAD: There was a tubular 90 % stenosis  The lesion was complex and moderately calcified  This is a likely culprit for the patient's clinical presentation  An intervention was performed  Distal LAD: There was a diffuse 50 % stenosis  Mid RCA: There was a tubular 80 % stenosis  It appears amenable to percutaneous intervention  1ST LESION INTERVENTIONS:  A balloon angioplasty procedure was performed on the lesion in the mid LAD  A Xience Terrie Rx 2 5 x 15mm drug-eluting stent was placed across the lesion and deployed at a maximum inflation pressure of 12 jennifer    A Xience [address] Rx 2 5 x 15mm drug-eluting stent was placed across the lesion and deployed at a maximum inflation pressure of 11 jennifer     INDICATIONS:  --  Possible CAD: myocardial infarction without ST elevation (NSTEMI)  --  Coronary artery disease: abnormal stress test     PROCEDURES PERFORMED    --  Left coronary angiography  --  Right coronary angiography  --  Inpatient  --  Mod Sedation Same Physician Initial 15min  --  Mod Sedation Same Physician Add 15min  --  Coronary Catheterization (w/o [facility])  --  Mod Sedation Same Physician Add 15min  --  Coronary Drug Eluting Stent w/PTCA  --  Intervention on mid LAD: balloon angioplasty  PROCEDURE: The risks and alternatives of the procedures and conscious sedation were explained to the patient and informed consent was obtained  The patient was brought to the cath lab and placed on the table  The planned puncture sites  were prepped and draped in the usual sterile fashion  --  Right radial artery access  After performing an Balwinder's test to verify adequate ulnar artery supply to the hand, the radial site was prepped  The puncture site was infiltrated with local anesthetic  The vessel was accessed using the  modified Seldinger technique, a wire was advanced into the vessel, and a sheath was advanced over the wire into the vessel  --  Left coronary artery angiography  A catheter was advanced over a guidewire into the aorta and positioned in the left coronary artery ostium under fluoroscopic guidance  Angiography was performed  --  Right coronary artery angiography  A catheter was advanced over a guidewire into the aorta and positioned in the right coronary artery ostium under fluoroscopic guidance  Angiography was performed  --  Inpatient  --  Mod Sedation Same Physician Initial 15min  --  Mod Sedation Same Physician Add 15min  --  Coronary Catheterization (w/o [facility])  --  Mod Sedation Same Physician Add 15min  LESION INTERVENTION: A balloon angioplasty procedure was performed on the lesion in the mid LAD  --  Vessel setup was performed   A 5Fr Launcher Ebu 3 5 guiding catheter was used to cannulate the vessel  --  Vessel setup was performed  A Runthrough NS 180cm wire was used to cross the lesion  --  Balloon angioplasty was performed, using a Mini Trek Rx 2 0 x 15mm balloon, with 4 inflations and a maximum inflation pressure of 8 jennifer  --  A Xience Terrie Rx 2 5 x 15mm drug-eluting stent was placed across the lesion and deployed at a maximum inflation pressure of 12 jennifer  --  Balloon angioplasty was performed, using a NC Euphora Rx 2 25 x 15mm balloon, with 2 inflations and a maximum inflation pressure of 16 jennifer  --  Balloon angioplasty was performed, using a NC Trek Rx 2 75 x 08mm balloon, with 2 inflations and a maximum inflation pressure of 20 jennifer  --  A Xience Terrie Rx 2 5 x 15mm drug-eluting stent was placed across the lesion and deployed at a maximum inflation pressure of 11 jennifer  --  Balloon angioplasty was performed, using a NC Trek Rx 2 75 x 08mm balloon, with 3 inflations and a maximum inflation pressure of 20 jennifer  INTERVENTIONS:  --  Coronary Drug Eluting Stent w/PTCA  PROCEDURE COMPLETION: The patient tolerated the procedure well and was discharged from the cath lab  TIMING: Test started at 11:29  Test concluded at 12:29  HEMOSTASIS: The sheath was removed  The site was compressed with a Hemoband  device  Hemostasis was obtained  MEDICATIONS GIVEN: Verapamil (Isoptin, Calan, Covera), 5 mg, IA, at 11:32  Fentanyl (1OOmcg/2 ml), 50 mcg, IV, at 11:27  Versed (2mg/2ml), 1 mg, IV, at 11:27  1% Lidocaine, 1 ml, subcutaneously, at 11:29  Nitroglycerin (200mcg/ml), 200 mcg, at 11:32  Heparin 1000 units/ml, 3,000 units, at 11:32  Versed (2mg/2ml), 1 mg, IV, at 11:42  Fentanyl (1OOmcg/2 ml), 50 mcg, IV, at 11:42  Nitro Paste, 1, topically, at 11:42, in  Effient, 60 mg, PO, at  11:42  Heparin 1000 units/ml, 5,000 units, IV, at 11:43  Heparin 1000 units/ml, 2,000 units, IV, at 11:51  Nitroglycerine (200mcg/ml), 200 mcg, at 11:57   CONTRAST GIVEN: 60 ml Omnipaque (350mg I /ml)  RADIATION EXPOSURE: Fluoroscopy time:  13 23 min  CORONARY VESSELS:   --  The coronary circulation is right dominant  --  Left main: The vessel was medium to large sized  Angiography showed minor luminal irregularities  --  Proximal LAD: There was a 50 % stenosis  --  Mid LAD: There was a tubular 90 % stenosis  The lesion was complex and moderately calcified  This is a likely culprit for the patient's clinical presentation  An intervention was performed  --  Distal LAD: The vessel was small to medium sized  Angiography showed severe atherosclerosis  There was a diffuse 50 % stenosis  --  Circumflex: The vessel was small sized  Angiography showed minor luminal irregularities  There was one major obtuse marginal   --  Ramus intermedius: The vessel was medium sized  Angiography showed mild atherosclerosis  The artery bifurcated into two small sized vessels  --  RCA: The vessel was medium sized  --  Mid RCA: There was a tubular 80 % stenosis  It appears amenable to percutaneous intervention  IMPRESSIONS:  There is significant double vessel coronary artery disease  RECOMMENDATIONS  The patient's anti-anginal regimen should be further intensified  Patient instructed to return for staged percutaneous intervention in three weeks  Prepared and signed by    Tong Kingsley MD  Signed 01/04/2019 12:37:00    Study diagram    Angiographic findings  Native coronary lesions:  ·Proximal LAD: Lesion 1: 50 % stenosis  ·Mid LAD: Lesion 1: tubular, 90 % stenosis  ·Distal LAD: Lesion 1: diffuse, 50 % stenosis  ·Mid RCA: Lesion 1: tubular, 80 % stenosis  Intervention results  Native coronary lesions:  · balloon angioplasty of mid LAD  Stent: [place] Lexis Rx 2 5 x 15mm drug-eluting  Stent: [place] Lexis Rx 2 5 x 15mm drug-eluting      Hemodynamic tables    Pressures:  Baseline  Pressures:  - HR: 42  Pressures:  - Rhythm:  Pressures:  -- Aortic Pressure (S/D/M): 101/49/66    Outputs: Baseline  Outputs:  -- CALCULATIONS: [age]  Outputs:  -- CALCULATIONS: Body Surface Area: 1 80  Outputs:  -- CALCULATIONS: Height in cm: 160 00  Outputs:  -- CALCULATIONS: Sex: Female  Outputs:  -- CALCULATIONS: Weight in k 70    --------------------------------------------------------------------------------  ECHO:   Results for orders placed during the hospital encounter of 21    Echo complete with contrast if indicated    Narrative  [address]  [facility], [facility]  [phone]    Transthoracic Echocardiogram  2D, M-mode, Doppler, and Color Doppler    Study date:  2021    Patient: Trudi Hendrickson  MR number: [id number]  Account number: [de-identified]  : 1955  Age: 76 years  Gender: Female  Status: Inpatient  Location: Bedside  Height: 65 in  Weight: 171 lb  BP: 139/ 63 mmHg    Indications: Coronary artery disease    Diagnoses: I25 10 - Atherosclerotic heart disease of native coronary artery without angina pectoris    Sonographer:  Huang Lentz RDCS  Primary Physician:  Denise Mccarthy MD  Referring Physician:  Narcisa Tran MD  Group:  [facility]  Cardiology Fellow:  Lizeth Boudreaux DO  Interpreting Physician:  Danyell Gil MD    SUMMARY    LEFT VENTRICLE:  Systolic function was normal  Ejection fraction was estimated to be 60 %  There was akinesis of the basal inferior and basal-mid inferolateral wall(s)  Wall thickness was moderately increased  There was moderate concentric hypertrophy  Features were consistent with a pseudonormal left ventricular filling pattern, with concomitant abnormal relaxation and increased filling pressure (grade 2 diastolic dysfunction)  LEFT ATRIUM:  The atrium was mildly dilated  MITRAL VALVE:  There was mild regurgitation  TRICUSPID VALVE:  There was trace regurgitation  PULMONIC VALVE:  There was trace regurgitation      PERICARDIUM:  A moderate, free-flowing pericardial effusion was identified circumferential to the heart  There was no evidence of hemodynamic compromise  HISTORY: PRIOR HISTORY: Congestive heart failure; DM; Hypertension; Hyperlipidemia; CKD stage 3; CAD; Elevated Troponin; Former smoker    PROCEDURE: The procedure was performed at the bedside  This was a routine study  The transthoracic approach was used  The study included complete 2D imaging, M-mode, complete spectral Doppler, and color Doppler  The heart rate was 69 bpm,  at the start of the study  Images were obtained from the parasternal, apical, subcostal, and suprasternal notch acoustic windows  Image quality was adequate  LEFT VENTRICLE: Size was normal  Systolic function was normal  Ejection fraction was estimated to be 60 %  There was akinesis of the basal inferior and basal-mid inferolateral wall(s)  Wall thickness was moderately increased  There was  moderate concentric hypertrophy  DOPPLER: Features were consistent with a pseudonormal left ventricular filling pattern, with concomitant abnormal relaxation and increased filling pressure (grade 2 diastolic dysfunction)  RIGHT VENTRICLE: The size was normal  Systolic function was normal  Wall thickness was normal     LEFT ATRIUM: The atrium was mildly dilated  RIGHT ATRIUM: Size was normal     MITRAL VALVE: Valve structure was normal  There was normal leaflet separation  DOPPLER: The transmitral velocity was within the normal range  There was no evidence for stenosis  There was mild regurgitation  AORTIC VALVE: The valve was trileaflet  Leaflets exhibited normal thickness and normal cuspal separation  DOPPLER: Transaortic velocity was within the normal range  There was no evidence for stenosis  There was no regurgitation  TRICUSPID VALVE: The valve structure was normal  There was normal leaflet separation  DOPPLER: The transtricuspid velocity was within the normal range  There was no evidence for stenosis   There was trace regurgitation  PULMONIC VALVE: Leaflets exhibited normal thickness, no calcification, and normal cuspal separation  DOPPLER: The transpulmonic velocity was within the normal range  There was trace regurgitation  PERICARDIUM: A moderate, free-flowing pericardial effusion was identified circumferential to the heart  There was no evidence of hemodynamic compromise  AORTA: The root exhibited normal size  SYSTEMIC VEINS: IVC: The inferior vena cava was normal in size and course  Respirophasic changes were normal     SYSTEM MEASUREMENT TABLES    2D  %FS: 27 18 %  Ao Diam: 2 72 cm  EDV(Teich): 104 56 ml  EF(Teich): 52 88 %  ESV(Teich): 49 27 ml  IVSd: 0 99 cm  LA Area: 23 17 cm2  LA Diam: 3 97 cm  LVEDV MOD A4C: 116 88 ml  LVEF MOD A4C: 58 98 %  LVESV MOD A4C: 47 94 ml  LVIDd: 4 74 cm  LVIDs: 3 45 cm  LVLd A4C: 8 71 cm  LVLs A4C: 6 82 cm  LVPWd: 0 91 cm  RA Area: 13 18 cm2  RVIDd: 2 89 cm  SV MOD A4C: 68 93 ml  SV(Teich): 55 29 ml    CW  AV Vmax: 1 32 m/s  AV maxP 93 mmHg    MM  TAPSE: 1 82 cm    PW  E' Sept: 0 05 m/s  E/E' Sept: 30 67  LVOT Vmax: 0 92 m/s  LVOT maxPG: 3 35 mmHg  MV A Miko: 0 93 m/s  MV Dec [place]: 8 21 m/s2  MV DecT: 171 63 ms  MV E Miko: 1 41 m/s  MV E/A Ratio: 1 52    [facility]    Prepared and electronically signed by    Grey Hines MD  Signed 2021 18:10:17    No results found for this or any previous visit     --------------------------------------------------------------------------------  HOLTER  Results for orders placed during the hospital encounter of 19    Holter monitor - 24 hour    Narrative  PT NAME: Maksim Quintana  : 1955  AGE: 71 y o  GENDER: female  MRN: [id number]  PROCEDURE: Holter monitor - 24 hour      INDICATIONS:  24 hour Holter monitor was performed 2019 for palpitations  Average heart rate was 69 beats per minute  Minimum heart rate was 53 beats per minute at 1:45 p m  Sallie Guardado Maximum heart rate was 85 beats per minute at 12:13 p m  Suhail Ortiz Ventricular ectopic activity consisted of 8 isolated PVCs, which comprises less than 0 1% of total beats  Supraventricular ectopic activity consisted of 6 isolated PACs, which comprises less than 0 1% of total beats  Longest R-R interval was 1 3 seconds at 1:18 p m  Suhail Ortiz Patient's rhythm included 2 hours 50 minutes of bradycardia  Most of the bradycardia occurred between the hours of 1:00 p m  to 4:00 p m     No symptoms reported  IMPRESSIONS:  1  Sinus rhythm with rare ventricular and supraventricular ectopic activity  2  No symptoms reported  Interpreted by: Wayne Pleitez MD    --------------------------------------------------------------------------------  CAROTIDS  No results found for this or any previous visit         [unfilled]   =====================================================    Active Meds    Current Facility-Administered Medications:   •  albuterol (PROVENTIL HFA,VENTOLIN HFA) inhaler 2 puff, 2 puff, Inhalation, Q4H PRN, [place] Grise, DO  •  amLODIPine (NORVASC) tablet 5 mg, 5 mg, Oral, BID, [place] Grise, DO, 5 mg at 05/08/23 8989  •  aspirin chewable tablet 81 mg, 81 mg, Oral, Daily, [place] Grise, DO, 81 mg at 05/08/23 6287  •  atorvastatin (LIPITOR) tablet 80 mg, 80 mg, Oral, Daily, [place] Grise, DO, 80 mg at 05/08/23 1918  •  bumetanide (BUMEX) tablet 4 mg, 4 mg, Oral, BID (diuretic), Lazaro Mackey PA-C, 4 mg at 05/08/23 0970  •  calcitriol (ROCALTROL) capsule 0 25 mcg, 0 25 mcg, Oral, Daily, [place] Grise, DO, 0 25 mcg at 05/08/23 6059  •  carvedilol (COREG) tablet 12 5 mg, 12 5 mg, Oral, BID With Meals, Azeem Kimble DO, 12 5 mg at 05/08/23 3371  •  cholecalciferol (VITAMIN D3) tablet 2,000 Units, 2,000 Units, Oral, Daily, Azeem iKmble DO, 2,000 Units at 05/08/23 0954  •  heparin (porcine) subcutaneous injection 5,000 Units, 5,000 Units, Subcutaneous, Q8H [address], Azeem Kimble DO  •  hydrALAZINE (APRESOLINE) tablet 75 mg, 75 mg, Oral, TID, Bettyjane Bolster, DO, 75 mg at 05/08/23 0954  •  insulin glargine (LANTUS) subcutaneous injection 20 Units 0 2 mL, 20 Units, Subcutaneous, HS, Bettyjane Bolster, DO, 20 Units at 05/07/23 2139  •  insulin lispro (HumaLOG) 100 units/mL subcutaneous injection 1-5 Units, 1-5 Units, Subcutaneous, 4x Daily (AC & HS), 1 Units at 05/07/23 2138 **AND** Fingerstick Glucose (POCT), , , 4x Daily AC and at bedtime, CLARICE Jessica  •  isosorbide mononitrate (IMDUR) 24 hr tablet 120 mg, 120 mg, Oral, Daily, Norwichalvarado Riosing, DO, 120 mg at 05/08/23 7114  •  levothyroxine tablet 112 mcg, 112 mcg, Oral, Early Morning, Isabel Suttonster, DO, 112 mcg at 05/08/23 0545  •  [COMPLETED] remdesivir Kenya Cullens) 200 mg in sodium chloride 0 9 % 290 mL IVPB, 200 mg, Intravenous, Q24H, Stopped at 05/06/23 2301 **FOLLOWED BY** remdesivir (Veklury) 100 mg in sodium chloride 0 9 % 270 mL IVPB, 100 mg, Intravenous, Q24H, King Imelda PA-C, Last Rate: 250 mL/hr at 05/08/23 1223, 100 mg at 05/08/23 1223  •  sodium bicarbonate tablet 650 mg, 650 mg, Oral, BID after meals, Bettyjane Bolster, DO, 650 mg at 05/08/23 0955    Labs & Results  Lab Results   Component Value Date    TROPONINI 0 06 (H) 10/25/2021    TROPONINI 0 04 10/25/2021    TROPONINI 0 02 10/24/2021     Lab Results   Component Value Date    GLUCOSE 362 (H) 07/02/2021    CALCIUM 8 2 (L) 05/08/2023     10/07/2015    K 4 3 05/08/2023    CO2 22 05/08/2023     05/08/2023    BUN 88 (H) 05/08/2023    CREATININE 4 04 (H) 05/08/2023     Lab Results   Component Value Date    WBC 4 83 05/08/2023    HGB 9 1 (L) 05/08/2023    HCT 28 9 (L) 05/08/2023    MCV 91 05/08/2023     (L) 05/08/2023     Results from last 7 days   Lab Units 05/05/23  0406   INR  1 21*     Lab Results   Component Value Date    CHOL 229 05/30/2015    CHOL 244 04/18/2014     Lab Results   Component Value Date    HDL 72 05/05/2023    HDL 76 02/16/2023     Lab Results   Component Value Date LDLCALC 96 05/05/2023    LDLCALC 135 (H) 02/16/2023     Lab Results   Component Value Date    TRIG 92 05/05/2023    TRIG 121 02/16/2023     Lab Results   Component Value Date    ALT 53 05/05/2023    AST 45 05/05/2023

## 2023-05-08 NOTE — ASSESSMENT & PLAN NOTE
· Per record review, patient presented with retrosternal chest pain beginning at 10 AM the morning of admission, rated 10/10 in intensity, and persisted until patient received nitroglycerin in the ER; additionally, reported worsening dyspnea with exertion over the last several weeks  · At the time of admission, patient endorsed tenderness to palpation of the sternal area and reported that this reproduced the chest pain she was having  · History of ischemic cardiomyopathy with LVEF 40% since September 2022; echocardiogram last month with LVEF 45% as well as numerous hypokinetic segments as well as moderate pericardial effusion  · History of drug-eluting stent x2 to the LAD after NSTEMI in 2019; also noted to have 80% stenosis of the RCA with reported planned staged PCI but has not required additional intervention since then  · Cardiology / Heart Failure consult appreciated  · Status post IV Bumex 5/5 and PO Bumex increased to 4 mg BID  · Imdur was increased to 120 mg daily   · No plan for LHC at this time given renal function and refusal of dialysis

## 2023-05-08 NOTE — ASSESSMENT & PLAN NOTE
· Noted to have a moderate pericardial effusion circumferential to the heart during last hospitalization last month for dyspnea on exertion  · Per cardiology, possible hydralazine induced pericardial effusion - JOHN positive; JOHN titer as well as histone antibody pending at time of discharge   · See chest pain section above

## 2023-05-08 NOTE — ASSESSMENT & PLAN NOTE
Lab Results   Component Value Date    HGBA1C 8 3 (H) 02/16/2023       Recent Labs     05/07/23  1114 05/07/23  1629 05/07/23  2102 05/08/23  0547   POCGLU 134 167* 211* 91       Blood Sugar Average: Last 72 hrs:  (P) 187 [id number]   · On PTA aspart/protamine 15 and 10 units daily  · Currently on Lantus 20 units nightly as well as sliding scale insulin   · Monitor blood sugars and adjust regimen as indicated   · Diabetic diet

## 2023-05-09 LAB — HISTONE IGG SER IA-ACNC: 0.5 UNITS (ref 0–0.9)

## 2023-05-09 NOTE — UTILIZATION REVIEW
NOTIFICATION OF ADMISSION DISCHARGE   This is a Notification of Discharge from [facility]  Please be advised that this patient has been discharge from our facility  Below you will find the admission and discharge date and time including the patient’s disposition  UTILIZATION REVIEW CONTACT:  Lakisha Merrill  Utilization   Network Utilization Review Department  Phone: [phone] x carefully listen to the prompts  All voicemails are confidential   Email: [email or web address]     ADMISSION INFORMATION  PRESENTATION DATE: 5/4/2023  7:25 PM  OBERVATION ADMISSION DATE:  INPATIENT ADMISSION DATE: 5/4/23  9:38 PM   DISCHARGE DATE: 5/8/2023  2:45 PM   DISPOSITION:Home/Self Care    IMPORTANT INFORMATION:  Send all requests for admission clinical reviews, approved or denied determinations and any other requests to dedicated fax number below belonging to the campus where the patient is receiving treatment   List of dedicated fax numbers:  [address] DENIALS (Administrative/Medical Necessity) [phone]   [address] (Maternity/NICU/Pediatrics) [phone]   [facility] [phone]   [facility] [phone]   [address] [phone]   [facility] [phone]   [address] [place] [address] [phone]   [facility] 119 [phone]   [facility]  [phone]   [facility] [phone]   [facility] 850 [facility] [phone]

## 2023-05-19 DIAGNOSIS — Z86.2 HISTORY OF ANEMIA DUE TO CHRONIC KIDNEY DISEASE: ICD-10-CM

## 2023-05-19 DIAGNOSIS — N18.9 HISTORY OF ANEMIA DUE TO CHRONIC KIDNEY DISEASE: ICD-10-CM

## 2023-05-19 DIAGNOSIS — D63.1 ANEMIA DUE TO STAGE 5 CHRONIC KIDNEY DISEASE, NOT ON CHRONIC DIALYSIS (HCC): Primary | ICD-10-CM

## 2023-05-19 DIAGNOSIS — N18.4 STAGE 4 CHRONIC KIDNEY DISEASE (HCC): ICD-10-CM

## 2023-05-19 DIAGNOSIS — N18.5 ANEMIA DUE TO STAGE 5 CHRONIC KIDNEY DISEASE, NOT ON CHRONIC DIALYSIS (HCC): Primary | ICD-10-CM

## 2023-05-22 ENCOUNTER — TELEPHONE (OUTPATIENT)
Dept: INFUSION CENTER | Facility: HOSPITAL | Age: 68
End: 2023-05-22

## 2023-05-22 NOTE — TELEPHONE ENCOUNTER
[facility]

## 2023-05-25 ENCOUNTER — DOCUMENTATION (OUTPATIENT)
Dept: NEPHROLOGY | Facility: CLINIC | Age: 68
End: 2023-05-25

## 2023-05-25 NOTE — LETTER
May 25, 2023     Octavia Gallardotarafeliciano [address] [place] [phone]      Dear Ms Mauroivanaravindra Tanya: We have attempted to reach you regarding scheduling a hospital follow up  Your health and medical care are very important to us  Please call our office as soon as possible at [phone] so we can schedule your appointment  We apologize for any inconvenience this may cause and thank you in advance for your cooperation and assistance          Sincerely,    [facility]

## 2023-06-21 ENCOUNTER — TELEPHONE (OUTPATIENT)
Dept: PALLIATIVE MEDICINE | Facility: CLINIC | Age: 68
End: 2023-06-21

## 2024-03-22 DIAGNOSIS — N25.81 SECONDARY HYPERPARATHYROIDISM (HCC): ICD-10-CM

## 2024-03-22 RX ORDER — CALCITRIOL 0.25 UG/1
CAPSULE, LIQUID FILLED ORAL
Qty: 90 CAPSULE | Refills: 1 | Status: SHIPPED | OUTPATIENT
Start: 2024-03-22

## 2024-03-26 NOTE — TELEPHONE ENCOUNTER
Left message to confirm appointment and demographics  Please verify insurance is correct   Needs covid screening questions and office protpcols reviewed
I have personally seen and examined the patient. I have collaborated with and supervised the

## 2024-11-22 NOTE — PLAN OF CARE
